# Patient Record
Sex: FEMALE | Race: OTHER | Employment: OTHER | URBAN - METROPOLITAN AREA
[De-identification: names, ages, dates, MRNs, and addresses within clinical notes are randomized per-mention and may not be internally consistent; named-entity substitution may affect disease eponyms.]

---

## 2018-10-21 ENCOUNTER — HOSPITAL ENCOUNTER (EMERGENCY)
Age: 53
Discharge: HOME | End: 2018-10-21
Attending: EMERGENCY MEDICINE
Payer: MEDICAID

## 2018-10-21 VITALS
SYSTOLIC BLOOD PRESSURE: 180 MMHG | TEMPERATURE: 98.3 F | OXYGEN SATURATION: 98 % | RESPIRATION RATE: 18 BRPM | HEART RATE: 86 BPM | DIASTOLIC BLOOD PRESSURE: 109 MMHG

## 2018-10-21 DIAGNOSIS — G40.909 SEIZURE DISORDER (HCC): ICD-10-CM

## 2018-10-21 DIAGNOSIS — R11.2 NAUSEA AND VOMITING, INTRACTABILITY OF VOMITING NOT SPECIFIED, UNSPECIFIED VOMITING TYPE: ICD-10-CM

## 2018-10-21 DIAGNOSIS — G43.011 INTRACTABLE MIGRAINE WITHOUT AURA AND WITH STATUS MIGRAINOSUS: Primary | ICD-10-CM

## 2018-10-21 LAB
ABSOLUTE EOS #: 0.06 K/UL (ref 0–0.44)
ABSOLUTE IMMATURE GRANULOCYTE: 0.03 K/UL (ref 0–0.3)
ABSOLUTE LYMPH #: 3.52 K/UL (ref 1.1–3.7)
ABSOLUTE MONO #: 1.04 K/UL (ref 0.1–1.2)
ALBUMIN SERPL-MCNC: 3.9 G/DL (ref 3.5–5.2)
ALBUMIN/GLOBULIN RATIO: 1.1 (ref 1–2.5)
ALP BLD-CCNC: 144 U/L (ref 35–104)
ALT SERPL-CCNC: 12 U/L (ref 5–33)
ANION GAP SERPL CALCULATED.3IONS-SCNC: 13 MMOL/L (ref 9–17)
AST SERPL-CCNC: 16 U/L
BASOPHILS # BLD: 0 % (ref 0–2)
BASOPHILS ABSOLUTE: 0.04 K/UL (ref 0–0.2)
BILIRUB SERPL-MCNC: 0.35 MG/DL (ref 0.3–1.2)
BILIRUBIN DIRECT: 0.09 MG/DL
BILIRUBIN, INDIRECT: 0.26 MG/DL (ref 0–1)
BUN BLDV-MCNC: 11 MG/DL (ref 6–20)
BUN/CREAT BLD: ABNORMAL (ref 9–20)
CALCIUM SERPL-MCNC: 9.3 MG/DL (ref 8.6–10.4)
CHLORIDE BLD-SCNC: 97 MMOL/L (ref 98–107)
CO2: 27 MMOL/L (ref 20–31)
CREAT SERPL-MCNC: 0.51 MG/DL (ref 0.5–0.9)
DIFFERENTIAL TYPE: ABNORMAL
EKG ATRIAL RATE: 76 BPM
EKG P AXIS: 62 DEGREES
EKG P-R INTERVAL: 170 MS
EKG Q-T INTERVAL: 416 MS
EKG QRS DURATION: 80 MS
EKG QTC CALCULATION (BAZETT): 468 MS
EKG R AXIS: 26 DEGREES
EKG T AXIS: 40 DEGREES
EKG VENTRICULAR RATE: 76 BPM
EOSINOPHILS RELATIVE PERCENT: 1 % (ref 1–4)
GFR AFRICAN AMERICAN: >60 ML/MIN
GFR NON-AFRICAN AMERICAN: >60 ML/MIN
GFR SERPL CREATININE-BSD FRML MDRD: ABNORMAL ML/MIN/{1.73_M2}
GFR SERPL CREATININE-BSD FRML MDRD: ABNORMAL ML/MIN/{1.73_M2}
GLOBULIN: ABNORMAL G/DL (ref 1.5–3.8)
GLUCOSE BLD-MCNC: 88 MG/DL (ref 70–99)
HCT VFR BLD CALC: 43.4 % (ref 36.3–47.1)
HEMOGLOBIN: 14.2 G/DL (ref 11.9–15.1)
IMMATURE GRANULOCYTES: 0 %
LIPASE: 50 U/L (ref 13–60)
LYMPHOCYTES # BLD: 32 % (ref 24–43)
MCH RBC QN AUTO: 27.5 PG (ref 25.2–33.5)
MCHC RBC AUTO-ENTMCNC: 32.7 G/DL (ref 28.4–34.8)
MCV RBC AUTO: 83.9 FL (ref 82.6–102.9)
MONOCYTES # BLD: 10 % (ref 3–12)
NRBC AUTOMATED: 0 PER 100 WBC
PDW BLD-RTO: 13.7 % (ref 11.8–14.4)
PHENYTOIN DATE LAST DOSE: ABNORMAL
PHENYTOIN DOSE AMOUNT: ABNORMAL
PHENYTOIN DOSE TIME: ABNORMAL
PHENYTOIN FREE: 1.4 UG/ML (ref 1–2)
PHENYTOIN LEVEL: 8.9 UG/ML (ref 10–20)
PLATELET # BLD: 361 K/UL (ref 138–453)
PLATELET ESTIMATE: ABNORMAL
PMV BLD AUTO: 8.8 FL (ref 8.1–13.5)
POC TROPONIN I: 0 NG/ML (ref 0–0.1)
POC TROPONIN I: 0.01 NG/ML (ref 0–0.1)
POC TROPONIN INTERP: NORMAL
POC TROPONIN INTERP: NORMAL
POTASSIUM SERPL-SCNC: 4.4 MMOL/L (ref 3.7–5.3)
RBC # BLD: 5.17 M/UL (ref 3.95–5.11)
RBC # BLD: ABNORMAL 10*6/UL
SEG NEUTROPHILS: 57 % (ref 36–65)
SEGMENTED NEUTROPHILS ABSOLUTE COUNT: 6.22 K/UL (ref 1.5–8.1)
SODIUM BLD-SCNC: 137 MMOL/L (ref 135–144)
TOTAL PROTEIN: 7.5 G/DL (ref 6.4–8.3)
WBC # BLD: 10.9 K/UL (ref 3.5–11.3)
WBC # BLD: ABNORMAL 10*3/UL

## 2018-10-21 PROCEDURE — 6360000002 HC RX W HCPCS: Performed by: STUDENT IN AN ORGANIZED HEALTH CARE EDUCATION/TRAINING PROGRAM

## 2018-10-21 PROCEDURE — 80186 ASSAY OF PHENYTOIN FREE: CPT

## 2018-10-21 PROCEDURE — 80048 BASIC METABOLIC PNL TOTAL CA: CPT

## 2018-10-21 PROCEDURE — 80185 ASSAY OF PHENYTOIN TOTAL: CPT

## 2018-10-21 PROCEDURE — 6370000000 HC RX 637 (ALT 250 FOR IP): Performed by: STUDENT IN AN ORGANIZED HEALTH CARE EDUCATION/TRAINING PROGRAM

## 2018-10-21 PROCEDURE — 80076 HEPATIC FUNCTION PANEL: CPT

## 2018-10-21 PROCEDURE — 83690 ASSAY OF LIPASE: CPT

## 2018-10-21 PROCEDURE — 2580000003 HC RX 258: Performed by: STUDENT IN AN ORGANIZED HEALTH CARE EDUCATION/TRAINING PROGRAM

## 2018-10-21 PROCEDURE — 96374 THER/PROPH/DIAG INJ IV PUSH: CPT

## 2018-10-21 PROCEDURE — 85025 COMPLETE CBC W/AUTO DIFF WBC: CPT

## 2018-10-21 PROCEDURE — 84484 ASSAY OF TROPONIN QUANT: CPT

## 2018-10-21 PROCEDURE — 93005 ELECTROCARDIOGRAM TRACING: CPT

## 2018-10-21 PROCEDURE — 99284 EMERGENCY DEPT VISIT MOD MDM: CPT

## 2018-10-21 RX ORDER — 0.9 % SODIUM CHLORIDE 0.9 %
500 INTRAVENOUS SOLUTION INTRAVENOUS ONCE
Status: COMPLETED | OUTPATIENT
Start: 2018-10-21 | End: 2018-10-21

## 2018-10-21 RX ORDER — PROMETHAZINE HYDROCHLORIDE 25 MG/ML
25 INJECTION, SOLUTION INTRAMUSCULAR; INTRAVENOUS ONCE
Status: COMPLETED | OUTPATIENT
Start: 2018-10-21 | End: 2018-10-21

## 2018-10-21 RX ORDER — DIPHENHYDRAMINE HCL 25 MG
25 TABLET ORAL ONCE
Status: COMPLETED | OUTPATIENT
Start: 2018-10-21 | End: 2018-10-21

## 2018-10-21 RX ADMIN — PROMETHAZINE HYDROCHLORIDE 25 MG: 25 INJECTION INTRAMUSCULAR; INTRAVENOUS at 10:21

## 2018-10-21 RX ADMIN — SODIUM CHLORIDE 500 ML: 9 INJECTION, SOLUTION INTRAVENOUS at 10:20

## 2018-10-21 RX ADMIN — DIPHENHYDRAMINE HCL 25 MG: 25 TABLET ORAL at 10:21

## 2018-10-21 ASSESSMENT — PAIN SCALES - GENERAL: PAINLEVEL_OUTOF10: 8

## 2018-10-21 ASSESSMENT — PAIN DESCRIPTION - LOCATION: LOCATION: HEAD

## 2018-10-21 ASSESSMENT — ENCOUNTER SYMPTOMS
RHINORRHEA: 0
DIARRHEA: 1
VOMITING: 1
NAUSEA: 1
SINUS PRESSURE: 0
PHOTOPHOBIA: 1
WHEEZING: 0
CONSTIPATION: 0
BLOOD IN STOOL: 0
COUGH: 0

## 2018-10-21 ASSESSMENT — PAIN DESCRIPTION - DESCRIPTORS: DESCRIPTORS: ACHING

## 2018-10-21 ASSESSMENT — PAIN DESCRIPTION - PAIN TYPE: TYPE: ACUTE PAIN

## 2018-10-21 ASSESSMENT — PAIN DESCRIPTION - FREQUENCY: FREQUENCY: CONTINUOUS

## 2018-10-21 NOTE — ED NOTES
Patient scheduled at [facility] on 10/30/18 at 11:00am to establish PCP.      Jose Linares, MSW, LSW

## 2018-10-21 NOTE — ED PROVIDER NOTES
troponin negative. Will follow up with second troponin. Point-of-care ultrasound of the gallbladder showed a non-thickened gallbladder wall, no Pericholecystic, no signs of gallbladder stones or sludge, and a negative Modi sign. Patient received Benadryl/Compazine and states her headache is starting to resolve. Currently 6/10 in pain. 11:53: Patient wants to go home. States her headache is 4/10. Will obtain second troponin. Phenytoin total 8.9. Phenytoin free 1.4. Likely discharge patient to home. Arranged ride for her to the [facility]. Asked social work to schedule her an appointment at [facility] clinic on [facility] to establish care. External referral to Neurology to establish care with local neurologist as patient just moved here from [place]. 12:14: Second Trop negative. Will discharge the patient home. Scheduled appointment at [facility] clinic on Oct 30th. External referral to Neurology. Patient states the pain has improved. FINAL IMPRESSION      1. Intractable migraine without aura and with status migrainosus    2. Nausea and vomiting, intractability of vomiting not specified, unspecified vomiting type    3. Seizure disorder [facility])          DISPOSITION/PLAN   DISPOSITION      Discharged home with scheduled follow up outpatient appointments. PATIENT REFERRED TO:  [facility]- Internal Medicine  [address]  P: [phone]  Go on 10/30/2018  Appointment at 11:00am to establish primary care doctor      DISCHARGE MEDICATIONS:  New Prescriptions    No medications on file     (Please note that portionsof this note were completed with a voice recognition program.  Efforts were made to edit the dictations but occasionally words are mis-transcribed.)  Ilda Edward MD  Internal Medicine Resident, PGY-1  [address], [place]  10/21/2018, 12:19 PM                Ilda Edward MD  Resident  10/21/18 [id number]

## 2018-10-22 ENCOUNTER — HOSPITAL ENCOUNTER (EMERGENCY)
Age: 53
Discharge: HOME | End: 2018-10-22
Attending: EMERGENCY MEDICINE
Payer: MEDICAID

## 2018-10-22 VITALS
DIASTOLIC BLOOD PRESSURE: 110 MMHG | TEMPERATURE: 98.2 F | HEART RATE: 89 BPM | SYSTOLIC BLOOD PRESSURE: 175 MMHG | OXYGEN SATURATION: 100 % | RESPIRATION RATE: 18 BRPM

## 2018-10-22 DIAGNOSIS — G43.809 OTHER MIGRAINE WITHOUT STATUS MIGRAINOSUS, NOT INTRACTABLE: Primary | ICD-10-CM

## 2018-10-22 PROCEDURE — 96374 THER/PROPH/DIAG INJ IV PUSH: CPT

## 2018-10-22 PROCEDURE — 2580000003 HC RX 258: Performed by: STUDENT IN AN ORGANIZED HEALTH CARE EDUCATION/TRAINING PROGRAM

## 2018-10-22 PROCEDURE — 96375 TX/PRO/DX INJ NEW DRUG ADDON: CPT

## 2018-10-22 PROCEDURE — 99283 EMERGENCY DEPT VISIT LOW MDM: CPT

## 2018-10-22 PROCEDURE — 6360000002 HC RX W HCPCS: Performed by: STUDENT IN AN ORGANIZED HEALTH CARE EDUCATION/TRAINING PROGRAM

## 2018-10-22 PROCEDURE — 6370000000 HC RX 637 (ALT 250 FOR IP): Performed by: STUDENT IN AN ORGANIZED HEALTH CARE EDUCATION/TRAINING PROGRAM

## 2018-10-22 RX ORDER — PHENYTOIN SODIUM 100 MG/1
100 CAPSULE, EXTENDED RELEASE ORAL ONCE
Status: COMPLETED | OUTPATIENT
Start: 2018-10-22 | End: 2018-10-22

## 2018-10-22 RX ORDER — 0.9 % SODIUM CHLORIDE 0.9 %
1000 INTRAVENOUS SOLUTION INTRAVENOUS ONCE
Status: COMPLETED | OUTPATIENT
Start: 2018-10-22 | End: 2018-10-22

## 2018-10-22 RX ORDER — DIVALPROEX SODIUM 500 MG/1
500 TABLET, EXTENDED RELEASE ORAL DAILY
Status: DISCONTINUED | OUTPATIENT
Start: 2018-10-22 | End: 2018-10-22 | Stop reason: HOSPADM

## 2018-10-22 RX ORDER — BUTALBITAL, ACETAMINOPHEN AND CAFFEINE 50; 325; 40 MG/1; MG/1; MG/1
2 TABLET ORAL ONCE
Status: COMPLETED | OUTPATIENT
Start: 2018-10-22 | End: 2018-10-22

## 2018-10-22 RX ORDER — DEXAMETHASONE SODIUM PHOSPHATE 10 MG/ML
10 INJECTION INTRAMUSCULAR; INTRAVENOUS ONCE
Status: COMPLETED | OUTPATIENT
Start: 2018-10-22 | End: 2018-10-22

## 2018-10-22 RX ORDER — DIPHENHYDRAMINE HCL 25 MG
25 TABLET ORAL ONCE
Status: COMPLETED | OUTPATIENT
Start: 2018-10-22 | End: 2018-10-22

## 2018-10-22 RX ADMIN — EXTENDED PHENYTOIN SODIUM 100 MG: 100 CAPSULE ORAL at 17:31

## 2018-10-22 RX ADMIN — BUTALBITAL, ACETAMINOPHEN, AND CAFFEINE 2 TABLET: 50; 325; 40 TABLET ORAL at 17:31

## 2018-10-22 RX ADMIN — PROCHLORPERAZINE EDISYLATE 10 MG: 5 INJECTION INTRAMUSCULAR; INTRAVENOUS at 16:49

## 2018-10-22 RX ADMIN — DEXAMETHASONE SODIUM PHOSPHATE 10 MG: 10 INJECTION INTRAMUSCULAR; INTRAVENOUS at 16:49

## 2018-10-22 RX ADMIN — SODIUM CHLORIDE 1000 ML: 9 INJECTION, SOLUTION INTRAVENOUS at 16:49

## 2018-10-22 RX ADMIN — DIPHENHYDRAMINE HCL 25 MG: 25 TABLET ORAL at 16:49

## 2018-10-22 ASSESSMENT — PAIN DESCRIPTION - FREQUENCY: FREQUENCY: CONTINUOUS

## 2018-10-22 ASSESSMENT — PAIN SCALES - GENERAL
PAINLEVEL_OUTOF10: 8
PAINLEVEL_OUTOF10: 8

## 2018-10-22 ASSESSMENT — PAIN DESCRIPTION - LOCATION: LOCATION: HEAD

## 2018-10-22 ASSESSMENT — PAIN DESCRIPTION - PAIN TYPE: TYPE: ACUTE PAIN

## 2018-10-22 NOTE — ED PROVIDER NOTES
dissection. Social History     Social History    Marital status:      Spouse name: N/A    Number of children: N/A    Years of education: N/A     Occupational History    Not on file. Social History Main Topics    Smoking status: Current Every Day Smoker     Packs/day: 0.50     Years: 12.00    Smokeless tobacco: Never Used    Alcohol use No    Drug use: No    Sexual activity: Not on file     Other Topics Concern    Not on file     Social History Narrative    No narrative on file       History reviewed. No pertinent family history. Allergies:  Bee venom; Bromide ion [bromine]; Flexeril [cyclobenzaprine]; Imitrex [sumatriptan]; Naproxen; Nsaids; Reglan [metoclopramide]; Sulfa antibiotics; Sulfadiazine; Toradol [ketorolac tromethamine]; and Tramadol    Home Medications:  Prior to Admission medications    Not on File       REVIEW OF SYSTEMS    (2-9 systems for level 4, 10 or more for level 5)      Review of Systems   Constitutional: Negative for chills, fatigue and fever. HENT: Negative for congestion and sore throat. Eyes: Negative for visual disturbance. Respiratory: Negative for cough and shortness of breath. Cardiovascular: Negative for chest pain. Gastrointestinal: Negative for abdominal pain, diarrhea, nausea and vomiting. Genitourinary: Negative for dysuria, flank pain and hematuria. Musculoskeletal: Negative for arthralgias, gait problem, neck pain and neck stiffness. Skin: Negative for rash and wound. Neurological: Positive for headaches. Negative for syncope, weakness, light-headedness and numbness. PHYSICAL EXAM   (up to 7 for level 4, 8 or more for level 5)      INITIAL VITALS:   BP (!) 175/110   Pulse 89   Temp 98.2 °F (36.8 °C) (Oral)   Resp 18   SpO2 100%     Physical Exam   Gen. Appearance: patient appears well, nondistressed. HEENT: head atraumatic, normocephalic. Pupils equal and reactive to light.  Oropharynx clear and moist.  Neck: Supple, normal range of motion. No lymphadenopathy. Pulmonary: Lungs clear to auscultation bilaterally. Equal air entry right left. Cardiovascular:  Heart sounds normal, no murmurs. Peripheral pulses strong, regular, equal.   Abdomen: Soft, nontender, nondistended. Bowel sounds normal. No palpable masses. Neurology: GCS 15. Oriented to person place and time.    -Cranial nerve exam:                        CN III, IV, VI: EOM normal                        CN V: facial sensation normal                        CN VII: normal facial expressions - symmetrical and normal eyebrow raise, eye closure, smile                        CN IX, X: uvula midline, symmetrical palate motion                        CN XI: normal symmetrical shoulder raise                        CN XII: normal tongue movement; no deviation  -Peripheral nerve exam: Normal tone and power in all 4 extremities. No sensory deficits. Skin: Warm, dry, well perfused    DIFFERENTIAL  DIAGNOSIS     PLAN (LABS / IMAGING / EKG):  No orders of the defined types were placed in this encounter. MEDICATIONS ORDERED:  Orders Placed This Encounter   Medications    prochlorperazine (COMPAZINE) injection 10 mg    diphenhydrAMINE (BENADRYL) tablet 25 mg    0.9 % sodium chloride bolus    dexamethasone (DECADRON) injection 10 mg    DISCONTD: divalproex (DEPAKOTE ER) extended release tablet 500 mg    phenytoin (DILANTIN) ER capsule 100 mg    butalbital-acetaminophen-caffeine (FIORICET, ESGIC) per tablet 2 tablet       DDX: migraine    DIAGNOSTIC RESULTS / EMERGENCY DEPARTMENT COURSE / MDM     LABS:  No results found for this visit on 10/22/18. IMPRESSION: 52-year-old female presents complaining of her typical migraine headache. Patient is afebrile, hemodynamically stable, and in no acute distress on arrival to the emergency department. Normal respiratory effort, lungs clear bilaterally, heart sounds normal, abdomen benign.   Thorough neurological examination benign. No elements of history or findings on examination to suggest infectious process; absolutely no findings of meningismus; very low clinical suspicion for CNS infection. No focal findings on exam to suggest intracranial pathology. No red flag element of patient's headache. Patient appears to have an ongoing migraine headache that is typical for her. Plan for Compazine, Benadryl, and dexamethasone to help prevent rebound headache. Will also give patient's home dose of Dilantin. Outpatient follow-up with PCP and neurologist.    Viola Osler:  None    EKG  None    All EKG's are interpreted by the Emergency Department Physician who either signs or Co-signs this chart in the absence of a cardiologist.    EMERGENCY DEPARTMENT COURSE:    Patient reports significant improvement after Compazine/Benadryl/dexamethasone, however states her symptoms are still present. Fioricet given with complete resolution of her symptoms. Dilantin given. Patient requesting discharge. I provided the patient with walk-in clinic information to establish a new primary care provider. She does plan to seek a primary care provider this week. I counseled her to return to the emergency department for any worsening symptoms, vision changes, numbness or tingling, focal weakness, slurred speech, vertigo symptoms, or for any other cares or concerns. Patient verbalized understanding and agreement with this plan. Discharged back to [facility] in stable condition and in no distress. PROCEDURES:  None    CONSULTS:  None    CRITICAL CARE:  None    FINAL IMPRESSION      1.  Other migraine without status migrainosus, not intractable          DISPOSITION / PLAN     DISPOSITION Decision To Discharge 10/22/2018 05:31:36 PM      PATIENT REFERRED TO:  Your Primary Care Provider    Schedule an appointment as soon as possible for a visit       [facility]  [address]  [address] [place] [id number]  [phone]    As

## 2018-10-24 ASSESSMENT — ENCOUNTER SYMPTOMS
ABDOMINAL PAIN: 0
COUGH: 0
SHORTNESS OF BREATH: 0
DIARRHEA: 0
NAUSEA: 0
VOMITING: 0
SORE THROAT: 0

## 2018-10-26 ENCOUNTER — HOSPITAL ENCOUNTER (OUTPATIENT)
Age: 53
Setting detail: OBSERVATION
Discharge: HOME | End: 2018-10-27
Attending: EMERGENCY MEDICINE | Admitting: EMERGENCY MEDICINE
Payer: MEDICAID

## 2018-10-26 ENCOUNTER — APPOINTMENT (OUTPATIENT)
Dept: GENERAL RADIOLOGY | Age: 53
End: 2018-10-26
Payer: MEDICAID

## 2018-10-26 ENCOUNTER — APPOINTMENT (OUTPATIENT)
Dept: CT IMAGING | Age: 53
End: 2018-10-26
Payer: MEDICAID

## 2018-10-26 DIAGNOSIS — R07.9 CHEST PAIN, UNSPECIFIED TYPE: Primary | ICD-10-CM

## 2018-10-26 LAB
ABSOLUTE EOS #: 0.05 K/UL (ref 0–0.44)
ABSOLUTE IMMATURE GRANULOCYTE: 0.08 K/UL (ref 0–0.3)
ABSOLUTE LYMPH #: 4.43 K/UL (ref 1.1–3.7)
ABSOLUTE MONO #: 0.97 K/UL (ref 0.1–1.2)
ANION GAP SERPL CALCULATED.3IONS-SCNC: 17 MMOL/L (ref 9–17)
BASOPHILS # BLD: 1 % (ref 0–2)
BASOPHILS ABSOLUTE: 0.06 K/UL (ref 0–0.2)
BNP INTERPRETATION: NORMAL
BUN BLDV-MCNC: 6 MG/DL (ref 6–20)
BUN/CREAT BLD: ABNORMAL (ref 9–20)
CALCIUM SERPL-MCNC: 9 MG/DL (ref 8.6–10.4)
CHLORIDE BLD-SCNC: 93 MMOL/L (ref 98–107)
CO2: 24 MMOL/L (ref 20–31)
CREAT SERPL-MCNC: 0.47 MG/DL (ref 0.5–0.9)
D-DIMER QUANTITATIVE: 0.52 MG/L FEU
DIFFERENTIAL TYPE: ABNORMAL
EOSINOPHILS RELATIVE PERCENT: 0 % (ref 1–4)
GFR AFRICAN AMERICAN: >60 ML/MIN
GFR NON-AFRICAN AMERICAN: >60 ML/MIN
GFR SERPL CREATININE-BSD FRML MDRD: ABNORMAL ML/MIN/{1.73_M2}
GFR SERPL CREATININE-BSD FRML MDRD: ABNORMAL ML/MIN/{1.73_M2}
GLUCOSE BLD-MCNC: 94 MG/DL (ref 70–99)
HCT VFR BLD CALC: 42.7 % (ref 36.3–47.1)
HEMOGLOBIN: 13.5 G/DL (ref 11.9–15.1)
IMMATURE GRANULOCYTES: 1 %
LYMPHOCYTES # BLD: 37 % (ref 24–43)
MCH RBC QN AUTO: 27.5 PG (ref 25.2–33.5)
MCHC RBC AUTO-ENTMCNC: 31.6 G/DL (ref 28.4–34.8)
MCV RBC AUTO: 87 FL (ref 82.6–102.9)
MONOCYTES # BLD: 8 % (ref 3–12)
NRBC AUTOMATED: 0 PER 100 WBC
PDW BLD-RTO: 14.1 % (ref 11.8–14.4)
PLATELET # BLD: 288 K/UL (ref 138–453)
PLATELET ESTIMATE: ABNORMAL
PMV BLD AUTO: 9 FL (ref 8.1–13.5)
POC TROPONIN I: 0 NG/ML (ref 0–0.1)
POC TROPONIN I: 0.01 NG/ML (ref 0–0.1)
POC TROPONIN INTERP: NORMAL
POC TROPONIN INTERP: NORMAL
POTASSIUM SERPL-SCNC: 3.9 MMOL/L (ref 3.7–5.3)
PRO-BNP: 84 PG/ML
RBC # BLD: 4.91 M/UL (ref 3.95–5.11)
RBC # BLD: ABNORMAL 10*6/UL
SEG NEUTROPHILS: 53 % (ref 36–65)
SEGMENTED NEUTROPHILS ABSOLUTE COUNT: 6.38 K/UL (ref 1.5–8.1)
SODIUM BLD-SCNC: 134 MMOL/L (ref 135–144)
WBC # BLD: 12 K/UL (ref 3.5–11.3)
WBC # BLD: ABNORMAL 10*3/UL

## 2018-10-26 PROCEDURE — G0378 HOSPITAL OBSERVATION PER HR: HCPCS

## 2018-10-26 PROCEDURE — 71260 CT THORAX DX C+: CPT

## 2018-10-26 PROCEDURE — 6370000000 HC RX 637 (ALT 250 FOR IP): Performed by: EMERGENCY MEDICINE

## 2018-10-26 PROCEDURE — 6360000002 HC RX W HCPCS: Performed by: EMERGENCY MEDICINE

## 2018-10-26 PROCEDURE — 96376 TX/PRO/DX INJ SAME DRUG ADON: CPT

## 2018-10-26 PROCEDURE — 96374 THER/PROPH/DIAG INJ IV PUSH: CPT

## 2018-10-26 PROCEDURE — 71046 X-RAY EXAM CHEST 2 VIEWS: CPT

## 2018-10-26 PROCEDURE — 2580000003 HC RX 258: Performed by: EMERGENCY MEDICINE

## 2018-10-26 PROCEDURE — 80048 BASIC METABOLIC PNL TOTAL CA: CPT

## 2018-10-26 PROCEDURE — 85379 FIBRIN DEGRADATION QUANT: CPT

## 2018-10-26 PROCEDURE — 93005 ELECTROCARDIOGRAM TRACING: CPT

## 2018-10-26 PROCEDURE — 85025 COMPLETE CBC W/AUTO DIFF WBC: CPT

## 2018-10-26 PROCEDURE — 99285 EMERGENCY DEPT VISIT HI MDM: CPT

## 2018-10-26 PROCEDURE — 96375 TX/PRO/DX INJ NEW DRUG ADDON: CPT

## 2018-10-26 PROCEDURE — 6360000004 HC RX CONTRAST MEDICATION: Performed by: EMERGENCY MEDICINE

## 2018-10-26 PROCEDURE — 84484 ASSAY OF TROPONIN QUANT: CPT

## 2018-10-26 PROCEDURE — 83880 ASSAY OF NATRIURETIC PEPTIDE: CPT

## 2018-10-26 RX ORDER — ACETAMINOPHEN 325 MG/1
650 TABLET ORAL EVERY 4 HOURS PRN
Status: DISCONTINUED | OUTPATIENT
Start: 2018-10-26 | End: 2018-10-27 | Stop reason: HOSPADM

## 2018-10-26 RX ORDER — ASPIRIN 81 MG/1
324 TABLET, CHEWABLE ORAL ONCE
Status: COMPLETED | OUTPATIENT
Start: 2018-10-26 | End: 2018-10-26

## 2018-10-26 RX ORDER — SODIUM CHLORIDE 9 MG/ML
INJECTION, SOLUTION INTRAVENOUS CONTINUOUS
Status: DISCONTINUED | OUTPATIENT
Start: 2018-10-27 | End: 2018-10-27

## 2018-10-26 RX ORDER — DIVALPROEX SODIUM 500 MG/1
500 TABLET, EXTENDED RELEASE ORAL DAILY
Status: DISCONTINUED | OUTPATIENT
Start: 2018-10-26 | End: 2018-10-27 | Stop reason: HOSPADM

## 2018-10-26 RX ORDER — ONDANSETRON 2 MG/ML
4 INJECTION INTRAMUSCULAR; INTRAVENOUS EVERY 8 HOURS PRN
Status: DISCONTINUED | OUTPATIENT
Start: 2018-10-26 | End: 2018-10-27 | Stop reason: HOSPADM

## 2018-10-26 RX ORDER — MORPHINE SULFATE 4 MG/ML
4 INJECTION, SOLUTION INTRAMUSCULAR; INTRAVENOUS ONCE
Status: COMPLETED | OUTPATIENT
Start: 2018-10-26 | End: 2018-10-26

## 2018-10-26 RX ORDER — MORPHINE SULFATE 4 MG/ML
4 INJECTION, SOLUTION INTRAMUSCULAR; INTRAVENOUS
Status: DISCONTINUED | OUTPATIENT
Start: 2018-10-26 | End: 2018-10-27

## 2018-10-26 RX ORDER — SODIUM CHLORIDE 0.9 % (FLUSH) 0.9 %
10 SYRINGE (ML) INJECTION PRN
Status: DISCONTINUED | OUTPATIENT
Start: 2018-10-26 | End: 2018-10-27 | Stop reason: HOSPADM

## 2018-10-26 RX ORDER — DIVALPROEX SODIUM 500 MG/1
500 TABLET, DELAYED RELEASE ORAL 3 TIMES DAILY
Status: ON HOLD | COMMUNITY
End: 2019-01-11 | Stop reason: HOSPADM

## 2018-10-26 RX ORDER — ONDANSETRON 2 MG/ML
4 INJECTION INTRAMUSCULAR; INTRAVENOUS ONCE
Status: COMPLETED | OUTPATIENT
Start: 2018-10-26 | End: 2018-10-26

## 2018-10-26 RX ORDER — PHENYTOIN SODIUM 100 MG/1
300 CAPSULE, EXTENDED RELEASE ORAL 3 TIMES DAILY
Status: ON HOLD | COMMUNITY
End: 2019-01-11 | Stop reason: HOSPADM

## 2018-10-26 RX ORDER — MONTELUKAST SODIUM 10 MG/1
10 TABLET ORAL NIGHTLY
Status: ON HOLD | COMMUNITY
End: 2019-01-12 | Stop reason: ALTCHOICE

## 2018-10-26 RX ORDER — PHENYTOIN SODIUM 100 MG/1
300 CAPSULE, EXTENDED RELEASE ORAL ONCE
Status: COMPLETED | OUTPATIENT
Start: 2018-10-26 | End: 2018-10-26

## 2018-10-26 RX ORDER — MORPHINE SULFATE 2 MG/ML
2 INJECTION, SOLUTION INTRAMUSCULAR; INTRAVENOUS
Status: DISCONTINUED | OUTPATIENT
Start: 2018-10-26 | End: 2018-10-27

## 2018-10-26 RX ORDER — SODIUM CHLORIDE 0.9 % (FLUSH) 0.9 %
10 SYRINGE (ML) INJECTION EVERY 12 HOURS SCHEDULED
Status: DISCONTINUED | OUTPATIENT
Start: 2018-10-26 | End: 2018-10-27 | Stop reason: HOSPADM

## 2018-10-26 RX ORDER — LEVOTHYROXINE SODIUM 0.05 MG/1
50 TABLET ORAL DAILY
COMMUNITY
End: 2019-10-15 | Stop reason: SDUPTHER

## 2018-10-26 RX ORDER — AMLODIPINE BESYLATE 5 MG/1
5 TABLET ORAL DAILY
Status: ON HOLD | COMMUNITY
End: 2019-01-15 | Stop reason: HOSPADM

## 2018-10-26 RX ORDER — PHENOBARBITAL 30 MG/1
100 TABLET ORAL 3 TIMES DAILY
Status: ON HOLD | COMMUNITY
End: 2019-01-11 | Stop reason: HOSPADM

## 2018-10-26 RX ORDER — FENTANYL CITRATE 50 UG/ML
50 INJECTION, SOLUTION INTRAMUSCULAR; INTRAVENOUS ONCE
Status: COMPLETED | OUTPATIENT
Start: 2018-10-26 | End: 2018-10-26

## 2018-10-26 RX ORDER — BUTALBITAL, ACETAMINOPHEN AND CAFFEINE 50; 325; 40 MG/1; MG/1; MG/1
2 TABLET ORAL EVERY 4 HOURS PRN
Status: ON HOLD | COMMUNITY
End: 2019-01-12 | Stop reason: ALTCHOICE

## 2018-10-26 RX ADMIN — MORPHINE SULFATE 4 MG: 4 INJECTION INTRAVENOUS at 17:32

## 2018-10-26 RX ADMIN — SODIUM CHLORIDE: 9 INJECTION, SOLUTION INTRAVENOUS at 23:12

## 2018-10-26 RX ADMIN — IOPAMIDOL 75 ML: 755 INJECTION, SOLUTION INTRAVENOUS at 18:35

## 2018-10-26 RX ADMIN — DIVALPROEX SODIUM 500 MG: 500 TABLET, EXTENDED RELEASE ORAL at 23:11

## 2018-10-26 RX ADMIN — FENTANYL CITRATE 50 MCG: 50 INJECTION INTRAMUSCULAR; INTRAVENOUS at 18:53

## 2018-10-26 RX ADMIN — EXTENDED PHENYTOIN SODIUM 300 MG: 100 CAPSULE ORAL at 23:11

## 2018-10-26 RX ADMIN — ONDANSETRON 4 MG: 2 INJECTION INTRAMUSCULAR; INTRAVENOUS at 17:32

## 2018-10-26 RX ADMIN — MORPHINE SULFATE 4 MG: 4 INJECTION INTRAVENOUS at 23:11

## 2018-10-26 RX ADMIN — Medication 10 ML: at 23:11

## 2018-10-26 RX ADMIN — ONDANSETRON 4 MG: 2 INJECTION INTRAMUSCULAR; INTRAVENOUS at 20:07

## 2018-10-26 RX ADMIN — ASPIRIN 81 MG 324 MG: 81 TABLET ORAL at 17:00

## 2018-10-26 ASSESSMENT — PAIN DESCRIPTION - FREQUENCY: FREQUENCY: CONTINUOUS

## 2018-10-26 ASSESSMENT — ENCOUNTER SYMPTOMS
EYE PAIN: 0
SHORTNESS OF BREATH: 1
NAUSEA: 1
VOMITING: 0
RHINORRHEA: 0
COUGH: 0
ABDOMINAL PAIN: 0
DIARRHEA: 0
CONSTIPATION: 0

## 2018-10-26 ASSESSMENT — HEART SCORE: ECG: 0

## 2018-10-26 ASSESSMENT — PAIN DESCRIPTION - ORIENTATION
ORIENTATION: LEFT;LOWER
ORIENTATION: LEFT;MID

## 2018-10-26 ASSESSMENT — PAIN DESCRIPTION - LOCATION
LOCATION: CHEST
LOCATION: CHEST

## 2018-10-26 ASSESSMENT — PAIN SCALES - GENERAL
PAINLEVEL_OUTOF10: 9
PAINLEVEL_OUTOF10: 5
PAINLEVEL_OUTOF10: 9
PAINLEVEL_OUTOF10: 6
PAINLEVEL_OUTOF10: 8

## 2018-10-26 ASSESSMENT — PAIN DESCRIPTION - DESCRIPTORS: DESCRIPTORS: PRESSURE

## 2018-10-26 ASSESSMENT — PAIN DESCRIPTION - PAIN TYPE
TYPE: ACUTE PAIN
TYPE: ACUTE PAIN

## 2018-10-26 NOTE — H&P
CC: L sided Chest pain     HPI:  Joni Burkett, 51 yo. F presents to the ED w/ progressive intermittent L sided chest pain x 1 day. Pain radiates up R arm and jaw. Pain 8/10. Associated with nausea, headache and SOB. Patient took 3 dose of nitro and 1 baby aspirin at home yesterday with no relief. Patient took 1 dose of Nitro today prior to coming to ED with no relief. Patient has h/o HTN. In February patient had cardiac cath performed was advised to receive several stents but did not follow through with procedure. Patient denies diaphoresis, fever, chills, vomiting.

## 2018-10-26 NOTE — ED TRIAGE NOTES
Pt. Arrives to ED via private auto for c/o left sided chest pain x2  Days. Pt .reports she took aspirin yesterday and SL nitro, took 3 nitros today last dose approximately 30 minutes PTA without relief. Pt. States that she has been evaluated by a cardiologist in [place] for known blockages but has been unable to follow up since relocating here 09/2018. Pt. States she has been nauseous but denies vomiting.

## 2018-10-26 NOTE — ED PROVIDER NOTES
[facility] ED  Emergency Department Encounter  EmergencyMedicine Resident     Pt Name:Aleta Hernandez  MRN: [id number]  [place] 1965  Date of evaluation: 10/26/18  PCP:  No primary care provider on file. CHIEF COMPLAINT       Chief Complaint   Patient presents with    Chest Pain     x2 days, pt. took 3 SL nitro at home without relief. pt. has known blockage no cardiologist        HISTORY OF PRESENT ILLNESS  (Location/Symptom, Timing/Onset, Context/Setting, Quality, Duration, Modifying Factors, Severity.)      Peter Adams is a 52 y.o. female who presents presents to the ED w/ progressive intermittent L sided chest pain x 1 day. Pain radiates up R arm and jaw. Pain 8/10. Associated with nausea, headache and SOB. Patient took 3 dose of nitro and 1 baby aspirin at home yesterday with no relief. Patient took 1 dose of Nitro today prior to coming to ED with no relief. Patient has h/o HTN. In February patient [place] cardiac cath [place] was advised to receive several stents but did not follow through with procedure. Patient denies diaphoresis, fever, chills, vomiting. HPI written by medical student and edited by myself    PAST MEDICAL / SURGICAL / SOCIAL / FAMILY HISTORY      has a past medical history of CHF (congestive heart failure) ([address] COPD (chronic obstructive pulmonary disease) ([address] Headache; Neck fracture ([address] Seizure ([address] and Thyroid disease. has a past surgical history that includes knee surgery; partial hysterectomy (cervix not removed); and lymph node dissection. Social History     Social History    Marital status:      Spouse name: N/A    Number of children: N/A    Years of education: N/A     Occupational History    Not on file.      Social History Main Topics    Smoking status: Current Every Day Smoker     Packs/day: 0.50     Years: 12.00    Smokeless tobacco: Never Used    Alcohol use No    Drug use: No    Sexual activity: Not on %    Monocytes 8 3 - 12 %    Eosinophils % 0 (L) 1 - 4 %    Basophils 1 0 - 2 %    Immature Granulocytes 1 (H) 0 %    Segs Absolute 6.38 1.50 - 8.10 k/uL    Absolute Lymph # 4.43 (H) 1.10 - 3.70 k/uL    Absolute Mono # 0.97 0.10 - 1.20 k/uL    Absolute Eos # 0.05 0.00 - 0.44 k/uL    Basophils # 0.06 0.00 - 0.20 k/uL    Absolute Immature Granulocyte 0.08 0.00 - 0.30 k/uL   Basic Metabolic Panel   Result Value Ref Range    Glucose 94 70 - 99 mg/dL    BUN 6 6 - 20 mg/dL    CREATININE 0.47 (L) 0.50 - 0.90 mg/dL    Bun/Cre Ratio NOT REPORTED 9 - 20    Calcium 9.0 8.6 - 10.4 mg/dL    Sodium 134 (L) 135 - 144 mmol/L    Potassium 3.9 3.7 - 5.3 mmol/L    Chloride 93 (L) 98 - 107 mmol/L    CO2 24 20 - 31 mmol/L    Anion Gap 17 9 - 17 mmol/L    GFR Non-African American >60 >60 mL/min    GFR African American >60 >60 mL/min    GFR Comment          GFR Staging NOT REPORTED    D-Dimer, Quantitative   Result Value Ref Range    D-Dimer, Quant 0.52 mg/L FEU   Brain Natriuretic Peptide   Result Value Ref Range    Pro-BNP 84 <300 pg/mL    BNP Interpretation         POCT troponin   Result Value Ref Range    POC Troponin I 0.01 0.00 - 0.10 ng/mL    POC Troponin Interp       The Troponin-I (POC) results cannot be compared to the Troponin-T results. POCT troponin   Result Value Ref Range    POC Troponin I 0.00 0.00 - 0.10 ng/mL    POC Troponin Interp       The Troponin-I (POC) results cannot be compared to the Troponin-T results. IMPRESSION: Patient evaluated by myself and attending physician. Patient appears uncomfortable but no acute distress. Vital signs normal on arrival other slight hypertension. Afebrile. Heart normal rate and rhythm. Lungs auscultation bilaterally. We'll continue cardiac workup and d-dimer as patient recently traveled from [place] and states the pain is worse with deep breathing. We'll continue with cardiac workup, d-dimer, and symptomatic management. We'll give full dose aspirin.     RADIOLOGY:  Xr Chest Standard (2 Vw)    Result Date: 10/26/2018  EXAMINATION: TWO VIEWS OF THE CHEST 10/26/2018 5:30 pm COMPARISON: None. HISTORY: ORDERING SYSTEM PROVIDED HISTORY: chest pain TECHNOLOGIST PROVIDED HISTORY: chest pain FINDINGS: Frontal and lateral views of the chest are submitted for review. The cardiac silhouette is normal in size. Lung parenchyma is clear without focal airspace consolidation, sizeable pleural effusion, or pneumothorax. Trachea is midline. Osseous structures and soft tissues are grossly intact. No acute cardiopulmonary pathology. Ct Chest Pulmonary Embolism W Contrast    Result Date: 10/26/2018  EXAMINATION: CTA OF THE CHEST 10/26/2018 6:38 pm TECHNIQUE: CTA of the chest was performed after the administration of intravenous contrast.  Multiplanar reformatted images are provided for review. MIP images are provided for review. Dose modulation, iterative reconstruction, and/or weight based adjustment of the mA/kV was utilized to reduce the radiation dose to as low as reasonably achievable. COMPARISON: None. HISTORY: ORDERING SYSTEM PROVIDED HISTORY: elevated ddimer FINDINGS: Pulmonary Arteries: Pulmonary arteries are adequately opacified for evaluation. No evidence of intraluminal filling defect to suggest pulmonary embolism. Main pulmonary artery is normal in caliber. Mediastinum: No evidence of mediastinal lymphadenopathy. The heart and pericardium demonstrate no acute abnormality. There is no acute abnormality of the thoracic aorta. Lungs/pleura: The lungs are without acute process. No focal consolidation or pulmonary edema. No evidence of pleural effusion or pneumothorax. Upper Abdomen: Limited images of the upper abdomen are unremarkable. Soft Tissues/Bones: No acute bone or soft tissue abnormality. No evidence of pulmonary embolism or acute pulmonary abnormality.        EKG  EKG Interpretation    Interpreted by me    Rhythm: normal sinus   Rate: normal  Axis: Resident    (Please note that portions of thisnote were completed with a voice recognition program.  Efforts were made to edit the dictations but occasionally words are mis-transcribed.)      Ramez Emmanuel DO  10/26/18 1333

## 2018-10-27 ENCOUNTER — APPOINTMENT (OUTPATIENT)
Dept: NUCLEAR MEDICINE | Age: 53
End: 2018-10-27
Payer: MEDICAID

## 2018-10-27 VITALS
DIASTOLIC BLOOD PRESSURE: 80 MMHG | SYSTOLIC BLOOD PRESSURE: 126 MMHG | RESPIRATION RATE: 16 BRPM | HEART RATE: 76 BPM | OXYGEN SATURATION: 95 % | HEIGHT: 62 IN | BODY MASS INDEX: 31.45 KG/M2 | TEMPERATURE: 98.2 F | WEIGHT: 170.9 LBS

## 2018-10-27 LAB
EKG ATRIAL RATE: 69 BPM
EKG ATRIAL RATE: 74 BPM
EKG ATRIAL RATE: 87 BPM
EKG P AXIS: 61 DEGREES
EKG P AXIS: 61 DEGREES
EKG P AXIS: 66 DEGREES
EKG P-R INTERVAL: 158 MS
EKG P-R INTERVAL: 174 MS
EKG P-R INTERVAL: 182 MS
EKG Q-T INTERVAL: 380 MS
EKG Q-T INTERVAL: 414 MS
EKG Q-T INTERVAL: 432 MS
EKG QRS DURATION: 80 MS
EKG QRS DURATION: 80 MS
EKG QRS DURATION: 82 MS
EKG QTC CALCULATION (BAZETT): 457 MS
EKG QTC CALCULATION (BAZETT): 459 MS
EKG QTC CALCULATION (BAZETT): 462 MS
EKG R AXIS: 19 DEGREES
EKG R AXIS: 24 DEGREES
EKG R AXIS: 28 DEGREES
EKG T AXIS: 36 DEGREES
EKG T AXIS: 41 DEGREES
EKG T AXIS: 48 DEGREES
EKG VENTRICULAR RATE: 69 BPM
EKG VENTRICULAR RATE: 74 BPM
EKG VENTRICULAR RATE: 87 BPM
LV EF: 77 %
LVEF MODALITY: NORMAL

## 2018-10-27 PROCEDURE — 78452 HT MUSCLE IMAGE SPECT MULT: CPT

## 2018-10-27 PROCEDURE — G0378 HOSPITAL OBSERVATION PER HR: HCPCS

## 2018-10-27 PROCEDURE — A9500 TC99M SESTAMIBI: HCPCS | Performed by: INTERNAL MEDICINE

## 2018-10-27 PROCEDURE — 3430000000 HC RX DIAGNOSTIC RADIOPHARMACEUTICAL: Performed by: INTERNAL MEDICINE

## 2018-10-27 PROCEDURE — 6370000000 HC RX 637 (ALT 250 FOR IP): Performed by: EMERGENCY MEDICINE

## 2018-10-27 PROCEDURE — 96376 TX/PRO/DX INJ SAME DRUG ADON: CPT

## 2018-10-27 PROCEDURE — 6360000002 HC RX W HCPCS: Performed by: INTERNAL MEDICINE

## 2018-10-27 PROCEDURE — 93005 ELECTROCARDIOGRAM TRACING: CPT

## 2018-10-27 PROCEDURE — 2580000003 HC RX 258: Performed by: INTERNAL MEDICINE

## 2018-10-27 PROCEDURE — 2580000003 HC RX 258: Performed by: EMERGENCY MEDICINE

## 2018-10-27 PROCEDURE — 6360000002 HC RX W HCPCS: Performed by: EMERGENCY MEDICINE

## 2018-10-27 PROCEDURE — 93017 CV STRESS TEST TRACING ONLY: CPT | Performed by: NURSE PRACTITIONER

## 2018-10-27 RX ORDER — AMINOPHYLLINE DIHYDRATE 25 MG/ML
100 INJECTION, SOLUTION INTRAVENOUS
Status: DISCONTINUED | OUTPATIENT
Start: 2018-10-27 | End: 2018-10-27

## 2018-10-27 RX ORDER — NITROGLYCERIN 0.4 MG/1
0.4 TABLET SUBLINGUAL EVERY 5 MIN PRN
Status: DISCONTINUED | OUTPATIENT
Start: 2018-10-27 | End: 2018-10-27

## 2018-10-27 RX ORDER — METOPROLOL TARTRATE 5 MG/5ML
2.5 INJECTION INTRAVENOUS PRN
Status: DISCONTINUED | OUTPATIENT
Start: 2018-10-27 | End: 2018-10-27

## 2018-10-27 RX ORDER — SODIUM CHLORIDE 0.9 % (FLUSH) 0.9 %
10 SYRINGE (ML) INJECTION PRN
Status: DISCONTINUED | OUTPATIENT
Start: 2018-10-27 | End: 2018-10-27

## 2018-10-27 RX ORDER — SODIUM CHLORIDE 9 MG/ML
INJECTION, SOLUTION INTRAVENOUS ONCE
Status: COMPLETED | OUTPATIENT
Start: 2018-10-27 | End: 2018-10-27

## 2018-10-27 RX ADMIN — ONDANSETRON 4 MG: 2 INJECTION INTRAMUSCULAR; INTRAVENOUS at 14:01

## 2018-10-27 RX ADMIN — Medication 10 ML: at 12:40

## 2018-10-27 RX ADMIN — REGADENOSON 0.4 MG: 0.08 INJECTION, SOLUTION INTRAVENOUS at 10:33

## 2018-10-27 RX ADMIN — DIVALPROEX SODIUM 500 MG: 500 TABLET, EXTENDED RELEASE ORAL at 08:20

## 2018-10-27 RX ADMIN — MORPHINE SULFATE 2 MG: 2 INJECTION, SOLUTION INTRAMUSCULAR; INTRAVENOUS at 08:27

## 2018-10-27 RX ADMIN — MORPHINE SULFATE 2 MG: 2 INJECTION, SOLUTION INTRAMUSCULAR; INTRAVENOUS at 14:01

## 2018-10-27 RX ADMIN — SODIUM CHLORIDE: 9 INJECTION, SOLUTION INTRAVENOUS at 10:03

## 2018-10-27 RX ADMIN — TETRAKIS(2-METHOXYISOBUTYLISOCYANIDE)COPPER(I) TETRAFLUOROBORATE 11.1 MILLICURIE: 1 INJECTION, POWDER, LYOPHILIZED, FOR SOLUTION INTRAVENOUS at 13:35

## 2018-10-27 RX ADMIN — MORPHINE SULFATE 4 MG: 4 INJECTION INTRAVENOUS at 02:36

## 2018-10-27 RX ADMIN — MORPHINE SULFATE 4 MG: 4 INJECTION INTRAVENOUS at 04:56

## 2018-10-27 RX ADMIN — TETRAKIS(2-METHOXYISOBUTYLISOCYANIDE)COPPER(I) TETRAFLUOROBORATE 39.1 MILLICURIE: 1 INJECTION, POWDER, LYOPHILIZED, FOR SOLUTION INTRAVENOUS at 13:34

## 2018-10-27 RX ADMIN — ONDANSETRON 4 MG: 2 INJECTION INTRAMUSCULAR; INTRAVENOUS at 04:56

## 2018-10-27 RX ADMIN — Medication 10 ML: at 10:03

## 2018-10-27 ASSESSMENT — PAIN SCALES - GENERAL
PAINLEVEL_OUTOF10: 7
PAINLEVEL_OUTOF10: 7
PAINLEVEL_OUTOF10: 5
PAINLEVEL_OUTOF10: 5
PAINLEVEL_OUTOF10: 6
PAINLEVEL_OUTOF10: 6

## 2018-10-27 ASSESSMENT — PAIN DESCRIPTION - LOCATION: LOCATION: CHEST

## 2018-10-27 ASSESSMENT — PAIN DESCRIPTION - DESCRIPTORS: DESCRIPTORS: DISCOMFORT

## 2018-10-27 ASSESSMENT — PAIN DESCRIPTION - FREQUENCY: FREQUENCY: INTERMITTENT

## 2018-10-27 ASSESSMENT — PAIN DESCRIPTION - PAIN TYPE: TYPE: ACUTE PAIN

## 2018-10-27 NOTE — CONSULTS
congestion or hives. PHYSICAL EXAM:    Physical Examination:    BP (!) 168/108   Pulse 81   Temp 98.8 °F (37.1 °C) (Oral)   Resp 16   Ht 5' 2\" (1.575 m)   Wt 170 lb 14.4 oz (77.5 kg)   SpO2 95%   BMI 31.26 kg/m²    Constitutional and General Appearance: alert, cooperative, no distress and appears stated age  HEENT: PERRL, no cervical lymphadenopathy. No masses palpable. Normal oral mucosa  Respiratory:  · Normal excursion and expansion without use of accessory muscles  · Resp Auscultation: Good respiratory effort. No for increased work of breathing. On auscultation: clear to auscultation bilaterally  Cardiovascular:  · The apical impulse is not displaced  · Heart tones are crisp and normal. regular S1 and S2. Murmurs:  · Jugular venous pulsation Normal  · The carotid upstroke is normal in amplitude and contour without delay or bruit  · Peripheral pulses are symmetrical and full   Abdomen:  · No masses or tenderness  · Bowel sounds present  Extremities:  ·  No Cyanosis or Clubbing  ·  Lower extremity edema: Yes  ·  Skin: Warm and dry  Neurological:  · Alert and oriented. · Moves all extremities well  · No abnormalities of mood, affect, memory, mentation, or behavior are noted    DATA:    Diagnostics:      EKG: normal EKG, normal sinus rhythm, nonspecific ST and T waves changes, unchanged from previous tracings. Labs:     CBC:   Recent Labs      10/26/18   1702   WBC  12.0*   HGB  13.5   HCT  42.7   PLT  288     BMP:   Recent Labs      10/26/18   1702   NA  134*   K  3.9   CO2  24   BUN  6   CREATININE  0.47*   LABGLOM  >60   GLUCOSE  94     CARDIAC ENZYMES:  Recent Labs      10/26/18   1656  10/26/18   1851   TROPONINI  0.01  0.00           Patient Active Problem List   Diagnosis    Chest pain     IMPRESSION &  RECOMMENDATIONS:    1. Atypical chest pain. Possibly ACS. Has significant risk factors. Follow troponins. EKG unremarkable. Acute MI can be ruled out by serial troponins.  Check stress test. Further recommendations post testing. Check lipids, CMP, A1c.    2. Aggressive lifestyle and risk factor modifications discussed extensively with patient. 3. Further recs after above testing. Discussed with patient and nursing.     Electronically signed by Gerry Mo DO on 10/27/2018 at 8:07 AM.  [place] cardiology Consultant

## 2018-10-27 NOTE — H&P
None    REVIEW OF SYSTEMS       General ROS - No fevers, No malaise, diaphoresis  Ophthalmic ROS - No discharge, No changes in vision  ENT ROS -  No sore throat, No rhinorrhea,   Respiratory ROS - no shortness of breath, no cough, no  wheezing  Cardiovascular ROS - chest pain, no dyspnea on exertion  Gastrointestinal ROS - No abdominal pain, nausea, no vomiting, no change in bowel habits, no black or bloody stools  Genito-Urinary ROS - No dysuria, trouble voiding, or hematuria  Musculoskeletal ROS - No myalgias, No arthalgias  Neurological ROS - No headache, no dizziness/lightheadedness, No focal weakness, no loss of sensation  Dermatological ROS - No lesions, No rash     (PQRS) Advance directives on face sheet per hospital policy. No change unless specifically mentioned in chart    PAST MEDICAL HISTORY    has a past medical history of CHF (congestive heart failure) ([facility] COPD (chronic obstructive pulmonary disease) ([facility] Headache; Neck fracture ([facility] Seizure ([facility] and Thyroid disease. I have reviewed the past medical history with the patient and it is pertinent to this complaint. SURGICAL HISTORY      has a past surgical history that includes knee surgery; partial hysterectomy (cervix not removed); and lymph node dissection. I have reviewed and agree with Surgical History entered and it is not pertinent to this complaint. CURRENT MEDICATIONS         divalproex (DEPAKOTE ER) extended release tablet 500 mg Daily   sodium chloride flush 0.9 % injection 10 mL 2 times per day   sodium chloride flush 0.9 % injection 10 mL PRN   acetaminophen (TYLENOL) tablet 650 mg Q4H PRN   0.9 % sodium chloride infusion Continuous   ondansetron (ZOFRAN) injection 4 mg Q8H PRN   morphine injection 2 mg Q2H PRN   Or    morphine (PF) injection 4 mg Q2H PRN   influenza quadrivalent split vaccine (FLUZONE;FLUARIX;FLULAVAL;AFLURIA) injection 0.5 mL Once       All medication charted and reviewed.     ALLERGIES     is allergic Observation Physician who either signs or Co-signs this chart in the absence of a cardiologist.    EKG Interpretation    Interpreted by me    Rhythm: normal sinus   Rate: normal  Axis: normal  Ectopy: none  Conduction: normal  ST Segments: no acute change  T Waves: no acute change  Q Waves: none    Clinical Impression: no acute changes and normal EKG    Alinda Block, DO      RADIOLOGY:   I directly visualized the following  images and reviewed the radiologist interpretations:    Xr Chest Standard (2 Vw)    Result Date: 10/26/2018  EXAMINATION: TWO VIEWS OF THE CHEST 10/26/2018 5:30 pm COMPARISON: None. HISTORY: ORDERING SYSTEM PROVIDED HISTORY: chest pain TECHNOLOGIST PROVIDED HISTORY: chest pain FINDINGS: Frontal and lateral views of the chest are submitted for review. The cardiac silhouette is normal in size. Lung parenchyma is clear without focal airspace consolidation, sizeable pleural effusion, or pneumothorax. Trachea is midline. Osseous structures and soft tissues are grossly intact. No acute cardiopulmonary pathology. Ct Chest Pulmonary Embolism W Contrast    Result Date: 10/26/2018  EXAMINATION: CTA OF THE CHEST 10/26/2018 6:38 pm TECHNIQUE: CTA of the chest was performed after the administration of intravenous contrast.  Multiplanar reformatted images are provided for review. MIP images are provided for review. Dose modulation, iterative reconstruction, and/or weight based adjustment of the mA/kV was utilized to reduce the radiation dose to as low as reasonably achievable. COMPARISON: None. HISTORY: ORDERING SYSTEM PROVIDED HISTORY: elevated ddimer FINDINGS: Pulmonary Arteries: Pulmonary arteries are adequately opacified for evaluation. No evidence of intraluminal filling defect to suggest pulmonary embolism. Main pulmonary artery is normal in caliber. Mediastinum: No evidence of mediastinal lymphadenopathy. The heart and pericardium demonstrate no acute abnormality.   There is no acute

## 2018-10-28 NOTE — DISCHARGE SUMMARY
appears stated age    HEAD: normocephalic, atraumatic   EYES: PERRLA, EOMI    ENT: moist mucous membranes, uvula midline   NECK: supple, symmetric   BACK: symmetric   LUNGS: clear to auscultation bilaterally   CARDIOVASCULAR: regular rate and rhythm, no murmurs, rubs or gallops, non-reproducible chest pain   ABDOMEN: soft, non-tender, non-distended with normal active bowel sounds   NEUROLOGIC:  MAEx4, no focal sensory or motor deficits   MUSCULOSKELETAL: no clubbing, cyanosis or edema, no calf pain   SKIN: no rash or wounds           Hospital Course:  Clinical course has improved, labs and imaging reviewed. Peter Adams originally presented to the hospital on 10/26/2018  4:32 PM. with Acute left-sided chest pain due to unknown etiology. At that time it was determined that She required further observation and cardiology consult and stress test. She was admitted and labs and imaging were followed daily. Imaging results as above. She is medically stable to be discharged. Disposition: Home    Patient stated that they will not drive themselves home from the hospital if they have gotten pain killers/ narcotics earlier that day and that they will arrange for transportation on their own or work with the  for a ride. Patient counseled NOT to drive while under the influence of narcotics/ pain killers. Condition: Good    Patient stable and ready for discharge home. I have discussed plan of care with patient and they are in understanding. They were instructed to read discharge paperwork. All of their questions and concerns were addressed. Time Spent: 0 day      --  Anabel Patterson DO  Emergency Medicine Resident Physician    This dictation was generated by voice recognition computer software. Although all attempts are made to edit the dictation for accuracy, there may be errors in the transcription that are not intended.

## 2018-10-30 ENCOUNTER — HOSPITAL ENCOUNTER (OUTPATIENT)
Age: 53
Setting detail: OBSERVATION
Discharge: HOME | End: 2018-10-31
Attending: EMERGENCY MEDICINE | Admitting: PSYCHIATRY & NEUROLOGY
Payer: MEDICAID

## 2018-10-30 DIAGNOSIS — G43.001 MIGRAINE WITHOUT AURA AND WITH STATUS MIGRAINOSUS, NOT INTRACTABLE: Primary | ICD-10-CM

## 2018-10-30 DIAGNOSIS — G43.809 MIGRAINE VARIANT WITH HEADACHE: ICD-10-CM

## 2018-10-30 PROCEDURE — 96365 THER/PROPH/DIAG IV INF INIT: CPT

## 2018-10-30 PROCEDURE — 2580000003 HC RX 258: Performed by: STUDENT IN AN ORGANIZED HEALTH CARE EDUCATION/TRAINING PROGRAM

## 2018-10-30 PROCEDURE — 96372 THER/PROPH/DIAG INJ SC/IM: CPT

## 2018-10-30 PROCEDURE — 96375 TX/PRO/DX INJ NEW DRUG ADDON: CPT

## 2018-10-30 PROCEDURE — 6360000002 HC RX W HCPCS: Performed by: STUDENT IN AN ORGANIZED HEALTH CARE EDUCATION/TRAINING PROGRAM

## 2018-10-30 PROCEDURE — 99284 EMERGENCY DEPT VISIT MOD MDM: CPT

## 2018-10-30 PROCEDURE — 6360000002 HC RX W HCPCS: Performed by: EMERGENCY MEDICINE

## 2018-10-30 PROCEDURE — 2580000003 HC RX 258: Performed by: EMERGENCY MEDICINE

## 2018-10-30 PROCEDURE — G0378 HOSPITAL OBSERVATION PER HR: HCPCS

## 2018-10-30 PROCEDURE — 6370000000 HC RX 637 (ALT 250 FOR IP): Performed by: EMERGENCY MEDICINE

## 2018-10-30 PROCEDURE — 96374 THER/PROPH/DIAG INJ IV PUSH: CPT

## 2018-10-30 RX ORDER — SODIUM CHLORIDE 0.9 % (FLUSH) 0.9 %
10 SYRINGE (ML) INJECTION PRN
Status: DISCONTINUED | OUTPATIENT
Start: 2018-10-30 | End: 2018-10-31 | Stop reason: HOSPADM

## 2018-10-30 RX ORDER — PHENYTOIN SODIUM 300 MG/1
300 CAPSULE, EXTENDED RELEASE ORAL 3 TIMES DAILY
Status: DISCONTINUED | OUTPATIENT
Start: 2018-10-30 | End: 2018-10-31 | Stop reason: HOSPADM

## 2018-10-30 RX ORDER — DIPHENHYDRAMINE HYDROCHLORIDE 50 MG/ML
25 INJECTION INTRAMUSCULAR; INTRAVENOUS ONCE
Status: DISCONTINUED | OUTPATIENT
Start: 2018-10-30 | End: 2018-10-30

## 2018-10-30 RX ORDER — ACETAMINOPHEN 325 MG/1
650 TABLET ORAL EVERY 4 HOURS PRN
Status: DISCONTINUED | OUTPATIENT
Start: 2018-10-30 | End: 2018-10-31

## 2018-10-30 RX ORDER — DIVALPROEX SODIUM 500 MG/1
500 TABLET, DELAYED RELEASE ORAL 3 TIMES DAILY
Status: DISCONTINUED | OUTPATIENT
Start: 2018-10-30 | End: 2018-10-31 | Stop reason: HOSPADM

## 2018-10-30 RX ORDER — MONTELUKAST SODIUM 10 MG/1
10 TABLET ORAL NIGHTLY
Status: DISCONTINUED | OUTPATIENT
Start: 2018-10-30 | End: 2018-10-31 | Stop reason: HOSPADM

## 2018-10-30 RX ORDER — MAGNESIUM SULFATE 1 G/100ML
1 INJECTION INTRAVENOUS ONCE
Status: COMPLETED | OUTPATIENT
Start: 2018-10-30 | End: 2018-10-30

## 2018-10-30 RX ORDER — DIPHENHYDRAMINE HCL 25 MG
25 TABLET ORAL ONCE
Status: DISCONTINUED | OUTPATIENT
Start: 2018-10-30 | End: 2018-10-30

## 2018-10-30 RX ORDER — SODIUM CHLORIDE 0.9 % (FLUSH) 0.9 %
10 SYRINGE (ML) INJECTION EVERY 12 HOURS SCHEDULED
Status: DISCONTINUED | OUTPATIENT
Start: 2018-10-30 | End: 2018-10-31 | Stop reason: HOSPADM

## 2018-10-30 RX ORDER — PROMETHAZINE HYDROCHLORIDE 25 MG/ML
12.5 INJECTION, SOLUTION INTRAMUSCULAR; INTRAVENOUS ONCE
Status: COMPLETED | OUTPATIENT
Start: 2018-10-30 | End: 2018-10-30

## 2018-10-30 RX ORDER — PHENOBARBITAL 97.2 MG/1
100 TABLET ORAL 3 TIMES DAILY
Status: DISCONTINUED | OUTPATIENT
Start: 2018-10-30 | End: 2018-10-31 | Stop reason: HOSPADM

## 2018-10-30 RX ORDER — AMLODIPINE BESYLATE 5 MG/1
5 TABLET ORAL DAILY
Status: DISCONTINUED | OUTPATIENT
Start: 2018-10-30 | End: 2018-10-31 | Stop reason: HOSPADM

## 2018-10-30 RX ORDER — DIPHENHYDRAMINE HYDROCHLORIDE 50 MG/ML
25 INJECTION INTRAMUSCULAR; INTRAVENOUS ONCE
Status: COMPLETED | OUTPATIENT
Start: 2018-10-30 | End: 2018-10-30

## 2018-10-30 RX ORDER — 0.9 % SODIUM CHLORIDE 0.9 %
1000 INTRAVENOUS SOLUTION INTRAVENOUS ONCE
Status: COMPLETED | OUTPATIENT
Start: 2018-10-30 | End: 2018-10-30

## 2018-10-30 RX ORDER — BUTALBITAL, ACETAMINOPHEN AND CAFFEINE 50; 325; 40 MG/1; MG/1; MG/1
2 TABLET ORAL EVERY 4 HOURS PRN
Status: DISCONTINUED | OUTPATIENT
Start: 2018-10-30 | End: 2018-10-31

## 2018-10-30 RX ORDER — ONDANSETRON 2 MG/ML
4 INJECTION INTRAMUSCULAR; INTRAVENOUS ONCE
Status: COMPLETED | OUTPATIENT
Start: 2018-10-30 | End: 2018-10-30

## 2018-10-30 RX ORDER — LEVOTHYROXINE SODIUM 0.05 MG/1
50 TABLET ORAL DAILY
Status: DISCONTINUED | OUTPATIENT
Start: 2018-10-31 | End: 2018-10-31 | Stop reason: HOSPADM

## 2018-10-30 RX ADMIN — Medication 25 MG: at 15:44

## 2018-10-30 RX ADMIN — PHENYTOIN SODIUM 300 MG: 300 CAPSULE, EXTENDED RELEASE ORAL at 22:44

## 2018-10-30 RX ADMIN — PROCHLORPERAZINE EDISYLATE 10 MG: 5 INJECTION INTRAMUSCULAR; INTRAVENOUS at 15:46

## 2018-10-30 RX ADMIN — DIVALPROEX SODIUM 500 MG: 500 TABLET, DELAYED RELEASE ORAL at 22:45

## 2018-10-30 RX ADMIN — AMLODIPINE BESYLATE 5 MG: 5 TABLET ORAL at 22:45

## 2018-10-30 RX ADMIN — MAGNESIUM SULFATE HEPTAHYDRATE 1 G: 1 INJECTION, SOLUTION INTRAVENOUS at 17:47

## 2018-10-30 RX ADMIN — METOPROLOL TARTRATE 25 MG: 25 TABLET ORAL at 22:47

## 2018-10-30 RX ADMIN — MONTELUKAST SODIUM 10 MG: 10 TABLET, FILM COATED ORAL at 22:44

## 2018-10-30 RX ADMIN — ONDANSETRON 4 MG: 2 INJECTION INTRAMUSCULAR; INTRAVENOUS at 18:57

## 2018-10-30 RX ADMIN — PROMETHAZINE HYDROCHLORIDE 12.5 MG: 25 INJECTION INTRAMUSCULAR; INTRAVENOUS at 15:45

## 2018-10-30 RX ADMIN — BUTALBITAL, ACETAMINOPHEN, AND CAFFEINE 2 TABLET: 50; 325; 40 TABLET ORAL at 22:44

## 2018-10-30 RX ADMIN — ENOXAPARIN SODIUM 40 MG: 40 INJECTION SUBCUTANEOUS at 22:45

## 2018-10-30 RX ADMIN — SODIUM CHLORIDE 1000 ML: 9 INJECTION, SOLUTION INTRAVENOUS at 15:45

## 2018-10-30 RX ADMIN — Medication 10 ML: at 22:47

## 2018-10-30 ASSESSMENT — PAIN DESCRIPTION - ONSET: ONSET: ON-GOING

## 2018-10-30 ASSESSMENT — ENCOUNTER SYMPTOMS
VOMITING: 0
PHOTOPHOBIA: 0
STRIDOR: 0
NAUSEA: 1
RHINORRHEA: 0
ABDOMINAL PAIN: 0
WHEEZING: 0
BACK PAIN: 0

## 2018-10-30 ASSESSMENT — PAIN DESCRIPTION - FREQUENCY: FREQUENCY: CONTINUOUS

## 2018-10-30 ASSESSMENT — PAIN DESCRIPTION - PROGRESSION
CLINICAL_PROGRESSION: GRADUALLY IMPROVING

## 2018-10-30 ASSESSMENT — PAIN SCALES - GENERAL
PAINLEVEL_OUTOF10: 6
PAINLEVEL_OUTOF10: 6

## 2018-10-30 ASSESSMENT — PAIN DESCRIPTION - DESCRIPTORS: DESCRIPTORS: HEADACHE

## 2018-10-30 ASSESSMENT — PAIN DESCRIPTION - PAIN TYPE: TYPE: ACUTE PAIN

## 2018-10-30 ASSESSMENT — PAIN DESCRIPTION - LOCATION: LOCATION: HEAD

## 2018-10-30 NOTE — ED PROVIDER NOTES
Emergency Medicine Attending Note    I have seen and examined the patient in conjunction with the Resident/MLP. Please see my key portion documented:      I agree with the assessment and plan as discussed with Dr. Janay Smith. Electronically signed:  KENY Boo MD  10/30/18 1233

## 2018-10-31 VITALS
BODY MASS INDEX: 31.28 KG/M2 | SYSTOLIC BLOOD PRESSURE: 119 MMHG | OXYGEN SATURATION: 98 % | TEMPERATURE: 98.2 F | RESPIRATION RATE: 17 BRPM | DIASTOLIC BLOOD PRESSURE: 90 MMHG | WEIGHT: 170 LBS | HEART RATE: 68 BPM | HEIGHT: 62 IN

## 2018-10-31 PROCEDURE — G0008 ADMIN INFLUENZA VIRUS VAC: HCPCS | Performed by: EMERGENCY MEDICINE

## 2018-10-31 PROCEDURE — G0378 HOSPITAL OBSERVATION PER HR: HCPCS

## 2018-10-31 PROCEDURE — 96376 TX/PRO/DX INJ SAME DRUG ADON: CPT

## 2018-10-31 PROCEDURE — 6360000002 HC RX W HCPCS: Performed by: EMERGENCY MEDICINE

## 2018-10-31 PROCEDURE — 6370000000 HC RX 637 (ALT 250 FOR IP): Performed by: EMERGENCY MEDICINE

## 2018-10-31 PROCEDURE — 90686 IIV4 VACC NO PRSV 0.5 ML IM: CPT | Performed by: EMERGENCY MEDICINE

## 2018-10-31 PROCEDURE — 6360000002 HC RX W HCPCS: Performed by: STUDENT IN AN ORGANIZED HEALTH CARE EDUCATION/TRAINING PROGRAM

## 2018-10-31 PROCEDURE — 96372 THER/PROPH/DIAG INJ SC/IM: CPT

## 2018-10-31 PROCEDURE — 2580000003 HC RX 258: Performed by: EMERGENCY MEDICINE

## 2018-10-31 RX ORDER — BUTALBITAL, ACETAMINOPHEN AND CAFFEINE 50; 325; 40 MG/1; MG/1; MG/1
2 TABLET ORAL EVERY 8 HOURS PRN
Status: DISCONTINUED | OUTPATIENT
Start: 2018-10-31 | End: 2018-10-31 | Stop reason: HOSPADM

## 2018-10-31 RX ORDER — ONDANSETRON 2 MG/ML
4 INJECTION INTRAMUSCULAR; INTRAVENOUS
Status: COMPLETED | OUTPATIENT
Start: 2018-10-31 | End: 2018-10-31

## 2018-10-31 RX ORDER — HYDROCODONE BITARTRATE AND ACETAMINOPHEN 5; 325 MG/1; MG/1
2 TABLET ORAL EVERY 8 HOURS PRN
Status: DISCONTINUED | OUTPATIENT
Start: 2018-10-31 | End: 2018-10-31 | Stop reason: HOSPADM

## 2018-10-31 RX ORDER — ACETAMINOPHEN AND CODEINE PHOSPHATE 300; 30 MG/1; MG/1
2 TABLET ORAL EVERY 8 HOURS PRN
Status: DISCONTINUED | OUTPATIENT
Start: 2018-10-31 | End: 2018-10-31 | Stop reason: CLARIF

## 2018-10-31 RX ORDER — ACETAMINOPHEN AND CODEINE PHOSPHATE 300; 30 MG/1; MG/1
1-2 TABLET ORAL 3 TIMES DAILY PRN
Qty: 10 TABLET | Refills: 0 | Status: SHIPPED | OUTPATIENT
Start: 2018-10-31 | End: 2018-11-30

## 2018-10-31 RX ORDER — ONDANSETRON 4 MG/1
4 TABLET, FILM COATED ORAL EVERY 8 HOURS PRN
Qty: 12 TABLET | Refills: 0 | Status: SHIPPED | OUTPATIENT
Start: 2018-10-31 | End: 2018-11-03

## 2018-10-31 RX ORDER — ONDANSETRON 2 MG/ML
4 INJECTION INTRAMUSCULAR; INTRAVENOUS EVERY 4 HOURS PRN
Status: DISCONTINUED | OUTPATIENT
Start: 2018-10-31 | End: 2018-10-31 | Stop reason: HOSPADM

## 2018-10-31 RX ORDER — DIAZEPAM 5 MG/1
5 TABLET ORAL ONCE
Status: COMPLETED | OUTPATIENT
Start: 2018-10-31 | End: 2018-10-31

## 2018-10-31 RX ADMIN — BUTALBITAL, ACETAMINOPHEN, AND CAFFEINE 2 TABLET: 50; 325; 40 TABLET ORAL at 12:07

## 2018-10-31 RX ADMIN — ONDANSETRON 4 MG: 2 INJECTION INTRAMUSCULAR; INTRAVENOUS at 08:22

## 2018-10-31 RX ADMIN — ONDANSETRON 4 MG: 2 INJECTION INTRAMUSCULAR; INTRAVENOUS at 15:50

## 2018-10-31 RX ADMIN — HYDROCODONE BITARTRATE AND ACETAMINOPHEN 2 TABLET: 5; 325 TABLET ORAL at 12:08

## 2018-10-31 RX ADMIN — AMLODIPINE BESYLATE 5 MG: 5 TABLET ORAL at 12:06

## 2018-10-31 RX ADMIN — INFLUENZA A VIRUS A/MICHIGAN/45/2015 X-275 (H1N1) ANTIGEN (FORMALDEHYDE INACTIVATED), INFLUENZA A VIRUS A/SINGAPORE/INFIMH-16-0019/2016 IVR-186 (H3N2) ANTIGEN (FORMALDEHYDE INACTIVATED), INFLUENZA B VIRUS B/PHUKET/3073/2013 ANTIGEN (FORMALDEHYDE INACTIVATED), AND INFLUENZA B VIRUS B/MARYLAND/15/2016 BX-69A ANTIGEN (FORMALDEHYDE INACTIVATED) 0.5 ML: 15; 15; 15; 15 INJECTION, SUSPENSION INTRAMUSCULAR at 15:02

## 2018-10-31 RX ADMIN — ONDANSETRON 4 MG: 2 INJECTION INTRAMUSCULAR; INTRAVENOUS at 11:23

## 2018-10-31 RX ADMIN — ENOXAPARIN SODIUM 40 MG: 40 INJECTION SUBCUTANEOUS at 11:26

## 2018-10-31 RX ADMIN — METOPROLOL TARTRATE 25 MG: 25 TABLET ORAL at 12:05

## 2018-10-31 RX ADMIN — DIVALPROEX SODIUM 500 MG: 500 TABLET, DELAYED RELEASE ORAL at 16:11

## 2018-10-31 RX ADMIN — DIAZEPAM 5 MG: 5 TABLET ORAL at 12:08

## 2018-10-31 RX ADMIN — PHENYTOIN SODIUM 300 MG: 300 CAPSULE, EXTENDED RELEASE ORAL at 16:11

## 2018-10-31 RX ADMIN — BUTALBITAL, ACETAMINOPHEN, AND CAFFEINE 2 TABLET: 50; 325; 40 TABLET ORAL at 06:38

## 2018-10-31 RX ADMIN — DIVALPROEX SODIUM 500 MG: 500 TABLET, DELAYED RELEASE ORAL at 12:05

## 2018-10-31 RX ADMIN — PHENOBARBITAL 97.2 MG: 97.2 TABLET ORAL at 16:10

## 2018-10-31 RX ADMIN — LEVOTHYROXINE SODIUM 50 MCG: 50 TABLET ORAL at 06:45

## 2018-10-31 RX ADMIN — Medication 10 ML: at 15:58

## 2018-10-31 RX ADMIN — PHENOBARBITAL 97.2 MG: 97.2 TABLET ORAL at 12:06

## 2018-10-31 RX ADMIN — PHENYTOIN SODIUM 300 MG: 300 CAPSULE, EXTENDED RELEASE ORAL at 12:05

## 2018-10-31 RX ADMIN — PHENOBARBITAL 97.2 MG: 97.2 TABLET ORAL at 01:08

## 2018-10-31 ASSESSMENT — PAIN SCALES - GENERAL
PAINLEVEL_OUTOF10: 5
PAINLEVEL_OUTOF10: 8
PAINLEVEL_OUTOF10: 5

## 2018-10-31 ASSESSMENT — PAIN DESCRIPTION - PROGRESSION
CLINICAL_PROGRESSION: GRADUALLY IMPROVING

## 2018-10-31 ASSESSMENT — PAIN DESCRIPTION - PAIN TYPE: TYPE: CHRONIC PAIN

## 2018-10-31 ASSESSMENT — PAIN DESCRIPTION - LOCATION: LOCATION: BACK

## 2018-10-31 NOTE — CARE COORDINATION
Case Management Initial Discharge Plan  Aleta Mcdonough,             Met with:patient to discuss discharge plans. Information verified: address, contacts, phone number, , insurance Yes  PCP: No primary care provider on file. Date of last visit: has rescheduled [facility] appointment for 11-6    Insurance Provider: Managed Medicaid from [place]    Discharge Planning    Living Arrangements:  [place]:  None    Home has 1 stories  6  stairs to climb to get into front door,   Patient able to perform ADL's:Independent    Current Services (outpatient & in home) none   DME equipment: none  DME provider:     Pharmacy: plans on using [facility], since medications are delivered. Potential Assistance Purchasing Medications:  No  Does patient want to participate in local refill/ meds to beds program?  Not Assessed    Potential Assistance Needed:  N/A    Patient agreeable to home care: No  [place] of choice provided:  n/a    Prior SNF/Rehab Placement and Facility:   Agreeable to SNF/Rehab: No  [place] of choice provided: n/a   Evaluation: no    Expected Discharge date:  10/31/18  Patient expects to be discharged to:  home  Follow Up Appointment: Best Day/ Time: Monday AM    Transportation provider:   Transportation arrangements needed for discharge: unsure    Readmission Risk              Risk of Unplanned Readmission:        11             Does patient have a readmission risk score greater than 14?: No  11%  If yes, follow-up appointment must be made within 7 days of discharge. Discharge Plan: return to  [employer] [facility].  She states her Medicare and Medicaid will be transferred to [place] in a couple of weeks. .  On the phone with Disability agents when I was completing the interview.         Electronically signed by Matt Farris RN on 10/31/18 at 12:49 PM

## 2018-10-31 NOTE — H&P
[facility]  CDU / OBSERVATION eNCOUnter  Resident Note     Pt Name: Alen Hastings  MRN: [id number]  [place] 1965  Date of evaluation: 10/31/18  Patient's PCP is : No primary care provider on file. CHIEF COMPLAINT       Chief Complaint   Patient presents with    Migraine     started last [address] Jenna Goff is a 52 y.o. female who presents acute onset of  Typical migraine headache without aura. Happened 2x in past week. Getting worse and lasting longer. This one started Friday and has not been allievited with rest or iburpofen or caffeine. Not worst of life, no fevers neckpain chills nausea vomiting or photo/phonophobia. REVIEW OF SYSTEMS         General: no fever, chills, malaise  HEENT: no eye discharge, no change in vision, no sore throat, no rhinorrhea, no difficulty swallowing  Neck: no lymphadenopathy, no swelling, no tracheal deviation  Pulmonary: no shortness of breath, no cough, no wheezing  Cardiovascular: no chest pain, no palpitation, no exertional dyspnea  GI: no abdominal pain, nausea, vomiting, black or bloody BMs  : no dysuria, hematuria, testicular/vaginal pain  MSK: no myalgia, arthralgia, or decreased range of motion  Neuro: + headache. No dizziness, syncope, focal weakness, or loss of sensation  Skin: No rash or lesions. (PQRS) Advance directives on face sheet per hospital policy. No change unless specifically mentioned in chart    PAST MEDICAL HISTORY    has a past medical history of CHF (congestive heart failure) ([address] COPD (chronic obstructive pulmonary disease) ([address] Headache; Neck fracture ([address] Seizure ([address] and Thyroid disease. I have reviewed the past medical history with the patient and it is pertinent to this complaint. SURGICAL HISTORY      has a past surgical history that includes knee surgery; partial hysterectomy (cervix not removed); and lymph node dissection.   I have reviewed and respiration is 17 and oxygen saturation is 98%. General: alert and oriented x 4, nontoxic, not in acute distress  HEENT: Normocephalic, atraumatic, PERRLA, extraocular movements intact, tympanic membranes pearly and intact bilaterally, no tragal tenderness, no vesicles and auditory canal, nose is atraumatic, no tonsillar injection and erythema edema or exudation. Uvula is midline. NECK: Supple, symmetrical, trachea midline, no JVD  Pulmonary: Lungs clear to auscultation bilaterally in all fields, no wheezes rales or rhonchi. No accessory muscle use. HEART: Normal S1 and S2 heart sounds, no murmurs rubs or gallops. ABDOMEN: Soft, NTND, No peritoneal signs. Active bowel sounds x 4 quadrants  MSK: Normal Range of motion, nontender extremities. Neuro: A/O x3 , Sensation intact in bilateral upper and lower extremities to both deep and light palpation. No focal sensory or motor deficits  Skin: no rash or wounds        DIFFERENTIAL DIAGNOSIS/MDM:   SAH/ICH, meningitis, tension HA, migraine, epidural, subdural Hemorrhage. DIAGNOSTIC RESULTS     EKG: All EKG's are interpreted by the Observation Physician who either signs or Co-signs this chart in the absence of a cardiologist.      Judye Cooks, DO        RADIOLOGY:   I directly visualized the following  images and reviewed the radiologist interpretations:    No results found. LABS:  I have reviewed and interpreted all available lab results.   Labs Reviewed - No data to display    SCREENING TOOLS:    HEART Risk Score for Chest Pain Patients   History and Physical Exam Suspicion Level  (Nausea, Vomiting, Diaphoresis, Radiation, Exertion)   Slightly Suspicious (0 pts)   Moderately Suspicious (1 pt)   Highly Suspicious (2 pts)   EKG Interpretation   Normal (0 pts)   Non-Specific Repolarization Disturbance (1 pt)   Significant ST-Depression (2 pts)   Age of Patient (in years)   = 45 (0 pts)   46-64 (1 pt)   = 65 (2 pts)   Risk Factors   No Risk Factors (0 pts)   1-2 Risk Factors (1 pt)   = 3 Risk Factors (2 pts)   Risk Factors Include:   Hypercholesterolemia   Hypertension   Diabetes Mellitus   Cigarette smoking   Positive family history   Obesity   CAD   (SLE, CKDz, HIV, Cocaine abuse)   Troponin Levels   = Normal Limit (0 pts)   1-3 Times Normal Limit (1 pt)   > 3 Times Normal Limit (2 pts)  TOTAL:    Percent Risk for Major Adverse Cardiac Event (MACE)  0-3 pts indicates low risk for MACE   2.5% (DISCHARGE)   4-7 pts indicates moderate risk for MACE  20.3% (OBS)  8-10 pts indicates high risk for MACE  72.7% (EARLY INVASIVE [place])    CDU HENNA / Jd Quintin is a 52 y.o. female who presents with     1.) Acute on chronic exacerbation of headache, secondary to typical migraine without aura. - ED course unremarkable  - Pain control  - Antiemetic control  - Neurology consult    - IV zofran, PO norco, PO fioricet, and IV Phenergan    · Continue home medications and pain control  · Monitor vitals, labs, and imaging  · DISPO: pending consults and clinical improvement    CONSULTS:    IP CONSULT TO NEUROLOGY    PROCEDURES:  Not indicated       PATIENT REFERRED TO:    No follow-up provider specified. --  Judye Cooks, DO   Emergency Medicine Resident     This dictation was generated by voice recognition computer software. Although all attempts are made to edit the dictation for accuracy, there may be errors in the transcription that are not intended.

## 2018-11-01 ENCOUNTER — APPOINTMENT (OUTPATIENT)
Dept: GENERAL RADIOLOGY | Age: 53
End: 2018-11-01
Payer: MEDICARE

## 2018-11-01 ENCOUNTER — APPOINTMENT (OUTPATIENT)
Dept: CT IMAGING | Age: 53
End: 2018-11-01
Payer: MEDICARE

## 2018-11-01 ENCOUNTER — HOSPITAL ENCOUNTER (EMERGENCY)
Age: 53
Discharge: LEFT BEFORE DISCHARGE | End: 2018-11-01
Attending: EMERGENCY MEDICINE
Payer: MEDICARE

## 2018-11-01 VITALS
OXYGEN SATURATION: 97 % | WEIGHT: 170 LBS | TEMPERATURE: 98.4 F | RESPIRATION RATE: 19 BRPM | SYSTOLIC BLOOD PRESSURE: 126 MMHG | HEART RATE: 62 BPM | BODY MASS INDEX: 31.09 KG/M2 | DIASTOLIC BLOOD PRESSURE: 72 MMHG

## 2018-11-01 DIAGNOSIS — R56.9 SEIZURE (HCC): Primary | ICD-10-CM

## 2018-11-01 LAB
-: ABNORMAL
-: NORMAL
-: NORMAL
ABSOLUTE EOS #: 0.09 K/UL (ref 0–0.44)
ABSOLUTE IMMATURE GRANULOCYTE: 0.04 K/UL (ref 0–0.3)
ABSOLUTE LYMPH #: 3.84 K/UL (ref 1.1–3.7)
ABSOLUTE MONO #: 0.77 K/UL (ref 0.1–1.2)
ALBUMIN SERPL-MCNC: 3.4 G/DL (ref 3.5–5.2)
ALBUMIN/GLOBULIN RATIO: 0.9 (ref 1–2.5)
ALP BLD-CCNC: 120 U/L (ref 35–104)
ALT SERPL-CCNC: 16 U/L (ref 5–33)
AMORPHOUS: ABNORMAL
ANION GAP SERPL CALCULATED.3IONS-SCNC: 10 MMOL/L (ref 9–17)
AST SERPL-CCNC: 29 U/L
BACTERIA: ABNORMAL
BASOPHILS # BLD: 1 % (ref 0–2)
BASOPHILS ABSOLUTE: 0.05 K/UL (ref 0–0.2)
BILIRUB SERPL-MCNC: <0.1 MG/DL (ref 0.3–1.2)
BILIRUBIN URINE: NEGATIVE
BUN BLDV-MCNC: 6 MG/DL (ref 6–20)
BUN/CREAT BLD: ABNORMAL (ref 9–20)
CALCIUM SERPL-MCNC: 8.7 MG/DL (ref 8.6–10.4)
CASTS UA: ABNORMAL /LPF (ref 0–8)
CHLORIDE BLD-SCNC: 94 MMOL/L (ref 98–107)
CO2: 20 MMOL/L (ref 20–31)
COLOR: YELLOW
CREAT SERPL-MCNC: 0.41 MG/DL (ref 0.5–0.9)
CRYSTALS, UA: ABNORMAL /HPF
DIFFERENTIAL TYPE: ABNORMAL
EKG ATRIAL RATE: 76 BPM
EKG P AXIS: 53 DEGREES
EKG P-R INTERVAL: 166 MS
EKG Q-T INTERVAL: 400 MS
EKG QRS DURATION: 78 MS
EKG QTC CALCULATION (BAZETT): 450 MS
EKG R AXIS: 27 DEGREES
EKG T AXIS: 40 DEGREES
EKG VENTRICULAR RATE: 76 BPM
EOSINOPHILS RELATIVE PERCENT: 1 % (ref 1–4)
EPITHELIAL CELLS UA: ABNORMAL /HPF (ref 0–5)
GFR AFRICAN AMERICAN: >60 ML/MIN
GFR NON-AFRICAN AMERICAN: >60 ML/MIN
GFR SERPL CREATININE-BSD FRML MDRD: ABNORMAL ML/MIN/{1.73_M2}
GFR SERPL CREATININE-BSD FRML MDRD: ABNORMAL ML/MIN/{1.73_M2}
GLUCOSE BLD-MCNC: 85 MG/DL (ref 70–99)
GLUCOSE URINE: NEGATIVE
HCG QUALITATIVE: NEGATIVE
HCT VFR BLD CALC: 42.8 % (ref 36.3–47.1)
HEMOGLOBIN: 13.4 G/DL (ref 11.9–15.1)
IMMATURE GRANULOCYTES: 1 %
KETONES, URINE: NEGATIVE
LEUKOCYTE ESTERASE, URINE: ABNORMAL
LIPASE: 35 U/L (ref 13–60)
LYMPHOCYTES # BLD: 47 % (ref 24–43)
MCH RBC QN AUTO: 27.3 PG (ref 25.2–33.5)
MCHC RBC AUTO-ENTMCNC: 31.3 G/DL (ref 28.4–34.8)
MCV RBC AUTO: 87.2 FL (ref 82.6–102.9)
MONOCYTES # BLD: 10 % (ref 3–12)
MUCUS: ABNORMAL
NITRITE, URINE: POSITIVE
NRBC AUTOMATED: 0 PER 100 WBC
OTHER OBSERVATIONS UA: ABNORMAL
PDW BLD-RTO: 14.3 % (ref 11.8–14.4)
PH UA: 8 (ref 5–8)
PHENOBARBITAL DATE LAST DOSE: ABNORMAL
PHENOBARBITAL DOSE AMOUNT: ABNORMAL
PHENOBARBITAL TIME LAST DOSE: ABNORMAL
PHENOBARBITAL: 8.6 UG/ML (ref 15–40)
PHENYTOIN FREE: 1.2 UG/ML (ref 1–2)
PLATELET # BLD: ABNORMAL K/UL (ref 138–453)
PLATELET ESTIMATE: ABNORMAL
PLATELET, FLUORESCENCE: NORMAL K/UL (ref 138–453)
PLATELET, IMMATURE FRACTION: NORMAL % (ref 1.1–10.3)
PMV BLD AUTO: ABNORMAL FL (ref 8.1–13.5)
POC TROPONIN I: 0.11 NG/ML (ref 0–0.1)
POC TROPONIN INTERP: ABNORMAL
POTASSIUM SERPL-SCNC: 5.5 MMOL/L (ref 3.7–5.3)
PROTEIN UA: NEGATIVE
RBC # BLD: 4.91 M/UL (ref 3.95–5.11)
RBC # BLD: ABNORMAL 10*6/UL
RBC UA: ABNORMAL /HPF (ref 0–4)
REASON FOR REJECTION: NORMAL
REASON FOR REJECTION: NORMAL
RENAL EPITHELIAL, UA: ABNORMAL /HPF
SEG NEUTROPHILS: 41 % (ref 36–65)
SEGMENTED NEUTROPHILS ABSOLUTE COUNT: 3.31 K/UL (ref 1.5–8.1)
SODIUM BLD-SCNC: 124 MMOL/L (ref 135–144)
SPECIFIC GRAVITY UA: 1.01 (ref 1–1.03)
TOTAL PROTEIN: 7.1 G/DL (ref 6.4–8.3)
TRICHOMONAS: ABNORMAL
TROPONIN INTERP: NORMAL
TROPONIN T: <0.03 NG/ML
TSH SERPL DL<=0.05 MIU/L-ACNC: 1.39 MIU/L (ref 0.3–5)
TURBIDITY: CLEAR
URINE HGB: NEGATIVE
UROBILINOGEN, URINE: NORMAL
VALPROIC ACID LEVEL: 28 UG/ML (ref 50–125)
VALPROIC DATE LAST DOSE: ABNORMAL
VALPROIC DOSE AMOUNT: ABNORMAL
VALPROIC TIME LAST DOSE: ABNORMAL
WBC # BLD: 8.1 K/UL (ref 3.5–11.3)
WBC # BLD: ABNORMAL 10*3/UL
WBC UA: ABNORMAL /HPF (ref 0–5)
YEAST: ABNORMAL
ZZ NTE CLEAN UP: ORDERED TEST: NORMAL
ZZ NTE CLEAN UP: ORDERED TEST: NORMAL
ZZ NTE WITH NAME CLEAN UP: SPECIMEN SOURCE: NORMAL
ZZ NTE WITH NAME CLEAN UP: SPECIMEN SOURCE: NORMAL

## 2018-11-01 PROCEDURE — 96361 HYDRATE IV INFUSION ADD-ON: CPT

## 2018-11-01 PROCEDURE — 93005 ELECTROCARDIOGRAM TRACING: CPT

## 2018-11-01 PROCEDURE — 96374 THER/PROPH/DIAG INJ IV PUSH: CPT

## 2018-11-01 PROCEDURE — 2580000003 HC RX 258: Performed by: PHYSICIAN ASSISTANT

## 2018-11-01 PROCEDURE — 84443 ASSAY THYROID STIM HORMONE: CPT

## 2018-11-01 PROCEDURE — 85025 COMPLETE CBC W/AUTO DIFF WBC: CPT

## 2018-11-01 PROCEDURE — 80164 ASSAY DIPROPYLACETIC ACD TOT: CPT

## 2018-11-01 PROCEDURE — 71046 X-RAY EXAM CHEST 2 VIEWS: CPT

## 2018-11-01 PROCEDURE — 80053 COMPREHEN METABOLIC PANEL: CPT

## 2018-11-01 PROCEDURE — 80184 ASSAY OF PHENOBARBITAL: CPT

## 2018-11-01 PROCEDURE — 70450 CT HEAD/BRAIN W/O DYE: CPT

## 2018-11-01 PROCEDURE — 81001 URINALYSIS AUTO W/SCOPE: CPT

## 2018-11-01 PROCEDURE — 84703 CHORIONIC GONADOTROPIN ASSAY: CPT

## 2018-11-01 PROCEDURE — 85055 RETICULATED PLATELET ASSAY: CPT

## 2018-11-01 PROCEDURE — 84484 ASSAY OF TROPONIN QUANT: CPT

## 2018-11-01 PROCEDURE — 80186 ASSAY OF PHENYTOIN FREE: CPT

## 2018-11-01 PROCEDURE — 99285 EMERGENCY DEPT VISIT HI MDM: CPT

## 2018-11-01 PROCEDURE — 6360000002 HC RX W HCPCS: Performed by: PHYSICIAN ASSISTANT

## 2018-11-01 PROCEDURE — 83690 ASSAY OF LIPASE: CPT

## 2018-11-01 RX ORDER — ACETAMINOPHEN 325 MG/1
650 TABLET ORAL ONCE
Status: DISCONTINUED | OUTPATIENT
Start: 2018-11-01 | End: 2018-11-01 | Stop reason: HOSPADM

## 2018-11-01 RX ORDER — ONDANSETRON 2 MG/ML
4 INJECTION INTRAMUSCULAR; INTRAVENOUS ONCE
Status: COMPLETED | OUTPATIENT
Start: 2018-11-01 | End: 2018-11-01

## 2018-11-01 RX ORDER — 0.9 % SODIUM CHLORIDE 0.9 %
1000 INTRAVENOUS SOLUTION INTRAVENOUS ONCE
Status: COMPLETED | OUTPATIENT
Start: 2018-11-01 | End: 2018-11-01

## 2018-11-01 RX ADMIN — ONDANSETRON HYDROCHLORIDE 4 MG: 2 INJECTION, SOLUTION INTRAMUSCULAR; INTRAVENOUS at 16:45

## 2018-11-01 RX ADMIN — SODIUM CHLORIDE 1000 ML: 9 INJECTION, SOLUTION INTRAVENOUS at 16:45

## 2018-11-01 ASSESSMENT — ENCOUNTER SYMPTOMS
NAUSEA: 1
BACK PAIN: 0
COUGH: 0
DIARRHEA: 0
COLOR CHANGE: 0
EYE PAIN: 0
SORE THROAT: 0
ABDOMINAL PAIN: 0
SHORTNESS OF BREATH: 0
VOMITING: 1
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
## 2018-11-04 ENCOUNTER — HOSPITAL ENCOUNTER (EMERGENCY)
Age: 53
Discharge: HOME | End: 2018-11-04
Attending: EMERGENCY MEDICINE
Payer: MEDICARE

## 2018-11-04 ENCOUNTER — APPOINTMENT (OUTPATIENT)
Dept: CT IMAGING | Age: 53
End: 2018-11-04
Payer: MEDICARE

## 2018-11-04 VITALS
DIASTOLIC BLOOD PRESSURE: 88 MMHG | RESPIRATION RATE: 20 BRPM | OXYGEN SATURATION: 99 % | SYSTOLIC BLOOD PRESSURE: 136 MMHG | TEMPERATURE: 97.2 F | HEART RATE: 77 BPM

## 2018-11-04 DIAGNOSIS — R10.9 FLANK PAIN: Primary | ICD-10-CM

## 2018-11-04 LAB
-: ABNORMAL
ABSOLUTE EOS #: 0.1 K/UL (ref 0–0.44)
ABSOLUTE IMMATURE GRANULOCYTE: 0.03 K/UL (ref 0–0.3)
ABSOLUTE LYMPH #: 4.53 K/UL (ref 1.1–3.7)
ABSOLUTE MONO #: 0.61 K/UL (ref 0.1–1.2)
ALBUMIN SERPL-MCNC: 3.8 G/DL (ref 3.5–5.2)
ALBUMIN/GLOBULIN RATIO: 1.3 (ref 1–2.5)
ALP BLD-CCNC: 122 U/L (ref 35–104)
ALT SERPL-CCNC: 15 U/L (ref 5–33)
AMORPHOUS: ABNORMAL
ANION GAP SERPL CALCULATED.3IONS-SCNC: 7 MMOL/L (ref 9–17)
AST SERPL-CCNC: 17 U/L
BACTERIA: ABNORMAL
BASOPHILS # BLD: 1 % (ref 0–2)
BASOPHILS ABSOLUTE: 0.07 K/UL (ref 0–0.2)
BILIRUB SERPL-MCNC: <0.1 MG/DL (ref 0.3–1.2)
BILIRUBIN URINE: NEGATIVE
BUN BLDV-MCNC: 7 MG/DL (ref 6–20)
BUN/CREAT BLD: ABNORMAL (ref 9–20)
CALCIUM SERPL-MCNC: 9 MG/DL (ref 8.6–10.4)
CASTS UA: ABNORMAL /LPF (ref 0–2)
CHLORIDE BLD-SCNC: 98 MMOL/L (ref 98–107)
CO2: 26 MMOL/L (ref 20–31)
COLOR: YELLOW
CREAT SERPL-MCNC: 0.43 MG/DL (ref 0.5–0.9)
CRYSTALS, UA: ABNORMAL /HPF
DIFFERENTIAL TYPE: ABNORMAL
EOSINOPHILS RELATIVE PERCENT: 1 % (ref 1–4)
EPITHELIAL CELLS UA: ABNORMAL /HPF (ref 0–5)
GFR AFRICAN AMERICAN: >60 ML/MIN
GFR NON-AFRICAN AMERICAN: >60 ML/MIN
GFR SERPL CREATININE-BSD FRML MDRD: ABNORMAL ML/MIN/{1.73_M2}
GFR SERPL CREATININE-BSD FRML MDRD: ABNORMAL ML/MIN/{1.73_M2}
GLUCOSE BLD-MCNC: 89 MG/DL (ref 70–99)
GLUCOSE URINE: NEGATIVE
HCT VFR BLD CALC: 40.8 % (ref 36.3–47.1)
HEMOGLOBIN: 13.2 G/DL (ref 11.9–15.1)
IMMATURE GRANULOCYTES: 0 %
KETONES, URINE: NEGATIVE
LEUKOCYTE ESTERASE, URINE: ABNORMAL
LYMPHOCYTES # BLD: 49 % (ref 24–43)
MCH RBC QN AUTO: 27 PG (ref 25.2–33.5)
MCHC RBC AUTO-ENTMCNC: 32.4 G/DL (ref 28.4–34.8)
MCV RBC AUTO: 83.4 FL (ref 82.6–102.9)
MONOCYTES # BLD: 7 % (ref 3–12)
MUCUS: ABNORMAL
NITRITE, URINE: NEGATIVE
NRBC AUTOMATED: 0 PER 100 WBC
OTHER OBSERVATIONS UA: ABNORMAL
PDW BLD-RTO: 14.2 % (ref 11.8–14.4)
PH UA: 6.5 (ref 5–8)
PLATELET # BLD: 341 K/UL (ref 138–453)
PLATELET ESTIMATE: ABNORMAL
PMV BLD AUTO: 8.7 FL (ref 8.1–13.5)
POTASSIUM SERPL-SCNC: 4.6 MMOL/L (ref 3.7–5.3)
PROTEIN UA: NEGATIVE
RBC # BLD: 4.89 M/UL (ref 3.95–5.11)
RBC # BLD: ABNORMAL 10*6/UL
RBC UA: ABNORMAL /HPF (ref 0–2)
RENAL EPITHELIAL, UA: ABNORMAL /HPF
SEG NEUTROPHILS: 42 % (ref 36–65)
SEGMENTED NEUTROPHILS ABSOLUTE COUNT: 3.83 K/UL (ref 1.5–8.1)
SODIUM BLD-SCNC: 131 MMOL/L (ref 135–144)
SPECIFIC GRAVITY UA: 1.01 (ref 1–1.03)
TOTAL PROTEIN: 6.8 G/DL (ref 6.4–8.3)
TRICHOMONAS: ABNORMAL
TURBIDITY: CLEAR
URINE HGB: NEGATIVE
UROBILINOGEN, URINE: NORMAL
WBC # BLD: 9.2 K/UL (ref 3.5–11.3)
WBC # BLD: ABNORMAL 10*3/UL
WBC UA: ABNORMAL /HPF (ref 0–5)
YEAST: ABNORMAL

## 2018-11-04 PROCEDURE — 6370000000 HC RX 637 (ALT 250 FOR IP): Performed by: STUDENT IN AN ORGANIZED HEALTH CARE EDUCATION/TRAINING PROGRAM

## 2018-11-04 PROCEDURE — 81001 URINALYSIS AUTO W/SCOPE: CPT

## 2018-11-04 PROCEDURE — 80053 COMPREHEN METABOLIC PANEL: CPT

## 2018-11-04 PROCEDURE — 6360000002 HC RX W HCPCS: Performed by: STUDENT IN AN ORGANIZED HEALTH CARE EDUCATION/TRAINING PROGRAM

## 2018-11-04 PROCEDURE — 85025 COMPLETE CBC W/AUTO DIFF WBC: CPT

## 2018-11-04 PROCEDURE — 99284 EMERGENCY DEPT VISIT MOD MDM: CPT

## 2018-11-04 PROCEDURE — 74176 CT ABD & PELVIS W/O CONTRAST: CPT

## 2018-11-04 RX ORDER — ONDANSETRON 2 MG/ML
4 INJECTION INTRAMUSCULAR; INTRAVENOUS ONCE
Status: DISCONTINUED | OUTPATIENT
Start: 2018-11-04 | End: 2018-11-04

## 2018-11-04 RX ORDER — ONDANSETRON 4 MG/1
4 TABLET, FILM COATED ORAL ONCE
Status: DISCONTINUED | OUTPATIENT
Start: 2018-11-04 | End: 2018-11-04

## 2018-11-04 RX ORDER — HYDROCODONE BITARTRATE AND ACETAMINOPHEN 5; 325 MG/1; MG/1
2 TABLET ORAL ONCE
Status: COMPLETED | OUTPATIENT
Start: 2018-11-04 | End: 2018-11-04

## 2018-11-04 RX ORDER — DIAZEPAM 5 MG/1
5 TABLET ORAL ONCE
Status: COMPLETED | OUTPATIENT
Start: 2018-11-04 | End: 2018-11-04

## 2018-11-04 RX ORDER — ONDANSETRON 4 MG/1
4 TABLET, ORALLY DISINTEGRATING ORAL ONCE
Status: COMPLETED | OUTPATIENT
Start: 2018-11-04 | End: 2018-11-04

## 2018-11-04 RX ORDER — ACETAMINOPHEN 500 MG
1000 TABLET ORAL EVERY 6 HOURS PRN
Qty: 120 TABLET | Refills: 0 | Status: ON HOLD | OUTPATIENT
Start: 2018-11-04 | End: 2019-05-20

## 2018-11-04 RX ADMIN — ONDANSETRON 4 MG: 4 TABLET, ORALLY DISINTEGRATING ORAL at 16:04

## 2018-11-04 RX ADMIN — HYDROCODONE BITARTRATE AND ACETAMINOPHEN 2 TABLET: 5; 325 TABLET ORAL at 16:04

## 2018-11-04 RX ADMIN — DIAZEPAM 5 MG: 5 TABLET ORAL at 17:54

## 2018-11-04 ASSESSMENT — PAIN SCALES - GENERAL
PAINLEVEL_OUTOF10: 7
PAINLEVEL_OUTOF10: 8

## 2018-11-04 ASSESSMENT — PAIN DESCRIPTION - DESCRIPTORS: DESCRIPTORS: ACHING

## 2018-11-04 ASSESSMENT — ENCOUNTER SYMPTOMS
ABDOMINAL PAIN: 0
NAUSEA: 0
VOMITING: 0
SORE THROAT: 0
COUGH: 0
DIARRHEA: 0
SHORTNESS OF BREATH: 0

## 2018-11-04 ASSESSMENT — PAIN DESCRIPTION - LOCATION: LOCATION: FLANK

## 2018-11-04 ASSESSMENT — PAIN DESCRIPTION - ONSET: ONSET: ON-GOING

## 2018-11-04 ASSESSMENT — PAIN DESCRIPTION - PROGRESSION: CLINICAL_PROGRESSION: NOT CHANGED

## 2018-11-04 ASSESSMENT — PAIN DESCRIPTION - ORIENTATION: ORIENTATION: RIGHT;LEFT

## 2018-11-04 ASSESSMENT — PAIN DESCRIPTION - PAIN TYPE: TYPE: ACUTE PAIN

## 2018-11-04 ASSESSMENT — PAIN DESCRIPTION - FREQUENCY: FREQUENCY: CONTINUOUS

## 2018-11-04 NOTE — ED PROVIDER NOTES
Main Topics    Smoking status: Current Every Day Smoker     Packs/day: 0.50     Years: 12.00    Smokeless tobacco: Never Used    Alcohol use No    Drug use: No    Sexual activity: Not on file     Other Topics Concern    Not on file     Social History Narrative    No narrative on file       History reviewed. No pertinent family history. Allergies:  Bee venom; Bromide ion [bromine]; Flexeril [cyclobenzaprine]; Imitrex [sumatriptan]; Naproxen; Nsaids; Reglan [metoclopramide]; Sulfa antibiotics; Sulfadiazine; Toradol [ketorolac tromethamine]; and Tramadol    Home Medications:  Prior to Admission medications    Medication Sig Start Date End Date Taking? Authorizing Provider   acetaminophen (APAP EXTRA STRENGTH) 500 MG tablet Take 2 tablets by mouth every 6 hours as needed for Pain 11/4/18  Yes Roseanne Carver MD   Lidocaine-Menthol 4-5 % PTCH Apply 1 patch topically daily as needed (pain) Leave patch on for 12 hours at a time or follow instructions on box. 11/4/18  Yes Roseanne Carver MD   acetaminophen-codeine (TYLENOL/CODEINE #3) 300-30 MG per tablet Take 1-2 tablets by mouth 3 times daily as needed for Pain for up to 10 doses. . 10/31/18 11/30/18  Bhavin [place],    amLODIPine (NORVASC) 5 MG tablet Take 5 mg by mouth daily    Historical Provider, MD   levothyroxine (SYNTHROID) 50 MCG tablet Take 50 mcg by mouth Daily    Historical Provider, MD   metoprolol tartrate (LOPRESSOR) 25 MG tablet Take 25 mg by mouth 2 times daily    Historical Provider, MD   montelukast (SINGULAIR) 10 MG tablet Take 10 mg by mouth nightly    Historical Provider, MD   phenytoin (DILANTIN) 100 MG ER capsule Take 300 mg by mouth 3 times daily    Historical Provider, MD   divalproex (DEPAKOTE) 500 MG DR tablet Take 500 mg by mouth 3 times daily    Historical Provider, MD   PHENobarbital (LUMINAL) 30 MG tablet Take 100 mg by mouth 3 times daily. [place]     Historical Provider, MD   butalbital-acetaminophen-caffeine MisaeloqqortVenancio ochoa 11) -25  CBC Auto Differential    Comprehensive Metabolic Panel       MEDICATIONS ORDERED:  Orders Placed This Encounter   Medications    DISCONTD: ondansetron (ZOFRAN) injection 4 mg    HYDROcodone-acetaminophen (NORCO) 5-325 MG per tablet 2 tablet    DISCONTD: ondansetron (ZOFRAN) tablet 4 mg    ondansetron (ZOFRAN-ODT) disintegrating tablet 4 mg    diazepam (VALIUM) tablet 5 mg    acetaminophen (APAP EXTRA STRENGTH) 500 MG tablet     Sig: Take 2 tablets by mouth every 6 hours as needed for Pain     Dispense:  120 tablet     Refill:  0    Lidocaine-Menthol 4-5 % PTCH     Sig: Apply 1 patch topically daily as needed (pain) Leave patch on for 12 hours at a time or follow instructions on box.      Dispense:  30 patch     Refill:  0       DDX: Nephrolithiasis, pyelonephritis, musculoskeletal pain, pneumonia, AAA    DIAGNOSTIC RESULTS / EMERGENCY DEPARTMENT COURSE / MDM     LABS:  Results for orders placed or performed during the hospital encounter of 11/04/18   Urinalysis with microscopic   Result Value Ref Range    Color, UA YELLOW YEL    Turbidity UA CLEAR CLEAR    Glucose, Ur NEGATIVE NEG    Bilirubin Urine NEGATIVE NEG    Ketones, Urine NEGATIVE NEG    Specific Gravity, UA 1.010 1.005 - 1.030    Urine Hgb NEGATIVE NEG    pH, UA 6.5 5.0 - 8.0    Protein, UA NEGATIVE NEG    Urobilinogen, Urine Normal NORM    Nitrite, Urine NEGATIVE NEG    Leukocyte Esterase, Urine TRACE (A) NEG    -          WBC, UA 0 TO 2 0 - 5 /HPF    RBC, UA None 0 - 2 /HPF    Casts UA NOT REPORTED 0 - 2 /LPF    Crystals UA NOT REPORTED NONE /HPF    Epithelial Cells UA 0 TO 2 0 - 5 /HPF    Renal Epithelial, Urine NOT REPORTED 0 /HPF    Bacteria, UA FEW (A) NONE    Mucus, UA NOT REPORTED NONE    Trichomonas, UA NOT REPORTED NONE    Amorphous, UA NOT REPORTED NONE    Other Observations UA NOT REPORTED NREQ    Yeast, UA NOT REPORTED NONE   CBC Auto Differential   Result Value Ref Range    WBC 9.2 3.5 - 11.3 k/uL    RBC 4.89 3.95 - 5.11 m/uL nontender, neurologically normal.  There is mild CVA tenderness with percussion bilaterally, with absolutely no other abnormalities on examination. There is no midline back pain and no neurological findings in her lower extremities. Impression is MSK pain, possible bilateral nephrolithiasis, urinary tract infection, pyelonephritis less likely. Doubt AAA given the lack of abdominal symptoms. No respiratory symptoms to suggest pulmonary pathology. Plan for CBC, CMP, urinalysis, CT abdomen/pelvis, symptom relief. RADIOLOGY:      CT ABDOMEN PELVIS WO CONTRAST Additional Contrast? None (Preliminary result)   Result time 11/04/18 17:07:52   Preliminary result by Ladi Callaway MD (11/04/18 17:07:52)                Impression:    No acute abnormality. Narrative:    EXAMINATION:  CT OF THE ABDOMEN AND PELVIS WITHOUT CONTRAST 11/4/2018 4:29 pm    TECHNIQUE:  CT of the abdomen and pelvis was performed without the administration of  intravenous contrast. Multiplanar reformatted images are provided for review. Dose modulation, iterative reconstruction, and/or weight based adjustment of  the mA/kV was utilized to reduce the radiation dose to as low as reasonably  achievable. COMPARISON:  None    HISTORY:  ORDERING SYSTEM PROVIDED HISTORY: NEOPLASM - OTHER ABDOMINAL PRIMARY  TECHNOLOGIST PROVIDED HISTORY:      Acute bilateral renal colic. FINDINGS:  Lower Chest:  Visualized portion of the lower chest demonstrates no acute  abnormality. Organs: There is no acute abnormality of the liver, gallbladder, pancreas,  spleen, adrenals, or kidneys. [place] Bull[place] is a 3 cm minimally complex right renal  cyst with a thin posterior calcification.  This requires no additional  follow-up imaging. [place] Bullocks is a punctate nonobstructing calculus in the right  kidney. GI/Bowel: Bowel caliber is normal.  There is no evidence of active bowel  inflammation. There is no evidence of acute appendicitis.     Pelvis: No acute abnormality of the pelvic viscera. Peritoneum/Retroperitoneum: There is no free air or free fluid.  Lymph nodes  are not enlarged. There are atherosclerotic vascular calcifications. Alexis Slim  is dense calcification in the right common iliac artery which likely causes  stenosis. Bones/Soft Tissues: No acute osseous abnormality.                Preliminary result by Marcie Hancock MD (11/04/18 17:03:41)                Impression:    No acute abnormality. EKG  None    All EKG's are interpreted by the Emergency Department Physician who either signs or Co-signs this chart in the absence of a cardiologist.    EMERGENCY DEPARTMENT COURSE:    CBC, CMP, urinalysis unremarkable. No evidence of hematuria. CT abdomen/pelvis unremarkable. Normal caliber aorta, normal appearing kidneys, no nephrolithiasis, no other abnormalities. I discussed results with patient. She reports no relief of her symptoms after Norco.  I discussed possible MSK pain, perhaps a stone which has already passed. I counseled the patient to use Tylenol and lidocaine patches for her symptoms, as she is allergic to NSAIDs and Flexeril. I discussed heat and ice and lower back stretches. I counseled the patient to follow up with the primary care provider as planned. I counseled her to return to the emergency department for any worsening symptoms, dysuria, continued hematuria, abdominal pain, vomiting, or for any other cares or concerns. Patient verbalized understanding and agreement with this plan. Patient discharged home in stable condition and in no distress. PROCEDURES:  None    CONSULTS:  None    CRITICAL CARE:  None    FINAL IMPRESSION      1.  Flank pain          DISPOSITION / PLAN     DISPOSITION Decision To Discharge 11/04/2018 05:34:31 PM      PATIENT REFERRED TO:  Your Primary Care Provider    Schedule an appointment as soon as possible for a visit       [facility] ED  [address] [address]  [phone]    As needed      DISCHARGE MEDICATIONS:  Discharge Medication List as of 11/4/2018  5:37 PM      START taking these medications    Details   acetaminophen (APAP EXTRA STRENGTH) 500 MG tablet Take 2 tablets by mouth every 6 hours as needed for Pain, Disp-120 tablet, R-0Print      Lidocaine-Menthol 4-5 % PTCH Apply 1 patch topically daily as needed (pain) Leave patch on for 12 hours at a time or follow instructions on box., Disp-30 patch, R-0Print             Grace Cabrera MD  Emergency Medicine Resident    (Please note that portions of thisnote were completed with a voice recognition program.  Efforts were made to edit the dictations but occasionally words are mis-transcribed.)        Grace Cabrera MD  11/04/18 [id number]

## 2018-11-10 ENCOUNTER — HOSPITAL ENCOUNTER (EMERGENCY)
Age: 53
Discharge: HOME | End: 2018-11-10
Attending: EMERGENCY MEDICINE
Payer: MEDICARE

## 2018-11-10 ENCOUNTER — APPOINTMENT (OUTPATIENT)
Dept: GENERAL RADIOLOGY | Age: 53
End: 2018-11-10
Payer: MEDICARE

## 2018-11-10 VITALS
HEIGHT: 66 IN | HEART RATE: 89 BPM | DIASTOLIC BLOOD PRESSURE: 98 MMHG | BODY MASS INDEX: 27.32 KG/M2 | OXYGEN SATURATION: 99 % | RESPIRATION RATE: 19 BRPM | SYSTOLIC BLOOD PRESSURE: 150 MMHG | WEIGHT: 170 LBS | TEMPERATURE: 98.4 F

## 2018-11-10 DIAGNOSIS — J06.9 VIRAL UPPER RESPIRATORY TRACT INFECTION: Primary | ICD-10-CM

## 2018-11-10 PROCEDURE — 71046 X-RAY EXAM CHEST 2 VIEWS: CPT

## 2018-11-10 PROCEDURE — G0382 LEV 3 HOSP TYPE B ED VISIT: HCPCS

## 2018-11-10 RX ORDER — OXYMETAZOLINE HYDROCHLORIDE 0.05 G/100ML
2 SPRAY NASAL 2 TIMES DAILY
Qty: 1 BOTTLE | Refills: 0 | Status: SHIPPED | OUTPATIENT
Start: 2018-11-10 | End: 2018-11-13

## 2018-11-10 RX ORDER — GUAIFENESIN 600 MG/1
1200 TABLET, EXTENDED RELEASE ORAL 2 TIMES DAILY
Qty: 28 TABLET | Refills: 0 | Status: SHIPPED | OUTPATIENT
Start: 2018-11-10 | End: 2018-11-17

## 2018-11-10 RX ORDER — BENZONATATE 100 MG/1
100 CAPSULE ORAL 3 TIMES DAILY PRN
Qty: 30 CAPSULE | Refills: 0 | Status: SHIPPED | OUTPATIENT
Start: 2018-11-10 | End: 2018-11-17

## 2018-11-10 RX ORDER — ALBUTEROL SULFATE 90 UG/1
1-2 AEROSOL, METERED RESPIRATORY (INHALATION) EVERY 4 HOURS PRN
Qty: 1 INHALER | Refills: 0 | Status: ON HOLD | OUTPATIENT
Start: 2018-11-10 | End: 2019-01-12 | Stop reason: SDUPTHER

## 2018-11-10 ASSESSMENT — PAIN DESCRIPTION - FREQUENCY: FREQUENCY: CONTINUOUS

## 2018-11-10 ASSESSMENT — PAIN DESCRIPTION - DESCRIPTORS: DESCRIPTORS: ACHING

## 2018-11-10 ASSESSMENT — PAIN DESCRIPTION - ORIENTATION: ORIENTATION: RIGHT;LEFT

## 2018-11-10 ASSESSMENT — PAIN DESCRIPTION - LOCATION: LOCATION: EAR

## 2018-11-10 ASSESSMENT — PAIN DESCRIPTION - PAIN TYPE: TYPE: ACUTE PAIN

## 2018-11-10 ASSESSMENT — PAIN SCALES - GENERAL: PAINLEVEL_OUTOF10: 4

## 2018-11-10 NOTE — ED PROVIDER NOTES
Neurology: GCS 15. Oriented to person place and time. Normal tone and power in all 4 extremities. No sensory deficits. Skin: Warm, dry, well perfused      DIFFERENTIAL  DIAGNOSIS     PLAN (LABS / Thamas Evangelina / EKG):  Orders Placed This Encounter   Procedures    XR CHEST STANDARD (2 VW)       MEDICATIONS ORDERED:  Orders Placed This Encounter   Medications    oxymetazoline (AFRIN 12 HOUR) 0.05 % nasal spray     Si sprays by Nasal route 2 times daily for 3 days     Dispense:  1 Bottle     Refill:  0    guaiFENesin (MUCINEX) 600 MG extended release tablet     Sig: Take 2 tablets by mouth 2 times daily for 7 days     Dispense:  28 tablet     Refill:  0    benzonatate (TESSALON PERLES) 100 MG capsule     Sig: Take 1 capsule by mouth 3 times daily as needed for Cough     Dispense:  30 capsule     Refill:  0    benzocaine-menthol (CEPACOL SORE THROAT) 15-3.6 MG lozenge     Sig: Take 1 lozenge by mouth every 2 hours as needed for Sore Throat     Dispense:  168 lozenge     Refill:  0       DDX: URI, bronchitis, influenza, pneumonia    DIAGNOSTIC RESULTS / EMERGENCY DEPARTMENT COURSE / MDM     LABS:  No results found for this visit on 11/10/18. IMPRESSION: 47yo female presents with URI symptoms for the past four days. She is afebrile, HD stable, and in no acute distress. Normal respiratory effort, lungs clear bilaterally, heart sounds normal, abdomen benign. Obvious nasal congestion. Doubt influenza; also patient would not benefit from tamiflu given the length of symptoms. Doubt pneumonia but will rule out with CXR. Impression is likely viral URI. Likely discharge with symptomatic support.     RADIOLOGY:  XR CHEST STANDARD (2 VW) (Final result)   Result time 11/10/18 14:55:25   Final result by Charbel Ferrara MD (11/10/18 14:55:25)                Impression:    Normal exam.            Narrative:    EXAMINATION:  TWO VIEWS OF THE CHEST    11/10/2018 2:44 pm    COMPARISON:  Chest radiograph dated

## 2018-11-11 ASSESSMENT — ENCOUNTER SYMPTOMS
SORE THROAT: 1
VOMITING: 0
PHOTOPHOBIA: 0
NAUSEA: 0
SINUS PRESSURE: 1
COUGH: 1
ABDOMINAL PAIN: 0
VOICE CHANGE: 0
DIARRHEA: 0
TROUBLE SWALLOWING: 0
RHINORRHEA: 1
SHORTNESS OF BREATH: 0

## 2018-11-16 ENCOUNTER — APPOINTMENT (OUTPATIENT)
Dept: GENERAL RADIOLOGY | Age: 53
End: 2018-11-16
Payer: MEDICARE

## 2018-11-16 ENCOUNTER — HOSPITAL ENCOUNTER (EMERGENCY)
Age: 53
Discharge: HOME | End: 2018-11-16
Attending: EMERGENCY MEDICINE
Payer: MEDICARE

## 2018-11-16 VITALS
RESPIRATION RATE: 16 BRPM | HEART RATE: 100 BPM | TEMPERATURE: 98.2 F | BODY MASS INDEX: 27.44 KG/M2 | SYSTOLIC BLOOD PRESSURE: 132 MMHG | WEIGHT: 170 LBS | OXYGEN SATURATION: 97 % | DIASTOLIC BLOOD PRESSURE: 87 MMHG

## 2018-11-16 DIAGNOSIS — J44.1 COPD EXACERBATION (HCC): Primary | ICD-10-CM

## 2018-11-16 PROCEDURE — 71046 X-RAY EXAM CHEST 2 VIEWS: CPT

## 2018-11-16 PROCEDURE — 6370000000 HC RX 637 (ALT 250 FOR IP): Performed by: PHYSICIAN ASSISTANT

## 2018-11-16 PROCEDURE — 99283 EMERGENCY DEPT VISIT LOW MDM: CPT

## 2018-11-16 PROCEDURE — 94640 AIRWAY INHALATION TREATMENT: CPT

## 2018-11-16 PROCEDURE — 6360000002 HC RX W HCPCS: Performed by: PHYSICIAN ASSISTANT

## 2018-11-16 RX ORDER — PREDNISONE 20 MG/1
40 TABLET ORAL ONCE
Status: COMPLETED | OUTPATIENT
Start: 2018-11-16 | End: 2018-11-16

## 2018-11-16 RX ORDER — IPRATROPIUM BROMIDE AND ALBUTEROL SULFATE 2.5; .5 MG/3ML; MG/3ML
1 SOLUTION RESPIRATORY (INHALATION)
Status: DISCONTINUED | OUTPATIENT
Start: 2018-11-16 | End: 2018-11-16

## 2018-11-16 RX ORDER — ALBUTEROL SULFATE 90 UG/1
2 AEROSOL, METERED RESPIRATORY (INHALATION)
Status: DISCONTINUED | OUTPATIENT
Start: 2018-11-16 | End: 2018-11-16

## 2018-11-16 RX ORDER — ALBUTEROL SULFATE 90 UG/1
2 AEROSOL, METERED RESPIRATORY (INHALATION)
Status: DISCONTINUED | OUTPATIENT
Start: 2018-11-16 | End: 2018-11-16 | Stop reason: HOSPADM

## 2018-11-16 RX ORDER — PREDNISONE 10 MG/1
TABLET ORAL
Qty: 20 TABLET | Refills: 0 | Status: SHIPPED | OUTPATIENT
Start: 2018-11-16 | End: 2018-11-26

## 2018-11-16 RX ORDER — DOXYCYCLINE HYCLATE 100 MG
100 TABLET ORAL ONCE
Status: COMPLETED | OUTPATIENT
Start: 2018-11-16 | End: 2018-11-16

## 2018-11-16 RX ORDER — DOXYCYCLINE HYCLATE 100 MG
100 TABLET ORAL 2 TIMES DAILY
Qty: 14 TABLET | Refills: 0 | Status: SHIPPED | OUTPATIENT
Start: 2018-11-16 | End: 2018-11-23

## 2018-11-16 RX ORDER — ALBUTEROL SULFATE 2.5 MG/3ML
5 SOLUTION RESPIRATORY (INHALATION)
Status: DISCONTINUED | OUTPATIENT
Start: 2018-11-16 | End: 2018-11-16 | Stop reason: HOSPADM

## 2018-11-16 RX ADMIN — PREDNISONE 40 MG: 20 TABLET ORAL at 12:50

## 2018-11-16 RX ADMIN — DOXYCYCLINE HYCLATE 100 MG: 100 TABLET, COATED ORAL at 12:50

## 2018-11-16 RX ADMIN — IPRATROPIUM BROMIDE 0.5 MG: 0.5 SOLUTION RESPIRATORY (INHALATION) at 11:39

## 2018-11-16 RX ADMIN — ALBUTEROL SULFATE 5 MG: 5 SOLUTION RESPIRATORY (INHALATION) at 11:39

## 2018-11-16 ASSESSMENT — ENCOUNTER SYMPTOMS
NAUSEA: 0
EYE ITCHING: 0
COLOR CHANGE: 0
RHINORRHEA: 0
BACK PAIN: 0
SORE THROAT: 0
EYE PAIN: 0
VOMITING: 0
EYE DISCHARGE: 0
WHEEZING: 0
COUGH: 1

## 2018-11-16 ASSESSMENT — PAIN DESCRIPTION - PAIN TYPE: TYPE: ACUTE PAIN

## 2018-11-16 ASSESSMENT — PAIN DESCRIPTION - LOCATION: LOCATION: GENERALIZED

## 2018-11-16 ASSESSMENT — PAIN SCALES - GENERAL: PAINLEVEL_OUTOF10: 3

## 2018-11-16 ASSESSMENT — PAIN DESCRIPTION - DESCRIPTORS: DESCRIPTORS: ACHING

## 2018-11-16 NOTE — ED PROVIDER NOTES
[facility] ED  Emergency Department Encounter  Mid Level Provider     Pt Name: Lissy Myers  MRN: [id number]  [place] 1965  Date of evaluation: 11/16/18  PCP:  No primary care provider on file. CHIEF COMPLAINT       Chief Complaint   Patient presents with    URI     states she was diagnosed with URI. she states she's still coughing up yellow and having nasal drainage. HISTORY OF PRESENT ILLNESS  (Location/Symptom, Timing/Onset,Context/Setting, Quality, Duration, Modifying Factors, Severity.)      Lissy Myers is a 52 y.o. female who presents with Cough productive of green sputum, nasal congestion. Patient states that symptoms started about 2 weeks ago. Patient is currently staying in a homeless shelter and states that there are many ill with similar symptoms. She did get an influenza vaccine. Patient states that she has a history of COPD or asthma she is unsure which and is on 3 different inhalers which she took an hour and a half ago. She reports for subjective \"low-grade\" fevers which she took 2 Tylenol earlier today. In addition she was seen for this same complaint about one week ago and has been taking Tessalon Perles, TheraFlu. She denies any abdominal pain nausea or vomiting. She does report chest pain when coughing only. PAST MEDICAL /SURGICAL / SOCIAL / FAMILY HISTORY      has a past medical history of CHF (congestive heart failure) ([address] COPD (chronic obstructive pulmonary disease) ([address] Headache; Neck fracture ([address] Seizure ([address] and Thyroid disease. has a past surgical history that includes knee surgery; partial hysterectomy (cervix not removed); and lymph node dissection. Social History     Social History    Marital status:      Spouse name: N/A    Number of children: N/A    Years of education: N/A     Occupational History    Not on file.      Social History Main Topics    Smoking status: Current Every Day Smoker     Packs/day: 0.50     Years: 12.00    Smokeless tobacco: Never Used    Alcohol use No    Drug use: No    Sexual activity: Not on file     Other Topics Concern    Not on file     Social History Narrative    No narrative on file       History reviewed. No pertinent family history. Allergies:  Bee venom; Bromide ion [bromine]; Flexeril [cyclobenzaprine]; Imitrex [sumatriptan]; Naproxen; Nsaids; Reglan [metoclopramide]; Sulfa antibiotics; Sulfadiazine; Toradol [ketorolac tromethamine]; and Tramadol    Home Medications:  Prior to Admission medications    Medication Sig Start Date End Date Taking? Authorizing Provider   predniSONE (DELTASONE) 10 MG tablet Take 4 tablets by mouth once daily for 5 days 11/16/18 11/26/18 Yes Saint Nora, PA-C   doxycycline hyclate (VIBRA-TABS) 100 MG tablet Take 1 tablet by mouth 2 times daily for 7 days 11/16/18 11/23/18 Yes Saint Nora, PA-C   guaiFENesin (MUCINEX) 600 MG extended release tablet Take 2 tablets by mouth 2 times daily for 7 days 11/10/18 11/17/18  Duane Preciado MD   benzonatate (TESSALON PERLES) 100 MG capsule Take 1 capsule by mouth 3 times daily as needed for Cough 11/10/18 11/17/18  Duane Preciado MD   benzocaine-menthol (CEPACOL SORE THROAT) 15-3.6 MG lozenge Take 1 lozenge by mouth every 2 hours as needed for Sore Throat 11/10/18   Duane Preciado MD   albuterol sulfate HFA (PROVENTIL HFA) 108 (90 Base) MCG/ACT inhaler Inhale 1-2 puffs into the lungs every 4 hours as needed for Wheezing or Shortness of Breath (Space out to every 6 hours as symptoms improve) Space out to every 6 hours as symptoms improve. 11/10/18   Kenney Schilling MD   acetaminophen (APAP EXTRA STRENGTH) 500 MG tablet Take 2 tablets by mouth every 6 hours as needed for Pain 11/4/18   Duane Preciado MD   Lidocaine-Menthol 4-5 % [facility] AND [facility] Apply 1 patch topically daily as needed (pain) Leave patch on for 12 hours at a time or follow instructions on box.  11/4/18   Duane Preciado MD °C). Her blood pressure is 132/87 and her pulse is 100. Her respiration is 16 and oxygen saturation is 97%. Physical Exam   Constitutional: She is oriented to person, place, and time. She appears well-developed and well-nourished. HENT:   Head: Normocephalic and atraumatic. Right Ear: Tympanic membrane, external ear and ear canal normal.   Left Ear: Tympanic membrane, external ear and ear canal normal.   Eyes: Right eye exhibits no discharge. Left eye exhibits no discharge. No scleral icterus. Neck: Normal range of motion. No tracheal deviation present. Cardiovascular: Normal rate, regular rhythm and normal heart sounds. Exam reveals no gallop. No murmur heard. Pulmonary/Chest: Effort normal. No stridor. No respiratory distress. She has wheezes. Abdominal: There is no tenderness. There is no rebound and no guarding. Musculoskeletal: Normal range of motion. She exhibits no edema. Neurological: She is alert and oriented to person, place, and time. Coordination normal.   Skin: Skin is warm and dry. No rash (on exposed surfaces) noted. She is not diaphoretic. No pallor. Psychiatric: She has a normal mood and affect.  Her behavior is normal.       DIFFERENTIAL  DIAGNOSIS         , COPD exacerbation, pneumonia    PLAN (LABS / IMAGING / EKG):  Orders Placed This Encounter   Procedures    XR CHEST STANDARD (2 VW)    Initiate ED Aerosol Protocol    MDI Treatment    HHN Treatment       MEDICATIONS ORDERED:  Orders Placed This Encounter   Medications    albuterol (PROVENTIL) nebulizer solution 5 mg    DISCONTD: ipratropium-albuterol (DUONEB) nebulizer solution 1 ampule    albuterol (PROVENTIL) nebulizer solution 5 mg    albuterol sulfate  (90 Base) MCG/ACT inhaler 2 puff    DISCONTD: albuterol sulfate  (90 Base) MCG/ACT inhaler 2 puff    DISCONTD: ipratropium (ATROVENT HFA) 17 MCG/ACT inhaler 2 puff    ipratropium (ATROVENT) 0.02 % nebulizer solution 0.5 mg    predniSONE caliber. Mediastinum: No evidence of mediastinal lymphadenopathy. The heart and pericardium demonstrate no acute abnormality. There is no acute abnormality of the thoracic aorta. Lungs/pleura: The lungs are without acute process. No focal consolidation or pulmonary edema. No evidence of pleural effusion or pneumothorax. Upper Abdomen: Limited images of the upper abdomen are unremarkable. Soft Tissues/Bones: No acute bone or soft tissue abnormality. No evidence of pulmonary embolism or acute pulmonary abnormality. Nm Myocardial Spect Rest Exercise Or Rx    Result Date: 10/27/2018  EXAMINATION: MYOCARDIAL PERFUSION IMAGING 10/27/2018 1:36 pm TECHNIQUE: For the rest study, 11.3 mCi of Tc 99 labeled sestamibi were injected. SPECT images were acquired. Under cardiology supervision, 0.4mg Guadalupe Lass was infused. After pharmacologic stress, 39.1 mCi of Tc 99 labeled sestamibi were injected. SPECT images with ECG gating were acquired. COMPARISON: None Available. HISTORY: ORDERING SYSTEM PROVIDED HISTORY: CHEST PAIN, ACUTE CORONARY SYNDROME SUSPECT TECHNOLOGIST PROVIDED HISTORY: Ordering Physician Provided Reason for Exam: chest pain, acute coronary syndrome suspect Acuity: Acute Type of Exam: Initial Additional signs and symptoms: shortness of breath, palpitations FINDINGS: There is normal accumulation of tracer throughout the myocardium on rest images and stress images. There are no fixed or reversible defects. End diastolic volume is 72 ml. The ejection fraction equals 77% with no focal wall motion abnormalities. There is no left ventricular dilatation. TID of 1.14.     1. No pharmacologically induced reversible perfusion defects to suggest ischemia. 2. Ejection fraction of 77%. 3. No focal wall motion abnormality. XR CHEST STANDARD (2 VW)   Final Result   No acute airspace disease identified. LABS:  No results found for this visit on 11/16/18.       FINAL IMPRESSION      1. COPD

## 2018-11-16 NOTE — ED TRIAGE NOTES
Pt to room 7. She reports that she was diagnosed with a cold. She states that she's still having nasal drainage and coughing up yellow. She states that she is achey when she coughs. Pt is alert, oriented, speaking in full sentences.

## 2018-11-27 ENCOUNTER — APPOINTMENT (OUTPATIENT)
Dept: GENERAL RADIOLOGY | Age: 53
End: 2018-11-27
Payer: MEDICARE

## 2018-11-27 ENCOUNTER — HOSPITAL ENCOUNTER (EMERGENCY)
Age: 53
Discharge: HOME | End: 2018-11-27
Attending: EMERGENCY MEDICINE
Payer: MEDICARE

## 2018-11-27 VITALS
WEIGHT: 200 LBS | TEMPERATURE: 98.8 F | RESPIRATION RATE: 16 BRPM | HEART RATE: 99 BPM | HEIGHT: 65 IN | OXYGEN SATURATION: 96 % | BODY MASS INDEX: 33.32 KG/M2 | SYSTOLIC BLOOD PRESSURE: 129 MMHG | DIASTOLIC BLOOD PRESSURE: 89 MMHG

## 2018-11-27 DIAGNOSIS — J06.9 VIRAL URI WITH COUGH: Primary | ICD-10-CM

## 2018-11-27 LAB
ABSOLUTE EOS #: 0.06 K/UL (ref 0–0.44)
ABSOLUTE IMMATURE GRANULOCYTE: 0.04 K/UL (ref 0–0.3)
ABSOLUTE LYMPH #: 3.12 K/UL (ref 1.1–3.7)
ABSOLUTE MONO #: 1.2 K/UL (ref 0.1–1.2)
ANION GAP SERPL CALCULATED.3IONS-SCNC: 13 MMOL/L (ref 9–17)
BASOPHILS # BLD: 0 % (ref 0–2)
BASOPHILS ABSOLUTE: 0.04 K/UL (ref 0–0.2)
BUN BLDV-MCNC: 5 MG/DL (ref 6–20)
BUN/CREAT BLD: ABNORMAL (ref 9–20)
CALCIUM SERPL-MCNC: 9.1 MG/DL (ref 8.6–10.4)
CHLORIDE BLD-SCNC: 100 MMOL/L (ref 98–107)
CO2: 23 MMOL/L (ref 20–31)
CREAT SERPL-MCNC: 0.44 MG/DL (ref 0.5–0.9)
DIFFERENTIAL TYPE: ABNORMAL
EKG ATRIAL RATE: 100 BPM
EKG P AXIS: 55 DEGREES
EKG P-R INTERVAL: 160 MS
EKG Q-T INTERVAL: 384 MS
EKG QRS DURATION: 76 MS
EKG QTC CALCULATION (BAZETT): 495 MS
EKG R AXIS: 19 DEGREES
EKG T AXIS: 32 DEGREES
EKG VENTRICULAR RATE: 100 BPM
EOSINOPHILS RELATIVE PERCENT: 0 % (ref 1–4)
GFR AFRICAN AMERICAN: >60 ML/MIN
GFR NON-AFRICAN AMERICAN: >60 ML/MIN
GFR SERPL CREATININE-BSD FRML MDRD: ABNORMAL ML/MIN/{1.73_M2}
GFR SERPL CREATININE-BSD FRML MDRD: ABNORMAL ML/MIN/{1.73_M2}
GLUCOSE BLD-MCNC: 120 MG/DL (ref 70–99)
HCT VFR BLD CALC: 37.6 % (ref 36.3–47.1)
HEMOGLOBIN: 12.4 G/DL (ref 11.9–15.1)
IMMATURE GRANULOCYTES: 0 %
LYMPHOCYTES # BLD: 22 % (ref 24–43)
MCH RBC QN AUTO: 28.1 PG (ref 25.2–33.5)
MCHC RBC AUTO-ENTMCNC: 33 G/DL (ref 28.4–34.8)
MCV RBC AUTO: 85.3 FL (ref 82.6–102.9)
MONOCYTES # BLD: 9 % (ref 3–12)
NRBC AUTOMATED: 0 PER 100 WBC
PDW BLD-RTO: 16.4 % (ref 11.8–14.4)
PLATELET # BLD: 262 K/UL (ref 138–453)
PLATELET ESTIMATE: ABNORMAL
PMV BLD AUTO: 9.2 FL (ref 8.1–13.5)
POC TROPONIN I: 0 NG/ML (ref 0–0.1)
POC TROPONIN I: 0.01 NG/ML (ref 0–0.1)
POC TROPONIN INTERP: NORMAL
POC TROPONIN INTERP: NORMAL
POTASSIUM SERPL-SCNC: 3.7 MMOL/L (ref 3.7–5.3)
RBC # BLD: 4.41 M/UL (ref 3.95–5.11)
RBC # BLD: ABNORMAL 10*6/UL
SEG NEUTROPHILS: 69 % (ref 36–65)
SEGMENTED NEUTROPHILS ABSOLUTE COUNT: 9.5 K/UL (ref 1.5–8.1)
SODIUM BLD-SCNC: 136 MMOL/L (ref 135–144)
WBC # BLD: 14 K/UL (ref 3.5–11.3)
WBC # BLD: ABNORMAL 10*3/UL

## 2018-11-27 PROCEDURE — 99284 EMERGENCY DEPT VISIT MOD MDM: CPT

## 2018-11-27 PROCEDURE — 93005 ELECTROCARDIOGRAM TRACING: CPT

## 2018-11-27 PROCEDURE — 84484 ASSAY OF TROPONIN QUANT: CPT

## 2018-11-27 PROCEDURE — 80048 BASIC METABOLIC PNL TOTAL CA: CPT

## 2018-11-27 PROCEDURE — 6360000002 HC RX W HCPCS: Performed by: STUDENT IN AN ORGANIZED HEALTH CARE EDUCATION/TRAINING PROGRAM

## 2018-11-27 PROCEDURE — 94640 AIRWAY INHALATION TREATMENT: CPT

## 2018-11-27 PROCEDURE — 71046 X-RAY EXAM CHEST 2 VIEWS: CPT

## 2018-11-27 PROCEDURE — 85025 COMPLETE CBC W/AUTO DIFF WBC: CPT

## 2018-11-27 RX ORDER — FLUTICASONE PROPIONATE 50 MCG
1 SPRAY, SUSPENSION (ML) NASAL DAILY
Qty: 1 BOTTLE | Refills: 0 | Status: ON HOLD | OUTPATIENT
Start: 2018-11-27 | End: 2019-01-12 | Stop reason: ALTCHOICE

## 2018-11-27 RX ORDER — ALBUTEROL SULFATE 2.5 MG/3ML
5 SOLUTION RESPIRATORY (INHALATION)
Status: DISCONTINUED | OUTPATIENT
Start: 2018-11-27 | End: 2018-11-27 | Stop reason: HOSPADM

## 2018-11-27 RX ORDER — GUAIFENESIN, PSEUDOEPHEDRINE HYDROCHLORIDE 600; 60 MG/1; MG/1
1 TABLET, EXTENDED RELEASE ORAL EVERY 12 HOURS
Qty: 14 TABLET | Refills: 1 | Status: SHIPPED | OUTPATIENT
Start: 2018-11-27 | End: 2018-12-04

## 2018-11-27 RX ORDER — BENZONATATE 100 MG/1
100 CAPSULE ORAL 3 TIMES DAILY PRN
Status: DISCONTINUED | OUTPATIENT
Start: 2018-11-27 | End: 2018-11-27 | Stop reason: HOSPADM

## 2018-11-27 RX ADMIN — ALBUTEROL SULFATE 5 MG: 2.5 SOLUTION RESPIRATORY (INHALATION) at 09:54

## 2018-11-27 RX ADMIN — IPRATROPIUM BROMIDE 0.5 MG: 0.5 SOLUTION RESPIRATORY (INHALATION) at 09:54

## 2018-11-27 RX ADMIN — ALBUTEROL SULFATE 5 MG: 5 SOLUTION RESPIRATORY (INHALATION) at 10:22

## 2018-11-27 RX ADMIN — ALBUTEROL SULFATE 5 MG: 5 SOLUTION RESPIRATORY (INHALATION) at 11:05

## 2018-11-27 NOTE — ED PROVIDER NOTES
Start Date End Date Taking? Authorizing Provider   pseudoephedrine-guaiFENesin (MUCINEX D)  MG per extended release tablet Take 1 tablet by mouth every 12 hours for 7 days 11/27/18 12/4/18 Yes Dorenda Eth, DO   fluticasone (FLONASE) 50 MCG/ACT nasal spray 1 spray by Each Nare route daily 11/27/18  Yes Dorenda Eth, DO   benzocaine-menthol (CEPACOL SORE THROAT) 15-3.6 MG lozenge Take 1 lozenge by mouth every 2 hours as needed for Sore Throat 11/10/18   Brook Lipscomb MD   albuterol sulfate HFA (PROVENTIL HFA) 108 (90 Base) MCG/ACT inhaler Inhale 1-2 puffs into the lungs every 4 hours as needed for Wheezing or Shortness of Breath (Space out to every 6 hours as symptoms improve) Space out to every 6 hours as symptoms improve. 11/10/18   Yayo Garcia MD   acetaminophen (APAP EXTRA STRENGTH) 500 MG tablet Take 2 tablets by mouth every 6 hours as needed for Pain 11/4/18   Brook Lipscomb MD   Lidocaine-Menthol 4-5 % [facility] AND [facility] Apply 1 patch topically daily as needed (pain) Leave patch on for 12 hours at a time or follow instructions on box. 11/4/18   Brook Lipscomb MD   acetaminophen-codeine (TYLENOL/CODEINE #3) 300-30 MG per tablet Take 1-2 tablets by mouth 3 times daily as needed for Pain for up to 10 doses. . 10/31/18 11/30/18  Bhavin Armstrong DO   amLODIPine (NORVASC) 5 MG tablet Take 5 mg by mouth daily    Historical Provider, MD   levothyroxine (SYNTHROID) 50 MCG tablet Take 50 mcg by mouth Daily    Historical Provider, MD   metoprolol tartrate (LOPRESSOR) 25 MG tablet Take 25 mg by mouth 2 times daily    Historical Provider, MD   montelukast (SINGULAIR) 10 MG tablet Take 10 mg by mouth nightly    Historical Provider, MD   phenytoin (DILANTIN) 100 MG ER capsule Take 300 mg by mouth 3 times daily    Historical Provider, MD   divalproex (DEPAKOTE) 500 MG DR tablet Take 500 mg by mouth 3 times daily    Historical Provider, MD   PHENobarbital (LUMINAL) 30 MG tablet Take 100 mg by mouth 3 times daily. Sanjana Hernandez 0.0 per 100 WBC    Differential Type NOT REPORTED     Seg Neutrophils 69 (H) 36 - 65 %    Lymphocytes 22 (L) 24 - 43 %    Monocytes 9 3 - 12 %    Eosinophils % 0 (L) 1 - 4 %    Basophils 0 0 - 2 %    Immature Granulocytes 0 0 %    Segs Absolute 9.50 (H) 1.50 - 8.10 k/uL    Absolute Lymph # 3.12 1.10 - 3.70 k/uL    Absolute Mono # 1.20 0.10 - 1.20 k/uL    Absolute Eos # 0.06 0.00 - 0.44 k/uL    Basophils # 0.04 0.00 - 0.20 k/uL    Absolute Immature Granulocyte 0.04 0.00 - 0.30 k/uL    WBC Morphology NOT REPORTED     RBC Morphology ANISOCYTOSIS PRESENT     Platelet Estimate NOT REPORTED    Basic Metabolic Panel   Result Value Ref Range    Glucose 120 (H) 70 - 99 mg/dL    BUN 5 (L) 6 - 20 mg/dL    CREATININE 0.44 (L) 0.50 - 0.90 mg/dL    Bun/Cre Ratio NOT REPORTED 9 - 20    Calcium 9.1 8.6 - 10.4 mg/dL    Sodium 136 135 - 144 mmol/L    Potassium 3.7 3.7 - 5.3 mmol/L    Chloride 100 98 - 107 mmol/L    CO2 23 20 - 31 mmol/L    Anion Gap 13 9 - 17 mmol/L    GFR Non-African American >60 >60 mL/min    GFR African American >60 >60 mL/min    GFR Comment          GFR Staging NOT REPORTED    POCT troponin   Result Value Ref Range    POC Troponin I 0.00 0.00 - 0.10 ng/mL    POC Troponin Interp       The Troponin-I (POC) results cannot be compared to the Troponin-T results. POCT troponin   Result Value Ref Range    POC Troponin I 0.01 0.00 - 0.10 ng/mL    POC Troponin Interp       The Troponin-I (POC) results cannot be compared to the Troponin-T results. EKG 12 Lead   Result Value Ref Range    Ventricular Rate 100 BPM    Atrial Rate 100 BPM    P-R Interval 160 ms    QRS Duration 76 ms    Q-T Interval 384 ms    QTc Calculation (Bazett) 495 ms    P Axis 55 degrees    R Axis 19 degrees    T Axis 32 degrees       IMPRESSION: Patient is a 47yo female who presents for sob and cough as noted above.  Patient had a recent negative stress test. Will obtain CXR, RT eval, EKG, CBC, BMP, trop give aspirin and re-evaluate    Patient

## 2018-11-27 NOTE — ED PROVIDER NOTES
96%.    Patient awake alert, speaking in full sentences has audible congestion  Tenderness to palpation over the maxillary sinuses, bilateral nasal turbinates are edematous bilaterally with rhinorrhea present next line posterior pharynx is nonerythematous nonedematous, no tonsillar hypertrophy, uvula is midline  Patient is status post 2 albuterol nebulizer treatments. She does have some mild respiratory wheezes noted in upper lobes, and some mild radials noted in the bases bilaterally  Cardiac vascular: Regular rate and rhythm, no murmurs gallops or rubs  No swelling to her legs bilaterally, and no pain with palpation over her calves bilaterally. Impression: bronchitis, URI    Plan: cbc, bmp, ekg, trop, cxr, decongestant, cough supressant, and abx            EKG Interpretation    Interpreted by me    EKG shows normal sinus rhythm, with normal access, QT is prolonged at 495, no ST or T-wave changes noted, ventricular rate of 100, WI interval 160, curative 76.           Martha Schuster D.O, M.P.H  Attending Emergency Medicine Physician         Martha Schuster DO  11/27/18 [id number]

## 2018-11-28 ASSESSMENT — ENCOUNTER SYMPTOMS
VOMITING: 0
DIARRHEA: 0
ABDOMINAL DISTENTION: 0
NAUSEA: 0
WHEEZING: 0
EYE PAIN: 0
TROUBLE SWALLOWING: 0
ABDOMINAL PAIN: 0
VOICE CHANGE: 0
BACK PAIN: 0
RHINORRHEA: 1
SORE THROAT: 0
SHORTNESS OF BREATH: 0
CONSTIPATION: 0
COUGH: 1
STRIDOR: 0

## 2019-01-09 ENCOUNTER — HOSPITAL ENCOUNTER (INPATIENT)
Age: 54
LOS: 2 days | Discharge: PSYCHIATRIC OR SUBSTANCE USE FACILITY | DRG: 812 | End: 2019-01-11
Attending: EMERGENCY MEDICINE | Admitting: INTERNAL MEDICINE
Payer: MEDICARE

## 2019-01-09 ENCOUNTER — APPOINTMENT (OUTPATIENT)
Dept: GENERAL RADIOLOGY | Age: 54
DRG: 812 | End: 2019-01-09
Payer: MEDICARE

## 2019-01-09 DIAGNOSIS — F19.10 POLYSUBSTANCE ABUSE (HCC): ICD-10-CM

## 2019-01-09 DIAGNOSIS — G40.909 SEIZURE DISORDER (HCC): ICD-10-CM

## 2019-01-09 DIAGNOSIS — T50.904A DRUG OVERDOSE, UNDETERMINED INTENT, INITIAL ENCOUNTER: Primary | ICD-10-CM

## 2019-01-09 PROBLEM — I10 ESSENTIAL HYPERTENSION: Status: ACTIVE | Noted: 2019-01-09

## 2019-01-09 PROBLEM — T50.901A DRUG OVERDOSE, ACCIDENTAL OR UNINTENTIONAL, INITIAL ENCOUNTER: Status: ACTIVE | Noted: 2019-01-09

## 2019-01-09 PROBLEM — E03.9 HYPOTHYROIDISM: Status: ACTIVE | Noted: 2019-01-09

## 2019-01-09 LAB
ABSOLUTE EOS #: 0 K/UL (ref 0–0.4)
ABSOLUTE IMMATURE GRANULOCYTE: 0 K/UL (ref 0–0.3)
ABSOLUTE LYMPH #: 5.4 K/UL (ref 1.1–3.7)
ABSOLUTE MONO #: 0.61 K/UL (ref 0.1–0.8)
ACETAMINOPHEN LEVEL: <5 UG/ML (ref 10–30)
ALBUMIN SERPL-MCNC: 3.9 G/DL (ref 3.5–5.2)
ALBUMIN/GLOBULIN RATIO: 1.3 (ref 1–2.5)
ALLEN TEST: ABNORMAL
ALLEN TEST: NORMAL
ALP BLD-CCNC: 137 U/L (ref 35–104)
ALT SERPL-CCNC: 15 U/L (ref 5–33)
AMMONIA: 71 UMOL/L (ref 11–51)
ANION GAP SERPL CALCULATED.3IONS-SCNC: 12 MMOL/L (ref 9–17)
ANION GAP: 1 MMOL/L (ref 7–16)
AST SERPL-CCNC: 20 U/L
ATYPICAL LYMPHOCYTE ABSOLUTE COUNT: 0.17 K/UL
ATYPICAL LYMPHOCYTES: 2 %
BASOPHILS # BLD: 0 % (ref 0–2)
BASOPHILS ABSOLUTE: 0 K/UL (ref 0–0.2)
BILIRUB SERPL-MCNC: 0.21 MG/DL (ref 0.3–1.2)
BUN BLDV-MCNC: 5 MG/DL (ref 6–20)
BUN/CREAT BLD: ABNORMAL (ref 9–20)
CALCIUM SERPL-MCNC: 8.7 MG/DL (ref 8.6–10.4)
CARBOXYHEMOGLOBIN: 6.8 % (ref 0–5)
CHLORIDE BLD-SCNC: 88 MMOL/L (ref 98–107)
CO2: 25 MMOL/L (ref 20–31)
CREAT SERPL-MCNC: 0.4 MG/DL (ref 0.5–0.9)
DIFFERENTIAL TYPE: ABNORMAL
EOSINOPHILS RELATIVE PERCENT: 0 % (ref 1–4)
ETHANOL PERCENT: <0.01 %
ETHANOL: <10 MG/DL
FIO2: ABNORMAL
FIO2: NORMAL
GFR AFRICAN AMERICAN: >60 ML/MIN
GFR NON-AFRICAN AMERICAN: >60 ML/MIN
GFR NON-AFRICAN AMERICAN: >60 ML/MIN
GFR SERPL CREATININE-BSD FRML MDRD: >60 ML/MIN
GFR SERPL CREATININE-BSD FRML MDRD: ABNORMAL ML/MIN/{1.73_M2}
GLUCOSE BLD-MCNC: 79 MG/DL (ref 70–99)
GLUCOSE BLD-MCNC: 81 MG/DL (ref 74–100)
HCO3 VENOUS: 25.9 MMOL/L (ref 22–29)
HCO3 VENOUS: 26 MMOL/L (ref 24–30)
HCT VFR BLD CALC: 39.6 % (ref 36.3–47.1)
HEMOGLOBIN: 13.3 G/DL (ref 11.9–15.1)
IMMATURE GRANULOCYTES: 0 %
LIPASE: 18 U/L (ref 13–60)
LYMPHOCYTES # BLD: 62 % (ref 24–44)
MCH RBC QN AUTO: 29.6 PG (ref 25.2–33.5)
MCHC RBC AUTO-ENTMCNC: 33.6 G/DL (ref 28.4–34.8)
MCV RBC AUTO: 88.2 FL (ref 82.6–102.9)
METHEMOGLOBIN: ABNORMAL % (ref 0–1.5)
MODE: ABNORMAL
MODE: NORMAL
MONOCYTES # BLD: 7 % (ref 1–7)
MORPHOLOGY: ABNORMAL
NEGATIVE BASE EXCESS, VEN: ABNORMAL MMOL/L (ref 0–2)
NEGATIVE BASE EXCESS, VEN: NORMAL (ref 0–2)
NOTIFICATION TIME: ABNORMAL
NOTIFICATION: ABNORMAL
NRBC AUTOMATED: 0 PER 100 WBC
O2 DEVICE/FLOW/%: ABNORMAL
O2 DEVICE/FLOW/%: NORMAL
O2 SAT, VEN: 66 % (ref 60–85)
O2 SAT, VEN: 98.6 % (ref 60–85)
OXYHEMOGLOBIN: ABNORMAL % (ref 95–98)
PATIENT TEMP: 37
PATIENT TEMP: NORMAL
PCO2, VEN, TEMP ADJ: ABNORMAL MMHG (ref 39–55)
PCO2, VEN: 42.1 MM HG (ref 41–51)
PCO2, VEN: 48.3 (ref 39–55)
PDW BLD-RTO: 16.3 % (ref 11.8–14.4)
PEEP/CPAP: ABNORMAL
PH VENOUS: 7.35 (ref 7.32–7.42)
PH VENOUS: 7.4 (ref 7.32–7.43)
PH, VEN, TEMP ADJ: ABNORMAL (ref 7.32–7.42)
PHENOBARBITAL DATE LAST DOSE: ABNORMAL
PHENOBARBITAL DOSE AMOUNT: ABNORMAL
PHENOBARBITAL TIME LAST DOSE: ABNORMAL
PHENOBARBITAL: 44 UG/ML (ref 15–40)
PHENYTOIN DATE LAST DOSE: ABNORMAL
PHENYTOIN DOSE AMOUNT: ABNORMAL
PHENYTOIN DOSE TIME: ABNORMAL
PHENYTOIN FREE: 1.4 UG/ML (ref 1–2)
PHENYTOIN LEVEL: 8.8 UG/ML (ref 10–20)
PLATELET # BLD: 290 K/UL (ref 138–453)
PLATELET ESTIMATE: ABNORMAL
PMV BLD AUTO: 8.7 FL (ref 8.1–13.5)
PO2, VEN, TEMP ADJ: ABNORMAL MMHG (ref 30–50)
PO2, VEN: 116 (ref 30–50)
PO2, VEN: 34.5 MM HG (ref 30–50)
POC CHLORIDE: 96 MMOL/L (ref 98–107)
POC CREATININE: 0.5 MG/DL (ref 0.51–1.19)
POC HEMATOCRIT: 45 % (ref 36–46)
POC HEMOGLOBIN: 15.3 G/DL (ref 12–16)
POC IONIZED CALCIUM: 0.93 MMOL/L (ref 1.15–1.33)
POC LACTIC ACID: 1.37 MMOL/L (ref 0.56–1.39)
POC PCO2 TEMP: NORMAL MM HG
POC PH TEMP: NORMAL
POC PO2 TEMP: NORMAL MM HG
POC POTASSIUM: 4.2 MMOL/L (ref 3.5–4.5)
POC SODIUM: 123 MMOL/L (ref 138–146)
POSITIVE BASE EXCESS, VEN: 0.4 MMOL/L (ref 0–2)
POSITIVE BASE EXCESS, VEN: 1 (ref 0–3)
POTASSIUM SERPL-SCNC: 4.4 MMOL/L (ref 3.7–5.3)
PSV: ABNORMAL
PT. POSITION: ABNORMAL
RBC # BLD: 4.49 M/UL (ref 3.95–5.11)
RBC # BLD: ABNORMAL 10*6/UL
RESPIRATORY RATE: ABNORMAL
SALICYLATE LEVEL: <1 MG/DL (ref 3–10)
SAMPLE SITE: ABNORMAL
SAMPLE SITE: NORMAL
SEG NEUTROPHILS: 29 % (ref 36–66)
SEGMENTED NEUTROPHILS ABSOLUTE COUNT: 2.52 K/UL (ref 1.8–7.7)
SERUM OSMOLALITY: 267 MOSM/KG (ref 275–295)
SET RATE: ABNORMAL
SODIUM BLD-SCNC: 125 MMOL/L (ref 135–144)
TEXT FOR RESPIRATORY: ABNORMAL
TOTAL CO2, VENOUS: 27 MMOL/L (ref 23–30)
TOTAL HB: ABNORMAL G/DL (ref 12–16)
TOTAL PROTEIN: 6.8 G/DL (ref 6.4–8.3)
TOTAL RATE: ABNORMAL
TOXIC TRICYCLIC SC,BLOOD: NEGATIVE
TROPONIN INTERP: NORMAL
TROPONIN T: NORMAL NG/ML
TROPONIN, HIGH SENSITIVITY: <6 NG/L (ref 0–14)
VALPROIC ACID % FREE: 9.3 % (ref 5–18.4)
VALPROIC ACID LEVEL: 95 UG/ML (ref 50–125)
VALPROIC ACID, FREE: 8.8 UG/ML (ref 7–23)
VALPROIC DATE LAST DOSE: NORMAL
VALPROIC DOSE AMOUNT: NORMAL
VALPROIC TIME LAST DOSE: NORMAL
VT: ABNORMAL
WBC # BLD: 8.7 K/UL (ref 3.5–11.3)
WBC # BLD: ABNORMAL 10*3/UL

## 2019-01-09 PROCEDURE — 80165 DIPROPYLACETIC ACID FREE: CPT

## 2019-01-09 PROCEDURE — 80164 ASSAY DIPROPYLACETIC ACD TOT: CPT

## 2019-01-09 PROCEDURE — 85025 COMPLETE CBC W/AUTO DIFF WBC: CPT

## 2019-01-09 PROCEDURE — 80307 DRUG TEST PRSMV CHEM ANLYZR: CPT

## 2019-01-09 PROCEDURE — 80185 ASSAY OF PHENYTOIN TOTAL: CPT

## 2019-01-09 PROCEDURE — 80186 ASSAY OF PHENYTOIN FREE: CPT

## 2019-01-09 PROCEDURE — 83930 ASSAY OF BLOOD OSMOLALITY: CPT

## 2019-01-09 PROCEDURE — 84295 ASSAY OF SERUM SODIUM: CPT

## 2019-01-09 PROCEDURE — 80184 ASSAY OF PHENOBARBITAL: CPT

## 2019-01-09 PROCEDURE — 93005 ELECTROCARDIOGRAM TRACING: CPT

## 2019-01-09 PROCEDURE — 83605 ASSAY OF LACTIC ACID: CPT

## 2019-01-09 PROCEDURE — G0480 DRUG TEST DEF 1-7 CLASSES: HCPCS

## 2019-01-09 PROCEDURE — 2580000003 HC RX 258: Performed by: HOSPITALIST

## 2019-01-09 PROCEDURE — 85014 HEMATOCRIT: CPT

## 2019-01-09 PROCEDURE — 82330 ASSAY OF CALCIUM: CPT

## 2019-01-09 PROCEDURE — 82803 BLOOD GASES ANY COMBINATION: CPT

## 2019-01-09 PROCEDURE — 82140 ASSAY OF AMMONIA: CPT

## 2019-01-09 PROCEDURE — 82435 ASSAY OF BLOOD CHLORIDE: CPT

## 2019-01-09 PROCEDURE — 83690 ASSAY OF LIPASE: CPT

## 2019-01-09 PROCEDURE — 82947 ASSAY GLUCOSE BLOOD QUANT: CPT

## 2019-01-09 PROCEDURE — 84132 ASSAY OF SERUM POTASSIUM: CPT

## 2019-01-09 PROCEDURE — 2060000000 HC ICU INTERMEDIATE R&B

## 2019-01-09 PROCEDURE — 36415 COLL VENOUS BLD VENIPUNCTURE: CPT

## 2019-01-09 PROCEDURE — 82565 ASSAY OF CREATININE: CPT

## 2019-01-09 PROCEDURE — 71045 X-RAY EXAM CHEST 1 VIEW: CPT

## 2019-01-09 PROCEDURE — 84484 ASSAY OF TROPONIN QUANT: CPT

## 2019-01-09 PROCEDURE — 99285 EMERGENCY DEPT VISIT HI MDM: CPT

## 2019-01-09 PROCEDURE — 82805 BLOOD GASES W/O2 SATURATION: CPT

## 2019-01-09 PROCEDURE — 80053 COMPREHEN METABOLIC PANEL: CPT

## 2019-01-09 RX ORDER — SODIUM CHLORIDE 0.9 % (FLUSH) 0.9 %
10 SYRINGE (ML) INJECTION PRN
Status: DISCONTINUED | OUTPATIENT
Start: 2019-01-09 | End: 2019-01-11 | Stop reason: HOSPADM

## 2019-01-09 RX ORDER — ONDANSETRON 2 MG/ML
4 INJECTION INTRAMUSCULAR; INTRAVENOUS EVERY 6 HOURS PRN
Status: DISCONTINUED | OUTPATIENT
Start: 2019-01-09 | End: 2019-01-11 | Stop reason: HOSPADM

## 2019-01-09 RX ORDER — 0.9 % SODIUM CHLORIDE 0.9 %
1000 INTRAVENOUS SOLUTION INTRAVENOUS ONCE
Status: COMPLETED | OUTPATIENT
Start: 2019-01-09 | End: 2019-01-09

## 2019-01-09 RX ORDER — SODIUM CHLORIDE 0.9 % (FLUSH) 0.9 %
10 SYRINGE (ML) INJECTION EVERY 12 HOURS SCHEDULED
Status: DISCONTINUED | OUTPATIENT
Start: 2019-01-10 | End: 2019-01-11 | Stop reason: HOSPADM

## 2019-01-09 RX ADMIN — SODIUM CHLORIDE 1000 ML: 9 INJECTION, SOLUTION INTRAVENOUS at 18:36

## 2019-01-09 ASSESSMENT — ENCOUNTER SYMPTOMS
EYE DISCHARGE: 0
EYE REDNESS: 0
NAUSEA: 0
CONSTIPATION: 0
ABDOMINAL DISTENTION: 0
VOICE CHANGE: 0
CHEST TIGHTNESS: 0
DIARRHEA: 0
VOMITING: 0
SHORTNESS OF BREATH: 0
SORE THROAT: 0
ALLERGIC/IMMUNOLOGIC COMMENTS: BEE VENOM
PHOTOPHOBIA: 0
APNEA: 0
WHEEZING: 0
TROUBLE SWALLOWING: 0
ABDOMINAL PAIN: 0
COLOR CHANGE: 0
BACK PAIN: 1
COUGH: 0

## 2019-01-10 LAB
ABSOLUTE EOS #: 0.06 K/UL (ref 0–0.44)
ABSOLUTE IMMATURE GRANULOCYTE: 0.06 K/UL (ref 0–0.3)
ABSOLUTE LYMPH #: 2.86 K/UL (ref 1.1–3.7)
ABSOLUTE MONO #: 0.64 K/UL (ref 0.1–1.2)
ALLEN TEST: POSITIVE
AMMONIA: 58 UMOL/L (ref 11–51)
AMPHETAMINE SCREEN URINE: NEGATIVE
AMPHETAMINE SCREEN URINE: NEGATIVE
ANION GAP SERPL CALCULATED.3IONS-SCNC: 15 MMOL/L (ref 9–17)
BARBITURATE SCREEN URINE: POSITIVE
BARBITURATE SCREEN URINE: POSITIVE
BASOPHILS # BLD: 1 % (ref 0–2)
BASOPHILS ABSOLUTE: 0.04 K/UL (ref 0–0.2)
BENZODIAZEPINE SCREEN, URINE: NEGATIVE
BENZODIAZEPINE SCREEN, URINE: NEGATIVE
BILIRUBIN URINE: NEGATIVE
BUN BLDV-MCNC: 7 MG/DL (ref 6–20)
BUN/CREAT BLD: ABNORMAL (ref 9–20)
BUPRENORPHINE URINE: ABNORMAL
BUPRENORPHINE URINE: ABNORMAL
CALCIUM SERPL-MCNC: 9.1 MG/DL (ref 8.6–10.4)
CANNABINOID SCREEN URINE: NEGATIVE
CANNABINOID SCREEN URINE: NEGATIVE
CHLORIDE BLD-SCNC: 93 MMOL/L (ref 98–107)
CO2: 23 MMOL/L (ref 20–31)
COCAINE METABOLITE, URINE: NEGATIVE
COCAINE METABOLITE, URINE: NEGATIVE
COLOR: YELLOW
COMMENT UA: NORMAL
CREAT SERPL-MCNC: 0.46 MG/DL (ref 0.5–0.9)
CREATININE URINE: 39.6 MG/DL (ref 28–217)
DIFFERENTIAL TYPE: ABNORMAL
EKG ATRIAL RATE: 57 BPM
EKG ATRIAL RATE: 67 BPM
EKG P AXIS: 50 DEGREES
EKG P AXIS: 64 DEGREES
EKG P-R INTERVAL: 176 MS
EKG P-R INTERVAL: 196 MS
EKG Q-T INTERVAL: 434 MS
EKG Q-T INTERVAL: 488 MS
EKG QRS DURATION: 80 MS
EKG QRS DURATION: 86 MS
EKG QTC CALCULATION (BAZETT): 458 MS
EKG QTC CALCULATION (BAZETT): 474 MS
EKG R AXIS: 20 DEGREES
EKG R AXIS: 29 DEGREES
EKG T AXIS: 29 DEGREES
EKG T AXIS: 47 DEGREES
EKG VENTRICULAR RATE: 57 BPM
EKG VENTRICULAR RATE: 67 BPM
EOSINOPHILS RELATIVE PERCENT: 1 % (ref 1–4)
FIO2: ABNORMAL
GFR AFRICAN AMERICAN: >60 ML/MIN
GFR NON-AFRICAN AMERICAN: >60 ML/MIN
GFR SERPL CREATININE-BSD FRML MDRD: ABNORMAL ML/MIN/{1.73_M2}
GFR SERPL CREATININE-BSD FRML MDRD: ABNORMAL ML/MIN/{1.73_M2}
GLUCOSE BLD-MCNC: 131 MG/DL (ref 65–105)
GLUCOSE BLD-MCNC: 78 MG/DL (ref 70–99)
GLUCOSE BLD-MCNC: 85 MG/DL (ref 74–100)
GLUCOSE URINE: NEGATIVE
HCT VFR BLD CALC: 40.6 % (ref 36.3–47.1)
HEMOGLOBIN: 13.4 G/DL (ref 11.9–15.1)
IMMATURE GRANULOCYTES: 1 %
KETONES, URINE: NEGATIVE
LEUKOCYTE ESTERASE, URINE: NEGATIVE
LYMPHOCYTES # BLD: 40 % (ref 24–43)
MCH RBC QN AUTO: 29.6 PG (ref 25.2–33.5)
MCHC RBC AUTO-ENTMCNC: 33 G/DL (ref 28.4–34.8)
MCV RBC AUTO: 89.8 FL (ref 82.6–102.9)
MDMA URINE: ABNORMAL
MDMA URINE: ABNORMAL
METHADONE SCREEN, URINE: NEGATIVE
METHADONE SCREEN, URINE: NEGATIVE
METHAMPHETAMINE, URINE: ABNORMAL
METHAMPHETAMINE, URINE: ABNORMAL
MODE: ABNORMAL
MONOCYTES # BLD: 9 % (ref 3–12)
NEGATIVE BASE EXCESS, ART: ABNORMAL (ref 0–2)
NITRITE, URINE: NEGATIVE
NRBC AUTOMATED: 0 PER 100 WBC
O2 DEVICE/FLOW/%: ABNORMAL
OPIATES, URINE: NEGATIVE
OPIATES, URINE: NEGATIVE
OSMOLALITY URINE: 173 MOSM/KG (ref 80–1300)
OXYCODONE SCREEN URINE: NEGATIVE
OXYCODONE SCREEN URINE: NEGATIVE
PATIENT TEMP: ABNORMAL
PDW BLD-RTO: 16.7 % (ref 11.8–14.4)
PH UA: 6.5 (ref 5–8)
PHENCYCLIDINE, URINE: NEGATIVE
PHENCYCLIDINE, URINE: NEGATIVE
PHENOBARBITAL DATE LAST DOSE: ABNORMAL
PHENOBARBITAL DOSE AMOUNT: ABNORMAL
PHENOBARBITAL TIME LAST DOSE: ABNORMAL
PHENOBARBITAL: 41.5 UG/ML (ref 15–40)
PHENYTOIN DATE LAST DOSE: NORMAL
PHENYTOIN DOSE AMOUNT: NORMAL
PHENYTOIN DOSE TIME: NORMAL
PHENYTOIN FREE: 1.4 UG/ML (ref 1–2)
PHENYTOIN LEVEL: 10.6 UG/ML (ref 10–20)
PLATELET # BLD: 314 K/UL (ref 138–453)
PLATELET ESTIMATE: ABNORMAL
PMV BLD AUTO: 8.7 FL (ref 8.1–13.5)
POC HCO3: 28.3 MMOL/L (ref 21–28)
POC O2 SATURATION: 95 % (ref 94–98)
POC PCO2 TEMP: ABNORMAL MM HG
POC PCO2: 44.7 MM HG (ref 35–48)
POC PH TEMP: ABNORMAL
POC PH: 7.41 (ref 7.35–7.45)
POC PO2 TEMP: ABNORMAL MM HG
POC PO2: 74.9 MM HG (ref 83–108)
POSITIVE BASE EXCESS, ART: 3 (ref 0–3)
POTASSIUM SERPL-SCNC: 4 MMOL/L (ref 3.7–5.3)
PROPOXYPHENE, URINE: ABNORMAL
PROPOXYPHENE, URINE: ABNORMAL
PROTEIN UA: NEGATIVE
RBC # BLD: 4.52 M/UL (ref 3.95–5.11)
RBC # BLD: ABNORMAL 10*6/UL
SAMPLE SITE: ABNORMAL
SEG NEUTROPHILS: 48 % (ref 36–65)
SEGMENTED NEUTROPHILS ABSOLUTE COUNT: 3.48 K/UL (ref 1.5–8.1)
SODIUM BLD-SCNC: 131 MMOL/L (ref 135–144)
SODIUM,UR: <20 MMOL/L
SPECIFIC GRAVITY UA: 1.01 (ref 1–1.03)
TCO2 (CALC), ART: 30 MMOL/L (ref 22–29)
TEST INFORMATION: ABNORMAL
TEST INFORMATION: ABNORMAL
TRICYCLIC ANTIDEPRESSANTS, UR: ABNORMAL
TRICYCLIC ANTIDEPRESSANTS, UR: ABNORMAL
TURBIDITY: CLEAR
URINE HGB: NEGATIVE
UROBILINOGEN, URINE: NORMAL
VALPROIC ACID % FREE: 8 % (ref 5–18.4)
VALPROIC ACID LEVEL: 30 UG/ML (ref 50–125)
VALPROIC ACID, FREE: 2.4 UG/ML (ref 7–23)
VALPROIC DATE LAST DOSE: ABNORMAL
VALPROIC DOSE AMOUNT: ABNORMAL
VALPROIC TIME LAST DOSE: ABNORMAL
WBC # BLD: 7.1 K/UL (ref 3.5–11.3)
WBC # BLD: ABNORMAL 10*3/UL

## 2019-01-10 PROCEDURE — 76937 US GUIDE VASCULAR ACCESS: CPT

## 2019-01-10 PROCEDURE — 2060000000 HC ICU INTERMEDIATE R&B

## 2019-01-10 PROCEDURE — 84300 ASSAY OF URINE SODIUM: CPT

## 2019-01-10 PROCEDURE — 82803 BLOOD GASES ANY COMBINATION: CPT

## 2019-01-10 PROCEDURE — 6370000000 HC RX 637 (ALT 250 FOR IP): Performed by: STUDENT IN AN ORGANIZED HEALTH CARE EDUCATION/TRAINING PROGRAM

## 2019-01-10 PROCEDURE — 83935 ASSAY OF URINE OSMOLALITY: CPT

## 2019-01-10 PROCEDURE — 93005 ELECTROCARDIOGRAM TRACING: CPT

## 2019-01-10 PROCEDURE — 82140 ASSAY OF AMMONIA: CPT

## 2019-01-10 PROCEDURE — 80048 BASIC METABOLIC PNL TOTAL CA: CPT

## 2019-01-10 PROCEDURE — 2580000003 HC RX 258: Performed by: STUDENT IN AN ORGANIZED HEALTH CARE EDUCATION/TRAINING PROGRAM

## 2019-01-10 PROCEDURE — 99254 IP/OBS CNSLTJ NEW/EST MOD 60: CPT | Performed by: PSYCHIATRY & NEUROLOGY

## 2019-01-10 PROCEDURE — 82947 ASSAY GLUCOSE BLOOD QUANT: CPT

## 2019-01-10 PROCEDURE — 85025 COMPLETE CBC W/AUTO DIFF WBC: CPT

## 2019-01-10 PROCEDURE — 80307 DRUG TEST PRSMV CHEM ANLYZR: CPT

## 2019-01-10 PROCEDURE — 80186 ASSAY OF PHENYTOIN FREE: CPT

## 2019-01-10 PROCEDURE — 6360000002 HC RX W HCPCS: Performed by: STUDENT IN AN ORGANIZED HEALTH CARE EDUCATION/TRAINING PROGRAM

## 2019-01-10 PROCEDURE — 80164 ASSAY DIPROPYLACETIC ACD TOT: CPT

## 2019-01-10 PROCEDURE — 80165 DIPROPYLACETIC ACID FREE: CPT

## 2019-01-10 PROCEDURE — 36415 COLL VENOUS BLD VENIPUNCTURE: CPT

## 2019-01-10 PROCEDURE — 82570 ASSAY OF URINE CREATININE: CPT

## 2019-01-10 PROCEDURE — 99223 1ST HOSP IP/OBS HIGH 75: CPT | Performed by: INTERNAL MEDICINE

## 2019-01-10 PROCEDURE — 90792 PSYCH DIAG EVAL W/MED SRVCS: CPT | Performed by: NURSE PRACTITIONER

## 2019-01-10 PROCEDURE — 81003 URINALYSIS AUTO W/O SCOPE: CPT

## 2019-01-10 PROCEDURE — 36600 WITHDRAWAL OF ARTERIAL BLOOD: CPT

## 2019-01-10 PROCEDURE — 80185 ASSAY OF PHENYTOIN TOTAL: CPT

## 2019-01-10 RX ORDER — PHENYTOIN SODIUM 300 MG/1
300 CAPSULE, EXTENDED RELEASE ORAL 3 TIMES DAILY
Status: DISCONTINUED | OUTPATIENT
Start: 2019-01-10 | End: 2019-01-11 | Stop reason: HOSPADM

## 2019-01-10 RX ORDER — PHENOBARBITAL 97.2 MG/1
100 TABLET ORAL 3 TIMES DAILY
Status: DISCONTINUED | OUTPATIENT
Start: 2019-01-10 | End: 2019-01-11

## 2019-01-10 RX ORDER — SODIUM CHLORIDE 9 MG/ML
INJECTION, SOLUTION INTRAVENOUS CONTINUOUS
Status: DISCONTINUED | OUTPATIENT
Start: 2019-01-10 | End: 2019-01-11 | Stop reason: HOSPADM

## 2019-01-10 RX ORDER — BENZONATATE 100 MG/1
100 CAPSULE ORAL 3 TIMES DAILY PRN
Status: DISCONTINUED | OUTPATIENT
Start: 2019-01-10 | End: 2019-01-11 | Stop reason: HOSPADM

## 2019-01-10 RX ORDER — LORAZEPAM 0.5 MG/1
0.5 TABLET ORAL ONCE
Status: COMPLETED | OUTPATIENT
Start: 2019-01-10 | End: 2019-01-10

## 2019-01-10 RX ORDER — AMLODIPINE BESYLATE 5 MG/1
5 TABLET ORAL DAILY
Status: DISCONTINUED | OUTPATIENT
Start: 2019-01-10 | End: 2019-01-11 | Stop reason: HOSPADM

## 2019-01-10 RX ORDER — DIVALPROEX SODIUM 500 MG/1
500 TABLET, DELAYED RELEASE ORAL 3 TIMES DAILY
Status: DISCONTINUED | OUTPATIENT
Start: 2019-01-10 | End: 2019-01-11

## 2019-01-10 RX ADMIN — DIVALPROEX SODIUM 500 MG: 500 TABLET, DELAYED RELEASE ORAL at 21:24

## 2019-01-10 RX ADMIN — Medication 10 ML: at 21:24

## 2019-01-10 RX ADMIN — Medication 10 ML: at 09:18

## 2019-01-10 RX ADMIN — METOPROLOL TARTRATE 25 MG: 25 TABLET, FILM COATED ORAL at 09:17

## 2019-01-10 RX ADMIN — SODIUM CHLORIDE: 9 INJECTION, SOLUTION INTRAVENOUS at 09:18

## 2019-01-10 RX ADMIN — AMLODIPINE BESYLATE 5 MG: 5 TABLET ORAL at 09:17

## 2019-01-10 RX ADMIN — PHENOBARBITAL 97.2 MG: 97.2 TABLET ORAL at 21:24

## 2019-01-10 RX ADMIN — ENOXAPARIN SODIUM 40 MG: 40 INJECTION SUBCUTANEOUS at 09:17

## 2019-01-10 RX ADMIN — DIVALPROEX SODIUM 500 MG: 500 TABLET, DELAYED RELEASE ORAL at 13:44

## 2019-01-10 RX ADMIN — PHENYTOIN SODIUM 300 MG: 300 CAPSULE, EXTENDED RELEASE ORAL at 13:44

## 2019-01-10 RX ADMIN — PHENOBARBITAL 97.2 MG: 97.2 TABLET ORAL at 13:44

## 2019-01-10 RX ADMIN — PHENYTOIN SODIUM 300 MG: 300 CAPSULE, EXTENDED RELEASE ORAL at 21:24

## 2019-01-10 RX ADMIN — LORAZEPAM 0.5 MG: 0.5 TABLET ORAL at 22:34

## 2019-01-10 ASSESSMENT — PAIN SCALES - GENERAL: PAINLEVEL_OUTOF10: 0

## 2019-01-11 ENCOUNTER — HOSPITAL ENCOUNTER (INPATIENT)
Age: 54
LOS: 4 days | Discharge: HOME | DRG: 751 | End: 2019-01-15
Attending: PSYCHIATRY & NEUROLOGY | Admitting: PSYCHIATRY & NEUROLOGY
Payer: MEDICARE

## 2019-01-11 VITALS
BODY MASS INDEX: 34.32 KG/M2 | HEIGHT: 62 IN | RESPIRATION RATE: 16 BRPM | TEMPERATURE: 97.7 F | HEART RATE: 88 BPM | WEIGHT: 186.51 LBS | OXYGEN SATURATION: 97 % | DIASTOLIC BLOOD PRESSURE: 88 MMHG | SYSTOLIC BLOOD PRESSURE: 134 MMHG

## 2019-01-11 PROBLEM — F32.2 SEVERE MAJOR DEPRESSION (HCC): Status: ACTIVE | Noted: 2019-01-11

## 2019-01-11 LAB
ABSOLUTE EOS #: 0.05 K/UL (ref 0–0.44)
ABSOLUTE IMMATURE GRANULOCYTE: 0.03 K/UL (ref 0–0.3)
ABSOLUTE LYMPH #: 3.7 K/UL (ref 1.1–3.7)
ABSOLUTE MONO #: 0.9 K/UL (ref 0.1–1.2)
AMMONIA: 67 UMOL/L (ref 11–51)
ANION GAP SERPL CALCULATED.3IONS-SCNC: 18 MMOL/L (ref 9–17)
BASOPHILS # BLD: 1 % (ref 0–2)
BASOPHILS ABSOLUTE: 0.07 K/UL (ref 0–0.2)
BUN BLDV-MCNC: 10 MG/DL (ref 6–20)
BUN/CREAT BLD: ABNORMAL (ref 9–20)
CALCIUM SERPL-MCNC: 9.2 MG/DL (ref 8.6–10.4)
CHLORIDE BLD-SCNC: 85 MMOL/L (ref 98–107)
CO2: 26 MMOL/L (ref 20–31)
CREAT SERPL-MCNC: 0.52 MG/DL (ref 0.5–0.9)
DIFFERENTIAL TYPE: ABNORMAL
EOSINOPHILS RELATIVE PERCENT: 1 % (ref 1–4)
GFR AFRICAN AMERICAN: >60 ML/MIN
GFR NON-AFRICAN AMERICAN: >60 ML/MIN
GFR SERPL CREATININE-BSD FRML MDRD: ABNORMAL ML/MIN/{1.73_M2}
GFR SERPL CREATININE-BSD FRML MDRD: ABNORMAL ML/MIN/{1.73_M2}
GLUCOSE BLD-MCNC: 124 MG/DL (ref 70–99)
HCT VFR BLD CALC: 40.9 % (ref 36.3–47.1)
HEMOGLOBIN: 13.1 G/DL (ref 11.9–15.1)
IMMATURE GRANULOCYTES: 0 %
LYMPHOCYTES # BLD: 50 % (ref 24–43)
MCH RBC QN AUTO: 29.4 PG (ref 25.2–33.5)
MCHC RBC AUTO-ENTMCNC: 32 G/DL (ref 28.4–34.8)
MCV RBC AUTO: 91.7 FL (ref 82.6–102.9)
MONOCYTES # BLD: 12 % (ref 3–12)
NRBC AUTOMATED: 0 PER 100 WBC
PDW BLD-RTO: 16.4 % (ref 11.8–14.4)
PHENOBARBITAL DATE LAST DOSE: ABNORMAL
PHENOBARBITAL DOSE AMOUNT: ABNORMAL
PHENOBARBITAL TIME LAST DOSE: ABNORMAL
PHENOBARBITAL: 44.8 UG/ML (ref 15–40)
PHENYTOIN DATE LAST DOSE: ABNORMAL
PHENYTOIN DOSE AMOUNT: ABNORMAL
PHENYTOIN DOSE TIME: ABNORMAL
PHENYTOIN FREE: 2.2 UG/ML (ref 1–2)
PHENYTOIN LEVEL: 14.3 UG/ML (ref 10–20)
PLATELET # BLD: 300 K/UL (ref 138–453)
PLATELET ESTIMATE: ABNORMAL
PMV BLD AUTO: 8.5 FL (ref 8.1–13.5)
POTASSIUM SERPL-SCNC: 4.6 MMOL/L (ref 3.7–5.3)
RBC # BLD: 4.46 M/UL (ref 3.95–5.11)
RBC # BLD: ABNORMAL 10*6/UL
SEG NEUTROPHILS: 36 % (ref 36–65)
SEGMENTED NEUTROPHILS ABSOLUTE COUNT: 2.68 K/UL (ref 1.5–8.1)
SODIUM BLD-SCNC: 129 MMOL/L (ref 135–144)
VALPROIC ACID % FREE: 11 % (ref 5–18.4)
VALPROIC ACID LEVEL: 51 UG/ML (ref 50–125)
VALPROIC ACID, FREE: 5.6 UG/ML (ref 7–23)
VALPROIC DATE LAST DOSE: ABNORMAL
VALPROIC DOSE AMOUNT: ABNORMAL
VALPROIC TIME LAST DOSE: ABNORMAL
WBC # BLD: 7.4 K/UL (ref 3.5–11.3)
WBC # BLD: ABNORMAL 10*3/UL

## 2019-01-11 PROCEDURE — 1240000000 HC EMOTIONAL WELLNESS R&B

## 2019-01-11 PROCEDURE — 6370000000 HC RX 637 (ALT 250 FOR IP): Performed by: STUDENT IN AN ORGANIZED HEALTH CARE EDUCATION/TRAINING PROGRAM

## 2019-01-11 PROCEDURE — 99238 HOSP IP/OBS DSCHRG MGMT 30/<: CPT | Performed by: INTERNAL MEDICINE

## 2019-01-11 PROCEDURE — 80165 DIPROPYLACETIC ACID FREE: CPT

## 2019-01-11 PROCEDURE — 99232 SBSQ HOSP IP/OBS MODERATE 35: CPT | Performed by: PSYCHIATRY & NEUROLOGY

## 2019-01-11 PROCEDURE — 6360000002 HC RX W HCPCS: Performed by: STUDENT IN AN ORGANIZED HEALTH CARE EDUCATION/TRAINING PROGRAM

## 2019-01-11 PROCEDURE — 80164 ASSAY DIPROPYLACETIC ACD TOT: CPT

## 2019-01-11 PROCEDURE — 95819 EEG AWAKE AND ASLEEP: CPT

## 2019-01-11 PROCEDURE — 95819 EEG AWAKE AND ASLEEP: CPT | Performed by: PSYCHIATRY & NEUROLOGY

## 2019-01-11 PROCEDURE — 80185 ASSAY OF PHENYTOIN TOTAL: CPT

## 2019-01-11 PROCEDURE — 80048 BASIC METABOLIC PNL TOTAL CA: CPT

## 2019-01-11 PROCEDURE — 85025 COMPLETE CBC W/AUTO DIFF WBC: CPT

## 2019-01-11 PROCEDURE — 2580000003 HC RX 258: Performed by: STUDENT IN AN ORGANIZED HEALTH CARE EDUCATION/TRAINING PROGRAM

## 2019-01-11 PROCEDURE — 82140 ASSAY OF AMMONIA: CPT

## 2019-01-11 PROCEDURE — 80186 ASSAY OF PHENYTOIN FREE: CPT

## 2019-01-11 PROCEDURE — 80184 ASSAY OF PHENOBARBITAL: CPT

## 2019-01-11 PROCEDURE — 36415 COLL VENOUS BLD VENIPUNCTURE: CPT

## 2019-01-11 PROCEDURE — 6370000000 HC RX 637 (ALT 250 FOR IP): Performed by: PSYCHIATRY & NEUROLOGY

## 2019-01-11 RX ORDER — LORAZEPAM 0.5 MG/1
0.25 TABLET ORAL ONCE
Status: DISCONTINUED | OUTPATIENT
Start: 2019-01-11 | End: 2019-01-11 | Stop reason: HOSPADM

## 2019-01-11 RX ORDER — ALBUTEROL SULFATE 90 UG/1
1 AEROSOL, METERED RESPIRATORY (INHALATION) EVERY 4 HOURS PRN
Status: DISCONTINUED | OUTPATIENT
Start: 2019-01-11 | End: 2019-01-15 | Stop reason: HOSPADM

## 2019-01-11 RX ORDER — LEVOTHYROXINE SODIUM 0.05 MG/1
50 TABLET ORAL DAILY
Status: DISCONTINUED | OUTPATIENT
Start: 2019-01-12 | End: 2019-01-15 | Stop reason: HOSPADM

## 2019-01-11 RX ORDER — PHENOBARBITAL 16.2 MG/1
80 TABLET ORAL 3 TIMES DAILY
Qty: 450 TABLET | Refills: 0 | Status: SHIPPED | OUTPATIENT
Start: 2019-01-11 | End: 2019-02-10

## 2019-01-11 RX ORDER — BENZTROPINE MESYLATE 1 MG/ML
2 INJECTION INTRAMUSCULAR; INTRAVENOUS 2 TIMES DAILY PRN
Status: DISCONTINUED | OUTPATIENT
Start: 2019-01-11 | End: 2019-01-15 | Stop reason: HOSPADM

## 2019-01-11 RX ORDER — NICOTINE 21 MG/24HR
1 PATCH, TRANSDERMAL 24 HOURS TRANSDERMAL DAILY
Status: DISCONTINUED | OUTPATIENT
Start: 2019-01-12 | End: 2019-01-15 | Stop reason: HOSPADM

## 2019-01-11 RX ORDER — DIVALPROEX SODIUM 250 MG/1
750 TABLET, DELAYED RELEASE ORAL EVERY 12 HOURS SCHEDULED
Qty: 90 TABLET | Refills: 3 | Status: ON HOLD | OUTPATIENT
Start: 2019-01-11 | End: 2019-01-15 | Stop reason: HOSPADM

## 2019-01-11 RX ORDER — ALBUTEROL SULFATE 2.5 MG/3ML
2.5 SOLUTION RESPIRATORY (INHALATION) EVERY 4 HOURS PRN
Status: DISCONTINUED | OUTPATIENT
Start: 2019-01-11 | End: 2019-01-11 | Stop reason: HOSPADM

## 2019-01-11 RX ORDER — MAGNESIUM HYDROXIDE/ALUMINUM HYDROXICE/SIMETHICONE 120; 1200; 1200 MG/30ML; MG/30ML; MG/30ML
30 SUSPENSION ORAL EVERY 6 HOURS PRN
Status: DISCONTINUED | OUTPATIENT
Start: 2019-01-11 | End: 2019-01-15 | Stop reason: HOSPADM

## 2019-01-11 RX ORDER — ACETAMINOPHEN 325 MG/1
650 TABLET ORAL EVERY 4 HOURS PRN
Status: DISCONTINUED | OUTPATIENT
Start: 2019-01-11 | End: 2019-01-15 | Stop reason: HOSPADM

## 2019-01-11 RX ORDER — HYDROXYZINE 50 MG/1
50 TABLET, FILM COATED ORAL 3 TIMES DAILY PRN
Status: DISCONTINUED | OUTPATIENT
Start: 2019-01-11 | End: 2019-01-15 | Stop reason: HOSPADM

## 2019-01-11 RX ORDER — AMLODIPINE BESYLATE 5 MG/1
5 TABLET ORAL DAILY
Status: DISCONTINUED | OUTPATIENT
Start: 2019-01-12 | End: 2019-01-15 | Stop reason: HOSPADM

## 2019-01-11 RX ORDER — TRAZODONE HYDROCHLORIDE 50 MG/1
50 TABLET ORAL NIGHTLY PRN
Status: DISCONTINUED | OUTPATIENT
Start: 2019-01-12 | End: 2019-01-11

## 2019-01-11 RX ORDER — TRAZODONE HYDROCHLORIDE 50 MG/1
50 TABLET ORAL NIGHTLY PRN
Status: DISCONTINUED | OUTPATIENT
Start: 2019-01-11 | End: 2019-01-15 | Stop reason: HOSPADM

## 2019-01-11 RX ORDER — PHENYTOIN SODIUM 300 MG/1
300 CAPSULE, EXTENDED RELEASE ORAL 3 TIMES DAILY
Qty: 90 CAPSULE | Refills: 3 | Status: ON HOLD | OUTPATIENT
Start: 2019-01-11 | End: 2019-03-01 | Stop reason: HOSPADM

## 2019-01-11 RX ORDER — LEVOTHYROXINE SODIUM 0.05 MG/1
50 TABLET ORAL DAILY
Status: DISCONTINUED | OUTPATIENT
Start: 2019-01-11 | End: 2019-01-11 | Stop reason: HOSPADM

## 2019-01-11 RX ADMIN — BENZONATATE 100 MG: 100 CAPSULE ORAL at 06:10

## 2019-01-11 RX ADMIN — ENOXAPARIN SODIUM 40 MG: 40 INJECTION SUBCUTANEOUS at 08:13

## 2019-01-11 RX ADMIN — PHENOBARBITAL 81 MG: 16.2 TABLET ORAL at 14:06

## 2019-01-11 RX ADMIN — AMLODIPINE BESYLATE 5 MG: 5 TABLET ORAL at 08:13

## 2019-01-11 RX ADMIN — DIVALPROEX SODIUM 500 MG: 500 TABLET, DELAYED RELEASE ORAL at 08:12

## 2019-01-11 RX ADMIN — TRAZODONE HYDROCHLORIDE 50 MG: 50 TABLET ORAL at 23:49

## 2019-01-11 RX ADMIN — PHENYTOIN SODIUM 300 MG: 300 CAPSULE, EXTENDED RELEASE ORAL at 08:10

## 2019-01-11 RX ADMIN — PHENOBARBITAL 97.2 MG: 97.2 TABLET ORAL at 08:12

## 2019-01-11 RX ADMIN — LEVOTHYROXINE SODIUM 50 MCG: 50 TABLET ORAL at 11:18

## 2019-01-11 RX ADMIN — PHENYTOIN SODIUM 300 MG: 300 CAPSULE, EXTENDED RELEASE ORAL at 14:06

## 2019-01-11 RX ADMIN — Medication 10 ML: at 08:13

## 2019-01-11 RX ADMIN — METOPROLOL TARTRATE 25 MG: 25 TABLET, FILM COATED ORAL at 08:10

## 2019-01-11 ASSESSMENT — PAIN SCALES - GENERAL
PAINLEVEL_OUTOF10: 0
PAINLEVEL_OUTOF10: 0

## 2019-01-12 PROCEDURE — 6370000000 HC RX 637 (ALT 250 FOR IP): Performed by: NURSE PRACTITIONER

## 2019-01-12 PROCEDURE — 1240000000 HC EMOTIONAL WELLNESS R&B

## 2019-01-12 PROCEDURE — 94664 DEMO&/EVAL PT USE INHALER: CPT

## 2019-01-12 PROCEDURE — 6370000000 HC RX 637 (ALT 250 FOR IP): Performed by: PSYCHIATRY & NEUROLOGY

## 2019-01-12 PROCEDURE — 90792 PSYCH DIAG EVAL W/MED SRVCS: CPT | Performed by: NURSE PRACTITIONER

## 2019-01-12 RX ORDER — CLOPIDOGREL BISULFATE 75 MG/1
TABLET ORAL
Status: ON HOLD | COMMUNITY
End: 2019-05-20

## 2019-01-12 RX ORDER — OXYCODONE AND ACETAMINOPHEN 7.5; 325 MG/1; MG/1
TABLET ORAL
Status: ON HOLD | COMMUNITY
End: 2019-01-12 | Stop reason: SDUPTHER

## 2019-01-12 RX ORDER — LEVOFLOXACIN 500 MG/1
TABLET, FILM COATED ORAL
Status: ON HOLD | COMMUNITY
End: 2019-01-12 | Stop reason: ALTCHOICE

## 2019-01-12 RX ORDER — CLOPIDOGREL BISULFATE 75 MG/1
75 TABLET ORAL DAILY
Status: DISCONTINUED | OUTPATIENT
Start: 2019-01-12 | End: 2019-01-15 | Stop reason: HOSPADM

## 2019-01-12 RX ORDER — BACLOFEN 10 MG/1
TABLET ORAL
Status: ON HOLD | COMMUNITY
End: 2019-01-12 | Stop reason: ALTCHOICE

## 2019-01-12 RX ORDER — AZITHROMYCIN 250 MG/1
TABLET, FILM COATED ORAL
Status: ON HOLD | COMMUNITY
End: 2019-01-12 | Stop reason: ALTCHOICE

## 2019-01-12 RX ORDER — LORATADINE 10 MG/1
TABLET ORAL
Status: ON HOLD | COMMUNITY
End: 2019-01-12 | Stop reason: ALTCHOICE

## 2019-01-12 RX ORDER — PROMETHAZINE HYDROCHLORIDE 25 MG/1
TABLET ORAL
Status: ON HOLD | COMMUNITY
End: 2019-01-12 | Stop reason: ALTCHOICE

## 2019-01-12 RX ORDER — ASPIRIN 81 MG/1
81 TABLET ORAL DAILY
Status: DISCONTINUED | OUTPATIENT
Start: 2019-01-12 | End: 2019-01-15 | Stop reason: HOSPADM

## 2019-01-12 RX ORDER — LISINOPRIL 10 MG/1
TABLET ORAL
Status: ON HOLD | COMMUNITY
End: 2019-01-12 | Stop reason: ALTCHOICE

## 2019-01-12 RX ORDER — MIRTAZAPINE 30 MG/1
30 TABLET, FILM COATED ORAL NIGHTLY
Status: DISCONTINUED | OUTPATIENT
Start: 2019-01-12 | End: 2019-01-15 | Stop reason: HOSPADM

## 2019-01-12 RX ORDER — CITALOPRAM 40 MG/1
TABLET ORAL
Status: ON HOLD | COMMUNITY
Start: 2019-01-02 | End: 2019-01-12 | Stop reason: ALTCHOICE

## 2019-01-12 RX ORDER — MIRTAZAPINE 30 MG/1
30 TABLET, FILM COATED ORAL NIGHTLY
Status: ON HOLD | COMMUNITY
Start: 2019-01-02 | End: 2019-01-15 | Stop reason: HOSPADM

## 2019-01-12 RX ORDER — PREDNISONE 50 MG/1
TABLET ORAL
Status: ON HOLD | COMMUNITY
Start: 2018-12-02 | End: 2019-01-12 | Stop reason: ALTCHOICE

## 2019-01-12 RX ORDER — IPRATROPIUM BROMIDE AND ALBUTEROL SULFATE 2.5; .5 MG/3ML; MG/3ML
SOLUTION RESPIRATORY (INHALATION)
Status: ON HOLD | COMMUNITY
Start: 2019-01-03 | End: 2019-01-12 | Stop reason: ALTCHOICE

## 2019-01-12 RX ORDER — DOXYCYCLINE HYCLATE 100 MG/1
CAPSULE ORAL
Status: ON HOLD | COMMUNITY
End: 2019-01-12 | Stop reason: ALTCHOICE

## 2019-01-12 RX ORDER — SIMVASTATIN 10 MG
TABLET ORAL
Status: ON HOLD | COMMUNITY
End: 2019-01-12 | Stop reason: ALTCHOICE

## 2019-01-12 RX ORDER — LACTULOSE 10 G/15ML
SOLUTION ORAL
Status: ON HOLD | COMMUNITY
End: 2019-01-12 | Stop reason: ALTCHOICE

## 2019-01-12 RX ORDER — NITROGLYCERIN 0.4 MG/1
TABLET SUBLINGUAL
Status: ON HOLD | COMMUNITY
End: 2019-05-20

## 2019-01-12 RX ORDER — RISPERIDONE 1 MG/1
1 TABLET, FILM COATED ORAL EVERY EVENING
Status: DISCONTINUED | OUTPATIENT
Start: 2019-01-12 | End: 2019-01-15 | Stop reason: HOSPADM

## 2019-01-12 RX ORDER — OMEPRAZOLE 40 MG/1
CAPSULE, DELAYED RELEASE ORAL
Status: ON HOLD | COMMUNITY
End: 2019-01-12 | Stop reason: ALTCHOICE

## 2019-01-12 RX ORDER — FAMOTIDINE 20 MG/1
TABLET, FILM COATED ORAL
Status: ON HOLD | COMMUNITY
Start: 2019-01-03 | End: 2019-01-12 | Stop reason: ALTCHOICE

## 2019-01-12 RX ORDER — ALBUTEROL SULFATE 2.5 MG/3ML
SOLUTION RESPIRATORY (INHALATION)
Status: ON HOLD | COMMUNITY
Start: 2018-12-07 | End: 2019-05-24 | Stop reason: HOSPADM

## 2019-01-12 RX ORDER — PANTOPRAZOLE SODIUM 40 MG/1
TABLET, DELAYED RELEASE ORAL
Status: ON HOLD | COMMUNITY
Start: 2019-01-03 | End: 2019-01-12 | Stop reason: ALTCHOICE

## 2019-01-12 RX ORDER — METOPROLOL SUCCINATE 25 MG/1
TABLET, EXTENDED RELEASE ORAL
Status: ON HOLD | COMMUNITY
Start: 2019-01-03 | End: 2019-01-12 | Stop reason: ALTCHOICE

## 2019-01-12 RX ORDER — NICOTINE 21 MG/24HR
PATCH, TRANSDERMAL 24 HOURS TRANSDERMAL
Status: ON HOLD | COMMUNITY
Start: 2019-01-02 | End: 2019-03-01 | Stop reason: HOSPADM

## 2019-01-12 RX ORDER — HYDROCODONE BITARTRATE AND ACETAMINOPHEN 5; 325 MG/1; MG/1
TABLET ORAL
Status: ON HOLD | COMMUNITY
End: 2019-01-12 | Stop reason: SDUPTHER

## 2019-01-12 RX ORDER — FLUTICASONE PROPIONATE 50 MCG
BLISTER, WITH INHALATION DEVICE INHALATION
Status: ON HOLD | COMMUNITY
Start: 2018-11-29 | End: 2019-01-12 | Stop reason: ALTCHOICE

## 2019-01-12 RX ORDER — AMOXICILLIN AND CLAVULANATE POTASSIUM 875; 125 MG/1; MG/1
TABLET, FILM COATED ORAL
Status: ON HOLD | COMMUNITY
Start: 2019-01-02 | End: 2019-01-12 | Stop reason: ALTCHOICE

## 2019-01-12 RX ORDER — TRAZODONE HYDROCHLORIDE 100 MG/1
100 TABLET ORAL NIGHTLY
Status: ON HOLD | COMMUNITY
Start: 2019-01-02 | End: 2019-01-15 | Stop reason: HOSPADM

## 2019-01-12 RX ORDER — ALBUTEROL SULFATE 90 UG/1
2 AEROSOL, METERED RESPIRATORY (INHALATION)
Status: ON HOLD | COMMUNITY
End: 2019-03-01 | Stop reason: HOSPADM

## 2019-01-12 RX ORDER — TOPIRAMATE 25 MG/1
TABLET ORAL
Status: ON HOLD | COMMUNITY
End: 2019-01-12 | Stop reason: ALTCHOICE

## 2019-01-12 RX ORDER — ASPIRIN 81 MG
TABLET, DELAYED RELEASE (ENTERIC COATED) ORAL
Status: ON HOLD | COMMUNITY
Start: 2019-01-03 | End: 2019-03-01 | Stop reason: HOSPADM

## 2019-01-12 RX ORDER — RISPERIDONE 1 MG/1
1 TABLET, FILM COATED ORAL EVERY EVENING
Status: ON HOLD | COMMUNITY
Start: 2018-12-06 | End: 2019-01-15 | Stop reason: HOSPADM

## 2019-01-12 RX ORDER — PHENOBARBITAL 64.8 MG/1
TABLET ORAL
Status: ON HOLD | COMMUNITY
Start: 2019-01-02 | End: 2019-01-12 | Stop reason: ALTCHOICE

## 2019-01-12 RX ORDER — PHENYTOIN SODIUM 100 MG/1
100 CAPSULE, EXTENDED RELEASE ORAL 3 TIMES DAILY
Status: DISCONTINUED | OUTPATIENT
Start: 2019-01-12 | End: 2019-01-14

## 2019-01-12 RX ORDER — BUDESONIDE AND FORMOTEROL FUMARATE DIHYDRATE 160; 4.5 UG/1; UG/1
AEROSOL RESPIRATORY (INHALATION)
Status: ON HOLD | COMMUNITY
End: 2019-01-12 | Stop reason: ALTCHOICE

## 2019-01-12 RX ORDER — LISINOPRIL 5 MG/1
TABLET ORAL
Status: ON HOLD | COMMUNITY
End: 2019-01-12 | Stop reason: ALTCHOICE

## 2019-01-12 RX ORDER — FLUTICASONE PROPIONATE 110 UG/1
AEROSOL, METERED RESPIRATORY (INHALATION)
Status: ON HOLD | COMMUNITY
End: 2019-01-12 | Stop reason: ALTCHOICE

## 2019-01-12 RX ORDER — ALBUTEROL SULFATE 90 UG/1
AEROSOL, METERED RESPIRATORY (INHALATION)
Status: ON HOLD | COMMUNITY
End: 2019-03-01 | Stop reason: HOSPADM

## 2019-01-12 RX ORDER — DIAZEPAM 5 MG/1
TABLET ORAL
Status: ON HOLD | COMMUNITY
End: 2019-01-12 | Stop reason: ALTCHOICE

## 2019-01-12 RX ORDER — CIPROFLOXACIN 500 MG/1
TABLET, FILM COATED ORAL
Status: ON HOLD | COMMUNITY
End: 2019-01-12 | Stop reason: ALTCHOICE

## 2019-01-12 RX ADMIN — ASPIRIN 81 MG: 81 TABLET, COATED ORAL at 14:32

## 2019-01-12 RX ADMIN — TRAZODONE HYDROCHLORIDE 50 MG: 50 TABLET ORAL at 21:17

## 2019-01-12 RX ADMIN — ACETAMINOPHEN 650 MG: 325 TABLET, FILM COATED ORAL at 12:16

## 2019-01-12 RX ADMIN — CLOPIDOGREL BISULFATE 75 MG: 75 TABLET ORAL at 14:32

## 2019-01-12 RX ADMIN — AMLODIPINE BESYLATE 5 MG: 5 TABLET ORAL at 08:55

## 2019-01-12 RX ADMIN — MIRTAZAPINE 30 MG: 30 TABLET, FILM COATED ORAL at 21:17

## 2019-01-12 RX ADMIN — PHENYTOIN SODIUM 100 MG: 100 CAPSULE, EXTENDED RELEASE ORAL at 21:17

## 2019-01-12 RX ADMIN — LEVOTHYROXINE SODIUM 50 MCG: 50 TABLET ORAL at 08:54

## 2019-01-12 RX ADMIN — PHENYTOIN SODIUM 100 MG: 100 CAPSULE, EXTENDED RELEASE ORAL at 14:32

## 2019-01-12 RX ADMIN — METOPROLOL TARTRATE 25 MG: 25 TABLET ORAL at 08:54

## 2019-01-12 RX ADMIN — PHENYTOIN SODIUM 300 MG: 200 CAPSULE, EXTENDED RELEASE ORAL at 08:55

## 2019-01-12 RX ADMIN — DIVALPROEX SODIUM 750 MG: 500 TABLET, DELAYED RELEASE ORAL at 21:17

## 2019-01-12 ASSESSMENT — ENCOUNTER SYMPTOMS
NAUSEA: 0
SHORTNESS OF BREATH: 0
EYE DISCHARGE: 0
DIARRHEA: 0
APNEA: 0
CONSTIPATION: 0
CHOKING: 1
STRIDOR: 0
ALLERGIC/IMMUNOLOGIC NEGATIVE: 1
SORE THROAT: 0
EYE PAIN: 0
ABDOMINAL PAIN: 0
SINUS PAIN: 0
EYE ITCHING: 0

## 2019-01-12 ASSESSMENT — SLEEP AND FATIGUE QUESTIONNAIRES
SLEEP PATTERN: NORMAL
DO YOU USE A SLEEP AID: YES
AVERAGE NUMBER OF SLEEP HOURS: 6
DO YOU HAVE DIFFICULTY SLEEPING: NO

## 2019-01-12 ASSESSMENT — LIFESTYLE VARIABLES
HISTORY_ALCOHOL_USE: NO
HISTORY_ALCOHOL_USE: NO

## 2019-01-12 ASSESSMENT — PAIN SCALES - GENERAL
PAINLEVEL_OUTOF10: 2
PAINLEVEL_OUTOF10: 3

## 2019-01-13 LAB
-: NORMAL
REASON FOR REJECTION: NORMAL
ZZ NTE CLEAN UP: ORDERED TEST: NORMAL
ZZ NTE WITH NAME CLEAN UP: SPECIMEN SOURCE: NORMAL

## 2019-01-13 PROCEDURE — 6370000000 HC RX 637 (ALT 250 FOR IP): Performed by: NURSE PRACTITIONER

## 2019-01-13 PROCEDURE — 6370000000 HC RX 637 (ALT 250 FOR IP): Performed by: PSYCHIATRY & NEUROLOGY

## 2019-01-13 PROCEDURE — 99222 1ST HOSP IP/OBS MODERATE 55: CPT | Performed by: INTERNAL MEDICINE

## 2019-01-13 PROCEDURE — 1240000000 HC EMOTIONAL WELLNESS R&B

## 2019-01-13 RX ADMIN — MIRTAZAPINE 30 MG: 30 TABLET, FILM COATED ORAL at 21:11

## 2019-01-13 RX ADMIN — HYDROXYZINE HYDROCHLORIDE 50 MG: 50 TABLET, FILM COATED ORAL at 18:25

## 2019-01-13 RX ADMIN — TRAZODONE HYDROCHLORIDE 50 MG: 50 TABLET ORAL at 21:11

## 2019-01-13 RX ADMIN — DIVALPROEX SODIUM 750 MG: 500 TABLET, DELAYED RELEASE ORAL at 09:25

## 2019-01-13 RX ADMIN — LEVOTHYROXINE SODIUM 50 MCG: 50 TABLET ORAL at 06:00

## 2019-01-13 RX ADMIN — METOPROLOL TARTRATE 25 MG: 25 TABLET ORAL at 09:25

## 2019-01-13 RX ADMIN — AMLODIPINE BESYLATE 5 MG: 5 TABLET ORAL at 09:25

## 2019-01-13 RX ADMIN — CLOPIDOGREL BISULFATE 75 MG: 75 TABLET ORAL at 09:25

## 2019-01-13 RX ADMIN — RISPERIDONE 1 MG: 1 TABLET ORAL at 17:32

## 2019-01-13 RX ADMIN — PHENYTOIN SODIUM 100 MG: 100 CAPSULE, EXTENDED RELEASE ORAL at 21:11

## 2019-01-13 RX ADMIN — PHENYTOIN SODIUM 100 MG: 100 CAPSULE, EXTENDED RELEASE ORAL at 14:36

## 2019-01-13 RX ADMIN — ASPIRIN 81 MG: 81 TABLET, COATED ORAL at 09:25

## 2019-01-13 RX ADMIN — METOPROLOL TARTRATE 25 MG: 25 TABLET ORAL at 21:11

## 2019-01-13 RX ADMIN — PHENYTOIN SODIUM 100 MG: 100 CAPSULE, EXTENDED RELEASE ORAL at 09:25

## 2019-01-13 RX ADMIN — DIVALPROEX SODIUM 750 MG: 500 TABLET, DELAYED RELEASE ORAL at 21:11

## 2019-01-14 PROBLEM — F32.3 SEVERE MAJOR DEPRESSION WITH PSYCHOTIC FEATURES (HCC): Status: ACTIVE | Noted: 2019-01-11

## 2019-01-14 LAB
6-ACETYLMORPHINE, UR: NOT DETECTED
7-AMINOCLONAZEPAM, URINE: NOT DETECTED
ALPHA-OH-ALPRAZ, URINE: NOT DETECTED
ALPRAZOLAM, URINE: NOT DETECTED
AMPHETAMINES, URINE: NOT DETECTED
ANION GAP SERPL CALCULATED.3IONS-SCNC: 12 MMOL/L (ref 9–17)
BARBITURATES, URINE: PRESENT
BENZOYLECGONINE, UR: NOT DETECTED
BUN BLDV-MCNC: 10 MG/DL (ref 6–20)
BUN/CREAT BLD: ABNORMAL (ref 9–20)
BUPRENORPHINE URINE: NOT DETECTED
CALCIUM SERPL-MCNC: 9.4 MG/DL (ref 8.6–10.4)
CARISOPRODOL, UR: NOT DETECTED
CHLORIDE BLD-SCNC: 94 MMOL/L (ref 98–107)
CLONAZEPAM, URINE: NOT DETECTED
CO2: 23 MMOL/L (ref 20–31)
CODEINE, URINE: NOT DETECTED
CREAT SERPL-MCNC: 0.46 MG/DL (ref 0.5–0.9)
CREATININE URINE: 30.9 MG/DL (ref 20–400)
DIAZEPAM, URINE: NOT DETECTED
DRUGS EXPECTED, UR: NORMAL
EER HI RES INTERP UR: NORMAL
ETHYL GLUCURONIDE UR: NOT DETECTED
FENTANYL URINE: NOT DETECTED
GFR AFRICAN AMERICAN: >60 ML/MIN
GFR NON-AFRICAN AMERICAN: >60 ML/MIN
GFR SERPL CREATININE-BSD FRML MDRD: ABNORMAL ML/MIN/{1.73_M2}
GFR SERPL CREATININE-BSD FRML MDRD: ABNORMAL ML/MIN/{1.73_M2}
GLUCOSE BLD-MCNC: 121 MG/DL (ref 70–99)
HYDROCODONE, URINE: NOT DETECTED
HYDROMORPHONE, URINE: NOT DETECTED
LORAZEPAM, URINE: NOT DETECTED
MARIJUANA METAB, UR: NOT DETECTED
MDA, UR: NOT DETECTED
MDEA, EVE, UR: NOT DETECTED
MDMA URINE: NOT DETECTED
MEPERIDINE METAB, UR: NOT DETECTED
METHADONE, URINE: NOT DETECTED
METHAMPHETAMINE, URINE: NOT DETECTED
METHYLPHENIDATE: NOT DETECTED
MIDAZOLAM, URINE: NOT DETECTED
MORPHINE URINE: NOT DETECTED
NORBUPRENORPHINE, URINE: NOT DETECTED
NORDIAZEPAM, URINE: NOT DETECTED
NORFENTANYL, URINE: NOT DETECTED
NORHYDROCODONE, URINE: NOT DETECTED
NOROXYCODONE, URINE: NOT DETECTED
NOROXYMORPHONE, URINE: NOT DETECTED
OXAZEPAM, URINE: NOT DETECTED
OXYCODONE URINE: NOT DETECTED
OXYMORPHONE, URINE: NOT DETECTED
PAIN MANAGEMENT DRUG PANEL INTERP, URINE: NORMAL
PAIN MGT DRUG PANEL, HI RES, UR: NORMAL
PCP,URINE: NOT DETECTED
PHENTERMINE, UR: NOT DETECTED
POTASSIUM SERPL-SCNC: 4.7 MMOL/L (ref 3.7–5.3)
PROPOXYPHENE, URINE: NOT DETECTED
SODIUM BLD-SCNC: 129 MMOL/L (ref 135–144)
TAPENTADOL, URINE: NOT DETECTED
TAPENTADOL-O-SULFATE, URINE: NOT DETECTED
TEMAZEPAM, URINE: NOT DETECTED
TRAMADOL, URINE: NOT DETECTED
ZOLPIDEM, URINE: NOT DETECTED

## 2019-01-14 PROCEDURE — 1240000000 HC EMOTIONAL WELLNESS R&B

## 2019-01-14 PROCEDURE — 36415 COLL VENOUS BLD VENIPUNCTURE: CPT

## 2019-01-14 PROCEDURE — 5130000000 HC BRIDGE APPOINTMENT

## 2019-01-14 PROCEDURE — 99232 SBSQ HOSP IP/OBS MODERATE 35: CPT | Performed by: REGISTERED NURSE

## 2019-01-14 PROCEDURE — 6370000000 HC RX 637 (ALT 250 FOR IP): Performed by: NURSE PRACTITIONER

## 2019-01-14 PROCEDURE — 6370000000 HC RX 637 (ALT 250 FOR IP): Performed by: INTERNAL MEDICINE

## 2019-01-14 PROCEDURE — 6370000000 HC RX 637 (ALT 250 FOR IP): Performed by: PSYCHIATRY & NEUROLOGY

## 2019-01-14 PROCEDURE — 99254 IP/OBS CNSLTJ NEW/EST MOD 60: CPT | Performed by: INTERNAL MEDICINE

## 2019-01-14 PROCEDURE — 80048 BASIC METABOLIC PNL TOTAL CA: CPT

## 2019-01-14 RX ORDER — DIPHENHYDRAMINE HYDROCHLORIDE 50 MG/ML
25 INJECTION INTRAMUSCULAR; INTRAVENOUS EVERY 6 HOURS PRN
Status: DISCONTINUED | OUTPATIENT
Start: 2019-01-14 | End: 2019-01-15 | Stop reason: HOSPADM

## 2019-01-14 RX ORDER — LORAZEPAM 2 MG/ML
1 INJECTION INTRAMUSCULAR EVERY 6 HOURS PRN
Status: DISCONTINUED | OUTPATIENT
Start: 2019-01-14 | End: 2019-01-15 | Stop reason: HOSPADM

## 2019-01-14 RX ORDER — HALOPERIDOL 5 MG/ML
5 INJECTION INTRAMUSCULAR EVERY 6 HOURS PRN
Status: DISCONTINUED | OUTPATIENT
Start: 2019-01-14 | End: 2019-01-15 | Stop reason: HOSPADM

## 2019-01-14 RX ADMIN — METOPROLOL TARTRATE 25 MG: 25 TABLET ORAL at 08:12

## 2019-01-14 RX ADMIN — LEVOTHYROXINE SODIUM 50 MCG: 50 TABLET ORAL at 07:00

## 2019-01-14 RX ADMIN — PHENOBARBITAL 81 MG: 64.8 TABLET ORAL at 21:23

## 2019-01-14 RX ADMIN — RISPERIDONE 1 MG: 1 TABLET ORAL at 18:03

## 2019-01-14 RX ADMIN — ASPIRIN 81 MG: 81 TABLET, COATED ORAL at 08:12

## 2019-01-14 RX ADMIN — TRAZODONE HYDROCHLORIDE 50 MG: 50 TABLET ORAL at 21:17

## 2019-01-14 RX ADMIN — MIRTAZAPINE 30 MG: 30 TABLET, FILM COATED ORAL at 21:15

## 2019-01-14 RX ADMIN — METOPROLOL TARTRATE 25 MG: 25 TABLET ORAL at 21:16

## 2019-01-14 RX ADMIN — PHENOBARBITAL 81 MG: 64.8 TABLET ORAL at 15:23

## 2019-01-14 RX ADMIN — DIVALPROEX SODIUM 750 MG: 500 TABLET, DELAYED RELEASE ORAL at 08:12

## 2019-01-14 RX ADMIN — ACETAMINOPHEN 650 MG: 325 TABLET, FILM COATED ORAL at 06:32

## 2019-01-14 RX ADMIN — DIVALPROEX SODIUM 750 MG: 500 TABLET, DELAYED RELEASE ORAL at 21:15

## 2019-01-14 RX ADMIN — CLOPIDOGREL BISULFATE 75 MG: 75 TABLET ORAL at 08:12

## 2019-01-14 RX ADMIN — PHENOBARBITAL 81 MG: 64.8 TABLET ORAL at 08:11

## 2019-01-14 RX ADMIN — PHENYTOIN SODIUM 300 MG: 200 CAPSULE, EXTENDED RELEASE ORAL at 08:12

## 2019-01-14 RX ADMIN — PHENYTOIN SODIUM 300 MG: 200 CAPSULE, EXTENDED RELEASE ORAL at 21:14

## 2019-01-14 RX ADMIN — AMLODIPINE BESYLATE 5 MG: 5 TABLET ORAL at 08:12

## 2019-01-14 RX ADMIN — PHENYTOIN SODIUM 300 MG: 200 CAPSULE, EXTENDED RELEASE ORAL at 15:23

## 2019-01-14 ASSESSMENT — PAIN SCALES - GENERAL
PAINLEVEL_OUTOF10: 3
PAINLEVEL_OUTOF10: 0

## 2019-01-15 VITALS
DIASTOLIC BLOOD PRESSURE: 81 MMHG | OXYGEN SATURATION: 100 % | WEIGHT: 186 LBS | SYSTOLIC BLOOD PRESSURE: 124 MMHG | RESPIRATION RATE: 14 BRPM | BODY MASS INDEX: 34.23 KG/M2 | TEMPERATURE: 98.4 F | HEART RATE: 77 BPM | HEIGHT: 62 IN

## 2019-01-15 LAB — TSH SERPL DL<=0.05 MIU/L-ACNC: 1 MIU/L (ref 0.3–5)

## 2019-01-15 PROCEDURE — 6370000000 HC RX 637 (ALT 250 FOR IP): Performed by: INTERNAL MEDICINE

## 2019-01-15 PROCEDURE — 5130000000 HC BRIDGE APPOINTMENT

## 2019-01-15 PROCEDURE — 36415 COLL VENOUS BLD VENIPUNCTURE: CPT

## 2019-01-15 PROCEDURE — 99239 HOSP IP/OBS DSCHRG MGMT >30: CPT | Performed by: REGISTERED NURSE

## 2019-01-15 PROCEDURE — 6370000000 HC RX 637 (ALT 250 FOR IP): Performed by: PSYCHIATRY & NEUROLOGY

## 2019-01-15 PROCEDURE — 84443 ASSAY THYROID STIM HORMONE: CPT

## 2019-01-15 PROCEDURE — 6370000000 HC RX 637 (ALT 250 FOR IP): Performed by: NURSE PRACTITIONER

## 2019-01-15 RX ORDER — AMLODIPINE BESYLATE 5 MG/1
5 TABLET ORAL DAILY
Qty: 14 TABLET | Refills: 0 | Status: ON HOLD | OUTPATIENT
Start: 2019-01-16 | End: 2019-02-16

## 2019-01-15 RX ORDER — TRAZODONE HYDROCHLORIDE 50 MG/1
50 TABLET ORAL NIGHTLY PRN
Qty: 14 TABLET | Refills: 0 | Status: ON HOLD | OUTPATIENT
Start: 2019-01-15 | End: 2019-03-01 | Stop reason: HOSPADM

## 2019-01-15 RX ORDER — MIRTAZAPINE 30 MG/1
30 TABLET, FILM COATED ORAL NIGHTLY
Qty: 14 TABLET | Refills: 0 | Status: ON HOLD | OUTPATIENT
Start: 2019-01-15 | End: 2019-03-01 | Stop reason: HOSPADM

## 2019-01-15 RX ORDER — DIVALPROEX SODIUM 250 MG/1
750 TABLET, DELAYED RELEASE ORAL EVERY 12 HOURS SCHEDULED
Qty: 84 TABLET | Refills: 0 | Status: ON HOLD | OUTPATIENT
Start: 2019-01-15 | End: 2019-03-01 | Stop reason: HOSPADM

## 2019-01-15 RX ORDER — RISPERIDONE 1 MG/1
1 TABLET, FILM COATED ORAL EVERY EVENING
Qty: 14 TABLET | Refills: 0 | Status: ON HOLD | OUTPATIENT
Start: 2019-01-15 | End: 2019-03-01 | Stop reason: HOSPADM

## 2019-01-15 RX ADMIN — DIVALPROEX SODIUM 750 MG: 500 TABLET, DELAYED RELEASE ORAL at 08:43

## 2019-01-15 RX ADMIN — PHENOBARBITAL 81 MG: 64.8 TABLET ORAL at 08:42

## 2019-01-15 RX ADMIN — METOPROLOL TARTRATE 25 MG: 25 TABLET ORAL at 08:43

## 2019-01-15 RX ADMIN — CLOPIDOGREL BISULFATE 75 MG: 75 TABLET ORAL at 08:43

## 2019-01-15 RX ADMIN — ASPIRIN 81 MG: 81 TABLET, COATED ORAL at 08:43

## 2019-01-15 RX ADMIN — PHENYTOIN SODIUM 300 MG: 200 CAPSULE, EXTENDED RELEASE ORAL at 08:43

## 2019-01-15 RX ADMIN — LEVOTHYROXINE SODIUM 50 MCG: 50 TABLET ORAL at 08:43

## 2019-01-15 RX ADMIN — AMLODIPINE BESYLATE 5 MG: 5 TABLET ORAL at 08:42

## 2019-02-12 ENCOUNTER — HOSPITAL ENCOUNTER (INPATIENT)
Age: 54
LOS: 4 days | Discharge: PSYCHIATRIC OR SUBSTANCE USE FACILITY | DRG: 817 | End: 2019-02-16
Attending: EMERGENCY MEDICINE | Admitting: INTERNAL MEDICINE
Payer: MEDICARE

## 2019-02-12 ENCOUNTER — APPOINTMENT (OUTPATIENT)
Dept: CT IMAGING | Age: 54
DRG: 817 | End: 2019-02-12
Payer: MEDICARE

## 2019-02-12 ENCOUNTER — APPOINTMENT (OUTPATIENT)
Dept: GENERAL RADIOLOGY | Age: 54
DRG: 817 | End: 2019-02-12
Payer: MEDICARE

## 2019-02-12 ENCOUNTER — HOSPITAL ENCOUNTER (EMERGENCY)
Age: 54
Discharge: HOME | DRG: 817 | End: 2019-02-12
Attending: EMERGENCY MEDICINE
Payer: MEDICARE

## 2019-02-12 VITALS
DIASTOLIC BLOOD PRESSURE: 92 MMHG | HEIGHT: 62 IN | RESPIRATION RATE: 16 BRPM | HEART RATE: 83 BPM | OXYGEN SATURATION: 97 % | TEMPERATURE: 98.4 F | WEIGHT: 192 LBS | SYSTOLIC BLOOD PRESSURE: 145 MMHG | BODY MASS INDEX: 35.33 KG/M2

## 2019-02-12 DIAGNOSIS — T42.3X2A: Primary | ICD-10-CM

## 2019-02-12 DIAGNOSIS — J96.01 ACUTE RESPIRATORY FAILURE WITH HYPOXIA (HCC): ICD-10-CM

## 2019-02-12 DIAGNOSIS — E72.20 HYPERAMMONEMIA (HCC): ICD-10-CM

## 2019-02-12 DIAGNOSIS — I95.2 HYPOTENSION DUE TO DRUGS: ICD-10-CM
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
## 2019-02-12 LAB
-: ABNORMAL
-: NORMAL
ACETAMINOPHEN LEVEL: <5 UG/ML (ref 10–30)
ALBUMIN SERPL-MCNC: 4 G/DL (ref 3.5–5.2)
ALBUMIN/GLOBULIN RATIO: 1.3 (ref 1–2.5)
ALLEN TEST: ABNORMAL
ALP BLD-CCNC: 144 U/L (ref 35–104)
ALT SERPL-CCNC: 14 U/L (ref 5–33)
AMMONIA: 81 UMOL/L (ref 11–51)
AMORPHOUS: ABNORMAL
AMPHETAMINE SCREEN URINE: NEGATIVE
ANION GAP SERPL CALCULATED.3IONS-SCNC: 15 MMOL/L (ref 9–17)
AST SERPL-CCNC: 16 U/L
BACTERIA: ABNORMAL
BARBITURATE SCREEN URINE: POSITIVE
BENZODIAZEPINE SCREEN, URINE: POSITIVE
BILIRUB SERPL-MCNC: 0.33 MG/DL (ref 0.3–1.2)
BILIRUBIN URINE: NEGATIVE
BUN BLDV-MCNC: 9 MG/DL (ref 6–20)
BUN/CREAT BLD: ABNORMAL (ref 9–20)
BUPRENORPHINE URINE: ABNORMAL
CALCIUM SERPL-MCNC: 9 MG/DL (ref 8.6–10.4)
CANNABINOID SCREEN URINE: NEGATIVE
CARBOXYHEMOGLOBIN: 2.9 % (ref 0–5)
CASTS UA: ABNORMAL /LPF (ref 0–2)
CASTS UA: ABNORMAL /LPF (ref 0–2)
CHLORIDE BLD-SCNC: 97 MMOL/L (ref 98–107)
CO2: 22 MMOL/L (ref 20–31)
COCAINE METABOLITE, URINE: NEGATIVE
COLOR: YELLOW
COMMENT UA: ABNORMAL
CREAT SERPL-MCNC: 0.66 MG/DL (ref 0.5–0.9)
CRYSTALS, UA: ABNORMAL /HPF
EPITHELIAL CELLS UA: ABNORMAL /HPF (ref 0–5)
ETHANOL PERCENT: <0.01 %
ETHANOL: <10 MG/DL
FIO2: ABNORMAL
GFR AFRICAN AMERICAN: >60 ML/MIN
GFR NON-AFRICAN AMERICAN: >60 ML/MIN
GFR SERPL CREATININE-BSD FRML MDRD: ABNORMAL ML/MIN/{1.73_M2}
GFR SERPL CREATININE-BSD FRML MDRD: ABNORMAL ML/MIN/{1.73_M2}
GLUCOSE BLD-MCNC: 98 MG/DL (ref 70–99)
GLUCOSE URINE: NEGATIVE
HCG QUANTITATIVE: <1 IU/L
HCO3 VENOUS: 23.8 MMOL/L (ref 24–30)
KETONES, URINE: NEGATIVE
LEUKOCYTE ESTERASE, URINE: NEGATIVE
MDMA URINE: ABNORMAL
METHADONE SCREEN, URINE: NEGATIVE
METHAMPHETAMINE, URINE: ABNORMAL
METHEMOGLOBIN: ABNORMAL % (ref 0–1.5)
MODE: ABNORMAL
MUCUS: ABNORMAL
NEGATIVE BASE EXCESS, VEN: 5 MMOL/L (ref 0–2)
NITRITE, URINE: NEGATIVE
NOTIFICATION TIME: ABNORMAL
NOTIFICATION: ABNORMAL
O2 DEVICE/FLOW/%: ABNORMAL
O2 SAT, VEN: 80.3 % (ref 60–85)
OPIATES, URINE: POSITIVE
OTHER OBSERVATIONS UA: ABNORMAL
OXYCODONE SCREEN URINE: NEGATIVE
OXYHEMOGLOBIN: ABNORMAL % (ref 95–98)
PATIENT TEMP: 37
PCO2, VEN, TEMP ADJ: ABNORMAL MMHG (ref 39–55)
PCO2, VEN: 64.1 (ref 39–55)
PEEP/CPAP: ABNORMAL
PH UA: 6.5 (ref 5–8)
PH VENOUS: 7.2 (ref 7.32–7.42)
PH, VEN, TEMP ADJ: ABNORMAL (ref 7.32–7.42)
PHENCYCLIDINE, URINE: NEGATIVE
PHENOBARBITAL DATE LAST DOSE: ABNORMAL
PHENOBARBITAL DOSE AMOUNT: ABNORMAL
PHENOBARBITAL TIME LAST DOSE: ABNORMAL
PHENOBARBITAL: 51.4 UG/ML (ref 15–40)
PHENYTOIN DATE LAST DOSE: ABNORMAL
PHENYTOIN DOSE AMOUNT: ABNORMAL
PHENYTOIN DOSE TIME: ABNORMAL
PHENYTOIN FREE: 0.6 UG/ML (ref 1–2)
PHENYTOIN LEVEL: 4.6 UG/ML (ref 10–20)
PO2, VEN, TEMP ADJ: ABNORMAL MMHG (ref 30–50)
PO2, VEN: 54.4 (ref 30–50)
POSITIVE BASE EXCESS, VEN: ABNORMAL MMOL/L (ref 0–2)
POTASSIUM SERPL-SCNC: 3.8 MMOL/L (ref 3.7–5.3)
PROPOXYPHENE, URINE: ABNORMAL
PROTEIN UA: NEGATIVE
PSV: ABNORMAL
PT. POSITION: ABNORMAL
RBC UA: ABNORMAL /HPF (ref 0–2)
REASON FOR REJECTION: NORMAL
RENAL EPITHELIAL, UA: ABNORMAL /HPF
RESPIRATORY RATE: ABNORMAL
SALICYLATE LEVEL: <1 MG/DL (ref 3–10)
SAMPLE SITE: ABNORMAL
SET RATE: ABNORMAL
SODIUM BLD-SCNC: 134 MMOL/L (ref 135–144)
SPECIFIC GRAVITY UA: 1.01 (ref 1–1.03)
TEST INFORMATION: ABNORMAL
TEXT FOR RESPIRATORY: ABNORMAL
TOTAL HB: ABNORMAL G/DL (ref 12–16)
TOTAL PROTEIN: 7.1 G/DL (ref 6.4–8.3)
TOTAL RATE: ABNORMAL
TOXIC TRICYCLIC SC,BLOOD: NEGATIVE
TRICHOMONAS: ABNORMAL
TRICYCLIC ANTIDEPRESSANTS, UR: ABNORMAL
TURBIDITY: CLEAR
URINE HGB: ABNORMAL
UROBILINOGEN, URINE: NORMAL
VALPROIC ACID % FREE: 12.2 % (ref 5–18.4)
VALPROIC ACID LEVEL: 18 UG/ML (ref 50–125)
VALPROIC ACID, FREE: 2.2 UG/ML (ref 7–23)
VALPROIC DATE LAST DOSE: ABNORMAL
VALPROIC DOSE AMOUNT: ABNORMAL
VALPROIC TIME LAST DOSE: ABNORMAL
VT: ABNORMAL
WBC UA: ABNORMAL /HPF (ref 0–5)
YEAST: ABNORMAL
ZZ NTE CLEAN UP: ORDERED TEST: NORMAL
ZZ NTE WITH NAME CLEAN UP: SPECIMEN SOURCE: NORMAL

## 2019-02-12 PROCEDURE — G0480 DRUG TEST DEF 1-7 CLASSES: HCPCS

## 2019-02-12 PROCEDURE — 2000000000 HC ICU R&B

## 2019-02-12 PROCEDURE — 2580000003 HC RX 258: Performed by: EMERGENCY MEDICINE

## 2019-02-12 PROCEDURE — 5A09457 ASSISTANCE WITH RESPIRATORY VENTILATION, 24-96 CONSECUTIVE HOURS, CONTINUOUS POSITIVE AIRWAY PRESSURE: ICD-10-PCS | Performed by: STUDENT IN AN ORGANIZED HEALTH CARE EDUCATION/TRAINING PROGRAM

## 2019-02-12 PROCEDURE — 6360000002 HC RX W HCPCS

## 2019-02-12 PROCEDURE — 80185 ASSAY OF PHENYTOIN TOTAL: CPT

## 2019-02-12 PROCEDURE — 87641 MR-STAPH DNA AMP PROBE: CPT

## 2019-02-12 PROCEDURE — 2700000000 HC OXYGEN THERAPY PER DAY

## 2019-02-12 PROCEDURE — 71045 X-RAY EXAM CHEST 1 VIEW: CPT

## 2019-02-12 PROCEDURE — 84702 CHORIONIC GONADOTROPIN TEST: CPT

## 2019-02-12 PROCEDURE — 72125 CT NECK SPINE W/O DYE: CPT

## 2019-02-12 PROCEDURE — 36415 COLL VENOUS BLD VENIPUNCTURE: CPT

## 2019-02-12 PROCEDURE — 80186 ASSAY OF PHENYTOIN FREE: CPT

## 2019-02-12 PROCEDURE — 82805 BLOOD GASES W/O2 SATURATION: CPT

## 2019-02-12 PROCEDURE — 87086 URINE CULTURE/COLONY COUNT: CPT

## 2019-02-12 PROCEDURE — 31500 INSERT EMERGENCY AIRWAY: CPT

## 2019-02-12 PROCEDURE — 82140 ASSAY OF AMMONIA: CPT

## 2019-02-12 PROCEDURE — 94761 N-INVAS EAR/PLS OXIMETRY MLT: CPT

## 2019-02-12 PROCEDURE — 6370000000 HC RX 637 (ALT 250 FOR IP): Performed by: STUDENT IN AN ORGANIZED HEALTH CARE EDUCATION/TRAINING PROGRAM

## 2019-02-12 PROCEDURE — 80184 ASSAY OF PHENOBARBITAL: CPT

## 2019-02-12 PROCEDURE — 2500000003 HC RX 250 WO HCPCS

## 2019-02-12 PROCEDURE — 70450 CT HEAD/BRAIN W/O DYE: CPT

## 2019-02-12 PROCEDURE — 96372 THER/PROPH/DIAG INJ SC/IM: CPT

## 2019-02-12 PROCEDURE — 80053 COMPREHEN METABOLIC PANEL: CPT

## 2019-02-12 PROCEDURE — 81001 URINALYSIS AUTO W/SCOPE: CPT

## 2019-02-12 PROCEDURE — 72131 CT LUMBAR SPINE W/O DYE: CPT

## 2019-02-12 PROCEDURE — 0BH18EZ INSERTION OF ENDOTRACHEAL AIRWAY INTO TRACHEA, VIA NATURAL OR ARTIFICIAL OPENING ENDOSCOPIC: ICD-10-PCS | Performed by: EMERGENCY MEDICINE

## 2019-02-12 PROCEDURE — 93005 ELECTROCARDIOGRAM TRACING: CPT

## 2019-02-12 PROCEDURE — 80165 DIPROPYLACETIC ACID FREE: CPT

## 2019-02-12 PROCEDURE — 99285 EMERGENCY DEPT VISIT HI MDM: CPT

## 2019-02-12 PROCEDURE — 94002 VENT MGMT INPAT INIT DAY: CPT

## 2019-02-12 PROCEDURE — 72128 CT CHEST SPINE W/O DYE: CPT

## 2019-02-12 PROCEDURE — 80164 ASSAY DIPROPYLACETIC ACD TOT: CPT

## 2019-02-12 PROCEDURE — 6360000002 HC RX W HCPCS: Performed by: STUDENT IN AN ORGANIZED HEALTH CARE EDUCATION/TRAINING PROGRAM

## 2019-02-12 PROCEDURE — 85025 COMPLETE CBC W/AUTO DIFF WBC: CPT

## 2019-02-12 PROCEDURE — 80307 DRUG TEST PRSMV CHEM ANLYZR: CPT

## 2019-02-12 PROCEDURE — 2500000003 HC RX 250 WO HCPCS: Performed by: EMERGENCY MEDICINE

## 2019-02-12 PROCEDURE — 99284 EMERGENCY DEPT VISIT MOD MDM: CPT

## 2019-02-12 PROCEDURE — 94770 HC ETCO2 MONITOR DAILY: CPT

## 2019-02-12 RX ORDER — 0.9 % SODIUM CHLORIDE 0.9 %
1000 INTRAVENOUS SOLUTION INTRAVENOUS ONCE
Status: COMPLETED | OUTPATIENT
Start: 2019-02-12 | End: 2019-02-12

## 2019-02-12 RX ORDER — LIDOCAINE HYDROCHLORIDE 40 MG/ML
4 INJECTION, SOLUTION RETROBULBAR; TOPICAL
Status: DISCONTINUED | OUTPATIENT
Start: 2019-02-12 | End: 2019-02-16 | Stop reason: HOSPADM

## 2019-02-12 RX ORDER — IBUPROFEN 600 MG/1
600 TABLET ORAL 4 TIMES DAILY PRN
Qty: 60 TABLET | Refills: 0 | Status: ON HOLD | OUTPATIENT
Start: 2019-02-12 | End: 2019-02-16

## 2019-02-12 RX ORDER — IBUPROFEN 400 MG/1
400 TABLET ORAL EVERY 6 HOURS PRN
Status: DISCONTINUED | OUTPATIENT
Start: 2019-02-12 | End: 2019-02-12

## 2019-02-12 RX ORDER — IPRATROPIUM BROMIDE AND ALBUTEROL SULFATE 2.5; .5 MG/3ML; MG/3ML
1 SOLUTION RESPIRATORY (INHALATION)
Status: DISCONTINUED | OUTPATIENT
Start: 2019-02-12 | End: 2019-02-14

## 2019-02-12 RX ORDER — ALBUTEROL SULFATE 90 UG/1
2 AEROSOL, METERED RESPIRATORY (INHALATION)
Status: DISCONTINUED | OUTPATIENT
Start: 2019-02-12 | End: 2019-02-12

## 2019-02-12 RX ORDER — ONDANSETRON 4 MG/1
4 TABLET, FILM COATED ORAL ONCE
Status: COMPLETED | OUTPATIENT
Start: 2019-02-12 | End: 2019-02-12

## 2019-02-12 RX ORDER — ALBUTEROL SULFATE 2.5 MG/3ML
2.5 SOLUTION RESPIRATORY (INHALATION)
Status: DISCONTINUED | OUTPATIENT
Start: 2019-02-12 | End: 2019-02-14

## 2019-02-12 RX ORDER — CHLORHEXIDINE GLUCONATE 0.12 MG/ML
15 RINSE ORAL 2 TIMES DAILY
Status: DISCONTINUED | OUTPATIENT
Start: 2019-02-12 | End: 2019-02-12

## 2019-02-12 RX ORDER — ALBUTEROL SULFATE 90 UG/1
4 AEROSOL, METERED RESPIRATORY (INHALATION) EVERY 4 HOURS PRN
Status: DISCONTINUED | OUTPATIENT
Start: 2019-02-12 | End: 2019-02-12

## 2019-02-12 RX ORDER — ACETYLCYSTEINE 200 MG/ML
600 SOLUTION ORAL; RESPIRATORY (INHALATION)
Status: DISCONTINUED | OUTPATIENT
Start: 2019-02-12 | End: 2019-02-12

## 2019-02-12 RX ORDER — ALBUTEROL SULFATE 2.5 MG/3ML
5 SOLUTION RESPIRATORY (INHALATION)
Status: DISCONTINUED | OUTPATIENT
Start: 2019-02-12 | End: 2019-02-12

## 2019-02-12 RX ORDER — KETAMINE HYDROCHLORIDE 10 MG/ML
160 INJECTION, SOLUTION INTRAMUSCULAR; INTRAVENOUS ONCE
Status: DISCONTINUED | OUTPATIENT
Start: 2019-02-12 | End: 2019-02-15

## 2019-02-12 RX ORDER — MORPHINE SULFATE 4 MG/ML
4 INJECTION, SOLUTION INTRAMUSCULAR; INTRAVENOUS ONCE
Status: COMPLETED | OUTPATIENT
Start: 2019-02-12 | End: 2019-02-12

## 2019-02-12 RX ORDER — KETAMINE HYDROCHLORIDE 50 MG/ML
INJECTION, SOLUTION, CONCENTRATE INTRAMUSCULAR; INTRAVENOUS
Status: COMPLETED
Start: 2019-02-12 | End: 2019-02-12

## 2019-02-12 RX ADMIN — Medication 2 MG/HR: at 22:15

## 2019-02-12 RX ADMIN — SODIUM CHLORIDE 1000 ML: 9 INJECTION, SOLUTION INTRAVENOUS at 19:50

## 2019-02-12 RX ADMIN — KETAMINE HYDROCHLORIDE 160 MG: 50 INJECTION INTRAMUSCULAR; INTRAVENOUS at 22:07

## 2019-02-12 RX ADMIN — ONDANSETRON HYDROCHLORIDE 4 MG: 4 TABLET, FILM COATED ORAL at 11:38

## 2019-02-12 RX ADMIN — SODIUM CHLORIDE 1000 ML: 9 INJECTION, SOLUTION INTRAVENOUS at 22:00

## 2019-02-12 RX ADMIN — MORPHINE SULFATE 4 MG: 4 INJECTION INTRAVENOUS at 11:38

## 2019-02-12 RX ADMIN — NOREPINEPHRINE BITARTRATE 15 MCG/MIN: 1 INJECTION INTRAVENOUS at 22:10

## 2019-02-12 ASSESSMENT — ENCOUNTER SYMPTOMS
FACIAL SWELLING: 0
WHEEZING: 0
APNEA: 0
SHORTNESS OF BREATH: 0
ABDOMINAL DISTENTION: 0
PHOTOPHOBIA: 0
EYE ITCHING: 0
ABDOMINAL PAIN: 0
COUGH: 0
ABDOMINAL PAIN: 0
ALLERGIC/IMMUNOLOGIC COMMENTS: DRUG ALLERGIES
VOMITING: 0
BACK PAIN: 1
EYE PAIN: 0
VOMITING: 0
SHORTNESS OF BREATH: 0
DIARRHEA: 0
CHEST TIGHTNESS: 0
NAUSEA: 0
BACK PAIN: 1
SINUS PRESSURE: 0
NAUSEA: 1
TROUBLE SWALLOWING: 0
CHOKING: 0
SINUS PAIN: 0
EYE DISCHARGE: 0
EYE REDNESS: 0

## 2019-02-12 ASSESSMENT — PAIN SCALES - GENERAL
PAINLEVEL_OUTOF10: 3
PAINLEVEL_OUTOF10: 8
PAINLEVEL_OUTOF10: 8
PAINLEVEL_OUTOF10: 1
PAINLEVEL_OUTOF10: 9

## 2019-02-12 ASSESSMENT — PAIN DESCRIPTION - LOCATION
LOCATION: HEAD
LOCATION: BACK;SHOULDER;NECK;HEAD
LOCATION: HEAD
LOCATION: HEAD

## 2019-02-12 ASSESSMENT — PAIN DESCRIPTION - PAIN TYPE
TYPE: ACUTE PAIN

## 2019-02-12 ASSESSMENT — PAIN DESCRIPTION - ORIENTATION
ORIENTATION: POSTERIOR
ORIENTATION: RIGHT

## 2019-02-12 ASSESSMENT — PULMONARY FUNCTION TESTS
PIF_VALUE: 19
PIF_VALUE: 24
PIF_VALUE: 19

## 2019-02-12 ASSESSMENT — PAIN DESCRIPTION - DESCRIPTORS
DESCRIPTORS: CONSTANT
DESCRIPTORS: ACHING

## 2019-02-13 LAB
ABSOLUTE EOS #: 0.1 K/UL (ref 0–0.44)
ABSOLUTE EOS #: 0.11 K/UL (ref 0–0.44)
ABSOLUTE IMMATURE GRANULOCYTE: 0.04 K/UL (ref 0–0.3)
ABSOLUTE IMMATURE GRANULOCYTE: <0.03 K/UL (ref 0–0.3)
ABSOLUTE LYMPH #: 2.48 K/UL (ref 1.1–3.7)
ABSOLUTE LYMPH #: 3.02 K/UL (ref 1.1–3.7)
ABSOLUTE MONO #: 0.87 K/UL (ref 0.1–1.2)
ABSOLUTE MONO #: 1.25 K/UL (ref 0.1–1.2)
ALBUMIN SERPL-MCNC: 3.2 G/DL (ref 3.5–5.2)
ALBUMIN/GLOBULIN RATIO: 1.2 (ref 1–2.5)
ALLEN TEST: POSITIVE
ALLEN TEST: POSITIVE
ALP BLD-CCNC: 118 U/L (ref 35–104)
ALT SERPL-CCNC: 10 U/L (ref 5–33)
AMMONIA: 64 UMOL/L (ref 11–51)
ANION GAP SERPL CALCULATED.3IONS-SCNC: 11 MMOL/L (ref 9–17)
AST SERPL-CCNC: 11 U/L
BASOPHILS # BLD: 1 % (ref 0–2)
BASOPHILS # BLD: 1 % (ref 0–2)
BASOPHILS ABSOLUTE: 0.06 K/UL (ref 0–0.2)
BASOPHILS ABSOLUTE: 0.06 K/UL (ref 0–0.2)
BILIRUB SERPL-MCNC: 0.25 MG/DL (ref 0.3–1.2)
BILIRUBIN DIRECT: 0.1 MG/DL
BILIRUBIN, INDIRECT: 0.15 MG/DL (ref 0–1)
BUN BLDV-MCNC: 6 MG/DL (ref 6–20)
BUN/CREAT BLD: ABNORMAL (ref 9–20)
CALCIUM SERPL-MCNC: 7.7 MG/DL (ref 8.6–10.4)
CHLORIDE BLD-SCNC: 105 MMOL/L (ref 98–107)
CO2: 22 MMOL/L (ref 20–31)
CREAT SERPL-MCNC: 0.34 MG/DL (ref 0.5–0.9)
CULTURE: NO GROWTH
DIFFERENTIAL TYPE: ABNORMAL
DIFFERENTIAL TYPE: ABNORMAL
EKG ATRIAL RATE: 91 BPM
EKG P AXIS: 56 DEGREES
EKG P-R INTERVAL: 146 MS
EKG Q-T INTERVAL: 386 MS
EKG QRS DURATION: 84 MS
EKG QTC CALCULATION (BAZETT): 474 MS
EKG R AXIS: 16 DEGREES
EKG T AXIS: 31 DEGREES
EKG VENTRICULAR RATE: 91 BPM
EOSINOPHILS RELATIVE PERCENT: 1 % (ref 1–4)
EOSINOPHILS RELATIVE PERCENT: 1 % (ref 1–4)
FIO2: 30
FIO2: 30
GFR AFRICAN AMERICAN: >60 ML/MIN
GFR NON-AFRICAN AMERICAN: >60 ML/MIN
GFR SERPL CREATININE-BSD FRML MDRD: ABNORMAL ML/MIN/{1.73_M2}
GFR SERPL CREATININE-BSD FRML MDRD: ABNORMAL ML/MIN/{1.73_M2}
GLOBULIN: ABNORMAL G/DL (ref 1.5–3.8)
GLUCOSE BLD-MCNC: 102 MG/DL (ref 70–99)
HCG QUALITATIVE: NEGATIVE
HCT VFR BLD CALC: 35.3 % (ref 36.3–47.1)
HCT VFR BLD CALC: 36.9 % (ref 36.3–47.1)
HEMOGLOBIN: 11.3 G/DL (ref 11.9–15.1)
HEMOGLOBIN: 11.9 G/DL (ref 11.9–15.1)
IMMATURE GRANULOCYTES: 0 %
IMMATURE GRANULOCYTES: 0 %
INR BLD: 0.9
LYMPHOCYTES # BLD: 24 % (ref 24–43)
LYMPHOCYTES # BLD: 33 % (ref 24–43)
Lab: NORMAL
MCH RBC QN AUTO: 30.5 PG (ref 25.2–33.5)
MCH RBC QN AUTO: 31.6 PG (ref 25.2–33.5)
MCHC RBC AUTO-ENTMCNC: 32 G/DL (ref 28.4–34.8)
MCHC RBC AUTO-ENTMCNC: 32.2 G/DL (ref 28.4–34.8)
MCV RBC AUTO: 95.1 FL (ref 82.6–102.9)
MCV RBC AUTO: 97.9 FL (ref 82.6–102.9)
MODE: ABNORMAL
MODE: ABNORMAL
MONOCYTES # BLD: 10 % (ref 3–12)
MONOCYTES # BLD: 12 % (ref 3–12)
MRSA, DNA, NASAL: NORMAL
NEGATIVE BASE EXCESS, ART: 2 (ref 0–2)
NEGATIVE BASE EXCESS, ART: ABNORMAL (ref 0–2)
NRBC AUTOMATED: 0 PER 100 WBC
NRBC AUTOMATED: 0 PER 100 WBC
O2 DEVICE/FLOW/%: ABNORMAL
O2 DEVICE/FLOW/%: ABNORMAL
PATIENT TEMP: ABNORMAL
PATIENT TEMP: ABNORMAL
PDW BLD-RTO: 13.7 % (ref 11.8–14.4)
PDW BLD-RTO: 13.8 % (ref 11.8–14.4)
PHENOBARBITAL DATE LAST DOSE: ABNORMAL
PHENOBARBITAL DOSE AMOUNT: ABNORMAL
PHENOBARBITAL TIME LAST DOSE: ABNORMAL
PHENOBARBITAL: 100 UG/ML (ref 15–40)
PHENOBARBITAL: 69 UG/ML (ref 15–40)
PHENOBARBITAL: 80.6 UG/ML (ref 15–40)
PHENOBARBITAL: 89.1 UG/ML (ref 15–40)
PHENOBARBITAL: 94.2 UG/ML (ref 15–40)
PHENOBARBITAL: 96 UG/ML (ref 15–40)
PHENOBARBITAL: 98.1 UG/ML (ref 15–40)
PLATELET # BLD: 272 K/UL (ref 138–453)
PLATELET # BLD: 284 K/UL (ref 138–453)
PLATELET ESTIMATE: ABNORMAL
PLATELET ESTIMATE: ABNORMAL
PMV BLD AUTO: 8.6 FL (ref 8.1–13.5)
PMV BLD AUTO: 8.7 FL (ref 8.1–13.5)
POC HCO3: 24.7 MMOL/L (ref 21–28)
POC HCO3: 25.4 MMOL/L (ref 21–28)
POC O2 SATURATION: 93 % (ref 94–98)
POC O2 SATURATION: 97 % (ref 94–98)
POC PCO2 TEMP: ABNORMAL MM HG
POC PCO2 TEMP: ABNORMAL MM HG
POC PCO2: 44.2 MM HG (ref 35–48)
POC PCO2: 49.6 MM HG (ref 35–48)
POC PH TEMP: ABNORMAL
POC PH TEMP: ABNORMAL
POC PH: 7.31 (ref 7.35–7.45)
POC PH: 7.37 (ref 7.35–7.45)
POC PO2 TEMP: ABNORMAL MM HG
POC PO2 TEMP: ABNORMAL MM HG
POC PO2: 105.2 MM HG (ref 83–108)
POC PO2: 71.3 MM HG (ref 83–108)
POSITIVE BASE EXCESS, ART: 0 (ref 0–3)
POSITIVE BASE EXCESS, ART: ABNORMAL (ref 0–3)
POTASSIUM SERPL-SCNC: 3.7 MMOL/L (ref 3.7–5.3)
PROTHROMBIN TIME: 9.9 SEC (ref 9–12)
RBC # BLD: 3.71 M/UL (ref 3.95–5.11)
RBC # BLD: 3.77 M/UL (ref 3.95–5.11)
RBC # BLD: ABNORMAL 10*6/UL
RBC # BLD: ABNORMAL 10*6/UL
SAMPLE SITE: ABNORMAL
SAMPLE SITE: ABNORMAL
SEG NEUTROPHILS: 55 % (ref 36–65)
SEG NEUTROPHILS: 62 % (ref 36–65)
SEGMENTED NEUTROPHILS ABSOLUTE COUNT: 5.07 K/UL (ref 1.5–8.1)
SEGMENTED NEUTROPHILS ABSOLUTE COUNT: 6.45 K/UL (ref 1.5–8.1)
SODIUM BLD-SCNC: 138 MMOL/L (ref 135–144)
SPECIMEN DESCRIPTION: NORMAL
SPECIMEN DESCRIPTION: NORMAL
TCO2 (CALC), ART: 26 MMOL/L (ref 22–29)
TCO2 (CALC), ART: 27 MMOL/L (ref 22–29)
TOTAL PROTEIN: 5.9 G/DL (ref 6.4–8.3)
WBC # BLD: 10.4 K/UL (ref 3.5–11.3)
WBC # BLD: 9.1 K/UL (ref 3.5–11.3)
WBC # BLD: ABNORMAL 10*3/UL
WBC # BLD: ABNORMAL 10*3/UL

## 2019-02-13 PROCEDURE — 36600 WITHDRAWAL OF ARTERIAL BLOOD: CPT

## 2019-02-13 PROCEDURE — 2500000003 HC RX 250 WO HCPCS: Performed by: STUDENT IN AN ORGANIZED HEALTH CARE EDUCATION/TRAINING PROGRAM

## 2019-02-13 PROCEDURE — 94762 N-INVAS EAR/PLS OXIMTRY CONT: CPT

## 2019-02-13 PROCEDURE — 6360000002 HC RX W HCPCS: Performed by: INTERNAL MEDICINE

## 2019-02-13 PROCEDURE — 80076 HEPATIC FUNCTION PANEL: CPT

## 2019-02-13 PROCEDURE — 2000000000 HC ICU R&B

## 2019-02-13 PROCEDURE — 82803 BLOOD GASES ANY COMBINATION: CPT

## 2019-02-13 PROCEDURE — 84703 CHORIONIC GONADOTROPIN ASSAY: CPT

## 2019-02-13 PROCEDURE — 2580000003 HC RX 258: Performed by: STUDENT IN AN ORGANIZED HEALTH CARE EDUCATION/TRAINING PROGRAM

## 2019-02-13 PROCEDURE — 6360000002 HC RX W HCPCS: Performed by: STUDENT IN AN ORGANIZED HEALTH CARE EDUCATION/TRAINING PROGRAM

## 2019-02-13 PROCEDURE — 2700000000 HC OXYGEN THERAPY PER DAY

## 2019-02-13 PROCEDURE — 6370000000 HC RX 637 (ALT 250 FOR IP): Performed by: STUDENT IN AN ORGANIZED HEALTH CARE EDUCATION/TRAINING PROGRAM

## 2019-02-13 PROCEDURE — 06HY33Z INSERTION OF INFUSION DEVICE INTO LOWER VEIN, PERCUTANEOUS APPROACH: ICD-10-PCS | Performed by: SURGERY

## 2019-02-13 PROCEDURE — 80184 ASSAY OF PHENOBARBITAL: CPT

## 2019-02-13 PROCEDURE — 5A1D70Z PERFORMANCE OF URINARY FILTRATION, INTERMITTENT, LESS THAN 6 HOURS PER DAY: ICD-10-PCS | Performed by: INTERNAL MEDICINE

## 2019-02-13 PROCEDURE — 85025 COMPLETE CBC W/AUTO DIFF WBC: CPT

## 2019-02-13 PROCEDURE — 80048 BASIC METABOLIC PNL TOTAL CA: CPT

## 2019-02-13 PROCEDURE — 6370000000 HC RX 637 (ALT 250 FOR IP): Performed by: INTERNAL MEDICINE

## 2019-02-13 PROCEDURE — 36556 INSERT NON-TUNNEL CV CATH: CPT

## 2019-02-13 PROCEDURE — 94770 HC ETCO2 MONITOR DAILY: CPT

## 2019-02-13 PROCEDURE — 94003 VENT MGMT INPAT SUBQ DAY: CPT

## 2019-02-13 PROCEDURE — 85610 PROTHROMBIN TIME: CPT

## 2019-02-13 PROCEDURE — 82140 ASSAY OF AMMONIA: CPT

## 2019-02-13 PROCEDURE — 99291 CRITICAL CARE FIRST HOUR: CPT | Performed by: INTERNAL MEDICINE

## 2019-02-13 PROCEDURE — 36415 COLL VENOUS BLD VENIPUNCTURE: CPT

## 2019-02-13 RX ORDER — HEPARIN SODIUM 1000 [USP'U]/ML
1600 INJECTION, SOLUTION INTRAVENOUS; SUBCUTANEOUS PRN
Status: DISCONTINUED | OUTPATIENT
Start: 2019-02-13 | End: 2019-02-16 | Stop reason: HOSPADM

## 2019-02-13 RX ORDER — 0.9 % SODIUM CHLORIDE 0.9 %
500 INTRAVENOUS SOLUTION INTRAVENOUS ONCE
Status: COMPLETED | OUTPATIENT
Start: 2019-02-13 | End: 2019-02-13

## 2019-02-13 RX ORDER — GUAIFENESIN 100 MG/5ML
200 SYRUP ORAL 4 TIMES DAILY PRN
Status: ON HOLD | COMMUNITY
End: 2019-03-01 | Stop reason: HOSPADM

## 2019-02-13 RX ORDER — PANTOPRAZOLE SODIUM 40 MG/1
40 TABLET, DELAYED RELEASE ORAL DAILY
Status: ON HOLD | COMMUNITY
End: 2019-02-16

## 2019-02-13 RX ORDER — PHENOBARBITAL 32.4 MG/1
32.4 TABLET ORAL 3 TIMES DAILY
Status: ON HOLD | COMMUNITY
End: 2019-06-04 | Stop reason: HOSPADM

## 2019-02-13 RX ORDER — GUAIFENESIN 600 MG/1
600 TABLET, EXTENDED RELEASE ORAL 2 TIMES DAILY
Status: DISCONTINUED | OUTPATIENT
Start: 2019-02-13 | End: 2019-02-13

## 2019-02-13 RX ORDER — PANTOPRAZOLE SODIUM 40 MG/1
40 GRANULE, DELAYED RELEASE ORAL
Status: ON HOLD | COMMUNITY
End: 2019-02-16

## 2019-02-13 RX ORDER — SODIUM CHLORIDE 9 MG/ML
INJECTION, SOLUTION INTRAVENOUS CONTINUOUS
Status: DISCONTINUED | OUTPATIENT
Start: 2019-02-13 | End: 2019-02-16 | Stop reason: HOSPADM

## 2019-02-13 RX ORDER — FENTANYL CITRATE 50 UG/ML
50 INJECTION, SOLUTION INTRAMUSCULAR; INTRAVENOUS
Status: DISCONTINUED | OUTPATIENT
Start: 2019-02-13 | End: 2019-02-16 | Stop reason: HOSPADM

## 2019-02-13 RX ORDER — HEPARIN SODIUM 1000 [USP'U]/ML
1900 INJECTION, SOLUTION INTRAVENOUS; SUBCUTANEOUS PRN
Status: DISCONTINUED | OUTPATIENT
Start: 2019-02-13 | End: 2019-02-16 | Stop reason: HOSPADM

## 2019-02-13 RX ORDER — FAMOTIDINE 20 MG/1
20 TABLET, FILM COATED ORAL 2 TIMES DAILY
COMMUNITY
End: 2019-11-26 | Stop reason: SDUPTHER

## 2019-02-13 RX ORDER — GUAIFENESIN 100 MG/5ML
200 SOLUTION ORAL EVERY 4 HOURS PRN
Status: DISCONTINUED | OUTPATIENT
Start: 2019-02-13 | End: 2019-02-13

## 2019-02-13 RX ORDER — FENTANYL CITRATE 50 UG/ML
100 INJECTION, SOLUTION INTRAMUSCULAR; INTRAVENOUS
Status: DISCONTINUED | OUTPATIENT
Start: 2019-02-13 | End: 2019-02-16 | Stop reason: HOSPADM

## 2019-02-13 RX ORDER — TIZANIDINE 2 MG/1
2 TABLET ORAL EVERY 8 HOURS PRN
Status: ON HOLD | COMMUNITY
End: 2019-05-24 | Stop reason: HOSPADM

## 2019-02-13 RX ADMIN — SODIUM BICARBONATE: 84 INJECTION, SOLUTION INTRAVENOUS at 13:32

## 2019-02-13 RX ADMIN — FAMOTIDINE 20 MG: 10 INJECTION, SOLUTION INTRAVENOUS at 20:32

## 2019-02-13 RX ADMIN — FENTANYL CITRATE 100 MCG: 50 INJECTION, SOLUTION INTRAMUSCULAR; INTRAVENOUS at 19:20

## 2019-02-13 RX ADMIN — METOPROLOL TARTRATE 25 MG: 25 TABLET ORAL at 12:42

## 2019-02-13 RX ADMIN — FENTANYL CITRATE 100 MCG: 50 INJECTION, SOLUTION INTRAMUSCULAR; INTRAVENOUS at 22:34

## 2019-02-13 RX ADMIN — FAMOTIDINE 20 MG: 10 INJECTION, SOLUTION INTRAVENOUS at 08:02

## 2019-02-13 RX ADMIN — SODIUM CHLORIDE 500 ML: 9 INJECTION, SOLUTION INTRAVENOUS at 05:08

## 2019-02-13 RX ADMIN — HEPARIN SODIUM 1600 UNITS: 1000 INJECTION, SOLUTION INTRAVENOUS; SUBCUTANEOUS at 01:30

## 2019-02-13 RX ADMIN — FENTANYL CITRATE 100 MCG: 50 INJECTION, SOLUTION INTRAMUSCULAR; INTRAVENOUS at 16:39

## 2019-02-13 RX ADMIN — HEPARIN SODIUM 1900 UNITS: 1000 INJECTION, SOLUTION INTRAVENOUS; SUBCUTANEOUS at 01:30

## 2019-02-13 RX ADMIN — FENTANYL CITRATE 100 MCG: 50 INJECTION, SOLUTION INTRAMUSCULAR; INTRAVENOUS at 13:32

## 2019-02-13 RX ADMIN — FENTANYL CITRATE 100 MCG: 50 INJECTION, SOLUTION INTRAMUSCULAR; INTRAVENOUS at 20:26

## 2019-02-13 RX ADMIN — ACTIVATED CHARCOAL 50 G: 208 SUSPENSION ORAL at 17:38

## 2019-02-13 RX ADMIN — FENTANYL CITRATE 50 MCG: 50 INJECTION, SOLUTION INTRAMUSCULAR; INTRAVENOUS at 10:48

## 2019-02-13 RX ADMIN — ACTIVATED CHARCOAL 50 G: 208 SUSPENSION ORAL at 21:26

## 2019-02-13 RX ADMIN — FENTANYL CITRATE 100 MCG: 50 INJECTION, SOLUTION INTRAMUSCULAR; INTRAVENOUS at 14:49

## 2019-02-13 RX ADMIN — FENTANYL CITRATE 100 MCG: 50 INJECTION, SOLUTION INTRAMUSCULAR; INTRAVENOUS at 18:14

## 2019-02-13 RX ADMIN — ENOXAPARIN SODIUM 40 MG: 40 INJECTION SUBCUTANEOUS at 08:38

## 2019-02-13 RX ADMIN — METOPROLOL TARTRATE 25 MG: 25 TABLET ORAL at 20:32

## 2019-02-13 RX ADMIN — FENTANYL CITRATE 100 MCG: 50 INJECTION, SOLUTION INTRAMUSCULAR; INTRAVENOUS at 23:36

## 2019-02-13 RX ADMIN — SODIUM CHLORIDE: 9 INJECTION, SOLUTION INTRAVENOUS at 01:52

## 2019-02-13 RX ADMIN — FENTANYL CITRATE 100 MCG: 50 INJECTION, SOLUTION INTRAMUSCULAR; INTRAVENOUS at 21:26

## 2019-02-13 ASSESSMENT — PULMONARY FUNCTION TESTS
PIF_VALUE: 21
PIF_VALUE: 20
PIF_VALUE: 11
PIF_VALUE: 13
PIF_VALUE: 22
PIF_VALUE: 20
PIF_VALUE: 23
PIF_VALUE: 20
PIF_VALUE: 20
PIF_VALUE: 19
PIF_VALUE: 22
PIF_VALUE: 18
PIF_VALUE: 23
PIF_VALUE: 20
PIF_VALUE: 22
PIF_VALUE: 21
PIF_VALUE: 23
PIF_VALUE: 23
PIF_VALUE: 17
PIF_VALUE: 16
PIF_VALUE: 19
PIF_VALUE: 15
PIF_VALUE: 18
PIF_VALUE: 21
PIF_VALUE: 20
PIF_VALUE: 22
PIF_VALUE: 21
PIF_VALUE: 19
PIF_VALUE: 22
PIF_VALUE: 23
PIF_VALUE: 16
PIF_VALUE: 20
PIF_VALUE: 22
PIF_VALUE: 20
PIF_VALUE: 14
PIF_VALUE: 22
PIF_VALUE: 22
PIF_VALUE: 21
PIF_VALUE: 20
PIF_VALUE: 16
PIF_VALUE: 20
PIF_VALUE: 22
PIF_VALUE: 20
PIF_VALUE: 21
PIF_VALUE: 19
PIF_VALUE: 23
PIF_VALUE: 21
PIF_VALUE: 21

## 2019-02-13 ASSESSMENT — PAIN SCALES - GENERAL
PAINLEVEL_OUTOF10: 0

## 2019-02-14 ENCOUNTER — APPOINTMENT (OUTPATIENT)
Dept: GENERAL RADIOLOGY | Age: 54
DRG: 817 | End: 2019-02-14
Payer: MEDICARE

## 2019-02-14 LAB
ABSOLUTE EOS #: 0.07 K/UL (ref 0–0.44)
ABSOLUTE EOS #: <0.03 K/UL (ref 0–0.44)
ABSOLUTE IMMATURE GRANULOCYTE: 0.05 K/UL (ref 0–0.3)
ABSOLUTE IMMATURE GRANULOCYTE: 0.05 K/UL (ref 0–0.3)
ABSOLUTE LYMPH #: 2.15 K/UL (ref 1.1–3.7)
ABSOLUTE LYMPH #: 2.47 K/UL (ref 1.1–3.7)
ABSOLUTE MONO #: 1.36 K/UL (ref 0.1–1.2)
ABSOLUTE MONO #: 1.49 K/UL (ref 0.1–1.2)
ALBUMIN SERPL-MCNC: 3.4 G/DL (ref 3.5–5.2)
ALBUMIN/GLOBULIN RATIO: 1.1 (ref 1–2.5)
ALLEN TEST: POSITIVE
ALP BLD-CCNC: 138 U/L (ref 35–104)
ALT SERPL-CCNC: 12 U/L (ref 5–33)
ANION GAP SERPL CALCULATED.3IONS-SCNC: 16 MMOL/L (ref 9–17)
AST SERPL-CCNC: 15 U/L
BASOPHILS # BLD: 0 % (ref 0–2)
BASOPHILS # BLD: 1 % (ref 0–2)
BASOPHILS ABSOLUTE: 0.03 K/UL (ref 0–0.2)
BASOPHILS ABSOLUTE: 0.06 K/UL (ref 0–0.2)
BILIRUB SERPL-MCNC: 0.23 MG/DL (ref 0.3–1.2)
BILIRUBIN DIRECT: 0.09 MG/DL
BILIRUBIN, INDIRECT: 0.14 MG/DL (ref 0–1)
BUN BLDV-MCNC: <2 MG/DL (ref 6–20)
BUN/CREAT BLD: ABNORMAL (ref 9–20)
CALCIUM SERPL-MCNC: 8.5 MG/DL (ref 8.6–10.4)
CHLORIDE BLD-SCNC: 95 MMOL/L (ref 98–107)
CO2: 23 MMOL/L (ref 20–31)
CREAT SERPL-MCNC: 0.45 MG/DL (ref 0.5–0.9)
DIFFERENTIAL TYPE: ABNORMAL
DIFFERENTIAL TYPE: ABNORMAL
EOSINOPHILS RELATIVE PERCENT: 0 % (ref 1–4)
EOSINOPHILS RELATIVE PERCENT: 1 % (ref 1–4)
FIO2: 30
GFR AFRICAN AMERICAN: >60 ML/MIN
GFR NON-AFRICAN AMERICAN: >60 ML/MIN
GFR SERPL CREATININE-BSD FRML MDRD: ABNORMAL ML/MIN/{1.73_M2}
GFR SERPL CREATININE-BSD FRML MDRD: ABNORMAL ML/MIN/{1.73_M2}
GLOBULIN: ABNORMAL G/DL (ref 1.5–3.8)
GLUCOSE BLD-MCNC: 148 MG/DL (ref 70–99)
HBV SURFACE AB TITR SER: 66.2 MIU/ML
HCT VFR BLD CALC: 33.2 % (ref 36.3–47.1)
HCT VFR BLD CALC: 36.7 % (ref 36.3–47.1)
HEMOGLOBIN: 10.9 G/DL (ref 11.9–15.1)
HEMOGLOBIN: 12.1 G/DL (ref 11.9–15.1)
HEPATITIS B CORE TOTAL ANTIBODY: REACTIVE
HEPATITIS B SURFACE ANTIGEN: NONREACTIVE
IMMATURE GRANULOCYTES: 0 %
IMMATURE GRANULOCYTES: 1 %
LYMPHOCYTES # BLD: 22 % (ref 24–43)
LYMPHOCYTES # BLD: 23 % (ref 24–43)
MAGNESIUM: 1.4 MG/DL (ref 1.6–2.6)
MCH RBC QN AUTO: 30.6 PG (ref 25.2–33.5)
MCH RBC QN AUTO: 31.3 PG (ref 25.2–33.5)
MCHC RBC AUTO-ENTMCNC: 32.8 G/DL (ref 28.4–34.8)
MCHC RBC AUTO-ENTMCNC: 33 G/DL (ref 28.4–34.8)
MCV RBC AUTO: 93.3 FL (ref 82.6–102.9)
MCV RBC AUTO: 94.8 FL (ref 82.6–102.9)
MODE: ABNORMAL
MONOCYTES # BLD: 13 % (ref 3–12)
MONOCYTES # BLD: 14 % (ref 3–12)
NEGATIVE BASE EXCESS, ART: ABNORMAL (ref 0–2)
NRBC AUTOMATED: 0 PER 100 WBC
NRBC AUTOMATED: 0 PER 100 WBC
O2 DEVICE/FLOW/%: ABNORMAL
PATIENT TEMP: ABNORMAL
PDW BLD-RTO: 13.1 % (ref 11.8–14.4)
PDW BLD-RTO: 13.2 % (ref 11.8–14.4)
PHENOBARBITAL DATE LAST DOSE: ABNORMAL
PHENOBARBITAL DATE LAST DOSE: NORMAL
PHENOBARBITAL DATE LAST DOSE: NORMAL
PHENOBARBITAL DOSE AMOUNT: ABNORMAL
PHENOBARBITAL DOSE AMOUNT: NORMAL
PHENOBARBITAL DOSE AMOUNT: NORMAL
PHENOBARBITAL TIME LAST DOSE: ABNORMAL
PHENOBARBITAL TIME LAST DOSE: NORMAL
PHENOBARBITAL TIME LAST DOSE: NORMAL
PHENOBARBITAL: 38.2 UG/ML (ref 15–40)
PHENOBARBITAL: 40 UG/ML (ref 15–40)
PHENOBARBITAL: 46.7 UG/ML (ref 15–40)
PHENOBARBITAL: 47.7 UG/ML (ref 15–40)
PHENOBARBITAL: 59.6 UG/ML (ref 15–40)
PLATELET # BLD: 181 K/UL (ref 138–453)
PLATELET # BLD: 217 K/UL (ref 138–453)
PLATELET ESTIMATE: ABNORMAL
PLATELET ESTIMATE: ABNORMAL
PMV BLD AUTO: 8.8 FL (ref 8.1–13.5)
PMV BLD AUTO: 8.9 FL (ref 8.1–13.5)
POC HCO3: 25.4 MMOL/L (ref 21–28)
POC O2 SATURATION: 98 % (ref 94–98)
POC PCO2 TEMP: ABNORMAL MM HG
POC PCO2: 40.6 MM HG (ref 35–48)
POC PH TEMP: ABNORMAL
POC PH: 7.41 (ref 7.35–7.45)
POC PO2 TEMP: ABNORMAL MM HG
POC PO2: 111.6 MM HG (ref 83–108)
POSITIVE BASE EXCESS, ART: 1 (ref 0–3)
POTASSIUM SERPL-SCNC: 3 MMOL/L (ref 3.7–5.3)
RBC # BLD: 3.56 M/UL (ref 3.95–5.11)
RBC # BLD: 3.87 M/UL (ref 3.95–5.11)
RBC # BLD: ABNORMAL 10*6/UL
RBC # BLD: ABNORMAL 10*6/UL
SAMPLE SITE: ABNORMAL
SEG NEUTROPHILS: 62 % (ref 36–65)
SEG NEUTROPHILS: 63 % (ref 36–65)
SEGMENTED NEUTROPHILS ABSOLUTE COUNT: 5.92 K/UL (ref 1.5–8.1)
SEGMENTED NEUTROPHILS ABSOLUTE COUNT: 7.02 K/UL (ref 1.5–8.1)
SODIUM BLD-SCNC: 134 MMOL/L (ref 135–144)
TCO2 (CALC), ART: 27 MMOL/L (ref 22–29)
TOTAL PROTEIN: 6.4 G/DL (ref 6.4–8.3)
WBC # BLD: 11.2 K/UL (ref 3.5–11.3)
WBC # BLD: 9.5 K/UL (ref 3.5–11.3)
WBC # BLD: ABNORMAL 10*3/UL
WBC # BLD: ABNORMAL 10*3/UL

## 2019-02-14 PROCEDURE — 87040 BLOOD CULTURE FOR BACTERIA: CPT

## 2019-02-14 PROCEDURE — 87185 SC STD ENZYME DETCJ PER NZM: CPT

## 2019-02-14 PROCEDURE — 94640 AIRWAY INHALATION TREATMENT: CPT

## 2019-02-14 PROCEDURE — 2580000003 HC RX 258: Performed by: STUDENT IN AN ORGANIZED HEALTH CARE EDUCATION/TRAINING PROGRAM

## 2019-02-14 PROCEDURE — 6360000002 HC RX W HCPCS: Performed by: STUDENT IN AN ORGANIZED HEALTH CARE EDUCATION/TRAINING PROGRAM

## 2019-02-14 PROCEDURE — 6370000000 HC RX 637 (ALT 250 FOR IP): Performed by: STUDENT IN AN ORGANIZED HEALTH CARE EDUCATION/TRAINING PROGRAM

## 2019-02-14 PROCEDURE — 6370000000 HC RX 637 (ALT 250 FOR IP): Performed by: EMERGENCY MEDICINE

## 2019-02-14 PROCEDURE — 85025 COMPLETE CBC W/AUTO DIFF WBC: CPT

## 2019-02-14 PROCEDURE — 36415 COLL VENOUS BLD VENIPUNCTURE: CPT

## 2019-02-14 PROCEDURE — 2500000003 HC RX 250 WO HCPCS: Performed by: STUDENT IN AN ORGANIZED HEALTH CARE EDUCATION/TRAINING PROGRAM

## 2019-02-14 PROCEDURE — 94003 VENT MGMT INPAT SUBQ DAY: CPT

## 2019-02-14 PROCEDURE — 6360000002 HC RX W HCPCS: Performed by: EMERGENCY MEDICINE

## 2019-02-14 PROCEDURE — 82803 BLOOD GASES ANY COMBINATION: CPT

## 2019-02-14 PROCEDURE — 94762 N-INVAS EAR/PLS OXIMTRY CONT: CPT

## 2019-02-14 PROCEDURE — 87077 CULTURE AEROBIC IDENTIFY: CPT

## 2019-02-14 PROCEDURE — 90935 HEMODIALYSIS ONE EVALUATION: CPT

## 2019-02-14 PROCEDURE — 86704 HEP B CORE ANTIBODY TOTAL: CPT

## 2019-02-14 PROCEDURE — 6370000000 HC RX 637 (ALT 250 FOR IP): Performed by: INTERNAL MEDICINE

## 2019-02-14 PROCEDURE — 80076 HEPATIC FUNCTION PANEL: CPT

## 2019-02-14 PROCEDURE — 6360000002 HC RX W HCPCS: Performed by: INTERNAL MEDICINE

## 2019-02-14 PROCEDURE — 99291 CRITICAL CARE FIRST HOUR: CPT | Performed by: INTERNAL MEDICINE

## 2019-02-14 PROCEDURE — 87070 CULTURE OTHR SPECIMN AEROBIC: CPT

## 2019-02-14 PROCEDURE — 84132 ASSAY OF SERUM POTASSIUM: CPT

## 2019-02-14 PROCEDURE — 2700000000 HC OXYGEN THERAPY PER DAY

## 2019-02-14 PROCEDURE — 71045 X-RAY EXAM CHEST 1 VIEW: CPT

## 2019-02-14 PROCEDURE — 86317 IMMUNOASSAY INFECTIOUS AGENT: CPT

## 2019-02-14 PROCEDURE — 87340 HEPATITIS B SURFACE AG IA: CPT

## 2019-02-14 PROCEDURE — 2000000000 HC ICU R&B

## 2019-02-14 PROCEDURE — 80184 ASSAY OF PHENOBARBITAL: CPT

## 2019-02-14 PROCEDURE — 83605 ASSAY OF LACTIC ACID: CPT

## 2019-02-14 PROCEDURE — 83735 ASSAY OF MAGNESIUM: CPT

## 2019-02-14 PROCEDURE — 94770 HC ETCO2 MONITOR DAILY: CPT

## 2019-02-14 PROCEDURE — 87205 SMEAR GRAM STAIN: CPT

## 2019-02-14 PROCEDURE — 6370000000 HC RX 637 (ALT 250 FOR IP)

## 2019-02-14 PROCEDURE — 80048 BASIC METABOLIC PNL TOTAL CA: CPT

## 2019-02-14 RX ORDER — HEPARIN SODIUM 1000 [USP'U]/ML
1600 INJECTION, SOLUTION INTRAVENOUS; SUBCUTANEOUS ONCE
Status: COMPLETED | OUTPATIENT
Start: 2019-02-14 | End: 2019-02-14

## 2019-02-14 RX ORDER — POTASSIUM CHLORIDE 7.45 MG/ML
10 INJECTION INTRAVENOUS ONCE
Status: COMPLETED | OUTPATIENT
Start: 2019-02-14 | End: 2019-02-14

## 2019-02-14 RX ORDER — ALBUTEROL SULFATE 2.5 MG/3ML
2.5 SOLUTION RESPIRATORY (INHALATION) EVERY 4 HOURS PRN
Status: DISCONTINUED | OUTPATIENT
Start: 2019-02-14 | End: 2019-02-14

## 2019-02-14 RX ORDER — 0.9 % SODIUM CHLORIDE 0.9 %
250 INTRAVENOUS SOLUTION INTRAVENOUS PRN
Status: DISCONTINUED | OUTPATIENT
Start: 2019-02-14 | End: 2019-02-16 | Stop reason: HOSPADM

## 2019-02-14 RX ORDER — IPRATROPIUM BROMIDE AND ALBUTEROL SULFATE 2.5; .5 MG/3ML; MG/3ML
1 SOLUTION RESPIRATORY (INHALATION) EVERY 6 HOURS
Status: DISCONTINUED | OUTPATIENT
Start: 2019-02-14 | End: 2019-02-14

## 2019-02-14 RX ORDER — 0.9 % SODIUM CHLORIDE 0.9 %
150 INTRAVENOUS SOLUTION INTRAVENOUS PRN
Status: DISCONTINUED | OUTPATIENT
Start: 2019-02-14 | End: 2019-02-16 | Stop reason: HOSPADM

## 2019-02-14 RX ORDER — ACETAMINOPHEN 325 MG/1
650 TABLET ORAL EVERY 6 HOURS PRN
Status: DISCONTINUED | OUTPATIENT
Start: 2019-02-14 | End: 2019-02-15

## 2019-02-14 RX ORDER — HEPARIN SODIUM 1000 [USP'U]/ML
1900 INJECTION, SOLUTION INTRAVENOUS; SUBCUTANEOUS ONCE
Status: COMPLETED | OUTPATIENT
Start: 2019-02-14 | End: 2019-02-14

## 2019-02-14 RX ORDER — MAGNESIUM SULFATE 1 G/100ML
1 INJECTION INTRAVENOUS PRN
Status: DISCONTINUED | OUTPATIENT
Start: 2019-02-14 | End: 2019-02-16 | Stop reason: HOSPADM

## 2019-02-14 RX ORDER — POTASSIUM CHLORIDE 29.8 MG/ML
20 INJECTION INTRAVENOUS PRN
Status: DISCONTINUED | OUTPATIENT
Start: 2019-02-14 | End: 2019-02-16 | Stop reason: HOSPADM

## 2019-02-14 RX ORDER — IPRATROPIUM BROMIDE AND ALBUTEROL SULFATE 2.5; .5 MG/3ML; MG/3ML
SOLUTION RESPIRATORY (INHALATION)
Status: COMPLETED
Start: 2019-02-14 | End: 2019-02-14

## 2019-02-14 RX ORDER — SODIUM CHLORIDE 9 MG/ML
INJECTION, SOLUTION INTRAVENOUS CONTINUOUS
Status: DISCONTINUED | OUTPATIENT
Start: 2019-02-14 | End: 2019-02-16 | Stop reason: HOSPADM

## 2019-02-14 RX ORDER — POTASSIUM CHLORIDE 7.45 MG/ML
10 INJECTION INTRAVENOUS PRN
Status: DISCONTINUED | OUTPATIENT
Start: 2019-02-14 | End: 2019-02-14

## 2019-02-14 RX ORDER — SENNOSIDES 8.8 MG/5ML
5 LIQUID ORAL DAILY
Status: DISCONTINUED | OUTPATIENT
Start: 2019-02-14 | End: 2019-02-16 | Stop reason: HOSPADM

## 2019-02-14 RX ORDER — LEVALBUTEROL INHALATION SOLUTION 1.25 MG/3ML
1.25 SOLUTION RESPIRATORY (INHALATION) EVERY 6 HOURS
Status: DISCONTINUED | OUTPATIENT
Start: 2019-02-14 | End: 2019-02-15

## 2019-02-14 RX ORDER — LEVALBUTEROL INHALATION SOLUTION 1.25 MG/3ML
1.25 SOLUTION RESPIRATORY (INHALATION) EVERY 6 HOURS PRN
Status: DISCONTINUED | OUTPATIENT
Start: 2019-02-14 | End: 2019-02-15

## 2019-02-14 RX ADMIN — POTASSIUM CHLORIDE 10 MEQ: 7.46 INJECTION, SOLUTION INTRAVENOUS at 07:01

## 2019-02-14 RX ADMIN — FENTANYL CITRATE 100 MCG: 50 INJECTION, SOLUTION INTRAMUSCULAR; INTRAVENOUS at 16:17

## 2019-02-14 RX ADMIN — FENTANYL CITRATE 100 MCG: 50 INJECTION, SOLUTION INTRAMUSCULAR; INTRAVENOUS at 13:58

## 2019-02-14 RX ADMIN — FENTANYL CITRATE 100 MCG: 50 INJECTION, SOLUTION INTRAMUSCULAR; INTRAVENOUS at 02:40

## 2019-02-14 RX ADMIN — FENTANYL CITRATE 100 MCG: 50 INJECTION, SOLUTION INTRAMUSCULAR; INTRAVENOUS at 12:25

## 2019-02-14 RX ADMIN — DOCUSATE SODIUM 100 MG: 50 LIQUID ORAL at 16:23

## 2019-02-14 RX ADMIN — FENTANYL CITRATE 100 MCG: 50 INJECTION, SOLUTION INTRAMUSCULAR; INTRAVENOUS at 00:40

## 2019-02-14 RX ADMIN — ACETAMINOPHEN 650 MG: 325 TABLET ORAL at 22:14

## 2019-02-14 RX ADMIN — SODIUM CHLORIDE: 9 INJECTION, SOLUTION INTRAVENOUS at 16:24

## 2019-02-14 RX ADMIN — SODIUM BICARBONATE: 84 INJECTION, SOLUTION INTRAVENOUS at 13:58

## 2019-02-14 RX ADMIN — HEPARIN SODIUM 1900 UNITS: 1000 INJECTION INTRAVENOUS; SUBCUTANEOUS at 17:02

## 2019-02-14 RX ADMIN — METOPROLOL TARTRATE 25 MG: 25 TABLET ORAL at 20:23

## 2019-02-14 RX ADMIN — Medication 5 MG/HR: at 03:30

## 2019-02-14 RX ADMIN — FAMOTIDINE 20 MG: 10 INJECTION, SOLUTION INTRAVENOUS at 20:23

## 2019-02-14 RX ADMIN — Medication 8 MG/HR: at 17:43

## 2019-02-14 RX ADMIN — MAGNESIUM SULFATE HEPTAHYDRATE 1 G: 1 INJECTION, SOLUTION INTRAVENOUS at 09:39

## 2019-02-14 RX ADMIN — FENTANYL CITRATE 100 MCG: 50 INJECTION, SOLUTION INTRAMUSCULAR; INTRAVENOUS at 06:21

## 2019-02-14 RX ADMIN — FENTANYL CITRATE 100 MCG: 50 INJECTION, SOLUTION INTRAMUSCULAR; INTRAVENOUS at 19:22

## 2019-02-14 RX ADMIN — POTASSIUM CHLORIDE 20 MEQ: 29.8 INJECTION, SOLUTION INTRAVENOUS at 09:29

## 2019-02-14 RX ADMIN — METOPROLOL TARTRATE 25 MG: 25 TABLET ORAL at 10:03

## 2019-02-14 RX ADMIN — ACTIVATED CHARCOAL 50 G: 208 SUSPENSION ORAL at 02:30

## 2019-02-14 RX ADMIN — IPRATROPIUM BROMIDE AND ALBUTEROL SULFATE 3 ML: .5; 3 SOLUTION RESPIRATORY (INHALATION) at 04:10

## 2019-02-14 RX ADMIN — MAGNESIUM SULFATE HEPTAHYDRATE 1 G: 1 INJECTION, SOLUTION INTRAVENOUS at 10:49

## 2019-02-14 RX ADMIN — FENTANYL CITRATE 100 MCG: 50 INJECTION, SOLUTION INTRAMUSCULAR; INTRAVENOUS at 22:14

## 2019-02-14 RX ADMIN — Medication 8.8 MG: at 16:23

## 2019-02-14 RX ADMIN — POTASSIUM CHLORIDE 10 MEQ: 7.46 INJECTION, SOLUTION INTRAVENOUS at 07:59

## 2019-02-14 RX ADMIN — LEVALBUTEROL HYDROCHLORIDE 1.25 MG: 1.25 SOLUTION RESPIRATORY (INHALATION) at 15:30

## 2019-02-14 RX ADMIN — HEPARIN SODIUM 1600 UNITS: 1000 INJECTION INTRAVENOUS; SUBCUTANEOUS at 17:01

## 2019-02-14 RX ADMIN — SODIUM BICARBONATE: 84 INJECTION, SOLUTION INTRAVENOUS at 01:08

## 2019-02-14 RX ADMIN — ENOXAPARIN SODIUM 40 MG: 40 INJECTION SUBCUTANEOUS at 09:53

## 2019-02-14 RX ADMIN — FENTANYL CITRATE 100 MCG: 50 INJECTION, SOLUTION INTRAMUSCULAR; INTRAVENOUS at 15:16

## 2019-02-14 RX ADMIN — FENTANYL CITRATE 100 MCG: 50 INJECTION, SOLUTION INTRAMUSCULAR; INTRAVENOUS at 09:47

## 2019-02-14 RX ADMIN — IPRATROPIUM BROMIDE AND ALBUTEROL SULFATE 3 ML: 2.5; .5 SOLUTION RESPIRATORY (INHALATION) at 04:10

## 2019-02-14 RX ADMIN — FAMOTIDINE 20 MG: 10 INJECTION, SOLUTION INTRAVENOUS at 09:53

## 2019-02-14 RX ADMIN — IPRATROPIUM BROMIDE 0.5 MG: 0.5 SOLUTION RESPIRATORY (INHALATION) at 19:28

## 2019-02-14 RX ADMIN — SODIUM CHLORIDE: 9 INJECTION, SOLUTION INTRAVENOUS at 01:23

## 2019-02-14 RX ADMIN — FENTANYL CITRATE 100 MCG: 50 INJECTION, SOLUTION INTRAMUSCULAR; INTRAVENOUS at 20:57

## 2019-02-14 RX ADMIN — LEVALBUTEROL HYDROCHLORIDE 1.25 MG: 1.25 SOLUTION RESPIRATORY (INHALATION) at 19:29

## 2019-02-14 RX ADMIN — POTASSIUM CHLORIDE 20 MEQ: 29.8 INJECTION, SOLUTION INTRAVENOUS at 10:45

## 2019-02-14 RX ADMIN — IPRATROPIUM BROMIDE 0.5 MG: 0.5 SOLUTION RESPIRATORY (INHALATION) at 15:03

## 2019-02-14 RX ADMIN — LEVALBUTEROL HYDROCHLORIDE 1.25 MG: 1.25 SOLUTION RESPIRATORY (INHALATION) at 09:18

## 2019-02-14 RX ADMIN — FENTANYL CITRATE 100 MCG: 50 INJECTION, SOLUTION INTRAMUSCULAR; INTRAVENOUS at 11:01

## 2019-02-14 RX ADMIN — FENTANYL CITRATE 100 MCG: 50 INJECTION, SOLUTION INTRAMUSCULAR; INTRAVENOUS at 04:34

## 2019-02-14 RX ADMIN — FENTANYL CITRATE 100 MCG: 50 INJECTION, SOLUTION INTRAMUSCULAR; INTRAVENOUS at 17:26

## 2019-02-14 RX ADMIN — FENTANYL CITRATE 100 MCG: 50 INJECTION, SOLUTION INTRAMUSCULAR; INTRAVENOUS at 08:07

## 2019-02-14 ASSESSMENT — PAIN SCALES - GENERAL
PAINLEVEL_OUTOF10: 0
PAINLEVEL_OUTOF10: 0
PAINLEVEL_OUTOF10: 10
PAINLEVEL_OUTOF10: 0

## 2019-02-14 ASSESSMENT — PULMONARY FUNCTION TESTS
PIF_VALUE: 21
PIF_VALUE: 18
PIF_VALUE: 23
PIF_VALUE: 13
PIF_VALUE: 19
PIF_VALUE: 21
PIF_VALUE: 14
PIF_VALUE: 21
PIF_VALUE: 19
PIF_VALUE: 17
PIF_VALUE: 20
PIF_VALUE: 32
PIF_VALUE: 19
PIF_VALUE: 21
PIF_VALUE: 20
PIF_VALUE: 18
PIF_VALUE: 20
PIF_VALUE: 19
PIF_VALUE: 22
PIF_VALUE: 19
PIF_VALUE: 20
PIF_VALUE: 19
PIF_VALUE: 21
PIF_VALUE: 18
PIF_VALUE: 19
PIF_VALUE: 22
PIF_VALUE: 16
PIF_VALUE: 21
PIF_VALUE: 20
PIF_VALUE: 19
PIF_VALUE: 21
PIF_VALUE: 20
PIF_VALUE: 22
PIF_VALUE: 19
PIF_VALUE: 19
PIF_VALUE: 17
PIF_VALUE: 19
PIF_VALUE: 22
PIF_VALUE: 18
PIF_VALUE: 20
PIF_VALUE: 28
PIF_VALUE: 13
PIF_VALUE: 16
PIF_VALUE: 19
PIF_VALUE: 19
PIF_VALUE: 20
PIF_VALUE: 19
PIF_VALUE: 19
PIF_VALUE: 22
PIF_VALUE: 24
PIF_VALUE: 22
PIF_VALUE: 23
PIF_VALUE: 21
PIF_VALUE: 20
PIF_VALUE: 19
PIF_VALUE: 19
PIF_VALUE: 22

## 2019-02-15 LAB
ABSOLUTE EOS #: 0.05 K/UL (ref 0–0.44)
ABSOLUTE IMMATURE GRANULOCYTE: 0.08 K/UL (ref 0–0.3)
ABSOLUTE LYMPH #: 2.77 K/UL (ref 1.1–3.7)
ABSOLUTE MONO #: 1.35 K/UL (ref 0.1–1.2)
ALBUMIN SERPL-MCNC: 3.6 G/DL (ref 3.5–5.2)
ALBUMIN/GLOBULIN RATIO: 1.2 (ref 1–2.5)
ALLEN TEST: POSITIVE
ALP BLD-CCNC: 150 U/L (ref 35–104)
ALT SERPL-CCNC: 12 U/L (ref 5–33)
ANION GAP SERPL CALCULATED.3IONS-SCNC: 13 MMOL/L (ref 9–17)
AST SERPL-CCNC: 17 U/L
BASOPHILS # BLD: 0 % (ref 0–2)
BASOPHILS ABSOLUTE: 0.04 K/UL (ref 0–0.2)
BILIRUB SERPL-MCNC: 0.35 MG/DL (ref 0.3–1.2)
BILIRUBIN DIRECT: 0.13 MG/DL
BILIRUBIN, INDIRECT: 0.22 MG/DL (ref 0–1)
BUN BLDV-MCNC: 4 MG/DL (ref 6–20)
BUN/CREAT BLD: ABNORMAL (ref 9–20)
CALCIUM SERPL-MCNC: 8.5 MG/DL (ref 8.6–10.4)
CHLORIDE BLD-SCNC: 101 MMOL/L (ref 98–107)
CO2: 24 MMOL/L (ref 20–31)
CREAT SERPL-MCNC: 0.31 MG/DL (ref 0.5–0.9)
DIFFERENTIAL TYPE: ABNORMAL
DIRECT EXAM: NORMAL
DIRECT EXAM: NORMAL
EOSINOPHILS RELATIVE PERCENT: 1 % (ref 1–4)
FIO2: 30
GFR AFRICAN AMERICAN: >60 ML/MIN
GFR NON-AFRICAN AMERICAN: >60 ML/MIN
GFR SERPL CREATININE-BSD FRML MDRD: ABNORMAL ML/MIN/{1.73_M2}
GFR SERPL CREATININE-BSD FRML MDRD: ABNORMAL ML/MIN/{1.73_M2}
GLOBULIN: ABNORMAL G/DL (ref 1.5–3.8)
GLUCOSE BLD-MCNC: 121 MG/DL (ref 70–99)
HCT VFR BLD CALC: 37.3 % (ref 36.3–47.1)
HEMOGLOBIN: 12.1 G/DL (ref 11.9–15.1)
IMMATURE GRANULOCYTES: 1 %
LACTIC ACID, WHOLE BLOOD: 1 MMOL/L (ref 0.7–2.1)
LYMPHOCYTES # BLD: 27 % (ref 24–43)
Lab: NORMAL
MAGNESIUM: 1.7 MG/DL (ref 1.6–2.6)
MCH RBC QN AUTO: 30.6 PG (ref 25.2–33.5)
MCHC RBC AUTO-ENTMCNC: 32.4 G/DL (ref 28.4–34.8)
MCV RBC AUTO: 94.4 FL (ref 82.6–102.9)
MODE: ABNORMAL
MONOCYTES # BLD: 13 % (ref 3–12)
NEGATIVE BASE EXCESS, ART: ABNORMAL (ref 0–2)
NRBC AUTOMATED: 0 PER 100 WBC
O2 DEVICE/FLOW/%: ABNORMAL
PATIENT TEMP: ABNORMAL
PDW BLD-RTO: 13.1 % (ref 11.8–14.4)
PHENOBARBITAL DATE LAST DOSE: NORMAL
PHENOBARBITAL DOSE AMOUNT: NORMAL
PHENOBARBITAL TIME LAST DOSE: NORMAL
PHENOBARBITAL: 19.1 UG/ML (ref 15–40)
PHENOBARBITAL: 19.8 UG/ML (ref 15–40)
PHENOBARBITAL: 20.5 UG/ML (ref 15–40)
PHENOBARBITAL: 21 UG/ML (ref 15–40)
PHENOBARBITAL: 22.7 UG/ML (ref 15–40)
PLATELET # BLD: 219 K/UL (ref 138–453)
PLATELET ESTIMATE: ABNORMAL
PMV BLD AUTO: 9.2 FL (ref 8.1–13.5)
POC HCO3: 28.7 MMOL/L (ref 21–28)
POC O2 SATURATION: 94 % (ref 94–98)
POC PCO2 TEMP: ABNORMAL MM HG
POC PCO2: 43.8 MM HG (ref 35–48)
POC PH TEMP: ABNORMAL
POC PH: 7.42 (ref 7.35–7.45)
POC PO2 TEMP: ABNORMAL MM HG
POC PO2: 67.9 MM HG (ref 83–108)
POSITIVE BASE EXCESS, ART: 4 (ref 0–3)
POTASSIUM SERPL-SCNC: 3.8 MMOL/L (ref 3.7–5.3)
POTASSIUM SERPL-SCNC: 3.9 MMOL/L (ref 3.7–5.3)
RBC # BLD: 3.95 M/UL (ref 3.95–5.11)
RBC # BLD: ABNORMAL 10*6/UL
SAMPLE SITE: ABNORMAL
SEG NEUTROPHILS: 58 % (ref 36–65)
SEGMENTED NEUTROPHILS ABSOLUTE COUNT: 6.03 K/UL (ref 1.5–8.1)
SODIUM BLD-SCNC: 138 MMOL/L (ref 135–144)
SPECIMEN DESCRIPTION: NORMAL
TCO2 (CALC), ART: 30 MMOL/L (ref 22–29)
TOTAL PROTEIN: 6.7 G/DL (ref 6.4–8.3)
WBC # BLD: 10.3 K/UL (ref 3.5–11.3)
WBC # BLD: ABNORMAL 10*3/UL

## 2019-02-15 PROCEDURE — 6370000000 HC RX 637 (ALT 250 FOR IP): Performed by: EMERGENCY MEDICINE

## 2019-02-15 PROCEDURE — 97161 PT EVAL LOW COMPLEX 20 MIN: CPT

## 2019-02-15 PROCEDURE — 6360000002 HC RX W HCPCS: Performed by: EMERGENCY MEDICINE

## 2019-02-15 PROCEDURE — 2500000003 HC RX 250 WO HCPCS: Performed by: STUDENT IN AN ORGANIZED HEALTH CARE EDUCATION/TRAINING PROGRAM

## 2019-02-15 PROCEDURE — 94762 N-INVAS EAR/PLS OXIMTRY CONT: CPT

## 2019-02-15 PROCEDURE — 80184 ASSAY OF PHENOBARBITAL: CPT

## 2019-02-15 PROCEDURE — 6360000002 HC RX W HCPCS: Performed by: STUDENT IN AN ORGANIZED HEALTH CARE EDUCATION/TRAINING PROGRAM

## 2019-02-15 PROCEDURE — 97110 THERAPEUTIC EXERCISES: CPT

## 2019-02-15 PROCEDURE — 6370000000 HC RX 637 (ALT 250 FOR IP): Performed by: STUDENT IN AN ORGANIZED HEALTH CARE EDUCATION/TRAINING PROGRAM

## 2019-02-15 PROCEDURE — 97166 OT EVAL MOD COMPLEX 45 MIN: CPT

## 2019-02-15 PROCEDURE — 94003 VENT MGMT INPAT SUBQ DAY: CPT

## 2019-02-15 PROCEDURE — 6370000000 HC RX 637 (ALT 250 FOR IP): Performed by: INTERNAL MEDICINE

## 2019-02-15 PROCEDURE — 94667 MNPJ CHEST WALL 1ST: CPT

## 2019-02-15 PROCEDURE — 2580000003 HC RX 258: Performed by: STUDENT IN AN ORGANIZED HEALTH CARE EDUCATION/TRAINING PROGRAM

## 2019-02-15 PROCEDURE — 94664 DEMO&/EVAL PT USE INHALER: CPT

## 2019-02-15 PROCEDURE — 82803 BLOOD GASES ANY COMBINATION: CPT

## 2019-02-15 PROCEDURE — 6360000002 HC RX W HCPCS: Performed by: INTERNAL MEDICINE

## 2019-02-15 PROCEDURE — 85025 COMPLETE CBC W/AUTO DIFF WBC: CPT

## 2019-02-15 PROCEDURE — 99291 CRITICAL CARE FIRST HOUR: CPT | Performed by: INTERNAL MEDICINE

## 2019-02-15 PROCEDURE — 2000000000 HC ICU R&B

## 2019-02-15 PROCEDURE — 2700000000 HC OXYGEN THERAPY PER DAY

## 2019-02-15 PROCEDURE — 80048 BASIC METABOLIC PNL TOTAL CA: CPT

## 2019-02-15 PROCEDURE — 97535 SELF CARE MNGMENT TRAINING: CPT

## 2019-02-15 PROCEDURE — 94640 AIRWAY INHALATION TREATMENT: CPT

## 2019-02-15 PROCEDURE — 80076 HEPATIC FUNCTION PANEL: CPT

## 2019-02-15 PROCEDURE — 36600 WITHDRAWAL OF ARTERIAL BLOOD: CPT

## 2019-02-15 PROCEDURE — 94770 HC ETCO2 MONITOR DAILY: CPT

## 2019-02-15 PROCEDURE — 36415 COLL VENOUS BLD VENIPUNCTURE: CPT

## 2019-02-15 RX ORDER — IPRATROPIUM BROMIDE AND ALBUTEROL SULFATE 2.5; .5 MG/3ML; MG/3ML
1 SOLUTION RESPIRATORY (INHALATION) 4 TIMES DAILY
Status: DISCONTINUED | OUTPATIENT
Start: 2019-02-15 | End: 2019-02-16 | Stop reason: HOSPADM

## 2019-02-15 RX ORDER — ONDANSETRON 2 MG/ML
4 INJECTION INTRAMUSCULAR; INTRAVENOUS ONCE
Status: COMPLETED | OUTPATIENT
Start: 2019-02-15 | End: 2019-02-15

## 2019-02-15 RX ORDER — PROPOFOL 10 MG/ML
10 INJECTION, EMULSION INTRAVENOUS
Status: DISCONTINUED | OUTPATIENT
Start: 2019-02-15 | End: 2019-02-16 | Stop reason: HOSPADM

## 2019-02-15 RX ORDER — ALBUTEROL SULFATE 2.5 MG/3ML
2.5 SOLUTION RESPIRATORY (INHALATION) 2 TIMES DAILY
Status: DISCONTINUED | OUTPATIENT
Start: 2019-02-16 | End: 2019-02-16 | Stop reason: HOSPADM

## 2019-02-15 RX ORDER — ALBUTEROL SULFATE 2.5 MG/3ML
2.5 SOLUTION RESPIRATORY (INHALATION) EVERY 6 HOURS PRN
Status: DISCONTINUED | OUTPATIENT
Start: 2019-02-15 | End: 2019-02-16 | Stop reason: HOSPADM

## 2019-02-15 RX ORDER — PHENYTOIN SODIUM 100 MG/1
100 CAPSULE, EXTENDED RELEASE ORAL EVERY 8 HOURS
Status: DISCONTINUED | OUTPATIENT
Start: 2019-02-15 | End: 2019-02-16 | Stop reason: HOSPADM

## 2019-02-15 RX ORDER — AMLODIPINE BESYLATE 5 MG/1
5 TABLET ORAL DAILY
Status: DISCONTINUED | OUTPATIENT
Start: 2019-02-15 | End: 2019-02-16

## 2019-02-15 RX ORDER — ASPIRIN 81 MG/1
81 TABLET ORAL DAILY
Status: DISCONTINUED | OUTPATIENT
Start: 2019-02-15 | End: 2019-02-16 | Stop reason: HOSPADM

## 2019-02-15 RX ORDER — PHENYTOIN 125 MG/5ML
100 SUSPENSION ORAL 3 TIMES DAILY
Status: DISCONTINUED | OUTPATIENT
Start: 2019-02-15 | End: 2019-02-15

## 2019-02-15 RX ORDER — ACETAMINOPHEN 325 MG/1
650 TABLET ORAL EVERY 6 HOURS PRN
Status: DISCONTINUED | OUTPATIENT
Start: 2019-02-15 | End: 2019-02-16 | Stop reason: HOSPADM

## 2019-02-15 RX ORDER — RISPERIDONE 1 MG/1
1 TABLET, FILM COATED ORAL EVERY EVENING
Status: DISCONTINUED | OUTPATIENT
Start: 2019-02-15 | End: 2019-02-16 | Stop reason: HOSPADM

## 2019-02-15 RX ORDER — VALPROIC ACID 250 MG/1
500 CAPSULE, LIQUID FILLED ORAL 3 TIMES DAILY
Status: DISCONTINUED | OUTPATIENT
Start: 2019-02-15 | End: 2019-02-16 | Stop reason: HOSPADM

## 2019-02-15 RX ORDER — IPRATROPIUM BROMIDE AND ALBUTEROL SULFATE 2.5; .5 MG/3ML; MG/3ML
1 SOLUTION RESPIRATORY (INHALATION) 3 TIMES DAILY
Status: DISCONTINUED | OUTPATIENT
Start: 2019-02-15 | End: 2019-02-15

## 2019-02-15 RX ADMIN — FENTANYL CITRATE 100 MCG: 50 INJECTION, SOLUTION INTRAMUSCULAR; INTRAVENOUS at 09:41

## 2019-02-15 RX ADMIN — BENZOCAINE, MENTHOL 1 LOZENGE: 15; 3.6 LOZENGE ORAL at 22:02

## 2019-02-15 RX ADMIN — FENTANYL CITRATE 100 MCG: 50 INJECTION, SOLUTION INTRAMUSCULAR; INTRAVENOUS at 00:47

## 2019-02-15 RX ADMIN — SODIUM CHLORIDE: 9 INJECTION, SOLUTION INTRAVENOUS at 17:20

## 2019-02-15 RX ADMIN — VALPROIC ACID 500 MG: 250 CAPSULE, LIQUID FILLED ORAL at 13:42

## 2019-02-15 RX ADMIN — MOMETASONE FUROATE AND FORMOTEROL FUMARATE DIHYDRATE 2 PUFF: 200; 5 AEROSOL RESPIRATORY (INHALATION) at 19:34

## 2019-02-15 RX ADMIN — ACETAMINOPHEN 650 MG: 325 TABLET ORAL at 05:31

## 2019-02-15 RX ADMIN — FENTANYL CITRATE 100 MCG: 50 INJECTION, SOLUTION INTRAMUSCULAR; INTRAVENOUS at 08:21

## 2019-02-15 RX ADMIN — SODIUM CHLORIDE: 9 INJECTION, SOLUTION INTRAVENOUS at 03:48

## 2019-02-15 RX ADMIN — RISPERIDONE 1 MG: 1 TABLET, FILM COATED ORAL at 17:11

## 2019-02-15 RX ADMIN — IPRATROPIUM BROMIDE AND ALBUTEROL SULFATE 1 AMPULE: .5; 3 SOLUTION RESPIRATORY (INHALATION) at 14:10

## 2019-02-15 RX ADMIN — AMLODIPINE BESYLATE 5 MG: 5 TABLET ORAL at 13:14

## 2019-02-15 RX ADMIN — PIPERACILLIN AND TAZOBACTAM 3.38 G: 3; .375 INJECTION, POWDER, LYOPHILIZED, FOR SOLUTION INTRAVENOUS; PARENTERAL at 09:08

## 2019-02-15 RX ADMIN — ACETAMINOPHEN 650 MG: 325 TABLET ORAL at 14:54

## 2019-02-15 RX ADMIN — METOPROLOL TARTRATE 25 MG: 25 TABLET ORAL at 09:08

## 2019-02-15 RX ADMIN — FENTANYL CITRATE 100 MCG: 50 INJECTION, SOLUTION INTRAMUSCULAR; INTRAVENOUS at 02:08

## 2019-02-15 RX ADMIN — ASPIRIN 81 MG: 81 TABLET ORAL at 13:14

## 2019-02-15 RX ADMIN — DOCUSATE SODIUM 100 MG: 50 LIQUID ORAL at 09:08

## 2019-02-15 RX ADMIN — FAMOTIDINE 20 MG: 10 INJECTION, SOLUTION INTRAVENOUS at 19:18

## 2019-02-15 RX ADMIN — IPRATROPIUM BROMIDE 0.5 MG: 0.5 SOLUTION RESPIRATORY (INHALATION) at 03:24

## 2019-02-15 RX ADMIN — IPRATROPIUM BROMIDE AND ALBUTEROL SULFATE 1 AMPULE: .5; 3 SOLUTION RESPIRATORY (INHALATION) at 19:34

## 2019-02-15 RX ADMIN — IPRATROPIUM BROMIDE 0.5 MG: 0.5 SOLUTION RESPIRATORY (INHALATION) at 08:12

## 2019-02-15 RX ADMIN — METOPROLOL TARTRATE 25 MG: 25 TABLET ORAL at 19:17

## 2019-02-15 RX ADMIN — MOMETASONE FUROATE AND FORMOTEROL FUMARATE DIHYDRATE 2 PUFF: 200; 5 AEROSOL RESPIRATORY (INHALATION) at 14:10

## 2019-02-15 RX ADMIN — LEVALBUTEROL HYDROCHLORIDE 1.25 MG: 1.25 SOLUTION RESPIRATORY (INHALATION) at 08:12

## 2019-02-15 RX ADMIN — Medication 8.8 MG: at 09:08

## 2019-02-15 RX ADMIN — BENZOCAINE, MENTHOL 1 LOZENGE: 15; 3.6 LOZENGE ORAL at 17:57

## 2019-02-15 RX ADMIN — PIPERACILLIN AND TAZOBACTAM 3.38 G: 3; .375 INJECTION, POWDER, LYOPHILIZED, FOR SOLUTION INTRAVENOUS; PARENTERAL at 17:11

## 2019-02-15 RX ADMIN — ONDANSETRON 4 MG: 2 INJECTION INTRAMUSCULAR; INTRAVENOUS at 22:02

## 2019-02-15 RX ADMIN — PROPOFOL 10 MCG/KG/MIN: 10 INJECTION, EMULSION INTRAVENOUS at 10:23

## 2019-02-15 RX ADMIN — ONDANSETRON 4 MG: 2 INJECTION INTRAMUSCULAR; INTRAVENOUS at 15:01

## 2019-02-15 RX ADMIN — LEVALBUTEROL HYDROCHLORIDE 1.25 MG: 1.25 SOLUTION RESPIRATORY (INHALATION) at 03:24

## 2019-02-15 RX ADMIN — FAMOTIDINE 20 MG: 10 INJECTION, SOLUTION INTRAVENOUS at 09:08

## 2019-02-15 RX ADMIN — Medication 8 MG/HR: at 04:58

## 2019-02-15 RX ADMIN — EXTENDED PHENYTOIN SODIUM 100 MG: 100 CAPSULE ORAL at 13:42

## 2019-02-15 RX ADMIN — ENOXAPARIN SODIUM 40 MG: 40 INJECTION SUBCUTANEOUS at 09:08

## 2019-02-15 RX ADMIN — VALPROIC ACID 500 MG: 250 CAPSULE, LIQUID FILLED ORAL at 20:59

## 2019-02-15 RX ADMIN — FENTANYL CITRATE 100 MCG: 50 INJECTION, SOLUTION INTRAMUSCULAR; INTRAVENOUS at 03:46

## 2019-02-15 RX ADMIN — FENTANYL CITRATE 100 MCG: 50 INJECTION, SOLUTION INTRAMUSCULAR; INTRAVENOUS at 05:31

## 2019-02-15 RX ADMIN — EXTENDED PHENYTOIN SODIUM 100 MG: 100 CAPSULE ORAL at 20:59

## 2019-02-15 ASSESSMENT — PAIN SCALES - GENERAL
PAINLEVEL_OUTOF10: 7
PAINLEVEL_OUTOF10: 10
PAINLEVEL_OUTOF10: 0

## 2019-02-15 ASSESSMENT — PAIN SCALES - WONG BAKER: WONGBAKER_NUMERICALRESPONSE: 0

## 2019-02-15 ASSESSMENT — PAIN DESCRIPTION - PAIN TYPE: TYPE: ACUTE PAIN

## 2019-02-15 ASSESSMENT — PULMONARY FUNCTION TESTS
PIF_VALUE: 13
PIF_VALUE: 20
PIF_VALUE: 13
PIF_VALUE: 21
PIF_VALUE: 15

## 2019-02-15 ASSESSMENT — PAIN DESCRIPTION - LOCATION: LOCATION: HEAD

## 2019-02-16 ENCOUNTER — HOSPITAL ENCOUNTER (INPATIENT)
Age: 54
LOS: 13 days | Discharge: HOME | DRG: 751 | End: 2019-03-01
Attending: PSYCHIATRY & NEUROLOGY | Admitting: PSYCHIATRY & NEUROLOGY
Payer: MEDICARE

## 2019-02-16 VITALS
TEMPERATURE: 98.6 F | HEIGHT: 61 IN | SYSTOLIC BLOOD PRESSURE: 151 MMHG | WEIGHT: 188.93 LBS | OXYGEN SATURATION: 99 % | BODY MASS INDEX: 35.67 KG/M2 | DIASTOLIC BLOOD PRESSURE: 91 MMHG | HEART RATE: 82 BPM | RESPIRATION RATE: 18 BRPM

## 2019-02-16 PROBLEM — J40 BRONCHITIS: Status: ACTIVE | Noted: 2019-02-16

## 2019-02-16 PROBLEM — F33.9 MAJOR DEPRESSIVE DISORDER, RECURRENT (HCC): Status: ACTIVE | Noted: 2019-02-16

## 2019-02-16 PROBLEM — T14.91XA SUICIDE ATTEMPT (HCC): Status: ACTIVE | Noted: 2019-02-16

## 2019-02-16 LAB
ABSOLUTE EOS #: 0.18 K/UL (ref 0–0.44)
ABSOLUTE IMMATURE GRANULOCYTE: 0.13 K/UL (ref 0–0.3)
ABSOLUTE LYMPH #: 2.89 K/UL (ref 1.1–3.7)
ABSOLUTE MONO #: 0.99 K/UL (ref 0.1–1.2)
ANION GAP SERPL CALCULATED.3IONS-SCNC: 14 MMOL/L (ref 9–17)
BASOPHILS # BLD: 1 % (ref 0–2)
BASOPHILS ABSOLUTE: 0.05 K/UL (ref 0–0.2)
BUN BLDV-MCNC: 3 MG/DL (ref 6–20)
BUN/CREAT BLD: ABNORMAL (ref 9–20)
CALCIUM SERPL-MCNC: 8.5 MG/DL (ref 8.6–10.4)
CHLORIDE BLD-SCNC: 103 MMOL/L (ref 98–107)
CO2: 22 MMOL/L (ref 20–31)
CREAT SERPL-MCNC: 0.29 MG/DL (ref 0.5–0.9)
DIFFERENTIAL TYPE: ABNORMAL
EKG ATRIAL RATE: 78 BPM
EKG P AXIS: 67 DEGREES
EKG P-R INTERVAL: 170 MS
EKG Q-T INTERVAL: 406 MS
EKG QRS DURATION: 78 MS
EKG QTC CALCULATION (BAZETT): 462 MS
EKG R AXIS: 35 DEGREES
EKG T AXIS: 39 DEGREES
EKG VENTRICULAR RATE: 78 BPM
EOSINOPHILS RELATIVE PERCENT: 2 % (ref 1–4)
GFR AFRICAN AMERICAN: >60 ML/MIN
GFR NON-AFRICAN AMERICAN: >60 ML/MIN
GFR SERPL CREATININE-BSD FRML MDRD: ABNORMAL ML/MIN/{1.73_M2}
GFR SERPL CREATININE-BSD FRML MDRD: ABNORMAL ML/MIN/{1.73_M2}
GLUCOSE BLD-MCNC: 94 MG/DL (ref 70–99)
HCT VFR BLD CALC: 35.1 % (ref 36.3–47.1)
HEMOGLOBIN: 11.4 G/DL (ref 11.9–15.1)
IMMATURE GRANULOCYTES: 1 %
LYMPHOCYTES # BLD: 30 % (ref 24–43)
MAGNESIUM: 2.4 MG/DL (ref 1.6–2.6)
MCH RBC QN AUTO: 31.4 PG (ref 25.2–33.5)
MCHC RBC AUTO-ENTMCNC: 32.5 G/DL (ref 28.4–34.8)
MCV RBC AUTO: 96.7 FL (ref 82.6–102.9)
MONOCYTES # BLD: 10 % (ref 3–12)
NRBC AUTOMATED: 0 PER 100 WBC
PDW BLD-RTO: 12.8 % (ref 11.8–14.4)
PHENOBARBITAL DATE LAST DOSE: NORMAL
PHENOBARBITAL DATE LAST DOSE: NORMAL
PHENOBARBITAL DOSE AMOUNT: NORMAL
PHENOBARBITAL DOSE AMOUNT: NORMAL
PHENOBARBITAL TIME LAST DOSE: NORMAL
PHENOBARBITAL TIME LAST DOSE: NORMAL
PHENOBARBITAL: 17.6 UG/ML (ref 15–40)
PHENOBARBITAL: 18.1 UG/ML (ref 15–40)
PLATELET # BLD: 221 K/UL (ref 138–453)
PLATELET ESTIMATE: ABNORMAL
PMV BLD AUTO: 9.1 FL (ref 8.1–13.5)
POTASSIUM SERPL-SCNC: 3.4 MMOL/L (ref 3.7–5.3)
RBC # BLD: 3.63 M/UL (ref 3.95–5.11)
RBC # BLD: ABNORMAL 10*6/UL
SEG NEUTROPHILS: 56 % (ref 36–65)
SEGMENTED NEUTROPHILS ABSOLUTE COUNT: 5.57 K/UL (ref 1.5–8.1)
SODIUM BLD-SCNC: 139 MMOL/L (ref 135–144)
WBC # BLD: 9.8 K/UL (ref 3.5–11.3)
WBC # BLD: ABNORMAL 10*3/UL

## 2019-02-16 PROCEDURE — 83735 ASSAY OF MAGNESIUM: CPT

## 2019-02-16 PROCEDURE — 80184 ASSAY OF PHENOBARBITAL: CPT

## 2019-02-16 PROCEDURE — 85025 COMPLETE CBC W/AUTO DIFF WBC: CPT

## 2019-02-16 PROCEDURE — 6370000000 HC RX 637 (ALT 250 FOR IP): Performed by: NURSE PRACTITIONER

## 2019-02-16 PROCEDURE — 6370000000 HC RX 637 (ALT 250 FOR IP): Performed by: STUDENT IN AN ORGANIZED HEALTH CARE EDUCATION/TRAINING PROGRAM

## 2019-02-16 PROCEDURE — 94762 N-INVAS EAR/PLS OXIMTRY CONT: CPT

## 2019-02-16 PROCEDURE — 36415 COLL VENOUS BLD VENIPUNCTURE: CPT

## 2019-02-16 PROCEDURE — 2580000003 HC RX 258: Performed by: STUDENT IN AN ORGANIZED HEALTH CARE EDUCATION/TRAINING PROGRAM

## 2019-02-16 PROCEDURE — 90792 PSYCH DIAG EVAL W/MED SRVCS: CPT | Performed by: NURSE PRACTITIONER

## 2019-02-16 PROCEDURE — 99238 HOSP IP/OBS DSCHRG MGMT 30/<: CPT | Performed by: INTERNAL MEDICINE

## 2019-02-16 PROCEDURE — 1240000000 HC EMOTIONAL WELLNESS R&B

## 2019-02-16 PROCEDURE — 6370000000 HC RX 637 (ALT 250 FOR IP): Performed by: INTERNAL MEDICINE

## 2019-02-16 PROCEDURE — 94640 AIRWAY INHALATION TREATMENT: CPT

## 2019-02-16 PROCEDURE — 2500000003 HC RX 250 WO HCPCS: Performed by: STUDENT IN AN ORGANIZED HEALTH CARE EDUCATION/TRAINING PROGRAM

## 2019-02-16 PROCEDURE — 93005 ELECTROCARDIOGRAM TRACING: CPT

## 2019-02-16 PROCEDURE — 6370000000 HC RX 637 (ALT 250 FOR IP): Performed by: EMERGENCY MEDICINE

## 2019-02-16 PROCEDURE — 6360000002 HC RX W HCPCS: Performed by: EMERGENCY MEDICINE

## 2019-02-16 PROCEDURE — 2700000000 HC OXYGEN THERAPY PER DAY

## 2019-02-16 PROCEDURE — 80048 BASIC METABOLIC PNL TOTAL CA: CPT

## 2019-02-16 PROCEDURE — 6360000002 HC RX W HCPCS: Performed by: STUDENT IN AN ORGANIZED HEALTH CARE EDUCATION/TRAINING PROGRAM

## 2019-02-16 RX ORDER — VALPROIC ACID 250 MG/1
500 CAPSULE, LIQUID FILLED ORAL 3 TIMES DAILY
Status: CANCELLED | OUTPATIENT
Start: 2019-02-16

## 2019-02-16 RX ORDER — ASPIRIN 81 MG/1
81 TABLET ORAL DAILY
Status: DISCONTINUED | OUTPATIENT
Start: 2019-02-17 | End: 2019-03-01 | Stop reason: HOSPADM

## 2019-02-16 RX ORDER — CLOPIDOGREL BISULFATE 75 MG/1
75 TABLET ORAL DAILY
Status: DISCONTINUED | OUTPATIENT
Start: 2019-02-17 | End: 2019-03-01 | Stop reason: HOSPADM

## 2019-02-16 RX ORDER — HYDROXYZINE 50 MG/1
50 TABLET, FILM COATED ORAL 3 TIMES DAILY PRN
Status: DISCONTINUED | OUTPATIENT
Start: 2019-02-16 | End: 2019-03-01 | Stop reason: HOSPADM

## 2019-02-16 RX ORDER — POTASSIUM CHLORIDE 20 MEQ/1
40 TABLET, EXTENDED RELEASE ORAL ONCE
Status: COMPLETED | OUTPATIENT
Start: 2019-02-16 | End: 2019-02-16

## 2019-02-16 RX ORDER — ONDANSETRON 2 MG/ML
4 INJECTION INTRAMUSCULAR; INTRAVENOUS ONCE
Status: DISCONTINUED | OUTPATIENT
Start: 2019-02-16 | End: 2019-02-16

## 2019-02-16 RX ORDER — LEVOTHYROXINE SODIUM 0.05 MG/1
50 TABLET ORAL DAILY
Status: DISCONTINUED | OUTPATIENT
Start: 2019-02-17 | End: 2019-03-01 | Stop reason: HOSPADM

## 2019-02-16 RX ORDER — ONDANSETRON 4 MG/1
4 TABLET, FILM COATED ORAL ONCE
Status: COMPLETED | OUTPATIENT
Start: 2019-02-16 | End: 2019-02-16

## 2019-02-16 RX ORDER — AMLODIPINE BESYLATE 10 MG/1
10 TABLET ORAL DAILY
Status: CANCELLED | OUTPATIENT
Start: 2019-02-17

## 2019-02-16 RX ORDER — MIRTAZAPINE 30 MG/1
30 TABLET, FILM COATED ORAL NIGHTLY
Status: DISCONTINUED | OUTPATIENT
Start: 2019-02-16 | End: 2019-02-18

## 2019-02-16 RX ORDER — ALBUTEROL SULFATE 2.5 MG/3ML
2.5 SOLUTION RESPIRATORY (INHALATION) 2 TIMES DAILY
Status: CANCELLED | OUTPATIENT
Start: 2019-02-17

## 2019-02-16 RX ORDER — BENZTROPINE MESYLATE 1 MG/ML
2 INJECTION INTRAMUSCULAR; INTRAVENOUS 2 TIMES DAILY PRN
Status: DISCONTINUED | OUTPATIENT
Start: 2019-02-16 | End: 2019-03-01 | Stop reason: HOSPADM

## 2019-02-16 RX ORDER — MAGNESIUM HYDROXIDE/ALUMINUM HYDROXICE/SIMETHICONE 120; 1200; 1200 MG/30ML; MG/30ML; MG/30ML
30 SUSPENSION ORAL EVERY 6 HOURS PRN
Status: DISCONTINUED | OUTPATIENT
Start: 2019-02-16 | End: 2019-03-01 | Stop reason: HOSPADM

## 2019-02-16 RX ORDER — TRAZODONE HYDROCHLORIDE 50 MG/1
50 TABLET ORAL NIGHTLY PRN
Status: DISCONTINUED | OUTPATIENT
Start: 2019-02-16 | End: 2019-02-17

## 2019-02-16 RX ORDER — LEVOFLOXACIN 500 MG/1
500 TABLET, FILM COATED ORAL DAILY
Status: CANCELLED | OUTPATIENT
Start: 2019-02-17 | End: 2019-02-19

## 2019-02-16 RX ORDER — TIZANIDINE 4 MG/1
2 TABLET ORAL EVERY 8 HOURS PRN
Status: DISCONTINUED | OUTPATIENT
Start: 2019-02-16 | End: 2019-03-01 | Stop reason: HOSPADM

## 2019-02-16 RX ORDER — PHENYTOIN SODIUM 100 MG/1
100 CAPSULE, EXTENDED RELEASE ORAL 3 TIMES DAILY
Status: DISCONTINUED | OUTPATIENT
Start: 2019-02-16 | End: 2019-03-01 | Stop reason: HOSPADM

## 2019-02-16 RX ORDER — RISPERIDONE 1 MG/1
1 TABLET, FILM COATED ORAL EVERY EVENING
Status: CANCELLED | OUTPATIENT
Start: 2019-02-16

## 2019-02-16 RX ORDER — NICOTINE 21 MG/24HR
1 PATCH, TRANSDERMAL 24 HOURS TRANSDERMAL DAILY
Status: DISCONTINUED | OUTPATIENT
Start: 2019-02-17 | End: 2019-02-17

## 2019-02-16 RX ORDER — LEVOFLOXACIN 500 MG/1
500 TABLET, FILM COATED ORAL DAILY
Status: DISCONTINUED | OUTPATIENT
Start: 2019-02-16 | End: 2019-02-16 | Stop reason: HOSPADM

## 2019-02-16 RX ORDER — AMLODIPINE BESYLATE 10 MG/1
10 TABLET ORAL DAILY
Status: DISCONTINUED | OUTPATIENT
Start: 2019-02-16 | End: 2019-02-16 | Stop reason: HOSPADM

## 2019-02-16 RX ORDER — PHENYTOIN SODIUM 100 MG/1
100 CAPSULE, EXTENDED RELEASE ORAL EVERY 8 HOURS
Status: CANCELLED | OUTPATIENT
Start: 2019-02-16

## 2019-02-16 RX ORDER — TRAZODONE HYDROCHLORIDE 50 MG/1
50 TABLET ORAL NIGHTLY PRN
Status: DISCONTINUED | OUTPATIENT
Start: 2019-02-17 | End: 2019-02-16

## 2019-02-16 RX ORDER — FAMOTIDINE 20 MG/1
20 TABLET, FILM COATED ORAL 2 TIMES DAILY
Status: DISCONTINUED | OUTPATIENT
Start: 2019-02-16 | End: 2019-03-01 | Stop reason: HOSPADM

## 2019-02-16 RX ORDER — ACETAMINOPHEN 325 MG/1
650 TABLET ORAL EVERY 4 HOURS PRN
Status: DISCONTINUED | OUTPATIENT
Start: 2019-02-16 | End: 2019-03-01 | Stop reason: HOSPADM

## 2019-02-16 RX ORDER — IPRATROPIUM BROMIDE AND ALBUTEROL SULFATE 2.5; .5 MG/3ML; MG/3ML
1 SOLUTION RESPIRATORY (INHALATION) 4 TIMES DAILY
Status: CANCELLED | OUTPATIENT
Start: 2019-02-16

## 2019-02-16 RX ORDER — ONDANSETRON 2 MG/ML
4 INJECTION INTRAMUSCULAR; INTRAVENOUS ONCE
Status: COMPLETED | OUTPATIENT
Start: 2019-02-16 | End: 2019-02-16

## 2019-02-16 RX ORDER — RISPERIDONE 1 MG/1
1 TABLET, FILM COATED ORAL EVERY EVENING
Status: DISCONTINUED | OUTPATIENT
Start: 2019-02-16 | End: 2019-02-18

## 2019-02-16 RX ORDER — ALBUTEROL SULFATE 90 UG/1
2 AEROSOL, METERED RESPIRATORY (INHALATION) EVERY 6 HOURS PRN
Status: DISCONTINUED | OUTPATIENT
Start: 2019-02-16 | End: 2019-03-01 | Stop reason: HOSPADM

## 2019-02-16 RX ORDER — ONDANSETRON 2 MG/ML
INJECTION INTRAMUSCULAR; INTRAVENOUS
Status: DISCONTINUED
Start: 2019-02-16 | End: 2019-02-16 | Stop reason: HOSPADM

## 2019-02-16 RX ORDER — ASPIRIN 81 MG/1
81 TABLET ORAL DAILY
Status: CANCELLED | OUTPATIENT
Start: 2019-02-17

## 2019-02-16 RX ORDER — GUAIFENESIN 100 MG/5ML
200 SOLUTION ORAL 4 TIMES DAILY PRN
Status: DISCONTINUED | OUTPATIENT
Start: 2019-02-16 | End: 2019-03-01 | Stop reason: HOSPADM

## 2019-02-16 RX ADMIN — TRAZODONE HYDROCHLORIDE 50 MG: 50 TABLET ORAL at 22:36

## 2019-02-16 RX ADMIN — ONDANSETRON 4 MG: 2 INJECTION INTRAMUSCULAR; INTRAVENOUS at 09:25

## 2019-02-16 RX ADMIN — BENZOCAINE, MENTHOL 1 LOZENGE: 15; 3.6 LOZENGE ORAL at 05:13

## 2019-02-16 RX ADMIN — IPRATROPIUM BROMIDE AND ALBUTEROL SULFATE 1 AMPULE: .5; 3 SOLUTION RESPIRATORY (INHALATION) at 08:35

## 2019-02-16 RX ADMIN — FAMOTIDINE 20 MG: 10 INJECTION, SOLUTION INTRAVENOUS at 08:12

## 2019-02-16 RX ADMIN — LEVOFLOXACIN 500 MG: 500 TABLET, FILM COATED ORAL at 12:23

## 2019-02-16 RX ADMIN — VALPROIC ACID 500 MG: 250 CAPSULE, LIQUID FILLED ORAL at 14:38

## 2019-02-16 RX ADMIN — AMLODIPINE BESYLATE 10 MG: 5 TABLET ORAL at 08:10

## 2019-02-16 RX ADMIN — Medication 8.8 MG: at 08:12

## 2019-02-16 RX ADMIN — ONDANSETRON 4 MG: 2 INJECTION INTRAMUSCULAR; INTRAVENOUS at 06:33

## 2019-02-16 RX ADMIN — MIRTAZAPINE 30 MG: 30 TABLET, FILM COATED ORAL at 22:35

## 2019-02-16 RX ADMIN — FAMOTIDINE 20 MG: 20 TABLET ORAL at 22:36

## 2019-02-16 RX ADMIN — ACETAMINOPHEN 650 MG: 325 TABLET ORAL at 00:27

## 2019-02-16 RX ADMIN — ONDANSETRON HYDROCHLORIDE 4 MG: 4 TABLET, FILM COATED ORAL at 18:38

## 2019-02-16 RX ADMIN — PIPERACILLIN AND TAZOBACTAM 3.38 G: 3; .375 INJECTION, POWDER, LYOPHILIZED, FOR SOLUTION INTRAVENOUS; PARENTERAL at 01:44

## 2019-02-16 RX ADMIN — IPRATROPIUM BROMIDE AND ALBUTEROL SULFATE 1 AMPULE: .5; 3 SOLUTION RESPIRATORY (INHALATION) at 18:03

## 2019-02-16 RX ADMIN — DOCUSATE SODIUM 100 MG: 50 LIQUID ORAL at 08:11

## 2019-02-16 RX ADMIN — METOPROLOL TARTRATE 25 MG: 25 TABLET ORAL at 08:11

## 2019-02-16 RX ADMIN — EXTENDED PHENYTOIN SODIUM 100 MG: 100 CAPSULE ORAL at 14:40

## 2019-02-16 RX ADMIN — ENOXAPARIN SODIUM 40 MG: 40 INJECTION SUBCUTANEOUS at 08:12

## 2019-02-16 RX ADMIN — RISPERIDONE 1 MG: 1 TABLET, FILM COATED ORAL at 16:58

## 2019-02-16 RX ADMIN — ASPIRIN 81 MG: 81 TABLET ORAL at 08:10

## 2019-02-16 RX ADMIN — TIZANIDINE 2 MG: 4 TABLET ORAL at 22:36

## 2019-02-16 RX ADMIN — HYDROXYZINE HYDROCHLORIDE 50 MG: 50 TABLET, FILM COATED ORAL at 22:35

## 2019-02-16 RX ADMIN — METOPROLOL TARTRATE 25 MG: 25 TABLET ORAL at 22:35

## 2019-02-16 RX ADMIN — IPRATROPIUM BROMIDE AND ALBUTEROL SULFATE 1 AMPULE: .5; 3 SOLUTION RESPIRATORY (INHALATION) at 12:00

## 2019-02-16 RX ADMIN — PHENYTOIN SODIUM 100 MG: 100 CAPSULE ORAL at 22:34

## 2019-02-16 RX ADMIN — EXTENDED PHENYTOIN SODIUM 100 MG: 100 CAPSULE ORAL at 05:13

## 2019-02-16 RX ADMIN — GUAIFENESIN 200 MG: 200 SOLUTION ORAL at 22:33

## 2019-02-16 RX ADMIN — SODIUM CHLORIDE: 9 INJECTION, SOLUTION INTRAVENOUS at 06:01

## 2019-02-16 RX ADMIN — POTASSIUM CHLORIDE 40 MEQ: 1500 TABLET, EXTENDED RELEASE ORAL at 08:15

## 2019-02-16 RX ADMIN — ONDANSETRON HYDROCHLORIDE 4 MG: 4 TABLET, FILM COATED ORAL at 12:23

## 2019-02-16 RX ADMIN — VALPROIC ACID 500 MG: 250 CAPSULE, LIQUID FILLED ORAL at 08:10

## 2019-02-16 RX ADMIN — DIVALPROEX SODIUM 750 MG: 500 TABLET, DELAYED RELEASE ORAL at 22:34

## 2019-02-16 RX ADMIN — BENZOCAINE, MENTHOL 1 LOZENGE: 15; 3.6 LOZENGE ORAL at 08:11

## 2019-02-16 RX ADMIN — MOMETASONE FUROATE AND FORMOTEROL FUMARATE DIHYDRATE 2 PUFF: 200; 5 AEROSOL RESPIRATORY (INHALATION) at 08:36

## 2019-02-16 ASSESSMENT — PAIN SCALES - GENERAL
PAINLEVEL_OUTOF10: 0
PAINLEVEL_OUTOF10: 0
PAINLEVEL_OUTOF10: 5
PAINLEVEL_OUTOF10: 0
PAINLEVEL_OUTOF10: 3

## 2019-02-16 ASSESSMENT — SLEEP AND FATIGUE QUESTIONNAIRES
SLEEP PATTERN: DIFFICULTY FALLING ASLEEP;RESTLESSNESS;INSOMNIA
DIFFICULTY ARISING: NO
DIFFICULTY STAYING ASLEEP: YES
DIFFICULTY FALLING ASLEEP: YES
RESTFUL SLEEP: NO
DO YOU USE A SLEEP AID: YES
DO YOU HAVE DIFFICULTY SLEEPING: YES

## 2019-02-16 ASSESSMENT — PAIN DESCRIPTION - DESCRIPTORS: DESCRIPTORS: ACHING

## 2019-02-16 ASSESSMENT — PAIN DESCRIPTION - LOCATION
LOCATION: HEAD
LOCATION: HEAD;THROAT

## 2019-02-16 ASSESSMENT — PAIN DESCRIPTION - PAIN TYPE
TYPE: ACUTE PAIN
TYPE: ACUTE PAIN

## 2019-02-16 ASSESSMENT — LIFESTYLE VARIABLES: HISTORY_ALCOHOL_USE: NO

## 2019-02-16 ASSESSMENT — PATIENT HEALTH QUESTIONNAIRE - PHQ9: SUM OF ALL RESPONSES TO PHQ QUESTIONS 1-9: 18

## 2019-02-17 LAB
CULTURE: ABNORMAL
CULTURE: ABNORMAL
DIRECT EXAM: ABNORMAL
Lab: ABNORMAL
SPECIMEN DESCRIPTION: ABNORMAL

## 2019-02-17 PROCEDURE — 6370000000 HC RX 637 (ALT 250 FOR IP): Performed by: NURSE PRACTITIONER

## 2019-02-17 PROCEDURE — 90792 PSYCH DIAG EVAL W/MED SRVCS: CPT | Performed by: NURSE PRACTITIONER

## 2019-02-17 PROCEDURE — 1240000000 HC EMOTIONAL WELLNESS R&B

## 2019-02-17 RX ORDER — TRAZODONE HYDROCHLORIDE 100 MG/1
100 TABLET ORAL NIGHTLY PRN
Status: DISCONTINUED | OUTPATIENT
Start: 2019-02-17 | End: 2019-03-01 | Stop reason: HOSPADM

## 2019-02-17 RX ADMIN — PHENYTOIN SODIUM 100 MG: 100 CAPSULE ORAL at 09:33

## 2019-02-17 RX ADMIN — TIZANIDINE 2 MG: 4 TABLET ORAL at 09:37

## 2019-02-17 RX ADMIN — METOPROLOL TARTRATE 25 MG: 25 TABLET ORAL at 21:32

## 2019-02-17 RX ADMIN — FAMOTIDINE 20 MG: 20 TABLET ORAL at 21:32

## 2019-02-17 RX ADMIN — ACETAMINOPHEN 650 MG: 325 TABLET, FILM COATED ORAL at 13:13

## 2019-02-17 RX ADMIN — GUAIFENESIN 200 MG: 200 SOLUTION ORAL at 13:33

## 2019-02-17 RX ADMIN — FAMOTIDINE 20 MG: 20 TABLET ORAL at 09:32

## 2019-02-17 RX ADMIN — TRAZODONE HYDROCHLORIDE 100 MG: 100 TABLET ORAL at 21:32

## 2019-02-17 RX ADMIN — LEVOTHYROXINE SODIUM 50 MCG: 50 TABLET ORAL at 06:43

## 2019-02-17 RX ADMIN — RISPERIDONE 1 MG: 1 TABLET ORAL at 18:29

## 2019-02-17 RX ADMIN — GUAIFENESIN 200 MG: 200 SOLUTION ORAL at 21:33

## 2019-02-17 RX ADMIN — PHENYTOIN SODIUM 100 MG: 100 CAPSULE ORAL at 21:32

## 2019-02-17 RX ADMIN — Medication 1 LOZENGE: at 06:52

## 2019-02-17 RX ADMIN — CLOPIDOGREL BISULFATE 75 MG: 75 TABLET ORAL at 09:33

## 2019-02-17 RX ADMIN — DIVALPROEX SODIUM 750 MG: 500 TABLET, DELAYED RELEASE ORAL at 21:32

## 2019-02-17 RX ADMIN — METOPROLOL TARTRATE 25 MG: 25 TABLET ORAL at 09:32

## 2019-02-17 RX ADMIN — DIVALPROEX SODIUM 750 MG: 500 TABLET, DELAYED RELEASE ORAL at 09:30

## 2019-02-17 RX ADMIN — HYDROXYZINE HYDROCHLORIDE 50 MG: 50 TABLET, FILM COATED ORAL at 21:32

## 2019-02-17 RX ADMIN — GUAIFENESIN 200 MG: 200 SOLUTION ORAL at 06:48

## 2019-02-17 RX ADMIN — TIZANIDINE 2 MG: 4 TABLET ORAL at 21:32

## 2019-02-17 RX ADMIN — MIRTAZAPINE 30 MG: 30 TABLET, FILM COATED ORAL at 21:32

## 2019-02-17 RX ADMIN — Medication 1 LOZENGE: at 13:33

## 2019-02-17 RX ADMIN — ALBUTEROL SULFATE 2 PUFF: 90 AEROSOL, METERED RESPIRATORY (INHALATION) at 21:31

## 2019-02-17 RX ADMIN — PHENYTOIN SODIUM 100 MG: 100 CAPSULE ORAL at 14:42

## 2019-02-17 RX ADMIN — ASPIRIN 81 MG: 81 TABLET, COATED ORAL at 09:32

## 2019-02-17 ASSESSMENT — LIFESTYLE VARIABLES: HISTORY_ALCOHOL_USE: YES

## 2019-02-17 ASSESSMENT — PAIN SCALES - GENERAL
PAINLEVEL_OUTOF10: 3
PAINLEVEL_OUTOF10: 0
PAINLEVEL_OUTOF10: 1

## 2019-02-18 PROCEDURE — 6370000000 HC RX 637 (ALT 250 FOR IP): Performed by: NURSE PRACTITIONER

## 2019-02-18 PROCEDURE — 1240000000 HC EMOTIONAL WELLNESS R&B

## 2019-02-18 PROCEDURE — 99232 SBSQ HOSP IP/OBS MODERATE 35: CPT | Performed by: NURSE PRACTITIONER

## 2019-02-18 PROCEDURE — 99254 IP/OBS CNSLTJ NEW/EST MOD 60: CPT | Performed by: INTERNAL MEDICINE

## 2019-02-18 PROCEDURE — 6370000000 HC RX 637 (ALT 250 FOR IP): Performed by: INTERNAL MEDICINE

## 2019-02-18 RX ORDER — BUSPIRONE HYDROCHLORIDE 5 MG/1
5 TABLET ORAL 3 TIMES DAILY
Status: DISCONTINUED | OUTPATIENT
Start: 2019-02-18 | End: 2019-02-20

## 2019-02-18 RX ORDER — IPRATROPIUM BROMIDE AND ALBUTEROL SULFATE 2.5; .5 MG/3ML; MG/3ML
1 SOLUTION RESPIRATORY (INHALATION) 4 TIMES DAILY
Status: DISCONTINUED | OUTPATIENT
Start: 2019-02-18 | End: 2019-02-19

## 2019-02-18 RX ORDER — LEVOFLOXACIN 750 MG/1
750 TABLET ORAL DAILY
Status: COMPLETED | OUTPATIENT
Start: 2019-02-18 | End: 2019-02-24

## 2019-02-18 RX ORDER — RISPERIDONE 1 MG/1
1 TABLET, FILM COATED ORAL NIGHTLY
Status: DISCONTINUED | OUTPATIENT
Start: 2019-02-18 | End: 2019-02-25

## 2019-02-18 RX ORDER — BUTALBITAL, ACETAMINOPHEN AND CAFFEINE 50; 325; 40 MG/1; MG/1; MG/1
1 TABLET ORAL EVERY 4 HOURS PRN
Status: DISCONTINUED | OUTPATIENT
Start: 2019-02-18 | End: 2019-03-01 | Stop reason: HOSPADM

## 2019-02-18 RX ADMIN — DIVALPROEX SODIUM 750 MG: 500 TABLET, DELAYED RELEASE ORAL at 21:10

## 2019-02-18 RX ADMIN — RISPERIDONE 1 MG: 1 TABLET ORAL at 21:11

## 2019-02-18 RX ADMIN — CLOPIDOGREL BISULFATE 75 MG: 75 TABLET ORAL at 09:15

## 2019-02-18 RX ADMIN — ALBUTEROL SULFATE 2 PUFF: 90 AEROSOL, METERED RESPIRATORY (INHALATION) at 09:18

## 2019-02-18 RX ADMIN — LEVOTHYROXINE SODIUM 50 MCG: 50 TABLET ORAL at 06:16

## 2019-02-18 RX ADMIN — HYDROXYZINE HYDROCHLORIDE 50 MG: 50 TABLET, FILM COATED ORAL at 21:10

## 2019-02-18 RX ADMIN — FAMOTIDINE 20 MG: 20 TABLET ORAL at 09:15

## 2019-02-18 RX ADMIN — ASPIRIN 81 MG: 81 TABLET, COATED ORAL at 09:15

## 2019-02-18 RX ADMIN — METOPROLOL TARTRATE 25 MG: 25 TABLET ORAL at 21:11

## 2019-02-18 RX ADMIN — BUTALBITAL, ACETAMINOPHEN AND CAFFEINE 1 TABLET: 50; 325; 40 TABLET ORAL at 21:11

## 2019-02-18 RX ADMIN — TIZANIDINE 2 MG: 4 TABLET ORAL at 21:10

## 2019-02-18 RX ADMIN — LEVOFLOXACIN 750 MG: 750 TABLET, FILM COATED ORAL at 21:10

## 2019-02-18 RX ADMIN — Medication 1 LOZENGE: at 06:16

## 2019-02-18 RX ADMIN — PHENYTOIN SODIUM 100 MG: 100 CAPSULE ORAL at 09:15

## 2019-02-18 RX ADMIN — PHENYTOIN SODIUM 100 MG: 100 CAPSULE ORAL at 21:10

## 2019-02-18 RX ADMIN — DIVALPROEX SODIUM 750 MG: 500 TABLET, DELAYED RELEASE ORAL at 09:15

## 2019-02-18 RX ADMIN — TIZANIDINE 2 MG: 4 TABLET ORAL at 09:15

## 2019-02-18 RX ADMIN — PHENYTOIN SODIUM 100 MG: 100 CAPSULE ORAL at 14:00

## 2019-02-18 RX ADMIN — FAMOTIDINE 20 MG: 20 TABLET ORAL at 21:10

## 2019-02-18 RX ADMIN — METOPROLOL TARTRATE 25 MG: 25 TABLET ORAL at 09:15

## 2019-02-18 RX ADMIN — TRAZODONE HYDROCHLORIDE 100 MG: 100 TABLET ORAL at 21:10

## 2019-02-18 RX ADMIN — ACETAMINOPHEN 650 MG: 325 TABLET, FILM COATED ORAL at 09:14

## 2019-02-18 RX ADMIN — BUSPIRONE HYDROCHLORIDE 5 MG: 5 TABLET ORAL at 14:00

## 2019-02-18 RX ADMIN — ACETAMINOPHEN 650 MG: 325 TABLET, FILM COATED ORAL at 15:14

## 2019-02-18 RX ADMIN — GUAIFENESIN 200 MG: 200 SOLUTION ORAL at 06:16

## 2019-02-18 RX ADMIN — BUSPIRONE HYDROCHLORIDE 5 MG: 5 TABLET ORAL at 21:10

## 2019-02-18 RX ADMIN — Medication 1 LOZENGE: at 09:18

## 2019-02-18 RX ADMIN — MIRTAZAPINE 45 MG: 30 TABLET, FILM COATED ORAL at 21:10

## 2019-02-18 RX ADMIN — ALUMINUM HYDROXIDE, MAGNESIUM HYDROXIDE, AND SIMETHICONE 30 ML: 200; 200; 20 SUSPENSION ORAL at 19:27

## 2019-02-18 ASSESSMENT — PAIN SCALES - GENERAL
PAINLEVEL_OUTOF10: 3
PAINLEVEL_OUTOF10: 3
PAINLEVEL_OUTOF10: 1
PAINLEVEL_OUTOF10: 7
PAINLEVEL_OUTOF10: 0
PAINLEVEL_OUTOF10: 0

## 2019-02-19 LAB
ALBUMIN SERPL-MCNC: 3.7 G/DL (ref 3.5–5.2)
ALBUMIN/GLOBULIN RATIO: ABNORMAL (ref 1–2.5)
ALP BLD-CCNC: 132 U/L (ref 35–104)
ALT SERPL-CCNC: 12 U/L (ref 5–33)
ANION GAP SERPL CALCULATED.3IONS-SCNC: 12 MMOL/L (ref 9–17)
AST SERPL-CCNC: 13 U/L
BILIRUB SERPL-MCNC: <0.15 MG/DL (ref 0.3–1.2)
BUN BLDV-MCNC: 10 MG/DL (ref 6–20)
BUN/CREAT BLD: ABNORMAL (ref 9–20)
CALCIUM SERPL-MCNC: 9.3 MG/DL (ref 8.6–10.4)
CHLORIDE BLD-SCNC: 96 MMOL/L (ref 98–107)
CHOLESTEROL/HDL RATIO: 3.4
CHOLESTEROL: 191 MG/DL
CO2: 25 MMOL/L (ref 20–31)
CREAT SERPL-MCNC: 0.44 MG/DL (ref 0.5–0.9)
ESTIMATED AVERAGE GLUCOSE: 103 MG/DL
GFR AFRICAN AMERICAN: >60 ML/MIN
GFR NON-AFRICAN AMERICAN: >60 ML/MIN
GFR SERPL CREATININE-BSD FRML MDRD: ABNORMAL ML/MIN/{1.73_M2}
GFR SERPL CREATININE-BSD FRML MDRD: ABNORMAL ML/MIN/{1.73_M2}
GLUCOSE BLD-MCNC: 118 MG/DL (ref 70–99)
HBA1C MFR BLD: 5.2 % (ref 4–6)
HDLC SERPL-MCNC: 56 MG/DL
LDL CHOLESTEROL: 105 MG/DL (ref 0–130)
POTASSIUM SERPL-SCNC: 4.4 MMOL/L (ref 3.7–5.3)
SODIUM BLD-SCNC: 133 MMOL/L (ref 135–144)
THYROXINE, FREE: 1.06 NG/DL (ref 0.93–1.7)
TOTAL PROTEIN: 7.2 G/DL (ref 6.4–8.3)
TRIGL SERPL-MCNC: 150 MG/DL
TSH SERPL DL<=0.05 MIU/L-ACNC: 0.96 MIU/L (ref 0.3–5)
VLDLC SERPL CALC-MCNC: ABNORMAL MG/DL (ref 1–30)

## 2019-02-19 PROCEDURE — 6370000000 HC RX 637 (ALT 250 FOR IP): Performed by: NURSE PRACTITIONER

## 2019-02-19 PROCEDURE — 99232 SBSQ HOSP IP/OBS MODERATE 35: CPT | Performed by: INTERNAL MEDICINE

## 2019-02-19 PROCEDURE — 80053 COMPREHEN METABOLIC PANEL: CPT

## 2019-02-19 PROCEDURE — 6370000000 HC RX 637 (ALT 250 FOR IP): Performed by: INTERNAL MEDICINE

## 2019-02-19 PROCEDURE — 80061 LIPID PANEL: CPT

## 2019-02-19 PROCEDURE — 84439 ASSAY OF FREE THYROXINE: CPT

## 2019-02-19 PROCEDURE — 99232 SBSQ HOSP IP/OBS MODERATE 35: CPT | Performed by: NURSE PRACTITIONER

## 2019-02-19 PROCEDURE — 84443 ASSAY THYROID STIM HORMONE: CPT

## 2019-02-19 PROCEDURE — 1240000000 HC EMOTIONAL WELLNESS R&B

## 2019-02-19 PROCEDURE — 36415 COLL VENOUS BLD VENIPUNCTURE: CPT

## 2019-02-19 PROCEDURE — 83036 HEMOGLOBIN GLYCOSYLATED A1C: CPT

## 2019-02-19 RX ORDER — IPRATROPIUM BROMIDE AND ALBUTEROL SULFATE 2.5; .5 MG/3ML; MG/3ML
1 SOLUTION RESPIRATORY (INHALATION) EVERY 4 HOURS PRN
Status: DISCONTINUED | OUTPATIENT
Start: 2019-02-19 | End: 2019-03-01 | Stop reason: HOSPADM

## 2019-02-19 RX ORDER — AMMONIUM LACTATE 12 G/100G
LOTION TOPICAL PRN
Status: DISCONTINUED | OUTPATIENT
Start: 2019-02-19 | End: 2019-03-01 | Stop reason: HOSPADM

## 2019-02-19 RX ORDER — AMMONIUM LACTATE 12 G/100G
LOTION TOPICAL PRN
Status: DISCONTINUED | OUTPATIENT
Start: 2019-02-19 | End: 2019-02-19 | Stop reason: SDUPTHER

## 2019-02-19 RX ORDER — METOPROLOL TARTRATE 50 MG/1
50 TABLET, FILM COATED ORAL 2 TIMES DAILY
Status: DISCONTINUED | OUTPATIENT
Start: 2019-02-19 | End: 2019-03-01 | Stop reason: HOSPADM

## 2019-02-19 RX ADMIN — BUSPIRONE HYDROCHLORIDE 5 MG: 5 TABLET ORAL at 09:12

## 2019-02-19 RX ADMIN — CLOPIDOGREL BISULFATE 75 MG: 75 TABLET ORAL at 09:13

## 2019-02-19 RX ADMIN — BUTALBITAL, ACETAMINOPHEN AND CAFFEINE 1 TABLET: 50; 325; 40 TABLET ORAL at 06:31

## 2019-02-19 RX ADMIN — ALUMINUM HYDROXIDE, MAGNESIUM HYDROXIDE, AND SIMETHICONE 30 ML: 200; 200; 20 SUSPENSION ORAL at 13:39

## 2019-02-19 RX ADMIN — Medication 1 LOZENGE: at 09:33

## 2019-02-19 RX ADMIN — ACETAMINOPHEN 650 MG: 325 TABLET, FILM COATED ORAL at 13:39

## 2019-02-19 RX ADMIN — DIVALPROEX SODIUM 750 MG: 500 TABLET, DELAYED RELEASE ORAL at 21:12

## 2019-02-19 RX ADMIN — PHENYTOIN SODIUM 100 MG: 100 CAPSULE ORAL at 09:12

## 2019-02-19 RX ADMIN — TRAZODONE HYDROCHLORIDE 100 MG: 100 TABLET ORAL at 21:14

## 2019-02-19 RX ADMIN — IPRATROPIUM BROMIDE AND ALBUTEROL SULFATE 1 AMPULE: .5; 3 SOLUTION RESPIRATORY (INHALATION) at 08:29

## 2019-02-19 RX ADMIN — BUTALBITAL, ACETAMINOPHEN AND CAFFEINE 1 TABLET: 50; 325; 40 TABLET ORAL at 19:43

## 2019-02-19 RX ADMIN — BUSPIRONE HYDROCHLORIDE 5 MG: 5 TABLET ORAL at 21:11

## 2019-02-19 RX ADMIN — HYDROXYZINE HYDROCHLORIDE 50 MG: 50 TABLET, FILM COATED ORAL at 21:12

## 2019-02-19 RX ADMIN — DIVALPROEX SODIUM 750 MG: 500 TABLET, DELAYED RELEASE ORAL at 09:12

## 2019-02-19 RX ADMIN — METOPROLOL TARTRATE 50 MG: 50 TABLET ORAL at 21:12

## 2019-02-19 RX ADMIN — METOPROLOL TARTRATE 25 MG: 25 TABLET ORAL at 09:12

## 2019-02-19 RX ADMIN — MIRTAZAPINE 45 MG: 30 TABLET, FILM COATED ORAL at 21:11

## 2019-02-19 RX ADMIN — ACETAMINOPHEN 650 MG: 325 TABLET, FILM COATED ORAL at 09:15

## 2019-02-19 RX ADMIN — PHENYTOIN SODIUM 100 MG: 100 CAPSULE ORAL at 21:12

## 2019-02-19 RX ADMIN — LEVOTHYROXINE SODIUM 50 MCG: 50 TABLET ORAL at 06:34

## 2019-02-19 RX ADMIN — BUTALBITAL, ACETAMINOPHEN AND CAFFEINE 1 TABLET: 50; 325; 40 TABLET ORAL at 14:28

## 2019-02-19 RX ADMIN — LEVOFLOXACIN 750 MG: 750 TABLET, FILM COATED ORAL at 09:15

## 2019-02-19 RX ADMIN — TIZANIDINE 2 MG: 4 TABLET ORAL at 09:13

## 2019-02-19 RX ADMIN — RISPERIDONE 1 MG: 1 TABLET ORAL at 21:11

## 2019-02-19 RX ADMIN — Medication 1 LOZENGE: at 19:44

## 2019-02-19 RX ADMIN — FAMOTIDINE 20 MG: 20 TABLET ORAL at 07:40

## 2019-02-19 RX ADMIN — GUAIFENESIN 200 MG: 200 SOLUTION ORAL at 09:33

## 2019-02-19 RX ADMIN — GUAIFENESIN 200 MG: 200 SOLUTION ORAL at 19:44

## 2019-02-19 RX ADMIN — BUSPIRONE HYDROCHLORIDE 5 MG: 5 TABLET ORAL at 14:28

## 2019-02-19 RX ADMIN — BUTALBITAL, ACETAMINOPHEN AND CAFFEINE 1 TABLET: 50; 325; 40 TABLET ORAL at 10:22

## 2019-02-19 RX ADMIN — ASPIRIN 81 MG: 81 TABLET, COATED ORAL at 09:12

## 2019-02-19 RX ADMIN — FAMOTIDINE 20 MG: 20 TABLET ORAL at 21:12

## 2019-02-19 RX ADMIN — PHENYTOIN SODIUM 100 MG: 100 CAPSULE ORAL at 14:28

## 2019-02-19 ASSESSMENT — PAIN SCALES - GENERAL
PAINLEVEL_OUTOF10: 3
PAINLEVEL_OUTOF10: 7
PAINLEVEL_OUTOF10: 2
PAINLEVEL_OUTOF10: 4
PAINLEVEL_OUTOF10: 7
PAINLEVEL_OUTOF10: 6
PAINLEVEL_OUTOF10: 9
PAINLEVEL_OUTOF10: 0
PAINLEVEL_OUTOF10: 2
PAINLEVEL_OUTOF10: 3
PAINLEVEL_OUTOF10: 1

## 2019-02-19 ASSESSMENT — PAIN DESCRIPTION - PAIN TYPE: TYPE: CHRONIC PAIN

## 2019-02-19 ASSESSMENT — PAIN DESCRIPTION - ONSET: ONSET: ON-GOING

## 2019-02-19 ASSESSMENT — PAIN DESCRIPTION - LOCATION: LOCATION: HEAD

## 2019-02-19 ASSESSMENT — PAIN DESCRIPTION - DESCRIPTORS: DESCRIPTORS: ACHING;HEADACHE

## 2019-02-19 ASSESSMENT — PAIN DESCRIPTION - FREQUENCY: FREQUENCY: CONTINUOUS

## 2019-02-20 PROCEDURE — 6370000000 HC RX 637 (ALT 250 FOR IP): Performed by: INTERNAL MEDICINE

## 2019-02-20 PROCEDURE — 6370000000 HC RX 637 (ALT 250 FOR IP): Performed by: NURSE PRACTITIONER

## 2019-02-20 PROCEDURE — 99232 SBSQ HOSP IP/OBS MODERATE 35: CPT | Performed by: NURSE PRACTITIONER

## 2019-02-20 PROCEDURE — 1240000000 HC EMOTIONAL WELLNESS R&B

## 2019-02-20 PROCEDURE — 99232 SBSQ HOSP IP/OBS MODERATE 35: CPT | Performed by: INTERNAL MEDICINE

## 2019-02-20 RX ORDER — BUSPIRONE HYDROCHLORIDE 10 MG/1
10 TABLET ORAL 3 TIMES DAILY
Status: DISCONTINUED | OUTPATIENT
Start: 2019-02-20 | End: 2019-02-27

## 2019-02-20 RX ORDER — HYDROCHLOROTHIAZIDE 25 MG/1
25 TABLET ORAL DAILY
Status: DISCONTINUED | OUTPATIENT
Start: 2019-02-21 | End: 2019-03-01 | Stop reason: HOSPADM

## 2019-02-20 RX ADMIN — DIVALPROEX SODIUM 750 MG: 500 TABLET, DELAYED RELEASE ORAL at 22:27

## 2019-02-20 RX ADMIN — FAMOTIDINE 20 MG: 20 TABLET ORAL at 22:28

## 2019-02-20 RX ADMIN — LEVOTHYROXINE SODIUM 50 MCG: 50 TABLET ORAL at 06:50

## 2019-02-20 RX ADMIN — BUSPIRONE HYDROCHLORIDE 5 MG: 5 TABLET ORAL at 08:39

## 2019-02-20 RX ADMIN — LEVOFLOXACIN 750 MG: 750 TABLET, FILM COATED ORAL at 08:40

## 2019-02-20 RX ADMIN — FAMOTIDINE 20 MG: 20 TABLET ORAL at 08:39

## 2019-02-20 RX ADMIN — BUSPIRONE HYDROCHLORIDE 5 MG: 5 TABLET ORAL at 13:40

## 2019-02-20 RX ADMIN — METOPROLOL TARTRATE 50 MG: 50 TABLET ORAL at 08:40

## 2019-02-20 RX ADMIN — ASPIRIN 81 MG: 81 TABLET, COATED ORAL at 08:40

## 2019-02-20 RX ADMIN — METOPROLOL TARTRATE 50 MG: 50 TABLET ORAL at 22:27

## 2019-02-20 RX ADMIN — PHENYTOIN SODIUM 100 MG: 100 CAPSULE ORAL at 22:28

## 2019-02-20 RX ADMIN — GUAIFENESIN 200 MG: 200 SOLUTION ORAL at 15:30

## 2019-02-20 RX ADMIN — MIRTAZAPINE 45 MG: 30 TABLET, FILM COATED ORAL at 22:27

## 2019-02-20 RX ADMIN — GUAIFENESIN 200 MG: 200 SOLUTION ORAL at 10:48

## 2019-02-20 RX ADMIN — PHENYTOIN SODIUM 100 MG: 100 CAPSULE ORAL at 08:40

## 2019-02-20 RX ADMIN — DIVALPROEX SODIUM 750 MG: 500 TABLET, DELAYED RELEASE ORAL at 08:39

## 2019-02-20 RX ADMIN — RISPERIDONE 1 MG: 1 TABLET ORAL at 22:28

## 2019-02-20 RX ADMIN — TRAZODONE HYDROCHLORIDE 100 MG: 100 TABLET ORAL at 22:31

## 2019-02-20 RX ADMIN — PHENYTOIN SODIUM 100 MG: 100 CAPSULE ORAL at 13:40

## 2019-02-20 RX ADMIN — BUTALBITAL, ACETAMINOPHEN AND CAFFEINE 1 TABLET: 50; 325; 40 TABLET ORAL at 15:30

## 2019-02-20 RX ADMIN — ACETAMINOPHEN 650 MG: 325 TABLET, FILM COATED ORAL at 15:30

## 2019-02-20 RX ADMIN — BUTALBITAL, ACETAMINOPHEN AND CAFFEINE 1 TABLET: 50; 325; 40 TABLET ORAL at 19:31

## 2019-02-20 RX ADMIN — CLOPIDOGREL BISULFATE 75 MG: 75 TABLET ORAL at 08:40

## 2019-02-20 RX ADMIN — BUSPIRONE HYDROCHLORIDE 10 MG: 10 TABLET ORAL at 22:27

## 2019-02-20 RX ADMIN — GUAIFENESIN 200 MG: 200 SOLUTION ORAL at 19:31

## 2019-02-20 RX ADMIN — Medication: at 08:55

## 2019-02-20 RX ADMIN — Medication 1 LOZENGE: at 19:31

## 2019-02-20 RX ADMIN — BUTALBITAL, ACETAMINOPHEN AND CAFFEINE 1 TABLET: 50; 325; 40 TABLET ORAL at 06:50

## 2019-02-20 RX ADMIN — Medication 1 LOZENGE: at 08:55

## 2019-02-20 RX ADMIN — BUTALBITAL, ACETAMINOPHEN AND CAFFEINE 1 TABLET: 50; 325; 40 TABLET ORAL at 10:48

## 2019-02-20 ASSESSMENT — PAIN SCALES - GENERAL
PAINLEVEL_OUTOF10: 5
PAINLEVEL_OUTOF10: 0
PAINLEVEL_OUTOF10: 5
PAINLEVEL_OUTOF10: 0
PAINLEVEL_OUTOF10: 6
PAINLEVEL_OUTOF10: 0
PAINLEVEL_OUTOF10: 5
PAINLEVEL_OUTOF10: 5

## 2019-02-20 ASSESSMENT — PAIN DESCRIPTION - DESCRIPTORS: DESCRIPTORS: ACHING;HEADACHE

## 2019-02-20 ASSESSMENT — PAIN DESCRIPTION - PAIN TYPE: TYPE: CHRONIC PAIN

## 2019-02-21 LAB
CULTURE: NORMAL
CULTURE: NORMAL
Lab: NORMAL
Lab: NORMAL
SPECIMEN DESCRIPTION: NORMAL
SPECIMEN DESCRIPTION: NORMAL

## 2019-02-21 PROCEDURE — 6370000000 HC RX 637 (ALT 250 FOR IP): Performed by: NURSE PRACTITIONER

## 2019-02-21 PROCEDURE — 99231 SBSQ HOSP IP/OBS SF/LOW 25: CPT | Performed by: INTERNAL MEDICINE

## 2019-02-21 PROCEDURE — 6370000000 HC RX 637 (ALT 250 FOR IP): Performed by: INTERNAL MEDICINE

## 2019-02-21 PROCEDURE — 1240000000 HC EMOTIONAL WELLNESS R&B

## 2019-02-21 PROCEDURE — 99232 SBSQ HOSP IP/OBS MODERATE 35: CPT | Performed by: NURSE PRACTITIONER

## 2019-02-21 RX ADMIN — GUAIFENESIN 200 MG: 200 SOLUTION ORAL at 21:04

## 2019-02-21 RX ADMIN — HYDROXYZINE HYDROCHLORIDE 50 MG: 50 TABLET, FILM COATED ORAL at 20:57

## 2019-02-21 RX ADMIN — METOPROLOL TARTRATE 50 MG: 50 TABLET ORAL at 09:01

## 2019-02-21 RX ADMIN — PHENYTOIN SODIUM 100 MG: 100 CAPSULE ORAL at 09:02

## 2019-02-21 RX ADMIN — BUTALBITAL, ACETAMINOPHEN AND CAFFEINE 1 TABLET: 50; 325; 40 TABLET ORAL at 13:58

## 2019-02-21 RX ADMIN — DIVALPROEX SODIUM 750 MG: 500 TABLET, DELAYED RELEASE ORAL at 20:56

## 2019-02-21 RX ADMIN — DIVALPROEX SODIUM 750 MG: 500 TABLET, DELAYED RELEASE ORAL at 09:01

## 2019-02-21 RX ADMIN — RISPERIDONE 1 MG: 1 TABLET ORAL at 20:57

## 2019-02-21 RX ADMIN — CLOPIDOGREL BISULFATE 75 MG: 75 TABLET ORAL at 09:02

## 2019-02-21 RX ADMIN — Medication 1 LOZENGE: at 09:34

## 2019-02-21 RX ADMIN — Medication 1 LOZENGE: at 15:06

## 2019-02-21 RX ADMIN — LEVOTHYROXINE SODIUM 50 MCG: 50 TABLET ORAL at 06:20

## 2019-02-21 RX ADMIN — PHENYTOIN SODIUM 100 MG: 100 CAPSULE ORAL at 20:57

## 2019-02-21 RX ADMIN — Medication 1 LOZENGE: at 18:05

## 2019-02-21 RX ADMIN — BUTALBITAL, ACETAMINOPHEN AND CAFFEINE 1 TABLET: 50; 325; 40 TABLET ORAL at 09:02

## 2019-02-21 RX ADMIN — FAMOTIDINE 20 MG: 20 TABLET ORAL at 20:57

## 2019-02-21 RX ADMIN — METOPROLOL TARTRATE 50 MG: 50 TABLET ORAL at 20:57

## 2019-02-21 RX ADMIN — ASPIRIN 81 MG: 81 TABLET, COATED ORAL at 09:02

## 2019-02-21 RX ADMIN — HYDROCHLOROTHIAZIDE 25 MG: 25 TABLET ORAL at 09:02

## 2019-02-21 RX ADMIN — BUSPIRONE HYDROCHLORIDE 10 MG: 10 TABLET ORAL at 13:58

## 2019-02-21 RX ADMIN — GUAIFENESIN 200 MG: 200 SOLUTION ORAL at 15:06

## 2019-02-21 RX ADMIN — MIRTAZAPINE 45 MG: 30 TABLET, FILM COATED ORAL at 20:56

## 2019-02-21 RX ADMIN — TRAZODONE HYDROCHLORIDE 100 MG: 100 TABLET ORAL at 20:57

## 2019-02-21 RX ADMIN — TIZANIDINE 2 MG: 4 TABLET ORAL at 10:52

## 2019-02-21 RX ADMIN — TIZANIDINE 2 MG: 4 TABLET ORAL at 20:57

## 2019-02-21 RX ADMIN — BUTALBITAL, ACETAMINOPHEN AND CAFFEINE 1 TABLET: 50; 325; 40 TABLET ORAL at 19:33

## 2019-02-21 RX ADMIN — PHENYTOIN SODIUM 100 MG: 100 CAPSULE ORAL at 13:58

## 2019-02-21 RX ADMIN — Medication: at 20:57

## 2019-02-21 RX ADMIN — LEVOFLOXACIN 750 MG: 750 TABLET, FILM COATED ORAL at 09:11

## 2019-02-21 RX ADMIN — GUAIFENESIN 200 MG: 200 SOLUTION ORAL at 09:01

## 2019-02-21 RX ADMIN — BUSPIRONE HYDROCHLORIDE 10 MG: 10 TABLET ORAL at 09:02

## 2019-02-21 RX ADMIN — Medication 1 LOZENGE: at 20:56

## 2019-02-21 RX ADMIN — BUSPIRONE HYDROCHLORIDE 10 MG: 10 TABLET ORAL at 20:57

## 2019-02-21 RX ADMIN — ACETAMINOPHEN 650 MG: 325 TABLET, FILM COATED ORAL at 19:33

## 2019-02-21 RX ADMIN — FAMOTIDINE 20 MG: 20 TABLET ORAL at 09:02

## 2019-02-21 ASSESSMENT — PAIN DESCRIPTION - LOCATION
LOCATION: GENERALIZED
LOCATION: GENERALIZED

## 2019-02-21 ASSESSMENT — PAIN SCALES - GENERAL
PAINLEVEL_OUTOF10: 5
PAINLEVEL_OUTOF10: 6
PAINLEVEL_OUTOF10: 3

## 2019-02-21 ASSESSMENT — PAIN DESCRIPTION - PAIN TYPE
TYPE: ACUTE PAIN
TYPE: ACUTE PAIN

## 2019-02-22 PROCEDURE — 99231 SBSQ HOSP IP/OBS SF/LOW 25: CPT | Performed by: INTERNAL MEDICINE

## 2019-02-22 PROCEDURE — 6370000000 HC RX 637 (ALT 250 FOR IP): Performed by: INTERNAL MEDICINE

## 2019-02-22 PROCEDURE — 6370000000 HC RX 637 (ALT 250 FOR IP): Performed by: NURSE PRACTITIONER

## 2019-02-22 PROCEDURE — 1240000000 HC EMOTIONAL WELLNESS R&B

## 2019-02-22 PROCEDURE — 99232 SBSQ HOSP IP/OBS MODERATE 35: CPT | Performed by: PSYCHIATRY & NEUROLOGY

## 2019-02-22 RX ADMIN — ACETAMINOPHEN 650 MG: 325 TABLET, FILM COATED ORAL at 19:02

## 2019-02-22 RX ADMIN — Medication 1 LOZENGE: at 19:02

## 2019-02-22 RX ADMIN — CLOPIDOGREL BISULFATE 75 MG: 75 TABLET ORAL at 09:55

## 2019-02-22 RX ADMIN — BUTALBITAL, ACETAMINOPHEN AND CAFFEINE 1 TABLET: 50; 325; 40 TABLET ORAL at 09:55

## 2019-02-22 RX ADMIN — DIVALPROEX SODIUM 750 MG: 500 TABLET, DELAYED RELEASE ORAL at 09:54

## 2019-02-22 RX ADMIN — FAMOTIDINE 20 MG: 20 TABLET ORAL at 09:55

## 2019-02-22 RX ADMIN — PHENYTOIN SODIUM 100 MG: 100 CAPSULE ORAL at 15:23

## 2019-02-22 RX ADMIN — PHENYTOIN SODIUM 100 MG: 100 CAPSULE ORAL at 09:54

## 2019-02-22 RX ADMIN — BUSPIRONE HYDROCHLORIDE 10 MG: 10 TABLET ORAL at 21:33

## 2019-02-22 RX ADMIN — TIZANIDINE 2 MG: 4 TABLET ORAL at 09:56

## 2019-02-22 RX ADMIN — BUSPIRONE HYDROCHLORIDE 10 MG: 10 TABLET ORAL at 15:33

## 2019-02-22 RX ADMIN — METOPROLOL TARTRATE 50 MG: 50 TABLET ORAL at 21:34

## 2019-02-22 RX ADMIN — Medication 1 LOZENGE: at 15:24

## 2019-02-22 RX ADMIN — HYDROXYZINE HYDROCHLORIDE 50 MG: 50 TABLET, FILM COATED ORAL at 21:33

## 2019-02-22 RX ADMIN — PHENYTOIN SODIUM 100 MG: 100 CAPSULE ORAL at 21:34

## 2019-02-22 RX ADMIN — LEVOFLOXACIN 750 MG: 750 TABLET, FILM COATED ORAL at 12:05

## 2019-02-22 RX ADMIN — HYDROXYZINE HYDROCHLORIDE 50 MG: 50 TABLET, FILM COATED ORAL at 09:55

## 2019-02-22 RX ADMIN — BUTALBITAL, ACETAMINOPHEN AND CAFFEINE 1 TABLET: 50; 325; 40 TABLET ORAL at 15:25

## 2019-02-22 RX ADMIN — ALBUTEROL SULFATE 2 PUFF: 90 AEROSOL, METERED RESPIRATORY (INHALATION) at 21:32

## 2019-02-22 RX ADMIN — DIVALPROEX SODIUM 750 MG: 500 TABLET, DELAYED RELEASE ORAL at 21:33

## 2019-02-22 RX ADMIN — MIRTAZAPINE 45 MG: 30 TABLET, FILM COATED ORAL at 21:33

## 2019-02-22 RX ADMIN — FAMOTIDINE 20 MG: 20 TABLET ORAL at 21:33

## 2019-02-22 RX ADMIN — ACETAMINOPHEN 650 MG: 325 TABLET, FILM COATED ORAL at 09:55

## 2019-02-22 RX ADMIN — TRAZODONE HYDROCHLORIDE 100 MG: 100 TABLET ORAL at 21:34

## 2019-02-22 RX ADMIN — TIZANIDINE 2 MG: 4 TABLET ORAL at 21:34

## 2019-02-22 RX ADMIN — ASPIRIN 81 MG: 81 TABLET, COATED ORAL at 09:57

## 2019-02-22 RX ADMIN — Medication 1 LOZENGE: at 21:36

## 2019-02-22 RX ADMIN — LEVOTHYROXINE SODIUM 50 MCG: 50 TABLET ORAL at 05:53

## 2019-02-22 RX ADMIN — HYDROCHLOROTHIAZIDE 25 MG: 25 TABLET ORAL at 09:55

## 2019-02-22 RX ADMIN — BUSPIRONE HYDROCHLORIDE 10 MG: 10 TABLET ORAL at 09:55

## 2019-02-22 RX ADMIN — GUAIFENESIN 200 MG: 200 SOLUTION ORAL at 09:54

## 2019-02-22 RX ADMIN — BUTALBITAL, ACETAMINOPHEN AND CAFFEINE 1 TABLET: 50; 325; 40 TABLET ORAL at 19:52

## 2019-02-22 RX ADMIN — RISPERIDONE 1 MG: 1 TABLET ORAL at 21:33

## 2019-02-22 RX ADMIN — Medication 1 LOZENGE: at 12:06

## 2019-02-22 RX ADMIN — GUAIFENESIN 200 MG: 200 SOLUTION ORAL at 19:02

## 2019-02-22 ASSESSMENT — PAIN SCALES - GENERAL
PAINLEVEL_OUTOF10: 2
PAINLEVEL_OUTOF10: 0
PAINLEVEL_OUTOF10: 0
PAINLEVEL_OUTOF10: 2
PAINLEVEL_OUTOF10: 1
PAINLEVEL_OUTOF10: 0
PAINLEVEL_OUTOF10: 2
PAINLEVEL_OUTOF10: 6

## 2019-02-23 PROCEDURE — 6370000000 HC RX 637 (ALT 250 FOR IP): Performed by: INTERNAL MEDICINE

## 2019-02-23 PROCEDURE — 6370000000 HC RX 637 (ALT 250 FOR IP): Performed by: NURSE PRACTITIONER

## 2019-02-23 PROCEDURE — 99232 SBSQ HOSP IP/OBS MODERATE 35: CPT | Performed by: PSYCHIATRY & NEUROLOGY

## 2019-02-23 PROCEDURE — 1240000000 HC EMOTIONAL WELLNESS R&B

## 2019-02-23 RX ADMIN — DIVALPROEX SODIUM 750 MG: 500 TABLET, DELAYED RELEASE ORAL at 08:46

## 2019-02-23 RX ADMIN — CLOPIDOGREL BISULFATE 75 MG: 75 TABLET ORAL at 08:46

## 2019-02-23 RX ADMIN — GUAIFENESIN 200 MG: 200 SOLUTION ORAL at 09:38

## 2019-02-23 RX ADMIN — BUTALBITAL, ACETAMINOPHEN AND CAFFEINE 1 TABLET: 50; 325; 40 TABLET ORAL at 10:48

## 2019-02-23 RX ADMIN — PHENYTOIN SODIUM 100 MG: 100 CAPSULE ORAL at 08:46

## 2019-02-23 RX ADMIN — FAMOTIDINE 20 MG: 20 TABLET ORAL at 20:40

## 2019-02-23 RX ADMIN — LEVOTHYROXINE SODIUM 50 MCG: 50 TABLET ORAL at 05:47

## 2019-02-23 RX ADMIN — Medication 1 LOZENGE: at 09:38

## 2019-02-23 RX ADMIN — BUTALBITAL, ACETAMINOPHEN AND CAFFEINE 1 TABLET: 50; 325; 40 TABLET ORAL at 14:55

## 2019-02-23 RX ADMIN — ASPIRIN 81 MG: 81 TABLET, COATED ORAL at 08:46

## 2019-02-23 RX ADMIN — METOPROLOL TARTRATE 50 MG: 50 TABLET ORAL at 08:46

## 2019-02-23 RX ADMIN — ACETAMINOPHEN 650 MG: 325 TABLET, FILM COATED ORAL at 10:48

## 2019-02-23 RX ADMIN — ACETAMINOPHEN 650 MG: 325 TABLET, FILM COATED ORAL at 05:47

## 2019-02-23 RX ADMIN — PHENYTOIN SODIUM 100 MG: 100 CAPSULE ORAL at 14:39

## 2019-02-23 RX ADMIN — RISPERIDONE 1 MG: 1 TABLET ORAL at 20:40

## 2019-02-23 RX ADMIN — TIZANIDINE 2 MG: 4 TABLET ORAL at 11:32

## 2019-02-23 RX ADMIN — ALUMINUM HYDROXIDE, MAGNESIUM HYDROXIDE, AND SIMETHICONE 30 ML: 200; 200; 20 SUSPENSION ORAL at 14:38

## 2019-02-23 RX ADMIN — BUTALBITAL, ACETAMINOPHEN AND CAFFEINE 1 TABLET: 50; 325; 40 TABLET ORAL at 05:46

## 2019-02-23 RX ADMIN — FAMOTIDINE 20 MG: 20 TABLET ORAL at 08:46

## 2019-02-23 RX ADMIN — GUAIFENESIN 200 MG: 200 SOLUTION ORAL at 21:13

## 2019-02-23 RX ADMIN — BUSPIRONE HYDROCHLORIDE 10 MG: 10 TABLET ORAL at 08:46

## 2019-02-23 RX ADMIN — Medication 1 LOZENGE: at 21:13

## 2019-02-23 RX ADMIN — METOPROLOL TARTRATE 50 MG: 50 TABLET ORAL at 20:40

## 2019-02-23 RX ADMIN — MIRTAZAPINE 45 MG: 30 TABLET, FILM COATED ORAL at 20:40

## 2019-02-23 RX ADMIN — HYDROCHLOROTHIAZIDE 25 MG: 25 TABLET ORAL at 08:46

## 2019-02-23 RX ADMIN — BUTALBITAL, ACETAMINOPHEN AND CAFFEINE 1 TABLET: 50; 325; 40 TABLET ORAL at 20:40

## 2019-02-23 RX ADMIN — DIVALPROEX SODIUM 750 MG: 500 TABLET, DELAYED RELEASE ORAL at 20:40

## 2019-02-23 RX ADMIN — PHENYTOIN SODIUM 100 MG: 100 CAPSULE ORAL at 20:40

## 2019-02-23 RX ADMIN — BUSPIRONE HYDROCHLORIDE 10 MG: 10 TABLET ORAL at 20:40

## 2019-02-23 RX ADMIN — BUSPIRONE HYDROCHLORIDE 10 MG: 10 TABLET ORAL at 14:39

## 2019-02-23 RX ADMIN — LEVOFLOXACIN 750 MG: 750 TABLET, FILM COATED ORAL at 08:46

## 2019-02-23 ASSESSMENT — PAIN SCALES - GENERAL
PAINLEVEL_OUTOF10: 5
PAINLEVEL_OUTOF10: 5
PAINLEVEL_OUTOF10: 0
PAINLEVEL_OUTOF10: 2
PAINLEVEL_OUTOF10: 5
PAINLEVEL_OUTOF10: 0
PAINLEVEL_OUTOF10: 3
PAINLEVEL_OUTOF10: 0

## 2019-02-23 ASSESSMENT — PAIN DESCRIPTION - PAIN TYPE: TYPE: CHRONIC PAIN

## 2019-02-23 ASSESSMENT — PAIN DESCRIPTION - LOCATION: LOCATION: GENERALIZED

## 2019-02-24 PROCEDURE — 6370000000 HC RX 637 (ALT 250 FOR IP): Performed by: NURSE PRACTITIONER

## 2019-02-24 PROCEDURE — 6370000000 HC RX 637 (ALT 250 FOR IP): Performed by: INTERNAL MEDICINE

## 2019-02-24 PROCEDURE — 99231 SBSQ HOSP IP/OBS SF/LOW 25: CPT | Performed by: INTERNAL MEDICINE

## 2019-02-24 PROCEDURE — 1240000000 HC EMOTIONAL WELLNESS R&B

## 2019-02-24 PROCEDURE — 99232 SBSQ HOSP IP/OBS MODERATE 35: CPT | Performed by: PSYCHIATRY & NEUROLOGY

## 2019-02-24 RX ORDER — HYDROCORTISONE 0.5 %
CREAM (GRAM) TOPICAL 3 TIMES DAILY PRN
Status: DISCONTINUED | OUTPATIENT
Start: 2019-02-24 | End: 2019-03-01 | Stop reason: HOSPADM

## 2019-02-24 RX ORDER — CETIRIZINE HYDROCHLORIDE 10 MG/1
5 TABLET ORAL DAILY
Status: DISCONTINUED | OUTPATIENT
Start: 2019-02-24 | End: 2019-03-01 | Stop reason: HOSPADM

## 2019-02-24 RX ORDER — FLUTICASONE PROPIONATE 50 MCG
1 SPRAY, SUSPENSION (ML) NASAL DAILY
Status: DISCONTINUED | OUTPATIENT
Start: 2019-02-24 | End: 2019-03-01 | Stop reason: HOSPADM

## 2019-02-24 RX ADMIN — Medication 1 LOZENGE: at 18:21

## 2019-02-24 RX ADMIN — CLOPIDOGREL BISULFATE 75 MG: 75 TABLET ORAL at 09:03

## 2019-02-24 RX ADMIN — METOPROLOL TARTRATE 50 MG: 50 TABLET ORAL at 09:02

## 2019-02-24 RX ADMIN — PHENYTOIN SODIUM 100 MG: 100 CAPSULE ORAL at 14:44

## 2019-02-24 RX ADMIN — PHENYTOIN SODIUM 100 MG: 100 CAPSULE ORAL at 09:02

## 2019-02-24 RX ADMIN — MIRTAZAPINE 45 MG: 30 TABLET, FILM COATED ORAL at 21:48

## 2019-02-24 RX ADMIN — ALUMINUM HYDROXIDE, MAGNESIUM HYDROXIDE, AND SIMETHICONE 30 ML: 200; 200; 20 SUSPENSION ORAL at 14:44

## 2019-02-24 RX ADMIN — LEVOFLOXACIN 750 MG: 750 TABLET, FILM COATED ORAL at 09:05

## 2019-02-24 RX ADMIN — GUAIFENESIN 200 MG: 200 SOLUTION ORAL at 18:20

## 2019-02-24 RX ADMIN — LEVOTHYROXINE SODIUM 50 MCG: 50 TABLET ORAL at 06:11

## 2019-02-24 RX ADMIN — CETIRIZINE HYDROCHLORIDE 5 MG: 10 TABLET, FILM COATED ORAL at 17:47

## 2019-02-24 RX ADMIN — HYDROXYZINE HYDROCHLORIDE 50 MG: 50 TABLET, FILM COATED ORAL at 09:03

## 2019-02-24 RX ADMIN — BUSPIRONE HYDROCHLORIDE 10 MG: 10 TABLET ORAL at 14:44

## 2019-02-24 RX ADMIN — BUSPIRONE HYDROCHLORIDE 10 MG: 10 TABLET ORAL at 21:48

## 2019-02-24 RX ADMIN — BUSPIRONE HYDROCHLORIDE 10 MG: 10 TABLET ORAL at 09:03

## 2019-02-24 RX ADMIN — FAMOTIDINE 20 MG: 20 TABLET ORAL at 09:02

## 2019-02-24 RX ADMIN — Medication 1 LOZENGE: at 14:45

## 2019-02-24 RX ADMIN — Medication 1 LOZENGE: at 09:05

## 2019-02-24 RX ADMIN — FAMOTIDINE 20 MG: 20 TABLET ORAL at 21:49

## 2019-02-24 RX ADMIN — TRAZODONE HYDROCHLORIDE 100 MG: 100 TABLET ORAL at 21:48

## 2019-02-24 RX ADMIN — BUTALBITAL, ACETAMINOPHEN AND CAFFEINE 1 TABLET: 50; 325; 40 TABLET ORAL at 18:20

## 2019-02-24 RX ADMIN — FLUTICASONE PROPIONATE 1 SPRAY: 50 SPRAY, METERED NASAL at 17:48

## 2019-02-24 RX ADMIN — GUAIFENESIN 200 MG: 200 SOLUTION ORAL at 09:05

## 2019-02-24 RX ADMIN — BUTALBITAL, ACETAMINOPHEN AND CAFFEINE 1 TABLET: 50; 325; 40 TABLET ORAL at 13:27

## 2019-02-24 RX ADMIN — RISPERIDONE 1 MG: 1 TABLET ORAL at 21:49

## 2019-02-24 RX ADMIN — METOPROLOL TARTRATE 50 MG: 50 TABLET ORAL at 21:49

## 2019-02-24 RX ADMIN — TIZANIDINE 2 MG: 4 TABLET ORAL at 09:02

## 2019-02-24 RX ADMIN — BUTALBITAL, ACETAMINOPHEN AND CAFFEINE 1 TABLET: 50; 325; 40 TABLET ORAL at 09:03

## 2019-02-24 RX ADMIN — DIVALPROEX SODIUM 750 MG: 500 TABLET, DELAYED RELEASE ORAL at 09:02

## 2019-02-24 RX ADMIN — DIVALPROEX SODIUM 750 MG: 500 TABLET, DELAYED RELEASE ORAL at 21:49

## 2019-02-24 RX ADMIN — PHENYTOIN SODIUM 100 MG: 100 CAPSULE ORAL at 21:49

## 2019-02-24 RX ADMIN — ASPIRIN 81 MG: 81 TABLET, COATED ORAL at 09:03

## 2019-02-24 RX ADMIN — HYDROCHLOROTHIAZIDE 25 MG: 25 TABLET ORAL at 09:03

## 2019-02-24 ASSESSMENT — PAIN SCALES - GENERAL
PAINLEVEL_OUTOF10: 5
PAINLEVEL_OUTOF10: 3
PAINLEVEL_OUTOF10: 4
PAINLEVEL_OUTOF10: 6
PAINLEVEL_OUTOF10: 6
PAINLEVEL_OUTOF10: 3

## 2019-02-24 ASSESSMENT — PAIN DESCRIPTION - LOCATION: LOCATION: BACK

## 2019-02-25 PROCEDURE — 6370000000 HC RX 637 (ALT 250 FOR IP): Performed by: INTERNAL MEDICINE

## 2019-02-25 PROCEDURE — 99232 SBSQ HOSP IP/OBS MODERATE 35: CPT | Performed by: NURSE PRACTITIONER

## 2019-02-25 PROCEDURE — 6370000000 HC RX 637 (ALT 250 FOR IP): Performed by: NURSE PRACTITIONER

## 2019-02-25 PROCEDURE — 6370000000 HC RX 637 (ALT 250 FOR IP): Performed by: PSYCHIATRY & NEUROLOGY

## 2019-02-25 PROCEDURE — 99231 SBSQ HOSP IP/OBS SF/LOW 25: CPT | Performed by: INTERNAL MEDICINE

## 2019-02-25 PROCEDURE — 1240000000 HC EMOTIONAL WELLNESS R&B

## 2019-02-25 RX ORDER — ONDANSETRON 4 MG/1
4 TABLET, ORALLY DISINTEGRATING ORAL EVERY 8 HOURS PRN
Status: DISCONTINUED | OUTPATIENT
Start: 2019-02-25 | End: 2019-03-01 | Stop reason: HOSPADM

## 2019-02-25 RX ORDER — NEOMYCIN SULFATE, POLYMYXIN B SULFATE AND HYDROCORTISONE 10; 3.5; 1 MG/ML; MG/ML; [USP'U]/ML
2 SUSPENSION/ DROPS AURICULAR (OTIC) 2 TIMES DAILY
Status: DISCONTINUED | OUTPATIENT
Start: 2019-02-25 | End: 2019-03-01 | Stop reason: HOSPADM

## 2019-02-25 RX ORDER — RISPERIDONE 2 MG/1
2 TABLET, FILM COATED ORAL NIGHTLY
Status: DISCONTINUED | OUTPATIENT
Start: 2019-02-25 | End: 2019-03-01 | Stop reason: HOSPADM

## 2019-02-25 RX ORDER — NEOMYCIN SULFATE, POLYMYXIN B SULFATE, HYDROCORTISONE 3.5; 10000; 1 MG/ML; [USP'U]/ML; MG/ML
2 SOLUTION/ DROPS AURICULAR (OTIC) 2 TIMES DAILY
Status: DISCONTINUED | OUTPATIENT
Start: 2019-02-25 | End: 2019-02-25 | Stop reason: CLARIF

## 2019-02-25 RX ADMIN — BUTALBITAL, ACETAMINOPHEN AND CAFFEINE 1 TABLET: 50; 325; 40 TABLET ORAL at 03:04

## 2019-02-25 RX ADMIN — BUTALBITAL, ACETAMINOPHEN AND CAFFEINE 1 TABLET: 50; 325; 40 TABLET ORAL at 08:54

## 2019-02-25 RX ADMIN — BUTALBITAL, ACETAMINOPHEN AND CAFFEINE 1 TABLET: 50; 325; 40 TABLET ORAL at 19:35

## 2019-02-25 RX ADMIN — CETIRIZINE HYDROCHLORIDE 5 MG: 10 TABLET, FILM COATED ORAL at 08:54

## 2019-02-25 RX ADMIN — PHENYTOIN SODIUM 100 MG: 100 CAPSULE ORAL at 21:51

## 2019-02-25 RX ADMIN — TRAZODONE HYDROCHLORIDE 100 MG: 100 TABLET ORAL at 21:53

## 2019-02-25 RX ADMIN — DIVALPROEX SODIUM 750 MG: 500 TABLET, DELAYED RELEASE ORAL at 21:52

## 2019-02-25 RX ADMIN — DIVALPROEX SODIUM 750 MG: 500 TABLET, DELAYED RELEASE ORAL at 08:53

## 2019-02-25 RX ADMIN — GUAIFENESIN 200 MG: 200 SOLUTION ORAL at 08:52

## 2019-02-25 RX ADMIN — BUSPIRONE HYDROCHLORIDE 10 MG: 10 TABLET ORAL at 14:52

## 2019-02-25 RX ADMIN — MIRTAZAPINE 45 MG: 30 TABLET, FILM COATED ORAL at 21:52

## 2019-02-25 RX ADMIN — CLOPIDOGREL BISULFATE 75 MG: 75 TABLET ORAL at 08:54

## 2019-02-25 RX ADMIN — FAMOTIDINE 20 MG: 20 TABLET ORAL at 22:02

## 2019-02-25 RX ADMIN — FLUTICASONE PROPIONATE 1 SPRAY: 50 SPRAY, METERED NASAL at 08:51

## 2019-02-25 RX ADMIN — FAMOTIDINE 20 MG: 20 TABLET ORAL at 08:54

## 2019-02-25 RX ADMIN — HYDROCHLOROTHIAZIDE 25 MG: 25 TABLET ORAL at 08:53

## 2019-02-25 RX ADMIN — LEVOTHYROXINE SODIUM 50 MCG: 50 TABLET ORAL at 06:29

## 2019-02-25 RX ADMIN — TIZANIDINE 2 MG: 4 TABLET ORAL at 14:52

## 2019-02-25 RX ADMIN — PHENYTOIN SODIUM 100 MG: 100 CAPSULE ORAL at 08:54

## 2019-02-25 RX ADMIN — Medication 1 LOZENGE: at 08:58

## 2019-02-25 RX ADMIN — NEOMYCIN SULFATE, POLYMYXIN B SULFATE AND HYDROCORTISONE 2 DROP: 10; 3.5; 1 SUSPENSION/ DROPS AURICULAR (OTIC) at 21:50

## 2019-02-25 RX ADMIN — RISPERIDONE 2 MG: 2 TABLET ORAL at 21:53

## 2019-02-25 RX ADMIN — Medication 1 LOZENGE: at 03:03

## 2019-02-25 RX ADMIN — BUSPIRONE HYDROCHLORIDE 10 MG: 10 TABLET ORAL at 21:50

## 2019-02-25 RX ADMIN — ASPIRIN 81 MG: 81 TABLET, COATED ORAL at 08:54

## 2019-02-25 RX ADMIN — PHENYTOIN SODIUM 100 MG: 100 CAPSULE ORAL at 14:53

## 2019-02-25 RX ADMIN — GUAIFENESIN 200 MG: 200 SOLUTION ORAL at 03:04

## 2019-02-25 RX ADMIN — BUSPIRONE HYDROCHLORIDE 10 MG: 10 TABLET ORAL at 08:54

## 2019-02-25 RX ADMIN — HYDROXYZINE HYDROCHLORIDE 50 MG: 50 TABLET, FILM COATED ORAL at 21:53

## 2019-02-25 RX ADMIN — Medication 1 LOZENGE: at 21:57

## 2019-02-25 RX ADMIN — ONDANSETRON 4 MG: 4 TABLET, ORALLY DISINTEGRATING ORAL at 19:35

## 2019-02-25 RX ADMIN — BUTALBITAL, ACETAMINOPHEN AND CAFFEINE 1 TABLET: 50; 325; 40 TABLET ORAL at 14:52

## 2019-02-25 ASSESSMENT — PAIN SCALES - GENERAL
PAINLEVEL_OUTOF10: 0
PAINLEVEL_OUTOF10: 5
PAINLEVEL_OUTOF10: 2
PAINLEVEL_OUTOF10: 5
PAINLEVEL_OUTOF10: 4
PAINLEVEL_OUTOF10: 7

## 2019-02-25 ASSESSMENT — PAIN DESCRIPTION - LOCATION
LOCATION: HEAD

## 2019-02-25 ASSESSMENT — PAIN DESCRIPTION - DESCRIPTORS: DESCRIPTORS: HEADACHE

## 2019-02-25 ASSESSMENT — PAIN DESCRIPTION - PAIN TYPE
TYPE: ACUTE PAIN

## 2019-02-26 PROCEDURE — 1240000000 HC EMOTIONAL WELLNESS R&B

## 2019-02-26 PROCEDURE — 6370000000 HC RX 637 (ALT 250 FOR IP): Performed by: NURSE PRACTITIONER

## 2019-02-26 PROCEDURE — 6370000000 HC RX 637 (ALT 250 FOR IP): Performed by: INTERNAL MEDICINE

## 2019-02-26 PROCEDURE — 99232 SBSQ HOSP IP/OBS MODERATE 35: CPT | Performed by: NURSE PRACTITIONER

## 2019-02-26 RX ADMIN — METOPROLOL TARTRATE 50 MG: 50 TABLET ORAL at 21:03

## 2019-02-26 RX ADMIN — BUTALBITAL, ACETAMINOPHEN AND CAFFEINE 1 TABLET: 50; 325; 40 TABLET ORAL at 18:54

## 2019-02-26 RX ADMIN — Medication 1 LOZENGE: at 14:40

## 2019-02-26 RX ADMIN — HYDROCHLOROTHIAZIDE 25 MG: 25 TABLET ORAL at 09:51

## 2019-02-26 RX ADMIN — RISPERIDONE 2 MG: 2 TABLET ORAL at 21:03

## 2019-02-26 RX ADMIN — TIZANIDINE 2 MG: 4 TABLET ORAL at 21:03

## 2019-02-26 RX ADMIN — ASPIRIN 81 MG: 81 TABLET, COATED ORAL at 09:51

## 2019-02-26 RX ADMIN — CETIRIZINE HYDROCHLORIDE 5 MG: 10 TABLET, FILM COATED ORAL at 09:50

## 2019-02-26 RX ADMIN — PHENYTOIN SODIUM 100 MG: 100 CAPSULE ORAL at 21:03

## 2019-02-26 RX ADMIN — TRAZODONE HYDROCHLORIDE 100 MG: 100 TABLET ORAL at 21:03

## 2019-02-26 RX ADMIN — TIZANIDINE 2 MG: 4 TABLET ORAL at 09:50

## 2019-02-26 RX ADMIN — BUSPIRONE HYDROCHLORIDE 10 MG: 10 TABLET ORAL at 21:03

## 2019-02-26 RX ADMIN — DIVALPROEX SODIUM 750 MG: 500 TABLET, DELAYED RELEASE ORAL at 21:03

## 2019-02-26 RX ADMIN — Medication: at 21:09

## 2019-02-26 RX ADMIN — PHENYTOIN SODIUM 100 MG: 100 CAPSULE ORAL at 14:39

## 2019-02-26 RX ADMIN — HYDROXYZINE HYDROCHLORIDE 50 MG: 50 TABLET, FILM COATED ORAL at 21:03

## 2019-02-26 RX ADMIN — BUTALBITAL, ACETAMINOPHEN AND CAFFEINE 1 TABLET: 50; 325; 40 TABLET ORAL at 14:40

## 2019-02-26 RX ADMIN — PHENYTOIN SODIUM 100 MG: 100 CAPSULE ORAL at 09:51

## 2019-02-26 RX ADMIN — FAMOTIDINE 20 MG: 20 TABLET ORAL at 09:51

## 2019-02-26 RX ADMIN — BUSPIRONE HYDROCHLORIDE 10 MG: 10 TABLET ORAL at 09:50

## 2019-02-26 RX ADMIN — LEVOTHYROXINE SODIUM 50 MCG: 50 TABLET ORAL at 06:46

## 2019-02-26 RX ADMIN — BUSPIRONE HYDROCHLORIDE 10 MG: 10 TABLET ORAL at 14:40

## 2019-02-26 RX ADMIN — FAMOTIDINE 20 MG: 20 TABLET ORAL at 21:03

## 2019-02-26 RX ADMIN — BUTALBITAL, ACETAMINOPHEN AND CAFFEINE 1 TABLET: 50; 325; 40 TABLET ORAL at 09:50

## 2019-02-26 RX ADMIN — ONDANSETRON 4 MG: 4 TABLET, ORALLY DISINTEGRATING ORAL at 09:50

## 2019-02-26 RX ADMIN — NEOMYCIN SULFATE, POLYMYXIN B SULFATE AND HYDROCORTISONE 2 DROP: 10; 3.5; 1 SUSPENSION/ DROPS AURICULAR (OTIC) at 09:54

## 2019-02-26 RX ADMIN — MENTHOL, METHYL SALICYLATE: 10; 15 CREAM TOPICAL at 21:01

## 2019-02-26 RX ADMIN — MIRTAZAPINE 45 MG: 30 TABLET, FILM COATED ORAL at 21:03

## 2019-02-26 RX ADMIN — GUAIFENESIN 200 MG: 200 SOLUTION ORAL at 09:50

## 2019-02-26 RX ADMIN — NEOMYCIN SULFATE, POLYMYXIN B SULFATE AND HYDROCORTISONE 2 DROP: 10; 3.5; 1 SUSPENSION/ DROPS AURICULAR (OTIC) at 21:00

## 2019-02-26 RX ADMIN — DIVALPROEX SODIUM 750 MG: 500 TABLET, DELAYED RELEASE ORAL at 09:51

## 2019-02-26 RX ADMIN — CLOPIDOGREL BISULFATE 75 MG: 75 TABLET ORAL at 09:50

## 2019-02-26 ASSESSMENT — PAIN SCALES - GENERAL
PAINLEVEL_OUTOF10: 0
PAINLEVEL_OUTOF10: 1
PAINLEVEL_OUTOF10: 7
PAINLEVEL_OUTOF10: 3
PAINLEVEL_OUTOF10: 4
PAINLEVEL_OUTOF10: 7

## 2019-02-27 PROCEDURE — 99232 SBSQ HOSP IP/OBS MODERATE 35: CPT | Performed by: NURSE PRACTITIONER

## 2019-02-27 PROCEDURE — 6370000000 HC RX 637 (ALT 250 FOR IP): Performed by: NURSE PRACTITIONER

## 2019-02-27 PROCEDURE — 1240000000 HC EMOTIONAL WELLNESS R&B

## 2019-02-27 PROCEDURE — 6370000000 HC RX 637 (ALT 250 FOR IP): Performed by: INTERNAL MEDICINE

## 2019-02-27 RX ORDER — BUSPIRONE HYDROCHLORIDE 5 MG/1
15 TABLET ORAL 3 TIMES DAILY
Status: DISCONTINUED | OUTPATIENT
Start: 2019-02-27 | End: 2019-03-01 | Stop reason: HOSPADM

## 2019-02-27 RX ORDER — BUSPIRONE HYDROCHLORIDE 15 MG/1
15 TABLET ORAL 3 TIMES DAILY
Status: DISCONTINUED | OUTPATIENT
Start: 2019-02-27 | End: 2019-02-27

## 2019-02-27 RX ADMIN — BUTALBITAL, ACETAMINOPHEN AND CAFFEINE 1 TABLET: 50; 325; 40 TABLET ORAL at 12:13

## 2019-02-27 RX ADMIN — TRAZODONE HYDROCHLORIDE 100 MG: 100 TABLET ORAL at 21:01

## 2019-02-27 RX ADMIN — CLOPIDOGREL BISULFATE 75 MG: 75 TABLET ORAL at 08:43

## 2019-02-27 RX ADMIN — PHENYTOIN SODIUM 100 MG: 100 CAPSULE ORAL at 14:17

## 2019-02-27 RX ADMIN — MENTHOL, METHYL SALICYLATE: 10; 15 CREAM TOPICAL at 19:30

## 2019-02-27 RX ADMIN — DIVALPROEX SODIUM 750 MG: 500 TABLET, DELAYED RELEASE ORAL at 08:44

## 2019-02-27 RX ADMIN — Medication 1 LOZENGE: at 03:08

## 2019-02-27 RX ADMIN — ASPIRIN 81 MG: 81 TABLET, COATED ORAL at 08:44

## 2019-02-27 RX ADMIN — ONDANSETRON 4 MG: 4 TABLET, ORALLY DISINTEGRATING ORAL at 03:08

## 2019-02-27 RX ADMIN — BUTALBITAL, ACETAMINOPHEN AND CAFFEINE 1 TABLET: 50; 325; 40 TABLET ORAL at 03:08

## 2019-02-27 RX ADMIN — CETIRIZINE HYDROCHLORIDE 5 MG: 10 TABLET, FILM COATED ORAL at 08:44

## 2019-02-27 RX ADMIN — PHENYTOIN SODIUM 100 MG: 100 CAPSULE ORAL at 08:44

## 2019-02-27 RX ADMIN — BUTALBITAL, ACETAMINOPHEN AND CAFFEINE 1 TABLET: 50; 325; 40 TABLET ORAL at 16:29

## 2019-02-27 RX ADMIN — MIRTAZAPINE 45 MG: 30 TABLET, FILM COATED ORAL at 21:02

## 2019-02-27 RX ADMIN — ONDANSETRON 4 MG: 4 TABLET, ORALLY DISINTEGRATING ORAL at 08:43

## 2019-02-27 RX ADMIN — HYDROCHLOROTHIAZIDE 25 MG: 25 TABLET ORAL at 08:43

## 2019-02-27 RX ADMIN — TIZANIDINE 2 MG: 4 TABLET ORAL at 21:02

## 2019-02-27 RX ADMIN — BUSPIRONE HYDROCHLORIDE 10 MG: 10 TABLET ORAL at 13:37

## 2019-02-27 RX ADMIN — TIZANIDINE 2 MG: 4 TABLET ORAL at 12:13

## 2019-02-27 RX ADMIN — METOPROLOL TARTRATE 50 MG: 50 TABLET ORAL at 21:02

## 2019-02-27 RX ADMIN — HYDROXYZINE HYDROCHLORIDE 50 MG: 50 TABLET, FILM COATED ORAL at 21:02

## 2019-02-27 RX ADMIN — RISPERIDONE 2 MG: 2 TABLET ORAL at 21:02

## 2019-02-27 RX ADMIN — NEOMYCIN SULFATE, POLYMYXIN B SULFATE AND HYDROCORTISONE 2 DROP: 10; 3.5; 1 SUSPENSION/ DROPS AURICULAR (OTIC) at 12:12

## 2019-02-27 RX ADMIN — FAMOTIDINE 20 MG: 20 TABLET ORAL at 21:02

## 2019-02-27 RX ADMIN — GUAIFENESIN 200 MG: 200 SOLUTION ORAL at 13:37

## 2019-02-27 RX ADMIN — Medication 1 LOZENGE: at 13:37

## 2019-02-27 RX ADMIN — FAMOTIDINE 20 MG: 20 TABLET ORAL at 08:44

## 2019-02-27 RX ADMIN — BUSPIRONE HYDROCHLORIDE 15 MG: 5 TABLET ORAL at 21:02

## 2019-02-27 RX ADMIN — BUSPIRONE HYDROCHLORIDE 10 MG: 10 TABLET ORAL at 09:00

## 2019-02-27 RX ADMIN — LEVOTHYROXINE SODIUM 50 MCG: 50 TABLET ORAL at 06:09

## 2019-02-27 RX ADMIN — NEOMYCIN SULFATE, POLYMYXIN B SULFATE AND HYDROCORTISONE 2 DROP: 10; 3.5; 1 SUSPENSION/ DROPS AURICULAR (OTIC) at 21:02

## 2019-02-27 RX ADMIN — DIVALPROEX SODIUM 750 MG: 500 TABLET, DELAYED RELEASE ORAL at 21:02

## 2019-02-27 RX ADMIN — PHENYTOIN SODIUM 100 MG: 100 CAPSULE ORAL at 21:02

## 2019-02-27 ASSESSMENT — PAIN SCALES - GENERAL
PAINLEVEL_OUTOF10: 0
PAINLEVEL_OUTOF10: 4
PAINLEVEL_OUTOF10: 0
PAINLEVEL_OUTOF10: 0
PAINLEVEL_OUTOF10: 5
PAINLEVEL_OUTOF10: 0
PAINLEVEL_OUTOF10: 5

## 2019-02-28 PROCEDURE — 6370000000 HC RX 637 (ALT 250 FOR IP): Performed by: NURSE PRACTITIONER

## 2019-02-28 PROCEDURE — 6370000000 HC RX 637 (ALT 250 FOR IP): Performed by: INTERNAL MEDICINE

## 2019-02-28 PROCEDURE — 1240000000 HC EMOTIONAL WELLNESS R&B

## 2019-02-28 PROCEDURE — 99232 SBSQ HOSP IP/OBS MODERATE 35: CPT | Performed by: NURSE PRACTITIONER

## 2019-02-28 RX ADMIN — DIVALPROEX SODIUM 750 MG: 500 TABLET, DELAYED RELEASE ORAL at 21:19

## 2019-02-28 RX ADMIN — TRAZODONE HYDROCHLORIDE 100 MG: 100 TABLET ORAL at 21:19

## 2019-02-28 RX ADMIN — RISPERIDONE 2 MG: 2 TABLET ORAL at 21:19

## 2019-02-28 RX ADMIN — METOPROLOL TARTRATE 50 MG: 50 TABLET ORAL at 21:19

## 2019-02-28 RX ADMIN — Medication 1 LOZENGE: at 02:30

## 2019-02-28 RX ADMIN — BUSPIRONE HYDROCHLORIDE 15 MG: 5 TABLET ORAL at 21:19

## 2019-02-28 RX ADMIN — PHENYTOIN SODIUM 100 MG: 100 CAPSULE ORAL at 15:25

## 2019-02-28 RX ADMIN — DIVALPROEX SODIUM 750 MG: 500 TABLET, DELAYED RELEASE ORAL at 08:09

## 2019-02-28 RX ADMIN — TIZANIDINE 2 MG: 4 TABLET ORAL at 21:18

## 2019-02-28 RX ADMIN — BUTALBITAL, ACETAMINOPHEN AND CAFFEINE 1 TABLET: 50; 325; 40 TABLET ORAL at 08:09

## 2019-02-28 RX ADMIN — BUTALBITAL, ACETAMINOPHEN AND CAFFEINE 1 TABLET: 50; 325; 40 TABLET ORAL at 21:18

## 2019-02-28 RX ADMIN — ONDANSETRON 4 MG: 4 TABLET, ORALLY DISINTEGRATING ORAL at 15:25

## 2019-02-28 RX ADMIN — BUTALBITAL, ACETAMINOPHEN AND CAFFEINE 1 TABLET: 50; 325; 40 TABLET ORAL at 15:25

## 2019-02-28 RX ADMIN — NEOMYCIN SULFATE, POLYMYXIN B SULFATE AND HYDROCORTISONE 2 DROP: 10; 3.5; 1 SUSPENSION/ DROPS AURICULAR (OTIC) at 21:17

## 2019-02-28 RX ADMIN — GUAIFENESIN 200 MG: 200 SOLUTION ORAL at 02:30

## 2019-02-28 RX ADMIN — PHENYTOIN SODIUM 100 MG: 100 CAPSULE ORAL at 08:10

## 2019-02-28 RX ADMIN — ASPIRIN 81 MG: 81 TABLET, COATED ORAL at 08:10

## 2019-02-28 RX ADMIN — FAMOTIDINE 20 MG: 20 TABLET ORAL at 21:19

## 2019-02-28 RX ADMIN — BUSPIRONE HYDROCHLORIDE 15 MG: 5 TABLET ORAL at 11:58

## 2019-02-28 RX ADMIN — BUSPIRONE HYDROCHLORIDE 15 MG: 5 TABLET ORAL at 15:24

## 2019-02-28 RX ADMIN — Medication 1 LOZENGE: at 21:19

## 2019-02-28 RX ADMIN — CETIRIZINE HYDROCHLORIDE 5 MG: 10 TABLET, FILM COATED ORAL at 08:10

## 2019-02-28 RX ADMIN — PHENYTOIN SODIUM 100 MG: 100 CAPSULE ORAL at 21:18

## 2019-02-28 RX ADMIN — GUAIFENESIN 200 MG: 200 SOLUTION ORAL at 21:18

## 2019-02-28 RX ADMIN — GUAIFENESIN 200 MG: 200 SOLUTION ORAL at 15:25

## 2019-02-28 RX ADMIN — CLOPIDOGREL BISULFATE 75 MG: 75 TABLET ORAL at 08:10

## 2019-02-28 RX ADMIN — ONDANSETRON 4 MG: 4 TABLET, ORALLY DISINTEGRATING ORAL at 08:11

## 2019-02-28 RX ADMIN — FAMOTIDINE 20 MG: 20 TABLET ORAL at 08:09

## 2019-02-28 RX ADMIN — MIRTAZAPINE 45 MG: 30 TABLET, FILM COATED ORAL at 21:19

## 2019-02-28 RX ADMIN — HYDROXYZINE HYDROCHLORIDE 50 MG: 50 TABLET, FILM COATED ORAL at 21:18

## 2019-02-28 RX ADMIN — LEVOTHYROXINE SODIUM 50 MCG: 50 TABLET ORAL at 06:19

## 2019-02-28 RX ADMIN — BUTALBITAL, ACETAMINOPHEN AND CAFFEINE 1 TABLET: 50; 325; 40 TABLET ORAL at 02:30

## 2019-02-28 RX ADMIN — HYDROCHLOROTHIAZIDE 25 MG: 25 TABLET ORAL at 08:10

## 2019-02-28 RX ADMIN — METOPROLOL TARTRATE 50 MG: 50 TABLET ORAL at 08:10

## 2019-02-28 ASSESSMENT — PAIN SCALES - GENERAL
PAINLEVEL_OUTOF10: 4
PAINLEVEL_OUTOF10: 0
PAINLEVEL_OUTOF10: 5
PAINLEVEL_OUTOF10: 6
PAINLEVEL_OUTOF10: 4
PAINLEVEL_OUTOF10: 3
PAINLEVEL_OUTOF10: 5
PAINLEVEL_OUTOF10: 6

## 2019-02-28 ASSESSMENT — PAIN DESCRIPTION - ORIENTATION: ORIENTATION: RIGHT

## 2019-02-28 ASSESSMENT — PAIN DESCRIPTION - LOCATION: LOCATION: HEAD

## 2019-02-28 ASSESSMENT — PAIN DESCRIPTION - PAIN TYPE: TYPE: ACUTE PAIN

## 2019-03-01 VITALS
TEMPERATURE: 98 F | HEIGHT: 61 IN | DIASTOLIC BLOOD PRESSURE: 46 MMHG | BODY MASS INDEX: 35.5 KG/M2 | RESPIRATION RATE: 14 BRPM | SYSTOLIC BLOOD PRESSURE: 82 MMHG | WEIGHT: 188 LBS | OXYGEN SATURATION: 97 % | HEART RATE: 112 BPM

## 2019-03-01 PROCEDURE — 6370000000 HC RX 637 (ALT 250 FOR IP): Performed by: NURSE PRACTITIONER

## 2019-03-01 PROCEDURE — 99239 HOSP IP/OBS DSCHRG MGMT >30: CPT | Performed by: NURSE PRACTITIONER

## 2019-03-01 PROCEDURE — 6370000000 HC RX 637 (ALT 250 FOR IP): Performed by: INTERNAL MEDICINE

## 2019-03-01 PROCEDURE — 5130000000 HC BRIDGE APPOINTMENT

## 2019-03-01 RX ORDER — DIVALPROEX SODIUM 250 MG/1
750 TABLET, DELAYED RELEASE ORAL EVERY 12 HOURS SCHEDULED
Qty: 90 TABLET | Refills: 0 | Status: ON HOLD | OUTPATIENT
Start: 2019-03-01 | End: 2019-06-04 | Stop reason: HOSPADM

## 2019-03-01 RX ORDER — FLUTICASONE PROPIONATE 50 MCG
1 SPRAY, SUSPENSION (ML) NASAL DAILY
Qty: 1 BOTTLE | Refills: 0 | Status: ON HOLD | OUTPATIENT
Start: 2019-03-02 | End: 2019-05-20

## 2019-03-01 RX ORDER — RISPERIDONE 2 MG/1
2 TABLET, FILM COATED ORAL NIGHTLY
Qty: 30 TABLET | Refills: 0 | Status: SHIPPED | OUTPATIENT
Start: 2019-03-01 | End: 2019-10-07 | Stop reason: SDUPTHER

## 2019-03-01 RX ORDER — BUSPIRONE HYDROCHLORIDE 15 MG/1
15 TABLET ORAL 3 TIMES DAILY
Qty: 90 TABLET | Refills: 0 | Status: SHIPPED | OUTPATIENT
Start: 2019-03-01 | End: 2019-05-17

## 2019-03-01 RX ORDER — TRAZODONE HYDROCHLORIDE 100 MG/1
100 TABLET ORAL NIGHTLY PRN
Qty: 30 TABLET | Refills: 0 | Status: ON HOLD | OUTPATIENT
Start: 2019-03-01 | End: 2019-05-24 | Stop reason: HOSPADM

## 2019-03-01 RX ORDER — ASPIRIN 81 MG/1
81 TABLET ORAL DAILY
Qty: 30 TABLET | Refills: 0 | Status: ON HOLD | OUTPATIENT
Start: 2019-03-02 | End: 2019-05-20

## 2019-03-01 RX ORDER — ALBUTEROL SULFATE 90 UG/1
2 AEROSOL, METERED RESPIRATORY (INHALATION) EVERY 6 HOURS PRN
Qty: 1 INHALER | Refills: 0 | Status: ON HOLD | OUTPATIENT
Start: 2019-03-01 | End: 2019-05-20

## 2019-03-01 RX ORDER — HYDROCHLOROTHIAZIDE 25 MG/1
25 TABLET ORAL DAILY
Qty: 30 TABLET | Refills: 0 | Status: ON HOLD | OUTPATIENT
Start: 2019-03-02 | End: 2019-06-04 | Stop reason: HOSPADM

## 2019-03-01 RX ORDER — NEOMYCIN SULFATE, POLYMYXIN B SULFATE AND HYDROCORTISONE 10; 3.5; 1 MG/ML; MG/ML; [USP'U]/ML
2 SUSPENSION/ DROPS AURICULAR (OTIC) 2 TIMES DAILY
Qty: 1 BOTTLE | Refills: 0 | Status: SHIPPED | OUTPATIENT
Start: 2019-03-01 | End: 2019-03-11

## 2019-03-01 RX ORDER — METOPROLOL TARTRATE 50 MG/1
50 TABLET, FILM COATED ORAL 2 TIMES DAILY
Qty: 60 TABLET | Refills: 0 | Status: ON HOLD | OUTPATIENT
Start: 2019-03-01 | End: 2019-06-04 | Stop reason: HOSPADM

## 2019-03-01 RX ORDER — MIRTAZAPINE 45 MG/1
45 TABLET, FILM COATED ORAL NIGHTLY
Qty: 30 TABLET | Refills: 0 | Status: SHIPPED | OUTPATIENT
Start: 2019-03-01 | End: 2019-10-07 | Stop reason: SDUPTHER

## 2019-03-01 RX ORDER — PHENYTOIN SODIUM 100 MG/1
100 CAPSULE, EXTENDED RELEASE ORAL 3 TIMES DAILY
Qty: 90 CAPSULE | Refills: 0 | Status: ON HOLD | OUTPATIENT
Start: 2019-03-01 | End: 2019-06-04 | Stop reason: HOSPADM

## 2019-03-01 RX ORDER — HYDROXYZINE 50 MG/1
50 TABLET, FILM COATED ORAL 3 TIMES DAILY PRN
Qty: 30 TABLET | Refills: 0 | Status: SHIPPED | OUTPATIENT
Start: 2019-03-01 | End: 2019-03-11

## 2019-03-01 RX ADMIN — BUSPIRONE HYDROCHLORIDE 15 MG: 5 TABLET ORAL at 15:13

## 2019-03-01 RX ADMIN — CETIRIZINE HYDROCHLORIDE 5 MG: 10 TABLET, FILM COATED ORAL at 11:27

## 2019-03-01 RX ADMIN — BUTALBITAL, ACETAMINOPHEN AND CAFFEINE 1 TABLET: 50; 325; 40 TABLET ORAL at 04:56

## 2019-03-01 RX ADMIN — TIZANIDINE 2 MG: 4 TABLET ORAL at 15:13

## 2019-03-01 RX ADMIN — PHENYTOIN SODIUM 100 MG: 100 CAPSULE ORAL at 11:26

## 2019-03-01 RX ADMIN — HYDROXYZINE HYDROCHLORIDE 50 MG: 50 TABLET, FILM COATED ORAL at 16:47

## 2019-03-01 RX ADMIN — PHENYTOIN SODIUM 100 MG: 100 CAPSULE ORAL at 15:13

## 2019-03-01 RX ADMIN — BUTALBITAL, ACETAMINOPHEN AND CAFFEINE 1 TABLET: 50; 325; 40 TABLET ORAL at 16:47

## 2019-03-01 RX ADMIN — ASPIRIN 81 MG: 81 TABLET, COATED ORAL at 11:26

## 2019-03-01 RX ADMIN — Medication 1 LOZENGE: at 04:57

## 2019-03-01 RX ADMIN — ONDANSETRON 4 MG: 4 TABLET, ORALLY DISINTEGRATING ORAL at 04:56

## 2019-03-01 RX ADMIN — GUAIFENESIN 200 MG: 200 SOLUTION ORAL at 04:57

## 2019-03-01 RX ADMIN — DIVALPROEX SODIUM 750 MG: 500 TABLET, DELAYED RELEASE ORAL at 11:27

## 2019-03-01 RX ADMIN — BUTALBITAL, ACETAMINOPHEN AND CAFFEINE 1 TABLET: 50; 325; 40 TABLET ORAL at 11:26

## 2019-03-01 RX ADMIN — ONDANSETRON 4 MG: 4 TABLET, ORALLY DISINTEGRATING ORAL at 16:47

## 2019-03-01 RX ADMIN — LEVOTHYROXINE SODIUM 50 MCG: 50 TABLET ORAL at 06:05

## 2019-03-01 RX ADMIN — BUSPIRONE HYDROCHLORIDE 15 MG: 5 TABLET ORAL at 11:26

## 2019-03-01 RX ADMIN — CLOPIDOGREL BISULFATE 75 MG: 75 TABLET ORAL at 11:31

## 2019-03-01 RX ADMIN — FAMOTIDINE 20 MG: 20 TABLET ORAL at 11:27

## 2019-03-01 RX ADMIN — GUAIFENESIN 200 MG: 200 SOLUTION ORAL at 11:26

## 2019-03-01 RX ADMIN — ONDANSETRON 4 MG: 4 TABLET, ORALLY DISINTEGRATING ORAL at 11:25

## 2019-03-01 ASSESSMENT — PAIN SCALES - GENERAL
PAINLEVEL_OUTOF10: 5
PAINLEVEL_OUTOF10: 4
PAINLEVEL_OUTOF10: 5
PAINLEVEL_OUTOF10: 5
PAINLEVEL_OUTOF10: 4
PAINLEVEL_OUTOF10: 2

## 2019-03-01 ASSESSMENT — PAIN DESCRIPTION - PAIN TYPE: TYPE: ACUTE PAIN

## 2019-03-01 ASSESSMENT — PAIN DESCRIPTION - LOCATION: LOCATION: HEAD

## 2019-05-17 ENCOUNTER — ANESTHESIA (OUTPATIENT)
Dept: OPERATING ROOM | Age: 54
DRG: 720 | End: 2019-05-17
Payer: MEDICARE

## 2019-05-17 ENCOUNTER — APPOINTMENT (OUTPATIENT)
Dept: GENERAL RADIOLOGY | Age: 54
DRG: 720 | End: 2019-05-17
Payer: MEDICARE

## 2019-05-17 ENCOUNTER — HOSPITAL ENCOUNTER (INPATIENT)
Age: 54
LOS: 11 days | Discharge: NURSING, REHAB OR LONG-TERM CARE | DRG: 720 | End: 2019-05-28
Attending: EMERGENCY MEDICINE | Admitting: SURGERY
Payer: MEDICARE

## 2019-05-17 ENCOUNTER — ANESTHESIA EVENT (OUTPATIENT)
Dept: OPERATING ROOM | Age: 54
DRG: 720 | End: 2019-05-17
Payer: MEDICARE

## 2019-05-17 ENCOUNTER — APPOINTMENT (OUTPATIENT)
Dept: CT IMAGING | Age: 54
DRG: 720 | End: 2019-05-17
Payer: MEDICARE

## 2019-05-17 VITALS — DIASTOLIC BLOOD PRESSURE: 62 MMHG | OXYGEN SATURATION: 99 % | SYSTOLIC BLOOD PRESSURE: 119 MMHG

## 2019-05-17 DIAGNOSIS — M72.6 NECROTIZING FASCIITIS (HCC): Primary | ICD-10-CM

## 2019-05-17 DIAGNOSIS — M79.89 NECROTIZING SOFT TISSUE INFECTION: ICD-10-CM

## 2019-05-17 PROBLEM — A41.9 SEPSIS AFFECTING SKIN: Status: ACTIVE | Noted: 2019-05-17

## 2019-05-17 PROBLEM — E87.1 HYPONATREMIA: Status: ACTIVE | Noted: 2019-05-17

## 2019-05-17 PROBLEM — D72.829 LEUKOCYTOSIS: Status: ACTIVE | Noted: 2019-05-17

## 2019-05-17 PROBLEM — R79.89 ELEVATED LACTIC ACID LEVEL: Status: ACTIVE | Noted: 2019-05-17

## 2019-05-17 LAB
-: NORMAL
ABSOLUTE EOS #: 0 K/UL (ref 0–0.4)
ABSOLUTE IMMATURE GRANULOCYTE: 0 K/UL (ref 0–0.3)
ABSOLUTE LYMPH #: 1.94 K/UL (ref 1–4.8)
ABSOLUTE MONO #: 0.6 K/UL (ref 0.1–0.8)
ALBUMIN SERPL-MCNC: 2.7 G/DL (ref 3.5–5.2)
ALBUMIN/GLOBULIN RATIO: 0.6 (ref 1–2.5)
ALP BLD-CCNC: 79 U/L (ref 35–104)
ALT SERPL-CCNC: 13 U/L (ref 5–33)
ANION GAP SERPL CALCULATED.3IONS-SCNC: 17 MMOL/L (ref 9–17)
AST SERPL-CCNC: 23 U/L
BASOPHILS # BLD: 0 % (ref 0–2)
BASOPHILS ABSOLUTE: 0 K/UL (ref 0–0.2)
BILIRUB SERPL-MCNC: 0.93 MG/DL (ref 0.3–1.2)
BUN BLDV-MCNC: 19 MG/DL (ref 6–20)
BUN/CREAT BLD: ABNORMAL (ref 9–20)
C-REACTIVE PROTEIN: 341.4 MG/L (ref 0–5)
CALCIUM SERPL-MCNC: 8.8 MG/DL (ref 8.6–10.4)
CHLORIDE BLD-SCNC: 89 MMOL/L (ref 98–107)
CO2: 24 MMOL/L (ref 20–31)
CREAT SERPL-MCNC: 0.65 MG/DL (ref 0.5–0.9)
DIFFERENTIAL TYPE: ABNORMAL
EOSINOPHILS RELATIVE PERCENT: 0 % (ref 1–4)
GFR AFRICAN AMERICAN: >60 ML/MIN
GFR NON-AFRICAN AMERICAN: >60 ML/MIN
GFR SERPL CREATININE-BSD FRML MDRD: ABNORMAL ML/MIN/{1.73_M2}
GFR SERPL CREATININE-BSD FRML MDRD: ABNORMAL ML/MIN/{1.73_M2}
GLUCOSE BLD-MCNC: 101 MG/DL (ref 70–99)
HCT VFR BLD CALC: 38.2 % (ref 36.3–47.1)
HEMOGLOBIN: 13.3 G/DL (ref 11.9–15.1)
IMMATURE GRANULOCYTES: 0 %
INR BLD: 1
LACTIC ACID, SEPSIS WHOLE BLOOD: 2.5 MMOL/L (ref 0.5–1.9)
LACTIC ACID, SEPSIS WHOLE BLOOD: 4.4 MMOL/L (ref 0.5–1.9)
LACTIC ACID, SEPSIS: ABNORMAL MMOL/L (ref 0.5–1.9)
LACTIC ACID, SEPSIS: ABNORMAL MMOL/L (ref 0.5–1.9)
LYMPHOCYTES # BLD: 13 % (ref 24–44)
MCH RBC QN AUTO: 30.1 PG (ref 25.2–33.5)
MCHC RBC AUTO-ENTMCNC: 34.8 G/DL (ref 28.4–34.8)
MCV RBC AUTO: 86.4 FL (ref 82.6–102.9)
MONOCYTES # BLD: 4 % (ref 1–7)
MORPHOLOGY: ABNORMAL
NRBC AUTOMATED: 0 PER 100 WBC
PARTIAL THROMBOPLASTIN TIME: 24 SEC (ref 20.5–30.5)
PDW BLD-RTO: 14.5 % (ref 11.8–14.4)
PLATELET # BLD: ABNORMAL K/UL (ref 138–453)
PLATELET ESTIMATE: ABNORMAL
PMV BLD AUTO: ABNORMAL FL (ref 8.1–13.5)
POTASSIUM SERPL-SCNC: 3.4 MMOL/L (ref 3.7–5.3)
PROTHROMBIN TIME: 10.8 SEC (ref 9–12)
RBC # BLD: 4.42 M/UL (ref 3.95–5.11)
RBC # BLD: ABNORMAL 10*6/UL
REASON FOR REJECTION: NORMAL
SEG NEUTROPHILS: 83 % (ref 36–66)
SEGMENTED NEUTROPHILS ABSOLUTE COUNT: 12.36 K/UL (ref 1.8–7.7)
SODIUM BLD-SCNC: 130 MMOL/L (ref 135–144)
TOTAL PROTEIN: 7.1 G/DL (ref 6.4–8.3)
TROPONIN INTERP: NORMAL
TROPONIN T: NORMAL NG/ML
TROPONIN, HIGH SENSITIVITY: <6 NG/L (ref 0–14)
WBC # BLD: 14.9 K/UL (ref 3.5–11.3)
WBC # BLD: ABNORMAL 10*3/UL
ZZ NTE CLEAN UP: ORDERED TEST: NORMAL
ZZ NTE WITH NAME CLEAN UP: SPECIMEN SOURCE: NORMAL

## 2019-05-17 PROCEDURE — 83605 ASSAY OF LACTIC ACID: CPT

## 2019-05-17 PROCEDURE — 87176 TISSUE HOMOGENIZATION CULTR: CPT

## 2019-05-17 PROCEDURE — 80053 COMPREHEN METABOLIC PANEL: CPT

## 2019-05-17 PROCEDURE — 73701 CT LOWER EXTREMITY W/DYE: CPT

## 2019-05-17 PROCEDURE — 88304 TISSUE EXAM BY PATHOLOGIST: CPT

## 2019-05-17 PROCEDURE — 85025 COMPLETE CBC W/AUTO DIFF WBC: CPT

## 2019-05-17 PROCEDURE — 96375 TX/PRO/DX INJ NEW DRUG ADDON: CPT

## 2019-05-17 PROCEDURE — 6360000002 HC RX W HCPCS: Performed by: ANESTHESIOLOGY

## 2019-05-17 PROCEDURE — 87077 CULTURE AEROBIC IDENTIFY: CPT

## 2019-05-17 PROCEDURE — 71045 X-RAY EXAM CHEST 1 VIEW: CPT

## 2019-05-17 PROCEDURE — 2580000003 HC RX 258: Performed by: EMERGENCY MEDICINE

## 2019-05-17 PROCEDURE — 0JB90ZZ EXCISION OF BUTTOCK SUBCUTANEOUS TISSUE AND FASCIA, OPEN APPROACH: ICD-10-PCS | Performed by: SURGERY

## 2019-05-17 PROCEDURE — 7100000000 HC PACU RECOVERY - FIRST 15 MIN: Performed by: SURGERY

## 2019-05-17 PROCEDURE — 3600000014 HC SURGERY LEVEL 4 ADDTL 15MIN: Performed by: SURGERY

## 2019-05-17 PROCEDURE — 87070 CULTURE OTHR SPECIMN AEROBIC: CPT

## 2019-05-17 PROCEDURE — 85730 THROMBOPLASTIN TIME PARTIAL: CPT

## 2019-05-17 PROCEDURE — 87075 CULTR BACTERIA EXCEPT BLOOD: CPT

## 2019-05-17 PROCEDURE — 87086 URINE CULTURE/COLONY COUNT: CPT

## 2019-05-17 PROCEDURE — 7100000001 HC PACU RECOVERY - ADDTL 15 MIN: Performed by: SURGERY

## 2019-05-17 PROCEDURE — 87040 BLOOD CULTURE FOR BACTERIA: CPT

## 2019-05-17 PROCEDURE — 3600000004 HC SURGERY LEVEL 4 BASE: Performed by: SURGERY

## 2019-05-17 PROCEDURE — 86403 PARTICLE AGGLUT ANTBDY SCRN: CPT

## 2019-05-17 PROCEDURE — 6360000002 HC RX W HCPCS: Performed by: EMERGENCY MEDICINE

## 2019-05-17 PROCEDURE — 96367 TX/PROPH/DG ADDL SEQ IV INF: CPT

## 2019-05-17 PROCEDURE — 6360000002 HC RX W HCPCS: Performed by: NURSE ANESTHETIST, CERTIFIED REGISTERED

## 2019-05-17 PROCEDURE — 6360000002 HC RX W HCPCS

## 2019-05-17 PROCEDURE — 2500000003 HC RX 250 WO HCPCS: Performed by: NURSE ANESTHETIST, CERTIFIED REGISTERED

## 2019-05-17 PROCEDURE — 2580000003 HC RX 258: Performed by: STUDENT IN AN ORGANIZED HEALTH CARE EDUCATION/TRAINING PROGRAM

## 2019-05-17 PROCEDURE — 6360000004 HC RX CONTRAST MEDICATION: Performed by: EMERGENCY MEDICINE

## 2019-05-17 PROCEDURE — 2580000003 HC RX 258: Performed by: NURSE ANESTHETIST, CERTIFIED REGISTERED

## 2019-05-17 PROCEDURE — 84484 ASSAY OF TROPONIN QUANT: CPT

## 2019-05-17 PROCEDURE — 2709999900 HC NON-CHARGEABLE SUPPLY: Performed by: SURGERY

## 2019-05-17 PROCEDURE — 96365 THER/PROPH/DIAG IV INF INIT: CPT

## 2019-05-17 PROCEDURE — 87205 SMEAR GRAM STAIN: CPT

## 2019-05-17 PROCEDURE — 86140 C-REACTIVE PROTEIN: CPT

## 2019-05-17 PROCEDURE — 3700000000 HC ANESTHESIA ATTENDED CARE: Performed by: SURGERY

## 2019-05-17 PROCEDURE — 87185 SC STD ENZYME DETCJ PER NZM: CPT

## 2019-05-17 PROCEDURE — 3700000001 HC ADD 15 MINUTES (ANESTHESIA): Performed by: SURGERY

## 2019-05-17 PROCEDURE — 2500000003 HC RX 250 WO HCPCS: Performed by: EMERGENCY MEDICINE

## 2019-05-17 PROCEDURE — 85610 PROTHROMBIN TIME: CPT

## 2019-05-17 PROCEDURE — 99285 EMERGENCY DEPT VISIT HI MDM: CPT

## 2019-05-17 PROCEDURE — 2000000000 HC ICU R&B

## 2019-05-17 PROCEDURE — 0JBL0ZZ EXCISION OF RIGHT UPPER LEG SUBCUTANEOUS TISSUE AND FASCIA, OPEN APPROACH: ICD-10-PCS | Performed by: SURGERY

## 2019-05-17 RX ORDER — 0.9 % SODIUM CHLORIDE 0.9 %
80 INTRAVENOUS SOLUTION INTRAVENOUS ONCE
Status: COMPLETED | OUTPATIENT
Start: 2019-05-17 | End: 2019-05-17

## 2019-05-17 RX ORDER — SODIUM CHLORIDE 0.9 % (FLUSH) 0.9 %
10 SYRINGE (ML) INJECTION
Status: COMPLETED | OUTPATIENT
Start: 2019-05-17 | End: 2019-05-17

## 2019-05-17 RX ORDER — CLINDAMYCIN PHOSPHATE 900 MG/50ML
900 INJECTION INTRAVENOUS ONCE
Status: COMPLETED | OUTPATIENT
Start: 2019-05-17 | End: 2019-05-17

## 2019-05-17 RX ORDER — SODIUM CHLORIDE 0.9 % (FLUSH) 0.9 %
10 SYRINGE (ML) INJECTION EVERY 12 HOURS SCHEDULED
Status: DISCONTINUED | OUTPATIENT
Start: 2019-05-17 | End: 2019-05-29 | Stop reason: HOSPADM

## 2019-05-17 RX ORDER — ONDANSETRON 2 MG/ML
4 INJECTION INTRAMUSCULAR; INTRAVENOUS ONCE
Status: COMPLETED | OUTPATIENT
Start: 2019-05-17 | End: 2019-05-17

## 2019-05-17 RX ORDER — SODIUM CHLORIDE 9 MG/ML
INJECTION, SOLUTION INTRAVENOUS CONTINUOUS PRN
Status: DISCONTINUED | OUTPATIENT
Start: 2019-05-17 | End: 2019-05-17 | Stop reason: SDUPTHER

## 2019-05-17 RX ORDER — DEXAMETHASONE SODIUM PHOSPHATE 10 MG/ML
INJECTION INTRAMUSCULAR; INTRAVENOUS PRN
Status: DISCONTINUED | OUTPATIENT
Start: 2019-05-17 | End: 2019-05-17 | Stop reason: SDUPTHER

## 2019-05-17 RX ORDER — OXYCODONE HYDROCHLORIDE 5 MG/1
5 TABLET ORAL EVERY 4 HOURS PRN
Status: DISCONTINUED | OUTPATIENT
Start: 2019-05-17 | End: 2019-05-18

## 2019-05-17 RX ORDER — 0.9 % SODIUM CHLORIDE 0.9 %
30 INTRAVENOUS SOLUTION INTRAVENOUS ONCE
Status: COMPLETED | OUTPATIENT
Start: 2019-05-17 | End: 2019-05-17

## 2019-05-17 RX ORDER — PROPOFOL 10 MG/ML
INJECTION, EMULSION INTRAVENOUS PRN
Status: DISCONTINUED | OUTPATIENT
Start: 2019-05-17 | End: 2019-05-17 | Stop reason: SDUPTHER

## 2019-05-17 RX ORDER — ONDANSETRON 2 MG/ML
INJECTION INTRAMUSCULAR; INTRAVENOUS PRN
Status: DISCONTINUED | OUTPATIENT
Start: 2019-05-17 | End: 2019-05-17 | Stop reason: SDUPTHER

## 2019-05-17 RX ORDER — NEOSTIGMINE METHYLSULFATE 5 MG/5 ML
SYRINGE (ML) INTRAVENOUS PRN
Status: DISCONTINUED | OUTPATIENT
Start: 2019-05-17 | End: 2019-05-17 | Stop reason: SDUPTHER

## 2019-05-17 RX ORDER — MORPHINE SULFATE 4 MG/ML
4 INJECTION, SOLUTION INTRAMUSCULAR; INTRAVENOUS
Status: DISCONTINUED | OUTPATIENT
Start: 2019-05-17 | End: 2019-05-20

## 2019-05-17 RX ORDER — PHENYLEPHRINE HYDROCHLORIDE 10 MG/ML
INJECTION INTRAVENOUS PRN
Status: DISCONTINUED | OUTPATIENT
Start: 2019-05-17 | End: 2019-05-17 | Stop reason: SDUPTHER

## 2019-05-17 RX ORDER — ROCURONIUM BROMIDE 10 MG/ML
INJECTION, SOLUTION INTRAVENOUS PRN
Status: DISCONTINUED | OUTPATIENT
Start: 2019-05-17 | End: 2019-05-17 | Stop reason: SDUPTHER

## 2019-05-17 RX ORDER — FENTANYL CITRATE 50 UG/ML
INJECTION, SOLUTION INTRAMUSCULAR; INTRAVENOUS PRN
Status: DISCONTINUED | OUTPATIENT
Start: 2019-05-17 | End: 2019-05-17 | Stop reason: SDUPTHER

## 2019-05-17 RX ORDER — SODIUM CHLORIDE, SODIUM LACTATE, POTASSIUM CHLORIDE, CALCIUM CHLORIDE 600; 310; 30; 20 MG/100ML; MG/100ML; MG/100ML; MG/100ML
INJECTION, SOLUTION INTRAVENOUS CONTINUOUS
Status: DISCONTINUED | OUTPATIENT
Start: 2019-05-17 | End: 2019-05-20

## 2019-05-17 RX ORDER — VANCOMYCIN HYDROCHLORIDE 1 G/200ML
15 INJECTION, SOLUTION INTRAVENOUS ONCE
Status: COMPLETED | OUTPATIENT
Start: 2019-05-17 | End: 2019-05-17

## 2019-05-17 RX ORDER — SODIUM CHLORIDE 0.9 % (FLUSH) 0.9 %
10 SYRINGE (ML) INJECTION PRN
Status: DISCONTINUED | OUTPATIENT
Start: 2019-05-17 | End: 2019-05-29 | Stop reason: HOSPADM

## 2019-05-17 RX ORDER — ONDANSETRON 2 MG/ML
8 INJECTION INTRAMUSCULAR; INTRAVENOUS EVERY 6 HOURS PRN
Status: DISCONTINUED | OUTPATIENT
Start: 2019-05-17 | End: 2019-05-20

## 2019-05-17 RX ORDER — GLYCOPYRROLATE 1 MG/5 ML
SYRINGE (ML) INTRAVENOUS PRN
Status: DISCONTINUED | OUTPATIENT
Start: 2019-05-17 | End: 2019-05-17 | Stop reason: SDUPTHER

## 2019-05-17 RX ORDER — FENTANYL CITRATE 50 UG/ML
50 INJECTION, SOLUTION INTRAMUSCULAR; INTRAVENOUS ONCE
Status: COMPLETED | OUTPATIENT
Start: 2019-05-17 | End: 2019-05-17

## 2019-05-17 RX ORDER — LIDOCAINE HYDROCHLORIDE 10 MG/ML
INJECTION, SOLUTION EPIDURAL; INFILTRATION; INTRACAUDAL; PERINEURAL PRN
Status: DISCONTINUED | OUTPATIENT
Start: 2019-05-17 | End: 2019-05-17 | Stop reason: SDUPTHER

## 2019-05-17 RX ORDER — OXYCODONE HYDROCHLORIDE 5 MG/1
10 TABLET ORAL EVERY 4 HOURS PRN
Status: DISCONTINUED | OUTPATIENT
Start: 2019-05-17 | End: 2019-05-18

## 2019-05-17 RX ORDER — ACETAMINOPHEN 325 MG/1
650 TABLET ORAL EVERY 4 HOURS PRN
Status: DISCONTINUED | OUTPATIENT
Start: 2019-05-17 | End: 2019-05-20 | Stop reason: SDUPTHER

## 2019-05-17 RX ORDER — VANCOMYCIN HYDROCHLORIDE 1 G/200ML
1000 INJECTION, SOLUTION INTRAVENOUS EVERY 12 HOURS
Status: DISCONTINUED | OUTPATIENT
Start: 2019-05-18 | End: 2019-05-20

## 2019-05-17 RX ORDER — MORPHINE SULFATE 2 MG/ML
2 INJECTION, SOLUTION INTRAMUSCULAR; INTRAVENOUS
Status: DISCONTINUED | OUTPATIENT
Start: 2019-05-17 | End: 2019-05-20

## 2019-05-17 RX ADMIN — FENTANYL CITRATE 25 MCG: 50 INJECTION, SOLUTION INTRAMUSCULAR; INTRAVENOUS at 20:15

## 2019-05-17 RX ADMIN — FENTANYL CITRATE 50 MCG: 50 INJECTION, SOLUTION INTRAMUSCULAR; INTRAVENOUS at 18:45

## 2019-05-17 RX ADMIN — SODIUM CHLORIDE: 9 INJECTION, SOLUTION INTRAVENOUS at 19:37

## 2019-05-17 RX ADMIN — FENTANYL CITRATE 25 MCG: 50 INJECTION, SOLUTION INTRAMUSCULAR; INTRAVENOUS at 20:02

## 2019-05-17 RX ADMIN — SODIUM CHLORIDE 80 ML: 9 INJECTION, SOLUTION INTRAVENOUS at 17:40

## 2019-05-17 RX ADMIN — SODIUM CHLORIDE, PRESERVATIVE FREE 10 ML: 5 INJECTION INTRAVENOUS at 17:41

## 2019-05-17 RX ADMIN — PHENYLEPHRINE HYDROCHLORIDE 300 MCG: 10 INJECTION INTRAVENOUS at 19:27

## 2019-05-17 RX ADMIN — FENTANYL CITRATE 25 MCG: 50 INJECTION, SOLUTION INTRAMUSCULAR; INTRAVENOUS at 19:39

## 2019-05-17 RX ADMIN — PHENYLEPHRINE HYDROCHLORIDE 300 MCG: 10 INJECTION INTRAVENOUS at 19:19

## 2019-05-17 RX ADMIN — HYDROMORPHONE HYDROCHLORIDE 0.5 MG: 1 INJECTION, SOLUTION INTRAMUSCULAR; INTRAVENOUS; SUBCUTANEOUS at 21:12

## 2019-05-17 RX ADMIN — FENTANYL CITRATE 50 MCG: 50 INJECTION INTRAMUSCULAR; INTRAVENOUS at 18:45

## 2019-05-17 RX ADMIN — HYDROMORPHONE HYDROCHLORIDE 0.25 MG: 1 INJECTION, SOLUTION INTRAMUSCULAR; INTRAVENOUS; SUBCUTANEOUS at 21:54

## 2019-05-17 RX ADMIN — PIPERACILLIN AND TAZOBACTAM 3.38 G: 3; .375 INJECTION, POWDER, LYOPHILIZED, FOR SOLUTION INTRAVENOUS; PARENTERAL at 16:02

## 2019-05-17 RX ADMIN — HYDROMORPHONE HYDROCHLORIDE 0.25 MG: 1 INJECTION, SOLUTION INTRAMUSCULAR; INTRAVENOUS; SUBCUTANEOUS at 22:23

## 2019-05-17 RX ADMIN — SODIUM CHLORIDE 2040 ML: 9 INJECTION, SOLUTION INTRAVENOUS at 16:32

## 2019-05-17 RX ADMIN — DEXAMETHASONE SODIUM PHOSPHATE 10 MG: 10 INJECTION INTRAMUSCULAR; INTRAVENOUS at 18:53

## 2019-05-17 RX ADMIN — Medication 0.4 MG: at 19:40

## 2019-05-17 RX ADMIN — FENTANYL CITRATE 25 MCG: 50 INJECTION, SOLUTION INTRAMUSCULAR; INTRAVENOUS at 20:11

## 2019-05-17 RX ADMIN — HYDROMORPHONE HYDROCHLORIDE 0.25 MG: 1 INJECTION, SOLUTION INTRAMUSCULAR; INTRAVENOUS; SUBCUTANEOUS at 21:48

## 2019-05-17 RX ADMIN — PROPOFOL 200 MG: 10 INJECTION, EMULSION INTRAVENOUS at 18:45

## 2019-05-17 RX ADMIN — FENTANYL CITRATE 50 MCG: 50 INJECTION INTRAMUSCULAR; INTRAVENOUS at 18:42

## 2019-05-17 RX ADMIN — FENTANYL CITRATE 50 MCG: 50 INJECTION, SOLUTION INTRAMUSCULAR; INTRAVENOUS at 17:51

## 2019-05-17 RX ADMIN — HYDROMORPHONE HYDROCHLORIDE 0.25 MG: 1 INJECTION, SOLUTION INTRAMUSCULAR; INTRAVENOUS; SUBCUTANEOUS at 22:22

## 2019-05-17 RX ADMIN — Medication 10 ML: at 23:10

## 2019-05-17 RX ADMIN — IOHEXOL 75 ML: 350 INJECTION, SOLUTION INTRAVENOUS at 17:40

## 2019-05-17 RX ADMIN — ROCURONIUM BROMIDE 50 MG: 10 INJECTION INTRAVENOUS at 18:46

## 2019-05-17 RX ADMIN — CLINDAMYCIN PHOSPHATE 900 MG: 900 INJECTION, SOLUTION INTRAVENOUS at 18:22

## 2019-05-17 RX ADMIN — PHENYLEPHRINE HYDROCHLORIDE 300 MCG: 10 INJECTION INTRAVENOUS at 19:23

## 2019-05-17 RX ADMIN — PHENYLEPHRINE HYDROCHLORIDE 200 MCG: 10 INJECTION INTRAVENOUS at 19:15

## 2019-05-17 RX ADMIN — PHENYLEPHRINE HYDROCHLORIDE 300 MCG: 10 INJECTION INTRAVENOUS at 19:37

## 2019-05-17 RX ADMIN — FENTANYL CITRATE 25 MCG: 50 INJECTION, SOLUTION INTRAMUSCULAR; INTRAVENOUS at 19:53

## 2019-05-17 RX ADMIN — HYDROMORPHONE HYDROCHLORIDE 0.5 MG: 1 INJECTION, SOLUTION INTRAMUSCULAR; INTRAVENOUS; SUBCUTANEOUS at 21:04

## 2019-05-17 RX ADMIN — SODIUM CHLORIDE, POTASSIUM CHLORIDE, SODIUM LACTATE AND CALCIUM CHLORIDE: 600; 310; 30; 20 INJECTION, SOLUTION INTRAVENOUS at 23:09

## 2019-05-17 RX ADMIN — LIDOCAINE HYDROCHLORIDE 50 MG: 10 INJECTION, SOLUTION EPIDURAL; INFILTRATION; INTRACAUDAL; PERINEURAL at 18:45

## 2019-05-17 RX ADMIN — FENTANYL CITRATE 25 MCG: 50 INJECTION, SOLUTION INTRAMUSCULAR; INTRAVENOUS at 19:44

## 2019-05-17 RX ADMIN — PHENYLEPHRINE HYDROCHLORIDE 200 MCG: 10 INJECTION INTRAVENOUS at 19:11

## 2019-05-17 RX ADMIN — SODIUM CHLORIDE: 9 INJECTION, SOLUTION INTRAVENOUS at 15:00

## 2019-05-17 RX ADMIN — ONDANSETRON 4 MG: 2 INJECTION, SOLUTION INTRAMUSCULAR; INTRAVENOUS at 19:39

## 2019-05-17 RX ADMIN — ONDANSETRON 4 MG: 2 INJECTION INTRAMUSCULAR; INTRAVENOUS at 17:50

## 2019-05-17 RX ADMIN — PHENYLEPHRINE HYDROCHLORIDE 300 MCG: 10 INJECTION INTRAVENOUS at 19:34

## 2019-05-17 RX ADMIN — Medication 3 MG: at 19:40

## 2019-05-17 RX ADMIN — VANCOMYCIN HYDROCHLORIDE 1000 MG: 1 INJECTION, SOLUTION INTRAVENOUS at 16:48

## 2019-05-17 RX ADMIN — FENTANYL CITRATE 25 MCG: 50 INJECTION, SOLUTION INTRAMUSCULAR; INTRAVENOUS at 19:59

## 2019-05-17 RX ADMIN — FENTANYL CITRATE 25 MCG: 50 INJECTION, SOLUTION INTRAMUSCULAR; INTRAVENOUS at 19:55

## 2019-05-17 RX ADMIN — PHENYLEPHRINE HYDROCHLORIDE 300 MCG: 10 INJECTION INTRAVENOUS at 19:40

## 2019-05-17 RX ADMIN — PHENYLEPHRINE HYDROCHLORIDE 200 MCG: 10 INJECTION INTRAVENOUS at 19:30

## 2019-05-17 RX ADMIN — PHENYLEPHRINE HYDROCHLORIDE 200 MCG: 10 INJECTION INTRAVENOUS at 19:08

## 2019-05-17 ASSESSMENT — PULMONARY FUNCTION TESTS
PIF_VALUE: 1
PIF_VALUE: 19
PIF_VALUE: 19
PIF_VALUE: 3
PIF_VALUE: 6
PIF_VALUE: 18
PIF_VALUE: 18
PIF_VALUE: 19
PIF_VALUE: 1
PIF_VALUE: 19
PIF_VALUE: 3
PIF_VALUE: 0
PIF_VALUE: 21
PIF_VALUE: 16
PIF_VALUE: 0
PIF_VALUE: 4
PIF_VALUE: 4
PIF_VALUE: 19
PIF_VALUE: 19
PIF_VALUE: 1
PIF_VALUE: 19
PIF_VALUE: 0
PIF_VALUE: 18
PIF_VALUE: 4
PIF_VALUE: 19
PIF_VALUE: 16
PIF_VALUE: 16
PIF_VALUE: 19
PIF_VALUE: 3
PIF_VALUE: 19
PIF_VALUE: 21
PIF_VALUE: 2
PIF_VALUE: 19
PIF_VALUE: 5
PIF_VALUE: 18
PIF_VALUE: 5
PIF_VALUE: 21
PIF_VALUE: 7
PIF_VALUE: 20
PIF_VALUE: 20
PIF_VALUE: 1
PIF_VALUE: 18
PIF_VALUE: 17
PIF_VALUE: 3
PIF_VALUE: 3
PIF_VALUE: 19
PIF_VALUE: 3
PIF_VALUE: 18
PIF_VALUE: 18
PIF_VALUE: 2
PIF_VALUE: 3
PIF_VALUE: 20
PIF_VALUE: 20
PIF_VALUE: 19
PIF_VALUE: 4
PIF_VALUE: 14
PIF_VALUE: 17
PIF_VALUE: 20
PIF_VALUE: 19
PIF_VALUE: 3
PIF_VALUE: 5
PIF_VALUE: 18
PIF_VALUE: 20
PIF_VALUE: 0
PIF_VALUE: 17
PIF_VALUE: 20
PIF_VALUE: 19
PIF_VALUE: 18
PIF_VALUE: 19
PIF_VALUE: 2
PIF_VALUE: 5
PIF_VALUE: 19
PIF_VALUE: 1
PIF_VALUE: 21
PIF_VALUE: 20
PIF_VALUE: 5
PIF_VALUE: 19
PIF_VALUE: 20
PIF_VALUE: 19
PIF_VALUE: 0
PIF_VALUE: 19
PIF_VALUE: 18
PIF_VALUE: 20
PIF_VALUE: 20
PIF_VALUE: 28
PIF_VALUE: 19
PIF_VALUE: 20
PIF_VALUE: 16

## 2019-05-17 ASSESSMENT — PAIN SCALES - GENERAL
PAINLEVEL_OUTOF10: 9
PAINLEVEL_OUTOF10: 10
PAINLEVEL_OUTOF10: 9
PAINLEVEL_OUTOF10: 10
PAINLEVEL_OUTOF10: 10
PAINLEVEL_OUTOF10: 9
PAINLEVEL_OUTOF10: 10
PAINLEVEL_OUTOF10: 10
PAINLEVEL_OUTOF10: 8
PAINLEVEL_OUTOF10: 2
PAINLEVEL_OUTOF10: 7
PAINLEVEL_OUTOF10: 7
PAINLEVEL_OUTOF10: 10

## 2019-05-17 ASSESSMENT — PAIN DESCRIPTION - PAIN TYPE
TYPE: ACUTE PAIN
TYPE: ACUTE PAIN
TYPE: SURGICAL PAIN
TYPE: ACUTE PAIN
TYPE: ACUTE PAIN
TYPE: ACUTE PAIN;SURGICAL PAIN

## 2019-05-17 ASSESSMENT — ENCOUNTER SYMPTOMS
BLOOD IN STOOL: 0
ABDOMINAL PAIN: 0
VOMITING: 0
CHOKING: 0
NAUSEA: 0
CHEST TIGHTNESS: 0
FACIAL SWELLING: 0
TROUBLE SWALLOWING: 0
DIARRHEA: 0
PHOTOPHOBIA: 0
WHEEZING: 0
SHORTNESS OF BREATH: 1
COLOR CHANGE: 1
STRIDOR: 0

## 2019-05-17 ASSESSMENT — PAIN DESCRIPTION - LOCATION
LOCATION: BUTTOCKS
LOCATION: HIP
LOCATION: BUTTOCKS
LOCATION: BUTTOCKS
LOCATION: HIP
LOCATION: BUTTOCKS

## 2019-05-17 ASSESSMENT — PAIN DESCRIPTION - ORIENTATION
ORIENTATION: RIGHT

## 2019-05-17 ASSESSMENT — PAIN DESCRIPTION - PROGRESSION: CLINICAL_PROGRESSION: NOT CHANGED

## 2019-05-17 ASSESSMENT — PAIN DESCRIPTION - DESCRIPTORS
DESCRIPTORS: THROBBING
DESCRIPTORS: THROBBING
DESCRIPTORS: DISCOMFORT;SORE
DESCRIPTORS: TENDER;TIGHTNESS
DESCRIPTORS: SHARP;BURNING
DESCRIPTORS: TENDER

## 2019-05-17 ASSESSMENT — PAIN DESCRIPTION - FREQUENCY
FREQUENCY: CONTINUOUS
FREQUENCY: CONTINUOUS

## 2019-05-17 ASSESSMENT — PAIN DESCRIPTION - ONSET
ONSET: ON-GOING
ONSET: ON-GOING

## 2019-05-17 NOTE — ANESTHESIA PRE PROCEDURE
Department of Anesthesiology  Preprocedure Note       Name:  Allie Barone   Age:  62 y.o.  :  1965                                          MRN:  [id number]         Date:  2019      Surgeon: Polo Paulson):  Kannan Haskins MD    Procedure: IRRIGATION, DEBRIDEMENT RIGHT THIGH (Right )    Medications prior to admission:   Prior to Admission medications    Medication Sig Start Date End Date Taking? Authorizing Provider   aspirin (ASPIRIN LOW DOSE) 81 MG EC tablet Take 1 tablet by mouth daily meds to beds 3/2/19  Yes KEANU Miranda CNP   divalproex (DEPAKOTE) 250 MG DR tablet Take 3 tablets by mouth every 12 hours 3/1/19  Yes KEANU Miranda CNP   phenytoin (DILANTIN) 100 MG ER capsule Take 1 capsule by mouth 3 times daily 3/1/19  Yes KEANU Miranda CNP   mirtazapine (REMERON) 45 MG tablet Take 1 tablet by mouth nightly 3/1/19  Yes KEANU Miranda CNP   traZODone (DESYREL) 100 MG tablet Take 1 tablet by mouth nightly as needed for Sleep 3/1/19  Yes KEANU Miranda CNP   risperiDONE (RISPERDAL) 2 MG tablet Take 1 tablet by mouth nightly 3/1/19  Yes KEANU Miranda CNP   metoprolol tartrate (LOPRESSOR) 50 MG tablet Take 1 tablet by mouth 2 times daily 3/1/19  Yes KEANU Miranda CNP   hydrochlorothiazide (HYDRODIURIL) 25 MG tablet Take 1 tablet by mouth daily 3/2/19  Yes KEANU Miranda CNP   PHENobarbital (LUMINAL) 32.4 MG tablet Take 32.4 mg by mouth 3 times daily. .   Yes Historical Provider, MD   tiZANidine (ZANAFLEX) 2 MG tablet Take 2 mg by mouth every 8 hours as needed   Yes Historical Provider, MD   famotidine (PEPCID) 20 MG tablet Take 20 mg by mouth 2 times daily   Yes Historical Provider, MD   clopidogrel (PLAVIX) 75 MG tablet clopidogrel 75 mg tablet   Yes Historical Provider, MD   levothyroxine (SYNTHROID) 50 MCG tablet Take 50 mcg by mouth Daily   Yes Historical Provider, MD   albuterol sulfate HFA (PROAIR HFA) 108 (90 Base) MCG/ACT inhaler Inhale 2 puffs into the lungs tiZANidine (ZANAFLEX) 2 MG tablet Take 2 mg by mouth every 8 hours as needed      famotidine (PEPCID) 20 MG tablet Take 20 mg by mouth 2 times daily      clopidogrel (PLAVIX) 75 MG tablet clopidogrel 75 mg tablet      levothyroxine (SYNTHROID) 50 MCG tablet Take 50 mcg by mouth Daily      albuterol sulfate HFA (PROAIR HFA) 108 (90 Base) MCG/ACT inhaler Inhale 2 puffs into the lungs every 6 hours as needed for Wheezing 1 Inhaler 0    fluticasone (FLONASE) 50 MCG/ACT nasal spray 1 spray by Each Nare route daily 1 Bottle 0    albuterol (PROVENTIL) (2.5 MG/3ML) 0.083% nebulizer solution       nitroGLYCERIN (NITROSTAT) 0.4 MG SL tablet Nitrostat 0.4 mg sublingual tablet      acetaminophen (APAP EXTRA STRENGTH) 500 MG tablet Take 2 tablets by mouth every 6 hours as needed for Pain 120 tablet 0       Allergies:     Allergies   Allergen Reactions    Imitrex [Sumatriptan] Hives    Bee Pollen     Bee Venom     Bromide Ion [Bromine]     Flexeril [Cyclobenzaprine]     Ketorolac     Naproxen Hives    Nsaids     Potassium Bromide     Reglan [Metoclopramide]     Sulfa Antibiotics     Sulfadiazine     Toradol [Ketorolac Tromethamine] Hives    Tramadol Hives       Problem List:    Patient Active Problem List   Diagnosis Code    Chest pain R07.9    Migraine variant with headache G43.809    Drug overdose, accidental or unintentional, initial encounter T50.901A    Hypothyroidism E03.9    Essential hypertension I10    Seizure disorder ([facility] G40.909    Drug overdose T50.901A    History of seizure Z87.898    Severe major depression with psychotic features (HCC) F32.3    Severe major depression (HCC) F32.2    Overdose of barbiturate, intentional self-harm, initial encounter ([facility] T42.3X2A    Intentional phenobarbital overdose ([facility] T42.3X2A    Severe episode of recurrent major depressive disorder, without psychotic features ([address] F33.2    Bronchitis J40    Suicide attempt ([address] T14.91XA    Major depressive disorder, recurrent (HCC) F33.9    Ear infection H66.90       Past Medical History:        Diagnosis Date    Arthritis     Asthma     CHF (congestive heart failure) (HCC)     COPD (chronic obstructive pulmonary disease) (HCC)     Fibromyalgia     Headache     Hypertension     Movement disorder     Neck fracture (HCC)     Seizure ([address]     Thyroid disease        Past Surgical History:        Procedure Laterality Date    KNEE SURGERY      LYMPH NODE DISSECTION      PARTIAL HYSTERECTOMY         Social History:    Social History     Tobacco Use    Smoking status: Current Every Day Smoker     Packs/day: 0.50     Years: 12.00     Pack years: 6.00    Smokeless tobacco: Never Used   Substance Use Topics    Alcohol use: No                                Ready to quit: Not Answered  Counseling given: Not Answered      Vital Signs (Current):   Vitals:    05/17/19 1623 05/17/19 1636 05/17/19 1745 05/17/19 1804   BP: 117/71 117/76 131/79 110/60   Pulse: 110 108 103 101   Resp: (!) 31 (!) 34 25 22   Temp:   100.8 °F (38.2 °C)    TempSrc:   Oral    SpO2: 100% 99% 98% 99%   Weight:       Height:                                                  BP Readings from Last 3 Encounters:   05/17/19 110/60   02/16/19 (!) 151/91   02/12/19 (!) 145/92       NPO Status:                                                                                 BMI:   Wt Readings from Last 3 Encounters:   05/17/19 150 lb (68 kg)   02/16/19 188 lb 15 oz (85.7 kg)   02/12/19 192 lb (87.1 kg)     Body mass index is 27.44 kg/m². CBC:   Lab Results   Component Value Date    WBC 14.9 05/17/2019    RBC 4.42 05/17/2019    HGB 13.3 05/17/2019    HCT 38.2 05/17/2019    MCV 86.4 05/17/2019    RDW 14.5 05/17/2019    PLT Platelet clumps present, count appears adequate.  05/17/2019       CMP:   Lab Results   Component Value Date     05/17/2019    K 3.4 05/17/2019    CL 89 05/17/2019    CO2 24 05/17/2019    BUN 19 05/17/2019 CREATININE 0.65 05/17/2019    GFRAA >60 05/17/2019    LABGLOM >60 05/17/2019    GLUCOSE 101 05/17/2019    PROT 7.1 05/17/2019    CALCIUM 8.8 05/17/2019    BILITOT 0.93 05/17/2019    ALKPHOS 79 05/17/2019    AST 23 05/17/2019    ALT 13 05/17/2019       POC Tests: No results for input(s): POCGLU, POCNA, POCK, POCCL, POCBUN, POCHEMO, POCHCT in the last 72 hours. Coags:   Lab Results   Component Value Date    PROTIME 10.8 05/17/2019    INR 1.0 05/17/2019    APTT 24.0 05/17/2019       HCG (If Applicable):   Lab Results   Component Value Date    HCGQUANT <1 02/12/2019        ABGs: No results found for: PHART, PO2ART, FBD6BEV, QMA1XKZ, BEART, Q8TOXWBS     Type & Screen (If Applicable):  No results found for: LABABO, [address]    Anesthesia Evaluation  Patient summary reviewed and Nursing notes reviewed no history of anesthetic complications:   Airway: Mallampati: II  TM distance: >3 FB   Neck ROM: full  Mouth opening: > = 3 FB Dental: normal exam         Pulmonary:normal exam    (+) COPD:  asthma:                            Cardiovascular:    (+) hypertension:, CHF:, orthopnea,     (-) CABG/stent and dysrhythmias            Stress test reviewed       Beta Blocker:  Dose within 24 Hrs         Neuro/Psych:   (+) seizures:, neuromuscular disease:, headaches:, psychiatric history:depression/anxiety             GI/Hepatic/Renal:        (-) GERD       Endo/Other:    (+) hypothyroidism::., .                 Abdominal:           Vascular:                                      Anesthesia Plan      general     ASA 4 - emergent       Induction: intravenous and rapid sequence. MIPS: Postoperative opioids intended and Prophylactic antiemetics administered. Anesthetic plan and risks discussed with patient.       Plan discussed with CRNA and surgical team.                Daja Miguel MD   5/17/2019

## 2019-05-17 NOTE — CONSULTS
History and Physical    PATIENT NAME: Aleta Mcdonough  AGE: 62 y.o. MEDICAL RECORD NO. [id number]  DATE: 5/17/2019  SURGEON: Dr Bruner Elyse: No primary care provider on file. Patient evaluated at the request of  Dr. Tammi Zimmerman  Reason for evaluation: Necrotizing fasciitis    Patient information was obtained from patient. History/Exam limitations: none. Patient presented to the Emergency Department by ambulance    IMPRESSION:     Patient Active Problem List   Diagnosis    Chest pain    Migraine variant with headache    Drug overdose, accidental or unintentional, initial encounter    Hypothyroidism    Essential hypertension    Seizure disorder ([address]    Drug overdose    History of seizure    Severe major depression with psychotic features ([address]    Severe major depression ([address]    Overdose of barbiturate, intentional self-harm, initial encounter ([address]    Intentional phenobarbital overdose ([address]    Severe episode of recurrent major depressive disorder, without psychotic features ([address]    Bronchitis    Suicide attempt ([address]    Major depressive disorder, recurrent ([address]    Ear infection   1. Necrotizing fasciitis  2. Hyponatremia  3. Sepsis      PLAN:   1. Necrotizing fasciitis, right hip  -High clinical suspicion based on physical exam findings, crepitus, hemorrhagic bullae , CT showing diffuse subcutaneous emphysema, malodorous drainage  -Vancomycin, Zosyn, clindamycin  -BP stable, continue IV fluids  -Plan take patient emergently to the OR  -SIRS/sepsis: Febrile 38.2, WBC 14.9, tachycardia 113, tachypnea 34  -Initial lactic 4.4--> 2.5    2. Recommending MICU admission  - Mild Hyponatremia, hypokalemia  -PMHx: COPD, CHF, CAD on plavix, HTN, hypothyroid        HISTORY:   History of Chief Complaint:    Chidi Mendoza is a 62 y.o. female who presents with right hip pain, redness x2 weeks.   General surgery consulted due to concern high concern for necrotizing fasciitis, extensive Christiano Hernandez CNP   divalproex (DEPAKOTE) 250 MG DR tablet Take 3 tablets by mouth every 12 hours 3/1/19  Yes KEANU Lima CNP   phenytoin (DILANTIN) 100 MG ER capsule Take 1 capsule by mouth 3 times daily 3/1/19  Yes KEANU Lima CNP   mirtazapine (REMERON) 45 MG tablet Take 1 tablet by mouth nightly 3/1/19  Yes KEAUN Lima CNP   traZODone (DESYREL) 100 MG tablet Take 1 tablet by mouth nightly as needed for Sleep 3/1/19  Yes KEANU Lima CNP   risperiDONE (RISPERDAL) 2 MG tablet Take 1 tablet by mouth nightly 3/1/19  Yes KEANU Lima CNP   metoprolol tartrate (LOPRESSOR) 50 MG tablet Take 1 tablet by mouth 2 times daily 3/1/19  Yes KEANU Lima CNP   hydrochlorothiazide (HYDRODIURIL) 25 MG tablet Take 1 tablet by mouth daily 3/2/19  Yes KEANU Lima CNP   PHENobarbital (LUMINAL) 32.4 MG tablet Take 32.4 mg by mouth 3 times daily. .   Yes Historical Provider, MD   tiZANidine (ZANAFLEX) 2 MG tablet Take 2 mg by mouth every 8 hours as needed   Yes Historical Provider, MD   famotidine (PEPCID) 20 MG tablet Take 20 mg by mouth 2 times daily   Yes Historical Provider, MD   clopidogrel (PLAVIX) 75 MG tablet clopidogrel 75 mg tablet   Yes Historical Provider, MD   levothyroxine (SYNTHROID) 50 MCG tablet Take 50 mcg by mouth Daily   Yes Historical Provider, MD   albuterol sulfate HFA (PROAIR HFA) 108 (90 Base) MCG/ACT inhaler Inhale 2 puffs into the lungs every 6 hours as needed for Wheezing 3/1/19   KEANU Lima CNP   fluticasone Yakelin Lacy) 50 MCG/ACT nasal spray 1 spray by Each Nare route daily 3/2/19   KEANU Lima CNP   albuterol (PROVENTIL) (2.5 MG/3ML) 0.083% nebulizer solution  12/7/18   Historical Provider, MD   nitroGLYCERIN (NITROSTAT) 0.4 MG SL tablet Nitrostat 0.4 mg sublingual tablet    Historical Provider, MD   acetaminophen (APAP EXTRA STRENGTH) 500 MG tablet Take 2 tablets by mouth every 6 hours as needed for Pain 11/4/18   Darrell Monique MD    Scheduled Meds:   [START ON 5/18/2019] vancomycin  1,000 mg Intravenous Q12H    vancomycin (VANCOCIN) intermittent dosing (placeholder)   Other RX Placeholder    clindamycin (CLEOCIN) IV  900 mg Intravenous Once     Continuous Infusions:  PRN Meds:. Allergies  is allergic to imitrex [sumatriptan]; bee pollen; bee venom; bromide ion [bromine]; flexeril [cyclobenzaprine]; ketorolac; naproxen; nsaids; potassium bromide; reglan [metoclopramide]; sulfa antibiotics; sulfadiazine; toradol [ketorolac tromethamine]; and tramadol. Family History  family history is not on file. Social History   reports that she has been smoking. She has a 6.00 pack-year smoking history. She has never used smokeless tobacco.   reports that she does not drink alcohol. reports that she does not use drugs. Review of Systems  General positive for fevers, chills  HEENT  Denies any diplopia, tinnitus or vertigo  Resp Denies any shortness of breath, cough or wheezing  Cardiac Denies any chest pain, palpitations, claudication or edema  GI Denies any melena, hematochezia, hematemesis or pyrosis   Denies any frequency, urgency, hesitancy or incontinence  Heme Denies bruising or bleeding easily  Endocrine reports prediabetic, thyroid disease  Neuro Denies any focal motor or sensory deficits  Skin right hip drainage, erythema, blistering    PHYSICAL:   VITALS:  height is 5' 2\" (1.575 m) and weight is 150 lb (68 kg). Her oral temperature is 100.8 °F (38.2 °C). Her blood pressure is 110/60 and her pulse is 101. Her respiration is 22 and oxygen saturation is 99%. CONSTITUTIONAL: Alert and oriented times 3, moderate distress  HEENT: Head is normocephalic, atraumatic. EOMI, PERRLA  NECK: Soft, trachea midline and straight  LUNGS: Chest expands equally bilaterally upon respiration, no accessory muscle used.    CARDIOVASCULAR: Heart sounds are normal.  Tachycardia low 100s   ABDOMEN: soft, nontender, nondistended, no masses or organomegaly, no hernias palpable, no guarding or peritoneal signs. Bowel sounds are present in all four quadrants Scars are consistent with previous surgical history. NEUROLOGIC: CN II-XII are grossly intact. There are no focalizing motor or sensory deficits  EXTREMITIES: See chart for image. Extensive area of erythema with straw-colored and hemorrhagic bullae, crepitus, severe tenderness to palpation. High concern for necrotizing fasciitis on exam    LABS:     Recent Labs     05/17/19  1529 05/17/19  1601 05/17/19  1644   WBC 14.9*  --   --    HGB 13.3  --   --    HCT 38.2  --   --    PLT Platelet clumps present, count appears adequate. --   --    NA  --   --  130*   K  --   --  3.4*   CL  --   --  89*   CO2  --   --  24   BUN  --   --  19   CREATININE  --   --  0.65   CALCIUM  --   --  8.8   INR  --  1.0  --    AST  --   --  23   ALT  --   --  13   BILITOT  --   --  0.93     Recent Labs     05/17/19  1644   ALKPHOS 79   ALT 13   AST 23   BILITOT 0.93   LABALBU 2.7*     CBC:   Lab Results   Component Value Date    WBC 14.9 05/17/2019    RBC 4.42 05/17/2019    HGB 13.3 05/17/2019    HCT 38.2 05/17/2019    MCV 86.4 05/17/2019    MCH 30.1 05/17/2019    MCHC 34.8 05/17/2019    RDW 14.5 05/17/2019    PLT Platelet clumps present, count appears adequate. 05/17/2019    MPV NOT REPORTED 05/17/2019     CBC with Differential:    Lab Results   Component Value Date    WBC 14.9 05/17/2019    RBC 4.42 05/17/2019    HGB 13.3 05/17/2019    HCT 38.2 05/17/2019    PLT Platelet clumps present, count appears adequate.  05/17/2019    MCV 86.4 05/17/2019    MCH 30.1 05/17/2019    MCHC 34.8 05/17/2019    RDW 14.5 05/17/2019    LYMPHOPCT 13 05/17/2019    MONOPCT 4 05/17/2019    BASOPCT 0 05/17/2019    MONOSABS 0.60 05/17/2019    LYMPHSABS 1.94 05/17/2019    EOSABS 0.00 05/17/2019    BASOSABS 0.00 05/17/2019    DIFFTYPE NOT REPORTED 05/17/2019     Hemoglobin/Hematocrit:    Lab Results   Component Value Date    HGB 13.3 05/17/2019    HCT 38.2 05/17/2019     CMP: Lab Results   Component Value Date     05/17/2019    K 3.4 05/17/2019    CL 89 05/17/2019    CO2 24 05/17/2019    BUN 19 05/17/2019    CREATININE 0.65 05/17/2019    GFRAA >60 05/17/2019    LABGLOM >60 05/17/2019    GLUCOSE 101 05/17/2019    PROT 7.1 05/17/2019    LABALBU 2.7 05/17/2019    CALCIUM 8.8 05/17/2019    BILITOT 0.93 05/17/2019    ALKPHOS 79 05/17/2019    AST 23 05/17/2019    ALT 13 05/17/2019     BMP:    Lab Results   Component Value Date     05/17/2019    K 3.4 05/17/2019    CL 89 05/17/2019    CO2 24 05/17/2019    BUN 19 05/17/2019    LABALBU 2.7 05/17/2019    CREATININE 0.65 05/17/2019    CALCIUM 8.8 05/17/2019    GFRAA >60 05/17/2019    LABGLOM >60 05/17/2019    GLUCOSE 101 05/17/2019     BUN/Creatinine:    Lab Results   Component Value Date    BUN 19 05/17/2019    CREATININE 0.65 05/17/2019     Hepatic Function Panel:    Lab Results   Component Value Date    ALKPHOS 79 05/17/2019    ALT 13 05/17/2019    AST 23 05/17/2019    PROT 7.1 05/17/2019    BILITOT 0.93 05/17/2019    BILIDIR 0.13 02/15/2019    IBILI 0.22 02/15/2019    LABALBU 2.7 05/17/2019     PT/INR:    Lab Results   Component Value Date    PROTIME 10.8 05/17/2019    INR 1.0 05/17/2019     PTT:    Lab Results   Component Value Date    APTT 24.0 05/17/2019   [APTT}  Troponin:    Lab Results   Component Value Date    TROPONINI 0.01 11/27/2018       RADIOLOGY:   Xr Chest Portable    Result Date: 5/17/2019  No acute process. Ct Hip Right W Contrast    Result Date: 5/17/2019  1. Severe subcutaneous fat stranding of the right gluteal region extending into the proximal thigh. Significant gas in the gluteal subcutaneous fat raising the possibility of a necrotizing cellulitis. No well-defined drainable fluid collection or involvement of the underlying musculature. 2. No acute osseous abnormality. Findings were discussed with Dr. Alondra Bill of the [facility] emergency department at 6:38 pm on 5/17/2019. Thank you for the interesting evaluation. Further recommendations to follow. Raúl Francoisn  5/17/2019, 6:11 PM         Attending Note    Patient with bullae and cellulitis of right hip which as been present for 2 weeks. CT suggestive of abscess  I have reviewed the above TECSS note(s) and I either performed the key elements of the medical history and physical exam or was present with the resident when the key elements of the medical history and physical exam were performed.  I have discussed the findings, established the care plan and recommendations with Resident Nafisa and Meme Jones MD  5/17/2019  9:50 PM

## 2019-05-17 NOTE — ED PROVIDER NOTES
[address] [facility]     Emergency Department     Faculty Attestation    I performed a history and physical examination of the patient and discussed management with the resident. I reviewed the residents note and agree with the documented findings including all diagnostic interpretations and plan of care. Any areas of disagreement are noted on the chart. I was personally present for the key portions of any procedures. I have documented in the chart those procedures where I was not present during the key portions. I have reviewed the emergency nurses triage note. I agree with the chief complaint, past medical history, past surgical history, allergies, medications, social and family history as documented unless otherwise noted below. Documentation of the HPI, Physical Exam and Medical Decision Making performed by navdeepibxiao is based on my personal performance of the HPI, PE and MDM. For Physician Assistant/ Nurse Practitioner cases/documentation I have personally evaluated this patient and have completed at least one if not all key elements of the E/M (history, physical exam, and MDM). Additional findings are as noted. Primary Care Physician: No primary care provider on file. History: This is a 62 y.o. female who presents to the Emergency Department with complaint of hip pain. Worsening over the past 2 weeks with redness. She believes it started as an insect bite LO she did not see anything bite her. Has had chills. No vomiting. Has started to have blisters form and was concerned bumping her to come to the emergency department    Physical:     height is 5' 2\" (1.575 m) and weight is 150 lb (68 kg). Her oral temperature is 98.2 °F (36.8 °C). Her blood pressure is 116/78 and her pulse is 113. Her respiration is 32 (abnormal) and oxygen saturation is 100%.     62 y.o. female ill-appearing, tachycardic, regular rhythm, pulmonary clear bilaterally, tachypnea, abdomen

## 2019-05-17 NOTE — ED PROVIDER NOTES
[facility]  Emergency Department Encounter  EmergencyMedicine Resident     Pt Name:Aleta Bingham  MRN: [id number]  Armschristinegfdora 1965  Date of evaluation: 5/17/19  PCP:  No primary care provider on file. CHIEF COMPLAINT       Chief Complaint   Patient presents with    Abscess       HISTORY OF PRESENT ILLNESS  (Location/Symptom, Timing/Onset, Context/Setting, Quality, Duration, Modifying Factors, Severity.)      Johanna Posada is a 62 y.o. female who presents with primary complaint that for the last 2 weeks she has had worsening erythema and pain on the right lateral soft tissue and skin of hip. She reports this started out as a small pimple. But has progressed. Patient denies any history of drug abuse however patient has been seen in our department before for drug overdoses. Plan for septic workup as patient is tachycardic and appears ill. PAST MEDICAL / SURGICAL / SOCIAL / FAMILY HISTORY      has a past medical history of Arthritis, Asthma, CHF (congestive heart failure) ([address] COPD (chronic obstructive pulmonary disease) ([address] Fibromyalgia, Headache, Hypertension, Movement disorder, Neck fracture ([address] Seizure ([address] and Thyroid disease. has a past surgical history that includes knee surgery; partial hysterectomy (cervix not removed); and lymph node dissection.     Social History     Socioeconomic History    Marital status:      Spouse name: Not on file    Number of children: Not on file    Years of education: Not on file    Highest education level: Not on file   Occupational History    Not on file   Social Needs    Financial resource strain: Not on file    Food insecurity:     Worry: Not on file     Inability: Not on file    Transportation needs:     Medical: Not on file     Non-medical: Not on file   Tobacco Use    Smoking status: Current Every Day Smoker     Packs/day: 0.50     Years: 12.00     Pack years: 6.00    Smokeless tobacco: Never Used   Substance and Sexual Activity    Alcohol use: No    Drug use: No    Sexual activity: Not on file   Lifestyle    Physical activity:     Days per week: Not on file     Minutes per session: Not on file    Stress: Not on file   Relationships    Social connections:     Talks on phone: Not on file     Gets together: Not on file     Attends [employer] service: Not on file     Active member of club or organization: Not on file     Attends meetings of clubs or organizations: Not on file     Relationship status: Not on file    Intimate partner violence:     Fear of current or ex partner: Not on file     Emotionally abused: Not on file     Physically abused: Not on file     Forced sexual activity: Not on file   Other Topics Concern    Not on file   Social History Narrative    Not on file       History reviewed. No pertinent family history. Allergies:  Imitrex [sumatriptan]; Bee pollen; Bee venom; Bromide ion [bromine]; Flexeril [cyclobenzaprine]; Ketorolac; Naproxen; Nsaids; Potassium bromide; Reglan [metoclopramide]; Sulfa antibiotics; Sulfadiazine; Toradol [ketorolac tromethamine]; and Tramadol    Home Medications:  Prior to Admission medications    Medication Sig Start Date End Date Taking?  Authorizing Provider   aspirin (ASPIRIN LOW DOSE) 81 MG EC tablet Take 1 tablet by mouth daily meds to beds 3/2/19  Yes KEANU Lucia CNP   divalproex (DEPAKOTE) 250 MG DR tablet Take 3 tablets by mouth every 12 hours 3/1/19  Yes KEANU Lucia CNP   phenytoin (DILANTIN) 100 MG ER capsule Take 1 capsule by mouth 3 times daily 3/1/19  Yes KEANU Lucia CNP   mirtazapine (REMERON) 45 MG tablet Take 1 tablet by mouth nightly 3/1/19  Yes KEANU Lucia CNP   traZODone (DESYREL) 100 MG tablet Take 1 tablet by mouth nightly as needed for Sleep 3/1/19  Yes KEANU Lucia CNP   risperiDONE (RISPERDAL) 2 MG tablet Take 1 tablet by mouth nightly 3/1/19  Yes KEANU Lucia CNP   metoprolol tartrate (LOPRESSOR) 50 MG tablet Take 1 tablet by mouth 2 times daily 3/1/19  Yes KEANU Kruger CNP   hydrochlorothiazide (HYDRODIURIL) 25 MG tablet Take 1 tablet by mouth daily 3/2/19  Yes KEANU Kruger CNP   PHENobarbital (LUMINAL) 32.4 MG tablet Take 32.4 mg by mouth 3 times daily. .   Yes Historical Provider, MD   tiZANidine (ZANAFLEX) 2 MG tablet Take 2 mg by mouth every 8 hours as needed   Yes Historical Provider, MD   famotidine (PEPCID) 20 MG tablet Take 20 mg by mouth 2 times daily   Yes Historical Provider, MD   clopidogrel (PLAVIX) 75 MG tablet clopidogrel 75 mg tablet   Yes Historical Provider, MD   levothyroxine (SYNTHROID) 50 MCG tablet Take 50 mcg by mouth Daily   Yes Historical Provider, MD   albuterol sulfate HFA (PROAIR HFA) 108 (90 Base) MCG/ACT inhaler Inhale 2 puffs into the lungs every 6 hours as needed for Wheezing 3/1/19   KEANU Kruger CNP   fluticasone [facility]) 50 MCG/ACT nasal spray 1 spray by Each Nare route daily 3/2/19   KEANU Kruger CNP   albuterol (PROVENTIL) (2.5 MG/3ML) 0.083% nebulizer solution  12/7/18   Historical Provider, MD   nitroGLYCERIN (NITROSTAT) 0.4 MG SL tablet Nitrostat 0.4 mg sublingual tablet    Historical Provider, MD   acetaminophen (APAP EXTRA STRENGTH) 500 MG tablet Take 2 tablets by mouth every 6 hours as needed for Pain 11/4/18   Maryam Bautsita MD       REVIEW OF SYSTEMS    (2-9 systems for level 4, 10 or more for level 5)      Review of Systems   Constitutional: Negative for activity change, appetite change, chills, diaphoresis and fever. HENT: Negative for facial swelling, nosebleeds and trouble swallowing. Eyes: Negative for photophobia and visual disturbance. Respiratory: Positive for shortness of breath. Negative for choking, chest tightness, wheezing and stridor. Cardiovascular: Negative for chest pain and leg swelling. Gastrointestinal: Negative for abdominal pain, blood in stool, diarrhea, nausea and vomiting.    Genitourinary: Negative for decreased urine volume, difficulty urinating, dysuria, enuresis and hematuria. Musculoskeletal: Negative for joint swelling and neck pain. Skin: Positive for color change and wound. Negative for pallor and rash. Neurological: Negative for dizziness, syncope, facial asymmetry, speech difficulty, light-headedness and numbness. Psychiatric/Behavioral: Negative for agitation, behavioral problems, confusion and decreased concentration. PHYSICAL EXAM   (up to 7 for level 4, 8 or more for level 5)      INITIAL VITALS:   /60   Pulse 101   Temp 100.8 °F (38.2 °C) (Oral)   Resp 22   Ht 5' 2\" (1.575 m)   Wt 150 lb (68 kg)   SpO2 99%   BMI 27.44 kg/m²     Physical Exam   Constitutional: She is oriented to person, place, and time. She appears well-developed and well-nourished. HENT:   Head: Normocephalic and atraumatic. Eyes: Pupils are equal, round, and reactive to light. Right eye exhibits no discharge. Left eye exhibits no discharge. Neck: Normal range of motion. Cardiovascular: Normal rate, regular rhythm and normal heart sounds. Exam reveals no gallop and no friction rub. No murmur heard. Pulmonary/Chest: No respiratory distress. She has no wheezes. She has no rales. She exhibits no tenderness. Abdominal: Soft. Bowel sounds are normal. She exhibits no distension and no mass. There is no tenderness. There is no rebound and no guarding. Musculoskeletal: Normal range of motion. She exhibits no edema, tenderness or deformity. Neurological: She is alert and oriented to person, place, and time. Skin: Skin is warm, dry and intact. No rash noted. She is not diaphoretic. No erythema. No pallor. Patient has a large area of cellulitis on right lateral leg and buttocks, area of necrotizing skin noted in the center with blistering. Psychiatric: She has a normal mood and affect.  Her speech is normal and behavior is normal. Judgment and thought content normal. Cognition and memory are normal. DIFFERENTIAL  DIAGNOSIS     PLAN (LABS / IMAGING / EKG):  Orders Placed This Encounter   Procedures    Culture Blood #1    Culture Blood #1    Urine Culture    Anaerobic and Aerobic Culture    Urine Culture    Anaerobic and Aerobic Culture    XR CHEST PORTABLE    CT HIP RIGHT W CONTRAST    CBC Auto Differential    Lactate, Sepsis    Troponin    Urinalysis with Microscopic    BUN & CREATININE    SPECIMEN REJECTION    Protime-INR    APTT    PREVIOUS SPECIMEN    Comprehensive Metabolic Panel    VANCOMYCIN, TROUGH    C-Reactive Protein    Vital signs per unit routine    Notify anesthesia provider    Phase I - bedrest    Phase I - warming device    Phase I - cardiac monitor    Phase I & II - IV infusion rate    Nursing communication - Discharge from PACU    Encourage deep breathing and coughing    Continuous Pulse Oximetry    Pharmacy to Dose: Vancomycin    Inpatient consult to General Surgery    Initiate Oxygen Therapy Protocol    Phase I & II - metered glucose    Insert peripheral IV    Transfer Patient    PATIENT STATUS (FROM ED OR OR/PROCEDURAL) Inpatient       MEDICATIONS ORDERED:  Orders Placed This Encounter   Medications    piperacillin-tazobactam (ZOSYN) 3.375 g in dextrose 5 % 50 mL IVPB (mini-bag)    vancomycin (VANCOCIN) 1000 mg in dextrose 5% 200 mL IVPB    0.9 % sodium chloride bolus    0.9 % sodium chloride bolus    iohexol (OMNIPAQUE 350) solution 75 mL    sodium chloride flush 0.9 % injection 10 mL    vancomycin (VANCOCIN) 1000 mg in dextrose 5% 200 mL IVPB    vancomycin (VANCOCIN) intermittent dosing (placeholder)    ondansetron (ZOFRAN) injection 4 mg    fentaNYL (SUBLIMAZE) injection 50 mcg    clindamycin (CLEOCIN) 900 mg in dextrose 5 % 50 mL IVPB    HYDROmorphone (DILAUDID) injection 0.25 mg    HYDROmorphone (DILAUDID) injection 0.5 mg    phenylephrine (DYLON-SYNEPHRINE) 50 mg in dextrose 5 % 250 mL infusion       DDX: Necrotizing fasciitis versus just overlying cellulitis. DIAGNOSTIC RESULTS / EMERGENCY DEPARTMENT COURSE / MDM     LABS:  Results for orders placed or performed during the hospital encounter of 05/17/19   CBC Auto Differential   Result Value Ref Range    WBC 14.9 (H) 3.5 - 11.3 k/uL    RBC 4.42 3.95 - 5.11 m/uL    Hemoglobin 13.3 11.9 - 15.1 g/dL    Hematocrit 38.2 36.3 - 47.1 %    MCV 86.4 82.6 - 102.9 fL    MCH 30.1 25.2 - 33.5 pg    MCHC 34.8 28.4 - 34.8 g/dL    RDW 14.5 (H) 11.8 - 14.4 %    Platelets Platelet clumps present, count appears adequate. 138 - 453 k/uL    MPV NOT REPORTED 8.1 - 13.5 fL    NRBC Automated 0.0 0.0 per 100 WBC    Differential Type NOT REPORTED     WBC Morphology NOT REPORTED     RBC Morphology NOT REPORTED     Platelet Estimate NOT REPORTED     Immature Granulocytes 0 0 %    Seg Neutrophils 83 (H) 36 - 66 %    Lymphocytes 13 (L) 24 - 44 %    Monocytes 4 1 - 7 %    Eosinophils % 0 (L) 1 - 4 %    Basophils 0 0 - 2 %    Absolute Immature Granulocyte 0.00 0.00 - 0.30 k/uL    Segs Absolute 12.36 (H) 1.8 - 7.7 k/uL    Absolute Lymph # 1.94 1.0 - 4.8 k/uL    Absolute Mono # 0.60 0.1 - 0.8 k/uL    Absolute Eos # 0.00 0.0 - 0.4 k/uL    Basophils # 0.00 0.0 - 0.2 k/uL    Morphology ANISOCYTOSIS PRESENT     Morphology TOXIC GRANULATION PRESENT     Morphology VACUOLATED NEUTROPHILS PRESENT    Lactate, Sepsis   Result Value Ref Range    Lactic Acid, Sepsis NOT REPORTED 0.5 - 1.9 mmol/L    Lactic Acid, Sepsis, Whole Blood 4.4 (H) 0.5 - 1.9 mmol/L   Lactate, Sepsis   Result Value Ref Range    Lactic Acid, Sepsis NOT REPORTED 0.5 - 1.9 mmol/L    Lactic Acid, Sepsis, Whole Blood 2.5 (H) 0.5 - 1.9 mmol/L   Troponin   Result Value Ref Range    Troponin, High Sensitivity <6 0 - 14 ng/L    Troponin T NOT REPORTED <0.03 ng/mL    Troponin Interp NOT REPORTED    SPECIMEN REJECTION   Result Value Ref Range    Specimen Source . BLOOD     Ordered Test PT PTT     Reason for Rejection       Unable to perform testing: Specimen quantity not sufficient.    - NOT REPORTED    Protime-INR   Result Value Ref Range    Protime 10.8 9.0 - 12.0 sec    INR 1.0    APTT   Result Value Ref Range    PTT 24.0 20.5 - 30.5 sec   Comprehensive Metabolic Panel   Result Value Ref Range    Glucose 101 (H) 70 - 99 mg/dL    BUN 19 6 - 20 mg/dL    CREATININE 0.65 0.50 - 0.90 mg/dL    Bun/Cre Ratio NOT REPORTED 9 - 20    Calcium 8.8 8.6 - 10.4 mg/dL    Sodium 130 (L) 135 - 144 mmol/L    Potassium 3.4 (L) 3.7 - 5.3 mmol/L    Chloride 89 (L) 98 - 107 mmol/L    CO2 24 20 - 31 mmol/L    Anion Gap 17 9 - 17 mmol/L    Alkaline Phosphatase 79 35 - 104 U/L    ALT 13 5 - 33 U/L    AST 23 <32 U/L    Total Bilirubin 0.93 0.3 - 1.2 mg/dL    Total Protein 7.1 6.4 - 8.3 g/dL    Alb 2.7 (L) 3.5 - 5.2 g/dL    Albumin/Globulin Ratio 0.6 (L) 1.0 - 2.5    GFR Non-African American >60 >60 mL/min    GFR African American >60 >60 mL/min    GFR Comment          GFR Staging NOT REPORTED    C-Reactive Protein   Result Value Ref Range    .4 (H) 0.0 - 5.0 mg/L       RADIOLOGY:     Xr Chest Portable    Result Date: 5/17/2019  EXAMINATION: ONE XRAY VIEW OF THE CHEST 5/17/2019 3:29 pm COMPARISON: Chest February 14, 2019. HISTORY: ORDERING SYSTEM PROVIDED HISTORY: sepsis workup TECHNOLOGIST PROVIDED HISTORY: sepsis workup Ordering Physician Provided Reason for Exam: sepsis workup Acuity: Unknown Type of Exam: Unknown FINDINGS: The lungs are without acute focal process. There is no effusion or pneumothorax. The cardiomediastinal silhouette is without acute process. The osseous structures are without acute process. No acute process. Xr Chest Portable    Result Date: 5/17/2019  EXAMINATION: ONE XRAY VIEW OF THE CHEST 5/17/2019 3:29 pm COMPARISON: Chest February 14, 2019.  HISTORY: ORDERING SYSTEM PROVIDED HISTORY: sepsis workup TECHNOLOGIST PROVIDED HISTORY: sepsis workup Ordering Physician Provided Reason for Exam: sepsis workup Acuity: Unknown Type of Exam: Unknown FINDINGS: The lungs are in the gluteal subcutaneous fat raising the possibility of a necrotizing cellulitis. No well-defined drainable fluid collection or involvement of the underlying musculature. 2. No acute osseous abnormality. Findings were discussed with Dr. Leilani Humphrey of the [address] [place] [address] emergency department at 6:38 pm on 5/17/2019. MDM/EMERGENCY DEPARTMENT COURSE:       ED Course as of May 17 2059   Fri May 17, 2019   1559 Lactic Acid, Sepsis, Whole Blood(!): 4.4 [KW]   1600 Lactic acid elevated 30 mL/kg initiated. Patient presented with complaint of right-sided cellulitis, reports his been worsening for 2 weeks, no obvious area of crepitus under skin however we will get CT to rule out any gas under skin, antibiotics initiated fluids initiated septic workup initiated. [KW]   1611 WBC(!): 14.9 [KW]   7464 CT imaging reviewed, subcutaneous gas noted on imaging, surgery consulted immediately. [OK]   5451 Surgery team evaluated plan for OR.    [KW]   9799 Patient transferred to the OR from the ER she'll be transferred to the ICU from here, ICU resident evaluating now. [KW]      ED Course User Index  [KW] Pushpa Danielle DO         PROCEDURES:  None    CONSULTS:  PHARMACY TO DOSE VANCOMYCIN  IP CONSULT TO GENERAL SURGERY  PHARMACY TO DOSE VANCOMYCIN    CRITICAL CARE:  None    FINAL IMPRESSION      1. Necrotizing fasciitis ([address]          DISPOSITION / PLAN     DISPOSITION Decision To Admit    PATIENT REFERRED TO:  No follow-up provider specified.     DISCHARGE MEDICATIONS:  Current Discharge Medication List          Pushpa Danielle DO  Emergency Medicine Resident    (Please note that portions of this note were completed with a voicerecognition program.  Efforts were made to edit the dictations but occasionally words are mis-transcribed.)       Pushpa Danielle DO  05/17/19 2059

## 2019-05-17 NOTE — ED TRIAGE NOTES
Pt presents to the ED with c/o abscess to right buttocks for the last 2 weeks. Pt initially reports she had a spider bite, or bug bite. Pt reports there was multiple \"pimples\" on her bottom. Pt does have a Hx of abscesses. Pt afebrile on arrival. Pt right buttocks very red and warm to touch with yellow seeping present. Pts redness wraps around from of right leg to vaginal area. Pts right buttocks very tender to touch. Provider to bedside for pt assessment. Pt alert and oriented x4.

## 2019-05-17 NOTE — PROGRESS NOTES
Pharmacy Note  Vancomycin Consult    Eveline Neves is a 62 y.o. female started on Vancomycin for soft tissue infection/possible sepsis; consult received from Dr. Bridget Gross to manage therapy. Also receiving the following antibiotics: Zosyn. Patient Active Problem List   Diagnosis    Chest pain    Migraine variant with headache    Drug overdose, accidental or unintentional, initial encounter    Hypothyroidism    Essential hypertension    Seizure disorder ([facility]    Drug overdose    History of seizure    Severe major depression with psychotic features ([facility]    Severe major depression ([facility]    Overdose of barbiturate, intentional self-harm, [facility]    Intentional phenobarbital overdose ([facility]    Severe episode of recurrent major depressive disorder, without psychotic features ([facility]    Bronchitis    Suicide attempt ([facility]    Major depressive disorder, recurrent ([facility]    Ear infection       Allergies:  Imitrex [sumatriptan]; Bee pollen; Bee venom; Bromide ion [bromine]; Flexeril [cyclobenzaprine]; Ketorolac; Naproxen; Nsaids; Potassium bromide; Reglan [metoclopramide]; Sulfa antibiotics; Sulfadiazine; Toradol [ketorolac tromethamine]; and Tramadol     Temp max: afebrile    Recent Labs     05/17/19  1644   BUN 19       Recent Labs     05/17/19  1644   CREATININE 0.65       Recent Labs     05/17/19  1529   WBC 14.9*       No intake or output data in the 24 hours ending 05/17/19 1732    Culture Date      Source                       Results  5/17/2019            Blood x 2                   Pending  5/17/2019            Urine                          Pending    Ht Readings from Last 1 Encounters:   05/17/19 5' 2\" (1.575 m)        Wt Readings from Last 1 Encounters:   05/17/19 150 lb (68 kg)         Body mass index is 27.44 kg/m². Estimated Creatinine Clearance: 87 mL/min (based on SCr of 0.65 mg/dL).     Goal Trough Level: 15-20 mcg/mL    Assessment/Plan:  Will initiate vancomycin 1000 mg IV every 12 hours. Timing of trough level will be determined based on culture results, renal function, and clinical response. Vancomycin trough on Sunday 5/19/19 at 1600. Thank you for the consult. Will continue to follow.   Hyacinth Wharton [facility]  5/17/2019  5:34 PM

## 2019-05-18 ENCOUNTER — ANESTHESIA EVENT (OUTPATIENT)
Dept: OPERATING ROOM | Age: 54
DRG: 720 | End: 2019-05-18
Payer: MEDICARE

## 2019-05-18 PROBLEM — M79.89 NECROTIZING SOFT TISSUE INFECTION: Status: RESOLVED | Noted: 2019-05-18 | Resolved: 2019-05-18

## 2019-05-18 PROBLEM — M79.89 NECROTIZING SOFT TISSUE INFECTION: Status: ACTIVE | Noted: 2019-05-18

## 2019-05-18 PROBLEM — M79.89 NECROTIZING SOFT TISSUE INFECTION: Status: ACTIVE | Noted: 2019-05-17

## 2019-05-18 LAB
ABSOLUTE EOS #: 0 K/UL (ref 0–0.4)
ABSOLUTE IMMATURE GRANULOCYTE: 0 K/UL (ref 0–0.3)
ABSOLUTE LYMPH #: 0.84 K/UL (ref 1–4.8)
ABSOLUTE MONO #: 0.24 K/UL (ref 0.1–0.8)
ANION GAP SERPL CALCULATED.3IONS-SCNC: 13 MMOL/L (ref 9–17)
BASOPHILS # BLD: 0 % (ref 0–2)
BASOPHILS ABSOLUTE: 0 K/UL (ref 0–0.2)
BUN BLDV-MCNC: 12 MG/DL (ref 6–20)
BUN/CREAT BLD: ABNORMAL (ref 9–20)
CALCIUM IONIZED: 1.1 MMOL/L (ref 1.13–1.33)
CALCIUM SERPL-MCNC: 7.5 MG/DL (ref 8.6–10.4)
CHLORIDE BLD-SCNC: 99 MMOL/L (ref 98–107)
CO2: 21 MMOL/L (ref 20–31)
CREAT SERPL-MCNC: 0.38 MG/DL (ref 0.5–0.9)
CULTURE: NO GROWTH
DIFFERENTIAL TYPE: ABNORMAL
EOSINOPHILS RELATIVE PERCENT: 0 % (ref 1–4)
GFR AFRICAN AMERICAN: >60 ML/MIN
GFR NON-AFRICAN AMERICAN: >60 ML/MIN
GFR SERPL CREATININE-BSD FRML MDRD: ABNORMAL ML/MIN/{1.73_M2}
GFR SERPL CREATININE-BSD FRML MDRD: ABNORMAL ML/MIN/{1.73_M2}
GLUCOSE BLD-MCNC: 115 MG/DL (ref 65–105)
GLUCOSE BLD-MCNC: 119 MG/DL (ref 70–99)
HCT VFR BLD CALC: 26.1 % (ref 36.3–47.1)
HCT VFR BLD CALC: 26.5 % (ref 36.3–47.1)
HEMOGLOBIN: 8.7 G/DL (ref 11.9–15.1)
HEMOGLOBIN: 8.9 G/DL (ref 11.9–15.1)
IMMATURE GRANULOCYTES: 0 %
LACTIC ACID, WHOLE BLOOD: 1.2 MMOL/L (ref 0.7–2.1)
LYMPHOCYTES # BLD: 7 % (ref 24–44)
Lab: NORMAL
MAGNESIUM: 1.9 MG/DL (ref 1.6–2.6)
MCH RBC QN AUTO: 28.7 PG (ref 25.2–33.5)
MCHC RBC AUTO-ENTMCNC: 34.1 G/DL (ref 28.4–34.8)
MCV RBC AUTO: 84.2 FL (ref 82.6–102.9)
MONOCYTES # BLD: 2 % (ref 1–7)
MORPHOLOGY: ABNORMAL
MORPHOLOGY: ABNORMAL
MRSA, DNA, NASAL: NORMAL
NRBC AUTOMATED: 0 PER 100 WBC
PDW BLD-RTO: 14.3 % (ref 11.8–14.4)
PHOSPHORUS: 2.9 MG/DL (ref 2.6–4.5)
PLATELET # BLD: 312 K/UL (ref 138–453)
PLATELET ESTIMATE: ABNORMAL
PMV BLD AUTO: 11.1 FL (ref 8.1–13.5)
POTASSIUM SERPL-SCNC: 3.3 MMOL/L (ref 3.7–5.3)
RBC # BLD: 3.1 M/UL (ref 3.95–5.11)
RBC # BLD: ABNORMAL 10*6/UL
SEG NEUTROPHILS: 91 % (ref 36–66)
SEGMENTED NEUTROPHILS ABSOLUTE COUNT: 10.92 K/UL (ref 1.8–7.7)
SODIUM BLD-SCNC: 133 MMOL/L (ref 135–144)
SPECIMEN DESCRIPTION: NORMAL
SPECIMEN DESCRIPTION: NORMAL
WBC # BLD: 12 K/UL (ref 3.5–11.3)
WBC # BLD: ABNORMAL 10*3/UL

## 2019-05-18 PROCEDURE — 85018 HEMOGLOBIN: CPT

## 2019-05-18 PROCEDURE — 82330 ASSAY OF CALCIUM: CPT

## 2019-05-18 PROCEDURE — 6360000002 HC RX W HCPCS: Performed by: STUDENT IN AN ORGANIZED HEALTH CARE EDUCATION/TRAINING PROGRAM

## 2019-05-18 PROCEDURE — 82947 ASSAY GLUCOSE BLOOD QUANT: CPT

## 2019-05-18 PROCEDURE — 6370000000 HC RX 637 (ALT 250 FOR IP): Performed by: STUDENT IN AN ORGANIZED HEALTH CARE EDUCATION/TRAINING PROGRAM

## 2019-05-18 PROCEDURE — 2580000003 HC RX 258: Performed by: STUDENT IN AN ORGANIZED HEALTH CARE EDUCATION/TRAINING PROGRAM

## 2019-05-18 PROCEDURE — 80048 BASIC METABOLIC PNL TOTAL CA: CPT

## 2019-05-18 PROCEDURE — 2500000003 HC RX 250 WO HCPCS: Performed by: STUDENT IN AN ORGANIZED HEALTH CARE EDUCATION/TRAINING PROGRAM

## 2019-05-18 PROCEDURE — 85025 COMPLETE CBC W/AUTO DIFF WBC: CPT

## 2019-05-18 PROCEDURE — 83605 ASSAY OF LACTIC ACID: CPT

## 2019-05-18 PROCEDURE — 2000000000 HC ICU R&B

## 2019-05-18 PROCEDURE — 85014 HEMATOCRIT: CPT

## 2019-05-18 PROCEDURE — 36415 COLL VENOUS BLD VENIPUNCTURE: CPT

## 2019-05-18 PROCEDURE — 0JBL0ZZ EXCISION OF RIGHT UPPER LEG SUBCUTANEOUS TISSUE AND FASCIA, OPEN APPROACH: ICD-10-PCS | Performed by: SURGERY

## 2019-05-18 PROCEDURE — 87641 MR-STAPH DNA AMP PROBE: CPT

## 2019-05-18 PROCEDURE — 0JB90ZZ EXCISION OF BUTTOCK SUBCUTANEOUS TISSUE AND FASCIA, OPEN APPROACH: ICD-10-PCS | Performed by: SURGERY

## 2019-05-18 PROCEDURE — 83735 ASSAY OF MAGNESIUM: CPT

## 2019-05-18 PROCEDURE — 84100 ASSAY OF PHOSPHORUS: CPT

## 2019-05-18 RX ORDER — LEVOTHYROXINE SODIUM 0.05 MG/1
50 TABLET ORAL DAILY
Status: DISCONTINUED | OUTPATIENT
Start: 2019-05-18 | End: 2019-05-18 | Stop reason: SDUPTHER

## 2019-05-18 RX ORDER — GABAPENTIN 300 MG/1
300 CAPSULE ORAL EVERY 8 HOURS
Status: DISCONTINUED | OUTPATIENT
Start: 2019-05-18 | End: 2019-05-20

## 2019-05-18 RX ORDER — OXYCODONE HYDROCHLORIDE 5 MG/1
10 TABLET ORAL EVERY 6 HOURS PRN
Status: DISCONTINUED | OUTPATIENT
Start: 2019-05-18 | End: 2019-05-19

## 2019-05-18 RX ORDER — POTASSIUM CHLORIDE 7.45 MG/ML
10 INJECTION INTRAVENOUS
Status: COMPLETED | OUTPATIENT
Start: 2019-05-18 | End: 2019-05-18

## 2019-05-18 RX ORDER — ALBUTEROL SULFATE 90 UG/1
2 AEROSOL, METERED RESPIRATORY (INHALATION) EVERY 6 HOURS PRN
Status: DISCONTINUED | OUTPATIENT
Start: 2019-05-18 | End: 2019-05-29 | Stop reason: HOSPADM

## 2019-05-18 RX ORDER — DOCUSATE SODIUM 100 MG/1
100 CAPSULE, LIQUID FILLED ORAL DAILY
Status: DISCONTINUED | OUTPATIENT
Start: 2019-05-18 | End: 2019-05-20

## 2019-05-18 RX ORDER — LEVOTHYROXINE SODIUM 0.05 MG/1
50 TABLET ORAL DAILY
Status: DISCONTINUED | OUTPATIENT
Start: 2019-05-18 | End: 2019-05-29 | Stop reason: HOSPADM

## 2019-05-18 RX ORDER — FAMOTIDINE 20 MG/1
20 TABLET, FILM COATED ORAL 2 TIMES DAILY
Status: DISCONTINUED | OUTPATIENT
Start: 2019-05-18 | End: 2019-05-18 | Stop reason: SDUPTHER

## 2019-05-18 RX ORDER — ACETAMINOPHEN 500 MG
1000 TABLET ORAL EVERY 8 HOURS
Status: DISCONTINUED | OUTPATIENT
Start: 2019-05-18 | End: 2019-05-29 | Stop reason: HOSPADM

## 2019-05-18 RX ORDER — PHENYTOIN SODIUM 100 MG/1
100 CAPSULE, EXTENDED RELEASE ORAL 3 TIMES DAILY
Status: DISCONTINUED | OUTPATIENT
Start: 2019-05-18 | End: 2019-05-20

## 2019-05-18 RX ORDER — OXYCODONE HYDROCHLORIDE 5 MG/1
5 TABLET ORAL EVERY 6 HOURS PRN
Status: DISCONTINUED | OUTPATIENT
Start: 2019-05-18 | End: 2019-05-19

## 2019-05-18 RX ORDER — FAMOTIDINE 20 MG/1
20 TABLET, FILM COATED ORAL 2 TIMES DAILY
Status: DISCONTINUED | OUTPATIENT
Start: 2019-05-18 | End: 2019-05-20

## 2019-05-18 RX ORDER — CLINDAMYCIN PHOSPHATE 900 MG/50ML
900 INJECTION INTRAVENOUS EVERY 8 HOURS
Status: DISCONTINUED | OUTPATIENT
Start: 2019-05-18 | End: 2019-05-18 | Stop reason: SDUPTHER

## 2019-05-18 RX ORDER — CLINDAMYCIN PHOSPHATE 900 MG/50ML
900 INJECTION INTRAVENOUS EVERY 8 HOURS
Status: DISCONTINUED | OUTPATIENT
Start: 2019-05-18 | End: 2019-05-22

## 2019-05-18 RX ADMIN — CLINDAMYCIN IN 5 PERCENT DEXTROSE 900 MG: 18 INJECTION, SOLUTION INTRAVENOUS at 21:01

## 2019-05-18 RX ADMIN — ONDANSETRON 8 MG: 2 INJECTION INTRAMUSCULAR; INTRAVENOUS at 17:14

## 2019-05-18 RX ADMIN — Medication 10 ML: at 08:23

## 2019-05-18 RX ADMIN — Medication 10 ML: at 20:43

## 2019-05-18 RX ADMIN — EXTENDED PHENYTOIN SODIUM 100 MG: 100 CAPSULE ORAL at 08:35

## 2019-05-18 RX ADMIN — GABAPENTIN 300 MG: 300 CAPSULE ORAL at 17:14

## 2019-05-18 RX ADMIN — PIPERACILLIN AND TAZOBACTAM 3.38 G: 3; .375 INJECTION, POWDER, FOR SOLUTION INTRAVENOUS at 13:32

## 2019-05-18 RX ADMIN — GABAPENTIN 300 MG: 300 CAPSULE ORAL at 23:30

## 2019-05-18 RX ADMIN — Medication 10 MEQ: at 10:00

## 2019-05-18 RX ADMIN — CALCIUM GLUCONATE 2 G: 98 INJECTION, SOLUTION INTRAVENOUS at 08:23

## 2019-05-18 RX ADMIN — MORPHINE SULFATE 2 MG: 2 INJECTION, SOLUTION INTRAMUSCULAR; INTRAVENOUS at 13:25

## 2019-05-18 RX ADMIN — MORPHINE SULFATE 4 MG: 4 INJECTION, SOLUTION INTRAMUSCULAR; INTRAVENOUS at 23:55

## 2019-05-18 RX ADMIN — ONDANSETRON 8 MG: 2 INJECTION INTRAMUSCULAR; INTRAVENOUS at 08:24

## 2019-05-18 RX ADMIN — FAMOTIDINE 20 MG: 20 TABLET, FILM COATED ORAL at 20:42

## 2019-05-18 RX ADMIN — ACETAMINOPHEN 1000 MG: 500 TABLET ORAL at 08:33

## 2019-05-18 RX ADMIN — OXYCODONE HYDROCHLORIDE 10 MG: 5 TABLET ORAL at 17:16

## 2019-05-18 RX ADMIN — DIVALPROEX SODIUM 750 MG: 500 TABLET, DELAYED RELEASE ORAL at 08:36

## 2019-05-18 RX ADMIN — DIVALPROEX SODIUM 750 MG: 500 TABLET, DELAYED RELEASE ORAL at 20:42

## 2019-05-18 RX ADMIN — VANCOMYCIN HYDROCHLORIDE 1000 MG: 1 INJECTION, SOLUTION INTRAVENOUS at 09:13

## 2019-05-18 RX ADMIN — MORPHINE SULFATE 2 MG: 2 INJECTION, SOLUTION INTRAMUSCULAR; INTRAVENOUS at 06:13

## 2019-05-18 RX ADMIN — CLINDAMYCIN IN 5 PERCENT DEXTROSE 900 MG: 18 INJECTION, SOLUTION INTRAVENOUS at 13:28

## 2019-05-18 RX ADMIN — VANCOMYCIN HYDROCHLORIDE 1000 MG: 1 INJECTION, SOLUTION INTRAVENOUS at 20:32

## 2019-05-18 RX ADMIN — PIPERACILLIN AND TAZOBACTAM 3.38 G: 3; .375 INJECTION, POWDER, FOR SOLUTION INTRAVENOUS at 22:00

## 2019-05-18 RX ADMIN — Medication 10 MEQ: at 09:14

## 2019-05-18 RX ADMIN — MORPHINE SULFATE 4 MG: 4 INJECTION, SOLUTION INTRAMUSCULAR; INTRAVENOUS at 20:37

## 2019-05-18 RX ADMIN — Medication 10 MEQ: at 11:30

## 2019-05-18 RX ADMIN — DOCUSATE SODIUM 100 MG: 100 CAPSULE, LIQUID FILLED ORAL at 08:36

## 2019-05-18 RX ADMIN — LEVOTHYROXINE SODIUM 50 MCG: 50 TABLET ORAL at 08:33

## 2019-05-18 RX ADMIN — CLINDAMYCIN IN 5 PERCENT DEXTROSE 900 MG: 18 INJECTION, SOLUTION INTRAVENOUS at 03:45

## 2019-05-18 RX ADMIN — ACETAMINOPHEN 1000 MG: 500 TABLET ORAL at 23:30

## 2019-05-18 RX ADMIN — EXTENDED PHENYTOIN SODIUM 100 MG: 100 CAPSULE ORAL at 20:42

## 2019-05-18 RX ADMIN — Medication 10 MEQ: at 08:23

## 2019-05-18 RX ADMIN — ENOXAPARIN SODIUM 40 MG: 40 INJECTION SUBCUTANEOUS at 08:39

## 2019-05-18 RX ADMIN — PIPERACILLIN AND TAZOBACTAM 3.38 G: 3; .375 INJECTION, POWDER, FOR SOLUTION INTRAVENOUS at 04:04

## 2019-05-18 RX ADMIN — OXYCODONE HYDROCHLORIDE 10 MG: 5 TABLET ORAL at 08:53

## 2019-05-18 RX ADMIN — FAMOTIDINE 20 MG: 20 TABLET, FILM COATED ORAL at 08:35

## 2019-05-18 RX ADMIN — ACETAMINOPHEN 1000 MG: 500 TABLET ORAL at 15:00

## 2019-05-18 RX ADMIN — EXTENDED PHENYTOIN SODIUM 100 MG: 100 CAPSULE ORAL at 13:32

## 2019-05-18 ASSESSMENT — PAIN - FUNCTIONAL ASSESSMENT
PAIN_FUNCTIONAL_ASSESSMENT: PREVENTS OR INTERFERES SOME ACTIVE ACTIVITIES AND ADLS

## 2019-05-18 ASSESSMENT — PAIN DESCRIPTION - ORIENTATION
ORIENTATION: RIGHT

## 2019-05-18 ASSESSMENT — PAIN DESCRIPTION - PAIN TYPE
TYPE: ACUTE PAIN;SURGICAL PAIN

## 2019-05-18 ASSESSMENT — PAIN DESCRIPTION - LOCATION
LOCATION: HIP

## 2019-05-18 ASSESSMENT — PAIN DESCRIPTION - ONSET
ONSET: ON-GOING

## 2019-05-18 ASSESSMENT — PAIN DESCRIPTION - DESCRIPTORS
DESCRIPTORS: SHARP

## 2019-05-18 ASSESSMENT — PAIN DESCRIPTION - FREQUENCY
FREQUENCY: CONTINUOUS

## 2019-05-18 ASSESSMENT — PAIN DESCRIPTION - PROGRESSION
CLINICAL_PROGRESSION: NOT CHANGED

## 2019-05-18 ASSESSMENT — PAIN SCALES - GENERAL
PAINLEVEL_OUTOF10: 0
PAINLEVEL_OUTOF10: 8
PAINLEVEL_OUTOF10: 8
PAINLEVEL_OUTOF10: 7
PAINLEVEL_OUTOF10: 2
PAINLEVEL_OUTOF10: 8
PAINLEVEL_OUTOF10: 5
PAINLEVEL_OUTOF10: 6
PAINLEVEL_OUTOF10: 8
PAINLEVEL_OUTOF10: 2
PAINLEVEL_OUTOF10: 3
PAINLEVEL_OUTOF10: 4
PAINLEVEL_OUTOF10: 10
PAINLEVEL_OUTOF10: 0
PAINLEVEL_OUTOF10: 0
PAINLEVEL_OUTOF10: 4
PAINLEVEL_OUTOF10: 8
PAINLEVEL_OUTOF10: 3
PAINLEVEL_OUTOF10: 8

## 2019-05-18 NOTE — PROGRESS NOTES
resp reg/unlabored. Emilia Reid Pt A&O x4.  Ranks pain at 10/10. Skin fleshtone  W/d. LS with rhonchi in BUL'S. BASES MILDLY  DIMINISHED. RML CTA. SR WITHOUT ECTOPY. RT HIP DRSG D/I.  LARGE ERRYTHEMIC AREA AROUND DRSG AND ON THE LATERAL BACK AND MID THIGH TO GROIN.  FEW SM YELLLOW BLISTERED AREAS NOTED. JUANA X4.

## 2019-05-18 NOTE — PROGRESS NOTES
Pt a/o through out the day. Vitals stable and afebrile. Moderate PO intake today and minimal liquids PO. Lungs clear. Ultra sound IV placed today in rt basilic area by MD. Low urine output but pt was NPO until this afternoon. Severe Rt hip pain. PO 10 mg oxycodone given every 6hr with 2 mg morphine for breakthrough pain. Surgery resident added gabapentin to also help with pain. Dressing changed and reinforced X 2 today. Will continue to monitor.

## 2019-05-18 NOTE — CARE COORDINATION
Case Management Initial Discharge Plan  Aleta Mcdonough,             Met with:patient to discuss discharge plans. Information verified: address, contacts, phone number, , insurance Yes  PCP: Felipa Carrasco [facility]  Date of last visit: not sure,  She states she is looking for a new PCP    Insurance Provider: [employer]    Discharge Planning    Living Arrangements:  Friends   Support Systems:  Friends/Neighbors    Home has  stories   stairs to climb to get into front door, stairs to climb to reach second floor  Location of bedroom/bathroom in home main    Patient able to perform ADL's:Independent    Current Services (outpatient & in home) none  DME equipment: nebulizer  DME provider:     Pharmacy: pal/meds to beds   Potential Assistance Purchasing Medications:  No  Does patient want to participate in local refill/ meds to beds program?  Yes    Potential Assistance Needed:  Home Care, Transportation    Patient agreeable to home care: Yes  Freedom of choice provided:  yes    Prior SNF/Rehab Placement and Facility: no  Agreeable to SNF/Rehab: Yes  [employer] of choice provided: yes   Evaluation: n/a    Expected Discharge date:  19  Patient expects to be discharged to:  home  Follow Up Appointment: Best Day/ Time:      Transportation provider: friend/cab  Transportation arrangements needed for discharge: not sure    Readmission Risk              Risk of Unplanned Readmission:        43             Does patient have a readmission risk score greater than 14?: Yes  If yes, follow-up appointment must be made within 7 days of discharge. Discharge Plan: Pt has necrotizing fasciitis to right hip. Await wound care needs and ID rec's  Pt is agreeable to [facility] care if home. Will need to discuss SNF/LTACH is needs greater than home care can provide. States she has a nebulizer but no medicine for it.           Electronically signed by Maylin Vega RN on 19 at 1:05 PM

## 2019-05-18 NOTE — OP NOTE
[address]                             OPERATIVE REPORT    PATIENT NAME: Jen Phillip                    :         1965  MED REC NO:   [id number]                             ROOM:  ACCOUNT NO:   [de-identified]                           ADMIT DATE:  2019  PROVIDER:     Bashir Aldana    DATE OF PROCEDURE:  2019    PREOPERATIVE DIAGNOSIS:  Necrotizing fasciitis of the right hip and  buttock. POSTOPERATIVE DIAGNOSIS:  Necrotizing fasciitis of the right hip  and buttock. PROCEDURE:  Sharp excisional debridement of necrotizing fasciitis  of the right buttock. ANESTHESIA:  General.    SURGEON:    Elissa Chaidez MD    ASSISTANTS:    Bernice Fine, PGY-3  Hermelindo Tinajero, PGY-1    IV FLUIDS:  1200 mL of crystalloid. ESTIMATED BLOOD LOSS:  50 mL. INDICATIONS:  This is a 49-year-old female who presented to the  emergency department of [facility] with severe increasing pain,  tenderness, and erythema to the right buttock. In the emergency  department, she had an elevated fever at 38.2; however, she was  hemodynamically stable. Labs were significant for a leukocytosis  of 14,900 with a left shift as well as a sodium of 130 and a  lactate of 2.5. CT of the right hip was performed by the emergency   department which demonstrated severe subcutaneous gas and fluid  in the right gluteal region extending into the proximal thigh with   extension into the fascial compartment concerning for necrotizing fasciitis. General surgery was consulted at this time and we described to the patient that she  would need to go to the operating room for an emergent excisional  debridement and drainage procedure. The risks, benefits, and  alternatives were discussed with the patient. All questions were  answered and informed consent at this time with an RN as witness.     DESCRIPTION OF PROCEDURE:      The patient was taken to the operating  room, placed in the supine position. General endotracheal  anesthesia was induced. A time-out was performed, confirming all  personnel present, patient ID, and the procedure to be performed. IV antibiotics were given in accordance with SCIP in the form of  Vancomycin, Zosyn and Clindamycin. After general  endotracheal anesthesia was induced, the patient was safely rolled  onto the left lateral decubitus position. The right hip, buttock,  and right groin was prepped with Hibiclens in a sterile fashion,  and the area was draped accordingly. Using a #10 blade, a circular incision was made around the area  of concern extending along the majority of the patient's right  buttock and hip. In the central portion of the tissue, the skin was   completely necrosed and a significant amount of foul smelling, purulent fluid   rushed incision, at which time cultures were obtained and sent to the lab. The circular specimen was fully excised using electrocautery and passed off   the field to be sent for gross pathology. All surrounding pockets of fluid  were widely opened and drained. All necrotic appearing  tissue was fully and sharply excised until we had healthy appearing  bleeding subcutaneous tissue. When we were convinced that we had  widely debrided and drained the area without any additional  significant necrotic tissue, we proceeded with irrigation of the  entire wound cavity. It should be noted that the wound cavity did  extend down into the proximal thigh and groin as we expected from  the CT scan with a significant amount of purulent fluid that was  drained. Again, all the tissue in that area was debrided  appropriately. Again, copious amount of irrigation was used to  wash out the entire wound and hemostasis was obtained with  electrocautery.     At this point, we then dressed the wound with Betadine moistened  Kerlix, then covered them with multiple dry Kerlix and ABDs which  were fastened to the skin with staples for the postoperative  period. We will plan to return to the operating room tomorrow for  a second look to see if any additional tissue needs to be sharply  debrided. The patient tolerated the procedure well and was safely  extubated at the end of the procedure. All sponge and instrument  counts were correct at the end of the procedure. Dr. Janine Granda was present and participated for the duration of the  case. Jeff Nuno    D: 05/17/2019 19:52:45       T: 05/18/2019 3:00:18     [email or web address]  Job#: [id number]     Doc#: [id number]    CC:  Raymondo Sever. Merrilee Huddle, M.D. [address]        Attending Note      I have reviewed the above TECSS note and I either performed the key elements of the procedure or was present with the resident when the key elements of the procedure were performed. I have discussed the findings, established the care plan and recommendations with resident.     Manuel Joseph MD  5/21/2019  9:04 AM

## 2019-05-18 NOTE — H&P
subcutaneous air on CT scan. Patient reports that symptoms have been ongoing and progressively worsening over last 2 weeks. She believes that she was \"bit \"while she was sleeping 2 weeks ago. Initially she noticed several small bumps on the lateral aspect of the right hip. Patient states that over last 2 weeks she's had significant worsening in pain, redness. Patient has not been previously evaluated for this complaint, is not on antibiotics. Patient reports prior history of skin infections. Last infection was several years ago. Patient denies IV drug use. Patient does state that she's had intermittent fevers, chills in the last 2 weeks. Patient needing Sirs criteria and ER with tachycardia, tachypnea, febrile. Patient was started on vancomycin, Zosyn by ER staff, recommended adding clindamycin to regimen. On my evaluation patient with large area of erythema with both straw-colored and hemorrhagic bullae, mild crepitus on palpation. Significant tenderness. CT scan concerning for large area of subcutaneous emphysema to right hip. Patient awake alert oriented, denies shortness of breath, chest pain, abdominal pain, nausea or vomiting. Patient does report prior history of  section. Patient states that she is not eating in several days due to her pain. Past Medical History   has a past medical history of Arthritis, Asthma, CHF (congestive heart failure) ([address] COPD (chronic obstructive pulmonary disease) ([address] Fibromyalgia, Headache, Hypertension, Movement disorder, Neck fracture ([address] Seizure ([address] and Thyroid disease. Past Surgical History   has a past surgical history that includes knee surgery; partial hysterectomy (cervix not removed); and lymph node dissection. Medications  Prior to Admission medications    Medication Sig Start Date End Date Taking?  Authorizing Provider   aspirin (ASPIRIN LOW DOSE) 81 MG EC tablet Take 1 tablet by mouth daily meds to beds 3/2/19  Yes Liliana Chavez Ally Hernandez CNP   divalproex (DEPAKOTE) 250 MG DR tablet Take 3 tablets by mouth every 12 hours 3/1/19  Yes KEANU Powell CNP   phenytoin (DILANTIN) 100 MG ER capsule Take 1 capsule by mouth 3 times daily 3/1/19  Yes KEANU Powell CNP   mirtazapine (REMERON) 45 MG tablet Take 1 tablet by mouth nightly 3/1/19  Yes KEANU Powell CNP   traZODone (DESYREL) 100 MG tablet Take 1 tablet by mouth nightly as needed for Sleep 3/1/19  Yes KEANU Powell CNP   risperiDONE (RISPERDAL) 2 MG tablet Take 1 tablet by mouth nightly 3/1/19  Yes KEANU Powell CNP   metoprolol tartrate (LOPRESSOR) 50 MG tablet Take 1 tablet by mouth 2 times daily 3/1/19  Yes KEANU Powell CNP   hydrochlorothiazide (HYDRODIURIL) 25 MG tablet Take 1 tablet by mouth daily 3/2/19  Yes KEANU Powell CNP   PHENobarbital (LUMINAL) 32.4 MG tablet Take 32.4 mg by mouth 3 times daily. .   Yes Historical Provider, MD   tiZANidine (ZANAFLEX) 2 MG tablet Take 2 mg by mouth every 8 hours as needed   Yes Historical Provider, MD   famotidine (PEPCID) 20 MG tablet Take 20 mg by mouth 2 times daily   Yes Historical Provider, MD   clopidogrel (PLAVIX) 75 MG tablet clopidogrel 75 mg tablet   Yes Historical Provider, MD   levothyroxine (SYNTHROID) 50 MCG tablet Take 50 mcg by mouth Daily   Yes Historical Provider, MD   albuterol sulfate HFA (PROAIR HFA) 108 (90 Base) MCG/ACT inhaler Inhale 2 puffs into the lungs every 6 hours as needed for Wheezing 3/1/19   KEANU Powell CNP   fluticasone Rice [place]) 50 MCG/ACT nasal spray 1 spray by Each Nare route daily 3/2/19   KEANU Powell CNP   albuterol (PROVENTIL) (2.5 MG/3ML) 0.083% nebulizer solution  12/7/18   Historical Provider, MD   nitroGLYCERIN (NITROSTAT) 0.4 MG SL tablet Nitrostat 0.4 mg sublingual tablet    Historical Provider, MD   acetaminophen (APAP EXTRA STRENGTH) 500 MG tablet Take 2 tablets by mouth every 6 hours as needed for Pain 11/4/18   Urszula Hinojosa MD    Scheduled Meds:   [START ON 5/18/2019] vancomycin  1,000 mg Intravenous Q12H    vancomycin (VANCOCIN) intermittent dosing (placeholder)   Other RX Placeholder    clindamycin (CLEOCIN) IV  900 mg Intravenous Once     Continuous Infusions:  PRN Meds:. Allergies  is allergic to imitrex [sumatriptan]; bee pollen; bee venom; bromide ion [bromine]; flexeril [cyclobenzaprine]; ketorolac; naproxen; nsaids; potassium bromide; reglan [metoclopramide]; sulfa antibiotics; sulfadiazine; toradol [ketorolac tromethamine]; and tramadol. Family History  family history is not on file. Social History   reports that she has been smoking. She has a 6.00 pack-year smoking history. She has never used smokeless tobacco.   reports that she does not drink alcohol. reports that she does not use drugs. Review of Systems  General positive for fevers, chills  HEENT  Denies any diplopia, tinnitus or vertigo  Resp Denies any shortness of breath, cough or wheezing  Cardiac Denies any chest pain, palpitations, claudication or edema  GI Denies any melena, hematochezia, hematemesis or pyrosis   Denies any frequency, urgency, hesitancy or incontinence  Heme Denies bruising or bleeding easily  Endocrine reports prediabetic, thyroid disease  Neuro Denies any focal motor or sensory deficits  Skin right hip drainage, erythema, blistering    PHYSICAL:   VITALS:  height is 5' 2\" (1.575 m) and weight is 150 lb (68 kg). Her oral temperature is 100.8 °F (38.2 °C). Her blood pressure is 110/60 and her pulse is 101. Her respiration is 22 and oxygen saturation is 99%. CONSTITUTIONAL: Alert and oriented times 3, moderate distress  HEENT: Head is normocephalic, atraumatic. EOMI, PERRLA  NECK: Soft, trachea midline and straight  LUNGS: Chest expands equally bilaterally upon respiration, no accessory muscle used.    CARDIOVASCULAR: Heart sounds are normal.  Tachycardia low 100s   ABDOMEN: soft, nontender, nondistended, no masses or organomegaly, no hernias Thank you for the interesting evaluation. Further recommendations to follow. Frededwin Nancy  5/17/2019, 6:11 PM         Attending Note    Patientsee as a general surgery consult on 5/17 with bullae and cellulitis of right hip which as been present for 2 weeks. CT suggestive of abscess  To OR for debridemnt  I have reviewed the above TECSS note(s) and I either performed the key elements of the medical history and physical exam or was present with the resident when the key elements of the medical history and physical exam were performed.  I have discussed the findings, established the care plan and recommendations with Resident Nafisa and Lelia Mcconnell MD  5/17/2019  9:50 PM

## 2019-05-18 NOTE — OP NOTE
[address]               [facility]                             OPERATIVE REPORT    PATIENT NAME: Ashish Wood                    :         1965  MED REC NO:   [id number]                             ROOM:  ACCOUNT NO:   [de-identified]                           ADMIT DATE:  2019  PROVIDER:     Arturo Cordon    DATE OF PROCEDURE:  2019    PREOPERATIVE DIAGNOSIS:  Necrotizing fasciitis of the right hip and  buttock. POSTOPERATIVE DIAGNOSIS:  Necrotizing fasciitis of the right hip  and buttock. PROCEDURE:  Sharp excisional debridement of necrotizing fasciitis  of the right buttock. ANESTHESIA:  General.    SURGEON:  Amy Nicolas MD    ASSISTANTS:  Sydnee Burns, PGY-3; Lakeshia Gandhi, PGY-1    IV FLUIDS:  1200 mL of crystalloid. ESTIMATED BLOOD LOSS:  50 mL. INDICATIONS:  This is a 78-year-old female who presented to the  emergency department of [facility] with severe increasing pain,  tenderness, and erythema to the right buttock. In the emergency  department, she had an elevated fever at 38.2; however, she was  hemodynamically stable. Labs were significant for a leukocytosis  of 14,900 with a left shift as well as a sodium of 130 and a  lactate of 2.5. However, her CRP was not drawn. CT of the right  hip was performed by the emergency department which demonstrated  severe subcutaneous _____ in the right gluteal region extending  into the proximal thigh with extension into the fascial compartment  concerning for necrotizing fasciitis. General surgery was  consulted at this time and we described to the patient that she  would need to go to the operating room for an emergent excisional  debridement and drainage procedure. The risks, benefits, and  alternatives were discussed with the patient. All questions were  answered and informed consent at this time with an RN as witness.     DESCRIPTION OF PROCEDURE:  The patient was taken to the operating  room, placed in the supine position. General endotracheal  anesthesia was induced. A time-out was performed, confirming all  personnel present, patient ID, and the procedure to be performed. IV antibiotics were given in accordance with the vancomycin and  Zosyn as well as a dose of clindamycin as well. After general  endotracheal anesthesia was induced, the patient was safely rolled  onto the left lateral decubitus position. The right hip, buttock,  and right groin was prepped with Hibiclens in a sterile fashion,  and the area was draped accordingly. Using a #10 blade, a circular incision was made around the area  concerning extending along the majority of the patient's right  buttock and hip and extending onto the proximal groin. In the  central portion of the tissue, the skin was completely necrosed and  the underlying tissue was consistent with necrotizing fasciitis  upon incision. A significant amount of _____ purulent matter came  rushing from the incision which was cultured and sent to the  microbiology lab. The circular specimen was fully excised using  electrocautery and passed off the field to be sent for gross  pathology. All the pockets surrounding the large amount of tissue  excised were widely drained and opened. All necrotic appearing  tissue was fully and sharply excised until we had healthy appearing  bleeding subcutaneous tissue. When we were convinced that we had  widely debrided and drained the area without any additional  significant necrotic tissue, we proceeded with irrigation of the  entire wound cavity. It should be noted that the wound cavity did  extend down into the proximal thigh and groin as we expected from  the CT scan with a significant amount of purulent fluid that was  drained. Again, all the tissue in that area was debrided  appropriately.   Again, copious amount of irrigation was used to  wash out the entire wound and hemostasis was obtained with  electrocautery. At this point, we then dressed the wound with Betadine moistened  Kerlix, then covered them with multiple dry Kerlix and ABDs which  were fastened to the skin with staples for the postoperative  period. We will plan to return to the operating room tomorrow for  a second look to see if any additional tissue needs to be sharply  debrided. The patient tolerated the procedure well and was safely  extubated at the end of the procedure. All sponge and instrument  counts were correct at the end of the procedure. Dr. Nano Concepcion was present and participated for the duration of the  case. Roland Teresa    D: 05/17/2019 19:52:45       T: 05/18/2019 3:00:18     [email or web address]  Job#: [id number]     Doc#: [id number]    CC:  Codi Encinas. Alexei Newell M.D. [address]        Attending Note      I have reviewed the above TECSS note and I either performed the key elements of the procedure or was present with the resident when the key elements of the procedure were performed. I have discussed the findings, established the care plan and recommendations with resident.     Nitin Shah MD  5/18/2019  8:32 AM

## 2019-05-18 NOTE — PROGRESS NOTES
PT TRANSFERRED TO ICU IN STABLE CONDITION WITH RN X2, EKG, 02 SAT AND BP MONITORING  ALERT AND ORIENTED X4. RANKS PAIN AT 3/10. DRSG ANTERIOR D/I. POSTERIOR WITH YELLOW FOUL SMELLING DRNG.   ERRYTHEMIA UNCHANGED. IV INFUSING.

## 2019-05-18 NOTE — PROGRESS NOTES
ICU PROGRESS NOTE      PATIENT NAME: Frances Casper  MEDICAL RECORD NO. [id number]  DATE: 5/18/2019    PRIMARY CARE PHYSICIAN: No primary care provider on file. HD: # 1    ASSESSMENT    Patient Active Problem List   Diagnosis    Chest pain    Migraine variant with headache    Drug overdose, accidental or unintentional, initial encounter    Hypothyroidism    Essential hypertension    [facility]    Drug overdose    History of seizure    Severe major depression with psychotic features ([facility]    Severe major depression ([facility]    Overdose of barbiturate, intentional self-harm, initial encounter ([facility]    Intentional phenobarbital overdose ([facility]    Severe episode of recurrent major depressive disorder, without psychotic features ([facility]    Bronchitis    Suicide attempt ([facility]    Major depressive disorder, recurrent ([facility]    Ear infection    Necrotizing fasciitis ([facility]    Sepsis affecting skin (HCC)    Leukocytosis    Elevated lactic acid level    Hyponatremia       MEDICAL DECISION MAKING AND PLAN    1. Neuro  1. Pain control - Tylenol, marquez and morphine PRN  2. Seizure hx - restart home dilantin and depakote  3. Significant psych hx including depression and barbituate overdose - verify meds with pharmacy prior to restarting   2. CV  1. Hemodynamically stable  2. Regular rate and rhythm  3. MAPs 60-65 overnight, SBP running 90s  4. Continue to monitor BP  5. Hold home BP meds at this time  3. Pulm  1. O2 sat >95% on 3 L NC, wean as able  2. Home albuterol restarted PRN  4. GI  1. NPO  2. IVF  3. Pepcid BID - home med  4. Bowel regimen: colace and MOM PRN  5. Renal  1. Continue LR @125cc/hr   2. Strict I&Os  3. UOP adequate, >1cc/kg/hr  6. Heme/ID  1. Necrotizing fasciitis of the right lateral thigh and buttock  2. Follow up AM labs  3. Hgb 13.3 preoperatively  4. WBC 14.9 preoperatively  5. Follow up wound cultures  6.  Continue Vanc, Zosyn and Clinda at this time  7. Endocrine  1. Hypothyroidism - continue home synthroid 50mcg  2. BS stable  8. Ppx  1. SCDs  2. Hold AC at this time for return to OR  9. Dispo  1. Continue ICU at this time  2. Plan for second look in the OR to assess wound for possible additional debridement and dressing change vs wound vac placement       SUBJECTIVE    Aleta Mcdonough was seen and examined postoperatively. POD#1 s/p sharp excisional debridement of right lateral thigh and buttock necrotizing fasciitis. Patient has been resting comfortably since transfer to the unit postoperatively. Denies pain at this time. NPO. UOP >1 cc/kg/hr. OBJECTIVE  VITALS: Temp: Temp: 97.2 °F (36.2 °C)Temp  Av.6 °F (37 °C)  Min: 97.2 °F (36.2 °C)  Max: 100.8 °F (09.4 °C) BP Systolic (77RXQ), LAY:475 , Min:66 , HJE:791   Diastolic (59XAE), HCZ:65, Min:39, Max:121   Pulse Pulse  Av.5  Min: 84  Max: 113 Resp Resp  Av.6  Min: 0  Max: 34 Pulse ox SpO2  Av.2 %  Min: 96 %  Max: 100 %    CONSTITUTIONAL: awake, alert, cooperative, no apparent distress  HEAD: atraumatic, normocephalic  EYES: sclera clear, pupils equal and reactive to light  ENT: ears are symmetric, nares patent   HEENT: moist mucous membranes  NECK: Supple, symmetrical, trachea midline  LUNGS: no respiratory distress, no audible wheezing  CARDIOVASCULAR: +S1/S2  ABDOMEN: soft, nontender, nondistended, no guarding, no rebound tenderness   MUSCULOSKELETAL: full range of motion noted  NEUROLOGIC: Awake, alert, oriented to name, place and time  EXTREMITIES: peripheral pulses normal  SKIN: Right hip dressings in place, C/D/I, erythema to right medial thigh    LAB:  CBC:   Recent Labs     19  1529   WBC 14.9*   HGB 13.3   HCT 38.2   MCV 86.4   PLT Platelet clumps present, count appears adequate.      BMP:   Recent Labs     19  1644   *   K 3.4*   CL 89*   CO2 24   BUN 19   CREATININE 0.65   GLUCOSE 101*         RADIOLOGY:  Xr Chest Portable    Result Date: unremarkable. No free fluid in the pelvis. Mild right inguinal lymphadenopathy. Joint:  Mild degenerative changes of the bilateral hips. Mild bilateral sacroiliac joint and pubic symphysis degenerative changes. 1. Severe subcutaneous fat stranding of the right gluteal region extending into the proximal thigh. Significant gas in the gluteal subcutaneous fat raising the possibility of a necrotizing cellulitis. No well-defined drainable fluid collection or involvement of the underlying musculature. 2. No acute osseous abnormality. Findings were discussed with Dr. Halle Jasso of the [facility] emergency department at 6:38 pm on 5/17/2019. Shreya Garcia MD  General Surgery PGY1  Pager Number: [phone]        PROGRESS NOTE    PATIENT NAME: Otto Rose  MEDICAL RECORD NO. [id number]  DATE: 5/18/2019  SURGEON: heather  PRIMARY CARE PHYSICIAN: No primary care provider on file. HD: # 1    ASSESSMENT  Patient Active Problem List   Diagnosis    Chest pain    Migraine variant with headache    Drug overdose, accidental or unintentional, initial encounter    Hypothyroidism    Essential hypertension    Seizure disorder ([address]    Drug overdose    History of seizure    Severe major depression with psychotic features ([address]    Severe major depression ([address]    Overdose of barbiturate, intentional self-harm, initial encounter ([address]    Intentional phenobarbital overdose ([address]    Severe episode of recurrent major depressive disorder, without psychotic features ([address]    Bronchitis    Suicide attempt ([address]    Major depressive disorder, recurrent ([address]    Ear infection    Necrotizing fasciitis ([address]    Sepsis affecting skin ([address]    Leukocytosis    Elevated lactic acid level    Hyponatremia     New diagnoses: none    PLAN  1.  To OR early am 5/19 [place] second look, additional debridement if needed, possible initiation of wound vac systrm     SUBJECTIVE  Patient is doing well. Pain is controlled. she is tolerating a Diet NPO Effective Now diet. Patient is tolerating bedrest with bathroom privileges. Patient is passing flatus and has had a bowel movement. Patient denies nausea or vomiting.      OBJECTIVE  VITALS   Patient Vitals for the past 24 hrs:   BP Temp Temp src Pulse Resp SpO2 Height Weight   05/18/19 0800 89/64 97.5 °F (36.4 °C) Oral 71 21 96 % -- --   05/18/19 0400 -- 99.7 °F (37.6 °C) Oral -- -- -- -- --   05/18/19 0000 -- 97.9 °F (36.6 °C) Oral -- -- -- -- --   05/17/19 2300 91/65 -- -- 90 21 -- -- --   05/17/19 2240 -- -- -- 87 18 -- -- --   05/17/19 2230 (!) 90/59 -- -- 84 21 -- -- --   05/17/19 2220 -- -- -- 87 16 -- -- --   05/17/19 2210 (!) 97/58 -- -- 84 18 -- -- --   05/17/19 2200 (!) 92/52 -- -- 90 16 -- -- --   05/17/19 2154 -- 97.2 °F (36.2 °C) Temporal -- -- -- -- --   05/17/19 2150 100/61 -- -- 88 27 -- -- --   05/17/19 2140 101/65 -- -- 90 27 -- -- --   05/17/19 2130 101/70 -- -- 88 26 -- -- --   05/17/19 2120 98/61 -- -- 91 20 -- -- --   05/17/19 2110 (!) 93/56 -- -- 91 23 -- -- --   05/17/19 2100 (!) 93/56 -- -- 91 24 97 % -- --   05/17/19 2050 -- -- -- 95 25 -- -- --   05/17/19 2045 (!) 92/53 -- -- 94 26 100 % -- --   05/17/19 2040 (!) 92/54 -- -- 96 26 -- -- --   05/17/19 2030 (!) 96/54 -- -- 98 26 100 % -- --   05/17/19 2020 (!) 102/53 -- -- 106 20 -- -- --   05/17/19 2015 107/61 -- -- 111 27 100 % -- --   05/17/19 2011 (!) 92/53 98.2 °F (36.8 °C) Temporal 92 23 96 % -- --   05/17/19 1821 -- -- -- 100 -- -- -- --   05/17/19 1804 110/60 -- -- 101 22 99 % -- --   05/17/19 1745 131/79 100.8 °F (38.2 °C) Oral 103 25 98 % -- --   05/17/19 1636 117/76 -- -- 108 (!) 34 99 % -- --   05/17/19 1623 117/71 -- -- 110 (!) 31 100 % -- --   05/17/19 1510 116/78 98.2 °F (36.8 °C) Oral 113 (!) 32 100 % 5' 2\" (1.575 m) 150 lb (68 kg)     GENERAL: alert  NEUROLOGIC: alert, oriented, normal speech, no focal findings or movement disorder noted  LUNGS: clear to auscultation bilaterally- no wheezes, rales or rhonchi, normal air movement, no respiratory distress  HEART: normal rate, normal S1 and S2, no gallops, intact distal pulses and no carotid bruits  ABDOMEN: soft, non-tender, non-distended, normal bowel sounds, no masses or organomegaly  WOUNDS: see media:  Outer dressing removed  EXTREMITY: no cyanosis and no clubbing    24 HR INTAKE/OUTPUT:     Intake/Output Summary (Last 24 hours) at 5/18/2019 0949  Last data filed at 5/17/2019 2327  Gross per 24 hour   Intake 1450 ml   Output 1425 ml   Net 25 ml       Chest X-Ray: See radiology report    LABS:  CBC:   Recent Labs     05/17/19  1529 05/18/19  0527   WBC 14.9* 12.0*   HGB 13.3 8.9*   HCT 38.2 26.1*   MCV 86.4 84.2   PLT Platelet clumps present, count appears adequate. 312     BMP: [email or web address]  COAGS:   Recent Labs     05/17/19  1601 05/17/19  1644   APTT 24.0  --    PROT  --  7.1   INR 1.0  --      PANCREAS:  No results for input(s): LIPASE, AMYLASE in the last 72 hours.   LIVER:   Recent Labs     05/17/19  1644   AST 23   ALT 13   BILITOT 0.93   ALKPHOS 79     CBC:   Lab Results   Component Value Date    WBC 12.0 05/18/2019    RBC 3.10 05/18/2019    HGB 8.9 05/18/2019    HCT 26.1 05/18/2019    MCV 84.2 05/18/2019    MCH 28.7 05/18/2019    MCHC 34.1 05/18/2019    RDW 14.3 05/18/2019     05/18/2019    MPV 11.1 05/18/2019       Jerome Churchill MD  5/18/19, 9:49 AM

## 2019-05-19 ENCOUNTER — ANESTHESIA (OUTPATIENT)
Dept: OPERATING ROOM | Age: 54
DRG: 720 | End: 2019-05-19
Payer: MEDICARE

## 2019-05-19 VITALS — SYSTOLIC BLOOD PRESSURE: 98 MMHG | OXYGEN SATURATION: 100 % | TEMPERATURE: 95.8 F | DIASTOLIC BLOOD PRESSURE: 83 MMHG

## 2019-05-19 LAB
ABSOLUTE EOS #: 0 K/UL (ref 0–0.4)
ABSOLUTE IMMATURE GRANULOCYTE: 0.25 K/UL (ref 0–0.3)
ABSOLUTE LYMPH #: 2.83 K/UL (ref 1–4.8)
ABSOLUTE MONO #: 0.12 K/UL (ref 0.1–0.8)
ANION GAP SERPL CALCULATED.3IONS-SCNC: 15 MMOL/L (ref 9–17)
BASOPHILS # BLD: 0 % (ref 0–2)
BASOPHILS ABSOLUTE: 0 K/UL (ref 0–0.2)
BUN BLDV-MCNC: 21 MG/DL (ref 6–20)
BUN/CREAT BLD: ABNORMAL (ref 9–20)
CALCIUM IONIZED: 1.07 MMOL/L (ref 1.13–1.33)
CALCIUM SERPL-MCNC: 7.4 MG/DL (ref 8.6–10.4)
CHLORIDE BLD-SCNC: 97 MMOL/L (ref 98–107)
CO2: 20 MMOL/L (ref 20–31)
CREAT SERPL-MCNC: 0.86 MG/DL (ref 0.5–0.9)
DIFFERENTIAL TYPE: ABNORMAL
EOSINOPHILS RELATIVE PERCENT: 0 % (ref 1–4)
GFR AFRICAN AMERICAN: >60 ML/MIN
GFR NON-AFRICAN AMERICAN: >60 ML/MIN
GFR SERPL CREATININE-BSD FRML MDRD: ABNORMAL ML/MIN/{1.73_M2}
GFR SERPL CREATININE-BSD FRML MDRD: ABNORMAL ML/MIN/{1.73_M2}
GLUCOSE BLD-MCNC: 106 MG/DL (ref 70–99)
HCT VFR BLD CALC: 27.7 % (ref 36.3–47.1)
HEMOGLOBIN: 8.8 G/DL (ref 11.9–15.1)
IMMATURE GRANULOCYTES: 2 %
INR BLD: 0.9
LACTIC ACID, WHOLE BLOOD: 2.5 MMOL/L (ref 0.7–2.1)
LYMPHOCYTES # BLD: 23 % (ref 24–44)
MAGNESIUM: 1.7 MG/DL (ref 1.6–2.6)
MCH RBC QN AUTO: 29.2 PG (ref 25.2–33.5)
MCHC RBC AUTO-ENTMCNC: 31.8 G/DL (ref 28.4–34.8)
MCV RBC AUTO: 92 FL (ref 82.6–102.9)
MONOCYTES # BLD: 1 % (ref 1–7)
MORPHOLOGY: ABNORMAL
MORPHOLOGY: ABNORMAL
NRBC AUTOMATED: 0 PER 100 WBC
PARTIAL THROMBOPLASTIN TIME: 20.5 SEC (ref 20.5–30.5)
PDW BLD-RTO: 14.9 % (ref 11.8–14.4)
PLATELET # BLD: 401 K/UL (ref 138–453)
PLATELET ESTIMATE: ABNORMAL
PMV BLD AUTO: 10.8 FL (ref 8.1–13.5)
POTASSIUM SERPL-SCNC: 3 MMOL/L (ref 3.7–5.3)
PROTHROMBIN TIME: 9.5 SEC (ref 9–12)
RBC # BLD: 3.01 M/UL (ref 3.95–5.11)
RBC # BLD: ABNORMAL 10*6/UL
SEG NEUTROPHILS: 74 % (ref 36–66)
SEGMENTED NEUTROPHILS ABSOLUTE COUNT: 9.1 K/UL (ref 1.8–7.7)
SODIUM BLD-SCNC: 132 MMOL/L (ref 135–144)
VANCOMYCIN TROUGH DATE LAST DOSE: NORMAL
VANCOMYCIN TROUGH DOSE AMOUNT: NORMAL
VANCOMYCIN TROUGH TIME LAST DOSE: NORMAL
VANCOMYCIN TROUGH: 15.1 UG/ML (ref 10–20)
WBC # BLD: 12.3 K/UL (ref 3.5–11.3)
WBC # BLD: ABNORMAL 10*3/UL

## 2019-05-19 PROCEDURE — 2709999900 HC NON-CHARGEABLE SUPPLY: Performed by: SURGERY

## 2019-05-19 PROCEDURE — 36415 COLL VENOUS BLD VENIPUNCTURE: CPT

## 2019-05-19 PROCEDURE — 3600000013 HC SURGERY LEVEL 3 ADDTL 15MIN: Performed by: SURGERY

## 2019-05-19 PROCEDURE — 6360000002 HC RX W HCPCS: Performed by: STUDENT IN AN ORGANIZED HEALTH CARE EDUCATION/TRAINING PROGRAM

## 2019-05-19 PROCEDURE — 2580000003 HC RX 258: Performed by: SPECIALIST

## 2019-05-19 PROCEDURE — 3700000000 HC ANESTHESIA ATTENDED CARE: Performed by: SURGERY

## 2019-05-19 PROCEDURE — 3700000001 HC ADD 15 MINUTES (ANESTHESIA): Performed by: SURGERY

## 2019-05-19 PROCEDURE — 2500000003 HC RX 250 WO HCPCS: Performed by: STUDENT IN AN ORGANIZED HEALTH CARE EDUCATION/TRAINING PROGRAM

## 2019-05-19 PROCEDURE — 80048 BASIC METABOLIC PNL TOTAL CA: CPT

## 2019-05-19 PROCEDURE — 6370000000 HC RX 637 (ALT 250 FOR IP): Performed by: STUDENT IN AN ORGANIZED HEALTH CARE EDUCATION/TRAINING PROGRAM

## 2019-05-19 PROCEDURE — 80202 ASSAY OF VANCOMYCIN: CPT

## 2019-05-19 PROCEDURE — 83605 ASSAY OF LACTIC ACID: CPT

## 2019-05-19 PROCEDURE — 7100000001 HC PACU RECOVERY - ADDTL 15 MIN: Performed by: SURGERY

## 2019-05-19 PROCEDURE — 2000000000 HC ICU R&B

## 2019-05-19 PROCEDURE — 2580000003 HC RX 258: Performed by: STUDENT IN AN ORGANIZED HEALTH CARE EDUCATION/TRAINING PROGRAM

## 2019-05-19 PROCEDURE — 2700000000 HC OXYGEN THERAPY PER DAY

## 2019-05-19 PROCEDURE — 2W1NX6Z COMPRESSION OF RIGHT UPPER LEG USING PRESSURE DRESSING: ICD-10-PCS | Performed by: SURGERY

## 2019-05-19 PROCEDURE — 6360000002 HC RX W HCPCS: Performed by: SPECIALIST

## 2019-05-19 PROCEDURE — 85025 COMPLETE CBC W/AUTO DIFF WBC: CPT

## 2019-05-19 PROCEDURE — 6360000002 HC RX W HCPCS: Performed by: ANESTHESIOLOGY

## 2019-05-19 PROCEDURE — 2580000003 HC RX 258: Performed by: SURGERY

## 2019-05-19 PROCEDURE — 83735 ASSAY OF MAGNESIUM: CPT

## 2019-05-19 PROCEDURE — 85730 THROMBOPLASTIN TIME PARTIAL: CPT

## 2019-05-19 PROCEDURE — 94761 N-INVAS EAR/PLS OXIMETRY MLT: CPT

## 2019-05-19 PROCEDURE — 3600000003 HC SURGERY LEVEL 3 BASE: Performed by: SURGERY

## 2019-05-19 PROCEDURE — 7100000000 HC PACU RECOVERY - FIRST 15 MIN: Performed by: SURGERY

## 2019-05-19 PROCEDURE — 82330 ASSAY OF CALCIUM: CPT

## 2019-05-19 PROCEDURE — 2500000003 HC RX 250 WO HCPCS: Performed by: SPECIALIST

## 2019-05-19 PROCEDURE — 85610 PROTHROMBIN TIME: CPT

## 2019-05-19 RX ORDER — FENTANYL CITRATE 50 UG/ML
25 INJECTION, SOLUTION INTRAMUSCULAR; INTRAVENOUS EVERY 5 MIN PRN
Status: DISCONTINUED | OUTPATIENT
Start: 2019-05-19 | End: 2019-05-19 | Stop reason: HOSPADM

## 2019-05-19 RX ORDER — OXYCODONE HYDROCHLORIDE 5 MG/1
10 TABLET ORAL EVERY 4 HOURS PRN
Status: DISCONTINUED | OUTPATIENT
Start: 2019-05-19 | End: 2019-05-24

## 2019-05-19 RX ORDER — OXYCODONE HYDROCHLORIDE 5 MG/1
5 TABLET ORAL EVERY 4 HOURS PRN
Status: DISCONTINUED | OUTPATIENT
Start: 2019-05-19 | End: 2019-05-24

## 2019-05-19 RX ORDER — MEPERIDINE HYDROCHLORIDE 50 MG/ML
12.5 INJECTION INTRAMUSCULAR; INTRAVENOUS; SUBCUTANEOUS EVERY 5 MIN PRN
Status: DISCONTINUED | OUTPATIENT
Start: 2019-05-19 | End: 2019-05-19 | Stop reason: HOSPADM

## 2019-05-19 RX ORDER — DEXTROSE, SODIUM CHLORIDE, SODIUM LACTATE, POTASSIUM CHLORIDE, AND CALCIUM CHLORIDE 5; .6; .31; .03; .02 G/100ML; G/100ML; G/100ML; G/100ML; G/100ML
INJECTION, SOLUTION INTRAVENOUS CONTINUOUS PRN
Status: DISCONTINUED | OUTPATIENT
Start: 2019-05-19 | End: 2019-05-19 | Stop reason: SDUPTHER

## 2019-05-19 RX ORDER — NEOSTIGMINE METHYLSULFATE 5 MG/5 ML
SYRINGE (ML) INTRAVENOUS PRN
Status: DISCONTINUED | OUTPATIENT
Start: 2019-05-19 | End: 2019-05-19 | Stop reason: SDUPTHER

## 2019-05-19 RX ORDER — POTASSIUM CHLORIDE 7.45 MG/ML
10 INJECTION INTRAVENOUS
Status: DISPENSED | OUTPATIENT
Start: 2019-05-19 | End: 2019-05-19

## 2019-05-19 RX ORDER — ONDANSETRON 2 MG/ML
INJECTION INTRAMUSCULAR; INTRAVENOUS PRN
Status: DISCONTINUED | OUTPATIENT
Start: 2019-05-19 | End: 2019-05-19 | Stop reason: SDUPTHER

## 2019-05-19 RX ORDER — TIZANIDINE 2 MG/1
2 TABLET ORAL EVERY 8 HOURS PRN
Status: DISCONTINUED | OUTPATIENT
Start: 2019-05-19 | End: 2019-05-20

## 2019-05-19 RX ORDER — FENTANYL CITRATE 50 UG/ML
50 INJECTION, SOLUTION INTRAMUSCULAR; INTRAVENOUS EVERY 5 MIN PRN
Status: DISCONTINUED | OUTPATIENT
Start: 2019-05-19 | End: 2019-05-19 | Stop reason: HOSPADM

## 2019-05-19 RX ORDER — BUSPIRONE HYDROCHLORIDE 15 MG/1
15 TABLET ORAL 3 TIMES DAILY
Status: DISCONTINUED | OUTPATIENT
Start: 2019-05-19 | End: 2019-05-20

## 2019-05-19 RX ORDER — GLYCOPYRROLATE 1 MG/5 ML
SYRINGE (ML) INTRAVENOUS PRN
Status: DISCONTINUED | OUTPATIENT
Start: 2019-05-19 | End: 2019-05-19 | Stop reason: SDUPTHER

## 2019-05-19 RX ORDER — VANCOMYCIN HYDROCHLORIDE 1 G/20ML
INJECTION, POWDER, LYOPHILIZED, FOR SOLUTION INTRAVENOUS PRN
Status: DISCONTINUED | OUTPATIENT
Start: 2019-05-19 | End: 2019-05-19 | Stop reason: SDUPTHER

## 2019-05-19 RX ORDER — ALBUTEROL SULFATE 2.5 MG/3ML
2.5 SOLUTION RESPIRATORY (INHALATION) EVERY 4 HOURS PRN
Status: DISCONTINUED | OUTPATIENT
Start: 2019-05-19 | End: 2019-05-29 | Stop reason: HOSPADM

## 2019-05-19 RX ORDER — LIDOCAINE HYDROCHLORIDE 10 MG/ML
INJECTION, SOLUTION EPIDURAL; INFILTRATION; INTRACAUDAL; PERINEURAL PRN
Status: DISCONTINUED | OUTPATIENT
Start: 2019-05-19 | End: 2019-05-19 | Stop reason: SDUPTHER

## 2019-05-19 RX ORDER — RISPERIDONE 2 MG/1
2 TABLET, FILM COATED ORAL NIGHTLY
Status: DISCONTINUED | OUTPATIENT
Start: 2019-05-19 | End: 2019-05-29 | Stop reason: HOSPADM

## 2019-05-19 RX ORDER — ONDANSETRON 2 MG/ML
4 INJECTION INTRAMUSCULAR; INTRAVENOUS
Status: DISCONTINUED | OUTPATIENT
Start: 2019-05-19 | End: 2019-05-19 | Stop reason: HOSPADM

## 2019-05-19 RX ORDER — PROPOFOL 10 MG/ML
INJECTION, EMULSION INTRAVENOUS PRN
Status: DISCONTINUED | OUTPATIENT
Start: 2019-05-19 | End: 2019-05-19 | Stop reason: SDUPTHER

## 2019-05-19 RX ORDER — MAGNESIUM HYDROXIDE 1200 MG/15ML
LIQUID ORAL CONTINUOUS PRN
Status: COMPLETED | OUTPATIENT
Start: 2019-05-19 | End: 2019-05-19

## 2019-05-19 RX ORDER — POTASSIUM CHLORIDE 20 MEQ/1
40 TABLET, EXTENDED RELEASE ORAL ONCE
Status: COMPLETED | OUTPATIENT
Start: 2019-05-19 | End: 2019-05-19

## 2019-05-19 RX ORDER — FENTANYL CITRATE 50 UG/ML
INJECTION, SOLUTION INTRAMUSCULAR; INTRAVENOUS PRN
Status: DISCONTINUED | OUTPATIENT
Start: 2019-05-19 | End: 2019-05-19 | Stop reason: SDUPTHER

## 2019-05-19 RX ORDER — TRAZODONE HYDROCHLORIDE 100 MG/1
100 TABLET ORAL NIGHTLY PRN
Status: DISCONTINUED | OUTPATIENT
Start: 2019-05-19 | End: 2019-05-20

## 2019-05-19 RX ORDER — ROCURONIUM BROMIDE 10 MG/ML
INJECTION, SOLUTION INTRAVENOUS PRN
Status: DISCONTINUED | OUTPATIENT
Start: 2019-05-19 | End: 2019-05-19 | Stop reason: SDUPTHER

## 2019-05-19 RX ADMIN — MIRTAZAPINE 45 MG: 30 TABLET, FILM COATED ORAL at 21:30

## 2019-05-19 RX ADMIN — Medication 10 ML: at 20:30

## 2019-05-19 RX ADMIN — MORPHINE SULFATE 2 MG: 2 INJECTION, SOLUTION INTRAMUSCULAR; INTRAVENOUS at 15:05

## 2019-05-19 RX ADMIN — OXYCODONE HYDROCHLORIDE 5 MG: 5 TABLET ORAL at 12:59

## 2019-05-19 RX ADMIN — Medication 4 MG: at 10:49

## 2019-05-19 RX ADMIN — FENTANYL CITRATE 50 MCG: 50 INJECTION, SOLUTION INTRAMUSCULAR; INTRAVENOUS at 11:23

## 2019-05-19 RX ADMIN — Medication 10 MEQ: at 13:00

## 2019-05-19 RX ADMIN — LIDOCAINE HYDROCHLORIDE 50 MG: 10 INJECTION, SOLUTION EPIDURAL; INFILTRATION; INTRACAUDAL; PERINEURAL at 10:09

## 2019-05-19 RX ADMIN — SODIUM CHLORIDE, POTASSIUM CHLORIDE, SODIUM LACTATE AND CALCIUM CHLORIDE: 600; 310; 30; 20 INJECTION, SOLUTION INTRAVENOUS at 12:42

## 2019-05-19 RX ADMIN — EXTENDED PHENYTOIN SODIUM 100 MG: 100 CAPSULE ORAL at 20:33

## 2019-05-19 RX ADMIN — POTASSIUM CHLORIDE 40 MEQ: 20 TABLET, EXTENDED RELEASE ORAL at 18:09

## 2019-05-19 RX ADMIN — FAMOTIDINE 20 MG: 20 TABLET, FILM COATED ORAL at 12:41

## 2019-05-19 RX ADMIN — MORPHINE SULFATE 4 MG: 4 INJECTION, SOLUTION INTRAMUSCULAR; INTRAVENOUS at 18:08

## 2019-05-19 RX ADMIN — BUSPIRONE HYDROCHLORIDE 15 MG: 15 TABLET ORAL at 21:30

## 2019-05-19 RX ADMIN — FENTANYL CITRATE 50 MCG: 50 INJECTION INTRAMUSCULAR; INTRAVENOUS at 10:09

## 2019-05-19 RX ADMIN — VANCOMYCIN HYDROCHLORIDE 1000 MG: 1 INJECTION, POWDER, LYOPHILIZED, FOR SOLUTION INTRAVENOUS at 10:41

## 2019-05-19 RX ADMIN — RISPERIDONE 2 MG: 2 TABLET, FILM COATED ORAL at 21:34

## 2019-05-19 RX ADMIN — OXYCODONE HYDROCHLORIDE 10 MG: 5 TABLET ORAL at 16:54

## 2019-05-19 RX ADMIN — Medication 0.5 MG: at 10:51

## 2019-05-19 RX ADMIN — PROPOFOL 150 MG: 10 INJECTION, EMULSION INTRAVENOUS at 10:09

## 2019-05-19 RX ADMIN — CLINDAMYCIN IN 5 PERCENT DEXTROSE 900 MG: 18 INJECTION, SOLUTION INTRAVENOUS at 04:09

## 2019-05-19 RX ADMIN — CLINDAMYCIN IN 5 PERCENT DEXTROSE 900 MG: 18 INJECTION, SOLUTION INTRAVENOUS at 20:29

## 2019-05-19 RX ADMIN — PIPERACILLIN AND TAZOBACTAM 3.38 G: 3; .375 INJECTION, POWDER, FOR SOLUTION INTRAVENOUS at 04:57

## 2019-05-19 RX ADMIN — ACETAMINOPHEN 1000 MG: 500 TABLET ORAL at 15:00

## 2019-05-19 RX ADMIN — ROCURONIUM BROMIDE 40 MG: 10 INJECTION INTRAVENOUS at 10:09

## 2019-05-19 RX ADMIN — FAMOTIDINE 20 MG: 20 TABLET, FILM COATED ORAL at 20:33

## 2019-05-19 RX ADMIN — ACETAMINOPHEN 1000 MG: 500 TABLET ORAL at 23:55

## 2019-05-19 RX ADMIN — Medication 10 MEQ: at 09:00

## 2019-05-19 RX ADMIN — LEVOTHYROXINE SODIUM 50 MCG: 50 TABLET ORAL at 12:39

## 2019-05-19 RX ADMIN — MORPHINE SULFATE 2 MG: 2 INJECTION, SOLUTION INTRAMUSCULAR; INTRAVENOUS at 05:47

## 2019-05-19 RX ADMIN — ONDANSETRON 8 MG: 2 INJECTION INTRAMUSCULAR; INTRAVENOUS at 13:16

## 2019-05-19 RX ADMIN — MORPHINE SULFATE 2 MG: 2 INJECTION, SOLUTION INTRAMUSCULAR; INTRAVENOUS at 03:20

## 2019-05-19 RX ADMIN — GABAPENTIN 300 MG: 300 CAPSULE ORAL at 23:55

## 2019-05-19 RX ADMIN — FENTANYL CITRATE 50 MCG: 50 INJECTION INTRAMUSCULAR; INTRAVENOUS at 10:05

## 2019-05-19 RX ADMIN — Medication 10 ML: at 09:00

## 2019-05-19 RX ADMIN — SODIUM CHLORIDE, SODIUM LACTATE, POTASSIUM CHLORIDE, CALCIUM CHLORIDE AND DEXTROSE MONOHYDRATE: 5; 600; 310; 30; 20 INJECTION, SOLUTION INTRAVENOUS at 09:48

## 2019-05-19 RX ADMIN — EXTENDED PHENYTOIN SODIUM 100 MG: 100 CAPSULE ORAL at 13:01

## 2019-05-19 RX ADMIN — PIPERACILLIN AND TAZOBACTAM 3.38 G: 3; .375 INJECTION, POWDER, FOR SOLUTION INTRAVENOUS at 20:30

## 2019-05-19 RX ADMIN — FENTANYL CITRATE 50 MCG: 50 INJECTION, SOLUTION INTRAMUSCULAR; INTRAVENOUS at 11:31

## 2019-05-19 RX ADMIN — DIVALPROEX SODIUM 750 MG: 500 TABLET, DELAYED RELEASE ORAL at 20:33

## 2019-05-19 RX ADMIN — ONDANSETRON 4 MG: 2 INJECTION, SOLUTION INTRAMUSCULAR; INTRAVENOUS at 10:44

## 2019-05-19 RX ADMIN — GABAPENTIN 300 MG: 300 CAPSULE ORAL at 16:56

## 2019-05-19 RX ADMIN — VANCOMYCIN HYDROCHLORIDE 1000 MG: 1 INJECTION, SOLUTION INTRAVENOUS at 21:32

## 2019-05-19 RX ADMIN — GABAPENTIN 300 MG: 300 CAPSULE ORAL at 12:40

## 2019-05-19 RX ADMIN — FENTANYL CITRATE 50 MCG: 50 INJECTION, SOLUTION INTRAMUSCULAR; INTRAVENOUS at 12:17

## 2019-05-19 RX ADMIN — DOCUSATE SODIUM 100 MG: 100 CAPSULE, LIQUID FILLED ORAL at 12:40

## 2019-05-19 RX ADMIN — TIZANIDINE 2 MG: 2 TABLET ORAL at 21:30

## 2019-05-19 RX ADMIN — DIVALPROEX SODIUM 750 MG: 500 TABLET, DELAYED RELEASE ORAL at 12:39

## 2019-05-19 ASSESSMENT — PAIN DESCRIPTION - ORIENTATION
ORIENTATION: RIGHT

## 2019-05-19 ASSESSMENT — PAIN - FUNCTIONAL ASSESSMENT
PAIN_FUNCTIONAL_ASSESSMENT: PREVENTS OR INTERFERES SOME ACTIVE ACTIVITIES AND ADLS
PAIN_FUNCTIONAL_ASSESSMENT: PREVENTS OR INTERFERES SOME ACTIVE ACTIVITIES AND ADLS
PAIN_FUNCTIONAL_ASSESSMENT: PREVENTS OR INTERFERES WITH MANY ACTIVE NOT PASSIVE ACTIVITIES
PAIN_FUNCTIONAL_ASSESSMENT: PREVENTS OR INTERFERES SOME ACTIVE ACTIVITIES AND ADLS
PAIN_FUNCTIONAL_ASSESSMENT: PREVENTS OR INTERFERES WITH MANY ACTIVE NOT PASSIVE ACTIVITIES
PAIN_FUNCTIONAL_ASSESSMENT: PREVENTS OR INTERFERES SOME ACTIVE ACTIVITIES AND ADLS

## 2019-05-19 ASSESSMENT — PAIN SCALES - GENERAL
PAINLEVEL_OUTOF10: 8
PAINLEVEL_OUTOF10: 0
PAINLEVEL_OUTOF10: 7
PAINLEVEL_OUTOF10: 6
PAINLEVEL_OUTOF10: 3
PAINLEVEL_OUTOF10: 3
PAINLEVEL_OUTOF10: 8
PAINLEVEL_OUTOF10: 8
PAINLEVEL_OUTOF10: 9
PAINLEVEL_OUTOF10: 9
PAINLEVEL_OUTOF10: 3
PAINLEVEL_OUTOF10: 10
PAINLEVEL_OUTOF10: 10
PAINLEVEL_OUTOF10: 8
PAINLEVEL_OUTOF10: 0
PAINLEVEL_OUTOF10: 2
PAINLEVEL_OUTOF10: 2
PAINLEVEL_OUTOF10: 7
PAINLEVEL_OUTOF10: 6
PAINLEVEL_OUTOF10: 10
PAINLEVEL_OUTOF10: 3

## 2019-05-19 ASSESSMENT — PULMONARY FUNCTION TESTS
PIF_VALUE: 21
PIF_VALUE: 4
PIF_VALUE: 21
PIF_VALUE: 10
PIF_VALUE: 23
PIF_VALUE: 12
PIF_VALUE: 22
PIF_VALUE: 21
PIF_VALUE: 19
PIF_VALUE: 12
PIF_VALUE: 19
PIF_VALUE: 21
PIF_VALUE: 0
PIF_VALUE: 1
PIF_VALUE: 22
PIF_VALUE: 12
PIF_VALUE: 0
PIF_VALUE: 1
PIF_VALUE: 21
PIF_VALUE: 21
PIF_VALUE: 1
PIF_VALUE: 21
PIF_VALUE: 22
PIF_VALUE: 21
PIF_VALUE: 21
PIF_VALUE: 22
PIF_VALUE: 22
PIF_VALUE: 21
PIF_VALUE: 22
PIF_VALUE: 21
PIF_VALUE: 1
PIF_VALUE: 21
PIF_VALUE: 21
PIF_VALUE: 12
PIF_VALUE: 22
PIF_VALUE: 1
PIF_VALUE: 2
PIF_VALUE: 1
PIF_VALUE: 1
PIF_VALUE: 12
PIF_VALUE: 1
PIF_VALUE: 12
PIF_VALUE: 0
PIF_VALUE: 1
PIF_VALUE: 12
PIF_VALUE: 19
PIF_VALUE: 21
PIF_VALUE: 12
PIF_VALUE: 22
PIF_VALUE: 22
PIF_VALUE: 12
PIF_VALUE: 0
PIF_VALUE: 14
PIF_VALUE: 23
PIF_VALUE: 7
PIF_VALUE: 12
PIF_VALUE: 1
PIF_VALUE: 23
PIF_VALUE: 12
PIF_VALUE: 0
PIF_VALUE: 21
PIF_VALUE: 21
PIF_VALUE: 22
PIF_VALUE: 0
PIF_VALUE: 21
PIF_VALUE: 21
PIF_VALUE: 22
PIF_VALUE: 25
PIF_VALUE: 3
PIF_VALUE: 1
PIF_VALUE: 1
PIF_VALUE: 12
PIF_VALUE: 21
PIF_VALUE: 22
PIF_VALUE: 22
PIF_VALUE: 21
PIF_VALUE: 22

## 2019-05-19 ASSESSMENT — PAIN DESCRIPTION - PROGRESSION
CLINICAL_PROGRESSION: NOT CHANGED
CLINICAL_PROGRESSION: NOT CHANGED
CLINICAL_PROGRESSION: GRADUALLY WORSENING
CLINICAL_PROGRESSION: GRADUALLY IMPROVING
CLINICAL_PROGRESSION: GRADUALLY IMPROVING
CLINICAL_PROGRESSION: NOT CHANGED
CLINICAL_PROGRESSION: NOT CHANGED
CLINICAL_PROGRESSION: GRADUALLY IMPROVING
CLINICAL_PROGRESSION: NOT CHANGED
CLINICAL_PROGRESSION: GRADUALLY IMPROVING
CLINICAL_PROGRESSION: GRADUALLY IMPROVING
CLINICAL_PROGRESSION: NOT CHANGED
CLINICAL_PROGRESSION: GRADUALLY IMPROVING
CLINICAL_PROGRESSION: NOT CHANGED
CLINICAL_PROGRESSION: NOT CHANGED
CLINICAL_PROGRESSION: GRADUALLY IMPROVING
CLINICAL_PROGRESSION: NOT CHANGED
CLINICAL_PROGRESSION: GRADUALLY IMPROVING

## 2019-05-19 ASSESSMENT — PAIN DESCRIPTION - FREQUENCY
FREQUENCY: CONTINUOUS
FREQUENCY: INTERMITTENT
FREQUENCY: CONTINUOUS

## 2019-05-19 ASSESSMENT — PAIN DESCRIPTION - DESCRIPTORS
DESCRIPTORS: ACHING
DESCRIPTORS: ACHING;DISCOMFORT;SORE
DESCRIPTORS: ACHING;DISCOMFORT;TENDER

## 2019-05-19 ASSESSMENT — PAIN DESCRIPTION - ONSET
ONSET: ON-GOING

## 2019-05-19 ASSESSMENT — PAIN DESCRIPTION - PAIN TYPE
TYPE: ACUTE PAIN;SURGICAL PAIN
TYPE: SURGICAL PAIN;ACUTE PAIN
TYPE: ACUTE PAIN;SURGICAL PAIN
TYPE: SURGICAL PAIN;ACUTE PAIN

## 2019-05-19 ASSESSMENT — PAIN DESCRIPTION - LOCATION
LOCATION: HIP

## 2019-05-19 NOTE — PROGRESS NOTES
POST OP NOTE    SUBJECTIVE  Pt s/p Right thigh wound washout with irrigating woundvac placement. .     OBJECTIVE  VITALS:  BP 91/61   Pulse 63   Temp 96.8 °F (36 °C) (Oral)   Resp 11   Ht 5' 2\" (1.575 m)   Wt 150 lb (68 kg)   SpO2 96%   BMI 27.44 kg/m²         GENERAL:  awake and alert. No acute distress  CARDIOVASCULAR:  regular rate and rhythm   LUNGS:  CTA Bilaterally  ABDOMEN:   Abdomen soft, non-tender, non-distended  INCISION: Incision clean/dry/intact    ASSESSMENT  1. POD# 0 s/p Right thigh wound washout with irrigating woundvac placement. [place] PLAN  1. Pain management- tylenol, neurotin, morphine, marquez  2. DVT proph- lovenox  3. GI proph- pepcid  4.  House in place - adequate u/o  5. 600 cc output from irrigating woundvac      [facility]  Trauma/Surgery Service  5/19/2019 at 4:47 PM

## 2019-05-19 NOTE — PROGRESS NOTES
ICU PROGRESS NOTE        PATIENT NAME: Verner Canner  MEDICAL RECORD NO. [id number]  DATE: 5/19/2019    PRIMARY CARE PHYSICIAN: Edita Johnston NP-GONZALO    HD: # 2    ASSESSMENT    Patient Active Problem List   Diagnosis    Chest pain    Migraine variant with headache    Drug overdose, accidental or unintentional, initial encounter    Hypothyroidism    Essential hypertension    Seizure disorder ([address]    Drug overdose    History of seizure    Severe major depression with psychotic features ([address]    Severe major depression ([address]    Overdose of barbiturate, intentional self-harm, initial encounter ([address]    Intentional phenobarbital overdose ([address]    Severe episode of recurrent major depressive disorder, without psychotic features ([address]    Bronchitis    Suicide attempt ([address]    Major depressive disorder, recurrent ([address]    Ear infection    Necrotizing soft tissue infection    Sepsis affecting skin ([address]    Leukocytosis    Elevated lactic acid level    Hyponatremia       MEDICAL DECISION MAKING AND PLAN  57-year-old female admitted on 5/17 for right sided hip, gluteal necrotizing soft tissue infection  1. Neuro  1. Pain control - Tylenol, marquez and morphine PRN  2. Seizure hx - on home Dilantin, Depakote    2. CV  1. Hemodynamically stable  2. Regular rate and rhythm  3. -120s, heart rate 70-80s  4. Continue to monitor BP  5. Hemoglobin stable  3. Pulm  1. History of COPD, current smoker  2. Home albuterol restarted PRN  4. GI  1. NPO for surgery  2. Pepcid BID - home med  3. Bowel regimen: colace and MOM PRN  5. Renal  1. Continue LR @125cc/hr   2. Strict I&Os  3. UOP adequate  6. Heme/ID  1. Necrotizing soft tissue infection of the right lateral thigh and buttock  2. Follow up AM labs  3. Continue Vanc, Zosyn and Clinda at this time  7. Endocrine  1. Hypothyroidism -synthroid 50mcg  2. BS stable  8. Ppx  1. SCDs  2. Lovenox  9. Dispo  1. Continue ICU at this time  2.  Plan for OR today 5/19 SUBJECTIVE    Aleta Mcdonough  plan for OR today for second look and washout wound VAC placement. Hemoglobin stable, afebrile last 24 hours, WBC 12.3. If patient continues to progress likely E transfer out of ICU tomorrow. OBJECTIVE  VITALS: Temp: Temp: 98.3 °F (36.8 °C)Temp  Av.9 °F (36.6 °C)  Min: 97.5 °F (36.4 °C)  Max: 98.3 °F (00.0 °C) BP Systolic (75BGL), TZR:162 , Min:85 , BELLE:146   Diastolic (27MRF), XWO:16, Min:52, Max:89   Pulse Pulse  Av.7  Min: 56  Max: 87 Resp Resp  Av.8  Min: 14  Max: 24 Pulse ox SpO2  Av.3 %  Min: 95 %  Max: 100 %    CONSTITUTIONAL: awake, alert, cooperative, no apparent distress  HEAD: atraumatic, normocephalic  EYES: sclera clear, pupils equal and reactive to light  ENT: ears are symmetric, nares patent   HEENT: moist mucous membranes  NECK: Supple, symmetrical, trachea midline  LUNGS: no respiratory distress, no audible wheezing  CARDIOVASCULAR: +S1/S2  ABDOMEN: soft, nontender, nondistended, no guarding, no rebound tenderness   MUSCULOSKELETAL: full range of motion noted  NEUROLOGIC: Awake, alert, oriented to name, place and time  EXTREMITIES: peripheral pulses normal, no pedal edema, no calf tenderness  SKIN: Erythema and tenderness to right hip, buttock, dressing in place    LAB:  CBC:   Recent Labs     19  1529 19  0527 19  1213 19  0548   WBC 14.9* 12.0*  --  12.3*   HGB 13.3 8.9* 8.7* 8.8*   HCT 38.2 26.1* 26.5* 27.7*   MCV 86.4 84.2  --  92.0   PLT Platelet clumps present, count appears adequate.  312  --  401     BMP:   Recent Labs     19  1644 19  0527 19  0548   * 133* 132*   K 3.4* 3.3* 3.0*   CL 89* 99 97*   CO2 24 21 20   BUN 19 12 21*   CREATININE 0.65 0.38* 0.86   GLUCOSE 101* 119* 106*         Nam Skelton DO  19, 7:45 AM         Attending Note    Plan wound vac change  I have reviewed the above TECSS note(s) and I either performed the key elements of the medical history and physical exam or was present with the resident when the key elements of the medical history and physical exam were performed. I have discussed the findings, established the care plan and recommendations with Resident, YENNY RN, bedside nurse.     Emma Ramirez MD  7/9/2019  12:13 PM

## 2019-05-19 NOTE — BRIEF OP NOTE
Brief Postoperative Note  ______________________________________________________________    Patient: Otto Rose  YOB: 1965  MRN: [id number]  Date of Procedure: 5/19/2019    Pre-Op Diagnosis: Necrotizing soft tissue infection s/p irrigation and debridement    Post-Op Diagnosis: Same       Procedure(s):  RIGHT THIGH WOUND WASHOUT WITH irrigating [address] PLACEMENT    Anesthesia: General    Surgeon(s):  Paula To MD    Assistant: Che Gaitan, PGY3, Jayson Gottron, PGY1    Estimated Blood Loss (mL): 2 mL    Complications: None      Drains:   Negative Pressure Wound Therapy Hip Proximal;Right;Upper; Lateral (Active)       Urethral Catheter 16 fr (Active)   Catheter Indications Perioperative use in selected surgeries including but not limited to urologic, pelvic or need for intraoperative monitoring of urinary output due to prolonged surgery, large volume infusion or need for diuretic therapy in surgery 5/18/2019  4:30 PM   Site Assessment No urethral drainage 5/18/2019  8:00 PM   Urine Color Yellow 5/19/2019  4:00 AM   Urine Appearance Clear 5/19/2019  4:00 AM   Output (mL) 200 mL 5/19/2019 12:00 AM       Findings: Wound class 3. Dirty.      Amish Nichols DO  Date: 5/19/2019  Time: 11:05 AM

## 2019-05-19 NOTE — FLOWSHEET NOTE
Assessment: Patient was awake and alert when  visited. Family was not present at the time. Patient talked about her loses (her son and her father who  last year). Patient was a bit tearful and worried. Patient said she came from [place] to stay with friends since after her loses. Patient is currently estranged from her daughter. Patient was raised [employer]ism but not active in her aminata community. Intervention:  maintained listening presence, offered support and some consoling words. Patient received sacrament of anointing of the sick following prayer said with her. Outcome: Patient was very happy and appreciative of the visit and anointing she received. Follow up visits recommended for more spiritual and emotional support. 19 0668   Encounter Summary   Services provided to: Patient   Support System Family members;Friends/neighbors   Place of [employer]racticing [employer]    Continue Visiting   (2019)   Complexity of Encounter Moderate   Length of Encounter 30 minutes   Spiritual Assessment Completed Yes   Routine   Type Follow up   Spiritual/Samaritan   Type Spiritual support   Assessment Calm; Approachable; Hopeful   Intervention Active listening;Nurtured hope;Prayer; Anointing   Outcome Expressed gratitude   Sacraments   Sacrament of Sick-Anointing Anointed

## 2019-05-19 NOTE — BRIEF OP NOTE
Brief Postoperative Note  ______________________________________________________________    Patient: Annette Mckeon  YOB: 1965  MRN: [id number]  Date of Procedure: 5/19/2019    Pre-Op Diagnosis: NECROTIZING SOFT TISSUE INFECTION    Post-Op Diagnosis: Same       Procedure(s):  RIGHT THIGH WOUND WASHOUT WITH IRRIGATING [facility] [id number]    Anesthesia: General    Surgeon(s):  Fouzia Elaine MD    Assistant: Nixon Brown. DO JERICA PGY3, Carlene Rock DO PGY1    Estimated Blood Loss (mL): less than 50     Complications: None    Specimens:   * No specimens in log *    Implants:  * No implants in log *      Drains:   Negative Pressure Wound Therapy Hip Proximal;Right;Upper; Lateral (Active)       Urethral Catheter 16 fr (Active)   Catheter Indications Perioperative use in selected surgeries including but not limited to urologic, pelvic or need for intraoperative monitoring of urinary output due to prolonged surgery, large volume infusion or need for diuretic therapy in surgery 5/18/2019  4:30 PM   Site Assessment No urethral drainage 5/18/2019  8:00 PM   Urine Color Yellow 5/19/2019  4:00 AM   Urine Appearance Clear 5/19/2019  4:00 AM   Output (mL) 200 mL 5/19/2019 12:00 AM       Findings: healthy tissue s/p incisional drainage and debridement on 5/17/19; placement of irrigating wound vac with DuoDerm on tissue edges. Will plan for wound vac change in OR likely Wednesday    Gabriel Wong DO  Date: 5/19/2019  Time: 11:08 AM     Cynthia Bhatt Mc, MD, PhD  05/19/19  12:18 PM

## 2019-05-19 NOTE — PLAN OF CARE
Problem: Falls - Risk of:  Goal: Will remain free from falls  Description  Will remain free from falls  5/19/2019 0110 by Maxwell Chacon RN  Outcome: Ongoing     Problem: Falls - Risk of:  Goal: Absence of physical injury  Description  Absence of physical injury  5/19/2019 0110 by Maxwell Chacon RN  Outcome: Ongoing     Problem: Pain:  Goal: Pain level will decrease  Description  Pain level will decrease  5/19/2019 0110 by Maxwlel Chacon RN  Outcome: Ongoing     Problem: Pain:  Goal: Control of acute pain  Description  Control of acute pain  5/19/2019 0110 by Maxwell Chacon RN  Outcome: Ongoing     Problem: Pain:  Goal: Control of chronic pain  Description  Control of chronic pain  5/19/2019 0110 by Maxwell Chacon RN  Outcome: Ongoing

## 2019-05-19 NOTE — ANESTHESIA PRE PROCEDURE
the lungs every 6 hours as needed for Wheezing 3/1/19   KEANU Lucia CNP   fluticasone [facility]) 50 MCG/ACT nasal spray 1 spray by Each Nare route daily 3/2/19   KEANU Lucia CNP   albuterol (PROVENTIL) (2.5 MG/3ML) 0.083% nebulizer solution  12/7/18   Historical Provider, MD   nitroGLYCERIN (NITROSTAT) 0.4 MG SL tablet Nitrostat 0.4 mg sublingual tablet    Historical Provider, MD   acetaminophen (APAP EXTRA STRENGTH) 500 MG tablet Take 2 tablets by mouth every 6 hours as needed for Pain 11/4/18   Thea Campo MD       Current medications:    Current Facility-Administered Medications   Medication Dose Route Frequency Provider Last Rate Last Dose    famotidine (PEPCID) tablet 20 mg  20 mg Oral BID Varinder Douse, DO   20 mg at 05/18/19 2042    levothyroxine (SYNTHROID) tablet 50 mcg  50 mcg Oral Daily Varinder Douse, DO   50 mcg at 05/18/19 2191    acetaminophen (TYLENOL) tablet 1,000 mg  1,000 mg Oral Q8H Varinder Douse, DO   1,000 mg at 05/18/19 2330    clindamycin (CLEOCIN) 900 mg in dextrose 5 % 50 mL IVPB  900 mg Intravenous Q8H John Campbell MD   Stopped at 05/19/19 0518    piperacillin-tazobactam (ZOSYN) 3.375 g in dextrose 5 % 50 mL IVPB (mini-bag)  3.375 g Intravenous Q8H John Campbell MD 12.5 mL/hr at 05/19/19 0457 3.375 g at 05/19/19 0457    magnesium hydroxide (MILK OF MAGNESIA) 400 MG/5ML suspension 30 mL  30 mL Oral Daily PRN John Campbell MD        docusate sodium (COLACE) capsule 100 mg  100 mg Oral Daily John Campbell MD   100 mg at 05/18/19 0836    albuterol sulfate  (90 Base) MCG/ACT inhaler 2 puff  2 puff Inhalation Q6H PRN John Campbell MD        divalproex (DEPAKOTE) DR tablet 750 mg  750 mg Oral 2 times per day John Campbell MD   750 mg at 05/18/19 2042    phenytoin (DILANTIN) ER capsule 100 mg  100 mg Oral TID John Campbell MD   100 mg at 05/18/19 2042    oxyCODONE (ROXICODONE) immediate release tablet 5 mg  5 mg Oral Q6H PRN John Campbell MD        Or    oxyCODONE (ROXICODONE) immediate release tablet 10 mg  10 mg Oral Q6H PRN Silva Ruelas MD   10 mg at 05/18/19 1716    gabapentin (NEURONTIN) capsule 300 mg  300 mg Oral Q8H Hina Limb, DO   300 mg at 05/18/19 2330    vancomycin (VANCOCIN) 1000 mg in dextrose 5% 200 mL IVPB  1,000 mg Intravenous Q12H Sherral Fearing, DO   Stopped at 05/18/19 2130    vancomycin (VANCOCIN) intermittent dosing (placeholder)   Other RX Placeholder Sherral Fearing, DO        sodium chloride flush 0.9 % injection 10 mL  10 mL Intravenous 2 times per day Sherral Fearing, DO   10 mL at 05/18/19 2043    sodium chloride flush 0.9 % injection 10 mL  10 mL Intravenous PRN Sherral Fearing, DO        acetaminophen (TYLENOL) tablet 650 mg  650 mg Oral Q4H PRN Sherral Fearing, DO        enoxaparin (LOVENOX) injection 40 mg  40 mg Subcutaneous Daily Silva Ruelas MD   40 mg at 05/18/19 5237    lactated ringers infusion   Intravenous Continuous Sherral Fearing,  mL/hr at 05/18/19 1800      morphine injection 2 mg  2 mg Intravenous Q2H PRN Sherral Fearing, DO   2 mg at 05/19/19 0547    Or    morphine (PF) injection 4 mg  4 mg Intravenous Q2H PRN Sherral Fearing, DO   4 mg at 05/18/19 2355    ondansetron (ZOFRAN) injection 8 mg  8 mg Intravenous Q6H PRN Sherral Fearing, DO   8 mg at 05/18/19 1714       Allergies:     Allergies   Allergen Reactions    Imitrex [Sumatriptan] Hives    Bee Pollen     Bee Venom     Bromide Ion [Bromine]     Flexeril [Cyclobenzaprine]     Ketorolac     Naproxen Hives    Nsaids     Potassium Bromide     Reglan [Metoclopramide]     Sulfa Antibiotics     Sulfadiazine     Toradol [Ketorolac Tromethamine] Hives    Tramadol Hives       Problem List:    Patient Active Problem List   Diagnosis Code    Chest pain R07.9    Migraine variant with headache G43.809    Drug overdose, accidental or unintentional, initial encounter T50.901A    Hypothyroidism E03.9    Essential hypertension I10    Seizure disorder ([facility] G40.909  Drug overdose T50.901A    History of seizure Z87.898    Severe major depression with psychotic features (HCC) F32.3    Severe major depression (HCC) F32.2    Overdose of barbiturate, intentional self-harm, initial encounter [facility] T42.3X2A    Intentional phenobarbital overdose ([facility] T42.3X2A    Severe episode of recurrent major depressive disorder, without psychotic features ([facility] F33.2    Bronchitis J40    Suicide attempt ([facility] T14.91XA    Major depressive disorder, recurrent (HCC) F33.9    Ear infection H66.90    Necrotizing soft tissue infection M79.89    Sepsis affecting skin ([facility]) A41.9    Leukocytosis D72.829    Elevated lactic acid level R79.89    Hyponatremia E87.1       Past Medical History:        Diagnosis Date    Arthritis     Asthma     CHF (congestive heart failure) (HCC)     COPD (chronic obstructive pulmonary disease) (HCC)     Fibromyalgia     Headache     Hypertension     Movement disorder     Neck fracture ([facility]     Seizure ([facility]     Thyroid disease        Past Surgical History:        Procedure Laterality Date    KNEE SURGERY      LYMPH NODE DISSECTION      PARTIAL HYSTERECTOMY         Social History:    Social History     Tobacco Use    Smoking status: Current Every Day Smoker     Packs/day: 0.50     Years: 12.00     Pack years: 6.00    Smokeless tobacco: Never Used   Substance Use Topics    Alcohol use:  No                                Ready to quit: Not Answered  Counseling given: Not Answered      Vital Signs (Current):   Vitals:    05/18/19 2130 05/19/19 0000 05/19/19 0100 05/19/19 0200   BP: 126/78  128/66 (!) 97/58   Pulse:       Resp:       Temp:  98.3 °F (36.8 °C)     TempSrc:       SpO2:       Weight:       Height:                                                  BP Readings from Last 3 Encounters:   05/19/19 (!) 97/58   05/17/19 119/62   02/16/19 (!) 151/91       NPO Status: BMI:   Wt Readings from Last 3 Encounters:   05/17/19 150 lb (68 kg)   02/16/19 188 lb 15 oz (85.7 kg)   02/12/19 192 lb (87.1 kg)     Body mass index is 27.44 kg/m².     CBC:   Lab Results   Component Value Date    WBC 12.3 05/19/2019    RBC 3.01 05/19/2019    HGB 8.8 05/19/2019    HCT 27.7 05/19/2019    MCV 92.0 05/19/2019    RDW 14.9 05/19/2019     05/19/2019       CMP:   Lab Results   Component Value Date     05/19/2019    K 3.0 05/19/2019    CL 97 05/19/2019    CO2 20 05/19/2019    BUN 21 05/19/2019    CREATININE 0.86 05/19/2019    GFRAA >60 05/19/2019    LABGLOM >60 05/19/2019    GLUCOSE 106 05/19/2019    PROT 7.1 05/17/2019    CALCIUM 7.4 05/19/2019    BILITOT 0.93 05/17/2019    ALKPHOS 79 05/17/2019    AST 23 05/17/2019    ALT 13 05/17/2019       POC Tests:   Recent Labs     05/18/19  0205   POCGLU 115*       Coags:   Lab Results   Component Value Date    PROTIME 9.5 05/19/2019    INR 0.9 05/19/2019    APTT 20.5 05/19/2019       HCG (If Applicable):   Lab Results   Component Value Date    HCGQUANT <1 02/12/2019        ABGs: No results found for: PHART, PO2ART, NJS5EYT, NES1PRV, BEART, D4AELPDQ     Type & Screen (If Applicable):  No results found for: LABABO, [address]    Anesthesia Evaluation  Patient summary reviewed and Nursing notes reviewed no history of anesthetic complications:   Airway: Mallampati: II  TM distance: >3 FB   Neck ROM: full  Mouth opening: > = 3 FB Dental: normal exam         Pulmonary:normal exam    (+) COPD:  asthma:                            Cardiovascular:    (+) hypertension:, CHF:, orthopnea,     (-) CABG/stent and dysrhythmias            Stress test reviewed       Beta Blocker:  Dose within 24 Hrs         Neuro/Psych:   (+) seizures:, neuromuscular disease:, headaches:, psychiatric history:depression/anxiety             GI/Hepatic/Renal:        (-) GERD       Endo/Other:    (+) hypothyroidism::., .                 Abdominal:           Vascular:

## 2019-05-20 LAB
ABSOLUTE EOS #: <0.03 K/UL (ref 0–0.44)
ABSOLUTE IMMATURE GRANULOCYTE: 0.5 K/UL (ref 0–0.3)
ABSOLUTE LYMPH #: 3.62 K/UL (ref 1.1–3.7)
ABSOLUTE MONO #: 0.61 K/UL (ref 0.1–1.2)
ANION GAP SERPL CALCULATED.3IONS-SCNC: 14 MMOL/L (ref 9–17)
BASOPHILS # BLD: 0 % (ref 0–2)
BASOPHILS ABSOLUTE: 0.03 K/UL (ref 0–0.2)
BUN BLDV-MCNC: 8 MG/DL (ref 6–20)
BUN/CREAT BLD: ABNORMAL (ref 9–20)
CALCIUM IONIZED: 1.07 MMOL/L (ref 1.13–1.33)
CALCIUM SERPL-MCNC: 7.4 MG/DL (ref 8.6–10.4)
CHLORIDE BLD-SCNC: 98 MMOL/L (ref 98–107)
CO2: 19 MMOL/L (ref 20–31)
CREAT SERPL-MCNC: 0.52 MG/DL (ref 0.5–0.9)
CULTURE: ABNORMAL
DIFFERENTIAL TYPE: ABNORMAL
DIRECT EXAM: ABNORMAL
EOSINOPHILS RELATIVE PERCENT: 0 % (ref 1–4)
GFR AFRICAN AMERICAN: >60 ML/MIN
GFR NON-AFRICAN AMERICAN: >60 ML/MIN
GFR SERPL CREATININE-BSD FRML MDRD: ABNORMAL ML/MIN/{1.73_M2}
GFR SERPL CREATININE-BSD FRML MDRD: ABNORMAL ML/MIN/{1.73_M2}
GLUCOSE BLD-MCNC: 98 MG/DL (ref 70–99)
HCT VFR BLD CALC: 30.9 % (ref 36.3–47.1)
HEMOGLOBIN: 9.2 G/DL (ref 11.9–15.1)
IMMATURE GRANULOCYTES: 4 %
LYMPHOCYTES # BLD: 27 % (ref 24–43)
Lab: ABNORMAL
Lab: ABNORMAL
MAGNESIUM: 1.5 MG/DL (ref 1.6–2.6)
MCH RBC QN AUTO: 29.1 PG (ref 25.2–33.5)
MCHC RBC AUTO-ENTMCNC: 29.8 G/DL (ref 28.4–34.8)
MCV RBC AUTO: 97.8 FL (ref 82.6–102.9)
MONOCYTES # BLD: 5 % (ref 3–12)
NRBC AUTOMATED: 0 PER 100 WBC
PDW BLD-RTO: 15.5 % (ref 11.8–14.4)
PHOSPHORUS: 4 MG/DL (ref 2.6–4.5)
PLATELET # BLD: 434 K/UL (ref 138–453)
PLATELET ESTIMATE: ABNORMAL
PMV BLD AUTO: 10.7 FL (ref 8.1–13.5)
POTASSIUM SERPL-SCNC: 3.6 MMOL/L (ref 3.7–5.3)
RBC # BLD: 3.16 M/UL (ref 3.95–5.11)
RBC # BLD: ABNORMAL 10*6/UL
SEG NEUTROPHILS: 64 % (ref 36–65)
SEGMENTED NEUTROPHILS ABSOLUTE COUNT: 8.43 K/UL (ref 1.5–8.1)
SODIUM BLD-SCNC: 131 MMOL/L (ref 135–144)
SPECIMEN DESCRIPTION: ABNORMAL
SPECIMEN DESCRIPTION: ABNORMAL
VALPROIC ACID % FREE: 31.1 % (ref 5–18.4)
VALPROIC ACID LEVEL: 65 UG/ML (ref 50–125)
VALPROIC ACID, FREE: 20.2 UG/ML (ref 7–23)
VALPROIC DATE LAST DOSE: ABNORMAL
VALPROIC DOSE AMOUNT: ABNORMAL
VALPROIC TIME LAST DOSE: ABNORMAL
WBC # BLD: 13.2 K/UL (ref 3.5–11.3)
WBC # BLD: ABNORMAL 10*3/UL

## 2019-05-20 PROCEDURE — 85025 COMPLETE CBC W/AUTO DIFF WBC: CPT

## 2019-05-20 PROCEDURE — 80164 ASSAY DIPROPYLACETIC ACD TOT: CPT

## 2019-05-20 PROCEDURE — 80185 ASSAY OF PHENYTOIN TOTAL: CPT

## 2019-05-20 PROCEDURE — 2580000003 HC RX 258: Performed by: STUDENT IN AN ORGANIZED HEALTH CARE EDUCATION/TRAINING PROGRAM

## 2019-05-20 PROCEDURE — 94761 N-INVAS EAR/PLS OXIMETRY MLT: CPT

## 2019-05-20 PROCEDURE — 6370000000 HC RX 637 (ALT 250 FOR IP): Performed by: STUDENT IN AN ORGANIZED HEALTH CARE EDUCATION/TRAINING PROGRAM

## 2019-05-20 PROCEDURE — 82330 ASSAY OF CALCIUM: CPT

## 2019-05-20 PROCEDURE — 80165 DIPROPYLACETIC ACID FREE: CPT

## 2019-05-20 PROCEDURE — 84100 ASSAY OF PHOSPHORUS: CPT

## 2019-05-20 PROCEDURE — 6370000000 HC RX 637 (ALT 250 FOR IP): Performed by: NURSE PRACTITIONER

## 2019-05-20 PROCEDURE — 2500000003 HC RX 250 WO HCPCS: Performed by: STUDENT IN AN ORGANIZED HEALTH CARE EDUCATION/TRAINING PROGRAM

## 2019-05-20 PROCEDURE — 6360000002 HC RX W HCPCS: Performed by: STUDENT IN AN ORGANIZED HEALTH CARE EDUCATION/TRAINING PROGRAM

## 2019-05-20 PROCEDURE — 1200000000 HC SEMI PRIVATE

## 2019-05-20 PROCEDURE — 80186 ASSAY OF PHENYTOIN FREE: CPT

## 2019-05-20 PROCEDURE — 83735 ASSAY OF MAGNESIUM: CPT

## 2019-05-20 PROCEDURE — 80048 BASIC METABOLIC PNL TOTAL CA: CPT

## 2019-05-20 PROCEDURE — 2700000000 HC OXYGEN THERAPY PER DAY

## 2019-05-20 PROCEDURE — 36415 COLL VENOUS BLD VENIPUNCTURE: CPT

## 2019-05-20 RX ORDER — POTASSIUM CHLORIDE 20 MEQ/1
20 TABLET, EXTENDED RELEASE ORAL 2 TIMES DAILY
Status: COMPLETED | OUTPATIENT
Start: 2019-05-20 | End: 2019-05-20

## 2019-05-20 RX ORDER — SENNA AND DOCUSATE SODIUM 50; 8.6 MG/1; MG/1
2 TABLET, FILM COATED ORAL 2 TIMES DAILY
Status: DISCONTINUED | OUTPATIENT
Start: 2019-05-20 | End: 2019-05-23

## 2019-05-20 RX ORDER — PHENYTOIN SODIUM 300 MG/1
300 CAPSULE, EXTENDED RELEASE ORAL NIGHTLY
Status: DISCONTINUED | OUTPATIENT
Start: 2019-05-21 | End: 2019-05-29 | Stop reason: HOSPADM

## 2019-05-20 RX ORDER — PHENOBARBITAL 32.4 MG/1
32.4 TABLET ORAL 3 TIMES DAILY
Status: DISCONTINUED | OUTPATIENT
Start: 2019-05-20 | End: 2019-05-29 | Stop reason: HOSPADM

## 2019-05-20 RX ORDER — BUSPIRONE HYDROCHLORIDE 5 MG/1
5 TABLET ORAL 2 TIMES DAILY
Status: DISCONTINUED | OUTPATIENT
Start: 2019-05-20 | End: 2019-05-29 | Stop reason: HOSPADM

## 2019-05-20 RX ORDER — HYDROCHLOROTHIAZIDE 25 MG/1
25 TABLET ORAL DAILY
Status: DISCONTINUED | OUTPATIENT
Start: 2019-05-20 | End: 2019-05-20

## 2019-05-20 RX ORDER — POTASSIUM CHLORIDE 20 MEQ/1
20 TABLET, EXTENDED RELEASE ORAL DAILY
Status: DISPENSED | OUTPATIENT
Start: 2019-05-20 | End: 2019-05-23

## 2019-05-20 RX ORDER — METOPROLOL TARTRATE 50 MG/1
50 TABLET, FILM COATED ORAL 2 TIMES DAILY
Status: DISCONTINUED | OUTPATIENT
Start: 2019-05-20 | End: 2019-05-20

## 2019-05-20 RX ORDER — FAMOTIDINE 20 MG/1
20 TABLET, FILM COATED ORAL DAILY
Status: DISCONTINUED | OUTPATIENT
Start: 2019-05-21 | End: 2019-05-29 | Stop reason: HOSPADM

## 2019-05-20 RX ORDER — ASPIRIN 81 MG/1
81 TABLET, CHEWABLE ORAL DAILY
Status: DISCONTINUED | OUTPATIENT
Start: 2019-05-20 | End: 2019-05-22

## 2019-05-20 RX ORDER — POLYETHYLENE GLYCOL 3350 17 G/17G
17 POWDER, FOR SOLUTION ORAL DAILY
Status: DISCONTINUED | OUTPATIENT
Start: 2019-05-20 | End: 2019-05-23

## 2019-05-20 RX ORDER — VANCOMYCIN HYDROCHLORIDE 1 G/200ML
1000 INJECTION, SOLUTION INTRAVENOUS EVERY 12 HOURS
Status: DISCONTINUED | OUTPATIENT
Start: 2019-05-20 | End: 2019-05-23

## 2019-05-20 RX ORDER — MIRTAZAPINE 30 MG/1
30 TABLET, FILM COATED ORAL NIGHTLY
Status: DISCONTINUED | OUTPATIENT
Start: 2019-05-20 | End: 2019-05-20

## 2019-05-20 RX ADMIN — VANCOMYCIN HYDROCHLORIDE 1000 MG: 1 INJECTION, SOLUTION INTRAVENOUS at 21:28

## 2019-05-20 RX ADMIN — POTASSIUM CHLORIDE 20 MEQ: 1500 TABLET, EXTENDED RELEASE ORAL at 10:24

## 2019-05-20 RX ADMIN — PIPERACILLIN AND TAZOBACTAM 3.38 G: 3; .375 INJECTION, POWDER, FOR SOLUTION INTRAVENOUS at 04:00

## 2019-05-20 RX ADMIN — PHENOBARBITAL 32.4 MG: 32.4 TABLET ORAL at 14:18

## 2019-05-20 RX ADMIN — MAGNESIUM GLUCONATE 500 MG ORAL TABLET 400 MG: 500 TABLET ORAL at 20:49

## 2019-05-20 RX ADMIN — VANCOMYCIN HYDROCHLORIDE 1000 MG: 1 INJECTION, SOLUTION INTRAVENOUS at 09:00

## 2019-05-20 RX ADMIN — EXTENDED PHENYTOIN SODIUM 100 MG: 100 CAPSULE ORAL at 08:47

## 2019-05-20 RX ADMIN — BUSPIRONE HYDROCHLORIDE 5 MG: 15 TABLET ORAL at 20:51

## 2019-05-20 RX ADMIN — MAGNESIUM GLUCONATE 500 MG ORAL TABLET 400 MG: 500 TABLET ORAL at 08:45

## 2019-05-20 RX ADMIN — POTASSIUM CHLORIDE 20 MEQ: 1500 TABLET, EXTENDED RELEASE ORAL at 08:45

## 2019-05-20 RX ADMIN — ACETAMINOPHEN 1000 MG: 500 TABLET ORAL at 23:30

## 2019-05-20 RX ADMIN — CLINDAMYCIN IN 5 PERCENT DEXTROSE 900 MG: 18 INJECTION, SOLUTION INTRAVENOUS at 03:58

## 2019-05-20 RX ADMIN — GABAPENTIN 300 MG: 300 CAPSULE ORAL at 08:30

## 2019-05-20 RX ADMIN — DIVALPROEX SODIUM 750 MG: 500 TABLET, DELAYED RELEASE ORAL at 20:52

## 2019-05-20 RX ADMIN — PHENOBARBITAL 32.4 MG: 32.4 TABLET ORAL at 21:59

## 2019-05-20 RX ADMIN — RISPERIDONE 2 MG: 2 TABLET, FILM COATED ORAL at 20:53

## 2019-05-20 RX ADMIN — ACETAMINOPHEN 1000 MG: 500 TABLET ORAL at 14:18

## 2019-05-20 RX ADMIN — MAGNESIUM GLUCONATE 500 MG ORAL TABLET 400 MG: 500 TABLET ORAL at 14:18

## 2019-05-20 RX ADMIN — DIVALPROEX SODIUM 750 MG: 500 TABLET, DELAYED RELEASE ORAL at 08:46

## 2019-05-20 RX ADMIN — OXYCODONE HYDROCHLORIDE 10 MG: 5 TABLET ORAL at 08:53

## 2019-05-20 RX ADMIN — Medication 10 ML: at 20:54

## 2019-05-20 RX ADMIN — MORPHINE SULFATE 4 MG: 4 INJECTION, SOLUTION INTRAMUSCULAR; INTRAVENOUS at 10:00

## 2019-05-20 RX ADMIN — BUSPIRONE HYDROCHLORIDE 15 MG: 15 TABLET ORAL at 08:45

## 2019-05-20 RX ADMIN — ASPIRIN 81 MG: 81 TABLET, CHEWABLE ORAL at 15:25

## 2019-05-20 RX ADMIN — Medication 10 ML: at 09:00

## 2019-05-20 RX ADMIN — STANDARDIZED SENNA CONCENTRATE AND DOCUSATE SODIUM 2 TABLET: 8.6; 5 TABLET ORAL at 20:53

## 2019-05-20 RX ADMIN — PIPERACILLIN AND TAZOBACTAM 3.38 G: 3; .375 INJECTION, POWDER, FOR SOLUTION INTRAVENOUS at 12:26

## 2019-05-20 RX ADMIN — OXYCODONE HYDROCHLORIDE 10 MG: 5 TABLET ORAL at 00:00

## 2019-05-20 RX ADMIN — OXYCODONE HYDROCHLORIDE 10 MG: 5 TABLET ORAL at 14:18

## 2019-05-20 RX ADMIN — CLINDAMYCIN IN 5 PERCENT DEXTROSE 900 MG: 18 INJECTION, SOLUTION INTRAVENOUS at 12:24

## 2019-05-20 RX ADMIN — CLINDAMYCIN IN 5 PERCENT DEXTROSE 900 MG: 18 INJECTION, SOLUTION INTRAVENOUS at 20:15

## 2019-05-20 RX ADMIN — OXYCODONE HYDROCHLORIDE 10 MG: 5 TABLET ORAL at 18:02

## 2019-05-20 RX ADMIN — MORPHINE SULFATE 2 MG: 2 INJECTION, SOLUTION INTRAMUSCULAR; INTRAVENOUS at 02:03

## 2019-05-20 RX ADMIN — POLYETHYLENE GLYCOL 3350 17 G: 17 POWDER, FOR SOLUTION ORAL at 08:52

## 2019-05-20 RX ADMIN — ENOXAPARIN SODIUM 40 MG: 40 INJECTION SUBCUTANEOUS at 08:47

## 2019-05-20 RX ADMIN — MAGNESIUM GLUCONATE 500 MG ORAL TABLET 400 MG: 500 TABLET ORAL at 10:24

## 2019-05-20 RX ADMIN — STANDARDIZED SENNA CONCENTRATE AND DOCUSATE SODIUM 2 TABLET: 8.6; 5 TABLET ORAL at 08:52

## 2019-05-20 RX ADMIN — ACETAMINOPHEN 1000 MG: 500 TABLET ORAL at 06:53

## 2019-05-20 RX ADMIN — FAMOTIDINE 20 MG: 20 TABLET, FILM COATED ORAL at 08:52

## 2019-05-20 RX ADMIN — OXYCODONE HYDROCHLORIDE 10 MG: 5 TABLET ORAL at 05:33

## 2019-05-20 RX ADMIN — LEVOTHYROXINE SODIUM 50 MCG: 50 TABLET ORAL at 06:53

## 2019-05-20 RX ADMIN — PIPERACILLIN AND TAZOBACTAM 3.38 G: 3; .375 INJECTION, POWDER, FOR SOLUTION INTRAVENOUS at 20:49

## 2019-05-20 ASSESSMENT — PAIN DESCRIPTION - DESCRIPTORS
DESCRIPTORS: ACHING;DISCOMFORT
DESCRIPTORS: ACHING;DISCOMFORT
DESCRIPTORS: DISCOMFORT;ACHING;SHARP
DESCRIPTORS: ACHING
DESCRIPTORS: ACHING;DISCOMFORT
DESCRIPTORS: ACHING
DESCRIPTORS: ACHING;BURNING

## 2019-05-20 ASSESSMENT — PAIN DESCRIPTION - FREQUENCY
FREQUENCY: CONTINUOUS
FREQUENCY: INTERMITTENT
FREQUENCY: INTERMITTENT
FREQUENCY: CONTINUOUS
FREQUENCY: INTERMITTENT

## 2019-05-20 ASSESSMENT — PAIN DESCRIPTION - LOCATION
LOCATION: HIP

## 2019-05-20 ASSESSMENT — PAIN SCALES - GENERAL
PAINLEVEL_OUTOF10: 10
PAINLEVEL_OUTOF10: 5
PAINLEVEL_OUTOF10: 8
PAINLEVEL_OUTOF10: 6
PAINLEVEL_OUTOF10: 8
PAINLEVEL_OUTOF10: 7
PAINLEVEL_OUTOF10: 7
PAINLEVEL_OUTOF10: 0
PAINLEVEL_OUTOF10: 0
PAINLEVEL_OUTOF10: 10
PAINLEVEL_OUTOF10: 3
PAINLEVEL_OUTOF10: 5
PAINLEVEL_OUTOF10: 10
PAINLEVEL_OUTOF10: 10
PAINLEVEL_OUTOF10: 0
PAINLEVEL_OUTOF10: 6
PAINLEVEL_OUTOF10: 0

## 2019-05-20 ASSESSMENT — PAIN DESCRIPTION - PROGRESSION
CLINICAL_PROGRESSION: NOT CHANGED
CLINICAL_PROGRESSION: GRADUALLY IMPROVING
CLINICAL_PROGRESSION: NOT CHANGED

## 2019-05-20 ASSESSMENT — PAIN DESCRIPTION - PAIN TYPE
TYPE: SURGICAL PAIN
TYPE: ACUTE PAIN;SURGICAL PAIN
TYPE: ACUTE PAIN;SURGICAL PAIN
TYPE: SURGICAL PAIN;ACUTE PAIN
TYPE: ACUTE PAIN;SURGICAL PAIN

## 2019-05-20 ASSESSMENT — PAIN DESCRIPTION - ORIENTATION
ORIENTATION: RIGHT;LEFT
ORIENTATION: RIGHT
ORIENTATION: RIGHT;LEFT
ORIENTATION: RIGHT

## 2019-05-20 ASSESSMENT — PAIN DESCRIPTION - ONSET
ONSET: ON-GOING

## 2019-05-20 NOTE — PROGRESS NOTES
Pharmacy Vancomycin Consult     Vancomycin Day: 4  Current Dosin mg q12h    Indication:necrotizing fasciitis  Goal range: 10-20 mcg/mL    Temp max:  afebrile    Recent Labs     19  0548 19  0531   BUN 21* 8       Recent Labs     19  0548 19  0531   CREATININE 0.86 0.52       Recent Labs     19  0548 19  0531   WBC 12.3* 13.2*         Intake/Output Summary (Last 24 hours) at 2019 0738  Last data filed at 2019 0400  Gross per 24 hour   Intake 4360 ml   Output 4400 ml   Net -40 ml       Culture Date      Source                       Results  .                BCx                            NG @ 3 days  .                WCx                           Strep pyogenes, GNR, GPCC  ..                BCx                            NG @ 2 days        Ht Readings from Last 1 Encounters:   19 5' 2\" (1.575 m)        Wt Readings from Last 1 Encounters:   19 150 lb (68 kg)         Body mass index is 27.44 kg/m². Estimated Creatinine Clearance: 109 mL/min (based on SCr of 0.52 mg/dL). Trough: 15.1 mcg/mL (drawn 2 hours early)    Assessment/Plan:  1) Trough within therapeutic range of 10-20 mcg/mL, will continue current regimen at this time. 2) Pharmacy will follow patient renal function, Vancomycin levels and doses with you during the course of therapy. Additional recommendations will appear in follow up notes. Faheem Zavaleta [facility] [address] [place] [address] Resident  [phone]  2019 7:46 AM

## 2019-05-20 NOTE — PLAN OF CARE
Problem: Falls - Risk of:  Goal: Will remain free from falls  Description  Will remain free from falls  5/19/2019 2343 by Nazario Diaz RN  Outcome: Ongoing     Problem: Falls - Risk of:  Goal: Absence of physical injury  Description  Absence of physical injury  5/19/2019 2343 by Nazario Diaz RN  Outcome: Ongoing     Problem: Pain:  Goal: Pain level will decrease  Description  Pain level will decrease  5/19/2019 2343 by Nazario Diaz RN  Outcome: Ongoing     Problem: Pain:  Goal: Control of acute pain  Description  Control of acute pain  5/19/2019 2343 by Nazario Diaz RN  Outcome: Ongoing     Problem: Pain:  Goal: Control of chronic pain  Description  Control of chronic pain  5/19/2019 2343 by Nazario Diaz RN  Outcome: Ongoing     Problem: Infection - Surgical Site:  Goal: Will show no infection signs and symptoms  Description  Will show no infection signs and symptoms  5/19/2019 2343 by Nazario Diaz RN  Outcome: Ongoing

## 2019-05-20 NOTE — CARE COORDINATION
Consult received for psych follow? Meds?   Met with pt who stated she resides in [facility] - came here several months ago from [place]  Stated she is see's Nathan Knife for PCP and has her meds delivered by [employer]  Noted pt in [facility] in Jan, Feb/Mar   Pt stated she was linked with [facility] for her psych care (depression)  Pt stated she see's the psychiatrist and a therapist but admits has not been there in months  Stated she is on an antidepressant that was prescribed by the psychiatrist at [address] [place] [address]  Asked pt if she had an upcoming appt and she was not sure  Pt was not agreeable to writer calling [facility] to see if has an upcoming appt, and if not, to have writer schedule her appt  Will f/u with pt again to discuss further as pt was somewhat guarded at this time in discussing above - pt was agreeable for writer to return at a later time

## 2019-05-20 NOTE — OP NOTE
[facility]                                OPERATIVE REPORT    PATIENT NAME: Devin Miller                    :        1965  MED REC NO:   [id number]                             ROOM:       0125  ACCOUNT NO:   [de-identified]                           ADMIT DATE: 2019  PROVIDER:     Solon Closs, DO    DATE OF PROCEDURE:  2019    PREOPERATIVE DIAGNOSIS:  Necrotizing soft tissue infection of the right  lateral thigh. POSTOPERATIVE DIAGNOSIS:  Necrotizing soft tissue infection of the right  lateral thigh. PROCEDURE:  Right thigh wound washout with irrigating wound VAC  Placement [id number]. ANESTHESIA:  General.    SURGEON:  Teofilo Gaona. Alexander Sutton MD    ASSISTANTS:  Shahab Trejo DO, PGY-3, Solon Closs, DO, PGY-1    ESTIMATED BLOOD LOSS:  2 mL. COMPLICATIONS:  None. SPECIMENS:  None obtained. INDICATIONS:  The patient is a 57-year-old female who is postop day-2  from a necrotizing soft tissue infection incision and drainage with sharp  debridement. The patient is being taken back to the OR on postop day-2 for  wound VAC placement and washout of right lateral thigh. The risks,  benefits, and alternatives were all discussed with the patient. Informed consent was obtained, and all questions were answered to the  fullest extent, and the patient agreed to proceed with the procedure. DESCRIPTION OF PROCEDURE:  The patient was brought into the operating  room and placed in the supine position on the operating table. After  induction of general anesthesia, the patient was placed in the left  lateral decubitus with a bean bag supporting her. The right thigh  dressings were taken down and investigated, in which the wound bed appeared to be clean. A time-out was performed confirming the patient, procedure, allergies,  and antibiotics given.   The patient was then prepped anddraped  in the usual sterile fashion. Again, the wound bed appeared to be  healthy without any additional necrotic tissue; therefore, the wound VAC  was appropriately measured [id number] and placed on the wound bed which included  areas of undermining from the prior procedure. DuoDerm was used to  protect the skin edges from the black foam.  The wound VAC was then  properly placed and hooked to an irrigating wound VAC device. Approximately 300 mL was irrigated onto the wound bed, and after  confirming adequate irrigation and no evidence of leak, the saline water  was suctioned out. This then concluded the procedure. All sponge and needle counts were confirmed at the end of the procedure. The patient was then  transferred to the PACU in stable condition. Dr. Srikanth Gresham was present for the entirety of the case.         Laith Vernon DO    D: 05/19/2019 11:40:05       T: 05/19/2019 22:52:23     [id number]  Job#: [id number]     Doc#: [id number]    CC:  MD Tish Torres DO

## 2019-05-20 NOTE — ANESTHESIA POSTPROCEDURE EVALUATION
Department of Anesthesiology  Postprocedure Note    Patient: Delaney Cote  MRN: [id number]  YOB: 1965  Date of evaluation: 5/20/2019  Time:  7:54 AM     Procedure Summary     Date:  05/17/19 Room / Location:  [facility] OR [address] [place] [address] OR    Anesthesia Start:  Algie Body Anesthesia Stop:  2019    Procedure:  IRRIGATION, DEBRIDEMENT RIGHT THIGH (Right Thigh) Diagnosis:  (NECROTIZING FASCIITIS)    Surgeon:  Marianne Esparza MD Responsible Provider:  Frank Chowdhury MD    Anesthesia Type:  general ASA Status:  4 - Emergent          Anesthesia Type: No value filed. Wojciech Phase I: Wojciech Score: 9    Wojciech Phase II:      Last vitals: Reviewed and per EMR flowsheets.        Anesthesia Post Evaluation    Patient location during evaluation: ICU  Patient participation: complete - patient participated  Level of consciousness: awake and alert  Airway patency: patent  Nausea & Vomiting: no nausea and no vomiting  Complications: no  Cardiovascular status: hemodynamically stable  Respiratory status: room air  Hydration status: euvolemic

## 2019-05-20 NOTE — PROGRESS NOTES
ICU PROGRESS NOTE        PATIENT NAME: Ijeoma Friend  MEDICAL RECORD NO. [id number]  DATE: 5/20/2019    PRIMARY CARE PHYSICIAN: Raúl Hernandez NPHA    HD: # 3    ASSESSMENT    Patient Active Problem List   Diagnosis    Chest pain    Migraine variant with headache    Drug overdose, accidental or unintentional, initial encounter    Hypothyroidism    Essential hypertension    [facility]    Drug overdose    History of seizure    Severe major depression with psychotic features ([facility]    Severe major depression ([facility]    Overdose of barbiturate, intentional self-harm, initial encounter ([facility]    Intentional phenobarbital overdose ([facility]    Severe episode of recurrent major depressive disorder, without psychotic features ([facility]    Bronchitis    Suicide attempt ([facility]    Major depressive disorder, recurrent ([facility]    Ear infection    Necrotizing soft tissue infection    Sepsis affecting skin (HCC)    Leukocytosis    Elevated lactic acid level    Hyponatremia       MEDICAL DECISION MAKING AND PLAN  1. Neuro  1. Pain control - Tylenol, marquez and morphine PRN  2. Seizure hx - on home Dilantin, Depakote    2. CV  1. Hemodynamically stable  2. Regular rate and rhythm  3. SBP 90-100s, heart rate 70-80s  4. Continue to monitor BP  3. Pulm  1. 97% on RA  2. History of COPD, current smoker  3. Home albuterol restarted PRN  4. GI  1. General diet with supplements  2. Pepcid BID - home med  3. Bowel regimen: colace daily, senna BID, miralax daily  5. Renal  1. Continue LR @125cc/hr   2. Strict I&Os  3. House (5/17) - UOP adequate  6. Heme/ID  1. Follow up AM labs  2. Continue Vanc, Zosyn and Clinda  7. Endocrine  1. Hypothyroidism -synthroid 50mcg  2. BS stable  8. Psych  1. Restart home meds - remeron, buspar  9. Ppx  1. SCDs  2. Lovenox  3. Pepcid BID  10. Dispo  1. Txf to stepdown  2. Wound vac change in OR likely Wednesday          SUBJECTIVE    Aleta Aguilar is doing okay. No acute overnight events. Patient complains of a lot of pain in her right thigh. Per nursing, patient sleeps well but will wake up suddenly and shout for pain medications. SBP in 80-90s when asleep and asymptomatic. Encouraged patient to be OOB and into chair today. Voiding without issues. Afebrile. VSS.        OBJECTIVE  VITALS: Temp: Temp: 97 °F (36.1 °C)Temp  Av.2 °F (35.7 °C)  Min: 95.4 °F (35.2 °C)  Max: 97 °F (91.7 °C) BP Systolic (81INL), FWA:875 , Min:78 , DET:812   Diastolic (11NIB), XXE:12, Min:29, Max:196   Pulse Pulse  Av.1  Min: 56  Max: 89 Resp Resp  Avg: 10.4  Min: 0  Max: 38 Pulse ox SpO2  Av.4 %  Min: 95 %  Max: 100 %    CONSTITUTIONAL: awake, alert, cooperative, no apparent distress  HEAD: atraumatic, normocephalic  EYES: sclera clear, pupils equal and reactive to light  ENT: ears are symmetric, nares patent   HEENT: moist mucous membranes  NECK: Supple, symmetrical, trachea midline  LUNGS: no respiratory distress, no audible wheezing  CARDIOVASCULAR: +S1/S2  ABDOMEN: soft, nontender, nondistended, no guarding, no rebound tenderness   MUSCULOSKELETAL: full range of motion noted, RLE limited due to pain  NEUROLOGIC: Awake, alert, oriented to name, place and time  EXTREMITIES: right lateral thigh wound vac in place with surrounding erythema on skin edges and erythema on upper thigh extending medially unchanged from yesterday; peripheral pulses normal, no pedal edema, no calf tenderness    LAB:  CBC:   Recent Labs     19  1213 19  0548 19  0531   WBC 12.0*  --  12.3* 13.2*   HGB 8.9* 8.7* 8.8* 9.2*   HCT 26.1* 26.5* 27.7* 30.9*   MCV 84.2  --  92.0 97.8     --  401 434     BMP:   Recent Labs     19  0548 19  0531   * 132* 131*   K 3.3* 3.0* 3.6*   CL 99 97* 98   CO2 21 20 19*   BUN 12 21* 8   CREATININE 0.38* 0.86 0.52   GLUCOSE 119* 106* 98         Belinda Saenz DO  19, 7:31 AM       Trauma Attending Attestation      I have reviewed the above TECSS note(s) and confirmed the key elements of the medical history and physical exam. I have seen and examined the pt. I have discussed the findings, established the care plan and recommendations with Resident, GCS RN, bedside nurse. I personally reviewed any images in real time to expedite the patient's care.         Eriberto Tolbert MD  7/5/2019  11:57 PM

## 2019-05-21 ENCOUNTER — ANESTHESIA EVENT (OUTPATIENT)
Dept: OPERATING ROOM | Age: 54
DRG: 720 | End: 2019-05-21
Payer: MEDICARE

## 2019-05-21 LAB
ABSOLUTE EOS #: 0 K/UL (ref 0–0.4)
ABSOLUTE IMMATURE GRANULOCYTE: 0.42 K/UL (ref 0–0.3)
ABSOLUTE LYMPH #: 2.78 K/UL (ref 1–4.8)
ABSOLUTE MONO #: 0.97 K/UL (ref 0.1–0.8)
ANION GAP SERPL CALCULATED.3IONS-SCNC: 11 MMOL/L (ref 9–17)
BASOPHILS # BLD: 0 % (ref 0–2)
BASOPHILS ABSOLUTE: 0 K/UL (ref 0–0.2)
BUN BLDV-MCNC: 6 MG/DL (ref 6–20)
BUN/CREAT BLD: ABNORMAL (ref 9–20)
CALCIUM SERPL-MCNC: 7.8 MG/DL (ref 8.6–10.4)
CHLORIDE BLD-SCNC: 97 MMOL/L (ref 98–107)
CO2: 24 MMOL/L (ref 20–31)
CREAT SERPL-MCNC: 0.54 MG/DL (ref 0.5–0.9)
DIFFERENTIAL TYPE: ABNORMAL
EOSINOPHILS RELATIVE PERCENT: 0 % (ref 1–4)
GFR AFRICAN AMERICAN: >60 ML/MIN
GFR NON-AFRICAN AMERICAN: >60 ML/MIN
GFR SERPL CREATININE-BSD FRML MDRD: ABNORMAL ML/MIN/{1.73_M2}
GFR SERPL CREATININE-BSD FRML MDRD: ABNORMAL ML/MIN/{1.73_M2}
GLUCOSE BLD-MCNC: 93 MG/DL (ref 70–99)
HCT VFR BLD CALC: 27.5 % (ref 36.3–47.1)
HEMOGLOBIN: 8.9 G/DL (ref 11.9–15.1)
IMMATURE GRANULOCYTES: 3 %
LYMPHOCYTES # BLD: 20 % (ref 24–44)
MAGNESIUM: 1.7 MG/DL (ref 1.6–2.6)
MCH RBC QN AUTO: 29.8 PG (ref 25.2–33.5)
MCHC RBC AUTO-ENTMCNC: 32.4 G/DL (ref 28.4–34.8)
MCV RBC AUTO: 92 FL (ref 82.6–102.9)
MONOCYTES # BLD: 7 % (ref 1–7)
MORPHOLOGY: ABNORMAL
MORPHOLOGY: ABNORMAL
NRBC AUTOMATED: 0 PER 100 WBC
PDW BLD-RTO: 15.6 % (ref 11.8–14.4)
PHENYTOIN DATE LAST DOSE: ABNORMAL
PHENYTOIN DOSE AMOUNT: ABNORMAL
PHENYTOIN DOSE TIME: ABNORMAL
PHENYTOIN FREE: 0.4 UG/ML (ref 1–2)
PHENYTOIN LEVEL: 1.8 UG/ML (ref 10–20)
PLATELET # BLD: 456 K/UL (ref 138–453)
PLATELET ESTIMATE: ABNORMAL
PMV BLD AUTO: 10.1 FL (ref 8.1–13.5)
POTASSIUM SERPL-SCNC: 3.6 MMOL/L (ref 3.7–5.3)
RBC # BLD: 2.99 M/UL (ref 3.95–5.11)
RBC # BLD: ABNORMAL 10*6/UL
SEG NEUTROPHILS: 70 % (ref 36–66)
SEGMENTED NEUTROPHILS ABSOLUTE COUNT: 9.73 K/UL (ref 1.8–7.7)
SODIUM BLD-SCNC: 132 MMOL/L (ref 135–144)
SURGICAL PATHOLOGY REPORT: NORMAL
VALPROIC ACID % FREE: 36.6 % (ref 5–18.4)
VALPROIC ACID LEVEL: 41 UG/ML (ref 50–125)
VALPROIC ACID, FREE: 15 UG/ML (ref 7–23)
VALPROIC DATE LAST DOSE: ABNORMAL
VALPROIC DOSE AMOUNT: ABNORMAL
VALPROIC TIME LAST DOSE: ABNORMAL
WBC # BLD: 13.9 K/UL (ref 3.5–11.3)
WBC # BLD: ABNORMAL 10*3/UL

## 2019-05-21 PROCEDURE — 6370000000 HC RX 637 (ALT 250 FOR IP): Performed by: STUDENT IN AN ORGANIZED HEALTH CARE EDUCATION/TRAINING PROGRAM

## 2019-05-21 PROCEDURE — 2500000003 HC RX 250 WO HCPCS: Performed by: STUDENT IN AN ORGANIZED HEALTH CARE EDUCATION/TRAINING PROGRAM

## 2019-05-21 PROCEDURE — 1200000000 HC SEMI PRIVATE

## 2019-05-21 PROCEDURE — 6370000000 HC RX 637 (ALT 250 FOR IP): Performed by: NURSE PRACTITIONER

## 2019-05-21 PROCEDURE — 2580000003 HC RX 258: Performed by: STUDENT IN AN ORGANIZED HEALTH CARE EDUCATION/TRAINING PROGRAM

## 2019-05-21 PROCEDURE — 80165 DIPROPYLACETIC ACID FREE: CPT

## 2019-05-21 PROCEDURE — 80048 BASIC METABOLIC PNL TOTAL CA: CPT

## 2019-05-21 PROCEDURE — 80164 ASSAY DIPROPYLACETIC ACD TOT: CPT

## 2019-05-21 PROCEDURE — 6360000002 HC RX W HCPCS: Performed by: STUDENT IN AN ORGANIZED HEALTH CARE EDUCATION/TRAINING PROGRAM

## 2019-05-21 PROCEDURE — 83735 ASSAY OF MAGNESIUM: CPT

## 2019-05-21 PROCEDURE — 85025 COMPLETE CBC W/AUTO DIFF WBC: CPT

## 2019-05-21 PROCEDURE — 97161 PT EVAL LOW COMPLEX 20 MIN: CPT

## 2019-05-21 PROCEDURE — 97166 OT EVAL MOD COMPLEX 45 MIN: CPT

## 2019-05-21 PROCEDURE — 36415 COLL VENOUS BLD VENIPUNCTURE: CPT

## 2019-05-21 PROCEDURE — 97530 THERAPEUTIC ACTIVITIES: CPT

## 2019-05-21 RX ADMIN — MAGNESIUM GLUCONATE 500 MG ORAL TABLET 400 MG: 500 TABLET ORAL at 08:01

## 2019-05-21 RX ADMIN — MAGNESIUM GLUCONATE 500 MG ORAL TABLET 400 MG: 500 TABLET ORAL at 15:20

## 2019-05-21 RX ADMIN — OXYCODONE HYDROCHLORIDE 10 MG: 5 TABLET ORAL at 12:12

## 2019-05-21 RX ADMIN — STANDARDIZED SENNA CONCENTRATE AND DOCUSATE SODIUM 2 TABLET: 8.6; 5 TABLET ORAL at 08:00

## 2019-05-21 RX ADMIN — CLINDAMYCIN IN 5 PERCENT DEXTROSE 900 MG: 18 INJECTION, SOLUTION INTRAVENOUS at 22:21

## 2019-05-21 RX ADMIN — PHENOBARBITAL 32.4 MG: 32.4 TABLET ORAL at 22:26

## 2019-05-21 RX ADMIN — VANCOMYCIN HYDROCHLORIDE 1000 MG: 1 INJECTION, SOLUTION INTRAVENOUS at 23:40

## 2019-05-21 RX ADMIN — CLINDAMYCIN IN 5 PERCENT DEXTROSE 900 MG: 18 INJECTION, SOLUTION INTRAVENOUS at 04:07

## 2019-05-21 RX ADMIN — DIVALPROEX SODIUM 750 MG: 500 TABLET, DELAYED RELEASE ORAL at 22:23

## 2019-05-21 RX ADMIN — FAMOTIDINE 20 MG: 20 TABLET, FILM COATED ORAL at 08:01

## 2019-05-21 RX ADMIN — PHENOBARBITAL 32.4 MG: 32.4 TABLET ORAL at 15:19

## 2019-05-21 RX ADMIN — Medication 10 ML: at 22:27

## 2019-05-21 RX ADMIN — LEVOTHYROXINE SODIUM 50 MCG: 50 TABLET ORAL at 07:49

## 2019-05-21 RX ADMIN — MAGNESIUM GLUCONATE 500 MG ORAL TABLET 400 MG: 500 TABLET ORAL at 22:25

## 2019-05-21 RX ADMIN — ASPIRIN 81 MG: 81 TABLET, CHEWABLE ORAL at 08:01

## 2019-05-21 RX ADMIN — PIPERACILLIN AND TAZOBACTAM 3.38 G: 3; .375 INJECTION, POWDER, FOR SOLUTION INTRAVENOUS at 04:07

## 2019-05-21 RX ADMIN — Medication 10 ML: at 08:03

## 2019-05-21 RX ADMIN — ACETAMINOPHEN 1000 MG: 500 TABLET ORAL at 15:18

## 2019-05-21 RX ADMIN — RISPERIDONE 2 MG: 2 TABLET, FILM COATED ORAL at 22:24

## 2019-05-21 RX ADMIN — MIRTAZAPINE 45 MG: 30 TABLET, FILM COATED ORAL at 08:03

## 2019-05-21 RX ADMIN — VANCOMYCIN HYDROCHLORIDE 1000 MG: 1 INJECTION, SOLUTION INTRAVENOUS at 09:30

## 2019-05-21 RX ADMIN — ACETAMINOPHEN 1000 MG: 500 TABLET ORAL at 07:48

## 2019-05-21 RX ADMIN — ACETAMINOPHEN 1000 MG: 500 TABLET ORAL at 22:22

## 2019-05-21 RX ADMIN — ENOXAPARIN SODIUM 40 MG: 40 INJECTION SUBCUTANEOUS at 08:02

## 2019-05-21 RX ADMIN — PHENOBARBITAL 32.4 MG: 32.4 TABLET ORAL at 12:08

## 2019-05-21 RX ADMIN — PHENYTOIN SODIUM 300 MG: 300 CAPSULE, EXTENDED RELEASE ORAL at 22:26

## 2019-05-21 RX ADMIN — BUSPIRONE HYDROCHLORIDE 5 MG: 15 TABLET ORAL at 08:01

## 2019-05-21 RX ADMIN — POLYETHYLENE GLYCOL 3350 17 G: 17 POWDER, FOR SOLUTION ORAL at 08:02

## 2019-05-21 RX ADMIN — DIVALPROEX SODIUM 750 MG: 500 TABLET, DELAYED RELEASE ORAL at 08:01

## 2019-05-21 RX ADMIN — POTASSIUM CHLORIDE 20 MEQ: 1500 TABLET, EXTENDED RELEASE ORAL at 08:01

## 2019-05-21 RX ADMIN — CLINDAMYCIN IN 5 PERCENT DEXTROSE 900 MG: 18 INJECTION, SOLUTION INTRAVENOUS at 12:08

## 2019-05-21 RX ADMIN — PIPERACILLIN AND TAZOBACTAM 3.38 G: 3; .375 INJECTION, POWDER, FOR SOLUTION INTRAVENOUS at 15:17

## 2019-05-21 RX ADMIN — STANDARDIZED SENNA CONCENTRATE AND DOCUSATE SODIUM 2 TABLET: 8.6; 5 TABLET ORAL at 22:22

## 2019-05-21 RX ADMIN — OXYCODONE HYDROCHLORIDE 10 MG: 5 TABLET ORAL at 08:01

## 2019-05-21 RX ADMIN — BUSPIRONE HYDROCHLORIDE 5 MG: 15 TABLET ORAL at 21:00

## 2019-05-21 ASSESSMENT — PAIN DESCRIPTION - LOCATION
LOCATION: HIP

## 2019-05-21 ASSESSMENT — PAIN SCALES - GENERAL
PAINLEVEL_OUTOF10: 9
PAINLEVEL_OUTOF10: 10
PAINLEVEL_OUTOF10: 8
PAINLEVEL_OUTOF10: 10
PAINLEVEL_OUTOF10: 0
PAINLEVEL_OUTOF10: 3
PAINLEVEL_OUTOF10: 0
PAINLEVEL_OUTOF10: 8
PAINLEVEL_OUTOF10: 9
PAINLEVEL_OUTOF10: 10

## 2019-05-21 ASSESSMENT — PAIN DESCRIPTION - DESCRIPTORS
DESCRIPTORS: ACHING;DISCOMFORT
DESCRIPTORS: ACHING;BURNING

## 2019-05-21 ASSESSMENT — PAIN DESCRIPTION - PROGRESSION
CLINICAL_PROGRESSION: NOT CHANGED
CLINICAL_PROGRESSION: GRADUALLY IMPROVING
CLINICAL_PROGRESSION: GRADUALLY IMPROVING
CLINICAL_PROGRESSION: NOT CHANGED
CLINICAL_PROGRESSION: GRADUALLY IMPROVING
CLINICAL_PROGRESSION: NOT CHANGED
CLINICAL_PROGRESSION: GRADUALLY IMPROVING
CLINICAL_PROGRESSION: NOT CHANGED

## 2019-05-21 ASSESSMENT — PAIN DESCRIPTION - PAIN TYPE
TYPE: ACUTE PAIN;SURGICAL PAIN
TYPE: CHRONIC PAIN
TYPE: CHRONIC PAIN
TYPE: ACUTE PAIN;SURGICAL PAIN
TYPE: SURGICAL PAIN
TYPE: ACUTE PAIN;SURGICAL PAIN

## 2019-05-21 ASSESSMENT — PAIN DESCRIPTION - FREQUENCY
FREQUENCY: INTERMITTENT

## 2019-05-21 ASSESSMENT — PAIN DESCRIPTION - ONSET
ONSET: GRADUAL
ONSET: GRADUAL

## 2019-05-21 ASSESSMENT — PAIN DESCRIPTION - ORIENTATION
ORIENTATION: RIGHT
ORIENTATION: RIGHT;LEFT
ORIENTATION: RIGHT

## 2019-05-21 NOTE — PROGRESS NOTES
Smoking Cessation - topics covered   []  Health Risks  []  Benefits of Quitting   []  Smoking Cessation  []  Patient has no history of tobacco use  []  Patient is former smoker. []  No need for tobacco cessation education. []  Booklet given  []  Patient verbalizes understanding. []  Patient denies need for tobacco cessation education. [x]  Unable to meet with patient today. Will follow up as able.   Vernon Romero  2:55 PM

## 2019-05-21 NOTE — PROGRESS NOTES
Occupational Therapy   Occupational Therapy Initial Assessment  Date: 2019   Patient Name: Desire Jung  MRN: [id number]     : 1965    Date of Service: 2019    Discharge Recommendations: Further therapy recommended at discharge. Pt close to baseline, need to assess further to determine recommendations. Pt reported living w/ friend who can assist PRN. OT Equipment Recommendations  Equipment Needed: No    Assessment   Performance deficits / Impairments: Decreased functional mobility ; Decreased safe awareness;Decreased balance;Decreased ADL status; Decreased endurance;Decreased high-level IADLs  Assessment: MAX encouragement needed for pt to participate in session. Pt reported being frustrated multiple times throughout session. Prognosis: Good  Decision Making: Medium Complexity  Patient Education: Traci Lim, importance of participating in therapy - good return  REQUIRES OT FOLLOW UP: Yes  Activity Tolerance  Activity Tolerance: Patient limited by pain  Safety Devices  Safety Devices in place: Yes  Type of devices: Left in chair;Call light within reach;Nurse notified  Restraints  Initially in place: No        Patient Diagnosis(es): The encounter diagnosis was Necrotizing fasciitis ([address] has a past medical history of Arthritis, Asthma, CHF (congestive heart failure) ([address] COPD (chronic obstructive pulmonary disease) ([address] Fibromyalgia, Headache, Hypertension, Movement disorder, Neck fracture ([address] Seizure ([address] and Thyroid disease. has a past surgical history that includes knee surgery; partial hysterectomy (cervix not removed); lymph node dissection; Irrigation and debridement (Right, 2019); and EXPLORATION OF WOUND OF EXTREMITY (Right, 2019).     Restrictions  Restrictions/Precautions  Restrictions/Precautions: Fall Risk, General Precautions  Required Braces or Orthoses?: No  Position Activity Restriction  Other position/activity restrictions: up with assist, R hip wound vac    Subjective   General  Patient assessed for rehabilitation services?: Yes  Family / Caregiver Present: No  Pain Assessment  Pain Assessment: 0-10  Pain Level: 9  Pain Type: Acute pain;Surgical pain  Pain Location: Hip  Pain Orientation: Right  Pain Descriptors: Aching;Discomfort  Pain Frequency: Intermittent  Non-Pharmaceutical Pain Intervention(s): Emotional support; Ambulation/Increased Activity; Therapeutic presence;Distraction  Response to Pain Intervention: Patient Satisfied  Social/Functional History  Social/Functional History  Lives With: Friend(s)  Type of Home: Apartment(1st floor apt)  Home Layout: One level  Home Access: Level entry  Bathroom Shower/Tub: Tub/Shower unit  Bathroom Toilet: Standard  Bathroom Equipment: Grab bars in shower, Tub transfer bench, Grab bars around toilet  Home Equipment: (no DME)  Receives Help From: Friend(s)  ADL Assistance: Independent  Homemaking Assistance: Independent  Homemaking Responsibilities: Yes  Ambulation Assistance: Independent  Transfer Assistance: Independent  Active : No  Patient's  Info: no car  Mode of Transportation: Bus  Leisure & Hobbies: \"absolutely nothing\"  Additional Comments: Pt reports friend is able to provide prn assist     Objective   Vision: Impaired  Vision Exceptions: Wears glasses at all times  Hearing: Within functional limits    Orientation  Overall Orientation Status: Within Functional Limits     Balance  Sitting Balance: Independent  Standing Balance: Stand by assistance  Standing Balance  Time: 4 minutes  Activity: Pt stood chairside, funct mob  Comment: Unsteady  Functional Mobility  Functional - Mobility Device: No device  Activity: Other(Steps forward and back)  Assist Level: Stand by assistance  Functional Mobility Comments: Unsteady  ADL  Feeding: Independent  Grooming: Stand by assistance  UE Bathing: Minimal assistance  LE Bathing: Minimal assistance  UE Dressing: Minimal assistance  LE Dressing: Minimal assistance  Toileting: Stand by assistance  Additional Comments: Pt refused ADLs  Tone RUE  RUE Tone: Normotonic  Tone LUE  LUE Tone: Normotonic  Coordination  Movements Are Fluid And Coordinated: Yes     Bed mobility  Supine to Sit: Unable to assess  Sit to Supine: Unable to assess  Scooting: Stand by assistance  Comment: Pt found and left seated in recliner  Transfers  Sit to stand: Stand by assistance  Stand to sit: Stand by assistance     Cognition  Overall Cognitive Status: WFL        Sensation  Overall Sensation Status: WFL      LUE AROM (degrees)  LUE AROM : WFL  Left Hand AROM (degrees)  Left Hand AROM: WFL  RUE AROM (degrees)  RUE AROM : WFL  Right Hand AROM (degrees)  Right Hand AROM: WFL  LUE Strength  Gross LUE Strength: WFL  L Hand Grasp: 4+/5  L Hand Release: 4+/5  RUE Strength  R Hand Grasp: 4+/5  R Hand Release: 4+/5     Plan   Plan  Times per week: 3-4x    AM-PAC Score  AM-[facility] Inpatient Daily Activity Raw Score: 21  AM-PAC Inpatient ADL T-Scale Score : 44.27  ADL Inpatient CMS 0-100% Score: 32.79  ADL Inpatient CMS G-Code Modifier : CJ    Goals  Short term goals  Time Frame for Short term goals: Pt will by d/c  Short term goal 1: Demo functional high/low item retrieval and mobility w/ SBA  Short term goal 2: Demo UB/LB ADLs w/ APRIL  Short term goal 3: independently demo sit <> stand transfer  Short term goal 4: demo 15 mins of functional dynamic standing w/ SUP       Therapy Time   Individual Concurrent Group Co-treatment   Time In 1047         Time Out 1103         Minutes 16      Co-eval w/ PT      Re Green S/OT

## 2019-05-21 NOTE — DISCHARGE INSTR - COC
Continuity of Care Form    Patient Name: Otto Rose   :  1965  MRN:  [id number]    Admit date:  2019  Discharge date:  19    Code Status Order: Full Code   Advance Directives:     Admitting Physician:  Martha Mir MD  PCP: Raúl KAUR    Discharging Nurse: [facility] Unit/Room#:   Discharging Unit Phone Number: ***    Emergency Contact:   Extended Emergency Contact Information  Primary Emergency Contact: Riley Mackgiovanny [address] Phone: [phone]  Mobile Phone: [phone]  Relation: Child    Past Surgical History:  Past Surgical History:   Procedure Laterality Date    EXPLORATION OF WOUND OF EXTREMITY Right 2019    RIGHT THIGH WOUND WASHOUT WITH [address] performed by Paula oT MD at [address] [place] [address], [place] [address] Right 2019    IRRIGATION, DEBRIDEMENT RIGHT THIGH performed [facility] Martha [facility] at [facility]      PARTIAL HYSTERECTOMY         Immunization History:   Immunization History   Administered Date(s) Administered    Influenza, Quadv, 6 mo and older, IM, PF (Flulaval, Fluarix) 10/31/2018       Active Problems:  Patient Active Problem List   Diagnosis Code    Chest pain R07.9    Migraine variant with headache G43.809    Drug overdose, accidental or unintentional, initial encounter T50.901A    Hypothyroidism E03.9    Essential hypertension I10    Seizure disorder ([address] G40.909    Drug overdose T50.901A    History of seizure Z87.898    Severe major depression with psychotic features ([address] F32.3    Severe major depression ([address] F32.2    Overdose of barbiturate, intentional self-harm, initial encounter ([address] T42.3X2A    Intentional phenobarbital overdose ([address] T42.3X2A    Severe episode of recurrent major depressive disorder, without psychotic features ([address] F33.2    Bronchitis J40    Suicide attempt ([address] T14.91XA    Major Net -1500 ml     I/O last 3 completed shifts: In: 1110 [P.O.:360; IV Piggyback:750]  Out: 4763 [Urine:2245; Drains:1450]    Safety Concerns: At Risk for Falls    Impairments/Disabilities:      None    Nutrition Therapy:  Current Nutrition Therapy:   - Oral Diet:  General    Routes of Feeding: Oral  Liquids: Thin Liquids  Daily Fluid Restriction: no  Last Modified Barium Swallow with Video (Video Swallowing Test): not done    Treatments at the Time of Hospital Discharge:   Respiratory Treatments: na  Oxygen Therapy:  is not on home oxygen therapy. Ventilator:    - No ventilator support    Rehab Therapies: wound care, wound vac   Weight Bearing Status/Restrictions: No weight bearing restirctions  Other Medical Equipment (for information only, NOT a DME order): Wound vac   Other Treatments: Wound Vac changes    Patient's personal belongings (please select all that are sent with patient):  None    RN SIGNATURE:  Electronically signed by Vladimir Blanco RN on 5/28/19 at 5:21 PM    CASE MANAGEMENT/SOCIAL WORK SECTION    Inpatient Status Date: ***    Readmission Risk Assessment Score:  Readmission Risk              Risk of Unplanned Readmission:        42           Discharging to Facility/ Agency   · Name: [facility]  · Address:  · Phone:[phone]  · Fax:    Dialysis Facility (if applicable)   · Name:  · Address:  · Dialysis Schedule:  · Phone:  · Fax:    / signature: Electronically signed by LEANN Grajeda on 5/28/19 at 5:04 PM    PHYSICIAN SECTION    Prognosis: Good    Condition at Discharge: Stable    Rehab Potential (if transferring to Rehab): Good    Recommended Labs or Other Treatments After Discharge: ***    Physician Certification: I certify the above information and transfer of Alaina Singletary  is necessary for the continuing treatment of the diagnosis listed and that she requires [facility] for less 30 days.      Update Admission H&P: No change in H&P    PHYSICIAN SIGNATURE:  Electronically signed by Iram Donald MD on 5/23/19 at 4:33 PM

## 2019-05-21 NOTE — CARE COORDINATION
Spoke with Jose Luis Castañeda at [facility]. They have referral.    Spoke with pt. She states she is not interested in nursing facility/LTACH at [place]. She wants to go home. She is agreeable to [facility]. Currently has wound VAC. Await further surgical plan and antibiotic plan.     Pt's [place] Adv CM is Katerin Blanco [phone]

## 2019-05-21 NOTE — ANESTHESIA PRE PROCEDURE
Hold] vancomycin (VANCOCIN) 1000 mg in dextrose 5% 200 mL IVPB  1,000 mg Intravenous Q12H Kirit Cowboy, DO   Stopped at 05/22/19 0040    [MAR Hold] sennosides-docusate sodium (SENOKOT-S) 8.6-50 MG tablet 2 tablet  2 tablet Oral BID Kirit Cowboy, DO   2 tablet at 05/21/19 2222    [MAR Hold] polyethylene glycol (GLYCOLAX) packet 17 g  17 g Oral Daily Kirit Cowboy, DO   17 g at 05/21/19 0802    [MAR Hold] magnesium oxide (MAG-OX) tablet 400 mg  400 mg Oral TID KEANU Read - CNP   400 mg at 05/21/19 2225    potassium chloride (KLOR-CON M) extended release tablet 20 mEq  20 mEq Oral Daily KEANU Read - CNP   20 mEq at 05/21/19 0801    [MAR Hold] phenytoin (PHENYTEK) ER capsule 300 mg  300 mg Oral Nightly KEANU Read CNP   300 mg at 05/21/19 2226    [MAR Hold] busPIRone (BUSPAR) tablet 5 mg  5 mg Oral BID KEANU Read - CNP   5 mg at 05/21/19 2100    [MAR Hold] aspirin chewable tablet 81 mg  81 mg Oral Daily KEANU Read - CNP   81 mg at 05/21/19 0801    [MAR Hold] PHENobarbital (LUMINAL) tablet 32.4 mg  32.4 mg Oral TID KEANU Read - CNP   32.4 mg at 05/21/19 2226    [MAR Hold] famotidine (PEPCID) tablet 20 mg  20 mg Oral Daily KEANU Read - CNP   20 mg at 05/21/19 0801    [MAR Hold] mirtazapine (REMERON) tablet 45 mg  45 mg Oral Daily KEANU Read - CNP   45 mg at 05/21/19 0803    [MAR Hold] oxyCODONE (ROXICODONE) immediate release tablet 5 mg  5 mg Oral Q4H PRN Tish Talbot, DO   5 mg at 05/22/19 0639    Or    [MAR Hold] oxyCODONE (ROXICODONE) immediate release tablet 10 mg  10 mg Oral Q4H PRN Tish Talbot, DO   10 mg at 05/22/19 1008    [MAR Hold] albuterol (PROVENTIL) nebulizer solution 2.5 mg  2.5 mg Nebulization Q4H PRN Tish Talbot DO        [MAR Hold] risperiDONE (RISPERDAL) tablet 2 mg  2 mg Oral Nightly Tish Talbot DO   2 mg at 05/21/19 2224    [MAR Hold] levothyroxine (SYNTHROID) tablet 50 mcg  50 mcg Oral Daily Tish CALIX E03.9    Essential hypertension I10    Seizure disorder ([facility] G40.909    Drug overdose T50.901A    History of seizure Z87.898    Severe major depression with psychotic features (HCC) F32.3    Severe major depression (HCC) F32.2    Overdose of barbiturate, intentional self-harm, initial encounter ([facility] T42.3X2A    Intentional phenobarbital overdose ([facility] T42.3X2A    Severe episode of recurrent major depressive disorder, without psychotic features ([facility] F33.2    Bronchitis J40    Suicide attempt ([facility] T14.91XA    Major depressive disorder, recurrent ([facility]) F33.9    Ear infection H66.90    Necrotizing soft tissue infection M79.89    Sepsis affecting skin ([facility]) A41.9    Leukocytosis D72.829    Elevated lactic acid level R79.89    Hyponatremia E87.1       Past Medical History:        Diagnosis Date    Arthritis     Asthma     CHF (congestive heart failure) ([facility])     COPD (chronic obstructive pulmonary disease) ([facility])     Fibromyalgia     Headache     Hypertension     Movement disorder     Neck fracture ([facility]     Seizure ([facility]     Thyroid disease        Past Surgical History:        Procedure Laterality Date    EXPLORATION OF WOUND OF EXTREMITY Right 5/19/2019    RIGHT THIGH WOUND WASHOUT WITH [facility]ACEMENT performed by Kristie Jo MD at [address] [place] [address] Right 5/17/2019    IRRIGATION, DEBRIDEMENT RIGHT THIGH performed by Terri Bee MD at [facility]      PARTIAL HYSTERECTOMY         Social History:    Social History     Tobacco Use    Smoking status: Current Every Day Smoker     Packs/day: 0.50     Years: 12.00     Pack years: 6.00    Smokeless tobacco: Never Used   Substance Use Topics    Alcohol use:  No                                Ready to quit: Not Answered  Counseling given: Not Answered      Vital Signs (Current):   Vitals:    05/21/19 2345 05/22/19 0851 05/22/19 1038 05/22/19 1050   BP: (!) 95/53 107/60 122/88 121/73   Pulse:  88 85 82   Resp:  18 14 14   Temp: 98.9 °F (37.2 °C) 98.7 °F (37.1 °C) 98.2 °F (36.8 °C)    TempSrc: Oral Axillary Temporal    SpO2:  91% 93% 97%   Weight:       Height:                                                  BP Readings from Last 3 Encounters:   05/22/19 121/73   05/19/19 98/83   05/17/19 119/62       NPO Status: Time of last liquid consumption: 1000(sip of water with meds)                        Time of last solid consumption: 1545                        Date of last liquid consumption: 05/22/19                        Date of last solid food consumption: 05/21/19    BMI:   Wt Readings from Last 3 Encounters:   05/17/19 150 lb (68 kg)   02/16/19 188 lb 15 oz (85.7 kg)   02/12/19 192 lb (87.1 kg)     Body mass index is 27.44 kg/m². CBC:   Lab Results   Component Value Date    WBC 9.2 05/22/2019    RBC 2.92 05/22/2019    HGB 8.5 05/22/2019    HCT 26.3 05/22/2019    MCV 90.1 05/22/2019    RDW 15.7 05/22/2019     05/22/2019       CMP:   Lab Results   Component Value Date     05/22/2019    K 4.1 05/22/2019     05/22/2019    CO2 27 05/22/2019    BUN 5 05/22/2019    CREATININE 0.48 05/22/2019    GFRAA >60 05/22/2019    LABGLOM >60 05/22/2019    GLUCOSE 81 05/22/2019    PROT 7.1 05/17/2019    CALCIUM 8.0 05/22/2019    BILITOT 0.93 05/17/2019    ALKPHOS 79 05/17/2019    AST 23 05/17/2019    ALT 13 05/17/2019       POC Tests:   No results for input(s): POCGLU, POCNA, POCK, POCCL, POCBUN, POCHEMO, POCHCT in the last 72 hours.     Coags:   Lab Results   Component Value Date    PROTIME 9.5 05/19/2019    INR 0.9 05/19/2019    APTT 20.5 05/19/2019       HCG (If Applicable):   Lab Results   Component Value Date    HCGQUANT <1 02/12/2019        ABGs: No results found for: PHART, PO2ART, HAL5FCH, HMT7EZQ, BEART, V9KNPPZN     Type & Screen (If Applicable):  No results found for: LABABO, [address]    Anesthesia Evaluation  Patient summary reviewed and Nursing notes reviewed no history of anesthetic complications:   Airway: Mallampati: II  TM distance: >3 FB   Neck ROM: full  Mouth opening: > = 3 FB Dental: normal exam         Pulmonary:normal exam    (+) COPD:  asthma:                            Cardiovascular:  Exercise tolerance: no interval change,   (+) hypertension:, CHF:, orthopnea,     (-) CABG/stent and dysrhythmias    ECG reviewed        Stress test reviewed       Beta Blocker:  Dose within 24 Hrs         Neuro/Psych:   (+) seizures:, neuromuscular disease:, headaches:, psychiatric history:depression/anxiety             GI/Hepatic/Renal: Neg GI/Hepatic/Renal ROS       (-) GERD       Endo/Other:    (+) hypothyroidism::., .                 Abdominal:       Abdomen: soft. Vascular: negative vascular ROS. Anesthesia Plan      general     ASA 3     (ASA 3 for COPD with multiple co-morbidities)  Induction: intravenous and rapid sequence. MIPS: Postoperative opioids intended and Prophylactic antiemetics administered. Anesthetic plan and risks discussed with patient.       Plan discussed with CRNA and surgical team.                  Yann Martinez MD   5/22/2019

## 2019-05-21 NOTE — CARE COORDINATION
Met with pt again  Inquired about where she gets her psych meds filled  Pt stated she gets them filled at [facility] - stated she was on 4 different meds  Pt was agreeable to writer calling to get med list and to see if she has an upcoming appt scheduled    Called [facility] and spoke with the pharmacist  She stated pt has not had any meds filled at their pharmacy  She stated pt has never seen the dr there and has only been there for counseling  Stated she was last seen in early March, cancelled her last 3 appts and there were no upcoming appts scheduled    Will f/u with pt again as she had difficult time staying awake

## 2019-05-21 NOTE — PROGRESS NOTES
PATIENT NAME: Connie Disla  MEDICAL RECORD NO. [id number]  DATE: 5/21/2019    PRIMARY CARE PHYSICIAN: Isaac Mera NP-C    HD: # 4    ASSESSMENT    Patient Active Problem List   Diagnosis    Chest pain    Migraine variant with headache    Drug overdose, accidental or unintentional, initial encounter    Hypothyroidism    Essential hypertension    [facility] disorder ([facility]    Drug overdose    History of seizure    Severe major depression with psychotic features ([address]    Severe major depression ([address]    Overdose of barbiturate, intentional self-harm, initial encounter ([facility]    Intentional phenobarbital overdose ([address]    Severe episode of recurrent major depressive disorder, without psychotic features ([address]    Bronchitis    Suicide attempt ([facility]    Major depressive disorder, recurrent ([address]    Ear infection    Necrotizing soft tissue infection    Sepsis affecting skin (HCC)    Leukocytosis    Elevated lactic acid level    Hyponatremia       MEDICAL DECISION MAKING AND PLAN  1. Neuro  1. Pain control - Tylenol, marquez and morphine PRN  2. Seizure hx - on home Dilantin, Depakote    2. CV  1. Hemodynamically stable    3. Pulm  1. >95%  on RA  2. History of COPD, current smoker  3. Home albuterol restarted PRN    4. GI  1. General diet with supplements, NPO at midnight for OR 5/22  2. Pepcid BID - home med  3. Bowel regimen: colace daily, senna BID, miralax daily    5. Renal  1. KVO  2. Monitor uop    6. Heme/ID  1. Follow up AM labs  2. R hip necrotizing fasciitis   - POD#4 excision debridement R hip  - POD#2 R thigh washout with irrigating wound vac placement ([id number])  - OR 5/22 for wound vac exchange  - Continue Vanc, Zosyn and Clinda, if wound bed clean will d/c clinda    7. Endocrine  1. Hypothyroidism -synthroid 50mcg  2. BS stable    8. Psych  1. Restart home meds - remeron, buspar    9. Ppx  1. SCDs  2. Lovenox  3. Pepcid BID  10. Dispo  1. Txf to stepdown  2.  Wound vac change in OR likely Wednesday          SUBJECTIVE    Aleta Solange Both is improving. Remains afebrile. Tolerating diet w/out n/v. +flatus. Has been up oob, ambulating and voiding on own. OBJECTIVE  VITALS: Temp: Temp: 98.6 °F (37 °C)Temp  Av.6 °F (37 °C)  Min: 98.4 °F (36.9 °C)  Max: 98.9 °F (12.4 °C) BP Systolic (00PFP), OTR:236 , Min:93 , UUL:101   Diastolic (87XBU), XBO:78, Min:55, Max:67   Pulse Pulse  Av.5  Min: 90  Max: 105 Resp Resp  Av  Min: 15  Max: 24 Pulse ox SpO2  Av.2 %  Min: 95 %  Max: 97 %    CONSTITUTIONAL: awake, alert, cooperative, no apparent distress  HEAD: atraumatic, normocephalic  EYES: sclera clear, pupils equal and reactive to light  ENT: ears are symmetric, nares patent   HEENT: moist mucous membranes  NECK: Supple, symmetrical, trachea midline  LUNGS: no respiratory distress, no audible wheezing  CARDIOVASCULAR: +S1/S2  ABDOMEN: soft, nontender, nondistended, no guarding, no rebound tenderness   MUSCULOSKELETAL: Wound vac intact R hip, mild surrounding erythema, decreased compared . Attp. FROM. NEUROLOGIC: Awake, alert, oriented to name, place and time    LAB:  CBC:   Recent Labs     19  0548 19  0531 19  0515   WBC 12.3* 13.2* 13.9*   HGB 8.8* 9.2* 8.9*   HCT 27.7* 30.9* 27.5*   MCV 92.0 97.8 92.0    434 456*     BMP:   Recent Labs     19  0548 19  0531 19  0515   * 131* 132*   K 3.0* 3.6* 3.6*   CL 97* 98 97*   CO2 20 19* 24   BUN 21* 8 6   CREATININE 0.86 0.52 0.54   GLUCOSE 106* 98 93         DO JAISON Palomo personally evaluated the patient and directed the medical decision making with Resident/JUDAH after the physical/radiologic exam and laboratory values were reviewed and confirmed.  ANKIT

## 2019-05-21 NOTE — PROGRESS NOTES
Physical Therapy    [address]  Initial Assessment    NAME: Naina Alfonso  : 1965  MRN: [id number]    Date of Service: 2019    Discharge Recommendations: Further therapy recommended at discharge. PT Equipment Recommendations  Equipment Needed: (CTA)    Assessment   Body structures, Functions, Activity limitations: Decreased balance;Decreased functional mobility ; Decreased endurance  Assessment: The pt ambulated 10ft without AD and SBA, limited by pain and pt agitation. The pt would benefit from continued therapy for gait and balance training. Prognosis: Good  Decision Making: Medium Complexity  Patient Education: POC, importance of mobility, purpose of PT  REQUIRES PT FOLLOW UP: Yes  Activity Tolerance  Activity Tolerance: Patient limited by pain;Treatment limited secondary to agitation       Patient Diagnosis(es): The encounter diagnosis was Necrotizing fasciitis ([address] has a past medical history of Arthritis, Asthma, CHF (congestive heart failure) ([address] COPD (chronic obstructive pulmonary disease) ([address] Fibromyalgia, Headache, Hypertension, Movement disorder, Neck fracture ([address] Seizure ([address] and Thyroid disease. has a past surgical history that includes knee surgery; partial hysterectomy (cervix not removed); lymph node dissection; Irrigation and debridement (Right, 2019); and EXPLORATION OF WOUND OF EXTREMITY (Right, 2019).     Restrictions  Restrictions/Precautions  Restrictions/Precautions: Fall Risk  Required Braces or Orthoses?: No  Position Activity Restriction  Other position/activity restrictions: up with assist, R hip wound vac  Vision/Hearing  Vision: Impaired  Vision Exceptions: Wears glasses at all times  Hearing: Within functional limits     Subjective  General  Patient assessed for rehabilitation services?: Yes  Response To Previous Treatment: Not applicable  Family / Caregiver Present: No  Follows Commands: Within Functional Limits  Subjective  Subjective: RN and pt agreeable to PT. Pt sitting in bedside chair upon arrival. Pt cooperative, however easily agitated.   Pain Screening  Patient Currently in Pain: Yes  Pain Assessment  Pain Assessment: 0-10  Pain Level: 9  Pain Type: Acute pain;Surgical pain  Pain Location: Hip  Pain Orientation: Right  Vital Signs  Patient Currently in Pain: Yes       Orientation  Orientation  Overall Orientation Status: Within Functional Limits  Social/Functional History  Social/Functional History  Lives With: Friend(s)  Type of Home: Apartment(1st floor)  Home Layout: One level  Home Access: Level entry  Bathroom Shower/Tub: Tub/Shower unit  Bathroom Toilet: Standard  Bathroom Equipment: Grab bars in shower, Tub transfer bench, Grab bars around toilet  Home Equipment: (no DME)  Receives Help From: Friend(s)  ADL Assistance: Independent  Homemaking Assistance: Independent  Homemaking Responsibilities: Yes  Ambulation Assistance: Independent  Transfer Assistance: Independent  Active : No  Patient's  Info: no car  Mode of Transportation: Bus  Leisure & Hobbies: \"absolutely nothing\"  Additional Comments: Pt reports friend is able to provide prn assist  Cognition   Cognition  Overall Cognitive Status: WNL    Objective          Joint Mobility  Spine: WFL  ROM RLE: WFL  ROM LLE: WFL  ROM RUE: WFL  ROM LUE: WFL  Strength RLE  Comment: appears WFL, not formally assessed d/t pain  Strength LLE  Strength LLE: WFL  Strength RUE  Strength RUE: WFL  Strength LUE  Strength LUE: WFL  Tone RLE  RLE Tone: Normotonic  Tone LLE  LLE Tone: Normotonic  Motor Control  Gross Motor?: WFL  Sensation  Overall Sensation Status: WFL(pt denies numbness and tingling)  Bed mobility  Comment: Pt sitting in bedside chair upon arrival, returned to sitting following ambulation  Transfers  Sit to Stand: Stand by assistance  Stand to sit: Stand by assistance  Stand Pivot Transfers: Stand by assistance  Comment: Pt would not allow CGA, mildly unsteady.   Ambulation  Ambulation?: Yes  Ambulation 1  Surface: level tile  Device: No Device  Assistance: Stand by assistance  Quality of Gait: antalgic, mildly unsteady, decreased R stance  Distance: 10ft within room  Comments: Pt declined CGA, gait belt and AD  Stairs/Curb  Stairs?: No     Balance  Posture: Good  Sitting - Static: Good  Sitting - Dynamic: Good  Standing - Static: Good;-  Standing - Dynamic: Good;-  Comments: standing balance assessed without AD        Plan   Plan  Times per week: 3-5x/wk  Current Treatment Recommendations: Strengthening, Balance Training, Functional Mobility Training, Endurance Training, Transfer Training, Patient/Caregiver Education & Training, Safety Education & Training, Gait Training, Stair training, ROM  Safety Devices  Type of devices: Gait belt, Call light within reach, Nurse notified, Left in chair  Restraints  Initially in place: No      AM-PAC Score  AM-PAC Inpatient Mobility Raw Score : 18  AM-PAC Inpatient T-Scale Score : 43.63  Mobility Inpatient CMS 0-100% Score: 46.58  Mobility Inpatient CMS G-Code Modifier : CK          Goals  Short term goals  Time Frame for Short term goals: 14 visits  Short term goal 1: Perform bed mobility and functional transfers independently  Short term goal 2: Ambulate 300ft without AD and supervision  Short term goal 3: Demo Good dynamic standing balance        Therapy Time   Individual Concurrent Group Co-treatment   Time In 1047         Time Out 1103         Minutes 16         Timed Code Treatment Minutes: 8 Minutes       Ulises Carrera, PT

## 2019-05-22 ENCOUNTER — ANESTHESIA (OUTPATIENT)
Dept: OPERATING ROOM | Age: 54
DRG: 720 | End: 2019-05-22
Payer: MEDICARE

## 2019-05-22 VITALS — TEMPERATURE: 94.6 F | DIASTOLIC BLOOD PRESSURE: 75 MMHG | OXYGEN SATURATION: 100 % | SYSTOLIC BLOOD PRESSURE: 124 MMHG

## 2019-05-22 LAB
-: NORMAL
ABSOLUTE EOS #: 0 K/UL (ref 0–0.4)
ABSOLUTE IMMATURE GRANULOCYTE: 0.46 K/UL (ref 0–0.3)
ABSOLUTE LYMPH #: 1.56 K/UL (ref 1–4.8)
ABSOLUTE MONO #: 0.37 K/UL (ref 0.1–0.8)
ANION GAP SERPL CALCULATED.3IONS-SCNC: 11 MMOL/L (ref 9–17)
BASOPHILS # BLD: 0 % (ref 0–2)
BASOPHILS ABSOLUTE: 0 K/UL (ref 0–0.2)
BUN BLDV-MCNC: 5 MG/DL (ref 6–20)
BUN/CREAT BLD: ABNORMAL (ref 9–20)
CALCIUM SERPL-MCNC: 8 MG/DL (ref 8.6–10.4)
CHLORIDE BLD-SCNC: 100 MMOL/L (ref 98–107)
CO2: 27 MMOL/L (ref 20–31)
CREAT SERPL-MCNC: 0.48 MG/DL (ref 0.5–0.9)
DIFFERENTIAL TYPE: ABNORMAL
EOSINOPHILS RELATIVE PERCENT: 0 % (ref 1–4)
GFR AFRICAN AMERICAN: >60 ML/MIN
GFR NON-AFRICAN AMERICAN: >60 ML/MIN
GFR SERPL CREATININE-BSD FRML MDRD: ABNORMAL ML/MIN/{1.73_M2}
GFR SERPL CREATININE-BSD FRML MDRD: ABNORMAL ML/MIN/{1.73_M2}
GLUCOSE BLD-MCNC: 81 MG/DL (ref 70–99)
HCT VFR BLD CALC: 26.3 % (ref 36.3–47.1)
HEMOGLOBIN: 8.5 G/DL (ref 11.9–15.1)
IMMATURE GRANULOCYTES: 5 %
LYMPHOCYTES # BLD: 17 % (ref 24–44)
MAGNESIUM: 1.9 MG/DL (ref 1.6–2.6)
MCH RBC QN AUTO: 29.1 PG (ref 25.2–33.5)
MCHC RBC AUTO-ENTMCNC: 32.3 G/DL (ref 28.4–34.8)
MCV RBC AUTO: 90.1 FL (ref 82.6–102.9)
MONOCYTES # BLD: 4 % (ref 1–7)
MORPHOLOGY: ABNORMAL
NRBC AUTOMATED: 0 PER 100 WBC
PDW BLD-RTO: 15.7 % (ref 11.8–14.4)
PLATELET # BLD: 457 K/UL (ref 138–453)
PLATELET ESTIMATE: ABNORMAL
PMV BLD AUTO: 9.4 FL (ref 8.1–13.5)
POTASSIUM SERPL-SCNC: 4.1 MMOL/L (ref 3.7–5.3)
RBC # BLD: 2.92 M/UL (ref 3.95–5.11)
RBC # BLD: ABNORMAL 10*6/UL
REASON FOR REJECTION: NORMAL
SEG NEUTROPHILS: 74 % (ref 36–66)
SEGMENTED NEUTROPHILS ABSOLUTE COUNT: 6.81 K/UL (ref 1.8–7.7)
SODIUM BLD-SCNC: 138 MMOL/L (ref 135–144)
WBC # BLD: 9.2 K/UL (ref 3.5–11.3)
WBC # BLD: ABNORMAL 10*3/UL
ZZ NTE CLEAN UP: ORDERED TEST: NORMAL
ZZ NTE WITH NAME CLEAN UP: SPECIMEN SOURCE: NORMAL

## 2019-05-22 PROCEDURE — 6370000000 HC RX 637 (ALT 250 FOR IP): Performed by: STUDENT IN AN ORGANIZED HEALTH CARE EDUCATION/TRAINING PROGRAM

## 2019-05-22 PROCEDURE — 6360000002 HC RX W HCPCS: Performed by: NURSE ANESTHETIST, CERTIFIED REGISTERED

## 2019-05-22 PROCEDURE — 2W0NX6Z CHANGE PRESSURE DRESSING ON RIGHT UPPER LEG: ICD-10-PCS | Performed by: SURGERY

## 2019-05-22 PROCEDURE — 80048 BASIC METABOLIC PNL TOTAL CA: CPT

## 2019-05-22 PROCEDURE — 3700000001 HC ADD 15 MINUTES (ANESTHESIA): Performed by: SURGERY

## 2019-05-22 PROCEDURE — 2500000003 HC RX 250 WO HCPCS: Performed by: STUDENT IN AN ORGANIZED HEALTH CARE EDUCATION/TRAINING PROGRAM

## 2019-05-22 PROCEDURE — 1200000000 HC SEMI PRIVATE

## 2019-05-22 PROCEDURE — 3600000014 HC SURGERY LEVEL 4 ADDTL 15MIN: Performed by: SURGERY

## 2019-05-22 PROCEDURE — 6360000002 HC RX W HCPCS: Performed by: STUDENT IN AN ORGANIZED HEALTH CARE EDUCATION/TRAINING PROGRAM

## 2019-05-22 PROCEDURE — 7100000001 HC PACU RECOVERY - ADDTL 15 MIN: Performed by: SURGERY

## 2019-05-22 PROCEDURE — 36415 COLL VENOUS BLD VENIPUNCTURE: CPT

## 2019-05-22 PROCEDURE — 85025 COMPLETE CBC W/AUTO DIFF WBC: CPT

## 2019-05-22 PROCEDURE — 7100000000 HC PACU RECOVERY - FIRST 15 MIN: Performed by: SURGERY

## 2019-05-22 PROCEDURE — 83735 ASSAY OF MAGNESIUM: CPT

## 2019-05-22 PROCEDURE — 2580000003 HC RX 258: Performed by: STUDENT IN AN ORGANIZED HEALTH CARE EDUCATION/TRAINING PROGRAM

## 2019-05-22 PROCEDURE — 3700000000 HC ANESTHESIA ATTENDED CARE: Performed by: SURGERY

## 2019-05-22 PROCEDURE — 2500000003 HC RX 250 WO HCPCS: Performed by: NURSE ANESTHETIST, CERTIFIED REGISTERED

## 2019-05-22 PROCEDURE — 2709999900 HC NON-CHARGEABLE SUPPLY: Performed by: SURGERY

## 2019-05-22 PROCEDURE — 3600000004 HC SURGERY LEVEL 4 BASE: Performed by: SURGERY

## 2019-05-22 PROCEDURE — 2580000003 HC RX 258: Performed by: NURSE ANESTHETIST, CERTIFIED REGISTERED

## 2019-05-22 RX ORDER — DEXAMETHASONE SODIUM PHOSPHATE 10 MG/ML
INJECTION INTRAMUSCULAR; INTRAVENOUS PRN
Status: DISCONTINUED | OUTPATIENT
Start: 2019-05-22 | End: 2019-05-22 | Stop reason: SDUPTHER

## 2019-05-22 RX ORDER — ALBUTEROL SULFATE 2.5 MG/3ML
2.5 SOLUTION RESPIRATORY (INHALATION) 2 TIMES DAILY
Status: DISCONTINUED | OUTPATIENT
Start: 2019-05-23 | End: 2019-05-29 | Stop reason: HOSPADM

## 2019-05-22 RX ORDER — IPRATROPIUM BROMIDE AND ALBUTEROL SULFATE 2.5; .5 MG/3ML; MG/3ML
1 SOLUTION RESPIRATORY (INHALATION) 4 TIMES DAILY
Status: DISCONTINUED | OUTPATIENT
Start: 2019-05-22 | End: 2019-05-22

## 2019-05-22 RX ORDER — ONDANSETRON 2 MG/ML
INJECTION INTRAMUSCULAR; INTRAVENOUS PRN
Status: DISCONTINUED | OUTPATIENT
Start: 2019-05-22 | End: 2019-05-22 | Stop reason: SDUPTHER

## 2019-05-22 RX ORDER — VALPROIC ACID 250 MG/1
500 CAPSULE, LIQUID FILLED ORAL 3 TIMES DAILY
Status: DISCONTINUED | OUTPATIENT
Start: 2019-05-22 | End: 2019-05-22

## 2019-05-22 RX ORDER — AMLODIPINE BESYLATE 10 MG/1
10 TABLET ORAL DAILY
Status: DISCONTINUED | OUTPATIENT
Start: 2019-05-22 | End: 2019-05-22

## 2019-05-22 RX ORDER — PHENYTOIN SODIUM 100 MG/1
100 CAPSULE, EXTENDED RELEASE ORAL EVERY 8 HOURS
Status: DISCONTINUED | OUTPATIENT
Start: 2019-05-22 | End: 2019-05-22

## 2019-05-22 RX ORDER — LIDOCAINE HYDROCHLORIDE 10 MG/ML
INJECTION, SOLUTION EPIDURAL; INFILTRATION; INTRACAUDAL; PERINEURAL PRN
Status: DISCONTINUED | OUTPATIENT
Start: 2019-05-22 | End: 2019-05-22 | Stop reason: SDUPTHER

## 2019-05-22 RX ORDER — LEVOFLOXACIN 500 MG/1
500 TABLET, FILM COATED ORAL DAILY
Status: COMPLETED | OUTPATIENT
Start: 2019-05-22 | End: 2019-05-23

## 2019-05-22 RX ORDER — PROPOFOL 10 MG/ML
INJECTION, EMULSION INTRAVENOUS PRN
Status: DISCONTINUED | OUTPATIENT
Start: 2019-05-22 | End: 2019-05-22 | Stop reason: SDUPTHER

## 2019-05-22 RX ORDER — ASPIRIN 81 MG/1
81 TABLET ORAL DAILY
Status: DISCONTINUED | OUTPATIENT
Start: 2019-05-22 | End: 2019-05-29 | Stop reason: HOSPADM

## 2019-05-22 RX ORDER — GLYCOPYRROLATE 1 MG/5 ML
SYRINGE (ML) INTRAVENOUS PRN
Status: DISCONTINUED | OUTPATIENT
Start: 2019-05-22 | End: 2019-05-22 | Stop reason: SDUPTHER

## 2019-05-22 RX ORDER — FENTANYL CITRATE 50 UG/ML
INJECTION, SOLUTION INTRAMUSCULAR; INTRAVENOUS PRN
Status: DISCONTINUED | OUTPATIENT
Start: 2019-05-22 | End: 2019-05-22 | Stop reason: SDUPTHER

## 2019-05-22 RX ORDER — MIDAZOLAM HYDROCHLORIDE 1 MG/ML
INJECTION INTRAMUSCULAR; INTRAVENOUS PRN
Status: DISCONTINUED | OUTPATIENT
Start: 2019-05-22 | End: 2019-05-22 | Stop reason: SDUPTHER

## 2019-05-22 RX ORDER — SODIUM CHLORIDE, SODIUM LACTATE, POTASSIUM CHLORIDE, CALCIUM CHLORIDE 600; 310; 30; 20 MG/100ML; MG/100ML; MG/100ML; MG/100ML
INJECTION, SOLUTION INTRAVENOUS CONTINUOUS PRN
Status: DISCONTINUED | OUTPATIENT
Start: 2019-05-22 | End: 2019-05-22 | Stop reason: SDUPTHER

## 2019-05-22 RX ORDER — RISPERIDONE 1 MG/1
1 TABLET, FILM COATED ORAL EVERY EVENING
Status: DISCONTINUED | OUTPATIENT
Start: 2019-05-22 | End: 2019-05-22

## 2019-05-22 RX ADMIN — CLINDAMYCIN IN 5 PERCENT DEXTROSE 900 MG: 18 INJECTION, SOLUTION INTRAVENOUS at 05:57

## 2019-05-22 RX ADMIN — OXYCODONE HYDROCHLORIDE 10 MG: 5 TABLET ORAL at 10:08

## 2019-05-22 RX ADMIN — ONDANSETRON 4 MG: 2 INJECTION, SOLUTION INTRAMUSCULAR; INTRAVENOUS at 12:33

## 2019-05-22 RX ADMIN — MIRTAZAPINE 45 MG: 30 TABLET, FILM COATED ORAL at 16:39

## 2019-05-22 RX ADMIN — FENTANYL CITRATE 50 MCG: 50 INJECTION INTRAMUSCULAR; INTRAVENOUS at 12:08

## 2019-05-22 RX ADMIN — VANCOMYCIN HYDROCHLORIDE 1000 MG: 1 INJECTION, SOLUTION INTRAVENOUS at 21:00

## 2019-05-22 RX ADMIN — PHENYTOIN SODIUM 300 MG: 300 CAPSULE, EXTENDED RELEASE ORAL at 21:06

## 2019-05-22 RX ADMIN — DEXAMETHASONE SODIUM PHOSPHATE 10 MG: 10 INJECTION INTRAMUSCULAR; INTRAVENOUS at 12:22

## 2019-05-22 RX ADMIN — STANDARDIZED SENNA CONCENTRATE AND DOCUSATE SODIUM 2 TABLET: 8.6; 5 TABLET ORAL at 16:30

## 2019-05-22 RX ADMIN — FENTANYL CITRATE 25 MCG: 50 INJECTION INTRAMUSCULAR; INTRAVENOUS at 12:18

## 2019-05-22 RX ADMIN — FENTANYL CITRATE 25 MCG: 50 INJECTION INTRAMUSCULAR; INTRAVENOUS at 12:31

## 2019-05-22 RX ADMIN — OXYCODONE HYDROCHLORIDE 10 MG: 5 TABLET ORAL at 20:58

## 2019-05-22 RX ADMIN — LEVOFLOXACIN 500 MG: 500 TABLET, FILM COATED ORAL at 16:29

## 2019-05-22 RX ADMIN — PHENYLEPHRINE HYDROCHLORIDE 100 MCG: 10 INJECTION INTRAVENOUS at 12:31

## 2019-05-22 RX ADMIN — PHENYLEPHRINE HYDROCHLORIDE 100 MCG: 10 INJECTION INTRAVENOUS at 12:40

## 2019-05-22 RX ADMIN — PHENYLEPHRINE HYDROCHLORIDE 200 MCG: 10 INJECTION INTRAVENOUS at 12:17

## 2019-05-22 RX ADMIN — ACETAMINOPHEN 1000 MG: 500 TABLET ORAL at 16:28

## 2019-05-22 RX ADMIN — DIVALPROEX SODIUM 750 MG: 500 TABLET, DELAYED RELEASE ORAL at 21:07

## 2019-05-22 RX ADMIN — FENTANYL CITRATE 50 MCG: 50 INJECTION INTRAMUSCULAR; INTRAVENOUS at 12:43

## 2019-05-22 RX ADMIN — RISPERIDONE 2 MG: 2 TABLET, FILM COATED ORAL at 21:07

## 2019-05-22 RX ADMIN — LIDOCAINE HYDROCHLORIDE 50 MG: 10 INJECTION, SOLUTION EPIDURAL; INFILTRATION; INTRACAUDAL; PERINEURAL at 12:08

## 2019-05-22 RX ADMIN — PHENYLEPHRINE HYDROCHLORIDE 100 MCG: 10 INJECTION INTRAVENOUS at 12:45

## 2019-05-22 RX ADMIN — METOPROLOL TARTRATE 25 MG: 25 TABLET ORAL at 21:05

## 2019-05-22 RX ADMIN — PROPOFOL 50 MG: 10 INJECTION, EMULSION INTRAVENOUS at 12:18

## 2019-05-22 RX ADMIN — BUSPIRONE HYDROCHLORIDE 5 MG: 15 TABLET ORAL at 21:08

## 2019-05-22 RX ADMIN — PIPERACILLIN AND TAZOBACTAM 3.38 G: 3; .375 INJECTION, POWDER, FOR SOLUTION INTRAVENOUS at 01:10

## 2019-05-22 RX ADMIN — OXYCODONE HYDROCHLORIDE 10 MG: 5 TABLET ORAL at 16:25

## 2019-05-22 RX ADMIN — PHENYLEPHRINE HYDROCHLORIDE 100 MCG: 10 INJECTION INTRAVENOUS at 12:49

## 2019-05-22 RX ADMIN — Medication 0.2 MG: at 12:16

## 2019-05-22 RX ADMIN — STANDARDIZED SENNA CONCENTRATE AND DOCUSATE SODIUM 2 TABLET: 8.6; 5 TABLET ORAL at 21:09

## 2019-05-22 RX ADMIN — OXYCODONE HYDROCHLORIDE 5 MG: 5 TABLET ORAL at 06:39

## 2019-05-22 RX ADMIN — BUSPIRONE HYDROCHLORIDE 5 MG: 15 TABLET ORAL at 16:37

## 2019-05-22 RX ADMIN — LIDOCAINE HYDROCHLORIDE 50 MG: 10 INJECTION, SOLUTION EPIDURAL; INFILTRATION; INTRACAUDAL; PERINEURAL at 12:51

## 2019-05-22 RX ADMIN — MAGNESIUM GLUCONATE 500 MG ORAL TABLET 400 MG: 500 TABLET ORAL at 21:06

## 2019-05-22 RX ADMIN — SODIUM CHLORIDE, POTASSIUM CHLORIDE, SODIUM LACTATE AND CALCIUM CHLORIDE: 600; 310; 30; 20 INJECTION, SOLUTION INTRAVENOUS at 11:40

## 2019-05-22 RX ADMIN — PHENYLEPHRINE HYDROCHLORIDE 100 MCG: 10 INJECTION INTRAVENOUS at 12:23

## 2019-05-22 RX ADMIN — ACETAMINOPHEN 1000 MG: 500 TABLET ORAL at 06:32

## 2019-05-22 RX ADMIN — MIDAZOLAM HYDROCHLORIDE 2 MG: 1 INJECTION, SOLUTION INTRAMUSCULAR; INTRAVENOUS at 12:02

## 2019-05-22 RX ADMIN — FENTANYL CITRATE 50 MCG: 50 INJECTION INTRAMUSCULAR; INTRAVENOUS at 12:38

## 2019-05-22 RX ADMIN — PIPERACILLIN AND TAZOBACTAM 3.38 G: 3; .375 INJECTION, POWDER, FOR SOLUTION INTRAVENOUS at 16:35

## 2019-05-22 RX ADMIN — PHENYLEPHRINE HYDROCHLORIDE 100 MCG: 10 INJECTION INTRAVENOUS at 12:35

## 2019-05-22 RX ADMIN — PROPOFOL 150 MG: 10 INJECTION, EMULSION INTRAVENOUS at 12:12

## 2019-05-22 RX ADMIN — PHENOBARBITAL 32.4 MG: 32.4 TABLET ORAL at 20:59

## 2019-05-22 ASSESSMENT — PULMONARY FUNCTION TESTS
PIF_VALUE: 21
PIF_VALUE: 1
PIF_VALUE: 15
PIF_VALUE: 1
PIF_VALUE: 9
PIF_VALUE: 1
PIF_VALUE: 20
PIF_VALUE: 6
PIF_VALUE: 21
PIF_VALUE: 20
PIF_VALUE: 1
PIF_VALUE: 23
PIF_VALUE: 20
PIF_VALUE: 20
PIF_VALUE: 9
PIF_VALUE: 1
PIF_VALUE: 23
PIF_VALUE: 20
PIF_VALUE: 1
PIF_VALUE: 24
PIF_VALUE: 1
PIF_VALUE: 21
PIF_VALUE: 1
PIF_VALUE: 2
PIF_VALUE: 20
PIF_VALUE: 22
PIF_VALUE: 24
PIF_VALUE: 20
PIF_VALUE: 1
PIF_VALUE: 20
PIF_VALUE: 20
PIF_VALUE: 1
PIF_VALUE: 20
PIF_VALUE: 1
PIF_VALUE: 2
PIF_VALUE: 28
PIF_VALUE: 1
PIF_VALUE: 3
PIF_VALUE: 11
PIF_VALUE: 2
PIF_VALUE: 1
PIF_VALUE: 24
PIF_VALUE: 25
PIF_VALUE: 2
PIF_VALUE: 5
PIF_VALUE: 2
PIF_VALUE: 1
PIF_VALUE: 35
PIF_VALUE: 20
PIF_VALUE: 21
PIF_VALUE: 22
PIF_VALUE: 14
PIF_VALUE: 23
PIF_VALUE: 23
PIF_VALUE: 20
PIF_VALUE: 24
PIF_VALUE: 1
PIF_VALUE: 20
PIF_VALUE: 33

## 2019-05-22 ASSESSMENT — PAIN SCALES - GENERAL
PAINLEVEL_OUTOF10: 10
PAINLEVEL_OUTOF10: 0
PAINLEVEL_OUTOF10: 0
PAINLEVEL_OUTOF10: 10
PAINLEVEL_OUTOF10: 0
PAINLEVEL_OUTOF10: 0
PAINLEVEL_OUTOF10: 10

## 2019-05-22 ASSESSMENT — PAIN - FUNCTIONAL ASSESSMENT
PAIN_FUNCTIONAL_ASSESSMENT: 0-10
PAIN_FUNCTIONAL_ASSESSMENT: INTOLERABLE, UNABLE TO DO ANY ACTIVE OR PASSIVE ACTIVITIES

## 2019-05-22 ASSESSMENT — PAIN DESCRIPTION - DESCRIPTORS: DESCRIPTORS: THROBBING;SHARP;CONSTANT

## 2019-05-22 NOTE — PROGRESS NOTES
PATIENT NAME: Chidi Mendoza  MEDICAL RECORD NO. [id number]  DATE: 5/22/2019    PRIMARY CARE PHYSICIAN: Gabrielle Pandey NPDavidC    HD: # 5    ASSESSMENT    Patient Active Problem List   Diagnosis    Chest pain    Migraine variant with headache    Drug overdose, accidental or unintentional, initial encounter    Hypothyroidism    Essential hypertension    Seizure disorder ([address]    Drug overdose    History of seizure    Severe major depression with psychotic features ([address]    Severe major depression ([address]    Overdose of barbiturate, intentional self-harm, initial encounter ([address]    Intentional phenobarbital overdose ([address]    Severe episode of recurrent major depressive disorder, without psychotic features ([address]    Bronchitis    Suicide attempt ([address]    Major depressive disorder, recurrent ([address]    Ear infection    Necrotizing soft tissue infection    Sepsis affecting skin (HCC)    Leukocytosis    Elevated lactic acid level    Hyponatremia       MEDICAL DECISION MAKING AND PLAN  1. Neuro  1. Pain control - Tylenol, marquez and morphine PRN  2. Seizure hx - on home Dilantin, Depakote    2. CV  1. Hemodynamically stable    3. Pulm  1. >95%  on RA  2. History of COPD, current smoker  3. Home albuterol restarted PRN    4. GI  1. General diet with supplements, NPO at midnight for OR 5/22  2. Pepcid BID - home med  3. Bowel regimen: colace daily, senna BID, miralax daily    5. Renal  1. KVO  2. Monitor uop    6. Heme/ID  1. Follow up AM labs  2. R hip necrotizing soft tissue infection  - POD#5 excision debridement R hip  - POD#3 R thigh washout with irrigating wound vac placement ([id number])  - OR today for wound vac exchange - consent obtained  - Continue Vanc, Zosyn and Clinda, if wound bed clean will d/c clinda    7. Endocrine  1. Hypothyroidism -synthroid 50mcg  2. BS stable    8. Psych  1. Restart home meds - remeron, buspar    9. Ppx  1. SCDs  2. Lovenox  3. Pepcid BID  10. Dispo  1.  Back to stepdown follow OR        SUBJECTIVE    Aleta Hernandez continues to improve. States she is not happy because she was calling out in pain overnight and never received pain medications. Med history shows she received 10mg roxicodones overnight. Patient has no further questions regarding OR today. Denies nausea, vomiting, fevers, chills. OBJECTIVE  VITALS: Temp: Temp: 98.9 °F (37.2 °C)Temp  Av.5 °F (36.9 °C)  Min: 97.9 °F (36.6 °C)  Max: 98.9 °F (76.9 °C) BP Systolic (34IGO), MFL:66 , Min:82 , IVB:92   Diastolic (80SBZ), JSY:33, Min:49, Max:63   Pulse Pulse  Av.2  Min: 77  Max: 106 Resp Resp  Av.8  Min: 18  Max: 31 Pulse ox SpO2  Av.6 %  Min: 89 %  Max: 99 %    CONSTITUTIONAL: awake, alert, cooperative, no apparent distress  HEAD: atraumatic, normocephalic  EYES: sclera clear, pupils equal and reactive to light  ENT: ears are symmetric, nares patent   HEENT: moist mucous membranes  NECK: Supple, symmetrical, trachea midline  LUNGS: no respiratory distress, no audible wheezing  CARDIOVASCULAR: +S1/S2  ABDOMEN: soft, nontender, nondistended, no guarding, no rebound tenderness   MUSCULOSKELETAL: Wound vac intact R hip, surrounding erythema extending inferiorly although unchanged from postop exam. Attp. FROM.    NEUROLOGIC: Awake, alert, oriented to name, place and time    LAB:  CBC:   Recent Labs     19   WBC 13.2* 13.9*   HGB 9.2* 8.9*   HCT 30.9* 27.5*   MCV 97.8 92.0    456*     BMP:   Recent Labs     1931 19   * 132*   K 3.6* 3.6*   CL 98 97*   CO2 19* 24   BUN 8 6   CREATININE 0.52 0.54   GLUCOSE 98 Carlos Lebron DO

## 2019-05-22 NOTE — PROGRESS NOTES
POST OP NOTE    SUBJECTIVE  Pt s/p right hip wound examination lavage and wound VAC placement. Patient seen and evaluated after transfer back to room following OR. Patient awake alert and oriented. Complaining of mild pain in the right hip. No acute distress. Respirations 18 and unlabored, abdomen soft nontender. Patient denies nausea, vomiting. .     OBJECTIVE  VITALS:  /70   Pulse 88   Temp 96.8 °F (36 °C) (Tympanic)   Resp 22   Ht 5' 2\" (1.575 m)   Wt 150 lb (68 kg)   SpO2 96%   BMI 27.44 kg/m²         GENERAL:  awake and alert. No acute distress  CARDIOVASCULAR:  regular rate and rhythm   LUNGS:  No acute respiratory distress  ABDOMEN:   Abdomen soft, non-tender, non-distended  INCISION: Right hip wound VAC in place    ASSESSMENT  1. POD# 0 s/p wound inspection, irrigation and wound VAC placement for history of necrotizing soft tissue infection. PLAN  1. Pain management-Tylenol, Roxicodone  2. DVT proph-continue Lovenox  3. GI proph- Pepcid  4. Antibiotics: Continue vancomycin, Zosyn. Discontinue clindamycin  5.   Psychiatry consultation: follow recommendation's for medications  6.  Gen. diet      Becky Goyal  Trauma/Surgery Service  5/22/2019 at 2:09 PM

## 2019-05-22 NOTE — BRIEF OP NOTE
Brief Postoperative Note  ______________________________________________________________    Patient: Flores Brown  YOB: 1965  MRN: [id number]  Date of Procedure: 5/22/2019    Pre-Op Diagnosis: NECROTIZING FASCITIS    Post-Op Diagnosis: Same       Procedure(s):  RIGHT WOUND HIP EXAMINATION LAVAGE AND WOUND VAC PLACEMENT    Anesthesia: General    Surgeon(s):  González Souza MD    Assistant: Karyle Glimpse PGY3    Estimated Blood Loss (mL): 01IP    Complications: None    Specimens:   * No specimens in log *    Implants:  * No implants in log *      Drains:   Negative Pressure Wound Therapy Hip Proximal;Right;Upper; Lateral (Active)   Wound Type Surgical 5/22/2019 10:38 AM   Dressing Type Black foam 5/22/2019 10:38 AM   Cycle Continuous 5/22/2019  9:21 AM   Target Pressure (mmHg) 125 5/22/2019  9:21 AM   Canister changed? No 5/21/2019  8:11 AM   Dressing Status Clean;Dry; Intact 5/22/2019 10:38 AM   Dressing Changed Other (Comment) 5/22/2019 10:38 AM   Drainage Amount Moderate 5/22/2019 10:38 AM   Drainage Description Serosanguinous 5/22/2019 10:38 AM   Dressing Change Due 05/22/19 5/22/2019 10:38 AM   Output (ml) 300 ml 5/21/2019  1:53 PM   Wound Assessment Clean;Dry;Painful;Red 5/21/2019  8:11 AM   Kori-wound Assessment Red 5/21/2019  8:11 AM   Odor None 5/21/2019  8:00 AM       [REMOVED] Urethral Catheter 16 fr (Removed)   Catheter Indications Need for fluid management in critically ill patients in a critical care setting not able to be managed by other means such as BSC with hat, bedpan, urinal, condom catheter, or short term intermittent urethral catherization 5/20/2019  4:00 PM   Site Assessment No urethral drainage 5/20/2019  4:00 PM   Urine Color Yellow 5/20/2019  4:00 PM   Urine Appearance Clear 5/20/2019  4:00 PM   Output (mL) 360 mL 5/20/2019  4:00 PM       Findings: 67O50U1.5cm wound diminsions. Wound base clean and healthy granulation tissue, no necrosis noted.  No purulent drainage or additional pockets noted.      Sierra Metz DO  Date: 5/22/2019  Time: 1:06 PM

## 2019-05-22 NOTE — PROGRESS NOTES
Smoking Cessation - topics covered   []  Health Risks  []  Benefits of Quitting   []  Smoking Cessation  []  Patient has no history of tobacco use  []  Patient is former smoker. []  No need for tobacco cessation education. []  Booklet given  []  Patient verbalizes understanding. []  Patient denies need for tobacco cessation education. [x]  Unable to meet with patient today. Will follow up as able.   Dar Harris  2:29 PM

## 2019-05-23 LAB
ABSOLUTE EOS #: 0.08 K/UL (ref 0–0.4)
ABSOLUTE IMMATURE GRANULOCYTE: 0.54 K/UL (ref 0–0.3)
ABSOLUTE LYMPH #: 2.39 K/UL (ref 1–4.8)
ABSOLUTE MONO #: 0.77 K/UL (ref 0.1–0.8)
ANION GAP SERPL CALCULATED.3IONS-SCNC: 9 MMOL/L (ref 9–17)
BASOPHILS # BLD: 0 % (ref 0–2)
BASOPHILS ABSOLUTE: 0 K/UL (ref 0–0.2)
BUN BLDV-MCNC: 6 MG/DL (ref 6–20)
BUN/CREAT BLD: ABNORMAL (ref 9–20)
CALCIUM SERPL-MCNC: 8.2 MG/DL (ref 8.6–10.4)
CHLORIDE BLD-SCNC: 99 MMOL/L (ref 98–107)
CO2: 29 MMOL/L (ref 20–31)
CREAT SERPL-MCNC: 0.53 MG/DL (ref 0.5–0.9)
CULTURE: NORMAL
DIFFERENTIAL TYPE: ABNORMAL
EOSINOPHILS RELATIVE PERCENT: 1 % (ref 1–4)
GFR AFRICAN AMERICAN: >60 ML/MIN
GFR NON-AFRICAN AMERICAN: >60 ML/MIN
GFR SERPL CREATININE-BSD FRML MDRD: ABNORMAL ML/MIN/{1.73_M2}
GFR SERPL CREATININE-BSD FRML MDRD: ABNORMAL ML/MIN/{1.73_M2}
GLUCOSE BLD-MCNC: 103 MG/DL (ref 70–99)
HCT VFR BLD CALC: 27.2 % (ref 36.3–47.1)
HEMOGLOBIN: 8.7 G/DL (ref 11.9–15.1)
IMMATURE GRANULOCYTES: 7 %
LYMPHOCYTES # BLD: 31 % (ref 24–44)
Lab: NORMAL
MAGNESIUM: 1.8 MG/DL (ref 1.6–2.6)
MCH RBC QN AUTO: 30.1 PG (ref 25.2–33.5)
MCHC RBC AUTO-ENTMCNC: 32 G/DL (ref 28.4–34.8)
MCV RBC AUTO: 94.1 FL (ref 82.6–102.9)
MONOCYTES # BLD: 10 % (ref 1–7)
MORPHOLOGY: ABNORMAL
NRBC AUTOMATED: 0 PER 100 WBC
PDW BLD-RTO: 15.9 % (ref 11.8–14.4)
PLATELET # BLD: 471 K/UL (ref 138–453)
PLATELET ESTIMATE: ABNORMAL
PMV BLD AUTO: 9.4 FL (ref 8.1–13.5)
POTASSIUM SERPL-SCNC: 3.2 MMOL/L (ref 3.7–5.3)
RBC # BLD: 2.89 M/UL (ref 3.95–5.11)
RBC # BLD: ABNORMAL 10*6/UL
SEG NEUTROPHILS: 51 % (ref 36–66)
SEGMENTED NEUTROPHILS ABSOLUTE COUNT: 3.92 K/UL (ref 1.8–7.7)
SODIUM BLD-SCNC: 137 MMOL/L (ref 135–144)
SPECIMEN DESCRIPTION: NORMAL
WBC # BLD: 7.7 K/UL (ref 3.5–11.3)
WBC # BLD: ABNORMAL 10*3/UL

## 2019-05-23 PROCEDURE — 90792 PSYCH DIAG EVAL W/MED SRVCS: CPT | Performed by: PSYCHIATRY & NEUROLOGY

## 2019-05-23 PROCEDURE — 6360000002 HC RX W HCPCS: Performed by: STUDENT IN AN ORGANIZED HEALTH CARE EDUCATION/TRAINING PROGRAM

## 2019-05-23 PROCEDURE — 6370000000 HC RX 637 (ALT 250 FOR IP): Performed by: STUDENT IN AN ORGANIZED HEALTH CARE EDUCATION/TRAINING PROGRAM

## 2019-05-23 PROCEDURE — 36415 COLL VENOUS BLD VENIPUNCTURE: CPT

## 2019-05-23 PROCEDURE — 2580000003 HC RX 258: Performed by: STUDENT IN AN ORGANIZED HEALTH CARE EDUCATION/TRAINING PROGRAM

## 2019-05-23 PROCEDURE — 83735 ASSAY OF MAGNESIUM: CPT

## 2019-05-23 PROCEDURE — 85025 COMPLETE CBC W/AUTO DIFF WBC: CPT

## 2019-05-23 PROCEDURE — 1200000000 HC SEMI PRIVATE

## 2019-05-23 PROCEDURE — 80048 BASIC METABOLIC PNL TOTAL CA: CPT

## 2019-05-23 RX ORDER — ONDANSETRON 4 MG/1
4 TABLET, ORALLY DISINTEGRATING ORAL EVERY 8 HOURS PRN
Status: DISCONTINUED | OUTPATIENT
Start: 2019-05-23 | End: 2019-05-24

## 2019-05-23 RX ORDER — IBUPROFEN 600 MG/1
600 TABLET ORAL EVERY 6 HOURS
Status: DISCONTINUED | OUTPATIENT
Start: 2019-05-23 | End: 2019-05-29 | Stop reason: HOSPADM

## 2019-05-23 RX ORDER — POTASSIUM CHLORIDE 20 MEQ/1
40 TABLET, EXTENDED RELEASE ORAL ONCE
Status: COMPLETED | OUTPATIENT
Start: 2019-05-23 | End: 2019-05-23

## 2019-05-23 RX ORDER — BENZONATATE 100 MG/1
100 CAPSULE ORAL 3 TIMES DAILY PRN
Status: DISCONTINUED | OUTPATIENT
Start: 2019-05-23 | End: 2019-05-29 | Stop reason: HOSPADM

## 2019-05-23 RX ORDER — DOCUSATE SODIUM 100 MG/1
100 CAPSULE, LIQUID FILLED ORAL DAILY
Status: DISCONTINUED | OUTPATIENT
Start: 2019-05-23 | End: 2019-05-24

## 2019-05-23 RX ADMIN — OXYCODONE HYDROCHLORIDE 10 MG: 5 TABLET ORAL at 10:45

## 2019-05-23 RX ADMIN — OXYCODONE HYDROCHLORIDE 10 MG: 5 TABLET ORAL at 21:10

## 2019-05-23 RX ADMIN — RISPERIDONE 2 MG: 2 TABLET, FILM COATED ORAL at 21:14

## 2019-05-23 RX ADMIN — DIVALPROEX SODIUM 750 MG: 500 TABLET, DELAYED RELEASE ORAL at 21:14

## 2019-05-23 RX ADMIN — MIRTAZAPINE 45 MG: 30 TABLET, FILM COATED ORAL at 09:08

## 2019-05-23 RX ADMIN — BUSPIRONE HYDROCHLORIDE 5 MG: 15 TABLET ORAL at 21:14

## 2019-05-23 RX ADMIN — PHENOBARBITAL 32.4 MG: 32.4 TABLET ORAL at 22:24

## 2019-05-23 RX ADMIN — PHENOBARBITAL 32.4 MG: 32.4 TABLET ORAL at 15:44

## 2019-05-23 RX ADMIN — PIPERACILLIN AND TAZOBACTAM 3.38 G: 3; .375 INJECTION, POWDER, FOR SOLUTION INTRAVENOUS at 01:29

## 2019-05-23 RX ADMIN — PIPERACILLIN AND TAZOBACTAM 3.38 G: 3; .375 INJECTION, POWDER, FOR SOLUTION INTRAVENOUS at 09:11

## 2019-05-23 RX ADMIN — ASPIRIN 81 MG: 81 TABLET ORAL at 09:04

## 2019-05-23 RX ADMIN — OXYCODONE HYDROCHLORIDE 10 MG: 5 TABLET ORAL at 01:24

## 2019-05-23 RX ADMIN — ACETAMINOPHEN 1000 MG: 500 TABLET ORAL at 15:44

## 2019-05-23 RX ADMIN — OXYCODONE HYDROCHLORIDE 10 MG: 5 TABLET ORAL at 06:51

## 2019-05-23 RX ADMIN — METOPROLOL TARTRATE 25 MG: 25 TABLET ORAL at 21:14

## 2019-05-23 RX ADMIN — PHENYTOIN SODIUM 300 MG: 300 CAPSULE, EXTENDED RELEASE ORAL at 21:15

## 2019-05-23 RX ADMIN — POTASSIUM CHLORIDE 40 MEQ: 1500 TABLET, EXTENDED RELEASE ORAL at 09:04

## 2019-05-23 RX ADMIN — VANCOMYCIN HYDROCHLORIDE 1000 MG: 1 INJECTION, SOLUTION INTRAVENOUS at 06:51

## 2019-05-23 RX ADMIN — LEVOFLOXACIN 500 MG: 500 TABLET, FILM COATED ORAL at 09:03

## 2019-05-23 RX ADMIN — OXYCODONE HYDROCHLORIDE 10 MG: 5 TABLET ORAL at 17:12

## 2019-05-23 RX ADMIN — LEVOTHYROXINE SODIUM 50 MCG: 50 TABLET ORAL at 06:53

## 2019-05-23 RX ADMIN — BENZONATATE 100 MG: 100 CAPSULE ORAL at 20:30

## 2019-05-23 RX ADMIN — Medication 10 ML: at 10:25

## 2019-05-23 RX ADMIN — METOPROLOL TARTRATE 25 MG: 25 TABLET ORAL at 09:08

## 2019-05-23 RX ADMIN — ONDANSETRON 4 MG: 4 TABLET, ORALLY DISINTEGRATING ORAL at 22:24

## 2019-05-23 RX ADMIN — FAMOTIDINE 20 MG: 20 TABLET, FILM COATED ORAL at 09:03

## 2019-05-23 RX ADMIN — ACETAMINOPHEN 1000 MG: 500 TABLET ORAL at 01:28

## 2019-05-23 RX ADMIN — BUSPIRONE HYDROCHLORIDE 5 MG: 15 TABLET ORAL at 09:04

## 2019-05-23 RX ADMIN — Medication 10 ML: at 21:17

## 2019-05-23 RX ADMIN — DIVALPROEX SODIUM 750 MG: 500 TABLET, DELAYED RELEASE ORAL at 09:04

## 2019-05-23 RX ADMIN — ACETAMINOPHEN 1000 MG: 500 TABLET ORAL at 06:53

## 2019-05-23 RX ADMIN — ACETAMINOPHEN 1000 MG: 500 TABLET ORAL at 23:30

## 2019-05-23 RX ADMIN — ENOXAPARIN SODIUM 40 MG: 40 INJECTION SUBCUTANEOUS at 09:04

## 2019-05-23 ASSESSMENT — PAIN SCALES - GENERAL
PAINLEVEL_OUTOF10: 10
PAINLEVEL_OUTOF10: 7
PAINLEVEL_OUTOF10: 10
PAINLEVEL_OUTOF10: 6
PAINLEVEL_OUTOF10: 8
PAINLEVEL_OUTOF10: 2
PAINLEVEL_OUTOF10: 2
PAINLEVEL_OUTOF10: 10
PAINLEVEL_OUTOF10: 3
PAINLEVEL_OUTOF10: 10
PAINLEVEL_OUTOF10: 2

## 2019-05-23 ASSESSMENT — PAIN DESCRIPTION - ORIENTATION
ORIENTATION: RIGHT
ORIENTATION: RIGHT

## 2019-05-23 ASSESSMENT — PAIN DESCRIPTION - PAIN TYPE
TYPE: ACUTE PAIN
TYPE: ACUTE PAIN

## 2019-05-23 ASSESSMENT — PAIN DESCRIPTION - LOCATION
LOCATION: LEG
LOCATION: LEG

## 2019-05-23 NOTE — ANESTHESIA POSTPROCEDURE EVALUATION
Department of Anesthesiology  Postprocedure Note    Patient: Crow Doyle  MRN: [id number]  YOB: 1965  Date of evaluation: 5/23/2019  Time:  9:07 AM     Procedure Summary     Date:  05/22/19 Room / Location:  [facility] OR  / [facility] OR    Anesthesia Start:  1152 Anesthesia Stop:  6351    Procedure:  RIGHT WOUND HIP EXAMINATION LAVAGE AND WOUND VAC PLACEMENT (N/A ) Diagnosis:  (NECROTIZING FASCITIS)    Surgeon:  Harsha Mendenhall MD Responsible Provider:  Cintia Fritz MD    Anesthesia Type:  general ASA Status:  3          Anesthesia Type: general    Wojciech Phase I: Wojciech Score: 9    Wojciech Phase II:      Last vitals: Reviewed and per EMR flowsheets.        Anesthesia Post Evaluation    Patient location during evaluation: PACU  Patient participation: complete - patient participated  Level of consciousness: awake  Pain score: 1  Airway patency: patent  Nausea & Vomiting: no nausea and no vomiting  Complications: no  Cardiovascular status: blood pressure returned to baseline and hemodynamically stable  Respiratory status: acceptable  Hydration status: euvolemic

## 2019-05-23 NOTE — PROGRESS NOTES
BRONCHOSPASM/BRONCHOCONSTRICTION     [x]         IMPROVE AERATION/BREATH SOUNDS  []   ADMINISTER BRONCHODILATOR THERAPY AS APPROPRIATE  [x]   ASSESS BREATH SOUNDS  []   IMPLEMENT AEROSOL/MDI PROTOCOL  [x]   PATIENT EDUCATION AS NEEDED

## 2019-05-23 NOTE — PROGRESS NOTES
Smoking Cessation - topics covered   []  Health Risks  []  Benefits of Quitting   []  Smoking Cessation  []  Patient has no history of tobacco use  []  Patient is former smoker. []  No need for tobacco cessation education. []  Booklet given  []  Patient verbalizes understanding. []  Patient denies need for tobacco cessation education. [x]  Unable to meet with patient today. Will follow up as able.   Renard Pal [phone]  5:24 PM

## 2019-05-23 NOTE — OP NOTE
Patient: Allie Barone  YOB: 1965  MRN: [id number]  Date of Procedure: 5/22/2019     Pre-Op Diagnosis: NECROTIZING FASCITIS     Post-Op Diagnosis: Same       Procedure(s):  RIGHT WOUND HIP EXAMINATION LAVAGE AND WOUND VAC PLACEMENT     Anesthesia: General     Surgeon(s):  Joselyn Taylor MD     Assistant: Ijeoma Wilder PGY3     Estimated Blood Loss (mL): 89WM     Complications: None     Specimens:   * No specimens in log *     Implants:  * No implants in log *      Drains:        Negative Pressure Wound Therapy Hip Proximal;Right;Upper; Lateral (Active)   Wound Type Surgical 5/22/2019 10:38 AM   Dressing Type Black foam 5/22/2019 10:38 AM   Cycle Continuous 5/22/2019  9:21 AM   Target Pressure (mmHg) 125 5/22/2019  9:21 AM   Canister changed? No 5/21/2019  8:11 AM   Dressing Status Clean;Dry; Intact 5/22/2019 10:38 AM   Dressing Changed Other (Comment) 5/22/2019 10:38 AM   Drainage Amount Moderate 5/22/2019 10:38 AM   Drainage Description Serosanguinous 5/22/2019 10:38 AM   Dressing Change Due 05/22/19 5/22/2019 10:38 AM   Output (ml) 300 ml 5/21/2019  1:53 PM   Wound Assessment Clean;Dry;Painful;Red 5/21/2019  8:11 AM   Kori-wound Assessment Red 5/21/2019  8:11 AM   Odor None 5/21/2019  8:00 AM       [REMOVED] Urethral Catheter 16 fr (Removed)   Catheter Indications Need for fluid management in critically ill patients in a critical care setting not able to be managed by other means such as BSC with hat, bedpan, urinal, condom catheter, or short term intermittent urethral catherization 5/20/2019  4:00 PM   Site Assessment No urethral drainage 5/20/2019  4:00 PM   Urine Color Yellow 5/20/2019  4:00 PM   Urine Appearance Clear 5/20/2019  4:00 PM   Output (mL) 360 mL 5/20/2019  4:00 PM         Findings: 51P73Q1.5cm wound diminsions. Wound base clean and healthy granulation tissue, no necrosis noted. No purulent drainage or additional pockets noted.      Indication  Que Gant is a 62year-old female who presented to the hospital on 5/17/2019. She was suspected to have necrotizing fasciitis of the right hip. She was emergently taken to the operating room for sharp excisional debridements and started on antibiotics. She was retaken back to the operating room room for reexploration at which time no additional debridement was performed in a irrigating wound VAC was placed. Today she was taken back to the operating room for her first wound VAC change and possible additional debridement. The risk, benefits and alternatives were discussed with her and all questions answered. Informed consent was signed. Procedure: The patient was taken back to the operating room and placed under general anesthesia per anesthesia guidelines. A timeout was called identifying the correct patient, position and procedure to be performed. A bump was placed under the patient's right flank in order to expose the right hip for maximum exposure of the wound bed. The existing wound VAC with black foam was removed and the area prepped and draped in usual sterile fashion. The wound bed appeared clean with healthy beefy red granulation tissue along with healthy appearing fat without any additional evidence of necrosis or infection. The wound cavity measured 52Y23V6.5 cm. the wound was explored and no additional areas of debridement were needed. No additional evidence of abscess cavities were noted. The wound was irrigated with a liter of normal saline. A large wound VAC was then replaced and connected to the [facility] without any evidence of leak. Instrument and sponge count were performed and found to be correct. The patient tolerated the procedure well and was transferred to the PACU in stable condition. Dr. Trisha Sorenson was present for the entirety of the procedure.

## 2019-05-23 NOTE — CARE COORDINATION
Met with pt along with Mikey/trauma cm to discuss home vs placement. Pt agreeable to placement in a SNF. Provided a list of [employer] approved SNF's. Pt wants to discuss with her friend/roommate prior to making a decision.

## 2019-05-23 NOTE — PROGRESS NOTES
improve. Is much happier this morning - likes her nurse better overnight. Had several loose stool overnight. Nonproductive cough. Denies nausea, vomiting, fevers, chills. OBJECTIVE  VITALS: Temp: Temp: 98.7 °F (37.1 °C)Temp  Av.7 °F (34.3 °C)  Min: 63 °F (17.2 °C)  Max: 98.7 °F (09.8 °C) BP Systolic (97IYR), TAV:062 , Min:33 , IUC:047   Diastolic (97IAH), QSA:82, Min:18, Max:96   Pulse Pulse  Av.8  Min: 82  Max: 92 Resp Resp  Av.1  Min: 0  Max: 53 Pulse ox SpO2  Av.9 %  Min: 90 %  Max: 100 %    CONSTITUTIONAL: awake, alert, cooperative, no apparent distress  HEENT: moist mucous membranes  NECK: Supple, symmetrical, trachea midline  LUNGS: no respiratory distress, no audible wheezing  CARDIOVASCULAR: +S1/S2  ABDOMEN: soft, nontender, nondistended, no guarding, no rebound tenderness   MUSCULOSKELETAL: Wound vac intact R hip, surrounding erythema extending inferiorly although unchanged from postop exam.   NEUROLOGIC: Awake, alert, oriented to name, place and time    LAB:  CBC:   Recent Labs     19  0515 19  0905 19  0625   WBC 13.9* 9.2 7.7   HGB 8.9* 8.5* 8.7*   HCT 27.5* 26.3* 27.2*   MCV 92.0 90.1 94.1   * 457* 471*     BMP:   Recent Labs     19  0515 19  0620 19  0625   * 138 137   K 3.6* 4.1 3.2*   CL 97* 100 99   CO2 24 27 29   BUN 6 5* 6   CREATININE 0.54 0.48* 0.53   GLUCOSE 93 81 103*         Norberto Luu DO     I personally evaluated the patient and directed the medical decision making with Resident/JUDAH after the physical/radiologic exam and laboratory values were reviewed and confirmed.  ANKIT

## 2019-05-24 PROBLEM — M79.89 NECROTIZING SOFT TISSUE INFECTION: Status: RESOLVED | Noted: 2019-05-17 | Resolved: 2019-05-24

## 2019-05-24 PROBLEM — R79.89 ELEVATED LACTIC ACID LEVEL: Status: RESOLVED | Noted: 2019-05-17 | Resolved: 2019-05-24

## 2019-05-24 PROBLEM — E43 SEVERE MALNUTRITION (HCC): Status: ACTIVE | Noted: 2019-05-24

## 2019-05-24 PROBLEM — D72.829 LEUKOCYTOSIS: Status: RESOLVED | Noted: 2019-05-17 | Resolved: 2019-05-24

## 2019-05-24 PROBLEM — E87.1 HYPONATREMIA: Status: RESOLVED | Noted: 2019-05-17 | Resolved: 2019-05-24

## 2019-05-24 PROBLEM — A41.9 SEPSIS AFFECTING SKIN: Status: RESOLVED | Noted: 2019-05-17 | Resolved: 2019-05-24

## 2019-05-24 LAB
ABSOLUTE EOS #: 0 K/UL (ref 0–0.4)
ABSOLUTE IMMATURE GRANULOCYTE: 0.77 K/UL (ref 0–0.3)
ABSOLUTE LYMPH #: 2.38 K/UL (ref 1–4.8)
ABSOLUTE MONO #: 0.6 K/UL (ref 0.1–0.8)
ANION GAP SERPL CALCULATED.3IONS-SCNC: 11 MMOL/L (ref 9–17)
BASOPHILS # BLD: 0 % (ref 0–2)
BASOPHILS ABSOLUTE: 0 K/UL (ref 0–0.2)
BUN BLDV-MCNC: 3 MG/DL (ref 6–20)
BUN/CREAT BLD: ABNORMAL (ref 9–20)
CALCIUM SERPL-MCNC: 8.2 MG/DL (ref 8.6–10.4)
CHLORIDE BLD-SCNC: 99 MMOL/L (ref 98–107)
CO2: 29 MMOL/L (ref 20–31)
CREAT SERPL-MCNC: 0.56 MG/DL (ref 0.5–0.9)
CULTURE: NORMAL
DIFFERENTIAL TYPE: ABNORMAL
EOSINOPHILS RELATIVE PERCENT: 0 % (ref 1–4)
GFR AFRICAN AMERICAN: >60 ML/MIN
GFR NON-AFRICAN AMERICAN: >60 ML/MIN
GFR SERPL CREATININE-BSD FRML MDRD: ABNORMAL ML/MIN/{1.73_M2}
GFR SERPL CREATININE-BSD FRML MDRD: ABNORMAL ML/MIN/{1.73_M2}
GLUCOSE BLD-MCNC: 80 MG/DL (ref 70–99)
HCT VFR BLD CALC: 30.1 % (ref 36.3–47.1)
HEMOGLOBIN: 9.4 G/DL (ref 11.9–15.1)
IMMATURE GRANULOCYTES: 9 %
LYMPHOCYTES # BLD: 28 % (ref 24–44)
Lab: NORMAL
MAGNESIUM: 1.7 MG/DL (ref 1.6–2.6)
MCH RBC QN AUTO: 29.2 PG (ref 25.2–33.5)
MCHC RBC AUTO-ENTMCNC: 31.2 G/DL (ref 28.4–34.8)
MCV RBC AUTO: 93.5 FL (ref 82.6–102.9)
MONOCYTES # BLD: 7 % (ref 1–7)
MORPHOLOGY: ABNORMAL
MORPHOLOGY: ABNORMAL
NRBC AUTOMATED: 0 PER 100 WBC
PDW BLD-RTO: 15.9 % (ref 11.8–14.4)
PLATELET # BLD: 479 K/UL (ref 138–453)
PLATELET ESTIMATE: ABNORMAL
PMV BLD AUTO: 8.9 FL (ref 8.1–13.5)
POTASSIUM SERPL-SCNC: 3.4 MMOL/L (ref 3.7–5.3)
RBC # BLD: 3.22 M/UL (ref 3.95–5.11)
RBC # BLD: ABNORMAL 10*6/UL
SEG NEUTROPHILS: 56 % (ref 36–66)
SEGMENTED NEUTROPHILS ABSOLUTE COUNT: 4.75 K/UL (ref 1.8–7.7)
SODIUM BLD-SCNC: 139 MMOL/L (ref 135–144)
SPECIMEN DESCRIPTION: NORMAL
WBC # BLD: 8.5 K/UL (ref 3.5–11.3)
WBC # BLD: ABNORMAL 10*3/UL

## 2019-05-24 PROCEDURE — 6370000000 HC RX 637 (ALT 250 FOR IP): Performed by: STUDENT IN AN ORGANIZED HEALTH CARE EDUCATION/TRAINING PROGRAM

## 2019-05-24 PROCEDURE — 85025 COMPLETE CBC W/AUTO DIFF WBC: CPT

## 2019-05-24 PROCEDURE — 36415 COLL VENOUS BLD VENIPUNCTURE: CPT

## 2019-05-24 PROCEDURE — 97535 SELF CARE MNGMENT TRAINING: CPT | Performed by: OCCUPATIONAL THERAPIST

## 2019-05-24 PROCEDURE — 1200000000 HC SEMI PRIVATE

## 2019-05-24 PROCEDURE — 6360000002 HC RX W HCPCS: Performed by: STUDENT IN AN ORGANIZED HEALTH CARE EDUCATION/TRAINING PROGRAM

## 2019-05-24 PROCEDURE — 2580000003 HC RX 258: Performed by: STUDENT IN AN ORGANIZED HEALTH CARE EDUCATION/TRAINING PROGRAM

## 2019-05-24 PROCEDURE — 83735 ASSAY OF MAGNESIUM: CPT

## 2019-05-24 PROCEDURE — 80048 BASIC METABOLIC PNL TOTAL CA: CPT

## 2019-05-24 RX ORDER — OXYCODONE HYDROCHLORIDE 5 MG/1
5 TABLET ORAL EVERY 8 HOURS PRN
Qty: 12 TABLET | Refills: 0 | Status: SHIPPED | OUTPATIENT
Start: 2019-05-24 | End: 2019-05-24 | Stop reason: SDUPTHER

## 2019-05-24 RX ORDER — ALBUTEROL SULFATE 90 UG/1
2 AEROSOL, METERED RESPIRATORY (INHALATION) EVERY 6 HOURS PRN
Qty: 1 INHALER | Refills: 3 | Status: SHIPPED | OUTPATIENT
Start: 2019-05-24 | End: 2020-04-14 | Stop reason: SDUPTHER

## 2019-05-24 RX ORDER — BUSPIRONE HYDROCHLORIDE 5 MG/1
5 TABLET ORAL 2 TIMES DAILY
Qty: 14 TABLET | Refills: 0 | Status: SHIPPED | OUTPATIENT
Start: 2019-05-24 | End: 2019-05-31

## 2019-05-24 RX ORDER — OXYCODONE HYDROCHLORIDE 5 MG/1
10 TABLET ORAL ONCE
Status: COMPLETED | OUTPATIENT
Start: 2019-05-24 | End: 2019-05-24

## 2019-05-24 RX ORDER — OXYCODONE HYDROCHLORIDE 5 MG/1
5 TABLET ORAL EVERY 8 HOURS PRN
Qty: 12 TABLET | Refills: 0 | Status: SHIPPED | OUTPATIENT
Start: 2019-05-24 | End: 2019-05-31

## 2019-05-24 RX ORDER — ASPIRIN 81 MG/1
81 TABLET ORAL DAILY
Qty: 7 TABLET | Refills: 0 | Status: ON HOLD | OUTPATIENT
Start: 2019-05-25 | End: 2019-06-04 | Stop reason: HOSPADM

## 2019-05-24 RX ORDER — OXYCODONE HYDROCHLORIDE 5 MG/1
5 TABLET ORAL EVERY 4 HOURS PRN
Status: DISCONTINUED | OUTPATIENT
Start: 2019-05-24 | End: 2019-05-27

## 2019-05-24 RX ORDER — IBUPROFEN 600 MG/1
600 TABLET ORAL EVERY 6 HOURS
Qty: 20 TABLET | Refills: 0 | Status: ON HOLD | COMMUNITY
Start: 2019-05-24 | End: 2019-06-04 | Stop reason: HOSPADM

## 2019-05-24 RX ORDER — POTASSIUM CHLORIDE 20 MEQ/1
40 TABLET, EXTENDED RELEASE ORAL ONCE
Status: COMPLETED | OUTPATIENT
Start: 2019-05-24 | End: 2019-05-24

## 2019-05-24 RX ADMIN — MOMETASONE FUROATE AND FORMOTEROL FUMARATE DIHYDRATE 2 PUFF: 200; 5 AEROSOL RESPIRATORY (INHALATION) at 23:05

## 2019-05-24 RX ADMIN — PHENOBARBITAL 32.4 MG: 32.4 TABLET ORAL at 16:19

## 2019-05-24 RX ADMIN — DIVALPROEX SODIUM 750 MG: 500 TABLET, DELAYED RELEASE ORAL at 11:42

## 2019-05-24 RX ADMIN — PHENYTOIN SODIUM 300 MG: 300 CAPSULE, EXTENDED RELEASE ORAL at 20:53

## 2019-05-24 RX ADMIN — DIVALPROEX SODIUM 750 MG: 500 TABLET, DELAYED RELEASE ORAL at 22:56

## 2019-05-24 RX ADMIN — Medication 10 ML: at 20:54

## 2019-05-24 RX ADMIN — ALBUTEROL SULFATE 2.5 MG: 2.5 SOLUTION RESPIRATORY (INHALATION) at 22:55

## 2019-05-24 RX ADMIN — PHENOBARBITAL 32.4 MG: 32.4 TABLET ORAL at 22:55

## 2019-05-24 RX ADMIN — OXYCODONE HYDROCHLORIDE 5 MG: 5 TABLET ORAL at 20:53

## 2019-05-24 RX ADMIN — OXYCODONE HYDROCHLORIDE 10 MG: 5 TABLET ORAL at 07:41

## 2019-05-24 RX ADMIN — OXYCODONE HYDROCHLORIDE 10 MG: 5 TABLET ORAL at 16:18

## 2019-05-24 RX ADMIN — RISPERIDONE 2 MG: 2 TABLET, FILM COATED ORAL at 20:53

## 2019-05-24 RX ADMIN — METOPROLOL TARTRATE 25 MG: 25 TABLET ORAL at 11:42

## 2019-05-24 RX ADMIN — BUSPIRONE HYDROCHLORIDE 5 MG: 15 TABLET ORAL at 11:41

## 2019-05-24 RX ADMIN — LEVOTHYROXINE SODIUM 50 MCG: 50 TABLET ORAL at 06:40

## 2019-05-24 RX ADMIN — BENZONATATE 100 MG: 100 CAPSULE ORAL at 11:46

## 2019-05-24 RX ADMIN — BENZONATATE 100 MG: 100 CAPSULE ORAL at 03:00

## 2019-05-24 RX ADMIN — ENOXAPARIN SODIUM 40 MG: 40 INJECTION SUBCUTANEOUS at 11:48

## 2019-05-24 RX ADMIN — OXYCODONE HYDROCHLORIDE 10 MG: 5 TABLET ORAL at 01:09

## 2019-05-24 RX ADMIN — OXYCODONE HYDROCHLORIDE 10 MG: 5 TABLET ORAL at 05:17

## 2019-05-24 RX ADMIN — BUSPIRONE HYDROCHLORIDE 5 MG: 15 TABLET ORAL at 22:55

## 2019-05-24 RX ADMIN — ACETAMINOPHEN 1000 MG: 500 TABLET ORAL at 16:20

## 2019-05-24 RX ADMIN — OXYCODONE HYDROCHLORIDE 10 MG: 5 TABLET ORAL at 11:43

## 2019-05-24 RX ADMIN — ONDANSETRON 4 MG: 4 TABLET, ORALLY DISINTEGRATING ORAL at 05:17

## 2019-05-24 RX ADMIN — METOPROLOL TARTRATE 25 MG: 25 TABLET ORAL at 22:56

## 2019-05-24 RX ADMIN — ACETAMINOPHEN 1000 MG: 500 TABLET ORAL at 06:40

## 2019-05-24 RX ADMIN — Medication 10 ML: at 10:00

## 2019-05-24 RX ADMIN — BENZONATATE 100 MG: 100 CAPSULE ORAL at 20:53

## 2019-05-24 RX ADMIN — POTASSIUM CHLORIDE 40 MEQ: 20 TABLET, EXTENDED RELEASE ORAL at 11:43

## 2019-05-24 RX ADMIN — MIRTAZAPINE 45 MG: 30 TABLET, FILM COATED ORAL at 09:54

## 2019-05-24 ASSESSMENT — PAIN DESCRIPTION - ORIENTATION
ORIENTATION: RIGHT

## 2019-05-24 ASSESSMENT — PAIN SCALES - GENERAL
PAINLEVEL_OUTOF10: 9
PAINLEVEL_OUTOF10: 7
PAINLEVEL_OUTOF10: 7
PAINLEVEL_OUTOF10: 9
PAINLEVEL_OUTOF10: 9
PAINLEVEL_OUTOF10: 7
PAINLEVEL_OUTOF10: 9
PAINLEVEL_OUTOF10: 8
PAINLEVEL_OUTOF10: 6
PAINLEVEL_OUTOF10: 9
PAINLEVEL_OUTOF10: 10
PAINLEVEL_OUTOF10: 8

## 2019-05-24 ASSESSMENT — PAIN DESCRIPTION - PAIN TYPE
TYPE: ACUTE PAIN

## 2019-05-24 ASSESSMENT — PAIN DESCRIPTION - LOCATION
LOCATION: HIP
LOCATION: LEG
LOCATION: LEG
LOCATION: HIP

## 2019-05-24 ASSESSMENT — PAIN DESCRIPTION - DESCRIPTORS: DESCRIPTORS: THROBBING

## 2019-05-24 ASSESSMENT — PAIN DESCRIPTION - FREQUENCY: FREQUENCY: CONTINUOUS

## 2019-05-24 NOTE — PROGRESS NOTES
Patient gave permission to send referral for SNF placement to [facility] and [address] [place] [address]. Kelsey /ANASTASIA made aware of patient's choice. Dispo planning continues.

## 2019-05-24 NOTE — PROGRESS NOTES
recommendations with resident, TECSS nurse and bedside nurse. The following confirms and/or amends the IMPRESSION, MEDICAL DECISION MAKING and PLAN from above. ASSESSMENT    Patient Active Problem List   Diagnosis    Chest pain    Migraine variant with headache    Drug overdose, accidental or unintentional, initial encounter    Hypothyroidism    Essential hypertension    Seizure disorder ([address]    Drug overdose    History of seizure    Major depressive disorder with psychotic features ([address]    Severe major depression ([address]    Overdose of barbiturate, intentional self-harm, initial encounter ([address]    Intentional phenobarbital overdose ([address]    Severe episode of recurrent major depressive disorder, without psychotic features ([address]    Bronchitis    Suicide attempt ([address]    Major depressive disorder, recurrent ([address]    Ear infection    Necrotizing soft tissue infection    Sepsis affecting skin ([address]    Leukocytosis    Elevated lactic acid level    Hyponatremia       MEDICAL DECISION MAKING AND PLAN  As plan is wound vac, would strongly urge patient to go to SNF where dressing canges can be done and pre-procedure analgesia would be appropriately administered.     Alise Wolff MD  5/24/2019  10:41 AM

## 2019-05-24 NOTE — PROGRESS NOTES
Occupational Therapy  Facility/Department: [facility] CAR 2  Daily Treatment Note  NAME: Chelsey العراقي  : 1965  MRN: [id number]    Date of Service: 2019    Discharge Recommendations:    Further therapy recommended at discharge. Assessment   Performance deficits / Impairments: Decreased functional mobility ; Decreased safe awareness;Decreased balance;Decreased ADL status; Decreased endurance;Decreased high-level IADLs  Prognosis: Good  Patient Education: OT POC, importance of mobility and therapy- fair return  REQUIRES OT FOLLOW UP: Yes  Activity Tolerance  Activity Tolerance: Patient limited by pain  Safety Devices  Safety Devices in place: Yes  Type of devices: Left in bed;Call light within reach; All fall risk precautions in place  Restraints  Initially in place: No         Patient Diagnosis(es): The primary encounter diagnosis was Necrotizing fasciitis ([facility] [address] A diagnosis of Necrotizing soft tissue infection was also pertinent to this visit. has a past medical history of Arthritis, Asthma, CHF (congestive heart failure) ([address] COPD (chronic obstructive pulmonary disease) ([address] Fibromyalgia, Headache, Hypertension, Movement disorder, Neck fracture ([address] Seizure ([address] and Thyroid disease. has a past surgical history that includes knee surgery; partial hysterectomy (cervix not removed); lymph node dissection; Irrigation and debridement (Right, 2019); EXPLORATION OF WOUND OF EXTREMITY (Right, 2019); and EXPLORATION OF WOUND OF EXTREMITY (N/A, 2019).     Restrictions  Restrictions/Precautions  Restrictions/Precautions: Fall Risk, General Precautions  Required Braces or Orthoses?: No  Position Activity Restriction  Other position/activity restrictions: up with assist, R hip wound vac     Subjective   General  Patient assessed for rehabilitation services?: Yes  Family / Caregiver Present: No  Pain Assessment  Pain Assessment: 0-10  Pain Level: 10  Pain Location: Hip  Pain Orientation: Right  Non-Pharmaceutical Pain Intervention(s): Emotional support  Response to Pain Intervention: Patient Satisfied  Vital Signs  Patient Currently in Pain: Yes   Orientation     Objective    ADL  Grooming: Modified independent ;Setup  Toileting: Stand by assistance;Setup(Pt completed toileting on bedside commode)        Standing Balance  Time: ~1 minute  Activity: functional mobility from bed to commode  Functional Mobility  Functional - Mobility Device: No device  Activity: Other(to/from bedside commode)  Assist Level: Stand by assistance  Bed mobility  Supine to Sit: Stand by assistance  Sit to Supine: Stand by assistance  Transfers  Sit to stand: Stand by assistance  Stand to sit: Stand by assistance          Plan   Plan  Times per week: 3-4x    Goals  Short term goals  Time Frame for Short term goals: Pt will by d/c  Short term goal 1: Demo functional high/low item retrieval and mobility w/ SBA  Short term goal 2: Demo UB/LB ADLs w/ APRIL  Short term goal 3: independently demo sit <> stand transfer  Short term goal 4: demo 15 mins of functional dynamic standing w/ SUP       Therapy Time   Individual Concurrent Group Co-treatment   Time In  10:36         Time Out  10:54         Minutes  18               Chastity Boyd OTR/L

## 2019-05-24 NOTE — PLAN OF CARE
Problem: Falls - Risk of:  Goal: Will remain free from falls  5/23/2019 2348 by Josefina Jarrell RN  Outcome: Ongoing  5/23/2019 1818 by Lucien Kong RN  Outcome: Met This Shift  Goal: Absence of physical injury  Outcome: Ongoing     Problem: Pain:  Goal: Pain level will decrease  5/23/2019 2348 by Josefina Jarrell RN  Outcome: Ongoing  5/23/2019 1818 by Lucien Kong RN  Outcome: Met This Shift  Goal: Control of acute pain  Outcome: Ongoing  Goal: Control of chronic pain  Outcome: Ongoing     Problem: Infection - Surgical Site:  Goal: Will show no infection signs and symptoms  5/23/2019 2348 by Josefina Jarrell RN  Outcome: Ongoing  5/23/2019 1818 by Lucien Kong RN  Outcome: Ongoing     Problem: Risk for Impaired Skin Integrity  Goal: Tissue integrity - skin and mucous membranes  5/23/2019 2348 by Josefina Jarrell RN  Outcome: Ongoing  5/23/2019 1818 by Lucien Kong RN  Outcome: Ongoing

## 2019-05-24 NOTE — PROGRESS NOTES
Nutrition Assessment    Type and Reason for Visit: Initial(LOS Day 7)     Nutrition Recommendations: Continue current general diet and recommend modifying current (wound healing supplements) vanilla/berry nutrition supplement to Ensure Enlive TID.     Nutrition Assessment:  Pt admitted one week ago d/t right hip pain/redness x 2 weeks. Pt was diagnosed with NF per hip eval. Pt w/ wound vac and surgical wounds. Pt w/ 11.7% wt loss x 6 mo per EMR. Pt states UBW of 135 lbs? Pt meets the criteria for severe malnutrition. Pt reported 50-75% meal consumption and would like to try a different supplement such as Ensure Enlive or [employer]. Will add supplements d/t severe malnutrition. Will monitor po intake, weights and wounds. Malnutrition Assessment:  · Malnutrition Status: Meets the criteria for severe malnutrition  · Context: Chronic illness  · Findings of the 6 clinical characteristics of malnutrition (Minimum of 2 out of 6 clinical characteristics is required to make the diagnosis of moderate or severe Protein Calorie Malnutrition based on AND/ASPEN Guidelines):  1. Energy Intake-Less than or equal to 75% of estimated energy requirement, Greater than or equal to 3 months    2. Weight Loss-10% loss or greater, in 6 months  3. Fat Loss-No significant subcutaneous fat loss,    4. Muscle Loss-No significant muscle mass loss,    5. Fluid Accumulation-Mild fluid accumulation, Extremities(right side)  6.   Strength-Not measured    Nutrition Risk Level: High    Nutrient Needs:  · Estimated Daily Total Kcal: 25-30 kcal/kg ~>6989-5090 kcals/d   · Estimated Daily Protein (g): 1.2-1.5 gm/kg ~> gms/d     Nutrition Diagnosis:   · Problem: Severe malnutrition, In context of chronic illness  · Etiology: related to Insufficient energy/nutrient consumption(medical condition)     Signs and symptoms:  as evidenced by Weight loss greater than or equal to 10% in 6 months, Diet history of poor intake, Localized or generalized fluid accumulation    Objective Information:  · Wound Type: Surgical Wound(Right hip incision with wound vac)  · Current Nutrition Therapies:  · Oral Diet Orders: General   · Oral Diet intake: 51-75%, %  · Oral Nutrition Supplement (ONS) Orders: Wound Healing Oral Supplement  · ONS intake: 0%(per pt)  · Anthropometric Measures:  · Ht: 5' 2\" (157.5 cm)   · Current Body Wt: 150 lb (68 kg)  · Admission Body Wt: 150 lb (68 kg)  · Usual Body Wt: 135 lb (61.2 kg)(per pt)  · % Weight Change:  ,  11% wt gain per pt; 11.7% wt loss x 6 mo  · Ideal Body Wt: 110 lb (49.9 kg), % Ideal Body 136% (adm/ideal)  · BMI Classification: BMI 25.0 - 29.9 Overweight    Nutrition Interventions:   Continue current diet, Modify current ONS  Continued Inpatient Monitoring, Education Not Indicated    Nutrition Evaluation:   · Evaluation: Goals set   · Goals: Consume >75% nutrient needs via PO intake/supplements    · Monitoring: Nutrition Progression, Meal Intake, Supplement Intake, Diet Tolerance, I&O, Weight, Wound Healing, Skin Integrity, Pertinent Labs, Monitor Bowel Function      Electronically signed by Arlene Wong RD, LD on 5/24/19 at 3:31 PM    Contact Number: [phone]

## 2019-05-24 NOTE — PROGRESS NOTES
Physical Therapy  DATE: 2019    NAME: Allie Barone  MRN: [id number]   : 1965    Patient not seen this date for Physical Therapy due to:  [] Blood transfusion in progress  [] Hemodialysis  [x]  Patient Declined  [] Spine Precautions   [] Strict Bedrest  [] Surgery/ Procedure  [] Testing      [x] Other I am not getting up after al lthat pain, Pt referring to wound vac change. Pt reports we shall try tomorrow. [] PT being discontinued at this time. Patient independent. No further needs. [] PT being discontinued at this time as the patient has been transferred to palliative care. No further needs.     Robert Ko, PT

## 2019-05-24 NOTE — PROGRESS NOTES
Smoking Cessation - topics covered   []  Health Risks  []  Benefits of Quitting   []  Smoking Cessation  []  Patient has no history of tobacco use  []  Patient is former smoker. []  No need for tobacco cessation education. []  Booklet given  [x]  Patient verbalizes understanding. [x]  Patient denies need for tobacco cessation education stating she quit smoking 1 week ago. []  Unable to meet with patient today. Will follow up as able.   Jenna Caballero  3:05 PM

## 2019-05-25 PROCEDURE — 6370000000 HC RX 637 (ALT 250 FOR IP): Performed by: STUDENT IN AN ORGANIZED HEALTH CARE EDUCATION/TRAINING PROGRAM

## 2019-05-25 PROCEDURE — 6370000000 HC RX 637 (ALT 250 FOR IP)

## 2019-05-25 PROCEDURE — 97116 GAIT TRAINING THERAPY: CPT

## 2019-05-25 PROCEDURE — 1200000000 HC SEMI PRIVATE

## 2019-05-25 PROCEDURE — 6360000002 HC RX W HCPCS: Performed by: STUDENT IN AN ORGANIZED HEALTH CARE EDUCATION/TRAINING PROGRAM

## 2019-05-25 PROCEDURE — 2580000003 HC RX 258: Performed by: STUDENT IN AN ORGANIZED HEALTH CARE EDUCATION/TRAINING PROGRAM

## 2019-05-25 RX ORDER — ACETAMINOPHEN 500 MG
TABLET ORAL
Status: DISCONTINUED
Start: 2019-05-25 | End: 2019-05-25 | Stop reason: WASHOUT

## 2019-05-25 RX ORDER — ACETAMINOPHEN 500 MG
TABLET ORAL
Status: COMPLETED
Start: 2019-05-25 | End: 2019-05-25

## 2019-05-25 RX ORDER — OXYCODONE HYDROCHLORIDE 5 MG/1
5 TABLET ORAL ONCE
Status: DISCONTINUED | OUTPATIENT
Start: 2019-05-25 | End: 2019-05-29 | Stop reason: HOSPADM

## 2019-05-25 RX ADMIN — METOPROLOL TARTRATE 25 MG: 25 TABLET ORAL at 10:15

## 2019-05-25 RX ADMIN — LEVOTHYROXINE SODIUM 50 MCG: 50 TABLET ORAL at 06:40

## 2019-05-25 RX ADMIN — PHENYTOIN SODIUM 300 MG: 300 CAPSULE, EXTENDED RELEASE ORAL at 21:36

## 2019-05-25 RX ADMIN — ASPIRIN 81 MG: 81 TABLET ORAL at 09:49

## 2019-05-25 RX ADMIN — ACETAMINOPHEN 1000 MG: 500 TABLET ORAL at 15:31

## 2019-05-25 RX ADMIN — OXYCODONE HYDROCHLORIDE 5 MG: 5 TABLET ORAL at 15:31

## 2019-05-25 RX ADMIN — ACETAMINOPHEN 1000 MG: 500 TABLET ORAL at 06:39

## 2019-05-25 RX ADMIN — OXYCODONE HYDROCHLORIDE 5 MG: 5 TABLET ORAL at 10:20

## 2019-05-25 RX ADMIN — BUSPIRONE HYDROCHLORIDE 5 MG: 15 TABLET ORAL at 21:38

## 2019-05-25 RX ADMIN — DIVALPROEX SODIUM 750 MG: 500 TABLET, DELAYED RELEASE ORAL at 09:46

## 2019-05-25 RX ADMIN — MIRTAZAPINE 45 MG: 30 TABLET, FILM COATED ORAL at 09:46

## 2019-05-25 RX ADMIN — OXYCODONE HYDROCHLORIDE 5 MG: 5 TABLET ORAL at 21:43

## 2019-05-25 RX ADMIN — OXYCODONE HYDROCHLORIDE 5 MG: 5 TABLET ORAL at 19:50

## 2019-05-25 RX ADMIN — ACETAMINOPHEN: 500 TABLET ORAL at 01:13

## 2019-05-25 RX ADMIN — OXYCODONE HYDROCHLORIDE 5 MG: 5 TABLET ORAL at 01:12

## 2019-05-25 RX ADMIN — Medication 10 ML: at 09:48

## 2019-05-25 RX ADMIN — OXYCODONE HYDROCHLORIDE 5 MG: 5 TABLET ORAL at 05:28

## 2019-05-25 RX ADMIN — DIVALPROEX SODIUM 750 MG: 500 TABLET, DELAYED RELEASE ORAL at 21:35

## 2019-05-25 RX ADMIN — Medication 10 ML: at 21:37

## 2019-05-25 RX ADMIN — RISPERIDONE 2 MG: 2 TABLET, FILM COATED ORAL at 21:36

## 2019-05-25 RX ADMIN — ENOXAPARIN SODIUM 40 MG: 40 INJECTION SUBCUTANEOUS at 09:50

## 2019-05-25 RX ADMIN — PHENOBARBITAL 32.4 MG: 32.4 TABLET ORAL at 11:44

## 2019-05-25 RX ADMIN — BUSPIRONE HYDROCHLORIDE 5 MG: 15 TABLET ORAL at 09:47

## 2019-05-25 RX ADMIN — FAMOTIDINE 20 MG: 20 TABLET, FILM COATED ORAL at 09:47

## 2019-05-25 ASSESSMENT — PAIN SCALES - GENERAL
PAINLEVEL_OUTOF10: 8
PAINLEVEL_OUTOF10: 3
PAINLEVEL_OUTOF10: 0
PAINLEVEL_OUTOF10: 6
PAINLEVEL_OUTOF10: 8
PAINLEVEL_OUTOF10: 4
PAINLEVEL_OUTOF10: 8
PAINLEVEL_OUTOF10: 6
PAINLEVEL_OUTOF10: 8
PAINLEVEL_OUTOF10: 7
PAINLEVEL_OUTOF10: 0
PAINLEVEL_OUTOF10: 8
PAINLEVEL_OUTOF10: 10

## 2019-05-25 ASSESSMENT — PAIN DESCRIPTION - PAIN TYPE
TYPE: ACUTE PAIN

## 2019-05-25 ASSESSMENT — PAIN DESCRIPTION - DESCRIPTORS
DESCRIPTORS: THROBBING
DESCRIPTORS: THROBBING
DESCRIPTORS: ACHING;THROBBING

## 2019-05-25 ASSESSMENT — PAIN DESCRIPTION - ORIENTATION
ORIENTATION: RIGHT

## 2019-05-25 ASSESSMENT — PAIN DESCRIPTION - LOCATION
LOCATION: HIP

## 2019-05-25 ASSESSMENT — PAIN DESCRIPTION - ONSET
ONSET: PROGRESSIVE
ONSET: ON-GOING
ONSET: ON-GOING

## 2019-05-25 ASSESSMENT — PAIN DESCRIPTION - FREQUENCY
FREQUENCY: CONTINUOUS

## 2019-05-25 ASSESSMENT — PAIN DESCRIPTION - PROGRESSION: CLINICAL_PROGRESSION: NOT CHANGED

## 2019-05-25 NOTE — PROGRESS NOTES
Physical Therapy  Facility/Department: [facility] CAR 2  Daily Treatment Note  NAME: Eveline Neves  : 1965  MRN: [id number]    Date of Service: 2019    Discharge Recommendations:  Further therapy recommended at discharge. PT Equipment Recommendations  Equipment Needed: (CTA)    Patient Diagnosis(es): The primary encounter diagnosis was Necrotizing fasciitis ([facility] [address] A diagnosis of Necrotizing soft tissue infection was also pertinent to this visit. has a past medical history of Arthritis, Asthma, CHF (congestive heart failure) ([facility] [address] COPD (chronic obstructive pulmonary disease) ([address] Fibromyalgia, Headache, Hypertension, Movement disorder, Neck fracture ([facility] [address] Seizure ([facility] [address] and Thyroid disease. has a past surgical history that includes knee surgery; partial hysterectomy (cervix not removed); lymph node dissection; Irrigation and debridement (Right, 2019); EXPLORATION OF WOUND OF EXTREMITY (Right, 2019); and EXPLORATION OF WOUND OF EXTREMITY (N/A, 2019). Restrictions  Restrictions/Precautions  Restrictions/Precautions: Fall Risk, General Precautions  Required Braces or Orthoses?: No  Position Activity Restriction  Other position/activity restrictions: up with assist, R hip wound vac  Subjective   General  Response To Previous Treatment: Not applicable  Family / Caregiver Present: No  Subjective  Subjective: Ist attempt in the a:m pt asks to come back later after lunch. Pt agreeable for short treatment P:M- pt can get irritated easily. Pain Screening  Patient Currently in Pain: Yes  Pain Assessment  Pain Assessment: 0-10  Pain Level: 8  Patient's Stated Pain Goal: No pain  Pain Type: Acute pain  Pain Location: Hip  Pain Orientation: Right  Pain Descriptors:  Throbbing  Pain Frequency: Continuous  Pain Onset: On-going  Clinical Progression: Not changed  Non-Pharmaceutical Pain Intervention(s): Ambulation/Increased Activity;Repositioned  Response to Pain Intervention: Asleep with RR greater than 10  POSS Score (Patient Ctrl Analgesia): 1  Vital Signs  BP Location: Right upper arm  Level of Consciousness: Alert  Patient Currently in Pain: Yes  Oxygen Therapy  O2 Device: None (Room air)  Patient Observation  Observations: Out of the room to OR       Orientation  Orientation  Overall Orientation Status: Within Functional Limits  Cognition      Objective   Bed mobility  Supine to Sit: Stand by assistance  Sit to Supine: Stand by assistance  Scooting: Stand by assistance  Transfers  Sit to Stand: Stand by assistance  Stand to sit: Stand by assistance  Comment: RW  Ambulation  Ambulation?: Yes  Ambulation 1  Surface: level tile  Device: Rolling Walker  Other Apparatus: (R Hip Wound vac)  Assistance: Stand by assistance  Quality of Gait: antalgic, mildly unsteady, decreased R stance  Distance: 20 ft  Comments: Pt declined CGA, gait belt , agreeable for RW, reports felt steadier with RW. Stairs/Curb  Stairs?: No     Balance  Posture: Good  Sitting - Static: Good  Sitting - Dynamic: Good  Standing - Static: Good;-  Standing - Dynamic: Good;-  Comments: standing balance assessed without AD         Strength RLE  Comment: appears WFL, not formally assessed d/t pain  Strength LLE  Strength LLE: WFL  Strength RUE  Strength RUE: WFL  Strength LUE  Strength LUE: WFL                 Assessment   Body structures, Functions, Activity limitations: Decreased balance;Decreased functional mobility ; Decreased endurance  Assessment: The pt ambulated 20ft withRW and CGASBA, limited by pain and pt agitation. The pt would benefit from continued therapy for gait and balance training.   Prognosis: Good  Patient Education: POC, importance of mobility, purpose of PT  REQUIRES PT FOLLOW UP: Yes  Activity Tolerance  Activity Tolerance: Patient limited by pain;Treatment limited secondary to agitation     G-Code     OutComes Score                                                  AM-PAC Score             Goals  Short term goals  Time Frame for Short term goals: 14 visits  Short term goal 1: Perform bed mobility and functional transfers independently  Short term goal 2: Ambulate 300ft without AD and supervision  Short term goal 3: Demo Good dynamic standing balance     Plan    Plan  Times per week: 3-5x/wk  Current Treatment Recommendations: Strengthening, Balance Training, Functional Mobility Training, Endurance Training, Transfer Training, Patient/Caregiver Education & Training, Safety Education & Training, Gait Training, Stair training, ROM  Safety Devices  Type of devices: Gait belt, Call light within reach, Nurse notified, Left in chair  Restraints  Initially in place: No     Therapy Time   Individual Concurrent Group Co-treatment   Time In 1410         Time Out 1423         Minutes 13                 Shannen Toledo, PT

## 2019-05-25 NOTE — CARE COORDINATION
Discussed case with LEANN Olmstead, patient has been accepted to [facility], awaiting precert    [id number] discussed with laurie Rodriguez, they are awaiting precert

## 2019-05-25 NOTE — PROGRESS NOTES
PROGRESS NOTE      PATIENT NAME: Flores Brown  MEDICAL RECORD NO. [id number]  DATE: 5/25/2019  PRIMARY CARE PHYSICIAN: Tye Blackwell NPHA    HD: # 8    ASSESSMENT    Patient Active Problem List   Diagnosis    Chest pain    Migraine variant with headache    Drug overdose, accidental or unintentional, initial encounter    Hypothyroidism    Essential hypertension    Seizure disorder ([address]    Drug overdose    History of seizure    Major depressive disorder with psychotic features ([address]    Severe major depression ([address]    Overdose of barbiturate, intentional self-harm, initial encounter ([address]    Intentional phenobarbital overdose ([address]    Severe episode of recurrent major depressive disorder, without psychotic features ([address]    Bronchitis    Suicide attempt ([address]    Major depressive disorder, recurrent ([address]    Ear infection    Severe malnutrition ([address]       MEDICAL DECISION MAKING AND PLAN  24-year-old female admitted on 5/17 with necrotizing soft tissue infection of right hip and buttock S/PE OR for debridement. 1. Neuro  1. Pain control - Tylenol, marquez   2. Seizure hx - on home Dilantin, Depakote     2. CV  1. Hemodynamically stable     3. Pulm  1. >95%  on RA  2. History of COPD, current smoker  3. Home albuterol restarted PRN     4. GI  1. General diet with supplements  2. Pepcid BID - home med  3. Bowel regimen: colace daily, senna BID, miralax daily     5. Renal  1. KVO  2. Monitor uop     6. Heme/ID  1. Afebrile  2. R hip necrotizing soft tissue infection  - POD#8 excision debridement R hip  - POD#6 R thigh washout with irrigating wound vac placement ([id number])  - POD#3 R thigh washout and wound vac placement ([id number]. 5cm)  - Bedside wound vac change yesterday     7. Endocrine  1. Hypothyroidism -synthroid 50mcg  2. BS stable     8. Psych  1.  home meds - remeron, buspar     9. Ppx  1. SCDs  2. Lovenox  3. Pepcid BID  10. PT/OT - continue to treat daily  11.  Dispo planning - awaiting SNF                SUBJECTIVE    Aleta Mcdonough is { unchanged since yesterday. Patient is tolerating oral diet. Pain controlled with current therapies. Patient waiting on SNF placement. Vital signs are stable. Wound VAC was changed at bedside yesterday. OBJECTIVE  VITALS: Temp: Temp: 99.1 °F (37.3 °C)Temp  Av.5 °F (36.9 °C)  Min: 97.8 °F (36.6 °C)  Max: 99.1 °F (69.3 °C) BP Systolic (64VQO), YRQ:477 , Min:106 , CEX:049   Diastolic (10KLW), RDC:79, Min:57, Max:64   Pulse Pulse  Av.8  Min: 85  Max: 90 Resp Resp  Av  Min: 16  Max: 20 Pulse ox SpO2  Av.5 %  Min: 94 %  Max: 95 %    CONSTITUTIONAL: awake, alert, cooperative, no apparent distress  HEAD: atraumatic, normocephalic  EYES: sclera clear, pupils equal and reactive to light  HEENT: moist mucous membranes  LUNGS: no respiratory distress, no audible wheezing  CARDIOVASCULAR: +S1/S2, regular rate  ABDOMEN: soft, nontender, nondistended,   MUSCULOSKELETAL: full range of motion noted  NEUROLOGIC: Awake, alert, oriented to name, place and time  EXTREMITIES: Wound VAC to right hip, skin surrounding wound VAC is not erythematous, no drainage. SKIN: normal coloration and turgor    No intake/output data recorded. Drain/tube output:  No intake/output data recorded. LAB:  CBC:   Recent Labs     19  0905 19  0625 19  0525   WBC 9.2 7.7 8.5   HGB 8.5* 8.7* 9.4*   HCT 26.3* 27.2* 30.1*   MCV 90.1 94.1 93.5   * 471* 479*     BMP:   Recent Labs     19  0620 19  0625 19  0525    137 139   K 4.1 3.2* 3.4*    99 99   CO2 27 29 29   BUN 5* 6 3*   CREATININE 0.48* 0.53 0.56   GLUCOSE 81 103* 80     COAGS: No results for input(s): APTT, PROT, INR in the last 72 hours. Pako Suárez  19, 6:12 AM

## 2019-05-26 PROCEDURE — 6370000000 HC RX 637 (ALT 250 FOR IP): Performed by: STUDENT IN AN ORGANIZED HEALTH CARE EDUCATION/TRAINING PROGRAM

## 2019-05-26 PROCEDURE — 6360000002 HC RX W HCPCS: Performed by: STUDENT IN AN ORGANIZED HEALTH CARE EDUCATION/TRAINING PROGRAM

## 2019-05-26 PROCEDURE — 1200000000 HC SEMI PRIVATE

## 2019-05-26 PROCEDURE — 2580000003 HC RX 258: Performed by: STUDENT IN AN ORGANIZED HEALTH CARE EDUCATION/TRAINING PROGRAM

## 2019-05-26 RX ADMIN — ACETAMINOPHEN 1000 MG: 500 TABLET ORAL at 05:59

## 2019-05-26 RX ADMIN — PHENOBARBITAL 32.4 MG: 32.4 TABLET ORAL at 20:53

## 2019-05-26 RX ADMIN — IBUPROFEN 600 MG: 600 TABLET, FILM COATED ORAL at 20:48

## 2019-05-26 RX ADMIN — ASPIRIN 81 MG: 81 TABLET ORAL at 09:21

## 2019-05-26 RX ADMIN — OXYCODONE HYDROCHLORIDE 5 MG: 5 TABLET ORAL at 01:35

## 2019-05-26 RX ADMIN — METOPROLOL TARTRATE 25 MG: 25 TABLET ORAL at 09:27

## 2019-05-26 RX ADMIN — FAMOTIDINE 20 MG: 20 TABLET, FILM COATED ORAL at 09:30

## 2019-05-26 RX ADMIN — DIVALPROEX SODIUM 750 MG: 500 TABLET, DELAYED RELEASE ORAL at 20:51

## 2019-05-26 RX ADMIN — PHENYTOIN SODIUM 300 MG: 300 CAPSULE, EXTENDED RELEASE ORAL at 20:53

## 2019-05-26 RX ADMIN — OXYCODONE HYDROCHLORIDE 5 MG: 5 TABLET ORAL at 14:45

## 2019-05-26 RX ADMIN — LEVOTHYROXINE SODIUM 50 MCG: 50 TABLET ORAL at 05:59

## 2019-05-26 RX ADMIN — ENOXAPARIN SODIUM 40 MG: 40 INJECTION SUBCUTANEOUS at 09:18

## 2019-05-26 RX ADMIN — DIVALPROEX SODIUM 750 MG: 500 TABLET, DELAYED RELEASE ORAL at 09:20

## 2019-05-26 RX ADMIN — BUSPIRONE HYDROCHLORIDE 5 MG: 15 TABLET ORAL at 20:51

## 2019-05-26 RX ADMIN — METOPROLOL TARTRATE 25 MG: 25 TABLET ORAL at 20:49

## 2019-05-26 RX ADMIN — RISPERIDONE 2 MG: 2 TABLET, FILM COATED ORAL at 20:53

## 2019-05-26 RX ADMIN — PHENOBARBITAL 32.4 MG: 32.4 TABLET ORAL at 14:45

## 2019-05-26 RX ADMIN — Medication 10 ML: at 09:26

## 2019-05-26 RX ADMIN — OXYCODONE HYDROCHLORIDE 5 MG: 5 TABLET ORAL at 05:36

## 2019-05-26 RX ADMIN — ACETAMINOPHEN 1000 MG: 500 TABLET ORAL at 14:46

## 2019-05-26 RX ADMIN — OXYCODONE HYDROCHLORIDE 5 MG: 5 TABLET ORAL at 09:22

## 2019-05-26 RX ADMIN — OXYCODONE HYDROCHLORIDE 5 MG: 5 TABLET ORAL at 20:47

## 2019-05-26 RX ADMIN — PHENOBARBITAL 32.4 MG: 32.4 TABLET ORAL at 09:20

## 2019-05-26 RX ADMIN — MIRTAZAPINE 45 MG: 30 TABLET, FILM COATED ORAL at 09:18

## 2019-05-26 RX ADMIN — BUSPIRONE HYDROCHLORIDE 5 MG: 15 TABLET ORAL at 09:20

## 2019-05-26 RX ADMIN — IBUPROFEN 600 MG: 600 TABLET, FILM COATED ORAL at 01:36

## 2019-05-26 RX ADMIN — Medication 10 ML: at 20:53

## 2019-05-26 ASSESSMENT — PAIN DESCRIPTION - ORIENTATION
ORIENTATION: RIGHT

## 2019-05-26 ASSESSMENT — PAIN DESCRIPTION - FREQUENCY
FREQUENCY: CONTINUOUS

## 2019-05-26 ASSESSMENT — PAIN SCALES - GENERAL
PAINLEVEL_OUTOF10: 7
PAINLEVEL_OUTOF10: 0
PAINLEVEL_OUTOF10: 7
PAINLEVEL_OUTOF10: 8
PAINLEVEL_OUTOF10: 5
PAINLEVEL_OUTOF10: 9
PAINLEVEL_OUTOF10: 0
PAINLEVEL_OUTOF10: 5
PAINLEVEL_OUTOF10: 0
PAINLEVEL_OUTOF10: 8
PAINLEVEL_OUTOF10: 9
PAINLEVEL_OUTOF10: 6
PAINLEVEL_OUTOF10: 8
PAINLEVEL_OUTOF10: 0
PAINLEVEL_OUTOF10: 8

## 2019-05-26 ASSESSMENT — PAIN DESCRIPTION - ONSET
ONSET: ON-GOING

## 2019-05-26 ASSESSMENT — PAIN DESCRIPTION - LOCATION
LOCATION: HIP

## 2019-05-26 ASSESSMENT — PAIN DESCRIPTION - DESCRIPTORS
DESCRIPTORS: THROBBING

## 2019-05-26 ASSESSMENT — PAIN DESCRIPTION - PAIN TYPE
TYPE: ACUTE PAIN

## 2019-05-26 ASSESSMENT — PAIN DESCRIPTION - PROGRESSION: CLINICAL_PROGRESSION: NOT CHANGED

## 2019-05-26 NOTE — PROGRESS NOTES
Received Phenobarbital from pharmacy in to administer medication, and patient asleep, pt. Refused to be awaken.

## 2019-05-26 NOTE — PLAN OF CARE
IPOC reviewed and revised. Patient free of injury/falls. Patient's pain is difficult to manage, pt. Has refused scheduled dose of Ibuprofen, and patient refuses to be awaken for scheduled pain medications.

## 2019-05-26 NOTE — PROGRESS NOTES
Ordered missing Phenobarbital medication from pharmacy, pt. Informed, pt states if medication arrives, and she is asleep she doesn't want to be awaken. Patient educated on the importance of anti-convulsants, despite education pt. Extremely adamant about not being awaken, to include taking scheduled Tylenol.

## 2019-05-26 NOTE — PROGRESS NOTES
PROGRESS NOTE          PATIENT NAME: Lu Mcrae  MEDICAL RECORD NO. [id number]  DATE: 5/26/2019  SURGEON: Nohemi King  PRIMARY CARE PHYSICIAN: Kandi Lugo NPD[facility]idC    HD: # 9    ASSESSMENT    Patient Active Problem List   Diagnosis    Chest pain    Migraine variant with headache    Drug overdose, accidental or unintentional, initial encounter    Hypothyroidism    Essential hypertension    Seizure disorder ([facility] [address]    Drug overdose    History of seizure    Major depressive disorder with psychotic features ([facility] [address]    Severe major depression ([facility] [address]    Overdose of barbiturate, intentional self-harm, initial encounter ([facility] [address]    Intentional phenobarbital overdose ([facility] [address]    Severe episode of recurrent major depressive disorder, without psychotic features ([facility] [address]    Bronchitis    Suicide attempt ([facility] [address]    Major depressive disorder, recurrent ([facility] [address]    Ear infection    Severe malnutrition ([facility] [address]       MEDICAL DECISION MAKING AND PLAN       1. Neuro  1. Pain control - Tylenol, ibuprofen, marquez   2. Seizure hx - on home Dilantin, Depakote     2. CV  1. Hemodynamically stable     3. Pulm  1. >95%  on RA  2. History of COPD, current smoker  3. Home albuterol restarted PRN     4. GI  1. General diet with supplements  2. Pepcid BID - home med  3. Bowel regimen: colace daily, senna BID, miralax daily     5. Renaal  1. Monitor uop     6. Heme/ID  1. Afebrile  2. R hip necrotizing soft tissue infection  - POD#9 excision debridement R hip  - POD#7 R thigh washout with irrigating wound vac placement ([id number])  - POD#4 R thigh washout and wound vac placement ([id number]. 5cm)  - Bedside wound vac change yesterday     7. Endocrine  1. Hypothyroidism -synthroid 50mcg  2. BS stable      8. Psych  1.  home meds - remeron, buspar     9. Ppx  1. SCDs  2. Lovenox  3. Pepcid BID  10. PT/OT - continue to treat daily  11. Dispo planning - awaiting SNF placement Tuesday         SUBJECTIVE    Aleta Claudette Afton is is unchanged since yesterday.  Eating and drinking more than usual. BM x2. No headache, nausea, vomiting, cp, sob, weakness, numbness. Wound vac 0 output since previous shift (200cc) in cannister. Patient refusing any nonnarcotic pain medication. OBJECTIVE  VITALS: Temp: Temp: 98 °F (36.7 °C)Temp  Av.5 °F (36.9 °C)  Min: 98 °F (36.7 °C)  Max: 98.9 °F (23.6 °C) BP Systolic (05HZU), OGD:834 , Min:94 , SHJ:683   Diastolic (50UCG), USD:88, Min:45, Max:72   Pulse Pulse  Av.6  Min: 70  Max: 90 Resp Resp  Av  Min: 16  Max: 20 Pulse ox SpO2  Av %  Min: 94 %  Max: 98 %  GENERAL: alert, no distress  NEURO: GCS 15  HEENT: no conjunctival pallor or scleral icterus  : deferred  LUNGS: clear to ausculation, without wheezes, rales or rhonci  HEART: normal rate and regular rhythm  ABDOMEN: soft, non-tender, non-distended, bowel sounds present in all 4 quadrants and no guarding or peritoneal signs present  EXTREMITY: no cyanosis, clubbing or edema ecchymosis RUE  WOUND: wound vac c/d/i , no cellulitis     I/O last 3 completed shifts: In: 480 [P.O.:480]  Out: 200 [Drains:200]    Drain/tube output: In: 840 [P.O.:840]  Out: 200 [Drains:200]    LAB:  CBC:   Recent Labs     19  0525   WBC 8.5   HGB 9.4*   HCT 30.1*   MCV 93.5   *     BMP:   Recent Labs     19  0525      K 3.4*   CL 99   CO2 29   BUN 3*   CREATININE 0.56   GLUCOSE 80     COAGS: No results for input(s): APTT, PROT, INR in the last 72 hours. RADIOLOGY:  CXR:       Liv Toure, DO  19, 6:56 AM     Trauma, Emergency and Critical Surgical Services  Attending Note      I have reviewed the above TECSS note(s) and confirmed the key elements of the medical history and physical exam. I have discussed the findings, established the care plan and recommendations with Resident, TECSS RN, bedside nurse. Seen and examined by me this am.  Awaiting placement. States needs to be asleep for VAC changes.   Advised this not practical.    Soraya Vieira, MD  5/26/2019  7:56 AM

## 2019-05-26 NOTE — PROGRESS NOTES
Patient slept in long intervals until around 0330 this am. Patient medicated around the clock with PRN Oxycodone. Patient very anxious at times in intervals, pt. Appeared to be upset when she desired her pain medication early, medication was due and administered q 4 hours PRN as scheduled. Patient re-educated on pain management, and medication administration. Will prepare to report off to am nurse. No acute distress noted. Wound vac remains patent and intact to right hip, scant serosanguineous drainage noted in tubing, 0 output noted in canister since previous shift noted 200ml.

## 2019-05-26 NOTE — PROGRESS NOTES
Bedside shift report completed at the beginning of the shift. Pt. A&OX3, pt. C/o pain to right hip, and medicated accordingly. Pt. Refuses Ibuprofen. Pt. Appears to be upset with pain regimen, pt. States her pain isn't adequately managed. Pt. Wanted MD to be notified. On call MD paged, awaiting response. Wound vac patent and intact to right hip at 125 mmHg. Will continue to monitor.

## 2019-05-27 PROCEDURE — 6370000000 HC RX 637 (ALT 250 FOR IP): Performed by: STUDENT IN AN ORGANIZED HEALTH CARE EDUCATION/TRAINING PROGRAM

## 2019-05-27 PROCEDURE — 94761 N-INVAS EAR/PLS OXIMETRY MLT: CPT

## 2019-05-27 PROCEDURE — 2580000003 HC RX 258: Performed by: STUDENT IN AN ORGANIZED HEALTH CARE EDUCATION/TRAINING PROGRAM

## 2019-05-27 PROCEDURE — 6360000002 HC RX W HCPCS: Performed by: STUDENT IN AN ORGANIZED HEALTH CARE EDUCATION/TRAINING PROGRAM

## 2019-05-27 PROCEDURE — 1200000000 HC SEMI PRIVATE

## 2019-05-27 RX ORDER — GABAPENTIN 300 MG/1
600 CAPSULE ORAL 3 TIMES DAILY
Status: DISCONTINUED | OUTPATIENT
Start: 2019-05-27 | End: 2019-05-28

## 2019-05-27 RX ORDER — ONDANSETRON 4 MG/1
4 TABLET, ORALLY DISINTEGRATING ORAL EVERY 8 HOURS PRN
Status: DISCONTINUED | OUTPATIENT
Start: 2019-05-27 | End: 2019-05-29 | Stop reason: HOSPADM

## 2019-05-27 RX ORDER — MAGNESIUM HYDROXIDE/ALUMINUM HYDROXICE/SIMETHICONE 120; 1200; 1200 MG/30ML; MG/30ML; MG/30ML
30 SUSPENSION ORAL ONCE
Status: COMPLETED | OUTPATIENT
Start: 2019-05-27 | End: 2019-05-27

## 2019-05-27 RX ADMIN — DIVALPROEX SODIUM 750 MG: 500 TABLET, DELAYED RELEASE ORAL at 21:26

## 2019-05-27 RX ADMIN — BUSPIRONE HYDROCHLORIDE 5 MG: 15 TABLET ORAL at 08:57

## 2019-05-27 RX ADMIN — BENZONATATE 100 MG: 100 CAPSULE ORAL at 21:26

## 2019-05-27 RX ADMIN — ACETAMINOPHEN 1000 MG: 500 TABLET ORAL at 23:18

## 2019-05-27 RX ADMIN — ACETAMINOPHEN 1000 MG: 500 TABLET ORAL at 06:28

## 2019-05-27 RX ADMIN — RISPERIDONE 2 MG: 2 TABLET, FILM COATED ORAL at 21:26

## 2019-05-27 RX ADMIN — BUSPIRONE HYDROCHLORIDE 5 MG: 15 TABLET ORAL at 21:26

## 2019-05-27 RX ADMIN — IBUPROFEN 600 MG: 600 TABLET, FILM COATED ORAL at 18:29

## 2019-05-27 RX ADMIN — PHENYTOIN SODIUM 300 MG: 300 CAPSULE, EXTENDED RELEASE ORAL at 21:26

## 2019-05-27 RX ADMIN — FAMOTIDINE 20 MG: 20 TABLET, FILM COATED ORAL at 08:57

## 2019-05-27 RX ADMIN — METOPROLOL TARTRATE 25 MG: 25 TABLET ORAL at 21:26

## 2019-05-27 RX ADMIN — DIVALPROEX SODIUM 750 MG: 500 TABLET, DELAYED RELEASE ORAL at 08:57

## 2019-05-27 RX ADMIN — METOPROLOL TARTRATE 25 MG: 25 TABLET ORAL at 08:58

## 2019-05-27 RX ADMIN — GABAPENTIN 600 MG: 300 CAPSULE ORAL at 21:25

## 2019-05-27 RX ADMIN — PHENOBARBITAL 32.4 MG: 32.4 TABLET ORAL at 09:00

## 2019-05-27 RX ADMIN — Medication 10 ML: at 09:00

## 2019-05-27 RX ADMIN — MIRTAZAPINE 45 MG: 30 TABLET, FILM COATED ORAL at 08:57

## 2019-05-27 RX ADMIN — ACETAMINOPHEN 1000 MG: 500 TABLET ORAL at 15:11

## 2019-05-27 RX ADMIN — ALUMINUM HYDROXIDE, MAGNESIUM HYDROXIDE, AND SIMETHICONE 30 ML: 200; 200; 20 SUSPENSION ORAL at 18:29

## 2019-05-27 RX ADMIN — LEVOTHYROXINE SODIUM 50 MCG: 50 TABLET ORAL at 06:28

## 2019-05-27 RX ADMIN — Medication 10 ML: at 21:26

## 2019-05-27 RX ADMIN — OXYCODONE HYDROCHLORIDE 5 MG: 5 TABLET ORAL at 04:15

## 2019-05-27 RX ADMIN — PHENOBARBITAL 32.4 MG: 32.4 TABLET ORAL at 21:26

## 2019-05-27 RX ADMIN — ENOXAPARIN SODIUM 40 MG: 40 INJECTION SUBCUTANEOUS at 08:58

## 2019-05-27 RX ADMIN — OXYCODONE HYDROCHLORIDE 5 MG: 5 TABLET ORAL at 09:06

## 2019-05-27 RX ADMIN — PHENOBARBITAL 32.4 MG: 32.4 TABLET ORAL at 15:11

## 2019-05-27 RX ADMIN — ASPIRIN 81 MG: 81 TABLET ORAL at 08:58

## 2019-05-27 ASSESSMENT — PAIN SCALES - GENERAL
PAINLEVEL_OUTOF10: 6
PAINLEVEL_OUTOF10: 3
PAINLEVEL_OUTOF10: 6
PAINLEVEL_OUTOF10: 8
PAINLEVEL_OUTOF10: 8
PAINLEVEL_OUTOF10: 4
PAINLEVEL_OUTOF10: 4
PAINLEVEL_OUTOF10: 0
PAINLEVEL_OUTOF10: 8
PAINLEVEL_OUTOF10: 8

## 2019-05-27 ASSESSMENT — PAIN DESCRIPTION - FREQUENCY
FREQUENCY: CONTINUOUS

## 2019-05-27 ASSESSMENT — PAIN DESCRIPTION - ONSET
ONSET: ON-GOING

## 2019-05-27 ASSESSMENT — PAIN DESCRIPTION - ORIENTATION
ORIENTATION: RIGHT

## 2019-05-27 ASSESSMENT — PAIN DESCRIPTION - PAIN TYPE
TYPE: ACUTE PAIN

## 2019-05-27 ASSESSMENT — PAIN DESCRIPTION - LOCATION
LOCATION: HIP

## 2019-05-27 ASSESSMENT — PAIN DESCRIPTION - DESCRIPTORS
DESCRIPTORS: ACHING
DESCRIPTORS: ACHING;THROBBING
DESCRIPTORS: ACHING

## 2019-05-28 VITALS
OXYGEN SATURATION: 98 % | WEIGHT: 183.3 LBS | HEIGHT: 62 IN | BODY MASS INDEX: 33.73 KG/M2 | SYSTOLIC BLOOD PRESSURE: 124 MMHG | RESPIRATION RATE: 20 BRPM | HEART RATE: 75 BPM | TEMPERATURE: 98.6 F | DIASTOLIC BLOOD PRESSURE: 80 MMHG

## 2019-05-28 PROCEDURE — APPSS45 APP SPLIT SHARED TIME 31-45 MINUTES: Performed by: NURSE PRACTITIONER

## 2019-05-28 PROCEDURE — 97530 THERAPEUTIC ACTIVITIES: CPT

## 2019-05-28 PROCEDURE — 6370000000 HC RX 637 (ALT 250 FOR IP): Performed by: STUDENT IN AN ORGANIZED HEALTH CARE EDUCATION/TRAINING PROGRAM

## 2019-05-28 PROCEDURE — 2700000000 HC OXYGEN THERAPY PER DAY

## 2019-05-28 PROCEDURE — 6360000002 HC RX W HCPCS: Performed by: STUDENT IN AN ORGANIZED HEALTH CARE EDUCATION/TRAINING PROGRAM

## 2019-05-28 PROCEDURE — 94640 AIRWAY INHALATION TREATMENT: CPT

## 2019-05-28 PROCEDURE — 97116 GAIT TRAINING THERAPY: CPT

## 2019-05-28 PROCEDURE — 2500000003 HC RX 250 WO HCPCS

## 2019-05-28 PROCEDURE — 2580000003 HC RX 258: Performed by: STUDENT IN AN ORGANIZED HEALTH CARE EDUCATION/TRAINING PROGRAM

## 2019-05-28 PROCEDURE — 1200000000 HC SEMI PRIVATE

## 2019-05-28 RX ORDER — OXYCODONE HYDROCHLORIDE 5 MG/1
5 TABLET ORAL ONCE
Status: COMPLETED | OUTPATIENT
Start: 2019-05-28 | End: 2019-05-28

## 2019-05-28 RX ORDER — LIDOCAINE HYDROCHLORIDE 10 MG/ML
10 INJECTION, SOLUTION EPIDURAL; INFILTRATION; INTRACAUDAL; PERINEURAL ONCE
Status: DISCONTINUED | OUTPATIENT
Start: 2019-05-28 | End: 2019-05-28 | Stop reason: SDUPTHER

## 2019-05-28 RX ORDER — LIDOCAINE HYDROCHLORIDE 10 MG/ML
INJECTION, SOLUTION INFILTRATION; PERINEURAL
Status: COMPLETED
Start: 2019-05-28 | End: 2019-05-28

## 2019-05-28 RX ADMIN — LIDOCAINE HYDROCHLORIDE: 10 INJECTION, SOLUTION INFILTRATION; PERINEURAL at 16:28

## 2019-05-28 RX ADMIN — METOPROLOL TARTRATE 25 MG: 25 TABLET ORAL at 09:00

## 2019-05-28 RX ADMIN — PHENOBARBITAL 32.4 MG: 32.4 TABLET ORAL at 08:57

## 2019-05-28 RX ADMIN — OXYCODONE HYDROCHLORIDE 5 MG: 5 TABLET ORAL at 11:48

## 2019-05-28 RX ADMIN — ONDANSETRON 4 MG: 4 TABLET, ORALLY DISINTEGRATING ORAL at 02:06

## 2019-05-28 RX ADMIN — LEVOTHYROXINE SODIUM 50 MCG: 50 TABLET ORAL at 06:28

## 2019-05-28 RX ADMIN — MIRTAZAPINE 45 MG: 30 TABLET, FILM COATED ORAL at 09:00

## 2019-05-28 RX ADMIN — IBUPROFEN 600 MG: 600 TABLET, FILM COATED ORAL at 02:06

## 2019-05-28 RX ADMIN — FAMOTIDINE 20 MG: 20 TABLET, FILM COATED ORAL at 09:00

## 2019-05-28 RX ADMIN — BUSPIRONE HYDROCHLORIDE 5 MG: 15 TABLET ORAL at 08:57

## 2019-05-28 RX ADMIN — ONDANSETRON 4 MG: 4 TABLET, ORALLY DISINTEGRATING ORAL at 11:48

## 2019-05-28 RX ADMIN — MOMETASONE FUROATE AND FORMOTEROL FUMARATE DIHYDRATE 2 PUFF: 200; 5 AEROSOL RESPIRATORY (INHALATION) at 08:09

## 2019-05-28 RX ADMIN — Medication 10 ML: at 09:00

## 2019-05-28 RX ADMIN — IBUPROFEN 600 MG: 600 TABLET, FILM COATED ORAL at 11:46

## 2019-05-28 RX ADMIN — ENOXAPARIN SODIUM 40 MG: 40 INJECTION SUBCUTANEOUS at 08:58

## 2019-05-28 RX ADMIN — ASPIRIN 81 MG: 81 TABLET ORAL at 08:57

## 2019-05-28 RX ADMIN — ACETAMINOPHEN 1000 MG: 500 TABLET ORAL at 11:47

## 2019-05-28 RX ADMIN — GABAPENTIN 600 MG: 300 CAPSULE ORAL at 08:57

## 2019-05-28 RX ADMIN — ACETAMINOPHEN 1000 MG: 500 TABLET ORAL at 06:28

## 2019-05-28 RX ADMIN — ALBUTEROL SULFATE 2.5 MG: 2.5 SOLUTION RESPIRATORY (INHALATION) at 08:08

## 2019-05-28 RX ADMIN — IBUPROFEN 600 MG: 600 TABLET, FILM COATED ORAL at 08:57

## 2019-05-28 RX ADMIN — DIVALPROEX SODIUM 750 MG: 500 TABLET, DELAYED RELEASE ORAL at 08:57

## 2019-05-28 ASSESSMENT — PAIN DESCRIPTION - DESCRIPTORS
DESCRIPTORS: ACHING
DESCRIPTORS: ACHING;DISCOMFORT
DESCRIPTORS: THROBBING;ACHING

## 2019-05-28 ASSESSMENT — PAIN DESCRIPTION - FREQUENCY
FREQUENCY: CONTINUOUS

## 2019-05-28 ASSESSMENT — PAIN SCALES - GENERAL
PAINLEVEL_OUTOF10: 8
PAINLEVEL_OUTOF10: 6
PAINLEVEL_OUTOF10: 8

## 2019-05-28 ASSESSMENT — PAIN DESCRIPTION - LOCATION
LOCATION: HIP

## 2019-05-28 ASSESSMENT — PAIN DESCRIPTION - PAIN TYPE
TYPE: ACUTE PAIN

## 2019-05-28 ASSESSMENT — PAIN - FUNCTIONAL ASSESSMENT: PAIN_FUNCTIONAL_ASSESSMENT: PREVENTS OR INTERFERES SOME ACTIVE ACTIVITIES AND ADLS

## 2019-05-28 ASSESSMENT — PAIN DESCRIPTION - ORIENTATION
ORIENTATION: RIGHT

## 2019-05-28 ASSESSMENT — PAIN DESCRIPTION - ONSET
ONSET: ON-GOING
ONSET: ON-GOING

## 2019-05-28 NOTE — CARE COORDINATION
Spoke to pt's friend at pt's request.  She stated that she wants pt to go to [facility] [address] [place] [address]. Called [facility] and they are not a [employer] provider. Called pt's friend back and went over the list of SNF's. She choose [facility]. Called [facility] and they are still working on precert.

## 2019-05-28 NOTE — PROGRESS NOTES
Physical Therapy  Facility/Department: [facility] CAR 2  Daily Treatment Note  NAME: Lu Mcrae  : 1965  MRN: [id number]    Date of Service: 2019    Discharge Recommendations:  Patient would benefit from continued therapy after discharge   PT Equipment Recommendations  Equipment Needed: Yes  Mobility Devices: Canes  Cane: Straight Cane    Patient Diagnosis(es): The primary encounter diagnosis was Necrotizing fasciitis ([facility] [address] A diagnosis of Necrotizing soft tissue infection was also pertinent to this visit. has a past medical history of Arthritis, Asthma, CHF (congestive heart failure) ([address] COPD (chronic obstructive pulmonary disease) ([address] Fibromyalgia, Headache, Hypertension, Movement disorder, Neck fracture ([facility] [address] Seizure ([facility] [address] and Thyroid disease. has a past surgical history that includes knee surgery; partial hysterectomy (cervix not removed); lymph node dissection; Irrigation and debridement (Right, 2019); EXPLORATION OF WOUND OF EXTREMITY (Right, 2019); and EXPLORATION OF WOUND OF EXTREMITY (N/A, 2019). Restrictions  Restrictions/Precautions  Restrictions/Precautions: Fall Risk, General Precautions  Required Braces or Orthoses?: No  Position Activity Restriction  Other position/activity restrictions: up in chair, R hip wound vac  Subjective   General  Response To Previous Treatment: Patient with no complaints from previous session. Family / Caregiver Present: No  Subjective  Subjective: Pt supine in bed resting upon arrival. Pt agreeable to therapy with encouragement this morning. RN agreeable to therapy.    Pain Screening  Patient Currently in Pain: Yes  Pain Assessment  Pain Assessment: 0-10  Pain Level: 8  Pain Type: Acute pain  Pain Location: Hip  Pain Orientation: Right  Pain Descriptors: Aching;Discomfort  Pain Frequency: Continuous  Functional Pain Assessment: Prevents or interferes some active activities and ADLs  Non-Pharmaceutical Pain Intervention(s): Ambulation/Increased Activity; Distraction;Repositioned  Response to Pain Intervention: Patient Satisfied  Vital Signs  Patient Currently in Pain: Yes       Objective   Bed mobility  Supine to Sit: Supervision  Sit to Supine: Supervision  Scooting: Supervision  Comment: HOB raised  Transfers  Sit to Stand: Supervision  Stand to sit: Supervision  Ambulation  Ambulation?: Yes  More Ambulation?: Yes  Ambulation 1  Surface: level tile  Device: No Device  Other Apparatus: (R hip wound vac)  Assistance: Stand by assistance;Contact guard assistance  Quality of Gait: antalgic with noted mild unsteadiness but without LOB, increased pain  Gait Deviations: Decreased step length; Slow Rosalind  Distance: 20ft  Comments: Pt adamantly declines gait belt  Ambulation 2  Surface - 2: level tile  Device 2: Single point cane  Assistance 2: Stand by assistance;Contact guard assistance  Quality of Gait 2: antalgic with noted mild unsteadiness but without LOB, pt reports more comfortable- noted antalgia as documented but somewhat improved  Gait Deviations: Slow Rosalind;Decreased step length  Distance: 20ft  Comments: Pt adamantly declines gait belt, pt declines attempt of RW stating she will not use it even if it will help with her pain  Stairs/Curb  Stairs?: No     Balance  Posture: Good  Sitting - Static: Good  Sitting - Dynamic: Good  Standing - Static: Good;-  Standing - Dynamic: Good;-  Comments: standing balance assessed without AD  Exercises  Comments: Provided pt with and educated pt on written HEP including: ankle pumps, clam shells, reverse clam shells, supine hip abduction, SLR and LAQ. Pt declines performing exercises at this time but thanks writer and verbalizes understanding of exercises and importance of exercises as reviewed by writer. Assessment   Body structures, Functions, Activity limitations: Decreased balance;Decreased functional mobility ; Decreased endurance  Assessment: Pt able to demonstrate

## 2019-05-28 NOTE — PROGRESS NOTES
PROGRESS NOTE          PATIENT NAME: Zina Jain  MEDICAL RECORD NO. [id number]  DATE: 5/28/2019  SURGEON: Jack Zelaya  PRIMARY CARE PHYSICIAN: Roman Benítez NP-C    HD: # 6    ASSESSMENT    Patient Active Problem List   Diagnosis    Chest pain    Migraine variant with headache    Drug overdose, accidental or unintentional, initial encounter    Hypothyroidism    Essential hypertension    Seizure disorder ([address]    Drug overdose    History of seizure    Major depressive disorder with psychotic features ([address]    Severe major depression ([address]    Overdose of barbiturate, intentional self-harm, initial encounter ([address]    Intentional phenobarbital overdose ([address]    Severe episode of recurrent major depressive disorder, without psychotic features ([address]    Bronchitis    Suicide attempt ([address]    Major depressive disorder, recurrent ([address]    Ear infection    Severe malnutrition ([address]       MEDICAL DECISION MAKING AND PLAN       1. Neuro  1. Pain control - Tylenol, ibuprofen, marquez for wound vac changes only  2. Seizure hx - on home Dilantin, Depakote     2. CV  1. Hemodynamically stable     3. Pulm  1. >95%  on RA  2. History of COPD, current smoker  3. Home albuterol restarted PRN     4. GI  1. General diet with supplements  2. Pepcid BID - home med  3. Bowel regimen: colace daily, senna BID, miralax daily     5. Renaal  1. Monitor uop     6. Heme/ID  1. Afebrile  2. R hip necrotizing soft tissue infection  - POD#11 excision debridement R hip  - POD#9 R thigh washout with irrigating wound vac placement ([id number])  - POD#6 R thigh washout and wound vac placement ([id number]. 5cm)  - Bedside wound vac change today - will need to decide whether she will be able to tolerate BID WTD dressing changes or continued wound vac changeds every 2-3 days     7. Endocrine  1. Hypothyroidism -synthroid 50mcg  2. BS stable      8. Psych  1.  home meds - remeron, buspar     9. Ppx  1. SCDs  2. Lovenox  3. Pepcid BID  10.  PT/OT -

## 2019-05-28 NOTE — CARE COORDINATION
Discharge instructions explained and given to pt. All questions answered, IV d/c'd with catheter tip intact. All belongings packed,   Report called to SNF, ambulance to  [phone]. Wound vac to go with pt. Security called to bedside to return valuables. Pt awaiting ambulance  for discharge. Pt friend Filippo Peña updated.

## 2019-05-28 NOTE — PROGRESS NOTES
Nutrition Assessment    Type and Reason for Visit: Reassess    Nutrition Recommendations: Continue general diet. Continue ensure enlive supplements TID. Nutrition Assessment: Pt improving from a nutritional standpoint aeb pt consuming % of meals/supplements. Pt reports that her appetite has increased since admission and likes the ensure enlive supplements. Pt remains at risk d/t severe malnutrition. Will continue to monitor po intake, weights and wounds. Malnutrition Assessment:  · Malnutrition Status: Meets the criteria for severe malnutrition  · Context: Chronic illness  · Findings of the 6 clinical characteristics of malnutrition (Minimum of 2 out of 6 clinical characteristics is required to make the diagnosis of moderate or severe Protein Calorie Malnutrition based on AND/ASPEN Guidelines):  1. Energy Intake-Less than or equal to 75% of estimated energy requirement, Greater than or equal to 3 months    2. Weight Loss-10% loss or greater, in 6 months  3. Fat Loss-No significant subcutaneous fat loss,    4. Muscle Loss-No significant muscle mass loss,    5. Fluid Accumulation-Mild fluid accumulation, Extremities(right side)  6.   Strength-Not measured    Nutrition Risk Level: High    Nutrient Needs:  · Estimated Daily Total Kcal: 1.2-1.3 ~>3936-1747 kcals/d   · Estimated Daily Protein (g): 1.2-1.4 gm/kg ~>60-70 gms/d     Nutrition Diagnosis:   · Problem: Severe malnutrition, In context of chronic illness  · Etiology: related to Insufficient energy/nutrient consumption(medical condition)     Signs and symptoms:  as evidenced by Weight loss greater than or equal to 10% in 6 months, Diet history of poor intake, Localized or generalized fluid accumulation    Objective Information:  · Wound Type: Multiple, Open Wounds, Surgical Wound  · Current Nutrition Therapies:  · Oral Diet Orders: General   · Oral Diet intake: %  · Oral Nutrition Supplement (ONS) Orders: Standard High Calorie Oral Supplement  · ONS intake: %  · Anthropometric Measures:  · Ht: 5' 2\" (157.5 cm)   · Current Body Wt: 183 lb (83 kg)  · Admission Body Wt: 183 lb (83 kg)  · Usual Body Wt: 135 lb (61.2 kg)(per pt)  · % Weight Change:  ,  Pt w/ wt gain since admission  · Ideal Body Wt: 110 lb (49.9 kg), % Ideal Body 166% (adm/ideal)  · BMI Classification: BMI 25.0 - 29.9 Overweight    Nutrition Interventions:   Continue current diet, Continue current ONS  Continued Inpatient Monitoring, Education Not Indicated    Nutrition Evaluation:   · Evaluation: Goal achieved   · Goals: Consume >75% nutrient needs via PO intake/supplements    · Monitoring: Nutrition Progression, Meal Intake, Supplement Intake, Diet Tolerance, I&O, Weight, Wound Healing, Skin Integrity, Pertinent Labs, Monitor Bowel Function      Electronically signed by Shala Ambrose RD, LD on 5/28/19 at 11:00 AM    Contact Number: [phone]

## 2019-05-28 NOTE — PROGRESS NOTES
Patient seen and examined at bedside for wound vac change. Tylenol, motrin and Eva 5mg administered approximately 30 minutes prior to vac change. 1% lidocaine diluted in saline used for removing black foam.     Photo of wound bed unfortunately did not save in Epic. Wound bed is beefy red with spots of yellow granulation tissue. No further areas of necrosis. Wound is healing nicely. Patient tolerated wound vac change. D/c planning to SNF.      Edmond Garza D.O. PGY-1  Department of Surgery  Pager # [phone]  5/28/2019, 12:59 PM

## 2019-05-28 NOTE — CARE COORDINATION
Discharge Report    [place] [address] Case Management Department  Written by: LEANN Gonzalez    Patient Name: Eveline Neves  Attending Provider: Yaya Robertson MD  Admit Date: 2019  3:01 PM  MRN: [id number]  Account: [de-identified]                     : 1965  Discharge Date:       Disposition: SNF [facility] received precert.   Pt is scheduled to transport with [facility] between 7pm and 7:30pm.    Called and notified her friend Mitzi Pallas, LSW

## 2019-05-30 NOTE — DISCHARGE SUMMARY
necrotizing soft tissue infection  - POD#11 excision debridement R hip  - POD#9 R thigh washout with irrigating wound vac placement ([id number])  - POD#6 R thigh washout and wound vac placement ([id number]. 5cm)  - Bedside wound vac change today - will need to decide whether she will be able to tolerate BID WTD dressing changes or continued wound vac changeds every 2-3 days     7. Endocrine  1. Hypothyroidism -synthroid 50mcg  2. BS stable      8. Psych  1.  home meds - remeron, buspar     9. Ppx  1. SCDs  2. Lovenox  3. Pepcid BID  10. PT/OT - continue to treat daily  11. Dispo planning - awaiting SNF placement           SUBJECTIVE     Aleta Mcdonough is unchanged from yesterday. Patient is very unhappy that her roxicodone was discontinued yesterday. States she doesn't understand why her pain medications were taken away. Reports that she has been taking all of her medications that have been given to her and it's not right that her roxis were taken away. Refusing a wound vac change this morning. Refusing to decide how she wants her wound to be taken care of.  States she's going to find an  because she has been lied to this whole time about her care.      OBJECTIVE  VITALS: Temp: Temp: 98.5 °F (36.9 °C)Temp  Av.3 °F (36.8 °C)  Min: 97.7 °F (36.5 °C)  Max: 98.6 °F (37 °C) BP Systolic (53JHZ), CKY:237 , Min:103 , JM   Diastolic (86WOW), LZO:84, Min:53, Max:75   Pulse Pulse  Av.5  Min: 62  Max: 88 Resp Resp  Av  Min: 16  Max: 18 Pulse ox SpO2  Av.6 %  Min: 96 %  Max: 98 %  GENERAL: alert, no distress  NEURO: GCS 15  HEENT: no conjunctival pallor or scleral icterus  : deferred  LUNGS: clear to ausculation, without wheezes, rales or rhonci  HEART: normal rate and regular rhythm  ABDOMEN: soft, non-tender, non-distended, no guarding or peritoneal signs   EXTREMITY: no cyanosis, clubbing or edema ecchymosis RUE  WOUND: wound vac c/d/i , no cellulitis      I/O last 3 completed shifts:  In: - tablet by mouth daily for 7 days Gap prescription until follow up with primary. Do not refill. Follow up with primary  What changed:  additional instructions     busPIRone 5 MG tablet  Commonly known as:  BUSPAR  Take 1 tablet by mouth 2 times daily for 7 days Gap prescription until follow up with psychiatry. Do not refill.   Follow up with psychiatrist  What changed:    · medication strength  · how much to take  · when to take this  · additional instructions        CONTINUE taking these medications    divalproex 250 MG DR tablet  Commonly known as:  DEPAKOTE  Take 3 tablets by mouth every 12 hours     famotidine 20 MG tablet  Commonly known as:  PEPCID     hydrochlorothiazide 25 MG tablet  Commonly known as:  HYDRODIURIL  Take 1 tablet by mouth daily     levothyroxine 50 MCG tablet  Commonly known as:  SYNTHROID     metoprolol tartrate 50 MG tablet  Commonly known as:  LOPRESSOR  Take 1 tablet by mouth 2 times daily     mirtazapine 45 MG tablet  Commonly known as:  REMERON  Take 1 tablet by mouth nightly     PHENobarbital 32.4 MG tablet  Commonly known as:  LUMINAL     phenytoin 100 MG ER capsule  Commonly known as:  DILANTIN  Take 1 capsule by mouth 3 times daily     risperiDONE 2 MG tablet  Commonly known as:  RISPERDAL  Take 1 tablet by mouth nightly        STOP taking these medications    acetaminophen 500 MG tablet  Commonly known as:  APAP EXTRA STRENGTH     clopidogrel 75 MG tablet  Commonly known as:  PLAVIX     fluticasone 50 MCG/ACT nasal spray  Commonly known as:  FLONASE     NITROSTAT 0.4 MG SL tablet  Generic drug:  nitroGLYCERIN     tiZANidine 2 MG tablet  Commonly known as:  ZANAFLEX     traZODone 100 MG tablet  Commonly known as:  DESYREL           Where to Get Your Medications      These medications were sent to [address]  [address] [facility], [address] [address]    Phone:  [phone]   · aspirin 81 MG EC

## 2019-06-02 ENCOUNTER — APPOINTMENT (OUTPATIENT)
Dept: CT IMAGING | Age: 54
DRG: 812 | End: 2019-06-02
Payer: MEDICARE

## 2019-06-02 ENCOUNTER — APPOINTMENT (OUTPATIENT)
Dept: GENERAL RADIOLOGY | Age: 54
DRG: 812 | End: 2019-06-02
Payer: MEDICARE

## 2019-06-02 ENCOUNTER — HOSPITAL ENCOUNTER (INPATIENT)
Age: 54
LOS: 2 days | Discharge: HOME HEALTH | DRG: 812 | End: 2019-06-04
Attending: EMERGENCY MEDICINE | Admitting: INTERNAL MEDICINE
Payer: MEDICARE

## 2019-06-02 DIAGNOSIS — G40.909 SEIZURE DISORDER (HCC): Primary | ICD-10-CM

## 2019-06-02 DIAGNOSIS — T50.901A DRUG OVERDOSE, ACCIDENTAL OR UNINTENTIONAL, INITIAL ENCOUNTER: ICD-10-CM

## 2019-06-02 DIAGNOSIS — R41.82 ALTERED MENTAL STATUS, UNSPECIFIED ALTERED MENTAL STATUS TYPE: ICD-10-CM

## 2019-06-02 LAB
ABSOLUTE EOS #: 0 K/UL (ref 0–0.4)
ABSOLUTE IMMATURE GRANULOCYTE: 0.12 K/UL (ref 0–0.3)
ABSOLUTE LYMPH #: 4.31 K/UL (ref 1–4.8)
ABSOLUTE MONO #: 0.37 K/UL (ref 0.1–0.8)
ABSOLUTE RETIC #: 0.13 M/UL (ref 0.03–0.08)
ALBUMIN SERPL-MCNC: 3.2 G/DL (ref 3.5–5.2)
ALBUMIN/GLOBULIN RATIO: 0.7 (ref 1–2.5)
ALP BLD-CCNC: 124 U/L (ref 35–104)
ALT SERPL-CCNC: 19 U/L (ref 5–33)
AMMONIA: 30 UMOL/L (ref 11–51)
ANION GAP SERPL CALCULATED.3IONS-SCNC: 10 MMOL/L (ref 9–17)
AST SERPL-CCNC: 56 U/L
BASOPHILS # BLD: 0 % (ref 0–2)
BASOPHILS ABSOLUTE: 0 K/UL (ref 0–0.2)
BILIRUB SERPL-MCNC: 0.18 MG/DL (ref 0.3–1.2)
BUN BLDV-MCNC: 17 MG/DL (ref 6–20)
BUN/CREAT BLD: ABNORMAL (ref 9–20)
CALCIUM SERPL-MCNC: 8.2 MG/DL (ref 8.6–10.4)
CHLORIDE BLD-SCNC: 95 MMOL/L (ref 98–107)
CO2: 27 MMOL/L (ref 20–31)
CREAT SERPL-MCNC: 0.9 MG/DL (ref 0.5–0.9)
CULTURE: NORMAL
DIFFERENTIAL TYPE: ABNORMAL
EOSINOPHILS RELATIVE PERCENT: 0 % (ref 1–4)
FOLATE: 9.4 NG/ML
GFR AFRICAN AMERICAN: >60 ML/MIN
GFR NON-AFRICAN AMERICAN: >60 ML/MIN
GFR SERPL CREATININE-BSD FRML MDRD: ABNORMAL ML/MIN/{1.73_M2}
GFR SERPL CREATININE-BSD FRML MDRD: ABNORMAL ML/MIN/{1.73_M2}
GLUCOSE BLD-MCNC: 96 MG/DL (ref 70–99)
HAV IGM SER IA-ACNC: NONREACTIVE
HCT VFR BLD CALC: 30.8 % (ref 36.3–47.1)
HEMOGLOBIN: 9.9 G/DL (ref 11.9–15.1)
HEPATITIS B CORE IGM ANTIBODY: NONREACTIVE
HEPATITIS B SURFACE ANTIGEN: NONREACTIVE
HEPATITIS C ANTIBODY: REACTIVE
IMMATURE GRANULOCYTES: 1 %
IMMATURE RETIC FRACT: 12.6 % (ref 2.7–18.3)
INR BLD: 0.9
IRON SATURATION: 9 % (ref 20–55)
IRON: 32 UG/DL (ref 37–145)
LACTIC ACID, SEPSIS WHOLE BLOOD: 1.5 MMOL/L (ref 0.5–1.9)
LACTIC ACID, SEPSIS WHOLE BLOOD: 1.6 MMOL/L (ref 0.5–1.9)
LACTIC ACID, SEPSIS: NORMAL MMOL/L (ref 0.5–1.9)
LACTIC ACID, SEPSIS: NORMAL MMOL/L (ref 0.5–1.9)
LYMPHOCYTES # BLD: 35 % (ref 24–44)
Lab: NORMAL
MAGNESIUM: 2.4 MG/DL (ref 1.6–2.6)
MCH RBC QN AUTO: 30.2 PG (ref 25.2–33.5)
MCHC RBC AUTO-ENTMCNC: 32.1 G/DL (ref 28.4–34.8)
MCV RBC AUTO: 93.9 FL (ref 82.6–102.9)
MONOCYTES # BLD: 3 % (ref 1–7)
MORPHOLOGY: ABNORMAL
NRBC AUTOMATED: 0 PER 100 WBC
PARTIAL THROMBOPLASTIN TIME: 21.1 SEC (ref 20.5–30.5)
PDW BLD-RTO: 16.3 % (ref 11.8–14.4)
PHENYTOIN DATE LAST DOSE: ABNORMAL
PHENYTOIN DATE LAST DOSE: ABNORMAL
PHENYTOIN DOSE AMOUNT: ABNORMAL
PHENYTOIN DOSE AMOUNT: ABNORMAL
PHENYTOIN DOSE TIME: ABNORMAL
PHENYTOIN DOSE TIME: ABNORMAL
PHENYTOIN FREE: 0.3 UG/ML (ref 1–2)
PHENYTOIN LEVEL: 2.1 UG/ML (ref 10–20)
PHENYTOIN LEVEL: ABNORMAL UG/ML
PLATELET # BLD: 572 K/UL (ref 138–453)
PLATELET ESTIMATE: ABNORMAL
PMV BLD AUTO: 8.3 FL (ref 8.1–13.5)
POTASSIUM SERPL-SCNC: 3.7 MMOL/L (ref 3.7–5.3)
PROTHROMBIN TIME: 9.9 SEC (ref 9–12)
RBC # BLD: 3.28 M/UL (ref 3.95–5.11)
RBC # BLD: ABNORMAL 10*6/UL
RETIC %: 4.5 % (ref 0.5–1.9)
RETIC HEMOGLOBIN: 34.6 PG (ref 28.2–35.7)
SEG NEUTROPHILS: 61 % (ref 36–66)
SEGMENTED NEUTROPHILS ABSOLUTE COUNT: 7.5 K/UL (ref 1.8–7.7)
SODIUM BLD-SCNC: 132 MMOL/L (ref 135–144)
SPECIMEN DESCRIPTION: NORMAL
TOTAL IRON BINDING CAPACITY: 373 UG/DL (ref 250–450)
TOTAL PROTEIN: 7.7 G/DL (ref 6.4–8.3)
TROPONIN INTERP: ABNORMAL
TROPONIN INTERP: ABNORMAL
TROPONIN INTERP: NORMAL
TROPONIN T: ABNORMAL NG/ML
TROPONIN T: ABNORMAL NG/ML
TROPONIN T: NORMAL NG/ML
TROPONIN, HIGH SENSITIVITY: 13 NG/L (ref 0–14)
TROPONIN, HIGH SENSITIVITY: 18 NG/L (ref 0–14)
TROPONIN, HIGH SENSITIVITY: 19 NG/L (ref 0–14)
TSH SERPL DL<=0.05 MIU/L-ACNC: 3.69 MIU/L (ref 0.3–5)
UNSATURATED IRON BINDING CAPACITY: 341 UG/DL (ref 112–347)
VALPROIC ACID % FREE: 14.6 % (ref 5–18.4)
VALPROIC ACID LEVEL: 37 UG/ML (ref 50–125)
VALPROIC ACID, FREE: 5.4 UG/ML (ref 7–23)
VALPROIC DATE LAST DOSE: ABNORMAL
VALPROIC DOSE AMOUNT: ABNORMAL
VALPROIC TIME LAST DOSE: ABNORMAL
VITAMIN B-12: 658 PG/ML (ref 232–1245)
WBC # BLD: 12.3 K/UL (ref 3.5–11.3)
WBC # BLD: ABNORMAL 10*3/UL

## 2019-06-02 PROCEDURE — 99285 EMERGENCY DEPT VISIT HI MDM: CPT

## 2019-06-02 PROCEDURE — 87205 SMEAR GRAM STAIN: CPT

## 2019-06-02 PROCEDURE — 85025 COMPLETE CBC W/AUTO DIFF WBC: CPT

## 2019-06-02 PROCEDURE — 96365 THER/PROPH/DIAG IV INF INIT: CPT

## 2019-06-02 PROCEDURE — 6370000000 HC RX 637 (ALT 250 FOR IP): Performed by: STUDENT IN AN ORGANIZED HEALTH CARE EDUCATION/TRAINING PROGRAM

## 2019-06-02 PROCEDURE — 6360000002 HC RX W HCPCS

## 2019-06-02 PROCEDURE — 2580000003 HC RX 258: Performed by: STUDENT IN AN ORGANIZED HEALTH CARE EDUCATION/TRAINING PROGRAM

## 2019-06-02 PROCEDURE — 80186 ASSAY OF PHENYTOIN FREE: CPT

## 2019-06-02 PROCEDURE — 87040 BLOOD CULTURE FOR BACTERIA: CPT

## 2019-06-02 PROCEDURE — 80074 ACUTE HEPATITIS PANEL: CPT

## 2019-06-02 PROCEDURE — 6370000000 HC RX 637 (ALT 250 FOR IP): Performed by: INTERNAL MEDICINE

## 2019-06-02 PROCEDURE — 80164 ASSAY DIPROPYLACETIC ACD TOT: CPT

## 2019-06-02 PROCEDURE — 6360000002 HC RX W HCPCS: Performed by: STUDENT IN AN ORGANIZED HEALTH CARE EDUCATION/TRAINING PROGRAM

## 2019-06-02 PROCEDURE — 36556 INSERT NON-TUNNEL CV CATH: CPT

## 2019-06-02 PROCEDURE — 93005 ELECTROCARDIOGRAM TRACING: CPT

## 2019-06-02 PROCEDURE — 85045 AUTOMATED RETICULOCYTE COUNT: CPT

## 2019-06-02 PROCEDURE — 96368 THER/DIAG CONCURRENT INF: CPT

## 2019-06-02 PROCEDURE — 87086 URINE CULTURE/COLONY COUNT: CPT

## 2019-06-02 PROCEDURE — 99223 1ST HOSP IP/OBS HIGH 75: CPT | Performed by: INTERNAL MEDICINE

## 2019-06-02 PROCEDURE — 81001 URINALYSIS AUTO W/SCOPE: CPT

## 2019-06-02 PROCEDURE — 71045 X-RAY EXAM CHEST 1 VIEW: CPT

## 2019-06-02 PROCEDURE — 2060000000 HC ICU INTERMEDIATE R&B

## 2019-06-02 PROCEDURE — 82746 ASSAY OF FOLIC ACID SERUM: CPT

## 2019-06-02 PROCEDURE — 2580000003 HC RX 258: Performed by: INTERNAL MEDICINE

## 2019-06-02 PROCEDURE — 84443 ASSAY THYROID STIM HORMONE: CPT

## 2019-06-02 PROCEDURE — 85610 PROTHROMBIN TIME: CPT

## 2019-06-02 PROCEDURE — 82607 VITAMIN B-12: CPT

## 2019-06-02 PROCEDURE — 82140 ASSAY OF AMMONIA: CPT

## 2019-06-02 PROCEDURE — 94640 AIRWAY INHALATION TREATMENT: CPT

## 2019-06-02 PROCEDURE — 84484 ASSAY OF TROPONIN QUANT: CPT

## 2019-06-02 PROCEDURE — 80307 DRUG TEST PRSMV CHEM ANLYZR: CPT

## 2019-06-02 PROCEDURE — 94761 N-INVAS EAR/PLS OXIMETRY MLT: CPT

## 2019-06-02 PROCEDURE — 83735 ASSAY OF MAGNESIUM: CPT

## 2019-06-02 PROCEDURE — 83550 IRON BINDING TEST: CPT

## 2019-06-02 PROCEDURE — 80185 ASSAY OF PHENYTOIN TOTAL: CPT

## 2019-06-02 PROCEDURE — 6360000002 HC RX W HCPCS: Performed by: EMERGENCY MEDICINE

## 2019-06-02 PROCEDURE — 80165 DIPROPYLACETIC ACID FREE: CPT

## 2019-06-02 PROCEDURE — 2580000003 HC RX 258: Performed by: EMERGENCY MEDICINE

## 2019-06-02 PROCEDURE — 6360000002 HC RX W HCPCS: Performed by: INTERNAL MEDICINE

## 2019-06-02 PROCEDURE — 96375 TX/PRO/DX INJ NEW DRUG ADDON: CPT

## 2019-06-02 PROCEDURE — 85730 THROMBOPLASTIN TIME PARTIAL: CPT

## 2019-06-02 PROCEDURE — 06HY33Z INSERTION OF INFUSION DEVICE INTO LOWER VEIN, PERCUTANEOUS APPROACH: ICD-10-PCS | Performed by: EMERGENCY MEDICINE

## 2019-06-02 PROCEDURE — 83605 ASSAY OF LACTIC ACID: CPT

## 2019-06-02 PROCEDURE — 93005 ELECTROCARDIOGRAM TRACING: CPT | Performed by: EMERGENCY MEDICINE

## 2019-06-02 PROCEDURE — 70450 CT HEAD/BRAIN W/O DYE: CPT

## 2019-06-02 PROCEDURE — 83540 ASSAY OF IRON: CPT

## 2019-06-02 PROCEDURE — 80053 COMPREHEN METABOLIC PANEL: CPT

## 2019-06-02 PROCEDURE — 87070 CULTURE OTHR SPECIMN AEROBIC: CPT

## 2019-06-02 RX ORDER — LORAZEPAM 2 MG/ML
1 INJECTION INTRAMUSCULAR EVERY 6 HOURS PRN
Status: DISCONTINUED | OUTPATIENT
Start: 2019-06-02 | End: 2019-06-04 | Stop reason: HOSPADM

## 2019-06-02 RX ORDER — NALOXONE HYDROCHLORIDE 0.4 MG/ML
INJECTION, SOLUTION INTRAMUSCULAR; INTRAVENOUS; SUBCUTANEOUS
Status: COMPLETED
Start: 2019-06-02 | End: 2019-06-02

## 2019-06-02 RX ORDER — FLUTICASONE PROPIONATE 50 MCG
2 SPRAY, SUSPENSION (ML) NASAL DAILY
Status: DISCONTINUED | OUTPATIENT
Start: 2019-06-02 | End: 2019-06-04 | Stop reason: HOSPADM

## 2019-06-02 RX ORDER — SODIUM CHLORIDE 0.9 % (FLUSH) 0.9 %
10 SYRINGE (ML) INJECTION PRN
Status: DISCONTINUED | OUTPATIENT
Start: 2019-06-02 | End: 2019-06-04 | Stop reason: HOSPADM

## 2019-06-02 RX ORDER — MAGNESIUM SULFATE 1 G/100ML
1 INJECTION INTRAVENOUS PRN
Status: DISCONTINUED | OUTPATIENT
Start: 2019-06-02 | End: 2019-06-04 | Stop reason: HOSPADM

## 2019-06-02 RX ORDER — METHYLPREDNISOLONE SODIUM SUCCINATE 40 MG/ML
40 INJECTION, POWDER, LYOPHILIZED, FOR SOLUTION INTRAMUSCULAR; INTRAVENOUS DAILY
Status: DISCONTINUED | OUTPATIENT
Start: 2019-06-02 | End: 2019-06-03

## 2019-06-02 RX ORDER — IPRATROPIUM BROMIDE AND ALBUTEROL SULFATE 2.5; .5 MG/3ML; MG/3ML
1 SOLUTION RESPIRATORY (INHALATION)
Status: DISCONTINUED | OUTPATIENT
Start: 2019-06-02 | End: 2019-06-04 | Stop reason: HOSPADM

## 2019-06-02 RX ORDER — ALBUTEROL SULFATE 90 UG/1
2 AEROSOL, METERED RESPIRATORY (INHALATION) EVERY 6 HOURS PRN
Status: DISCONTINUED | OUTPATIENT
Start: 2019-06-02 | End: 2019-06-04 | Stop reason: HOSPADM

## 2019-06-02 RX ORDER — PHENYTOIN SODIUM 100 MG/1
100 CAPSULE, EXTENDED RELEASE ORAL 3 TIMES DAILY
Status: DISCONTINUED | OUTPATIENT
Start: 2019-06-02 | End: 2019-06-03

## 2019-06-02 RX ORDER — SODIUM CHLORIDE 0.9 % (FLUSH) 0.9 %
10 SYRINGE (ML) INJECTION EVERY 12 HOURS SCHEDULED
Status: DISCONTINUED | OUTPATIENT
Start: 2019-06-02 | End: 2019-06-04 | Stop reason: SDUPTHER

## 2019-06-02 RX ORDER — BENZONATATE 100 MG/1
100 CAPSULE ORAL 3 TIMES DAILY PRN
Status: DISCONTINUED | OUTPATIENT
Start: 2019-06-02 | End: 2019-06-04 | Stop reason: HOSPADM

## 2019-06-02 RX ORDER — RISPERIDONE 2 MG/1
2 TABLET, FILM COATED ORAL NIGHTLY
Status: DISCONTINUED | OUTPATIENT
Start: 2019-06-02 | End: 2019-06-04 | Stop reason: HOSPADM

## 2019-06-02 RX ORDER — SODIUM CHLORIDE 0.9 % (FLUSH) 0.9 %
10 SYRINGE (ML) INJECTION EVERY 12 HOURS SCHEDULED
Status: DISCONTINUED | OUTPATIENT
Start: 2019-06-02 | End: 2019-06-04 | Stop reason: HOSPADM

## 2019-06-02 RX ORDER — SODIUM CHLORIDE 9 MG/ML
INJECTION, SOLUTION INTRAVENOUS CONTINUOUS
Status: DISCONTINUED | OUTPATIENT
Start: 2019-06-02 | End: 2019-06-04

## 2019-06-02 RX ORDER — LEVOTHYROXINE SODIUM 0.05 MG/1
50 TABLET ORAL DAILY
Status: DISCONTINUED | OUTPATIENT
Start: 2019-06-02 | End: 2019-06-04 | Stop reason: HOSPADM

## 2019-06-02 RX ORDER — PHENOBARBITAL 32.4 MG/1
32.4 TABLET ORAL 3 TIMES DAILY
Status: DISCONTINUED | OUTPATIENT
Start: 2019-06-02 | End: 2019-06-04

## 2019-06-02 RX ORDER — 0.9 % SODIUM CHLORIDE 0.9 %
1000 INTRAVENOUS SOLUTION INTRAVENOUS ONCE
Status: DISCONTINUED | OUTPATIENT
Start: 2019-06-02 | End: 2019-06-04 | Stop reason: HOSPADM

## 2019-06-02 RX ORDER — NALOXONE HYDROCHLORIDE 0.4 MG/ML
0.4 INJECTION, SOLUTION INTRAMUSCULAR; INTRAVENOUS; SUBCUTANEOUS ONCE
Status: COMPLETED | OUTPATIENT
Start: 2019-06-02 | End: 2019-06-02

## 2019-06-02 RX ORDER — 0.9 % SODIUM CHLORIDE 0.9 %
30 INTRAVENOUS SOLUTION INTRAVENOUS ONCE
Status: COMPLETED | OUTPATIENT
Start: 2019-06-02 | End: 2019-06-02

## 2019-06-02 RX ORDER — ONDANSETRON 2 MG/ML
4 INJECTION INTRAMUSCULAR; INTRAVENOUS EVERY 8 HOURS PRN
Status: DISCONTINUED | OUTPATIENT
Start: 2019-06-02 | End: 2019-06-02

## 2019-06-02 RX ORDER — ONDANSETRON 2 MG/ML
4 INJECTION INTRAMUSCULAR; INTRAVENOUS EVERY 6 HOURS PRN
Status: DISCONTINUED | OUTPATIENT
Start: 2019-06-02 | End: 2019-06-04 | Stop reason: HOSPADM

## 2019-06-02 RX ORDER — ACETAMINOPHEN 325 MG/1
650 TABLET ORAL EVERY 4 HOURS PRN
Status: DISCONTINUED | OUTPATIENT
Start: 2019-06-02 | End: 2019-06-04 | Stop reason: HOSPADM

## 2019-06-02 RX ADMIN — Medication 10 ML: at 22:12

## 2019-06-02 RX ADMIN — SODIUM CHLORIDE 2448 ML: 9 INJECTION, SOLUTION INTRAVENOUS at 08:53

## 2019-06-02 RX ADMIN — LEVOTHYROXINE SODIUM 50 MCG: 50 TABLET ORAL at 13:42

## 2019-06-02 RX ADMIN — Medication 10 ML: at 12:20

## 2019-06-02 RX ADMIN — PHENOBARBITAL 32.4 MG: 32.4 TABLET ORAL at 21:00

## 2019-06-02 RX ADMIN — EXTENDED PHENYTOIN SODIUM 100 MG: 100 CAPSULE ORAL at 13:42

## 2019-06-02 RX ADMIN — FLUTICASONE PROPIONATE 2 SPRAY: 50 SPRAY, METERED NASAL at 13:48

## 2019-06-02 RX ADMIN — Medication 10 ML: at 22:13

## 2019-06-02 RX ADMIN — NALOXONE HYDROCHLORIDE 0.4 MG: 0.4 INJECTION, SOLUTION INTRAMUSCULAR; INTRAVENOUS; SUBCUTANEOUS at 09:42

## 2019-06-02 RX ADMIN — Medication 2 PUFF: at 22:35

## 2019-06-02 RX ADMIN — RISPERIDONE 2 MG: 2 TABLET, FILM COATED ORAL at 20:58

## 2019-06-02 RX ADMIN — ACETAMINOPHEN 650 MG: 325 TABLET ORAL at 15:47

## 2019-06-02 RX ADMIN — PIPERACILLIN AND TAZOBACTAM 3.38 G: 3; .375 INJECTION, POWDER, FOR SOLUTION INTRAVENOUS at 16:39

## 2019-06-02 RX ADMIN — PIPERACILLIN AND TAZOBACTAM 3.38 G: 3; .375 INJECTION, POWDER, FOR SOLUTION INTRAVENOUS at 09:03

## 2019-06-02 RX ADMIN — PHENOBARBITAL 32.4 MG: 32.4 TABLET ORAL at 13:41

## 2019-06-02 RX ADMIN — Medication 2 PUFF: at 16:23

## 2019-06-02 RX ADMIN — METHYLPREDNISOLONE SODIUM SUCCINATE 40 MG: 40 INJECTION, POWDER, FOR SOLUTION INTRAMUSCULAR; INTRAVENOUS at 13:43

## 2019-06-02 RX ADMIN — SODIUM CHLORIDE: 9 INJECTION, SOLUTION INTRAVENOUS at 12:46

## 2019-06-02 RX ADMIN — ENOXAPARIN SODIUM 40 MG: 40 INJECTION SUBCUTANEOUS at 13:43

## 2019-06-02 RX ADMIN — MIRTAZAPINE 45 MG: 30 TABLET, FILM COATED ORAL at 20:58

## 2019-06-02 RX ADMIN — IPRATROPIUM BROMIDE AND ALBUTEROL SULFATE 1 AMPULE: .5; 3 SOLUTION RESPIRATORY (INHALATION) at 22:35

## 2019-06-02 RX ADMIN — DIVALPROEX SODIUM 750 MG: 500 TABLET, DELAYED RELEASE ORAL at 20:56

## 2019-06-02 RX ADMIN — EXTENDED PHENYTOIN SODIUM 100 MG: 100 CAPSULE ORAL at 20:58

## 2019-06-02 RX ADMIN — Medication 1250 MG: at 22:11

## 2019-06-02 RX ADMIN — IPRATROPIUM BROMIDE AND ALBUTEROL SULFATE 1 AMPULE: .5; 3 SOLUTION RESPIRATORY (INHALATION) at 16:22

## 2019-06-02 RX ADMIN — Medication 1250 MG: at 09:01

## 2019-06-02 RX ADMIN — PIPERACILLIN AND TAZOBACTAM 3.38 G: 3; .375 INJECTION, POWDER, FOR SOLUTION INTRAVENOUS at 23:59

## 2019-06-02 ASSESSMENT — PAIN SCALES - GENERAL
PAINLEVEL_OUTOF10: 7
PAINLEVEL_OUTOF10: 0
PAINLEVEL_OUTOF10: 8
PAINLEVEL_OUTOF10: 0
PAINLEVEL_OUTOF10: 0

## 2019-06-02 ASSESSMENT — PAIN DESCRIPTION - LOCATION: LOCATION: HIP

## 2019-06-02 ASSESSMENT — PAIN DESCRIPTION - ORIENTATION: ORIENTATION: RIGHT

## 2019-06-02 ASSESSMENT — PAIN DESCRIPTION - PAIN TYPE: TYPE: ACUTE PAIN

## 2019-06-02 NOTE — PLAN OF CARE
Pt A&Ox4, able to make needs known. Pt currently off the unit in w/c with tech to gift shop to get personal care items she states that she needs, took her money, cards, and cell phone with her after signing leaving unit responsibility form. Wound vac intake but does not have  cord for it. Pt emotionally labile, goes from conversing about discharge plans to expressing disdain regarding her POC especially the safety measures in place. Able to ambulate independently from bathroom back to bedside chair or back to bed. Expresses displeasure with room size stating that she is unable to rest in so small of a room, but then was noted to be in fact sleeping soundly and snoring multiple times since her arrival to the room. IVF continue into L femoral triple lumen PICC in distal lumen per MD order (see eMAR for details). This nurse will continue to monitor patient during her 7a-7p shift today, Sunday 06/02/2019. Problem: Risk for Impaired Skin Integrity  Goal: Tissue integrity - skin and mucous membranes  Description  Structural intactness and normal physiological function of skin and  mucous membranes.   Outcome: Ongoing     Problem: Falls - Risk of:  Goal: Will remain free from falls  Description  Will remain free from falls  Outcome: Ongoing  Goal: Absence of physical injury  Description  Absence of physical injury  Outcome: Ongoing

## 2019-06-02 NOTE — PROGRESS NOTES
Pharmacy Note  Vancomycin Consult    Flores Brown is a 62 y.o. female started on Vancomycin for empiric coverage; consult received from Dr. Shaunna Cheung to manage therapy. Also receiving the following antibiotics: Zosyn IV in ED. Patient Active Problem List   Diagnosis    Chest pain    Migraine variant with headache    Drug overdose, accidental or unintentional, initial encounter    Hypothyroidism    Essential hypertension    Seizure disorder ([facility] [address]    Drug overdose    History of seizure    Major depressive disorder with psychotic features ([facility] [address]    Severe major depression ([address]    Overdose of barbiturate, intentional self-harm, initial encounter ([facility] [address]    Intentional phenobarbital overdose ([address]    Severe episode of recurrent major depressive disorder, without psychotic features ([facility] [address]    Bronchitis    Suicide attempt ([facility] [address]    Major depressive disorder, recurrent ([facility] [address]    Ear infection    Severe malnutrition ([address]    Necrotizing fasciitis ([address]       Allergies:  Imitrex [sumatriptan]; Bee pollen; Bee venom; Bromide ion [bromine]; Flexeril [cyclobenzaprine]; Neurontin [gabapentin]; Nsaids; Potassium bromide; Reglan [metoclopramide]; Sulfa antibiotics; Sulfadiazine; Toradol [ketorolac tromethamine]; and Tramadol     Temp max: 99.8    No results for input(s): BUN in the last 72 hours. No results for input(s): CREATININE in the last 72 hours. No results for input(s): WBC in the last 72 hours. No intake or output data in the 24 hours ending 06/02/19 0726    Culture Date      Source                       Results      Ht Readings from Last 1 Encounters:   06/02/19 5' 2\" (1.575 m)        Wt Readings from Last 1 Encounters:   06/02/19 180 lb (81.6 kg)         Body mass index is 32.92 kg/m². Estimated Creatinine Clearance: 111 mL/min (based on SCr of 0.56 mg/dL). Goal Trough Level: 10-20 mcg/mL    Assessment/Plan:  Will initiate vancomycin 1250 mg IV every 12 hours.   Timing of trough level will be

## 2019-06-02 NOTE — PROGRESS NOTES
shift of pt's behaviors as well as this nurse's concerns regarding pt's threats of AMA and going outside to smoke with PICC line intact. Per hospital policy and procedure, pt able to sign waiver form to leave unit PRN and pt has signed form in chart. Night shift nurse, Collette Potts RN updated and made aware of all concerns. Pt sitting in bed eating dinner while watching TV and playing on phone. Bed in lowest locking position, call light within reach. Pt left the unit again approx 1916 to go smoke, refuses to stay in room or accept nicotine replacement therapy. Refuses to go back on monitor or take IVF and abx tx. Notified MD, no new orders at this time. Charge nurses made aware.

## 2019-06-02 NOTE — ED PROVIDER NOTES
[facility] 4B STEPDOWN  Emergency Department Encounter  EmergencyMedicine Resident     Pt Name:Aleta Aguirre  MRN: [id number]  [place] 1965  Date of evaluation: 6/2/19  PCP:  Ajay Choi NP-C    [address] [place] [address]       Chief Complaint   Patient presents with    Altered Mental Status       HISTORY OF PRESENT ILLNESS  (Location/Symptom, Timing/Onset, Context/Setting, Quality, Duration, Modifying Factors, Severity.)      Naina Alfonso is a 62 y.o. female who presents with EMS for altered mental status. Patient appeared sleepy/lethargic, with respiratory depression, pinpoint pupils and hypoxemia not able to answer questions or respond with just voice initially and needed a gentle knowledge, and after administration of 0.4 mg of Narcan intranasally patient woke up and was alert and oriented but very angry. Difficult to obtain review of systems due to patient's mentation and being a poor historian. Patient came from a rehab facility after having a history for necrotizing fasciitis does have a wound VAC in place to the right hip. On chart review significant past medical history for asthma, congestive heart failure, COPD, hypertension, and thyroid disease, and seizures. PAST MEDICAL / SURGICAL / SOCIAL / FAMILY HISTORY      has a past medical history of Arthritis, Asthma, CHF (congestive heart failure) ([address] COPD (chronic obstructive pulmonary disease) ([address] Fibromyalgia, Headache, Hypertension, Movement disorder, Neck fracture ([address] Seizure ([address] and Thyroid disease. has a past surgical history that includes knee surgery; partial hysterectomy (cervix not removed); lymph node dissection; Irrigation and debridement (Right, 5/17/2019); EXPLORATION OF WOUND OF EXTREMITY (Right, 5/19/2019); and EXPLORATION OF WOUND OF EXTREMITY (N/A, 5/22/2019).     Social History     Socioeconomic History    Marital status:      Spouse name: Not on file    Number of children: Not on file    Years of education: Not on file    Highest education level: Not on file   Occupational History    Not on file   Social Needs    Financial resource strain: Not on file    Food insecurity:     Worry: Not on file     Inability: Not on file    Transportation needs:     Medical: Not on file     Non-medical: Not on file   Tobacco Use    Smoking status: Current Every Day Smoker     Packs/day: 0.50     Years: 12.00     Pack years: 6.00    Smokeless tobacco: Never Used   Substance and Sexual Activity    Alcohol use: No    Drug use: No    Sexual activity: Not on file   Lifestyle    Physical activity:     Days per week: Not on file     Minutes per session: Not on file    Stress: Not on file   Relationships    Social connections:     Talks on phone: Not on file     Gets together: Not on file     Attends Anglican service: Not on file     Active member of club or organization: Not on file     Attends meetings of clubs or organizations: Not on file     Relationship status: Not on file    Intimate partner violence:     Fear of current or ex partner: Not on file     Emotionally abused: Not on file     Physically abused: Not on file     Forced sexual activity: Not on file   Other Topics Concern    Not on file   Social History Narrative    Not on file       History reviewed. No pertinent family history. Allergies:  Imitrex [sumatriptan]; Bee pollen; Bee venom; Bromide ion [bromine]; Flexeril [cyclobenzaprine]; Neurontin [gabapentin]; Nsaids; Potassium bromide; Reglan [metoclopramide]; Sulfa antibiotics; Sulfadiazine; Toradol [ketorolac tromethamine]; and Tramadol    Home Medications:  Prior to Admission medications    Medication Sig Start Date End Date Taking? Authorizing Provider   ibuprofen (ADVIL;MOTRIN) 600 MG tablet Take 1 tablet by mouth every 6 hours 5/24/19  Yes KEANU Chahal - CNP   enoxaparin (LOVENOX) 40 MG/0.4ML injection Inject 0.4 mLs into the skin daily for 14 days While in facility.   Do not take at home 5/25/19 6/8/19 Yes KEANU Pederson CNP   divalproex (DEPAKOTE) 250 MG DR tablet Take 3 tablets by mouth every 12 hours 3/1/19  Yes KEANU Henao CNP   phenytoin (DILANTIN) 100 MG ER capsule Take 1 capsule by mouth 3 times daily 3/1/19  Yes KEANU Henao CNP   mirtazapine (REMERON) 45 MG tablet Take 1 tablet by mouth nightly 3/1/19  Yes KEANU Henao CNP   risperiDONE (RISPERDAL) 2 MG tablet Take 1 tablet by mouth nightly 3/1/19  Yes KEANU Henao CNP   metoprolol tartrate (LOPRESSOR) 50 MG tablet Take 1 tablet by mouth 2 times daily 3/1/19  Yes KEANU Henao CNP   hydrochlorothiazide (HYDRODIURIL) 25 MG tablet Take 1 tablet by mouth daily 3/2/19  Yes KEANU Henao CNP   PHENobarbital (LUMINAL) 32.4 MG tablet Take 32.4 mg by mouth 3 times daily. .   Yes Historical Provider, MD   famotidine (PEPCID) 20 MG tablet Take 20 mg by mouth 2 times daily   Yes Historical Provider, MD   levothyroxine (SYNTHROID) 50 MCG tablet Take 50 mcg by mouth Daily   Yes Historical Provider, MD   aspirin (ASPIRIN LOW DOSE) 81 MG EC tablet Take 1 tablet by mouth daily for 7 days Gap prescription until follow up with primary. Do not refill. Follow up with primary 5/25/19 6/1/19  KEANU Pederson CNP   albuterol sulfate  (90 Base) MCG/ACT inhaler Inhale 2 puffs into the lungs every 6 hours as needed for Wheezing Gap prescription until follow up with primary. Do not refill. Follow up with primary 5/24/19   KEANU Pederson CNP       REVIEW OF SYSTEMS    (2-9 systems for level 4, 10 or more for level 5)      Review of Systems   Unable to perform ROS: Mental status change       PHYSICAL EXAM   (up to 7 for level 4, 8 or more for level 5)      INITIAL VITALS:   BP (!) 97/56   Pulse 69   Temp 97.9 °F (36.6 °C) (Oral)   Resp 14   Ht 5' 2.01\" (1.575 m)   Wt 202 lb 6.1 oz (91.8 kg)   SpO2 100%   BMI 37.01 kg/m²     Physical Exam   Constitutional: She is oriented to person, place, and time.  She Culture Blood #1    Culture Blood #1    Urine Culture    XR CHEST PORTABLE    CT HEAD WO CONTRAST    Ammonia    CBC Auto Differential    Comprehensive Metabolic Panel    Lactate, Sepsis    Magnesium    Protime-INR    APTT    Troponin    Urinalysis with Microscopic    Basic Metabolic Panel w/ Reflex to MG    Lactic acid, plasma    Magnesium    CBC auto differential    Urinalysis Reflex to Culture    Urine Drug Screen    Troponin    DIET GENERAL;    Vital signs per unit routine    Strict Bedrest    Place intermittent pneumatic compression device    Telemetry Monitoring    Elevate heels off of bed at all times if patient is not able to move lower extremities    Turn or assist with turn every 2 hours if patient is unable to turn self. Remind patient to turn if necessary.     Inspect skin per unit guidelines    Maintain HOB at the lowest elevation consistent with medical plan of care    Vital signs per unit routine    Tobacco cessation education    Notify physician    Up as tolerated    Intake and output    Neurovascular checks    Full Code    Pharmacy to Dose: Vancomycin    Inpatient consult to Internal Medicine    Inpatient consult to Cardiology    Inpatient consult to Spiritual Services    Inpatient consult to Case Management    OT eval and treat    PT evaluation and treat    Initiate Oxygen Therapy Protocol    EKG 12 Lead    PATIENT STATUS (FROM ED OR OR/PROCEDURAL) Inpatient       MEDICATIONS ORDERED:  Orders Placed This Encounter   Medications    naloxone (NARCAN) 0.4 MG/ML injection     Donny Malave: cabinet override    0.9 % sodium chloride bolus    piperacillin-tazobactam (ZOSYN) 3.375 g in dextrose 5 % 50 mL IVPB (mini-bag)    vancomycin (VANCOCIN) 1250 mg in dextrose 5 % 250 mL IVPB    vancomycin (VANCOCIN) intermittent dosing (placeholder)    0.9 % sodium chloride bolus    naloxone (NARCAN) injection 0.4 mg    sodium chloride flush 0.9 % injection 10 mL  sodium chloride flush 0.9 % injection 10 mL    acetaminophen (TYLENOL) tablet 650 mg    DISCONTD: enoxaparin (LOVENOX) injection 40 mg    0.9 % sodium chloride infusion    DISCONTD: ondansetron (ZOFRAN) injection 4 mg    sodium chloride flush 0.9 % injection 10 mL    sodium chloride flush 0.9 % injection 10 mL    ondansetron (ZOFRAN) injection 4 mg    enoxaparin (LOVENOX) injection 40 mg    magnesium sulfate 1 g in dextrose 5% 100 mL IVPB       DDX: AMS, sepsis, opioid overdose    DIAGNOSTIC RESULTS / EMERGENCY DEPARTMENT COURSE / MDM     LABS:  Results for orders placed or performed during the hospital encounter of 06/02/19   Culture Blood #1   Result Value Ref Range    Specimen Description . BLOOD     Special Requests NOT REPORTED     Culture       SPECIMEN SUBMITTED WAS INCORRECTLY IDENTIFIED NOTIFIED RN [facility] AT [id number] 06/02/2019   Culture Blood #1   Result Value Ref Range    Specimen Description . BLOOD     Special Requests L FEM CENTRAL LINE 10ML     Culture NO GROWTH 2 HOURS    Ammonia   Result Value Ref Range    Ammonia 30 11 - 51 umol/L   CBC Auto Differential   Result Value Ref Range    WBC 12.3 (H) 3.5 - 11.3 k/uL    RBC 3.28 (L) 3.95 - 5.11 m/uL    Hemoglobin 9.9 (L) 11.9 - 15.1 g/dL    Hematocrit 30.8 (L) 36.3 - 47.1 %    MCV 93.9 82.6 - 102.9 fL    MCH 30.2 25.2 - 33.5 pg    MCHC 32.1 28.4 - 34.8 g/dL    RDW 16.3 (H) 11.8 - 14.4 %    Platelets 541 (H) 763 - 453 k/uL    MPV 8.3 8.1 - 13.5 fL    NRBC Automated 0.0 0.0 per 100 WBC    Differential Type NOT REPORTED     WBC Morphology NOT REPORTED     RBC Morphology NOT REPORTED     Platelet Estimate NOT REPORTED     Immature Granulocytes 1 (H) 0 %    Seg Neutrophils 61 36 - 66 %    Lymphocytes 35 24 - 44 %    Monocytes 3 1 - 7 %    Eosinophils % 0 (L) 1 - 4 %    Basophils 0 0 - 2 %    Absolute Immature Granulocyte 0.12 0.00 - 0.30 k/uL    Segs Absolute 7.50 1.8 - 7.7 k/uL    Absolute Lymph # 4.31 1.0 - 4.8 k/uL    Absolute Mono # 0.37 0.1 - 0.8 k/uL    Absolute Eos # 0.00 0.0 - 0.4 k/uL    Basophils # 0.00 0.0 - 0.2 k/uL    Morphology ANISOCYTOSIS PRESENT    Comprehensive Metabolic Panel   Result Value Ref Range    Glucose 96 70 - 99 mg/dL    BUN 17 6 - 20 mg/dL    CREATININE 0.90 0.50 - 0.90 mg/dL    Bun/Cre Ratio NOT REPORTED 9 - 20    Calcium 8.2 (L) 8.6 - 10.4 mg/dL    Sodium 132 (L) 135 - 144 mmol/L    Potassium 3.7 3.7 - 5.3 mmol/L    Chloride 95 (L) 98 - 107 mmol/L    CO2 27 20 - 31 mmol/L    Anion Gap 10 9 - 17 mmol/L    Alkaline Phosphatase 124 (H) 35 - 104 U/L    ALT 19 5 - 33 U/L    AST 56 (H) <32 U/L    Total Bilirubin 0.18 (L) 0.3 - 1.2 mg/dL    Total Protein 7.7 6.4 - 8.3 g/dL    Alb 3.2 (L) 3.5 - 5.2 g/dL    Albumin/Globulin Ratio 0.7 (L) 1.0 - 2.5    GFR Non-African American >60 >60 mL/min    GFR African American >60 >60 mL/min    GFR Comment          GFR Staging NOT REPORTED    Lactate, Sepsis   Result Value Ref Range    Lactic Acid, Sepsis NOT REPORTED 0.5 - 1.9 mmol/L    Lactic Acid, Sepsis, Whole Blood 1.5 0.5 - 1.9 mmol/L   Magnesium   Result Value Ref Range    Magnesium 2.4 1.6 - 2.6 mg/dL   Protime-INR   Result Value Ref Range    Protime 9.9 9.0 - 12.0 sec    INR 0.9    APTT   Result Value Ref Range    PTT 21.1 20.5 - 30.5 sec   Troponin   Result Value Ref Range    Troponin, High Sensitivity 18 (H) 0 - 14 ng/L    Troponin T NOT REPORTED <0.03 ng/mL    Troponin Interp NOT REPORTED    Troponin   Result Value Ref Range    Troponin, High Sensitivity 19 (H) 0 - 14 ng/L    Troponin T NOT REPORTED <0.03 ng/mL    Troponin Interp NOT REPORTED        IMPRESSION/EMERGENCY DEPARTMENT COURSE:        ED Course as of Jun 02 1205   Sun Jun 02, 2019   Dorothy Mendez 68 is to give Narcan for decreased respirations, pinpoint pupils, and other mental status as the likely etiology. We'll also obtain a septic workup, obtain blood cultures, administer IV fluids, antibiotics and consult internal medicine for admission.   Patient does have a history of no chest fasciitis and does have a wound VAC in place on the right hip area. Patient's poor historian and unable to obtain accurate review of systems or significant amount of health information. [JM]   9936 Central line placed, see procedure note for details. No complications. [JM]   3164 Discussed with internal medicine, they will take patient for further evaluation and treatment service. Patient given additional IV fluids and is been responsive, patient also received a second dose of Narcan IV this time as it intranasally. [JM]      ED Course User Index  [JM] Kristie Mcknight DO     Awaiting transfer to the floor. BP responding to IVF's. RADIOLOGY:  Ct Head Wo Contrast    Result Date: 6/2/2019  EXAMINATION: CT OF THE HEAD WITHOUT CONTRAST  6/2/2019 9:55 am TECHNIQUE: CT of the head was performed without the administration of intravenous contrast. Dose modulation, iterative reconstruction, and/or weight based adjustment of the mA/kV was utilized to reduce the radiation dose to as low as reasonably achievable. COMPARISON: 12 February 2019 HISTORY: ORDERING SYSTEM PROVIDED HISTORY: AMS TECHNOLOGIST PROVIDED HISTORY: FINDINGS: BRAIN/VENTRICLES: There is no acute intracranial hemorrhage, mass effect or midline shift. No abnormal extra-axial fluid collection. The gray-white differentiation is maintained without evidence of an acute infarct. There is no evidence of hydrocephalus. ORBITS: The visualized portion of the orbits demonstrate no acute abnormality. SINUSES: Moderate to moderately severe mucoperiosteal thickening is present in the maxillary ethmoid and sphenoid sinuses, similar to that noted on prior exam.  Frontal sinuses and mastoid air cells are adequately appropriated. SOFT TISSUES/SKULL:  No acute abnormality of the visualized skull or soft tissues. Estimated biologic radiation dose for this procedure:865.54 mGy/cm2. No acute intracranial abnormality.   Redemonstration of moderate sinus inflammation similar to that noted on prior exam.     Xr Chest Portable    Result Date: 6/2/2019  EXAMINATION: ONE XRAY VIEW OF THE CHEST 6/2/2019 7:13 am COMPARISON: 05/17/2019 HISTORY: ORDERING SYSTEM PROVIDED HISTORY: respiratory depression TECHNOLOGIST PROVIDED HISTORY: respiratory depression Acuity: Unknown Type of Exam: Unknown Initial encounter FINDINGS: Mild bibasilar atelectasis. No focal consolidation, pleural effusion or pneumothorax. The cardiomediastinal silhouette is stable. No overt pulmonary edema. The osseous structures are stable. Mild bibasilar atelectasis. Xr Chest Portable    Result Date: 5/17/2019  EXAMINATION: ONE XRAY VIEW OF THE CHEST 5/17/2019 3:29 pm COMPARISON: Chest February 14, 2019. HISTORY: ORDERING SYSTEM PROVIDED HISTORY: sepsis workup TECHNOLOGIST PROVIDED HISTORY: sepsis workup Ordering Physician Provided Reason for Exam: sepsis workup Acuity: Unknown Type of Exam: Unknown FINDINGS: The lungs are without acute focal process. There is no effusion or pneumothorax. The cardiomediastinal silhouette is without acute process. The osseous structures are without acute process. No acute process. Ct Hip Right W Contrast    Result Date: 5/17/2019  EXAMINATION: CT OF THE RIGHT HIP WITH CONTRAST 5/17/2019 5:42 pm TECHNIQUE: CT of the right hip was performed with the administration of intravenous contrast.  Multiplanar reformatted images are provided for review. Dose modulation, iterative reconstruction, and/or weight based adjustment of the mA/kV was utilized to reduce the radiation dose to as low as reasonably achievable. COMPARISON: CT abdomen and pelvis 11/04/2018. HISTORY ORDERING SYSTEM PROVIDED HISTORY: Cellulitis overlying right lateral hip and right buttock tox please evaluate for any subcutaneous gas. TECHNOLOGIST PROVIDED HISTORY: Cellulitis overlying right lateral hip and right buttock tox please include soft tissue. FINDINGS: Bones: No fracture or dislocation. No osseous erosion. No suspicious lytic or blastic osseous lesion. Soft Tissue:  Severe subcutaneous fat stranding in the right gluteal area extending into the proximal right thigh sparing the medial aspect. Significant amount of gas in the right gluteal subcutaneous fat. No well-defined drainable fluid collection. Overlying skin thickening is noted. The visualized musculature is within normal limits. No deep fascial involvement identified. No radiopaque foreign body. The neurovascular structures are unremarkable. The urinary bladder is normal in appearance. Status post hysterectomy. The visualized bowel is unremarkable. No free fluid in the pelvis. Mild right inguinal lymphadenopathy. Joint:  Mild degenerative changes of the bilateral hips. Mild bilateral sacroiliac joint and pubic symphysis degenerative changes. 1. Severe subcutaneous fat stranding of the right gluteal region extending into the proximal thigh. Significant gas in the gluteal subcutaneous fat raising the possibility of a necrotizing cellulitis. No well-defined drainable fluid collection or involvement of the underlying musculature. 2. No acute osseous abnormality. Findings were discussed with Dr. Ishan Casanova of the [facility] emergency department at 6:38 pm on 5/17/2019.        EKG  EKG Interpretation    Interpreted by me    Rhythm: normal sinus   Rate: normal  Axis: normal  Ectopy: none  Conduction: possible left atrial enlargement  ST Segments: non-specific changes  T Waves: non-specific changes  Q Waves: none    Clinical Impression: non-specific EKG, ST/T wave changes when compared to previous done in February 2019    All EKG's are interpreted by the Emergency Department Physician who either signs or Co-signs thischart in the absence of a cardiologist.      PROCEDURES:  Central Line Placement Procedure Note    Indication: vascular access    Consent: The patient provided verbal consent for this

## 2019-06-02 NOTE — CARE COORDINATION
Pt is unable to be an active participant in assessment, per nurse pt is not able to provide clear responses.

## 2019-06-02 NOTE — ED NOTES
Bed: 25  Expected date: 6/2/19  Expected time:   Means of arrival:   Comments:  Michael Mayorga X [phone] Dewey Muniz RN  06/02/19 [id number]

## 2019-06-02 NOTE — ED TRIAGE NOTES
Pt presents via [facility]. Pt has been acting different at her ECF. Pt presents alert and oriented x2. Pt knows who she is but not where she is. Pt desating on RA. Pt palced on cardiac monitor. Dr. Navid Darnell at bedside.

## 2019-06-02 NOTE — CARE COORDINATION
Case Management Initial Discharge Plan  Aleta Mcdonough,             Met with:patient to discuss discharge plans. Information verified: address, contacts, phone number, , insurance Yes  PCP: Aliya KAUR  Date of last visit: 1 month go    Insurance Provider: [employer]    Discharge Planning    Living Arrangements:  Friends   Support Systems:  Friends/Neighbors, ECF/Assisted Living    Home has 2 stories  1 stairs to climb to get into front door, 1 flight stairs to climb to reach second floor  Location of bedroom/bathroom in home upstairs    Patient able to perform ADL's:Independent    Current Services (outpatient & in home) dme  DME equipment: nebulizer and shower chair  DME provider:     Pharmacy: [facility] Medications:  Yes  Does patient want to participate in local refill/ meds to beds program?  Yes    Potential Assistance Needed:  [facility]    Patient agreeable to home care: No  [place] of choice provided:  n/a    Prior SNF/Rehab Placement and Facility: [facility]  Agreeable to SNF/Rehab: No  [place] of choice provided: n/a   Evaluation: n/a    Expected Discharge date:  19  Patient expects to be discharged to:  return to [facility]  Follow Up Appointment: Best Day/ Time: Thursday PM    Transportation provider: suleiman  Transportation arrangements needed for discharge: Yes    Readmission Risk              Risk of Unplanned Readmission:        30             Does patient have a readmission risk score greater than 14?: Yes  If yes, follow-up appointment must be made within 7 days of discharge. Discharge Plan: SNF has list need choice.           Electronically signed by Mitzy Pulido RN on 19 at 1:00 PM

## 2019-06-02 NOTE — ED NOTES
Dr Virl Goltz ok starting iv antibiotics with only one blood culture.      Maliha Began, RN  06/02/19 [phone]

## 2019-06-02 NOTE — ED PROVIDER NOTES
[facility] [address] [facility]     Emergency Department     Faculty Attestation    I performed a history and physical examination of the patient and discussed management with the resident. I reviewed the residents note and agree with the documented findings and plan of care. Any areas of disagreement are noted on the chart. I was personally present for the key portions of any procedures. I have documented in the chart those procedures where I was not present during the key portions. I have reviewed the emergency nurses triage note. I agree with the chief complaint, past medical history, past surgical history, allergies, medications, social and family history as documented unless otherwise noted below. For Physician Assistant/ Nurse Practitioner cases/documentation I have personally evaluated this patient and have completed at least one if not all key elements of the E/M (history, physical exam, and MDM). Additional findings are as noted. Patient here with altered mental status from care facility, on arrival file toxidrome, improved with Narcan. No opioids on medication list however does states she's been taking oxycodone prescribed by her provider given recent wound infection. However patient is still somewhat confused, will not give additional Narcan was needed. We'll proceed with mental status workup given recent infection    ECG interpretation: sinus rhythm 81, normal intervals, normal axis, no acute ST or T changes. Normal ECG.     Critical Care     none    Charmaine Chan MD, Roz Tracy  Attending Emergency  Physician             Charmaine Chan MD  06/02/19 [id number]

## 2019-06-02 NOTE — H&P
[address]     Department of Internal Medicine - Staff Internal Medicine Service          ADMISSION NOTE/HISTORY AND PHYSICAL EXAMINATION   ______________________________________________________________________    HISTORY OBTAIN FROM: Patient and chart review    CHIEF COMPLAINT: Altered mental status since yesterday      HISTORY OF PRESENT ILLNESS:      The patient is a pleasant 62 y.o. female with PMH of depression with suicide attempt in past, seizure disorder, hypertension, migraine, hypothyroidism, asthma, COPD presented with confusion/altered mentation since yesterday night. She states that yesterday night she started feeling very hot and later found herself on the floor. She had no memory of events after that and eventually found herself in hospital. She denied any Seizure-like activity, hitting of head, bowel bladder incontinence. She was recently discharged from hospital few days ago after being treated for necrotizing fasciitis. She underwent surgical debridement with wound washout and was discharged to skilled nursing facility with wound VAC in place. (Patient has no follow-up with general surgery as per patient and chart review). She mentioned having subjective fevers without any chills since discharge. Has productive cough since few weeks however denied any shortness of breath, chest pain, change in bowel or bladder habits or any other symptoms. She denied any increased drainage or signs or symptoms of infection or wound VAC. She has underlying depression and has been admitted for suicidal attempt in the past. She takes Remeron and Risperdal for that. She has underlying seizure disorder for which she takes Dilantin, phenobarbital and Depakote. She reported using albuterol, Flonase for her underlying COPD as per patient (not sure about other inhalers. Was following up with pulmonologist in [place]). Takes Synthroid 50 for her hypothyroidism.     In ER she was sleepy  IRRIGATION AND DEBRIDEMENT Right 5/17/2019    IRRIGATION, DEBRIDEMENT RIGHT THIGH performed by Portia Billingsley MD at [facility]      PARTIAL HYSTERECTOMY         MEDICATION PRIOR TO ADMISSION:  Medications Prior to Admission: ibuprofen (ADVIL;MOTRIN) 600 MG tablet, Take 1 tablet by mouth every 6 hours  enoxaparin (LOVENOX) 40 MG/0.4ML injection, Inject 0.4 mLs into the skin daily for 14 days While in facility. Do not take at home  divalproex (DEPAKOTE) 250 MG DR tablet, Take 3 tablets by mouth every 12 hours  phenytoin (DILANTIN) 100 MG ER capsule, Take 1 capsule by mouth 3 times daily  mirtazapine (REMERON) 45 MG tablet, Take 1 tablet by mouth nightly  risperiDONE (RISPERDAL) 2 MG tablet, Take 1 tablet by mouth nightly  metoprolol tartrate (LOPRESSOR) 50 MG tablet, Take 1 tablet by mouth 2 times daily  hydrochlorothiazide (HYDRODIURIL) 25 MG tablet, Take 1 tablet by mouth daily  PHENobarbital (LUMINAL) 32.4 MG tablet, Take 32.4 mg by mouth 3 times daily. .  famotidine (PEPCID) 20 MG tablet, Take 20 mg by mouth 2 times daily  levothyroxine (SYNTHROID) 50 MCG tablet, Take 50 mcg by mouth Daily  aspirin (ASPIRIN LOW DOSE) 81 MG EC tablet, Take 1 tablet by mouth daily for 7 days [place] prescription until follow up with primary. Do not refill. Follow up with primary  albuterol sulfate  (90 Base) MCG/ACT inhaler, Inhale 2 puffs into the lungs every 6 hours as needed for Wheezing [place] prescription until follow up with primary. Do not refill. Follow up with primary    Allergies:  Imitrex [sumatriptan]; Bee pollen; Bee venom; Bromide ion [bromine]; Flexeril [cyclobenzaprine]; Neurontin [gabapentin]; Nsaids; Potassium bromide; Reglan [metoclopramide]; Sulfa antibiotics; Sulfadiazine; Toradol [ketorolac tromethamine]; and Tramadol    SOCIAL HISTORY: 20-pack-year history of smoking present. Reported occasional alcohol intake.   Patient denied history of drug abuse.    FAMILY HISTORY:   History reviewed. No pertinent family history. REVIEW OF SYSTEMS:  CONSTITUTIONAL:  positive for  fatigue and malaise  HEENT:  positive for  nasal congestion  RESPIRATORY:  positive for  cough with sputum and wheezing  CARDIOVASCULAR:  negative for  chest pain, palpitations, orthopnea, PND  GASTROINTESTINAL:  negative for nausea, vomiting and change in bowel habits  MUSCULOSKELETAL:  negative for  myalgias, arthralgias and pain  NEUROLOGICAL:  negative for headaches, dizziness and seizures  BEHAVIOR/PSYCH:  positive for depressed mood    PHYSICAL EXAM:  BP (!) 97/56   Pulse 69   Temp 97.9 °F (36.6 °C) (Oral)   Resp 14   Ht 5' 2.01\" (1.575 m)   Wt 202 lb 6.1 oz (91.8 kg)   SpO2 100%   BMI 37.01 kg/m²   CONSTITUTIONAL:  awake, alert, cooperative, lethargic and drowsy  ENT:  Normocephalic, without obvious abnormality, atraumatic, sinuses nontender on palpation, external ears without lesions, oral pharynx with moist mucus membranes, tonsils without erythema or exudates, gums normal and good dentition. BACK:  Symmetric, no curvature, spinous processes are non-tender on palpation, paraspinous muscles are non-tender on palpation, no costal vertebral tenderness  LUNGS:  No increased work of breathing, good air exchange, wheezing present bilaterally  CARDIOVASCULAR:  Normal apical impulse, regular rate and rhythm, normal S1 and S2, no S3 or S4, and no murmur noted  ABDOMEN:  No scars, normal bowel sounds, soft, non-distended, non-tender, no masses palpated, no hepatosplenomegally  MUSCULOSKELETAL:  There is no redness, warmth, or swelling of the joints. Full range of motion noted. Motor strength is 5 out of 5 all extremities bilaterally. Tone is normal.  NEUROLOGIC:  Awake, alert, oriented to name, place and time. Lethargic and drowsy  Cranial nerves II-XII are grossly intact. Motor is 5 out of 5 bilaterally. Sensory is intact. gait is normal.  SKIN:  Rt hip wound VAC in place, draining well. Skin her wound VAC slightly erythematous(at baseline as per patient)      DATA:    CT head without contrast  Impression   No acute intracranial abnormality.  Redemonstration of moderate sinus   inflammation similar to that noted on prior exam.               Chest x-ray  FINDINGS:   Mild bibasilar atelectasis.  No focal consolidation, pleural effusion or   pneumothorax.  The cardiomediastinal silhouette is stable.  No overt   pulmonary edema.  The osseous structures are stable.           Impression   Mild bibasilar atelectasis. IMPRESSION  This is a 62 y.o. female with PMH of depression with suicide attempt in past, seizure disorder, hypertension, migraine, hypothyroidism, asthma/COPD and recent admission for necrotizing fasciitis s/p debridement with wound VAC in place presented from SNF with altered mentation and found to have leukocytosis and likely sepsis. Patient requires inpatient admission to [facility]. ASSESSMENT/PLAN:  1. Toxic metabolic encephalopathy likely secondary to underlying sepsis and opioid use- lethargic and drowsy but alert and oriented ×3. CT head unremarkable, ammonia levels normal.  We will continue to monitor. 2.  Sepsis etiology yet to be determined-leukocytosis present. History of subjective fevers since few days. Currently afebrile. Recent surgical debridement for necrotizing fasciitis of right hip with movement in place. Received a bolus of an nacl at 30 ml/kg as per sepsis protocol in ER. Will continue on IV fluids at 125 mg per hour for now. Received 1 dose of vancomycin and Zosyn in ER. Will continue on IV vancomycin dosed by pharmacy. Follow-up blood, urine and sputum cultures. Will monitor WBCs and fever. 3.  Leukocytosis-WBC 12.3 on presentation. Afebrile otherwise. Continued on vancomycin dosed by pharmacy. Daily CBC. 3.  Necrotizing fasciitis of right hip in recent admission s/p surgical wound debridement and wound VAC in place. Wound VAC draining well. No s/s/o infection noticed around [facility]. Will consult general surgery for further recommendations. 4.  COPD/asthma-CXR showed chronic sinusitis changes with no acute pathology. On evaluation bilateral wheezing present. We'll continue on albuterol, DuoNeb nebulizer and IV Solu Medrol 40 mg daily. RT aerosol treatment. RT eval and treat. Pulse ox. Continue telemetry. Oxygen as per protocol    5. Depression with suicidal ideation past- currently denies any acute signs and symptoms. No suicidal ideation as per patient. Resumed on home doses of Remeron, Resperdal.     6.  Seizure disorder-resumed on home doses of Dilantin, Depakote inhibitor. Will follow-up drug levels. 7.  Cxvuzpfuirtndz-tdujbx-yx TSH/T4. Resumed on levo thyroxine 50 mcs as per home dose. 8.  Hypertension-currently the low-normal range. We'll monitor and resume medications as appropriate. 9.  Elevated troponins-troponins trended up from 7<18<19. Initial EKG done in ER showed nonspecific ST-T wave changes. Patient asymptomatic. Cardiology was consulted from ER. Diet- General diet  DVT prophylaxis-On lovenox  PT/OT Evaluation and treatment.  consulted for discharge planning. Praveena Venegas, PGY 1, Internal Medicine Resident  [address] [place] [address]    Attending Physician Statement  I have discussed the care of Michael Amaya, including pertinent history and exam findings with the resident. I have reviewed the key elements of all parts of the encounter with the resident. I have seen and examined the patient with the resident and the key elements of all parts of the encounter have been performed by me.      62year old lady admitted with altered mental status   PMH, vitals, labs, medications reviewed  AMS was most likely sec to opiates  Also has h/o seizures, drug levels were subtherapeutic- meds have been continued, neuro consult has been placed  Questionable sepsis, sec to wound infection, currently has a wound Vac  DC Antibiotics once cleared by surgery  Recheck lactic acid  Has been placed on steroids for COPD exacerbation-change to PO  Patient is currently awake, alert, answers all questions appropriately , appears comfortable, able to ambulate  No narcotics  Elevated troponin-ECHO

## 2019-06-03 LAB
-: NORMAL
ABSOLUTE EOS #: 0.03 K/UL (ref 0–0.44)
ABSOLUTE IMMATURE GRANULOCYTE: 0.06 K/UL (ref 0–0.3)
ABSOLUTE LYMPH #: 3.48 K/UL (ref 1.1–3.7)
ABSOLUTE MONO #: 1.04 K/UL (ref 0.1–1.2)
AMORPHOUS: NORMAL
AMPHETAMINE SCREEN URINE: NEGATIVE
ANION GAP SERPL CALCULATED.3IONS-SCNC: 12 MMOL/L (ref 9–17)
BACTERIA: NORMAL
BARBITURATE SCREEN URINE: POSITIVE
BASOPHILS # BLD: 1 % (ref 0–2)
BASOPHILS ABSOLUTE: 0.06 K/UL (ref 0–0.2)
BENZODIAZEPINE SCREEN, URINE: NEGATIVE
BILIRUBIN URINE: NEGATIVE
BUN BLDV-MCNC: 9 MG/DL (ref 6–20)
BUN/CREAT BLD: ABNORMAL (ref 9–20)
BUPRENORPHINE URINE: ABNORMAL
CALCIUM SERPL-MCNC: 8.1 MG/DL (ref 8.6–10.4)
CANNABINOID SCREEN URINE: NEGATIVE
CASTS UA: NORMAL /LPF (ref 0–8)
CHLORIDE BLD-SCNC: 99 MMOL/L (ref 98–107)
CO2: 25 MMOL/L (ref 20–31)
COCAINE METABOLITE, URINE: NEGATIVE
COLOR: YELLOW
CREAT SERPL-MCNC: 0.6 MG/DL (ref 0.5–0.9)
CRYSTALS, UA: NORMAL /HPF
DIFFERENTIAL TYPE: ABNORMAL
EOSINOPHILS RELATIVE PERCENT: 0 % (ref 1–4)
EPITHELIAL CELLS UA: NORMAL /HPF (ref 0–5)
GFR AFRICAN AMERICAN: >60 ML/MIN
GFR NON-AFRICAN AMERICAN: >60 ML/MIN
GFR SERPL CREATININE-BSD FRML MDRD: ABNORMAL ML/MIN/{1.73_M2}
GFR SERPL CREATININE-BSD FRML MDRD: ABNORMAL ML/MIN/{1.73_M2}
GLUCOSE BLD-MCNC: 89 MG/DL (ref 70–99)
GLUCOSE URINE: NEGATIVE
HCT VFR BLD CALC: 29 % (ref 36.3–47.1)
HEMOGLOBIN: 8.8 G/DL (ref 11.9–15.1)
IMMATURE GRANULOCYTES: 1 %
KETONES, URINE: NEGATIVE
LACTIC ACID, WHOLE BLOOD: 2.4 MMOL/L (ref 0.7–2.1)
LACTIC ACID, WHOLE BLOOD: 2.9 MMOL/L (ref 0.7–2.1)
LACTIC ACID: ABNORMAL MMOL/L
LEUKOCYTE ESTERASE, URINE: NEGATIVE
LV EF: 74 %
LVEF MODALITY: NORMAL
LYMPHOCYTES # BLD: 44 % (ref 24–43)
MAGNESIUM: 2 MG/DL (ref 1.6–2.6)
MCH RBC QN AUTO: 29.6 PG (ref 25.2–33.5)
MCHC RBC AUTO-ENTMCNC: 30.3 G/DL (ref 28.4–34.8)
MCV RBC AUTO: 97.6 FL (ref 82.6–102.9)
MDMA URINE: ABNORMAL
METHADONE SCREEN, URINE: NEGATIVE
METHAMPHETAMINE, URINE: ABNORMAL
MONOCYTES # BLD: 13 % (ref 3–12)
MUCUS: NORMAL
NITRITE, URINE: NEGATIVE
NRBC AUTOMATED: 0 PER 100 WBC
OPIATES, URINE: POSITIVE
OTHER OBSERVATIONS UA: NORMAL
OXYCODONE SCREEN URINE: NEGATIVE
PDW BLD-RTO: 16.7 % (ref 11.8–14.4)
PH UA: 6 (ref 5–8)
PHENCYCLIDINE, URINE: NEGATIVE
PHENOBARBITAL DATE LAST DOSE: ABNORMAL
PHENOBARBITAL DOSE AMOUNT: ABNORMAL
PHENOBARBITAL TIME LAST DOSE: ABNORMAL
PHENOBARBITAL: 10.7 UG/ML (ref 15–40)
PLATELET # BLD: 486 K/UL (ref 138–453)
PLATELET ESTIMATE: ABNORMAL
PMV BLD AUTO: 8.4 FL (ref 8.1–13.5)
POTASSIUM SERPL-SCNC: 3.6 MMOL/L (ref 3.7–5.3)
PROPOXYPHENE, URINE: ABNORMAL
PROTEIN UA: NEGATIVE
RBC # BLD: 2.97 M/UL (ref 3.95–5.11)
RBC # BLD: ABNORMAL 10*6/UL
RBC UA: NORMAL /HPF (ref 0–4)
RENAL EPITHELIAL, UA: NORMAL /HPF
SEG NEUTROPHILS: 41 % (ref 36–65)
SEGMENTED NEUTROPHILS ABSOLUTE COUNT: 3.23 K/UL (ref 1.5–8.1)
SODIUM BLD-SCNC: 136 MMOL/L (ref 135–144)
SPECIFIC GRAVITY UA: 1.01 (ref 1–1.03)
TEST INFORMATION: ABNORMAL
TRICHOMONAS: NORMAL
TRICYCLIC ANTIDEPRESSANTS, UR: ABNORMAL
TROPONIN INTERP: NORMAL
TROPONIN INTERP: NORMAL
TROPONIN T: NORMAL NG/ML
TROPONIN T: NORMAL NG/ML
TROPONIN, HIGH SENSITIVITY: 10 NG/L (ref 0–14)
TROPONIN, HIGH SENSITIVITY: 6 NG/L (ref 0–14)
TURBIDITY: CLEAR
URINE HGB: NEGATIVE
UROBILINOGEN, URINE: NORMAL
WBC # BLD: 7.9 K/UL (ref 3.5–11.3)
WBC # BLD: ABNORMAL 10*3/UL
WBC UA: NORMAL /HPF (ref 0–5)
YEAST: NORMAL

## 2019-06-03 PROCEDURE — 81001 URINALYSIS AUTO W/SCOPE: CPT

## 2019-06-03 PROCEDURE — 83735 ASSAY OF MAGNESIUM: CPT

## 2019-06-03 PROCEDURE — 6370000000 HC RX 637 (ALT 250 FOR IP): Performed by: STUDENT IN AN ORGANIZED HEALTH CARE EDUCATION/TRAINING PROGRAM

## 2019-06-03 PROCEDURE — 36415 COLL VENOUS BLD VENIPUNCTURE: CPT

## 2019-06-03 PROCEDURE — 6370000000 HC RX 637 (ALT 250 FOR IP): Performed by: INTERNAL MEDICINE

## 2019-06-03 PROCEDURE — 2580000003 HC RX 258: Performed by: STUDENT IN AN ORGANIZED HEALTH CARE EDUCATION/TRAINING PROGRAM

## 2019-06-03 PROCEDURE — 2580000003 HC RX 258: Performed by: INTERNAL MEDICINE

## 2019-06-03 PROCEDURE — 80048 BASIC METABOLIC PNL TOTAL CA: CPT

## 2019-06-03 PROCEDURE — 93306 TTE W/DOPPLER COMPLETE: CPT

## 2019-06-03 PROCEDURE — 87086 URINE CULTURE/COLONY COUNT: CPT

## 2019-06-03 PROCEDURE — 94760 N-INVAS EAR/PLS OXIMETRY 1: CPT

## 2019-06-03 PROCEDURE — 1200000000 HC SEMI PRIVATE

## 2019-06-03 PROCEDURE — 99223 1ST HOSP IP/OBS HIGH 75: CPT | Performed by: PSYCHIATRY & NEUROLOGY

## 2019-06-03 PROCEDURE — 6360000002 HC RX W HCPCS: Performed by: STUDENT IN AN ORGANIZED HEALTH CARE EDUCATION/TRAINING PROGRAM

## 2019-06-03 PROCEDURE — 83605 ASSAY OF LACTIC ACID: CPT

## 2019-06-03 PROCEDURE — 80184 ASSAY OF PHENOBARBITAL: CPT

## 2019-06-03 PROCEDURE — 80307 DRUG TEST PRSMV CHEM ANLYZR: CPT

## 2019-06-03 PROCEDURE — 94640 AIRWAY INHALATION TREATMENT: CPT

## 2019-06-03 PROCEDURE — 85025 COMPLETE CBC W/AUTO DIFF WBC: CPT

## 2019-06-03 PROCEDURE — 6360000002 HC RX W HCPCS: Performed by: INTERNAL MEDICINE

## 2019-06-03 PROCEDURE — 84484 ASSAY OF TROPONIN QUANT: CPT

## 2019-06-03 RX ORDER — AMOXICILLIN AND CLAVULANATE POTASSIUM 875; 125 MG/1; MG/1
1 TABLET, FILM COATED ORAL EVERY 12 HOURS SCHEDULED
Status: DISCONTINUED | OUTPATIENT
Start: 2019-06-04 | End: 2019-06-03

## 2019-06-03 RX ORDER — LANOLIN ALCOHOL/MO/W.PET/CERES
325 CREAM (GRAM) TOPICAL
Status: DISCONTINUED | OUTPATIENT
Start: 2019-06-04 | End: 2019-06-04 | Stop reason: HOSPADM

## 2019-06-03 RX ORDER — FENTANYL CITRATE 50 UG/ML
50 INJECTION, SOLUTION INTRAMUSCULAR; INTRAVENOUS ONCE
Status: COMPLETED | OUTPATIENT
Start: 2019-06-03 | End: 2019-06-03

## 2019-06-03 RX ORDER — PREDNISONE 20 MG/1
40 TABLET ORAL DAILY
Status: DISCONTINUED | OUTPATIENT
Start: 2019-06-04 | End: 2019-06-04 | Stop reason: HOSPADM

## 2019-06-03 RX ORDER — BUSPIRONE HYDROCHLORIDE 5 MG/1
5 TABLET ORAL 2 TIMES DAILY
Status: DISCONTINUED | OUTPATIENT
Start: 2019-06-03 | End: 2019-06-04 | Stop reason: HOSPADM

## 2019-06-03 RX ORDER — LANOLIN ALCOHOL/MO/W.PET/CERES
325 CREAM (GRAM) TOPICAL 2 TIMES DAILY WITH MEALS
Status: DISCONTINUED | OUTPATIENT
Start: 2019-06-03 | End: 2019-06-03

## 2019-06-03 RX ORDER — DIVALPROEX SODIUM 500 MG/1
1000 TABLET, DELAYED RELEASE ORAL EVERY 12 HOURS SCHEDULED
Status: DISCONTINUED | OUTPATIENT
Start: 2019-06-03 | End: 2019-06-04 | Stop reason: HOSPADM

## 2019-06-03 RX ADMIN — DIVALPROEX SODIUM 750 MG: 500 TABLET, DELAYED RELEASE ORAL at 08:49

## 2019-06-03 RX ADMIN — ACETAMINOPHEN 650 MG: 325 TABLET ORAL at 06:03

## 2019-06-03 RX ADMIN — PHENOBARBITAL 32.4 MG: 32.4 TABLET ORAL at 21:00

## 2019-06-03 RX ADMIN — FERROUS SULFATE TAB EC 325 MG (65 MG FE EQUIVALENT) 325 MG: 325 (65 FE) TABLET DELAYED RESPONSE at 09:03

## 2019-06-03 RX ADMIN — METHYLPREDNISOLONE SODIUM SUCCINATE 40 MG: 40 INJECTION, POWDER, FOR SOLUTION INTRAMUSCULAR; INTRAVENOUS at 08:50

## 2019-06-03 RX ADMIN — ACETAMINOPHEN 650 MG: 325 TABLET ORAL at 15:45

## 2019-06-03 RX ADMIN — ENOXAPARIN SODIUM 40 MG: 40 INJECTION SUBCUTANEOUS at 08:49

## 2019-06-03 RX ADMIN — PIPERACILLIN AND TAZOBACTAM 3.38 G: 3; .375 INJECTION, POWDER, FOR SOLUTION INTRAVENOUS at 15:54

## 2019-06-03 RX ADMIN — SODIUM CHLORIDE: 9 INJECTION, SOLUTION INTRAVENOUS at 15:55

## 2019-06-03 RX ADMIN — IPRATROPIUM BROMIDE AND ALBUTEROL SULFATE 1 AMPULE: .5; 3 SOLUTION RESPIRATORY (INHALATION) at 15:32

## 2019-06-03 RX ADMIN — ACETAMINOPHEN 650 MG: 325 TABLET ORAL at 10:35

## 2019-06-03 RX ADMIN — FLUTICASONE PROPIONATE 2 SPRAY: 50 SPRAY, METERED NASAL at 08:50

## 2019-06-03 RX ADMIN — EXTENDED PHENYTOIN SODIUM 150 MG: 30 CAPSULE ORAL at 21:00

## 2019-06-03 RX ADMIN — RISPERIDONE 2 MG: 2 TABLET, FILM COATED ORAL at 21:00

## 2019-06-03 RX ADMIN — BUSPIRONE HYDROCHLORIDE 5 MG: 5 TABLET ORAL at 21:01

## 2019-06-03 RX ADMIN — PHENOBARBITAL 32.4 MG: 32.4 TABLET ORAL at 13:32

## 2019-06-03 RX ADMIN — EXTENDED PHENYTOIN SODIUM 100 MG: 100 CAPSULE ORAL at 13:32

## 2019-06-03 RX ADMIN — Medication 10 ML: at 09:37

## 2019-06-03 RX ADMIN — Medication 2 PUFF: at 08:01

## 2019-06-03 RX ADMIN — DIVALPROEX SODIUM 1000 MG: 500 TABLET, DELAYED RELEASE ORAL at 21:01

## 2019-06-03 RX ADMIN — Medication 1250 MG: at 06:00

## 2019-06-03 RX ADMIN — EXTENDED PHENYTOIN SODIUM 100 MG: 100 CAPSULE ORAL at 08:49

## 2019-06-03 RX ADMIN — ACETAMINOPHEN 650 MG: 325 TABLET ORAL at 21:02

## 2019-06-03 RX ADMIN — PHENOBARBITAL 32.4 MG: 32.4 TABLET ORAL at 09:35

## 2019-06-03 RX ADMIN — MIRTAZAPINE 45 MG: 30 TABLET, FILM COATED ORAL at 21:01

## 2019-06-03 RX ADMIN — ACETAMINOPHEN 650 MG: 325 TABLET ORAL at 00:33

## 2019-06-03 RX ADMIN — Medication 10 ML: at 09:36

## 2019-06-03 RX ADMIN — IPRATROPIUM BROMIDE AND ALBUTEROL SULFATE 1 AMPULE: .5; 3 SOLUTION RESPIRATORY (INHALATION) at 08:01

## 2019-06-03 RX ADMIN — FENTANYL CITRATE 50 MCG: 50 INJECTION, SOLUTION INTRAMUSCULAR; INTRAVENOUS at 16:52

## 2019-06-03 RX ADMIN — LEVOTHYROXINE SODIUM 50 MCG: 50 TABLET ORAL at 06:02

## 2019-06-03 RX ADMIN — PIPERACILLIN AND TAZOBACTAM 3.38 G: 3; .375 INJECTION, POWDER, FOR SOLUTION INTRAVENOUS at 09:35

## 2019-06-03 ASSESSMENT — PAIN SCALES - GENERAL
PAINLEVEL_OUTOF10: 8
PAINLEVEL_OUTOF10: 6
PAINLEVEL_OUTOF10: 0
PAINLEVEL_OUTOF10: 4
PAINLEVEL_OUTOF10: 10
PAINLEVEL_OUTOF10: 5
PAINLEVEL_OUTOF10: 7
PAINLEVEL_OUTOF10: 0
PAINLEVEL_OUTOF10: 0
PAINLEVEL_OUTOF10: 4
PAINLEVEL_OUTOF10: 0

## 2019-06-03 ASSESSMENT — ENCOUNTER SYMPTOMS
EYES NEGATIVE: 1
RESPIRATORY NEGATIVE: 1

## 2019-06-03 NOTE — PROGRESS NOTES
Smoking Cessation - topics covered   []  Health Risks  []  Benefits of Quitting   []  Smoking Cessation  []  Patient has no history of tobacco use  []  Patient is former smoker. []  No need for tobacco cessation education. []  Booklet given  [x]  Patient verbalizes understanding. [x]  Patient denies need for tobacco cessation education. []  Unable to meet with patient today. Will follow up as able.   Pankaj Dominguez  2:36 PM

## 2019-06-03 NOTE — PROGRESS NOTES
Nutrition Assessment    Type and Reason for Visit: Initial, Positive Nutrition Screen(poor appetite)    Nutrition Recommendations: Continue general diet. Recommend obtaining daily wts to accurately assess wt status. Nutrition Assessment:  Pt admitted d/t altered mental status. Pt nutritionally at risk aeb presence of wounds. Pt reports \"okay\" appetite; however, pt consumes % meals and ate 3 breakfasts this morning. UBW unknown by pt - wt increase x 4 mo noted per EMR. Pt states her CBW is the most she has ever weighed - she was dx w/ anorexia/bulimia at 13 yo. Will monitor po intake, weight, labs, and wound progression. Malnutrition Assessment:  · Malnutrition Status: At risk for malnutrition  · Context: Acute illness or injury  · Findings of the 6 clinical characteristics of malnutrition (Minimum of 2 out of 6 clinical characteristics is required to make the diagnosis of moderate or severe Protein Calorie Malnutrition based on AND/ASPEN Guidelines):  1. Energy Intake-Unable to assess, Unable to assess    2. Weight Loss-No significant weight loss,    3. Fat Loss-No significant subcutaneous fat loss,    4. Muscle Loss-No significant muscle mass loss,    5. Fluid Accumulation-Mild fluid accumulation(+1), Extremities  6.  Strength-Not measured    Nutrition Risk Level:  Moderate    Nutrient Needs:  · Estimated Daily Total Kcal: 1131-2567 kcals/d (1.1-1.3)  · Estimated Daily Protein (g): 55-65 g/d (1.1-1.3 g/kg)    Nutrition Diagnosis:   · Problem: Increased nutrient needs  · Etiology: related to Increased demand for energy/nutrients(wound healing)     Signs and symptoms:  as evidenced by Presence of wounds    Objective Information:  · Nutrition-Focused Physical Findings:    · Wound Type: Surgical Wound, Multiple(right hip and thigh)  · Current Nutrition Therapies:  · Oral Diet Orders: General   · Oral Diet intake: %  · Oral Nutrition Supplement (ONS) Orders: None  · Anthropometric Measures:  · Ht: 5' 2.01\" (157.5 cm)   · Current Body Wt: 202 lb (91.6 kg)  · Admission Body Wt: 202 lb (91.6 kg)  · Usual Body Wt: (FRED, unknown by pt)  · % Weight Change:  ,  FRED, 6.9% wt increase x 4 mo per EMR  · Ideal Body Wt: 110 lb (49.9 kg), % Ideal Body 184% (adm/ideal)  · BMI Classification: BMI 35.0 - 39.9 Obese Class II    Nutrition Interventions:   Continue current diet  Continued Inpatient Monitoring, Education Not Indicated    Nutrition Evaluation:   · Evaluation: Goals set   · Goals: Meet at least 85% or more nutrient needs via PO intake    · Monitoring: Nutrition Progression, Meal Intake, I&O, Skin Integrity, Wound Healing, Mental Status/Confusion, Weight, Pertinent Labs, Monitor Bowel Function    Charisma Randall, Dietetic Intern  Electronically signed by Irineo Taylor RD, LD on 6/3/19 at 12:04 PM    Contact Number: [phone]

## 2019-06-03 NOTE — PLAN OF CARE
Pt A&Ox4, able to make needs known, took all meds per order (see eMAR for details). Wound vac intake and secured on pt's IV pole. IVF running per order into R FA PIV. Pt continues to display manipulative behaviors with staff-splitting commentary as well as frequently changing details when assessing pt during various provider rounds. Blood work continues, ECHO completed today. New order received to transfer pt to lower level of acuity to general Med/Surg when a bed is available d/t pt's return to baseline mental status, independence in ADLs, as well as frequent trips off unit (signed waiver form in pt's chart) for extended periods of time. Pt currently off the unit again following neuro team bedside rounds. This nurse will continue to monitor patient during her 7a-7p shift today, Monday 06/03/2019. Problem: Risk for Impaired Skin Integrity  Goal: Tissue integrity - skin and mucous membranes  Description  Structural intactness and normal physiological function of skin and  mucous membranes.   6/3/2019 1441 by Betina Aragon RN  Outcome: Ongoing  6/3/2019 1441 by Betina Aragon RN  Outcome: Ongoing     Problem: Falls - Risk of:  Goal: Will remain free from falls  Description  Will remain free from falls  6/3/2019 1441 by Betina Aragon RN  Outcome: Ongoing  6/3/2019 1441 by Betina Aragon RN  Outcome: Ongoing  Goal: Absence of physical injury  Description  Absence of physical injury  6/3/2019 1441 by Betina Aragon RN  Outcome: Ongoing  6/3/2019 1441 by Betina Aragon RN  Outcome: Ongoing     Problem: Pain:  Goal: Pain level will decrease  Description  Pain level will decrease  6/3/2019 1441 by Betina Aragon RN  Outcome: Ongoing  6/3/2019 1441 by Betina Aragon RN  Outcome: Ongoing  Goal: Control of acute pain  Description  Control of acute pain  6/3/2019 1441 by Betina Aragon RN  Outcome: Ongoing  6/3/2019 1441 by Betina Aragon RN  Outcome: Ongoing  Goal: Control of chronic pain  Description  Control of chronic pain  6/3/2019 1441 by Cinda Hendrix RN  Outcome: Ongoing  6/3/2019 1441 by Cinda Hendrix RN  Outcome: Ongoing     Problem: Nutrition  Goal: Optimal nutrition therapy  Description  Nutrition Problem: Increased nutrient needs  Intervention: Food and/or Nutrient Delivery: Continue current diet  Nutritional Goals: Meet at least 85% or more nutrient needs via PO intake   6/3/2019 1441 by Cinda Hendrix RN  Outcome: Ongoing  6/3/2019 1441 by Cinda Hendrix RN  Outcome: Ongoing

## 2019-06-03 NOTE — CONSULTS
CONSULT NOTE    PATIENT NAME: Aleta Mcdonough  AGE: 62 y.o. MEDICAL RECORD NO. [id number]  DATE: 6/3/2019  SURGEON:  LEAH  PRIMARY CARE PHYSICIAN: Meseret Gunter NP-C    Patient evaluated at the request of  Dr. Paulette Wilkerson  Reason for evaluation: right hip wound    Patient information was obtained from patient and past medical records. History/Exam limitations: none. IMPRESSION:     Patient Active Problem List   Diagnosis    Chest pain    Migraine variant with headache    Drug overdose, accidental or unintentional, initial encounter    Hypothyroidism    Essential hypertension    Seizure disorder ([address]    Drug overdose    History of seizure    Major depressive disorder with psychotic features ([address]    Severe major depression ([address]    Overdose of barbiturate, intentional self-harm, initial encounter ([address]    Intentional phenobarbital overdose ([address]    Severe episode of recurrent major depressive disorder, without psychotic features ([address]    Bronchitis    Suicide attempt ([address]    Major depressive disorder, recurrent ([address]    Ear infection    Severe malnutrition ([address]    Necrotizing fasciitis ([address]    Altered mental status     MEDICAL DECISION MAKING AND PLAN:     1. Wound vac: Site appears clean, dry, and intact. Low suspicion for infection, doubt cause of altered mental status. Wound vac changed today. Recommend consulting wound care for further wound management  2. PE/DVT prophylaxis: okay from surgery standpoint. 3. Altered mental status: Doubt related to wound. Management and work up per primary team.  4. Patient will need home health care for wound vac changes MW.  5. Trauma surgery to sign off at this time. We certainly appreciate being involved in the care of this patient. Thank you for the consultation. Any questions or concerns please do not hesitate to contact trauma surgery.     HISTORY:   History of Chief Complaint:    Bhavani Martinez is a 62 y.o. female who presents with altered mental status. She presented to the ED last night with AMS with hypoxemia that responded to narcan. She was started on vancomycin and zosyn for presumed sepsis. She recently underwent surgical debridement of her right hip due to concerns for necrotizing fascitis on 5/17 with placement of a wound vac. She was discharged and staying in a nursing care facility. She reports increasing subjective fevers and pain at the wound vac site since then. Severity is described as severe. Course of her symptoms over time is acute. Past Medical History   has a past medical history of Arthritis, Asthma, CHF (congestive heart failure) ([address] COPD (chronic obstructive pulmonary disease) ([address] Fibromyalgia, Headache, Hypertension, Movement disorder, Neck fracture ([address] Seizure ([address] and Thyroid disease. Past Surgical History   has a past surgical history that includes knee surgery; partial hysterectomy (cervix not removed); lymph node dissection; Irrigation and debridement (Right, 5/17/2019); EXPLORATION OF WOUND OF EXTREMITY (Right, 5/19/2019); and EXPLORATION OF WOUND OF EXTREMITY (N/A, 5/22/2019). Medications  Prior to Admission medications    Medication Sig Start Date End Date Taking? Authorizing Provider   ibuprofen (ADVIL;MOTRIN) 600 MG tablet Take 1 tablet by mouth every 6 hours 5/24/19  Yes KEANU Hartmann CNP   enoxaparin (LOVENOX) 40 MG/0.4ML injection Inject 0.4 mLs into the skin daily for 14 days While in facility.   Do not take at home 5/25/19 6/8/19 Yes KEANU Hartmann CNP   divalproex (DEPAKOTE) 250 MG DR tablet Take 3 tablets by mouth every 12 hours 3/1/19  Yes KEANU Pickard CNP   phenytoin (DILANTIN) 100 MG ER capsule Take 1 capsule by mouth 3 times daily 3/1/19  Yes KEANU Pickard CNP   mirtazapine (REMERON) 45 MG tablet Take 1 tablet by mouth nightly 3/1/19  Yes KEANU Pickard CNP   risperiDONE (RISPERDAL) 2 MG tablet Take 1 tablet by mouth nightly 3/1/19  Yes KEANU Pickard Meds:.sodium chloride flush, acetaminophen, sodium chloride flush, ondansetron, magnesium sulfate, albuterol sulfate HFA, benzonatate, LORazepam    Allergies  is allergic to imitrex [sumatriptan]; bee pollen; bee venom; bromide ion [bromine]; flexeril [cyclobenzaprine]; neurontin [gabapentin]; nsaids; potassium bromide; reglan [metoclopramide]; sulfa antibiotics; sulfadiazine; toradol [ketorolac tromethamine]; and tramadol. Family History  family history is not on file. Social History   reports that she has been smoking. She has a 6.00 pack-year smoking history. She has never used smokeless tobacco.   reports that she does not drink alcohol. reports that she does not use drugs. REVIEW OF SYSTEMS:     General Positive for subjective fevers  HEENT Denies any diplopia, tinnitus or vertigo  Resp Denies any shortness of breath, cough or wheezing  Cardiac Denies any chest pain, palpitations, claudication or edema  GI Normal   Denies any frequency, urgency, hesitancy or incontinence  Heme Denies bruising or bleeding easily  Endocrine Denies any history of diabetes or thyroid disease  Neuro Denies any focal motor or sensory deficits    PHYSICAL:     VITALS:  height is 5' 2.01\" (1.575 m) and weight is 202 lb 6.1 oz (91.8 kg). Her oral temperature is 98 °F (36.7 °C). Her blood pressure is 122/73 and her pulse is 81. Her respiration is 18 and oxygen saturation is 99%. CONSTITUTIONAL: Alert and oriented times 3, no acute distress and cooperative to examination. HEENT: Normal  NECK: Soft, trachea midline and straight  LUNGS: Chest expands equally bilaterally upon respiration, no accessory muscle used. Ausculation reveals no wheezes, rales or rhonchi. CARDIOVASCULAR: Heart sounds are normal.  Regular rate and rhythm without murmur, gallop or rub.   ABDOMEN: soft, nontender, nondistended, no masses or organomegaly  soft, nontender, nondistended, no masses or organomegaly, no hernias palpable, no guarding or peritoneal signs. Bowel sounds are present in all four quadrants Scars are consistent with previous surgical history. NEUROLOGIC: CN II-XII are grossly intact. There are no focalizing motor or sensory deficits  EXTREMITIES: Wound vac in place over the lateral aspect of the proximal right thigh. No surrounding erythema or warmth. Small amount of drainage in wound vac. LABS:     Recent Labs     06/02/19  0853 06/03/19  0408 06/03/19  0625   WBC 12.3*  --  7.9   HGB 9.9*  --  8.8*   HCT 30.8*  --  29.0*   *  --  486*   *  --  136   K 3.7  --  3.6*   CL 95*  --  99   CO2 27  --  25   BUN 17  --  9   CREATININE 0.90  --  0.60   MG 2.4  --  2.0   CALCIUM 8.2*  --  8.1*   INR 0.9  --   --    AST 56*  --   --    ALT 19  --   --    BILITOT 0.18*  --   --    NITRU  --  NEGATIVE  --    COLORU  --  YELLOW  --    BACTERIA  --  NOT REPORTED  --      Recent Labs     06/02/19  0853   ALKPHOS 124*   ALT 19   AST 56*   BILITOT 0.18*   LABALBU 3.2*     CBC:   Lab Results   Component Value Date    WBC 7.9 06/03/2019    RBC 2.97 06/03/2019    HGB 8.8 06/03/2019    HCT 29.0 06/03/2019    MCV 97.6 06/03/2019    MCH 29.6 06/03/2019    MCHC 30.3 06/03/2019    RDW 16.7 06/03/2019     06/03/2019    MPV 8.4 06/03/2019     Blood Culture:  No components found for: CBLOOD, CFUNGUSBL    RADIOLOGY:     Ct Head Wo Contrast    Result Date: 6/2/2019  EXAMINATION: CT OF THE HEAD WITHOUT CONTRAST  6/2/2019 9:55 am TECHNIQUE: CT of the head was performed without the administration of intravenous contrast. Dose modulation, iterative reconstruction, and/or weight based adjustment of the mA/kV was utilized to reduce the radiation dose to as low as reasonably achievable. COMPARISON: 12 February 2019 HISTORY: ORDERING SYSTEM PROVIDED HISTORY: AMS TECHNOLOGIST PROVIDED HISTORY: FINDINGS: BRAIN/VENTRICLES: There is no acute intracranial hemorrhage, mass effect or midline shift. No abnormal extra-axial fluid collection.   The gray-white was performed with the administration of intravenous contrast.  Multiplanar reformatted images are provided for review. Dose modulation, iterative reconstruction, and/or weight based adjustment of the mA/kV was utilized to reduce the radiation dose to as low as reasonably achievable. COMPARISON: CT abdomen and pelvis 11/04/2018. HISTORY ORDERING SYSTEM PROVIDED HISTORY: Cellulitis overlying right lateral hip and right buttock tox please evaluate for any subcutaneous gas. TECHNOLOGIST PROVIDED HISTORY: Cellulitis overlying right lateral hip and right buttock tox please include soft tissue. FINDINGS: Bones: No fracture or dislocation. No osseous erosion. No suspicious lytic or blastic osseous lesion. Soft Tissue:  Severe subcutaneous fat stranding in the right gluteal area extending into the proximal right thigh sparing the medial aspect. Significant amount of gas in the right gluteal subcutaneous fat. No well-defined drainable fluid collection. Overlying skin thickening is noted. The visualized musculature is within normal limits. No deep fascial involvement identified. No radiopaque foreign body. The neurovascular structures are unremarkable. The urinary bladder is normal in appearance. Status post hysterectomy. The visualized bowel is unremarkable. No free fluid in the pelvis. Mild right inguinal lymphadenopathy. Joint:  Mild degenerative changes of the bilateral hips. Mild bilateral sacroiliac joint and pubic symphysis degenerative changes. 1. Severe subcutaneous fat stranding of the right gluteal region extending into the proximal thigh. Significant gas in the gluteal subcutaneous fat raising the possibility of a necrotizing cellulitis. No well-defined drainable fluid collection or involvement of the underlying musculature. 2. No acute osseous abnormality. Findings were discussed with Dr. Inessa Porter of the [facility] emergency department at 6:38 pm on 5/17/2019. resident performed them. I have discussed the findings, established the care plan and recommendations with resident, TECSS nurse and bedside nurse. The following confirms and/or amends the IMPRESSION, MEDICAL DECISION MAKING and PLAN from above.     ASSESSMENT    Patient Active Problem List   Diagnosis    Chest pain    Migraine variant with headache    Drug overdose, accidental or unintentional, initial encounter    Hypothyroidism    Essential hypertension    Seizure disorder ([address]    Drug overdose    History of seizure    Major depressive disorder with psychotic features ([address]    Severe major depression ([address]    Overdose of barbiturate, intentional self-harm, initial encounter ([address]    Intentional phenobarbital overdose ([address]    Severe episode of recurrent major depressive disorder, without psychotic features ([address]    Bronchitis    Suicide attempt ([address]    Major depressive disorder, recurrent ([address]    Ear infection    Severe malnutrition ([address]    Necrotizing fasciitis ([address]    Altered mental status    Depression    Elevated troponin       MEDICAL DECISION MAKING AND PLAN      Vicki Walsh MD  6/3/2019  2:00 PM

## 2019-06-03 NOTE — CONSULTS
[facility]    Consult / H&P               Today's Date: 6/3/2019  Patient Name: Chelsey العراقي  Date of admission: 6/2/2019  6:46 AM  Patient's age: 62 y.o., 1965  Admission Dx: Altered mental status [R41.82]    Reason for Consult:  Cardiac evaluation    Requesting Physician: Oma Pulido MD    CHIEF COMPLAINT: Altered Mental Status    History Obtained From:  patient, electronic medical record    HISTORY OF PRESENT ILLNESS:    68-year-old female with past medical history of hypertension, epilepsy, hypothyroidism, movement disorder. As per the patient she lives in a nursing home and remembers suddenly feeling hot yesterday after which the next thing she knew she was surrounded by EMS personnel. Patient denied any drug abuse before that. As per EMR she had pinpoint pupils in the ED and responded to Narcan with rapid improvement of mental status. Troponin on admission have been stable 18 and 19. EKG showing no acute changes. She reportede some chest pain, none now    Patient denies any chest pain, shortness of breath. Stress test was normal October 27, 2018. Lipid panel was also normal except high triglycerides previously. Urine drug screen positive for barbiturates and opiates however patient states that she has been taking oxycodone for hourly. Currently patient has a wound VAC on right side for previous necrotizing fascitis as per her it was caused by a unknown bite. Past Medical History:   has a past medical history of Arthritis, Asthma, CHF (congestive heart failure) ([address] COPD (chronic obstructive pulmonary disease) ([address] Fibromyalgia, Headache, Hypertension, Movement disorder, Neck fracture ([address] Seizure ([address] and Thyroid disease.     Past Surgical History:   has a past surgical history that includes knee surgery; partial hysterectomy (cervix not removed); lymph node dissection; Irrigation and debridement (Right, 5/17/2019); EXPLORATION OF WOUND OF EXTREMITY (Right, 5/19/2019); and EXPLORATION OF WOUND OF EXTREMITY (N/A, 5/22/2019). Home Medications:    Prior to Admission medications    Medication Sig Start Date End Date Taking? Authorizing Provider   ibuprofen (ADVIL;MOTRIN) 600 MG tablet Take 1 tablet by mouth every 6 hours 5/24/19  Yes KEANU Leonardo CNP   enoxaparin (LOVENOX) 40 MG/0.4ML injection Inject 0.4 mLs into the skin daily for 14 days While in facility. Do not take at home 5/25/19 6/8/19 Yes KEANU Leonardo CNP   divalproex (DEPAKOTE) 250 MG DR tablet Take 3 tablets by mouth every 12 hours 3/1/19  Yes KEANU Sales CNP   phenytoin (DILANTIN) 100 MG ER capsule Take 1 capsule by mouth 3 times daily 3/1/19  Yes KEANU Sales CNP   mirtazapine (REMERON) 45 MG tablet Take 1 tablet by mouth nightly 3/1/19  Yes KAENU Sales CNP   risperiDONE (RISPERDAL) 2 MG tablet Take 1 tablet by mouth nightly 3/1/19  Yes KEANU Sales CNP   metoprolol tartrate (LOPRESSOR) 50 MG tablet Take 1 tablet by mouth 2 times daily 3/1/19  Yes KEANU Sales CNP   hydrochlorothiazide (HYDRODIURIL) 25 MG tablet Take 1 tablet by mouth daily 3/2/19  Yes KEANU Sales CNP   PHENobarbital (LUMINAL) 32.4 MG tablet Take 32.4 mg by mouth 3 times daily. .   Yes Historical Provider, MD   famotidine (PEPCID) 20 MG tablet Take 20 mg by mouth 2 times daily   Yes Historical Provider, MD   levothyroxine (SYNTHROID) 50 MCG tablet Take 50 mcg by mouth Daily   Yes Historical Provider, MD   aspirin (ASPIRIN LOW DOSE) 81 MG EC tablet Take 1 tablet by mouth daily for 7 days Gap prescription until follow up with primary. Do not refill. Follow up with primary 5/25/19 6/1/19  KEANU Leonardo CNP   albuterol sulfate  (90 Base) MCG/ACT inhaler Inhale 2 puffs into the lungs every 6 hours as needed for Wheezing Gap prescription until follow up with primary. Do not refill.   Follow up with primary 5/24/19   KEANU Leonardo - CNP      Current Facility-Administered Medications: ferrous sulfate EC tablet 325 mg, 325 mg, Oral, BID WC  vancomycin (VANCOCIN) 1250 mg in dextrose 5 % 250 mL IVPB, 1,250 mg, Intravenous, Q12H  vancomycin (VANCOCIN) intermittent dosing (placeholder), , Other, RX Placeholder  0.9 % sodium chloride bolus, 1,000 mL, Intravenous, Once  sodium chloride flush 0.9 % injection 10 mL, 10 mL, Intravenous, 2 times per day  sodium chloride flush 0.9 % injection 10 mL, 10 mL, Intravenous, PRN  acetaminophen (TYLENOL) tablet 650 mg, 650 mg, Oral, Q4H PRN  0.9 % sodium chloride infusion, , Intravenous, Continuous  sodium chloride flush 0.9 % injection 10 mL, 10 mL, Intravenous, 2 times per day  sodium chloride flush 0.9 % injection 10 mL, 10 mL, Intravenous, PRN  ondansetron (ZOFRAN) injection 4 mg, 4 mg, Intravenous, Q6H PRN  enoxaparin (LOVENOX) injection 40 mg, 40 mg, Subcutaneous, Daily  magnesium sulfate 1 g in dextrose 5% 100 mL IVPB, 1 g, Intravenous, PRN  albuterol sulfate  (90 Base) MCG/ACT inhaler 2 puff, 2 puff, Inhalation, Q6H PRN  ipratropium-albuterol (DUONEB) nebulizer solution 1 ampule, 1 ampule, Inhalation, Q4H WA  mometasone-formoterol (DULERA) 200-5 MCG/ACT inhaler 2 puff, 2 puff, Inhalation, BID **AND** MDI Treatment, , , BID  methylPREDNISolone sodium (SOLU-MEDROL) injection 40 mg, 40 mg, Intravenous, Daily  benzonatate (TESSALON) capsule 100 mg, 100 mg, Oral, TID PRN  fluticasone (FLONASE) 50 MCG/ACT nasal spray 2 spray, 2 spray, Each Nostril, Daily  divalproex (DEPAKOTE) DR tablet 750 mg, 750 mg, Oral, 2 times per day  mirtazapine (REMERON) tablet 45 mg, 45 mg, Oral, Nightly  PHENobarbital (LUMINAL) tablet 32.4 mg, 32.4 mg, Oral, TID  phenytoin (DILANTIN) ER capsule 100 mg, 100 mg, Oral, TID  risperiDONE (RISPERDAL) tablet 2 mg, 2 mg, Oral, Nightly  levothyroxine (SYNTHROID) tablet 50 mcg, 50 mcg, Oral, Daily  LORazepam (ATIVAN) injection 1 mg, 1 mg, Intravenous, Q6H PRN  piperacillin-tazobactam (ZOSYN) 3.375 g in dextrose 5 % 50 mL IVPB extended infusion (mini-bag), 3.375 g, Intravenous, Q8H    Allergies:  Imitrex [sumatriptan]; Bee pollen; Bee venom; Bromide ion [bromine]; Flexeril [cyclobenzaprine]; Neurontin [gabapentin]; Nsaids; Potassium bromide; Reglan [metoclopramide]; Sulfa antibiotics; Sulfadiazine; Toradol [ketorolac tromethamine]; and Tramadol    Social History:   reports that she has been smoking. She has a 6.00 pack-year smoking history. She has never used smokeless tobacco. She reports that she does not drink alcohol or use drugs. Family History: family history is not on file. No h/o sudden cardiac death for premature CAD    REVIEW OF SYSTEMS:    · Constitutional: Loss of consciousness. · Eyes: No visual changes or diplopia. No scleral icterus. · ENT: No Headaches  · Cardiovascular: No cardiac history  · Respiratory: No previous pulmonary problems, No cough  · Gastrointestinal: No abdominal pain. No change in bowel or bladder habits. · Genitourinary: No dysuria, trouble voiding, or hematuria. · Musculoskeletal:  No gait disturbance, No weakness or joint complaints. · Integumentary: No rash or pruritis. · Neurological: No headache, diplopia, change in muscle strength, numbness or tingling. No change in gait, balance, coordination, mood, affect, memory, mentation, behavior. · Psychiatric: No anxiety, or depression. · Endocrine: No temperature intolerance. No excessive thirst, fluid intake, or urination. No tremor. · Hematologic/Lymphatic: No abnormal bruising or bleeding, blood clots or swollen lymph nodes. · Allergic/Immunologic: No nasal congestion or hives. PHYSICAL EXAM:      /73   Pulse 81   Temp 98 °F (36.7 °C) (Oral)   Resp 18   Ht 5' 2.01\" (1.575 m)   Wt 202 lb 6.1 oz (91.8 kg)   SpO2 97%   BMI 37.01 kg/m²    Constitutional and General Appearance: alert, cooperative, no distress and appears stated age  HEENT: PERRL, no cervical lymphadenopathy. No masses palpable. Normal oral mucosa  Respiratory:  · Normal excursion and expansion without use of accessory muscles  · Resp Auscultation: Good respiratory effort. No for increased work of breathing. On auscultation: clear to auscultation bilaterally  Cardiovascular:  · The apical impulse is not displaced  · Heart tones are crisp and normal. regular S1 and S2.  · Jugular venous pulsation Normal  · The carotid upstroke is normal in amplitude and contour without delay or bruit  · Peripheral pulses are symmetrical and full   Abdomen:   · No masses or tenderness  · Bowel sounds present  Extremities:  ·  No Cyanosis or Clubbing  ·  Lower extremity edema: No  ·  Skin: Warm and dry  Neurological:  · Alert and oriented. · Moves all extremities well  · No abnormalities of mood, affect, memory, mentation, or behavior are noted    DATA:    Diagnostics:    EKG: Normal EKG, nonspecific changes. Unchanged from previous tracings. Stress Test: .  Negative Lexiscan stress test 10/27/18. Labs:     CBC:   Recent Labs     06/02/19  0853   WBC 12.3*   HGB 9.9*   HCT 30.8*   *     BMP:   Recent Labs     06/02/19  0853   *   K 3.7   CO2 27   BUN 17   CREATININE 0.90   LABGLOM >60   GLUCOSE 96     BNP: No results for input(s): BNP in the last 72 hours. PT/INR:   Recent Labs     06/02/19  0853   PROTIME 9.9   INR 0.9     APTT:  Recent Labs     06/02/19  0853   APTT 21.1     CARDIAC ENZYMES:No results for input(s): CKTOTAL, CKMB, CKMBINDEX, TROPONINI in the last 72 hours. FASTING LIPID PANEL:  Lab Results   Component Value Date    HDL 56 02/19/2019    TRIG 150 02/19/2019     LIVER PROFILE:  Recent Labs     06/02/19  0853   AST 56*   ALT 19   LABALBU 3.2*       IMPRESSION:    1. Chest pain, non specific, atypical  2, HTN  3.  Overdose history  Hypothyroid  Patient Active Problem List   Diagnosis    Chest pain    Migraine variant with headache    Drug overdose, accidental or unintentional, initial encounter    Hypothyroidism    Essential hypertension    Seizure disorder ([facility] [address]    Drug overdose    History of seizure    Major depressive disorder with psychotic features ([facility] [address]    Severe major depression ([facility] [address]    Overdose of barbiturate, intentional self-harm, initial encounter ([facility] [address]    Intentional phenobarbital overdose ([facility] [address]    Severe episode of recurrent major depressive disorder, without psychotic features ([address]    Bronchitis    Suicide attempt ([facility] [address]    Major depressive disorder, recurrent ([address]    Ear infection    Severe malnutrition ([address]    Necrotizing fasciitis ([address]    Altered mental status       RECOMMENDATIONS:  1. Currently patient is asymptomatic with EKG showing no changes and troponins are pending stable. Will trend 2 more troponin 6 hourly. 2.  Patient has a history of smoking and advised her to abstain from smoking. Can continue to take aspirin. 3. Cause of syncope unknown at this time. Ordered TTE to rule out structural heart disease. Continue to follow. Discussed with patient and Nurse. Nathalie Beltran MD  [facility]         6/3/2019, 9:13 AM  [phone]    [facility]      [phone]      I have reviewed the case / procedure with resident / fellow  I have examined the patient personally  Patient agree with treatment plan, correction innotes was made as appropriate, and discussed final arrangement based on  my evaluation and exam.    Risk and benefit of procedure if planned were explained in details. Procedure was performed by me, with all aspect of the procedure being done using standard protocol. Note was modified based on my own assessment and treatment.     Gavin Mary MD  [facility]

## 2019-06-03 NOTE — CARE COORDINATION
Met with pt who was alert and oriented to complete SBIRT. Pt denies any drug or alcohol use. 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 She has also been diagnosed with PTSD and has night terrors related to her mother's murder. Pt states that she takes meds for these things and does okay handling them. Alcohol Screening and Brief Intervention        No results for input(s): ALC in the last 72 hours. Alcohol Pre-screening     (WOMEN ONLY) How many times in the past year have you had 4 or more drinks in a day?: None    Alcohol Screening Audit       Drug Pre-Screening   How many times in the past year have you used a recreational drug or used a prescription medication for nonmedical reasons?: None    Drug Screening DAST       Mood Pre-Screening (PHQ-2)  During the past two weeks, have you been bothered by little interest or pleasure in doing things?: No  During the past two weeks, have you been bothered by feeling down, depressed, or hopeless?: No    Mood Pre-Screening (PHQ-9)         I have interviewed Lia Simpsonann, [id number] regarding  Her alcohol consumption/drug use and risk for excessive use. Screenings were negative. Patient  N/A intervention at this time. Please see social work note regarding intervention.     Deferred []    Completed on: 6/3/2019   LEANN Sandhu

## 2019-06-03 NOTE — PROGRESS NOTES
Occupational Therapy Not Seen Note    DATE: 6/3/2019  Name: Shantell Hoffmann  : 1965  MRN: [id number]    Patient not available for Occupational Therapy due to:    Pt independent with functional mobility and functional tasks.  Pt with no OT acute care needs at this time, will defer OT eval.    Next Scheduled Treatment: NA     Electronically signed by Wava Apley, OTR/L on 6/3/2019 at 10:29 AM

## 2019-06-03 NOTE — CONSULTS
Neurology Consult Note          CHIEF COMPLAINT:  Altered mental status for 1 day    History Obtained From:  patient, electronic medical record       HISTORY OF PRESENT ILLNESS:              The patient is a 62 y.o. female with significant past medical history of hypertension, epilepsy, hypothyroidism, and movement disorder  who presents with altered mental status for 1 day. Patient mentions that she was having subjective fever and then she remembers that she woke up finding on the floor and does not recall what happened in between. In the ED, she was lethargic, sleepy, and hypotensive. Blood pressure 95/58. Pulse 86. Saturation 88%. Temp 98.8. She received 0.4 gram of Narcan with improvement of her mentation as per notes. Troponins went up from 7-18-19. EKG showed nonspecific ST wave changes. White blood cells 12.3. Hemoglobin 9.9. Chest x-ray unremarkable. CT head showed no acute abnormality with chronic sinusitis. He was started on broad-spectrum antibiotics in the ED and given fluid bolus. AEDs including Depakote, Dilantin, Luminal, were resumed. EEG January 2019: Mild slowing consisting of 5-7 Hz theta activity. Intermittent slowing and bifrontal areas with delta frequency lasting 1-2 seconds. Findings consistent with diffuse encephalopathy. No epileptiform discharges. No clinical or EEG seizures. Patient has been having epilepsy since the age of 3. She has generalized tonic-clonic seizures. Last seizure episode was a month ago and before that a few months ago. She has been on many regimens and reports that the current regimen of Depakote, Dilantin, and luminal has been the best for seizure control. She moved 5 months ago from [place] and that was the last time seen in neurology clinic.     Luminal 32.4 mg by mouth 3 times a day  Depakote 750 milligrams by mouth twice a day  Dilantin 100 mg by mouth 3 times a day    Past Medical History:        Diagnosis Date    Arthritis  Asthma     [facility]     COPD [facility]ch[facility] obstructive pulmonary disease) [facility]     Fibromyalgia     Headache     Hypertension     Movement disorder     [facility]     Seizure [facility]     Thyroid disease      Past Surgical History:        Procedure Laterality Date    EXPLORATION OF WOUND OF EXTREMITY Right 5/19/2019    RIGHT THIGH WOUND WASHOUT WITH [address] PLACEMENT performed by Mukul Gillis MD at [address] N/A 5/22/2019    RIGHT WOUND HIP EXAMINATION LAVAGE AND WOUND VAC PLACEMENT performed by Niyah Hobson MD at [address] [place] [address] Right 5/17/2019    IRRIGATION, DEBRIDEMENT RIGHT THIGH performed by Cruz Sol MD at [facility]      PARTIAL HYSTERECTOMY       Current Medications:   Current Facility-Administered Medications: ferrous sulfate EC tablet 325 mg, 325 mg, Oral, BID WC  vancomycin (VANCOCIN) 1250 mg in dextrose 5 % 250 mL IVPB, 1,250 mg, Intravenous, Q12H  vancomycin (VANCOCIN) intermittent dosing (placeholder), , Other, RX Placeholder  0.9 % sodium chloride bolus, 1,000 mL, Intravenous, Once  sodium chloride flush 0.9 % injection 10 mL, 10 mL, Intravenous, 2 times per day  sodium chloride flush 0.9 % injection 10 mL, 10 mL, Intravenous, PRN  acetaminophen (TYLENOL) tablet 650 mg, 650 mg, Oral, Q4H PRN  0.9 % sodium chloride infusion, , Intravenous, Continuous  sodium chloride flush 0.9 % injection 10 mL, 10 mL, Intravenous, 2 times per day  sodium chloride flush 0.9 % injection 10 mL, 10 mL, Intravenous, PRN  ondansetron (ZOFRAN) injection 4 mg, 4 mg, Intravenous, Q6H PRN  enoxaparin (LOVENOX) injection 40 mg, 40 mg, Subcutaneous, Daily  magnesium sulfate 1 g in dextrose 5% 100 mL IVPB, 1 g, Intravenous, PRN  albuterol sulfate  (90 Base) MCG/ACT inhaler 2 puff, 2 puff, Inhalation, Q6H PRN  ipratropium-albuterol (DUONEB) nebulizer solution 1 ampule, 1 ampule, Inhalation, Q4H WA  mometasone-formoterol (DULERA) 200-5 MCG/ACT inhaler 2 puff, 2 puff, Inhalation, BID **AND** MDI Treatment, , , BID  methylPREDNISolone sodium (SOLU-MEDROL) injection 40 mg, 40 mg, Intravenous, Daily  benzonatate (TESSALON) capsule 100 mg, 100 mg, Oral, TID PRN  fluticasone (FLONASE) 50 MCG/ACT nasal spray 2 spray, 2 spray, Each Nostril, Daily  divalproex (DEPAKOTE) DR tablet 750 mg, 750 mg, Oral, 2 times per day  mirtazapine (REMERON) tablet 45 mg, 45 mg, Oral, Nightly  PHENobarbital (LUMINAL) tablet 32.4 mg, 32.4 mg, Oral, TID  phenytoin (DILANTIN) ER capsule 100 mg, 100 mg, Oral, TID  risperiDONE (RISPERDAL) tablet 2 mg, 2 mg, Oral, Nightly  levothyroxine (SYNTHROID) tablet 50 mcg, 50 mcg, Oral, Daily  LORazepam (ATIVAN) injection 1 mg, 1 mg, Intravenous, Q6H PRN  piperacillin-tazobactam (ZOSYN) 3.375 g in dextrose 5 % 50 mL IVPB extended infusion (mini-bag), 3.375 g, Intravenous, Q8H  Allergies:  Imitrex [sumatriptan]; Bee pollen; Bee venom; Bromide ion [bromine]; Flexeril [cyclobenzaprine]; Neurontin [gabapentin]; Nsaids; Potassium bromide; Reglan [metoclopramide]; Sulfa antibiotics; Sulfadiazine; Toradol [ketorolac tromethamine]; and Tramadol    Social History:  TOBACCO:   reports that she has been smoking. She has a 6.00 pack-year smoking history. She has never used smokeless tobacco.  ETOH:   reports that she does not drink alcohol. DRUGS:   reports that she does not use drugs. ACTIVITIES OF DAILY LIVING:    INSTRUMENTAL ACTIVITIES OF DAILY LIVING:  Patient is able to perform all instrumental activities of daily living. Family History:   History reviewed. No pertinent family history. REVIEW OF SYSTEMS:  Review of Systems   Constitutional: Positive for fever. HENT: Negative. Eyes: Negative. Respiratory: Negative. Cardiovascular: Negative. Genitourinary: Negative. Neurological: Positive for seizures.  Negative for dizziness, tremors, syncope, Description . BLOOD     Special Requests L FEM CENTRAL LINE 10ML     Culture NO GROWTH 21 HOURS    Ammonia    Collection Time: 06/02/19  8:53 AM   Result Value Ref Range    Ammonia 30 11 - 51 umol/L   CBC Auto Differential    Collection Time: 06/02/19  8:53 AM   Result Value Ref Range    WBC 12.3 (H) 3.5 - 11.3 k/uL    RBC 3.28 (L) 3.95 - 5.11 m/uL    Hemoglobin 9.9 (L) 11.9 - 15.1 g/dL    Hematocrit 30.8 (L) 36.3 - 47.1 %    MCV 93.9 82.6 - 102.9 fL    MCH 30.2 25.2 - 33.5 pg    MCHC 32.1 28.4 - 34.8 g/dL    RDW 16.3 (H) 11.8 - 14.4 %    Platelets 229 (H) 643 - 453 k/uL    MPV 8.3 8.1 - 13.5 fL    NRBC Automated 0.0 0.0 per 100 WBC    Differential Type NOT REPORTED     WBC Morphology NOT REPORTED     RBC Morphology NOT REPORTED     Platelet Estimate NOT REPORTED     Immature Granulocytes 1 (H) 0 %    Seg Neutrophils 61 36 - 66 %    Lymphocytes 35 24 - 44 %    Monocytes 3 1 - 7 %    Eosinophils % 0 (L) 1 - 4 %    Basophils 0 0 - 2 %    Absolute Immature Granulocyte 0.12 0.00 - 0.30 k/uL    Segs Absolute 7.50 1.8 - 7.7 k/uL    Absolute Lymph # 4.31 1.0 - 4.8 k/uL    Absolute Mono # 0.37 0.1 - 0.8 k/uL    Absolute Eos # 0.00 0.0 - 0.4 k/uL    Basophils # 0.00 0.0 - 0.2 k/uL    Morphology ANISOCYTOSIS PRESENT    Comprehensive Metabolic Panel    Collection Time: 06/02/19  8:53 AM   Result Value Ref Range    Glucose 96 70 - 99 mg/dL    BUN 17 6 - 20 mg/dL    CREATININE 0.90 0.50 - 0.90 mg/dL    Bun/Cre Ratio NOT REPORTED 9 - 20    Calcium 8.2 (L) 8.6 - 10.4 mg/dL    Sodium 132 (L) 135 - 144 mmol/L    Potassium 3.7 3.7 - 5.3 mmol/L    Chloride 95 (L) 98 - 107 mmol/L    CO2 27 20 - 31 mmol/L    Anion Gap 10 9 - 17 mmol/L    Alkaline Phosphatase 124 (H) 35 - 104 U/L    ALT 19 5 - 33 U/L    AST 56 (H) <32 U/L    Total Bilirubin 0.18 (L) 0.3 - 1.2 mg/dL    Total Protein 7.7 6.4 - 8.3 g/dL    Alb 3.2 (L) 3.5 - 5.2 g/dL    Albumin/Globulin Ratio 0.7 (L) 1.0 - 2.5    GFR Non-African American >60 >60 mL/min    GFR African American >60 >60 mL/min    GFR Comment          GFR Staging NOT REPORTED    Magnesium    Collection Time: 06/02/19  8:53 AM   Result Value Ref Range    Magnesium 2.4 1.6 - 2.6 mg/dL   Protime-INR    Collection Time: 06/02/19  8:53 AM   Result Value Ref Range    Protime 9.9 9.0 - 12.0 sec    INR 0.9    APTT    Collection Time: 06/02/19  8:53 AM   Result Value Ref Range    PTT 21.1 20.5 - 30.5 sec   Troponin    Collection Time: 06/02/19  8:53 AM   Result Value Ref Range    Troponin, High Sensitivity 18 (H) 0 - 14 ng/L    Troponin T NOT REPORTED <0.03 ng/mL    Troponin Interp NOT REPORTED    Phenytoin Level, Total    Collection Time: 06/02/19  8:53 AM   Result Value Ref Range    Phenytoin Lvl 2.1 (L) 10.0 - 20.0 ug/mL    Phenytoin Dose Amount NOT REPORTED     Phenytoin Date Last Dose NOT REPORTED     Phenytoin Dose Time NOT REPORTED    Iron and TIBC    Collection Time: 06/02/19  8:53 AM   Result Value Ref Range    Iron 32 (L) 37 - 145 ug/dL    TIBC 373 250 - 450 ug/dL    Iron Saturation 9 (L) 20 - 55 %    UIBC 341 112 - 347 ug/dL   TSH with Reflex    Collection Time: 06/02/19  8:53 AM   Result Value Ref Range    TSH 3.69 0.30 - 5.00 mIU/L   Lactate, Sepsis    Collection Time: 06/02/19  8:54 AM   Result Value Ref Range    Lactic Acid, Sepsis NOT REPORTED 0.5 - 1.9 mmol/L    Lactic Acid, Sepsis, Whole Blood 1.5 0.5 - 1.9 mmol/L   Culture Blood #1    Collection Time: 06/02/19  9:30 AM   Result Value Ref Range    Specimen Description . BLOOD     Special Requests NOT REPORTED     Culture       SPECIMEN SUBMITTED WAS INCORRECTLY IDENTIFIED NOTIFIED RN [facility] AT 7801 06/02/2019   Troponin    Collection Time: 06/02/19 10:23 AM   Result Value Ref Range    Troponin, High Sensitivity 19 (H) 0 - 14 ng/L    Troponin T NOT REPORTED <0.03 ng/mL    Troponin Interp NOT REPORTED    Phenytoin Level, Total and Free    Collection Time: 06/02/19  1:12 PM   Result Value Ref Range    Phenytoin Lvl MOVED TO [id number] ug/mL    Phenytoin Dose Amount NOT Comment          GFR Staging NOT REPORTED    Lactic acid, plasma    Collection Time: 06/03/19  6:25 AM   Result Value Ref Range    Lactic Acid NOT REPORTED mmol/L    Lactic Acid, Whole Blood 2.9 (H) 0.7 - 2.1 mmol/L   Magnesium    Collection Time: 06/03/19  6:25 AM   Result Value Ref Range    Magnesium 2.0 1.6 - 2.6 mg/dL   CBC auto differential    Collection Time: 06/03/19  6:25 AM   Result Value Ref Range    WBC 7.9 3.5 - 11.3 k/uL    RBC 2.97 (L) 3.95 - 5.11 m/uL    Hemoglobin 8.8 (L) 11.9 - 15.1 g/dL    Hematocrit 29.0 (L) 36.3 - 47.1 %    MCV 97.6 82.6 - 102.9 fL    MCH 29.6 25.2 - 33.5 pg    MCHC 30.3 28.4 - 34.8 g/dL    RDW 16.7 (H) 11.8 - 14.4 %    Platelets 042 (H) 662 - 453 k/uL    MPV 8.4 8.1 - 13.5 fL    NRBC Automated 0.0 0.0 per 100 WBC    Differential Type NOT REPORTED     WBC Morphology NOT REPORTED     RBC Morphology ANISOCYTOSIS PRESENT     Platelet Estimate NOT REPORTED     Seg Neutrophils 41 36 - 65 %    Lymphocytes 44 (H) 24 - 43 %    Monocytes 13 (H) 3 - 12 %    Eosinophils % 0 (L) 1 - 4 %    Basophils 1 0 - 2 %    Immature Granulocytes 1 (H) 0 %    Segs Absolute 3.23 1.50 - 8.10 k/uL    Absolute Lymph # 3.48 1.10 - 3.70 k/uL    Absolute Mono # 1.04 0.10 - 1.20 k/uL    Absolute Eos # 0.03 0.00 - 0.44 k/uL    Basophils # 0.06 0.00 - 0.20 k/uL    Absolute Immature Granulocyte 0.06 0.00 - 0.30 k/uL       IMAGING   CT head without contrast 6/2/2019  No acute intracranial abnormality.  Redemonstration of moderate sinus   inflammation similar to that noted on prior exam.       IMPRESSION  Breakthrough seizure  Subtherapeutic Depakote level  Subtherapeutic Dilantin level    RECOMMENDATIONS:   Will discuss regarding increasing Depakote, and Dilantin dose  Will obtain a phenobarbital level  Seizure precautions  Ativan 2 mg IV for generalized motor seizures  Will follow up in clinic in 2 weeks after discharge    Thank you for the consultation. Will follow.  Case consulted with Dr. Kelly Daugherty, MD  Neurology Resident PGY-2  6/3/2019 at 8:35 AM

## 2019-06-03 NOTE — PROGRESS NOTES
Physical Therapy  DATE: 6/3/2019    NAME: Deloris Neri  MRN: [id number]   : 1965    Patient not seen this date for Physical Therapy due to:  [] Blood transfusion in progress  [] Hemodialysis  []  Patient Declined  [] Spine Precautions   [] Strict Bedrest  [] Surgery/ Procedure  [] Testing      [] Other        [x] PT being discontinued at this time. Patient independent. No further needs. [] PT being discontinued at this time as the patient has been transferred to palliative care. No further needs.     Kaveh Mcgovern, PT

## 2019-06-04 VITALS
DIASTOLIC BLOOD PRESSURE: 65 MMHG | WEIGHT: 202.38 LBS | BODY MASS INDEX: 37.24 KG/M2 | HEART RATE: 87 BPM | HEIGHT: 62 IN | RESPIRATION RATE: 16 BRPM | SYSTOLIC BLOOD PRESSURE: 118 MMHG | TEMPERATURE: 97.8 F | OXYGEN SATURATION: 93 %

## 2019-06-04 LAB
ABSOLUTE EOS #: 0.03 K/UL (ref 0–0.44)
ABSOLUTE IMMATURE GRANULOCYTE: 0.04 K/UL (ref 0–0.3)
ABSOLUTE LYMPH #: 2.7 K/UL (ref 1.1–3.7)
ABSOLUTE MONO #: 0.53 K/UL (ref 0.1–1.2)
ANION GAP SERPL CALCULATED.3IONS-SCNC: 11 MMOL/L (ref 9–17)
BASOPHILS # BLD: 0 % (ref 0–2)
BASOPHILS ABSOLUTE: <0.03 K/UL (ref 0–0.2)
BUN BLDV-MCNC: 7 MG/DL (ref 6–20)
BUN/CREAT BLD: ABNORMAL (ref 9–20)
CALCIUM SERPL-MCNC: 8.3 MG/DL (ref 8.6–10.4)
CHLORIDE BLD-SCNC: 100 MMOL/L (ref 98–107)
CO2: 24 MMOL/L (ref 20–31)
CREAT SERPL-MCNC: 0.51 MG/DL (ref 0.5–0.9)
CULTURE: NO GROWTH
DIFFERENTIAL TYPE: ABNORMAL
EOSINOPHILS RELATIVE PERCENT: 1 % (ref 1–4)
GFR AFRICAN AMERICAN: >60 ML/MIN
GFR NON-AFRICAN AMERICAN: >60 ML/MIN
GFR SERPL CREATININE-BSD FRML MDRD: ABNORMAL ML/MIN/{1.73_M2}
GFR SERPL CREATININE-BSD FRML MDRD: ABNORMAL ML/MIN/{1.73_M2}
GLUCOSE BLD-MCNC: 81 MG/DL (ref 70–99)
HCT VFR BLD CALC: 28.2 % (ref 36.3–47.1)
HEMOGLOBIN: 8.8 G/DL (ref 11.9–15.1)
IMMATURE GRANULOCYTES: 1 %
LYMPHOCYTES # BLD: 44 % (ref 24–43)
Lab: NORMAL
MAGNESIUM: 1.7 MG/DL (ref 1.6–2.6)
MCH RBC QN AUTO: 30 PG (ref 25.2–33.5)
MCHC RBC AUTO-ENTMCNC: 31.2 G/DL (ref 28.4–34.8)
MCV RBC AUTO: 96.2 FL (ref 82.6–102.9)
MONOCYTES # BLD: 9 % (ref 3–12)
NRBC AUTOMATED: 0 PER 100 WBC
PDW BLD-RTO: 16.9 % (ref 11.8–14.4)
PLATELET # BLD: 344 K/UL (ref 138–453)
PLATELET ESTIMATE: ABNORMAL
PMV BLD AUTO: 8.6 FL (ref 8.1–13.5)
POTASSIUM SERPL-SCNC: 3.5 MMOL/L (ref 3.7–5.3)
RBC # BLD: 2.93 M/UL (ref 3.95–5.11)
RBC # BLD: ABNORMAL 10*6/UL
SEG NEUTROPHILS: 46 % (ref 36–65)
SEGMENTED NEUTROPHILS ABSOLUTE COUNT: 2.83 K/UL (ref 1.5–8.1)
SODIUM BLD-SCNC: 135 MMOL/L (ref 135–144)
SPECIMEN DESCRIPTION: NORMAL
WBC # BLD: 6.2 K/UL (ref 3.5–11.3)
WBC # BLD: ABNORMAL 10*3/UL

## 2019-06-04 PROCEDURE — 6360000002 HC RX W HCPCS: Performed by: STUDENT IN AN ORGANIZED HEALTH CARE EDUCATION/TRAINING PROGRAM

## 2019-06-04 PROCEDURE — 80048 BASIC METABOLIC PNL TOTAL CA: CPT

## 2019-06-04 PROCEDURE — 83735 ASSAY OF MAGNESIUM: CPT

## 2019-06-04 PROCEDURE — 6370000000 HC RX 637 (ALT 250 FOR IP): Performed by: STUDENT IN AN ORGANIZED HEALTH CARE EDUCATION/TRAINING PROGRAM

## 2019-06-04 PROCEDURE — 95816 EEG AWAKE AND DROWSY: CPT | Performed by: PSYCHIATRY & NEUROLOGY

## 2019-06-04 PROCEDURE — 95819 EEG AWAKE AND ASLEEP: CPT

## 2019-06-04 PROCEDURE — 2580000003 HC RX 258: Performed by: STUDENT IN AN ORGANIZED HEALTH CARE EDUCATION/TRAINING PROGRAM

## 2019-06-04 PROCEDURE — 36415 COLL VENOUS BLD VENIPUNCTURE: CPT

## 2019-06-04 PROCEDURE — 6370000000 HC RX 637 (ALT 250 FOR IP): Performed by: INTERNAL MEDICINE

## 2019-06-04 PROCEDURE — 99233 SBSQ HOSP IP/OBS HIGH 50: CPT | Performed by: PSYCHIATRY & NEUROLOGY

## 2019-06-04 PROCEDURE — 85025 COMPLETE CBC W/AUTO DIFF WBC: CPT

## 2019-06-04 PROCEDURE — 99239 HOSP IP/OBS DSCHRG MGMT >30: CPT | Performed by: INTERNAL MEDICINE

## 2019-06-04 RX ORDER — DIVALPROEX SODIUM 500 MG/1
1000 TABLET, DELAYED RELEASE ORAL EVERY 12 HOURS SCHEDULED
Qty: 90 TABLET | Refills: 3 | Status: ON HOLD | OUTPATIENT
Start: 2019-06-04 | End: 2019-06-12 | Stop reason: HOSPADM

## 2019-06-04 RX ORDER — PHENOBARBITAL 32.4 MG/1
32.4 TABLET ORAL 2 TIMES DAILY
Status: DISCONTINUED | OUTPATIENT
Start: 2019-06-04 | End: 2019-06-04 | Stop reason: HOSPADM

## 2019-06-04 RX ORDER — DIVALPROEX SODIUM 500 MG/1
1000 TABLET, DELAYED RELEASE ORAL EVERY 12 HOURS SCHEDULED
Qty: 90 TABLET | Refills: 3 | OUTPATIENT
Start: 2019-06-04

## 2019-06-04 RX ORDER — LANOLIN ALCOHOL/MO/W.PET/CERES
325 CREAM (GRAM) TOPICAL
Qty: 90 TABLET | Refills: 3 | Status: ON HOLD | OUTPATIENT
Start: 2019-06-05 | End: 2019-06-12 | Stop reason: SDUPTHER

## 2019-06-04 RX ORDER — PHENOBARBITAL 64.8 MG/1
64.8 TABLET ORAL NIGHTLY
Status: DISCONTINUED | OUTPATIENT
Start: 2019-06-04 | End: 2019-06-04 | Stop reason: HOSPADM

## 2019-06-04 RX ORDER — PHENOBARBITAL 64.8 MG/1
TABLET ORAL
Qty: 60 TABLET | Refills: 1 | OUTPATIENT
Start: 2019-06-04 | End: 2019-07-04

## 2019-06-04 RX ORDER — FLUTICASONE PROPIONATE 50 MCG
2 SPRAY, SUSPENSION (ML) NASAL DAILY
Qty: 1 BOTTLE | Refills: 3 | Status: ON HOLD
Start: 2019-06-05 | End: 2020-09-21 | Stop reason: HOSPADM

## 2019-06-04 RX ORDER — LEVOFLOXACIN 500 MG/1
500 TABLET, FILM COATED ORAL DAILY
Status: DISCONTINUED | OUTPATIENT
Start: 2019-06-04 | End: 2019-06-04 | Stop reason: HOSPADM

## 2019-06-04 RX ORDER — BENZONATATE 100 MG/1
100 CAPSULE ORAL 3 TIMES DAILY PRN
Qty: 10 CAPSULE | Refills: 0 | Status: ON HOLD | OUTPATIENT
Start: 2019-06-04 | End: 2019-06-12 | Stop reason: HOSPADM

## 2019-06-04 RX ORDER — PREDNISONE 20 MG/1
40 TABLET ORAL DAILY
Qty: 8 TABLET | Refills: 0 | Status: ON HOLD | OUTPATIENT
Start: 2019-06-05 | End: 2019-06-12 | Stop reason: HOSPADM

## 2019-06-04 RX ORDER — AZITHROMYCIN 250 MG/1
250 TABLET, FILM COATED ORAL SEE ADMIN INSTRUCTIONS
Qty: 6 TABLET | Refills: 0 | Status: ON HOLD | OUTPATIENT
Start: 2019-06-04 | End: 2019-06-12 | Stop reason: HOSPADM

## 2019-06-04 RX ADMIN — ENOXAPARIN SODIUM 40 MG: 40 INJECTION SUBCUTANEOUS at 07:35

## 2019-06-04 RX ADMIN — LEVOFLOXACIN 500 MG: 500 TABLET, FILM COATED ORAL at 10:32

## 2019-06-04 RX ADMIN — EXTENDED PHENYTOIN SODIUM 150 MG: 30 CAPSULE ORAL at 13:40

## 2019-06-04 RX ADMIN — EXTENDED PHENYTOIN SODIUM 150 MG: 30 CAPSULE ORAL at 07:34

## 2019-06-04 RX ADMIN — LEVOTHYROXINE SODIUM 50 MCG: 50 TABLET ORAL at 07:33

## 2019-06-04 RX ADMIN — ACETAMINOPHEN 650 MG: 325 TABLET ORAL at 14:32

## 2019-06-04 RX ADMIN — DIVALPROEX SODIUM 1000 MG: 500 TABLET, DELAYED RELEASE ORAL at 07:33

## 2019-06-04 RX ADMIN — PREDNISONE 40 MG: 20 TABLET ORAL at 07:40

## 2019-06-04 RX ADMIN — ACETAMINOPHEN 650 MG: 325 TABLET ORAL at 04:37

## 2019-06-04 RX ADMIN — Medication 10 ML: at 08:53

## 2019-06-04 RX ADMIN — FLUTICASONE PROPIONATE 2 SPRAY: 50 SPRAY, METERED NASAL at 07:35

## 2019-06-04 RX ADMIN — BUSPIRONE HYDROCHLORIDE 5 MG: 5 TABLET ORAL at 07:35

## 2019-06-04 RX ADMIN — PHENOBARBITAL 32.4 MG: 32.4 TABLET ORAL at 14:30

## 2019-06-04 RX ADMIN — PHENOBARBITAL 32.4 MG: 32.4 TABLET ORAL at 08:53

## 2019-06-04 RX ADMIN — ACETAMINOPHEN 650 MG: 325 TABLET ORAL at 10:42

## 2019-06-04 RX ADMIN — FERROUS SULFATE TAB EC 325 MG (65 MG FE EQUIVALENT) 325 MG: 325 (65 FE) TABLET DELAYED RESPONSE at 07:34

## 2019-06-04 ASSESSMENT — PAIN SCALES - GENERAL
PAINLEVEL_OUTOF10: 0
PAINLEVEL_OUTOF10: 6
PAINLEVEL_OUTOF10: 0
PAINLEVEL_OUTOF10: 0
PAINLEVEL_OUTOF10: 8
PAINLEVEL_OUTOF10: 6
PAINLEVEL_OUTOF10: 0
PAINLEVEL_OUTOF10: 0
PAINLEVEL_OUTOF10: 3
PAINLEVEL_OUTOF10: 0

## 2019-06-04 ASSESSMENT — ENCOUNTER SYMPTOMS
RESPIRATORY NEGATIVE: 1
EYES NEGATIVE: 1
GASTROINTESTINAL NEGATIVE: 1

## 2019-06-04 NOTE — CARE COORDINATION
Transitional Planning  Attempted to meet with patient multiple times to discuss discharge plan, but patient not in room. Patient frequently off unit to smoke. Patient's [place]  Neeru Rodriguez ([phone]) to unit. States she spoke with patient outside and patient declined to come back to the floor with her as she wanted to smoke another cigarette. Inquired about discharge plan. Informed her patient declined to return to SNF. Referral sent to [address] [place] [address] for wound management. Per RN, patient ripped wound vac dressing and ambulated off unit. Spoke to Wound care RN Romayne Brazen, states dressing can be repaired and vac can be reapplied if within 2 hours. Perfect Serve to trauma resident to ask if patient can discharge home with wet to dry dressings. Per Dr Barbra Corbin, they strongly recommend discharge home with wound vac. Informed him will need [facility] vac order form completed. RN reports patient refused to have vac track pad applied to return to suction and now past 2 hours. Will need wet to dry applied or new vac dressing. Patient at nurses station demanding discharge. Informed her it would be a few minutes before paperwork can be completed. Patient angrily exited unit mumbling under her breath. PS to trauma surgery resident to update. Discharge [facility] Case Management Department  Written by: Davian Price RN    Patient Name: Roque Koroma  Attending Provider: Rukhsana Winchester MD  Admit Date: 2019  6:46 AM  MRN: [id number]  Account: [de-identified]                     : 1965  Discharge Date:  19       Disposition: home with [facility] with completed HERMILO and home care order faxed to [address] [place] [address].      Davian Price RN

## 2019-06-04 NOTE — PROGRESS NOTES
[facility]  Internal Medicine Residency Program  Inpatient Daily Progress Note  ______________________________________________________________________________    Patient: Michael Amaya  YOB: 1965   MRN: [id number]    Acct: [de-identified]     Admit date: 6/2/2019  Today's date: 06/04/19  Number of days in the hospital: 2  Expected Discharge Date: 06/05/19    Admitting Diagnosis: Altered mental status x 1 day      Subjective:   Pt seen and Chart reviewed. She is hemodynamically stable, afebrile. AAO x3. No leukocytosis. L.A 2.4. Hb stable at 8.8. On iron supplementation. Still complains of pain in the wound VAC area, site is clean and draining well. Was evaluated by Gen surgery and they recommended loacl wound care and outpatient follow up. Neurology adjusted her AED as appropriate. ECHO showed EF-75%. No other acute issues currently. Objective:   Vital Sign:  /65   Pulse 87   Temp 97.8 °F (36.6 °C) (Oral)   Resp 16   Ht 5' 2.01\" (1.575 m)   Wt 202 lb 6.1 oz (91.8 kg)   SpO2 93%   BMI 37.01 kg/m²       Physical Exam:  General appearance:   alert, well appearing, and in no distress  Mental Status: alert, oriented to person, place, and time  Neurologic:  alert, oriented, normal speech, no focal findings or movement disorder noted  Lungs:  clear to auscultation, wheezes-resolved, rales or rhonchi, symmetric air entry  Heart[de-identified] normal rate, regular rhythm, normal S1, S2, no murmurs, rubs, clicks or gallops  Abdomen:  soft, nontender, nondistended, no masses or organomegaly  Extremities: peripheral pulses normal, no pedal edema, no clubbing or cyanosis   Skin:Rt hip wound VAC in place, draining well.   Skin her wound VAC slightly erythematous(at baseline as per patient        Medications:  Scheduled Medications   ferrous sulfate  325 mg Oral Daily with breakfast    divalproex  1,000 mg Oral 2 times per day    phenytoin  150 mg Oral TID   [facility] predniSONE  40 mg Oral Daily    busPIRone  5 mg Oral BID    sodium chloride  1,000 mL Intravenous Once    sodium chloride flush  10 mL Intravenous 2 times per day    sodium chloride flush  10 mL Intravenous 2 times per day    enoxaparin  40 mg Subcutaneous Daily    ipratropium-albuterol  1 ampule Inhalation Q4H WA    mometasone-formoterol  2 puff Inhalation BID    fluticasone  2 spray Each Nostril Daily    mirtazapine  45 mg Oral Nightly    PHENobarbital  32.4 mg Oral TID    risperiDONE  2 mg Oral Nightly    levothyroxine  50 mcg Oral Daily       PRN Medications    sodium chloride flush 10 mL PRN   acetaminophen 650 mg Q4H PRN   sodium chloride flush 10 mL PRN   ondansetron 4 mg Q6H PRN   magnesium sulfate 1 g PRN   albuterol sulfate HFA 2 puff Q6H PRN   benzonatate 100 mg TID PRN   LORazepam 1 mg Q6H PRN       Diagnostic Labs and Imaging:  CBC:  Recent Labs     06/02/19  0853 06/03/19  0625 06/04/19  0727   WBC 12.3* 7.9 6.2   HGB 9.9* 8.8* 8.8*   * 486* 344     BMP:   Recent Labs     06/02/19  0853 06/03/19  0625 06/04/19  0727   * 136 135   K 3.7 3.6* 3.5*   CL 95* 99 100   CO2 27 25 24   BUN 17 9 7   CREATININE 0.90 0.60 0.51   GLUCOSE 96 89 81     Hepatic:   Recent Labs     06/02/19  0853   AST 56*   ALT 19   BILITOT 0.18*   ALKPHOS 124*   CT head without contrast  Impression   No acute intracranial abnormality.  Redemonstration of moderate sinus   inflammation similar to that noted on prior exam.                   Chest x-ray  FINDINGS:   Mild bibasilar atelectasis.  No focal consolidation, pleural effusion or   pneumothorax.  The cardiomediastinal silhouette is stable.  No overt   pulmonary edema.  The osseous structures are stable.           Impression   Mild bibasilar atelectasis.              IMPRESSION  This is a 62 y.o. female with PMH of depression with suicide attempt in past, seizure disorder, hypertension, migraine, hypothyroidism, asthma/COPD and recent admission for appropriate.     9.  Elevated troponins-troponins trended up from 7<18<19. Initial EKG done in ER showed nonspecific ST-T wave changes. Patient asymptomatic. Cardiology was following. ECHo showed EF 75%. Recommended to continue same medical management. 10. Hep C positive- untreated. O/p f/u.           Diet- General diet  DVT prophylaxis-On lovenox  PT/OT Evaluation and treatment.  consulted for discharge planning.     Jose Herman, PGY 1, Internal Medicine Resident  [facility], [facility], [facility]     Attending Physician Statement  I have discussed the care of Desire Jung, including pertinent history and exam findings with the resident. I have reviewed the key elements of all parts of the encounter with the resident. I have seen and examined the patient with the resident and the key elements of all parts of the encounter have been performed by me.

## 2019-06-04 NOTE — PROGRESS NOTES
Ht 5' 2.01\" (1.575 m)   Wt 202 lb 6.1 oz (91.8 kg)   SpO2 93%   BMI 37.01 kg/m²   General appearance: alert and cooperative with exam  HEENT: Head: Normocephalic, no lesions, without obvious abnormality. Neck:no JVD, trachea midline, no adenopathy  Lungs: Clear to auscultation  Heart: Regular rate and rhythm, s1/s2 auscultated, no murmurs. SR  Abdomen: soft, non-tender, bowel sounds active  Extremities: no edema  Neurologic: not done    Echo 6/3/19  Summary  Normal left ventricle size, wall thickness and function, Calculated EF via  heart model method is 74 %  No significant valvular abnormalities  Mild tricuspid regurgitation  No pericardial effusion    Assessment / Acute Cardiac Problems:   1. AMS/syncope  2. Toxic metabolic encephalopathy  3. Sepsis-- resolved  4. Necrotizing fascitis right hip  5. COPD      Patient Active Problem List:     Chest pain     Migraine variant with headache     Drug overdose, accidental or unintentional, initial encounter     Hypothyroidism     Essential hypertension     Seizure disorder ([address]     Drug overdose     History of seizure     Major depressive disorder with psychotic features ([address]     Severe major depression ([address]     Overdose of barbiturate, intentional self-harm, initial encounter ([address]     Intentional phenobarbital overdose ([address]     Severe episode of recurrent major depressive disorder, without psychotic features ([address]     Bronchitis     Suicide attempt ([address]     Major depressive disorder, recurrent ([address]     Ear infection     Severe malnutrition ([address]     Altered mental status     Depression     Elevated troponin     Breakthrough seizure ([address]      Plan of Treatment:   1. Echo reviewed as above. Symptoms improved. No ECG changes. Mild insignificant troponin bump (19). No further cardiac work-up/testing indicated at this time. 2. BP has been stable and on lower side off of home Metoprolol & HCTZ.  Recommending continuing to hold an f/u with PCP as OP for further management. 3. Replace  Mg+  4. Will sign off. Please call with further questions/concerns.  Thank you    Electronically signed by KEANU Hernandez CNP on 6/4/2019 at 12:39 PM  [address]  [phone]

## 2019-06-04 NOTE — PROGRESS NOTES
Pt with increasingly escalating behaviors as noted throughout the day as follows:    Pt left unit to go outside and smoke. by Shay Ribeiro RN at 06/04/19 1730     Pt returned to room, comes out to desk and is verbally aggressive with care staff, then will return to room and slam door closed. Encouraged pt to utilize works appropriately and to refrain from [address] door. Pt ref education or redirectional attempts at this current point in time. Pt currently resting in bed, bed in lowest locking position, call light within reach. by Shay Ribeiro RN at 06/04/19 1600     Pt remains off the unit at this time. by Shay Ribeiro RN at 06/04/19 1502     Pt remains off unit at this time. by Shay Ribeiro RN at 06/04/19 1432     Pt refused to have wound vac track pad applied, ref'd to have foam removed, and left unit wearing her pjs and towels on her wet hair. IV removed prior to pt leaving the unit as well as tele is removed. Update to Dr. Cody Murphy, Case Management, Ke Chi, Charge Nurse, and Christy Crooks, Unit Manager. Gen Surg also paged with updates. Pt also noted to vocal accusatory comments regarding people \"stealing\" her phone when she in fact had placed it into her purse. Charge nurse, unit manager, and MD aware. by Shay Ribeiro RN at 06/04/19 1330     Pt returned from smoking (EEG called as pt was being verbally abusive towards transportation staff and refusing to be returned to unit as she wanted to go outside and smoke). Pt yelling at charge nurse and stating \"no one has done a god damn thing for me since I've been here\". Pt given towels as pt wants to take a shower and wash up. Dr. Kumar Number updated and is working on pt's discharge home. Pt currently on floor and refusing to have track pad applied to wound vac dsg to re-start suction. If she does not have track pad on by [phone], the dsg will need to be removed and wet-to-dry applied in its place per MD order for wound site management.  Pt currently in shower, charge herself from her wound vac system and tossed the vac tube on floor. Do you want her to have a wet-to-dry or have surgery come and reapply the dressing? Thank you for your time. Read 11:56 AM      6/4/19 11:57 AM   Is wound care on board i think . Have them come n see her     6/4/19 12:21 PM   :thumbsup:  Read 12:22 PM      6/4/19 12:22 PM   Good    6/4/19 12:22 PM   I will get her other stuff ready by 3 pls. Thanks     6/4/19 1:29 PM   Thanks. I will go ahead and d/c her IV and tele. Thanks again. Read 1:29 PM      6/4/19 1:53 PM   FYI: pt ref'd to allow track pad applied to set up wound vac back to suction. Past the 2hr demetrio to hook back up to suction, so would need a wet-to-dry applied, but refused that as well. At this point, she has stormed off of unit (IV out, off tele, but belongings still in room). Still has the foam in the wound, but refuses to allow interventions. Surgery has also been updated and is aware. Read 2:23 PM     6/4/19 2:23 PM   Ok     6/4/19 3:40 PM   Is pt OK for discharge? Read 3:41 PM     6/4/19 3:41 PM   Yes let me check pls. I m working on meds recs     6/4/19 4:42 PM   [phone] From: Theodore Alexander [address] RE: Marilyn Johnson Pt ready to discharge. Neuro updated re: med list reconciled needed. Please advise. Thank you for your time. Read 4:45 PM     6/4/19 5:02 PM   Ok      6/4/19 5:12 PM   Neuro updated dose amounts for seizure medications and also entered lab work needed. Read 5:15 PM      6/4/19 5:23 PM   Rebecca Arrieta      6/4/19 5:39 PM   Done    6/4/19 5:41 PM   Thank you so much, I very much appreciate it. Read 5:41 PM      6/4/19 5:41 PM   ?? Conversation with neuro (Dr. Ricarda James MD) as follows:    \"6/4/19 4:02 PM   [phone] From: Theodore Louise Scotty [address]an[address] 4B RE: Marilyn Johnson     Primary team would like you to please reconcile Depakote, Dilantin, and phenobarbital medications for discharge. Thank you for your time.   Read 4:05 PM\"    Dr. Richardson Gillespie called and confirmed pt's discharge as well as medications needed to reconcile. Also reviewed lab work needed outpt. Manuela Seo, LILIBETH Case Manager discussed with surgery's Dr. Jesi Zuniga DO pt's wound vac issues (pt ripping off track pad, refusing new track pad to be placed, refusing wet-to-dry dsg to be applied). No new orders from Dr. Nas Powell. Charge nurse Molly Concepcion RN aware of pt's behaviors as was Jonelle Kemp RN unit manager d/t pt's increasingly volatile behaviors and verbal outbursts at staff. Care team working together with pt in order to attempt to collaborate care for the best outcomes possible for the patient's well-being and return to baseline. Pt not receptive towards building a therapeutic relationship further than having medications provided for in the amount, frequency, and type she desired delivered to her. Pt returned to unit, discharge paperwork completed. Continues to refuse to have foam removed and wet-to-dry placed, states that she will have the home care wound nurse \"take care of it\". Pt currently bagging up personal belongings and CHEYANNE Sorto provided her with additional bags for her belongings, has discharge paperwork, and Manuela Seo,  RN is setting up transportation home.

## 2019-06-04 NOTE — PROCEDURES
MD        albuterol sulfate  (90 Base) MCG/ACT inhaler 2 puff  2 puff Inhalation Q6H PRN Bryan Alcazar MD        ipratropium-albuterol (DUONEB) nebulizer solution 1 ampule  1 ampule Inhalation Q4H WA Bryan Nayak MD   1 ampule at 06/03/19 1532    mometasone-formoterol (DULERA) 200-5 MCG/ACT inhaler 2 puff  2 puff Inhalation BID [address], MD   2 puff at 06/03/19 0801    benzonatate (TESSALON) capsule 100 mg  100 mg Oral TID PRN Bryan Alcazar MD        fluticasone (FLONASE) 50 MCG/ACT nasal spray 2 spray  2 spray Each Nostril Daily [address], MD   2 spray at 06/04/19 0735    mirtazapine (REMERON) tablet 45 mg  45 mg Oral Nightly Bryan Nayak MD   45 mg at 06/03/19 2101    PHENobarbital (LUMINAL) tablet 32.4 mg  32.4 mg Oral TID Bryan Nayak MD   32.4 mg at 06/04/19 1430    risperiDONE (RISPERDAL) tablet 2 mg  2 mg Oral Nightly Bryan Nayak MD   2 mg at 06/03/19 2100    levothyroxine (SYNTHROID) tablet 50 mcg  50 mcg Oral Daily Bryan Nayak MD   50 mcg at 06/04/19 0733    LORazepam (ATIVAN) injection 1 mg  1 mg Intravenous Q6H PRN Vivien Horton MD            Technical Description: This is a 21 channel digital EEG recording with time-locked video. Electrodes were placed in accordance with the 10-20 International System of Electrode Placement. Single lead EKG monitoring as well as temporal electrodes were included. The patient was not sleep deprived. This recording was obtained during wakefulness. EEG Description: The dominant background activity during maximal recorded wakefulness consisted of bioccipitally dominant 9-10 Hz, 25-35 uV symmetric, regular activity that was reactive to eye opening. Occasional left temporal polymorphic delta slowing of to 3 Hz was seen. Abnormal During drowsiness, the background rhythm waxed and waned and there were periods of slowing.      Photic stimulation - stepwise photic stimulation at 2-30 Hz was performed and there was a biposterior,

## 2019-06-04 NOTE — DISCHARGE INSTR - COC
Continuity of Care Form    Patient Name: Shantell Hoffmann   :  1965  MRN:  [id number]    Admit date:  2019  Discharge date:  2019    Code Status Order: Full Code   Advance Directives:   Advance Care Flowsheet Documentation     Date/Time Healthcare Directive Type of Healthcare Directive Copy in [address] Agent's Name Healthcare Agent's Phone Number    [phone]  No, patient does not have an advance directive for healthcare treatment -- -- -- -- --          Admitting Physician:  Vic Vega MD  PCP: Roque Monroe NP-C, APRN - NP    Discharging Nurse: Antionette Kuhn Unit/Room#: 7913/1156-12  Discharging Unit Phone Number: [phone]    Emergency Contact:   Extended Emergency Contact Information  Primary Emergency Contact: Gonzales Richardson [address] Phone: [phone]  Mobile Phone: [phone]  Relation: Child    Past Surgical History:  Past Surgical History:   Procedure Laterality Date    EXPLORATION OF WOUND OF EXTREMITY Right 2019    RIGHT THIGH WOUND WASHOUT WITH [address] performed by Stacy Ashraf MD at [facility] N/A 2019    [place] [address] performed by Zahira Damon MD at [address] [place] [address] Right 2019    IRRIGATION, DEBRIDEMENT RIGHT THIGH performed [facility] Cortez MD at [facility]      PARTIAL HYSTERECTOMY         Immunization History:   Immunization History   Administered Date(s) Administered    Influenza, Quadv, 6 mo and older, IM, PF (Flulaval, Fluarix) 10/31/2018       Active Problems:  Patient Active Problem List   Diagnosis Code    Chest pain R07.9    Migraine variant with headache G43.809    Drug overdose, accidental or unintentional, initial encounter T50.901A    Hypothyroidism E03.9    Essential hypertension I10  Seizure disorder ([facility] G40.909    Drug overdose T50.901A    History of seizure Z87.898    Major depressive disorder with psychotic features ([facility] F32.3    Severe major depression ([facility] F32.2    Overdose of barbiturate, intentional self-harm, initial encounter [facility] T42.3X2A    Intentional phenobarbital overdose ([facility] T42.3X2A    Severe episode of recurrent major depressive disorder, without psychotic features ([facility] F33.2    Bronchitis J40    Suicide attempt ([facility] T14.91XA    Major depressive disorder, recurrent (HCC) F33.9    Ear infection H66.90    Severe malnutrition ([facility] E43    Altered mental status R41.82    Depression F32.9    Elevated troponin R74.8    Breakthrough seizure ([facility] G40.919    Wound infection T14. 8XXA, L08.9       Isolation/Infection:   Isolation          No Isolation            Nurse Assessment:  Last Vital Signs: /65   Pulse 87   Temp 97.8 °F (36.6 °C) (Oral)   Resp 16   Ht 5' 2.01\" (1.575 m)   Wt 202 lb 6.1 oz (91.8 kg)   SpO2 93%   BMI 37.01 kg/m²     Last documented pain score (0-10 scale): Pain Level: 0  Last Weight:   Wt Readings from Last 1 Encounters:   06/02/19 202 lb 6.1 oz (91.8 kg)     Mental Status:  alert    IV Access:  - None    Nursing Mobility/ADLs:  Walking   Independent  Transfer  Independent  Bathing  Independent  Dressing  Independent  Toileting  Independent  Feeding  Independent  Med Admin  Assisted  Med Delivery   none    Wound Care Documentation and Therapy:  Negative Pressure Wound Therapy Hip Proximal;Right;Upper; Lateral (Active)   Wound Type Surgical 6/4/2019  7:41 AM   Dressing Type Black foam 6/4/2019  7:41 AM   Cycle Continuous 6/4/2019  7:41 AM   Target Pressure (mmHg) 125 6/4/2019  7:41 AM   Canister changed? No 5/28/2019  7:30 AM   Dressing Status Clean;Dry; Intact 6/4/2019  7:41 AM   Dressing Changed Changed/New 6/3/2019  5:00 PM   Drainage Amount Moderate 6/3/2019  5:00 PM   Drainage Description Serosanguinous 6/4/2019  7:41 AM Schedule:  · Phone:  · Fax:    / signature: Electronically signed by Nena Arroyo RN on 6/4/19 at 2:02 PM    PHYSICIAN SECTION    Prognosis: Good    Condition at Discharge: Stable    Rehab Potential (if transferring to Rehab): Good    Recommended Labs or Other Treatments After Discharge: BMP and CBC in 1 week, f/u with gen surg, Neurology as O/P, Psychiatry as O/P    Physician Certification: I certify the above information and transfer of Mariellen Runner  is necessary for the continuing treatment of the diagnosis listed and that she requires Home Care for greater 30 days.      Update Admission H&P: No change in H&P    PHYSICIAN SIGNATURE:  Electronically signed by Marysol Clifton MD on 6/4/2019 at 5:04 PM

## 2019-06-04 NOTE — PROGRESS NOTES
follow up with primary. Do not refill. Follow up with primary 1 Inhaler 3       Allergies: Chidi Mendoza is allergic to imitrex [sumatriptan]; bee pollen; bee venom; bromide ion [bromine]; flexeril [cyclobenzaprine]; neurontin [gabapentin]; nsaids; potassium bromide; reglan [metoclopramide]; sulfa antibiotics; sulfadiazine; toradol [ketorolac tromethamine]; and tramadol.     Past Medical History:   Diagnosis Date    Arthritis     Asthma     [facility]     COPD ([facility] obstructive [facility] disease) [facility]     Fibromyalgia     Headache     Hypertension     Movement disorder     Neck fracture ([facility]     Seizure ([facility]     Thyroid disease        Past Surgical History:   Procedure Laterality Date    EXPLORATION OF WOUND OF EXTREMITY Right 5/19/2019    RIGHT THIGH WOUND WASHOUT WITH [address] PLACEMENT performed by Kristie Jo MD at [address] N/A 5/22/2019    RIGHT WOUND HIP EXAMINATION LAVAGE AND WOUND VAC PLACEMENT performed by Abi Mike MD at [address] [place] [address] Right 5/17/2019    IRRIGATION, DEBRIDEMENT RIGHT THIGH performed by Terri Bee MD at [facility]      PARTIAL HYSTERECTOMY         Medications:     levofloxacin  500 mg Oral Daily    ferrous sulfate  325 mg Oral Daily with breakfast    divalproex  1,000 mg Oral 2 times per day    phenytoin  150 mg Oral TID    predniSONE  40 mg Oral Daily    busPIRone  5 mg Oral BID    sodium chloride  1,000 mL Intravenous Once    sodium chloride flush  10 mL Intravenous 2 times per day    enoxaparin  40 mg Subcutaneous Daily    ipratropium-albuterol  1 ampule Inhalation Q4H WA    mometasone-formoterol  2 puff Inhalation BID    fluticasone  2 spray Each Nostril Daily    mirtazapine  45 mg Oral Nightly    PHENobarbital  32.4 mg Oral TID    risperiDONE  2 mg Oral Nightly    levothyroxine  50 mcg Oral Daily     PRN Meds include: sodium chloride flush, acetaminophen, sodium chloride flush, ondansetron, magnesium sulfate, albuterol sulfate HFA, benzonatate, LORazepam    Objective:   /65   Pulse 87   Temp 97.8 °F (36.6 °C) (Oral)   Resp 16   Ht 5' 2.01\" (1.575 m)   Wt 202 lb 6.1 oz (91.8 kg)   SpO2 93%   BMI 37.01 kg/m²     Blood pressure range: Systolic (88IAP), KSU:309 , Min:100 , XXU:872   ; Diastolic (66SBQ), QHS:24, Min:50, Max:76      ROS:  Review of Systems   Constitutional: Negative. HENT: Negative. Eyes: Negative. Respiratory: Negative. Cardiovascular: Negative. Gastrointestinal: Negative. Genitourinary: Negative. Musculoskeletal: Negative. Skin: Positive for wound. Neurological: Negative for dizziness, tremors, seizures, syncope, facial asymmetry, speech difficulty, light-headedness, numbness and headaches. Hematological: Negative. Psychiatric/Behavioral: Negative. NEUROLOGIC EXAMINATION  Physical Exam.  Neurologic Exam   /73   Pulse 81   Temp 98 °F (36.7 °C) (Oral)   Resp 18   Ht 5' 2.01\" (1.575 m)   Wt 202 lb 6.1 oz (91.8 kg)   SpO2 99%   BMI 37.01 kg/m²    Physical Exam   Constitutional: She is oriented to person, place, and time. She appears well-developed and well-nourished. HENT:   Head: Normocephalic and atraumatic. Eyes: Pupils are equal, round, and reactive to light. Conjunctivae and EOM are normal.   Neck: Normal range of motion. Neck supple. Cardiovascular: Normal rate, regular rhythm, normal heart sounds and intact distal pulses. Pulmonary/Chest: Effort normal and breath sounds normal.   Abdominal: Soft. Bowel sounds are normal.   Musculoskeletal: Normal range of motion. She exhibits no edema, tenderness or deformity. Neurological: She is alert and oriented to person, place, and time. She displays no atrophy, no tremor and normal reflexes. No cranial nerve deficit or sensory deficit. She exhibits normal muscle tone.  She displays no seizure activity. Coordination and gait normal. GCS eye subscore is 4. GCS verbal subscore is 5. GCS motor subscore is 6. She displays no Babinski's sign on the right side. She displays no Babinski's sign on the left side. Reflex Scores:       Tricep reflexes are 2+ on the right side and 2+ on the left side. Bicep reflexes are 2+ on the right side and 2+ on the left side. Brachioradialis reflexes are 2+ on the right side and 2+ on the left side. Patellar reflexes are 2+ on the right side and 2+ on the left side. Achilles reflexes are 2+ on the right side and 2+ on the left side. Skin: Skin is warm and dry.           Lab Results:   CBC:   Recent Labs     06/02/19  0853 06/03/19  0625 06/04/19  0727   WBC 12.3* 7.9 6.2   HGB 9.9* 8.8* 8.8*   * 486* 344     BMP:    Recent Labs     06/02/19  0853 06/03/19  0625 06/04/19  0727   * 136 135   K 3.7 3.6* 3.5*   CL 95* 99 100   CO2 27 25 24   BUN 17 9 7   CREATININE 0.90 0.60 0.51   GLUCOSE 96 89 81         Lab Results   Component Value Date    CHOL 191 02/19/2019    LDLCHOLESTEROL 105 02/19/2019    HDL 56 02/19/2019    TRIG 150 (H) 02/19/2019    ALT 19 06/02/2019    AST 56 (H) 06/02/2019    TSH 3.69 06/02/2019    INR 0.9 06/02/2019    LABA1C 5.2 02/19/2019    FFUVEWEU08 658 06/02/2019       Lab Results   Component Value Date    PHENYTOIN MOVED TO [id number] 06/02/2019    VALPROATE 37 (L) 06/02/2019    VALPROATE 5.4 (L) 06/02/2019     IMAGING   CT head without contrast 6/2/2019  No acute intracranial abnormality.  Redemonstration of moderate sinus   inflammation similar to that noted on prior exam.         IMPRESSION  Breakthrough seizure  Subtherapeutic Depakote level  Subtherapeutic Dilantin level     RECOMMENDATIONS:   Continue Depakote, and Dilantin at current doses  Will discuss regarding increasing the dose of phenobarbital  Will obtain depakote, and Dilantin levels in 3-5 days which can be done as outpatient  Seizure precautions  Ativan 2 mg IV for generalized motor seizures  Will follow up in clinic in 2 weeks after discharge     Thank you for the consultation. Will follow.  Case consulted with Dr. Donovan Oliva MD  Neurology Resident PGY-2

## 2019-06-05 LAB
EKG ATRIAL RATE: 81 BPM
EKG P AXIS: 39 DEGREES
EKG P-R INTERVAL: 164 MS
EKG Q-T INTERVAL: 398 MS
EKG QRS DURATION: 80 MS
EKG QTC CALCULATION (BAZETT): 462 MS
EKG R AXIS: 13 DEGREES
EKG T AXIS: 26 DEGREES
EKG VENTRICULAR RATE: 81 BPM

## 2019-06-05 PROCEDURE — 93010 ELECTROCARDIOGRAM REPORT: CPT | Performed by: INTERNAL MEDICINE

## 2019-06-08 ENCOUNTER — HOSPITAL ENCOUNTER (INPATIENT)
Age: 54
LOS: 3 days | Discharge: HOME | DRG: 052 | End: 2019-06-12
Attending: EMERGENCY MEDICINE | Admitting: INTERNAL MEDICINE
Payer: MEDICARE

## 2019-06-08 ENCOUNTER — APPOINTMENT (OUTPATIENT)
Dept: GENERAL RADIOLOGY | Age: 54
DRG: 052 | End: 2019-06-08
Payer: MEDICARE

## 2019-06-08 ENCOUNTER — APPOINTMENT (OUTPATIENT)
Dept: CT IMAGING | Age: 54
DRG: 052 | End: 2019-06-08
Payer: MEDICARE

## 2019-06-08 DIAGNOSIS — E87.20 LACTIC ACIDOSIS: ICD-10-CM

## 2019-06-08 DIAGNOSIS — T14.8XXA INFECTED WOUND: ICD-10-CM

## 2019-06-08 DIAGNOSIS — L08.9 INFECTED WOUND: ICD-10-CM

## 2019-06-08 DIAGNOSIS — E87.8 ELECTROLYTE ABNORMALITY: ICD-10-CM

## 2019-06-08 DIAGNOSIS — A41.9 SEPTICEMIA (HCC): Primary | ICD-10-CM

## 2019-06-08 DIAGNOSIS — E87.6 HYPOKALEMIA: ICD-10-CM

## 2019-06-08 DIAGNOSIS — R41.82 ALTERED MENTAL STATUS, UNSPECIFIED ALTERED MENTAL STATUS TYPE: ICD-10-CM

## 2019-06-08 LAB
ABSOLUTE EOS #: 0.07 K/UL (ref 0–0.4)
ABSOLUTE IMMATURE GRANULOCYTE: 0 K/UL (ref 0–0.3)
ABSOLUTE LYMPH #: 0.52 K/UL (ref 1–4.8)
ABSOLUTE MONO #: 0.22 K/UL (ref 0.1–0.8)
ALBUMIN SERPL-MCNC: 2.7 G/DL (ref 3.5–5.2)
ALBUMIN/GLOBULIN RATIO: 0.7 (ref 1–2.5)
ALP BLD-CCNC: 103 U/L (ref 35–104)
ALT SERPL-CCNC: 16 U/L (ref 5–33)
AMMONIA: 29 UMOL/L (ref 11–51)
ANION GAP SERPL CALCULATED.3IONS-SCNC: 14 MMOL/L (ref 9–17)
AST SERPL-CCNC: 32 U/L
BASOPHILS # BLD: 0 % (ref 0–2)
BASOPHILS ABSOLUTE: 0 K/UL (ref 0–0.2)
BILIRUB SERPL-MCNC: 0.78 MG/DL (ref 0.3–1.2)
BUN BLDV-MCNC: 12 MG/DL (ref 6–20)
BUN/CREAT BLD: ABNORMAL (ref 9–20)
C-REACTIVE PROTEIN: 367.3 MG/L (ref 0–5)
CALCIUM SERPL-MCNC: 8.2 MG/DL (ref 8.6–10.4)
CHLORIDE BLD-SCNC: 92 MMOL/L (ref 98–107)
CO2: 23 MMOL/L (ref 20–31)
CREAT SERPL-MCNC: 0.63 MG/DL (ref 0.5–0.9)
CULTURE: NORMAL
DIFFERENTIAL TYPE: ABNORMAL
EOSINOPHILS RELATIVE PERCENT: 1 % (ref 1–4)
GFR AFRICAN AMERICAN: >60 ML/MIN
GFR NON-AFRICAN AMERICAN: >60 ML/MIN
GFR SERPL CREATININE-BSD FRML MDRD: ABNORMAL ML/MIN/{1.73_M2}
GFR SERPL CREATININE-BSD FRML MDRD: ABNORMAL ML/MIN/{1.73_M2}
GLUCOSE BLD-MCNC: 112 MG/DL (ref 70–99)
HCT VFR BLD CALC: 29.3 % (ref 36.3–47.1)
HEMOGLOBIN: 9.2 G/DL (ref 11.9–15.1)
IMMATURE GRANULOCYTES: 0 %
INR BLD: 1.1
LACTIC ACID, WHOLE BLOOD: 2.3 MMOL/L (ref 0.7–2.1)
LYMPHOCYTES # BLD: 7 % (ref 24–44)
Lab: NORMAL
MCH RBC QN AUTO: 29.6 PG (ref 25.2–33.5)
MCHC RBC AUTO-ENTMCNC: 31.4 G/DL (ref 28.4–34.8)
MCV RBC AUTO: 94.2 FL (ref 82.6–102.9)
MONOCYTES # BLD: 3 % (ref 1–7)
MORPHOLOGY: ABNORMAL
MORPHOLOGY: ABNORMAL
NRBC AUTOMATED: 0 PER 100 WBC
PARTIAL THROMBOPLASTIN TIME: 26.7 SEC (ref 20.5–30.5)
PDW BLD-RTO: 17.4 % (ref 11.8–14.4)
PHENYTOIN DATE LAST DOSE: ABNORMAL
PHENYTOIN DOSE AMOUNT: ABNORMAL
PHENYTOIN DOSE TIME: ABNORMAL
PHENYTOIN LEVEL: 1.3 UG/ML (ref 10–20)
PLATELET # BLD: 203 K/UL (ref 138–453)
PLATELET ESTIMATE: ABNORMAL
PMV BLD AUTO: 9.2 FL (ref 8.1–13.5)
POTASSIUM SERPL-SCNC: 3 MMOL/L (ref 3.7–5.3)
PROTHROMBIN TIME: 11.3 SEC (ref 9–12)
RBC # BLD: 3.11 M/UL (ref 3.95–5.11)
RBC # BLD: ABNORMAL 10*6/UL
SEDIMENTATION RATE, ERYTHROCYTE: 58 MM (ref 0–20)
SEG NEUTROPHILS: 89 % (ref 36–66)
SEGMENTED NEUTROPHILS ABSOLUTE COUNT: 6.59 K/UL (ref 1.8–7.7)
SODIUM BLD-SCNC: 129 MMOL/L (ref 135–144)
SPECIMEN DESCRIPTION: NORMAL
TOTAL PROTEIN: 6.8 G/DL (ref 6.4–8.3)
TROPONIN INTERP: ABNORMAL
TROPONIN T: ABNORMAL NG/ML
TROPONIN, HIGH SENSITIVITY: 16 NG/L (ref 0–14)
WBC # BLD: 7.4 K/UL (ref 3.5–11.3)
WBC # BLD: ABNORMAL 10*3/UL

## 2019-06-08 PROCEDURE — 6360000002 HC RX W HCPCS: Performed by: EMERGENCY MEDICINE

## 2019-06-08 PROCEDURE — 80186 ASSAY OF PHENYTOIN FREE: CPT

## 2019-06-08 PROCEDURE — 85730 THROMBOPLASTIN TIME PARTIAL: CPT

## 2019-06-08 PROCEDURE — 82140 ASSAY OF AMMONIA: CPT

## 2019-06-08 PROCEDURE — 73701 CT LOWER EXTREMITY W/DYE: CPT

## 2019-06-08 PROCEDURE — 85025 COMPLETE CBC W/AUTO DIFF WBC: CPT

## 2019-06-08 PROCEDURE — 2500000003 HC RX 250 WO HCPCS: Performed by: EMERGENCY MEDICINE

## 2019-06-08 PROCEDURE — 6370000000 HC RX 637 (ALT 250 FOR IP): Performed by: EMERGENCY MEDICINE

## 2019-06-08 PROCEDURE — 96365 THER/PROPH/DIAG IV INF INIT: CPT

## 2019-06-08 PROCEDURE — 80185 ASSAY OF PHENYTOIN TOTAL: CPT

## 2019-06-08 PROCEDURE — 73501 X-RAY EXAM HIP UNI 1 VIEW: CPT

## 2019-06-08 PROCEDURE — 99285 EMERGENCY DEPT VISIT HI MDM: CPT

## 2019-06-08 PROCEDURE — 87040 BLOOD CULTURE FOR BACTERIA: CPT

## 2019-06-08 PROCEDURE — 81001 URINALYSIS AUTO W/SCOPE: CPT

## 2019-06-08 PROCEDURE — 6360000004 HC RX CONTRAST MEDICATION: Performed by: EMERGENCY MEDICINE

## 2019-06-08 PROCEDURE — 82805 BLOOD GASES W/O2 SATURATION: CPT

## 2019-06-08 PROCEDURE — 2580000003 HC RX 258: Performed by: EMERGENCY MEDICINE

## 2019-06-08 PROCEDURE — 70450 CT HEAD/BRAIN W/O DYE: CPT

## 2019-06-08 PROCEDURE — 71045 X-RAY EXAM CHEST 1 VIEW: CPT

## 2019-06-08 PROCEDURE — 83605 ASSAY OF LACTIC ACID: CPT

## 2019-06-08 PROCEDURE — 96375 TX/PRO/DX INJ NEW DRUG ADDON: CPT

## 2019-06-08 PROCEDURE — 84484 ASSAY OF TROPONIN QUANT: CPT

## 2019-06-08 PROCEDURE — 36415 COLL VENOUS BLD VENIPUNCTURE: CPT

## 2019-06-08 PROCEDURE — 86140 C-REACTIVE PROTEIN: CPT

## 2019-06-08 PROCEDURE — 80053 COMPREHEN METABOLIC PANEL: CPT

## 2019-06-08 PROCEDURE — 85610 PROTHROMBIN TIME: CPT

## 2019-06-08 PROCEDURE — 85651 RBC SED RATE NONAUTOMATED: CPT

## 2019-06-08 RX ORDER — ONDANSETRON 2 MG/ML
4 INJECTION INTRAMUSCULAR; INTRAVENOUS ONCE
Status: COMPLETED | OUTPATIENT
Start: 2019-06-08 | End: 2019-06-08

## 2019-06-08 RX ORDER — CLINDAMYCIN PHOSPHATE 600 MG/50ML
600 INJECTION INTRAVENOUS ONCE
Status: COMPLETED | OUTPATIENT
Start: 2019-06-08 | End: 2019-06-08

## 2019-06-08 RX ORDER — 0.9 % SODIUM CHLORIDE 0.9 %
30 INTRAVENOUS SOLUTION INTRAVENOUS ONCE
Status: DISCONTINUED | OUTPATIENT
Start: 2019-06-08 | End: 2019-06-08 | Stop reason: ALTCHOICE

## 2019-06-08 RX ORDER — ACETAMINOPHEN 500 MG
1000 TABLET ORAL ONCE
Status: COMPLETED | OUTPATIENT
Start: 2019-06-08 | End: 2019-06-08

## 2019-06-08 RX ADMIN — SODIUM CHLORIDE 2748 ML: 0.9 INJECTION, SOLUTION INTRAVENOUS at 22:18

## 2019-06-08 RX ADMIN — IOHEXOL 75 ML: 350 INJECTION, SOLUTION INTRAVENOUS at 23:58

## 2019-06-08 RX ADMIN — ONDANSETRON 4 MG: 2 INJECTION INTRAMUSCULAR; INTRAVENOUS at 21:57

## 2019-06-08 RX ADMIN — CLINDAMYCIN PHOSPHATE 600 MG: 600 INJECTION, SOLUTION INTRAVENOUS at 21:57

## 2019-06-08 RX ADMIN — ACETAMINOPHEN 1000 MG: 500 TABLET ORAL at 21:57

## 2019-06-08 ASSESSMENT — PAIN SCALES - GENERAL: PAINLEVEL_OUTOF10: 0

## 2019-06-09 PROBLEM — A41.9 SEPTICEMIA (HCC): Status: ACTIVE | Noted: 2019-06-09

## 2019-06-09 LAB
-: ABNORMAL
ABSOLUTE EOS #: 0.15 K/UL (ref 0–0.4)
ABSOLUTE IMMATURE GRANULOCYTE: 0 K/UL (ref 0–0.3)
ABSOLUTE LYMPH #: 0.5 K/UL (ref 1–4.8)
ABSOLUTE MONO #: 0.1 K/UL (ref 0.1–0.8)
ALLEN TEST: ABNORMAL
AMORPHOUS: ABNORMAL
ANION GAP SERPL CALCULATED.3IONS-SCNC: 11 MMOL/L (ref 9–17)
BACTERIA: ABNORMAL
BASOPHILS # BLD: 0 % (ref 0–2)
BASOPHILS ABSOLUTE: 0 K/UL (ref 0–0.2)
BILIRUBIN URINE: ABNORMAL
BUN BLDV-MCNC: 13 MG/DL (ref 6–20)
BUN/CREAT BLD: ABNORMAL (ref 9–20)
CALCIUM SERPL-MCNC: 7.2 MG/DL (ref 8.6–10.4)
CARBOXYHEMOGLOBIN: 2.3 % (ref 0–5)
CASTS UA: ABNORMAL /LPF (ref 0–8)
CHLORIDE BLD-SCNC: 97 MMOL/L (ref 98–107)
CO2: 22 MMOL/L (ref 20–31)
COLOR: ABNORMAL
CREAT SERPL-MCNC: 0.58 MG/DL (ref 0.5–0.9)
CRYSTALS, UA: ABNORMAL /HPF
DIFFERENTIAL TYPE: ABNORMAL
DIRECT EXAM: NORMAL
EOSINOPHILS RELATIVE PERCENT: 3 % (ref 1–4)
EPITHELIAL CELLS UA: ABNORMAL /HPF (ref 0–5)
FIO2: ABNORMAL
GFR AFRICAN AMERICAN: >60 ML/MIN
GFR NON-AFRICAN AMERICAN: >60 ML/MIN
GFR SERPL CREATININE-BSD FRML MDRD: ABNORMAL ML/MIN/{1.73_M2}
GFR SERPL CREATININE-BSD FRML MDRD: ABNORMAL ML/MIN/{1.73_M2}
GLUCOSE BLD-MCNC: 118 MG/DL (ref 70–99)
GLUCOSE URINE: NEGATIVE
HCO3 VENOUS: 24.2 MMOL/L (ref 24–30)
HCT VFR BLD CALC: 26.5 % (ref 36.3–47.1)
HEMOGLOBIN: 7.9 G/DL (ref 11.9–15.1)
IMMATURE GRANULOCYTES: 0 %
KETONES, URINE: NEGATIVE
LACTIC ACID, SEPSIS WHOLE BLOOD: 1.1 MMOL/L (ref 0.5–1.9)
LACTIC ACID, SEPSIS: NORMAL MMOL/L (ref 0.5–1.9)
LACTOFERRIN, QUAL: NORMAL
LEUKOCYTE ESTERASE, URINE: ABNORMAL
LYMPHOCYTES # BLD: 10 % (ref 24–44)
Lab: NORMAL
Lab: NORMAL
MAGNESIUM: 1.8 MG/DL (ref 1.6–2.6)
MAGNESIUM: 1.9 MG/DL (ref 1.6–2.6)
MCH RBC QN AUTO: 29.7 PG (ref 25.2–33.5)
MCHC RBC AUTO-ENTMCNC: 29.8 G/DL (ref 28.4–34.8)
MCV RBC AUTO: 99.6 FL (ref 82.6–102.9)
METHEMOGLOBIN: ABNORMAL % (ref 0–1.5)
MICRO OVA & PARASITES: NORMAL
MODE: ABNORMAL
MONOCYTES # BLD: 2 % (ref 1–7)
MORPHOLOGY: ABNORMAL
MUCUS: ABNORMAL
NEGATIVE BASE EXCESS, VEN: ABNORMAL MMOL/L (ref 0–2)
NITRITE, URINE: NEGATIVE
NOTIFICATION TIME: ABNORMAL
NOTIFICATION: ABNORMAL
NRBC AUTOMATED: 0 PER 100 WBC
O2 DEVICE/FLOW/%: ABNORMAL
O2 SAT, VEN: 90.5 % (ref 60–85)
OTHER OBSERVATIONS UA: ABNORMAL
OXYHEMOGLOBIN: ABNORMAL % (ref 95–98)
PATIENT TEMP: 37
PCO2, VEN, TEMP ADJ: ABNORMAL MMHG (ref 39–55)
PCO2, VEN: 36 (ref 39–55)
PDW BLD-RTO: 17.6 % (ref 11.8–14.4)
PEEP/CPAP: ABNORMAL
PH UA: 5.5 (ref 5–8)
PH VENOUS: 7.44 (ref 7.32–7.42)
PH, VEN, TEMP ADJ: ABNORMAL (ref 7.32–7.42)
PHENYTOIN DATE LAST DOSE: ABNORMAL
PHENYTOIN DOSE AMOUNT: ABNORMAL
PHENYTOIN DOSE TIME: ABNORMAL
PHENYTOIN FREE: 0.2 UG/ML (ref 1–2)
PHENYTOIN FREE: 0.2 UG/ML (ref 1–2)
PHENYTOIN LEVEL: 1.1 UG/ML (ref 10–20)
PLATELET # BLD: 170 K/UL (ref 138–453)
PLATELET ESTIMATE: ABNORMAL
PMV BLD AUTO: 9.3 FL (ref 8.1–13.5)
PO2, VEN, TEMP ADJ: ABNORMAL MMHG (ref 30–50)
PO2, VEN: 54.8 (ref 30–50)
POSITIVE BASE EXCESS, VEN: 0.6 MMOL/L (ref 0–2)
POTASSIUM SERPL-SCNC: 2.7 MMOL/L (ref 3.7–5.3)
PROTEIN UA: ABNORMAL
PSV: ABNORMAL
PT. POSITION: ABNORMAL
RBC # BLD: 2.66 M/UL (ref 3.95–5.11)
RBC # BLD: ABNORMAL 10*6/UL
RBC UA: ABNORMAL /HPF (ref 0–4)
RENAL EPITHELIAL, UA: ABNORMAL /HPF
RESPIRATORY RATE: ABNORMAL
SAMPLE SITE: ABNORMAL
SEG NEUTROPHILS: 85 % (ref 36–66)
SEGMENTED NEUTROPHILS ABSOLUTE COUNT: 4.25 K/UL (ref 1.8–7.7)
SET RATE: ABNORMAL
SODIUM BLD-SCNC: 130 MMOL/L (ref 135–144)
SPECIFIC GRAVITY UA: 1.02 (ref 1–1.03)
SPECIMEN DESCRIPTION: NORMAL
SPECIMEN DESCRIPTION: NORMAL
TEXT FOR RESPIRATORY: ABNORMAL
TOTAL HB: ABNORMAL G/DL (ref 12–16)
TOTAL RATE: ABNORMAL
TRICHOMONAS: ABNORMAL
TURBIDITY: CLEAR
URINE HGB: NEGATIVE
UROBILINOGEN, URINE: NORMAL
VALPROIC ACID LEVEL: 8 UG/ML (ref 50–125)
VALPROIC ACID, FREE: <0.4 UG/ML (ref 7–23)
VALPROIC DATE LAST DOSE: ABNORMAL
VALPROIC DOSE AMOUNT: ABNORMAL
VALPROIC TIME LAST DOSE: ABNORMAL
VT: ABNORMAL
WBC # BLD: 5 K/UL (ref 3.5–11.3)
WBC # BLD: ABNORMAL 10*3/UL
WBC UA: ABNORMAL /HPF (ref 0–5)
YEAST: ABNORMAL

## 2019-06-09 PROCEDURE — 87015 SPECIMEN INFECT AGNT CONCNTJ: CPT

## 2019-06-09 PROCEDURE — 87147 CULTURE TYPE IMMUNOLOGIC: CPT

## 2019-06-09 PROCEDURE — 85025 COMPLETE CBC W/AUTO DIFF WBC: CPT

## 2019-06-09 PROCEDURE — 2580000003 HC RX 258: Performed by: STUDENT IN AN ORGANIZED HEALTH CARE EDUCATION/TRAINING PROGRAM

## 2019-06-09 PROCEDURE — 6370000000 HC RX 637 (ALT 250 FOR IP): Performed by: STUDENT IN AN ORGANIZED HEALTH CARE EDUCATION/TRAINING PROGRAM

## 2019-06-09 PROCEDURE — 6370000000 HC RX 637 (ALT 250 FOR IP): Performed by: INTERNAL MEDICINE

## 2019-06-09 PROCEDURE — 87186 SC STD MICRODIL/AGAR DIL: CPT

## 2019-06-09 PROCEDURE — 87506 IADNA-DNA/RNA PROBE TQ 6-11: CPT

## 2019-06-09 PROCEDURE — 99223 1ST HOSP IP/OBS HIGH 75: CPT | Performed by: PSYCHIATRY & NEUROLOGY

## 2019-06-09 PROCEDURE — 83630 LACTOFERRIN FECAL (QUAL): CPT

## 2019-06-09 PROCEDURE — 87449 NOS EACH ORGANISM AG IA: CPT

## 2019-06-09 PROCEDURE — 6360000002 HC RX W HCPCS: Performed by: EMERGENCY MEDICINE

## 2019-06-09 PROCEDURE — 80185 ASSAY OF PHENYTOIN TOTAL: CPT

## 2019-06-09 PROCEDURE — 99223 1ST HOSP IP/OBS HIGH 75: CPT | Performed by: INTERNAL MEDICINE

## 2019-06-09 PROCEDURE — 87209 SMEAR COMPLEX STAIN: CPT

## 2019-06-09 PROCEDURE — 87493 C DIFF AMPLIFIED PROBE: CPT

## 2019-06-09 PROCEDURE — 36415 COLL VENOUS BLD VENIPUNCTURE: CPT

## 2019-06-09 PROCEDURE — 87070 CULTURE OTHR SPECIMN AEROBIC: CPT

## 2019-06-09 PROCEDURE — 80165 DIPROPYLACETIC ACID FREE: CPT

## 2019-06-09 PROCEDURE — 86403 PARTICLE AGGLUT ANTBDY SCRN: CPT

## 2019-06-09 PROCEDURE — 2060000000 HC ICU INTERMEDIATE R&B

## 2019-06-09 PROCEDURE — 99254 IP/OBS CNSLTJ NEW/EST MOD 60: CPT | Performed by: INTERNAL MEDICINE

## 2019-06-09 PROCEDURE — 96375 TX/PRO/DX INJ NEW DRUG ADDON: CPT

## 2019-06-09 PROCEDURE — 94664 DEMO&/EVAL PT USE INHALER: CPT

## 2019-06-09 PROCEDURE — 87210 SMEAR WET MOUNT SALINE/INK: CPT

## 2019-06-09 PROCEDURE — 80164 ASSAY DIPROPYLACETIC ACD TOT: CPT

## 2019-06-09 PROCEDURE — 83735 ASSAY OF MAGNESIUM: CPT

## 2019-06-09 PROCEDURE — 80186 ASSAY OF PHENYTOIN FREE: CPT

## 2019-06-09 PROCEDURE — 87324 CLOSTRIDIUM AG IA: CPT

## 2019-06-09 PROCEDURE — 2580000003 HC RX 258: Performed by: INTERNAL MEDICINE

## 2019-06-09 PROCEDURE — 83605 ASSAY OF LACTIC ACID: CPT

## 2019-06-09 PROCEDURE — 2580000003 HC RX 258: Performed by: EMERGENCY MEDICINE

## 2019-06-09 PROCEDURE — 2580000003 HC RX 258

## 2019-06-09 PROCEDURE — 87205 SMEAR GRAM STAIN: CPT

## 2019-06-09 PROCEDURE — 6360000002 HC RX W HCPCS: Performed by: STUDENT IN AN ORGANIZED HEALTH CARE EDUCATION/TRAINING PROGRAM

## 2019-06-09 PROCEDURE — 80048 BASIC METABOLIC PNL TOTAL CA: CPT

## 2019-06-09 PROCEDURE — 94640 AIRWAY INHALATION TREATMENT: CPT

## 2019-06-09 PROCEDURE — 87328 CRYPTOSPORIDIUM AG IA: CPT

## 2019-06-09 PROCEDURE — 87329 GIARDIA AG IA: CPT

## 2019-06-09 RX ORDER — PHENOBARBITAL 64.8 MG/1
64.8 TABLET ORAL NIGHTLY
Status: DISCONTINUED | OUTPATIENT
Start: 2019-06-09 | End: 2019-06-10

## 2019-06-09 RX ORDER — 0.9 % SODIUM CHLORIDE 0.9 %
1000 INTRAVENOUS SOLUTION INTRAVENOUS ONCE
Status: COMPLETED | OUTPATIENT
Start: 2019-06-09 | End: 2019-06-09

## 2019-06-09 RX ORDER — SODIUM CHLORIDE 0.9 % (FLUSH) 0.9 %
10 SYRINGE (ML) INJECTION EVERY 12 HOURS SCHEDULED
Status: DISCONTINUED | OUTPATIENT
Start: 2019-06-09 | End: 2019-06-12 | Stop reason: HOSPADM

## 2019-06-09 RX ORDER — LANOLIN ALCOHOL/MO/W.PET/CERES
325 CREAM (GRAM) TOPICAL
Status: DISCONTINUED | OUTPATIENT
Start: 2019-06-09 | End: 2019-06-11

## 2019-06-09 RX ORDER — MAGNESIUM HYDROXIDE 1200 MG/15ML
LIQUID ORAL
Status: COMPLETED
Start: 2019-06-09 | End: 2019-06-09

## 2019-06-09 RX ORDER — ALBUTEROL SULFATE 90 UG/1
2 AEROSOL, METERED RESPIRATORY (INHALATION) EVERY 6 HOURS PRN
Status: DISCONTINUED | OUTPATIENT
Start: 2019-06-09 | End: 2019-06-12 | Stop reason: HOSPADM

## 2019-06-09 RX ORDER — VALPROIC ACID 250 MG/1
1000 CAPSULE, LIQUID FILLED ORAL 2 TIMES DAILY
Status: DISCONTINUED | OUTPATIENT
Start: 2019-06-09 | End: 2019-06-12 | Stop reason: HOSPADM

## 2019-06-09 RX ORDER — SODIUM CHLORIDE 0.9 % (FLUSH) 0.9 %
10 SYRINGE (ML) INJECTION PRN
Status: DISCONTINUED | OUTPATIENT
Start: 2019-06-09 | End: 2019-06-12 | Stop reason: HOSPADM

## 2019-06-09 RX ORDER — ACETAMINOPHEN 325 MG/1
650 TABLET ORAL EVERY 4 HOURS PRN
Status: DISCONTINUED | OUTPATIENT
Start: 2019-06-09 | End: 2019-06-09

## 2019-06-09 RX ORDER — IPRATROPIUM BROMIDE AND ALBUTEROL SULFATE 2.5; .5 MG/3ML; MG/3ML
1 SOLUTION RESPIRATORY (INHALATION)
Status: DISCONTINUED | OUTPATIENT
Start: 2019-06-09 | End: 2019-06-12 | Stop reason: HOSPADM

## 2019-06-09 RX ORDER — MAGNESIUM SULFATE 1 G/100ML
1 INJECTION INTRAVENOUS PRN
Status: DISCONTINUED | OUTPATIENT
Start: 2019-06-09 | End: 2019-06-12 | Stop reason: HOSPADM

## 2019-06-09 RX ORDER — POTASSIUM CHLORIDE 7.45 MG/ML
40 INJECTION INTRAVENOUS ONCE
Status: COMPLETED | OUTPATIENT
Start: 2019-06-09 | End: 2019-06-09

## 2019-06-09 RX ORDER — FLUTICASONE PROPIONATE 50 MCG
2 SPRAY, SUSPENSION (ML) NASAL DAILY
Status: DISCONTINUED | OUTPATIENT
Start: 2019-06-09 | End: 2019-06-12 | Stop reason: HOSPADM

## 2019-06-09 RX ORDER — NICOTINE 21 MG/24HR
1 PATCH, TRANSDERMAL 24 HOURS TRANSDERMAL DAILY
Status: DISCONTINUED | OUTPATIENT
Start: 2019-06-09 | End: 2019-06-12 | Stop reason: HOSPADM

## 2019-06-09 RX ORDER — PHENOBARBITAL 32.4 MG/1
32.4 TABLET ORAL 3 TIMES DAILY
Status: DISCONTINUED | OUTPATIENT
Start: 2019-06-09 | End: 2019-06-09

## 2019-06-09 RX ORDER — VALPROIC ACID 250 MG/1
250 CAPSULE, LIQUID FILLED ORAL 3 TIMES DAILY
Status: DISCONTINUED | OUTPATIENT
Start: 2019-06-09 | End: 2019-06-09

## 2019-06-09 RX ORDER — RISPERIDONE 2 MG/1
2 TABLET, FILM COATED ORAL NIGHTLY
Status: DISCONTINUED | OUTPATIENT
Start: 2019-06-09 | End: 2019-06-12 | Stop reason: HOSPADM

## 2019-06-09 RX ORDER — LEVOTHYROXINE SODIUM 0.05 MG/1
50 TABLET ORAL DAILY
Status: DISCONTINUED | OUTPATIENT
Start: 2019-06-09 | End: 2019-06-12 | Stop reason: HOSPADM

## 2019-06-09 RX ORDER — POTASSIUM CHLORIDE 20 MEQ/1
40 TABLET, EXTENDED RELEASE ORAL ONCE
Status: DISCONTINUED | OUTPATIENT
Start: 2019-06-09 | End: 2019-06-09

## 2019-06-09 RX ORDER — OXYCODONE HYDROCHLORIDE AND ACETAMINOPHEN 5; 325 MG/1; MG/1
1 TABLET ORAL EVERY 6 HOURS PRN
Status: COMPLETED | OUTPATIENT
Start: 2019-06-09 | End: 2019-06-11

## 2019-06-09 RX ORDER — PROCHLORPERAZINE EDISYLATE 5 MG/ML
5 INJECTION INTRAMUSCULAR; INTRAVENOUS ONCE
Status: COMPLETED | OUTPATIENT
Start: 2019-06-09 | End: 2019-06-09

## 2019-06-09 RX ORDER — ONDANSETRON 2 MG/ML
4 INJECTION INTRAMUSCULAR; INTRAVENOUS EVERY 6 HOURS PRN
Status: DISCONTINUED | OUTPATIENT
Start: 2019-06-09 | End: 2019-06-12 | Stop reason: HOSPADM

## 2019-06-09 RX ORDER — 0.9 % SODIUM CHLORIDE 0.9 %
500 INTRAVENOUS SOLUTION INTRAVENOUS ONCE
Status: COMPLETED | OUTPATIENT
Start: 2019-06-09 | End: 2019-06-09

## 2019-06-09 RX ORDER — ACETAMINOPHEN 325 MG/1
650 TABLET ORAL EVERY 4 HOURS PRN
Status: DISCONTINUED | OUTPATIENT
Start: 2019-06-09 | End: 2019-06-12 | Stop reason: HOSPADM

## 2019-06-09 RX ORDER — PHENOBARBITAL 32.4 MG/1
32.4 TABLET ORAL 2 TIMES DAILY
Status: DISCONTINUED | OUTPATIENT
Start: 2019-06-09 | End: 2019-06-10

## 2019-06-09 RX ORDER — SODIUM CHLORIDE 9 MG/ML
INJECTION, SOLUTION INTRAVENOUS CONTINUOUS
Status: DISCONTINUED | OUTPATIENT
Start: 2019-06-09 | End: 2019-06-12

## 2019-06-09 RX ORDER — POTASSIUM CHLORIDE 7.45 MG/ML
10 INJECTION INTRAVENOUS
Status: ACTIVE | OUTPATIENT
Start: 2019-06-09 | End: 2019-06-09

## 2019-06-09 RX ADMIN — SODIUM CHLORIDE 500 ML: 9 INJECTION, SOLUTION INTRAVENOUS at 05:35

## 2019-06-09 RX ADMIN — SODIUM CHLORIDE: 9 INJECTION, SOLUTION INTRAVENOUS at 06:19

## 2019-06-09 RX ADMIN — VALPROIC ACID 1000 MG: 250 CAPSULE, LIQUID FILLED ORAL at 13:00

## 2019-06-09 RX ADMIN — Medication 1250 MG: at 02:22

## 2019-06-09 RX ADMIN — SODIUM CHLORIDE: 9 INJECTION, SOLUTION INTRAVENOUS at 16:36

## 2019-06-09 RX ADMIN — PIPERACILLIN AND TAZOBACTAM 3.38 G: 3; .375 INJECTION, POWDER, FOR SOLUTION INTRAVENOUS at 23:33

## 2019-06-09 RX ADMIN — PROCHLORPERAZINE EDISYLATE 5 MG: 5 INJECTION INTRAMUSCULAR; INTRAVENOUS at 20:56

## 2019-06-09 RX ADMIN — SODIUM CHLORIDE: 9 INJECTION, SOLUTION INTRAVENOUS at 02:21

## 2019-06-09 RX ADMIN — PIPERACILLIN AND TAZOBACTAM 3.38 G: 3; .375 INJECTION, POWDER, FOR SOLUTION INTRAVENOUS at 09:33

## 2019-06-09 RX ADMIN — ACETAMINOPHEN 650 MG: 325 TABLET ORAL at 09:41

## 2019-06-09 RX ADMIN — OXYCODONE HYDROCHLORIDE AND ACETAMINOPHEN 1 TABLET: 5; 325 TABLET ORAL at 23:33

## 2019-06-09 RX ADMIN — VALPROIC ACID 1000 MG: 250 CAPSULE, LIQUID FILLED ORAL at 20:56

## 2019-06-09 RX ADMIN — PIPERACILLIN AND TAZOBACTAM 3.38 G: 3; .375 INJECTION, POWDER, LYOPHILIZED, FOR SOLUTION INTRAVENOUS; PARENTERAL at 00:23

## 2019-06-09 RX ADMIN — PHENOBARBITAL 64.8 MG: 32.4 TABLET ORAL at 20:57

## 2019-06-09 RX ADMIN — FLUTICASONE PROPIONATE 2 SPRAY: 50 SPRAY, METERED NASAL at 09:40

## 2019-06-09 RX ADMIN — PIPERACILLIN AND TAZOBACTAM 3.38 G: 3; .375 INJECTION, POWDER, FOR SOLUTION INTRAVENOUS at 16:34

## 2019-06-09 RX ADMIN — WATER 1000 ML: 1 IRRIGANT IRRIGATION at 02:39

## 2019-06-09 RX ADMIN — PHENOBARBITAL 32.4 MG: 64.8 TABLET ORAL at 14:26

## 2019-06-09 RX ADMIN — Medication 10 ML: at 09:48

## 2019-06-09 RX ADMIN — MIRTAZAPINE 45 MG: 30 TABLET, FILM COATED ORAL at 20:57

## 2019-06-09 RX ADMIN — ENOXAPARIN SODIUM 40 MG: 40 INJECTION SUBCUTANEOUS at 09:41

## 2019-06-09 RX ADMIN — SODIUM CHLORIDE 1000 ML: 900 IRRIGANT IRRIGATION at 21:30

## 2019-06-09 RX ADMIN — ONDANSETRON 4 MG: 2 INJECTION INTRAMUSCULAR; INTRAVENOUS at 16:35

## 2019-06-09 RX ADMIN — EXTENDED PHENYTOIN SODIUM 150 MG: 30 CAPSULE ORAL at 14:26

## 2019-06-09 RX ADMIN — FERROUS SULFATE TAB EC 325 MG (65 MG FE EQUIVALENT) 325 MG: 325 (65 FE) TABLET DELAYED RESPONSE at 09:33

## 2019-06-09 RX ADMIN — ACETAMINOPHEN 650 MG: 325 TABLET ORAL at 20:56

## 2019-06-09 RX ADMIN — RISPERIDONE 2 MG: 2 TABLET, FILM COATED ORAL at 20:56

## 2019-06-09 RX ADMIN — Medication 10 ML: at 20:57

## 2019-06-09 RX ADMIN — Medication 10 ML: at 21:00

## 2019-06-09 RX ADMIN — SODIUM CHLORIDE 1000 ML: 9 INJECTION, SOLUTION INTRAVENOUS at 09:30

## 2019-06-09 RX ADMIN — VANCOMYCIN HYDROCHLORIDE 1250 MG: 10 INJECTION, POWDER, LYOPHILIZED, FOR SOLUTION INTRAVENOUS at 14:26

## 2019-06-09 RX ADMIN — Medication 10 ML: at 09:56

## 2019-06-09 RX ADMIN — POTASSIUM CHLORIDE 40 MEQ: 7.46 INJECTION, SOLUTION INTRAVENOUS at 06:09

## 2019-06-09 RX ADMIN — ONDANSETRON 4 MG: 2 INJECTION INTRAMUSCULAR; INTRAVENOUS at 23:33

## 2019-06-09 RX ADMIN — EXTENDED PHENYTOIN SODIUM 150 MG: 30 CAPSULE ORAL at 20:56

## 2019-06-09 RX ADMIN — IPRATROPIUM BROMIDE AND ALBUTEROL SULFATE 1 AMPULE: .5; 3 SOLUTION RESPIRATORY (INHALATION) at 19:42

## 2019-06-09 ASSESSMENT — PAIN SCALES - GENERAL
PAINLEVEL_OUTOF10: 8
PAINLEVEL_OUTOF10: 0
PAINLEVEL_OUTOF10: 6
PAINLEVEL_OUTOF10: 0
PAINLEVEL_OUTOF10: 8
PAINLEVEL_OUTOF10: 6
PAINLEVEL_OUTOF10: 8

## 2019-06-09 ASSESSMENT — PAIN DESCRIPTION - FREQUENCY
FREQUENCY: CONTINUOUS
FREQUENCY: INTERMITTENT

## 2019-06-09 ASSESSMENT — PAIN DESCRIPTION - LOCATION
LOCATION: LEG
LOCATION: LEG

## 2019-06-09 ASSESSMENT — PAIN DESCRIPTION - PAIN TYPE
TYPE: ACUTE PAIN
TYPE: ACUTE PAIN

## 2019-06-09 ASSESSMENT — PAIN DESCRIPTION - DESCRIPTORS
DESCRIPTORS: ACHING;THROBBING
DESCRIPTORS: ACHING;THROBBING

## 2019-06-09 ASSESSMENT — PAIN DESCRIPTION - ONSET
ONSET: ON-GOING
ONSET: ON-GOING

## 2019-06-09 ASSESSMENT — PAIN DESCRIPTION - ORIENTATION
ORIENTATION: RIGHT
ORIENTATION: RIGHT

## 2019-06-09 ASSESSMENT — PAIN - FUNCTIONAL ASSESSMENT
PAIN_FUNCTIONAL_ASSESSMENT: ACTIVITIES ARE NOT PREVENTED
PAIN_FUNCTIONAL_ASSESSMENT: ACTIVITIES ARE NOT PREVENTED

## 2019-06-09 ASSESSMENT — PAIN DESCRIPTION - PROGRESSION
CLINICAL_PROGRESSION: GRADUALLY WORSENING
CLINICAL_PROGRESSION: GRADUALLY WORSENING

## 2019-06-09 NOTE — ED NOTES
Bed: 20  Expected date: 6/8/19  Expected time:   Means of arrival:   Comments:  Mortimer Rice, RN  06/08/19 0277

## 2019-06-09 NOTE — ED PROVIDER NOTES
Abnormal; Notable for the following components:    Glucose 112 (*)     Calcium 8.2 (*)     Sodium 129 (*)     Potassium 3.0 (*)     Chloride 92 (*)     AST 32 (*)     Alb 2.7 (*)     Albumin/Globulin Ratio 0.7 (*)     All other components within normal limits   URINALYSIS WITH MICROSCOPIC - Abnormal; Notable for the following components:    Color, UA DARK YELLOW (*)     Bilirubin Urine NEGATIVE  Verified by ictotest. (*)     Protein, UA 1+ (*)     Leukocyte Esterase, Urine SMALL (*)     All other components within normal limits   TROPONIN - Abnormal; Notable for the following components:    Troponin, High Sensitivity 16 (*)     All other components within normal limits   C-REACTIVE PROTEIN - Abnormal; Notable for the following components:    .3 (*)     All other components within normal limits   SEDIMENTATION RATE - Abnormal; Notable for the following components:    Sed Rate 58 (*)     All other components within normal limits   PHENYTOIN LEVEL, TOTAL - Abnormal; Notable for the following components:    Phenytoin Lvl 1.3 (*)     All other components within normal limits   LACTIC ACID, WHOLE BLOOD - Abnormal; Notable for the following components:    Lactic Acid, Whole Blood 2.3 (*)     All other components within normal limits   PHENYTOIN LEVEL, FREE - Abnormal; Notable for the following components:    Phenytoin, Free 0.2 (*)     All other components within normal limits   CULTURE BLOOD #1   CULTURE BLOOD #1   AMMONIA   PROTIME-INR   APTT   LACTATE, SEPSIS   BLOOD GAS, VENOUS   POC BLOOD GAS AND CHEMISTRY       Xr Hip Right (1 View)    Result Date: 6/8/2019  EXAMINATION: ONE XRAY VIEW OF THE RIGHT HIP 6/8/2019 10:48 pm COMPARISON: None.  HISTORY: ORDERING SYSTEM PROVIDED HISTORY: necrotizing infection TECHNOLOGIST PROVIDED HISTORY: necrotizing infection Ordering Physician Provided Reason for Exam: rt hip wound,pt has wound vac./check for air,ap only Acuity: Unknown Type of Exam: Unknown FINDINGS: Single view of the hip was provided. Detail of the bones is limited. There is no acute fracture. There is no dislocation. There is no definite destructive change. There is a large soft tissue defect in the lateral aspect of the hip soft tissues. Large soft tissue defect No focal bony erosion. Bone detail is slightly limited     Ct Head Wo Contrast    Result Date: 6/8/2019  EXAMINATION: CT OF THE HEAD WITHOUT CONTRAST  6/8/2019 9:41 pm TECHNIQUE: CT of the head was performed without the administration of intravenous contrast. Dose modulation, iterative reconstruction, and/or weight based adjustment of the mA/kV was utilized to reduce the radiation dose to as low as reasonably achievable. COMPARISON: CT head performed 06/02/2019. HISTORY: ORDERING SYSTEM PROVIDED HISTORY: ams TECHNOLOGIST PROVIDED HISTORY: Ordering Physician Provided Reason for Exam: AMS Acuity: Acute Type of Exam: Initial FINDINGS: BRAIN/VENTRICLES: There is no acute intracranial hemorrhage, mass effect, or midline shift. There is satisfactory overall gray-white matter differentiation. The ventricular structures are symmetric and unremarkable. The infratentorial structures are unremarkable. ORBITS: The visualized portion of the orbits demonstrate no acute abnormality. SINUSES: Mastoid air cells are normally aerated. There is chronic sinusitis most pronounced in the maxillary sinuses and left sphenoid sinus. SOFT TISSUES/SKULL:  No acute abnormality of the visualized skull or soft tissues. No acute intracranial abnormality. Chronic sinusitis most pronounced in the maxillary sinuses and left sphenoid sinus. Xr Chest Portable    Result Date: 6/8/2019  EXAMINATION: ONE XRAY VIEW OF THE CHEST 6/8/2019 10:48 pm COMPARISON: Chest radiograph performed 06/02/2019.  HISTORY: ORDERING SYSTEM PROVIDED HISTORY: fever, sepsis TECHNOLOGIST PROVIDED HISTORY: fever, sepsis Ordering Physician Provided Reason for Exam: fever,sepsis Acuity: Unknown Type of Exam: Unknown FINDINGS: There is mild pulmonary congestion bilaterally. There is no definite consolidation or effusion. There is no pneumothorax. The mediastinal structures are unremarkable. The upper abdomen is unremarkable. The extrathoracic soft tissues are unremarkable. Mild bilateral pulmonary congestion. RECENT VITALS:     Temp: 101 °F (38.3 °C),  Pulse: 100, Resp: 20, BP: 97/64, SpO2: 95 %    This patient is a 62 y.o. Female with septic. H/o of nec fas to right thigh, wound vac removed 3 days ago. Surgery evaluated, don't thiink it is wound infection. But meets SIRS criteria. Elevated CRP. Given Vanc, Zosyn, Clinda. Admitted to IM. OUTSTANDING TASKS / RECOMMENDATIONS:    1. Transport to Floor     FINAL IMPRESSION:     1. Septicemia ([address]    2. Altered mental status, unspecified altered mental status type    3. Hypokalemia    4.  Lactic acidosis        DISPOSITION:         DISPOSITION:  []  Discharge   []  Transfer -    [x]  Admission -  Internal Medicine   []  Against Medical Advice   []  Eloped   FOLLOW-UP: KEANU Hansen - NP  [address]  [phone]           DISCHARGE MEDICATIONS: New Prescriptions    No medications on file          Alisha Casanova MD  Emergency Medicine Resident  [facility]        Alisha Casanova MD  Resident  06/09/19 4120

## 2019-06-09 NOTE — CONSULTS
NEUROLOGY INPATIENT CONSULT NOTE    6/9/2019         Eveline Neves is a  62 y.o. female admitted on 6/8/2019 with  Septicemia ([facility] [address] [A41.9]      History is obtained mostly from the patient and the medical record and from the caregivers. Chart is reviewed and patient is examined. Briefly, this is a  62 y.o. female admitted on 6/8/2019 with past medical history of seizure disorder, hypertension, headache, fibromyalgia, COPD, CHF and movement disorder came to the ED due to altered mental status. Patient was recently discharged from the hospital after being treated for necrotizing fasciitis and underwent surgical debridement with wound washout and was discharged to SNF with found to [facility] in place. Shunt took her wound VAC of 3 days ago and also has been having diarrhea since discharge without any mucus or blood in it. She was seen by neurology earlier this week due to breakthrough seizure in the setting of low Dilantin, Depakote, and unknown luminal  level and infection. Patient has a history of seizures that started at the age of 3. Her seizure episode starts with an aura of feeling overwhelmed, difficulty breathing and involve tongue biting  with periods of apnea. Her last seizure on 6/2 and the one prior to that was 4 month ago. Medications included     Luminal 32.4 mg tid  Depakote 750 mg bid  Dilantin 100 mg tid    On her last admission plan was to increase vimpat to 1000 mg twice a day, Dilantin 150 mg 3 times a day and continue luminal was increased to 64.8 as level was low. EEG done at that time showed occasional left component slowing and patient was advised to get trough free and total levels in 3-5 day outpatient  Today phenytoin levels are 1.3, free 0.2  Other lab abnormalities include sodium-130 potassium 2.7, lactic acid 2. 3.UA showed 1 positive protein and small leukocyte esterase with 5-10 WBCs  Patient had fever of 103.1 and bp 97/64 pulse 110 on arrival in ED  Ct head was unremarkable for [ketorolac tromethamine]; and tramadol. Past Medical History:   Diagnosis Date    Arthritis     Asthma     [facility]est[facility]     COPD [facility]chronic obstructive pulmonary disease) [facility]     Fibromyalgia     Headache     Hypertension     Movement disorder     Neck fracture ([facility] [address]     Seizure ([facility] [address]     Thyroid disease        Past Surgical History:   Procedure Laterality Date    EXPLORATION OF WOUND OF EXTREMITY Right 5/19/2019    RIGHT THIGH WOUND WASHOUT WITH [facility] WEST-ER PLACEMENT performed by Tony Winter MD at [address] N/A 5/22/2019    RIGHT WOUND HIP EXAMINATION LAVAGE AND WOUND VAC PLACEMENT performed by Sabina Norman MD at [address] [place] [address], [place] [address] Right 5/17/2019    IRRIGATION, DEBRIDEMENT RIGHT THIGH performed by Nitin Shah MD at [facility]      PARTIAL HYSTERECTOMY       Social History: Deloris Neri  reports that she has been smoking. She has a 6.00 pack-year smoking history. She has never used smokeless tobacco. She reports that she does not drink alcohol or use drugs. History reviewed. No pertinent family history.     Current Medications:     sodium chloride flush  10 mL Intravenous 2 times per day    sodium chloride flush  10 mL Intravenous 2 times per day    enoxaparin  40 mg Subcutaneous Daily    nicotine  1 patch Transdermal Daily    vancomycin  1,250 mg Intravenous Q12H    vancomycin (VANCOCIN) intermittent dosing (placeholder)   Other RX Placeholder    piperacillin-tazobactam  3.375 g Intravenous Q8H    ferrous sulfate  325 mg Oral Daily with breakfast    fluticasone  2 spray Each Nostril Daily    levothyroxine  50 mcg Oral Daily    mirtazapine  45 mg Oral Nightly    risperiDONE  2 mg Oral Nightly    ipratropium-albuterol  1 ampule Inhalation Q4H WA    potassium chloride  10 mEq Intravenous Q1H    sodium chloride  1,000 mL Intravenous Once     PRN Meds include: sodium chloride flush, acetaminophen, sodium chloride flush, magnesium hydroxide, ondansetron, magnesium sulfate, albuterol sulfate HFA    ROS:   Constitutional Negative for fever and chills   HEENT Negative for ear discharge, ear pain, nosebleed   Eyes Negative for photophobia, pain and discharge   Respiratory Negative for hemoptysis and sputum   Cardiovascular Negative for orthopnea, claudication and PND   Gastrointestinal Negative for abdominal pain, diarrhea, blood in stool   Musculoskeletal Negative for joint pain, negative for myalgia   Skin Negative for rash or itching   Endo/heme/allergies Negative for polydipsia, environmental allergy   Psychiatric Negative for suicidal ideation. Patient is not anxious           Objective:   BP (!) 87/49 Comment: provider notified. Bolus ordered  Pulse 74   Temp 97.9 °F (36.6 °C) (Axillary)   Resp 30   Ht 5' 2.21\" (1.58 m)   Wt 186 lb 11.7 oz (84.7 kg)   SpO2 99%   BMI 33.93 kg/m²     Blood pressure range: Systolic (93NXH), AXZ:486 , Min:87 , FGR:413   ; Diastolic (96UEP), PDN:02, Min:49, Max:76      Continuous infusions:    sodium chloride 125 mL/hr at 06/09/19 0619                    EXAMINATION:  GENERAL    SKIN  Appears comfortable and in no distress    Right hip wound site warm and erythematous, no discharge noted   HEENT NC/ AT  Eyes:no icterus, redness, pupils equal and reactive, extraocular eye movements intact, conjunctiva clear  Ear: normal external ear, no discharge, hearing intact  Nose:  no drainage noted  Mouth: mucous membranes moist   NECK  LUNGS  Supple and no bruits heard  Clear to auscultation,b/l   Cardiovascular  Abdomen  S1, S2 heard; radial pulse intact,RRR  Soft,non-tender,no organomegaly,BS+   MENTAL STATUS:  Alert, oriented, intact memory, no confusion, normal speech, normal language, no hallucination or delusion   CRANIAL NERVES: II     -       Pupils reactive b/l visual acuity: 20/30 OU;  Fundus exam: intact venous pulsations; 92*  --  97*   CO2 23  --  22   GLUCOSE 112*  --  118*   BUN 12  --  13   CREATININE 0.63  --  0.58   MG  --  1.8 1.9   CALCIUM 8.2*  --  7.2*     Recent Labs     06/08/19  2140   PROT 6.8   LABALBU 2.7*   AST 32*   ALT 16   AMMONIA 29       Lab Results   Component Value Date    PHENYTOIN 1.3 (L) 06/08/2019    VALPROATE 37 (L) 06/02/2019    VALPROATE 5.4 (L) 06/02/2019                 Impression and Plan:     Ms. Johanna Posada is a 62 y.o. female with  past medical history of seizure disorder, hypertension, headache, fibromyalgia, COPD, CHF and movement disorder came to the ED due to altered mental status. Patient was discharged on  VPA  1000 mg twice a day, Dilantin 150 mg 3 times a day and luminal was increased to 64.8 evening dose due to low level  Her phenytoin levels are 1.3, free 0.2 today  Will resume medications and check trough levels and make adjustment if neccessary  Seiziure precautions  Ativan prn for breakthrough seizures      Staffed with   Will follow with you. Thank you for consultation.          Katy Soni MD Pager: [phone]  Electronically signed 6/9/2019 at 9:03 AM

## 2019-06-09 NOTE — ED PROVIDER NOTES
file   [place] needs:     Medical: Not on file     Non-medical: Not on file   Tobacco Use    Smoking status: Current Every Day Smoker     Packs/day: 0.50     Years: 12.00     Pack years: 6.00    Smokeless tobacco: Never Used   Substance and Sexual Activity    Alcohol use: No    Drug use: No    Sexual activity: Not on file   Lifestyle    Physical activity:     Days per week: Not on file     Minutes per session: Not on file    Stress: Not on file   Relationships    Social connections:     Talks on phone: Not on file     Gets together: Not on file     Attends Sikhism service: Not on file     Active member of club or organization: Not on file     Attends meetings of clubs or organizations: Not on file     Relationship status: Not on file    Intimate partner violence:     Fear of current or ex partner: Not on file     Emotionally abused: Not on file     Physically abused: Not on file     Forced sexual activity: Not on file   Other Topics Concern    Not on file   Social History Narrative    Not on file       History reviewed. No pertinent family history. Allergies:  Imitrex [sumatriptan]; Bee pollen; Bee venom; Bromide ion [bromine]; Flexeril [cyclobenzaprine]; Neurontin [gabapentin]; Nsaids; Potassium bromide; Reglan [metoclopramide]; Sulfa antibiotics; Sulfadiazine; Toradol [ketorolac tromethamine]; and Tramadol    Home Medications:  Prior to Admission medications    Medication Sig Start Date End Date Taking?  Authorizing Provider   divalproex (DEPAKOTE) 500 MG DR tablet Take 2 tablets by mouth every 12 hours 6/4/19   Susan Avalos MD   phenytoin (DILANTIN) 30 MG ER capsule Take 5 capsules by mouth 3 times daily 6/4/19   Susan Avalos MD   predniSONE (DELTASONE) 20 MG tablet Take 2 tablets by mouth daily for 4 doses 6/5/19 6/9/19  Susan Avalos MD   benzonatate (TESSALON) 100 MG capsule Take 1 capsule by mouth 3 times daily as needed for Cough 6/4/19 6/11/19  Susan Avalos MD   fluticasone (FLONASE) 50 MCG/ACT nasal spray 2 sprays by Each Nostril route daily 6/5/19   Jolanta Phillips MD   azithromycin (ZITHROMAX) 250 MG tablet Take 1 tablet by mouth See Admin Instructions for 5 days 500mg on day 1 followed by 250mg on days 2 - 5 6/4/19 6/9/19  Jolanta Phillips MD   ferrous sulfate 325 (65 Fe) MG EC tablet Take 1 tablet by mouth daily (with breakfast) 6/5/19   Bryan Nayak MD   albuterol sulfate  (90 Base) MCG/ACT inhaler Inhale 2 puffs into the lungs every 6 hours as needed for Wheezing Gap prescription until follow up with primary. Do not refill. Follow up with primary 5/24/19   KEANU Hanks CNP   mirtazapine (REMERON) 45 MG tablet Take 1 tablet by mouth nightly 3/1/19   KEANU Hutson CNP   risperiDONE (RISPERDAL) 2 MG tablet Take 1 tablet by mouth nightly 3/1/19   KEANU Hutson CNP   famotidine (PEPCID) 20 MG tablet Take 20 mg by mouth 2 times daily    Historical Provider, MD   levothyroxine (SYNTHROID) 50 MCG tablet Take 50 mcg by mouth Daily    Historical Provider, MD       REVIEW OF SYSTEMS    (2-9 systems for level 4, 10 or more for level 5)      Review of Systems   Unable to perform ROS: Mental status change       PHYSICAL EXAM   (up to 7 for level 4, 8 or more for level 5)      INITIAL VITALS:   BP 97/64   Pulse 100   Temp 101 °F (38.3 °C) (Axillary)   Resp 20   Wt 202 lb (91.6 kg)   SpO2 95%   BMI 36.94 kg/m²     Physical Exam   Constitutional: She appears well-developed and well-nourished. She appears lethargic. She appears distressed. HENT:   Head: Normocephalic and atraumatic. Eyes: Pupils are equal, round, and reactive to light. Neck: Normal range of motion. No tracheal deviation present. Cardiovascular: Regular rhythm. Tachycardia present. Pulmonary/Chest: Effort normal. No respiratory distress. She has no wheezes. Abdominal: Soft. She exhibits no distension. There is no tenderness. Musculoskeletal:        Right hip: She exhibits tenderness. Value Ref Range    Protime 11.3 9.0 - 12.0 sec    INR 1.1    APTT   Result Value Ref Range    PTT 26.7 20.5 - 30.5 sec   Urinalysis with Microscopic   Result Value Ref Range    Color, UA DARK YELLOW (A) YELLOW    Turbidity UA CLEAR CLEAR    Glucose, Ur NEGATIVE NEGATIVE    Bilirubin Urine NEGATIVE  Verified by ictotest. (A) NEGATIVE    Ketones, Urine NEGATIVE NEGATIVE    Specific Gravity, UA 1.017 1.005 - 1.030    Urine Hgb NEGATIVE NEGATIVE    pH, UA 5.5 5.0 - 8.0    Protein, UA 1+ (A) NEGATIVE    Urobilinogen, Urine Normal Normal    Nitrite, Urine NEGATIVE NEGATIVE    Leukocyte Esterase, Urine SMALL (A) NEGATIVE    -          WBC, UA 5 TO 10 0 - 5 /HPF    RBC, UA 0 TO 2 0 - 4 /HPF    Casts UA  0 - 8 /LPF     10 TO 20 HYALINE Reference range defined for non-centrifuged specimen. Crystals UA NOT REPORTED None /HPF    Epithelial Cells UA 2 TO 5 0 - 5 /HPF    Renal Epithelial, Urine NOT REPORTED 0 /HPF    Bacteria, UA NOT REPORTED None    Mucus, UA NOT REPORTED None    Trichomonas, UA NOT REPORTED None    Amorphous, UA NOT REPORTED None    Other Observations UA NOT REPORTED NOT REQ. Yeast, UA NOT REPORTED None   Troponin   Result Value Ref Range    Troponin, High Sensitivity 16 (H) 0 - 14 ng/L    Troponin T NOT REPORTED <0.03 ng/mL    Troponin Interp NOT REPORTED    C-REACTIVE PROTEIN   Result Value Ref Range    .3 (H) 0.0 - 5.0 mg/L   Sedimentation Rate   Result Value Ref Range    Sed Rate 58 (H) 0 - 20 mm   Phenytoin Level, Total   Result Value Ref Range    Phenytoin Lvl 1.3 (L) 10.0 - 20.0 ug/mL    Phenytoin Dose Amount NOT REPORTED     Phenytoin Date Last Dose NOT REPORTED     Phenytoin Dose Time NOT REPORTED    Lactic Acid, Whole Blood   Result Value Ref Range    Lactic Acid, Whole Blood 2.3 (H) 0.7 - 2.1 mmol/L   Phenytoin level, free   Result Value Ref Range    Phenytoin, Free 0.2 (L) 1.0 - 2.0 ug/mL       IMPRESSION: Patient presents with possible necrotizing infection, sepsis of unknown source. Plan is for workup, full septic workup, antibiotics. Gen. surgery consults to evaluate wound VAC however on initial appearance do not think this is the source of her infection. There is some mild serous drainage but there is no crepitance and no purulent drainage. The surrounding skin is not very erythematous or warm to touch. RADIOLOGY:  Xr Hip Right (1 View)    Result Date: 6/8/2019  EXAMINATION: ONE XRAY VIEW OF THE RIGHT HIP 6/8/2019 10:48 pm COMPARISON: None. HISTORY: ORDERING SYSTEM PROVIDED HISTORY: necrotizing infection TECHNOLOGIST PROVIDED HISTORY: necrotizing infection Ordering Physician Provided Reason for Exam: rt hip wound,pt has wound vac./check for air,ap only Acuity: Unknown Type of Exam: Unknown FINDINGS: Single view of the hip was provided. Detail of the bones is limited. There is no acute fracture. There is no dislocation. There is no definite destructive change. There is a large soft tissue defect in the lateral aspect of the hip soft tissues. Large soft tissue defect No focal bony erosion. Bone detail is slightly limited     Ct Head Wo Contrast    Result Date: 6/8/2019  EXAMINATION: CT OF THE HEAD WITHOUT CONTRAST  6/8/2019 9:41 pm TECHNIQUE: CT of the head was performed without the administration of intravenous contrast. Dose modulation, iterative reconstruction, and/or weight based adjustment of the mA/kV was utilized to reduce the radiation dose to as low as reasonably achievable. COMPARISON: CT head performed 06/02/2019. HISTORY: ORDERING SYSTEM PROVIDED HISTORY: ams TECHNOLOGIST PROVIDED HISTORY: Ordering Physician Provided Reason for Exam: AMS Acuity: Acute Type of Exam: Initial FINDINGS: BRAIN/VENTRICLES: There is no acute intracranial hemorrhage, mass effect, or midline shift. There is satisfactory overall gray-white matter differentiation. The ventricular structures are symmetric and unremarkable. The infratentorial structures are unremarkable.  ORBITS: The visualized portion of the orbits demonstrate no acute abnormality. SINUSES: Mastoid air cells are normally aerated. There is chronic sinusitis most pronounced in the maxillary sinuses and left sphenoid sinus. SOFT TISSUES/SKULL:  No acute abnormality of the visualized skull or soft tissues. No acute intracranial abnormality. Chronic sinusitis most pronounced in the maxillary sinuses and left sphenoid sinus. Ct Head Wo Contrast    Result Date: 6/2/2019  EXAMINATION: CT OF THE HEAD WITHOUT CONTRAST  6/2/2019 9:55 am TECHNIQUE: CT of the head was performed without the administration of intravenous contrast. Dose modulation, iterative reconstruction, and/or weight based adjustment of the mA/kV was utilized to reduce the radiation dose to as low as reasonably achievable. COMPARISON: 12 February 2019 HISTORY: ORDERING SYSTEM PROVIDED HISTORY: [facility] TECHNOLOGIST PROVIDED HISTORY: FINDINGS: BRAIN/VENTRICLES: There is no acute intracranial hemorrhage, mass effect or midline shift. No abnormal extra-axial fluid collection. The gray-white differentiation is maintained without evidence of an acute infarct. There is no evidence of hydrocephalus. ORBITS: The visualized portion of the orbits demonstrate no acute abnormality. SINUSES: Moderate to moderately severe mucoperiosteal thickening is present in the maxillary ethmoid and sphenoid sinuses, similar to that noted on prior exam.  Frontal sinuses and mastoid air cells are adequately appropriated. SOFT TISSUES/SKULL:  No acute abnormality of the visualized skull or soft tissues. Estimated biologic radiation dose for this procedure:865.54 mGy/cm2. No acute intracranial abnormality. Redemonstration of moderate sinus inflammation similar to that noted on prior exam.     Xr Chest Portable    Result Date: 6/8/2019  EXAMINATION: ONE XRAY VIEW OF THE CHEST 6/8/2019 10:48 pm COMPARISON: Chest radiograph performed 06/02/2019.  HISTORY: ORDERING SYSTEM PROVIDED HISTORY: fever, sepsis dose to as low as reasonably achievable. COMPARISON: CT abdomen and pelvis 11/04/2018. HISTORY ORDERING SYSTEM PROVIDED HISTORY: Cellulitis overlying right lateral hip and right buttock tox please evaluate for any subcutaneous gas. TECHNOLOGIST PROVIDED HISTORY: Cellulitis overlying right lateral hip and right buttock tox please include soft tissue. FINDINGS: Bones: No fracture or dislocation. No osseous erosion. No suspicious lytic or blastic osseous lesion. Soft Tissue:  Severe subcutaneous fat stranding in the right gluteal area extending into the proximal right thigh sparing the medial aspect. Significant amount of gas in the right gluteal subcutaneous fat. No well-defined drainable fluid collection. Overlying skin thickening is noted. The visualized musculature is within normal limits. No deep fascial involvement identified. No radiopaque foreign body. The neurovascular structures are unremarkable. The urinary bladder is normal in appearance. Status post hysterectomy. The visualized bowel is unremarkable. No free fluid in the pelvis. Mild right inguinal lymphadenopathy. Joint:  Mild degenerative changes of the bilateral hips. Mild bilateral sacroiliac joint and pubic symphysis degenerative changes. 1. Severe subcutaneous fat stranding of the right gluteal region extending into the proximal thigh. Significant gas in the gluteal subcutaneous fat raising the possibility of a necrotizing cellulitis. No well-defined drainable fluid collection or involvement of the underlying musculature. 2. No acute osseous abnormality. Findings were discussed with Dr. Chloe Blank of the [facility] emergency department at 6:38 pm on 5/17/2019. EKG  None    All EKG's are interpreted by the Emergency Department Physician who either signs or Co-signs this chart in the absence of a cardiologist.    EMERGENCY DEPARTMENT COURSE:  General surgery has evaluated patient.  Do not believe source of infection is from wound. No concerns for nec fasc. Urinalysis pending, but no source of infection yet. Patient sent up to Dr. Erendira Jansen. Admitted to internal medicine. PROCEDURES:  None    CONSULTS:  IP CONSULT TO GENERAL SURGERY  IP CONSULT TO INTERNAL MEDICINE    CRITICAL CARE:  None    FINAL IMPRESSION      1. Septicemia ([facility] [address]    2. Altered mental status, unspecified altered mental status type    3. Hypokalemia    4. Lactic acidosis          DISPOSITION / PLAN     DISPOSITION  Admit      PATIENT REFERRED TO:  KEANU Gordon - [place]  [address]  [phone]            DISCHARGE MEDICATIONS:  New Prescriptions    No medications on file       Ericka Quesada DO  Emergency Medicine Resident    (Please note that portions of thisnote were completed with a voice recognition program.  Efforts were made to edit the dictations but occasionally words are mis-transcribed. )        Ericka Quesada DO  Resident  06/09/19 [id number]

## 2019-06-09 NOTE — H&P
[facility]     Department of Internal Medicine - Staff Internal Medicine Service          ADMISSION NOTE/HISTORY AND PHYSICAL EXAMINATION   ______________________________________________________________________    HISTORY OBTAIN FROM: Patient and chart review    CHIEF COMPLAINT: altered mentation since yest      HISTORY OF PRESENT ILLNESS:      The patient is a pleasant 62 y.o. female with PMH of depression with suicidal ideation in past, seizure disorder, hypertension, migraine, hypothyroidism, asthma/COPD was brought in by EMS for altered mental status since yesterday. She was recently discharged from hospital and was admitted with similar symptoms at that time as well. Pt told EMS that she took her wound VAC off 3 days ago. Patient also reported having diarrhea since discharge but without any mucus or blood in it. She denied any fever, cough, shortness of breath, headache, seizure-like activity or any other current symptoms. She has underlying depression and has been admitted for suicidal attempt in the past. She takes Remeron and Risperdal for that. She has underlying seizure disorder for which she takes Dilantin, phenobarbital and Depakote. She reported using albuterol, Flonase for her underlying COPD as per patient (not sure about other inhalers. Was following up with pulmonologist in [place]). Takes Synthroid 50 for her hypothyroidism. On presentation she was lethargic, drowsy and somnolent. Not very responsive. Blood pressure was in normal range but she had fever with T max-103. Her wound VAC site was erythematous and warm with some yellowish drainage but without any purulent or bloody drainage. General surgery was also consulted from ER and they ruled out necrotizing fasciitis. Other significant labs showed   Na-129, potassium 3 , lactic acid 2.3 and later trended down to 1.1, CRP elevated to 367 and ESR in 50s. Albumin 2.7 , hemoglobin 9.2 , troponin 16 .   EKG was unremarkable . Urinalysis was sent which showed dark colored urine and 1 positive protein and small leukocyte esterase with 5-10 WBCs. Chest x-ray showed mild bilateral pulmonary congestion. CT head without contrast was unremarkable except chronic sinusitis changes in Maxillary sinuses and left sphenoid sinus. X-ray right hip showed no bony erosion but a large soft tissue defect in the lateral aspect of the hip soft tissues was reported. Follow-up CT of the right side with contrast.  She received 1 dose of vancomycin and Zosyn and clindamycin and is continued on IV hydration.         PAST MEDICAL HISTORY:        Diagnosis Date    Arthritis     Asthma     [facility]congestive [facility] failure) [facility] [address]     COPD (chronic obstructive pulmonary disease) ([facility] [address]     Fibromyalgia     Headache     Hypertension     Movement disorder     Neck fracture ([facility] [address]     Seizure ([facility] [address]     Thyroid disease        PAST SURGICAL HISTORY:        Procedure Laterality Date    EXPLORATION OF WOUND OF EXTREMITY Right 5/19/2019    RIGHT THIGH WOUND WASHOUT WITH [address] PLACEMENT performed by Maria Esther Menjivar MD at [address] N/A 5/22/2019    RIGHT WOUND HIP EXAMINATION LAVAGE AND WOUND VAC PLACEMENT performed by Julianna Navarro MD at [address] Right 5/17/2019    IRRIGATION, DEBRIDEMENT RIGHT THIGH performed by Adore Maguire MD at [place] [address] TO ADMISSION:  Medications Prior to Admission: divalproex (DEPAKOTE) 500 MG DR tablet, Take 2 tablets by mouth every 12 hours  phenytoin (DILANTIN) 30 MG ER capsule, Take 5 capsules by mouth 3 times daily  predniSONE (DELTASONE) 20 MG tablet, Take 2 tablets by mouth daily for 4 doses  benzonatate (TESSALON) 100 MG capsule, Take 1 capsule by mouth 3 times daily as needed for Cough  fluticasone (FLONASE) 50 MCG/ACT nasal spray, 2 sprays by Each Nostril route daily  azithromycin (ZITHROMAX) 250 MG tablet, Take 1 tablet by mouth See Admin Instructions for 5 days 500mg on day 1 followed by 250mg on days 2 - 5  ferrous sulfate 325 (65 Fe) MG EC tablet, Take 1 tablet by mouth daily (with breakfast)  albuterol sulfate  (90 Base) MCG/ACT inhaler, Inhale 2 puffs into the lungs every 6 hours as needed for Wheezing [place] prescription until follow up with primary. Do not refill. Follow up with primary  mirtazapine (REMERON) 45 MG tablet, Take 1 tablet by mouth nightly  risperiDONE (RISPERDAL) 2 MG tablet, Take 1 tablet by mouth nightly  famotidine (PEPCID) 20 MG tablet, Take 20 mg by mouth 2 times daily  levothyroxine (SYNTHROID) 50 MCG tablet, Take 50 mcg by mouth Daily    Allergies:  Imitrex [sumatriptan]; Bee pollen; Bee venom; Bromide ion [bromine]; Flexeril [cyclobenzaprine]; Neurontin [gabapentin]; Nsaids; Potassium bromide; Reglan [metoclopramide]; Sulfa antibiotics; Sulfadiazine; Toradol [ketorolac tromethamine]; and Tramadol    SOCIAL HISTORY:   20-pack-year history of smoking present . occasional alcohol intake. Denies drug abuse. FAMILY HISTORY:   History reviewed. No pertinent family history. REVIEW OF SYSTEMS:  CONSTITUTIONAL:  positive for  fatigue and malaise  HEENT:   negative for congestion.   RESPIRATORY:  positive for  wheezing  CARDIOVASCULAR:  negative for  chest pain, palpitations, orthopnea, PND  GASTROINTESTINAL:   positive for diarrhea negative for nausea, vomiting and change in bowel habits  MUSCULOSKELETAL:  negative for  myalgias, arthralgias and pain  NEUROLOGICAL:  negative for headaches, dizziness and seizures  BEHAVIOR/PSYCH:  positive for depressed mood      PHYSICAL EXAM:  BP 97/64   Pulse 85   Temp 101 °F (38.3 °C) (Axillary)   Resp 20   Wt 202 lb (91.6 kg)   SpO2 94%   BMI 36.94 kg/m²   CONSTITUTIONAL:  awake, alert, cooperative,  lethargic and drowsy  NECK:  Supple, symmetrical, trachea midline, no adenopathy, thyroid symmetric, not enlarged and no tenderness, skin normal  BACK:  Symmetric, no curvature, spinous processes are non-tender on palpation, paraspinous muscles are non-tender on palpation, no costal vertebral tenderness  LUNGS:  No increased work of breathing, good air exchange, wheezing present bilaterally  CARDIOVASCULAR:  Normal apical impulse, regular rate and rhythm, normal S1 and S2, no S3 or S4, and no murmur noted  ABDOMEN:  No scars, normal bowel sounds, soft, non-distended, non-tender, no masses palpated, no hepatosplenomegally  MUSCULOSKELETAL:  There is no redness, warmth, or swelling of the joints. Full range of motion noted. Motor strength is 5 out of 5 all extremities bilaterally. Tone is normal.  NEUROLOGIC:Lethargic and drowsy    SKIN: Rt hip wound site warm and erythematous. No purulent discharge noticed. DATA:    Chest x-ray showed mild bilateral pulmonary congestion. CT head without contrast was unremarkable except chronic sinusitis changes in Maxillary sinuses and left sphenoid sinus. X-ray right hip showed no bony erosion but a large soft tissue defect in the lateral aspect of the hip soft tissues was reported. IMPRESSION  This is a 62 y.o. female with PMH of depression with suicide attempt in past, seizure disorder, hypertension, migraine, hypothyroidism, asthma/COPD and necrotizing fasciitis of right hip status post wound VAC in place presented with altered mental status , reported that her wound VAC came off 3 days ago and found to have sepsis. Patient requires admission to Med surg for further management and care. ASSESSMENT/PLAN:  Toxic metabolic encephalopathy likely secondary to underlying sepsis  lethargic and drowsy but alert and oriented ×3. CT head without contrast was unremarkable except chronic sinusitis changes in Maxillary sinuses and left sphenoid sinus. We will continue to monitor.      Sepsis etiology yet to be determined.   Had fever of T-max 103.1F. CRP elevated to 367 and ESR in 50s. Received 1 dose of clindamycin, vancomycin and Zosyn in ER . Will continue on IV fluids at 125 mg per hour for now and vancomycin and Zosyn . Pharmacy to dose. Follow-up blood, urine cultures. Will monitor WBCs and fever. Necrotizing fasciitis of right hip s/p surgical wound debridement and wound VAC that came off 3 days ago. Wound site warm and erythematous, no purulent secretions noticed. Gen. surgery was consulted and they ruled out any active necrotizing fasciitis. X-ray right hip showed no bony erosion but a large soft tissue defect in the lateral aspect of the hip soft tissues was reported. Follow-up CT of the right side with contrast.  She received 1 dose of vancomycin and Zosyn and clindamycin and is continued on IV hydration. Consulted wound care. We'll continue on vancomycin and Zosyn for now. Electrolyte derangement   Na-129, potassium 3 . Electrolyte replacement as per needed. Daily BMP. Lactic acidosis   lactic acid 2.3 and later trended down to 1.1. Will continue to monitor. UTI  Urinalysis was sent which showed dark colored urine and 1 positive protein and small leukocyte esterase with 5-10 WBCs. Will f/u urine cultures. Vancomycin and Zosyn for now.       Diarrhea   Follow up stool studies. COPD/Asthma  Chest x-ray showed mild bilateral pulmonary congestion. On evaluation bilateral wheezing present. We'll continue on albuterol, DuoNeb nebulizer. RT aerosol treatment. RT eval and treat. Pulse ox. Continue telemetry. Oxygen as per protocol     Depression with suicidal ideation past- currently denies any acute signs and symptoms. No suicidal ideation as per patient. Resumed on home doses of Remeron, Resperdal.      Seizure disorder-phenytoin levels were low. Will consult neurology for medication adjustment.       Hypothyroidism-Resumed on levo thyroxine 50 mcs as per home dose.     Hypertension-currently the low-normal range.

## 2019-06-09 NOTE — PLAN OF CARE
Pt A&Ox3 with periods of lethargy, slow to respond but when awake and alert is able to make needs known. Took all meds per order (see eMAR for details). IVF running per order into L FA PIV. Pt continues to display manipulative behaviors with staff-splitting commentary and has requested to speak with a patient rep d/t safety measures in place r/t her continuing to be symptomatic with her variable Bps. Stool sample out for testing, contact iso precautions continue. Continuing to offload R arm, VQ scan ordered. Id, Neuro, PT c/s'd for goals of care. This nurse will continue to monitor patient during her 7a-7p shift today, Monday 06/03/2019. Problem: Falls - Risk of:  Goal: Will remain free from falls  Description  Will remain free from falls  Outcome: Ongoing  Goal: Absence of physical injury  Description  Absence of physical injury  Outcome: Ongoing     Problem: Risk for Impaired Skin Integrity  Goal: Tissue integrity - skin and mucous membranes  Description  Structural intactness and normal physiological function of skin and  mucous membranes.   Outcome: Ongoing

## 2019-06-09 NOTE — ED NOTES
Wound dressed with a wet to dry dressing. Packed with 2 krillex and covered with ABDs.       Norbert Gracia RN  06/09/19 2538

## 2019-06-09 NOTE — CONSULTS
[facility] - Initial Consult Note  Today's Date and Time: 6/9/2019, 3:21 PM    Impression :   · Right hip wound infection   · History of right hip fasciitis s/p I&D on 5/22/2019. Wound vac recently removed - wound drainage   · Right hip Soft tissue not well defined collectin - possible abscess -CT hip. · Sepsis/ SIRS from infection,  · Hypotension may also be as a result of having diarrhea for 4 days. · CRP elevation 367    Recommendations:   · Continue Vancomycin and Zosyn. Will adjust on finalization of cultures. · dakins wet to dry for now  · Follow up stool studies, blood cultures  · Wound care evaluation  · Contact precautions pending C.diff studies. Medical Decision Making/Summary/Discussion:6/9/2019     ·   Infection Control Recommendations   · [place] Precautions  · Contact Isolation     Antimicrobial Stewardship Recommendations     · Simplification of therapy  · Restricted antimicrobial use  · Discontinuation of therapy    Coordination of Outpatient Care:   · Estimated Length of IV antimicrobials:  · Patient will need Midline Catheter Insertion:   · Patient will need PICC line Insertion:  · Patient will need: Home IV , [place],  SNF,  LTAC:  · Patient will need outpatient wound care:    Chief complaint/reason for consultation:   · Sepsis     History of Present Illness:   Yovana Kline is a 62y.o.-year-old  female who was initially admitted on 6/8/2019. Patient seen at the request of Tunde Berg    The patient presented from home via EMS with altered mental status. History was obtained mostly from chart review as she does not recall the events leading to her present admission. She was admitted in May for necrotizing fasciitis of the right hip and underwent debridement. She was treated with Vancomycin and Zosyn at the time, but discharged off antibiotics. Wound vac was placed.  She was then re-admitted on 6/2 with AMS secondary to sepsis. Again treated with antibiotics. She was then discharged after blood cultures were negative. Wound care was following at home. However, her wound vac was apparently dislodged for 3 days. Her roommate mentioned that she developed over that time period lethargy which led up to AMS. She also reported diarrhea for the last 4 days. Non-bloody watery stools with mucous. More than 4 episodes daily. Denied abdominal pain, recent travel,change in diet or sick contacts. On admission, Tmax was 103. 1. Hypotensive, tachypneic, tachycardic. She received a total of 4 L fluids thus far and maintained on IVF @ 125 cc/hr. Labs, X rays reviewed: 6/9/2019    BUN:12-->13  Cr:0.63-->0.58    WBC:7.4-->5.0 (Segmented neutrophils 85)  Hb: 9.2-->7.9  Plat: 203-->170    Cultures:    Blood: negative (pending)    Stool:   · Lactoferrin (pending)  · Culture (pending)  · Leukocytes (pending)  · C. Diff (pending)  · Giardia/cryptosporidium (pending)    CT right hip: 6/9/19  . Large wound extending to the level of the underlying iliotibial   band/fascia venessa.  Subcutaneous edema involving the soft tissues about the   wound suggesting cellulitis.  Somewhat more confluent fluid noted adjacent to   the lateral aspect of the right gluteus julia muscle, however, no   well-defined drainable fluid collection identified.  No subcutaneous gas   evident. 2. No CT evidence for osteomyelitis.          Past Medical History:     Past Medical History:   Diagnosis Date    Arthritis     Asthma     CHF [facility]congestive heart failure) ([facility]     COPD (chronic obstructive pulmonary disease) (HCC)     Fibromyalgia     Headache     Hypertension     Movement disorder     Neck fracture (HCC)     Se[facility] ([facility] [address]     Thyroid disease        Past Surgical  History:     Past Surgical History:   Procedure Laterality Date    EXPLORATION OF WOUND OF EXTREMITY Right 5/19/2019    RIGHT THIGH WOUND WASHOUT WITH [address] PLACEMENT performed by Giancarlo Graves Alden Edward MD at [facility] N/A 5/22/2019    RIGHT WOUND HIP EXAMINATION LAVAGE AND WOUND VAC PLACEMENT performed by González Souza MD at [address] [place] [address], [address] Right 5/17/2019    IRRIGATION, DEBRIDEMENT RIGHT THIGH performed by Kelsea Weller MD at [facility]      PARTIAL HYSTERECTOMY         Medications:      sodium chloride flush  10 mL Intravenous 2 times per day    sodium chloride flush  10 mL Intravenous 2 times per day    enoxaparin  40 mg Subcutaneous Daily    nicotine  1 patch Transdermal Daily    vancomycin (VANCOCIN) intermittent dosing (placeholder)   Other RX Placeholder    piperacillin-tazobactam  3.375 g Intravenous Q8H    ferrous sulfate  325 mg Oral Daily with breakfast    fluticasone  2 spray Each Nostril Daily    levothyroxine  50 mcg Oral Daily    mirtazapine  45 mg Oral Nightly    risperiDONE  2 mg Oral Nightly    ipratropium-albuterol  1 ampule Inhalation Q4H WA    vancomycin  1,250 mg Intravenous Q12H    phenytoin  150 mg Oral TID    valproic acid  1,000 mg Oral BID    PHENobarbital  64.8 mg Oral Nightly    PHENobarbital  32.4 mg Oral BID       Social History:     Social History     Socioeconomic History    Marital status:      Spouse name: Not on file    Number of children: Not on file    Years of education: Not on file    Highest education level: Not on file   Occupational History    Not on file   Social Needs    Financial resource strain: Not on file    Food insecurity:     Worry: Not on file     Inability: Not on file    Transportation needs:     Medical: Not on file     Non-medical: Not on file   Tobacco Use    Smoking status: Current Every Day Smoker     Packs/day: 0.50     Years: 12.00     Pack years: 6.00    Smokeless tobacco: Never Used   Substance and Sexual Activity    Alcohol use: No    Drug use: No    Sexual activity: Not on file   Lifestyle    Physical activity:     Days per week: Not on file     Minutes per session: Not on file    Stress: Not on file   Relationships    Social connections:     Talks on phone: Not on file     Gets together: Not on file     Attends [employer] service: Not on file     Active member of club or organization: Not on file     Attends meetings of clubs or organizations: Not on file     Relationship status: Not on file    Intimate partner violence:     Fear of current or ex partner: Not on file     Emotionally abused: Not on file     Physically abused: Not on file     Forced sexual activity: Not on file   Other Topics Concern    Not on file   Social History Narrative    Not on file       Family History:   History reviewed. No pertinent family history. Allergies:   Imitrex [sumatriptan]; Bee pollen; Bee venom; Bromide ion [bromine]; Flexeril [cyclobenzaprine]; Neurontin [gabapentin]; Nsaids; Potassium bromide; Reglan [metoclopramide]; Sulfa antibiotics; Sulfadiazine; Toradol [ketorolac tromethamine]; and Tramadol     Review of Systems:   Constitutional: No fevers or chills. No systemic complaints. Feeling afraid  Head: No headaches  Eyes: No double vision or blurry vision. No conjunctival inflammation. ENT: No sore throat or runny nose. . No hearing loss, tinnitus or vertigo. Cardiovascular: No chest pain or palpitations. No shortness of breath. No DESAI  Lung: No shortness of breath or cough. No sputum production  Abdomen: pos diarrhea, no abdominal pain. [place] No cramps. Genitourinary: No increased urinary frequency, or dysuria. No hematuria. No suprapubic or CVA pain  Musculoskeletal: right hip pain. Hematologic: No bleeding or bruising. Neurologic: No headache, weakness, numbness, or tingling. Integument: No rash, no ulcers. Psychiatric: No depression. Endocrine: No polyuria, no polydipsia, no polyphagia.     Physical Examination :     Patient Vitals for the past 8 hrs:   BP Temp Temp src Pulse Resp SpO2   06/09/19 1400 and examined the patient and the key elements of all parts of the encounter have been performed by me. I agree with the assessment, plan and orders as documented by the resident.     Tamica Serrano, Infectious Diseases

## 2019-06-09 NOTE — PROGRESS NOTES
Physical Therapy  DATE: 2019    NAME: Myla Mckeon  MRN: [id number]   : 1965    Patient not seen this date for Physical Therapy due to:  [] Blood transfusion in progress  [] Hemodialysis  []  Patient Declined  [] Spine Precautions   [] Strict Bedrest  [] Surgery/ Procedure  [] Testing      [x] Other      Dopplers R UE        [] PT being discontinued at this time. Patient independent. No further needs. [] PT being discontinued at this time as the patient has been transferred to palliative care. No further needs.     Chandrakant Coffey, PT

## 2019-06-09 NOTE — PROGRESS NOTES
Pt continues with c/o nausea. Msg to Intern as follows:    \"6/9/19 7:38 PM   [phone] From: [facility] RE: Bogdan Mcdaniel Pt is c/o nausea. Zofran given at 1635. Recommend Compazine or Reglan d/t pt's h/o AMS r/t potential ODs and pmh s/f IVDA. Thank you for your time. Read 7:38 PM\"    Response remains pending at this time. Night shift nurse, Maico Shelby RN and charge nurse, Jose Calero RN notified and made aware of pt's c/o nausea as well as pt complaining about using bed pan instead of getting OOB for frequent loose stools d/t her on-going lethargy as well as being symptomatic with her soft Bps (sys ). Pt demanded to talk with pt rep; none available on Sunday, but request noted and parlayed to appropriate resources to see her tomorrow. Handoff at bedside conducted, explained POC to manage pt's c/o nausea as well as manage pt's safety with frequent evaluations of pt's status. Pt currently resting in bed, seizure precaution pads in place, bed in lowest locked position, bed alarm and engaged, call light within reach.

## 2019-06-09 NOTE — ED NOTES
Pt refusing to allow writer to start antibiotics or redraw blood until she gets a soda.       Hoang Flores RN  06/09/19 0076

## 2019-06-09 NOTE — FLOWSHEET NOTE
Assessment: Patient was awake and alert when  visited. Patient said she was experiencing excruciating pain. However, patient remained optimistic and seemed to have confidence in the medical staff. Patient was raised Mandaen. Intervention:  maintained listening presence, offered support and prayed with patient. Patient received sacrament of anointing of the sick. Outcome: Patient was very grateful and thankful for the visit and prayer said with her. Follow up visits recommended for more spiritual and emotional support. 06/09/19 1103   Encounter Summary   Services provided to: Patient   Place of [facility]   Continue Visiting   (06/09/2019)   Complexity of Encounter Moderate   Length of Encounter 15 minutes   Spiritual Assessment Completed Yes   Routine   Type Initial   Spiritual/Zoroastrian   Type Spiritual support   Assessment Calm; Approachable; Hopeful   Intervention Active listening;Nurtured hope;Prayer; Anointing   Outcome Expressed gratitude   Sacraments   Sacrament of Sick-Anointing Anointed

## 2019-06-09 NOTE — ED NOTES
Pt to the ED with complaints of a wound to her right hip. EMS was called because she was altered. Pt is answering questions but is slow to respond. Pt had surgery last month for necrotizing fascitis but states that she took her wound vac off a couple of days ago but will not say why. Wound appears slightly red around the border.      Tristan Stuart RN  06/08/19 1800

## 2019-06-09 NOTE — CARE COORDINATION
Case Management Initial Discharge Plan  Aleta Mcdonough,             Met with:patient to discuss discharge plans. Information verified: address, contacts, phone number, , insurance Yes  PCP: Edita YEPEZC, KEANU Hernandez NP  Date of last visit: \"less than a month ago\"    Insurance Provider: [employer]    Discharge Planning    Living Arrangements:  Friends   Support Systems:  Friends/Neighbors, Home Care Staff    Home has 1 stories  1 stairs to climb to get into front door, n/a stairs to climb to reach second floor  Location of bedroom/bathroom in home main level    Patient able to perform ADL's:Independent    Current Services (outpatient & in home) DME, [facility]  DME equipment: nebulizer, wound vac  DME provider:      Pharmacy: [address] Medications:  No  Does patient want to participate in local refill/ meds to beds program?  Yes    Potential Assistance Needed:  1 Kavitha Vitale, Outpatient IV, [place]    Patient agreeable to home care: Yes  Freedom of choice provided:  yes    Prior SNF/Rehab Placement and Facility: Yes  Agreeable to SNF/Rehab: No  [place] of choice provided: n/a   Evaluation: n/a    Expected Discharge date:  19  Patient expects to be discharged to:  Home w/ [facility] vs SNF  Follow Up Appointment: Best Day/ Time:      Transportation provider: nelly  Transportation arrangements needed for discharge: No     Readmission Risk              Risk of Unplanned Readmission:        44             Does patient have a readmission risk score greater than 14?: Yes  If yes, follow-up appointment must be made within 7 days of discharge. Discharge Plan: Home with [facility] vs SNF - monitor antibiotic plan  May need SNF placement if continued IV antibiotics d/t history of drug abuse. ** 30 day readmission ** Patient discharged 19 to home with home care after refusing wound vac dressing to be fixed. Readmitted yesterday with sepsis.  States wound vac was just delivered to her home and put on by visiting nurse.          Electronically signed by Nia Vernon RN on 6/9/19 at 10:08 AM

## 2019-06-10 LAB
ABSOLUTE EOS #: 0.12 K/UL (ref 0–0.4)
ABSOLUTE IMMATURE GRANULOCYTE: 0 K/UL (ref 0–0.3)
ABSOLUTE LYMPH #: 1.2 K/UL (ref 1–4.8)
ABSOLUTE MONO #: 0.12 K/UL (ref 0.1–0.8)
ABSOLUTE RETIC #: 0.09 M/UL (ref 0.03–0.08)
ALBUMIN SERPL-MCNC: 2.2 G/DL (ref 3.5–5.2)
ANION GAP SERPL CALCULATED.3IONS-SCNC: 14 MMOL/L (ref 9–17)
BASOPHILS # BLD: 0 % (ref 0–2)
BASOPHILS ABSOLUTE: 0 K/UL (ref 0–0.2)
BUN BLDV-MCNC: 6 MG/DL (ref 6–20)
BUN/CREAT BLD: ABNORMAL (ref 9–20)
C DIFF AG + TOXIN: ABNORMAL
C DIFFICILE TOXINS, PCR: NORMAL
C-REACTIVE PROTEIN: 100.9 MG/L (ref 0–5)
CALCIUM SERPL-MCNC: 7.2 MG/DL (ref 8.6–10.4)
CAMPYLOBACTER PCR: NORMAL
CHLORIDE BLD-SCNC: 102 MMOL/L (ref 98–107)
CO2: 21 MMOL/L (ref 20–31)
CREAT SERPL-MCNC: 0.51 MG/DL (ref 0.5–0.9)
DATE, STOOL #1: NORMAL
DATE, STOOL #1: NORMAL
DATE, STOOL #2: NORMAL
DATE, STOOL #2: NORMAL
DATE, STOOL #3: NORMAL
DATE, STOOL #3: NORMAL
DIFFERENTIAL TYPE: ABNORMAL
DIRECT EXAM: NORMAL
DIRECT EXAM: NORMAL
E COLI ENTEROTOXIGENIC PCR: NORMAL
EOSINOPHILS RELATIVE PERCENT: 4 % (ref 1–4)
FERRITIN: 200 UG/L (ref 13–150)
FOLATE: 5.7 NG/ML
GFR AFRICAN AMERICAN: >60 ML/MIN
GFR NON-AFRICAN AMERICAN: >60 ML/MIN
GFR SERPL CREATININE-BSD FRML MDRD: ABNORMAL ML/MIN/{1.73_M2}
GFR SERPL CREATININE-BSD FRML MDRD: ABNORMAL ML/MIN/{1.73_M2}
GLUCOSE BLD-MCNC: 102 MG/DL (ref 70–99)
HCT VFR BLD CALC: 21.3 % (ref 36.3–47.1)
HCT VFR BLD CALC: 26.2 % (ref 36.3–47.1)
HEMOCCULT SP1 STL QL: NEGATIVE
HEMOCCULT SP1 STL QL: NEGATIVE
HEMOCCULT SP2 STL QL: NORMAL
HEMOCCULT SP2 STL QL: NORMAL
HEMOCCULT SP3 STL QL: NORMAL
HEMOCCULT SP3 STL QL: NORMAL
HEMOGLOBIN: 7 G/DL (ref 11.9–15.1)
HEMOGLOBIN: 7.8 G/DL (ref 11.9–15.1)
IMMATURE GRANULOCYTES: 0 %
IMMATURE RETIC FRACT: 5.7 % (ref 2.7–18.3)
IRON SATURATION: 7 % (ref 20–55)
IRON: 14 UG/DL (ref 37–145)
LACTIC ACID, WHOLE BLOOD: 3.8 MMOL/L (ref 0.7–2.1)
LACTIC ACID, WHOLE BLOOD: 3.9 MMOL/L (ref 0.7–2.1)
LACTIC ACID: ABNORMAL MMOL/L
LYMPHOCYTES # BLD: 40 % (ref 24–44)
Lab: NORMAL
MAGNESIUM: 1.7 MG/DL (ref 1.6–2.6)
MCH RBC QN AUTO: 30 PG (ref 25.2–33.5)
MCHC RBC AUTO-ENTMCNC: 32.9 G/DL (ref 28.4–34.8)
MCV RBC AUTO: 91.4 FL (ref 82.6–102.9)
MONOCYTES # BLD: 4 % (ref 1–7)
MORPHOLOGY: ABNORMAL
NRBC AUTOMATED: 0 PER 100 WBC
PDW BLD-RTO: 17.2 % (ref 11.8–14.4)
PHENOBARBITAL DATE LAST DOSE: ABNORMAL
PHENOBARBITAL DOSE AMOUNT: ABNORMAL
PHENOBARBITAL TIME LAST DOSE: ABNORMAL
PHENOBARBITAL: 7.8 UG/ML (ref 15–40)
PHENYTOIN DATE LAST DOSE: ABNORMAL
PHENYTOIN DOSE AMOUNT: ABNORMAL
PHENYTOIN DOSE TIME: ABNORMAL
PHENYTOIN FREE: 0.5 UG/ML (ref 1–2)
PHENYTOIN FREE: ABNORMAL UG/ML (ref 1–2)
PHENYTOIN LEVEL: 1.6 UG/ML (ref 10–20)
PLATELET # BLD: ABNORMAL K/UL (ref 138–453)
PLATELET ESTIMATE: ABNORMAL
PLATELET, FLUORESCENCE: NORMAL K/UL (ref 138–453)
PLATELET, IMMATURE FRACTION: NORMAL % (ref 1.1–10.3)
PLESIOMONAS SHIGELLOIDES PCR: NORMAL
PMV BLD AUTO: ABNORMAL FL (ref 8.1–13.5)
POTASSIUM SERPL-SCNC: 2.9 MMOL/L (ref 3.7–5.3)
RBC # BLD: 2.33 M/UL (ref 3.95–5.11)
RBC # BLD: ABNORMAL 10*6/UL
RETIC %: 3.6 % (ref 0.5–1.9)
RETIC HEMOGLOBIN: 24.3 PG (ref 28.2–35.7)
SALMONELLA PCR: NORMAL
SEG NEUTROPHILS: 52 % (ref 36–66)
SEGMENTED NEUTROPHILS ABSOLUTE COUNT: 1.56 K/UL (ref 1.8–7.7)
SHIGATOXIN GENE PCR: NORMAL
SHIGELLA SP PCR: NORMAL
SODIUM BLD-SCNC: 137 MMOL/L (ref 135–144)
SPECIMEN DESCRIPTION: ABNORMAL
SPECIMEN DESCRIPTION: NORMAL
TIME, STOOL #1: NORMAL
TIME, STOOL #1: NORMAL
TIME, STOOL #2: NORMAL
TIME, STOOL #2: NORMAL
TIME, STOOL #3: NORMAL
TIME, STOOL #3: NORMAL
TOTAL IRON BINDING CAPACITY: 194 UG/DL (ref 250–450)
UNSATURATED IRON BINDING CAPACITY: 180 UG/DL (ref 112–347)
VALPROIC ACID % FREE: 28.7 % (ref 5–18.4)
VALPROIC ACID LEVEL: 52 UG/ML (ref 50–125)
VALPROIC ACID, FREE: 14.9 UG/ML (ref 7–23)
VALPROIC DATE LAST DOSE: ABNORMAL
VALPROIC DOSE AMOUNT: ABNORMAL
VALPROIC TIME LAST DOSE: ABNORMAL
VANCOMYCIN TROUGH DATE LAST DOSE: NORMAL
VANCOMYCIN TROUGH DOSE AMOUNT: NORMAL
VANCOMYCIN TROUGH TIME LAST DOSE: NORMAL
VANCOMYCIN TROUGH: 10.4 UG/ML (ref 10–20)
VIBRIO PCR: NORMAL
VITAMIN B-12: 588 PG/ML (ref 232–1245)
WBC # BLD: 3 K/UL (ref 3.5–11.3)
WBC # BLD: ABNORMAL 10*3/UL
YERSINIA ENTEROCOLITICA PCR: NORMAL

## 2019-06-10 PROCEDURE — 80185 ASSAY OF PHENYTOIN TOTAL: CPT

## 2019-06-10 PROCEDURE — 80184 ASSAY OF PHENOBARBITAL: CPT

## 2019-06-10 PROCEDURE — 99233 SBSQ HOSP IP/OBS HIGH 50: CPT | Performed by: INTERNAL MEDICINE

## 2019-06-10 PROCEDURE — 83550 IRON BINDING TEST: CPT

## 2019-06-10 PROCEDURE — 2580000003 HC RX 258: Performed by: STUDENT IN AN ORGANIZED HEALTH CARE EDUCATION/TRAINING PROGRAM

## 2019-06-10 PROCEDURE — 85018 HEMOGLOBIN: CPT

## 2019-06-10 PROCEDURE — 83540 ASSAY OF IRON: CPT

## 2019-06-10 PROCEDURE — 85055 RETICULATED PLATELET ASSAY: CPT

## 2019-06-10 PROCEDURE — 99232 SBSQ HOSP IP/OBS MODERATE 35: CPT | Performed by: INTERNAL MEDICINE

## 2019-06-10 PROCEDURE — 86140 C-REACTIVE PROTEIN: CPT

## 2019-06-10 PROCEDURE — 85014 HEMATOCRIT: CPT

## 2019-06-10 PROCEDURE — 80165 DIPROPYLACETIC ACID FREE: CPT

## 2019-06-10 PROCEDURE — 6360000002 HC RX W HCPCS: Performed by: STUDENT IN AN ORGANIZED HEALTH CARE EDUCATION/TRAINING PROGRAM

## 2019-06-10 PROCEDURE — 2060000000 HC ICU INTERMEDIATE R&B

## 2019-06-10 PROCEDURE — 6370000000 HC RX 637 (ALT 250 FOR IP): Performed by: STUDENT IN AN ORGANIZED HEALTH CARE EDUCATION/TRAINING PROGRAM

## 2019-06-10 PROCEDURE — 85025 COMPLETE CBC W/AUTO DIFF WBC: CPT

## 2019-06-10 PROCEDURE — 94640 AIRWAY INHALATION TREATMENT: CPT

## 2019-06-10 PROCEDURE — 80202 ASSAY OF VANCOMYCIN: CPT

## 2019-06-10 PROCEDURE — 83735 ASSAY OF MAGNESIUM: CPT

## 2019-06-10 PROCEDURE — 36415 COLL VENOUS BLD VENIPUNCTURE: CPT

## 2019-06-10 PROCEDURE — 94762 N-INVAS EAR/PLS OXIMTRY CONT: CPT

## 2019-06-10 PROCEDURE — 82728 ASSAY OF FERRITIN: CPT

## 2019-06-10 PROCEDURE — 80164 ASSAY DIPROPYLACETIC ACD TOT: CPT

## 2019-06-10 PROCEDURE — 99212 OFFICE O/P EST SF 10 MIN: CPT

## 2019-06-10 PROCEDURE — 6360000002 HC RX W HCPCS: Performed by: INTERNAL MEDICINE

## 2019-06-10 PROCEDURE — 85045 AUTOMATED RETICULOCYTE COUNT: CPT

## 2019-06-10 PROCEDURE — 6370000000 HC RX 637 (ALT 250 FOR IP): Performed by: INTERNAL MEDICINE

## 2019-06-10 PROCEDURE — 83605 ASSAY OF LACTIC ACID: CPT

## 2019-06-10 PROCEDURE — 82607 VITAMIN B-12: CPT

## 2019-06-10 PROCEDURE — 80048 BASIC METABOLIC PNL TOTAL CA: CPT

## 2019-06-10 PROCEDURE — G0328 FECAL BLOOD SCRN IMMUNOASSAY: HCPCS

## 2019-06-10 PROCEDURE — 82746 ASSAY OF FOLIC ACID SERUM: CPT

## 2019-06-10 PROCEDURE — 99233 SBSQ HOSP IP/OBS HIGH 50: CPT | Performed by: NURSE PRACTITIONER

## 2019-06-10 PROCEDURE — 80186 ASSAY OF PHENYTOIN FREE: CPT

## 2019-06-10 PROCEDURE — 93971 EXTREMITY STUDY: CPT

## 2019-06-10 PROCEDURE — 82040 ASSAY OF SERUM ALBUMIN: CPT

## 2019-06-10 RX ORDER — POTASSIUM CHLORIDE 20 MEQ/1
20 TABLET, EXTENDED RELEASE ORAL ONCE
Status: DISCONTINUED | OUTPATIENT
Start: 2019-06-10 | End: 2019-06-10

## 2019-06-10 RX ORDER — 0.9 % SODIUM CHLORIDE 0.9 %
1000 INTRAVENOUS SOLUTION INTRAVENOUS ONCE
Status: COMPLETED | OUTPATIENT
Start: 2019-06-10 | End: 2019-06-10

## 2019-06-10 RX ORDER — POTASSIUM CHLORIDE 20 MEQ/1
40 TABLET, EXTENDED RELEASE ORAL ONCE
Status: COMPLETED | OUTPATIENT
Start: 2019-06-10 | End: 2019-06-10

## 2019-06-10 RX ORDER — POTASSIUM CHLORIDE 20 MEQ/1
40 TABLET, EXTENDED RELEASE ORAL 2 TIMES DAILY WITH MEALS
Status: COMPLETED | OUTPATIENT
Start: 2019-06-10 | End: 2019-06-10

## 2019-06-10 RX ORDER — MAGNESIUM SULFATE 1 G/100ML
1 INJECTION INTRAVENOUS
Status: COMPLETED | OUTPATIENT
Start: 2019-06-10 | End: 2019-06-10

## 2019-06-10 RX ORDER — LORAZEPAM 2 MG/ML
1 INJECTION INTRAMUSCULAR EVERY 30 MIN PRN
Status: DISCONTINUED | OUTPATIENT
Start: 2019-06-10 | End: 2019-06-12 | Stop reason: HOSPADM

## 2019-06-10 RX ADMIN — POTASSIUM CHLORIDE 40 MEQ: 1500 TABLET, EXTENDED RELEASE ORAL at 18:11

## 2019-06-10 RX ADMIN — MAGNESIUM SULFATE HEPTAHYDRATE 1 G: 1 INJECTION, SOLUTION INTRAVENOUS at 08:05

## 2019-06-10 RX ADMIN — VANCOMYCIN HYDROCHLORIDE 1250 MG: 10 INJECTION, POWDER, LYOPHILIZED, FOR SOLUTION INTRAVENOUS at 02:00

## 2019-06-10 RX ADMIN — EXTENDED PHENYTOIN SODIUM 150 MG: 30 CAPSULE ORAL at 15:21

## 2019-06-10 RX ADMIN — DAKIN'S SOLUTION 0.125% (QUARTER STRENGTH) 473 ML: 0.12 SOLUTION at 21:35

## 2019-06-10 RX ADMIN — POTASSIUM CHLORIDE 40 MEQ: 1500 TABLET, EXTENDED RELEASE ORAL at 10:43

## 2019-06-10 RX ADMIN — SODIUM CHLORIDE: 9 INJECTION, SOLUTION INTRAVENOUS at 21:28

## 2019-06-10 RX ADMIN — PIPERACILLIN AND TAZOBACTAM 3.38 G: 3; .375 INJECTION, POWDER, FOR SOLUTION INTRAVENOUS at 10:42

## 2019-06-10 RX ADMIN — PIPERACILLIN AND TAZOBACTAM 3.38 G: 3; .375 INJECTION, POWDER, FOR SOLUTION INTRAVENOUS at 23:44

## 2019-06-10 RX ADMIN — SODIUM CHLORIDE 1000 ML: 9 INJECTION, SOLUTION INTRAVENOUS at 07:56

## 2019-06-10 RX ADMIN — ACETAMINOPHEN 650 MG: 325 TABLET ORAL at 08:18

## 2019-06-10 RX ADMIN — OXYCODONE HYDROCHLORIDE AND ACETAMINOPHEN 1 TABLET: 5; 325 TABLET ORAL at 18:10

## 2019-06-10 RX ADMIN — MAGNESIUM SULFATE HEPTAHYDRATE 1 G: 1 INJECTION, SOLUTION INTRAVENOUS at 10:28

## 2019-06-10 RX ADMIN — EXTENDED PHENYTOIN SODIUM 150 MG: 30 CAPSULE ORAL at 21:30

## 2019-06-10 RX ADMIN — ONDANSETRON 4 MG: 2 INJECTION INTRAMUSCULAR; INTRAVENOUS at 11:34

## 2019-06-10 RX ADMIN — FLUTICASONE PROPIONATE 2 SPRAY: 50 SPRAY, METERED NASAL at 08:19

## 2019-06-10 RX ADMIN — IPRATROPIUM BROMIDE AND ALBUTEROL SULFATE 1 AMPULE: .5; 3 SOLUTION RESPIRATORY (INHALATION) at 16:52

## 2019-06-10 RX ADMIN — PIPERACILLIN AND TAZOBACTAM 3.38 G: 3; .375 INJECTION, POWDER, FOR SOLUTION INTRAVENOUS at 18:12

## 2019-06-10 RX ADMIN — ACETAMINOPHEN 650 MG: 325 TABLET ORAL at 03:59

## 2019-06-10 RX ADMIN — OXYCODONE HYDROCHLORIDE AND ACETAMINOPHEN 1 TABLET: 5; 325 TABLET ORAL at 10:42

## 2019-06-10 RX ADMIN — IPRATROPIUM BROMIDE AND ALBUTEROL SULFATE 1 AMPULE: .5; 3 SOLUTION RESPIRATORY (INHALATION) at 19:47

## 2019-06-10 RX ADMIN — VALPROIC ACID 1000 MG: 250 CAPSULE, LIQUID FILLED ORAL at 08:21

## 2019-06-10 RX ADMIN — POTASSIUM CHLORIDE 40 MEQ: 20 TABLET, EXTENDED RELEASE ORAL at 08:18

## 2019-06-10 RX ADMIN — ONDANSETRON 4 MG: 2 INJECTION INTRAMUSCULAR; INTRAVENOUS at 06:54

## 2019-06-10 RX ADMIN — ONDANSETRON 4 MG: 2 INJECTION INTRAMUSCULAR; INTRAVENOUS at 18:10

## 2019-06-10 RX ADMIN — RISPERIDONE 2 MG: 2 TABLET, FILM COATED ORAL at 21:32

## 2019-06-10 RX ADMIN — EXTENDED PHENYTOIN SODIUM 150 MG: 30 CAPSULE ORAL at 08:19

## 2019-06-10 RX ADMIN — PHENOBARBITAL 32.4 MG: 64.8 TABLET ORAL at 08:19

## 2019-06-10 RX ADMIN — DAKIN'S SOLUTION 0.125% (QUARTER STRENGTH): 0.12 SOLUTION at 08:24

## 2019-06-10 RX ADMIN — ACETAMINOPHEN 650 MG: 325 TABLET ORAL at 15:22

## 2019-06-10 RX ADMIN — FERROUS SULFATE TAB EC 325 MG (65 MG FE EQUIVALENT) 325 MG: 325 (65 FE) TABLET DELAYED RESPONSE at 08:19

## 2019-06-10 RX ADMIN — MIRTAZAPINE 45 MG: 30 TABLET, FILM COATED ORAL at 21:32

## 2019-06-10 RX ADMIN — IPRATROPIUM BROMIDE AND ALBUTEROL SULFATE 1 AMPULE: .5; 3 SOLUTION RESPIRATORY (INHALATION) at 11:48

## 2019-06-10 RX ADMIN — LEVOTHYROXINE SODIUM 50 MCG: 50 TABLET ORAL at 06:54

## 2019-06-10 RX ADMIN — VALPROIC ACID 1000 MG: 250 CAPSULE, LIQUID FILLED ORAL at 21:30

## 2019-06-10 RX ADMIN — VANCOMYCIN HYDROCHLORIDE 1250 MG: 10 INJECTION, POWDER, LYOPHILIZED, FOR SOLUTION INTRAVENOUS at 15:22

## 2019-06-10 RX ADMIN — Medication 10 ML: at 08:21

## 2019-06-10 ASSESSMENT — PAIN SCALES - GENERAL
PAINLEVEL_OUTOF10: 7
PAINLEVEL_OUTOF10: 6
PAINLEVEL_OUTOF10: 10
PAINLEVEL_OUTOF10: 0
PAINLEVEL_OUTOF10: 6
PAINLEVEL_OUTOF10: 8
PAINLEVEL_OUTOF10: 6
PAINLEVEL_OUTOF10: 8

## 2019-06-10 ASSESSMENT — PAIN DESCRIPTION - LOCATION: LOCATION: LEG

## 2019-06-10 ASSESSMENT — PAIN DESCRIPTION - ORIENTATION: ORIENTATION: RIGHT

## 2019-06-10 ASSESSMENT — PAIN DESCRIPTION - PAIN TYPE: TYPE: ACUTE PAIN

## 2019-06-10 ASSESSMENT — PAIN - FUNCTIONAL ASSESSMENT: PAIN_FUNCTIONAL_ASSESSMENT: ACTIVITIES ARE NOT PREVENTED

## 2019-06-10 ASSESSMENT — PAIN DESCRIPTION - PROGRESSION: CLINICAL_PROGRESSION: GRADUALLY IMPROVING

## 2019-06-10 ASSESSMENT — PAIN DESCRIPTION - FREQUENCY: FREQUENCY: INTERMITTENT

## 2019-06-10 ASSESSMENT — PAIN DESCRIPTION - ONSET: ONSET: GRADUAL

## 2019-06-10 ASSESSMENT — PAIN DESCRIPTION - DESCRIPTORS: DESCRIPTORS: ACHING;THROBBING

## 2019-06-10 NOTE — PLAN OF CARE
Problem: Falls - Risk of:  Goal: Will remain free from falls  Description  Will remain free from falls  6/10/2019 0609 by Livia Kellogg RN  Outcome: Ongoing  6/9/2019 1754 by Michael Blake RN  Outcome: Ongoing  Goal: Absence of physical injury  Description  Absence of physical injury  6/10/2019 0609 by Livia Kellogg RN  Outcome: Ongoing  6/9/2019 1754 by Michael Blake RN  Outcome: Ongoing     Problem: Risk for Impaired Skin Integrity  Goal: Tissue integrity - skin and mucous membranes  Description  Structural intactness and normal physiological function of skin and  mucous membranes. 6/10/2019 1724 by Livia Kellogg RN  Outcome: Ongoing  6/9/2019 1754 by Michael Blake RN  Outcome: Ongoing     Problem: Pain:  Goal: Pain level will decrease  Description  Pain level will decrease  Outcome: Ongoing  Goal: Control of acute pain  Description  Control of acute pain  Outcome: Ongoing  Goal: Control of chronic pain  Description  Control of chronic pain  Outcome: Ongoing   D: Patient is A/Ox3, AVSS on room air, c/o RLE pain, and continent/incontinent of stool. NSR on telemetry. A: Completed assessments. Administered scheduled and prn medications (see EMAR for details). R: Will continue to monitor Patient for acute changes; will intervene as needed.

## 2019-06-10 NOTE — PROGRESS NOTES
Pharmacy Vancomycin Consult     Vancomycin Day: 2  Current Dosinmg q12    Temp max:  98.8    Recent Labs     19  0422 06/10/19  0541   BUN 13 6       Recent Labs     19  0422 06/10/19  0541   CREATININE 0.58 0.51       Recent Labs     19  0422 06/10/19  0541   WBC 5.0 3.0*         Intake/Output Summary (Last 24 hours) at 6/10/2019 1335  Last data filed at 6/10/2019 0400  Gross per 24 hour   Intake 1730 ml   Output 1500 ml   Net 230 ml       Culture Date      Source                       Results                         blood                          ngtd                         wound                        ngtd    Ht Readings from Last 1 Encounters:   19 5' 2.21\" (1.58 m)        Wt Readings from Last 1 Encounters:   19 186 lb 11.7 oz (84.7 kg)         Body mass index is 33.93 kg/m². Estimated Creatinine Clearance: 125 mL/min (based on SCr of 0.51 mg/dL).     Trough: 10.4    Assessment/Plan:  Continue current regimen.    Fadi Kirkland PharmD, BCPS 6/10/2019 1:37 PM

## 2019-06-10 NOTE — PROGRESS NOTES
Occupational Therapy Not Seen Note    DATE: 6/10/2019  Name: Vivi Giles  : 1965  MRN: [id number]    Patient not available for Occupational Therapy due to:     Other: dopplers ordered, will eval once results available     Next Scheduled Treatment: check back later as able or 19    Electronically signed by IRIS Lynn on 6/10/2019 at 9:18 AM

## 2019-06-10 NOTE — PROGRESS NOTES
[facility]  Nurse  Consult Note       NAME:  Kareem Gallardo  MEDICAL RECORD NUMBER:  [id number]  AGE: 62 y.o. GENDER: female  : 1965  TODAY'S DATE:  6/10/2019    Subjective   Reason for [address] Nurse Evaluation and Assessment:   Wound assessment and dressing change right hip wound s/p I&D for necrotizing wound per Dr. Murphy Randolph.   Had been discharged to home with NPWT apparently applied by Home care. Returned to hospital after having removed the [facility] dressing; states she is unclear why or how it was removed. [place] (home NPWT device) with depleted battery at bedside with trackpad and canister attached;  Canister removed and discarded (canister with serous drainage)      Kareem Gallardo is a 62 y.o. female referred by:   [x] Physician  [] Nursing  [] Other:         PAST MEDICAL HISTORY        Diagnosis Date    Arthritis     Asthma     [facility]     COPD [facility]chronic obstructive pulmonary disease[facility]     Fibromyalgia     Headache     Hypertension     Movement disorder     Neck fracture [facility]     Seizure ([facility]     Thyroid disease        PAST SURGICAL HISTORY    Past Surgical History:   Procedure Laterality Date    EXPLORATION OF WOUND OF EXTREMITY Right 2019    RIGHT THIGH WOUND WASHOUT WITH [address] performed by Gama Sinclair MD at [facility] N/A 2019    [facility] by Richard Segundo MD at [address] [place] [address] Right 2019    IRRIGATION, DEBRIDEMENT RIGHT THIGH performed by Jerome Churchill MD at [address] [place] [address]    History reviewed. No pertinent family history.     SOCIAL HISTORY    Social History     Tobacco Use    Smoking status: Current Every Day Smoker     Packs/day: 0.50     Years: 12.00     Pack years: 6.00    Smokeless tobacco: Never Used   Substance Use Topics    Alcohol use: No    Drug use: No       ALLERGIES    Allergies   Allergen Reactions    Imitrex [Sumatriptan] Hives    Bee Pollen     Bee Venom     Bromide Ion [Bromine]     Flexeril [Cyclobenzaprine]     Neurontin [Gabapentin]      Please consider all AEDS and meds if this med is required    Nsaids      States nausea and vomiting with PO NSAIDS, specifically ibuprofen and naproxen     Potassium Bromide     Reglan [Metoclopramide]     Sulfa Antibiotics     Sulfadiazine     Toradol [Ketorolac Tromethamine] Hives    Tramadol Hives       MEDICATIONS    No current facility-administered medications on file prior to encounter. Current Outpatient Medications on File Prior to Encounter   Medication Sig Dispense Refill    divalproex (DEPAKOTE) 500 MG DR tablet Take 2 tablets by mouth every 12 hours 90 tablet 3    phenytoin (DILANTIN) 30 MG ER capsule Take 5 capsules by mouth 3 times daily 60 capsule 3    benzonatate (TESSALON) 100 MG capsule Take 1 capsule by mouth 3 times daily as needed for Cough 10 capsule 0    fluticasone (FLONASE) 50 MCG/ACT nasal spray 2 sprays by Each Nostril route daily 1 Bottle 3    ferrous sulfate 325 (65 Fe) MG EC tablet Take 1 tablet by mouth daily (with breakfast) 90 tablet 3    albuterol sulfate  (90 Base) MCG/ACT inhaler Inhale 2 puffs into the lungs every 6 hours as needed for Wheezing Gap prescription until follow up with primary. Do not refill.   Follow up with primary 1 Inhaler 3    mirtazapine (REMERON) 45 MG tablet Take 1 tablet by mouth nightly 30 tablet 0    risperiDONE (RISPERDAL) 2 MG tablet Take 1 tablet by mouth nightly 30 tablet 0    famotidine (PEPCID) 20 MG tablet Take 20 mg by mouth 2 times daily      levothyroxine (SYNTHROID) 50 MCG tablet Take 50 mcg by mouth Daily         Objective    /68   Pulse 81   Temp 97 °F (36.1 °C) (Temporal)   Resp 22   Ht 5' 2.21\" (1.58 m)   Wt 186 lb 11.7 oz Assessment Pink;Granulation tissue;Clean   Kori-wound Assessment Intact  (SurePREP applied)   Wound Length (cm) 17 cm   Wound Width (cm) 27 cm   Wound Depth (cm) 2 cm   Wound Volume (cm^3) 918 cm^3   Drainage Amount Moderate   Drainage Description Yellow   Odor None   Dressing/Treatment Pharmaceutical agent (see MAR); Moist to moist;ABD  (Maternity panties)   Dressing Changed Changed/New   Dressing Status Changed             Response to treatment:  With complaints of pain. Plan   Plan of Care: Incision 05/19/19 Hip Right-Dressing/Treatment: (P) Pharmaceutical agent (see MAR), Moist to moist, ABD(Maternity panties)  0.125% Steph Damon re: plan of care. To continue BID moist to moist dressing changes with 1/4 st. Dakin's solution through tomorrow. Will resume NPWT for home; will need to have  brought for home to charge battery.         Specialty Bed Required : No   [] Low Air Loss   [] Pressure Redistribution  [] Fluid Immersion  [] Bariatric  [] Total Pressure Relief  [] Other:     Current Diet: DIET GENERAL;      Discharge Plan:  Placement for patient upon discharge: home with support    Patient appropriate for Outpatient [address]: Yes      Patient/Caregiver Teaching:  Level of patient/caregiver understanding able to:   [] Indicates understanding       [x] Needs reinforcement  [] Unsuccessful      [] Verbal Understanding  [] Demonstrated understanding       [] No evidence of learning  [] Refused teaching         [] N/A

## 2019-06-10 NOTE — PROGRESS NOTES
[facility]  Internal Medicine Residency Program  Inpatient Daily Progress Note  ______________________________________________________________________________    Patient: Verner Canner  YOB: 1965   MRN: [id number]    Acct: [de-identified]     Admit date: 6/8/2019  Today's date: 06/10/19  Number of days in the hospital: 1  Expected Discharge Date: 06/12/19    Admitting Diagnosis: Sepsis of unknown origin     Subjective:   Pt seen and examined bedside. Her B.p is in lower normal range this am, afebrile. Potassium 2.9 this morning, hb-7 likely dilutional.  On electrolyte replacement as appropriate. CT hip showed s/c edema suggesting cellulitis absecess r/o . ID followingand continued on vancomycin and Zosyn for now. Neurology adjusted AED medications yest.  No other new acute issues. Objective:   Vital Sign:  /68   Pulse 81   Temp 97 °F (36.1 °C) (Temporal)   Resp 22   Ht 5' 2.21\" (1.58 m)   Wt 186 lb 11.7 oz (84.7 kg)   SpO2 97%   BMI 33.93 kg/m²       Physical Exam:  General appearance:   alert, well appearing, and in no distress, valeriy rgic. Mental Status: alert, oriented to person, place, and time  Neurologic:  alert, oriented, normal speech, no focal findings or movement disorder noted  Lungs:  clear to auscultation, no wheezes, rales or rhonchi, symmetric air entry  Heart[de-identified] normal rate, regular rhythm, normal S1, S2, no murmurs, rubs, clicks or gallops  Abdomen:  soft, nontender, nondistended, no masses or organomegaly  Extremities: peripheral pulses normal, no pedal edema, no clubbing or cyanosis   Skin: Rt hip wound bandaged.  , no rashes, no suspicious skin lesions noted    Medications:  Scheduled Medications   sodium chloride  1,000 mL Intravenous Once    potassium chloride  40 mEq Oral Once    potassium chloride  20 mEq Oral Once    magnesium sulfate  2 g Intravenous Once    sodium chloride flush  10 mL Intravenous 2 times per day    sodium chloride flush  10 mL Intravenous 2 times per day    enoxaparin  40 mg Subcutaneous Daily    nicotine  1 patch Transdermal Daily    vancomycin (VANCOCIN) intermittent dosing (placeholder)   Other RX Placeholder    piperacillin-tazobactam  3.375 g Intravenous Q8H    ferrous sulfate  325 mg Oral Daily with breakfast    fluticasone  2 spray Each Nostril Daily    levothyroxine  50 mcg Oral Daily    mirtazapine  45 mg Oral Nightly    risperiDONE  2 mg Oral Nightly    ipratropium-albuterol  1 ampule Inhalation Q4H WA    vancomycin  1,250 mg Intravenous Q12H    phenytoin  150 mg Oral TID    valproic acid  1,000 mg Oral BID    PHENobarbital  64.8 mg Oral Nightly    PHENobarbital  32.4 mg Oral BID    sodium hypochlorite   Irrigation BID       PRN Medications  sodium chloride flush 10 mL PRN   acetaminophen 650 mg Q4H PRN   sodium chloride flush 10 mL PRN   magnesium hydroxide 30 mL Daily PRN   ondansetron 4 mg Q6H PRN   magnesium sulfate 1 g PRN   albuterol sulfate HFA 2 puff Q6H PRN   oxyCODONE-acetaminophen 1 tablet Q6H PRN       Diagnostic Labs and Imaging:  CBC:  Recent Labs     06/08/19 2140 06/09/19 0422 06/10/19  0541   WBC 7.4 5.0 PENDING   HGB 9.2* 7.9* 7.0*    170 See Reflexed IPF Result     BMP: Recent Labs     06/08/19 2140 06/09/19  0422 06/10/19  0541   * 130* 137   K 3.0* 2.7* 2.9*   CL 92* 97* 102   CO2 23 22 21   BUN 12 13 6   CREATININE 0.63 0.58 0.51   GLUCOSE 112* 118* 102*     Hepatic: Recent Labs     06/08/19 2140   AST 32*   ALT 16   BILITOT 0.78   ALKPHOS 103     CT hip rt w contrast  1.  Large wound extending to the level of the underlying iliotibial   band/fascia venessa.  Subcutaneous edema involving the soft tissues about the   wound suggesting cellulitis.  Somewhat more confluent fluid noted adjacent to   the lateral aspect of the right gluteus julia muscle, however, no   well-defined drainable fluid collection identified.  No subcutaneous gas evident. 2. No CT evidence for osteomyelitis. 3. Small amount of gas within the bladder lumen.  Correlate for recent   instrumentation. Assessment and Plan:   Metabolic encephalopathy likely secondary to underlying sepsis  Lethargic and drowsy but alert and oriented ×3. CT head without contrast was unremarkable except chronic sinusitis changes in Maxillary sinuses and left sphenoid sinus. We will continue to monitor.     Sepsis of unknown etiology  Afebrile. ID following.  Will continue on IV fluids at 125 mg per hour for now and vancomycin and Zosyn . Pharmacy to dose. Blood, urine cultures- no growth till date. Will monitor WBCs and fever.     Necrotizing fasciitis of right hip s/p surgical wound debridement and wound VAC that came off 3 days ago- clean   Gen. surgery  ruled out any active necrotizing fasciitis. X-ray right hip showed no bony erosion but a large soft tissue defect in the lateral aspect of the hip soft tissues was reported. CT of the right side with contrast showed cellulitis r/o abscess and osteomyelitis. ID following. On  vancomycin and Zosyn, IV hydration. wound care following.      Acute on chronic anemia likely dilutional.  Hb-7 this am from 9.2 on admission. F/u iron studies suggesting fe def anemia. Will give I.v venofer for few doses. Electrolyte derangement from underlying diarrhea and poor oral intake  Potassium 2.9 . On KCL 40 BID for now. Daily BMP.     Lactic acidosis   lactic acid 3.9 today. F/u repeat L. A. Will continue to monitor.      Diarrhea-improving   stool studies unmramarkable. f/u c.diff toxin PCR.     COPD/Asthma-stable  Chest x-ray showed mild bilateral pulmonary congestion. On evaluation bilateral wheezing present.  We'll continue on albuterol, DuoNeb nebulizer.  RT aerosol treatment. RT eval and treat. Pulse ox. Continue telemetry. Oxygen as per protocol     Depression with suicidal ideation past- currently denies any acute signs and symptoms.  No suicidal ideation as per patient. on home doses of Remeron, Resperdal.      Seizure disorder-phenytoin, luminal levels were low on admission and repeat levels this am as well. Neurology following.        Hypothyroidism-On levo thyroxine 50 mcs as per home dose.     Hypertension-currently the low-normal range. On iv hydration and intermittent fluid boluses if reqd We'll monitor B.p.     Elevated troponins-troponins 16.  follow-up trend. EKG unremarkable       Diet- General diet  DVT prophylaxis-On lovenox  PT/OT following   consulted for discharge maranda Nayak, PGY 1, Internal Medicine Resident  [address] [place] [address]       Attending Physician Statement  I have discussed the care of Neisha Ambrocio, including pertinent history and exam findings with the resident. I have reviewed the key elements of all parts of the encounter with the resident. I have seen and examined the patient with the resident and the key elements of all parts of the encounter have been performed by me.         Pelon Lerma MD  Attending and Faculty Internal Medicine  [address] [place] [address]  Internal Medicine [facility]   6/10/19
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
## 2019-06-10 NOTE — PROGRESS NOTES
Smoking Cessation - topics covered   []  Health Risks  []  Benefits of Quitting   []  Smoking Cessation  []  Patient has no history of tobacco use  []  Patient is former smoker. []  No need for tobacco cessation education. []  Booklet given  [x]  Patient verbalizes understanding. [x]  Patient denies need for tobacco cessation education. []  Unable to meet with patient today. Will follow up as able.   Keyshawn Olivares  2:58 PM

## 2019-06-10 NOTE — CONSULTS
[facility] - Initial Consult Note  Today's Date and Time: 6/10/2019, 6:09 PM    Impression :   · Right hip wound infection   · History of right hip fasciitis s/p I&D on 5/22/2019. Wound vac recently removed - wound drainage   · Right hip Soft tissue not well defined collectin - possible abscess -CT hip. · Sepsis/ SIRS from infection,  · Hypotension may also be as a result of having diarrhea for 4 days. · CRP elevation 367 -100    Recommendations:   · Continue Vancomycin and Zosyn. · Right hip seems to be better CRP down 100  · To still not clear if there is a fluid collection in the soft tissues of the right wound. · dakins wet to dry for now, if it continues to be clean tomorrow, would place the [facility], the case was discussed the stoma nurse and patient. · Follow up stool studies, blood cultures  · Wound care evaluation  ·     Medical Decision Making/Summary/Discussion:6/10/2019     ·   Infection Control Recommendations   · [place] Precautions  · Contact Isolation     Antimicrobial Stewardship Recommendations     · Simplification of therapy  · Restricted antimicrobial use  · Discontinuation of therapy    Coordination of Outpatient Care:   · Estimated Length of IV antimicrobials:  · Patient will need Midline Catheter Insertion:   · Patient will need PICC line Insertion:  · Patient will need: Home IV , [place],  SNF,  LTAC:  · Patient will need outpatient wound care:    Chief complaint/reason for consultation:   · Sepsis     History of Present Illness:   Chelsey العراقي is a 62y.o.-year-old  female who was initially admitted on 6/8/2019. Patient seen at the request of Anahy Dumont    The patient presented from home via EMS with altered mental status. History was obtained mostly from chart review as she does not recall the events leading to her present admission.      She was admitted in May for necrotizing fasciitis of the right hip and underwent debridement. She was treated with Vancomycin and Zosyn at the time, but discharged off antibiotics. Wound vac was placed. She was then re-admitted on 6/2 with AMS secondary to sepsis. Again treated with antibiotics. She was then discharged after blood cultures were negative. Wound care was following at home. However, her wound vac was apparently dislodged for 3 days. Her roommate mentioned that she developed over that time period lethargy which led up to AMS. She also reported diarrhea for the last 4 days. Non-bloody watery stools with mucous. More than 4 episodes daily. Denied abdominal pain, recent travel,change in diet or sick contacts. On admission, Tmax was 103. 1. Hypotensive, tachypneic, tachycardic. She received a total of 4 L fluids thus far and maintained on IVF @ 125 cc/hr. Interval changes 06/11/19   No fever, chills, feels much better, the achiness has resolved, the right upper/. She is able to eat. Although she has some nausea, but she is better on Zofran  , No abdominal pain, the stools have tested negative for C. diff   Repeat blood cultures are negative      Labs, X rays reviewed: 6/10/2019    BUN:12-->13  Cr:0.63-->0.58    · WBC:7.4-->5.0   · (Segmented neutrophils 85)-3  · 4% eosinophils, neutrophils are 52  Hb: 9.2-->7.9  Plat: 203-->170    Cultures:    Blood: negative (pending)    Stool:   · Lactoferrin (pending)  · Culture (pending)  · Leukocytes (pending)  · C. Diff (pending)  · Giardia/cryptosporidium (pending)    CT right hip: 6/9/19  . Large wound extending to the level of the underlying iliotibial   band/fascia venessa.  Subcutaneous edema involving the soft tissues about the   wound suggesting cellulitis.  Somewhat more confluent fluid noted adjacent to   the lateral aspect of the right gluteus julia muscle, however, no   well-defined drainable fluid collection identified.  No subcutaneous gas   evident. 2. No CT evidence for osteomyelitis.          Past Medical History: Past Medical History:   Diagnosis Date    Arthritis     Asthma     CHF (congestive heart failure) (HCC)     [facility] disease) [facility]     Fibromyalgia     Headache     Hypertension     Movement disorder     [facility]     Seizure ([facility]     Thyroid disease        Past Surgical  History:     Past Surgical History:   Procedure Laterality Date    EXPLORATION OF WOUND OF EXTREMITY Right 5/19/2019    RIGHT THIGH WOUND WASHOUT WITH [address] PLACEMENT performed by Nickey Duverney, MD at [address] N/A 5/22/2019    RIGHT WOUND HIP EXAMINATION LAVAGE AND WOUND VAC PLACEMENT performed by Joanne Rice MD at [address] [place] [address] Right 5/17/2019    IRRIGATION, DEBRIDEMENT RIGHT THIGH performed by Steven Salter MD at [facility]      PARTIAL HYSTERECTOMY         Medications:      potassium chloride  40 mEq Oral BID WC    sodium chloride flush  10 mL Intravenous 2 times per day    sodium chloride flush  10 mL Intravenous 2 times per day    enoxaparin  40 mg Subcutaneous Daily    nicotine  1 patch Transdermal Daily    vancomycin (VANCOCIN) intermittent dosing (placeholder)   Other RX Placeholder    piperacillin-tazobactam  3.375 g Intravenous Q8H    ferrous sulfate  325 mg Oral Daily with breakfast    fluticasone  2 spray Each Nostril Daily    levothyroxine  50 mcg Oral Daily    mirtazapine  45 mg Oral Nightly    risperiDONE  2 mg Oral Nightly    ipratropium-albuterol  1 ampule Inhalation Q4H WA    vancomycin  1,250 mg Intravenous Q12H    phenytoin  150 mg Oral TID    valproic acid  1,000 mg Oral BID    sodium hypochlorite   Irrigation BID       Social History:     Social History     Socioeconomic History    Marital status:      Spouse name: Not on file    Number of children: Not on file    Years of education: Not on file    Highest education level: Not on Musculoskeletal: Negative for arthralgias. Skin: Positive for wound. Allergic/Immunologic: Negative for environmental allergies. Neurological: Negative for dizziness. Hematological: Negative for adenopathy. Psychiatric/Behavioral: Negative for agitation. Physical Examination :     Patient Vitals for the past 8 hrs:   Resp SpO2   06/10/19 1652 26 100 %   06/10/19 1148 26 98 %   ,    Physical Exam   Constitutional: She is oriented to person, place, and time. She appears well-developed and well-nourished. HENT:   Head: Normocephalic and atraumatic. Eyes: Conjunctivae are normal.   Neck: Neck supple. No tracheal deviation present. Cardiovascular: Normal rate and regular rhythm. Exam reveals no friction rub. No murmur heard. Pulmonary/Chest: No stridor. No respiratory distress. Abdominal: Soft. She exhibits no distension. There is no tenderness. Genitourinary:   Genitourinary Comments: urine clear   Musculoskeletal: She exhibits no edema or deformity. Neurological: She is alert and oriented to person, place, and time. No cranial nerve deficit. Coordination normal.   Skin: Skin is warm and dry. No erythema. Right hip wound clean   Psychiatric: She has a normal mood and affect.  Her behavior is normal.         Medical Decision Making -Laboratory:   I have independently reviewed/ordered the following labs:    CBC with Differential:   Recent Labs     06/09/19  0422 06/10/19  0541 06/10/19  1135   WBC 5.0 3.0*  --    HGB 7.9* 7.0* 7.8*   HCT 26.5* 21.3* 26.2*    See Reflexed IPF Result  --    LYMPHOPCT 10* 40  --    MONOPCT 2 4  --      BMP:   Recent Labs     06/09/19  0422 06/10/19  0541   * 137   K 2.7* 2.9*   CL 97* 102   CO2 22 21   BUN 13 6   CREATININE 0.58 0.51   MG 1.9 1.7     Hepatic Function Panel:   Recent Labs     06/08/19  2140 06/10/19  0541   PROT 6.8  --    LABALBU 2.7* 2.2*   BILITOT 0.78  --    ALKPHOS 103  --    ALT 16  --    AST 32*  --      No results for input(s): RPR in the last 72 hours. No results for input(s): HIV in the last 72 hours. No results for input(s): BC in the last 72 hours. Lab Results   Component Value Date    MUCUS NOT REPORTED 06/08/2019    RBC 2.33 06/10/2019    TRICHOMONAS NOT REPORTED 06/08/2019    WBC 3.0 06/10/2019    YEAST NOT REPORTED 06/08/2019    TURBIDITY CLEAR 06/08/2019     Lab Results   Component Value Date    CREATININE 0.51 06/10/2019    GLUCOSE 102 06/10/2019       Medical Decision Making-Imaging:     CT right hip 6/8/2019  Impression   1. Large wound extending to the level of the underlying iliotibial   band/fascia venessa.  Subcutaneous edema involving the soft tissues about the   wound suggesting cellulitis.  Somewhat more confluent fluid noted adjacent to   the lateral aspect of the right gluteus julia muscle, however, no   well-defined drainable fluid collection identified.  No subcutaneous gas   evident. 2. No CT evidence for osteomyelitis. 3. Small amount of gas within the bladder lumen.  Correlate for recent   instrumentation. XR Right hip 6/8/2019  Impression   Large soft tissue defect       No focal bony erosion.  Bone detail is slightly limited     CXR 6/8/2019  Impression   Mild bilateral pulmonary congestion. Medical Decision [id number]:       Medical Decision Making-Other:     Note:  · Labs, medications, radiologic studies were reviewed with personal review of films  · Moderate Large amounts of data were reviewed  · Discussed with nursing Staff, Discharge planner  · Infection Control and Prevention measures reviewed  · All prior entries were reviewed  · Administer medications as ordered  · Prognosis: Good  · Discharge planning reviewed  · Follow up as outpatient. Thank you for allowing us to participate in the care of this patient. Please call with questions.        Nichelle Gregg MD  medicine resident  [facility], [place], [place]  6/10/2019         I have discussed the care of the patient, including pertinent history and exam findings,  with the resident. I have seen and examined the patient and the key elements of all parts of the encounter have been performed by me. I agree with the assessment, plan and orders as documented by the resident.     Tamica Serrano, Infectious Diseases

## 2019-06-11 PROBLEM — A41.9 SEPTICEMIA (HCC): Status: RESOLVED | Noted: 2019-06-09 | Resolved: 2019-06-11

## 2019-06-11 LAB
ABSOLUTE EOS #: 0.35 K/UL (ref 0–0.44)
ABSOLUTE IMMATURE GRANULOCYTE: 0.03 K/UL (ref 0–0.3)
ABSOLUTE LYMPH #: 1.13 K/UL (ref 1.1–3.7)
ABSOLUTE MONO #: 0.35 K/UL (ref 0.1–1.2)
ANION GAP SERPL CALCULATED.3IONS-SCNC: 10 MMOL/L (ref 9–17)
BASOPHILS # BLD: 0 % (ref 0–2)
BASOPHILS ABSOLUTE: 0 K/UL (ref 0–0.2)
BUN BLDV-MCNC: <2 MG/DL (ref 6–20)
BUN/CREAT BLD: ABNORMAL (ref 9–20)
CALCIUM SERPL-MCNC: 7.5 MG/DL (ref 8.6–10.4)
CHLORIDE BLD-SCNC: 105 MMOL/L (ref 98–107)
CO2: 24 MMOL/L (ref 20–31)
CREAT SERPL-MCNC: 0.37 MG/DL (ref 0.5–0.9)
DATE, STOOL #1: NORMAL
DATE, STOOL #2: NORMAL
DATE, STOOL #3: NORMAL
DIFFERENTIAL TYPE: ABNORMAL
EOSINOPHILS RELATIVE PERCENT: 12 % (ref 1–4)
GFR AFRICAN AMERICAN: >60 ML/MIN
GFR NON-AFRICAN AMERICAN: >60 ML/MIN
GFR SERPL CREATININE-BSD FRML MDRD: ABNORMAL ML/MIN/{1.73_M2}
GFR SERPL CREATININE-BSD FRML MDRD: ABNORMAL ML/MIN/{1.73_M2}
GLUCOSE BLD-MCNC: 79 MG/DL (ref 70–99)
HCT VFR BLD CALC: 24.8 % (ref 36.3–47.1)
HEMOCCULT SP1 STL QL: NEGATIVE
HEMOCCULT SP2 STL QL: NORMAL
HEMOCCULT SP3 STL QL: NORMAL
HEMOGLOBIN: 7.7 G/DL (ref 11.9–15.1)
IMMATURE GRANULOCYTES: 1 %
LACTIC ACID, WHOLE BLOOD: 2.4 MMOL/L (ref 0.7–2.1)
LYMPHOCYTES # BLD: 39 % (ref 24–43)
MAGNESIUM: 1.6 MG/DL (ref 1.6–2.6)
MCH RBC QN AUTO: 29.3 PG (ref 25.2–33.5)
MCHC RBC AUTO-ENTMCNC: 31 G/DL (ref 28.4–34.8)
MCV RBC AUTO: 94.3 FL (ref 82.6–102.9)
MONOCYTES # BLD: 12 % (ref 3–12)
MORPHOLOGY: ABNORMAL
NRBC AUTOMATED: 0 PER 100 WBC
PDW BLD-RTO: 17.3 % (ref 11.8–14.4)
PHENYTOIN DATE LAST DOSE: ABNORMAL
PHENYTOIN DOSE AMOUNT: ABNORMAL
PHENYTOIN DOSE TIME: ABNORMAL
PHENYTOIN FREE: 0.7 UG/ML (ref 1–2)
PHENYTOIN LEVEL: 3 UG/ML (ref 10–20)
PLATELET # BLD: 176 K/UL (ref 138–453)
PLATELET ESTIMATE: ABNORMAL
PMV BLD AUTO: 9.2 FL (ref 8.1–13.5)
POTASSIUM SERPL-SCNC: 3 MMOL/L (ref 3.7–5.3)
RBC # BLD: 2.63 M/UL (ref 3.95–5.11)
RBC # BLD: ABNORMAL 10*6/UL
SEG NEUTROPHILS: 36 % (ref 36–65)
SEGMENTED NEUTROPHILS ABSOLUTE COUNT: 1.04 K/UL (ref 1.5–8.1)
SODIUM BLD-SCNC: 139 MMOL/L (ref 135–144)
SURGICAL PATHOLOGY REPORT: NORMAL
TIME, STOOL #1: 700
TIME, STOOL #2: NORMAL
TIME, STOOL #3: NORMAL
WBC # BLD: 2.9 K/UL (ref 3.5–11.3)
WBC # BLD: ABNORMAL 10*3/UL

## 2019-06-11 PROCEDURE — 83605 ASSAY OF LACTIC ACID: CPT

## 2019-06-11 PROCEDURE — 80185 ASSAY OF PHENYTOIN TOTAL: CPT

## 2019-06-11 PROCEDURE — 99233 SBSQ HOSP IP/OBS HIGH 50: CPT | Performed by: INTERNAL MEDICINE

## 2019-06-11 PROCEDURE — 6370000000 HC RX 637 (ALT 250 FOR IP): Performed by: STUDENT IN AN ORGANIZED HEALTH CARE EDUCATION/TRAINING PROGRAM

## 2019-06-11 PROCEDURE — 80048 BASIC METABOLIC PNL TOTAL CA: CPT

## 2019-06-11 PROCEDURE — 99232 SBSQ HOSP IP/OBS MODERATE 35: CPT | Performed by: INTERNAL MEDICINE

## 2019-06-11 PROCEDURE — 2580000003 HC RX 258: Performed by: STUDENT IN AN ORGANIZED HEALTH CARE EDUCATION/TRAINING PROGRAM

## 2019-06-11 PROCEDURE — 6370000000 HC RX 637 (ALT 250 FOR IP): Performed by: INTERNAL MEDICINE

## 2019-06-11 PROCEDURE — 85025 COMPLETE CBC W/AUTO DIFF WBC: CPT

## 2019-06-11 PROCEDURE — 83735 ASSAY OF MAGNESIUM: CPT

## 2019-06-11 PROCEDURE — 6360000002 HC RX W HCPCS: Performed by: STUDENT IN AN ORGANIZED HEALTH CARE EDUCATION/TRAINING PROGRAM

## 2019-06-11 PROCEDURE — 99232 SBSQ HOSP IP/OBS MODERATE 35: CPT | Performed by: NURSE PRACTITIONER

## 2019-06-11 PROCEDURE — 36415 COLL VENOUS BLD VENIPUNCTURE: CPT

## 2019-06-11 PROCEDURE — 80186 ASSAY OF PHENYTOIN FREE: CPT

## 2019-06-11 PROCEDURE — 1200000000 HC SEMI PRIVATE

## 2019-06-11 PROCEDURE — G0328 FECAL BLOOD SCRN IMMUNOASSAY: HCPCS

## 2019-06-11 RX ORDER — DOXYCYCLINE HYCLATE 100 MG
100 TABLET ORAL EVERY 12 HOURS SCHEDULED
Status: DISCONTINUED | OUTPATIENT
Start: 2019-06-11 | End: 2019-06-12 | Stop reason: HOSPADM

## 2019-06-11 RX ORDER — OXYCODONE HYDROCHLORIDE AND ACETAMINOPHEN 5; 325 MG/1; MG/1
1 TABLET ORAL EVERY 6 HOURS PRN
Status: DISCONTINUED | OUTPATIENT
Start: 2019-06-11 | End: 2019-06-12 | Stop reason: HOSPADM

## 2019-06-11 RX ORDER — POTASSIUM CHLORIDE 20 MEQ/1
40 TABLET, EXTENDED RELEASE ORAL 2 TIMES DAILY WITH MEALS
Status: COMPLETED | OUTPATIENT
Start: 2019-06-11 | End: 2019-06-11

## 2019-06-11 RX ADMIN — MIRTAZAPINE 45 MG: 30 TABLET, FILM COATED ORAL at 23:08

## 2019-06-11 RX ADMIN — ONDANSETRON 4 MG: 2 INJECTION INTRAMUSCULAR; INTRAVENOUS at 15:14

## 2019-06-11 RX ADMIN — IRON SUCROSE 200 MG: 20 INJECTION, SOLUTION INTRAVENOUS at 11:00

## 2019-06-11 RX ADMIN — DOXYCYCLINE HYCLATE 100 MG: 100 TABLET, COATED ORAL at 23:09

## 2019-06-11 RX ADMIN — VANCOMYCIN HYDROCHLORIDE 1250 MG: 10 INJECTION, POWDER, LYOPHILIZED, FOR SOLUTION INTRAVENOUS at 01:57

## 2019-06-11 RX ADMIN — ALBUTEROL SULFATE 2 PUFF: 90 AEROSOL, METERED RESPIRATORY (INHALATION) at 06:08

## 2019-06-11 RX ADMIN — OXYCODONE HYDROCHLORIDE AND ACETAMINOPHEN 1 TABLET: 5; 325 TABLET ORAL at 19:13

## 2019-06-11 RX ADMIN — DAKIN'S SOLUTION 0.125% (QUARTER STRENGTH) 473 ML: 0.12 SOLUTION at 08:55

## 2019-06-11 RX ADMIN — EXTENDED PHENYTOIN SODIUM 150 MG: 30 CAPSULE ORAL at 23:07

## 2019-06-11 RX ADMIN — LEVOTHYROXINE SODIUM 50 MCG: 50 TABLET ORAL at 06:08

## 2019-06-11 RX ADMIN — VALPROIC ACID 1000 MG: 250 CAPSULE, LIQUID FILLED ORAL at 08:52

## 2019-06-11 RX ADMIN — PIPERACILLIN AND TAZOBACTAM 3.38 G: 3; .375 INJECTION, POWDER, FOR SOLUTION INTRAVENOUS at 15:53

## 2019-06-11 RX ADMIN — OXYCODONE HYDROCHLORIDE AND ACETAMINOPHEN 1 TABLET: 5; 325 TABLET ORAL at 03:03

## 2019-06-11 RX ADMIN — ONDANSETRON 4 MG: 2 INJECTION INTRAMUSCULAR; INTRAVENOUS at 09:10

## 2019-06-11 RX ADMIN — PIPERACILLIN AND TAZOBACTAM 3.38 G: 3; .375 INJECTION, POWDER, FOR SOLUTION INTRAVENOUS at 08:52

## 2019-06-11 RX ADMIN — VANCOMYCIN HYDROCHLORIDE 1250 MG: 10 INJECTION, POWDER, LYOPHILIZED, FOR SOLUTION INTRAVENOUS at 14:16

## 2019-06-11 RX ADMIN — POTASSIUM CHLORIDE 40 MEQ: 1500 TABLET, EXTENDED RELEASE ORAL at 19:12

## 2019-06-11 RX ADMIN — EXTENDED PHENYTOIN SODIUM 150 MG: 30 CAPSULE ORAL at 08:54

## 2019-06-11 RX ADMIN — EXTENDED PHENYTOIN SODIUM 150 MG: 30 CAPSULE ORAL at 14:18

## 2019-06-11 RX ADMIN — ONDANSETRON 4 MG: 2 INJECTION INTRAMUSCULAR; INTRAVENOUS at 03:04

## 2019-06-11 RX ADMIN — OXYCODONE HYDROCHLORIDE AND ACETAMINOPHEN 1 TABLET: 5; 325 TABLET ORAL at 13:15

## 2019-06-11 RX ADMIN — POTASSIUM CHLORIDE 40 MEQ: 1500 TABLET, EXTENDED RELEASE ORAL at 09:09

## 2019-06-11 RX ADMIN — DAKIN'S SOLUTION 0.125% (QUARTER STRENGTH): 0.12 SOLUTION at 23:10

## 2019-06-11 RX ADMIN — VALPROIC ACID 1000 MG: 250 CAPSULE, LIQUID FILLED ORAL at 23:07

## 2019-06-11 RX ADMIN — RISPERIDONE 2 MG: 2 TABLET, FILM COATED ORAL at 23:08

## 2019-06-11 RX ADMIN — ACETAMINOPHEN 650 MG: 325 TABLET ORAL at 09:10

## 2019-06-11 ASSESSMENT — PAIN SCALES - GENERAL
PAINLEVEL_OUTOF10: 8
PAINLEVEL_OUTOF10: 6
PAINLEVEL_OUTOF10: 8

## 2019-06-11 ASSESSMENT — PAIN DESCRIPTION - LOCATION
LOCATION: HIP
LOCATION: LEG

## 2019-06-11 ASSESSMENT — PAIN DESCRIPTION - FREQUENCY: FREQUENCY: INTERMITTENT

## 2019-06-11 ASSESSMENT — PAIN - FUNCTIONAL ASSESSMENT: PAIN_FUNCTIONAL_ASSESSMENT: ACTIVITIES ARE NOT PREVENTED

## 2019-06-11 ASSESSMENT — PAIN DESCRIPTION - DESCRIPTORS: DESCRIPTORS: THROBBING

## 2019-06-11 ASSESSMENT — PAIN DESCRIPTION - PROGRESSION: CLINICAL_PROGRESSION: NOT CHANGED

## 2019-06-11 ASSESSMENT — PAIN DESCRIPTION - PAIN TYPE: TYPE: ACUTE PAIN

## 2019-06-11 ASSESSMENT — PAIN DESCRIPTION - ORIENTATION
ORIENTATION: RIGHT
ORIENTATION: RIGHT

## 2019-06-11 ASSESSMENT — PAIN DESCRIPTION - ONSET: ONSET: GRADUAL

## 2019-06-11 NOTE — DISCHARGE SUMMARY
[address] [place] [address]     Department of Internal Medicine - Staff Internal Medicine Service    INPATIENT DISCHARGE SUMMARY        Patient Identification:  Michael Amaya is a 62 y.o. female. :  1965  MRN: [id number]     Acct: [de-identified]   Admit Date:  2019  Discharge date and time: 2019  6:30 PM   Attending Provider: No att. providers found                                     Admission Diagnoses: Altered mental status [R41.82]    Discharge Diagnoses: Active Problems:    Altered mental status    Depression    Elevated troponin    Breakthrough seizure ([address]    Infected wound  Resolved Problems:    * No resolved hospital problems. *       Consults:   Gen surg, Neurology,cardiology. Brief Inpatient course:  Pleasant 62 y.o. female with PMH of depression with suicide attempt in past, seizure disorder, hypertension, migraine, hypothyroidism, asthma, COPD presented with confusion/altered mentation of 1 day duration. No seizure like activity noticed. She was recently discharged from hospital few days ago after being treated for necrotizing fasciitis. She underwent surgical debridement with wound washout and was discharged to skilled nursing facility with wound VAC in place. She was discharged on oxycodone while wound dressings but patient was taking oxycodone every 8. She was noticed to have pinpoint pupils  And 1 dose of Narcan 0.4 mg was given after which her mentation improved as per chart review. She was given fluid bolus as per sepsis protocol in ER at 30 ml/kg/hr and then continued on IV normal saline at 125 ml/hr and started on vanco and zosyn dosed by pharmacy. Gen surg was also consulted and necrotizing fasciitis was ruled out. She was also started on breathing treatments albuterol, DuoNeb Dulera and Flonase and started on IV solumedrol 40 mg daily as well. She is resumed on Remeron, Resperdal and Synthyroid (her home medications).  AED Medication levels were checked and they were found to be subtherapeutic neurology was consulted and they've adjusted her AED medications. Wound care was also following. Cardiology was also consulted and initially her troponins were elevated likely type II MI. Echo done showed normal EF 70%. Later she recovered over time and was d/c home with [facility] in stable condition.         Procedures:  None    Any Hospital Acquired Infections: none    Discharge Functional Status:  stable    Disposition: home    Patient Instructions:   Discharge Medication List as of 6/4/2019  5:46 PM      START taking these medications    Details   predniSONE (DELTASONE) 20 MG tablet Take 2 tablets by mouth daily for 4 doses, Disp-8 tablet, R-0Normal      benzonatate (TESSALON) 100 MG capsule Take 1 capsule by mouth 3 times daily as needed for Cough, Disp-10 capsule, R-0Normal      fluticasone (FLONASE) 50 MCG/ACT nasal spray 2 sprays by Each Nostril route daily, Disp-1 Bottle, R-3Normal      azithromycin (ZITHROMAX) 250 MG tablet Take 1 tablet by mouth See Admin Instructions for 5 days 500mg on day 1 followed by 250mg on days 2 - 5, Disp-6 tablet, R-0Normal      ferrous sulfate 325 (65 Fe) MG EC tablet Take 1 tablet by mouth daily (with breakfast), Disp-90 tablet, R-3Normal         CONTINUE these medications which have CHANGED    Details   divalproex (DEPAKOTE) 500 MG DR tablet Take 2 tablets by mouth every 12 hours, Disp-90 tablet, R-3Normal      phenytoin (DILANTIN) 30 MG ER capsule Take 5 capsules by mouth 3 times daily, Disp-60 capsule, R-3Normal         CONTINUE these medications which have NOT CHANGED    Details   mirtazapine (REMERON) 45 MG tablet Take 1 tablet by mouth nightly, Disp-30 tablet, R-0Normal      risperiDONE (RISPERDAL) 2 MG tablet Take 1 tablet by mouth nightly, Disp-30 tablet, R-0Normal      famotidine (PEPCID) 20 MG tablet Take 20 mg by mouth 2 times dailyHistorical Med      levothyroxine (SYNTHROID) 50 MCG tablet Take 50 mcg by mouth DailyHistorical Med

## 2019-06-11 NOTE — DISCHARGE INSTR - COC
overdose T50.901A    History of seizure Z87.898    Major depressive disorder with psychotic features ([facility] F32.3    Severe major depression (HCC) F32.2    Overdose of barbiturate, intentional self-harm, initial encounter ([facility] T42.3X2A    Intentional phenobarbital overdose ([facility] T42.3X2A    Severe episode of recurrent major depressive disorder, without psychotic features ([facility] F33.2    Bronchitis J40    Suicide attempt ([address] T14.91XA    Major depressive disorder, recurrent ([address] F33.9    Ear infection H66.90    Sepsis ([address] A41.9    Severe malnutrition ([facility] E43    Altered mental status R41.82    Depression F32.9    Elevated troponin R74.8    Breakthrough seizure ([facility] G40.919    Hypokalemia E87.6       Isolation/Infection:   Isolation          No Isolation        Patient Infection Status     Infection Encounter Level?  Onset Date Added Added By Resolved Resolved By Review Date    MRSA No 06/09/19 06/11/19 WOUND CULTURE       C-diff (Clostridium difficile) Yes 06/09/19 06/10/19 C DIFF TOXIN/ANTIGEN   06/17/19          Nurse Assessment:  Last Vital Signs: /82   Pulse 89   Temp 98.4 °F (36.9 °C) (Oral)   Resp 16   Ht 5' 2.21\" (1.58 m)   Wt 186 lb 11.7 oz (84.7 kg)   SpO2 97%   BMI 33.93 kg/m²     Last documented pain score (0-10 scale): Pain Level: 9  Last Weight:   Wt Readings from Last 1 Encounters:   06/09/19 186 lb 11.7 oz (84.7 kg)     Mental Status:  oriented, alert and able to concentrate and follow conversation    IV Access:  - None    Nursing Mobility/ADLs:  Walking   Independent  Transfer  Independent  Bathing  [address] [facility]  Assisted  Med Delivery   none    Wound Care Documentation and Therapy:   dakins dressing change twice daily until wound vac reapplied     Elimination:  Continence:   · Bowel: YES  · Bladder: YES  Urinary Catheter: None   Colostomy/Ileostomy/Ileal Conduit: NO  Date of Last BM: ***    Intake/Output Summary (Last 24 hours) at 6/12/2019 1104  Last data filed at 6/12/2019 0934  Gross per 24 hour   Intake 2180 ml   Output 1200 ml   Net 980 ml     I/O last 3 completed shifts: In: 1700 [P.O.:1700]  Out: 1200 [Urine:1200]    Safety Concerns:     None    Impairments/Disabilities:      None    Nutrition Therapy:  Current Nutrition Therapy:   - Oral Diet:  General    Routes of Feeding: Oral  Liquids: {Slp liquid thickness:36821}  Daily Fluid Restriction: no  Last Modified Barium Swallow with Video (Video Swallowing Test): not done    Treatments at the Time of Hospital Discharge:   Respiratory Treatments: ***  Oxygen Therapy:  is not on home oxygen therapy. Ventilator:    - No ventilator support    Rehab Therapies: {THERAPEUTIC INTERVENTION:5704934162}  Weight Bearing Status/Restrictions: No weight bearing restirctions  Other Medical Equipment (for information only, NOT a DME order):  {EQUIPMENT:030829221}  Other Treatments: ***    Patient's personal belongings (please select all that are sent with patient):  None    RN SIGNATURE:  Electronically signed by Annalisa Hernández RN on 6/12/19 at 12:21 PM    CASE MANAGEMENT/SOCIAL WORK SECTION    Inpatient Status Date: 6/12/19    Readmission Risk Assessment Score:  Readmission Risk              Risk of Unplanned Readmission:        43           Discharging to Facility/ Agency   · Name: [facility]  Address:  [address] [place] [address]        Phone: [phone]       Fax: [phone]        ·   · Phone:  · Fax:    Dialysis Facility (if applicable)   · Name:  · Address:  · Dialysis Schedule:  · Phone:  · Fax:    / signature: Electronically signed by Elian Olmos RN on 6/12/19 at 11:04 AM    PHYSICIAN SECTION    Prognosis: Fair    Condition at Discharge: Stable    Rehab Potential (if transferring to Rehab): Fair    Recommended Labs or Other Treatments After Discharge: wound care, BMP and CBC in 1 week.     Physician Certification: I certify the above information and transfer of Desire Jung  is necessary for the continuing treatment of the diagnosis listed and that she requires Home Care for greater 30 days.      Update Admission H&P: No change in H&P    PHYSICIAN SIGNATURE:  Electronically signed by Fabiola Wilhelm MD on 6/12/19 at 9:25 AM

## 2019-06-11 NOTE — PROGRESS NOTES
Pt signed paperwork for her to leave the floor. Pt left the floor twice tonight. Pt drinks a large amount of fluids throughout night. Pt up to bathroom frequently. Pt unplugs herself from EKG monitor and uses bathroom without asking for assistance. Advised pt to use call light. Pt continues to get up without assistance from staff.

## 2019-06-11 NOTE — PLAN OF CARE
Problem: Falls - Risk of:  Goal: Will remain free from falls  Description  Will remain free from falls  Outcome: Met This Shift     Problem: Risk for Impaired Skin Integrity  Goal: Tissue integrity - skin and mucous membranes  Description  Structural intactness and normal physiological function of skin and  mucous membranes.   Outcome: Ongoing     Problem: Pain:  Goal: Pain level will decrease  Description  Pain level will decrease  Outcome: Ongoing

## 2019-06-11 NOTE — PROGRESS NOTES
Occupational Therapy Not Seen Note    DATE: 2019  Name: Bhavani Martinez  : 1965  MRN: [id number]    Patient not available for Occupational Therapy due to:    Per RN, pt independent with functional mobility and functional tasks. Pt has been independent getting up to bathroom and going off unit.  Pt with no OT acute care needs at this time, will defer OT eval.    Next Scheduled Treatment: NA     Electronically signed by IRIS Jessica on 2019 at 10:27 AM

## 2019-06-11 NOTE — PLAN OF CARE
Patient transferred to this unit in stable condition. VSS. No signs of distress and no needs noted at this time.

## 2019-06-11 NOTE — PLAN OF CARE
Problem: Falls - Risk of:  Goal: Will remain free from falls  Description  Will remain free from falls  6/11/2019 1726 by Galdino Chambers RN  Outcome: Met This Shift    Problem: Risk for Impaired Skin Integrity  Goal: Tissue integrity - skin and mucous membranes  Description  Structural intactness and normal physiological function of skin and  mucous membranes.   6/11/2019 1726 by Galdino Chambers RN  Outcome: Ongoing    Problem: Pain:  Goal: Pain level will decrease  Description  Pain level will decrease  6/11/2019 1726 by Galdino Chambers RN  Outcome: Ongoing

## 2019-06-11 NOTE — CONSULTS
[facility] - Initial Consult Note  Today's Date and Time: 6/11/2019, 7:07 PM    Impression :   · Right hip wound infection   · History of right hip fasciitis s/p I&D on 5/22/2019. Wound vac recently removed - wound drainage   · Right hip Soft tissue not well defined collectin - possible abscess -CT hip. · Sepsis/ SIRS from infection,  · Hypotension may also be as a result of having diarrhea for 4 days. · CRP elevation 367 -100 -  · Drug reaction -Eosinophilia and leukopenia- 6/11/19 -    Recommendations:   · Stop  Vancomycin and Zosyn. Due to drop in the WBC and the eosinophilia -  · Right hip better  · Need to look at the wound in am  · Switch to po doxy -14 more days - cx is MRSA per lab  · To still not clear if there is a fluid collection in the soft tissues of the right wound. · disch planning in am  · FU office in 2 weeks  w me      Medical Decision Making/Summary/Discussion:6/11/2019     ·   Infection Control Recommendations   · [place] Precautions  · Contact Isolation     Antimicrobial Stewardship Recommendations     · Simplification of therapy  · Restricted antimicrobial use  · Discontinuation of therapy    Coordination of Outpatient Care:   · Estimated Length of IV antimicrobials:  · Patient will need Midline Catheter Insertion:   · Patient will need PICC line Insertion:  · Patient will need: Home IV , [place],  SNF,  LTAC:  · Patient will need outpatient wound care:    Chief complaint/reason for consultation:   · Sepsis     History of Present Illness:   Zina Jain is a 62y.o.-year-old  female who was initially admitted on 6/8/2019. Patient seen at the request of Sumanth Smith    The patient presented from home via EMS with altered mental status. History was obtained mostly from chart review as she does not recall the events leading to her present admission.      She was admitted in May for necrotizing fasciitis of the right hip and underwent debridement. She was treated with Vancomycin and Zosyn at the time, but discharged off antibiotics. Wound vac was placed. She was then re-admitted on 6/2 with AMS secondary to sepsis. Again treated with antibiotics. She was then discharged after blood cultures were negative. Wound care was following at home. However, her wound vac was apparently dislodged for 3 days. Her roommate mentioned that she developed over that time period lethargy which led up to AMS. She also reported diarrhea for the last 4 days. Non-bloody watery stools with mucous. More than 4 episodes daily. Denied abdominal pain, recent travel,change in diet or sick contacts. On admission, Tmax was 103. 1. Hypotensive, tachypneic, tachycardic. She received a total of 4 L fluids thus far and maintained on IVF @ 125 cc/hr. Interval changes 06/11/19   Not happy to show me the wound tired- but better in general - abvd soft non tender  eso elevated and WBC low as below- possibly the vanco  Agrees to switch antibiotics to just doxy - called the lab abd the cx is pos MRSA only  Wound less tender to touch - will eval in am w the stoma RN      , No abdominal pain, the stools have tested negative for C. diff   Repeat blood cultures are negative      Labs, X rays reviewed: 6/11/2019    BUN:12-->13  Cr:0.63-->0.58    · WBC:7.4-->5.0   · (Segmented neutrophils 85)-3  · 4% eosinophils, neutrophils are 52  Hb: 9.2-->7.9  Plat: 203-->170    Cultures:    Blood: negative (pending)    Stool:   · Lactoferrin (pending)  · Culture (pending)  · Leukocytes (pending)  · C. Diff (pending)  · Giardia/cryptosporidium (pending)    CT right hip: 6/9/19  .  Large wound extending to the level of the underlying iliotibial   band/fascia venessa.  Subcutaneous edema involving the soft tissues about the   wound suggesting cellulitis.  Somewhat more confluent fluid noted adjacent to   the lateral aspect of the right gluteus julia muscle, however, no   well-defined drainable fluid collection identified.  No subcutaneous gas   evident. 2. No CT evidence for osteomyelitis.          Past Medical History:     Past Medical History:   Diagnosis Date    Arthritis     Asthma     CHF (congestive heart failure) (HCC)     COPD (chronic obstructive pulmonary disease) (HCC)     Fibromyalgia     Headache     Hypertension     Movement disorder     Neck fracture (HCC)     Seizure ([facility] [address]     Thyroid disease        Past Surgical  History:     Past Surgical History:   Procedure Laterality Date    EXPLORATION OF WOUND OF EXTREMITY Right 5/19/2019    RIGHT THIGH WOUND WASHOUT WITH [address] PLACEMENT performed by Randy Katz MD at [address] N[address]A 5/22/2019    RIGHT WOUND HIP EXAMINATION LAVAGE AND WOUND VAC PLACEMENT performed by Hyacinth Carrington MD at [address] [place] [address] Right 5/17/2019    IRRIGATION, DEBRIDEMENT RIGHT THIGH performed by Shannan Lloyd MD at [facility]      PARTIAL HYSTERECTOMY         Medications:      potassium chloride  40 mEq Oral BID WC    iron sucrose  200 mg Intravenous Q24H    doxycycline hyclate  100 mg Oral 2 times per day    sodium chloride flush  10 mL Intravenous 2 times per day    sodium chloride flush  10 mL Intravenous 2 times per day    enoxaparin  40 mg Subcutaneous Daily    nicotine  1 patch Transdermal Daily    fluticasone  2 spray Each Nostril Daily    levothyroxine  50 mcg Oral Daily    mirtazapine  45 mg Oral Nightly    risperiDONE  2 mg Oral Nightly    ipratropium-albuterol  1 ampule Inhalation Q4H WA    phenytoin  150 mg Oral TID    valproic acid  1,000 mg Oral BID    sodium hypochlorite   Irrigation BID       Social History:     Social History     Socioeconomic History    Marital status:      Spouse name: Not on file    Number of children: Not on file    Years of education: Not on file    Highest education level: Not on file   Occupational History    Not on file   Social Needs    Financial resource strain: Not on file    Food insecurity:     Worry: Not on file     Inability: Not on file    Transportation needs:     Medical: Not on file     Non-medical: Not on file   Tobacco Use    Smoking status: Current Every Day Smoker     Packs/day: 0.50     Years: 12.00     Pack years: 6.00    Smokeless tobacco: Never Used   Substance and Sexual Activity    Alcohol use: No    Drug use: No    Sexual activity: Not on file   Lifestyle    Physical activity:     Days per week: Not on file     Minutes per session: Not on file    Stress: Not on file   Relationships    Social connections:     Talks on phone: Not on file     Gets together: Not on file     Attends [employer] service: Not on file     Active member of club or organization: Not on file     Attends meetings of clubs or organizations: Not on file     Relationship status: Not on file    Intimate partner violence:     Fear of current or ex partner: Not on file     Emotionally abused: Not on file     Physically abused: Not on file     Forced sexual activity: Not on file   Other Topics Concern    Not on file   Social History Narrative    Not on file       Family History:   History reviewed. No pertinent family history. Allergies:   Imitrex [sumatriptan]; Bee pollen; Bee venom; Bromide ion [bromine]; Flexeril [cyclobenzaprine]; Neurontin [gabapentin]; Nsaids; Potassium bromide; Reglan [metoclopramide]; Sulfa antibiotics; Sulfadiazine; Toradol [ketorolac tromethamine]; and Tramadol     Review of Systems:     Review of Systems   Constitutional: Negative for activity change. HENT: Negative for congestion. Eyes: Negative for discharge. Respiratory: Negative for chest tightness. Cardiovascular: Negative for leg swelling. Gastrointestinal: Negative for abdominal distention. Endocrine: Negative for heat intolerance. Genitourinary: Negative for dysuria. Musculoskeletal: Negative for arthralgias. Skin: Negative for color change. Allergic/Immunologic: Negative for food allergies. Neurological: Negative for syncope. Psychiatric/Behavioral: Negative for agitation. Physical Examination :     Patient Vitals for the past 8 hrs:   BP Temp Temp src Pulse Resp SpO2   06/11/19 1742 134/85 -- -- -- -- --   06/11/19 1738 (!) 150/83 98.8 °F (37.1 °C) Oral 79 20 99 %   06/11/19 1515 115/85 97.3 °F (36.3 °C) Temporal 82 23 98 %   06/11/19 1238 117/86 98.2 °F (36.8 °C) Oral 89 28 100 %     Physical Exam   Constitutional: She is oriented to person, place, and time. She appears well-developed and well-nourished. No distress. HENT:   Head: Normocephalic and atraumatic. Eyes: Pupils are equal, round, and reactive to light. Conjunctivae are normal. No scleral icterus. Neck: Neck supple. No tracheal deviation present. Cardiovascular: Normal rate and regular rhythm. Exam reveals no friction rub. No murmur heard. Pulmonary/Chest: No stridor. No respiratory distress. Abdominal: Soft. She exhibits no distension. There is no tenderness. Genitourinary:   Genitourinary Comments: Urine clear   Musculoskeletal: She exhibits no edema or deformity. Neurological: She is alert and oriented to person, place, and time. No cranial nerve deficit. Skin: Skin is warm. She is not diaphoretic. No erythema. Hip wound is covered -   Psychiatric: She has a normal mood and affect.  Her behavior is normal.               Medical Decision Making -Laboratory:   I have independently reviewed/ordered the following labs:    CBC with Differential:   Recent Labs     06/10/19  0541 06/10/19  1135 06/11/19  0710   WBC 3.0*  --  2.9*   HGB 7.0* 7.8* 7.7*   HCT 21.3* 26.2* 24.8*   PLT See Reflexed IPF Result  --  176   LYMPHOPCT 40  --  39   MONOPCT 4  --  12     BMP:   Recent Labs     06/10/19  0541 06/11/19  0710    139   K 2.9* 3.0*    105   CO2 21 24   BUN 6 <2* CREATININE 0.51 0.37*   MG 1.7 1.6     Hepatic Function Panel:   Recent Labs     06/08/19  2140 06/10/19  0541   PROT 6.8  --    LABALBU 2.7* 2.2*   BILITOT 0.78  --    ALKPHOS 103  --    ALT 16  --    AST 32*  --      No results for input(s): RPR in the last 72 hours. No results for input(s): HIV in the last 72 hours. No results for input(s): BC in the last 72 hours. Lab Results   Component Value Date    MUCUS NOT REPORTED 06/08/2019    RBC 2.63 06/11/2019    TRICHOMONAS NOT REPORTED 06/08/2019    WBC 2.9 06/11/2019    YEAST NOT REPORTED 06/08/2019    TURBIDITY CLEAR 06/08/2019     Lab Results   Component Value Date    CREATININE 0.37 06/11/2019    GLUCOSE 79 06/11/2019       Medical Decision Making-Imaging:     CT right hip 6/8/2019  Impression   1. Large wound extending to the level of the underlying iliotibial   band/fascia venessa.  Subcutaneous edema involving the soft tissues about the   wound suggesting cellulitis.  Somewhat more confluent fluid noted adjacent to   the lateral aspect of the right gluteus julia muscle, however, no   well-defined drainable fluid collection identified.  No subcutaneous gas   evident. 2. No CT evidence for osteomyelitis. 3. Small amount of gas within the bladder lumen.  Correlate for recent   instrumentation. XR Right hip 6/8/2019  Impression   Large soft tissue defect       No focal bony erosion.  Bone detail is slightly limited     CXR 6/8/2019  Impression   Mild bilateral pulmonary congestion.      Medical Decision [id number]:       Medical Decision Making-Other:     Note:  · Labs, medications, radiologic studies were reviewed with personal review of films  · Moderate Large amounts of data were reviewed  · Discussed with nursing Staff, Discharge planner  · Infection Control and Prevention measures reviewed  · All prior entries were reviewed  · Administer medications as ordered  · Prognosis: Good  · Discharge planning reviewed  · Follow up as outpatient. Thank you for allowing us to participate in the care of this patient. Please call with questions. Scooby Luke MD  medicine resident  [facility], [place], [place]  6/11/2019         I have discussed the care of the patient, including pertinent history and exam findings,  with the resident. I have seen and examined the patient and the key elements of all parts of the encounter have been performed by me. I agree with the assessment, plan and orders as documented by the resident.     Tamica Serrano, Infectious Diseases
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
## 2019-06-11 NOTE — PROGRESS NOTES
Subcutaneous Daily    nicotine  1 patch Transdermal Daily    vancomycin (VANCOCIN) intermittent dosing (placeholder)   Other RX Placeholder    piperacillin-tazobactam  3.375 g Intravenous Q8H    ferrous sulfate  325 mg Oral Daily with breakfast    fluticasone  2 spray Each Nostril Daily    levothyroxine  50 mcg Oral Daily    mirtazapine  45 mg Oral Nightly    risperiDONE  2 mg Oral Nightly    ipratropium-albuterol  1 ampule Inhalation Q4H WA    vancomycin  1,250 mg Intravenous Q12H    phenytoin  150 mg Oral TID    valproic acid  1,000 mg Oral BID    sodium hypochlorite   Irrigation BID       PRN Medications    LORazepam 1 mg Q30 Min PRN   sodium chloride flush 10 mL PRN   acetaminophen 650 mg Q4H PRN   sodium chloride flush 10 mL PRN   magnesium hydroxide 30 mL Daily PRN   ondansetron 4 mg Q6H PRN   magnesium sulfate 1 g PRN   albuterol sulfate HFA 2 puff Q6H PRN       Diagnostic Labs and Imaging:  CBC:  Recent Labs     06/08/19 2140 06/09/19 0422 06/10/19  0541 06/10/19  1135   WBC 7.4 5.0 3.0*  --    HGB 9.2* 7.9* 7.0* 7.8*    170 See Reflexed IPF Result  --      BMP:   Recent Labs     06/08/19 2140 06/09/19 0422 06/10/19  0541   * 130* 137   K 3.0* 2.7* 2.9*   CL 92* 97* 102   CO2 23 22 21   BUN 12 13 6   CREATININE 0.63 0.58 0.51   GLUCOSE 112* 118* 102*     Hepatic:   Recent Labs     06/08/19 2140   AST 32*   ALT 16   BILITOT 0.78   ALKPHOS 103     CT hip rt w contrast  1. Large wound extending to the level of the underlying iliotibial   band/fascia venessa.  Subcutaneous edema involving the soft tissues about the   wound suggesting cellulitis.  Somewhat more confluent fluid noted adjacent to   the lateral aspect of the right gluteus julia muscle, however, no   well-defined drainable fluid collection identified.  No subcutaneous gas   evident. 2. No CT evidence for osteomyelitis. 3. Small amount of gas within the bladder lumen.  Correlate for recent   instrumentation. Assessment and Plan:   Metabolic encephalopathy likely secondary to underlying sepsis- resolved  Alert and oriented ×3. CT head without contrast was unremarkable except chronic sinusitis changes in Maxillary sinuses and left sphenoid sinus. We will continue to monitor. Neurology following.     Sepsis of unknown etiology-resolving  Afebrile, blood pressure in normal range . ID following. Lactic acid trended down to 2.4 today and CRP trended to 100(397 on admission). Continued on IV fluids and vancomycin and Zosyn Pharmacy to dose. Blood, urine cultures- no growth till date. Will monitor WBCs and fever.     Necrotizing fasciitis of right hip s/p surgical wound debridement and wound VAC that came off 3 days ago- clean   Gen. surgery  ruled out any active necrotizing fasciitis. X-ray right hip showed no bony erosion but a large soft tissue defect in the lateral aspect of the hip soft tissues was reported. CT of the right side with contrast showed cellulitis r/o abscess and osteomyelitis. ID following. On  vancomycin and Zosyn, IV hydration. Wound care following.      Acute on chronic anemia likely dilutional-improving  Hb-7.8 this am.  FOBT negative. On I.v venofer for 3 doses. Electrolyte derangement from underlying diarrhea and poor oral intake  Potassium 3 this am. On KCL 40 BID for now. Daily BMP.     Lactic acidosis-resolving  lactic acid trended down to 2.4 today. Will continue to monitor.      Diarrhea-improving   Stool studies unmramarkable. C.diff test indeterminate. ID following.     COPD/Asthma-stable  Chest x-ray showed mild bilateral pulmonary congestion. On evaluation bilateral wheezing present.  We'll continue on albuterol, DuoNeb nebulizer. RT aerosol treatment. RT eval and treat. Pulse ox. Continue telemetry. Oxygen as per protocol     Depression with suicidal ideation past-  currently denies any acute signs and symptoms.  No suicidal ideation as per patient.  On home doses of Remeron, Resperdal.      Seizure disorder  phenytoin, luminal levels were low on admission and repeat levels this am as well. Neurology following.        Hypothyroidism  On levo thyroxine 50 mcs as per home dose.     Hypertension  currently in normal range. On iv hydration. We'll monitor B.p.     Elevated troponins  Troponins 16, showed a flat trend. EKG unremarkable. ACS ruled out.      Diet- General diet  DVT prophylaxis-On lovenox  PT/OT following   following for discharge planning        Nasra Vargas, PGY 1, Internal Medicine Resident  [facility], [facility]     Attending Physician Statement  I have discussed the care of Pinkie Breath, including pertinent history and exam findings with the resident. I have reviewed the key elements of all parts of the encounter with the resident. I have seen and examined the patient with the resident and the key elements of all parts of the encounter have been performed by me.         Joselin Gallardo MD  Attending and Faculty Internal Medicine  [facility]  Internal Medicine [address] [place] [address], [facility]   6/11/19

## 2019-06-11 NOTE — PROGRESS NOTES
Resident notified via [employer] of pt's potassium level. Awaiting further orders, will continue to monitor.

## 2019-06-12 VITALS
HEART RATE: 83 BPM | RESPIRATION RATE: 16 BRPM | BODY MASS INDEX: 34.36 KG/M2 | DIASTOLIC BLOOD PRESSURE: 81 MMHG | SYSTOLIC BLOOD PRESSURE: 125 MMHG | HEIGHT: 62 IN | TEMPERATURE: 97.7 F | OXYGEN SATURATION: 99 % | WEIGHT: 186.73 LBS

## 2019-06-12 LAB
ABSOLUTE EOS #: 0.28 K/UL (ref 0–0.44)
ABSOLUTE IMMATURE GRANULOCYTE: 0.04 K/UL (ref 0–0.3)
ABSOLUTE LYMPH #: 1.99 K/UL (ref 1.1–3.7)
ABSOLUTE MONO #: 0.28 K/UL (ref 0.1–1.2)
ANION GAP SERPL CALCULATED.3IONS-SCNC: 11 MMOL/L (ref 9–17)
BASOPHILS # BLD: 0 % (ref 0–2)
BASOPHILS ABSOLUTE: 0 K/UL (ref 0–0.2)
BUN BLDV-MCNC: 2 MG/DL (ref 6–20)
BUN/CREAT BLD: ABNORMAL (ref 9–20)
C-REACTIVE PROTEIN: 30.4 MG/L (ref 0–5)
CALCIUM SERPL-MCNC: 8.2 MG/DL (ref 8.6–10.4)
CHLORIDE BLD-SCNC: 104 MMOL/L (ref 98–107)
CO2: 26 MMOL/L (ref 20–31)
CREAT SERPL-MCNC: 0.44 MG/DL (ref 0.5–0.9)
DIFFERENTIAL TYPE: ABNORMAL
EOSINOPHILS RELATIVE PERCENT: 8 % (ref 1–4)
GFR AFRICAN AMERICAN: >60 ML/MIN
GFR NON-AFRICAN AMERICAN: >60 ML/MIN
GFR SERPL CREATININE-BSD FRML MDRD: ABNORMAL ML/MIN/{1.73_M2}
GFR SERPL CREATININE-BSD FRML MDRD: ABNORMAL ML/MIN/{1.73_M2}
GLUCOSE BLD-MCNC: 87 MG/DL (ref 70–99)
HCT VFR BLD CALC: 28.3 % (ref 36.3–47.1)
HEMOGLOBIN: 8.4 G/DL (ref 11.9–15.1)
IMMATURE GRANULOCYTES: 1 %
LYMPHOCYTES # BLD: 57 % (ref 24–43)
MAGNESIUM: 1.5 MG/DL (ref 1.6–2.6)
MCH RBC QN AUTO: 29.2 PG (ref 25.2–33.5)
MCHC RBC AUTO-ENTMCNC: 29.7 G/DL (ref 28.4–34.8)
MCV RBC AUTO: 98.3 FL (ref 82.6–102.9)
MONOCYTES # BLD: 8 % (ref 3–12)
MORPHOLOGY: ABNORMAL
NRBC AUTOMATED: 0 PER 100 WBC
PATHOLOGIST REVIEW: NORMAL
PDW BLD-RTO: 17.5 % (ref 11.8–14.4)
PHENYTOIN DATE LAST DOSE: ABNORMAL
PHENYTOIN DOSE AMOUNT: ABNORMAL
PHENYTOIN DOSE TIME: ABNORMAL
PHENYTOIN FREE: 0.6 UG/ML (ref 1–2)
PHENYTOIN LEVEL: 3.9 UG/ML (ref 10–20)
PLATELET # BLD: 192 K/UL (ref 138–453)
PLATELET ESTIMATE: ABNORMAL
PMV BLD AUTO: 9.2 FL (ref 8.1–13.5)
POTASSIUM SERPL-SCNC: 3.5 MMOL/L (ref 3.7–5.3)
RBC # BLD: 2.88 M/UL (ref 3.95–5.11)
RBC # BLD: ABNORMAL 10*6/UL
SEG NEUTROPHILS: 26 % (ref 36–65)
SEGMENTED NEUTROPHILS ABSOLUTE COUNT: 0.91 K/UL (ref 1.5–8.1)
SODIUM BLD-SCNC: 141 MMOL/L (ref 135–144)
WBC # BLD: 3.5 K/UL (ref 3.5–11.3)
WBC # BLD: ABNORMAL 10*3/UL

## 2019-06-12 PROCEDURE — 6370000000 HC RX 637 (ALT 250 FOR IP): Performed by: STUDENT IN AN ORGANIZED HEALTH CARE EDUCATION/TRAINING PROGRAM

## 2019-06-12 PROCEDURE — 2580000003 HC RX 258: Performed by: STUDENT IN AN ORGANIZED HEALTH CARE EDUCATION/TRAINING PROGRAM

## 2019-06-12 PROCEDURE — 94760 N-INVAS EAR/PLS OXIMETRY 1: CPT

## 2019-06-12 PROCEDURE — 85025 COMPLETE CBC W/AUTO DIFF WBC: CPT

## 2019-06-12 PROCEDURE — 6370000000 HC RX 637 (ALT 250 FOR IP): Performed by: INTERNAL MEDICINE

## 2019-06-12 PROCEDURE — 80048 BASIC METABOLIC PNL TOTAL CA: CPT

## 2019-06-12 PROCEDURE — 80186 ASSAY OF PHENYTOIN FREE: CPT

## 2019-06-12 PROCEDURE — 2580000003 HC RX 258: Performed by: NURSE PRACTITIONER

## 2019-06-12 PROCEDURE — 99233 SBSQ HOSP IP/OBS HIGH 50: CPT | Performed by: INTERNAL MEDICINE

## 2019-06-12 PROCEDURE — 36415 COLL VENOUS BLD VENIPUNCTURE: CPT

## 2019-06-12 PROCEDURE — 99232 SBSQ HOSP IP/OBS MODERATE 35: CPT | Performed by: NURSE PRACTITIONER

## 2019-06-12 PROCEDURE — 80185 ASSAY OF PHENYTOIN TOTAL: CPT

## 2019-06-12 PROCEDURE — 2580000003 HC RX 258: Performed by: INTERNAL MEDICINE

## 2019-06-12 PROCEDURE — 99212 OFFICE O/P EST SF 10 MIN: CPT

## 2019-06-12 PROCEDURE — 6360000002 HC RX W HCPCS: Performed by: NURSE PRACTITIONER

## 2019-06-12 PROCEDURE — 6360000002 HC RX W HCPCS: Performed by: STUDENT IN AN ORGANIZED HEALTH CARE EDUCATION/TRAINING PROGRAM

## 2019-06-12 PROCEDURE — 83735 ASSAY OF MAGNESIUM: CPT

## 2019-06-12 PROCEDURE — 86140 C-REACTIVE PROTEIN: CPT

## 2019-06-12 RX ORDER — VALPROIC ACID 250 MG/1
1000 CAPSULE, LIQUID FILLED ORAL 2 TIMES DAILY
Qty: 120 CAPSULE | Refills: 3 | Status: SHIPPED | OUTPATIENT
Start: 2019-06-12 | End: 2019-06-12

## 2019-06-12 RX ORDER — VALPROIC ACID 250 MG/1
1000 CAPSULE, LIQUID FILLED ORAL 2 TIMES DAILY
Qty: 120 CAPSULE | Refills: 0 | Status: SHIPPED | OUTPATIENT
Start: 2019-06-12 | End: 2020-01-17

## 2019-06-12 RX ORDER — DOXYCYCLINE HYCLATE 100 MG
100 TABLET ORAL EVERY 12 HOURS SCHEDULED
Qty: 20 TABLET | Refills: 0 | Status: SHIPPED | OUTPATIENT
Start: 2019-06-12 | End: 2019-06-12

## 2019-06-12 RX ORDER — PHENYTOIN SODIUM 200 MG/1
200 CAPSULE, EXTENDED RELEASE ORAL 2 TIMES DAILY
Status: DISCONTINUED | OUTPATIENT
Start: 2019-06-12 | End: 2019-06-12 | Stop reason: HOSPADM

## 2019-06-12 RX ORDER — LANOLIN ALCOHOL/MO/W.PET/CERES
325 CREAM (GRAM) TOPICAL
Qty: 30 TABLET | Refills: 0 | Status: SHIPPED | OUTPATIENT
Start: 2019-06-12 | End: 2019-11-26 | Stop reason: SDUPTHER

## 2019-06-12 RX ORDER — MAGNESIUM SULFATE 1 G/100ML
1 INJECTION INTRAVENOUS
Status: COMPLETED | OUTPATIENT
Start: 2019-06-12 | End: 2019-06-12

## 2019-06-12 RX ORDER — LOPERAMIDE HYDROCHLORIDE 2 MG/1
2 CAPSULE ORAL ONCE
Status: COMPLETED | OUTPATIENT
Start: 2019-06-12 | End: 2019-06-12

## 2019-06-12 RX ORDER — VALPROIC ACID 250 MG/1
1000 CAPSULE, LIQUID FILLED ORAL 2 TIMES DAILY
Qty: 30 CAPSULE | Refills: 0 | Status: SHIPPED | OUTPATIENT
Start: 2019-06-12 | End: 2019-06-12 | Stop reason: HOSPADM

## 2019-06-12 RX ORDER — PHENYTOIN SODIUM 200 MG/1
200 CAPSULE, EXTENDED RELEASE ORAL 2 TIMES DAILY
Qty: 60 CAPSULE | Refills: 4 | Status: SHIPPED | OUTPATIENT
Start: 2019-06-12 | End: 2019-11-21 | Stop reason: SDUPTHER

## 2019-06-12 RX ORDER — ONDANSETRON 4 MG/1
4 TABLET, FILM COATED ORAL DAILY PRN
Qty: 10 TABLET | Refills: 0 | Status: SHIPPED | OUTPATIENT
Start: 2019-06-12 | End: 2019-10-07 | Stop reason: SDUPTHER

## 2019-06-12 RX ORDER — TRAMADOL HYDROCHLORIDE 50 MG/1
50 TABLET ORAL EVERY 8 HOURS PRN
Qty: 10 TABLET | Refills: 0 | Status: SHIPPED | OUTPATIENT
Start: 2019-06-12 | End: 2019-06-14

## 2019-06-12 RX ORDER — DOXYCYCLINE HYCLATE 100 MG
100 TABLET ORAL EVERY 12 HOURS SCHEDULED
Qty: 28 TABLET | Refills: 0 | Status: SHIPPED | OUTPATIENT
Start: 2019-06-12 | End: 2019-06-26

## 2019-06-12 RX ORDER — VALPROIC ACID 250 MG/1
1000 CAPSULE, LIQUID FILLED ORAL 2 TIMES DAILY
Qty: 120 CAPSULE | Refills: 4 | Status: SHIPPED | OUTPATIENT
Start: 2019-06-12 | End: 2019-06-12 | Stop reason: HOSPADM

## 2019-06-12 RX ORDER — ADHESIVE TAPE 3"X 2.3 YD
200 TAPE, NON-MEDICATED TOPICAL DAILY
Qty: 5 TABLET | Refills: 0 | Status: SHIPPED | OUTPATIENT
Start: 2019-06-12 | End: 2019-09-11

## 2019-06-12 RX ORDER — POTASSIUM CHLORIDE 20 MEQ/1
10 TABLET, EXTENDED RELEASE ORAL DAILY
Qty: 1 TABLET | Refills: 0 | Status: ON HOLD | OUTPATIENT
Start: 2019-06-12 | End: 2020-09-10 | Stop reason: ALTCHOICE

## 2019-06-12 RX ADMIN — FLUTICASONE PROPIONATE 2 SPRAY: 50 SPRAY, METERED NASAL at 10:43

## 2019-06-12 RX ADMIN — EXTENDED PHENYTOIN SODIUM 150 MG: 30 CAPSULE ORAL at 10:43

## 2019-06-12 RX ADMIN — OXYCODONE HYDROCHLORIDE AND ACETAMINOPHEN 1 TABLET: 5; 325 TABLET ORAL at 02:22

## 2019-06-12 RX ADMIN — OXYCODONE HYDROCHLORIDE AND ACETAMINOPHEN 1 TABLET: 5; 325 TABLET ORAL at 15:51

## 2019-06-12 RX ADMIN — Medication 10 ML: at 12:15

## 2019-06-12 RX ADMIN — SODIUM CHLORIDE: 9 INJECTION, SOLUTION INTRAVENOUS at 01:39

## 2019-06-12 RX ADMIN — IRON SUCROSE 200 MG: 20 INJECTION, SOLUTION INTRAVENOUS at 10:44

## 2019-06-12 RX ADMIN — DOXYCYCLINE HYCLATE 100 MG: 100 TABLET, COATED ORAL at 10:42

## 2019-06-12 RX ADMIN — OXYCODONE HYDROCHLORIDE AND ACETAMINOPHEN 1 TABLET: 5; 325 TABLET ORAL at 08:49

## 2019-06-12 RX ADMIN — DAKIN'S SOLUTION 0.125% (QUARTER STRENGTH): 0.12 SOLUTION at 12:14

## 2019-06-12 RX ADMIN — LEVOTHYROXINE SODIUM 50 MCG: 50 TABLET ORAL at 06:04

## 2019-06-12 RX ADMIN — VALPROIC ACID 1000 MG: 250 CAPSULE, LIQUID FILLED ORAL at 10:43

## 2019-06-12 RX ADMIN — ONDANSETRON 4 MG: 2 INJECTION INTRAMUSCULAR; INTRAVENOUS at 08:49

## 2019-06-12 RX ADMIN — ENOXAPARIN SODIUM 40 MG: 40 INJECTION SUBCUTANEOUS at 10:44

## 2019-06-12 RX ADMIN — MAGNESIUM SULFATE HEPTAHYDRATE 1 G: 1 INJECTION, SOLUTION INTRAVENOUS at 10:44

## 2019-06-12 RX ADMIN — MAGNESIUM SULFATE HEPTAHYDRATE 1 G: 1 INJECTION, SOLUTION INTRAVENOUS at 12:13

## 2019-06-12 RX ADMIN — PHENYTOIN SODIUM 600 MG: 50 INJECTION INTRAMUSCULAR; INTRAVENOUS at 16:00

## 2019-06-12 RX ADMIN — LOPERAMIDE HYDROCHLORIDE 2 MG: 2 CAPSULE ORAL at 10:42

## 2019-06-12 ASSESSMENT — ENCOUNTER SYMPTOMS
APNEA: 0
ABDOMINAL DISTENTION: 0
CHEST TIGHTNESS: 0
EYE DISCHARGE: 0
COLOR CHANGE: 0

## 2019-06-12 ASSESSMENT — PAIN SCALES - GENERAL
PAINLEVEL_OUTOF10: 6
PAINLEVEL_OUTOF10: 7
PAINLEVEL_OUTOF10: 0
PAINLEVEL_OUTOF10: 2
PAINLEVEL_OUTOF10: 3
PAINLEVEL_OUTOF10: 9

## 2019-06-12 NOTE — PROGRESS NOTES
CLINICAL PHARMACY NOTE: MEDS TO [address] Patient?: Yes  Total # of Prescriptions Filled: 7   The following medications were delivered to the patient:  · H-CHLOR 12 (0.125%) SOLN. · MAGNESIUM OXIDE 400 MG  · DOXYCYCLINE HYCLATE 100 MG  · POTASSIUM CHLORIDE KYMBERLY. ER 10  · ONDANSETRON 4 MG  Total # of Interventions Completed: 2  Time Spent (min): 120    Additional Documentation:DELIVERED MEDICATIONS TO THE  ROOM AND THEN THEY CALLED FOR ME TO GO BACK AND GET THE MEDICATIONS BECAUSE THEY ARE CHANGING A COUPLE OF MEDICATIONS AND ADDING A MEDICATION.
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
## 2019-06-12 NOTE — PROGRESS NOTES
[facility]  Internal Medicine Residency Program  Inpatient Daily Progress Note  ______________________________________________________________________________    Patient: Delaney Cote  YOB: 1965   MRN: [id number]    Acct: [de-identified]     Admit date: 6/8/2019  Today's date: 06/12/19  Number of days in the hospital: 3  Expected Discharge Date: 06/12/19    Admitting Diagnosis: Sepsis of unknown origin     Subjective:   Pt seen and examined bedside. Her B.p was in normal range this am, afebrile. Potassium 3.5/mg-1.5 this morning, hb stable at 8.5 currently. CRP trended down to 30(367 on admission). No current s/s/o infection noticed. On electrolyte replacement as appropriate. ID following and started her on p/o doxycycline 100 BID. Pt c/o diarrhea( no loose stools reported as per RN). All stool studies and C.diff testing neg. Blood and urine cultures showed no growth. Neurology following. Adjusted her AEDs. Dosages. Her appetite improved and she is ambulating well. No other new acute issues. Objective:   Vital Sign:  /82   Pulse 89   Temp 98.4 °F (36.9 °C) (Oral)   Resp 16   Ht 5' 2.21\" (1.58 m)   Wt 186 lb 11.7 oz (84.7 kg)   SpO2 97%   BMI 33.93 kg/m²       Physical Exam:  General appearance:   alert, well appearing, and in no distress, lethargic-improving. Mental Status: alert, oriented to person, place, and time  Neurologic:  alert, oriented, normal speech, no focal findings or movement disorder noted  Lungs:  clear to auscultation, no wheezes, rales or rhonchi, symmetric air entry  Heart[de-identified] normal rate, regular rhythm, normal S1, S2, no murmurs, rubs, clicks or gallops  Abdomen:  soft, nontender, nondistended, no masses or organomegaly  Extremities: peripheral pulses normal, no pedal edema, no clubbing or cyanosis   Skin: Rt hip wound bandaged, Site is clean with no signs and symptoms of infection.   no rashes, no suspicious skin lesions noted    Medications:  Scheduled Medications   magnesium sulfate  2 g Intravenous Once    iron sucrose  200 mg Intravenous Q24H    doxycycline hyclate  100 mg Oral 2 times per day    sodium chloride flush  10 mL Intravenous 2 times per day    sodium chloride flush  10 mL Intravenous 2 times per day    enoxaparin  40 mg Subcutaneous Daily    nicotine  1 patch Transdermal Daily    fluticasone  2 spray Each Nostril Daily    levothyroxine  50 mcg Oral Daily    mirtazapine  45 mg Oral Nightly    risperiDONE  2 mg Oral Nightly    ipratropium-albuterol  1 ampule Inhalation Q4H WA    phenytoin  150 mg Oral TID    valproic acid  1,000 mg Oral BID    sodium hypochlorite   Irrigation BID       PRN Medications    oxyCODONE-acetaminophen 1 tablet Q6H PRN   LORazepam 1 mg Q30 Min PRN   sodium chloride flush 10 mL PRN   acetaminophen 650 mg Q4H PRN   sodium chloride flush 10 mL PRN   magnesium hydroxide 30 mL Daily PRN   ondansetron 4 mg Q6H PRN   magnesium sulfate 1 g PRN   albuterol sulfate HFA 2 puff Q6H PRN       Diagnostic Labs and Imaging:  CBC:  Recent Labs     06/10/19  0541 06/10/19  1135 06/11/19  0710 06/12/19  0556   WBC 3.0*  --  2.9* 3.5   HGB 7.0* 7.8* 7.7* 8.4*   PLT See Reflexed IPF Result  --  176 192     BMP:   Recent Labs     06/10/19  0541 06/11/19  0710 06/12/19  0556    139 141   K 2.9* 3.0* 3.5*    105 104   CO2 21 24 26   BUN 6 <2* 2*   CREATININE 0.51 0.37* 0.44*   GLUCOSE 102* 79 87     Hepatic:   No results for input(s): AST, ALT, ALB, BILITOT, ALKPHOS in the last 72 hours. CT hip rt w contrast  1. Large wound extending to the level of the underlying iliotibial   band/fascia venessa.  Subcutaneous edema involving the soft tissues about the   wound suggesting cellulitis.  Somewhat more confluent fluid noted adjacent to   the lateral aspect of the right gluteus julia muscle, however, no   well-defined drainable fluid collection identified.  No subcutaneous gas   evident. 2. No CT evidence for osteomyelitis. 3. Small amount of gas within the bladder lumen.  Correlate for recent   instrumentation. Assessment and Plan:   Metabolic encephalopathy likely secondary to underlying sepsis- resolved  Alert and oriented ×3. CT head without contrast was unremarkable except chronic sinusitis changes in Maxillary sinuses and left sphenoid sinus. We will continue to monitor. Neurology following.     Sepsis of unknown etiology-resolved  Afebrile, blood pressure in normal range . ID following. CRP trended to 30(397 on admission). ID following and started her on p/o doxycycline 100 BID. Blood, urine cultures- no growth till date. Will monitor WBCs and fever.     Necrotizing fasciitis of right hip s/p surgical wound debridement and wound VAC that came off 3 days ago- clean   Gen. surgery  ruled out any active necrotizing fasciitis. X-ray right hip showed no bony erosion but a large soft tissue defect in the lateral aspect of the hip soft tissues was reported. CT of the right side with contrast showed cellulitis r/o abscess and osteomyelitis. ID following. Wound care following. Acute on chronic anemia likely dilutional-improving  Hb stable at 8.5. FOBT negative. On I.v venofer for 3 doses. Electrolyte derangement from underlying diarrhea and poor oral intake  Potassium 3.5/mg-1.5 this morning. On electrolyte replacement as appropriate. Daily BMP.      Lactic acidosis-resolved  Will continue to monitor.      Diarrhea-improving   Stool studies unmramarkable. C.diff test indeterminate. ID following.     COPD/Asthma-stable  Chest x-ray showed mild bilateral pulmonary congestion. On evaluation bilateral wheezing present.  We'll continue on albuterol, DuoNeb nebulizer.  RT aerosol treatment. RT eval and treat. Pulse ox. Continue telemetry. Oxygen as per protocol     Depression with suicidal ideation past-  currently denies any acute signs and symptoms.  No suicidal ideation as per patient. On home doses of Remeron, Resperdal. On buspar as well as per pt . Will check with pharmacy and restart as appropriate.     Seizure disorder  phenytoin, luminal levels were low on admission and repeat levels this am as well. Neurology following.        Hypothyroidism  On levo thyroxine 50 mcs as per home dose.     Hypertension  currently in normal range. On iv hydration. We'll monitor B.p.     Elevated troponins  Troponins 16, showed a flat trend. EKG unremarkable. ACS ruled out.      Diet- General diet  DVT prophylaxis-On lovenox  PT/OT following   following for discharge planning. Likely d/c home with [facility]. today.        Feliciano Bence, PGY 1, Internal Medicine Resident  [facility], [facility]     Attending Physician Statement  I have discussed the care of Juarez Flaherty, including pertinent history and exam findings with the resident. I have reviewed the key elements of all parts of the encounter with the resident. I have seen and examined the patient with the resident and the key elements of all parts of the encounter have been performed by me.         Melani Rodriguez MD  Attending and Faculty Internal Medicine  [facility]  Internal Medicine [address] [place] [address], S..   6/12/19

## 2019-06-12 NOTE — PROGRESS NOTES
[facility] Continence Nurse  Follow up      NAME:  Jordan Blankenship RECORD NUMBER:  [id number]  AGE: 62 y.o. GENDER: female  : 1965  TODAY'S DATE:  2019    Subjective:     Reason for WOCN Evaluation and Assessment: right thigh wound. Crow Doyle is a 62 y.o. female referred by:   [] Physician  [] Nursing  [] Other:     Wound Identification:  Wound Type: surgical s/o I&D necrotizing soft tissue infection. Contributing Factors: non-adherence        PAST MEDICAL HISTORY        Diagnosis Date    Arthritis     Asthma     CHF (congestive heart failure) (HCC)     [facility]) [facility]     Fibromyalgia     Headache     Hypertension     Movement disorder     Neck fracture ([facility]     Seizure ([facility]     Thyroid disease        PAST SURGICAL HISTORY    Past Surgical History:   Procedure Laterality Date    EXPLORATION OF WOUND OF EXTREMITY Right 2019    RIGHT THIGH WOUND WASHOUT WITH [facility] WEST-ER PLACEMENT performed by Kee Ga MD at [facility] N/A 2019    RIGHT WOUND HIP EXAMINATION LAVAGE AND WOUND VAC PLACEMENT performed by Harsha Mendenhall MD at [address] [place] [address] Right 2019    IRRIGATION, DEBRIDEMENT RIGHT THIGH performed by Manuel Joseph MD at [facility]      PARTIAL HYSTERECTOMY         FAMILY HISTORY    History reviewed. No pertinent family history.     SOCIAL HISTORY    Social History     Tobacco Use    Smoking status: Current Every Day Smoker     Packs/day: 0.50     Years: 12.00     Pack years: 6.00    Smokeless tobacco: Never Used   Substance Use Topics    Alcohol use: No    Drug use: No       ALLERGIES    Allergies   Allergen Reactions    Imitrex [Sumatriptan] Hives    Bee Pollen     Bee Venom     Bromide Ion [Bromine]     Flexeril [Cyclobenzaprine]     Neurontin [Gabapentin]      Please consider all AEDS and meds Right  Wound Description (Comments): Wound VAC   Wound Image    Wound Assessment Pink;Slough; Yellow   Kori-wound Assessment Intact;Painful   Wound Length (cm) 17 cm   Wound Width (cm) 27 cm   Wound Depth (cm) 2 cm   Wound Volume (cm^3) 918 cm^3   Wound Healing % 0   Closure None   Drainage Amount Moderate   Drainage Description Serosanguinous; Yellow   Odor None   Dressing/Treatment Pharmaceutical agent (see MAR); Moist to moist;ABD   Dressing Changed Changed/New   Dressing Change Due 06/12/19     Yellow drainage noted on fluff removed from the most medial and distal wound bed edge. No odor. Home unit in room. No power cord and battery is depleted. Response to treatment:  Well tolerated by patient. Plan:     Plan of Care:   Continue Dakin's 0.125% moistened fluffs under ABD pads secured with paper tape BID until NPWT can be resumed by home care RN. Specialty Bed Required : No   [] Low Air Loss   [] Pressure Redistribution  [] Fluid Immersion  [] Bariatric  [] Total Pressure Relief  [] Other:     Discharge Plan:  Placement for patient upon discharge: home with support   Hospice Care: No   Patient appropriate for Outpatient [address] [facility] [address]: No    Patient/Caregiver Teaching:  Supplies (fluffs, ABD, paper tape) provided to patient for home use until home health RN can reapply NPWT. Instructed patient to plug unit in on arrival to home. Complete the Dakin's 0.125% moistened gauze packings until home care applies NPWT.  D/w .   [] Indicates understanding       [x] Needs reinforcement  [] Unsuccessful      [] Verbal Understanding  [] Demonstrated understanding       [] No evidence of learning  [] Refused teaching         [] N/A       Electronically signed by Wilmar Caldwell RN, CWON on 6/12/2019 at 2:20 PM

## 2019-06-12 NOTE — PROGRESS NOTES
Pt discharged with personal belongings, meds and escorted out by [facility] to cab ride arranged by herself.

## 2019-06-13 LAB
CULTURE: ABNORMAL
CULTURE: ABNORMAL
DIRECT EXAM: ABNORMAL
DIRECT EXAM: ABNORMAL
INTERVENTION: NORMAL
Lab: ABNORMAL
SPECIMEN DESCRIPTION: ABNORMAL

## 2019-06-13 RX ORDER — ACETAMINOPHEN 500 MG
500 TABLET ORAL EVERY 8 HOURS PRN
Qty: 90 TABLET | Refills: 0 | Status: SHIPPED | OUTPATIENT
Start: 2019-06-13 | End: 2019-11-12

## 2019-06-14 NOTE — DISCHARGE SUMMARY
[address] [place] [address]     Department of Internal Medicine - Staff Internal Medicine Service    INPATIENT DISCHARGE SUMMARY        Patient Identification:  Romona Bloch is a 62 y.o. female. :  1965  MRN: [id number]     Acct: [de-identified]   Admit Date:  2019  Discharge date and time: 2019  6:34 PM   Attending Provider: No att. providers found                                     Admission Diagnoses:   Septicemia [facility]) [A41.9]    Discharge Diagnoses: Active Problems:    Sepsis ([address]    Hypokalemia  Resolved Problems:    Infected wound    Septicemia (HCC)    Electrolyte abnormality    Lactic acidosis    CRP elevated    Arterial hypotension    Subtherapeutic phenytoin level    Subtherapeutic serum dilantin level       Consults:   ID, Gen surg, Neurology. Brief Inpatient course: The patient is a pleasant 62 y.o. female with PMH of depression with suicidal ideation in past, seizure disorder, hypertension, migraine, hypothyroidism, asthma/COPD was brought in by EMS for altered mental status of 1 day duration. She was recently discharged from hospital and was admitted with similar symptoms at that time as well. Pt told EMS that she took her wound VAC off 3 days ago. Patient also reported having diarrhea since discharge but without any mucus or blood in it. She has underlying depression and has been admitted for suicidal attempt in the past. She takes Remeron and Risperdal for that. She has underlying seizure disorder for which she takes Dilantin, phenobarbital and Depakote. She reported using albuterol, Flonase for her underlying COPD as per patient (not sure about other inhalers. Was following up with pulmonologist in [place]). Takes Synthroid 50 for her hypothyroidism. In this admission she was febrile initially with hypotension, had lactic acidosis and also elevated CRP. He was  started on IV hydration as per sepsis protocol.   General surgery was also consulted for concerns of wound VAC site infection. x-ray right hip and CT right hip they were unremarkable for any abscess. Necrotizing fasciitis was ruled out. Wound care was following. Chest x-ray showed bilateral pulmonary congestion. Patient was started on vancomycin and Zosyn. ID was also consulted. She was continued on vancomycin, Zosyn and IV hydration and later switched to doxycycline on d/c. Neurology was also consulted as patient's AEd levels were subtherapeutic and were adjusted as appropriate. Later on her mentation improved. She developed diarrhea, C. difficile ruled out. Electrolytes were replaced as appropriate. She also received IV Venofer for 3 doses due to concerns of acute on chronic anemia. Later she was discharged home with home care in stable condition. Procedures:  None    Any Hospital Acquired Infections: none    Discharge Functional Status:  stable    Disposition: [facility] with [facility]    Patient Instructions:   Discharge Medication List as of 6/12/2019  4:36 PM      START taking these medications    Details   potassium chloride (KLOR-CON M) 20 MEQ extended release tablet Take 0.5 tablets by mouth daily for 2 days, Disp-1 tablet, R-0Normal      Magnesium Oxide 200 MG TABS Take 200 mg by mouth daily, Disp-5 tablet, R-0Normal      ondansetron (ZOFRAN) 4 MG tablet Take 1 tablet by mouth daily as needed for Nausea or Vomiting, Disp-10 tablet, R-0Normal      sodium hypochlorite (DAKINS) 0.125 % SOLN Irrigate with 473 mLs as directed 2 times daily, Disp-2 Bottle, R-0Normal      traMADol (ULTRAM) 50 MG tablet Take 1 tablet by mouth every 8 hours as needed for Pain for up to 10 doses. Intended supply: 3 days.  Take lowest dose possible to manage pain, Disp-10 tablet, R-0Print         CONTINUE these medications which have CHANGED    Details   ferrous sulfate 325 (65 Fe) MG EC tablet Take 1 tablet by mouth daily (with breakfast), Disp-30 tablet, R-0Normal      doxycycline hyclate (VIBRA-TABS) 100 MG tablet Take 1 tablet by mouth every 12 hours for 14 days, Disp-28 tablet, R-0Normal      phenytoin (PHENYTEK) 200 MG ER capsule Take 1 capsule by mouth 2 times daily, Disp-60 capsule, R-4Normal      valproic acid (DEPAKENE) 250 MG capsule Take 4 capsules by mouth 2 times daily, Disp-120 capsule, R-0Normal         CONTINUE these medications which have NOT CHANGED    Details   fluticasone (FLONASE) 50 MCG/ACT nasal spray 2 sprays by Each Nostril route daily, Disp-1 Bottle, R-3Normal      albuterol sulfate  (90 Base) MCG/ACT inhaler Inhale 2 puffs into the lungs every 6 hours as needed for [place]ezing [place] prescription until follow up with primary. Do not refill.   Follow up with primary, Disp-1 Inhaler, R-3Print      mirtazapine (REMERON) 45 MG tablet Take 1 tablet by mouth nightly, Disp-30 tablet, R-0Normal      risperiDONE (RISPERDAL) 2 MG tablet Take 1 tablet by mouth nightly, Disp-30 tablet, R-0Normal      famotidine (PEPCID) 20 MG tablet Take 20 mg by mouth 2 times dailyHistorical Med      levothyroxine (SYNTHROID) 50 MCG tablet Take 50 mcg by mouth DailyHistorical Med         STOP taking these medications       divalproex (DEPAKOTE) 500 MG DR tablet Comments:   Reason for Stopping:         predniSONE (DELTASONE) 20 MG tablet Comments:   Reason for Stopping:         benzonatate (TESSALON) 100 MG capsule Comments:   Reason for Stopping:         azithromycin (ZITHROMAX) 250 MG tablet Comments:   Reason for Stopping:               Activity: activity as tolerated    Diet: regular diet    Follow-up:    KEANU Sands - NP  [address]  [phone]    In 1 week      Elayne Salmeron  [phone]  In 1 week      *[facility]    In 2 weeks      [address] [address]  [phone]        [place] [address] [phone]    In 2 weeks  pls call to make apointment in 2 weeks for the wound infection      Follow up labs: BMP, CBC in 1 week    Follow up imaging: per gen surg    Note that over 30 minutes was spent in preparing discharge papers, discussing discharge with patient, medication review, etc.      Eli Islas MD         Department of [place] [address], [facility]         6/14/2019, 1:45 PM

## 2019-06-15 ENCOUNTER — APPOINTMENT (OUTPATIENT)
Dept: GENERAL RADIOLOGY | Age: 54
End: 2019-06-15
Payer: MEDICARE

## 2019-06-15 ENCOUNTER — HOSPITAL ENCOUNTER (OUTPATIENT)
Age: 54
Setting detail: OBSERVATION
Discharge: HOME | End: 2019-06-16
Attending: EMERGENCY MEDICINE | Admitting: EMERGENCY MEDICINE
Payer: MEDICARE

## 2019-06-15 DIAGNOSIS — I50.9 CONGESTIVE HEART FAILURE, UNSPECIFIED HF CHRONICITY, UNSPECIFIED HEART FAILURE TYPE (HCC): Primary | ICD-10-CM

## 2019-06-15 DIAGNOSIS — G43.809 MIGRAINE VARIANT WITH HEADACHE: ICD-10-CM

## 2019-06-15 LAB
ABSOLUTE EOS #: <0.03 K/UL (ref 0–0.44)
ABSOLUTE IMMATURE GRANULOCYTE: 0.16 K/UL (ref 0–0.3)
ABSOLUTE LYMPH #: 4 K/UL (ref 1.1–3.7)
ABSOLUTE MONO #: 0.45 K/UL (ref 0.1–1.2)
ANION GAP SERPL CALCULATED.3IONS-SCNC: 12 MMOL/L (ref 9–17)
BASOPHILS # BLD: 0 % (ref 0–2)
BASOPHILS ABSOLUTE: 0.03 K/UL (ref 0–0.2)
BNP INTERPRETATION: ABNORMAL
BUN BLDV-MCNC: 6 MG/DL (ref 6–20)
BUN/CREAT BLD: ABNORMAL (ref 9–20)
CALCIUM SERPL-MCNC: 8.5 MG/DL (ref 8.6–10.4)
CHLORIDE BLD-SCNC: 99 MMOL/L (ref 98–107)
CO2: 22 MMOL/L (ref 20–31)
CREAT SERPL-MCNC: 0.44 MG/DL (ref 0.5–0.9)
DIFFERENTIAL TYPE: ABNORMAL
EOSINOPHILS RELATIVE PERCENT: 0 % (ref 1–4)
GFR AFRICAN AMERICAN: >60 ML/MIN
GFR NON-AFRICAN AMERICAN: >60 ML/MIN
GFR SERPL CREATININE-BSD FRML MDRD: ABNORMAL ML/MIN/{1.73_M2}
GFR SERPL CREATININE-BSD FRML MDRD: ABNORMAL ML/MIN/{1.73_M2}
GLUCOSE BLD-MCNC: 97 MG/DL (ref 70–99)
HCT VFR BLD CALC: 31.5 % (ref 36.3–47.1)
HEMOGLOBIN: 9.4 G/DL (ref 11.9–15.1)
IMMATURE GRANULOCYTES: 2 %
LYMPHOCYTES # BLD: 39 % (ref 24–43)
MCH RBC QN AUTO: 29.6 PG (ref 25.2–33.5)
MCHC RBC AUTO-ENTMCNC: 29.8 G/DL (ref 28.4–34.8)
MCV RBC AUTO: 99.1 FL (ref 82.6–102.9)
MONOCYTES # BLD: 4 % (ref 3–12)
NRBC AUTOMATED: 0 PER 100 WBC
PDW BLD-RTO: 18.8 % (ref 11.8–14.4)
PLATELET # BLD: 281 K/UL (ref 138–453)
PLATELET ESTIMATE: ABNORMAL
PMV BLD AUTO: 9.2 FL (ref 8.1–13.5)
POTASSIUM SERPL-SCNC: 3.8 MMOL/L (ref 3.7–5.3)
PRO-BNP: 406 PG/ML
RBC # BLD: 3.18 M/UL (ref 3.95–5.11)
RBC # BLD: ABNORMAL 10*6/UL
SEG NEUTROPHILS: 55 % (ref 36–65)
SEGMENTED NEUTROPHILS ABSOLUTE COUNT: 5.59 K/UL (ref 1.5–8.1)
SODIUM BLD-SCNC: 133 MMOL/L (ref 135–144)
TROPONIN INTERP: NORMAL
TROPONIN T: NORMAL NG/ML
TROPONIN, HIGH SENSITIVITY: 10 NG/L (ref 0–14)
WBC # BLD: 10.2 K/UL (ref 3.5–11.3)
WBC # BLD: ABNORMAL 10*3/UL

## 2019-06-15 PROCEDURE — 85025 COMPLETE CBC W/AUTO DIFF WBC: CPT

## 2019-06-15 PROCEDURE — 83880 ASSAY OF NATRIURETIC PEPTIDE: CPT

## 2019-06-15 PROCEDURE — 99285 EMERGENCY DEPT VISIT HI MDM: CPT

## 2019-06-15 PROCEDURE — 85379 FIBRIN DEGRADATION QUANT: CPT

## 2019-06-15 PROCEDURE — 84484 ASSAY OF TROPONIN QUANT: CPT

## 2019-06-15 PROCEDURE — 93005 ELECTROCARDIOGRAM TRACING: CPT | Performed by: EMERGENCY MEDICINE

## 2019-06-15 PROCEDURE — 93005 ELECTROCARDIOGRAM TRACING: CPT

## 2019-06-15 PROCEDURE — 80048 BASIC METABOLIC PNL TOTAL CA: CPT

## 2019-06-15 PROCEDURE — 71046 X-RAY EXAM CHEST 2 VIEWS: CPT

## 2019-06-15 RX ORDER — ACETAMINOPHEN 500 MG
1000 TABLET ORAL ONCE
Status: DISCONTINUED | OUTPATIENT
Start: 2019-06-15 | End: 2019-06-16 | Stop reason: HOSPADM

## 2019-06-16 VITALS
OXYGEN SATURATION: 100 % | HEART RATE: 73 BPM | TEMPERATURE: 98.5 F | SYSTOLIC BLOOD PRESSURE: 125 MMHG | WEIGHT: 180 LBS | BODY MASS INDEX: 32.71 KG/M2 | DIASTOLIC BLOOD PRESSURE: 82 MMHG | RESPIRATION RATE: 16 BRPM

## 2019-06-16 PROBLEM — I50.9 CONGESTIVE HEART FAILURE OF UNKNOWN ETIOLOGY (HCC): Status: ACTIVE | Noted: 2019-06-16

## 2019-06-16 LAB
-: NORMAL
D-DIMER QUANTITATIVE: 0.5 MG/L FEU
HCT VFR BLD CALC: 29.9 % (ref 36.3–47.1)
HEMOGLOBIN: 9.4 G/DL (ref 11.9–15.1)
MCH RBC QN AUTO: 30 PG (ref 25.2–33.5)
MCHC RBC AUTO-ENTMCNC: 31.4 G/DL (ref 28.4–34.8)
MCV RBC AUTO: 95.5 FL (ref 82.6–102.9)
NRBC AUTOMATED: 0 PER 100 WBC
PDW BLD-RTO: 19.1 % (ref 11.8–14.4)
PLATELET # BLD: 292 K/UL (ref 138–453)
PMV BLD AUTO: 9.3 FL (ref 8.1–13.5)
RBC # BLD: 3.13 M/UL (ref 3.95–5.11)
REASON FOR REJECTION: NORMAL
TROPONIN INTERP: NORMAL
TROPONIN T: NORMAL NG/ML
TROPONIN, HIGH SENSITIVITY: 11 NG/L (ref 0–14)
WBC # BLD: 6.5 K/UL (ref 3.5–11.3)
ZZ NTE CLEAN UP: ORDERED TEST: NORMAL
ZZ NTE WITH NAME CLEAN UP: SPECIMEN SOURCE: NORMAL

## 2019-06-16 PROCEDURE — 93970 EXTREMITY STUDY: CPT

## 2019-06-16 PROCEDURE — G0378 HOSPITAL OBSERVATION PER HR: HCPCS

## 2019-06-16 PROCEDURE — 85379 FIBRIN DEGRADATION QUANT: CPT

## 2019-06-16 PROCEDURE — 36415 COLL VENOUS BLD VENIPUNCTURE: CPT

## 2019-06-16 PROCEDURE — 96372 THER/PROPH/DIAG INJ SC/IM: CPT

## 2019-06-16 PROCEDURE — 85027 COMPLETE CBC AUTOMATED: CPT

## 2019-06-16 PROCEDURE — 84484 ASSAY OF TROPONIN QUANT: CPT

## 2019-06-16 PROCEDURE — 6370000000 HC RX 637 (ALT 250 FOR IP): Performed by: EMERGENCY MEDICINE

## 2019-06-16 PROCEDURE — 6370000000 HC RX 637 (ALT 250 FOR IP): Performed by: STUDENT IN AN ORGANIZED HEALTH CARE EDUCATION/TRAINING PROGRAM

## 2019-06-16 PROCEDURE — 6360000002 HC RX W HCPCS: Performed by: EMERGENCY MEDICINE

## 2019-06-16 RX ORDER — OXYCODONE HYDROCHLORIDE AND ACETAMINOPHEN 5; 325 MG/1; MG/1
2 TABLET ORAL ONCE
Status: COMPLETED | OUTPATIENT
Start: 2019-06-16 | End: 2019-06-16

## 2019-06-16 RX ORDER — FUROSEMIDE 10 MG/ML
40 INJECTION INTRAMUSCULAR; INTRAVENOUS ONCE
Status: DISCONTINUED | OUTPATIENT
Start: 2019-06-16 | End: 2019-06-16 | Stop reason: HOSPADM

## 2019-06-16 RX ORDER — OXYCODONE HYDROCHLORIDE 5 MG/1
5 TABLET ORAL EVERY 4 HOURS PRN
Status: DISCONTINUED | OUTPATIENT
Start: 2019-06-16 | End: 2019-06-16 | Stop reason: HOSPADM

## 2019-06-16 RX ORDER — FUROSEMIDE 20 MG/1
20 TABLET ORAL DAILY
Status: DISCONTINUED | OUTPATIENT
Start: 2019-06-16 | End: 2019-06-16 | Stop reason: HOSPADM

## 2019-06-16 RX ORDER — PHENYTOIN SODIUM 200 MG/1
200 CAPSULE, EXTENDED RELEASE ORAL 2 TIMES DAILY
Status: DISCONTINUED | OUTPATIENT
Start: 2019-06-16 | End: 2019-06-16 | Stop reason: HOSPADM

## 2019-06-16 RX ORDER — SODIUM CHLORIDE 0.9 % (FLUSH) 0.9 %
10 SYRINGE (ML) INJECTION PRN
Status: DISCONTINUED | OUTPATIENT
Start: 2019-06-16 | End: 2019-06-16 | Stop reason: HOSPADM

## 2019-06-16 RX ORDER — IPRATROPIUM BROMIDE AND ALBUTEROL SULFATE 2.5; .5 MG/3ML; MG/3ML
1 SOLUTION RESPIRATORY (INHALATION) EVERY 4 HOURS
Qty: 360 ML | Refills: 1 | Status: ON HOLD | OUTPATIENT
Start: 2019-06-16 | End: 2021-06-03 | Stop reason: SDUPTHER

## 2019-06-16 RX ORDER — SODIUM CHLORIDE 0.9 % (FLUSH) 0.9 %
10 SYRINGE (ML) INJECTION EVERY 12 HOURS SCHEDULED
Status: DISCONTINUED | OUTPATIENT
Start: 2019-06-16 | End: 2019-06-16 | Stop reason: HOSPADM

## 2019-06-16 RX ORDER — LANOLIN ALCOHOL/MO/W.PET/CERES
325 CREAM (GRAM) TOPICAL
Status: DISCONTINUED | OUTPATIENT
Start: 2019-06-16 | End: 2019-06-16 | Stop reason: HOSPADM

## 2019-06-16 RX ORDER — FAMOTIDINE 20 MG/1
20 TABLET, FILM COATED ORAL 2 TIMES DAILY
Status: DISCONTINUED | OUTPATIENT
Start: 2019-06-16 | End: 2019-06-16 | Stop reason: HOSPADM

## 2019-06-16 RX ORDER — ACETAMINOPHEN 325 MG/1
650 TABLET ORAL EVERY 4 HOURS PRN
Status: DISCONTINUED | OUTPATIENT
Start: 2019-06-16 | End: 2019-06-16 | Stop reason: HOSPADM

## 2019-06-16 RX ORDER — FUROSEMIDE 20 MG/1
20 TABLET ORAL DAILY
Qty: 12 TABLET | Refills: 1 | Status: SHIPPED | OUTPATIENT
Start: 2019-06-16 | End: 2019-12-16 | Stop reason: SDUPTHER

## 2019-06-16 RX ORDER — VALPROIC ACID 250 MG/1
1000 CAPSULE, LIQUID FILLED ORAL 2 TIMES DAILY
Status: DISCONTINUED | OUTPATIENT
Start: 2019-06-16 | End: 2019-06-16 | Stop reason: HOSPADM

## 2019-06-16 RX ORDER — ONDANSETRON 2 MG/ML
4 INJECTION INTRAMUSCULAR; INTRAVENOUS EVERY 8 HOURS PRN
Status: DISCONTINUED | OUTPATIENT
Start: 2019-06-16 | End: 2019-06-16 | Stop reason: HOSPADM

## 2019-06-16 RX ORDER — LEVOTHYROXINE SODIUM 0.05 MG/1
50 TABLET ORAL DAILY
Status: DISCONTINUED | OUTPATIENT
Start: 2019-06-16 | End: 2019-06-16 | Stop reason: HOSPADM

## 2019-06-16 RX ORDER — RISPERIDONE 2 MG/1
2 TABLET, FILM COATED ORAL NIGHTLY
Status: DISCONTINUED | OUTPATIENT
Start: 2019-06-16 | End: 2019-06-16 | Stop reason: HOSPADM

## 2019-06-16 RX ORDER — OXYCODONE HYDROCHLORIDE AND ACETAMINOPHEN 5; 325 MG/1; MG/1
1-2 TABLET ORAL EVERY 12 HOURS PRN
Qty: 20 TABLET | Refills: 0 | Status: SHIPPED | OUTPATIENT
Start: 2019-06-16 | End: 2019-06-30

## 2019-06-16 RX ADMIN — OXYCODONE HYDROCHLORIDE 5 MG: 5 TABLET ORAL at 13:31

## 2019-06-16 RX ADMIN — OXYCODONE HYDROCHLORIDE 5 MG: 5 TABLET ORAL at 09:19

## 2019-06-16 RX ADMIN — LEVOTHYROXINE SODIUM 50 MCG: 50 TABLET ORAL at 05:17

## 2019-06-16 RX ADMIN — MAGNESIUM GLUCONATE 500 MG ORAL TABLET 200 MG: 500 TABLET ORAL at 09:20

## 2019-06-16 RX ADMIN — PHENYTOIN SODIUM 200 MG: 200 CAPSULE, EXTENDED RELEASE ORAL at 09:19

## 2019-06-16 RX ADMIN — FUROSEMIDE 20 MG: 20 TABLET ORAL at 18:46

## 2019-06-16 RX ADMIN — FERROUS SULFATE TAB EC 325 MG (65 MG FE EQUIVALENT) 325 MG: 325 (65 FE) TABLET DELAYED RESPONSE at 09:20

## 2019-06-16 RX ADMIN — FAMOTIDINE 20 MG: 20 TABLET, FILM COATED ORAL at 09:22

## 2019-06-16 RX ADMIN — OXYCODONE HYDROCHLORIDE AND ACETAMINOPHEN 2 TABLET: 5; 325 TABLET ORAL at 15:40

## 2019-06-16 RX ADMIN — ACETAMINOPHEN 650 MG: 325 TABLET ORAL at 05:15

## 2019-06-16 RX ADMIN — ENOXAPARIN SODIUM 40 MG: 40 INJECTION SUBCUTANEOUS at 09:20

## 2019-06-16 RX ADMIN — OXYCODONE HYDROCHLORIDE 5 MG: 5 TABLET ORAL at 18:11

## 2019-06-16 RX ADMIN — VALPROIC ACID 1000 MG: 250 CAPSULE, LIQUID FILLED ORAL at 09:19

## 2019-06-16 ASSESSMENT — PAIN SCALES - GENERAL
PAINLEVEL_OUTOF10: 6
PAINLEVEL_OUTOF10: 5
PAINLEVEL_OUTOF10: 7
PAINLEVEL_OUTOF10: 6
PAINLEVEL_OUTOF10: 7

## 2019-06-16 NOTE — ED NOTES
resident at bedside attempting 1 [facility]  06/16/19 245 [facility]  06/16/19 0998

## 2019-06-16 NOTE — ED NOTES
Pt came into ER in NAD. Pt states bilateral leg swelling and pain x4-5days. Pt states she just got off blood thinners as well. Pt reports pain is 6/10. Pt also reports hip pain due to wound on right side of hip. Pt has wound vac on that right side of hip wound. Pt denies CP or SOB. Pt resting in bed in NAD with even and unlabored rr.  Pt on cardiac monitor, will continue to assess       rFoy Houser RN  06/15/19 3208

## 2019-06-16 NOTE — ED NOTES
Pt returning from [facility] [place] [facility], RN  06/16/19 0001

## 2019-06-16 NOTE — ED NOTES
Report given to [facility].      Arcelia Navarrete RN  06/16/19 4421

## 2019-06-16 NOTE — ED NOTES
Straight stick done for lab work, labeled. Pt in NAD.  Dr. Armando Dietrich @ bedside for eval.     Krystian Ruiz, LILIBETH  06/15/19 3661

## 2019-06-16 NOTE — PROGRESS NOTES
Per wound RN, wound vac sponge should not be left without a vacuum for more than 2 hours. Pt refuses wet to dry dressing and does not want it changed.

## 2019-06-16 NOTE — ED PROVIDER NOTES
[facility]  Emergency Department  Faculty Attestation     I performed a history and physical examination of the patient and discussed management with the resident. I reviewed the residents note and agree with the documented findings and plan of care. Any areas of disagreement are noted on the chart. I was personally present for the key portions of any procedures. I have documented in the chart those procedures where I was not present during the key portions. I have reviewed the emergency nurses triage note. I agree with the chief complaint, past medical history, past surgical history, allergies, medications, social and family history as documented unless otherwise noted below. For Physician Assistant/ Nurse Practitioner cases/documentation I have personally evaluated this patient and have completed at least one if not all key elements of the E/M (history, physical exam, and MDM). Additional findings are as noted. Primary Care Physician:  Felipa Carrasco NP-C, APRN - NP    Screenings:  [unfilled]    CHIEF COMPLAINT       Chief Complaint   Patient presents with    Leg Swelling     pt. was recently admitted for sepsis, pt. was on blood thinners but taken off    Leg Pain     bilteral calf pain       RECENT VITALS:   Temp: 98.1 °F (36.7 °C),  Pulse: 86, Resp: 18, BP: 127/85    LABS:  Labs Reviewed - No data to display    Radiology  No orders to display         EKG:  EKG Interpretation    Interpreted by emergency department physician    Rhythm: normal sinus   Rate: normal  Axis: normal  Ectopy: none  Conduction: long QTc  ST Segments: no acute change  T Waves: no acute change  Q Waves: none    Clinical Impression: non-specific EKG    Yimi Magana      Attending Physician Additional  Notes    Right lateral leg swelling with right calf pain. No chest pain. There is been no shortness of breath. No cough sputum hemoptysis.   She has low-grade temperatures and has recent surgery for necrotizing fasciitis and has a wound VAC which has increasing fluid drainage. She is on doxycycline. She has visiting nurse daily. Pictures of her wounds look good. On exam she is nontoxic afebrile vital signs are normal.  Lungs are clear. Heart exam is without murmur gallop rub. No JVD. Abdomen is benign. No erythema warmth tenderness surrounding her right greater trochanteric wound. There is trace pitting edema both lower extremities symmetrical nature without calf tenderness or cords. Normal pulses. Patient is peripheral edema palpitations calf pain, rule out DVT. [facility]  Arron Baires MD, [address] [facility]  Attending Emergency  Physician                Maryann Peterson MD  06/15/19 5893

## 2019-06-16 NOTE — PROGRESS NOTES
Discharge order received pending WBC below 12, it is 6.5. Writer went over discharge instructions with pt including follow up appointments and medications. Pt verbalized understanding. Pt received medications from pharmacy and called for ride. Pt ambulated off unit with all belongings.

## 2019-06-16 NOTE — DISCHARGE INSTR - COC
Continuity of Care Form    Patient Name: Alaina Singletary   :  1965  MRN:  [id number]    Admit date:  6/15/2019  Discharge date:  2019    Code Status Order: Full Code   Advance Directives:   [facility] Documentation     Date/Time Healthcare Directive Type of Healthcare Directive Copy in [facility] [address] Agent's Name Healthcare Agent's Phone Number    [phone]  Unknown, patient unable to respond due to medical condition -- -- -- -- --          Admitting Physician:  Vilma Wills MD  PCP: Janet Oleary NP-C, APRN - NP    Discharging Nurse: [facility] Unit/Room#: 6550/7361-05  Discharging Unit Phone Number:     Emergency Contact:   Extended Emergency Contact Information  Primary Emergency Contact: Gonzales Richardson [address] Phone: [phone]  Mobile Phone: [phone]  Relation: Child  Secondary Emergency Contact: luh griffiths  [place] Phone: [phone]  Relation: Other   needed?  No    Past Surgical History:  Past Surgical History:   Procedure Laterality Date    EXPLORATION OF WOUND OF EXTREMITY Right 2019    RIGHT THIGH WOUND WASHOUT WITH [facility] WEST-ER PLACEMENT performed by Nickey Duverney, MD at [facility] N/A 2019    RIGHT WOUND HIP EXAMINATION LAVAGE AND WOUND VAC PLACEMENT performed by Joanne Rice MD at [address] [place] [address], [place] [address] Right 2019    IRRIGATION, DEBRIDEMENT RIGHT THIGH performed by Steven Salter MD at [facility]      PARTIAL HYSTERECTOMY         Immunization History:   Immunization History   Administered Date(s) Administered    Influenza, Quadv, 6 mo and older, IM, PF (Flulaval, Fluarix) 10/31/2018       Active Problems:  Patient Active Problem List   Diagnosis Code    Chest pain R07.9    Migraine variant with headache G43.809    Drug overdose, accidental or unintentional, initial encounter T50.901A    Hypothyroidism E03.9    Essential hypertension I10    Seizure disorder ([facility] G40.909    Drug overdose T50.901A    History of seizure Z87.898    Major depressive disorder with psychotic features ([facility] F32.3    Severe major depression ([facility] F32.2    Overdose of barbiturate, intentional self-harm, initial encounter ([facility] T42.3X2A    Intentional phenobarbital overdose ([facility] T42.3X2A    Severe episode of recurrent major depressive disorder, without psychotic features ([facility] F33.2    Bronchitis J40    Suicide attempt ([facility] T14.91XA    Major depressive disorder, recurrent (HCC) F33.9    Ear infection H66.90    Sepsis ([facility] A41.9    Severe malnutrition ([facility] E43    Altered mental status R41.82    Depression F32.9    Elevated troponin R74.8    Breakthrough seizure ([facility] G40.919    Hypokalemia E87.6    Congestive heart failure of unknown etiology ([facility] I50.9       Isolation/Infection:   Isolation          No Isolation        Patient Infection Status     Infection Encounter Level? Onset Date Added Added By Resolved Resolved By Review Date    MRSA No 06/09/19 06/11/19 WOUND CULTURE             Nurse Assessment:  Last Vital Signs: /82   Pulse 73   Temp 98.5 °F (36.9 °C) (Oral)   Resp 16   Wt 180 lb (81.6 kg)   SpO2 100%   BMI 32.71 kg/m²     Last documented pain score (0-10 scale): Pain Level: 7  Last Weight:   Wt Readings from Last 1 Encounters:   06/15/19 180 lb (81.6 kg)     Mental Status:  oriented    IV Access:  - None    Nursing Mobility/ADLs:  Walking   Independent  Transfer  Independent  Bathing  Assisted  Dressing  Independent  [address]  Independent  Med Delivery   whole    Wound Care Documentation and Therapy:        Elimination:  Continence:   · Bowel:  Yes  · Bladder: Yes  Urinary Catheter: None   Colostomy/Ileostomy/Ileal Conduit: No       Date of Last BM: ***    Intake/Output Summary (Last 24 hours) at 6/16/2019 1256  Last data filed at 6/16/2019 0634  Gross per 24 hour   Intake 755 ml   Output --   Net 755 ml     I/O last 3 completed shifts: In: 56 [P.O.:755]  Out: -     Safety Concerns:     History of Seizures    Impairments/Disabilities:      None    Nutrition Therapy:  Current Nutrition Therapy:   - Oral Diet:  General    Routes of Feeding: Oral  Liquids: Thin Liquids  Daily Fluid Restriction: no  Last Modified Barium Swallow with Video (Video Swallowing Test): not done    Treatments at the Time of Hospital Discharge:   Respiratory Treatments: none***  Oxygen Therapy:  is not on home oxygen therapy. Ventilator:    - No ventilator support    Rehab Therapies: ***  Weight Bearing Status/Restrictions: No weight bearing restirctions  Other Medical Equipment (for information only, NOT a DME order):  ***  Other Treatments: ***    Patient's personal belongings (please select all that are sent with patient):  Phone, purse, clothing    RN SIGNATURE:  Electronically signed by Baltazar Olsen RN on 6/16/19 at 5:40 PM    CASE MANAGEMENT/SOCIAL WORK SECTION    Inpatient Status Date: ***    Readmission Risk Assessment Score:  Readmission Risk              Risk of Unplanned Readmission:        55           Discharging to Facility/ Agency   · Name: [address] [place] [address] 17- home care   · Address:  · Phone:[phone]  · Fax: [phone]    Dialysis Facility (if applicable)   · Name:  · Address:  · Dialysis Schedule:  · Phone:  · Fax:    / signature: Electronically signed by Denise Lees RN on 6/16/19 at 1:24 PM    PHYSICIAN SECTION    Prognosis: Good    Condition at Discharge: Stable    Rehab Potential (if transferring to Rehab): Good    Recommended Labs or Other Treatments After Discharge: Wound care    Physician Certification: I certify the above information and transfer of Kareem Gallardo  is necessary for the continuing treatment of the diagnosis listed and that she requires Home Care for greater 30 days. Update Admission H&P: No change in H&P    PHYSICIAN SIGNATURE:  Electronically signed by Delonte Hernandez MD on 6/16/19 at 12:56 PM

## 2019-06-16 NOTE — FLOWSHEET NOTE
 Spiritual Assessment: Devi Mcrae is a 62 yr old female. Has been in  multiple times through out the month with various issues. She recognized  form the elevator on her last discharge. She was open to talking. She is from [place] but is looking to move here. Her expressed her support system was Orthodox friends. She identified as a Mu-ism, but has been attending [address]. She was open for prayer.  Intervention: I knocked and entered the room. I spoke with her first of the 2 beds. I engaged in conversation with patient. I offered a prayer and she accepted prayer. Then moved to bed 1.      Outcome: Pt seemed hopeful about her condition. She expressed gratitude for the prayer and visit.        06/16/19 1400   Encounter Summary   Services provided to: Patient   Referral/Consult From: Luis Manuel Galeana [facility]   (6/16/19)   Complexity of Encounter Low   Length of Encounter 15 minutes   Spiritual Assessment Completed Yes   Routine   Type Initial   Assessment Calm; Approachable;Passive; Anxious; Coping   Intervention Active listening;Explored feelings, thoughts, concerns;Prayer;Sustaining presence/ Ministry of presence   Outcome Comfort;Expressed gratitude;Engaged in conversation

## 2019-06-16 NOTE — ED PROVIDER NOTES
[facility]  Emergency Department Encounter  EmergencyMedicine Resident     Pt Name:Madge Burnard Simmonds  MRN: [id number]  [place] 1965  Date of evaluation: 6/15/19  PCP:  Kate KAUR, KEANU Hernandez NP    [address] [facility]       Chief Complaint   Patient presents with    Leg Swelling     pt. was recently admitted for sepsis, pt. was on blood thinners but taken off    Leg Pain     bilteral calf pain       HISTORY OF PRESENT ILLNESS  (Location/Symptom, Timing/Onset, Context/Setting, Quality, Duration, Modifying Factors, Severity.)      Mariellen Runner is a 62 y.o. female who presents with complaints of bilateral lower extremity swelling. Patient has been admitted several times in the past few weeks for altered mental status, and chest pain/shortness of breath and states that her legs have been swelling more and she has become short of breath. Patient does have a wound VAC to her right hip and has had previous history of necrotizing fasciitis. The wound VAC does appear to be working well, is full, and will be changed during this visit. Patient is resting comfortably in no acute distress, nontoxic-appearing, normal vitals. No unilateral lower extremity swelling or erythema, mild bilateral lower extremity +1 pitting edema, no calf pain or significant tenderness to palpation. Patient states she has bilateral lower extremity swelling and discomfort, denies any headache or change in vision, no nausea or vomiting, no fevers or chills, no chest pain/shortness of breath currently, no abdominal pain no  symptoms    PAST MEDICAL / SURGICAL / SOCIAL / FAMILY HISTORY      has a past medical history of Arthritis, Asthma, CHF (congestive heart failure) ([address] COPD (chronic obstructive pulmonary disease) ([address] Fibromyalgia, Headache, Hypertension, Movement disorder, Neck fracture ([address] Seizure ([address] and Thyroid disease.      has a past surgical history that includes knee surgery; partial hysterectomy (cervix not Medications:  Prior to Admission medications    Medication Sig Start Date End Date Taking? Authorizing Provider   oxyCODONE-acetaminophen (PERCOCET) 5-325 MG per tablet Take 1-2 tablets by mouth every 12 hours as needed for Pain for up to 14 days. WARNING:  May cause drowsiness. May impair ability to operate vehicles or machinery. Do not use in combination with alcohol.  6/16/19 6/30/19 Yes Yuri Mattson MD   ipratropium-albuterol (DUONEB) 0.5-2.5 (3) MG/3ML SOLN nebulizer solution Inhale 3 mLs into the lungs every 4 hours 6/16/19  Yes Yuri Mattson MD   furosemide (LASIX) 20 MG tablet Take 1 tablet by mouth daily 6/16/19  Yes Yuri Mattson MD   acetaminophen (APAP EXTRA STRENGTH) 500 MG tablet Take 1 tablet by mouth every 8 hours as needed for Pain 6/13/19   Suresh Escobar MD   potassium chloride (KLOR-CON M) 20 MEQ extended release tablet Take 0.5 tablets by mouth daily for 2 days 6/12/19 6/14/19  Lisseth Benson MD   Magnesium Oxide 200 MG TABS Take 200 mg by mouth daily 6/12/19   Lisseth Benson MD   ferrous sulfate 325 (65 Fe) MG EC tablet Take 1 tablet by mouth daily (with breakfast) 6/12/19   Lisseth Benson MD   ondansetron (ZOFRAN) 4 MG tablet Take 1 tablet by mouth daily as needed for Nausea or Vomiting 6/12/19   Lisseth Benson MD   doxycycline hyclate (VIBRA-TABS) 100 MG tablet Take 1 tablet by mouth every 12 hours for 14 days 6/12/19 6/26/19  Maile Edmondson MD   sodium hypochlorite (DAKINS) 0.125 % SOLN Irrigate with 473 mLs as directed 2 times daily 6/12/19   Lisseth Benson MD   phenytoin (PHENYTEK) 200 MG ER capsule Take 1 capsule by mouth 2 times daily 6/12/19   KEANU Galicia - CNP   valproic acid (DEPAKENE) 250 MG capsule Take 4 capsules by mouth 2 times daily 6/12/19   Lisseth Benson MD   fluticasone (FLONASE) 50 MCG/ACT nasal spray 2 sprays by Each Nostril route daily 6/5/19   Lisseth Benson MD   albuterol sulfate  (90 Base) MCG/ACT inhaler Inhale 2 puffs into the lungs every 6 hours as needed for Wheezing Gap prescription until follow up with primary. Do not refill. Follow up with primary 5/24/19   Cheryl Leavitt, APRN - CNP   mirtazapine (REMERON) 45 MG tablet Take 1 tablet by mouth nightly 3/1/19   [place], APRN - CNP   risperiDONE (RISPERDAL) 2 MG tablet Take 1 tablet by mouth nightly 3/1/19   [place], APRN - CNP   famotidine (PEPCID) 20 MG tablet Take 20 mg by mouth 2 times daily    Historical Provider, MD   levothyroxine (SYNTHROID) 50 MCG tablet Take 50 mcg by mouth Daily    Historical Provider, MD       REVIEW OF SYSTEMS    (2-9 systems for level 4, 10 or more for level 5)      Review of Systems   Constitutional: Negative for chills and fever. HENT: Negative for congestion, rhinorrhea and sore throat. Eyes: Negative for discharge and redness. Respiratory: Negative for cough, chest tightness and shortness of breath. Cardiovascular: Positive for leg swelling. Negative for chest pain. Gastrointestinal: Negative for abdominal pain, blood in stool, diarrhea, nausea and vomiting. Endocrine: Negative for polydipsia and polyuria. Genitourinary: Negative for dysuria and hematuria. Musculoskeletal: Negative for neck pain and neck stiffness. Bilateral lower extremely leg pain and swelling   Skin: Negative for rash and wound. Allergic/Immunologic: Negative for immunocompromised state. Neurological: Negative for weakness, numbness and headaches. Hematological: Negative for adenopathy. Psychiatric/Behavioral: Negative for agitation and behavioral problems. PHYSICAL EXAM   (up to 7 for level 4, 8 or more for level 5)      INITIAL VITALS:   /82   Pulse 73   Temp 98.5 °F (36.9 °C) (Oral)   Resp 16   Wt 180 lb (81.6 kg)   SpO2 100%   BMI 32.71 kg/m²     Physical Exam   Constitutional: She is oriented to person, place, and time. She appears well-developed and well-nourished. No distress.    Patient is well-appearing, nontoxic, no acute distress, resting comfortably in bed, normal vitals     HENT:   Head: Normocephalic and atraumatic. Eyes: Pupils are equal, round, and reactive to light. Conjunctivae and EOM are normal.   Neck: Normal range of motion. Neck supple. No JVD present. No tracheal deviation present. Cardiovascular: Normal rate, regular rhythm, normal heart sounds and intact distal pulses. Pulmonary/Chest: Effort normal and breath sounds normal. No stridor. No respiratory distress. She has no wheezes. She has no rales. She exhibits no tenderness. Abdominal: Soft. Bowel sounds are normal. She exhibits no distension. There is no tenderness. There is no rebound and no guarding. Base,Abdomen soft, nondistended, nontender, non-peritoneal, no rebound tenderness or guarding, normal bowel sounds, no midline pulsatile abdominal mass     Musculoskeletal: Normal range of motion. She exhibits edema and tenderness. She exhibits no deformity. Bilateral 1+ pitting edema to bilateral lower extremities, no calf tenderness to palpation, no unilateral leg swelling compared to the other, no erythema   Neurological: She is alert and oriented to person, place, and time. No cranial nerve deficit. No focal neurological deficits noted, moving all extremities, answering all questions appropriately, no change in sensation to bilateral upper and lower extremities, normal 5/5 motor strength bilateral upper and lower extremities, no loss of bowel or bladder, no nystagmus or disconjugate gaze     Skin: Skin is warm and dry. Capillary refill takes less than 2 seconds. No rash noted. Psychiatric: She has a normal mood and affect.        DIFFERENTIAL  DIAGNOSIS     PLAN (LABS / IMAGING / EKG):  Orders Placed This Encounter   Procedures    XR CHEST STANDARD (2 VW)    VL DUP LOWER EXTREMITY VENOUS BILATERAL    Basic Metabolic Panel    Brain Natriuretic Peptide    CBC Auto Differential    Troponin    SPECIMEN REJECTION    D-Dimer, Quantitative    orders placed or performed during the hospital encounter of 09/98/76   Basic Metabolic Panel   Result Value Ref Range    Glucose 97 70 - 99 mg/dL    BUN 6 6 - 20 mg/dL    CREATININE 0.44 (L) 0.50 - 0.90 mg/dL    Bun/Cre Ratio NOT REPORTED 9 - 20    Calcium 8.5 (L) 8.6 - 10.4 mg/dL    Sodium 133 (L) 135 - 144 mmol/L    Potassium 3.8 3.7 - 5.3 mmol/L    Chloride 99 98 - 107 mmol/L    CO2 22 20 - 31 mmol/L    Anion Gap 12 9 - 17 mmol/L    GFR Non-African American >60 >60 mL/min    GFR African American >60 >60 mL/min    GFR Comment          GFR Staging NOT REPORTED    Brain Natriuretic Peptide   Result Value Ref Range    Pro- (H) <300 pg/mL    BNP Interpretation Pro-BNP Reference Range:    CBC Auto Differential   Result Value Ref Range    WBC 10.2 3.5 - 11.3 k/uL    RBC 3.18 (L) 3.95 - 5.11 m/uL    Hemoglobin 9.4 (L) 11.9 - 15.1 g/dL    Hematocrit 31.5 (L) 36.3 - 47.1 %    MCV 99.1 82.6 - 102.9 fL    MCH 29.6 25.2 - 33.5 pg    MCHC 29.8 28.4 - 34.8 g/dL    RDW 18.8 (H) 11.8 - 14.4 %    Platelets 112 000 - 557 k/uL    MPV 9.2 8.1 - 13.5 fL    NRBC Automated 0.0 0.0 per 100 WBC    Differential Type NOT REPORTED     WBC Morphology NOT REPORTED     RBC Morphology ANISOCYTOSIS PRESENT     Platelet Estimate NOT REPORTED     Seg Neutrophils 55 36 - 65 %    Lymphocytes 39 24 - 43 %    Monocytes 4 3 - 12 %    Eosinophils % 0 (L) 1 - 4 %    Basophils 0 0 - 2 %    Immature Granulocytes 2 (H) 0 %    Segs Absolute 5.59 1.50 - 8.10 k/uL    Absolute Lymph # 4.00 (H) 1.10 - 3.70 k/uL    Absolute Mono # 0.45 0.10 - 1.20 k/uL    Absolute Eos # <0.03 0.00 - 0.44 k/uL    Basophils # 0.03 0.00 - 0.20 k/uL    Absolute Immature Granulocyte 0.16 0.00 - 0.30 k/uL   Troponin   Result Value Ref Range    Troponin, High Sensitivity 10 0 - 14 ng/L    Troponin T NOT REPORTED <0.03 ng/mL    Troponin Interp NOT REPORTED    Troponin   Result Value Ref Range    Troponin, High Sensitivity 11 0 - 14 ng/L    Troponin T NOT REPORTED <0.03 ng/mL Troponin Interp NOT REPORTED    SPECIMEN REJECTION   Result Value Ref Range    Specimen Source . BLOOD     Ordered Test DIME     Reason for Rejection Unable to perform testing: Specimen clotted. - NOT REPORTED    D-Dimer, Quantitative   Result Value Ref Range    D-Dimer, Quant 0.50 mg/L FEU   CBC   Result Value Ref Range    WBC 6.5 3.5 - 11.3 k/uL    RBC 3.13 (L) 3.95 - 5.11 m/uL    Hemoglobin 9.4 (L) 11.9 - 15.1 g/dL    Hematocrit 29.9 (L) 36.3 - 47.1 %    MCV 95.5 82.6 - 102.9 fL    MCH 30.0 25.2 - 33.5 pg    MCHC 31.4 28.4 - 34.8 g/dL    RDW 19.1 (H) 11.8 - 14.4 %    Platelets 817 043 - 940 k/uL    MPV 9.3 8.1 - 13.5 fL    NRBC Automated 0.0 0.0 per 100 WBC       IMPRESSION/EMERGENCY DEPARTMENT COURSE:        ED Course as of Jun 17 0429   Sat Julian 15, 2019   9776 Plan is to obtain basic laboratory work-up including cardiac, will also obtain d-dimer for possible DVT however due to bilateral lower extremity swelling likely congestive heart failure. Patient no acute distress, normal vitals. [JM]   Sun Jun 16, 2019   0018 Laboratory work-up demonstrated elevated BNP, pending troponin and d-dimer. Will admit to observation for further evaluation for new onset CHF. Will give a dose of Lasix and reevaluate. Patient has no respiratory distress, no hypoxemia. [JM]   0031 Perform straight stick for blood as patient is a very hard stick. [JM]   0107 Negative troponin, pending d-dimer. [JM]   0213 Femoral stick performed for blood with success. See procedure note for details. [JM]      ED Course User Index  [JM] Samantha Knee, DO     D-dimer 0.5, low suspicion for DVT/PE, likely CHF exacerbation due to fluid overload. Given IV Lasix, patient admitted to the observation unit for CHF work-up and diuresis and possible lower extreme Doppler. Vision agreeable to plan, resting comfortably no acute distress, awaiting transfer to the floor.     RADIOLOGY:  Xr Chest Standard (2 Vw)    Result Date: 6/16/2019  EXAMINATION: TWO XRAY VIEWS OF THE CHEST 6/15/2019 11:56 pm COMPARISON: 06/08/2019. HISTORY: ORDERING SYSTEM PROVIDED HISTORY: SOB, BLE swelling TECHNOLOGIST PROVIDED HISTORY: SOB, BLE swelling Ordering Physician Provided Reason for Exam: Shortness of breath Acuity: Unknown Type of Exam: Unknown FINDINGS: Lungs are clear. Cardiac and mediastinal silhouettes are within normal limits. No pneumothoraces. Bony structures appear intact. No evidence for acute cardiopulmonary process. Xr Hip Right (1 View)    Result Date: 6/8/2019  EXAMINATION: ONE XRAY VIEW OF THE RIGHT HIP 6/8/2019 10:48 pm COMPARISON: None. HISTORY: ORDERING SYSTEM PROVIDED HISTORY: necrotizing infection TECHNOLOGIST PROVIDED HISTORY: necrotizing infection Ordering Physician Provided Reason for Exam: rt hip wound,pt has wound vac./check for air,ap only Acuity: Unknown Type of Exam: Unknown FINDINGS: Single view of the hip was provided. Detail of the bones is limited. There is no acute fracture. There is no dislocation. There is no definite destructive change. There is a large soft tissue defect in the lateral aspect of the hip soft tissues. Large soft tissue defect No focal bony erosion. Bone detail is slightly limited     Ct Head Wo Contrast    Result Date: 6/8/2019  EXAMINATION: CT OF THE HEAD WITHOUT CONTRAST  6/8/2019 9:41 pm TECHNIQUE: CT of the head was performed without the administration of intravenous contrast. Dose modulation, iterative reconstruction, and/or weight based adjustment of the mA/kV was utilized to reduce the radiation dose to as low as reasonably achievable. COMPARISON: CT head performed 06/02/2019. HISTORY: ORDERING SYSTEM PROVIDED HISTORY: ams TECHNOLOGIST PROVIDED HISTORY: Ordering Physician Provided Reason for Exam: AMS Acuity: Acute Type of Exam: Initial FINDINGS: BRAIN/VENTRICLES: There is no acute intracranial hemorrhage, mass effect, or midline shift.   There is satisfactory overall gray-white matter differentiation. The ventricular structures are symmetric and unremarkable. The infratentorial structures are unremarkable. ORBITS: The visualized portion of the orbits demonstrate no acute abnormality. SINUSES: Mastoid air cells are normally aerated. There is chronic sinusitis most pronounced in the maxillary sinuses and left sphenoid sinus. SOFT TISSUES/SKULL:  No acute abnormality of the visualized skull or soft tissues. No acute intracranial abnormality. Chronic sinusitis most pronounced in the maxillary sinuses and left sphenoid sinus. Ct Head Wo Contrast    Result Date: 6/2/2019  EXAMINATION: CT OF THE HEAD WITHOUT CONTRAST  6/2/2019 9:55 am TECHNIQUE: CT of the head was performed without the administration of intravenous contrast. Dose modulation, iterative reconstruction, and/or weight based adjustment of the mA/kV was utilized to reduce the radiation dose to as low as reasonably achievable. COMPARISON: 12 February 2019 HISTORY: ORDERING SYSTEM PROVIDED HISTORY: [facility] TECHNOLOGIST PROVIDED HISTORY: FINDINGS: BRAIN/VENTRICLES: There is no acute intracranial hemorrhage, mass effect or midline shift. No abnormal extra-axial fluid collection. The gray-white differentiation is maintained without evidence of an acute infarct. There is no evidence of hydrocephalus. ORBITS: The visualized portion of the orbits demonstrate no acute abnormality. SINUSES: Moderate to moderately severe mucoperiosteal thickening is present in the maxillary ethmoid and sphenoid sinuses, similar to that noted on prior exam.  Frontal sinuses and mastoid air cells are adequately appropriated. SOFT TISSUES/SKULL:  No acute abnormality of the visualized skull or soft tissues. Estimated biologic radiation dose for this procedure:865.54 mGy/cm2. No acute intracranial abnormality.   Redemonstration of moderate sinus inflammation similar to that noted on prior exam.     Xr Chest Portable    Result Date: 6/8/2019  EXAMINATION: ONE XRAY VIEW OF THE CHEST 6/8/2019 10:48 pm COMPARISON: Chest radiograph performed 06/02/2019. HISTORY: ORDERING SYSTEM PROVIDED HISTORY: fever, sepsis TECHNOLOGIST PROVIDED HISTORY: fever, sepsis Ordering Physician Provided Reason for Exam: fever,sepsis Acuity: Unknown Type of Exam: Unknown FINDINGS: There is mild pulmonary congestion bilaterally. There is no definite consolidation or effusion. There is no pneumothorax. The mediastinal structures are unremarkable. The upper abdomen is unremarkable. The extrathoracic soft tissues are unremarkable. Mild bilateral pulmonary congestion. Xr Chest Portable    Result Date: 6/2/2019  EXAMINATION: ONE XRAY VIEW OF THE CHEST 6/2/2019 7:13 am COMPARISON: 05/17/2019 HISTORY: ORDERING SYSTEM PROVIDED HISTORY: respiratory depression TECHNOLOGIST PROVIDED HISTORY: respiratory depression Acuity: Unknown Type of Exam: Unknown Initial encounter FINDINGS: Mild bibasilar atelectasis. No focal consolidation, pleural effusion or pneumothorax. The cardiomediastinal silhouette is stable. No overt pulmonary edema. The osseous structures are stable. Mild bibasilar atelectasis. Ct Hip Right W Contrast    Result Date: 6/9/2019  EXAMINATION: CT OF THE RIGHT HIP WITH CONTRAST 6/8/2019 11:12 pm TECHNIQUE: CT of the right hip was performed with the administration of intravenous contrast.  Multiplanar reformatted images are provided for review. Dose modulation, iterative reconstruction, and/or weight based adjustment of the mA/kV was utilized to reduce the radiation dose to as low as reasonably achievable. COMPARISON: Right hip radiograph June 8, 2019; CT right hip May 17, 2019 HISTORY ORDERING SYSTEM PROVIDED HISTORY: open wound TECHNOLOGIST PROVIDED HISTORY: Acuity: Unknown Type of Exam: Unknown FINDINGS: Bones: No acute fracture or dislocation is identified. Osseous mineralization appears preserved.   No osteolysis or suspicious periosteal # [id number]   Patient Acct # [de-identified]   MR #           [id number]     Sonographer             Diann Johnston RVT   Accession #    [id number]   Interpreting Physician  Maye Howell   Referring                  Referring Physician     Chelsie Lopez MD  Nurse  Practitioner  Procedure Type of Study:   Veins: Upper Extremities Veins, Venous Scan Upper Right. Indications for Study:Pain and swelling. Patient Status: In Patient. Conclusions   Summary   No evidence of superficial or deep venous thrombosis in the right upper  extremity. Signature   ----------------------------------------------------------------  Electronically signed by Diann Johnston RVT(Sonographer) on  06/10/2019 01:42 PM  ----------------------------------------------------------------   ----------------------------------------------------------------  Electronically signed by Lionel Torres(Interpreting physician)  on 06/10/2019 10:20 PM  ----------------------------------------------------------------  Findings:   Right Impression:  Right internal jugular, subclavian, axillary, brachial, ulnar, radial,  cephalic and basilic veins are compressible with normal doppler  responses. Risk Factors History +------------------------------------------------------------+----+--------+ ! Diagnosis                                                   ! Date! Comments! +------------------------------------------------------------+----+--------+ ! CHF                                                         !    !        ! +------------------------------------------------------------+----+--------+ ! Chronic lung disease->COPD                                  !    !        ! +------------------------------------------------------------+----+--------+   - The patient's risk factor(s) include: arterial hypertension.  Velocities are measured in cm/s ; Diameters are measured in cm Right UE Vein Measurements 2D Measurements +------------------------------------+----------+---------------+----------+ ! Location                            ! Visualized! Compressibility! Thrombosis! +------------------------------------+----------+---------------+----------+ ! Prox IJV                            ! Yes       ! Yes            ! None      ! +------------------------------------+----------+---------------+----------+ ! Dist IJV                            ! Yes       ! Yes            ! None      ! +------------------------------------+----------+---------------+----------+ ! Prox SCV                            ! Yes       ! Yes            ! None      ! +------------------------------------+----------+---------------+----------+ ! Dist SCV                            ! Yes       ! Yes            ! None      ! +------------------------------------+----------+---------------+----------+ ! Prox Axillary                       ! Yes       ! Yes            ! None      ! +------------------------------------+----------+---------------+----------+ ! Dist Axillary                       ! Yes       ! Yes            ! None      ! +------------------------------------+----------+---------------+----------+ ! Prox Brachial                       !Yes       ! Yes            ! None      ! +------------------------------------+----------+---------------+----------+ ! Dist Brachial                       !Yes       ! Yes            ! None      ! +------------------------------------+----------+---------------+----------+ ! Prox Radial                         !Yes       ! Yes            ! None      ! +------------------------------------+----------+---------------+----------+ ! Dist Radial                         !Yes       ! Yes            ! None      ! +------------------------------------+----------+---------------+----------+ ! Prox Ulnar                          ! Yes       ! Yes            ! None      ! +------------------------------------+----------+---------------+----------+ ! Dist Ulnar !Yes       !Yes            ! None      ! +------------------------------------+----------+---------------+----------+ ! Basilic at UA                       ! Yes       ! Yes            ! None      ! +------------------------------------+----------+---------------+----------+ ! Basilic at AF                       ! Yes       ! Yes            ! None      ! +------------------------------------+----------+---------------+----------+ ! Basilic at [address]                       ! Yes       ! Yes            ! None      ! +------------------------------------+----------+---------------+----------+ ! Cephalic at UA                      ! Yes       ! Yes            ! None      ! +------------------------------------+----------+---------------+----------+ ! Cephalic at AF                      ! Yes       ! Yes            ! None      ! +------------------------------------+----------+---------------+----------+ ! Cephalic at [address]                      ! Yes       ! Yes            ! None      ! +------------------------------------+----------+---------------+----------+ Doppler Measurements +------------------------+-------------------------+-----------------------+ ! Location                ! Signal                   !Reflux                 ! +------------------------+-------------------------+-----------------------+ ! IJV                     ! Pulsatile                !                       ! +------------------------+-------------------------+-----------------------+ ! SCV                     ! Pulsatile                !                       ! +------------------------+-------------------------+-----------------------+ ! Axillary                ! Pulsatile                !                       ! +------------------------+-------------------------+-----------------------+ ! Brachial                !Pulsatile                !                       ! +------------------------+-------------------------+-----------------------+ Left UE Vein Measurements Waqar Sapp DO  Emergency Medicine Resident    (Please note that portions of this note were completed with a voice recognition program.  Jacob Board made to edit the dictations but occasionally words are mis-transcribed.)     Graham Muir DO  06/17/19 [id number]

## 2019-06-16 NOTE — PROGRESS NOTES
[facility]  CDU / OBSERVATION eNCOUnter  Attending NOte       I performed a history and physical examination of the patient and discussed management with the resident. I reviewed the residents note and agree with the documented findings and plan of care. Any areas of disagreement are noted on the chart. I was personally present for the key portions of any procedures. I have documented in the chart those procedures where I was not present during the key portions. I have reviewed the nurses notes. I agree with the chief complaint, past medical history, past surgical history, allergies, medications, social and family history as documented unless otherwise noted below. The Family history, social history, and ROS are effectively unchanged since admission unless noted elsewhere in the chart. Patient admitted for Dopplers of lower extremities. Patient swelling the legs. Chief complaints is congestive heart failure though the patient does not have actual shortness of breath but rather leg swelling. Patient had wound VAC on which should come loose. Discussed with wound care and wet-to-dry dressings were suggested in view of reattaching the wound VAC. Patient had requested a shower chair, cane, and raised toilet seat. Face-to-face discussion was had with patient regarding DME equipment. Patient understands need for equipment and how to use. Patient awaiting Doppler results. Patient for some instruction on wound care.     Jenelle Phillip MD  Attending Emergency  Physician

## 2019-06-17 LAB
EKG ATRIAL RATE: 87 BPM
EKG P AXIS: 54 DEGREES
EKG P-R INTERVAL: 150 MS
EKG Q-T INTERVAL: 400 MS
EKG QRS DURATION: 86 MS
EKG QTC CALCULATION (BAZETT): 481 MS
EKG R AXIS: 22 DEGREES
EKG T AXIS: 32 DEGREES
EKG VENTRICULAR RATE: 87 BPM

## 2019-06-17 PROCEDURE — 93010 ELECTROCARDIOGRAM REPORT: CPT | Performed by: INTERNAL MEDICINE

## 2019-06-17 ASSESSMENT — ENCOUNTER SYMPTOMS
ABDOMINAL PAIN: 0
NAUSEA: 0
RHINORRHEA: 0
COUGH: 0
CHEST TIGHTNESS: 0
BLOOD IN STOOL: 0
SORE THROAT: 0
VOMITING: 0
SHORTNESS OF BREATH: 0
EYE DISCHARGE: 0
EYE REDNESS: 0
DIARRHEA: 0

## 2019-06-17 NOTE — CARE COORDINATION
Discharge [facility] Case Management Department  Written by: Landy Santiago RN    Patient Name: Roque Koroma  Attending Provider: No att. providers found  Admit Date: 6/15/2019 10:22 PM  MRN: [id number]  Account: [de-identified]                     : 1965  Discharge Date: 2019      Disposition: home with home care - [facility], all DC papers faxed to office.      Landy Santiago RN

## 2019-06-17 NOTE — H&P
[address] [facility] [employer]  CDU / OBSERVATION ENCOUNTER  ATTENDING NOTE     Pt Name: Mariellen Runner  MRN: [id number]  [place] 1965  Date of evaluation: 6/16/19  Patient's PCP is :  Kate KAUR, KEANU - [place]    [address] [facility]       Chief Complaint   Patient presents with    Leg Swelling     pt. was recently admitted for sepsis, pt. was on blood thinners but taken off    Leg Pain     bilteral calf pain         HISTORY OF PRESENT ILLNESS    Madge Burnard Simmonds is a 62 y.o. female who presents with complaints of leg swelling. Patient had bilateral lower extremity swelling which is symmetric. She had slightly swelling around the wound on her hip where debridement had occurred after fasciitis on the right. Patient did not have any fevers. Patient did not have any signs of sepsis. No chills. Patient has had home care. Patient is mobile and ambulatory. Patient also had her wound VAC disconnected. Patient will require dressing changes. Location/Symptom: Lateral lower extremity edema. Somewhat resolved after elevation of lower extremities. Timing/Onset: Last 2 to 3 days. Provocation: Patient has had lower extremity hip wound. Patient has had swelling previously and this is an acute exacerbation of subacute problem. Quality: Bilateral feelings of fullness in the legs  Radiation: None  Severity: Mild now after elevation. Timing/Duration: Several days  Modifying Factors: None    REVIEW OF SYSTEMS        General ROS - No fevers, No malaise   Ophthalmic ROS - No discharge, No changes in vision  ENT ROS -  No sore throat, No rhinorrhea,   Respiratory ROS - no shortness of breath, no cough, no  wheezing  Cardiovascular ROS - No chest pain, no dyspnea on exertion  Gastrointestinal ROS - No abdominal pain, no nausea or vomiting, no change in bowel habits, no black or bloody stools  Genito-Urinary ROS - No dysuria, trouble voiding, or hematuria  Musculoskeletal ROS -healing wound to right hip. Neurological ROS - No headache, no dizziness/lightheadedness, No focal weakness, no loss of sensation  Dermatological ROS - No lesions, No rash     (PQRS) Advance directives on face sheet per hospital policy. No change unless specifically mentioned in chart    PAST MEDICAL HISTORY    has a past medical history of Arthritis, Asthma, CHF (congestive heart failure) ([facility] [address] COPD (chronic obstructive pulmonary disease) ([facility] [address] Fibromyalgia, Headache, Hypertension, Movement disorder, Neck fracture ([facility] [address] Seizure ([facility] [address] and Thyroid disease. I have reviewed the past medical history with the patient and it is  pertinent to this complaint. SURGICAL HISTORY      has a past surgical history that includes knee surgery; partial hysterectomy (cervix not removed); lymph node dissection; Irrigation and debridement (Right, 5/17/2019); EXPLORATION OF WOUND OF EXTREMITY (Right, 5/19/2019); and EXPLORATION OF WOUND OF EXTREMITY (N/A, 5/22/2019). I have reviewed and agree with Surgical History entered and it is   pertinent to this complaint. CURRENT MEDICATIONS       No current facility-administered medications for this encounter. All medication charted and reviewed. ALLERGIES     is allergic to imitrex [sumatriptan]; bee pollen; bee venom; bromide ion [bromine]; flexeril [cyclobenzaprine]; neurontin [gabapentin]; nsaids; potassium bromide; reglan [metoclopramide]; sulfa antibiotics; sulfadiazine; toradol [ketorolac tromethamine]; and tramadol. FAMILY HISTORY     has no family status information on file. family history is not on file. The patient denies any pertinent family history. I have reviewed and agree with the family history entered. I have reviewed the Family History and it is not significant to the case    SOCIAL HISTORY      reports that she has been smoking. She has a 6.00 pack-year smoking history.  She has never used smokeless tobacco. She reports that she does not drink alcohol or use !None      ! +------------------------------------+----------+---------------+----------+ ! Deep Femoral                        !No        !               !          ! +------------------------------------+----------+---------------+----------+ ! Popliteal                           !Yes       ! Yes            ! None      ! +------------------------------------+----------+---------------+----------+ ! Sapheno Femoral Junction            ! Yes       ! Yes            ! None      ! +------------------------------------+----------+---------------+----------+ ! PTV                                 ! Yes       ! Yes            ! None      ! +------------------------------------+----------+---------------+----------+ ! Peroneal                            !No        !               !          ! +------------------------------------+----------+---------------+----------+ ! Gastroc                             ! Yes       ! Yes            ! None      ! +------------------------------------+----------+---------------+----------+ ! GSV Thigh                           ! Yes       ! Yes            ! None      ! +------------------------------------+----------+---------------+----------+ ! GSV Knee                            ! Yes       ! Yes            ! None      ! +------------------------------------+----------+---------------+----------+ ! GSV Ankle                           ! Yes       ! Yes            ! None      ! +------------------------------------+----------+---------------+----------+ ! SSV                                 ! Yes       ! Yes            ! None      ! +------------------------------------+----------+---------------+----------+ Right Doppler Measurements +---------------------------+------+------+--------------------------------+ ! Location                   ! Signal!Reflux! Reflux (msec)                   ! +---------------------------+------+------+--------------------------------+ ! Common Femoral             !Phasic!      ! ! +---------------------------+------+------+--------------------------------+ ! Prox Femoral               !Phasic!      !                                ! +---------------------------+------+------+--------------------------------+ ! Popliteal                  !Phasic!      !                                ! +---------------------------+------+------+--------------------------------+ Left Lower Extremities DVT Study Measurements Left 2D Measurements +------------------------------------+----------+---------------+----------+ ! Location                            ! Visualized! Compressibility! Thrombosis! +------------------------------------+----------+---------------+----------+ ! Common Femoral                      !Yes       ! Yes            ! None      ! +------------------------------------+----------+---------------+----------+ ! Prox Femoral                        !Yes       ! Yes            ! None      ! +------------------------------------+----------+---------------+----------+ ! Mid Femoral                         !Yes       ! Yes            ! None      ! +------------------------------------+----------+---------------+----------+ ! Dist Femoral                        !Yes       ! Yes            ! None      ! +------------------------------------+----------+---------------+----------+ ! Deep Femoral                        !No        !               !          ! +------------------------------------+----------+---------------+----------+ ! Popliteal                           !Yes       ! Yes            ! None      ! +------------------------------------+----------+---------------+----------+ ! Sapheno Femoral Junction            ! Yes       ! Yes            ! None      ! +------------------------------------+----------+---------------+----------+ ! PTV                                 ! Yes       ! Yes            ! None      ! +------------------------------------+----------+---------------+----------+ ! Brad Abnormal; Notable for the following components:    Pro- (*)     All other components within normal limits   CBC WITH AUTO DIFFERENTIAL - Abnormal; Notable for the following components:    RBC 3.18 (*)     Hemoglobin 9.4 (*)     Hematocrit 31.5 (*)     RDW 18.8 (*)     Eosinophils % 0 (*)     Immature Granulocytes 2 (*)     Absolute Lymph # 4.00 (*)     All other components within normal limits   CBC - Abnormal; Notable for the following components:    RBC 3.13 (*)     Hemoglobin 9.4 (*)     Hematocrit 29.9 (*)     RDW 19.1 (*)     All other components within normal limits   TROPONIN   TROPONIN   SPECIMEN REJECTION   D-DIMER, QUANTITATIVE   PREVIOUS SPECIMEN         CDU HENNA / Madie Mcginnis is a 62 y.o. female who presents with swelling of her lower extremities admitted for Dopplers of the lower extremities to rule out DVT. Patient also requiring wound care and analgesia. · #1. Acute on chronic lower extremity edema secondary to wound to right hip. Patient improved with diuretics and elevation. Admitted for possible DVT and treated with p.o. Lasix and investigated further with Doppler ultrasound. · #2. Open wound to right hip. Subacute. Secondary to debridement after fasciitis. Treated with wet-to-dry dressings as instructed by wound care. Patient also will require analgesia. Patient has home nurse to help further treatment at home. · Continue home medications and pain control  · Monitor vitals, labs, and imaging  · DISPO: pending consults and clinical improvement    CONSULTS:    IP CONSULT TO HOME CARE NEEDS    PROCEDURES:  Not indicated        PATIENT REFERRED TO:    KEANU Porter - NP  [address]  [phone]            --  Jacquie Maldonado MD   Emergency Medicine Attending    This dictation was generated by voice recognition computer software.   Although all attempts are made to edit the dictation for accuracy, there may be errors in the transcription that are not intended.

## 2019-06-17 NOTE — PROGRESS NOTES
CLINICAL PHARMACY NOTE: MEDS TO [address] Select Patient?: Yes  Total # of Prescriptions Filled: 3   The following medications were delivered to the patient:  · Lasix  · Percocet  · ipratropium  Total # of Interventions Completed: 0  Time Spent (min): 0    Additional Documentation: delivered meds to the nurse. Patient was not in her room . The nurse signed for the meds.  meds delivered on 6.16.19 at 6:20pm

## 2019-06-17 NOTE — DISCHARGE SUMMARY
CDU Discharge Summary        Patient:  Urszula Short  YOB: 1965    MRN: [id number]   Acct: [de-identified]    Primary Care Physician: Lamberto KAUR, APRN - NP    Admit date:  6/15/2019 10:22 PM  Discharge date: 6/16/2019  7:03 PM      Discharge Diagnoses:     1.)  Acute on chronic exacerbation of bilateral lower extremity swelling. Secondary to open wound and inflammatory process. Admitted for rule out of DVT. Dopplers of lower extremities    2. Chronic healing of open wound to right hip where patient had debridement after fasciitis. Treated with wet-to-dry dressing changes per wound care. No evidence of infection. Further work-up for sepsis negative. Follow-up:  Call today/tomorrow for a follow up appointment with Lamberto KAUR, APRN - NP , or return to the Emergency Room with worsening symptoms    Stressed to patient the importance of following up with primary care doctor for further workup/management of symptoms. Pt verbalizes understanding and agrees with plan. Discharge Medication Changes:       Medication List      START taking these medications    furosemide 20 MG tablet  Commonly known as:  LASIX  Take 1 tablet by mouth daily     ipratropium-albuterol 0.5-2.5 (3) MG/3ML Soln nebulizer solution  Commonly known as:  DUONEB  Inhale 3 mLs into the lungs every 4 hours     oxyCODONE-acetaminophen 5-325 MG per tablet  Commonly known as:  PERCOCET  Take 1-2 tablets by mouth every 12 hours as needed for Pain for up to 14 days. WARNING:  May cause drowsiness. May impair ability to operate vehicles or machinery. Do not use in combination with alcohol.         ASK your doctor about these medications    acetaminophen 500 MG tablet  Commonly known as:  APAP EXTRA STRENGTH  Take 1 tablet by mouth every 8 hours as needed for Pain     albuterol sulfate  (90 Base) MCG/ACT inhaler  Inhale 2 puffs into the lungs every 6 hours as needed for Wheezing Gap prescription until follow up with primary. Do not refill.   Follow up with primary     doxycycline hyclate 100 MG tablet  Commonly known as:  VIBRA-TABS  Take 1 tablet by mouth every 12 hours for 14 days     famotidine 20 MG tablet  Commonly known as:  PEPCID     ferrous sulfate 325 (65 Fe) MG EC tablet  Take 1 tablet by mouth daily (with breakfast)     fluticasone 50 MCG/ACT nasal spray  Commonly known as:  FLONASE  2 sprays by Each Nostril route daily     levothyroxine 50 MCG tablet  Commonly known as:  SYNTHROID     Magnesium Oxide 200 MG Tabs  Take 200 mg by mouth daily     mirtazapine 45 MG tablet  Commonly known as:  REMERON  Take 1 tablet by mouth nightly     ondansetron 4 MG tablet  Commonly known as:  ZOFRAN  Take 1 tablet by mouth daily as needed for Nausea or Vomiting     phenytoin 200 MG ER capsule  Commonly known as:  PHENYTEK  Take 1 capsule by mouth 2 times daily     potassium chloride 20 MEQ extended release tablet  Commonly known as:  KLOR-CON M  Take 0.5 tablets by mouth daily for 2 days     risperiDONE 2 MG tablet  Commonly known as:  RISPERDAL  Take 1 tablet by mouth nightly     sodium hypochlorite 0.125 % Soln  Commonly known as:  DAKINS  Irrigate with 473 mLs as directed 2 times daily     valproic acid 250 MG capsule  Commonly known as:  DEPAKENE  Take 4 capsules by mouth 2 times daily           Where to Get Your Medications      You can get these medications from any pharmacy    Bring a paper prescription for each of these medications  · furosemide 20 MG tablet  · ipratropium-albuterol 0.5-2.5 (3) MG/3ML Soln nebulizer solution  · oxyCODONE-acetaminophen 5-325 MG per tablet         Diet:  No diet orders on file, advance as tolerated     Activity:  As tolerated    Consultants: IP CONSULT TO HOME CARE NEEDS    Procedures:  Not indicated      Diagnostic Test:   Results for orders placed or performed during the hospital encounter of 14/82/88   Basic Metabolic Panel   Result Value Ref Range    Glucose 97 70 - 99 mg/dL    BUN 6 6 - 20 mg/dL    CREATININE 0.44 (L) 0.50 - 0.90 mg/dL    Bun/Cre Ratio NOT REPORTED 9 - 20    Calcium 8.5 (L) 8.6 - 10.4 mg/dL    Sodium 133 (L) 135 - 144 mmol/L    Potassium 3.8 3.7 - 5.3 mmol/L    Chloride 99 98 - 107 mmol/L    CO2 22 20 - 31 mmol/L    Anion Gap 12 9 - 17 mmol/L    GFR Non-African American >60 >60 mL/min    GFR African American >60 >60 mL/min    GFR Comment          GFR Staging NOT REPORTED    Brain Natriuretic Peptide   Result Value Ref Range    Pro- (H) <300 pg/mL    BNP Interpretation Pro-BNP Reference Range:    CBC Auto Differential   Result Value Ref Range    WBC 10.2 3.5 - 11.3 k/uL    RBC 3.18 (L) 3.95 - 5.11 m/uL    Hemoglobin 9.4 (L) 11.9 - 15.1 g/dL    Hematocrit 31.5 (L) 36.3 - 47.1 %    MCV 99.1 82.6 - 102.9 fL    MCH 29.6 25.2 - 33.5 pg    MCHC 29.8 28.4 - 34.8 g/dL    RDW 18.8 (H) 11.8 - 14.4 %    Platelets 266 053 - 350 k/uL    MPV 9.2 8.1 - 13.5 fL    NRBC Automated 0.0 0.0 per 100 WBC    Differential Type NOT REPORTED     WBC Morphology NOT REPORTED     RBC Morphology ANISOCYTOSIS PRESENT     Platelet Estimate NOT REPORTED     Seg Neutrophils 55 36 - 65 %    Lymphocytes 39 24 - 43 %    Monocytes 4 3 - 12 %    Eosinophils % 0 (L) 1 - 4 %    Basophils 0 0 - 2 %    Immature Granulocytes 2 (H) 0 %    Segs Absolute 5.59 1.50 - 8.10 k/uL    Absolute Lymph # 4.00 (H) 1.10 - 3.70 k/uL    Absolute Mono # 0.45 0.10 - 1.20 k/uL    Absolute Eos # <0.03 0.00 - 0.44 k/uL    Basophils # 0.03 0.00 - 0.20 k/uL    Absolute Immature Granulocyte 0.16 0.00 - 0.30 k/uL   Troponin   Result Value Ref Range    Troponin, High Sensitivity 10 0 - 14 ng/L    Troponin T NOT REPORTED <0.03 ng/mL    Troponin Interp NOT REPORTED    Troponin   Result Value Ref Range    Troponin, High Sensitivity 11 0 - 14 ng/L    Troponin T NOT REPORTED <0.03 ng/mL    Troponin Interp NOT REPORTED    SPECIMEN REJECTION   Result Value Ref Range    Specimen Source . BLOOD     Ordered Test DIME     Reason for Rejection Unable to perform testing: Specimen clotted. - NOT REPORTED    D-Dimer, Quantitative   Result Value Ref Range    D-Dimer, Quant 0.50 mg/L FEU   CBC   Result Value Ref Range    WBC 6.5 3.5 - 11.3 k/uL    RBC 3.13 (L) 3.95 - 5.11 m/uL    Hemoglobin 9.4 (L) 11.9 - 15.1 g/dL    Hematocrit 29.9 (L) 36.3 - 47.1 %    MCV 95.5 82.6 - 102.9 fL    MCH 30.0 25.2 - 33.5 pg    MCHC 31.4 28.4 - 34.8 g/dL    RDW 19.1 (H) 11.8 - 14.4 %    Platelets 116 169 - 929 k/uL    MPV 9.3 8.1 - 13.5 fL    NRBC Automated 0.0 0.0 per 100 WBC   EKG 12 Lead   Result Value Ref Range    Ventricular Rate 87 BPM    Atrial Rate 87 BPM    P-R Interval 150 ms    QRS Duration 86 ms    Q-T Interval 400 ms    QTc Calculation (Bazett) 481 ms    P Axis 54 degrees    R Axis 22 degrees    T Axis 32 degrees     Xr Chest Standard (2 Vw)    Result Date: 6/16/2019  EXAMINATION: TWO XRAY VIEWS OF THE CHEST 6/15/2019 11:56 pm COMPARISON: 06/08/2019. HISTORY: ORDERING SYSTEM PROVIDED HISTORY: SOB, BLE swelling TECHNOLOGIST PROVIDED HISTORY: SOB, BLE swelling Ordering Physician Provided Reason for Exam: Shortness of breath Acuity: Unknown Type of Exam: Unknown FINDINGS: Lungs are clear. Cardiac and mediastinal silhouettes are within normal limits. No pneumothoraces. Bony structures appear intact. No evidence for acute cardiopulmonary process.      Vl Dup Lower Extremity Venous Bilateral    Result Date: 6/17/2019    [facility]  Vascular Lower Extremities DVT Study Procedure   Patient Name   EDUARDO URENA     Date of Study           06/16/2019                 PANCHO HOBSON   Date of Birth  1965  Gender                  Female   Age            62 year(s)  Race                    Other   Room Number    5197   Corporate ID # [id number]   Patient Acct # [de-identified]   MR #           [id number]     Sonographer             Liv Parham   Accession #    [id number]   Interpreting Physician  Lionel Torres   Referring                  Referring Physician     Angie Ruffin, DO  Nurse  Practitioner  Procedure Type of Study:   Veins: Lower Extremities DVT Study, Venous Scan Lower Bilateral.  Indications for Study:Pain, leg, Pain and swelling and R/O DVT. Patient Status: In Patient. Technical Quality:Adequate visualization. Conclusions   Summary   No evidence of superficial or deep venous thrombosis in both lower  extremities. Signature   ----------------------------------------------------------------  Electronically signed by Joselo Greene(Sonographer) on  06/16/2019 05:18 PM  ----------------------------------------------------------------   ----------------------------------------------------------------  Electronically signed by Lionel Torres(Interpreting physician)  on 06/17/2019 11:52 AM  ----------------------------------------------------------------  Findings:   Right Impression:                    Left Impression:  The common femoral, femoral,         The common femoral, femoral,  popliteal and tibial veins           popliteal and tibial veins  demonstrate normal compressibility   demonstrate normal compressibility  and augmentation. and augmentation. Normal compressibility of the great  Normal compressibility of the great  saphenous vein. saphenous vein. Normal compressibility of the small  Normal compressibility of the small  saphenous vein. saphenous vein. Risk Factors History +------------------------------------------------------------+----+--------+ ! Diagnosis                                                   ! Date! Comments! +------------------------------------------------------------+----+--------+ ! CHF                                                         !    !        ! +------------------------------------------------------------+----+--------+ ! Chronic lung disease->COPD                                  !    !        ! +------------------------------------------------------------+----+--------+ !Yes            !None      ! +------------------------------------+----------+---------------+----------+ ! GSV Knee                            ! Yes       ! Yes            ! None      ! +------------------------------------+----------+---------------+----------+ ! GSV Ankle                           ! Yes       ! Yes            ! None      ! +------------------------------------+----------+---------------+----------+ ! SSV                                 ! Yes       ! Yes            ! None      ! +------------------------------------+----------+---------------+----------+ Right Doppler Measurements +---------------------------+------+------+--------------------------------+ ! Location                   ! Signal!Reflux! Reflux (msec)                   ! +---------------------------+------+------+--------------------------------+ ! Common Femoral             !Phasic!      !                                ! +---------------------------+------+------+--------------------------------+ ! Prox Femoral               !Phasic!      !                                ! +---------------------------+------+------+--------------------------------+ ! Popliteal                  !Phasic!      !                                ! +---------------------------+------+------+--------------------------------+ Left Lower Extremities DVT Study Measurements Left 2D Measurements +------------------------------------+----------+---------------+----------+ ! Location                            ! Visualized! Compressibility! Thrombosis! +------------------------------------+----------+---------------+----------+ ! Common Femoral                      !Yes       ! Yes            ! None      ! +------------------------------------+----------+---------------+----------+ ! Prox Femoral                        !Yes       ! Yes            ! None      ! +------------------------------------+----------+---------------+----------+ ! Mid Femoral                         !Yes       ! Yes !None      ! +------------------------------------+----------+---------------+----------+ ! Dist Femoral                        !Yes       ! Yes            ! None      ! +------------------------------------+----------+---------------+----------+ ! Deep Femoral                        !No        !               !          ! +------------------------------------+----------+---------------+----------+ ! Popliteal                           !Yes       ! Yes            ! None      ! +------------------------------------+----------+---------------+----------+ ! Sapheno Femoral Junction            ! Yes       ! Yes            ! None      ! +------------------------------------+----------+---------------+----------+ ! PTV                                 ! Yes       ! Yes            ! None      ! +------------------------------------+----------+---------------+----------+ ! Peroneal                            !No        !               !          ! +------------------------------------+----------+---------------+----------+ ! Gastroc                             ! Yes       ! Yes            ! None      ! +------------------------------------+----------+---------------+----------+ ! GSV Thigh                           ! Yes       ! Yes            ! None      ! +------------------------------------+----------+---------------+----------+ ! GSV Knee                            ! Yes       ! Yes            ! None      ! +------------------------------------+----------+---------------+----------+ ! GSV Ankle                           ! Yes       ! Yes            ! None      ! +------------------------------------+----------+---------------+----------+ ! SSV                                 ! Yes       ! Yes            ! None      ! +------------------------------------+----------+---------------+----------+ Left Doppler Measurements +---------------------------+------+------+--------------------------------+ ! Location                   ! Signal!Reflux! Reflux (msec)                   ! +---------------------------+------+------+--------------------------------+ ! Common Femoral             !Phasic!      !                                ! +---------------------------+------+------+--------------------------------+ ! Prox Femoral               !Phasic!      !                                ! +---------------------------+------+------+--------------------------------+ ! Popliteal                  !Phasic!      !                                ! +---------------------------+------+------+--------------------------------+          Physical Exam:    General appearance - NAD, AOx 3    Lungs -CTAB, no R/R/R  Heart - RRR, no M/R/G  Abdomen - Soft, NT/ND  Neurological:  MAEx4, No focal motor deficit, sensory loss  Extremities -wound VAC removed and large but well appearing and healing open wound to right hip. No purulent discharge. Clean wound edges. Skin -warm, dry      Hospital Course:  Clinical course has improved, labs and imaging reviewed. Johanna Posada originally presented to the hospital on 6/15/2019 10:22 PM with lower extremity swelling and here for Dopplers of the lower extremities to rule out DVT. At that time it was determined that She required further observation and dressing changes and rule out for sepsis. Patient had dressings changed which showed well-healing wound. Patient's Dopplers were negative. Patient had negative white count and no fever. Patient had appropriate care at home. Patient was subsequent discharged after negative studies. . Labs and imaging were followed daily. Imaging results as above. She is medically stable to be discharged. Disposition: Home    Patient stated that they will not drive themselves home from the hospital if they have gotten pain killers/ narcotics earlier that day and that they will arrange for transportation on their own or work with the  for a ride.  Patient counseled NOT to drive while under the influence of narcotics/ pain killers. Condition: Good    Patient stable and ready for discharge home. I have discussed plan of care with patient and they are in understanding. They were instructed to read discharge paperwork. All of their questions and concerns were addressed. Time Spent: 0 day      --  Minoo Parr MD  Emergency Medicine Attending Physician    This dictation was generated by voice recognition computer software. Although all attempts are made to edit the dictation for accuracy, there may be errors in the transcription that are not intended.

## 2019-06-25 ENCOUNTER — TELEPHONE (OUTPATIENT)
Dept: SURGERY | Age: 54
End: 2019-06-25

## 2019-06-25 NOTE — TELEPHONE ENCOUNTER
[facility] called wanting to know if we could follow patient until seen in our clinic on 7/2/19. Patient was released from hospital with wound vac and orders to change 3 times a week. How ever family care doc will not sign for the orders for dressing changes. Dr. Princeton Mcburney did agree to follow until she is seen. And keep orignal orders for wound vac change 3 times per week until seen .

## 2019-06-27 LAB
CULTURE: NORMAL
DIRECT EXAM: NORMAL
Lab: NORMAL
SPECIMEN DESCRIPTION: NORMAL

## 2019-07-02 ENCOUNTER — OFFICE VISIT (OUTPATIENT)
Dept: SURGERY | Age: 54
End: 2019-07-02
Payer: MEDICARE

## 2019-07-02 VITALS
HEIGHT: 62 IN | HEART RATE: 91 BPM | DIASTOLIC BLOOD PRESSURE: 79 MMHG | WEIGHT: 179 LBS | BODY MASS INDEX: 32.94 KG/M2 | SYSTOLIC BLOOD PRESSURE: 120 MMHG

## 2019-07-02 DIAGNOSIS — Z51.89 VISIT FOR WOUND CHECK: Primary | ICD-10-CM

## 2019-07-02 PROCEDURE — 99211 OFF/OP EST MAY X REQ PHY/QHP: CPT | Performed by: SPECIALIST

## 2019-07-02 RX ORDER — IBUPROFEN 600 MG/1
600 TABLET ORAL EVERY 6 HOURS PRN
Qty: 120 TABLET | Refills: 1 | Status: SHIPPED | OUTPATIENT
Start: 2019-07-02 | End: 2020-01-11

## 2019-07-02 RX ORDER — ESOMEPRAZOLE MAGNESIUM 40 MG/1
40 CAPSULE, DELAYED RELEASE ORAL
Qty: 30 CAPSULE | Refills: 0 | Status: SHIPPED | OUTPATIENT
Start: 2019-07-02 | End: 2019-11-15 | Stop reason: ALTCHOICE

## 2019-08-01 ENCOUNTER — TELEPHONE (OUTPATIENT)
Dept: SURGERY | Age: 54
End: 2019-08-01

## 2019-08-01 NOTE — TELEPHONE ENCOUNTER
Called patient and talk to her and let her know that she would have to wait until  is here to sign any orders she has that get faxed over to the clinic.

## 2019-08-01 NOTE — TELEPHONE ENCOUNTER
Patient called back regarding her Boost RX, she stated that the RX will need to be faxed at least 2 maybe 3 times, they always say they don't get it.

## 2019-08-06 ENCOUNTER — OFFICE VISIT (OUTPATIENT)
Dept: SURGERY | Age: 54
End: 2019-08-06
Payer: MEDICARE

## 2019-08-06 VITALS
HEIGHT: 62 IN | BODY MASS INDEX: 32.39 KG/M2 | DIASTOLIC BLOOD PRESSURE: 88 MMHG | HEART RATE: 87 BPM | WEIGHT: 176 LBS | SYSTOLIC BLOOD PRESSURE: 132 MMHG

## 2019-08-06 DIAGNOSIS — T14.8XXA OPEN WOUND: ICD-10-CM

## 2019-08-06 DIAGNOSIS — A41.9 SEPSIS, DUE TO UNSPECIFIED ORGANISM: ICD-10-CM

## 2019-08-06 DIAGNOSIS — Z51.89 VISIT FOR WOUND CHECK: Primary | ICD-10-CM

## 2019-08-06 PROCEDURE — 3017F COLORECTAL CA SCREEN DOC REV: CPT | Performed by: SPECIALIST

## 2019-08-06 PROCEDURE — G8417 CALC BMI ABV UP PARAM F/U: HCPCS | Performed by: SPECIALIST

## 2019-08-06 PROCEDURE — 99212 OFFICE O/P EST SF 10 MIN: CPT | Performed by: SPECIALIST

## 2019-08-06 PROCEDURE — G8428 CUR MEDS NOT DOCUMENT: HCPCS | Performed by: SPECIALIST

## 2019-08-06 PROCEDURE — 4004F PT TOBACCO SCREEN RCVD TLK: CPT | Performed by: SPECIALIST

## 2019-08-06 RX ORDER — LACTOSE-REDUCED FOOD 0.06G-1/ML
LIQUID (ML) ORAL
Qty: 60 CAN | Refills: 3 | Status: SHIPPED | OUTPATIENT
Start: 2019-08-06 | End: 2019-08-27 | Stop reason: SDUPTHER

## 2019-08-06 NOTE — PROGRESS NOTES
MD Jasmine at [facility]      PARTIAL HYSTERECTOMY         Current Outpatient Medications   Medication Sig Dispense Refill    esomeprazole ([address]) 40 MG delayed release capsule Take 1 capsule by mouth every morning (before breakfast) 30 capsule 0    ipratropium-albuterol (DUONEB) 0.5-2.5 (3) MG/3ML SOLN nebulizer solution Inhale 3 mLs into the lungs every 4 hours 360 mL 1    furosemide (LASIX) 20 MG tablet Take 1 tablet by mouth daily 12 tablet 1    acetaminophen (APAP EXTRA STRENGTH) 500 MG tablet Take 1 tablet by mouth every 8 hours as needed for Pain 90 tablet 0    Magnesium Oxide 200 MG TABS Take 200 mg by mouth daily 5 tablet 0    ferrous sulfate 325 (65 Fe) MG EC tablet Take 1 tablet by mouth daily (with breakfast) 30 tablet 0    ondansetron (ZOFRAN) 4 MG tablet Take 1 tablet by mouth daily as needed for Nausea or Vomiting 10 tablet 0    sodium hypochlorite (DAKINS) 0.125 % SOLN Irrigate with 473 mLs as directed 2 times daily 2 Bottle 0    phenytoin (PHENYTEK) 200 MG ER capsule Take 1 capsule by mouth 2 times daily 60 capsule 4    valproic acid (DEPAKENE) 250 MG capsule Take 4 capsules by mouth 2 times daily 120 capsule 0    fluticasone (FLONASE) 50 MCG/ACT nasal spray 2 sprays by Each Nostril route daily 1 Bottle 3    albuterol sulfate  (90 Base) MCG/ACT inhaler Inhale 2 puffs into the lungs every 6 hours as needed for Wheezing Gap prescription until follow up with primary. Do not refill.   Follow up with primary 1 Inhaler 3    mirtazapine (REMERON) 45 MG tablet Take 1 tablet by mouth nightly 30 tablet 0    risperiDONE (RISPERDAL) 2 MG tablet Take 1 tablet by mouth nightly 30 tablet 0    famotidine (PEPCID) 20 MG tablet Take 20 mg by mouth 2 times daily      levothyroxine (SYNTHROID) 50 MCG tablet Take 50 mcg by mouth Daily      ibuprofen (ADVIL;MOTRIN) 600 MG tablet Take 1 tablet by mouth every 6 hours as needed for Pain 120 tablet 1   

## 2019-08-14 ENCOUNTER — OFFICE VISIT (OUTPATIENT)
Dept: PODIATRY | Age: 54
End: 2019-08-14
Payer: MEDICARE

## 2019-08-14 VITALS
WEIGHT: 180 LBS | HEIGHT: 63 IN | HEART RATE: 77 BPM | DIASTOLIC BLOOD PRESSURE: 70 MMHG | BODY MASS INDEX: 31.89 KG/M2 | SYSTOLIC BLOOD PRESSURE: 132 MMHG

## 2019-08-14 DIAGNOSIS — M79.674 TOE PAIN, BILATERAL: Primary | ICD-10-CM

## 2019-08-14 DIAGNOSIS — M79.675 TOE PAIN, BILATERAL: Primary | ICD-10-CM

## 2019-08-14 DIAGNOSIS — R73.03 PRE-DIABETES: ICD-10-CM

## 2019-08-14 DIAGNOSIS — B35.1 ONYCHOMYCOSIS: ICD-10-CM

## 2019-08-14 PROCEDURE — 3017F COLORECTAL CA SCREEN DOC REV: CPT | Performed by: STUDENT IN AN ORGANIZED HEALTH CARE EDUCATION/TRAINING PROGRAM

## 2019-08-14 PROCEDURE — 99203 OFFICE O/P NEW LOW 30 MIN: CPT | Performed by: STUDENT IN AN ORGANIZED HEALTH CARE EDUCATION/TRAINING PROGRAM

## 2019-08-14 PROCEDURE — 4004F PT TOBACCO SCREEN RCVD TLK: CPT | Performed by: STUDENT IN AN ORGANIZED HEALTH CARE EDUCATION/TRAINING PROGRAM

## 2019-08-14 PROCEDURE — 11721 DEBRIDE NAIL 6 OR MORE: CPT | Performed by: STUDENT IN AN ORGANIZED HEALTH CARE EDUCATION/TRAINING PROGRAM

## 2019-08-14 PROCEDURE — 99202 OFFICE O/P NEW SF 15 MIN: CPT | Performed by: STUDENT IN AN ORGANIZED HEALTH CARE EDUCATION/TRAINING PROGRAM

## 2019-08-14 PROCEDURE — G8417 CALC BMI ABV UP PARAM F/U: HCPCS | Performed by: STUDENT IN AN ORGANIZED HEALTH CARE EDUCATION/TRAINING PROGRAM

## 2019-08-14 PROCEDURE — G8427 DOCREV CUR MEDS BY ELIG CLIN: HCPCS | Performed by: STUDENT IN AN ORGANIZED HEALTH CARE EDUCATION/TRAINING PROGRAM

## 2019-08-27 ENCOUNTER — OFFICE VISIT (OUTPATIENT)
Dept: SURGERY | Age: 54
End: 2019-08-27
Payer: MEDICARE

## 2019-08-27 VITALS
DIASTOLIC BLOOD PRESSURE: 75 MMHG | BODY MASS INDEX: 32.25 KG/M2 | WEIGHT: 182 LBS | HEIGHT: 63 IN | SYSTOLIC BLOOD PRESSURE: 113 MMHG | HEART RATE: 85 BPM

## 2019-08-27 DIAGNOSIS — A41.9 SEPSIS, DUE TO UNSPECIFIED ORGANISM: ICD-10-CM

## 2019-08-27 DIAGNOSIS — T14.8XXA OPEN WOUND: ICD-10-CM

## 2019-08-27 DIAGNOSIS — Z51.89 VISIT FOR WOUND CHECK: ICD-10-CM

## 2019-08-27 PROCEDURE — 3017F COLORECTAL CA SCREEN DOC REV: CPT | Performed by: SPECIALIST

## 2019-08-27 PROCEDURE — G8427 DOCREV CUR MEDS BY ELIG CLIN: HCPCS | Performed by: SPECIALIST

## 2019-08-27 PROCEDURE — 99212 OFFICE O/P EST SF 10 MIN: CPT | Performed by: SPECIALIST

## 2019-08-27 PROCEDURE — G8417 CALC BMI ABV UP PARAM F/U: HCPCS | Performed by: SPECIALIST

## 2019-08-27 PROCEDURE — 4004F PT TOBACCO SCREEN RCVD TLK: CPT | Performed by: SPECIALIST

## 2019-08-27 RX ORDER — LACTOSE-REDUCED FOOD 0.06G-1/ML
LIQUID (ML) ORAL
Qty: 60 CAN | Refills: 3 | Status: SHIPPED | OUTPATIENT
Start: 2019-08-27 | End: 2019-10-11 | Stop reason: SDUPTHER

## 2019-08-27 NOTE — PROGRESS NOTES
nebulizer solution Inhale 3 mLs into the lungs every 4 hours 360 mL 1    furosemide (LASIX) 20 MG tablet Take 1 tablet by mouth daily 12 tablet 1    acetaminophen (APAP EXTRA STRENGTH) 500 MG tablet Take 1 tablet by mouth every 8 hours as needed for Pain 90 tablet 0    potassium chloride (KLOR-CON M) 20 MEQ extended release tablet Take 0.5 tablets by mouth daily for 2 days 1 tablet 0    Magnesium Oxide 200 MG TABS Take 200 mg by mouth daily 5 tablet 0    ferrous sulfate 325 (65 Fe) MG EC tablet Take 1 tablet by mouth daily (with breakfast) 30 tablet 0    ondansetron (ZOFRAN) 4 MG tablet Take 1 tablet by mouth daily as needed for Nausea or Vomiting 10 tablet 0    sodium hypochlorite (DAKINS) 0.125 % SOLN Irrigate with 473 mLs as directed 2 times daily 2 Bottle 0    phenytoin (PHENYTEK) 200 MG ER capsule Take 1 capsule by mouth 2 times daily 60 capsule 4    valproic acid (DEPAKENE) 250 MG capsule Take 4 capsules by mouth 2 times daily 120 capsule 0    fluticasone (FLONASE) 50 MCG/ACT nasal spray 2 sprays by Each Nostril route daily 1 Bottle 3    albuterol sulfate  (90 Base) MCG/ACT inhaler Inhale 2 puffs into the lungs every 6 hours as needed for Wheezing Gap prescription until follow up with primary. Do not refill. Follow up with primary 1 Inhaler 3    mirtazapine (REMERON) 45 MG tablet Take 1 tablet by mouth nightly 30 tablet 0    risperiDONE (RISPERDAL) 2 MG tablet Take 1 tablet by mouth nightly 30 tablet 0    famotidine (PEPCID) 20 MG tablet Take 20 mg by mouth 2 times daily      levothyroxine (SYNTHROID) 50 MCG tablet Take 50 mcg by mouth Daily       No current facility-administered medications for this visit.         Allergies   Allergen Reactions    Imitrex [Sumatriptan] Hives    Bee Pollen     Bee Venom     Bromide Ion [Bromine]     Flexeril [Cyclobenzaprine]     Neurontin [Gabapentin]      Please consider all AEDS and meds if this med is required    Nsaids      States nausea and

## 2019-09-11 ENCOUNTER — OFFICE VISIT (OUTPATIENT)
Dept: PRIMARY CARE CLINIC | Age: 54
End: 2019-09-11
Payer: MEDICARE

## 2019-09-11 VITALS
BODY MASS INDEX: 32.58 KG/M2 | OXYGEN SATURATION: 94 % | SYSTOLIC BLOOD PRESSURE: 116 MMHG | WEIGHT: 181 LBS | HEART RATE: 77 BPM | DIASTOLIC BLOOD PRESSURE: 81 MMHG

## 2019-09-11 DIAGNOSIS — Z91.030 BEE ALLERGY STATUS: ICD-10-CM

## 2019-09-11 DIAGNOSIS — E43 SEVERE MALNUTRITION (HCC): ICD-10-CM

## 2019-09-11 DIAGNOSIS — G40.919 BREAKTHROUGH SEIZURE (HCC): ICD-10-CM

## 2019-09-11 DIAGNOSIS — F32.2 SEVERE MAJOR DEPRESSION (HCC): ICD-10-CM

## 2019-09-11 DIAGNOSIS — Z92.29 H/O POSTMENOPAUSAL HORMONE REPLACEMENT THERAPY: ICD-10-CM

## 2019-09-11 DIAGNOSIS — Z12.31 ENCOUNTER FOR SCREENING MAMMOGRAM FOR BREAST CANCER: ICD-10-CM

## 2019-09-11 DIAGNOSIS — I50.9 CONGESTIVE HEART FAILURE OF UNKNOWN ETIOLOGY (HCC): ICD-10-CM

## 2019-09-11 DIAGNOSIS — Z23 ENCOUNTER FOR IMMUNIZATION: ICD-10-CM

## 2019-09-11 DIAGNOSIS — G40.909 SEIZURE DISORDER (HCC): Primary | ICD-10-CM

## 2019-09-11 DIAGNOSIS — Z23 NEED FOR PROPHYLACTIC VACCINATION AGAINST STREPTOCOCCUS PNEUMONIAE (PNEUMOCOCCUS): ICD-10-CM

## 2019-09-11 DIAGNOSIS — J44.1 COPD EXACERBATION (HCC): ICD-10-CM

## 2019-09-11 DIAGNOSIS — F33.2 SEVERE EPISODE OF RECURRENT MAJOR DEPRESSIVE DISORDER, WITHOUT PSYCHOTIC FEATURES (HCC): ICD-10-CM

## 2019-09-11 PROCEDURE — G8427 DOCREV CUR MEDS BY ELIG CLIN: HCPCS | Performed by: NURSE PRACTITIONER

## 2019-09-11 PROCEDURE — 3017F COLORECTAL CA SCREEN DOC REV: CPT | Performed by: NURSE PRACTITIONER

## 2019-09-11 PROCEDURE — G8417 CALC BMI ABV UP PARAM F/U: HCPCS | Performed by: NURSE PRACTITIONER

## 2019-09-11 PROCEDURE — 4004F PT TOBACCO SCREEN RCVD TLK: CPT | Performed by: NURSE PRACTITIONER

## 2019-09-11 PROCEDURE — 90686 IIV4 VACC NO PRSV 0.5 ML IM: CPT | Performed by: NURSE PRACTITIONER

## 2019-09-11 PROCEDURE — 90471 IMMUNIZATION ADMIN: CPT | Performed by: NURSE PRACTITIONER

## 2019-09-11 PROCEDURE — 99204 OFFICE O/P NEW MOD 45 MIN: CPT | Performed by: NURSE PRACTITIONER

## 2019-09-11 PROCEDURE — 3023F SPIROM DOC REV: CPT | Performed by: NURSE PRACTITIONER

## 2019-09-11 PROCEDURE — G8926 SPIRO NO PERF OR DOC: HCPCS | Performed by: NURSE PRACTITIONER

## 2019-09-11 PROCEDURE — 90732 PPSV23 VACC 2 YRS+ SUBQ/IM: CPT | Performed by: NURSE PRACTITIONER

## 2019-09-11 PROCEDURE — 90472 IMMUNIZATION ADMIN EACH ADD: CPT | Performed by: NURSE PRACTITIONER

## 2019-09-11 RX ORDER — EPINEPHRINE 0.3 MG/.3ML
0.3 INJECTION SUBCUTANEOUS ONCE
Qty: 0.3 ML | Refills: 2 | Status: ON HOLD | OUTPATIENT
Start: 2019-09-11 | End: 2020-09-10

## 2019-09-11 RX ORDER — DIVALPROEX SODIUM 500 MG/1
500 TABLET, DELAYED RELEASE ORAL 2 TIMES DAILY
COMMUNITY
Start: 2019-09-04 | End: 2019-11-21 | Stop reason: SDUPTHER

## 2019-09-11 RX ORDER — BUDESONIDE AND FORMOTEROL FUMARATE DIHYDRATE 160; 4.5 UG/1; UG/1
2 AEROSOL RESPIRATORY (INHALATION) 2 TIMES DAILY
COMMUNITY
End: 2020-02-13 | Stop reason: SDUPTHER

## 2019-09-11 RX ORDER — DOXYCYCLINE HYCLATE 100 MG/1
100 CAPSULE ORAL 2 TIMES DAILY
Qty: 20 CAPSULE | Refills: 0 | Status: SHIPPED | OUTPATIENT
Start: 2019-09-11 | End: 2019-09-21

## 2019-09-11 RX ORDER — TRAZODONE HYDROCHLORIDE 100 MG/1
100 TABLET ORAL NIGHTLY
Qty: 90 TABLET | Refills: 1 | Status: SHIPPED | OUTPATIENT
Start: 2019-09-11 | End: 2019-10-07 | Stop reason: SDUPTHER

## 2019-09-11 ASSESSMENT — ENCOUNTER SYMPTOMS
DIARRHEA: 0
RHINORRHEA: 0
CHEST TIGHTNESS: 0
ABDOMINAL PAIN: 0
BLOOD IN STOOL: 0
SHORTNESS OF BREATH: 0
CONSTIPATION: 0
COUGH: 0
EYE DISCHARGE: 0
SINUS PRESSURE: 0

## 2019-09-11 NOTE — PROGRESS NOTES
dysphoric mood and self-injury. Objective:     /81 (Site: Right Upper Arm, Position: Sitting, Cuff Size: Large Adult)   Pulse 77   Wt 181 lb (82.1 kg)   SpO2 94%   Breastfeeding? No   BMI 32.58 kg/m²    Physical Exam   Constitutional: She is oriented to person, place, and time. She appears well-developed and well-nourished. No distress. HENT:   Head: Normocephalic and atraumatic. Right Ear: External ear normal.   Left Ear: External ear normal.   Nose: Nose normal.   Mouth/Throat: Oropharynx is clear and moist.   Eyes: Pupils are equal, round, and reactive to light. Conjunctivae and EOM are normal.   Neck: Trachea normal, normal range of motion and full passive range of motion without pain. No thyroid mass present. Cardiovascular: Normal rate, regular rhythm, S1 normal, S2 normal and normal heart sounds. Exam reveals no distant heart sounds and no friction rub. Pulmonary/Chest: Effort normal and breath sounds normal. No accessory muscle usage. No respiratory distress. Abdominal: Soft. Bowel sounds are normal. She exhibits no distension, no ascites and no mass. Musculoskeletal: Normal range of motion. Pain free ROM     Lymphadenopathy:     She has no cervical adenopathy. Neurological: She is oriented to person, place, and time. Gait is normal.   Skin: Skin is warm and dry. No rash noted. She is not diaphoretic. Psychiatric: She has a normal mood and affect. Her behavior is normal. Judgment and thought content normal.        Assessment/Plan         1. Seizure disorder ([address]  F/u neuro tomorrow     2. Encounter for screening mammogram for breast cancer    - [facility] Digital Screen Bilateral [[id number]]; Future    3. Need for prophylactic vaccination against Streptococcus pneumoniae (pneumococcus)    - Pneumococcal polysaccharide vaccine 23-valent PPSV23    4. Breakthrough seizure ([address]  Stable f/u with neuro     5. Congestive heart failure of unknown etiology ([address]  Stable.      6. Severe malnutrition ([facility] [address] [address]  Resolved. 7. Severe episode of recurrent major depressive disorder, without psychotic features ([facility] [address]  Stable seeing psych     8. Severe major depression ([facility] [address] [facility]  Stable seeing psych     9. Encounter for immunization    - INFLUENZA, QUADV, 3 YRS AND OLDER, IM PF, PREFILL SYR OR SDV, 0.5ML (AFLURIA QUADV, PF)    10. H/O postmenopausal hormone replacement therapy  Would like to get referral for HRT  - [facility], [address], [address] [facility], Gynecology, [place]    11. COPD exacerbation (HCC)    - budesonide-formoterol (SYMBICORT) 160-4.5 MCG/ACT AERO; Inhale 2 puffs into the lungs 2 times daily  - doxycycline hyclate (VIBRAMYCIN) 100 MG capsule; Take 1 capsule by mouth 2 times daily for 10 days Take with food, but avoid dairy, calcium and MTV's 2 hours before and after the dose  Dispense: 20 capsule; Refill: 0    12. Bee allergy status    - EPINEPHrine (EPIPEN 2-MARTINA) 0.3 MG/0.3ML SOAJ injection; Inject 0.3 mLs into the muscle once for 1 dose Use as directed for allergic reaction  Dispense: 0.3 mL; Refill: 2    RTO if symptoms worsen or fail to improve  Pt agreeable with plan      Patient given educationalmaterials - see patient instructions. Discussed use, benefit, and side effectsof prescribed medications. All patient questions answered. Pt voiced understanding. Reviewed health maintenance. Instructed to continue current medications, diet andexercise. 1.  Aleta received counseling on the following healthy behaviors: nutrition, exercise and medication adherence  2. Patient given educational materials when available - see patient instructionswhen applicable  3. Discussed use, benefit, and side effects of prescribed medications. Barriersto medication compliance addressed. All patient questions answered. Pt voicedunderstanding. 4.  Reviewed prior labs and health maintenance  5. Continuecurrent medications, diet and exercise.     Completed Refills   Requested Prescriptions     Signed Prescriptions

## 2019-09-12 ENCOUNTER — OFFICE VISIT (OUTPATIENT)
Dept: NEUROLOGY | Age: 54
End: 2019-09-12
Payer: MEDICARE

## 2019-09-12 VITALS
HEART RATE: 60 BPM | SYSTOLIC BLOOD PRESSURE: 120 MMHG | BODY MASS INDEX: 31.86 KG/M2 | WEIGHT: 177 LBS | DIASTOLIC BLOOD PRESSURE: 80 MMHG

## 2019-09-12 DIAGNOSIS — G43.809 MIGRAINE VARIANT WITH HEADACHE: ICD-10-CM

## 2019-09-12 DIAGNOSIS — R56.9 SEIZURE (HCC): Primary | ICD-10-CM

## 2019-09-12 DIAGNOSIS — F32.A DEPRESSION, UNSPECIFIED DEPRESSION TYPE: ICD-10-CM

## 2019-09-12 DIAGNOSIS — T50.902S INTENTIONAL DRUG OVERDOSE, SEQUELA (HCC): ICD-10-CM

## 2019-09-12 DIAGNOSIS — T14.91XA SUICIDE ATTEMPT (HCC): ICD-10-CM

## 2019-09-12 DIAGNOSIS — G40.919 BREAKTHROUGH SEIZURE (HCC): ICD-10-CM

## 2019-09-12 DIAGNOSIS — I50.9 CONGESTIVE HEART FAILURE OF UNKNOWN ETIOLOGY (HCC): ICD-10-CM

## 2019-09-12 PROCEDURE — 99215 OFFICE O/P EST HI 40 MIN: CPT | Performed by: STUDENT IN AN ORGANIZED HEALTH CARE EDUCATION/TRAINING PROGRAM

## 2019-09-12 PROCEDURE — 3017F COLORECTAL CA SCREEN DOC REV: CPT | Performed by: STUDENT IN AN ORGANIZED HEALTH CARE EDUCATION/TRAINING PROGRAM

## 2019-09-12 PROCEDURE — 4004F PT TOBACCO SCREEN RCVD TLK: CPT | Performed by: STUDENT IN AN ORGANIZED HEALTH CARE EDUCATION/TRAINING PROGRAM

## 2019-09-12 PROCEDURE — G8427 DOCREV CUR MEDS BY ELIG CLIN: HCPCS | Performed by: STUDENT IN AN ORGANIZED HEALTH CARE EDUCATION/TRAINING PROGRAM

## 2019-09-12 PROCEDURE — G8417 CALC BMI ABV UP PARAM F/U: HCPCS | Performed by: STUDENT IN AN ORGANIZED HEALTH CARE EDUCATION/TRAINING PROGRAM

## 2019-09-12 RX ORDER — BUTALBITAL, ACETAMINOPHEN AND CAFFEINE 50; 325; 40 MG/1; MG/1; MG/1
1 TABLET ORAL EVERY 4 HOURS PRN
Qty: 10 TABLET | Refills: 0 | Status: SHIPPED | OUTPATIENT
Start: 2019-09-12 | End: 2019-10-15 | Stop reason: SDUPTHER

## 2019-09-12 NOTE — PROGRESS NOTES
Attending Physician Statement:    I have discussed the case of Nitin Whitt, including pertinent history and exam findings with the resident. I have seen and examined the patient and the key elements of the encounter have been performed by me. I have reviewed medications, clinical laboratory, imaging and other diagnostic tests with the residents. I agree with the assessment, plan and orders as documented by the resident with changes made to the note as needed. History:  Ms. Nitin Whitt is a 62 y.o. female with seizure disorder, hypertension, fibromyalgia, COPD, CHF, depression with suicidal attempt was seen in the clinic for hospital follow-up. Patient was admitted on 5/17/2019 for right hip pain and was diagnosed to have necrotizing fasciitis of the right hip, underwent I&D on 5/22/2019, she suffered with severe sepsis at that time. She was finally discharged to SNF but then got readmitted again on 6/2/2019 with altered mental status, hypoxia, she was treated with Narcan, her condition improved significantly with that. Neurology was consulted for altered mental status and subtherapeutic AED levels. EEG showed occasional left temporal slowing. CT head did not show any acute findings. Patient was lethargic, disoriented also septic was treated with antibiotics. She was diagnosed to have encephalopathy secondary to sepsis and toxic encephalopathy. During the admission her phenytoin level was 1.3, free level was 0.2. Her AEDs were adjusted by neurology, she was discharged on valproic acid 1 g twice daily, Dilantin 150 mg 3 times a day and her dose of phenobarbital was discontinued considering her history of suicidal attempt. Of note patient has a history of major depression, she had a suicidal attempt on 2/12/2019 where she intentionally took the whole bottle of phenobarbital.  In January 2019 she also had a similar episode where she took extra doses of AEDs.   But as per patient's these were all situational because she was going through a lot of stress as her son was murdered and his father  recently. She denies any depression at present. As per patient she endorses multiple seizures since discharge from the hospital.  She had 6 episodes in last 2 weeks and 2 seizures this morning. She was requesting to restart her phenobarbital as her seizures were very well controlled on that. She said she was on phenobarbital since her childhood and denies any suicidal attempts or depression with that. She denies any depression or suicidal thoughts or thoughts of hurting herself or others at present. Impression and Plan:     · History of seizure disorder  · History of breakthrough seizures  · History of depression and suicidal attempt. · Fibromyalgia  · COPD  · CHF      Plan  -Continue Dilantin 125 mg 3 times a day and Depakote 1 g twice daily.  -Will check phenytoin and valproic acid level today    -As per patient her seizures were very well controlled when she was taking phenobarbital 32.4 mg/32.4 mg / 64.8 mg, her suicidal attempt last year was situational due to murder of her son and death of her father. Patient denies any depression or suicidal thoughts at present. As her seizures were very well controlled on phenobarbital for so many years without any worsening depression or personality changes will consider restarting her phenobarbital, discussed with patient about all possible side effects and instructed her to call the clinic if she feels depressed or noticed any personality changes or starts developing suicidal thoughts, patient voiced understanding.  -We will check phenobarbital level in 2 weeks.    -Seizure Precautions:   counseled the patient with regard to seizure precautions for injury prevention and reinforced their rationale. No driving for at least 6 months, no activity at dangerous heights, no operation of dangerous machinery, no baths, no swimming.  Caution (preferably accompanied) with cooking or near fires. The patient expressed understanding.     - Fioricet as needed for moderate to severe headache.  - Follow up in the clinic with the resident in 4 weeks  - Instructed patient to call the clinic if symptoms worsen or develop any new symptoms.            She Luo MD 9/12/2019 4:29 PM    Neurology

## 2019-09-12 NOTE — PROGRESS NOTES
[address] [place] [address]  [facility] [address] [facility]  Dept: [phone]  Dept Fax: [phone]    NEUROLOGY FOLLOW UP NOTE                                              PATIENT NAME: James Catherine   PATIENT MRN: [id number]  FOLLOW UP TODAY: 9/12/2019        INITIAL & INTERVAL HISTORY:     James Catherine is a 62 y.o. female here for follow up. Patient was admitted to [facility] on 6/8/19 for altered mental status. She has past medical history of seizure disorder, hypertension, headache, fibromyalgia, COPD, CHF and movement disorder. She was  recently discharged from the hospital previously after being treated for necrotizing fasciitis and underwent surgical debridement with wound washout and was discharged to SNF with VAC in place. Sh took her wound VAC of 3 days ago and also has been having diarrhea since discharge without any mucus or blood in it. She was seen by neurology earlier that  week due to breakthrough seizure in the setting of low Dilantin, Depakote, and unknown luminal  level and infection. Review of charts showed that history of seizures that started at the age of 3. Her seizure episode starts with an aura of feeling overwhelmed, difficulty breathing and involve tongue biting  with periods of apnea. Her last seizure on 6/2 reported in EMR and the one prior to that was 4 month ago. Medications included   Phenobarbital 32.4 mg tid  Depakote 750 mg bid  Dilantin 100 mg tid  During the admission her phenytoin level was 1.3, free-0.2, CT head was unremarkable and she plan was to  discharged  on Valproic acid  1000 mg twice a day, Dilantin 150 mg 3 times a day and  phenobarbital 32.4/32.4/64.8 but it phenobawas d/c due to recent suicidal attempt documented in charts. Patient is seen today in clinic on valporic acid 1000 mg bid, dilantin 150 mg tid. She states she was on phenobarbital 100 mg all her AEDS [facility]

## 2019-09-17 ENCOUNTER — OFFICE VISIT (OUTPATIENT)
Dept: SURGERY | Age: 54
End: 2019-09-17
Payer: MEDICARE

## 2019-09-17 VITALS
HEART RATE: 90 BPM | DIASTOLIC BLOOD PRESSURE: 53 MMHG | HEIGHT: 63 IN | BODY MASS INDEX: 32.43 KG/M2 | WEIGHT: 183 LBS | SYSTOLIC BLOOD PRESSURE: 138 MMHG

## 2019-09-17 DIAGNOSIS — M72.6 NECROTIZING FASCIITIS OF LOWER LEG (HCC): Primary | ICD-10-CM

## 2019-09-17 DIAGNOSIS — Z51.89 VISIT FOR WOUND CHECK: ICD-10-CM

## 2019-09-17 PROCEDURE — 99212 OFFICE O/P EST SF 10 MIN: CPT | Performed by: SURGERY

## 2019-09-17 NOTE — PROGRESS NOTES
Inhale 2 puffs into the lungs 2 times daily      traZODone (DESYREL) 100 MG tablet Take 1 tablet by mouth nightly 90 tablet 1    doxycycline hyclate (VIBRAMYCIN) 100 MG capsule Take 1 capsule by mouth 2 times daily for 10 days Take with food, but avoid dairy, calcium and MTV's 2 hours before and after the dose 20 capsule 0    Nutritional Supplements (BOOST HIGH PROTEIN) LIQD Patient is to drink 1 bottle four times per day 60 Can 3    esomeprazole (NEXIUM) 40 MG delayed release capsule Take 1 capsule by mouth every morning (before breakfast) 30 capsule 0    ipratropium-albuterol (DUONEB) 0.5-2.5 (3) MG/3ML SOLN nebulizer solution Inhale 3 mLs into the lungs every 4 hours 360 mL 1    furosemide (LASIX) 20 MG tablet Take 1 tablet by mouth daily 12 tablet 1    acetaminophen (APAP EXTRA STRENGTH) 500 MG tablet Take 1 tablet by mouth every 8 hours as needed for Pain 90 tablet 0    ferrous sulfate 325 (65 Fe) MG EC tablet Take 1 tablet by mouth daily (with breakfast) 30 tablet 0    ondansetron (ZOFRAN) 4 MG tablet Take 1 tablet by mouth daily as needed for Nausea or Vomiting 10 tablet 0    phenytoin (PHENYTEK) 200 MG ER capsule Take 1 capsule by mouth 2 times daily 60 capsule 4    valproic acid (DEPAKENE) 250 MG capsule Take 4 capsules by mouth 2 times daily 120 capsule 0    fluticasone (FLONASE) 50 MCG/ACT nasal spray 2 sprays by Each Nostril route daily 1 Bottle 3    albuterol sulfate  (90 Base) MCG/ACT inhaler Inhale 2 puffs into the lungs every 6 hours as needed for Wheezing Gap prescription until follow up with primary. Do not refill.   Follow up with primary 1 Inhaler 3    mirtazapine (REMERON) 45 MG tablet Take 1 tablet by mouth nightly 30 tablet 0    risperiDONE (RISPERDAL) 2 MG tablet Take 1 tablet by mouth nightly 30 tablet 0    famotidine (PEPCID) 20 MG tablet Take 20 mg by mouth 2 times daily      levothyroxine (SYNTHROID) 50 MCG tablet Take 50 mcg by mouth Daily      EPINEPHrine (EPIPEN 2-MARTINA) 0.3 MG/0.3ML SOAJ injection Inject 0.3 mLs into the muscle once for 1 dose Use as directed for allergic reaction 0.3 mL 2    ibuprofen (ADVIL;MOTRIN) 600 MG tablet Take 1 tablet by mouth every 6 hours as needed for Pain 120 tablet 1    potassium chloride (KLOR-CON M) 20 MEQ extended release tablet Take 0.5 tablets by mouth daily for 2 days 1 tablet 0     No current facility-administered medications for this visit. Allergies   Allergen Reactions    Imitrex [Sumatriptan] Hives    Bee Pollen     Bee Venom     Bromide Ion [Bromine]     Flexeril [Cyclobenzaprine]     Neurontin [Gabapentin]      Please consider all AEDS and meds if this med is required    Nsaids      States nausea and vomiting with PO NSAIDS, specifically ibuprofen and naproxen     Potassium Bromide     Reglan [Metoclopramide]     Sulfa Antibiotics     Sulfadiazine     Toradol [Ketorolac Tromethamine] Hives    Tramadol Hives       No family history on file.     Social History     Socioeconomic History    Marital status:      Spouse name: Not on file    Number of children: Not on file    Years of education: Not on file    Highest education level: Not on file   Occupational History    Not on file   Social Needs    Financial resource strain: Not on file    Food insecurity:     Worry: Not on file     Inability: Not on file    Transportation needs:     Medical: Not on file     Non-medical: Not on file   Tobacco Use    Smoking status: Current Every Day Smoker     Packs/day: 0.50     Years: 12.00     Pack years: 6.00    Smokeless tobacco: Never Used   Substance and Sexual Activity    Alcohol use: No    Drug use: No    Sexual activity: Not on file   Lifestyle    Physical activity:     Days per week: Not on file     Minutes per session: Not on file    Stress: Not on file   Relationships    Social connections:     Talks on phone: Not on file     Gets together: Not on file     Attends [employer] service: Not

## 2019-10-07 RX ORDER — RISPERIDONE 2 MG/1
2 TABLET, FILM COATED ORAL NIGHTLY
Qty: 30 TABLET | Refills: 0 | Status: SHIPPED | OUTPATIENT
Start: 2019-10-07 | End: 2019-11-15 | Stop reason: SDUPTHER

## 2019-10-07 RX ORDER — ONDANSETRON 4 MG/1
4 TABLET, FILM COATED ORAL DAILY PRN
Qty: 10 TABLET | Refills: 0 | Status: SHIPPED | OUTPATIENT
Start: 2019-10-07 | End: 2019-10-15 | Stop reason: SDUPTHER

## 2019-10-07 RX ORDER — MIRTAZAPINE 45 MG/1
45 TABLET, FILM COATED ORAL NIGHTLY
Qty: 30 TABLET | Refills: 0 | Status: SHIPPED | OUTPATIENT
Start: 2019-10-07 | End: 2019-11-15 | Stop reason: SDUPTHER

## 2019-10-07 RX ORDER — TRAZODONE HYDROCHLORIDE 100 MG/1
100 TABLET ORAL NIGHTLY
Qty: 90 TABLET | Refills: 1 | Status: SHIPPED | OUTPATIENT
Start: 2019-10-07 | End: 2019-11-21 | Stop reason: SDUPTHER

## 2019-10-08 ENCOUNTER — OFFICE VISIT (OUTPATIENT)
Dept: SURGERY | Age: 54
End: 2019-10-08
Payer: MEDICARE

## 2019-10-08 VITALS
HEIGHT: 63 IN | WEIGHT: 187 LBS | HEART RATE: 69 BPM | BODY MASS INDEX: 33.13 KG/M2 | DIASTOLIC BLOOD PRESSURE: 80 MMHG | SYSTOLIC BLOOD PRESSURE: 121 MMHG

## 2019-10-08 DIAGNOSIS — F50.9 EATING DISORDER, UNSPECIFIED TYPE: ICD-10-CM

## 2019-10-08 DIAGNOSIS — Z51.89 VISIT FOR WOUND CHECK: ICD-10-CM

## 2019-10-08 DIAGNOSIS — T14.8XXA OPEN WOUND: ICD-10-CM

## 2019-10-08 DIAGNOSIS — Z51.89 VISIT FOR WOUND CHECK: Primary | ICD-10-CM

## 2019-10-08 PROCEDURE — G8427 DOCREV CUR MEDS BY ELIG CLIN: HCPCS | Performed by: SPECIALIST

## 2019-10-08 PROCEDURE — G8482 FLU IMMUNIZE ORDER/ADMIN: HCPCS | Performed by: SPECIALIST

## 2019-10-08 PROCEDURE — 4004F PT TOBACCO SCREEN RCVD TLK: CPT | Performed by: SPECIALIST

## 2019-10-08 PROCEDURE — 3017F COLORECTAL CA SCREEN DOC REV: CPT | Performed by: SPECIALIST

## 2019-10-08 PROCEDURE — G8417 CALC BMI ABV UP PARAM F/U: HCPCS | Performed by: SPECIALIST

## 2019-10-08 PROCEDURE — 99212 OFFICE O/P EST SF 10 MIN: CPT | Performed by: SPECIALIST

## 2019-10-08 RX ORDER — BENZOYL PEROXIDE 50 MG/ML
LIQUID TOPICAL
COMMUNITY
Start: 2019-10-02 | End: 2020-02-13 | Stop reason: SDUPTHER

## 2019-10-08 RX ORDER — ONDANSETRON 4 MG/1
TABLET, ORALLY DISINTEGRATING ORAL
COMMUNITY
Start: 2019-10-02 | End: 2019-11-15 | Stop reason: SDUPTHER

## 2019-10-08 RX ORDER — BUSPIRONE HYDROCHLORIDE 15 MG/1
TABLET ORAL
COMMUNITY
Start: 2019-10-02 | End: 2019-11-21 | Stop reason: SDUPTHER

## 2019-10-08 RX ORDER — PHENYTOIN 50 MG/1
TABLET, CHEWABLE ORAL
COMMUNITY
Start: 2019-10-02 | End: 2020-02-13

## 2019-10-08 RX ORDER — NICOTINE 21 MG/24HR
PATCH, TRANSDERMAL 24 HOURS TRANSDERMAL
COMMUNITY
Start: 2019-10-02 | End: 2019-11-15 | Stop reason: ALTCHOICE

## 2019-10-08 RX ORDER — WATER / MINERAL OIL / WHITE PETROLATUM 16 OZ
CREAM TOPICAL
Status: ON HOLD | COMMUNITY
Start: 2019-10-02 | End: 2020-08-26 | Stop reason: HOSPADM

## 2019-10-08 RX ORDER — CETIRIZINE HYDROCHLORIDE 10 MG/1
TABLET ORAL
COMMUNITY
Start: 2019-10-02 | End: 2019-12-16 | Stop reason: SDUPTHER

## 2019-10-08 RX ORDER — LIDOCAINE HYDROCHLORIDE 20 MG/ML
JELLY TOPICAL
COMMUNITY
Start: 2019-10-04 | End: 2020-02-13 | Stop reason: SDUPTHER

## 2019-10-08 RX ORDER — TIZANIDINE 2 MG/1
TABLET ORAL
COMMUNITY
Start: 2019-10-02 | End: 2019-11-21 | Stop reason: SDUPTHER

## 2019-10-11 RX ORDER — LACTOSE-REDUCED FOOD 0.06G-1/ML
LIQUID (ML) ORAL
Qty: 120 CAN | Refills: 3 | Status: SHIPPED | OUTPATIENT
Start: 2019-10-11 | End: 2019-10-14 | Stop reason: SDUPTHER

## 2019-10-14 RX ORDER — LACTOSE-REDUCED FOOD 0.06G-1/ML
LIQUID (ML) ORAL
Qty: 120 CAN | Refills: 3 | Status: SHIPPED | OUTPATIENT
Start: 2019-10-14 | End: 2019-10-15 | Stop reason: SDUPTHER

## 2019-10-15 ENCOUNTER — OFFICE VISIT (OUTPATIENT)
Dept: PRIMARY CARE CLINIC | Age: 54
End: 2019-10-15
Payer: MEDICARE

## 2019-10-15 VITALS
WEIGHT: 183 LBS | HEART RATE: 80 BPM | DIASTOLIC BLOOD PRESSURE: 75 MMHG | OXYGEN SATURATION: 97 % | BODY MASS INDEX: 32.94 KG/M2 | SYSTOLIC BLOOD PRESSURE: 109 MMHG | TEMPERATURE: 98.1 F

## 2019-10-15 DIAGNOSIS — G40.919 BREAKTHROUGH SEIZURE (HCC): Primary | ICD-10-CM

## 2019-10-15 DIAGNOSIS — E03.9 HYPOTHYROIDISM, UNSPECIFIED TYPE: ICD-10-CM

## 2019-10-15 DIAGNOSIS — G56.03 BILATERAL CARPAL TUNNEL SYNDROME: ICD-10-CM

## 2019-10-15 DIAGNOSIS — F50.9 EATING DISORDER, UNSPECIFIED TYPE: ICD-10-CM

## 2019-10-15 DIAGNOSIS — G43.109 MIGRAINE WITH AURA AND WITHOUT STATUS MIGRAINOSUS, NOT INTRACTABLE: ICD-10-CM

## 2019-10-15 DIAGNOSIS — K31.84 GASTROPARESIS: ICD-10-CM

## 2019-10-15 DIAGNOSIS — Z51.89 VISIT FOR WOUND CHECK: ICD-10-CM

## 2019-10-15 DIAGNOSIS — T14.8XXA OPEN WOUND: ICD-10-CM

## 2019-10-15 PROCEDURE — 4004F PT TOBACCO SCREEN RCVD TLK: CPT | Performed by: NURSE PRACTITIONER

## 2019-10-15 PROCEDURE — 3017F COLORECTAL CA SCREEN DOC REV: CPT | Performed by: NURSE PRACTITIONER

## 2019-10-15 PROCEDURE — 99214 OFFICE O/P EST MOD 30 MIN: CPT | Performed by: NURSE PRACTITIONER

## 2019-10-15 PROCEDURE — G8417 CALC BMI ABV UP PARAM F/U: HCPCS | Performed by: NURSE PRACTITIONER

## 2019-10-15 PROCEDURE — G8427 DOCREV CUR MEDS BY ELIG CLIN: HCPCS | Performed by: NURSE PRACTITIONER

## 2019-10-15 PROCEDURE — G8482 FLU IMMUNIZE ORDER/ADMIN: HCPCS | Performed by: NURSE PRACTITIONER

## 2019-10-15 RX ORDER — LACTOSE-REDUCED FOOD 0.06G-1/ML
LIQUID (ML) ORAL
Qty: 120 CAN | Refills: 3 | Status: SHIPPED | OUTPATIENT
Start: 2019-10-15 | End: 2019-11-21 | Stop reason: SDUPTHER

## 2019-10-15 RX ORDER — DOCUSATE SODIUM 100 MG/1
100 CAPSULE, LIQUID FILLED ORAL DAILY PRN
Qty: 30 CAPSULE | Refills: 0 | Status: SHIPPED | OUTPATIENT
Start: 2019-10-15 | End: 2019-11-26 | Stop reason: SDUPTHER

## 2019-10-15 RX ORDER — ONDANSETRON 4 MG/1
4 TABLET, FILM COATED ORAL DAILY PRN
Qty: 30 TABLET | Refills: 0 | Status: SHIPPED | OUTPATIENT
Start: 2019-10-15 | End: 2020-01-23 | Stop reason: SDUPTHER

## 2019-10-15 RX ORDER — PHENYLEPHRINE HYDROCHLORIDE 10 MG/1
1 TABLET, COATED ORAL NIGHTLY
Qty: 2 EACH | Refills: 0 | Status: SHIPPED | OUTPATIENT
Start: 2019-10-15 | End: 2019-12-16

## 2019-10-15 RX ORDER — LEVOTHYROXINE SODIUM 0.1 MG/1
100 TABLET ORAL DAILY
Qty: 30 TABLET | Refills: 2 | Status: SHIPPED | OUTPATIENT
Start: 2019-10-15 | End: 2019-11-21 | Stop reason: SDUPTHER

## 2019-10-15 RX ORDER — BUTALBITAL, ACETAMINOPHEN AND CAFFEINE 50; 325; 40 MG/1; MG/1; MG/1
1 TABLET ORAL EVERY 6 HOURS PRN
Qty: 20 TABLET | Refills: 0 | Status: SHIPPED | OUTPATIENT
Start: 2019-10-15 | End: 2019-12-16 | Stop reason: SDUPTHER

## 2019-10-15 ASSESSMENT — ENCOUNTER SYMPTOMS
RHINORRHEA: 0
CHEST TIGHTNESS: 0
COUGH: 0
BLOOD IN STOOL: 0
SHORTNESS OF BREATH: 0
ABDOMINAL PAIN: 0
SINUS PRESSURE: 0
CONSTIPATION: 0
EYE DISCHARGE: 0
DIARRHEA: 0

## 2019-10-17 ENCOUNTER — TELEPHONE (OUTPATIENT)
Dept: GASTROENTEROLOGY | Age: 54
End: 2019-10-17

## 2019-11-05 ENCOUNTER — TELEPHONE (OUTPATIENT)
Dept: SURGERY | Age: 54
End: 2019-11-05

## 2019-11-06 ENCOUNTER — TELEPHONE (OUTPATIENT)
Dept: SURGERY | Age: 54
End: 2019-11-06

## 2019-11-12 ENCOUNTER — OFFICE VISIT (OUTPATIENT)
Dept: SURGERY | Age: 54
End: 2019-11-12
Payer: MEDICARE

## 2019-11-12 VITALS
SYSTOLIC BLOOD PRESSURE: 164 MMHG | HEIGHT: 62 IN | DIASTOLIC BLOOD PRESSURE: 93 MMHG | BODY MASS INDEX: 34.74 KG/M2 | HEART RATE: 70 BPM | WEIGHT: 188.8 LBS

## 2019-11-12 DIAGNOSIS — Z51.89 VISIT FOR WOUND CHECK: Primary | ICD-10-CM

## 2019-11-12 PROCEDURE — G8427 DOCREV CUR MEDS BY ELIG CLIN: HCPCS | Performed by: SPECIALIST

## 2019-11-12 PROCEDURE — G8482 FLU IMMUNIZE ORDER/ADMIN: HCPCS | Performed by: SPECIALIST

## 2019-11-12 PROCEDURE — 99212 OFFICE O/P EST SF 10 MIN: CPT | Performed by: SPECIALIST

## 2019-11-12 PROCEDURE — 3017F COLORECTAL CA SCREEN DOC REV: CPT | Performed by: SPECIALIST

## 2019-11-12 PROCEDURE — G8417 CALC BMI ABV UP PARAM F/U: HCPCS | Performed by: SPECIALIST

## 2019-11-12 PROCEDURE — 4004F PT TOBACCO SCREEN RCVD TLK: CPT | Performed by: SPECIALIST

## 2019-11-12 RX ORDER — ACETAMINOPHEN 500 MG
500 TABLET ORAL EVERY 6 HOURS PRN
Qty: 360 TABLET | Refills: 0 | Status: SHIPPED | OUTPATIENT
Start: 2019-11-12 | End: 2020-01-11 | Stop reason: SDUPTHER

## 2019-11-12 RX ORDER — GAUZE BANDAGE 4" X 4"
1 SPONGE TOPICAL 2 TIMES DAILY
Qty: 30 EACH | Refills: 2 | Status: ON HOLD | OUTPATIENT
Start: 2019-11-12 | End: 2020-09-10

## 2019-11-12 RX ORDER — GAUZE BANDAGE 4" X 4"
1 BANDAGE TOPICAL DAILY PRN
Qty: 30 EACH | Refills: 2 | Status: ON HOLD | OUTPATIENT
Start: 2019-11-12 | End: 2020-08-26 | Stop reason: HOSPADM

## 2019-11-15 ENCOUNTER — HOSPITAL ENCOUNTER (EMERGENCY)
Age: 54
Discharge: HOME | End: 2019-11-15
Attending: EMERGENCY MEDICINE
Payer: MEDICARE

## 2019-11-15 VITALS
TEMPERATURE: 98.1 F | SYSTOLIC BLOOD PRESSURE: 111 MMHG | DIASTOLIC BLOOD PRESSURE: 78 MMHG | HEART RATE: 69 BPM | OXYGEN SATURATION: 97 % | RESPIRATION RATE: 16 BRPM

## 2019-11-15 DIAGNOSIS — Z51.89 VISIT FOR WOUND CHECK: Primary | ICD-10-CM

## 2019-11-15 PROCEDURE — 99282 EMERGENCY DEPT VISIT SF MDM: CPT

## 2019-11-15 RX ORDER — MIRTAZAPINE 45 MG/1
45 TABLET, FILM COATED ORAL NIGHTLY
Qty: 30 TABLET | Refills: 0 | Status: SHIPPED | OUTPATIENT
Start: 2019-11-15 | End: 2019-11-21 | Stop reason: SDUPTHER

## 2019-11-15 RX ORDER — RISPERIDONE 2 MG/1
2 TABLET, FILM COATED ORAL NIGHTLY
Qty: 30 TABLET | Refills: 0 | Status: SHIPPED | OUTPATIENT
Start: 2019-11-15 | End: 2019-11-21 | Stop reason: SDUPTHER

## 2019-11-15 RX ORDER — ONDANSETRON 4 MG/1
4 TABLET, ORALLY DISINTEGRATING ORAL EVERY 8 HOURS PRN
Qty: 90 TABLET | Refills: 0 | Status: SHIPPED | OUTPATIENT
Start: 2019-11-15 | End: 2020-01-24 | Stop reason: SDUPTHER

## 2019-11-15 ASSESSMENT — PAIN SCALES - GENERAL: PAINLEVEL_OUTOF10: 8

## 2019-11-15 ASSESSMENT — ENCOUNTER SYMPTOMS
ABDOMINAL PAIN: 0
VOMITING: 0
SORE THROAT: 0
BACK PAIN: 0
SHORTNESS OF BREATH: 0

## 2019-11-18 ENCOUNTER — CARE COORDINATION (OUTPATIENT)
Dept: CARE COORDINATION | Age: 54
End: 2019-11-18

## 2019-11-19 RX ORDER — GAUZE BANDAGE 4" X 4"
SPONGE TOPICAL
Qty: 30 EACH | Refills: 2 | Status: ON HOLD | OUTPATIENT
Start: 2019-11-19 | End: 2020-08-26 | Stop reason: HOSPADM

## 2019-11-20 ENCOUNTER — OFFICE VISIT (OUTPATIENT)
Dept: PODIATRY | Age: 54
End: 2019-11-20
Payer: MEDICARE

## 2019-11-20 VITALS
BODY MASS INDEX: 35.15 KG/M2 | WEIGHT: 191 LBS | HEIGHT: 62 IN | HEART RATE: 77 BPM | SYSTOLIC BLOOD PRESSURE: 119 MMHG | DIASTOLIC BLOOD PRESSURE: 78 MMHG

## 2019-11-20 DIAGNOSIS — R73.03 PRE-DIABETES: ICD-10-CM

## 2019-11-20 DIAGNOSIS — B35.1 ONYCHOMYCOSIS: Primary | ICD-10-CM

## 2019-11-20 DIAGNOSIS — M79.675 TOE PAIN, BILATERAL: ICD-10-CM

## 2019-11-20 DIAGNOSIS — M79.674 TOE PAIN, BILATERAL: ICD-10-CM

## 2019-11-20 DIAGNOSIS — L84 CORN OR CALLUS: ICD-10-CM

## 2019-11-20 PROCEDURE — 11055 PARING/CUTG B9 HYPRKER LES 1: CPT | Performed by: STUDENT IN AN ORGANIZED HEALTH CARE EDUCATION/TRAINING PROGRAM

## 2019-11-20 PROCEDURE — G8482 FLU IMMUNIZE ORDER/ADMIN: HCPCS | Performed by: STUDENT IN AN ORGANIZED HEALTH CARE EDUCATION/TRAINING PROGRAM

## 2019-11-20 PROCEDURE — G8417 CALC BMI ABV UP PARAM F/U: HCPCS | Performed by: STUDENT IN AN ORGANIZED HEALTH CARE EDUCATION/TRAINING PROGRAM

## 2019-11-20 PROCEDURE — G8428 CUR MEDS NOT DOCUMENT: HCPCS | Performed by: STUDENT IN AN ORGANIZED HEALTH CARE EDUCATION/TRAINING PROGRAM

## 2019-11-20 PROCEDURE — 3017F COLORECTAL CA SCREEN DOC REV: CPT | Performed by: STUDENT IN AN ORGANIZED HEALTH CARE EDUCATION/TRAINING PROGRAM

## 2019-11-20 PROCEDURE — 4004F PT TOBACCO SCREEN RCVD TLK: CPT | Performed by: STUDENT IN AN ORGANIZED HEALTH CARE EDUCATION/TRAINING PROGRAM

## 2019-11-20 PROCEDURE — 99212 OFFICE O/P EST SF 10 MIN: CPT | Performed by: STUDENT IN AN ORGANIZED HEALTH CARE EDUCATION/TRAINING PROGRAM

## 2019-11-20 PROCEDURE — 11721 DEBRIDE NAIL 6 OR MORE: CPT | Performed by: STUDENT IN AN ORGANIZED HEALTH CARE EDUCATION/TRAINING PROGRAM

## 2019-11-20 RX ORDER — AMOXICILLIN 500 MG/1
CAPSULE ORAL
COMMUNITY
Start: 2019-11-08 | End: 2019-11-26

## 2019-11-20 RX ORDER — BUPRENORPHINE AND NALOXONE 8; 2 MG/1; MG/1
FILM, SOLUBLE BUCCAL; SUBLINGUAL
Status: ON HOLD | COMMUNITY
Start: 2019-11-14 | End: 2020-09-09

## 2019-11-20 RX ORDER — AMOXICILLIN 250 MG
CAPSULE ORAL
COMMUNITY
Start: 2019-11-15 | End: 2019-11-21 | Stop reason: SDUPTHER

## 2019-11-21 ENCOUNTER — OFFICE VISIT (OUTPATIENT)
Dept: PRIMARY CARE CLINIC | Age: 54
End: 2019-11-21
Payer: MEDICARE

## 2019-11-21 VITALS
OXYGEN SATURATION: 95 % | BODY MASS INDEX: 35.12 KG/M2 | TEMPERATURE: 98.4 F | WEIGHT: 192 LBS | SYSTOLIC BLOOD PRESSURE: 114 MMHG | HEART RATE: 77 BPM | DIASTOLIC BLOOD PRESSURE: 79 MMHG

## 2019-11-21 DIAGNOSIS — F32.2 SEVERE MAJOR DEPRESSION (HCC): ICD-10-CM

## 2019-11-21 DIAGNOSIS — F50.9 EATING DISORDER, UNSPECIFIED TYPE: ICD-10-CM

## 2019-11-21 DIAGNOSIS — G40.919 BREAKTHROUGH SEIZURE (HCC): Primary | ICD-10-CM

## 2019-11-21 DIAGNOSIS — E03.9 HYPOTHYROIDISM, UNSPECIFIED TYPE: ICD-10-CM

## 2019-11-21 DIAGNOSIS — T14.8XXA OPEN WOUND: ICD-10-CM

## 2019-11-21 PROCEDURE — 4004F PT TOBACCO SCREEN RCVD TLK: CPT | Performed by: NURSE PRACTITIONER

## 2019-11-21 PROCEDURE — G8417 CALC BMI ABV UP PARAM F/U: HCPCS | Performed by: NURSE PRACTITIONER

## 2019-11-21 PROCEDURE — G8427 DOCREV CUR MEDS BY ELIG CLIN: HCPCS | Performed by: NURSE PRACTITIONER

## 2019-11-21 PROCEDURE — 3017F COLORECTAL CA SCREEN DOC REV: CPT | Performed by: NURSE PRACTITIONER

## 2019-11-21 PROCEDURE — 99214 OFFICE O/P EST MOD 30 MIN: CPT | Performed by: NURSE PRACTITIONER

## 2019-11-21 PROCEDURE — G8482 FLU IMMUNIZE ORDER/ADMIN: HCPCS | Performed by: NURSE PRACTITIONER

## 2019-11-21 RX ORDER — LACTOSE-REDUCED FOOD 0.06G-1/ML
LIQUID (ML) ORAL
Qty: 120 CAN | Refills: 3 | Status: SHIPPED | OUTPATIENT
Start: 2019-11-21 | End: 2020-01-21 | Stop reason: SDUPTHER

## 2019-11-21 RX ORDER — AMOXICILLIN 250 MG
1 CAPSULE ORAL 2 TIMES DAILY PRN
Qty: 30 TABLET | Refills: 2 | Status: SHIPPED | OUTPATIENT
Start: 2019-11-21 | End: 2019-12-21

## 2019-11-21 RX ORDER — DIVALPROEX SODIUM 500 MG/1
500 TABLET, DELAYED RELEASE ORAL 2 TIMES DAILY
Qty: 60 TABLET | Refills: 1 | Status: SHIPPED | OUTPATIENT
Start: 2019-11-21 | End: 2019-12-16 | Stop reason: SDUPTHER

## 2019-11-21 RX ORDER — MIRTAZAPINE 45 MG/1
45 TABLET, FILM COATED ORAL NIGHTLY
Qty: 30 TABLET | Refills: 0 | Status: ON HOLD | OUTPATIENT
Start: 2019-11-21 | End: 2020-09-21 | Stop reason: HOSPADM

## 2019-11-21 RX ORDER — TRAZODONE HYDROCHLORIDE 100 MG/1
100 TABLET ORAL NIGHTLY
Qty: 90 TABLET | Refills: 1 | Status: ON HOLD | OUTPATIENT
Start: 2019-11-21 | End: 2020-09-21 | Stop reason: HOSPADM

## 2019-11-21 RX ORDER — PHENYTOIN SODIUM 200 MG/1
200 CAPSULE, EXTENDED RELEASE ORAL 2 TIMES DAILY
Qty: 60 CAPSULE | Refills: 4 | Status: SHIPPED | OUTPATIENT
Start: 2019-11-21 | End: 2020-03-20 | Stop reason: SDUPTHER

## 2019-11-21 RX ORDER — TIZANIDINE 4 MG/1
4 TABLET ORAL EVERY 8 HOURS PRN
Qty: 90 TABLET | Refills: 1 | Status: SHIPPED | OUTPATIENT
Start: 2019-11-21 | End: 2020-02-13 | Stop reason: SDUPTHER

## 2019-11-21 RX ORDER — BUSPIRONE HYDROCHLORIDE 10 MG/1
20 TABLET ORAL 3 TIMES DAILY
Qty: 180 TABLET | Refills: 0 | Status: SHIPPED | OUTPATIENT
Start: 2019-11-21 | End: 2019-12-21

## 2019-11-21 RX ORDER — RISPERIDONE 2 MG/1
2 TABLET, FILM COATED ORAL NIGHTLY
Qty: 30 TABLET | Refills: 0 | Status: ON HOLD | OUTPATIENT
Start: 2019-11-21 | End: 2020-09-21 | Stop reason: HOSPADM

## 2019-11-21 RX ORDER — LEVOTHYROXINE SODIUM 0.1 MG/1
100 TABLET ORAL DAILY
Qty: 30 TABLET | Refills: 2 | Status: SHIPPED | OUTPATIENT
Start: 2019-11-21 | End: 2020-03-17 | Stop reason: SDUPTHER

## 2019-11-21 ASSESSMENT — ENCOUNTER SYMPTOMS
CHEST TIGHTNESS: 0
RHINORRHEA: 0
EYE DISCHARGE: 0
DIARRHEA: 0
SINUS PRESSURE: 0
COUGH: 0
SHORTNESS OF BREATH: 0
CONSTIPATION: 0
ABDOMINAL PAIN: 0
BLOOD IN STOOL: 0

## 2019-11-26 ENCOUNTER — OFFICE VISIT (OUTPATIENT)
Dept: PRIMARY CARE CLINIC | Age: 54
End: 2019-11-26
Payer: MEDICARE

## 2019-11-26 ENCOUNTER — OFFICE VISIT (OUTPATIENT)
Dept: SURGERY | Age: 54
End: 2019-11-26
Payer: MEDICARE

## 2019-11-26 VITALS
TEMPERATURE: 97.5 F | BODY MASS INDEX: 35.48 KG/M2 | HEART RATE: 77 BPM | OXYGEN SATURATION: 96 % | SYSTOLIC BLOOD PRESSURE: 134 MMHG | WEIGHT: 194 LBS | DIASTOLIC BLOOD PRESSURE: 89 MMHG

## 2019-11-26 VITALS
BODY MASS INDEX: 35.48 KG/M2 | SYSTOLIC BLOOD PRESSURE: 140 MMHG | TEMPERATURE: 97.3 F | HEART RATE: 79 BPM | DIASTOLIC BLOOD PRESSURE: 90 MMHG | WEIGHT: 194 LBS

## 2019-11-26 DIAGNOSIS — I10 ESSENTIAL HYPERTENSION: ICD-10-CM

## 2019-11-26 DIAGNOSIS — M72.6 NECROTIZING FASCIITIS OF LOWER LEG (HCC): ICD-10-CM

## 2019-11-26 DIAGNOSIS — J44.9 CHRONIC OBSTRUCTIVE PULMONARY DISEASE, UNSPECIFIED COPD TYPE (HCC): ICD-10-CM

## 2019-11-26 DIAGNOSIS — G47.33 OSA (OBSTRUCTIVE SLEEP APNEA): ICD-10-CM

## 2019-11-26 DIAGNOSIS — E03.9 HYPOTHYROIDISM, UNSPECIFIED TYPE: ICD-10-CM

## 2019-11-26 DIAGNOSIS — D51.0 PERNICIOUS ANEMIA: ICD-10-CM

## 2019-11-26 DIAGNOSIS — Z51.89 VISIT FOR WOUND CHECK: Primary | ICD-10-CM

## 2019-11-26 DIAGNOSIS — Z11.59 ENCOUNTER FOR HEPATITIS C SCREENING TEST FOR LOW RISK PATIENT: Primary | ICD-10-CM

## 2019-11-26 DIAGNOSIS — G43.809 MIGRAINE VARIANT WITH HEADACHE: ICD-10-CM

## 2019-11-26 PROCEDURE — G8926 SPIRO NO PERF OR DOC: HCPCS | Performed by: NURSE PRACTITIONER

## 2019-11-26 PROCEDURE — G8482 FLU IMMUNIZE ORDER/ADMIN: HCPCS | Performed by: NURSE PRACTITIONER

## 2019-11-26 PROCEDURE — 4004F PT TOBACCO SCREEN RCVD TLK: CPT | Performed by: SPECIALIST

## 2019-11-26 PROCEDURE — 99212 OFFICE O/P EST SF 10 MIN: CPT | Performed by: SPECIALIST

## 2019-11-26 PROCEDURE — 4004F PT TOBACCO SCREEN RCVD TLK: CPT | Performed by: NURSE PRACTITIONER

## 2019-11-26 PROCEDURE — 99214 OFFICE O/P EST MOD 30 MIN: CPT | Performed by: NURSE PRACTITIONER

## 2019-11-26 PROCEDURE — 3023F SPIROM DOC REV: CPT | Performed by: NURSE PRACTITIONER

## 2019-11-26 PROCEDURE — G8482 FLU IMMUNIZE ORDER/ADMIN: HCPCS | Performed by: SPECIALIST

## 2019-11-26 PROCEDURE — G8427 DOCREV CUR MEDS BY ELIG CLIN: HCPCS | Performed by: SPECIALIST

## 2019-11-26 PROCEDURE — G8417 CALC BMI ABV UP PARAM F/U: HCPCS | Performed by: NURSE PRACTITIONER

## 2019-11-26 PROCEDURE — G8427 DOCREV CUR MEDS BY ELIG CLIN: HCPCS | Performed by: NURSE PRACTITIONER

## 2019-11-26 PROCEDURE — 3017F COLORECTAL CA SCREEN DOC REV: CPT | Performed by: SPECIALIST

## 2019-11-26 PROCEDURE — 99213 OFFICE O/P EST LOW 20 MIN: CPT | Performed by: SPECIALIST

## 2019-11-26 PROCEDURE — 3017F COLORECTAL CA SCREEN DOC REV: CPT | Performed by: NURSE PRACTITIONER

## 2019-11-26 PROCEDURE — G8417 CALC BMI ABV UP PARAM F/U: HCPCS | Performed by: SPECIALIST

## 2019-11-26 RX ORDER — LANOLIN ALCOHOL/MO/W.PET/CERES
325 CREAM (GRAM) TOPICAL
Qty: 30 TABLET | Refills: 3 | Status: ON HOLD | OUTPATIENT
Start: 2019-11-26 | End: 2020-09-21 | Stop reason: HOSPADM

## 2019-11-26 RX ORDER — DOCUSATE SODIUM 100 MG/1
100 CAPSULE, LIQUID FILLED ORAL DAILY PRN
Qty: 30 CAPSULE | Refills: 2 | Status: ON HOLD | OUTPATIENT
Start: 2019-11-26 | End: 2020-09-21 | Stop reason: HOSPADM

## 2019-11-26 RX ORDER — BUTALBITAL, ACETAMINOPHEN AND CAFFEINE 50; 325; 40 MG/1; MG/1; MG/1
1-2 TABLET ORAL EVERY 4 HOURS PRN
Qty: 20 TABLET | Refills: 0 | Status: SHIPPED | OUTPATIENT
Start: 2019-11-26 | End: 2020-01-21 | Stop reason: SDUPTHER

## 2019-11-26 RX ORDER — BENZONATATE 100 MG/1
100 CAPSULE ORAL 3 TIMES DAILY PRN
Qty: 90 CAPSULE | Refills: 1 | Status: SHIPPED | OUTPATIENT
Start: 2019-11-26 | End: 2020-10-26

## 2019-11-26 RX ORDER — FAMOTIDINE 20 MG/1
20 TABLET, FILM COATED ORAL 2 TIMES DAILY
Qty: 60 TABLET | Refills: 2 | Status: SHIPPED | OUTPATIENT
Start: 2019-11-26 | End: 2020-03-10 | Stop reason: SDUPTHER

## 2019-11-26 RX ORDER — PROPRANOLOL HCL 60 MG
60 CAPSULE, EXTENDED RELEASE 24HR ORAL DAILY
Qty: 30 CAPSULE | Refills: 2 | Status: SHIPPED | OUTPATIENT
Start: 2019-11-26 | End: 2020-02-13 | Stop reason: SDUPTHER

## 2019-11-26 ASSESSMENT — ENCOUNTER SYMPTOMS
CONSTIPATION: 1
EYE DISCHARGE: 0
SHORTNESS OF BREATH: 0
RHINORRHEA: 0
ABDOMINAL PAIN: 0
DIARRHEA: 0
SINUS PRESSURE: 0
CHEST TIGHTNESS: 0
COUGH: 0
BLOOD IN STOOL: 0

## 2019-11-27 ENCOUNTER — HOSPITAL ENCOUNTER (EMERGENCY)
Age: 54
Discharge: HOME | End: 2019-11-27
Attending: EMERGENCY MEDICINE
Payer: MEDICARE

## 2019-11-27 ENCOUNTER — APPOINTMENT (OUTPATIENT)
Dept: CT IMAGING | Age: 54
End: 2019-11-27
Payer: MEDICARE

## 2019-11-27 VITALS
BODY MASS INDEX: 35.7 KG/M2 | RESPIRATION RATE: 17 BRPM | SYSTOLIC BLOOD PRESSURE: 117 MMHG | OXYGEN SATURATION: 98 % | DIASTOLIC BLOOD PRESSURE: 70 MMHG | HEART RATE: 69 BPM | TEMPERATURE: 97.5 F | HEIGHT: 62 IN | WEIGHT: 194 LBS

## 2019-11-27 DIAGNOSIS — R51.9 ACUTE NONINTRACTABLE HEADACHE, UNSPECIFIED HEADACHE TYPE: Primary | ICD-10-CM

## 2019-11-27 PROCEDURE — 6360000002 HC RX W HCPCS: Performed by: STUDENT IN AN ORGANIZED HEALTH CARE EDUCATION/TRAINING PROGRAM

## 2019-11-27 PROCEDURE — 96372 THER/PROPH/DIAG INJ SC/IM: CPT

## 2019-11-27 PROCEDURE — 96375 TX/PRO/DX INJ NEW DRUG ADDON: CPT

## 2019-11-27 PROCEDURE — 2580000003 HC RX 258: Performed by: STUDENT IN AN ORGANIZED HEALTH CARE EDUCATION/TRAINING PROGRAM

## 2019-11-27 PROCEDURE — 70450 CT HEAD/BRAIN W/O DYE: CPT

## 2019-11-27 PROCEDURE — 96374 THER/PROPH/DIAG INJ IV PUSH: CPT

## 2019-11-27 PROCEDURE — 93005 ELECTROCARDIOGRAM TRACING: CPT | Performed by: STUDENT IN AN ORGANIZED HEALTH CARE EDUCATION/TRAINING PROGRAM

## 2019-11-27 PROCEDURE — 99284 EMERGENCY DEPT VISIT MOD MDM: CPT

## 2019-11-27 RX ORDER — DEXAMETHASONE SODIUM PHOSPHATE 10 MG/ML
10 INJECTION INTRAMUSCULAR; INTRAVENOUS ONCE
Status: COMPLETED | OUTPATIENT
Start: 2019-11-27 | End: 2019-11-27

## 2019-11-27 RX ORDER — DIPHENHYDRAMINE HYDROCHLORIDE 50 MG/ML
25 INJECTION INTRAMUSCULAR; INTRAVENOUS ONCE
Status: COMPLETED | OUTPATIENT
Start: 2019-11-27 | End: 2019-11-27

## 2019-11-27 RX ORDER — 0.9 % SODIUM CHLORIDE 0.9 %
500 INTRAVENOUS SOLUTION INTRAVENOUS ONCE
Status: COMPLETED | OUTPATIENT
Start: 2019-11-27 | End: 2019-11-27

## 2019-11-27 RX ORDER — PROMETHAZINE HYDROCHLORIDE 25 MG/ML
25 INJECTION, SOLUTION INTRAMUSCULAR; INTRAVENOUS ONCE
Status: COMPLETED | OUTPATIENT
Start: 2019-11-27 | End: 2019-11-27

## 2019-11-27 RX ADMIN — PROMETHAZINE HYDROCHLORIDE 25 MG: 25 INJECTION INTRAMUSCULAR; INTRAVENOUS at 18:15

## 2019-11-27 RX ADMIN — DEXAMETHASONE SODIUM PHOSPHATE 10 MG: 10 INJECTION INTRAMUSCULAR; INTRAVENOUS at 18:15

## 2019-11-27 RX ADMIN — SODIUM CHLORIDE 500 ML: 9 INJECTION, SOLUTION INTRAVENOUS at 18:15

## 2019-11-27 RX ADMIN — DIPHENHYDRAMINE HYDROCHLORIDE 25 MG: 50 INJECTION, SOLUTION INTRAMUSCULAR; INTRAVENOUS at 18:15

## 2019-11-27 ASSESSMENT — PAIN DESCRIPTION - PAIN TYPE: TYPE: ACUTE PAIN

## 2019-11-27 ASSESSMENT — PAIN SCALES - GENERAL: PAINLEVEL_OUTOF10: 8

## 2019-11-27 ASSESSMENT — PAIN DESCRIPTION - LOCATION: LOCATION: HEAD

## 2019-11-27 ASSESSMENT — PAIN DESCRIPTION - ONSET: ONSET: SUDDEN

## 2019-11-27 ASSESSMENT — PAIN DESCRIPTION - DESCRIPTORS: DESCRIPTORS: SHARP

## 2019-11-27 ASSESSMENT — PAIN DESCRIPTION - FREQUENCY: FREQUENCY: CONTINUOUS

## 2019-11-29 ENCOUNTER — CARE COORDINATION (OUTPATIENT)
Dept: CARE COORDINATION | Age: 54
End: 2019-11-29

## 2019-11-29 LAB
EKG ATRIAL RATE: 63 BPM
EKG P AXIS: 61 DEGREES
EKG P-R INTERVAL: 184 MS
EKG Q-T INTERVAL: 454 MS
EKG QRS DURATION: 88 MS
EKG QTC CALCULATION (BAZETT): 464 MS
EKG R AXIS: 34 DEGREES
EKG T AXIS: 42 DEGREES
EKG VENTRICULAR RATE: 63 BPM

## 2019-12-02 ENCOUNTER — OFFICE VISIT (OUTPATIENT)
Dept: PODIATRY | Age: 54
End: 2019-12-02
Payer: MEDICARE

## 2019-12-02 VITALS
HEIGHT: 62 IN | SYSTOLIC BLOOD PRESSURE: 90 MMHG | WEIGHT: 196 LBS | HEART RATE: 69 BPM | BODY MASS INDEX: 36.07 KG/M2 | DIASTOLIC BLOOD PRESSURE: 61 MMHG

## 2019-12-02 DIAGNOSIS — M77.41 METATARSALGIA OF BOTH FEET: ICD-10-CM

## 2019-12-02 DIAGNOSIS — R73.03 PRE-DIABETES: Primary | ICD-10-CM

## 2019-12-02 DIAGNOSIS — M77.42 METATARSALGIA OF BOTH FEET: ICD-10-CM

## 2019-12-02 DIAGNOSIS — B35.1 ONYCHOMYCOSIS: ICD-10-CM

## 2019-12-02 DIAGNOSIS — L84 CORN OR CALLUS: ICD-10-CM

## 2019-12-02 DIAGNOSIS — E11.42 DIABETIC PERIPHERAL NEUROPATHY ASSOCIATED WITH TYPE 2 DIABETES MELLITUS (HCC): ICD-10-CM

## 2019-12-02 PROCEDURE — G8427 DOCREV CUR MEDS BY ELIG CLIN: HCPCS | Performed by: STUDENT IN AN ORGANIZED HEALTH CARE EDUCATION/TRAINING PROGRAM

## 2019-12-02 PROCEDURE — 2022F DILAT RTA XM EVC RTNOPTHY: CPT | Performed by: STUDENT IN AN ORGANIZED HEALTH CARE EDUCATION/TRAINING PROGRAM

## 2019-12-02 PROCEDURE — G8482 FLU IMMUNIZE ORDER/ADMIN: HCPCS | Performed by: STUDENT IN AN ORGANIZED HEALTH CARE EDUCATION/TRAINING PROGRAM

## 2019-12-02 PROCEDURE — G8417 CALC BMI ABV UP PARAM F/U: HCPCS | Performed by: STUDENT IN AN ORGANIZED HEALTH CARE EDUCATION/TRAINING PROGRAM

## 2019-12-02 PROCEDURE — 4004F PT TOBACCO SCREEN RCVD TLK: CPT | Performed by: STUDENT IN AN ORGANIZED HEALTH CARE EDUCATION/TRAINING PROGRAM

## 2019-12-02 PROCEDURE — L3020 FOOT LONGITUD/METATARSAL SUP: HCPCS | Performed by: STUDENT IN AN ORGANIZED HEALTH CARE EDUCATION/TRAINING PROGRAM

## 2019-12-02 PROCEDURE — 99213 OFFICE O/P EST LOW 20 MIN: CPT | Performed by: STUDENT IN AN ORGANIZED HEALTH CARE EDUCATION/TRAINING PROGRAM

## 2019-12-02 PROCEDURE — 3017F COLORECTAL CA SCREEN DOC REV: CPT | Performed by: STUDENT IN AN ORGANIZED HEALTH CARE EDUCATION/TRAINING PROGRAM

## 2019-12-02 PROCEDURE — 3044F HG A1C LEVEL LT 7.0%: CPT | Performed by: STUDENT IN AN ORGANIZED HEALTH CARE EDUCATION/TRAINING PROGRAM

## 2019-12-02 PROCEDURE — 99212 OFFICE O/P EST SF 10 MIN: CPT

## 2019-12-10 ENCOUNTER — OFFICE VISIT (OUTPATIENT)
Dept: SURGERY | Age: 54
End: 2019-12-10
Payer: MEDICARE

## 2019-12-10 VITALS
HEIGHT: 63 IN | WEIGHT: 198.8 LBS | DIASTOLIC BLOOD PRESSURE: 70 MMHG | BODY MASS INDEX: 35.22 KG/M2 | SYSTOLIC BLOOD PRESSURE: 117 MMHG | HEART RATE: 60 BPM

## 2019-12-10 DIAGNOSIS — G47.33 OSA (OBSTRUCTIVE SLEEP APNEA): Primary | ICD-10-CM

## 2019-12-10 DIAGNOSIS — M72.6 NECROTIZING FASCIITIS (HCC): Primary | ICD-10-CM

## 2019-12-10 PROCEDURE — G8482 FLU IMMUNIZE ORDER/ADMIN: HCPCS | Performed by: SPECIALIST

## 2019-12-10 PROCEDURE — 99213 OFFICE O/P EST LOW 20 MIN: CPT | Performed by: SPECIALIST

## 2019-12-10 PROCEDURE — 3017F COLORECTAL CA SCREEN DOC REV: CPT | Performed by: SPECIALIST

## 2019-12-10 PROCEDURE — G8427 DOCREV CUR MEDS BY ELIG CLIN: HCPCS | Performed by: SPECIALIST

## 2019-12-10 PROCEDURE — G8417 CALC BMI ABV UP PARAM F/U: HCPCS | Performed by: SPECIALIST

## 2019-12-10 PROCEDURE — 99212 OFFICE O/P EST SF 10 MIN: CPT

## 2019-12-10 PROCEDURE — 4004F PT TOBACCO SCREEN RCVD TLK: CPT | Performed by: SPECIALIST

## 2019-12-16 ENCOUNTER — HOSPITAL ENCOUNTER (OUTPATIENT)
Age: 54
Discharge: HOME | End: 2019-12-16
Payer: MEDICARE

## 2019-12-16 ENCOUNTER — HOSPITAL ENCOUNTER (OUTPATIENT)
Dept: SLEEP CENTER | Age: 54
Discharge: HOME | End: 2019-12-18
Payer: MEDICARE

## 2019-12-16 ENCOUNTER — TELEPHONE (OUTPATIENT)
Dept: PRIMARY CARE CLINIC | Age: 54
End: 2019-12-16

## 2019-12-16 ENCOUNTER — OFFICE VISIT (OUTPATIENT)
Dept: PRIMARY CARE CLINIC | Age: 54
End: 2019-12-16
Payer: MEDICARE

## 2019-12-16 VITALS
HEIGHT: 62 IN | HEART RATE: 76 BPM | BODY MASS INDEX: 36.07 KG/M2 | RESPIRATION RATE: 18 BRPM | WEIGHT: 196 LBS | OXYGEN SATURATION: 92 %

## 2019-12-16 VITALS
SYSTOLIC BLOOD PRESSURE: 130 MMHG | DIASTOLIC BLOOD PRESSURE: 83 MMHG | WEIGHT: 196 LBS | OXYGEN SATURATION: 97 % | TEMPERATURE: 98.4 F | HEART RATE: 63 BPM | BODY MASS INDEX: 35.28 KG/M2

## 2019-12-16 DIAGNOSIS — I10 ESSENTIAL HYPERTENSION: ICD-10-CM

## 2019-12-16 DIAGNOSIS — R76.8 HEPATITIS C ANTIBODY POSITIVE IN BLOOD: Primary | ICD-10-CM

## 2019-12-16 DIAGNOSIS — E03.9 HYPOTHYROIDISM, UNSPECIFIED TYPE: ICD-10-CM

## 2019-12-16 DIAGNOSIS — R56.9 SEIZURE (HCC): ICD-10-CM

## 2019-12-16 DIAGNOSIS — Z11.59 ENCOUNTER FOR HEPATITIS C SCREENING TEST FOR LOW RISK PATIENT: ICD-10-CM

## 2019-12-16 DIAGNOSIS — M25.561 ACUTE PAIN OF RIGHT KNEE: ICD-10-CM

## 2019-12-16 DIAGNOSIS — G47.33 OSA (OBSTRUCTIVE SLEEP APNEA): ICD-10-CM

## 2019-12-16 DIAGNOSIS — J06.9 VIRAL UPPER RESPIRATORY TRACT INFECTION: Primary | ICD-10-CM

## 2019-12-16 DIAGNOSIS — Z76.0 MEDICATION REFILL: ICD-10-CM

## 2019-12-16 DIAGNOSIS — D51.0 PERNICIOUS ANEMIA: ICD-10-CM

## 2019-12-16 DIAGNOSIS — J44.1 COPD EXACERBATION (HCC): ICD-10-CM

## 2019-12-16 DIAGNOSIS — G56.03 BILATERAL CARPAL TUNNEL SYNDROME: ICD-10-CM

## 2019-12-16 PROBLEM — M54.50 CHRONIC LOW BACK PAIN: Status: ACTIVE | Noted: 2017-02-10

## 2019-12-16 PROBLEM — M54.16 LUMBAR RADICULOPATHY: Status: ACTIVE | Noted: 2017-02-10

## 2019-12-16 PROBLEM — G57.00 PIRIFORMIS SYNDROME: Status: ACTIVE | Noted: 2019-12-16

## 2019-12-16 PROBLEM — G89.29 CHRONIC LOW BACK PAIN: Status: ACTIVE | Noted: 2017-02-10

## 2019-12-16 LAB
ALBUMIN SERPL-MCNC: 3.9 G/DL (ref 3.5–5.2)
ALBUMIN/GLOBULIN RATIO: 1.3 (ref 1–2.5)
ALP BLD-CCNC: 105 U/L (ref 35–104)
ALT SERPL-CCNC: 11 U/L (ref 5–33)
ANION GAP SERPL CALCULATED.3IONS-SCNC: 11 MMOL/L (ref 9–17)
AST SERPL-CCNC: 19 U/L
BILIRUB SERPL-MCNC: 0.19 MG/DL (ref 0.3–1.2)
BUN BLDV-MCNC: 13 MG/DL (ref 6–20)
BUN/CREAT BLD: ABNORMAL (ref 9–20)
CALCIUM SERPL-MCNC: 8.9 MG/DL (ref 8.6–10.4)
CHLORIDE BLD-SCNC: 96 MMOL/L (ref 98–107)
CO2: 26 MMOL/L (ref 20–31)
CREAT SERPL-MCNC: 0.37 MG/DL (ref 0.5–0.9)
FOLATE: 13.1 NG/ML
GFR AFRICAN AMERICAN: >60 ML/MIN
GFR NON-AFRICAN AMERICAN: >60 ML/MIN
GFR SERPL CREATININE-BSD FRML MDRD: ABNORMAL ML/MIN/{1.73_M2}
GFR SERPL CREATININE-BSD FRML MDRD: ABNORMAL ML/MIN/{1.73_M2}
GLUCOSE FASTING: 107 MG/DL (ref 70–99)
HEPATITIS C ANTIBODY: REACTIVE
PHENYTOIN DATE LAST DOSE: ABNORMAL
PHENYTOIN DOSE AMOUNT: ABNORMAL
PHENYTOIN DOSE TIME: ABNORMAL
PHENYTOIN FREE: 1 UG/ML (ref 1–2)
PHENYTOIN LEVEL: 7 UG/ML (ref 10–20)
POTASSIUM SERPL-SCNC: 4.6 MMOL/L (ref 3.7–5.3)
SODIUM BLD-SCNC: 133 MMOL/L (ref 135–144)
TOTAL PROTEIN: 7 G/DL (ref 6.4–8.3)
TSH SERPL DL<=0.05 MIU/L-ACNC: 0.3 MIU/L (ref 0.3–5)
VALPROIC ACID % FREE: 9.4 % (ref 5–18.4)
VALPROIC ACID LEVEL: 48 UG/ML (ref 50–125)
VALPROIC ACID, FREE: 4.5 UG/ML (ref 7–23)
VALPROIC DATE LAST DOSE: ABNORMAL
VALPROIC DOSE AMOUNT: ABNORMAL
VALPROIC TIME LAST DOSE: ABNORMAL
VITAMIN B-12: 424 PG/ML (ref 232–1245)

## 2019-12-16 PROCEDURE — G8926 SPIRO NO PERF OR DOC: HCPCS | Performed by: NURSE PRACTITIONER

## 2019-12-16 PROCEDURE — 80164 ASSAY DIPROPYLACETIC ACD TOT: CPT

## 2019-12-16 PROCEDURE — 3017F COLORECTAL CA SCREEN DOC REV: CPT | Performed by: NURSE PRACTITIONER

## 2019-12-16 PROCEDURE — 86803 HEPATITIS C AB TEST: CPT

## 2019-12-16 PROCEDURE — G8417 CALC BMI ABV UP PARAM F/U: HCPCS | Performed by: NURSE PRACTITIONER

## 2019-12-16 PROCEDURE — 3023F SPIROM DOC REV: CPT | Performed by: NURSE PRACTITIONER

## 2019-12-16 PROCEDURE — G8427 DOCREV CUR MEDS BY ELIG CLIN: HCPCS | Performed by: NURSE PRACTITIONER

## 2019-12-16 PROCEDURE — 82607 VITAMIN B-12: CPT

## 2019-12-16 PROCEDURE — 95811 POLYSOM 6/>YRS CPAP 4/> PARM: CPT

## 2019-12-16 PROCEDURE — 36415 COLL VENOUS BLD VENIPUNCTURE: CPT

## 2019-12-16 PROCEDURE — 80053 COMPREHEN METABOLIC PANEL: CPT

## 2019-12-16 PROCEDURE — 84443 ASSAY THYROID STIM HORMONE: CPT

## 2019-12-16 PROCEDURE — 80185 ASSAY OF PHENYTOIN TOTAL: CPT

## 2019-12-16 PROCEDURE — 80165 DIPROPYLACETIC ACID FREE: CPT

## 2019-12-16 PROCEDURE — 99214 OFFICE O/P EST MOD 30 MIN: CPT | Performed by: NURSE PRACTITIONER

## 2019-12-16 PROCEDURE — 82746 ASSAY OF FOLIC ACID SERUM: CPT

## 2019-12-16 PROCEDURE — G8482 FLU IMMUNIZE ORDER/ADMIN: HCPCS | Performed by: NURSE PRACTITIONER

## 2019-12-16 PROCEDURE — 80186 ASSAY OF PHENYTOIN FREE: CPT

## 2019-12-16 PROCEDURE — 4004F PT TOBACCO SCREEN RCVD TLK: CPT | Performed by: NURSE PRACTITIONER

## 2019-12-16 RX ORDER — CITALOPRAM 20 MG/1
20 TABLET ORAL DAILY
Status: ON HOLD | COMMUNITY
End: 2020-09-10

## 2019-12-16 RX ORDER — DIVALPROEX SODIUM 500 MG/1
500 TABLET, DELAYED RELEASE ORAL 2 TIMES DAILY
Qty: 60 TABLET | Refills: 1 | Status: SHIPPED | OUTPATIENT
Start: 2019-12-16 | End: 2020-01-17

## 2019-12-16 RX ORDER — FUROSEMIDE 20 MG/1
20 TABLET ORAL DAILY PRN
Qty: 30 TABLET | Refills: 1 | Status: ON HOLD | OUTPATIENT
Start: 2019-12-16 | End: 2020-08-26 | Stop reason: HOSPADM

## 2019-12-16 RX ORDER — LIDOCAINE 50 MG/G
OINTMENT TOPICAL
Qty: 1 TUBE | Refills: 2 | Status: ON HOLD | OUTPATIENT
Start: 2019-12-16 | End: 2020-08-26 | Stop reason: HOSPADM

## 2019-12-16 RX ORDER — BUTALBITAL, ACETAMINOPHEN AND CAFFEINE 50; 325; 40 MG/1; MG/1; MG/1
1 TABLET ORAL EVERY 6 HOURS PRN
Qty: 20 TABLET | Refills: 0 | Status: SHIPPED | OUTPATIENT
Start: 2019-12-16 | End: 2020-01-17 | Stop reason: SDUPTHER

## 2019-12-16 RX ORDER — WATER / MINERAL OIL / WHITE PETROLATUM 16 OZ
CREAM TOPICAL
Qty: 1 PACKAGE | Refills: 2 | Status: SHIPPED | OUTPATIENT
Start: 2019-12-16 | End: 2020-02-13 | Stop reason: SDUPTHER

## 2019-12-16 RX ORDER — IBUPROFEN 600 MG/1
600 TABLET ORAL 3 TIMES DAILY PRN
Qty: 30 TABLET | Refills: 0 | Status: SHIPPED | OUTPATIENT
Start: 2019-12-16 | End: 2020-01-10 | Stop reason: SDUPTHER

## 2019-12-16 RX ORDER — DOXYCYCLINE HYCLATE 100 MG
100 TABLET ORAL 2 TIMES DAILY
Qty: 14 TABLET | Refills: 0 | Status: SHIPPED | OUTPATIENT
Start: 2019-12-16 | End: 2019-12-23

## 2019-12-16 RX ORDER — CETIRIZINE HYDROCHLORIDE 10 MG/1
10 TABLET ORAL DAILY
Qty: 30 TABLET | Refills: 2 | Status: SHIPPED | OUTPATIENT
Start: 2019-12-16 | End: 2020-01-15

## 2019-12-16 RX ORDER — PHENYLEPHRINE HYDROCHLORIDE 10 MG/1
TABLET, COATED ORAL
Qty: 2 EACH | Refills: 0 | Status: SHIPPED | OUTPATIENT
Start: 2019-12-16 | End: 2020-03-10 | Stop reason: SDUPTHER

## 2019-12-16 ASSESSMENT — ENCOUNTER SYMPTOMS
ABDOMINAL PAIN: 0
SINUS PRESSURE: 0
DIARRHEA: 0
CHEST TIGHTNESS: 0
COUGH: 1
RHINORRHEA: 1
WHEEZING: 1
SINUS PAIN: 1
BLOOD IN STOOL: 0
VOMITING: 0
CONSTIPATION: 1
EYE DISCHARGE: 0
SHORTNESS OF BREATH: 0

## 2019-12-17 ENCOUNTER — TELEPHONE (OUTPATIENT)
Dept: PRIMARY CARE CLINIC | Age: 54
End: 2019-12-17

## 2019-12-22 LAB — STATUS: NORMAL

## 2019-12-23 ENCOUNTER — HOSPITAL ENCOUNTER (EMERGENCY)
Age: 54
Discharge: HOME | End: 2019-12-23
Attending: EMERGENCY MEDICINE
Payer: MEDICARE

## 2019-12-23 VITALS
DIASTOLIC BLOOD PRESSURE: 106 MMHG | TEMPERATURE: 98.1 F | RESPIRATION RATE: 17 BRPM | OXYGEN SATURATION: 97 % | HEIGHT: 67 IN | HEART RATE: 66 BPM | WEIGHT: 196.6 LBS | SYSTOLIC BLOOD PRESSURE: 141 MMHG | BODY MASS INDEX: 30.86 KG/M2

## 2019-12-23 DIAGNOSIS — G43.909 MIGRAINE WITHOUT STATUS MIGRAINOSUS, NOT INTRACTABLE, UNSPECIFIED MIGRAINE TYPE: Primary | ICD-10-CM

## 2019-12-23 PROCEDURE — 96372 THER/PROPH/DIAG INJ SC/IM: CPT

## 2019-12-23 PROCEDURE — 6360000002 HC RX W HCPCS: Performed by: STUDENT IN AN ORGANIZED HEALTH CARE EDUCATION/TRAINING PROGRAM

## 2019-12-23 PROCEDURE — 6370000000 HC RX 637 (ALT 250 FOR IP): Performed by: STUDENT IN AN ORGANIZED HEALTH CARE EDUCATION/TRAINING PROGRAM

## 2019-12-23 PROCEDURE — 99283 EMERGENCY DEPT VISIT LOW MDM: CPT

## 2019-12-23 RX ORDER — KETOROLAC TROMETHAMINE 10 MG/1
10 TABLET, FILM COATED ORAL EVERY 6 HOURS PRN
Qty: 20 TABLET | Refills: 0 | Status: SHIPPED | OUTPATIENT
Start: 2019-12-23 | End: 2020-03-10 | Stop reason: SDUPTHER

## 2019-12-23 RX ORDER — ACETAMINOPHEN 500 MG
1000 TABLET ORAL ONCE
Status: COMPLETED | OUTPATIENT
Start: 2019-12-23 | End: 2019-12-23

## 2019-12-23 RX ORDER — PROCHLORPERAZINE MALEATE 10 MG
10 TABLET ORAL ONCE
Status: COMPLETED | OUTPATIENT
Start: 2019-12-23 | End: 2019-12-23

## 2019-12-23 RX ORDER — PROCHLORPERAZINE MALEATE 5 MG/1
5 TABLET ORAL EVERY 6 HOURS PRN
Qty: 10 TABLET | Refills: 0 | Status: SHIPPED | OUTPATIENT
Start: 2019-12-23 | End: 2020-03-10 | Stop reason: SDUPTHER

## 2019-12-23 RX ORDER — KETOROLAC TROMETHAMINE 30 MG/ML
30 INJECTION, SOLUTION INTRAMUSCULAR; INTRAVENOUS ONCE
Status: COMPLETED | OUTPATIENT
Start: 2019-12-23 | End: 2019-12-23

## 2019-12-23 RX ORDER — DIPHENHYDRAMINE HCL 25 MG
25 TABLET ORAL ONCE
Status: COMPLETED | OUTPATIENT
Start: 2019-12-23 | End: 2019-12-23

## 2019-12-23 RX ADMIN — DIPHENHYDRAMINE HCL 25 MG: 25 TABLET ORAL at 13:04

## 2019-12-23 RX ADMIN — ACETAMINOPHEN 1000 MG: 500 TABLET ORAL at 13:04

## 2019-12-23 RX ADMIN — KETOROLAC TROMETHAMINE 30 MG: 30 INJECTION, SOLUTION INTRAMUSCULAR at 13:03

## 2019-12-23 RX ADMIN — PROCHLORPERAZINE MALEATE 10 MG: 10 TABLET ORAL at 13:04

## 2019-12-23 ASSESSMENT — ENCOUNTER SYMPTOMS
SINUS PAIN: 0
APNEA: 0
ABDOMINAL PAIN: 0
RHINORRHEA: 0
SHORTNESS OF BREATH: 0
BLOOD IN STOOL: 0
WHEEZING: 0
DIARRHEA: 0
COUGH: 0
NAUSEA: 0
EYE PAIN: 0
VOMITING: 0
EYE REDNESS: 0

## 2019-12-23 ASSESSMENT — PAIN DESCRIPTION - LOCATION: LOCATION: HEAD

## 2019-12-23 ASSESSMENT — PAIN DESCRIPTION - FREQUENCY: FREQUENCY: CONTINUOUS

## 2019-12-23 ASSESSMENT — PAIN SCALES - GENERAL
PAINLEVEL_OUTOF10: 6

## 2019-12-23 ASSESSMENT — PAIN DESCRIPTION - PAIN TYPE: TYPE: ACUTE PAIN

## 2019-12-23 ASSESSMENT — PAIN DESCRIPTION - DESCRIPTORS: DESCRIPTORS: ACHING;CONSTANT

## 2019-12-24 ENCOUNTER — CARE COORDINATION (OUTPATIENT)
Dept: CARE COORDINATION | Age: 54
End: 2019-12-24

## 2020-01-02 ENCOUNTER — HOSPITAL ENCOUNTER (EMERGENCY)
Age: 55
Discharge: HOME | End: 2020-01-02
Attending: EMERGENCY MEDICINE
Payer: MEDICARE

## 2020-01-02 ENCOUNTER — APPOINTMENT (OUTPATIENT)
Dept: GENERAL RADIOLOGY | Age: 55
End: 2020-01-02
Payer: MEDICARE

## 2020-01-02 VITALS
SYSTOLIC BLOOD PRESSURE: 140 MMHG | OXYGEN SATURATION: 98 % | RESPIRATION RATE: 16 BRPM | HEART RATE: 68 BPM | TEMPERATURE: 98 F | DIASTOLIC BLOOD PRESSURE: 82 MMHG

## 2020-01-02 PROCEDURE — 71046 X-RAY EXAM CHEST 2 VIEWS: CPT

## 2020-01-02 PROCEDURE — 99283 EMERGENCY DEPT VISIT LOW MDM: CPT

## 2020-01-02 PROCEDURE — 6360000002 HC RX W HCPCS: Performed by: GENERAL PRACTICE

## 2020-01-02 PROCEDURE — 96374 THER/PROPH/DIAG INJ IV PUSH: CPT

## 2020-01-02 RX ORDER — DEXAMETHASONE SODIUM PHOSPHATE 10 MG/ML
10 INJECTION INTRAMUSCULAR; INTRAVENOUS ONCE
Status: COMPLETED | OUTPATIENT
Start: 2020-01-02 | End: 2020-01-02

## 2020-01-02 RX ADMIN — DEXAMETHASONE SODIUM PHOSPHATE 10 MG: 10 INJECTION INTRAMUSCULAR; INTRAVENOUS at 07:43

## 2020-01-02 ASSESSMENT — ENCOUNTER SYMPTOMS
SINUS PRESSURE: 0
DIARRHEA: 0
SINUS PAIN: 0
EYE PAIN: 0
SHORTNESS OF BREATH: 0
BACK PAIN: 0
VOMITING: 0
NAUSEA: 0
ABDOMINAL PAIN: 0
SORE THROAT: 0
COUGH: 1

## 2020-01-02 NOTE — ED NOTES
Writer provided patient information on [facility]. Patient stated Marsha Stroud is her PCP at the clinic. Writer explained clinic is open at 8:00am, patient acknowledged but stated she \"woke up like this\". Patient requested [facility] transport home then stated she's going to smoke a cigarette. Writer arranged transport.       GLENIS Perez, [facility]  01/02/20 [id number]

## 2020-01-02 NOTE — ED PROVIDER NOTES
insecurity:     Worry: Not on file     Inability: Not on file    Transportation needs:     Medical: Not on file     Non-medical: Not on file   Tobacco Use    Smoking status: Current Every Day Smoker     Packs/day: 0.50     Years: 12.00     Pack years: 6.00    Smokeless tobacco: Never Used   Substance and Sexual Activity    Alcohol use: No    Drug use: No    Sexual activity: Not on file   Lifestyle    Physical activity:     Days per week: Not on file     Minutes per session: Not on file    Stress: Not on file   Relationships    Social connections:     Talks on phone: Not on file     Gets together: Not on file     Attends Yarsani service: Not on file     Active member of club or organization: Not on file     Attends meetings of clubs or organizations: Not on file     Relationship status: Not on file    Intimate partner violence:     Fear of current or ex partner: Not on file     Emotionally abused: Not on file     Physically abused: Not on file     Forced sexual activity: Not on file   Other Topics Concern    Not on file   Social History Narrative    Not on file       No family history on file. Allergies:  Imitrex [sumatriptan]; Bee pollen; Bee venom; Bromide ion [bromine]; Flexeril [cyclobenzaprine]; Neurontin [gabapentin]; Nsaids; Potassium bromide; Reglan [metoclopramide]; Sulfa antibiotics; Sulfadiazine; Toradol [ketorolac tromethamine]; and Tramadol    Home Medications:  Prior to Admission medications    Medication Sig Start Date End Date Taking?  Authorizing Provider   ketorolac (TORADOL) 10 MG tablet Take 1 tablet by mouth every 6 hours as needed for Pain 12/23/19   Antoinette Friedman MD   prochlorperazine (COMPAZINE) 5 MG tablet Take 1 tablet by mouth every 6 hours as needed for Nausea 12/23/19   Antoinette Friedman MD   Lidocaine 2 % GEL Apply topically 3 times daily as needed (pain) 12/18/19 1/17/20  KEANU Cronin - CNP   Elastic Bandages & Supports (CARPAL TUNNEL WRIST STABILIZER) [address] [facility] Apply nightly to each wrist 12/16/19   KEANU Mckinney CNP   citalopram (CELEXA) 20 MG tablet Take 20 mg by mouth daily    Historical Provider, MD   divalproex (DEPAKOTE) 500 MG DR tablet Take 1 tablet by mouth 2 times daily 12/16/19 1/15/20  KEANU Mckinney CNP   furosemide (LASIX) 20 MG tablet Take 1 tablet by mouth daily as needed (for swelling) 12/16/19   KEANU Mckinney CNP   cetirizine (ZYRTEC) 10 MG tablet Take 1 tablet by mouth daily 12/16/19 1/15/20  KEANU Mckinney CNP   Skin Protectants, Misc. (EUCERIN) cream Apply topically as needed.  12/16/19   KEANU Mckinney CNP   lidocaine (XYLOCAINE) 5 % ointment Apply topically as needed to knee up to three times a day 12/16/19   KEANU Mckinney CNP   butalbital-acetaminophen-caffeine (FIORICET, [facility]) -04 MG per tablet Take 1 tablet by mouth every 6 hours as needed for Migraine 12/16/19 1/15/20  KEANU Mckinney CNP   ibuprofen (ADVIL;MOTRIN) 600 MG tablet Take 1 tablet by mouth 3 times daily as needed for Pain 12/16/19   KEANU Mckinney CNP   famotidine (PEPCID) 20 MG tablet Take 1 tablet by mouth 2 times daily 11/26/19   KEANU Mckinney CNP   docusate sodium (COLACE) 100 MG capsule Take 1 capsule by mouth daily as needed for Constipation 11/26/19   KEANU Mckinney CNP   ferrous sulfate 325 (65 Fe) MG EC tablet Take 1 tablet by mouth daily (with breakfast) 11/26/19   KEANU Mckinney CNP   butalbital-acetaminophen-caffeine (FIORICET, ESGIC) -61 MG per tablet Take 1-2 tablets by mouth every 4 hours as needed for Headaches or Migraine Take 2 pills at onset, then take 1 pill 4 hours later if needed  Patient not taking: Reported on 12/16/2019 11/26/19   Venora Husky, APRN - CNP   propranolol (INDERAL LA) 60 MG extended release capsule Take 1 capsule by mouth daily 11/26/19   KEANU Mckinney - CNP   Nutritional Supplements (BOOST HIGH PROTEIN) LIQD Patient is to drink 1 bottle four times per day  Patient not taking: Reported on 12/16/2019 11/21/19   KEANU Dueñas - CNP   risperiDONE (RISPERDAL) 2 MG tablet Take 1 tablet by mouth nightly 11/21/19   KEANU Dueñas - CNP   mirtazapine (REMERON) 45 MG tablet Take 1 tablet by mouth nightly 11/21/19   KEANU Dueñas - CNP   tiZANidine (ZANAFLEX) 4 MG tablet Take 1 tablet by mouth every 8 hours as needed (pain) 11/21/19   KEANU Dueñas - CNP   traZODone (DESYREL) 100 MG tablet Take 1 tablet by mouth nightly 11/21/19   KEANU Dueñas - CNP   levothyroxine (SYNTHROID) 100 MCG tablet Take 1 tablet by mouth Daily 11/21/19 12/21/19  KEANU Dueñas - CNP   phenytoin (PHENYTEK) 200 MG ER capsule Take 1 capsule by mouth 2 times daily 11/21/19   KEANU Dueñas CNP   buprenorphine-naloxone (SUBOXONE) 8-2 MG FILM SL film  11/14/19   Historical Provider, MD   ciclopirox (PENLAC) 8 % solution Apply topically nightly.  11/20/19   Early LENIN Batista   Wound Dressings (ADAPTIC NON-ADHERING DRESSING) PADS Use daily with wound dressing changes 11/19/19   Iona Lara MD   ondansetron (ZOFRAN-ODT) 4 MG disintegrating tablet Place 1 tablet under the tongue every 8 hours as needed for Nausea or Vomiting 11/15/19   KEANU Dueñas CNP   Wound Dressings (ADAPTIC NON-ADHERING DRESSING) PADS Apply 1 each topically 2 times daily Apply to R lateral thigh wound after shower daily 11/12/19   Patrick Severance, DO   Gauze Pads & Dressings (GAUZE DRESSING) 4\"X4\" PADS 1 each by Does not apply route daily as needed (wound)  Patient not taking: Reported on 12/16/2019 11/12/19   Patrick Severance, DO   acetaminophen (TYLENOL) 500 MG tablet Take 1 tablet by mouth every 6 hours as needed for Pain or Fever 11/12/19   Patrick Severance, DO   ondansetron (ZOFRAN) 4 MG tablet Take 1 tablet by mouth daily as needed for Nausea or Vomiting  Patient not taking: Reported on 12/16/2019 10/15/19   Darien Vergara, tenderness. Abdominal:      General: There is no distension. Palpations: Abdomen is soft. Tenderness: There is no tenderness. Lymphadenopathy:      Cervical: No cervical adenopathy. Skin:     General: Skin is warm. Neurological:      General: No focal deficit present. Mental Status: She is alert and oriented to person, place, and time. Psychiatric:         Mood and Affect: Mood normal.         Thought Content: Thought content normal.         DIFFERENTIAL  DIAGNOSIS     PLAN (LABS / IMAGING / EKG):  Orders Placed This Encounter   Procedures    XR CHEST STANDARD (2 VW)       MEDICATIONS ORDERED:  No orders of the defined types were placed in this encounter. DIAGNOSTIC RESULTS / EMERGENCY DEPARTMENT COURSE / MDM   :  No results found for this visit on 01/02/20. IMPRESSION    RADIOLOGY:  No acute cardiopulmonary abnormality    EKG  None    All EKG's are interpreted by the Emergency Department Physician who either signs or Co-signs this chart in the absence of a cardiologist.    EMERGENCY DEPARTMENT COURSE/IMPRESSION:  47-year-old female with history of COPD and CHF presenting with productive cough for 2 days which is foul-smelling and foul tasting per patient, order chest x-ray rule out pneumonia, remainder of exam is unremarkable, patient is afebrile. Treated patient with 10 mg of oral Decadron, will discharge with Cepacol lozenges, return precautions given. PROCEDURES:  None    CONSULTS:  None    CRITICAL CARE:  None    FINAL IMPRESSION      1. Cough          DISPOSITION / PLAN     DISPOSITION        PATIENT REFERRED TO:  No follow-up provider specified.     DISCHARGE MEDICATIONS:  New Prescriptions    No medications on file       Renate Asher DO  Emergency Medicine Resident    (Please note that portions of thisnote were completed with a voice recognition program.  Efforts were made to edit the dictations but occasionally words are mis-transcribed.)     Renate Asher DO  Resident  01/02/20 [id number]

## 2020-01-02 NOTE — ED PROVIDER NOTES
[facility]  ED  eMERGENCY dEPARTMENT eNCOUnter   Attending Attestation     Pt Name: Irma Jimenes  MRN: [id number]  [place] 1965  Date of evaluation: 1/2/20       Irma Jimenes is a 62 y.o. female who presents with Cough (productive x2 days)    Patient presents with productive cough that is green and tastes badly. Patient says that she does have COPD and has been using her inhaler. Plan for chest x-ray, probable antibiotics and she is COPD and discharge follow-up with PCP. I performed a history and physical examination of the patient and discussed management with the resident. I reviewed the residents note and agree with the documented findings and plan of care. Any areas of disagreement are noted on the chart. I was personally present for the key portions of any procedures. I have documented in the chart those procedures where I was not present during the key portions. I have personally reviewed all images and agree with the resident's interpretation. I have reviewed the emergency nurses triage note. I agree with the chief complaint, past medical history, past surgical history, allergies, medications, social and family history as documented unless otherwise noted below. Documentation of the HPI, Physical Exam and Medical Decision Making performed by medical students or scribes is based on my personal performance of the HPI, PE and MDM. For Phys Assistant/ Nurse Practitioner cases/documentation I have had a face to face evaluation of this patient and have completed at least one if not all key elements of the E/M (history, physical exam, and MDM). Additional findings are as noted. For APC cases I have personally evaluated and examined the patient in conjunction with the APC and agree with the treatment plan and disposition of the patient as recorded by the APC.     Phuc Jung MD  Attending Emergency  Physician        Molina Vega MD  01/02/20 0793

## 2020-01-03 ENCOUNTER — CARE COORDINATION (OUTPATIENT)
Dept: CARE COORDINATION | Age: 55
End: 2020-01-03

## 2020-01-03 NOTE — CARE COORDINATION
Patient was called to follow up with most recent ER visit. A lady states pt is not available. A message was left to have patient call back. Office number given [phone].

## 2020-01-04 ENCOUNTER — HOSPITAL ENCOUNTER (EMERGENCY)
Age: 55
Discharge: HOME | End: 2020-01-04
Attending: EMERGENCY MEDICINE
Payer: MEDICARE

## 2020-01-04 VITALS
HEIGHT: 62 IN | BODY MASS INDEX: 36.07 KG/M2 | HEART RATE: 94 BPM | OXYGEN SATURATION: 95 % | DIASTOLIC BLOOD PRESSURE: 77 MMHG | SYSTOLIC BLOOD PRESSURE: 121 MMHG | RESPIRATION RATE: 16 BRPM | TEMPERATURE: 97.2 F | WEIGHT: 196 LBS

## 2020-01-04 PROCEDURE — 6370000000 HC RX 637 (ALT 250 FOR IP): Performed by: STUDENT IN AN ORGANIZED HEALTH CARE EDUCATION/TRAINING PROGRAM

## 2020-01-04 PROCEDURE — 99282 EMERGENCY DEPT VISIT SF MDM: CPT

## 2020-01-04 RX ORDER — OXYMETAZOLINE HYDROCHLORIDE 0.05 G/100ML
2 SPRAY NASAL 2 TIMES DAILY
Qty: 1 BOTTLE | Refills: 0 | Status: SHIPPED | OUTPATIENT
Start: 2020-01-04 | End: 2020-01-06

## 2020-01-04 RX ORDER — GUAIFENESIN 600 MG/1
600 TABLET, EXTENDED RELEASE ORAL ONCE
Status: COMPLETED | OUTPATIENT
Start: 2020-01-04 | End: 2020-01-04

## 2020-01-04 RX ORDER — GUAIFENESIN AND DEXTROMETHORPHAN HYDROBROMIDE 1200; 60 MG/1; MG/1
1 TABLET, EXTENDED RELEASE ORAL 2 TIMES DAILY PRN
Qty: 28 TABLET | Refills: 0 | Status: SHIPPED | OUTPATIENT
Start: 2020-01-04 | End: 2020-01-07 | Stop reason: SDUPTHER

## 2020-01-04 RX ADMIN — BENZOCAINE AND MENTHOL 1 LOZENGE: 15; 3.6 LOZENGE ORAL at 21:49

## 2020-01-04 RX ADMIN — GUAIFENESIN 600 MG: 600 TABLET, EXTENDED RELEASE ORAL at 21:49

## 2020-01-04 ASSESSMENT — PAIN DESCRIPTION - PAIN TYPE: TYPE: ACUTE PAIN

## 2020-01-04 ASSESSMENT — PAIN DESCRIPTION - LOCATION: LOCATION: CHEST

## 2020-01-04 ASSESSMENT — ENCOUNTER SYMPTOMS
COUGH: 1
NAUSEA: 0
ABDOMINAL PAIN: 0
SORE THROAT: 0
TROUBLE SWALLOWING: 0
RHINORRHEA: 1
VOMITING: 0
SHORTNESS OF BREATH: 0
EYE PAIN: 0
BACK PAIN: 0

## 2020-01-04 ASSESSMENT — PAIN SCALES - GENERAL: PAINLEVEL_OUTOF10: 7

## 2020-01-04 ASSESSMENT — PAIN DESCRIPTION - PROGRESSION: CLINICAL_PROGRESSION: NOT CHANGED

## 2020-01-04 ASSESSMENT — PAIN DESCRIPTION - DESCRIPTORS: DESCRIPTORS: ACHING

## 2020-01-04 ASSESSMENT — PAIN DESCRIPTION - FREQUENCY: FREQUENCY: CONTINUOUS

## 2020-01-04 ASSESSMENT — PAIN DESCRIPTION - ONSET: ONSET: ON-GOING

## 2020-01-05 NOTE — ED PROVIDER NOTES
[facility]  ED  Emergency Department Encounter  Emergency Medicine Resident     Pt Name: Gil Taylor  UTN:[id number]  [place] 1965  Date of evaluation: 1/4/20  PCP:  KEANU Enriquez [id number]       Chief Complaint   Patient presents with    Cough    Nasal Congestion       HISTORY OF PRESENT ILLNESS  (Location/Symptom, Timing/Onset, Context/Setting, Quality, Duration, ModifyingFactors, Severity.)      Gil Taylor is a 62 y.o. female with PMH of COPD, CHF presents with 4 days of persistent dry cough, sore throat, congestion. Presented to the ED 2 days ago with productive cough, given Decadron, discharged with Cepacol lozenges. Patient stated the pharmacy did not have her other 2 prescriptions although upon chart review was only discharged with Cepacol lozenges prescription. 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 Denies any worsening symptoms, fevers, productive cough, headache, lightheadedness, chest pain or shortness of breath, nausea or vomiting. Patient smokes half pack cigarettes per day, denies other alcohol or drug use. PAST MEDICAL / SURGICAL / SOCIAL /FAMILY HISTORY      has a past medical history of Arthritis, Asthma, Borderline diabetes, Borderline personality disorder ([address] CHF (congestive heart failure) ([address] COPD (chronic obstructive pulmonary disease) ([address] Fibromyalgia, Headache, Hypertension, Manic depression ([address] Movement disorder, Neck fracture ([address] Pernicious anemia, Seizure ([address] and Thyroid disease. has a past surgical history that includes knee surgery (Bilateral); partial hysterectomy (cervix not removed); lymph node dissection; Irrigation and debridement (Right, 5/17/2019); EXPLORATION OF WOUND OF EXTREMITY (Right, 5/19/2019); and EXPLORATION OF WOUND OF EXTREMITY (N/A, 5/22/2019).     Social History     Socioeconomic History    Marital status:      Spouse name: Not on file    Number of children: Not on file    Years of education: Not on file    Highest education level: Not on file   Occupational History    Not on file   Social Needs    Financial resource strain: Not on file    Food insecurity:     Worry: Not on file     Inability: Not on file    Transportation needs:     Medical: Not on file     Non-medical: Not on file   Tobacco Use    Smoking status: Current Every Day Smoker     Packs/day: 0.50     Years: 12.00     Pack years: 6.00    Smokeless tobacco: Never Used   Substance and Sexual Activity    Alcohol use: No    Drug use: No    Sexual activity: Not on file   Lifestyle    Physical activity:     Days per week: Not on file     Minutes per session: Not on file    Stress: Not on file   Relationships    Social connections:     Talks on phone: Not on file     Gets together: Not on file     Attends [employer] service: Not on file     Active member of club or organization: Not on file     Attends meetings of clubs or organizations: Not on file     Relationship status: Not on file    Intimate partner violence:     Fear of current or ex partner: Not on file     Emotionally abused: Not on file     Physically abused: Not on file     Forced sexual activity: Not on file   Other Topics Concern    Not on file   Social History Narrative    Not on file       I counseled the patient against using tobacco products. History reviewed. No pertinent family history. Allergies:  Imitrex [sumatriptan]; Bee pollen; Bee venom; Bromide ion [bromine]; Flexeril [cyclobenzaprine]; Neurontin [gabapentin]; Nsaids; Potassium bromide; Reglan [metoclopramide]; Sulfa antibiotics; Sulfadiazine; Toradol [ketorolac tromethamine]; and Tramadol    Home Medications:  Prior to Admission medications    Medication Sig Start Date End Date Taking?  Authorizing Provider   Benzocaine-Menthol (CEPACOL SORE THROAT) 10-2.1 MG LOZG Take 1 lozenge by mouth every 2 hours as 11/26/19   Hernandez Hoang APRN - CNP   docusate sodium (COLACE) 100 MG capsule Take 1 capsule by mouth daily as needed for Constipation 11/26/19   Hernandez Hoang APRN - CNP   ferrous sulfate 325 (65 Fe) MG EC tablet Take 1 tablet by mouth daily (with breakfast) 11/26/19   Hernandez Hoang APRN - CNP   butalbital-acetaminophen-caffeine (FIORICET, ESGIC) -11 MG per tablet Take 1-2 tablets by mouth every 4 hours as needed for Headaches or Migraine Take 2 pills at onset, then take 1 pill 4 hours later if needed  Patient not taking: Reported on 12/16/2019 11/26/19   Hernandez Hoang APRN - CNP   propranolol (INDERAL LA) 60 MG extended release capsule Take 1 capsule by mouth daily 11/26/19   Hernandez Hoang APRN - CNP   Nutritional Supplements (BOOST HIGH PROTEIN) LIQD Patient is to drink 1 bottle four times per day  Patient not taking: Reported on 12/16/2019 11/21/19   Hernandez Hoang APRN - CNP   risperiDONE (RISPERDAL) 2 MG tablet Take 1 tablet by mouth nightly 11/21/19   Hernandez Hoang APRN - CNP   mirtazapine (REMERON) 45 MG tablet Take 1 tablet by mouth nightly 11/21/19   Hernandez Hoang APRN - CNP   tiZANidine (ZANAFLEX) 4 MG tablet Take 1 tablet by mouth every 8 hours as needed (pain) 11/21/19   Hernandez Hoang APRN - CNP   traZODone (DESYREL) 100 MG tablet Take 1 tablet by mouth nightly 11/21/19   Thebetha Natalya, APRN - CNP   levothyroxine (SYNTHROID) 100 MCG tablet Take 1 tablet by mouth Daily 11/21/19 12/21/19  Theagusto Hoang APRN - CNP   phenytoin (PHENYTEK) 200 MG ER capsule Take 1 capsule by mouth 2 times daily 11/21/19   Thebetha Natalya APRN - CNP   buprenorphine-naloxone (SUBOXONE) 8-2 MG FILM SL film  11/14/19   Historical Provider, MD   ciclopirox (PENLAC) 8 % solution Apply topically nightly.  11/20/19   Gulshan Chrissy, DPM   Wound Dressings (ADAPTIC NON-ADHERING DRESSING) PADS Use daily with wound dressing changes 11/19/19   Johana Morris MD   ondansetron (ZOFRAN-ODT) 4 MG disintegrating tablet Place 1 tablet under the tongue every 8 hours as needed for Nausea or Vomiting 11/15/19   KEANU Contreras CNP   Wound Dressings (ADAPTIC NON-ADHERING DRESSING) PADS Apply 1 each topically 2 times daily Apply to R lateral thigh wound after shower daily 11/12/19   Unknown Hurter, DO   Gauze Pads & Dressings (GAUZE DRESSING) 4\"X4\" PADS 1 each by Does not apply route daily as needed (wound)  Patient not taking: Reported on 12/16/2019 11/12/19   Unknown Hurter, DO   acetaminophen (TYLENOL) 500 MG tablet Take 1 tablet by mouth every 6 hours as needed for Pain or Fever 11/12/19   Unknown Hurter, DO   ondansetron (ZOFRAN) 4 MG tablet Take 1 tablet by mouth daily as needed for Nausea or Vomiting  Patient not taking: Reported on 12/16/2019 10/15/19   KEANU Contreras CNP   nicotine (NICOTROL) 10 MG inhaler Inhale 1 puff into the lungs as needed for Smoking cessation 6-16 cartridges per day 10/15/19   KEANU Contreras CNP   BENZOYL PEROXIDE 5 % external wash  10/2/19   Historical Provider, MD   lidocaine (XYLOCAINE) 2 % jelly  10/4/19   Historical Provider, MD   phenytoin (DILANTIN) 50 MG tablet  10/2/19   Historical Provider, MD   Skin Protectants, Misc.  (EUCERIN) cream  10/2/19   Historical Provider, MD   budesonide-formoterol (SYMBICORT) 160-4.5 MCG/ACT AERO Inhale 2 puffs into the lungs 2 times daily    Historical Provider, MD   EPINEPHrine (EPIPEN 2-MARTINA) 0.3 MG/0.3ML SOAJ injection Inject 0.3 mLs into the muscle once for 1 dose Use as directed for allergic reaction 9/11/19 9/11/19  KEANU Flores CNP   ibuprofen (ADVIL;MOTRIN) 600 MG tablet Take 1 tablet by mouth every 6 hours as needed for Pain 7/2/19 8/1/19  Syble Aus, DO   ipratropium-albuterol (DUONEB) 0.5-2.5 (3) MG/3ML SOLN nebulizer solution Inhale 3 mLs into the lungs every 4 hours  Patient not taking: Reported on 12/16/2019 6/16/19   Baldomero Byrne MD   potassium chloride (KLOR-CON M) 20 MEQ R-0Print      oxymetazoline (12 HOUR NASAL SPRAY) 0.05 % nasal spray 2 sprays by Nasal route 2 times daily for 2 days, Disp-1 Bottle, R-0Print             Justyna Wharton DO  PGY 2  Resident Physician Emergency Medicine  01/04/20 10:47 PM        (Please note that portions of this note were completed with a voice recognition program.Efforts were made to edit the dictations but occasionally words are mis-transcribed.)        Justyna Wharton DO  Resident  01/04/20 [id number]

## 2020-01-05 NOTE — ED NOTES
Pt. To ED via self ambulatory to room 33  Patient here with c/o cough and congestion  Patient states she was recently discharged with medication but was unable to get them filled by her pharmacy and needs the prescriptions again  Patient states no new onset of symptoms since last discharge, just not getting any better  Patient states she has intermittent chest pain because of her coughing  Vitals obtained, call light provided, respirations even and non-labored  No other needs at this time  Will continue to follow plan of care     Jennifer Ramirez RN  01/04/20 [id number]

## 2020-01-06 ENCOUNTER — CARE COORDINATION (OUTPATIENT)
Dept: CARE COORDINATION | Age: 55
End: 2020-01-06

## 2020-01-07 ENCOUNTER — OFFICE VISIT (OUTPATIENT)
Dept: PRIMARY CARE CLINIC | Age: 55
End: 2020-01-07
Payer: MEDICARE

## 2020-01-07 ENCOUNTER — HOSPITAL ENCOUNTER (OUTPATIENT)
Age: 55
Discharge: HOME | End: 2020-01-07
Payer: MEDICARE

## 2020-01-07 VITALS
SYSTOLIC BLOOD PRESSURE: 116 MMHG | BODY MASS INDEX: 36.58 KG/M2 | WEIGHT: 200 LBS | DIASTOLIC BLOOD PRESSURE: 74 MMHG | HEART RATE: 71 BPM | OXYGEN SATURATION: 96 % | TEMPERATURE: 98.8 F

## 2020-01-07 PROCEDURE — 87902 NFCT AGT GNTYP ALYS HEP C: CPT

## 2020-01-07 PROCEDURE — 87522 HEPATITIS C REVRS TRNSCRPJ: CPT

## 2020-01-07 PROCEDURE — G8417 CALC BMI ABV UP PARAM F/U: HCPCS | Performed by: NURSE PRACTITIONER

## 2020-01-07 PROCEDURE — G8482 FLU IMMUNIZE ORDER/ADMIN: HCPCS | Performed by: NURSE PRACTITIONER

## 2020-01-07 PROCEDURE — G8427 DOCREV CUR MEDS BY ELIG CLIN: HCPCS | Performed by: NURSE PRACTITIONER

## 2020-01-07 PROCEDURE — 36415 COLL VENOUS BLD VENIPUNCTURE: CPT

## 2020-01-07 PROCEDURE — 4004F PT TOBACCO SCREEN RCVD TLK: CPT | Performed by: NURSE PRACTITIONER

## 2020-01-07 PROCEDURE — 99213 OFFICE O/P EST LOW 20 MIN: CPT | Performed by: NURSE PRACTITIONER

## 2020-01-07 PROCEDURE — 3017F COLORECTAL CA SCREEN DOC REV: CPT | Performed by: NURSE PRACTITIONER

## 2020-01-07 RX ORDER — GUAIFENESIN AND DEXTROMETHORPHAN HYDROBROMIDE 1200; 60 MG/1; MG/1
1 TABLET, EXTENDED RELEASE ORAL 2 TIMES DAILY PRN
Qty: 28 TABLET | Refills: 0 | Status: SHIPPED | OUTPATIENT
Start: 2020-01-07 | End: 2020-04-01 | Stop reason: SDUPTHER

## 2020-01-07 RX ORDER — GABAPENTIN 600 MG/1
TABLET ORAL
COMMUNITY
Start: 2020-01-03 | End: 2020-02-13 | Stop reason: ALTCHOICE

## 2020-01-07 NOTE — PROGRESS NOTES
[address] [facility]  [facility] [place] [address]  Dept: [phone]  Dept Fax: [phone]    Patient Care Team:  KEANU Orellana CNP as PCP - General (Family Medicine)  KEANU Orellana CNP as PCP - REHABILITATION [facility] Empaneled Provider    2020     Jesus Quintero (:  3/05/1918)OF a 62 y.o. female, here for evaluation of the following medical concerns:   Chief Complaint   Patient presents with    Cough    Congestion       In ER on  nd  for URI symptoms  + cough, congestion with yellow phlegm  + HA triggering migraines, frontal pain.  + Sore throat    . Review of Systems    Prior to Visit Medications    Medication Sig Taking? Authorizing Provider   gabapentin (NEURONTIN) 600 MG tablet  Yes Historical Provider, MD   Dextromethorphan-guaiFENesin ([address] DM MAXIMUM STRENGTH)  MG TB12 Take 1 tablet by mouth 2 times daily as needed (cough, congestion) Yes Yennifer Cline, KEANU Hernandez CNP   Benzocaine-Menthol (CEPACOL SORE THROAT) 10-2.1 MG LOZG Take 1 lozenge by mouth every 2 hours as needed (cough) Yes Nishant Meyer DO   ketorolac (TORADOL) 10 MG tablet Take 1 tablet by mouth every 6 hours as needed for Pain Yes Sonal Mann MD   prochlorperazine (COMPAZINE) 5 MG tablet Take 1 tablet by mouth every 6 hours as needed for Nausea Yes Sonal Mann MD   Elastic Bandages & Supports (CARPAL TUNNEL WRIST STABILIZER) MISC Apply nightly to each wrist Yes Yennifer Cline, KEANU Hernandez CNP   citalopram (CELEXA) 20 MG tablet Take 20 mg by mouth daily Yes Historical Provider, MD   furosemide (LASIX) 20 MG tablet Take 1 tablet by mouth daily as needed (for swelling) Yes KEANU Orellana CNP   cetirizine (ZYRTEC) 10 MG tablet Take 1 tablet by mouth daily Yes KEANU Orellana CNP   Skin Protectants, Misc. (EUCERIN) cream Apply topically as needed.  Yes KEANU Orellana CNP   butalbital-acetaminophen-caffeine (FIORICET, [address]) -40 MG per tablet Take 1 tablet by mouth every 6 hours as needed for Migraine Yes Lee Sindi, APRN - CNP   ibuprofen (ADVIL;MOTRIN) 600 MG tablet Take 1 tablet by mouth 3 times daily as needed for Pain Yes Lee Sindi, APRN - CNP   famotidine (PEPCID) 20 MG tablet Take 1 tablet by mouth 2 times daily Yes Lee Sindi, APRN - CNP   docusate sodium (COLACE) 100 MG capsule Take 1 capsule by mouth daily as needed for Constipation Yes Lee Sindi, APRN - CNP   propranolol (INDERAL LA) 60 MG extended release capsule Take 1 capsule by mouth daily Yes Lee Sindi, APRN - CNP   risperiDONE (RISPERDAL) 2 MG tablet Take 1 tablet by mouth nightly Yes Lee Sindi, APRN - CNP   mirtazapine (REMERON) 45 MG tablet Take 1 tablet by mouth nightly Yes Lee Sindi, APRN - CNP   tiZANidine (ZANAFLEX) 4 MG tablet Take 1 tablet by mouth every 8 hours as needed (pain) Yes Lee Sindi, APRN - CNP   traZODone (DESYREL) 100 MG tablet Take 1 tablet by mouth nightly Yes Lee Sindi, APRN - CNP   phenytoin (PHENYTEK) 200 MG ER capsule Take 1 capsule by mouth 2 times daily Yes Lee Sindi, APRN - CNP   buprenorphine-naloxone (SUBOXONE) 8-2 MG FILM SL film  Yes Emeka Edwards MD   ciclopirox (PENLAC) 8 % solution Apply topically nightly.  Yes Emma Stovall DPM   Wound Dressings (ADAPTIC NON-ADHERING DRESSING) PADS Use daily with wound dressing changes Yes Anrdey Rojas MD   ondansetron (ZOFRAN-ODT) 4 MG disintegrating tablet Place 1 tablet under the tongue every 8 hours as needed for Nausea or Vomiting Yes Lee Sindi, APRN - CNP   Wound Dressings (ADAPTIC NON-ADHERING DRESSING) PADS Apply 1 each topically 2 times daily Apply to R lateral thigh wound after shower daily Yes Obi Delatorre DO   acetaminophen (TYLENOL) 500 MG tablet Take 1 tablet by mouth every 6 hours as needed for Pain or Fever Yes Obi Delatorre DO   nicotine (NICOTROL) 10 MG inhaler Inhale 1 puff into the

## 2020-01-10 LAB
DIRECT EXAM: ABNORMAL
DIRECT EXAM: ABNORMAL
Lab: ABNORMAL
SPECIMEN DESCRIPTION: ABNORMAL

## 2020-01-10 RX ORDER — IBUPROFEN 600 MG/1
600 TABLET ORAL 3 TIMES DAILY PRN
Qty: 30 TABLET | Refills: 0 | Status: SHIPPED | OUTPATIENT
Start: 2020-01-10 | End: 2020-01-11

## 2020-01-11 ENCOUNTER — HOSPITAL ENCOUNTER (EMERGENCY)
Age: 55
Discharge: HOME | End: 2020-01-11
Attending: EMERGENCY MEDICINE
Payer: MEDICARE

## 2020-01-11 ENCOUNTER — APPOINTMENT (OUTPATIENT)
Dept: CT IMAGING | Age: 55
End: 2020-01-11
Payer: MEDICARE

## 2020-01-11 VITALS
OXYGEN SATURATION: 98 % | RESPIRATION RATE: 18 BRPM | SYSTOLIC BLOOD PRESSURE: 129 MMHG | TEMPERATURE: 98.6 F | DIASTOLIC BLOOD PRESSURE: 80 MMHG | HEART RATE: 88 BPM

## 2020-01-11 LAB
ABSOLUTE EOS #: 0.19 K/UL (ref 0–0.44)
ABSOLUTE IMMATURE GRANULOCYTE: 0.03 K/UL (ref 0–0.3)
ABSOLUTE LYMPH #: 3.62 K/UL (ref 1.1–3.7)
ABSOLUTE MONO #: 0.73 K/UL (ref 0.1–1.2)
ANION GAP SERPL CALCULATED.3IONS-SCNC: 12 MMOL/L (ref 9–17)
BASOPHILS # BLD: 1 % (ref 0–2)
BASOPHILS ABSOLUTE: 0.06 K/UL (ref 0–0.2)
BUN BLDV-MCNC: 17 MG/DL (ref 6–20)
BUN/CREAT BLD: ABNORMAL (ref 9–20)
C-REACTIVE PROTEIN: 11.6 MG/L (ref 0–5)
CALCIUM SERPL-MCNC: 9 MG/DL (ref 8.6–10.4)
CHLORIDE BLD-SCNC: 97 MMOL/L (ref 98–107)
CO2: 24 MMOL/L (ref 20–31)
CREAT SERPL-MCNC: 0.47 MG/DL (ref 0.5–0.9)
DIFFERENTIAL TYPE: ABNORMAL
EOSINOPHILS RELATIVE PERCENT: 2 % (ref 1–4)
GFR AFRICAN AMERICAN: >60 ML/MIN
GFR NON-AFRICAN AMERICAN: >60 ML/MIN
GFR SERPL CREATININE-BSD FRML MDRD: ABNORMAL ML/MIN/{1.73_M2}
GFR SERPL CREATININE-BSD FRML MDRD: ABNORMAL ML/MIN/{1.73_M2}
GLUCOSE BLD-MCNC: 86 MG/DL (ref 70–99)
HCT VFR BLD CALC: 36.6 % (ref 36.3–47.1)
HCV QUANTITATIVE: NORMAL
HEMOGLOBIN: 12.2 G/DL (ref 11.9–15.1)
HEPATITIS C GENOTYPE: NORMAL
IMMATURE GRANULOCYTES: 0 %
LYMPHOCYTES # BLD: 39 % (ref 24–43)
MCH RBC QN AUTO: 31.5 PG (ref 25.2–33.5)
MCHC RBC AUTO-ENTMCNC: 33.3 G/DL (ref 28.4–34.8)
MCV RBC AUTO: 94.6 FL (ref 82.6–102.9)
MONOCYTES # BLD: 8 % (ref 3–12)
NRBC AUTOMATED: 0 PER 100 WBC
PDW BLD-RTO: 12.9 % (ref 11.8–14.4)
PLATELET # BLD: 266 K/UL (ref 138–453)
PLATELET ESTIMATE: ABNORMAL
PMV BLD AUTO: 8.8 FL (ref 8.1–13.5)
POTASSIUM SERPL-SCNC: 4.4 MMOL/L (ref 3.7–5.3)
RBC # BLD: 3.87 M/UL (ref 3.95–5.11)
RBC # BLD: ABNORMAL 10*6/UL
SEG NEUTROPHILS: 50 % (ref 36–65)
SEGMENTED NEUTROPHILS ABSOLUTE COUNT: 4.61 K/UL (ref 1.5–8.1)
SODIUM BLD-SCNC: 133 MMOL/L (ref 135–144)
WBC # BLD: 9.2 K/UL (ref 3.5–11.3)
WBC # BLD: ABNORMAL 10*3/UL

## 2020-01-11 PROCEDURE — 6360000002 HC RX W HCPCS: Performed by: STUDENT IN AN ORGANIZED HEALTH CARE EDUCATION/TRAINING PROGRAM

## 2020-01-11 PROCEDURE — 72193 CT PELVIS W/DYE: CPT

## 2020-01-11 PROCEDURE — 80048 BASIC METABOLIC PNL TOTAL CA: CPT

## 2020-01-11 PROCEDURE — 85025 COMPLETE CBC W/AUTO DIFF WBC: CPT

## 2020-01-11 PROCEDURE — 99283 EMERGENCY DEPT VISIT LOW MDM: CPT

## 2020-01-11 PROCEDURE — 86140 C-REACTIVE PROTEIN: CPT

## 2020-01-11 PROCEDURE — 6370000000 HC RX 637 (ALT 250 FOR IP): Performed by: STUDENT IN AN ORGANIZED HEALTH CARE EDUCATION/TRAINING PROGRAM

## 2020-01-11 PROCEDURE — 96372 THER/PROPH/DIAG INJ SC/IM: CPT

## 2020-01-11 PROCEDURE — 6360000004 HC RX CONTRAST MEDICATION: Performed by: STUDENT IN AN ORGANIZED HEALTH CARE EDUCATION/TRAINING PROGRAM

## 2020-01-11 RX ORDER — DOXYCYCLINE 100 MG/1
100 TABLET ORAL 2 TIMES DAILY
Qty: 20 TABLET | Refills: 0 | Status: SHIPPED | OUTPATIENT
Start: 2020-01-11 | End: 2020-01-21

## 2020-01-11 RX ORDER — KETOROLAC TROMETHAMINE 30 MG/ML
30 INJECTION, SOLUTION INTRAMUSCULAR; INTRAVENOUS ONCE
Status: COMPLETED | OUTPATIENT
Start: 2020-01-11 | End: 2020-01-11

## 2020-01-11 RX ORDER — ACETAMINOPHEN 500 MG
1000 TABLET ORAL EVERY 6 HOURS PRN
Qty: 60 TABLET | Refills: 0 | Status: SHIPPED | OUTPATIENT
Start: 2020-01-11 | End: 2020-01-17

## 2020-01-11 RX ORDER — IBUPROFEN 800 MG/1
800 TABLET ORAL EVERY 8 HOURS PRN
Qty: 30 TABLET | Refills: 0 | Status: SHIPPED | OUTPATIENT
Start: 2020-01-11 | End: 2020-03-05 | Stop reason: SDUPTHER

## 2020-01-11 RX ORDER — DOXYCYCLINE HYCLATE 100 MG
100 TABLET ORAL ONCE
Status: COMPLETED | OUTPATIENT
Start: 2020-01-11 | End: 2020-01-11

## 2020-01-11 RX ADMIN — KETOROLAC TROMETHAMINE 30 MG: 30 INJECTION, SOLUTION INTRAMUSCULAR at 21:11

## 2020-01-11 RX ADMIN — IOHEXOL 75 ML: 350 INJECTION, SOLUTION INTRAVENOUS at 19:23

## 2020-01-11 RX ADMIN — DOXYCYCLINE HYCLATE 100 MG: 100 TABLET, COATED ORAL at 21:11

## 2020-01-11 ASSESSMENT — ENCOUNTER SYMPTOMS
EYE REDNESS: 0
DIARRHEA: 0
RHINORRHEA: 0
SHORTNESS OF BREATH: 0
BACK PAIN: 0
ABDOMINAL PAIN: 0
EYE ITCHING: 0
VOMITING: 0
COUGH: 0
NAUSEA: 0

## 2020-01-11 ASSESSMENT — PAIN SCALES - GENERAL
PAINLEVEL_OUTOF10: 9
PAINLEVEL_OUTOF10: 10

## 2020-01-11 NOTE — ED NOTES
Patient had hip surgery in 2016 and continues to have issues with the wound healing. The ulceration is silver dollar size no drainage noted. Patient complains of pain, no nausea or fever.       Genaro Chandra RN  01/11/20 [id number]

## 2020-01-12 NOTE — ED PROVIDER NOTES
[facility]  ED  Emergency Department Encounter  EmergencyMedicine Resident     Pt Name:Aleta Chris  MRN: [id number]  [place] 1965  Date of evaluation: 1/11/20  PCP:  KEANU Orellana CNP    CHIEF COMPLAINT       Chief Complaint   Patient presents with    Wound Check       HISTORY OF PRESENT ILLNESS  (Location/Symptom, Timing/Onset, Context/Setting, Quality, Duration, Modifying Factors, Severity.)      Jesus Quintero is a 62 y.o. female who presents with concern for wound infection. Patient has a history of necrotizing fasciitis and had surgery for debridement back in May of this year. Patient states that she has had increased pain over the site, denies fevers chills nausea vomiting abdominal pain. Patient states that it hurts when she touches it, also states that it is having some drainage associated with it. PAST MEDICAL / SURGICAL / SOCIAL / FAMILY HISTORY      has a past medical history of Arthritis, Asthma, Borderline diabetes, Borderline personality disorder ([address] CHF (congestive heart failure) ([address] COPD (chronic obstructive pulmonary disease) ([address] Fibromyalgia, Headache, Hypertension, Manic depression ([address] Movement disorder, Neck fracture ([address] Pernicious anemia, Seizure ([address] and Thyroid disease. has a past surgical history that includes knee surgery (Bilateral); partial hysterectomy (cervix not removed); lymph node dissection; Irrigation and debridement (Right, 5/17/2019); EXPLORATION OF WOUND OF EXTREMITY (Right, 5/19/2019); and EXPLORATION OF WOUND OF EXTREMITY (N/A, 5/22/2019).     Social History     Socioeconomic History    Marital status:      Spouse name: Not on file    Number of children: Not on file    Years of education: Not on file    Highest education level: Not on file   Occupational History    Not on file   Social Needs    Financial resource strain: Not on file    Food insecurity:     Worry: Not on file     Inability: Not on sodium (COLACE) 100 MG capsule Take 1 capsule by mouth daily as needed for Constipation 11/26/19   KEANU Dodson CNP   ferrous sulfate 325 (65 Fe) MG EC tablet Take 1 tablet by mouth daily (with breakfast)  Patient not taking: Reported on 1/7/2020 11/26/19   KEANU Dodson CNP   butalbital-acetaminophen-caffeine (FIORICET, ESGIC) -15 MG per tablet Take 1-2 tablets by mouth every 4 hours as needed for Headaches or Migraine Take 2 pills at onset, then take 1 pill 4 hours later if needed  Patient not taking: Reported on 12/16/2019 11/26/19   KEANU Dodson CNP   propranolol (INDERAL LA) 60 MG extended release capsule Take 1 capsule by mouth daily 11/26/19   KEANU Dodson CNP   Nutritional Supplements (BOOST HIGH PROTEIN) LIQD Patient is to drink 1 bottle four times per day  Patient not taking: Reported on 12/16/2019 11/21/19   KEANU Dodson CNP   risperiDONE (RISPERDAL) 2 MG tablet Take 1 tablet by mouth nightly 11/21/19   KEANU Dodson CNP   mirtazapine (REMERON) 45 MG tablet Take 1 tablet by mouth nightly 11/21/19   KEANU Dodson CNP   tiZANidine (ZANAFLEX) 4 MG tablet Take 1 tablet by mouth every 8 hours as needed (pain) 11/21/19   KEANU Dodson CNP   traZODone (DESYREL) 100 MG tablet Take 1 tablet by mouth nightly 11/21/19   KEANU Dodson CNP   levothyroxine (SYNTHROID) 100 MCG tablet Take 1 tablet by mouth Daily 11/21/19 12/21/19  KEANU Dodson CNP   phenytoin (PHENYTEK) 200 MG ER capsule Take 1 capsule by mouth 2 times daily 11/21/19   KEANU Dodson CNP   buprenorphine-naloxone (SUBOXONE) 8-2 MG FILM SL film  11/14/19   Historical Provider, MD   ciclopirox (PENLAC) 8 % solution Apply topically nightly.  11/20/19   Ezequiel Olea DPM   Wound Dressings (ADAPTIC NON-ADHERING DRESSING) PADS Use daily with wound dressing changes 11/19/19   Cintia Salvador MD   ondansetron (ZOFRAN-ODT) 4 MG disintegrating tablet Place 1 tablet under the tongue every 8 hours as needed for Nausea or Vomiting 11/15/19   KEANU Enriquez CNP   Wound Dressings (ADAPTIC NON-ADHERING DRESSING) PADS Apply 1 each topically 2 times daily Apply to R lateral thigh wound after shower daily 11/12/19   Gunner Shirley DO   Gauze Pads & Dressings (GAUZE DRESSING) 4\"X4\" PADS 1 each by Does not apply route daily as needed (wound)  Patient not taking: Reported on 12/16/2019 11/12/19   Gunner Shirley DO   ondansetron (ZOFRAN) 4 MG tablet Take 1 tablet by mouth daily as needed for Nausea or Vomiting  Patient not taking: Reported on 12/16/2019 10/15/19   KEANU Enriquez CNP   nicotine (NICOTROL) 10 MG inhaler Inhale 1 puff into the lungs as needed for Smoking cessation 6-16 cartridges per day 10/15/19   KEANU Enriquez CNP   BENZOYL PEROXIDE 5 % external wash  10/2/19   Historical Provider, MD   lidocaine (XYLOCAINE) 2 % jelly  10/4/19   Historical Provider, MD   phenytoin (DILANTIN) 50 MG tablet  10/2/19   Historical Provider, MD   Skin Protectants, Misc.  (EUCERIN) cream  10/2/19   Historical Provider, MD   budesonide-formoterol (SYMBICORT) 160-4.5 MCG/ACT AERO Inhale 2 puffs into the lungs 2 times daily    Historical Provider, MD   EPINEPHrine (EPIPEN 2-MARTINA) 0.3 MG/0.3ML SOAJ injection Inject 0.3 mLs into the muscle once for 1 dose Use as directed for allergic reaction 9/11/19 9/11/19  KEANU Grant CNP   ipratropium-albuterol (DUONEB) 0.5-2.5 (3) MG/3ML SOLN nebulizer solution Inhale 3 mLs into the lungs every 4 hours  Patient not taking: Reported on 12/16/2019 6/16/19   Lillie Freeman MD   potassium chloride (KLOR-CON M) 20 MEQ extended release tablet Take 0.5 tablets by mouth daily for 2 days 6/12/19 6/14/19  Kristen Casey MD   valproic acid (DEPAKENE) 250 MG capsule Take 4 capsules by mouth 2 times daily  Patient not taking: Reported on 12/16/2019 6/12/19   Kristen Casey MD   fluticasone (FLONASE) 50 MCG/ACT nasal spray 2 sprays by Each Nostril route daily 6/5/19   Steven Claude, MD   albuterol sulfate  (90 Base) MCG/ACT inhaler Inhale 2 puffs into the lungs every 6 hours as needed for Wheezing Gap prescription until follow up with primary. Do not refill. Follow up with primary 5/24/19   Chucky Meter, APRN - CNP       REVIEW OF SYSTEMS    (2-9 systems for level 4, 10 or more for level 5)      Review of Systems   Constitutional: Negative for chills and fever. HENT: Negative for congestion and rhinorrhea. Eyes: Negative for redness and itching. Respiratory: Negative for cough and shortness of breath. Cardiovascular: Negative for chest pain, palpitations and leg swelling. Gastrointestinal: Negative for abdominal pain, diarrhea, nausea and vomiting. Genitourinary: Negative for dysuria and frequency. Musculoskeletal: Negative for back pain, joint swelling and myalgias. Skin: Positive for rash and wound. Negative for pallor. Neurological: Negative for dizziness, weakness, light-headedness, numbness and headaches. PHYSICAL EXAM   (up to 7 for level 4, 8 or more for level 5)      INITIAL VITALS:   /80   Pulse 88   Temp 98.6 °F (37 °C)   Resp 18   SpO2 98%     Physical Exam  Constitutional:       General: She is not in acute distress. Appearance: She is well-developed. HENT:      Head: Normocephalic and atraumatic. Eyes:      Pupils: Pupils are equal, round, and reactive to light. Cardiovascular:      Rate and Rhythm: Normal rate and regular rhythm. Heart sounds: Normal heart sounds. Pulmonary:      Effort: Pulmonary effort is normal. No respiratory distress. Breath sounds: Normal breath sounds. Abdominal:      General: Bowel sounds are normal. There is no distension. Palpations: Abdomen is soft. Tenderness: There is no tenderness. Musculoskeletal: Normal range of motion. Skin:     Comments:  There is a small open wound in the right lateral thigh, approximately two quarters in size. Patient has some mild surrounding induration and tenderness. No palpable crepitus, no fluid collections or signs of abscess   Neurological:      Mental Status: She is alert and oriented to person, place, and time.          DIFFERENTIAL  DIAGNOSIS     PLAN (LABS / IMAGING / EKG):  Orders Placed This Encounter   Procedures    CT PELVIS W CONTRAST Additional Contrast? None    Basic Metabolic Panel    CBC WITH AUTO DIFFERENTIAL    C-REACTIVE PROTEIN    Contact isolation       MEDICATIONS ORDERED:  Orders Placed This Encounter   Medications    iohexol (OMNIPAQUE 350) solution 75 mL    ketorolac (TORADOL) injection 30 mg    doxycycline hyclate (VIBRA-TABS) tablet 100 mg    acetaminophen (TYLENOL) 500 MG tablet     Sig: Take 2 tablets by mouth every 6 hours as needed for Pain     Dispense:  60 tablet     Refill:  0    ibuprofen (ADVIL;MOTRIN) 800 MG tablet     Sig: Take 1 tablet by mouth every 8 hours as needed for Pain     Dispense:  30 tablet     Refill:  0    doxycycline monohydrate (ADOXA) 100 MG tablet     Sig: Take 1 tablet by mouth 2 times daily for 10 days     Dispense:  20 tablet     Refill:  0       DIAGNOSTIC RESULTS / EMERGENCY DEPARTMENT COURSE / MDM     LABS:  Results for orders placed or performed during the hospital encounter of 28/35/76   Basic Metabolic Panel   Result Value Ref Range    Glucose 86 70 - 99 mg/dL    BUN 17 6 - 20 mg/dL    CREATININE 0.47 (L) 0.50 - 0.90 mg/dL    Bun/Cre Ratio NOT REPORTED 9 - 20    Calcium 9.0 8.6 - 10.4 mg/dL    Sodium 133 (L) 135 - 144 mmol/L    Potassium 4.4 3.7 - 5.3 mmol/L    Chloride 97 (L) 98 - 107 mmol/L    CO2 24 20 - 31 mmol/L    Anion Gap 12 9 - 17 mmol/L    GFR Non-African American >60 >60 mL/min    GFR African American >60 >60 mL/min    GFR Comment          GFR Staging NOT REPORTED    CBC WITH AUTO DIFFERENTIAL   Result Value Ref Range    WBC 9.2 3.5 - 11.3 k/uL    RBC 3.87 (L) 3.95 - 5.11 m/uL    Hemoglobin 12.2 11.9 - 15.1 g/dL    Hematocrit 36.6 36.3 - 47.1 %    MCV 94.6 82.6 - 102.9 fL    MCH 31.5 25.2 - 33.5 pg    MCHC 33.3 28.4 - 34.8 g/dL    RDW 12.9 11.8 - 14.4 %    Platelets 638 835 - 349 k/uL    MPV 8.8 8.1 - 13.5 fL    NRBC Automated 0.0 0.0 per 100 WBC    Differential Type NOT REPORTED     Seg Neutrophils 50 36 - 65 %    Lymphocytes 39 24 - 43 %    Monocytes 8 3 - 12 %    Eosinophils % 2 1 - 4 %    Basophils 1 0 - 2 %    Immature Granulocytes 0 0 %    Segs Absolute 4.61 1.50 - 8.10 k/uL    Absolute Lymph # 3.62 1.10 - 3.70 k/uL    Absolute Mono # 0.73 0.10 - 1.20 k/uL    Absolute Eos # 0.19 0.00 - 0.44 k/uL    Basophils Absolute 0.06 0.00 - 0.20 k/uL    Absolute Immature Granulocyte 0.03 0.00 - 0.30 k/uL    WBC Morphology NOT REPORTED     RBC Morphology NOT REPORTED     Platelet Estimate NOT REPORTED    C-REACTIVE PROTEIN   Result Value Ref Range    CRP 11.6 (H) 0.0 - 5.0 mg/L       IMPRESSION: Andree Larkin is a 62 y.o. presenting with possible wound infection to the right lateral leg. Patient has a history of debridement of the area following necrotizing fasciitis. Vitals normal, afebrile. Patient does not have any systemic signs of infection. There is a small amount of induration surrounding the wound, mild nonpurulent drainage. No active bleeding. Patient will receive CT scan to evaluate for deep space infection in the setting of prior surgery at the site, prior necrotizing fasciitis. Patient otherwise appears well. Patient tolerates Toradol despite hives listed as an allergy, patient also is in recovery for narcotic use and requests non-opioid analgesia. RADIOLOGY:  CT PELVIS W CONTRAST Additional Contrast? None   Final Result   1. Skin thickening along the right lateral thigh with linear densities in   the subcutaneous fat, overall improved in appearance compared to 06/08/2019. This could be scarring/post surgical change.   No abscess collection or other   acute

## 2020-01-13 ENCOUNTER — CARE COORDINATION (OUTPATIENT)
Dept: CARE COORDINATION | Age: 55
End: 2020-01-13

## 2020-01-13 ENCOUNTER — TELEPHONE (OUTPATIENT)
Dept: PRIMARY CARE CLINIC | Age: 55
End: 2020-01-13

## 2020-01-13 NOTE — TELEPHONE ENCOUNTER
Please let Aleta know her lab values do show a current Hepatitis C infection. I referred her to GI in October, she please needs to call and schedule with them. They are the ones to treat this infection.     [facility] - [facility], [address] [place] [address], [address] [phone]

## 2020-01-14 ENCOUNTER — OFFICE VISIT (OUTPATIENT)
Dept: SURGERY | Age: 55
End: 2020-01-14
Payer: MEDICARE

## 2020-01-14 VITALS
DIASTOLIC BLOOD PRESSURE: 77 MMHG | WEIGHT: 205 LBS | HEART RATE: 72 BPM | BODY MASS INDEX: 37.73 KG/M2 | HEIGHT: 62 IN | SYSTOLIC BLOOD PRESSURE: 125 MMHG

## 2020-01-14 PROCEDURE — G8482 FLU IMMUNIZE ORDER/ADMIN: HCPCS | Performed by: SPECIALIST

## 2020-01-14 PROCEDURE — 4004F PT TOBACCO SCREEN RCVD TLK: CPT | Performed by: SPECIALIST

## 2020-01-14 PROCEDURE — 3017F COLORECTAL CA SCREEN DOC REV: CPT | Performed by: SPECIALIST

## 2020-01-14 PROCEDURE — G8417 CALC BMI ABV UP PARAM F/U: HCPCS | Performed by: SPECIALIST

## 2020-01-14 PROCEDURE — 99212 OFFICE O/P EST SF 10 MIN: CPT | Performed by: SPECIALIST

## 2020-01-14 PROCEDURE — G8427 DOCREV CUR MEDS BY ELIG CLIN: HCPCS | Performed by: SPECIALIST

## 2020-01-14 RX ORDER — BENZOCAINE 7.5; 5 MG/1; MG/1
LOZENGE ORAL
Status: ON HOLD | COMMUNITY
Start: 2020-01-07 | End: 2020-08-26 | Stop reason: HOSPADM

## 2020-01-14 RX ORDER — GABAPENTIN 800 MG/1
TABLET ORAL
COMMUNITY
Start: 2020-01-10 | End: 2020-02-13 | Stop reason: DRUGHIGH

## 2020-01-14 NOTE — LETTER
[facility] [address]  [address]  Phone: [phone]  Fax: [phone]    Nora Reyes MD        January 14, 2020     Patient: Karin Rowley   YOB: 1965   Date of Visit: 1/14/2020       To Whom It May Concern: It is my medical opinion that Josefina Cleveland should refrain from participation for yoga classes. Patient has f/u in 1 week. If you have any questions or concerns, please don't hesitate to call.     Sincerely,        Nora Reyes MD

## 2020-01-14 NOTE — PROGRESS NOTES
SURGERY Bilateral     LYMPH NODE DISSECTION      cervical    PARTIAL HYSTERECTOMY         Social History:  reports that she has been smoking. She has a 6.00 pack-year smoking history. She has never used smokeless tobacco. She reports that she does not drink alcohol or use drugs. Family History: family history is not on file. Review of Systems:   Constitutional: positive for fevers, negative for chills  Ears, nose, mouth, throat, and face: negative for nasal congestion and sore throat  Respiratory: negative for shortness of breath and wheezing  Cardiovascular: negative for chest pain and palpitations  Gastrointestinal: negative for abdominal pain, diarrhea, nausea and vomiting  Genitourinary:negative for dysuria and hematuria  Integument/breast: positive for wound drainage, negative for rash  Hematologic/lymphatic: negative for bleeding and easy bruising  Musculoskeletal:positive for muscle weakness and myalgias  Neurological: negative for gait problems, paresthesia and weakness    Allergies: Imitrex [sumatriptan]; Bee pollen; Bee venom; Bromide ion [bromine]; Flexeril [cyclobenzaprine]; Neurontin [gabapentin]; Nsaids; Potassium bromide; Reglan [metoclopramide]; Sulfa antibiotics; Sulfadiazine;  Toradol [ketorolac tromethamine]; and Tramadol    Current Meds:  Current Outpatient Medications:     gabapentin (NEURONTIN) 800 MG tablet, , Disp: , Rfl:     LONG ACTING NASAL SPRAY 0.05 % nasal spray, , Disp: , Rfl:     acetaminophen (TYLENOL) 500 MG tablet, Take 2 tablets by mouth every 6 hours as needed for Pain, Disp: 60 tablet, Rfl: 0    ibuprofen (ADVIL;MOTRIN) 800 MG tablet, Take 1 tablet by mouth every 8 hours as needed for Pain, Disp: 30 tablet, Rfl: 0    doxycycline monohydrate (ADOXA) 100 MG tablet, Take 1 tablet by mouth 2 times daily for 10 days, Disp: 20 tablet, Rfl: 0    gabapentin (NEURONTIN) 600 MG tablet, , Disp: , Rfl:     Dextromethorphan-guaiFENesin (MUCINEX DM MAXIMUM STRENGTH)  MG Misc. (EUCERIN) cream, , Disp: , Rfl:     budesonide-formoterol (SYMBICORT) 160-4.5 MCG/ACT AERO, Inhale 2 puffs into the lungs 2 times daily, Disp: , Rfl:     ipratropium-albuterol (DUONEB) 0.5-2.5 (3) MG/3ML SOLN nebulizer solution, Inhale 3 mLs into the lungs every 4 hours, Disp: 360 mL, Rfl: 1    valproic acid (DEPAKENE) 250 MG capsule, Take 4 capsules by mouth 2 times daily, Disp: 120 capsule, Rfl: 0    fluticasone (FLONASE) 50 MCG/ACT nasal spray, 2 sprays by Each Nostril route daily, Disp: 1 Bottle, Rfl: 3    albuterol sulfate  (90 Base) MCG/ACT inhaler, Inhale 2 puffs into the lungs every 6 hours as needed for Wheezing Gap prescription until follow up with primary. Do not refill. Follow up with primary, Disp: 1 Inhaler, Rfl: 3    levothyroxine (SYNTHROID) 100 MCG tablet, Take 1 tablet by mouth Daily, Disp: 30 tablet, Rfl: 2    EPINEPHrine (EPIPEN 2-MARTINA) 0.3 MG/0.3ML SOAJ injection, Inject 0.3 mLs into the muscle once for 1 dose Use as directed for allergic reaction, Disp: 0.3 mL, Rfl: 2    potassium chloride (KLOR-CON M) 20 MEQ extended release tablet, Take 0.5 tablets by mouth daily for 2 days, Disp: 1 tablet, Rfl: 0    Vital Signs:  Vitals:    01/14/20 1321   BP: 125/77   Pulse: 72       Physical Exam:  Vital signs and Nurse's note reviewed  Gen:  A&Ox3, NAD  HEENT: NCAT, PERRLA, EOMI, no scleral icterus, oral mucosa moist  Neck: Supple, no thyroid enlargement, no cervical or supraclavicular lymphaedenopathy  Chest: Symmetric rise with inhalation, no evidence of trauma  CVS: Regular rate and rhythm, no murmurs, no rubs or gallops  Resp: Good bilateral air entry, clear to auscultation b/l, no wheeze or rhonchi  Abd: soft, non-tender, non-distended, no hepatosplenomegaly or palpable masses, bowel sounds present.   Ext:  No clubbing, cyanosis, edema, peripheral pulses 2+ Rad/Fem/DP/PT  CNS: Moves all extremities, no gross focal motor deficits  Skin: On the right lateral thigh, there is a 2 cm circular healing ulcer with granulation tissue in the center without any discharge,warmth or erythema outside of the new scar tissue. There is no surrounding fluctuance    Labs:   CBC:   Recent Labs     01/11/20 1917   WBC 9.2   HGB 12.2        BMP:    Recent Labs     01/11/20 1917   *   K 4.4   CL 97*   CO2 24   BUN 17   CREATININE 0.47*   GLUCOSE 86     Hepatic: No results for input(s): AST, ALT, ALB, ALKPHOS, BILITOT, BILIDIR, LIPASE, AMYLASE in the last 72 hours. Coagulation: No results for input(s): APTT, PROT, INR in the last 72 hours. Problem List:  Patient Active Problem List    Diagnosis Date Noted    Piriformis syndrome 12/16/2019    Congestive heart failure of unknown etiology ([address] 06/16/2019    Hypokalemia     Depression     Breakthrough seizure ([address]     Altered mental status 06/02/2019    Severe malnutrition ([address] 05/24/2019    Sepsis ([address] 05/17/2019    Ear infection     Bronchitis 02/16/2019    Suicide attempt ([address] 02/16/2019    Major depressive disorder, recurrent ([address] 02/16/2019    Severe episode of recurrent major depressive disorder, without psychotic features ([address]     Intentional phenobarbital overdose ([address]     Overdose of barbiturate, intentional self-harm, initial encounter ([address] 02/12/2019    Severe major depression ([address]     Major depressive disorder with psychotic features ([address] 01/11/2019    Drug overdose     History of seizure     Seizure disorder ([address]     Drug overdose, accidental or unintentional, initial encounter 01/09/2019    Hypothyroidism 01/09/2019    Essential hypertension 01/09/2019    Migraine variant with headache 10/30/2018    Chest pain 10/26/2018    Lumbar radiculopathy 02/10/2017    Chronic low back pain 02/10/2017       Impression:    Karyn Rojas is a 62 y.o. female with poorly healing ulcer on the lateral aspect of her right thigh    Recommendation:    1. Continue to wash the wound with soap and water.   2. Apply

## 2020-01-17 ENCOUNTER — OFFICE VISIT (OUTPATIENT)
Dept: NEUROLOGY | Age: 55
End: 2020-01-17
Payer: MEDICARE

## 2020-01-17 ENCOUNTER — HOSPITAL ENCOUNTER (EMERGENCY)
Age: 55
Discharge: HOME | End: 2020-01-17
Attending: EMERGENCY MEDICINE
Payer: MEDICARE

## 2020-01-17 VITALS — SYSTOLIC BLOOD PRESSURE: 123 MMHG | DIASTOLIC BLOOD PRESSURE: 87 MMHG | HEART RATE: 71 BPM

## 2020-01-17 VITALS
BODY MASS INDEX: 37.36 KG/M2 | DIASTOLIC BLOOD PRESSURE: 78 MMHG | WEIGHT: 203 LBS | HEIGHT: 62 IN | TEMPERATURE: 98.4 F | HEART RATE: 75 BPM | OXYGEN SATURATION: 98 % | SYSTOLIC BLOOD PRESSURE: 113 MMHG | RESPIRATION RATE: 20 BRPM

## 2020-01-17 PROCEDURE — G8427 DOCREV CUR MEDS BY ELIG CLIN: HCPCS | Performed by: STUDENT IN AN ORGANIZED HEALTH CARE EDUCATION/TRAINING PROGRAM

## 2020-01-17 PROCEDURE — 3017F COLORECTAL CA SCREEN DOC REV: CPT | Performed by: STUDENT IN AN ORGANIZED HEALTH CARE EDUCATION/TRAINING PROGRAM

## 2020-01-17 PROCEDURE — 99283 EMERGENCY DEPT VISIT LOW MDM: CPT

## 2020-01-17 PROCEDURE — 2580000003 HC RX 258: Performed by: STUDENT IN AN ORGANIZED HEALTH CARE EDUCATION/TRAINING PROGRAM

## 2020-01-17 PROCEDURE — 6370000000 HC RX 637 (ALT 250 FOR IP): Performed by: STUDENT IN AN ORGANIZED HEALTH CARE EDUCATION/TRAINING PROGRAM

## 2020-01-17 PROCEDURE — 99215 OFFICE O/P EST HI 40 MIN: CPT | Performed by: STUDENT IN AN ORGANIZED HEALTH CARE EDUCATION/TRAINING PROGRAM

## 2020-01-17 PROCEDURE — 96375 TX/PRO/DX INJ NEW DRUG ADDON: CPT

## 2020-01-17 PROCEDURE — 96374 THER/PROPH/DIAG INJ IV PUSH: CPT

## 2020-01-17 PROCEDURE — G8417 CALC BMI ABV UP PARAM F/U: HCPCS | Performed by: STUDENT IN AN ORGANIZED HEALTH CARE EDUCATION/TRAINING PROGRAM

## 2020-01-17 PROCEDURE — 6360000002 HC RX W HCPCS: Performed by: STUDENT IN AN ORGANIZED HEALTH CARE EDUCATION/TRAINING PROGRAM

## 2020-01-17 PROCEDURE — G8482 FLU IMMUNIZE ORDER/ADMIN: HCPCS | Performed by: STUDENT IN AN ORGANIZED HEALTH CARE EDUCATION/TRAINING PROGRAM

## 2020-01-17 PROCEDURE — 4004F PT TOBACCO SCREEN RCVD TLK: CPT | Performed by: STUDENT IN AN ORGANIZED HEALTH CARE EDUCATION/TRAINING PROGRAM

## 2020-01-17 RX ORDER — ACETAMINOPHEN 325 MG/1
325 TABLET ORAL EVERY 6 HOURS PRN
Qty: 120 TABLET | Refills: 3 | Status: SHIPPED | OUTPATIENT
Start: 2020-01-17 | End: 2020-11-18 | Stop reason: SDUPTHER

## 2020-01-17 RX ORDER — PHENOBARBITAL 32.4 MG/1
32.4 TABLET ORAL 3 TIMES DAILY
Qty: 90 TABLET | Refills: 0 | Status: SHIPPED | OUTPATIENT
Start: 2020-01-17 | End: 2020-02-16

## 2020-01-17 RX ORDER — 0.9 % SODIUM CHLORIDE 0.9 %
1000 INTRAVENOUS SOLUTION INTRAVENOUS ONCE
Status: COMPLETED | OUTPATIENT
Start: 2020-01-17 | End: 2020-01-17

## 2020-01-17 RX ORDER — DIPHENHYDRAMINE HYDROCHLORIDE 50 MG/ML
25 INJECTION INTRAMUSCULAR; INTRAVENOUS ONCE
Status: COMPLETED | OUTPATIENT
Start: 2020-01-17 | End: 2020-01-17

## 2020-01-17 RX ORDER — PROCHLORPERAZINE EDISYLATE 5 MG/ML
10 INJECTION INTRAMUSCULAR; INTRAVENOUS ONCE
Status: COMPLETED | OUTPATIENT
Start: 2020-01-17 | End: 2020-01-17

## 2020-01-17 RX ORDER — KETOROLAC TROMETHAMINE 15 MG/ML
15 INJECTION, SOLUTION INTRAMUSCULAR; INTRAVENOUS ONCE
Status: COMPLETED | OUTPATIENT
Start: 2020-01-17 | End: 2020-01-17

## 2020-01-17 RX ORDER — ACETAMINOPHEN 500 MG
1000 TABLET ORAL ONCE
Status: COMPLETED | OUTPATIENT
Start: 2020-01-17 | End: 2020-01-17

## 2020-01-17 RX ADMIN — ACETAMINOPHEN 1000 MG: 500 TABLET ORAL at 19:31

## 2020-01-17 RX ADMIN — KETOROLAC TROMETHAMINE 15 MG: 15 INJECTION, SOLUTION INTRAMUSCULAR; INTRAVENOUS at 19:31

## 2020-01-17 RX ADMIN — DIPHENHYDRAMINE HYDROCHLORIDE 25 MG: 50 INJECTION, SOLUTION INTRAMUSCULAR; INTRAVENOUS at 19:53

## 2020-01-17 RX ADMIN — PROCHLORPERAZINE EDISYLATE 10 MG: 5 INJECTION INTRAMUSCULAR; INTRAVENOUS at 19:53

## 2020-01-17 RX ADMIN — SODIUM CHLORIDE 1000 ML: 9 INJECTION, SOLUTION INTRAVENOUS at 19:53

## 2020-01-17 ASSESSMENT — ENCOUNTER SYMPTOMS
SORE THROAT: 0
BACK PAIN: 0
NAUSEA: 0
SHORTNESS OF BREATH: 0
ABDOMINAL PAIN: 0
CONSTIPATION: 0
DIARRHEA: 0
EYE PAIN: 0
COUGH: 0
VOMITING: 0
NAUSEA: 0
EYE DISCHARGE: 0
EYE REDNESS: 0
COUGH: 0
SINUS PAIN: 0
VOMITING: 0
PHOTOPHOBIA: 1
BACK PAIN: 0
SHORTNESS OF BREATH: 0
PHOTOPHOBIA: 0
ABDOMINAL PAIN: 0

## 2020-01-17 ASSESSMENT — PAIN SCALES - GENERAL
PAINLEVEL_OUTOF10: 7
PAINLEVEL_OUTOF10: 9

## 2020-01-17 NOTE — PROGRESS NOTES
[address] [place] [address],  [address], [facility], [address] [facility], [address] [place] [address]  P: [phone]  F: [address] NOTE     PATIENT NAME: Fam Laura  PATIENT MRN: [id number]  PRIMARY CARE PHYSICIAN: KEANU Gallardo - CNP      Interval History: 1/17/2020     She was last seen in September 2019 with the resident Dr Glynn Marie, during the last visit she was supposed to be started on her phenobarbital, but because of unclear reason it was never restarted. As per patient she was taking phenytoin  mg twice a  day. She was also taking Depakote initially, but stopped taking Depakote for last couple of weeks. As per patient she had 2 seizures since last visit as she was not taking her Depakote. She does not want to continue Depakote and was requesting to prescribe phenobarbital as per patient her seizures were very well controlled when she was on phenobarbital.  She describes her seizures as an aura associated with weird feeling, feeling tense and funny followed by full body shaking lasting for around 5 to 10 minutes, had tongue bite couple of times. Denies any bowel bladder incontinence. Endorses post spell confusion and fatigue with no memories of the event. Denies any other new concerns during this visit. Notes from 9/12/2019-she was seen in the resident clinic at that time. HPI:      Fam Laura is a 62 y.o. right handed  female with PMH significant for seizure disorder, thyroid disease, fibromyalgia, hypertension, COPD, CHF, borderline personality disorder, asthma, arthritis, current smoker  was seen in the clinic for seizures    History:  Ms. Fam Laura is a 62 y.o. female with seizure disorder, hypertension, fibromyalgia, COPD, CHF, depression with suicidal attempt was seen in the clinic for hospital follow-up.   Patient was admitted on 5/17/2019 for right hip pain and was diagnosed to have necrotizing fasciitis of the right hip, or organizations: Not on file     Relationship status: Not on file    Intimate partner violence:     Fear of current or ex partner: Not on file     Emotionally abused: Not on file     Physically abused: Not on file     Forced sexual activity: Not on file   Other Topics Concern    Not on file   Social History Narrative    Not on file        Current Outpatient Medications   Medication Sig Dispense Refill    PHENobarbital (LUMINAL) 32.4 MG tablet Take 1 tablet by mouth 3 times daily for 30 days. 90 tablet 0    gabapentin (NEURONTIN) 800 MG tablet       LONG ACTING NASAL SPRAY 0.05 % nasal spray       acetaminophen (TYLENOL) 500 MG tablet Take 2 tablets by mouth every 6 hours as needed for Pain 60 tablet 0    ibuprofen (ADVIL;MOTRIN) 800 MG tablet Take 1 tablet by mouth every 8 hours as needed for Pain 30 tablet 0    doxycycline monohydrate (ADOXA) 100 MG tablet Take 1 tablet by mouth 2 times daily for 10 days 20 tablet 0    gabapentin (NEURONTIN) 600 MG tablet       Dextromethorphan-guaiFENesin (MUCINEX DM MAXIMUM STRENGTH)  MG TB12 Take 1 tablet by mouth 2 times daily as needed (cough, congestion) 28 tablet 0    ketorolac (TORADOL) 10 MG tablet Take 1 tablet by mouth every 6 hours as needed for Pain 20 tablet 0    prochlorperazine (COMPAZINE) 5 MG tablet Take 1 tablet by mouth every 6 hours as needed for Nausea 10 tablet 0    Lidocaine 2 % GEL Apply topically 3 times daily as needed (pain) 1 Tube 1    Elastic Bandages & Supports (CARPAL TUNNEL WRIST STABILIZER) MISC Apply nightly to each wrist 2 each 0    citalopram (CELEXA) 20 MG tablet Take 20 mg by mouth daily      furosemide (LASIX) 20 MG tablet Take 1 tablet by mouth daily as needed (for swelling) 30 tablet 1    Skin Protectants, Misc. (EUCERIN) cream Apply topically as needed.  1 Package 2    lidocaine (XYLOCAINE) 5 % ointment Apply topically as needed to knee up to three times a day 1 Tube 2    famotidine (PEPCID) 20 MG tablet Take 1 MG inhaler Inhale 1 puff into the lungs as needed for Smoking cessation 6-16 cartridges per day 1 Inhaler 3    BENZOYL PEROXIDE 5 % external wash       lidocaine (XYLOCAINE) 2 % jelly       phenytoin (DILANTIN) 50 MG tablet       Skin Protectants, Misc. (EUCERIN) cream       budesonide-formoterol (SYMBICORT) 160-4.5 MCG/ACT AERO Inhale 2 puffs into the lungs 2 times daily      ipratropium-albuterol (DUONEB) 0.5-2.5 (3) MG/3ML SOLN nebulizer solution Inhale 3 mLs into the lungs every 4 hours 360 mL 1    fluticasone (FLONASE) 50 MCG/ACT nasal spray 2 sprays by Each Nostril route daily 1 Bottle 3    albuterol sulfate  (90 Base) MCG/ACT inhaler Inhale 2 puffs into the lungs every 6 hours as needed for Wheezing Gap prescription until follow up with primary. Do not refill. Follow up with primary 1 Inhaler 3    levothyroxine (SYNTHROID) 100 MCG tablet Take 1 tablet by mouth Daily 30 tablet 2    EPINEPHrine (EPIPEN 2-MARTINA) 0.3 MG/0.3ML SOAJ injection Inject 0.3 mLs into the muscle once for 1 dose Use as directed for allergic reaction 0.3 mL 2    potassium chloride (KLOR-CON M) 20 MEQ extended release tablet Take 0.5 tablets by mouth daily for 2 days 1 tablet 0     No current facility-administered medications for this visit. Allergies  Allergies   Allergen Reactions    Imitrex [Sumatriptan] Hives    Bee Pollen     Bee Venom     Bromide Ion [Bromine]     Flexeril [Cyclobenzaprine]     Neurontin [Gabapentin]      Please consider all AEDS and meds if this med is required    Nsaids      States nausea and vomiting with PO NSAIDS, specifically ibuprofen and naproxen     Potassium Bromide     Reglan [Metoclopramide]     Sulfa Antibiotics     Sulfadiazine     Toradol [Ketorolac Tromethamine] Hives    Tramadol Hives        REVIEW OF SYSTEMS:     Review of Systems   Constitutional: Negative for chills, diaphoresis, fever and unexpected weight change.    HENT: Negative for congestion, ear discharge, ear pain, hearing loss, sinus pain and sore throat. Eyes: Negative for photophobia, pain, discharge, redness and visual disturbance. Respiratory: Negative for cough and shortness of breath. Cardiovascular: Negative for chest pain, palpitations and leg swelling. Gastrointestinal: Negative for abdominal pain, constipation, diarrhea, nausea and vomiting. Endocrine: Negative for polydipsia and polyuria. Genitourinary: Negative for difficulty urinating and hematuria. Musculoskeletal: Negative for back pain and neck pain. Skin: Negative for pallor and rash. Neurological: Positive for dizziness, seizures and headaches. Negative for tremors, syncope, facial asymmetry, speech difficulty, weakness, light-headedness and numbness. Hematological: Does not bruise/bleed easily. Psychiatric/Behavioral: Positive for agitation, confusion, decreased concentration and dysphoric mood. Negative for behavioral problems and hallucinations. The patient is nervous/anxious. VITALS  /87   Pulse 71      PHYSICAL EXAMINATION:     Constitutional: Well developed, well nourished and in no acute distress. Head:  normocephalic, atraumatic. Neck: supple, no carotid bruits, thyroid not palpable  Respiratory: Clear to auscultation bilaterally with no use of accessory muscles during respiration. Cardiovascular: normal rate, regular rhythm, no murmur, gallop, rub.   Abdomen: Soft, nontender, nondistended, normal bowel sounds, no hepatomegaly or splenomegaly  Extremities:  peripheral pulses palpable, no pedal edema or calf pain with palpation  Psych: normal affect      NEUROLOGICAL EXAMINATION:     Mental status   Alert and oriented; intact memory with no confusion, speech or language problems; no hallucinations or delusions     Cranial nerves   II - visual fields intact to confrontation                                                III, IV, VI - extra-ocular muscles full: no pupillary defect; no KAIDEN,

## 2020-01-17 NOTE — ED PROVIDER NOTES
5/17/2019); EXPLORATION OF WOUND OF EXTREMITY (Right, 5/19/2019); and EXPLORATION OF WOUND OF EXTREMITY (N/A, 5/22/2019). Social History     Socioeconomic History    Marital status:      Spouse name: Not on file    Number of children: Not on file    Years of education: Not on file    Highest education level: Not on file   Occupational History    Not on file   Social Needs    Financial resource strain: Not on file    Food insecurity:     Worry: Not on file     Inability: Not on file    Transportation needs:     Medical: Not on file     Non-medical: Not on file   Tobacco Use    Smoking status: Current Every Day Smoker     Packs/day: 0.50     Years: 12.00     Pack years: 6.00    Smokeless tobacco: Never Used   Substance and Sexual Activity    Alcohol use: No    Drug use: No    Sexual activity: Not on file   Lifestyle    Physical activity:     Days per week: Not on file     Minutes per session: Not on file    Stress: Not on file   Relationships    Social connections:     Talks on phone: Not on file     Gets together: Not on file     Attends [employer] service: Not on file     Active member of club or organization: Not on file     Attends meetings of clubs or organizations: Not on file     Relationship status: Not on file    Intimate partner violence:     Fear of current or ex partner: Not on file     Emotionally abused: Not on file     Physically abused: Not on file     Forced sexual activity: Not on file   Other Topics Concern    Not on file   Social History Narrative    Not on file       History reviewed. No pertinent family history. Allergies:  Imitrex [sumatriptan]; Bee pollen; Bee venom; Bromide ion [bromine]; Flexeril [cyclobenzaprine]; Nsaids; Potassium bromide; Reglan [metoclopramide]; Sulfa antibiotics; Sulfadiazine; and Tramadol    Home Medications:  Prior to Admission medications    Medication Sig Start Date End Date Taking?  Authorizing Provider   acetaminophen (TYLENOL) 325 MG CNP   docusate sodium (COLACE) 100 MG capsule Take 1 capsule by mouth daily as needed for Constipation 11/26/19   Duyen López APRN - CNP   ferrous sulfate 325 (65 Fe) MG EC tablet Take 1 tablet by mouth daily (with breakfast) 11/26/19   Duyen López APRN - FELIX   butalbital-acetaminophen-caffeine (FIORICET, ESGIC) -77 MG per tablet Take 1-2 tablets by mouth every 4 hours as needed for Headaches or Migraine Take 2 pills at onset, then take 1 pill 4 hours later if needed 11/26/19   KEANU Wright - CNP   propranolol (INDERAL LA) 60 MG extended release capsule Take 1 capsule by mouth daily 11/26/19   KEANU Wright - CNP   Nutritional Supplements (BOOST HIGH PROTEIN) LIQD Patient is to drink 1 bottle four times per day 11/21/19   KEANU Wright - CNP   risperiDONE (RISPERDAL) 2 MG tablet Take 1 tablet by mouth nightly 11/21/19   Duyen López APRN - CNP   mirtazapine (REMERON) 45 MG tablet Take 1 tablet by mouth nightly 11/21/19   Duyen López APRN - CNP   tiZANidine (ZANAFLEX) 4 MG tablet Take 1 tablet by mouth every 8 hours as needed (pain) 11/21/19   KEANU Wright - CNP   traZODone (DESYREL) 100 MG tablet Take 1 tablet by mouth nightly 11/21/19   Duyen López APRN - CNP   levothyroxine (SYNTHROID) 100 MCG tablet Take 1 tablet by mouth Daily 11/21/19 12/21/19  Duyen López APRN - CNP   phenytoin (PHENYTEK) 200 MG ER capsule Take 1 capsule by mouth 2 times daily 11/21/19   Duyen López APRN - FELIX   buprenorphine-naloxone (SUBOXONE) 8-2 MG FILM SL film  11/14/19   Historical Provider, MD   ciclopirox (PENLAC) 8 % solution Apply topically nightly.  11/20/19   Ninfa Edwards DPM   Wound Dressings (ADAPTIC NON-ADHERING DRESSING) PADS Use daily with wound dressing changes 11/19/19   Stella Rodriguez MD   ondansetron (ZOFRAN-ODT) 4 MG disintegrating tablet Place 1 tablet under the tongue every 8 hours as needed for Nausea or Vomiting 11/15/19   Мария Guevara KEANU Espinoza CNP   Wound Dressings (ADAPTIC NON-ADHERING DRESSING) PADS Apply 1 each topically 2 times daily Apply to R lateral thigh wound after shower daily 11/12/19   Jeri Womack DO   Gauze Pads & Dressings (GAUZE DRESSING) 4\"X4\" PADS 1 each by Does not apply route daily as needed (wound) 11/12/19   Jeri Womack DO   ondansetron (ZOFRAN) 4 MG tablet Take 1 tablet by mouth daily as needed for Nausea or Vomiting 10/15/19   KEANU Mccallum CNP   nicotine (NICOTROL) 10 MG inhaler Inhale 1 puff into the lungs as needed for Smoking cessation 6-16 cartridges per day 10/15/19   KEANU Mccallum CNP   BENZOYL PEROXIDE 5 % external wash  10/2/19   Historical Provider, MD   lidocaine (XYLOCAINE) 2 % jelly  10/4/19   Historical Provider, MD   phenytoin (DILANTIN) 50 MG tablet  10/2/19   Historical Provider, MD   Skin Protectants, Misc. (EUCERIN) cream  10/2/19   Historical Provider, MD   budesonide-formoterol (SYMBICORT) 160-4.5 MCG/ACT AERO Inhale 2 puffs into the lungs 2 times daily    Historical Provider, MD   EPINEPHrine (EPIPEN 2-MARTINA) 0.3 MG/0.3ML SOAJ injection Inject 0.3 mLs into the muscle once for 1 dose Use as directed for allergic reaction 9/11/19 9/11/19  KEANU Segura CNP   ipratropium-albuterol (DUONEB) 0.5-2.5 (3) MG/3ML SOLN nebulizer solution Inhale 3 mLs into the lungs every 4 hours 6/16/19   Vamshi Perez MD   potassium chloride (KLOR-CON M) 20 MEQ extended release tablet Take 0.5 tablets by mouth daily for 2 days 6/12/19 6/14/19  Inna Crocker MD   fluticasone (FLONASE) 50 MCG/ACT nasal spray 2 sprays by Each Nostril route daily 6/5/19   Inna Crocker MD   albuterol sulfate  (90 Base) MCG/ACT inhaler Inhale 2 puffs into the lungs every 6 hours as needed for Wheezing Gap prescription until follow up with primary. Do not refill.   Follow up with primary 5/24/19   Mahesh Eis, APRN - CNP       REVIEW OF SYSTEMS    (2-9 systems for level 4, 10 or more for level 5)      Review of Systems   Constitutional: Negative for chills and fever. HENT: Negative. Negative for congestion. Eyes: Positive for photophobia. Negative for visual disturbance. Respiratory: Negative for cough and shortness of breath. Cardiovascular: Negative for chest pain. Gastrointestinal: Negative for abdominal pain, nausea and vomiting. Genitourinary: Negative for decreased urine volume. Musculoskeletal: Negative for back pain, neck pain and neck stiffness. Skin: Negative for rash. Allergic/Immunologic: Positive for environmental allergies. Neurological: Positive for headaches. Hematological: Negative for adenopathy. Psychiatric/Behavioral: Negative for confusion. PHYSICAL EXAM   (up to 7 for level 4, 8 or more for level 5)      INITIAL VITALS:   /78   Pulse 75   Temp 98.4 °F (36.9 °C) (Oral)   Resp 20   Ht 5' 2\" (1.575 m)   Wt 203 lb (92.1 kg)   SpO2 98%   BMI 37.13 kg/m²     Physical Exam  Constitutional:       General: She is not in acute distress. Appearance: She is not diaphoretic. HENT:      Head: Normocephalic and atraumatic. Eyes:      Extraocular Movements: Extraocular movements intact. Pupils: Pupils are equal, round, and reactive to light. Neck:      Musculoskeletal: Normal range of motion and neck supple. No neck rigidity. Trachea: No tracheal deviation. Cardiovascular:      Rate and Rhythm: Normal rate and regular rhythm. Pulmonary:      Effort: Pulmonary effort is normal. No respiratory distress. Abdominal:      General: There is no distension. Palpations: Abdomen is soft. Musculoskeletal: Normal range of motion. Skin:     General: Skin is warm. Neurological:      General: No focal deficit present. Mental Status: She is oriented to person, place, and time. Comments: Normal strength sensation upper lower extremities.          DIFFERENTIAL  DIAGNOSIS     PLAN (LABS / IMAGING / EKG):  No orders of the defined types were placed in this encounter. MEDICATIONS ORDERED:  Orders Placed This Encounter   Medications    0.9 % sodium chloride bolus    diphenhydrAMINE (BENADRYL) injection 25 mg    prochlorperazine (COMPAZINE) injection 10 mg    acetaminophen (TYLENOL) tablet 1,000 mg    ketorolac (TORADOL) injection 15 mg    acetaminophen (TYLENOL) 325 MG tablet     Sig: Take 1 tablet by mouth every 6 hours as needed for Pain     Dispense:  120 tablet     Refill:  3         DIAGNOSTIC RESULTS / EMERGENCY DEPARTMENT COURSE / [facility]     LABS:  No results found for this visit on 01/17/20. RADIOLOGY:  None    EKG  None    All EKG's are interpreted by the Emergency Department Physician who either signs or Co-signs this chart in the absence of a cardiologist.    EMERGENCY DEPARTMENT COURSE:  22-year-old female presenting with 4-day history of right-sided throbbing severe headache. Not worse headache of life, gradual onset no focal neurologic deficit. Ran out of Fioricet at home. Vital signs are stable. Patient was seen and evaluated by her neurologist today, no comment or management of her headaches currently. Patient apparently is receiving referral onto a seizure or headache specialist.  Photophobia, PERRLA EOMI no neck pain or stiffness nonfocal neurologic exam.  Will treat symptomatically. No indication at this time for brain imaging, not worsening of life not thunderclap presentation, no focal deficit.    8:23 PM Nursing staff unable to establish IV access at this time for medications. Will give patient oral Tylenol, IM Toradol. Discussed with patient she has tolerated Toradol in the past.    Headache proved patient feels well enough to be discharged home at this time. Will have patient follow-up with a neurologist as previous scheduled for headache complaints. PROCEDURES:  None    CONSULTS:  None    CRITICAL CARE:  None    FINAL IMPRESSION      1.  Nonintractable episodic headache, unspecified headache type          DISPOSITION / PLAN     DISPOSITION  dc      PATIENT REFERRED TO:  [facility] ED  [address] [address]  [phone]    If symptoms worsen    Reba Benítez, KEANU - CNP  [address]  [address]  [phone]    In 2 days        DISCHARGE MEDICATIONS:  New Prescriptions    ACETAMINOPHEN (TYLENOL) 325 MG TABLET    Take 1 tablet by mouth every 6 hours as needed for Pain       Andre Diaz DO  Emergency Medicine Resident    (Please note that portions of thisnote were completed with a voice recognition program.  Efforts were made to edit the dictations but occasionally words are mis-transcribed.)        Andre Diaz DO  01/17/20 2023

## 2020-01-18 NOTE — ED NOTES
Pt resting quietly with lights off. NAD noted. RR even and non labored.       Anupam Palacios RN  01/17/20 2005

## 2020-01-21 ENCOUNTER — TELEPHONE (OUTPATIENT)
Dept: PRIMARY CARE CLINIC | Age: 55
End: 2020-01-21

## 2020-01-21 ENCOUNTER — OFFICE VISIT (OUTPATIENT)
Dept: SURGERY | Age: 55
End: 2020-01-21
Payer: MEDICARE

## 2020-01-21 VITALS
TEMPERATURE: 97.9 F | HEIGHT: 63 IN | SYSTOLIC BLOOD PRESSURE: 117 MMHG | WEIGHT: 201.2 LBS | DIASTOLIC BLOOD PRESSURE: 75 MMHG | BODY MASS INDEX: 35.65 KG/M2 | HEART RATE: 71 BPM

## 2020-01-21 PROCEDURE — 3017F COLORECTAL CA SCREEN DOC REV: CPT | Performed by: SPECIALIST

## 2020-01-21 PROCEDURE — G8427 DOCREV CUR MEDS BY ELIG CLIN: HCPCS | Performed by: SPECIALIST

## 2020-01-21 PROCEDURE — 99212 OFFICE O/P EST SF 10 MIN: CPT

## 2020-01-21 PROCEDURE — G8482 FLU IMMUNIZE ORDER/ADMIN: HCPCS | Performed by: SPECIALIST

## 2020-01-21 PROCEDURE — G8417 CALC BMI ABV UP PARAM F/U: HCPCS | Performed by: SPECIALIST

## 2020-01-21 PROCEDURE — 99212 OFFICE O/P EST SF 10 MIN: CPT | Performed by: SPECIALIST

## 2020-01-21 PROCEDURE — 4004F PT TOBACCO SCREEN RCVD TLK: CPT | Performed by: SPECIALIST

## 2020-01-21 RX ORDER — LACTOSE-REDUCED FOOD 0.06G-1/ML
LIQUID (ML) ORAL
Qty: 120 CAN | Refills: 3 | Status: ON HOLD | OUTPATIENT
Start: 2020-01-21 | End: 2020-09-10

## 2020-01-21 RX ORDER — MAGNESIUM HYDROXIDE 1200 MG/15ML
LIQUID ORAL
Qty: 1000 ML | Refills: 1 | Status: SHIPPED | OUTPATIENT
Start: 2020-01-21 | End: 2020-01-21 | Stop reason: SDUPTHER

## 2020-01-21 RX ORDER — MAGNESIUM HYDROXIDE 1200 MG/15ML
LIQUID ORAL
Qty: 1000 ML | Refills: 1 | Status: ON HOLD | OUTPATIENT
Start: 2020-01-21 | End: 2020-08-26 | Stop reason: HOSPADM

## 2020-01-21 RX ORDER — BUTALBITAL, ACETAMINOPHEN AND CAFFEINE 50; 325; 40 MG/1; MG/1; MG/1
TABLET ORAL
Qty: 20 TABLET | Refills: 0 | Status: SHIPPED | OUTPATIENT
Start: 2020-01-21 | End: 2020-03-17 | Stop reason: SDUPTHER

## 2020-01-21 NOTE — PROGRESS NOTES
PLACEMENT performed by Kian Trammell MD at [address] N/A 5/22/2019    RIGHT WOUND HIP EXAMINATION LAVAGE AND WOUND VAC PLACEMENT performed by Rajan Johnson MD at [address] [place] [address], [address] Right 5/17/2019    IRRIGATION, DEBRIDEMENT RIGHT THIGH performed by Leigh Ann Crawford MD at [facility] 1937 Bilateral     LYMPH NODE DISSECTION      cervical    PARTIAL HYSTERECTOMY         Social History:  reports that she has been smoking. She has a 6.00 pack-year smoking history. She has never used smokeless tobacco. She reports that she does not drink alcohol or use drugs. Family History: family history is not on file. Review of Systems:   Constitutional: negative for chills, fatigue and fevers  Eyes: negative except for redness and Positive for floaters of the right eye s/p cataracts. Ears, nose, mouth, throat, and face: negative except for nasal congestion and sore mouth  Respiratory: negative for cough and shortness of breath  Cardiovascular: negative for chest pain and palpitations  Gastrointestinal: negative for abdominal pain, nausea and vomiting  Genitourinary:negative for dysuria and hematuria  Integument/breast: negative for rash and Positive for nonhealing ulcer right lateral thigh. Musculoskeletal:negative for arthralgias, muscle weakness and myalgias  Neurological: negative for paresthesia and weakness    Allergies: Imitrex [sumatriptan]; Bee pollen; Bee venom; Bromide ion [bromine]; Flexeril [cyclobenzaprine]; Nsaids;  Potassium bromide; Reglan [metoclopramide]; Sulfa antibiotics; Sulfadiazine; and Tramadol    Current Meds:  Current Outpatient Medications:     butalbital-acetaminophen-caffeine (FIORICET, ESGIC) -40 MG per tablet, Take 2 pills at onset, then take 1 pill 6 hours later if needed, Disp: 20 tablet, Rfl: 0    Nutritional Supplements (BOOST HIGH PROTEIN) LIQD, Patient is to drink 1 bottle four times per day, Disp: 120 Can, Rfl: MCG/ACT AERO, Inhale 2 puffs into the lungs 2 times daily, Disp: , Rfl:     ipratropium-albuterol (DUONEB) 0.5-2.5 (3) MG/3ML SOLN nebulizer solution, Inhale 3 mLs into the lungs every 4 hours, Disp: 360 mL, Rfl: 1    fluticasone (FLONASE) 50 MCG/ACT nasal spray, 2 sprays by Each Nostril route daily, Disp: 1 Bottle, Rfl: 3    albuterol sulfate  (90 Base) MCG/ACT inhaler, Inhale 2 puffs into the lungs every 6 hours as needed for Wheezing Gap prescription until follow up with primary. Do not refill. Follow up with primary, Disp: 1 Inhaler, Rfl: 3    levothyroxine (SYNTHROID) 100 MCG tablet, Take 1 tablet by mouth Daily, Disp: 30 tablet, Rfl: 2    EPINEPHrine (EPIPEN 2-MARTINA) 0.3 MG/0.3ML SOAJ injection, Inject 0.3 mLs into the muscle once for 1 dose Use as directed for allergic reaction, Disp: 0.3 mL, Rfl: 2    potassium chloride (KLOR-CON M) 20 MEQ extended release tablet, Take 0.5 tablets by mouth daily for 2 days, Disp: 1 tablet, Rfl: 0    Vital Signs:  Vitals:    01/21/20 1328   BP: 117/75   Pulse: 71   Temp: 97.9 °F (36.6 °C)       Physical Exam:  Vital signs and Nurse's note reviewed  Gen:  A&Ox3, NAD  HEENT: NCAT, PERRLA, EOMI, no scleral icterus, oral mucosa moist  Neck: Supple, no JVD, no cervical lymphaedenopathy  Chest: Symmetric rise with inhalation, no evidence of trauma  CVS: Regular rate and rhythm, no murmurs, no rubs or gallops  Resp: Good bilateral air entry, clear to auscultation b/l, no wheeze or rhonchi  Abd: soft, non-tender, non-distended, bowel sounds present. Ext: No clubbing, cyanosis, edema, peripheral pulses 2+ Rad/DP/PT  CNS: Moves all extremities, no gross focal motor deficits  Skin: normal skin turgor, see picture of the wound on the later right thigh, the wound has minimal exudate, with no surrounding erythema, fluctuance, warmth or tenderness. Labs:   CBC: No results for input(s): WBC, HGB, PLT in the last 72 hours.   BMP:  No results for input(s): NA, K, CL, CO2, BUN, CREATININE, GLUCOSE in the last 72 hours. Hepatic: No results for input(s): AST, ALT, ALB, ALKPHOS, BILITOT, BILIDIR, LIPASE, AMYLASE in the last 72 hours. Coagulation: No results for input(s): APTT, PROT, INR in the last 72 hours. Problem List:  Patient Active Problem List    Diagnosis Date Noted    Piriformis syndrome 12/16/2019    Congestive heart failure of unknown etiology ([address] 06/16/2019    Hypokalemia     Depression     Breakthrough seizure ([address]     Altered mental status 06/02/2019    Severe malnutrition ([address] 05/24/2019    Sepsis ([address] 05/17/2019    Ear infection     Bronchitis 02/16/2019    Suicide attempt ([address] 02/16/2019    Major depressive disorder, recurrent ([address] 02/16/2019    Severe episode of recurrent major depressive disorder, without psychotic features ([address]     Intentional phenobarbital overdose ([address]     Overdose of barbiturate, intentional self-harm, initial encounter ([address] 02/12/2019    Severe major depression ([address]     Major depressive disorder with psychotic features ([address] 01/11/2019    Drug overdose     History of seizure     Seizure disorder ([address]     Drug overdose, accidental or unintentional, initial encounter 01/09/2019    Hypothyroidism 01/09/2019    Essential hypertension 01/09/2019    Migraine variant with headache 10/30/2018    Chest pain 10/26/2018    Lumbar radiculopathy 02/10/2017    Chronic low back pain 02/10/2017       Impression:    Mannie Juarez is a 62 y.o. female with female with poorly healing ulcer on the lateral aspect of her right thigh. Recommendation:    1. Continue to wash the wound with soap and water. 2. Apply a wet to dry dressing every day to the wound, but do not use Adaptic. 3. Follow up in 2 weeks for a wound recheck.       Electronically signed by Simon Perry DO  on 1/21/2020 at 1:36 PM

## 2020-01-21 NOTE — TELEPHONE ENCOUNTER
Patient called again for refill on Fioricet, also I will fax order to [facility],[place] [address] for the Boost. Patient is also requesting a referral for her carpal tunnel, she stated you talked about it at her visit in Dec. Please advise    Health Maintenance   Topic Date Due    DTaP/Tdap/Td vaccine (1 - Tdap) 02/16/1974    HIV screen  02/16/1978    Cervical cancer screen  02/16/1984    Breast cancer screen  02/16/2013    Shingles Vaccine (1 of 2) 02/16/2013    Colon Cancer Screen FIT/FOBT  06/11/2020    TSH testing  12/16/2020    Potassium monitoring  01/11/2021    Creatinine monitoring  01/11/2021    Lipid screen  02/19/2024    Flu vaccine  Completed    Pneumococcal 0-64 years Vaccine  Completed    Hepatitis C screen  Completed             (applicable per patient's age: Cancer Screenings, Depression Screening, Fall Risk Screening, Immunizations)    Hemoglobin A1C (%)   Date Value   02/19/2019 5.2     LDL Cholesterol (mg/dL)   Date Value   02/19/2019 105     AST (U/L)   Date Value   12/16/2019 19     ALT (U/L)   Date Value   12/16/2019 11     BUN (mg/dL)   Date Value   01/11/2020 17      (goal A1C is < 7)   (goal LDL is <100) need 30-50% reduction from baseline     BP Readings from Last 3 Encounters:   01/17/20 113/78   01/17/20 123/87   01/14/20 125/77    (goal /80)      All Future Testing planned in [facility]:  Lab Frequency Next Occurrence   Phenytoin Level, Free Once 06/03/2019   Phenytoin Level, Total Once 06/06/2019   Valproic Acid Level, Free Once 06/06/2019   Valproic Acid Level, Total Once 06/06/2019   Phenobarbital Level Once 38/77/8860   Basic Metabolic Panel Once 91/58/2818   Basic Metabolic Panel Once 98/18/7659   Basic Metabolic Panel Once 09/75/9462   KRYSTIAN Digital Screen Bilateral [[id number]] Once 01/27/2020   Phenobarbital Level Once 03/09/2020   PHENOBARBITAL, FREE Once 03/09/2020       Next Visit Date:  Future Appointments   Date Time Provider Rashida Torres   1/21/2020  2:00 PM SCHEDULE,

## 2020-01-22 NOTE — TELEPHONE ENCOUNTER
I will print and fax the prescriptions to a DME pharmacy.  I see she used pharmacy counter in the past for her wound care supplies, we can fax it there

## 2020-01-22 NOTE — TELEPHONE ENCOUNTER
Spoke with patient, she states she is confused she has no idea what pharmacy the wrists braces were sent to. In office note in December states was given a list of DME pharmacies but she states she was never given any scripts for braces.

## 2020-01-22 NOTE — TELEPHONE ENCOUNTER
At her visit we started with braces for her wrists as initial treatment. Did she  the braces and wear them at bedtime?

## 2020-01-23 RX ORDER — ONDANSETRON 4 MG/1
4 TABLET, FILM COATED ORAL DAILY PRN
Qty: 30 TABLET | Refills: 0 | Status: SHIPPED | OUTPATIENT
Start: 2020-01-23 | End: 2020-01-24

## 2020-01-23 NOTE — TELEPHONE ENCOUNTER
2:30 PM KEANU Gardner - CNP [facility]            Patient Active Problem List:     Chest pain     Migraine variant with headache     Drug overdose, accidental or unintentional, initial encounter     Hypothyroidism     Essential hypertension     Seizure disorder ([address]     Drug overdose     History of seizure     Major depressive disorder with psychotic features ([address]     Severe major depression ([address]     Overdose of barbiturate, intentional self-harm, initial encounter ([address]     Intentional phenobarbital overdose ([address]     Severe episode of recurrent major depressive disorder, without psychotic features ([address]     Bronchitis     Suicide attempt ([address]     Major depressive disorder, recurrent ([address]     Ear infection     Sepsis ([address]     Severe malnutrition ([address]     Altered mental status     Depression     Breakthrough seizure ([address]     Hypokalemia     Congestive heart failure of unknown etiology (HCC)     Lumbar radiculopathy     Chronic low back pain     Piriformis syndrome

## 2020-01-24 ENCOUNTER — OFFICE VISIT (OUTPATIENT)
Dept: PRIMARY CARE CLINIC | Age: 55
End: 2020-01-24
Payer: MEDICARE

## 2020-01-24 VITALS
BODY MASS INDEX: 37.26 KG/M2 | WEIGHT: 207 LBS | DIASTOLIC BLOOD PRESSURE: 86 MMHG | HEART RATE: 69 BPM | SYSTOLIC BLOOD PRESSURE: 127 MMHG | OXYGEN SATURATION: 97 %

## 2020-01-24 PROCEDURE — G8427 DOCREV CUR MEDS BY ELIG CLIN: HCPCS | Performed by: NURSE PRACTITIONER

## 2020-01-24 PROCEDURE — G8417 CALC BMI ABV UP PARAM F/U: HCPCS | Performed by: NURSE PRACTITIONER

## 2020-01-24 PROCEDURE — 3017F COLORECTAL CA SCREEN DOC REV: CPT | Performed by: NURSE PRACTITIONER

## 2020-01-24 PROCEDURE — 4004F PT TOBACCO SCREEN RCVD TLK: CPT | Performed by: NURSE PRACTITIONER

## 2020-01-24 PROCEDURE — G8482 FLU IMMUNIZE ORDER/ADMIN: HCPCS | Performed by: NURSE PRACTITIONER

## 2020-01-24 PROCEDURE — 99213 OFFICE O/P EST LOW 20 MIN: CPT | Performed by: NURSE PRACTITIONER

## 2020-01-24 RX ORDER — ONDANSETRON 4 MG/1
4 TABLET, ORALLY DISINTEGRATING ORAL EVERY 8 HOURS PRN
Qty: 30 TABLET | Refills: 0 | Status: SHIPPED | OUTPATIENT
Start: 2020-01-24 | End: 2020-02-13 | Stop reason: SDUPTHER

## 2020-01-24 RX ORDER — KETOROLAC TROMETHAMINE 30 MG/ML
30 INJECTION, SOLUTION INTRAMUSCULAR; INTRAVENOUS ONCE
Status: DISCONTINUED | OUTPATIENT
Start: 2020-01-24 | End: 2020-01-24

## 2020-01-24 RX ORDER — KETOROLAC TROMETHAMINE 30 MG/ML
30 INJECTION, SOLUTION INTRAMUSCULAR; INTRAVENOUS ONCE
Status: COMPLETED | OUTPATIENT
Start: 2020-01-24 | End: 2020-01-24

## 2020-01-24 RX ORDER — DOXYCYCLINE HYCLATE 100 MG
100 TABLET ORAL 2 TIMES DAILY
Qty: 14 TABLET | Refills: 0 | Status: SHIPPED | OUTPATIENT
Start: 2020-01-24 | End: 2020-01-31

## 2020-01-24 RX ADMIN — KETOROLAC TROMETHAMINE 30 MG: 30 INJECTION, SOLUTION INTRAMUSCULAR; INTRAVENOUS at 17:21

## 2020-01-24 ASSESSMENT — ENCOUNTER SYMPTOMS
NAUSEA: 0
SINUS PAIN: 0
COUGH: 0
VOMITING: 0
ABDOMINAL PAIN: 0
SHORTNESS OF BREATH: 0
DIARRHEA: 0
SORE THROAT: 0

## 2020-01-24 NOTE — PROGRESS NOTES
fracture (HCC)     Pernicious anemia     Seizure (HCC)     Thyroid disease         Current Outpatient Medications   Medication Sig Dispense Refill    doxycycline hyclate (VIBRA-TABS) 100 MG tablet Take 1 tablet by mouth 2 times daily for 7 days 14 tablet 0    ondansetron (ZOFRAN-ODT) 4 MG disintegrating tablet Place 1 tablet under the tongue every 8 hours as needed for Nausea or Vomiting 30 tablet 0    butalbital-acetaminophen-caffeine (FIORICET, ESGIC) -40 MG per tablet Take 2 pills at onset, then take 1 pill 6 hours later if needed 20 tablet 0    Nutritional Supplements (BOOST HIGH PROTEIN) LIQD Patient is to drink 1 bottle four times per day 120 Can 3    Gauze Pads & Dressings 2\"X2\" PADS Pt to change dressing once daily 50 each 2    Water For Irrigation, Sterile (STERILE WATER FOR IRRIGATION) Irrigate with as directed for 1 dose once daily 1000 mL 1    PHENobarbital (LUMINAL) 32.4 MG tablet Take 1 tablet by mouth 3 times daily for 30 days.  90 tablet 0    acetaminophen (TYLENOL) 325 MG tablet Take 1 tablet by mouth every 6 hours as needed for Pain 120 tablet 3    gabapentin (NEURONTIN) 800 MG tablet       LONG ACTING NASAL SPRAY 0.05 % nasal spray       ibuprofen (ADVIL;MOTRIN) 800 MG tablet Take 1 tablet by mouth every 8 hours as needed for Pain 30 tablet 0    gabapentin (NEURONTIN) 600 MG tablet       Dextromethorphan-guaiFENesin (MUCINEX DM MAXIMUM STRENGTH)  MG TB12 Take 1 tablet by mouth 2 times daily as needed (cough, congestion) 28 tablet 0    ketorolac (TORADOL) 10 MG tablet Take 1 tablet by mouth every 6 hours as needed for Pain 20 tablet 0    prochlorperazine (COMPAZINE) 5 MG tablet Take 1 tablet by mouth every 6 hours as needed for Nausea 10 tablet 0    Elastic Bandages & Supports (CARPAL TUNNEL WRIST STABILIZER) MISC Apply nightly to each wrist 2 each 0    citalopram (CELEXA) 20 MG tablet Take 20 mg by mouth daily      furosemide (LASIX) 20 MG tablet Take 1 tablet by mouth daily as needed (for swelling) 30 tablet 1    Skin Protectants, Misc. (EUCERIN) cream Apply topically as needed. 1 Package 2    lidocaine (XYLOCAINE) 5 % ointment Apply topically as needed to knee up to three times a day 1 Tube 2    famotidine (PEPCID) 20 MG tablet Take 1 tablet by mouth 2 times daily 60 tablet 2    docusate sodium (COLACE) 100 MG capsule Take 1 capsule by mouth daily as needed for Constipation 30 capsule 2    ferrous sulfate 325 (65 Fe) MG EC tablet Take 1 tablet by mouth daily (with breakfast) 30 tablet 3    propranolol (INDERAL LA) 60 MG extended release capsule Take 1 capsule by mouth daily 30 capsule 2    risperiDONE (RISPERDAL) 2 MG tablet Take 1 tablet by mouth nightly 30 tablet 0    mirtazapine (REMERON) 45 MG tablet Take 1 tablet by mouth nightly 30 tablet 0    tiZANidine (ZANAFLEX) 4 MG tablet Take 1 tablet by mouth every 8 hours as needed (pain) 90 tablet 1    traZODone (DESYREL) 100 MG tablet Take 1 tablet by mouth nightly 90 tablet 1    phenytoin (PHENYTEK) 200 MG ER capsule Take 1 capsule by mouth 2 times daily 60 capsule 4    buprenorphine-naloxone (SUBOXONE) 8-2 MG FILM SL film       ciclopirox (PENLAC) 8 % solution Apply topically nightly. 1 Bottle 3    Wound Dressings (ADAPTIC NON-ADHERING DRESSING) PADS Use daily with wound dressing changes 30 each 2    Wound Dressings (ADAPTIC NON-ADHERING DRESSING) PADS Apply 1 each topically 2 times daily Apply to R lateral thigh wound after shower daily 30 each 2    Gauze Pads & Dressings (GAUZE DRESSING) 4\"X4\" PADS 1 each by Does not apply route daily as needed (wound) 30 each 2    nicotine (NICOTROL) 10 MG inhaler Inhale 1 puff into the lungs as needed for Smoking cessation 6-16 cartridges per day 1 Inhaler 3    BENZOYL PEROXIDE 5 % external wash       lidocaine (XYLOCAINE) 2 % jelly       phenytoin (DILANTIN) 50 MG tablet       Skin Protectants, Misc.  (EUCERIN) cream       budesonide-formoterol (SYMBICORT) 160-4.5 MCG/ACT AERO Inhale 2 puffs into the lungs 2 times daily      ipratropium-albuterol (DUONEB) 0.5-2.5 (3) MG/3ML SOLN nebulizer solution Inhale 3 mLs into the lungs every 4 hours 360 mL 1    fluticasone (FLONASE) 50 MCG/ACT nasal spray 2 sprays by Each Nostril route daily 1 Bottle 3    albuterol sulfate  (90 Base) MCG/ACT inhaler Inhale 2 puffs into the lungs every 6 hours as needed for Wheezing Gap prescription until follow up with primary. Do not refill. Follow up with primary 1 Inhaler 3    levothyroxine (SYNTHROID) 100 MCG tablet Take 1 tablet by mouth Daily 30 tablet 2    EPINEPHrine (EPIPEN 2-MARTINA) 0.3 MG/0.3ML SOAJ injection Inject 0.3 mLs into the muscle once for 1 dose Use as directed for allergic reaction 0.3 mL 2    potassium chloride (KLOR-CON M) 20 MEQ extended release tablet Take 0.5 tablets by mouth daily for 2 days 1 tablet 0     No current facility-administered medications for this visit. Allergies   Allergen Reactions    Imitrex [Sumatriptan] Hives    Bee Pollen     Bee Venom     Bromide Ion [Bromine]     Flexeril [Cyclobenzaprine]     Nsaids      States nausea and vomiting with PO NSAIDS, specifically ibuprofen and naproxen     Potassium Bromide     Reglan [Metoclopramide]     Sulfa Antibiotics     Sulfadiazine     Tramadol Hives       Subjective:      Review of Systems   Constitutional: Negative for chills and fever. HENT: Negative for ear pain, sinus pain and sore throat. Respiratory: Negative for cough and shortness of breath. Cardiovascular: Negative for chest pain and palpitations. Gastrointestinal: Negative for abdominal pain, diarrhea, nausea and vomiting. Skin: Positive for wound. Neurological: Positive for headaches. Negative for dizziness. All other systems reviewed and are negative. Objective:     Physical Exam  Vitals signs and nursing note reviewed. Constitutional:       Appearance: Normal appearance.    HENT:  Absolute Immature Granul* 01/11/2020 0.03     WBC Morphology 01/11/2020 NOT REPORTED     RBC Morphology 01/11/2020 NOT REPORTED     Platelet Estimate 13/12/4223 NOT REPORTED     CRP 01/11/2020 [facility]   Component Date Value    Hepatitis C Genotype 01/07/2020 Type 4     HCV Quantitative 01/07/2020 NOT REPORTED     Specimen Description 01/07/2020 . PLASMA     Special Requests 01/07/2020 NOT REPORTED     Direct Exam 01/07/2020 HCV RNA DETECTED [id number] IU/ML (6.49 LOG IU/ML) This test is a sensitive method for quantitating HCV RNA viral loads in plasma. It utilizes RT-PCR in the FDA approved Roche Ampliprep/Taqman 48 System. This test is intended for detecting and quantifying HCV RNA viral loads in the range of 15 IU/mL to 100,000,000 IU/mL (1.18 log IU/mL to 8.00 log IU/mL). Patients should have confirmed HCV infection prior to RNA quantification. This test has been developed to monitor disease progression and efficacy of anti-HCV drug therapy. This test has been optimized for HCV genotypes 1-6. *    Direct Exam 01/07/2020 Results reported to the appropriate [facility] Outpatient Visit on 12/16/2019   Component Date Value    Status 12/16/2019 [facility] Outpatient Visit on 12/16/2019   Component Date Value    Valproic Acid Lvl 12/16/2019 48*    Valproic Dose amount 12/16/2019 NOT REPORTED     Valproic Date last dose 12/16/2019 NOT REPORTED     Valproic Time last dose 12/16/2019 NOT REPORTED     Valproic Acid, Free 12/16/2019 4.5*    Valproic Acid % Free 12/16/2019 9.4     Phenytoin Lvl 12/16/2019 7.0*    Phenytoin Dose Amount 12/16/2019 NOT REPORTED     Phenytoin Date Last Dose 12/16/2019 NOT REPORTED     Phenytoin Dose Time 12/16/2019 NOT REPORTED     Phenytoin, Free 12/16/2019 1.0    Hospital Outpatient Visit on 12/16/2019   Component Date Value    Hepatitis C Ab 12/16/2019 REACTIVE*    Vitamin B-12 12/16/2019 424  Folate 12/16/2019 13.1     TSH 12/16/2019 0.30     Glucose, Fasting 12/16/2019 107*    BUN 12/16/2019 13     CREATININE 12/16/2019 0.37*    Bun/Cre Ratio 12/16/2019 NOT REPORTED     Calcium 12/16/2019 8.9     Sodium 12/16/2019 133*    Potassium 12/16/2019 4.6     Chloride 12/16/2019 96*    CO2 12/16/2019 26     Anion Gap 12/16/2019 11     Alkaline Phosphatase 12/16/2019 105*    ALT 12/16/2019 11     AST 12/16/2019 19     Total Bilirubin 12/16/2019 0.19*    Total Protein 12/16/2019 7.0     Alb 12/16/2019 3.9     Albumin/Globulin Ratio 12/16/2019 1.3     GFR Non- 12/16/2019 >60     GFR  12/16/2019 >60     GFR Comment 12/16/2019          GFR Staging 12/16/2019 NOT REPORTED    Admission on 11/27/2019, Discharged on 11/27/2019   Component Date Value    Ventricular Rate 11/27/2019 63     Atrial Rate 11/27/2019 63     P-R Interval 11/27/2019 184     QRS Duration 11/27/2019 88     Q-T Interval 11/27/2019 454     QTc Calculation ([place]azett) 11/27/2019 464     P Axis 11/27/2019 61     R Axis 11/27/2019 34     T Axis 11/27/2019 42        Assessment:       Diagnosis Orders   1. Acute intractable headache, unspecified headache type  ketorolac (TORADOL) injection 30 mg    DISCONTINUED: ketorolac (TORADOL) injection 30 mg   2. Wound infection  doxycycline hyclate (VIBRA-TABS) 100 MG tablet   3. Nausea  ondansetron (ZOFRAN-ODT) 4 MG disintegrating tablet       Plan:      Return if symptoms worsen or fail to improve.     Orders Placed This Encounter   Medications    doxycycline hyclate (VIBRA-TABS) 100 MG tablet     Sig: Take 1 tablet by mouth 2 times daily for 7 days     Dispense:  14 tablet     Refill:  0    ondansetron (ZOFRAN-ODT) 4 MG disintegrating tablet     Sig: Place 1 tablet under the tongue every 8 hours as needed for Nausea or Vomiting     Dispense:  30 tablet     Refill:  0    DISCONTD: ketorolac (TORADOL) injection 30 mg    ketorolac (TORADOL) injection

## 2020-01-24 NOTE — PATIENT INSTRUCTIONS
Patient Education        Headache: Care Instructions  Your Care Instructions    Headaches have many possible causes. Most headaches aren't a sign of a more serious problem, and they will get better on their own. Home treatment may help you feel better faster. The doctor has checked you carefully, but problems can develop later. If you notice any problems or new symptoms, get medical treatment right away. Follow-up care is a key part of your treatment and safety. Be sure to make and go to all appointments, and call your doctor if you are having problems. It's also a good idea to know your test results and keep a list of the medicines you take. How can you care for yourself at home? · Do not drive if you have taken a prescription pain medicine. · Rest in a quiet, dark room until your headache is gone. Close your eyes and try to relax or go to sleep. Don't watch TV or read. · Put a cold, moist cloth or cold pack on the painful area for 10 to 20 minutes at a time. Put a thin cloth between the cold pack and your skin. · Use a warm, moist towel or a heating pad set on low to relax tight shoulder and neck muscles. · Have someone gently massage your neck and shoulders. · Take pain medicines exactly as directed. ? If the doctor gave you a prescription medicine for pain, take it as prescribed. ? If you are not taking a prescription pain medicine, ask your doctor if you can take an over-the-counter medicine. · Be careful not to take pain medicine more often than the instructions allow, because you may get worse or more frequent headaches when the medicine wears off. · Do not ignore new symptoms that occur with a headache, such as a fever, weakness or numbness, vision changes, or confusion. These may be signs of a more serious problem. To prevent headaches  · Keep a headache diary so you can figure out what triggers your headaches. Avoiding triggers may help you prevent headaches.  Record when each headache

## 2020-02-12 ENCOUNTER — NURSE TRIAGE (OUTPATIENT)
Dept: OTHER | Facility: CLINIC | Age: 55
End: 2020-02-12

## 2020-02-13 ENCOUNTER — OFFICE VISIT (OUTPATIENT)
Dept: PRIMARY CARE CLINIC | Age: 55
End: 2020-02-13
Payer: MEDICARE

## 2020-02-13 VITALS
TEMPERATURE: 97.2 F | OXYGEN SATURATION: 95 % | WEIGHT: 191 LBS | SYSTOLIC BLOOD PRESSURE: 103 MMHG | HEART RATE: 64 BPM | DIASTOLIC BLOOD PRESSURE: 75 MMHG | BODY MASS INDEX: 34.38 KG/M2

## 2020-02-13 PROBLEM — J40 BRONCHITIS: Status: RESOLVED | Noted: 2019-02-16 | Resolved: 2020-02-13

## 2020-02-13 PROBLEM — J44.9 COPD (CHRONIC OBSTRUCTIVE PULMONARY DISEASE) (HCC): Status: ACTIVE | Noted: 2020-02-13

## 2020-02-13 PROCEDURE — G8417 CALC BMI ABV UP PARAM F/U: HCPCS | Performed by: NURSE PRACTITIONER

## 2020-02-13 PROCEDURE — 4004F PT TOBACCO SCREEN RCVD TLK: CPT | Performed by: NURSE PRACTITIONER

## 2020-02-13 PROCEDURE — G8482 FLU IMMUNIZE ORDER/ADMIN: HCPCS | Performed by: NURSE PRACTITIONER

## 2020-02-13 PROCEDURE — 3017F COLORECTAL CA SCREEN DOC REV: CPT | Performed by: NURSE PRACTITIONER

## 2020-02-13 PROCEDURE — 99213 OFFICE O/P EST LOW 20 MIN: CPT | Performed by: NURSE PRACTITIONER

## 2020-02-13 PROCEDURE — G8427 DOCREV CUR MEDS BY ELIG CLIN: HCPCS | Performed by: NURSE PRACTITIONER

## 2020-02-13 PROCEDURE — 3023F SPIROM DOC REV: CPT | Performed by: NURSE PRACTITIONER

## 2020-02-13 PROCEDURE — G8926 SPIRO NO PERF OR DOC: HCPCS | Performed by: NURSE PRACTITIONER

## 2020-02-13 RX ORDER — BUDESONIDE AND FORMOTEROL FUMARATE DIHYDRATE 160; 4.5 UG/1; UG/1
2 AEROSOL RESPIRATORY (INHALATION) 2 TIMES DAILY
Qty: 1 INHALER | Refills: 5 | Status: SHIPPED | OUTPATIENT
Start: 2020-02-13 | End: 2020-04-14 | Stop reason: SDUPTHER

## 2020-02-13 RX ORDER — ONDANSETRON 4 MG/1
4 TABLET, ORALLY DISINTEGRATING ORAL EVERY 8 HOURS PRN
Qty: 30 TABLET | Refills: 0 | Status: SHIPPED | OUTPATIENT
Start: 2020-02-13 | End: 2020-03-10 | Stop reason: SDUPTHER

## 2020-02-13 RX ORDER — PROPRANOLOL HCL 60 MG
60 CAPSULE, EXTENDED RELEASE 24HR ORAL DAILY
Qty: 30 CAPSULE | Refills: 2 | Status: SHIPPED | OUTPATIENT
Start: 2020-02-13 | End: 2020-03-17 | Stop reason: SDUPTHER

## 2020-02-13 RX ORDER — TIZANIDINE 4 MG/1
4 TABLET ORAL EVERY 8 HOURS PRN
Qty: 90 TABLET | Refills: 1 | Status: SHIPPED | OUTPATIENT
Start: 2020-02-13 | End: 2020-03-17 | Stop reason: SDUPTHER

## 2020-02-13 RX ORDER — WATER / MINERAL OIL / WHITE PETROLATUM 16 OZ
CREAM TOPICAL
Qty: 1 PACKAGE | Refills: 2 | Status: ON HOLD | OUTPATIENT
Start: 2020-02-13 | End: 2020-09-10

## 2020-02-13 RX ORDER — LORAZEPAM 1 MG/1
1 TABLET ORAL 2 TIMES DAILY
Status: ON HOLD | COMMUNITY
End: 2020-09-09

## 2020-02-13 RX ORDER — DEXTROAMPHETAMINE SACCHARATE, AMPHETAMINE ASPARTATE MONOHYDRATE, DEXTROAMPHETAMINE SULFATE AND AMPHETAMINE SULFATE 5; 5; 5; 5 MG/1; MG/1; MG/1; MG/1
20 CAPSULE, EXTENDED RELEASE ORAL EVERY MORNING
Status: ON HOLD | COMMUNITY
End: 2020-09-09

## 2020-02-13 RX ORDER — LIDOCAINE HYDROCHLORIDE 20 MG/ML
JELLY TOPICAL
Qty: 1 TUBE | Refills: 2 | Status: ON HOLD | OUTPATIENT
Start: 2020-02-13 | End: 2020-09-21 | Stop reason: HOSPADM

## 2020-02-13 RX ORDER — GABAPENTIN 600 MG/1
600 TABLET ORAL 2 TIMES DAILY
Qty: 60 TABLET | Refills: 0 | Status: SHIPPED
Start: 2020-02-13 | End: 2020-03-17 | Stop reason: SDUPTHER

## 2020-02-13 RX ORDER — BENZOYL PEROXIDE 50 MG/ML
LIQUID TOPICAL
Qty: 1 BOTTLE | Refills: 0 | Status: ON HOLD | OUTPATIENT
Start: 2020-02-13 | End: 2020-09-10 | Stop reason: ALTCHOICE

## 2020-02-13 ASSESSMENT — ENCOUNTER SYMPTOMS
BLOOD IN STOOL: 0
ABDOMINAL PAIN: 0
TROUBLE SWALLOWING: 0
SHORTNESS OF BREATH: 1
VOMITING: 1
COUGH: 0
ANAL BLEEDING: 0
NAUSEA: 1
DIARRHEA: 1

## 2020-02-13 ASSESSMENT — COPD QUESTIONNAIRES: COPD: 1

## 2020-02-13 NOTE — PROGRESS NOTES
[facility] [address] [facility]  [facility] [address]  Dept: [phone]  Dept Fax: [phone]    Patient Care Team:  KEANU Mccallum CNP as PCP - General (Family Medicine)  KEANU Mccallum CNP as PCP - [facility] Empaneled Provider    2020     Nicola Lawrence (:  [id number])OE a 62 y.o. female, here for evaluation of the following medical concerns:   Chief Complaint   Patient presents with    Diarrhea    Nausea & Vomiting     pt states that on set was monday        Diagnosed with gastroparesis 4-5 years by a GI specialist in [place] [address]. Managed with phenergan and Zofran  Has not been a problem recently until this week  Denies fevers, chills or body aches  Last emesis was 4 am today, is now keeping pop down  Stools are improving, last BM was 7 AM. Not as liquid, still loose    Nausea & Vomiting   This is a new problem. The current episode started in the past 7 days (Monday). The problem has been gradually improving. Associated symptoms include fatigue, nausea, vomiting and weakness. Pertinent negatives include no abdominal pain, chest pain, chills, coughing, fever or myalgias. The symptoms are aggravated by drinking and eating. Diarrhea    This is a new problem. The current episode started in the past 7 days. The problem has been gradually improving. The stool consistency is described as watery. Associated symptoms include vomiting. Pertinent negatives include no abdominal pain, chills, coughing, fever or myalgias. Risk factors: gastroparesis. She has tried increased fluids and change of diet for the symptoms. There is no history of inflammatory bowel disease, irritable bowel syndrome or a recent abdominal surgery. COPD   She complains of shortness of breath. There is no cough. This is a chronic problem. The current episode started more than 1 year ago. The problem has been unchanged.  Pertinent negatives include no chest pain, dyspnea on exertion, fever, myalgias or trouble swallowing. Her symptoms are alleviated by beta-agonist. She reports significant improvement on treatment. Risk factors for lung disease include smoking/tobacco exposure. Her past medical history is significant for COPD. Clarene Search Review of Systems   Constitutional: Positive for fatigue. Negative for chills and fever. HENT: Negative for trouble swallowing. Respiratory: Positive for shortness of breath. Negative for cough. Cardiovascular: Negative for chest pain and dyspnea on exertion. Gastrointestinal: Positive for diarrhea, nausea and vomiting. Negative for abdominal pain, anal bleeding and blood in stool. Genitourinary: Negative for difficulty urinating and dysuria. Musculoskeletal: Negative for myalgias. Neurological: Positive for weakness. Negative for dizziness. Prior to Visit Medications    Medication Sig Taking? Authorizing Provider   gabapentin (NEURONTIN) 600 MG tablet 1 tablet 2 times daily for 30 days. Yes KEANU Kaminski CNP   LORazepam (ATIVAN) 1 MG tablet Take 1 mg by mouth 2 times daily. Yes Historical Provider, MD   amphetamine-dextroamphetamine (ADDERALL XR) 20 MG extended release capsule Take 20 mg by mouth every morning. Yes Historical Provider, MD   ondansetron (ZOFRAN-ODT) 4 MG disintegrating tablet Place 1 tablet under the tongue every 8 hours as needed for Nausea or Vomiting Yes KEANU Kaminski CNP   propranolol (INDERAL LA) 60 MG extended release capsule Take 1 capsule by mouth daily Yes KEANU Kaminski CNP   tiZANidine (ZANAFLEX) 4 MG tablet Take 1 tablet by mouth every 8 hours as needed (pain) Yes KEANU Kaminski CNP   BENZOYL PEROXIDE 5 % external wash Apply daily Yes KEANU Kaminski CNP   budesonide-formoterol (SYMBICORT) 160-4.5 MCG/ACT AERO Inhale 2 puffs into the lungs 2 times daily Yes KEANU Kaminski CNP   Skin Protectants, Misc. (EUCERIN) cream Apply topically as needed.  Yes Ora Ingrid APRN - CNP   lidocaine (XYLOCAINE) 2 % jelly Apply pea sized amount two times a day to shoulder Yes Ora Ingrid, APRN - CNP   butalbital-acetaminophen-caffeine (FIORICET, ESGIC) -40 MG per tablet Take 2 pills at onset, then take 1 pill 6 hours later if needed Yes Ora Ingrid APRN - CNP   Gauze Pads & Dressings 2\"X2\" PADS Pt to change dressing once daily Yes Alysa Mendoza MD   PHENobarbital (LUMINAL) 32.4 MG tablet Take 1 tablet by mouth 3 times daily for 30 days.  Yes Maureen Quintanilla MD   acetaminophen (TYLENOL) 325 MG tablet Take 1 tablet by mouth every 6 hours as needed for Pain Yes Arvin Partida DO   ibuprofen (ADVIL;MOTRIN) 800 MG tablet Take 1 tablet by mouth every 8 hours as needed for Pain Yes Salbador Green MD   Dextromethorphan-guaiFENesin (MUCINEX DM MAXIMUM STRENGTH)  MG TB12 Take 1 tablet by mouth 2 times daily as needed (cough, congestion) Yes Ormerna Quintero APRN - CNP   ketorolac (TORADOL) 10 MG tablet Take 1 tablet by mouth every 6 hours as needed for Pain Yes Edinson Dennis MD   Elastic Bandages & Supports (CARPAL TUNNEL WRIST STABILIZER) MISC Apply nightly to each wrist Yes Ora Ingrid APRN - CNP   citalopram (CELEXA) 20 MG tablet Take 20 mg by mouth daily Yes Historical Provider, MD   furosemide (LASIX) 20 MG tablet Take 1 tablet by mouth daily as needed (for swelling) Yes Ora Ingrid, APRN - CNP   docusate sodium (COLACE) 100 MG capsule Take 1 capsule by mouth daily as needed for Constipation Yes Ora Ingrid, APRN - CNP   ferrous sulfate 325 (65 Fe) MG EC tablet Take 1 tablet by mouth daily (with breakfast) Yes Ora Ingrid APRN - CNP   risperiDONE (RISPERDAL) 2 MG tablet Take 1 tablet by mouth nightly Yes Ora Ingrid, APRN - CNP   mirtazapine (REMERON) 45 MG tablet Take 1 tablet by mouth nightly Yes Ora Ingrid, APRN - CNP   traZODone (DESYREL) 100 MG tablet Take 1 tablet by mouth nightly Yes Ora Ingrid, APRN - Claudetta Rong, APRN - CNP   famotidine (PEPCID) 20 MG tablet Take 1 tablet by mouth 2 times daily  Patient not taking: Reported on 2/13/2020  Claudetta Rong, APRN - CNP   levothyroxine (SYNTHROID) 100 MCG tablet Take 1 tablet by mouth Daily  Claudetta Rong, APRN - CNP   ciclopirox (PENLAC) 8 % solution Apply topically nightly. Patient not taking: Reported on 2/13/2020  Yaniv Santana DPM   Skin Protectants, Misc. (EUCERIN) cream   Historical Provider, MD   EPINEPHrine (EPIPEN 2-MARTINA) 0.3 MG/0.3ML SOAJ injection Inject 0.3 mLs into the muscle once for 1 dose Use as directed for allergic reaction  Darlyn KEANU Smith CNP   potassium chloride (KLOR-CON M) 20 MEQ extended release tablet Take 0.5 tablets by mouth daily for 2 days  Dorothy Dykes MD        Social History     Tobacco Use    Smoking status: Current Every Day Smoker     Packs/day: 0.50     Years: 12.00     Pack years: 6.00    Smokeless tobacco: Never Used   Substance Use Topics    Alcohol use: No        Vitals:    02/13/20 1023   BP: 103/75   Site: Left Upper Arm   Position: Sitting   Cuff Size: Large Adult   Pulse: 64   Temp: 97.2 °F (36.2 °C)   TempSrc: Oral   SpO2: 95%   Weight: 191 lb (86.6 kg)     Estimated body mass index is 34.38 kg/m² as calculated from the following:    Height as of 1/21/20: 5' 2.5\" (1.588 m). Weight as of this encounter: 191 lb (86.6 kg).     DIAGNOSTIC FINDINGS:  CBC:  Lab Results   Component Value Date    WBC 9.2 01/11/2020    HGB 12.2 01/11/2020     01/11/2020       BMP:    Lab Results   Component Value Date     01/11/2020    K 4.4 01/11/2020    CL 97 01/11/2020    CO2 24 01/11/2020    BUN 17 01/11/2020    CREATININE 0.47 01/11/2020    GLUCOSE 86 01/11/2020       HEMOGLOBIN A1C:   Lab Results   Component Value Date    LABA1C 5.2 02/19/2019       FASTING LIPID PANEL:  Lab Results   Component Value Date    CHOL 191 02/19/2019    HDL 56 02/19/2019    TRIG 150 (H) 02/19/2019       Physical Exam  Vitals signs and nursing note reviewed. Constitutional:       General: She is not in acute distress. Appearance: She is well-developed. HENT:      Head: Normocephalic. Eyes:      General: No scleral icterus. Pupils: Pupils are equal, round, and reactive to light. Neck:      Musculoskeletal: Normal range of motion and neck supple. Cardiovascular:      Rate and Rhythm: Normal rate and regular rhythm. Pulmonary:      Effort: Pulmonary effort is normal.      Breath sounds: Normal breath sounds. Abdominal:      Palpations: Abdomen is soft. Skin:     General: Skin is warm and dry. Neurological:      Mental Status: She is alert and oriented to person, place, and time. Coordination: Coordination normal.   Psychiatric:         Behavior: Behavior normal. Behavior is cooperative. Thought Content: Thought content normal.         Judgment: Judgment normal.         ASSESSMENT     Diagnosis Orders   1. Gastroparesis  ondansetron (ZOFRAN-ODT) 4 MG disintegrating tablet   2. Nausea  ondansetron (ZOFRAN-ODT) 4 MG disintegrating tablet   3. Migraine variant with headache  propranolol (INDERAL LA) 60 MG extended release capsule   4. Chronic obstructive pulmonary disease, unspecified COPD type ([facility]            PLAN:  Orders Placed This Encounter   Medications    gabapentin (NEURONTIN) 600 MG tablet     Si tablet 2 times daily for 30 days.      Dispense:  60 tablet     Refill:  0    ondansetron (ZOFRAN-ODT) 4 MG disintegrating tablet     Sig: Place 1 tablet under the tongue every 8 hours as needed for Nausea or Vomiting     Dispense:  30 tablet     Refill:  0    propranolol (INDERAL LA) 60 MG extended release capsule     Sig: Take 1 capsule by mouth daily     Dispense:  30 capsule     Refill:  2    tiZANidine (ZANAFLEX) 4 MG tablet     Sig: Take 1 tablet by mouth every 8 hours as needed (pain)     Dispense:  90 tablet     Refill:  1    BENZOYL PEROXIDE 5 % external wash     Sig: Apply daily     Dispense:  1 Bottle     Refill:  0    budesonide-formoterol (SYMBICORT) 160-4.5 MCG/ACT AERO     Sig: Inhale 2 puffs into the lungs 2 times daily     Dispense:  1 Inhaler     Refill:  5    Skin Protectants, Misc. (EUCERIN) cream     Sig: Apply topically as needed. Dispense:  1 Package     Refill:  2    lidocaine (XYLOCAINE) 2 % jelly     Sig: Apply pea sized amount two times a day to shoulder     Dispense:  1 Tube     Refill:  2         1. FOLLOW UP AND INSTRUCTIONS:  Return in about 3 months (around 5/13/2020) for HTN, copd. Cancel 2/25/19. · Aleta received counseling on the following healthy behaviors:medication adherence and tobacco cessation    · Discussed use, benefit, and side effects of prescribed medications. Barriers to  medication compliance addressed. All patient questions answered. Pt  verbalized understanding of all instructions given. · Patient given educational materials - see patient instructions      · Patient advised to contact scheduling offices for any referrals or imaging orders  placed today if they have not been contacted in 48 hours. Return in about 3 months (around 5/13/2020) for HTN, copd. Cancel 2/25/19. An electronic signature was used to authenticate this note. --KEANU Enriquez - CNP on 2/13/2020 at 2:38 PM  Visit Information    Have you changed or started any medications since your last visit including any over-the-counter medicines, vitamins, or herbal medicines? no   Are you having any side effects from any of your medications? -  no  Have you stopped taking any of your medications? Is so, why? -  no    Have you seen any other physician or provider since your last visit? Yes - Records Requested  Have you had any other diagnostic tests since your last visit? Yes - Records Requested  Have you been seen in the emergency room and/or had an admission to a hospital since we last saw you?  Yes - Records Requested  Have you had your routine dental cleaning in the past 6 months? no    Have you activated your [employer] account? If not, what are your barriers?  Yes     Patient Care Team:  Claudetta Rong, APRN - CNP as PCP - General (Family Medicine)  Claudetta Rong, APRN - CNP as PCP - [facility] Empaneled Provider    Medical History Review  Past Medical, Family, and Social History reviewed and does not contribute to the patient presenting condition    Health Maintenance   Topic Date Due    DTaP/Tdap/Td vaccine (1 - Tdap) 02/16/1974    HIV screen  02/16/1978    Cervical cancer screen  02/16/1984    Breast cancer screen  02/16/2013    Shingles Vaccine (1 of 2) 02/16/2013    Colon Cancer Screen FIT/FOBT  06/11/2020    TSH testing  12/16/2020    Potassium monitoring  01/11/2021    Creatinine monitoring  01/11/2021    Lipid screen  02/19/2024    Flu vaccine  Completed    Pneumococcal 0-64 years Vaccine  Completed    Hepatitis C screen  Completed    Hepatitis A vaccine  Aged Out    Hepatitis B vaccine  Aged Out    Hib vaccine  Aged Out    Meningococcal (ACWY) vaccine  Aged Out

## 2020-02-13 NOTE — LETTER
[address] [facility]  [facility] [address]  Phone: [phone]  Fax: [phone], APRN - CNP        February 13, 2020     Patient: Nicola Lawrence   YOB: 1965   Date of Visit: 2/13/2020       To Whom it May Concern:    Chastity Russell was seen in my clinic on 2/13/2020. She may return to class on 2/17/20. If you have any questions or concerns, please don't hesitate to call.     Sincerely,         Reba Benítez, KEANU - CNP

## 2020-02-17 ENCOUNTER — TELEPHONE (OUTPATIENT)
Dept: NEUROSURGERY | Age: 55
End: 2020-02-17

## 2020-02-20 ENCOUNTER — OFFICE VISIT (OUTPATIENT)
Dept: PRIMARY CARE CLINIC | Age: 55
End: 2020-02-20
Payer: MEDICARE

## 2020-02-20 VITALS
WEIGHT: 190 LBS | HEART RATE: 69 BPM | DIASTOLIC BLOOD PRESSURE: 88 MMHG | HEIGHT: 63 IN | SYSTOLIC BLOOD PRESSURE: 130 MMHG | BODY MASS INDEX: 33.66 KG/M2

## 2020-02-20 PROCEDURE — 3017F COLORECTAL CA SCREEN DOC REV: CPT | Performed by: INTERNAL MEDICINE

## 2020-02-20 PROCEDURE — G8417 CALC BMI ABV UP PARAM F/U: HCPCS | Performed by: INTERNAL MEDICINE

## 2020-02-20 PROCEDURE — 4004F PT TOBACCO SCREEN RCVD TLK: CPT | Performed by: INTERNAL MEDICINE

## 2020-02-20 PROCEDURE — G8482 FLU IMMUNIZE ORDER/ADMIN: HCPCS | Performed by: INTERNAL MEDICINE

## 2020-02-20 PROCEDURE — 99213 OFFICE O/P EST LOW 20 MIN: CPT | Performed by: INTERNAL MEDICINE

## 2020-02-20 PROCEDURE — G8427 DOCREV CUR MEDS BY ELIG CLIN: HCPCS | Performed by: INTERNAL MEDICINE

## 2020-02-20 RX ORDER — DICYCLOMINE HYDROCHLORIDE 10 MG/1
10 CAPSULE ORAL 4 TIMES DAILY
Qty: 60 CAPSULE | Refills: 0 | Status: SHIPPED | OUTPATIENT
Start: 2020-02-20 | End: 2020-03-17 | Stop reason: SDUPTHER

## 2020-02-20 ASSESSMENT — ENCOUNTER SYMPTOMS
ABDOMINAL PAIN: 1
NAUSEA: 1
VOMITING: 1
DIARRHEA: 1

## 2020-02-20 NOTE — PROGRESS NOTES
[address] [facility]  [address] [place] [id number]  Dept: [phone]  Dept Fax: [phone]    Harley Wilson is a 60 y.o. female who presents to the urgent care today for her medicalconditions/complaints as noted below. Harley Wilson is c/o of Abdominal Pain and Letter for School/Work (for class states she missed Tuesday, Wednesday, and today)      HPI:     Abdominal Pain   This is a recurrent (has history of gastroparesis) problem. The current episode started 1 to 4 weeks ago. The pain is located in the generalized abdominal region. The abdominal pain does not radiate. Associated symptoms include anorexia (no appetitie), diarrhea, nausea and vomiting. Treatments tried: may have a gastric stimuator of some kind she says was placed while she was in [place]  also used bentyl in the past.     She was seen by Julia Booker last week and states she was treated with zofran and phenergan for this. She has missed two weeks of \"intensive drug classes' because of her symptoms. Of note patient is on suboxone. Past Medical History:   Diagnosis Date    Arthritis     Asthma     Borderline diabetes     Borderline personality disorder ([facility]     CHF (congestive heart failure) (HCC)     COPD (chronic obstructive pulmonary disease) (HCC)     Fibromyalgia     Headache     Hypertension     Manic depression (HCC)     Movement disorder     Neck fracture (HCC)     Pernicious anemia     Seizure (HCC)     Thyroid disease         Current Outpatient Medications   Medication Sig Dispense Refill    dicyclomine (BENTYL) 10 MG capsule Take 1 capsule by mouth 4 times daily 60 capsule 0    gabapentin (NEURONTIN) 600 MG tablet 1 tablet 2 times daily for 30 days. 60 tablet 0    LORazepam (ATIVAN) 1 MG tablet Take 1 mg by mouth 2 times daily.       amphetamine-dextroamphetamine (ADDERALL XR) 20 MG extended release capsule Take 20 mg by mouth every morning.  ondansetron (ZOFRAN-ODT) 4 MG disintegrating tablet Place 1 tablet under the tongue every 8 hours as needed for Nausea or Vomiting 30 tablet 0    propranolol (INDERAL LA) 60 MG extended release capsule Take 1 capsule by mouth daily 30 capsule 2    tiZANidine (ZANAFLEX) 4 MG tablet Take 1 tablet by mouth every 8 hours as needed (pain) 90 tablet 1    BENZOYL PEROXIDE 5 % external wash Apply daily 1 Bottle 0    budesonide-formoterol (SYMBICORT) 160-4.5 MCG/ACT AERO Inhale 2 puffs into the lungs 2 times daily 1 Inhaler 5    Skin Protectants, Misc. (EUCERIN) cream Apply topically as needed.  1 Package 2    lidocaine (XYLOCAINE) 2 % jelly Apply pea sized amount two times a day to shoulder 1 Tube 2    butalbital-acetaminophen-caffeine (FIORICET, ESGIC) -40 MG per tablet Take 2 pills at onset, then take 1 pill 6 hours later if needed 20 tablet 0    Nutritional Supplements (BOOST HIGH PROTEIN) LIQD Patient is to drink 1 bottle four times per day 120 Can 3    Gauze Pads & Dressings 2\"X2\" PADS Pt to change dressing once daily 50 each 2    acetaminophen (TYLENOL) 325 MG tablet Take 1 tablet by mouth every 6 hours as needed for Pain 120 tablet 3    LONG ACTING NASAL SPRAY 0.05 % nasal spray       ibuprofen (ADVIL;MOTRIN) 800 MG tablet Take 1 tablet by mouth every 8 hours as needed for Pain 30 tablet 0    Dextromethorphan-guaiFENesin (MUCINEX DM MAXIMUM STRENGTH)  MG TB12 Take 1 tablet by mouth 2 times daily as needed (cough, congestion) 28 tablet 0    ketorolac (TORADOL) 10 MG tablet Take 1 tablet by mouth every 6 hours as needed for Pain 20 tablet 0    prochlorperazine (COMPAZINE) 5 MG tablet Take 1 tablet by mouth every 6 hours as needed for Nausea 10 tablet 0    Elastic Bandages & Supports (CARPAL TUNNEL WRIST STABILIZER) MISC Apply nightly to each wrist 2 each 0    citalopram (CELEXA) 20 MG tablet Take 20 mg by mouth daily      furosemide (LASIX) 20 MG tablet Take 1 tablet by mouth daily as needed (for swelling) 30 tablet 1    lidocaine (XYLOCAINE) 5 % ointment Apply topically as needed to knee up to three times a day 1 Tube 2    famotidine (PEPCID) 20 MG tablet Take 1 tablet by mouth 2 times daily 60 tablet 2    docusate sodium (COLACE) 100 MG capsule Take 1 capsule by mouth daily as needed for Constipation 30 capsule 2    ferrous sulfate 325 (65 Fe) MG EC tablet Take 1 tablet by mouth daily (with breakfast) 30 tablet 3    risperiDONE (RISPERDAL) 2 MG tablet Take 1 tablet by mouth nightly 30 tablet 0    mirtazapine (REMERON) 45 MG tablet Take 1 tablet by mouth nightly 30 tablet 0    traZODone (DESYREL) 100 MG tablet Take 1 tablet by mouth nightly 90 tablet 1    phenytoin (PHENYTEK) 200 MG ER capsule Take 1 capsule by mouth 2 times daily 60 capsule 4    buprenorphine-naloxone (SUBOXONE) 8-2 MG FILM SL film       ciclopirox (PENLAC) 8 % solution Apply topically nightly. 1 Bottle 3    Wound Dressings (ADAPTIC NON-ADHERING DRESSING) PADS Use daily with wound dressing changes 30 each 2    Wound Dressings (ADAPTIC NON-ADHERING DRESSING) PADS Apply 1 each topically 2 times daily Apply to R lateral thigh wound after shower daily 30 each 2    Gauze Pads & Dressings (GAUZE DRESSING) 4\"X4\" PADS 1 each by Does not apply route daily as needed (wound) 30 each 2    nicotine (NICOTROL) 10 MG inhaler Inhale 1 puff into the lungs as needed for Smoking cessation 6-16 cartridges per day 1 Inhaler 3    Skin Protectants, Misc. (EUCERIN) cream       ipratropium-albuterol (DUONEB) 0.5-2.5 (3) MG/3ML SOLN nebulizer solution Inhale 3 mLs into the lungs every 4 hours 360 mL 1    fluticasone (FLONASE) 50 MCG/ACT nasal spray 2 sprays by Each Nostril route daily 1 Bottle 3    albuterol sulfate  (90 Base) MCG/ACT inhaler Inhale 2 puffs into the lungs every 6 hours as needed for Wheezing Gap prescription until follow up with primary. Do not refill.   Follow up with primary 1 Inhaler 3   

## 2020-03-04 ENCOUNTER — APPOINTMENT (OUTPATIENT)
Dept: GENERAL RADIOLOGY | Age: 55
End: 2020-03-04
Payer: MEDICARE

## 2020-03-04 ENCOUNTER — HOSPITAL ENCOUNTER (EMERGENCY)
Age: 55
Discharge: HOME | End: 2020-03-05
Attending: EMERGENCY MEDICINE
Payer: MEDICARE

## 2020-03-04 VITALS
BODY MASS INDEX: 36.32 KG/M2 | RESPIRATION RATE: 16 BRPM | WEIGHT: 185 LBS | HEIGHT: 60 IN | DIASTOLIC BLOOD PRESSURE: 69 MMHG | HEART RATE: 97 BPM | OXYGEN SATURATION: 97 % | TEMPERATURE: 99.1 F | SYSTOLIC BLOOD PRESSURE: 139 MMHG

## 2020-03-04 PROCEDURE — 6360000002 HC RX W HCPCS: Performed by: EMERGENCY MEDICINE

## 2020-03-04 PROCEDURE — 6370000000 HC RX 637 (ALT 250 FOR IP): Performed by: EMERGENCY MEDICINE

## 2020-03-04 PROCEDURE — 99283 EMERGENCY DEPT VISIT LOW MDM: CPT

## 2020-03-04 PROCEDURE — 96372 THER/PROPH/DIAG INJ SC/IM: CPT

## 2020-03-04 RX ORDER — KETOROLAC TROMETHAMINE 30 MG/ML
30 INJECTION, SOLUTION INTRAMUSCULAR; INTRAVENOUS ONCE
Status: COMPLETED | OUTPATIENT
Start: 2020-03-04 | End: 2020-03-04

## 2020-03-04 RX ORDER — HYDROCODONE BITARTRATE AND ACETAMINOPHEN 5; 325 MG/1; MG/1
1 TABLET ORAL ONCE
Status: COMPLETED | OUTPATIENT
Start: 2020-03-04 | End: 2020-03-04

## 2020-03-04 RX ADMIN — KETOROLAC TROMETHAMINE 30 MG: 30 INJECTION, SOLUTION INTRAMUSCULAR at 23:31

## 2020-03-04 RX ADMIN — HYDROCODONE BITARTRATE AND ACETAMINOPHEN 1 TABLET: 5; 325 TABLET ORAL at 23:31

## 2020-03-04 ASSESSMENT — PAIN SCALES - GENERAL: PAINLEVEL_OUTOF10: 8

## 2020-03-05 ENCOUNTER — APPOINTMENT (OUTPATIENT)
Dept: GENERAL RADIOLOGY | Age: 55
End: 2020-03-05
Payer: MEDICARE

## 2020-03-05 PROCEDURE — 73080 X-RAY EXAM OF ELBOW: CPT

## 2020-03-05 PROCEDURE — 73060 X-RAY EXAM OF HUMERUS: CPT

## 2020-03-05 RX ORDER — IBUPROFEN 800 MG/1
800 TABLET ORAL EVERY 8 HOURS PRN
Qty: 30 TABLET | Refills: 0 | Status: SHIPPED | OUTPATIENT
Start: 2020-03-05 | End: 2020-11-26

## 2020-03-05 ASSESSMENT — ENCOUNTER SYMPTOMS
COUGH: 0
SORE THROAT: 0
ABDOMINAL PAIN: 0
EYE PAIN: 0
SHORTNESS OF BREATH: 0
DIARRHEA: 0
VOMITING: 0
NAUSEA: 0

## 2020-03-05 NOTE — ED PROVIDER NOTES
[facility] ED  Emergency Department Encounter  EmergencyMedicine Resident     Pt Name:Aleta Ruelas  MRN: [id number]  [place] 1965  Date of evaluation: 3/4/20  PCP:  KEANU Mendez [id number]       Chief Complaint   Patient presents with    Elbow Pain     fell 5 days ago worsen since fall       HISTORY OF PRESENT ILLNESS  (Location/Symptom, Timing/Onset, Context/Setting, Quality, Duration, Modifying Factors, Severity.)      Leah Bailey is a 60 y.o. female who presents with complaints of right elbow pain. Patient reports she was [place] for inpatient psychiatric treatment when she tripped and fell onto her right elbow arm area striking the side of the bed. She did not hit her head, no loss of consciousness. She is not on any blood thinners. She reports she attempted to be taken to the emergency department at that time for evaluation and treatment, however the facility declined to allow her transfer. Patient was subsequently discharged a couple days ago and has had persistent pain and swelling and has limited range of motion, so is presenting at this time for evaluation and treatment of her injury. She reports that she is ambidextrous, so is not specifically right-handed. She reports no fevers or chills. No nausea or vomiting. No chest pain or shortness of breath. She feels that the elbow is swollen and is complaining of pain in the distal upper arm area as well as the proximal forearm area. She reports no decrease sensation, but does say that she has tingling in her right hand at this time.      PAST MEDICAL / SURGICAL / SOCIAL / FAMILY HISTORY      has a past medical history of Arthritis, Asthma, Borderline diabetes, Borderline personality disorder ([address] CHF (congestive heart failure) ([address] COPD (chronic obstructive pulmonary disease) ([address] Fibromyalgia, Headache, Hypertension, Manic depression ([address] Movement disorder, Neck fracture ([address] Molly Sapp MD   albuterol sulfate  (90 Base) MCG/ACT inhaler Inhale 2 puffs into the lungs every 6 hours as needed for [place] prescription until follow up with primary. Do not refill. Follow up with primary 5/24/19   Jose Luis Leisure, APRN - CNP       REVIEW OF SYSTEMS    (2-9 systems for level 4, 10 or more for level 5)      Review of Systems   Constitutional: Negative for activity change, appetite change and fever. HENT: Negative for congestion and sore throat. Eyes: Negative for pain and visual disturbance. Respiratory: Negative for cough and shortness of breath. Cardiovascular: Negative for chest pain and leg swelling. Gastrointestinal: Negative for abdominal pain, diarrhea, nausea and vomiting. Endocrine: Negative for polyphagia and polyuria. Genitourinary: Negative for dysuria and frequency. Musculoskeletal: Positive for arthralgias, joint swelling and myalgias. Skin: Negative for rash and wound. Allergic/Immunologic: Negative for environmental allergies and food allergies. Neurological: Negative for syncope and weakness. Hematological: Negative for adenopathy. Does not bruise/bleed easily. Psychiatric/Behavioral: Negative for confusion. The patient is not nervous/anxious. PHYSICAL EXAM   (up to 7 for level 4, 8 or more for level 5)      INITIAL VITALS:   /69   Pulse 97   Temp 99.1 °F (37.3 °C)   Resp 16   Ht 5' (1.524 m)   Wt 185 lb (83.9 kg)   SpO2 97%   BMI 36.13 kg/m²     Physical Exam  Constitutional:       General: She is not in acute distress. Appearance: She is well-developed. HENT:      Head: Normocephalic and atraumatic. Eyes:      Conjunctiva/sclera: Conjunctivae normal.      Pupils: Pupils are equal, round, and reactive to light. Neck:      Musculoskeletal: Normal range of motion and neck supple. Cardiovascular:      Rate and Rhythm: Normal rate and regular rhythm. Heart sounds: Normal heart sounds. No murmur. No friction rub. No gallop. Pulmonary:      Effort: Pulmonary effort is normal. No respiratory distress. Breath sounds: Normal breath sounds. No wheezing, rhonchi or rales. Abdominal:      General: Bowel sounds are normal. There is no distension. Palpations: Abdomen is soft. Tenderness: There is no abdominal tenderness. There is no right CVA tenderness, left CVA tenderness, guarding or rebound. Musculoskeletal:         General: No tenderness. Right elbow: She exhibits decreased range of motion. Skin:     General: Skin is warm and dry. Findings: No rash. Neurological:      Mental Status: She is alert and oriented to person, place, and time. GCS: GCS eye subscore is 4. GCS verbal subscore is 5. GCS motor subscore is 6. Cranial Nerves: Cranial nerves are intact. Sensory: Sensation is intact. Motor: Motor function is intact. Comments: Neuro exam intact, although patient does report some tingling in the right hand   Psychiatric:         Behavior: Behavior normal.         DIFFERENTIAL  DIAGNOSIS     PLAN (LABS / IMAGING / EKG):  Orders Placed This Encounter   Procedures    XR ELBOW RIGHT (MIN 3 VIEWS)    XR HUMERUS RIGHT (MIN 2 VIEWS)    Apply ace wrap    [facility] [employer] Arm Sling, Right; M       MEDICATIONS ORDERED:  Orders Placed This Encounter   Medications    ketorolac (TORADOL) injection 30 mg    HYDROcodone-acetaminophen (NORCO) 5-325 MG per tablet 1 tablet    ibuprofen (ADVIL;MOTRIN) 800 MG tablet     Sig: Take 1 tablet by mouth every 8 hours as needed for Pain     Dispense:  30 tablet     Refill:  0    diclofenac sodium 1 % GEL     Sig: Apply 4 g topically 4 times daily     Dispense:  4 Tube     Refill:  0       DIAGNOSTIC RESULTS / EMERGENCY DEPARTMENT COURSE / MDM     LABS:  No results found for this visit on 03/04/20.     RADIOLOGY:  Xr Humerus Right (min 2 Views)    Result Date: 3/5/2020  EXAMINATION: THREE XRAY VIEWS OF THE RIGHT ELBOW; TWO XRAY VIEWS OF THE RIGHT HUMERUS 3/4/2020 8:35 pm COMPARISON: None. HISTORY: ORDERING SYSTEM PROVIDED HISTORY: fall, swelling, pain, dec ROM TECHNOLOGIST PROVIDED HISTORY: fall, swelling, pain, dec ROM Reason for Exam: fall FINDINGS: Right humerus: No acute fracture is identified within the right humerus. No osteolysis or periosteal reaction is seen. No soft tissue abnormalities are detected. Right elbow: There are moderate degenerative changes of both the radiocapitellar and ulnotrochlear joint, characterized by joint space loss, osteophytosis, and articular bodies. No acute fracture or dislocation is identified. No joint effusion is seen. There is a needle shaped retained metallic foreign body seen along the radial and volar aspect of the proximal forearm, found approximately 10 cm distal to the radiocapitellar joint. Right humerus: No acute abnormality detected. Right elbow: No acute abnormality detected. Moderate degenerative disease. Retained needle shaped metallic foreign body within the radial and volar soft tissues of the proximal forearm. Xr Elbow Right (min 3 Views)    Result Date: 3/5/2020  EXAMINATION: THREE XRAY VIEWS OF THE RIGHT ELBOW; TWO XRAY VIEWS OF THE RIGHT HUMERUS 3/4/2020 8:35 pm COMPARISON: None. HISTORY: ORDERING SYSTEM PROVIDED HISTORY: fall, swelling, pain, dec ROM TECHNOLOGIST PROVIDED HISTORY: fall, swelling, pain, dec ROM Reason for Exam: fall FINDINGS: Right humerus: No acute fracture is identified within the right humerus. No osteolysis or periosteal reaction is seen. No soft tissue abnormalities are detected. Right elbow: There are moderate degenerative changes of both the radiocapitellar and ulnotrochlear joint, characterized by joint space loss, osteophytosis, and articular bodies. No acute fracture or dislocation is identified. No joint effusion is seen.   There is a needle shaped retained metallic foreign body seen along the radial and volar aspect of the proximal forearm, found approximately 10 cm distal to the radiocapitellar joint. Right humerus: No acute abnormality detected. Right elbow: No acute abnormality detected. Moderate degenerative disease. Retained needle shaped metallic foreign body within the radial and volar soft tissues of the proximal forearm. EKG  None    All EKG's are interpreted by the Emergency Department Physician who either signs or Co-signs this chart in the absence of a cardiologist.    EMERGENCY DEPARTMENT COURSE:  Patient seen and evaluated. She is sitting in the chair comfortably, no acute distress. Nontoxic-appearing. On examination, patient is in regular rate and rhythm, lungs are clear to auscultation bilaterally. She has no reproducible chest pain, no abdominal pain. She is neurovascularly intact except for some reports of ongoing tingling in the right hand. Patient has minimal swelling noted of the right elbow. She has tenderness along the distal humerus olecranon process. No forearm tenderness. She will not flex at the elbow secondary to pain. Patient is also limiting her range of motion at the shoulder secondary to pain. Patient was seen 2 be able to remove her shirt without difficulty even though she is unwilling to move now. No signs of erythema or effusion, no concern for septic joint at this time. No ecchymosis or deformity seen. There are findings on examination. Patient given Toradol and Norco.  X-rays ordered of the right humerus and right elbow. Review of the imaging, there are no acute bony injuries. No effusion noted. Patient found to have signs of arthritis in the joint. She also has an incidental finding of a metallic foreign body present in the proximal forearm area. Again patient was reassessed and found to be nontender in the area. Discussed all of the image findings with the patient and the treatment plan. Ace wrap was applied and sling was also applied.   Patient voiced understanding and is in agreement with the plan. Patient stable and ready for discharge home. PROCEDURES:  None    CONSULTS:  None    CRITICAL CARE:  None    FINAL IMPRESSION      1. Elbow sprain, right, initial encounter    2. Foreign body in right forearm, initial encounter    3.  Contusion of right elbow, initial encounter          DISPOSITION / PLAN     DISPOSITION    Decision to discharge    PATIENT REFERRED TO:  KEANU Ahn - CNP  [address]  [address]  [phone]    Call in 2 days  As needed, If symptoms worsen      DISCHARGE MEDICATIONS:  New Prescriptions    DICLOFENAC SODIUM 1 % GEL    Apply 4 g topically 4 times daily       Grant Chester MD  Emergency Medicine Resident    (Please note that portions of thisnote were completed with a voice recognition program.  Efforts were made to edit the dictations but occasionally words are mis-transcribed.)       Grant Chester MD  Resident  03/05/20 5700

## 2020-03-05 NOTE — ED PROVIDER NOTES
[address] [facility]     Emergency Department     Faculty Attestation    I performed a history and physical examination of the patient and discussed management with the resident. I have reviewed and agree with the residents findings including all diagnostic interpretations, and treatment plans as written at the time of my review. Any areas of disagreement are noted on the chart. I was personally present for the key portions of any procedures. I have documented in the chart those procedures where I was not present during the key portions. For Physician Assistant/ Nurse Practitioner cases/documentation I have personally evaluated this patient and have completed at least one if not all key elements of the E/M (history, physical exam, and MDM). Additional findings are as noted. Primary Care Physician: KEANU Webber - CNP    History: This is a 60 y.o. female who presents to the Emergency Department with complaint of right elbow pain. This been ongoing for last 5 days after fall. There is no head trauma no loss consciousness. Patient is ambidextrous. Physical:   height is 5' (1.524 m) and weight is 185 lb (83.9 kg). Her temperature is 99.1 °F (37.3 °C). Her blood pressure is 139/69 and her pulse is 97. Her respiration is 16 and oxygen saturation is 97%. There is tenderness to palpation along the medial lateral aspect of the right elbow. X-ray nerve is intact. Patient does have range of motion intact here for the wrist and elbow. Impression: Right elbow pain    Plan: X-ray, analgesia    (Please note that portions of this note were completed with a voice recognition program.  Efforts were made to edit the dictations but occasionally words are mis-transcribed.)    Daljit Brown.  Kevin Britt MD, [facility]  Attending Emergency Medicine Physician        Varun Wallace MD  03/04/20 [id number]

## 2020-03-10 ENCOUNTER — OFFICE VISIT (OUTPATIENT)
Dept: PRIMARY CARE CLINIC | Age: 55
End: 2020-03-10
Payer: MEDICARE

## 2020-03-10 VITALS
DIASTOLIC BLOOD PRESSURE: 90 MMHG | WEIGHT: 187 LBS | SYSTOLIC BLOOD PRESSURE: 141 MMHG | HEART RATE: 82 BPM | OXYGEN SATURATION: 97 % | BODY MASS INDEX: 36.52 KG/M2

## 2020-03-10 PROCEDURE — G8482 FLU IMMUNIZE ORDER/ADMIN: HCPCS | Performed by: INTERNAL MEDICINE

## 2020-03-10 PROCEDURE — 99213 OFFICE O/P EST LOW 20 MIN: CPT | Performed by: INTERNAL MEDICINE

## 2020-03-10 PROCEDURE — G8417 CALC BMI ABV UP PARAM F/U: HCPCS | Performed by: INTERNAL MEDICINE

## 2020-03-10 PROCEDURE — 3017F COLORECTAL CA SCREEN DOC REV: CPT | Performed by: INTERNAL MEDICINE

## 2020-03-10 PROCEDURE — G8427 DOCREV CUR MEDS BY ELIG CLIN: HCPCS | Performed by: INTERNAL MEDICINE

## 2020-03-10 PROCEDURE — 4004F PT TOBACCO SCREEN RCVD TLK: CPT | Performed by: INTERNAL MEDICINE

## 2020-03-10 RX ORDER — AZITHROMYCIN 250 MG/1
TABLET, FILM COATED ORAL
Qty: 1 PACKET | Refills: 0 | Status: ON HOLD | OUTPATIENT
Start: 2020-03-10 | End: 2020-09-09

## 2020-03-10 RX ORDER — PROCHLORPERAZINE MALEATE 5 MG/1
5 TABLET ORAL EVERY 6 HOURS PRN
Qty: 10 TABLET | Refills: 0 | Status: ON HOLD | OUTPATIENT
Start: 2020-03-10 | End: 2020-08-26 | Stop reason: SDUPTHER

## 2020-03-10 RX ORDER — ARM BRACE
EACH MISCELLANEOUS
Qty: 1 EACH | Refills: 0 | Status: ON HOLD | OUTPATIENT
Start: 2020-03-10 | End: 2020-09-10

## 2020-03-10 RX ORDER — ONDANSETRON 4 MG/1
4 TABLET, ORALLY DISINTEGRATING ORAL EVERY 8 HOURS PRN
Qty: 30 TABLET | Refills: 0 | Status: SHIPPED | OUTPATIENT
Start: 2020-03-10 | End: 2020-03-17 | Stop reason: SDUPTHER

## 2020-03-10 RX ORDER — PHENYLEPHRINE HYDROCHLORIDE 10 MG/1
TABLET, COATED ORAL
Qty: 2 EACH | Refills: 0 | Status: ON HOLD | OUTPATIENT
Start: 2020-03-10 | End: 2020-09-10

## 2020-03-10 RX ORDER — FAMOTIDINE 20 MG/1
20 TABLET, FILM COATED ORAL 2 TIMES DAILY
Qty: 60 TABLET | Refills: 0 | Status: SHIPPED | OUTPATIENT
Start: 2020-03-10 | End: 2020-03-17 | Stop reason: SDUPTHER

## 2020-03-10 RX ORDER — KETOROLAC TROMETHAMINE 10 MG/1
10 TABLET, FILM COATED ORAL EVERY 6 HOURS PRN
Qty: 20 TABLET | Refills: 0 | Status: ON HOLD | OUTPATIENT
Start: 2020-03-10 | End: 2020-09-09

## 2020-03-10 ASSESSMENT — ENCOUNTER SYMPTOMS: COUGH: 1

## 2020-03-10 NOTE — PROGRESS NOTES
[address] [facility]  [address] [facility] [address]  Dept: [phone]  Dept Fax: [phone]    Andree Larkin is a 60 y.o. female who presents to the urgent care today for her medicalconditions/complaints as noted below. Andree Larkin is c/o of Nausea; Cough; and Dizziness      HPI:     Cough   This is a new problem. The current episode started yesterday. The cough is non-productive. Arm Pain    The incident occurred more than 1 week ago (2 weeks). The incident occurred at home. The injury mechanism was a fall. The pain is present in the right elbow. The quality of the pain is described as aching. The pain does not radiate. The symptoms are aggravated by movement. She has tried ice (wrapped) for the symptoms. Past Medical History:   Diagnosis Date    Arthritis     Asthma     Borderline diabetes     Borderline personality disorder ([facility]     CH[facility]congest[facility] failure) [facility])     COPD (chronic obstructive pulmonary disease) ([facility])     Fibromyalgia     Headache     Hypertension     Manic depression (HCC)     Movement disorder     Neck fracture (HCC)     Pernicious anemia     Seizure (HCC)     Thyroid disease         Current Outpatient Medications   Medication Sig Dispense Refill    nicotine (NICOTROL) 10 MG inhaler Inhale 1 puff into the lungs as needed for Smoking cessation 6-16 cartridges per day 1 Inhaler 0    famotidine (PEPCID) 20 MG tablet Take 1 tablet by mouth 2 times daily 60 tablet 0    ondansetron (ZOFRAN-ODT) 4 MG disintegrating tablet Place 1 tablet under the tongue every 8 hours as needed for Nausea or Vomiting 30 tablet 0    prochlorperazine (COMPAZINE) 5 MG tablet Take 1 tablet by mouth every 6 hours as needed for Nausea 10 tablet 0    ketorolac (TORADOL) 10 MG tablet Take 1 tablet by mouth every 6 hours as needed for Pain 20 tablet 0    Elastic Bandages & Supports (FUTURO ELBOW BRACE) MISC Use daily. (SYNTHROID) 100 MCG tablet Take 1 tablet by mouth Daily 30 tablet 2    Wound Dressings (ADAPTIC NON-ADHERING DRESSING) PADS Use daily with wound dressing changes 30 each 2    Wound Dressings (ADAPTIC NON-ADHERING DRESSING) PADS Apply 1 each topically 2 times daily Apply to R lateral thigh wound after shower daily 30 each 2    EPINEPHrine (EPIPEN 2-MARTINA) 0.3 MG/0.3ML SOAJ injection Inject 0.3 mLs into the muscle once for 1 dose Use as directed for allergic reaction 0.3 mL 2    potassium chloride (KLOR-CON M) 20 MEQ extended release tablet Take 0.5 tablets by mouth daily for 2 days 1 tablet 0     No current facility-administered medications for this visit. Allergies   Allergen Reactions    Imitrex [Sumatriptan] Hives    Bee Pollen     Bee Venom     Bromide Ion [Bromine]     Flexeril [Cyclobenzaprine]     Nsaids      States nausea and vomiting with PO NSAIDS, specifically ibuprofen and naproxen     Potassium Bromide     Reglan [Metoclopramide]     Sulfa Antibiotics     Sulfadiazine     Tramadol Hives       Health Maintenance   Topic Date Due    HIV screen  02/16/1978    DTaP/Tdap/Td vaccine (1 - Tdap) 02/16/1982    Cervical cancer screen  02/16/1984    Breast cancer screen  02/16/2013    Shingles Vaccine (1 of 2) 02/16/2013    Colon Cancer Screen FIT/FOBT  06/11/2020    TSH testing  12/16/2020    Potassium monitoring  01/11/2021    Creatinine monitoring  01/11/2021    Lipid screen  02/19/2024    Flu vaccine  Completed    Pneumococcal 0-64 years Vaccine  Completed    Hepatitis C screen  Completed    Hepatitis A vaccine  Aged Out    Hepatitis B vaccine  Aged Out    Hib vaccine  Aged Out    Meningococcal (ACWY) vaccine  Aged Out       Subjective:      Review of Systems   Respiratory: Positive for cough. All other systems reviewed and are negative. Objective:     Physical Exam  Vitals signs reviewed. Constitutional:       Appearance: Normal appearance.    HENT:      Head:

## 2020-03-10 NOTE — PROGRESS NOTES
Visit Information    Have you changed or started any medications since your last visit including any over-the-counter medicines, vitamins, or herbal medicines? no   Are you having any side effects from any of your medications? -  no  Have you stopped taking any of your medications? Is so, why? -  no    Have you seen any other physician or provider since your last visit? No  Have you had any other diagnostic tests since your last visit? Yes  Have you been seen in the emergency room and/or had an admission to a hospital since we last saw you? Yes  Have you had your routine dental cleaning in the past 6 months? no    Have you activated your [employer] account? If not, what are your barriers?  Yes     Patient Care Team:  KEANU Contreras CNP as PCP - General (Family Medicine)  KEANU Contreras CNP as PCP - [facility] Provider    Medical History Review  Past Medical, Family, and Social History reviewed and does contribute to the patient presenting condition    Health Maintenance   Topic Date Due    HIV screen  02/16/1978    DTaP/Tdap/Td vaccine (1 - Tdap) 02/16/1982    Cervical cancer screen  02/16/1984    Breast cancer screen  02/16/2013    Shingles Vaccine (1 of 2) 02/16/2013    Colon Cancer Screen FIT/FOBT  06/11/2020    TSH testing  12/16/2020    Potassium monitoring  01/11/2021    Creatinine monitoring  01/11/2021    Lipid screen  02/19/2024    Flu vaccine  Completed    Pneumococcal 0-64 years Vaccine  Completed    Hepatitis C screen  Completed    Hepatitis A vaccine  Aged Out    Hepatitis B vaccine  Aged Out    Hib vaccine  Aged Out    Meningococcal (ACWY) vaccine  Aged Out

## 2020-03-16 ENCOUNTER — TELEPHONE (OUTPATIENT)
Dept: PRIMARY CARE CLINIC | Age: 55
End: 2020-03-16

## 2020-03-17 ENCOUNTER — HOSPITAL ENCOUNTER (EMERGENCY)
Age: 55
Discharge: HOME | End: 2020-03-18
Attending: EMERGENCY MEDICINE
Payer: MEDICARE

## 2020-03-17 ENCOUNTER — APPOINTMENT (OUTPATIENT)
Dept: GENERAL RADIOLOGY | Age: 55
End: 2020-03-17
Payer: MEDICARE

## 2020-03-17 VITALS
HEIGHT: 62 IN | BODY MASS INDEX: 31.28 KG/M2 | RESPIRATION RATE: 18 BRPM | HEART RATE: 61 BPM | TEMPERATURE: 98.3 F | SYSTOLIC BLOOD PRESSURE: 144 MMHG | OXYGEN SATURATION: 96 % | DIASTOLIC BLOOD PRESSURE: 88 MMHG | WEIGHT: 170 LBS

## 2020-03-17 LAB
-: NORMAL
-: NORMAL
ABSOLUTE EOS #: 0.11 K/UL (ref 0–0.44)
ABSOLUTE IMMATURE GRANULOCYTE: <0.03 K/UL (ref 0–0.3)
ABSOLUTE LYMPH #: 2.5 K/UL (ref 1.1–3.7)
ABSOLUTE MONO #: 0.54 K/UL (ref 0.1–1.2)
ADENOVIRUS PCR: NOT DETECTED
ALBUMIN SERPL-MCNC: 3.6 G/DL (ref 3.5–5.2)
ALBUMIN/GLOBULIN RATIO: 1.2 (ref 1–2.5)
ALP BLD-CCNC: 117 U/L (ref 35–104)
ALT SERPL-CCNC: 14 U/L (ref 5–33)
AMORPHOUS: NORMAL
ANION GAP SERPL CALCULATED.3IONS-SCNC: 10 MMOL/L (ref 9–17)
AST SERPL-CCNC: 18 U/L
BACTERIA: NORMAL
BASOPHILS # BLD: 1 % (ref 0–2)
BASOPHILS ABSOLUTE: 0.04 K/UL (ref 0–0.2)
BILIRUB SERPL-MCNC: 0.32 MG/DL (ref 0.3–1.2)
BILIRUBIN URINE: NEGATIVE
BORDETELLA PARAPERTUSSIS: NOT DETECTED
BORDETELLA PERTUSSIS PCR: NOT DETECTED
BUN BLDV-MCNC: 3 MG/DL (ref 6–20)
BUN/CREAT BLD: ABNORMAL (ref 9–20)
C-REACTIVE PROTEIN: 1.1 MG/L (ref 0–5)
CALCIUM SERPL-MCNC: 9.3 MG/DL (ref 8.6–10.4)
CASTS UA: NORMAL /LPF (ref 0–8)
CHLAMYDIA PNEUMONIAE BY PCR: NOT DETECTED
CHLORIDE BLD-SCNC: 100 MMOL/L (ref 98–107)
CO2: 27 MMOL/L (ref 20–31)
COLOR: YELLOW
COMMENT UA: ABNORMAL
CORONAVIRUS 229E PCR: NOT DETECTED
CORONAVIRUS HKU1 PCR: NOT DETECTED
CORONAVIRUS NL63 PCR: NOT DETECTED
CORONAVIRUS OC43 PCR: NOT DETECTED
CREAT SERPL-MCNC: 0.39 MG/DL (ref 0.5–0.9)
CRYSTALS, UA: NORMAL /HPF
DIFFERENTIAL TYPE: NORMAL
EOSINOPHILS RELATIVE PERCENT: 2 % (ref 1–4)
EPITHELIAL CELLS UA: NORMAL /HPF (ref 0–5)
GFR AFRICAN AMERICAN: >60 ML/MIN
GFR NON-AFRICAN AMERICAN: >60 ML/MIN
GFR SERPL CREATININE-BSD FRML MDRD: ABNORMAL ML/MIN/{1.73_M2}
GFR SERPL CREATININE-BSD FRML MDRD: ABNORMAL ML/MIN/{1.73_M2}
GLUCOSE BLD-MCNC: 99 MG/DL (ref 70–99)
GLUCOSE URINE: NEGATIVE
HCT VFR BLD CALC: 44.1 % (ref 36.3–47.1)
HEMOGLOBIN: 14.7 G/DL (ref 11.9–15.1)
HUMAN METAPNEUMOVIRUS PCR: NOT DETECTED
IMMATURE GRANULOCYTES: 0 %
INFLUENZA A BY PCR: NOT DETECTED
INFLUENZA A H1 (2009) PCR: NORMAL
INFLUENZA A H1 PCR: NORMAL
INFLUENZA A H3 PCR: NORMAL
INFLUENZA B BY PCR: NOT DETECTED
KETONES, URINE: NEGATIVE
LEGIONELLA PNEUMOPHILIA AG, URINE: NEGATIVE
LEUKOCYTE ESTERASE, URINE: ABNORMAL
LYMPHOCYTES # BLD: 38 % (ref 24–43)
MCH RBC QN AUTO: 30.4 PG (ref 25.2–33.5)
MCHC RBC AUTO-ENTMCNC: 33.3 G/DL (ref 28.4–34.8)
MCV RBC AUTO: 91.3 FL (ref 82.6–102.9)
MONOCYTES # BLD: 8 % (ref 3–12)
MUCUS: NORMAL
MYCOPLASMA PNEUMONIAE PCR: NOT DETECTED
NITRITE, URINE: NEGATIVE
NRBC AUTOMATED: 0 PER 100 WBC
OTHER OBSERVATIONS UA: NORMAL
PARAINFLUENZA 1 PCR: NOT DETECTED
PARAINFLUENZA 2 PCR: NOT DETECTED
PARAINFLUENZA 3 PCR: NOT DETECTED
PARAINFLUENZA 4 PCR: NOT DETECTED
PDW BLD-RTO: 12.2 % (ref 11.8–14.4)
PH UA: 6.5 (ref 5–8)
PLATELET # BLD: 265 K/UL (ref 138–453)
PLATELET ESTIMATE: NORMAL
PMV BLD AUTO: 9.2 FL (ref 8.1–13.5)
POTASSIUM SERPL-SCNC: 3.6 MMOL/L (ref 3.7–5.3)
PROTEIN UA: NEGATIVE
RBC # BLD: 4.83 M/UL (ref 3.95–5.11)
RBC # BLD: NORMAL 10*6/UL
RBC UA: NORMAL /HPF (ref 0–4)
REASON FOR REJECTION: NORMAL
RENAL EPITHELIAL, UA: NORMAL /HPF
RESP SYNCYTIAL VIRUS PCR: NOT DETECTED
RHINO/ENTEROVIRUS PCR: NOT DETECTED
SEG NEUTROPHILS: 51 % (ref 36–65)
SEGMENTED NEUTROPHILS ABSOLUTE COUNT: 3.38 K/UL (ref 1.5–8.1)
SODIUM BLD-SCNC: 137 MMOL/L (ref 135–144)
SPECIFIC GRAVITY UA: 1.01 (ref 1–1.03)
SPECIMEN DESCRIPTION: NORMAL
TOTAL PROTEIN: 6.6 G/DL (ref 6.4–8.3)
TRICHOMONAS: NORMAL
TURBIDITY: CLEAR
URINE HGB: NEGATIVE
UROBILINOGEN, URINE: NORMAL
WBC # BLD: 6.6 K/UL (ref 3.5–11.3)
WBC # BLD: NORMAL 10*3/UL
WBC UA: NORMAL /HPF (ref 0–5)
YEAST: NORMAL
ZZ NTE CLEAN UP: ORDERED TEST: NORMAL
ZZ NTE WITH NAME CLEAN UP: SPECIMEN SOURCE: NORMAL

## 2020-03-17 PROCEDURE — 81001 URINALYSIS AUTO W/SCOPE: CPT

## 2020-03-17 PROCEDURE — 93005 ELECTROCARDIOGRAM TRACING: CPT | Performed by: EMERGENCY MEDICINE

## 2020-03-17 PROCEDURE — 85025 COMPLETE CBC W/AUTO DIFF WBC: CPT

## 2020-03-17 PROCEDURE — 71045 X-RAY EXAM CHEST 1 VIEW: CPT

## 2020-03-17 PROCEDURE — 80053 COMPREHEN METABOLIC PANEL: CPT

## 2020-03-17 PROCEDURE — 6370000000 HC RX 637 (ALT 250 FOR IP): Performed by: EMERGENCY MEDICINE

## 2020-03-17 PROCEDURE — 0100U HC RESPIRPTHGN MULT REV TRANS & AMP PRB TECH 21 TRGT: CPT

## 2020-03-17 PROCEDURE — 87449 NOS EACH ORGANISM AG IA: CPT

## 2020-03-17 PROCEDURE — 87631 RESP VIRUS 3-5 TARGETS: CPT

## 2020-03-17 PROCEDURE — 86140 C-REACTIVE PROTEIN: CPT

## 2020-03-17 PROCEDURE — 99284 EMERGENCY DEPT VISIT MOD MDM: CPT

## 2020-03-17 RX ORDER — LORAZEPAM 1 MG/1
1 TABLET ORAL 2 TIMES DAILY
OUTPATIENT
Start: 2020-03-17

## 2020-03-17 RX ORDER — TIZANIDINE 4 MG/1
4 TABLET ORAL EVERY 8 HOURS PRN
Qty: 90 TABLET | Refills: 1 | Status: SHIPPED | OUTPATIENT
Start: 2020-03-17 | End: 2020-09-03

## 2020-03-17 RX ORDER — BUPRENORPHINE AND NALOXONE 8; 2 MG/1; MG/1
FILM, SOLUBLE BUCCAL; SUBLINGUAL
OUTPATIENT
Start: 2020-03-17

## 2020-03-17 RX ORDER — RISPERIDONE 2 MG/1
2 TABLET, FILM COATED ORAL NIGHTLY
Qty: 30 TABLET | Refills: 0 | OUTPATIENT
Start: 2020-03-17

## 2020-03-17 RX ORDER — DICYCLOMINE HYDROCHLORIDE 10 MG/1
10 CAPSULE ORAL 4 TIMES DAILY
Qty: 60 CAPSULE | Refills: 0 | Status: ON HOLD | OUTPATIENT
Start: 2020-03-17 | End: 2020-09-21 | Stop reason: HOSPADM

## 2020-03-17 RX ORDER — MIRTAZAPINE 45 MG/1
45 TABLET, FILM COATED ORAL NIGHTLY
Qty: 30 TABLET | Refills: 0 | OUTPATIENT
Start: 2020-03-17

## 2020-03-17 RX ORDER — ONDANSETRON 4 MG/1
4 TABLET, ORALLY DISINTEGRATING ORAL EVERY 8 HOURS PRN
Qty: 30 TABLET | Refills: 0 | Status: SHIPPED | OUTPATIENT
Start: 2020-03-17 | End: 2020-05-11 | Stop reason: SDUPTHER

## 2020-03-17 RX ORDER — GABAPENTIN 600 MG/1
600 TABLET ORAL 2 TIMES DAILY
Qty: 60 TABLET | Refills: 2 | Status: SHIPPED | OUTPATIENT
Start: 2020-03-17 | End: 2020-06-18 | Stop reason: SDUPTHER

## 2020-03-17 RX ORDER — DEXTROAMPHETAMINE SACCHARATE, AMPHETAMINE ASPARTATE MONOHYDRATE, DEXTROAMPHETAMINE SULFATE AND AMPHETAMINE SULFATE 5; 5; 5; 5 MG/1; MG/1; MG/1; MG/1
20 CAPSULE, EXTENDED RELEASE ORAL EVERY MORNING
OUTPATIENT
Start: 2020-03-17

## 2020-03-17 RX ORDER — FAMOTIDINE 20 MG/1
20 TABLET, FILM COATED ORAL 2 TIMES DAILY
Qty: 60 TABLET | Refills: 0 | Status: ON HOLD | OUTPATIENT
Start: 2020-03-17 | End: 2020-09-21 | Stop reason: HOSPADM

## 2020-03-17 RX ORDER — TRAZODONE HYDROCHLORIDE 100 MG/1
100 TABLET ORAL NIGHTLY
Qty: 90 TABLET | Refills: 1 | OUTPATIENT
Start: 2020-03-17

## 2020-03-17 RX ORDER — PROMETHAZINE HYDROCHLORIDE 6.25 MG/5ML
6.25 SYRUP ORAL ONCE
Status: COMPLETED | OUTPATIENT
Start: 2020-03-17 | End: 2020-03-17

## 2020-03-17 RX ORDER — PROPRANOLOL HCL 60 MG
60 CAPSULE, EXTENDED RELEASE 24HR ORAL DAILY
Qty: 30 CAPSULE | Refills: 2 | Status: ON HOLD | OUTPATIENT
Start: 2020-03-17 | End: 2020-08-26 | Stop reason: HOSPADM

## 2020-03-17 RX ORDER — LEVOTHYROXINE SODIUM 0.1 MG/1
100 TABLET ORAL DAILY
Qty: 30 TABLET | Refills: 2 | Status: ON HOLD | OUTPATIENT
Start: 2020-03-17 | End: 2020-08-26 | Stop reason: HOSPADM

## 2020-03-17 RX ORDER — BUTALBITAL, ACETAMINOPHEN AND CAFFEINE 50; 325; 40 MG/1; MG/1; MG/1
TABLET ORAL
Qty: 20 TABLET | Refills: 0 | Status: SHIPPED | OUTPATIENT
Start: 2020-03-17 | End: 2020-06-05

## 2020-03-17 RX ADMIN — PROMETHAZINE HYDROCHLORIDE 6.25 MG: 6.25 SOLUTION ORAL at 17:15

## 2020-03-17 ASSESSMENT — ENCOUNTER SYMPTOMS
VOMITING: 1
SHORTNESS OF BREATH: 0
SORE THROAT: 1
DIARRHEA: 1
WHEEZING: 0
CHEST TIGHTNESS: 0
RHINORRHEA: 1
ABDOMINAL PAIN: 0
NAUSEA: 1

## 2020-03-17 ASSESSMENT — PAIN DESCRIPTION - FREQUENCY: FREQUENCY: CONTINUOUS

## 2020-03-17 ASSESSMENT — PAIN DESCRIPTION - ONSET: ONSET: ON-GOING

## 2020-03-17 ASSESSMENT — PAIN SCALES - GENERAL: PAINLEVEL_OUTOF10: 6

## 2020-03-17 ASSESSMENT — PAIN DESCRIPTION - LOCATION: LOCATION: GENERALIZED

## 2020-03-17 ASSESSMENT — PAIN DESCRIPTION - DESCRIPTORS: DESCRIPTORS: HEADACHE;ACHING

## 2020-03-17 ASSESSMENT — PAIN DESCRIPTION - PAIN TYPE: TYPE: ACUTE PAIN

## 2020-03-17 NOTE — ED NOTES
Pt resting on stretcher, no respiratory distress noted, pt updated on plan of care, will continue to monitor, call light in reach.        Irena Bradley RN  03/17/20 [id number]

## 2020-03-17 NOTE — TELEPHONE ENCOUNTER
Patient states she can't get her suboxone right now because she is sick and they won't see her. She also wanted to know if you can change her inhaler to spiriva?  Please advise      Health Maintenance   Topic Date Due    HIV screen  02/16/1978    DTaP/Tdap/Td vaccine (1 - Tdap) 02/16/1982    Cervical cancer screen  02/16/1984    Breast cancer screen  02/16/2013    Shingles Vaccine (1 of 2) 02/16/2013    Colon Cancer Screen FIT/FOBT  06/11/2020    TSH testing  12/16/2020    Potassium monitoring  01/11/2021    Creatinine monitoring  01/11/2021    Lipid screen  02/19/2024    Flu vaccine  Completed    Pneumococcal 0-64 years Vaccine  Completed    Hepatitis C screen  Completed    Hepatitis A vaccine  Aged Out    Hepatitis B vaccine  Aged Out    Hib vaccine  Aged Out    Meningococcal (ACWY) vaccine  Aged Out             (applicable per patient's age: Cancer Screenings, Depression Screening, Fall Risk Screening, Immunizations)    Hemoglobin A1C (%)   Date Value   02/19/2019 5.2     LDL Cholesterol (mg/dL)   Date Value   02/19/2019 105     AST (U/L)   Date Value   12/16/2019 19     ALT (U/L)   Date Value   12/16/2019 11     BUN (mg/dL)   Date Value   01/11/2020 17      (goal A1C is < 7)   (goal LDL is <100) need 30-50% reduction from baseline     BP Readings from Last 3 Encounters:   03/10/20 (!) 141/90   03/04/20 139/69   02/20/20 130/88    (goal /80)      All Future Testing planned in [facility]:  Lab Frequency Next Occurrence   Phenytoin Level, Free Once 06/03/2019   Phenytoin Level, Total Once 06/06/2019   Valproic Acid Level, Free Once 06/06/2019   Valproic Acid Level, Total Once 06/06/2019   Phenobarbital Level Once 30/19/9490   Basic Metabolic Panel Once 17/65/2057   Basic Metabolic Panel Once 92/92/4657   Basic Metabolic Panel Once 12/32/1442   KRYSTIAN Digital Screen Bilateral [RPN0157] Once 05/13/2020   Phenobarbital Level Once 01/16/2021   PHENOBARBITAL, FREE Once 01/16/2021       Next Visit

## 2020-03-17 NOTE — ED NOTES
Pt reports ongoing cough, nausea, and diarrhea x 2 weeks. Pt reports being in class with classmates being sick. Pt reports going to an urgent care and being diagnosed with bronchitis. Pt reports she has not been getting better since. Pt reports hx of asthma, seizures. Pt reports taking zofran, robitussin, tessalon, immodium. Pt reports tolerating fluids. Pt alert and oriented x4, talking in complete sentences, respirations even and unlabored. Pt acting age appropriate. White board updated, will continue to monitor.        Talia Cabezas RN  03/17/20 [id number]

## 2020-03-17 NOTE — ED PROVIDER NOTES
file    Number of children: Not on file    Years of education: Not on file    Highest education level: Not on file   Occupational History    Not on file   Social Needs    Financial resource strain: Not on file    Food insecurity     Worry: Not on file     Inability: Not on file    Transportation needs     Medical: Not on file     Non-medical: Not on file   Tobacco Use    Smoking status: Current Every Day Smoker     Packs/day: 0.50     Years: 12.00     Pack years: 6.00    Smokeless tobacco: Never Used   Substance and Sexual Activity    Alcohol use: No    Drug use: No    Sexual activity: Not Currently   Lifestyle    Physical activity     Days per week: Not on file     Minutes per session: Not on file    Stress: Not on file   Relationships    Social connections     Talks on phone: Not on file     Gets together: Not on file     Attends Anabaptist service: Not on file     Active member of club or organization: Not on file     Attends meetings of clubs or organizations: Not on file     Relationship status: Not on file    Intimate partner violence     Fear of current or ex partner: Not on file     Emotionally abused: Not on file     Physically abused: Not on file     Forced sexual activity: Not on file   Other Topics Concern    Not on file   Social History Narrative    Not on file       No family history on file. Allergies:  Imitrex [sumatriptan]; Bee pollen; Bee venom; Bromide ion [bromine]; Flexeril [cyclobenzaprine]; Nsaids; Potassium bromide; Reglan [metoclopramide]; Sulfa antibiotics; Sulfadiazine; and Tramadol    Home Medications:  Prior to Admission medications    Medication Sig Start Date End Date Taking?  Authorizing Provider   azithromycin (ZITHROMAX) 250 MG tablet Take 1 tablet by mouth daily for 4 days 3/18/20 3/22/20 Yes Annette Scott MD   predniSONE (DELTASONE) 10 MG tablet Take 4 tablets by mouth once daily for 5 days 3/18/20 3/28/20 Yes Annette Scott MD up to three times a day  Patient not taking: Reported on 3/10/2020 12/16/19   KEANU Oliveros CNP   docusate sodium (COLACE) 100 MG capsule Take 1 capsule by mouth daily as needed for Constipation 11/26/19   KEANU Oliveros CNP   ferrous sulfate 325 (65 Fe) MG EC tablet Take 1 tablet by mouth daily (with breakfast) 11/26/19   Harini White, KEANU Hernandez CNP   risperiDONE (RISPERDAL) 2 MG tablet Take 1 tablet by mouth nightly 11/21/19   Harini White, KEANU Hernandez CNP   mirtazapine (REMERON) 45 MG tablet Take 1 tablet by mouth nightly 11/21/19   Harini White, KEANU Hernandez CNP   traZODone (DESYREL) 100 MG tablet Take 1 tablet by mouth nightly 11/21/19   Harini White, KEANU Hernandez CNP   phenytoin (PHENYTEK) 200 MG ER capsule Take 1 capsule by mouth 2 times daily 11/21/19   KEANU Oliveros CNP   buprenorphine-naloxone (SUBOXONE) 8-2 MG FILM SL film  11/14/19   Historical Provider, MD   ciclopirox (PENLAC) 8 % solution Apply topically nightly. 11/20/19   Trav Gilliam DPM   Wound Dressings (ADAPTIC NON-ADHERING DRESSING) PADS Use daily with wound dressing changes 11/19/19   Jan Jay MD   Wound Dressings (ADAPTIC NON-ADHERING DRESSING) PADS Apply 1 each topically 2 times daily Apply to R lateral thigh wound after shower daily 11/12/19   Michelle Galvan DO   Gauze Pads & Dressings (GAUZE DRESSING) 4\"X4\" PADS 1 each by Does not apply route daily as needed (wound) 11/12/19   Michelle Galvan DO   Skin Protectants, Misc.  (EUCERIN) cream  10/2/19   Historical Provider, MD   EPINEPHrine (EPIPEN 2-MARTINA) 0.3 MG/0.3ML SOAJ injection Inject 0.3 mLs into the muscle once for 1 dose Use as directed for allergic reaction 9/11/19 9/11/19  KEANU Weeks CNP   ipratropium-albuterol (DUONEB) 0.5-2.5 (3) MG/3ML SOLN nebulizer solution Inhale 3 mLs into the lungs every 4 hours 6/16/19   Nikhil Hooker MD   potassium chloride (KLOR-CON M) 20 MEQ extended release tablet Take 0.5 tablets by mouth Conjunctivae normal.      Pupils: Pupils are equal, round, and reactive to light. Neck:      Musculoskeletal: Normal range of motion and neck supple. Cardiovascular:      Rate and Rhythm: Normal rate and regular rhythm. Pulses: Normal pulses. Heart sounds: Normal heart sounds. No murmur. No friction rub. No gallop. Pulmonary:      Effort: Pulmonary effort is normal. No respiratory distress. Breath sounds: Normal breath sounds. No wheezing, rhonchi or rales. Abdominal:      General: Abdomen is flat. Bowel sounds are normal. There is no distension. Palpations: Abdomen is soft. Tenderness: There is no abdominal tenderness. There is no guarding or rebound. Musculoskeletal: Normal range of motion. General: No swelling, deformity or signs of injury. Skin:     General: Skin is warm and dry. Capillary Refill: Capillary refill takes less than 2 seconds. Neurological:      General: No focal deficit present. Mental Status: She is alert and oriented to person, place, and time. Mental status is at baseline. Motor: No weakness.          DIFFERENTIAL  DIAGNOSIS     PLAN (LABS / IMAGING / EKG):  Orders Placed This Encounter   Procedures    Respiratory Virus PCR Panel    LEGIONELLA ANTIGEN, URINE    XR CHEST PORTABLE    C-REACTIVE PROTEIN    CBC Auto Differential    Comprehensive Metabolic Panel    SPECIMEN REJECTION    URINALYSIS    Microscopic Urinalysis    EKG 12 Lead    EKG REPORT       MEDICATIONS ORDERED:  Orders Placed This Encounter   Medications    promethazine (PHENERGAN) 6.25 MG/5ML syrup 6.25 mg    azithromycin (ZITHROMAX) 250 MG tablet     Sig: Take 1 tablet by mouth daily for 4 days     Dispense:  4 tablet     Refill:  0    predniSONE (DELTASONE) 10 MG tablet     Sig: Take 4 tablets by mouth once daily for 5 days     Dispense:  20 tablet     Refill:  0    promethazine (PHENERGAN) tablet 25 mg    azithromycin (ZITHROMAX) tablet 500 mg DDX: Мария Campo is a 60 y.o. female who presents to the emergency department with diffuse myalgias, cough, nausea, vomiting. Differential diagnosis includes influenza, COVID, rhinovirus, other unspecified respiratory virus, COPD exacerbation, atypical angina, acute viral syndrome, pneumonia    DIAGNOSTIC RESULTS / EMERGENCY DEPARTMENT COURSE / MDM   LAB RESULTS:  Results for orders placed or performed during the hospital encounter of 03/17/20   Respiratory Virus PCR Panel   Result Value Ref Range    Specimen Description . NASOPHARYNGEAL SWAB     Adenovirus PCR Not Detected Not Detected    Coronavirus 229E PCR Not Detected Not Detected    Coronavirus HKU1 PCR Not Detected Not Detected    Coronavirus NL63 PCR Not Detected Not Detected    Coronavirus OC43 PCR Not Detected Not Detected    Human Metapneumovirus PCR Not Detected Not Detected    Rhino/Enterovirus PCR Not Detected Not Detected    Influenza A by PCR Not Detected Not Detected    Influenza A H1 PCR NOT REPORTED Not Detected    Influenza A H1 (2009) PCR NOT REPORTED Not Detected    Influenza A H3 PCR NOT REPORTED Not Detected    Influenza B by PCR Not Detected Not Detected    Parainfluenza 1 PCR Not Detected Not Detected    Parainfluenza 2 PCR Not Detected Not Detected    Parainfluenza 3 PCR Not Detected Not Detected    Parainfluenza 4 PCR Not Detected Not Detected    Resp Syncytial Virus PCR Not Detected Not Detected    Bordetella Parapertussis Not Detected Not Detected    B Pertussis by PCR Not Detected Not Detected    Chlamydia pneumoniae By PCR Not Detected Not Detected    Mycoplasma pneumo by PCR Not Detected Not Detected   LEGIONELLA ANTIGEN, URINE   Result Value Ref Range    Legionella Pneumophilia Ag, Urine NEGATIVE    C-REACTIVE PROTEIN   Result Value Ref Range    CRP 1.1 0.0 - 5.0 mg/L   CBC Auto Differential   Result Value Ref Range    WBC 6.6 3.5 - 11.3 k/uL    RBC 4.83 3.95 - 5.11 m/uL    Hemoglobin 14.7 11.9 - 15.1 g/dL    Hematocrit UA 1.006 1.005 - 1.030    Urine Hgb NEGATIVE NEGATIVE    pH, UA 6.5 5.0 - 8.0    Protein, UA NEGATIVE NEGATIVE    Urobilinogen, Urine Normal Normal    Nitrite, Urine NEGATIVE NEGATIVE    Leukocyte Esterase, Urine TRACE (A) NEGATIVE    Urinalysis Comments NOT REPORTED    Microscopic Urinalysis   Result Value Ref Range    -          WBC, UA 0 TO 2 0 - 5 /HPF    RBC, UA 2 TO 5 0 - 4 /HPF    Casts UA  0 - 8 /LPF    Crystals, UA NOT REPORTED None /HPF    Epithelial Cells UA 0 TO 2 0 - 5 /HPF    Renal Epithelial, UA NOT REPORTED 0 /HPF    Bacteria, UA NOT REPORTED None    Mucus, UA NOT REPORTED None    Trichomonas, UA NOT REPORTED None    Amorphous, UA NOT REPORTED None    Other Observations UA NOT REPORTED NOT REQ. Yeast, UA NOT REPORTED None   EKG 12 Lead   Result Value Ref Range    Ventricular Rate 59 BPM    Atrial Rate 59 BPM    P-R Interval 180 ms    QRS Duration 82 ms    Q-T Interval 454 ms    QTc Calculation (Bazett) 449 ms    P Axis 64 degrees    R Axis 26 degrees    T Axis 27 degrees       IMPRESSION: Liset Washington is a 60 y.o. female who presents to the emergency department with a 2-week history of diffuse myalgias, cough, nausea, vomiting. She is marginally afebrile at 99.9 °F and she has an examination consistent with a flulike syndrome. However, in the history of COPD and heart disease, will obtain basic cardiac work-up and also obtain chest x-ray. Will medicate for nausea. RADIOLOGY:  Xr Humerus Right (min 2 Views)    Result Date: 3/5/2020  EXAMINATION: THREE XRAY VIEWS OF THE RIGHT ELBOW; TWO XRAY VIEWS OF THE RIGHT HUMERUS 3/4/2020 8:35 pm COMPARISON: None. HISTORY: ORDERING SYSTEM PROVIDED HISTORY: fall, swelling, pain, dec ROM TECHNOLOGIST PROVIDED HISTORY: fall, swelling, pain, dec ROM Reason for Exam: fall FINDINGS: Right humerus: No acute fracture is identified within the right humerus. No osteolysis or periosteal reaction is seen. No soft tissue abnormalities are detected.  Right elbow: There are moderate degenerative changes of both the radiocapitellar and ulnotrochlear joint, characterized by joint space loss, osteophytosis, and articular bodies. No acute fracture or dislocation is identified. No joint effusion is seen. There is a needle shaped retained metallic foreign body seen along the radial and volar aspect of the proximal forearm, found approximately 10 cm distal to the radiocapitellar joint. Right humerus: No acute abnormality detected. Right elbow: No acute abnormality detected. Moderate degenerative disease. Retained needle shaped metallic foreign body within the radial and volar soft tissues of the proximal forearm. Xr Elbow Right (min 3 Views)    Result Date: 3/5/2020  EXAMINATION: THREE XRAY VIEWS OF THE RIGHT ELBOW; TWO XRAY VIEWS OF THE RIGHT HUMERUS 3/4/2020 8:35 pm COMPARISON: None. HISTORY: ORDERING SYSTEM PROVIDED HISTORY: fall, swelling, pain, dec ROM TECHNOLOGIST PROVIDED HISTORY: fall, swelling, pain, dec ROM Reason for Exam: fall FINDINGS: Right humerus: No acute fracture is identified within the right humerus. No osteolysis or periosteal reaction is seen. No soft tissue abnormalities are detected. Right elbow: There are moderate degenerative changes of both the radiocapitellar and ulnotrochlear joint, characterized by joint space loss, osteophytosis, and articular bodies. No acute fracture or dislocation is identified. No joint effusion is seen. There is a needle shaped retained metallic foreign body seen along the radial and volar aspect of the proximal forearm, found approximately 10 cm distal to the radiocapitellar joint. Right humerus: No acute abnormality detected. Right elbow: No acute abnormality detected. Moderate degenerative disease. Retained needle shaped metallic foreign body within the radial and volar soft tissues of the proximal forearm.        EKG  EKG Interpretation    Interpreted by emergency department physician    Rhythm: normal Take 1 tablet by mouth Daily, Disp-30 tablet, R-2Normal      ondansetron (ZOFRAN-ODT) 4 MG disintegrating tablet Place 1 tablet under the tongue every 8 hours as needed for Nausea or Vomiting, Disp-30 tablet, R-0Normal      propranolol (INDERAL LA) 60 MG extended release capsule Take 1 capsule by mouth daily, Disp-30 capsule, R-2Normal      tiZANidine (ZANAFLEX) 4 MG tablet Take 1 tablet by mouth every 8 hours as needed (pain), Disp-90 tablet, R-1Normal       !! - Potential duplicate medications found. Please discuss with provider.           Franky Hubbard MD  Emergency Medicine Resident    (Please note that portions of thisnote were completed with a voice recognition program.  Efforts were made to edit the dictations but occasionally words are mis-transcribed.)       Franky Hubbard MD  Resident  03/18/20 [id number]

## 2020-03-17 NOTE — ED PROVIDER NOTES
[facility]     Emergency Department     Faculty Attestation        I performed a history and physical examination of the patient and discussed management with the resident. I reviewed the residents note and agree with the documented findings and plan of care. Any areas of disagreement are noted on the chart. I was personally present for the key portions of any procedures. I have documented in the chart those procedures where I was not present during the key portions. I have reviewed the emergency nurses triage note. I agree with the chief complaint, past medical history, past surgical history, allergies, medications, social and family history as documented unless otherwise noted below. For Physician Assistant/ Nurse Practitioner cases/documentation I have personally evaluated this patient and have completed at least one if not all key elements of the E/M (history, physical exam, and MDM). Additional findings are as noted. Vital Signs: BP: (!) 144/88  Pulse: 61  Resp: 18  Temp: 99.9 °F (37.7 °C) SpO2: 96 %  PCP:  Anna Odonnell, APRN - CNP    Pertinent Comments:     Patient is a 49-year-old female with history of COPD as well as hypertension and diabetes still smoking with also history of diastolic congestive heart failure. Patient over the last 1 if not almost 2 weeks began with viral syndrome of runny nose as well as cough subsequently having nausea vomiting and diarrhea which still has progressed with minimal improvement over the last week. Patient is having subjective fevers and here with temperature 37.7 °C.    Occasional myalgias as well. Physical Examination:  Clear to auscultation bilaterally, regular rate and rhythm, and   abdomen soft/nontender/nondistended/normal bowel sounds/no pulsatile masses palpated. There is no calf swelling noted and no TTP, with strong DP pulses palpated bilaterally.     HEENT examination with mild nasal congestion observed and mild posterior pharyngeal erythema but no asymmetry and does have a normal midline uvula. Assessment/Plan:   Patient with clearly extended viral syndrome and given risk factors will test with viral PCR panel as well as brief cardiac work-up and obviously chest x-ray to assure no evolution to pneumonia. Symptomatic control attempted and reevaluate after      Critical Care  None      (Please note that portions of this note were completed with a voice recognition program. Efforts were made to edit the dictations but occasionally words are mis-transcribed.  Whenever words are used in this note in any gender, they shall be construed as though they were used in the gender appropriate to the circumstances; and whenever words are used in this note in the singular or plural form, they shall be construed as though they were used in the form appropriate to the circumstances.)    MD Maria Antonia Hernandez  Attending Emergency Medicine Physician            Asif Larson MD  03/17/20 2003

## 2020-03-17 NOTE — ED NOTES
Pt resting on stretcher, no respiratory distress noted, pt updated on plan of care, will continue to monitor, call light in reach.        Faby Cash RN  03/17/20 [id number]

## 2020-03-18 LAB
EKG ATRIAL RATE: 59 BPM
EKG P AXIS: 64 DEGREES
EKG P-R INTERVAL: 180 MS
EKG Q-T INTERVAL: 454 MS
EKG QRS DURATION: 82 MS
EKG QTC CALCULATION (BAZETT): 449 MS
EKG R AXIS: 26 DEGREES
EKG T AXIS: 27 DEGREES
EKG VENTRICULAR RATE: 59 BPM

## 2020-03-18 PROCEDURE — 6370000000 HC RX 637 (ALT 250 FOR IP): Performed by: STUDENT IN AN ORGANIZED HEALTH CARE EDUCATION/TRAINING PROGRAM

## 2020-03-18 PROCEDURE — 93010 ELECTROCARDIOGRAM REPORT: CPT | Performed by: INTERNAL MEDICINE

## 2020-03-18 RX ORDER — PROMETHAZINE HYDROCHLORIDE 25 MG/1
25 TABLET ORAL ONCE
Status: COMPLETED | OUTPATIENT
Start: 2020-03-18 | End: 2020-03-18

## 2020-03-18 RX ORDER — PREDNISONE 10 MG/1
TABLET ORAL
Qty: 20 TABLET | Refills: 0 | Status: SHIPPED | OUTPATIENT
Start: 2020-03-18 | End: 2020-03-28

## 2020-03-18 RX ORDER — AZITHROMYCIN 250 MG/1
500 TABLET, FILM COATED ORAL ONCE
Status: COMPLETED | OUTPATIENT
Start: 2020-03-18 | End: 2020-03-18

## 2020-03-18 RX ORDER — AZITHROMYCIN 250 MG/1
250 TABLET, FILM COATED ORAL DAILY
Qty: 4 TABLET | Refills: 0 | Status: SHIPPED | OUTPATIENT
Start: 2020-03-18 | End: 2020-03-22

## 2020-03-18 RX ADMIN — AZITHROMYCIN 500 MG: 250 TABLET, FILM COATED ORAL at 00:37

## 2020-03-18 RX ADMIN — PROMETHAZINE HYDROCHLORIDE 25 MG: 25 TABLET ORAL at 00:37

## 2020-03-18 NOTE — ED NOTES
Pt resting on stretcher, no respiratory distress noted, pt updated on plan of care, will continue to monitor, call light in reach.        Chaka Ho RN  03/18/20 [id number]

## 2020-03-18 NOTE — ED NOTES
Pt resting on stretcher, no respiratory distress noted, pt updated on plan of care, will continue to monitor, call light in reach.        Susan Meza RN  03/18/20 [id number]

## 2020-03-18 NOTE — ED NOTES
Pt resting on stretcher, no respiratory distress noted, pt updated on plan of care, will continue to monitor, call light in reach.        Clarence Monroe RN  03/18/20 [id number]

## 2020-03-18 NOTE — ED PROVIDER NOTES
[facility] ED  Emergency Department  Emergency Medicine Resident Sign-out     Care of Zina Barba was assumed from Dr. Charli Clinton and is being seen for Cough (started 2 weeks ago; went to walk in clinic on 3/10, has gotten worse since then - pt said \"clinic said I had bronchitis\" ) and Sweats  . The patient's initial evaluation and plan have been discussed with the prior provider who initially evaluated the patient. EMERGENCY DEPARTMENT COURSE / MEDICAL DECISION MAKING:       MEDICATIONS GIVEN:  Orders Placed This Encounter   Medications    promethazine (PHENERGAN) 6.25 MG/5ML syrup 6.25 mg    azithromycin (ZITHROMAX) 250 MG tablet     Sig: Take 1 tablet by mouth daily for 4 days     Dispense:  4 tablet     Refill:  0    predniSONE (DELTASONE) 10 MG tablet     Sig: Take 4 tablets by mouth once daily for 5 days     Dispense:  20 tablet     Refill:  0    promethazine (PHENERGAN) tablet 25 mg    azithromycin (ZITHROMAX) tablet 500 mg       LABS / RADIOLOGY:     Labs Reviewed   COMPREHENSIVE METABOLIC PANEL - Abnormal; Notable for the following components:       Result Value    BUN 3 (*)     CREATININE 0.39 (*)     Potassium 3.6 (*)     Alkaline Phosphatase 117 (*)     All other components within normal limits   URINALYSIS - Abnormal; Notable for the following components:    Leukocyte Esterase, Urine TRACE (*)     All other components within normal limits   RESPIRATORY VIRUS PCR PANEL   LEGIONELLA ANTIGEN, URINE   C-REACTIVE PROTEIN   CBC WITH AUTO DIFFERENTIAL   SPECIMEN REJECTION   MICROSCOPIC URINALYSIS       Xr Humerus Right (min 2 Views)    Result Date: 3/5/2020  EXAMINATION: THREE XRAY VIEWS OF THE RIGHT ELBOW; TWO XRAY VIEWS OF THE RIGHT HUMERUS 3/4/2020 8:35 pm COMPARISON: None.  HISTORY: ORDERING SYSTEM PROVIDED HISTORY: fall, swelling, pain, dec ROM TECHNOLOGIST PROVIDED HISTORY: fall, swelling, pain, dec ROM Reason for Exam: fall FINDINGS: Right humerus: No acute fracture is

## 2020-03-19 ENCOUNTER — CARE COORDINATION (OUTPATIENT)
Dept: CARE COORDINATION | Age: 55
End: 2020-03-19

## 2020-03-20 RX ORDER — PHENOBARBITAL 30 MG/1
30 TABLET ORAL 3 TIMES DAILY
Qty: 10 TABLET | Refills: 0 | Status: SHIPPED | OUTPATIENT
Start: 2020-03-20 | End: 2020-03-23

## 2020-03-20 RX ORDER — PHENYTOIN SODIUM 200 MG/1
200 CAPSULE, EXTENDED RELEASE ORAL 2 TIMES DAILY
Qty: 14 CAPSULE | Refills: 0 | Status: SHIPPED | OUTPATIENT
Start: 2020-03-20 | End: 2020-03-24 | Stop reason: SDUPTHER

## 2020-03-24 ENCOUNTER — TELEPHONE (OUTPATIENT)
Dept: NEUROLOGY | Age: 55
End: 2020-03-24

## 2020-03-24 RX ORDER — PHENYTOIN SODIUM 200 MG/1
200 CAPSULE, EXTENDED RELEASE ORAL 2 TIMES DAILY
Qty: 180 CAPSULE | Refills: 1 | Status: ON HOLD | OUTPATIENT
Start: 2020-03-24 | End: 2020-09-10 | Stop reason: ALTCHOICE

## 2020-03-24 RX ORDER — PHENOBARBITAL 32.4 MG/1
32.4 TABLET ORAL 3 TIMES DAILY
Qty: 270 TABLET | Refills: 1 | Status: SHIPPED | OUTPATIENT
Start: 2020-03-24 | End: 2020-06-22

## 2020-03-24 NOTE — TELEPHONE ENCOUNTER
Please inform the patient that I prescribed phenytoin and phenobarbital for 90 days with 1 refill  Thank you

## 2020-03-26 NOTE — CONSULTS
History and Physical    PATIENT NAME: Aleta Mcdonough  AGE: 62 y.o. MEDICAL RECORD NO. [id number]  DATE: 6/9/2019  SURGEON: Dr. Rodrick Calderón: Gabrielle Pandey NP-C, APRN - NP    Patient evaluated at the request of  Dr. Krystle Flores  Reason for evaluation: Eval of prior R hip nec fasc infection with wound vac    Patient information was obtained from past medical records and ED. History/Exam limitations: mental status. Patient presented to the Emergency Department by ambulance. IMPRESSION:     Patient Active Problem List   Diagnosis    Chest pain    Migraine variant with headache    Drug overdose, accidental or unintentional, initial encounter    Hypothyroidism    Essential hypertension    Seizure disorder ([address]    Drug overdose    History of seizure    Major depressive disorder with psychotic features ([address]    Severe major depression ([address]    Overdose of barbiturate, intentional self-harm, initial encounter ([address]    Intentional phenobarbital overdose ([address]    Severe episode of recurrent major depressive disorder, without psychotic features ([address]    Bronchitis    Suicide attempt ([address]    Major depressive disorder, recurrent ([address]    Ear infection    Severe malnutrition ([address]    Altered mental status    Depression    Elevated troponin    Breakthrough seizure ([address]    Wound infection    Septicemia ([address]         PLAN:   1. Wound vac - site does have some yellow drainage but no purulent or foul smelling drainage, minimal redness surrounding wound and appears to be healing well. Low suspicion for necrotizing fasciitis, normal wbc, mildly elevated lactate 2.3, doubt cause of altered mental status. Recommend consulting wound care for further wound management, general surgery will to sign off.   2.  Altered mental status - recommend medicine admission for further workup, patient has already been given broad spectrum abx and fluids per sepsis protocol   3.     DVT ppx - ok from surgery standpoint as long as other medical conditions are carefully assessed pertaining to the patient      HISTORY:   History of Chief Complaint:    Zina Jain is a 62 y.o. female who presents with altered mental status. Patient arrives brought in by EMS and told them she took off her wound vac 3 days ago to her right hip. Patient with history of necrotizing fasciitis to right hip. ED concerned given prior history and concern for sepsis therefore patient given vancomycin, zosyn and clindamycin and fluids. Unable to obtain any history from patient as she is altered and will only awake briefly to verbal and painful stimuli. Past Medical History   has a past medical history of Arthritis, Asthma, CHF (congestive heart failure) ([address] COPD (chronic obstructive pulmonary disease) ([address] Fibromyalgia, Headache, Hypertension, Movement disorder, Neck fracture ([address] Seizure ([address] and Thyroid disease. Past Surgical History   has a past surgical history that includes knee surgery; partial hysterectomy (cervix not removed); lymph node dissection; Irrigation and debridement (Right, 5/17/2019); EXPLORATION OF WOUND OF EXTREMITY (Right, 5/19/2019); and EXPLORATION OF WOUND OF EXTREMITY (N/A, 5/22/2019). Medications  Prior to Admission medications    Medication Sig Start Date End Date Taking?  Authorizing Provider   divalproex (DEPAKOTE) 500 MG DR tablet Take 2 tablets by mouth every 12 hours 6/4/19   Katya Jones MD   phenytoin (DILANTIN) 30 MG ER capsule Take 5 capsules by mouth 3 times daily 6/4/19   Katya Jones MD   predniSONE (DELTASONE) 20 MG tablet Take 2 tablets by mouth daily for 4 doses 6/5/19 6/9/19  Katya Jones MD   benzonatate (TESSALON) 100 MG capsule Take 1 capsule by mouth 3 times daily as needed for Cough 6/4/19 6/11/19  Katya Jones MD   fluticasone (FLONASE) 50 MCG/ACT nasal spray 2 sprays by Each Nostril route daily 6/5/19   Katya Jones MD   azithromycin (ZITHROMAX) 250 MG tablet Take 1 tablet by mouth See Admin Instructions for 5 days 500mg on day 1 followed by 250mg on days 2 - 5 6/4/19 6/9/19  Bryan Nayak MD   ferrous sulfate 325 (65 Fe) MG EC tablet Take 1 tablet by mouth daily (with breakfast) 6/5/19   Bryan Nayak MD   albuterol sulfate  (90 Base) MCG/ACT inhaler Inhale 2 puffs into the lungs every 6 hours as needed for Wheezing Gap prescription until follow up with primary. Do not refill. Follow up with primary 5/24/19   Teller KEANU Lange - CNP   mirtazapine (REMERON) 45 MG tablet Take 1 tablet by mouth nightly 3/1/19   [facility]   risperiDONE (RISPERDAL) 2 MG tablet Take 1 tablet by mouth nightly 3/1/19   [facility]   famotidine (PEPCID) 20 MG tablet Take 20 mg by mouth 2 times daily    Historical Provider, MD   levothyroxine (SYNTHROID) 50 MCG tablet Take 50 mcg by mouth Daily    Historical Provider, MD    Scheduled Meds:   vancomycin  15 mg/kg Intravenous Once    piperacillin-tazobactam  3.375 g Intravenous Once     Continuous Infusions:  PRN Meds:. Allergies  is allergic to imitrex [sumatriptan]; bee pollen; bee venom; bromide ion [bromine]; flexeril [cyclobenzaprine]; neurontin [gabapentin]; nsaids; potassium bromide; reglan [metoclopramide]; sulfa antibiotics; sulfadiazine; toradol [ketorolac tromethamine]; and tramadol. Family History  family history is not on file. Social History   reports that she has been smoking. She has a 6.00 pack-year smoking history. She has never used smokeless tobacco.   reports that she does not drink alcohol. reports that she does not use drugs. Review of Systems  Unable to obtain due to AMS    PHYSICAL:   VITALS:  weight is 202 lb (91.6 kg). Her axillary temperature is 101 °F (38.3 °C). Her blood pressure is 97/64 and her pulse is 89. Her respiration is 20 and oxygen saturation is 94%. CONSTITUTIONAL: Alert and oriented times 3, no acute distress and cooperative to examination.   HEENT: Head is normocephalic, Self/Transport

## 2020-03-31 ENCOUNTER — TELEPHONE (OUTPATIENT)
Dept: ADMINISTRATIVE | Age: 55
End: 2020-03-31

## 2020-04-01 RX ORDER — GUAIFENESIN AND DEXTROMETHORPHAN HYDROBROMIDE 1200; 60 MG/1; MG/1
1 TABLET, EXTENDED RELEASE ORAL 2 TIMES DAILY PRN
Qty: 28 TABLET | Refills: 0 | Status: ON HOLD | OUTPATIENT
Start: 2020-04-01 | End: 2020-09-21 | Stop reason: HOSPADM

## 2020-04-01 NOTE — TELEPHONE ENCOUNTER
Health Maintenance   Topic Date Due    HIV screen  02/16/1978    DTaP/Tdap/Td vaccine (1 - Tdap) 02/16/1982    Cervical cancer screen  02/16/1984    Breast cancer screen  02/16/2013    Shingles Vaccine (1 of 2) 02/16/2013    Colon Cancer Screen FIT/FOBT  06/11/2020    TSH testing  12/16/2020    Potassium monitoring  03/17/2021    Creatinine monitoring  03/17/2021    Lipid screen  02/19/2024    Flu vaccine  Completed    Pneumococcal 0-64 years Vaccine  Completed    Hepatitis C screen  Completed    Hepatitis A vaccine  Aged Out    Hepatitis B vaccine  Aged Out    Hib vaccine  Aged Out    Meningococcal (ACWY) vaccine  Aged Out             (applicable per patient's age: Cancer Screenings, Depression Screening, Fall Risk Screening, Immunizations)    Hemoglobin A1C (%)   Date Value   02/19/2019 5.2     LDL Cholesterol (mg/dL)   Date Value   02/19/2019 105     AST (U/L)   Date Value   03/17/2020 18     ALT (U/L)   Date Value   03/17/2020 14     BUN (mg/dL)   Date Value   03/17/2020 3 (L)      (goal A1C is < 7)   (goal LDL is <100) need 30-50% reduction from baseline     BP Readings from Last 3 Encounters:   03/17/20 (!) 144/88   03/10/20 (!) 141/90   03/04/20 139/69    (goal /80)      All Future Testing planned in [facility]:  Lab Frequency Next Occurrence   Phenytoin Level, Free Once 06/03/2019   Phenytoin Level, Total Once 06/06/2019   Valproic Acid Level, Free Once 06/06/2019   Valproic Acid Level, Total Once 06/06/2019   Phenobarbital Level Once 34/81/7925   Basic Metabolic Panel Once 61/79/1128   Basic Metabolic Panel Once 57/03/2850   Basic Metabolic Panel Once 23/25/4754   KRYSTIAN Digital Screen Bilateral [[id number]] Once 05/13/2020   Phenobarbital Level Once 01/16/2021   PHENOBARBITAL, FREE Once 01/16/2021       Next Visit Date:  Future Appointments   Date Time Provider Rashida Torres   5/14/2020 10:45 AM Faustine Divers, APRN - CNP [facility]            Patient Active Problem List:

## 2020-04-02 ENCOUNTER — CARE COORDINATION (OUTPATIENT)
Dept: CARE COORDINATION | Age: 55
End: 2020-04-02

## 2020-04-03 ENCOUNTER — TELEPHONE (OUTPATIENT)
Dept: PRIMARY CARE CLINIC | Age: 55
End: 2020-04-03

## 2020-04-07 ENCOUNTER — CARE COORDINATION (OUTPATIENT)
Dept: CARE COORDINATION | Age: 55
End: 2020-04-07

## 2020-04-14 ENCOUNTER — HOSPITAL ENCOUNTER (EMERGENCY)
Age: 55
Discharge: HOME | End: 2020-04-14
Attending: EMERGENCY MEDICINE
Payer: MEDICARE

## 2020-04-14 VITALS
OXYGEN SATURATION: 96 % | RESPIRATION RATE: 16 BRPM | HEART RATE: 88 BPM | WEIGHT: 179 LBS | TEMPERATURE: 98.6 F | DIASTOLIC BLOOD PRESSURE: 101 MMHG | SYSTOLIC BLOOD PRESSURE: 153 MMHG | BODY MASS INDEX: 32.74 KG/M2

## 2020-04-14 PROCEDURE — 99283 EMERGENCY DEPT VISIT LOW MDM: CPT

## 2020-04-14 RX ORDER — BUDESONIDE AND FORMOTEROL FUMARATE DIHYDRATE 160; 4.5 UG/1; UG/1
2 AEROSOL RESPIRATORY (INHALATION) 2 TIMES DAILY
Qty: 1 INHALER | Refills: 5 | Status: SHIPPED | OUTPATIENT
Start: 2020-04-14 | End: 2020-10-09 | Stop reason: SDUPTHER

## 2020-04-14 RX ORDER — ALBUTEROL SULFATE 90 UG/1
1 AEROSOL, METERED RESPIRATORY (INHALATION) EVERY 6 HOURS PRN
Qty: 1 INHALER | Refills: 3 | Status: SHIPPED | OUTPATIENT
Start: 2020-04-14 | End: 2020-12-18 | Stop reason: SDUPTHER

## 2020-04-14 ASSESSMENT — ENCOUNTER SYMPTOMS
EYE REDNESS: 0
ABDOMINAL PAIN: 0
CHEST TIGHTNESS: 0
SHORTNESS OF BREATH: 0
EYE DISCHARGE: 0

## 2020-04-14 NOTE — ED NOTES
Received return to work slip  Discharge instructions given. Verbalized understanding. All questions answered.        Gerardo Merino RN  04/14/20 8035

## 2020-04-14 NOTE — ED PROVIDER NOTES
3/10/20   Lexis Castillo MD   azithromycin (ZITHROMAX) 250 MG tablet Take 2 tabs (500 mg) on Day 1, and take 1 tab (250 mg) on days 2 through 5. 3/10/20   Lexis Castillo MD   Elastic Bandages & Supports (CARPAL TUNNEL WRIST STABILIZER) [facility] Apply nightly to each wrist 3/10/20   Lexis Castillo MD   ibuprofen (ADVIL;MOTRIN) 800 MG tablet Take 1 tablet by mouth every 8 hours as needed for Pain 3/5/20   Savanah Durham MD   diclofenac sodium 1 % GEL Apply 4 g topically 4 times daily 3/5/20   Savanah Durham MD   LORazepam (ATIVAN) 1 MG tablet Take 1 mg by mouth 2 times daily. Historical Provider, MD   amphetamine-dextroamphetamine (ADDERALL XR) 20 MG extended release capsule Take 20 mg by mouth every morning. Historical Provider, MD   BENZOYL PEROXIDE 5 % external wash Apply daily 2/13/20   KEANU Hendrix - CNP   Skin Protectants, Misc. (EUCERIN) cream Apply topically as needed.  2/13/20   KEANU Hendrix - CNP   lidocaine (XYLOCAINE) 2 % jelly Apply pea sized amount two times a day to shoulder 2/13/20   KEANU Hendrix - CNP   Nutritional Supplements (BOOST HIGH PROTEIN) LIQD Patient is to drink 1 bottle four times per day 1/21/20   KEANU Hendrix - CNP   Gauze Pads & Dressings 2\"X2\" PADS Pt to change dressing once daily 1/21/20   Alissa Victor MD   Water For Irrigation, Sterile (STERILE WATER FOR IRRIGATION) Irrigate with as directed for 1 dose once daily 1/21/20 2/4/20  Alissa Victor MD   acetaminophen (TYLENOL) 325 MG tablet Take 1 tablet by mouth every 6 hours as needed for Pain 1/17/20   Tolu Butler,    LONG ACTING NASAL SPRAY 0.05 % nasal spray  1/7/20   Historical Provider, MD   citalopram (CELEXA) 20 MG tablet Take 20 mg by mouth daily    Historical Provider, MD   furosemide (LASIX) 20 MG tablet Take 1 tablet by mouth daily as needed (for swelling) 12/16/19   KEANU Hendrix CNP   lidocaine (XYLOCAINE) 5 % ointment Apply topically as needed to knee up to three times a day  Patient not taking: Reported on 3/10/2020 12/16/19   KEANU You CNP   docusate sodium (COLACE) 100 MG capsule Take 1 capsule by mouth daily as needed for Constipation 11/26/19   KEANU You CNP   ferrous sulfate 325 (65 Fe) MG EC tablet Take 1 tablet by mouth daily (with breakfast) 11/26/19   KEANU You CNP   risperiDONE (RISPERDAL) 2 MG tablet Take 1 tablet by mouth nightly 11/21/19   KEANU You CNP   mirtazapine (REMERON) 45 MG tablet Take 1 tablet by mouth nightly 11/21/19   KEANU You CNP   traZODone (DESYREL) 100 MG tablet Take 1 tablet by mouth nightly 11/21/19   KEANU You CNP   buprenorphine-naloxone (SUBOXONE) 8-2 MG FILM SL film  11/14/19   Historical Provider, MD   ciclopirox (PENLAC) 8 % solution Apply topically nightly. 11/20/19   Frank Fermin DPM   Wound Dressings (ADAPTIC NON-ADHERING DRESSING) PADS Use daily with wound dressing changes 11/19/19   Sabas Tate MD   Wound Dressings (ADAPTIC NON-ADHERING DRESSING) PADS Apply 1 each topically 2 times daily Apply to R lateral thigh wound after shower daily 11/12/19   Illa Proper, DO   Gauze Pads & Dressings (GAUZE DRESSING) 4\"X4\" PADS 1 each by Does not apply route daily as needed (wound) 11/12/19   Illa Proper, DO   Skin Protectants, Misc.  (EUCERIN) cream  10/2/19   Historical Provider, MD   EPINEPHrine (EPIPEN 2-MARTINA) 0.3 MG/0.3ML SOAJ injection Inject 0.3 mLs into the muscle once for 1 dose Use as directed for allergic reaction 9/11/19 9/11/19  KEANU Dee CNP   ipratropium-albuterol (DUONEB) 0.5-2.5 (3) MG/3ML SOLN nebulizer solution Inhale 3 mLs into the lungs every 4 hours 6/16/19   Michaelfabián Brewer MD   potassium chloride (KLOR-CON M) 20 MEQ extended release tablet Take 0.5 tablets by mouth daily for 2 days 6/12/19 6/14/19  Sophie Uriarte MD   fluticasone (FLONASE) 50 MCG/ACT

## 2020-04-14 NOTE — ED PROVIDER NOTES
Raul Saini  ED  Emergency Department  Faculty Attestation       I performed a history and physical examination of the patient and discussed management with the resident. I reviewed the residents note and agree with the documented findings including all diagnostic interpretations and plan of care. Any areas of disagreement are noted on the chart. I was personally present for the key portions of any procedures. I have documented in the chart those procedures where I was not present during the key portions. I have reviewed the emergency nurses triage note. I agree with the chief complaint, past medical history, past surgical history, allergies, medications, social and family history as documented unless otherwise noted below. Documentation of the HPI, Physical Exam and Medical Decision Making performed by scribes is based on my personal performance of the HPI, PE and MDM. For Physician Assistant/ Nurse Practitioner cases/documentation I have personally evaluated this patient and have completed at least one if not all key elements of the E/M (history, physical exam, and MDM). Additional findings are as noted. Pertinent Comments     Primary Care Physician: KEANU Carter - CNP    ED Triage Vitals   BP Temp Temp Source Pulse Resp SpO2 Height Weight   04/14/20 1733 04/14/20 1720 04/14/20 1720 04/14/20 1812 04/14/20 1812 04/14/20 1733 -- 04/14/20 1737   (!) 153/102 98.6 °F (37 °C) Oral 88 16 98 %  179 lb (81.2 kg)        History/Physical: This is a 60 y.o. female who presents to the Emergency Department with complaint of cough, nasal congestion, and fever since Feb.  Symptoms now improved. On exam well appearing in NAD. Talking in full sentences. MDM/Plan: viral syndrome.   D/C    PPE: gown, gloves, mask, and goggles    CRITICAL CARE: None     Roger Mcconnell MD  Attending Emergency Physician         Roger Mcconnell MD  04/18/20 8244

## 2020-04-15 ENCOUNTER — CARE COORDINATION (OUTPATIENT)
Dept: CARE COORDINATION | Age: 55
End: 2020-04-15

## 2020-04-15 NOTE — CARE COORDINATION
call to patient. Identified myself to woman that answered the phone. She states that the patient is not home. I provided the reason for call and my contact information.  Will attempt call tomorrow 4/16/20

## 2020-04-16 ENCOUNTER — CARE COORDINATION (OUTPATIENT)
Dept: CARE COORDINATION | Age: 55
End: 2020-04-16

## 2020-04-24 NOTE — TELEPHONE ENCOUNTER
Last visit:3/10/20   Last Med refill:4/14/20 Does patient have enough medication for 72 hours: No: Pt never got script, Pt is asking for Asprin 81 mg also. Next Visit Date:  No future appointments.     Health Maintenance   Topic Date Due    HIV screen  02/16/1978    DTaP/Tdap/Td vaccine (1 - Tdap) 02/16/1982    Cervical cancer screen  02/16/1984    Breast cancer screen  02/16/2013    Shingles Vaccine (1 of 2) 02/16/2013    Colon Cancer Screen FIT/FOBT  06/11/2020    TSH testing  12/16/2020    Potassium monitoring  03/17/2021    Creatinine monitoring  03/17/2021    Lipid screen  02/19/2024    Flu vaccine  Completed    Pneumococcal 0-64 years Vaccine  Completed    Hepatitis C screen  Completed    Hepatitis A vaccine  Aged Out    Hepatitis B vaccine  Aged Out    Hib vaccine  Aged Out    Meningococcal (ACWY) vaccine  Aged Out       Hemoglobin A1C (%)   Date Value   02/19/2019 5.2             ( goal A1C is < 7)   No results found for: LABMICR  LDL Cholesterol (mg/dL)   Date Value   02/19/2019 105       (goal LDL is <100)   AST (U/L)   Date Value   03/17/2020 18     ALT (U/L)   Date Value   03/17/2020 14     BUN (mg/dL)   Date Value   03/17/2020 3 (L)     BP Readings from Last 3 Encounters:   04/14/20 (!) 153/101   03/17/20 (!) 144/88   03/10/20 (!) 141/90          (goal 120/80)    All Future Testing planned in [facility]  Lab Frequency Next Occurrence   Phenytoin Level, Free Once 06/03/2019   Phenytoin Level, Total Once 06/06/2019   Valproic Acid Level, Free Once 06/06/2019   Valproic Acid Level, Total Once 06/06/2019   Phenobarbital Level Once 87/90/2559   Basic Metabolic Panel Once 63/80/7720   Basic Metabolic Panel Once 41/08/2116   Basic Metabolic Panel Once 63/74/6865   KRYSTIAN Digital Screen Bilateral [[id number]] Once 05/13/2020   Phenobarbital Level Once 01/16/2021   PHENOBARBITAL, FREE Once 01/16/2021               Patient Active Problem List:     Chest pain     Migraine variant with headache     Drug

## 2020-04-27 RX ORDER — ALBUTEROL SULFATE 90 UG/1
1 AEROSOL, METERED RESPIRATORY (INHALATION) EVERY 6 HOURS PRN
Qty: 1 INHALER | Refills: 3 | OUTPATIENT
Start: 2020-04-27

## 2020-05-11 RX ORDER — ONDANSETRON 4 MG/1
4 TABLET, ORALLY DISINTEGRATING ORAL EVERY 8 HOURS PRN
Qty: 30 TABLET | Refills: 0 | Status: SHIPPED | OUTPATIENT
Start: 2020-05-11 | End: 2020-06-05

## 2020-06-05 RX ORDER — BUTALBITAL, ACETAMINOPHEN AND CAFFEINE 50; 325; 40 MG/1; MG/1; MG/1
TABLET ORAL
Qty: 20 TABLET | Refills: 0 | Status: SHIPPED | OUTPATIENT
Start: 2020-06-05 | End: 2020-06-24 | Stop reason: SDUPTHER

## 2020-06-05 RX ORDER — ONDANSETRON 4 MG/1
4 TABLET, ORALLY DISINTEGRATING ORAL EVERY 8 HOURS PRN
Qty: 30 TABLET | Refills: 0 | Status: SHIPPED | OUTPATIENT
Start: 2020-06-05 | End: 2020-06-18 | Stop reason: SDUPTHER

## 2020-06-05 NOTE — TELEPHONE ENCOUNTER
intentional self-harm, initial encounter [facility]     Intentional phenobarbital overdose [facility]     Severe episode of recurrent major depressive disorder, without psychotic features ([facility]     Suicide attempt ([facility]     Major depressive disorder, recurrent ([facility]     Sepsis ([facility]     Severe malnutrition ([facility]     Altered mental status     Depression     Hypokalemia     Congestive heart failure of unknown etiology ([facility]     Lumbar radiculopathy     Chronic low back pain     Piriformis syndrome     COPD (chronic obstructive pulmonary disease) ([facility]

## 2020-06-18 RX ORDER — ALBUTEROL SULFATE 2.5 MG/3ML
2.5 SOLUTION RESPIRATORY (INHALATION) EVERY 6 HOURS PRN
Qty: 120 ML | Refills: 3 | Status: ON HOLD | OUTPATIENT
Start: 2020-06-18 | End: 2021-08-28

## 2020-06-18 RX ORDER — ONDANSETRON 4 MG/1
4 TABLET, ORALLY DISINTEGRATING ORAL EVERY 8 HOURS PRN
Qty: 30 TABLET | Refills: 0 | Status: SHIPPED | OUTPATIENT
Start: 2020-06-18 | End: 2020-07-14

## 2020-06-18 RX ORDER — GABAPENTIN 600 MG/1
600 TABLET ORAL 2 TIMES DAILY
Qty: 60 TABLET | Refills: 2 | Status: ON HOLD | OUTPATIENT
Start: 2020-06-18 | End: 2020-09-09

## 2020-06-24 RX ORDER — BUTALBITAL, ACETAMINOPHEN AND CAFFEINE 50; 325; 40 MG/1; MG/1; MG/1
TABLET ORAL
Qty: 20 TABLET | Refills: 0 | Status: SHIPPED | OUTPATIENT
Start: 2020-06-24 | End: 2020-07-30 | Stop reason: SDUPTHER

## 2020-07-13 ENCOUNTER — HOSPITAL ENCOUNTER (EMERGENCY)
Age: 55
Discharge: HOME | End: 2020-07-14
Attending: EMERGENCY MEDICINE
Payer: MEDICARE

## 2020-07-13 ENCOUNTER — APPOINTMENT (OUTPATIENT)
Dept: GENERAL RADIOLOGY | Age: 55
End: 2020-07-13
Payer: MEDICARE

## 2020-07-13 LAB
-: NORMAL
ABSOLUTE EOS #: <0.03 K/UL (ref 0–0.44)
ABSOLUTE IMMATURE GRANULOCYTE: 0.08 K/UL (ref 0–0.3)
ABSOLUTE LYMPH #: 2.29 K/UL (ref 1.1–3.7)
ABSOLUTE MONO #: 0.84 K/UL (ref 0.1–1.2)
ALBUMIN SERPL-MCNC: 4.2 G/DL (ref 3.5–5.2)
ALBUMIN/GLOBULIN RATIO: 1.3 (ref 1–2.5)
ALP BLD-CCNC: 141 U/L (ref 35–104)
ALT SERPL-CCNC: 25 U/L (ref 5–33)
AMORPHOUS: NORMAL
ANION GAP SERPL CALCULATED.3IONS-SCNC: 14 MMOL/L (ref 9–17)
AST SERPL-CCNC: 35 U/L
BACTERIA: NORMAL
BASOPHILS # BLD: 0 % (ref 0–2)
BASOPHILS ABSOLUTE: 0.05 K/UL (ref 0–0.2)
BILIRUB SERPL-MCNC: 0.61 MG/DL (ref 0.3–1.2)
BILIRUBIN URINE: NEGATIVE
BUN BLDV-MCNC: 13 MG/DL (ref 6–20)
BUN/CREAT BLD: ABNORMAL (ref 9–20)
CALCIUM SERPL-MCNC: 9.2 MG/DL (ref 8.6–10.4)
CASTS UA: NORMAL /LPF (ref 0–8)
CHLORIDE BLD-SCNC: 95 MMOL/L (ref 98–107)
CO2: 25 MMOL/L (ref 20–31)
COLOR: YELLOW
COMMENT UA: ABNORMAL
CREAT SERPL-MCNC: 0.47 MG/DL (ref 0.5–0.9)
CRYSTALS, UA: NORMAL /HPF
D-DIMER QUANTITATIVE: 0.41 MG/L FEU
DIFFERENTIAL TYPE: ABNORMAL
EOSINOPHILS RELATIVE PERCENT: 0 % (ref 1–4)
EPITHELIAL CELLS UA: NORMAL /HPF (ref 0–5)
GFR AFRICAN AMERICAN: >60 ML/MIN
GFR NON-AFRICAN AMERICAN: >60 ML/MIN
GFR SERPL CREATININE-BSD FRML MDRD: ABNORMAL ML/MIN/{1.73_M2}
GFR SERPL CREATININE-BSD FRML MDRD: ABNORMAL ML/MIN/{1.73_M2}
GLUCOSE BLD-MCNC: 126 MG/DL (ref 70–99)
GLUCOSE URINE: NEGATIVE
HCT VFR BLD CALC: 48.7 % (ref 36.3–47.1)
HEMOGLOBIN: 16.6 G/DL (ref 11.9–15.1)
IMMATURE GRANULOCYTES: 1 %
INR BLD: 1
KETONES, URINE: NEGATIVE
LACTIC ACID, SEPSIS WHOLE BLOOD: 1.6 MMOL/L (ref 0.5–1.9)
LACTIC ACID, SEPSIS: NORMAL MMOL/L (ref 0.5–1.9)
LEUKOCYTE ESTERASE, URINE: NEGATIVE
LIPASE: 40 U/L (ref 13–60)
LYMPHOCYTES # BLD: 18 % (ref 24–43)
MCH RBC QN AUTO: 30.3 PG (ref 25.2–33.5)
MCHC RBC AUTO-ENTMCNC: 34.1 G/DL (ref 28.4–34.8)
MCV RBC AUTO: 88.9 FL (ref 82.6–102.9)
MONOCYTES # BLD: 7 % (ref 3–12)
MUCUS: NORMAL
NITRITE, URINE: NEGATIVE
NRBC AUTOMATED: 0 PER 100 WBC
OTHER OBSERVATIONS UA: NORMAL
PDW BLD-RTO: 12.1 % (ref 11.8–14.4)
PH UA: 6.5 (ref 5–8)
PLATELET # BLD: 320 K/UL (ref 138–453)
PLATELET ESTIMATE: ABNORMAL
PMV BLD AUTO: 9 FL (ref 8.1–13.5)
POTASSIUM SERPL-SCNC: 3.8 MMOL/L (ref 3.7–5.3)
PROTEIN UA: NEGATIVE
PROTHROMBIN TIME: 10.2 SEC (ref 9–12)
RBC # BLD: 5.48 M/UL (ref 3.95–5.11)
RBC # BLD: ABNORMAL 10*6/UL
RBC UA: NORMAL /HPF (ref 0–4)
RENAL EPITHELIAL, UA: NORMAL /HPF
SEG NEUTROPHILS: 74 % (ref 36–65)
SEGMENTED NEUTROPHILS ABSOLUTE COUNT: 9.22 K/UL (ref 1.5–8.1)
SODIUM BLD-SCNC: 134 MMOL/L (ref 135–144)
SPECIFIC GRAVITY UA: 1.01 (ref 1–1.03)
TOTAL PROTEIN: 7.4 G/DL (ref 6.4–8.3)
TRICHOMONAS: NORMAL
TROPONIN INTERP: NORMAL
TROPONIN T: NORMAL NG/ML
TROPONIN, HIGH SENSITIVITY: 6 NG/L (ref 0–14)
TURBIDITY: CLEAR
URINE HGB: ABNORMAL
UROBILINOGEN, URINE: NORMAL
WBC # BLD: 12.5 K/UL (ref 3.5–11.3)
WBC # BLD: ABNORMAL 10*3/UL
WBC UA: NORMAL /HPF (ref 0–5)
YEAST: NORMAL

## 2020-07-13 PROCEDURE — 85379 FIBRIN DEGRADATION QUANT: CPT

## 2020-07-13 PROCEDURE — 85025 COMPLETE CBC W/AUTO DIFF WBC: CPT

## 2020-07-13 PROCEDURE — 87086 URINE CULTURE/COLONY COUNT: CPT

## 2020-07-13 PROCEDURE — 2500000003 HC RX 250 WO HCPCS: Performed by: GENERAL PRACTICE

## 2020-07-13 PROCEDURE — 83605 ASSAY OF LACTIC ACID: CPT

## 2020-07-13 PROCEDURE — 6370000000 HC RX 637 (ALT 250 FOR IP): Performed by: GENERAL PRACTICE

## 2020-07-13 PROCEDURE — 71045 X-RAY EXAM CHEST 1 VIEW: CPT

## 2020-07-13 PROCEDURE — 96375 TX/PRO/DX INJ NEW DRUG ADDON: CPT

## 2020-07-13 PROCEDURE — 85610 PROTHROMBIN TIME: CPT

## 2020-07-13 PROCEDURE — 93005 ELECTROCARDIOGRAM TRACING: CPT | Performed by: EMERGENCY MEDICINE

## 2020-07-13 PROCEDURE — 84484 ASSAY OF TROPONIN QUANT: CPT

## 2020-07-13 PROCEDURE — 81001 URINALYSIS AUTO W/SCOPE: CPT

## 2020-07-13 PROCEDURE — 96374 THER/PROPH/DIAG INJ IV PUSH: CPT

## 2020-07-13 PROCEDURE — 80053 COMPREHEN METABOLIC PANEL: CPT

## 2020-07-13 PROCEDURE — 6360000002 HC RX W HCPCS: Performed by: GENERAL PRACTICE

## 2020-07-13 PROCEDURE — 83690 ASSAY OF LIPASE: CPT

## 2020-07-13 PROCEDURE — 99285 EMERGENCY DEPT VISIT HI MDM: CPT

## 2020-07-13 PROCEDURE — 2580000003 HC RX 258: Performed by: GENERAL PRACTICE

## 2020-07-13 RX ORDER — MAGNESIUM SULFATE 1 G/100ML
1 INJECTION INTRAVENOUS ONCE
Status: COMPLETED | OUTPATIENT
Start: 2020-07-13 | End: 2020-07-13

## 2020-07-13 RX ORDER — ONDANSETRON 2 MG/ML
4 INJECTION INTRAMUSCULAR; INTRAVENOUS ONCE
Status: COMPLETED | OUTPATIENT
Start: 2020-07-13 | End: 2020-07-13

## 2020-07-13 RX ORDER — DEXAMETHASONE SODIUM PHOSPHATE 10 MG/ML
10 INJECTION INTRAMUSCULAR; INTRAVENOUS ONCE
Status: COMPLETED | OUTPATIENT
Start: 2020-07-13 | End: 2020-07-13

## 2020-07-13 RX ORDER — NITROGLYCERIN 0.4 MG/1
0.4 TABLET SUBLINGUAL EVERY 5 MIN PRN
Status: DISCONTINUED | OUTPATIENT
Start: 2020-07-13 | End: 2020-07-14 | Stop reason: HOSPADM

## 2020-07-13 RX ORDER — DIPHENHYDRAMINE HYDROCHLORIDE 50 MG/ML
25 INJECTION INTRAMUSCULAR; INTRAVENOUS ONCE
Status: COMPLETED | OUTPATIENT
Start: 2020-07-13 | End: 2020-07-13

## 2020-07-13 RX ORDER — PROMETHAZINE HYDROCHLORIDE 25 MG/ML
12.5 INJECTION, SOLUTION INTRAMUSCULAR; INTRAVENOUS ONCE
Status: COMPLETED | OUTPATIENT
Start: 2020-07-13 | End: 2020-07-13

## 2020-07-13 RX ORDER — HYDRALAZINE HYDROCHLORIDE 20 MG/ML
10 INJECTION INTRAMUSCULAR; INTRAVENOUS ONCE
Status: DISCONTINUED | OUTPATIENT
Start: 2020-07-13 | End: 2020-07-13

## 2020-07-13 RX ORDER — 0.9 % SODIUM CHLORIDE 0.9 %
1000 INTRAVENOUS SOLUTION INTRAVENOUS ONCE
Status: COMPLETED | OUTPATIENT
Start: 2020-07-13 | End: 2020-07-13

## 2020-07-13 RX ORDER — LABETALOL HYDROCHLORIDE 5 MG/ML
10 INJECTION, SOLUTION INTRAVENOUS ONCE
Status: COMPLETED | OUTPATIENT
Start: 2020-07-13 | End: 2020-07-13

## 2020-07-13 RX ORDER — PROCHLORPERAZINE EDISYLATE 5 MG/ML
10 INJECTION INTRAMUSCULAR; INTRAVENOUS ONCE
Status: COMPLETED | OUTPATIENT
Start: 2020-07-13 | End: 2020-07-13

## 2020-07-13 RX ADMIN — DEXAMETHASONE SODIUM PHOSPHATE 10 MG: 10 INJECTION INTRAMUSCULAR; INTRAVENOUS at 23:12

## 2020-07-13 RX ADMIN — NITROGLYCERIN 0.4 MG: 0.4 TABLET SUBLINGUAL at 22:39

## 2020-07-13 RX ADMIN — PROMETHAZINE HYDROCHLORIDE 12.5 MG: 25 INJECTION INTRAMUSCULAR; INTRAVENOUS at 21:34

## 2020-07-13 RX ADMIN — ONDANSETRON 4 MG: 2 INJECTION INTRAMUSCULAR; INTRAVENOUS at 20:34

## 2020-07-13 RX ADMIN — SODIUM CHLORIDE 1000 ML: 9 INJECTION, SOLUTION INTRAVENOUS at 20:34

## 2020-07-13 RX ADMIN — Medication 10 MG: at 21:34

## 2020-07-13 RX ADMIN — PROCHLORPERAZINE EDISYLATE 10 MG: 5 INJECTION INTRAMUSCULAR; INTRAVENOUS at 23:12

## 2020-07-13 RX ADMIN — DIPHENHYDRAMINE HYDROCHLORIDE 25 MG: 50 INJECTION, SOLUTION INTRAMUSCULAR; INTRAVENOUS at 22:10

## 2020-07-13 RX ADMIN — MAGNESIUM SULFATE HEPTAHYDRATE 1 G: 1 INJECTION, SOLUTION INTRAVENOUS at 21:35

## 2020-07-13 ASSESSMENT — PAIN SCALES - GENERAL: PAINLEVEL_OUTOF10: 8

## 2020-07-13 ASSESSMENT — ENCOUNTER SYMPTOMS
COLOR CHANGE: 0
DIARRHEA: 1
VOICE CHANGE: 0
SHORTNESS OF BREATH: 1
TROUBLE SWALLOWING: 0
COUGH: 0
NAUSEA: 1
ABDOMINAL PAIN: 1
VOMITING: 1

## 2020-07-13 ASSESSMENT — PAIN DESCRIPTION - LOCATION: LOCATION: ABDOMEN

## 2020-07-13 ASSESSMENT — PAIN DESCRIPTION - PAIN TYPE: TYPE: ACUTE PAIN

## 2020-07-14 ENCOUNTER — APPOINTMENT (OUTPATIENT)
Dept: CT IMAGING | Age: 55
End: 2020-07-14
Payer: MEDICARE

## 2020-07-14 VITALS
WEIGHT: 160 LBS | HEART RATE: 91 BPM | BODY MASS INDEX: 29.44 KG/M2 | SYSTOLIC BLOOD PRESSURE: 157 MMHG | DIASTOLIC BLOOD PRESSURE: 112 MMHG | RESPIRATION RATE: 20 BRPM | HEIGHT: 62 IN | OXYGEN SATURATION: 95 % | TEMPERATURE: 98.8 F

## 2020-07-14 LAB
CULTURE: NORMAL
EKG ATRIAL RATE: 69 BPM
EKG ATRIAL RATE: 77 BPM
EKG P AXIS: 62 DEGREES
EKG P AXIS: 66 DEGREES
EKG P-R INTERVAL: 180 MS
EKG P-R INTERVAL: 186 MS
EKG Q-T INTERVAL: 436 MS
EKG Q-T INTERVAL: 470 MS
EKG QRS DURATION: 78 MS
EKG QRS DURATION: 80 MS
EKG QTC CALCULATION (BAZETT): 493 MS
EKG QTC CALCULATION (BAZETT): 503 MS
EKG R AXIS: 23 DEGREES
EKG R AXIS: 25 DEGREES
EKG T AXIS: 29 DEGREES
EKG T AXIS: 35 DEGREES
EKG VENTRICULAR RATE: 69 BPM
EKG VENTRICULAR RATE: 77 BPM
Lab: NORMAL
SPECIMEN DESCRIPTION: NORMAL

## 2020-07-14 PROCEDURE — 70450 CT HEAD/BRAIN W/O DYE: CPT

## 2020-07-14 PROCEDURE — 93010 ELECTROCARDIOGRAM REPORT: CPT | Performed by: INTERNAL MEDICINE

## 2020-07-14 RX ORDER — ONDANSETRON 4 MG/1
4 TABLET, ORALLY DISINTEGRATING ORAL EVERY 8 HOURS PRN
Qty: 20 TABLET | Refills: 0 | Status: ON HOLD | OUTPATIENT
Start: 2020-07-14 | End: 2020-08-26 | Stop reason: SDUPTHER

## 2020-07-14 RX ORDER — PROMETHAZINE HYDROCHLORIDE 25 MG/1
25 SUPPOSITORY RECTAL EVERY 6 HOURS PRN
Qty: 20 SUPPOSITORY | Refills: 0 | Status: SHIPPED | OUTPATIENT
Start: 2020-07-14 | End: 2020-07-21

## 2020-07-14 NOTE — ED PROVIDER NOTES
Faculty Sign-Out Attestation  Handoff taken on the following patient from prior Attending Physician: Mariana Davies    I was available and discussed any additional care issues that arose and coordinated the management plans with the resident(s) caring for the patient during my duty period. Any areas of disagreement with residents documentation of care or procedures are noted on the chart. I was personally present for the key portions of any/all procedures during my duty period. I have documented in the chart those procedures where I was not present during the key portions. CT HEAD WO CONTRAST   Final Result   No acute intracranial abnormality. Mild cerebral white matter chronic microvascular ischemic disease. XR CHEST PORTABLE   Final Result   No acute cardiopulmonary disease.                Daria Dandy, MD  Attending Physician       Daria Dandy, MD  07/14/20 9204

## 2020-07-14 NOTE — ED NOTES
Social work: spoke to patient who requested a cab ride home through her [employer]. Will call cab for pt.  Caryle Olszewski lsw Caryle Olszewski, MSW, Salvatore Felix  07/14/20 1001

## 2020-07-14 NOTE — ED PROVIDER NOTES
normal  Ectopy: none  Conduction: Long QTc  ST Segments: no acute change  T Waves: no acute change  Q Waves: none  U waves    Clinical Impression: no acute changes    Attending Physician Additional  Notes    Patient is been sick for the past 5 to 6 days. She is gone through a whole bottle of 30 Zofran tablets. She has persistent nausea vomiting and diarrhea. She has some shortness of breath and occasional cough. She has diffuse myalgias. No sore throat or rhinorrhea. She does have mild headache but no stiff neck. She has no loss of sense of smell or taste. She is not around anyone with COVID that she is aware of. On exam she is uncomfortable nauseated and vomiting, vital signs are hypertension other vital signs are normal.  Lungs are clear. Abdomen is soft and nontender. No distention tympany rebound or guarding. Skin is warm and dry. Impression is gastroenteritis, volume depletion, dyspnea rule out pneumonia, consider COVID or other cause. Plan is IV fluids antiemetics simple analgesics laboratory studies chest x-ray reassess. Erby Dry. Saurabh Meza MD, [facility]  Attending Emergency  Physician                Jorden Torrez MD  07/13/20 2059    EKG Interpretation    Interpreted by me    Rhythm: normal sinus   Rate: normal  Axis: normal  Ectopy: none  Conduction: normal  ST Segments: no acute change  T Waves: Inversion V3  Q Waves: none    Clinical Impression: Nonspecific T wave change, long QTC    Patient has improvement in her nausea and no vomiting here. Her blood pressure now is higher than when she arrived and she is complaining of headaches and chest heaviness. Repeat EKG shows T inversion in V3 with prolongation of her QT interval.  My suspicion for aortic dissection is very low. Chest x-ray mediastinal with an aortic knob is unchanged and within normal limits. Will add on labetalol or hydralazine, migraine cocktail, Decadron, CT brain, troponin/repeat troponin, anticipate admission. May Garcia MD  07/13/20 0111

## 2020-07-14 NOTE — ED NOTES
Pt to the ED with generalized complaints. Pt states that she has been having nonspecific abdominal pain, SOB, nausea, vomiting, diarrhea x 5 days. Pt states that she has not been able to keep anything down PO x 5 days. Pt reports that she has been trying to take her daily medications along with additional OTC medications such as tylenol and motrin, but states that she has been vomiting up all medications for the past couple of days. Upon arrival to ED, pt c/o mild headache and actively wretching.    On exam, pt hypertensive, pt awake and oriented x 4, pt moving all extremities and no neurological deficits noted, abdomen soft and nontender on exam.      Batool Salomon, LILIBETH  07/13/20 6937

## 2020-07-14 NOTE — ED PROVIDER NOTES
[facility] ED  Emergency Department Encounter  EmergencyMedicine Resident     Pt Name:Aleta Gillespie  MRN: [id number]  [place] 1965  Date of evaluation: 7/13/20  PCP:  Viviana Closs, APRN - [place] [id number]       Chief Complaint   Patient presents with    Abdominal Pain     abdominal pain with associated nausea and diarrhea x 5 days     Shortness of Breath    Generalized Body Aches       HISTORY OF PRESENT ILLNESS  (Location/Symptom, Timing/Onset, Context/Setting, Quality, Duration, Modifying Factors, Severity.)      Eleazar Alvarez is a 60 y.o. female who presents with generalized weakness, nausea, vomiting abdominal pain x5 days. Patient has any hematemesis, hematochezia. Patient states she has not been able to eat or drink anything, states anytime she tries to anything she has increased crampy abdominal pain, states she feels she is dehydrated, has had body aches, subjective fevers. Patient denies any dysuria, hematuria, patient has history of hysterectomy. Patient also has history of seizure disorder states her last seizure was approximately 2 to 3 weeks ago. Patient is a heavy smoker, states she has been able to smoke secondary to nausea vomiting abdominal pain. Patient states she is been able to take her hypertension medications due to the nausea vomiting    PAST MEDICAL / SURGICAL / SOCIAL / FAMILY HISTORY      has a past medical history of Arthritis, Asthma, Borderline diabetes, Borderline personality disorder ([address] CHF (congestive heart failure) ([address] COPD (chronic obstructive pulmonary disease) ([address] Fibromyalgia, Headache, Hypertension, Manic depression ([address] Movement disorder, Neck fracture ([address] Pernicious anemia, Seizure ([address] and Thyroid disease.      has a past surgical history that includes knee surgery (Bilateral); partial hysterectomy (cervix not removed); lymph node dissection; Irrigation and debridement (Right, 5/17/2019); EXPLORATION OF WOUND OF EXTREMITY (Right, 5/19/2019); and EXPLORATION OF WOUND OF EXTREMITY (N/A, 5/22/2019). Social History     Socioeconomic History    Marital status:      Spouse name: Not on file    Number of children: Not on file    Years of education: Not on file    Highest education level: Not on file   Occupational History    Not on file   Social Needs    Financial resource strain: Not on file    Food insecurity     Worry: Not on file     Inability: Not on file    Transportation needs     Medical: Not on file     Non-medical: Not on file   Tobacco Use    Smoking status: Current Every Day Smoker     Packs/day: 0.50     Years: 12.00     Pack years: 6.00    Smokeless tobacco: Never Used   Substance and Sexual Activity    Alcohol use: No    Drug use: No    Sexual activity: Not Currently   Lifestyle    Physical activity     Days per week: Not on file     Minutes per session: Not on file    Stress: Not on file   Relationships    Social connections     Talks on phone: Not on file     Gets together: Not on file     Attends Scientologist service: Not on file     Active member of club or organization: Not on file     Attends meetings of clubs or organizations: Not on file     Relationship status: Not on file    Intimate partner violence     Fear of current or ex partner: Not on file     Emotionally abused: Not on file     Physically abused: Not on file     Forced sexual activity: Not on file   Other Topics Concern    Not on file   Social History Narrative    Not on file       History reviewed. No pertinent family history. Allergies:  Imitrex [sumatriptan]; Bee pollen; Bee venom; Bromide ion [bromine]; Flexeril [cyclobenzaprine]; Nsaids; Potassium bromide; Reglan [metoclopramide]; Sulfa antibiotics; Sulfadiazine; and Tramadol    Home Medications:  Prior to Admission medications    Medication Sig Start Date End Date Taking?  Authorizing Provider   ondansetron (ZOFRAN ODT) 4 MG disintegrating tablet Take 1 tablet by mouth every 8 hours as needed for Nausea 7/14/20  Yes Alexander Do DO   promethazine (PHENERGAN) 25 MG suppository Place 1 suppository rectally every 6 hours as needed for Nausea WARNING:  May cause drowsiness. May impair ability to operate vehicles or machinery. Do not use in combination with alcohol. 7/14/20 7/21/20 Yes Alexander Do DO   butalbital-acetaminophen-caffeine (FIORICET, ESGIC) -77 MG per tablet Can take 2 pills at onset, the 1 tablet 6 hours later as needed 6/24/20 7/24/20  Bennett Kruger, KEANU - CNP   gabapentin (NEURONTIN) 600 MG tablet Take 1 tablet by mouth 2 times daily for 30 days. 6/18/20 7/18/20  KEANU Brooks CNP   ciclopirox (PENLAC) 8 % solution Apply topically nightly. 6/18/20   KEANU Brooks CNP   albuterol (PROVENTIL) (2.5 MG/3ML) 0.083% nebulizer solution Take 3 mLs by nebulization every 6 hours as needed for Wheezing 6/18/20   Bennett Kruger, APRN - CNP   albuterol sulfate  (90 Base) MCG/ACT inhaler Inhale 1 puff into the lungs every 6 hours as needed for Wheezing [place] prescription until follow up with primary. Do not refill.   Follow up with primary 4/14/20   Shayy Mcconnell DO   budesonide-formoterol [facility]) 160-4.5 MCG/ACT AERO Inhale 2 puffs into the lungs 2 times daily 4/14/20   Shayy Mcconnell DO   Dextromethorphan-guaiFENesin [facility] DM MAXIMUM STRENGTH)  MG TB12 Take 1 tablet by mouth 2 times daily as needed (cough, congestion) 4/1/20   Bennett Kruger APRN - CNP   phenytoin (PHENYTEK) 200 MG ER capsule Take 1 capsule by mouth 2 times daily 3/24/20 6/22/20  José Faria MD   dicyclomine (BENTYL) 10 MG capsule Take 1 capsule by mouth 4 times daily 3/17/20   Bennett Kruger APRN - CNP   famotidine (PEPCID) 20 MG tablet Take 1 tablet by mouth 2 times daily 3/17/20   KEANU Brooks CNP   levothyroxine (SYNTHROID) 100 MCG tablet Take 1 tablet by mouth Daily 3/17/20 4/16/20  KEANU Brooks CNP propranolol (INDERAL LA) 60 MG extended release capsule Take 1 capsule by mouth daily 3/17/20   KEANU Fortune CNP   tiZANidine (ZANAFLEX) 4 MG tablet Take 1 tablet by mouth every 8 hours as needed (pain) 3/17/20   KEANU Fortune CNP   nicotine (NICOTROL) 10 MG inhaler Inhale 1 puff into the lungs as needed for Smoking cessation 6-16 cartridges per day 3/10/20   Ramsey Erwin MD   prochlorperazine (COMPAZINE) 5 MG tablet Take 1 tablet by mouth every 6 hours as needed for Nausea 3/10/20   Ramsey Ewrin MD   ketorolac (TORADOL) 10 MG tablet Take 1 tablet by mouth every 6 hours as needed for Pain 3/10/20   Ramsey Erwin MD   Elastic Bandages & Supports ([facility]) MISC Use daily. Right elbow. 3/10/20   Ramsey Erwin MD   azithromycin (ZITHROMAX) 250 MG tablet Take 2 tabs (500 mg) on Day 1, and take 1 tab (250 mg) on days 2 through 5. 3/10/20   Ramsey Erwin MD   Elastic Bandages & Supports (CARPAL TUNNEL WRIST STABILIZER) [address] [facility] Apply nightly to each wrist 3/10/20   Ramsey Erwin MD   ibuprofen (ADVIL;MOTRIN) 800 MG tablet Take 1 tablet by mouth every 8 hours as needed for Pain 3/5/20   Christiano Blood MD   diclofenac sodium 1 % GEL Apply 4 g topically 4 times daily 3/5/20   Christiano Blood MD   LORazepam (ATIVAN) 1 MG tablet Take 1 mg by mouth 2 times daily. Historical Provider, MD   amphetamine-dextroamphetamine (ADDERALL XR) 20 MG extended release capsule Take 20 mg by mouth every morning. Historical Provider, MD   BENZOYL PEROXIDE 5 % external wash Apply daily 2/13/20   KEANU Fortune CNP   Skin Protectants, Misc. (EUCERIN) cream Apply topically as needed.  2/13/20   KEANU Fortune CNP   lidocaine (XYLOCAINE) 2 % jelly Apply pea sized amount two times a day to shoulder 2/13/20   KEANU Fortune CNP   Nutritional Supplements (BOOST HIGH PROTEIN) LIQD Patient is to drink 1 bottle four times per day 1/21/20   KEANU Fortune - CNP Gauze Pads & Dressings 2\"X2\" PADS Pt to change dressing once daily 1/21/20   Adrian Carrero MD   Water For Irrigation, Sterile (STERILE WATER FOR IRRIGATION) Irrigate with as directed for 1 dose once daily 1/21/20 2/4/20  Adrian Carrero MD   acetaminophen (TYLENOL) 325 MG tablet Take 1 tablet by mouth every 6 hours as needed for Pain 1/17/20   Ulises Gallardo, DO   LONG ACTING NASAL SPRAY 0.05 % nasal spray  1/7/20   Historical Provider, MD   citalopram (CELEXA) 20 MG tablet Take 20 mg by mouth daily    Historical Provider, MD   furosemide (LASIX) 20 MG tablet Take 1 tablet by mouth daily as needed (for swelling) 12/16/19   KEANU Monique CNP   lidocaine (XYLOCAINE) 5 % ointment Apply topically as needed to knee up to three times a day  Patient not taking: Reported on 3/10/2020 12/16/19   KEANU Monique CNP   docusate sodium (COLACE) 100 MG capsule Take 1 capsule by mouth daily as needed for Constipation 11/26/19   KEANU Monique CNP   ferrous sulfate 325 (65 Fe) MG EC tablet Take 1 tablet by mouth daily (with breakfast) 11/26/19   [place] Donny, APRN - CNP   risperiDONE (RISPERDAL) 2 MG tablet Take 1 tablet by mouth nightly 11/21/19   [place] Donny, APRN - CNP   mirtazapine (REMERON) 45 MG tablet Take 1 tablet by mouth nightly 11/21/19   [place] KEANU Hines CNP   traZODone (DESYREL) 100 MG tablet Take 1 tablet by mouth nightly 11/21/19   [place] Donny, APRN - CNP   buprenorphine-naloxone (SUBOXONE) 8-2 MG FILM SL film  11/14/19   Historical Provider, MD   Wound Dressings (ADAPTIC NON-ADHERING DRESSING) PADS Use daily with wound dressing changes 11/19/19   Adrian Carrero MD   Wound Dressings (ADAPTIC NON-ADHERING DRESSING) PADS Apply 1 each topically 2 times daily Apply to R lateral thigh wound after shower daily 11/12/19   Cheo Rubalcava, DO   Gauze Pads & Dressings (GAUZE DRESSING) 4\"X4\" PADS 1 each by Does not apply route daily as needed (wound) 11/12/19   Arnaldo Kellogg, DO   Skin Protectants, Misc. (EUCERIN) cream  10/2/19   Historical Provider, MD   EPINEPHrine (EPIPEN 2-MARTINA) 0.3 MG/0.3ML SOAJ injection Inject 0.3 mLs into the muscle once for 1 dose Use as directed for allergic reaction 9/11/19 9/11/19  Leif Rileysin, KEANU - CNP   ipratropium-albuterol (DUONEB) 0.5-2.5 (3) MG/3ML SOLN nebulizer solution Inhale 3 mLs into the lungs every 4 hours 6/16/19   Kenny Zuniga MD   potassium chloride (KLOR-CON M) 20 MEQ extended release tablet Take 0.5 tablets by mouth daily for 2 days 6/12/19 6/14/19  Radha Hines MD   fluticasone (FLONASE) 50 MCG/ACT nasal spray 2 sprays by Each Nostril route daily 6/5/19   Radha Hines MD       REVIEW OF SYSTEMS    (2-9 systems for level 4, 10 or more for level 5)      Review of Systems   Constitutional: Positive for activity change, appetite change, chills, fatigue and fever. HENT: Negative for congestion, dental problem, trouble swallowing and voice change. Respiratory: Positive for shortness of breath. Negative for cough. Cardiovascular: Negative for chest pain. Gastrointestinal: Positive for abdominal pain, diarrhea, nausea and vomiting. Genitourinary: Negative for dysuria. Musculoskeletal: Negative for myalgias, neck pain and neck stiffness. Skin: Negative for color change, pallor, rash and wound. Neurological: Positive for seizures. Negative for dizziness, tremors, syncope, facial asymmetry, speech difficulty, weakness, light-headedness, numbness and headaches. Psychiatric/Behavioral: Negative for agitation, confusion and suicidal ideas. The patient is not nervous/anxious. PHYSICAL EXAM   (up to 7 for level 4, 8 or more for level 5)      INITIAL VITALS:   BP (!) 157/112   Pulse 91   Temp 98.8 °F (37.1 °C)   Resp 20   Ht 5' 2\" (1.575 m)   Wt 160 lb (72.6 kg)   SpO2 95%   BMI 29.26 kg/m²     Physical Exam  Constitutional:       General: She is not in acute distress. Appearance: She is normal weight. She is ill-appearing. She is not toxic-appearing or diaphoretic. HENT:      Head: Normocephalic and atraumatic. Nose: Nose normal.   Eyes:      Extraocular Movements: Extraocular movements intact. Conjunctiva/sclera: Conjunctivae normal.      Pupils: Pupils are equal, round, and reactive to light. Cardiovascular:      Pulses: Normal pulses. Heart sounds: Normal heart sounds. Pulmonary:      Effort: Pulmonary effort is normal. No respiratory distress. Breath sounds: Normal breath sounds. No wheezing. Abdominal:      General: Abdomen is flat. Tenderness: There is abdominal tenderness. There is guarding. Neurological:      Mental Status: She is alert. DIFFERENTIAL  DIAGNOSIS     PLAN (LABS / IMAGING / EKG):  Orders Placed This Encounter   Procedures    Culture, Urine    XR CHEST PORTABLE    CT HEAD WO CONTRAST    CBC Auto Differential    Lactate, Sepsis    Comprehensive Metabolic Panel    LIPASE    Protime-INR    Urinalysis    Microscopic Urinalysis    D-Dimer, Quantitative    Troponin       MEDICATIONS ORDERED:  Orders Placed This Encounter   Medications    0.9 % sodium chloride bolus    ondansetron (ZOFRAN) injection 4 mg    labetalol (NORMODYNE;TRANDATE) injection 10 mg    promethazine (PHENERGAN) injection 12.5 mg    magnesium sulfate 1 g in dextrose 5% 100 mL IVPB    diphenhydrAMINE (BENADRYL) injection 25 mg    DISCONTD: hydrALAZINE (APRESOLINE) injection 10 mg    nitroGLYCERIN (NITROSTAT) SL tablet 0.4 mg    prochlorperazine (COMPAZINE) injection 10 mg    dexamethasone (DECADRON) injection 10 mg    ondansetron (ZOFRAN ODT) 4 MG disintegrating tablet     Sig: Take 1 tablet by mouth every 8 hours as needed for Nausea     Dispense:  20 tablet     Refill:  0    promethazine (PHENERGAN) 25 MG suppository     Sig: Place 1 suppository rectally every 6 hours as needed for Nausea WARNING:  May cause drowsiness.   May U/L    ALT 25 5 - 33 U/L    AST 35 (H) <32 U/L    Total Bilirubin 0.61 0.3 - 1.2 mg/dL    Total Protein 7.4 6.4 - 8.3 g/dL    Alb 4.2 3.5 - 5.2 g/dL    Albumin/Globulin Ratio 1.3 1.0 - 2.5    GFR Non-African American >60 >60 mL/min    GFR African American >60 >60 mL/min    GFR Comment          GFR Staging NOT REPORTED    LIPASE   Result Value Ref Range    Lipase 40 13 - 60 U/L   Protime-INR   Result Value Ref Range    Protime 10.2 9.0 - 12.0 sec    INR 1.0    Urinalysis   Result Value Ref Range    Color, UA YELLOW YELLOW    Turbidity UA CLEAR CLEAR    Glucose, Ur NEGATIVE NEGATIVE    Bilirubin Urine NEGATIVE NEGATIVE    Ketones, Urine NEGATIVE NEGATIVE    Specific Gravity, UA 1.010 1.005 - 1.030    Urine Hgb TRACE (A) NEGATIVE    pH, UA 6.5 5.0 - 8.0    Protein, UA NEGATIVE NEGATIVE    Urobilinogen, Urine Normal Normal    Nitrite, Urine NEGATIVE NEGATIVE    Leukocyte Esterase, Urine NEGATIVE NEGATIVE    Urinalysis Comments NOT REPORTED    Microscopic Urinalysis   Result Value Ref Range    -          WBC, UA None 0 - 5 /HPF    RBC, UA 2 TO 5 0 - 4 /HPF    Casts UA  0 - 8 /LPF     0 TO 2 HYALINE Reference range defined for non-centrifuged specimen. Crystals, UA NOT REPORTED None /HPF    Epithelial Cells UA 0 TO 2 0 - 5 /HPF    Renal Epithelial, UA NOT REPORTED 0 /HPF    Bacteria, UA NOT REPORTED None    Mucus, UA NOT REPORTED None    Trichomonas, UA NOT REPORTED None    Amorphous, UA NOT REPORTED None    Other Observations UA NOT REPORTED NOT REQ.     Yeast, UA NOT REPORTED None   D-Dimer, Quantitative   Result Value Ref Range    D-Dimer, Quant 0.41 mg/L FEU   Troponin   Result Value Ref Range    Troponin, High Sensitivity 6 0 - 14 ng/L    Troponin T NOT REPORTED <0.03 ng/mL    Troponin Interp NOT REPORTED          RADIOLOGY:  Narrative & Impression      EXAMINATION:  ONE XRAY VIEW OF THE CHEST     7/13/2020 8:23 pm     COMPARISON:  03/17/2020     HISTORY:  ORDERING SYSTEM PROVIDED HISTORY: fatigue  TECHNOLOGIST PROVIDED HISTORY:  fatigue  Reason for Exam: port Upright     FINDINGS:  Mild elevation of the left hemidiaphragm. Heart size and configuration are  normal.  Thoracic aorta is slightly elongated and tortuous, unchanged. The  lungs are clear. No pneumothorax or pleural fluid. No bone finding. Prior  resection of the distal right clavicle.     IMPRESSION:  No acute cardiopulmonary disease. EKG  Rhythm: normal sinus   Rate: normal  Axis: normal  Ectopy: none  Conduction: Long QTc  ST Segments: no acute change  T Waves: no acute change  Q Waves: none  U waves     Clinical Impression: no acute changes    All EKG's are interpreted by the Emergency Department Physician who either signs or Co-signs this chart in the absence of a cardiologist.    EMERGENCY DEPARTMENT COURSE/IMPRESSION: 57-year-old female presenting with generalized fatigue, abdominal pain, body aches, nausea, vomiting, patient appears ill, patient had elevated blood pressure, was given fluid bolus, 1g magnesium, labetalol, Zofran, nitro, Phenergan. Labs, chest x-ray, blood pressure trending down, patient ambulated out of the emergency department without telling anyone to get air, nausea and vomiting and headache improved. CT scan was ordered, was slightly delayed due to large amount of traumas in the emergency department at the time with backup in the CT scanner. Patient was reevaluated, blood pressure trending down, nausea vomiting improved, headache controlled, patient states she has follow-up with primary care provider will go later today, requesting work note, tolerated oral intake in the emergency department additional return precautions were discussed. PROCEDURES:  None    CONSULTS:  None    CRITICAL CARE:  None    FINAL IMPRESSION      1. Body aches    2. Other headache syndrome    3. Nausea    4.  Nausea and vomiting, intractability of vomiting not specified, unspecified vomiting type          DISPOSITION / PLAN     DISPOSITION

## 2020-07-14 NOTE — ED NOTES
Writer went to bedside with resident to inform patient she cannot leave ED with IVs in. Pt stated she went outside to \"get some air because she has a bad back d/t a previous fx. \"      Maia Dailey RN  07/14/20 0157

## 2020-07-14 NOTE — ED NOTES
Pt assisted back to ED stretcher, placed on telemetry monitoring with alarms set.       Jason Grier, LILIBETH  07/13/20 [id number]

## 2020-07-15 ENCOUNTER — CARE COORDINATION (OUTPATIENT)
Dept: FAMILY MEDICINE CLINIC | Age: 55
End: 2020-07-15

## 2020-07-16 ENCOUNTER — CARE COORDINATION (OUTPATIENT)
Dept: FAMILY MEDICINE CLINIC | Age: 55
End: 2020-07-16

## 2020-07-16 NOTE — CARE COORDINATION
[facility] attempted 2nd outreach for ED follow up, abdominal pain, SOB, nausea, COVID 19 Protocol    No answer and LVM to return call to [facility] at [phone] for updates and precautions.

## 2020-07-30 RX ORDER — BUTALBITAL, ACETAMINOPHEN AND CAFFEINE 50; 325; 40 MG/1; MG/1; MG/1
TABLET ORAL
Qty: 20 TABLET | Refills: 0 | Status: ON HOLD | OUTPATIENT
Start: 2020-07-30 | End: 2020-08-26 | Stop reason: HOSPADM

## 2020-08-02 ENCOUNTER — HOSPITAL ENCOUNTER (EMERGENCY)
Age: 55
Discharge: HOME | End: 2020-08-02
Attending: EMERGENCY MEDICINE
Payer: MEDICARE

## 2020-08-02 VITALS
OXYGEN SATURATION: 98 % | HEART RATE: 84 BPM | BODY MASS INDEX: 29.26 KG/M2 | TEMPERATURE: 98.2 F | WEIGHT: 160 LBS | DIASTOLIC BLOOD PRESSURE: 84 MMHG | SYSTOLIC BLOOD PRESSURE: 117 MMHG | RESPIRATION RATE: 18 BRPM

## 2020-08-02 LAB
ABSOLUTE EOS #: 0.05 K/UL (ref 0–0.44)
ABSOLUTE IMMATURE GRANULOCYTE: 0.04 K/UL (ref 0–0.3)
ABSOLUTE LYMPH #: 1.94 K/UL (ref 1.1–3.7)
ABSOLUTE MONO #: 0.82 K/UL (ref 0.1–1.2)
ANION GAP SERPL CALCULATED.3IONS-SCNC: 15 MMOL/L (ref 9–17)
BASOPHILS # BLD: 0 % (ref 0–2)
BASOPHILS ABSOLUTE: 0.03 K/UL (ref 0–0.2)
BUN BLDV-MCNC: 10 MG/DL (ref 6–20)
BUN/CREAT BLD: ABNORMAL (ref 9–20)
CALCIUM SERPL-MCNC: 9.1 MG/DL (ref 8.6–10.4)
CHLORIDE BLD-SCNC: 98 MMOL/L (ref 98–107)
CO2: 21 MMOL/L (ref 20–31)
CREAT SERPL-MCNC: 0.67 MG/DL (ref 0.5–0.9)
DIFFERENTIAL TYPE: ABNORMAL
EOSINOPHILS RELATIVE PERCENT: 1 % (ref 1–4)
GFR AFRICAN AMERICAN: >60 ML/MIN
GFR NON-AFRICAN AMERICAN: >60 ML/MIN
GFR SERPL CREATININE-BSD FRML MDRD: ABNORMAL ML/MIN/{1.73_M2}
GFR SERPL CREATININE-BSD FRML MDRD: ABNORMAL ML/MIN/{1.73_M2}
GLUCOSE BLD-MCNC: 112 MG/DL (ref 70–99)
HCT VFR BLD CALC: 44.6 % (ref 36.3–47.1)
HEMOGLOBIN: 15.5 G/DL (ref 11.9–15.1)
IMMATURE GRANULOCYTES: 1 %
LYMPHOCYTES # BLD: 24 % (ref 24–43)
MCH RBC QN AUTO: 31.1 PG (ref 25.2–33.5)
MCHC RBC AUTO-ENTMCNC: 34.8 G/DL (ref 28.4–34.8)
MCV RBC AUTO: 89.4 FL (ref 82.6–102.9)
MONOCYTES # BLD: 10 % (ref 3–12)
NRBC AUTOMATED: 0 PER 100 WBC
PDW BLD-RTO: 12.8 % (ref 11.8–14.4)
PLATELET # BLD: 283 K/UL (ref 138–453)
PLATELET ESTIMATE: ABNORMAL
PMV BLD AUTO: 9.1 FL (ref 8.1–13.5)
POTASSIUM SERPL-SCNC: 3.8 MMOL/L (ref 3.7–5.3)
RBC # BLD: 4.99 M/UL (ref 3.95–5.11)
RBC # BLD: ABNORMAL 10*6/UL
SEG NEUTROPHILS: 64 % (ref 36–65)
SEGMENTED NEUTROPHILS ABSOLUTE COUNT: 5.21 K/UL (ref 1.5–8.1)
SODIUM BLD-SCNC: 134 MMOL/L (ref 135–144)
WBC # BLD: 8.1 K/UL (ref 3.5–11.3)
WBC # BLD: ABNORMAL 10*3/UL

## 2020-08-02 PROCEDURE — 93005 ELECTROCARDIOGRAM TRACING: CPT | Performed by: STUDENT IN AN ORGANIZED HEALTH CARE EDUCATION/TRAINING PROGRAM

## 2020-08-02 PROCEDURE — 6360000002 HC RX W HCPCS: Performed by: STUDENT IN AN ORGANIZED HEALTH CARE EDUCATION/TRAINING PROGRAM

## 2020-08-02 PROCEDURE — 96375 TX/PRO/DX INJ NEW DRUG ADDON: CPT

## 2020-08-02 PROCEDURE — 99283 EMERGENCY DEPT VISIT LOW MDM: CPT

## 2020-08-02 PROCEDURE — 80048 BASIC METABOLIC PNL TOTAL CA: CPT

## 2020-08-02 PROCEDURE — 2580000003 HC RX 258: Performed by: STUDENT IN AN ORGANIZED HEALTH CARE EDUCATION/TRAINING PROGRAM

## 2020-08-02 PROCEDURE — 96376 TX/PRO/DX INJ SAME DRUG ADON: CPT

## 2020-08-02 PROCEDURE — 6360000002 HC RX W HCPCS

## 2020-08-02 PROCEDURE — 85025 COMPLETE CBC W/AUTO DIFF WBC: CPT

## 2020-08-02 PROCEDURE — 96365 THER/PROPH/DIAG IV INF INIT: CPT

## 2020-08-02 RX ORDER — ONDANSETRON 2 MG/ML
INJECTION INTRAMUSCULAR; INTRAVENOUS
Status: COMPLETED
Start: 2020-08-02 | End: 2020-08-02

## 2020-08-02 RX ORDER — ONDANSETRON 2 MG/ML
4 INJECTION INTRAMUSCULAR; INTRAVENOUS ONCE
Status: COMPLETED | OUTPATIENT
Start: 2020-08-02 | End: 2020-08-02

## 2020-08-02 RX ORDER — 0.9 % SODIUM CHLORIDE 0.9 %
1000 INTRAVENOUS SOLUTION INTRAVENOUS ONCE
Status: COMPLETED | OUTPATIENT
Start: 2020-08-02 | End: 2020-08-02

## 2020-08-02 RX ORDER — KETOROLAC TROMETHAMINE 30 MG/ML
30 INJECTION, SOLUTION INTRAMUSCULAR; INTRAVENOUS ONCE
Status: COMPLETED | OUTPATIENT
Start: 2020-08-02 | End: 2020-08-02

## 2020-08-02 RX ORDER — DIPHENHYDRAMINE HYDROCHLORIDE 50 MG/ML
25 INJECTION INTRAMUSCULAR; INTRAVENOUS ONCE
Status: COMPLETED | OUTPATIENT
Start: 2020-08-02 | End: 2020-08-02

## 2020-08-02 RX ORDER — PROCHLORPERAZINE EDISYLATE 5 MG/ML
10 INJECTION INTRAMUSCULAR; INTRAVENOUS ONCE
Status: COMPLETED | OUTPATIENT
Start: 2020-08-02 | End: 2020-08-02

## 2020-08-02 RX ORDER — KETOROLAC TROMETHAMINE 15 MG/ML
15 INJECTION, SOLUTION INTRAMUSCULAR; INTRAVENOUS ONCE
Status: COMPLETED | OUTPATIENT
Start: 2020-08-02 | End: 2020-08-02

## 2020-08-02 RX ORDER — MAGNESIUM SULFATE 1 G/100ML
1 INJECTION INTRAVENOUS ONCE
Status: DISCONTINUED | OUTPATIENT
Start: 2020-08-02 | End: 2020-08-02

## 2020-08-02 RX ADMIN — DIPHENHYDRAMINE HYDROCHLORIDE 25 MG: 50 INJECTION INTRAMUSCULAR; INTRAVENOUS at 17:13

## 2020-08-02 RX ADMIN — PROCHLORPERAZINE EDISYLATE 10 MG: 5 INJECTION INTRAMUSCULAR; INTRAVENOUS at 19:05

## 2020-08-02 RX ADMIN — MAGNESIUM SULFATE HEPTAHYDRATE 1 G: 1 INJECTION, SOLUTION INTRAVENOUS at 18:52

## 2020-08-02 RX ADMIN — ONDANSETRON 4 MG: 2 INJECTION INTRAMUSCULAR; INTRAVENOUS at 18:03

## 2020-08-02 RX ADMIN — KETOROLAC TROMETHAMINE 15 MG: 15 INJECTION, SOLUTION INTRAMUSCULAR; INTRAVENOUS at 17:13

## 2020-08-02 RX ADMIN — ONDANSETRON 4 MG: 2 INJECTION, SOLUTION INTRAMUSCULAR; INTRAVENOUS at 18:03

## 2020-08-02 RX ADMIN — KETOROLAC TROMETHAMINE 30 MG: 30 INJECTION, SOLUTION INTRAMUSCULAR at 18:38

## 2020-08-02 RX ADMIN — PROCHLORPERAZINE EDISYLATE 10 MG: 5 INJECTION INTRAMUSCULAR; INTRAVENOUS at 17:14

## 2020-08-02 RX ADMIN — SODIUM CHLORIDE 1000 ML: 9 INJECTION, SOLUTION INTRAVENOUS at 17:13

## 2020-08-02 RX ADMIN — DIPHENHYDRAMINE HYDROCHLORIDE 25 MG: 50 INJECTION INTRAMUSCULAR; INTRAVENOUS at 19:05

## 2020-08-02 ASSESSMENT — ENCOUNTER SYMPTOMS
BACK PAIN: 0
PHOTOPHOBIA: 1
SHORTNESS OF BREATH: 0
ABDOMINAL PAIN: 0
NAUSEA: 1
VOMITING: 1

## 2020-08-02 ASSESSMENT — PAIN SCALES - GENERAL: PAINLEVEL_OUTOF10: 8

## 2020-08-02 ASSESSMENT — PAIN DESCRIPTION - LOCATION: LOCATION: HEAD

## 2020-08-02 ASSESSMENT — PAIN DESCRIPTION - PAIN TYPE: TYPE: ACUTE PAIN

## 2020-08-02 NOTE — ED NOTES
Writer called to beside. Pt IV is leaking, IV fluids noted on bed and floor. Pt c/o no relief of pain at this time.       Carina Matamoros RN  08/02/20 [id number]

## 2020-08-02 NOTE — ED NOTES
Pt to ED with complaints of migraine. Pt states she has a hx of migraines. This started 5 days ago. Pt states she took home meds of fiorect with no relief. Pt states she is sound and light sensitive. Pt has associated nausea and vomitting. Pt states she has vomited 4 times today. Vitals obtained. IV established. Dr. Dickinson Handler at bedside.      Brennon Sullivan RN  08/02/20 [id number]

## 2020-08-02 NOTE — ED PROVIDER NOTES
[address]  ED  eMERGENCY dEPARTMENT eNCOUnter   Attending Attestation     Pt Name: Chris Lee  MRN: [id number]  [place] 1965  Date of evaluation: 8/2/20       Chris Lee is a 60 y.o. female who presents with Migraine      History: Patient presents with migraine. Patient has no other complaints except for some nausea associated with this. Patient says this is somewhat typical for her but is lasting no longer than usual.  Patient has Fioricet at home is not working. Exam: Heart rate and rhythm are regular. Lungs are clear to auscultation bilaterally. Abdomen soft, nontender. Patient is well-appearing. Pupils perrla. Plan for migraine cocktail, reevaluation, most likely discharge. I performed a history and physical examination of the patient and discussed management with the resident. I reviewed the residents note and agree with the documented findings and plan of care. Any areas of disagreement are noted on the chart. I was personally present for the key portions of any procedures. I have documented in the chart those procedures where I was not present during the key portions. I have personally reviewed all images and agree with the resident's interpretation. I have reviewed the emergency nurses triage note. I agree with the chief complaint, past medical history, past surgical history, allergies, medications, social and family history as documented unless otherwise noted below. Documentation of the HPI, Physical Exam and Medical Decision Making performed by medical students or scribes is based on my personal performance of the HPI, PE and MDM. For Phys Assistant/ Nurse Practitioner cases/documentation I have had a face to face evaluation of this patient and have completed at least one if not all key elements of the E/M (history, physical exam, and MDM). Additional findings are as noted.     For APC cases I have personally evaluated and examined the patient in conjunction with the APC and agree with the treatment plan and disposition of the patient as recorded by the APC.     Vasquez Hartman MD  Attending Emergency  Physician       Gary Yeh MD  08/02/20 0094

## 2020-08-02 NOTE — ED PROVIDER NOTES
[facility]  ED  Emergency Department Encounter  Emergency Medicine Resident     Pt Name: Keturah Ruano  MRN: [id number]  Chip 1965  Date of evaluation: 8/2/20  PCP:  KEANU Martinez [id number]       Chief Complaint   Patient presents with    Migraine       HISTORY Tyrese  (Location/Symptom, Timing/Onset, Context/Setting, Quality, Duration, Modifying Factors,Severity.)      Keturah Ruano is a 60 y. o.yo female who presents with headache. Patient complaining of migraine that started 5 days ago progressively worsening, states she is on Versed at home which has not helped. States that she is been seen in the emergency department multiple times for migraines and the migraine cocktail usually does help. States that she is been having nausea has thrown up 4 times today. Denies any fevers or chills, vision changes, focal neuro deficits. States that is been no traumatic injuries to the head, no cramping, no abdominal pain. Denies any shortness of breath or chest pain. PAST MEDICAL / SURGICAL / SOCIAL / FAMILY HISTORY      has a past medical history of Arthritis, Asthma, Borderline diabetes, Borderline personality disorder ([address] CHF (congestive heart failure) ([address] COPD (chronic obstructive pulmonary disease) ([address] Fibromyalgia, Headache, Hypertension, Manic depression ([address] Movement disorder, Neck fracture ([address] Pernicious anemia, Seizure ([address] and Thyroid disease. has a past surgical history that includes knee surgery (Bilateral); partial hysterectomy (cervix not removed); lymph node dissection; Irrigation and debridement (Right, 5/17/2019); EXPLORATION OF WOUND OF EXTREMITY (Right, 5/19/2019); and EXPLORATION OF WOUND OF EXTREMITY (N/A, 5/22/2019).      Social History     Socioeconomic History    Marital status:      Spouse name: Not on file    Number of children: Not on file    Years of education: Not on file    Highest education level: Not on file   Occupational History    Not on file   Social Needs    Financial resource strain: Not on file    Food insecurity     Worry: Not on file     Inability: Not on file    Transportation needs     Medical: Not on file     Non-medical: Not on file   Tobacco Use    Smoking status: Current Every Day Smoker     Packs/day: 0.50     Years: 12.00     Pack years: 6.00    Smokeless tobacco: Never Used   Substance and Sexual Activity    Alcohol use: No    Drug use: No    Sexual activity: Not Currently   Lifestyle    Physical activity     Days per week: Not on file     Minutes per session: Not on file    Stress: Not on file   Relationships    Social connections     Talks on phone: Not on file     Gets together: Not on file     Attends Church service: Not on file     Active member of club or organization: Not on file     Attends meetings of clubs or organizations: Not on file     Relationship status: Not on file    Intimate partner violence     Fear of current or ex partner: Not on file     Emotionally abused: Not on file     Physically abused: Not on file     Forced sexual activity: Not on file   Other Topics Concern    Not on file   Social History Narrative    Not on file       History reviewed. No pertinent family history. Allergies:  Imitrex [sumatriptan]; Bee pollen; Bee venom; Bromide ion [bromine]; Flexeril [cyclobenzaprine]; Nsaids; Potassium bromide; Reglan [metoclopramide]; Sulfa antibiotics; Sulfadiazine; and Tramadol    Home Medications:  Prior to Admission medications    Medication Sig Start Date End Date Taking?  Authorizing Provider   butalbital-acetaminophen-caffeine (FIORICET, ESGIC) 21542-93 MG per tablet Can take 2 pills at onset, the 1 tablet 6 hours later as needed 7/30/20 8/28/20  KEANU Espinal - CNP   ondansetron (ZOFRAN ODT) 4 MG disintegrating tablet Take 1 tablet by mouth every 8 hours as needed for Nausea 7/14/20   Cristina Mince, DO   gabapentin (NEURONTIN) 600 MG tablet Take 1 tablet by mouth 2 times daily for 30 days. 6/18/20 7/18/20  KEANU Zapata CNP   ciclopirox (PENLAC) 8 % solution Apply topically nightly. 6/18/20   KEANU Zapata CNP   albuterol (PROVENTIL) (2.5 MG/3ML) 0.083% nebulizer solution Take 3 mLs by nebulization every 6 hours as needed for Wheezing 6/18/20   KEANU Zapata CNP   albuterol sulfate  (90 Base) MCG/ACT inhaler Inhale 1 puff into the lungs every 6 hours as needed for Wheezing Gap prescription until follow up with primary. Do not refill.   Follow up with primary 4/14/20   Mini Kenneth, DO   budesonide-formoterol [facility]) 160-4.5 MCG/ACT AERO Inhale 2 puffs into the lungs 2 times daily 4/14/20   Mini Cooper, DO   Dextromethorphan-guaiFENesin [facility] DM MAXIMUM STRENGTH)  MG TB12 Take 1 tablet by mouth 2 times daily as needed (cough, congestion) 4/1/20   KEANU Zapata CNP   phenytoin (PHENYTEK) 200 MG ER capsule Take 1 capsule by mouth 2 times daily 3/24/20 6/22/20  Iván Godinez MD   dicyclomine (BENTYL) 10 MG capsule Take 1 capsule by mouth 4 times daily 3/17/20   KEANU Zapata CNP   famotidine (PEPCID) 20 MG tablet Take 1 tablet by mouth 2 times daily 3/17/20   KEANU Zapata CNP   levothyroxine (SYNTHROID) 100 MCG tablet Take 1 tablet by mouth Daily 3/17/20 4/16/20  KEANU Zapata CNP   propranolol (INDERAL LA) 60 MG extended release capsule Take 1 capsule by mouth daily 3/17/20   KEANU Zapata CNP   tiZANidine (ZANAFLEX) 4 MG tablet Take 1 tablet by mouth every 8 hours as needed (pain) 3/17/20   KEANU Zapata CNP   nicotine (NICOTROL) 10 MG inhaler Inhale 1 puff into the lungs as needed for Smoking cessation 6-16 cartridges per day 3/10/20   Viet Ardon MD   prochlorperazine (COMPAZINE) 5 MG tablet Take 1 tablet by mouth every 6 hours as needed for Nausea 3/10/20   Viet Ardon MD   ketorolac (TORADOL) 10 MG tablet Take 1 tablet by mouth every 6 hours as needed for Pain 3/10/20   Melissa Nicole MD   Elastic Bandages & Supports ([address] [place]) MISC Use daily. Right elbow. 3/10/20   Melissa Nicole MD   azithromycin (ZITHROMAX) 250 MG tablet Take 2 tabs (500 mg) on Day 1, and take 1 tab (250 mg) on days 2 through 5. 3/10/20   Melissa Nicole MD   Elastic Bandages & Supports (CARPAL TUNNEL WRIST STABILIZER) [address] Apply nightly to each wrist 3/10/20   Melissa Nicole MD   ibuprofen (ADVIL;MOTRIN) 800 MG tablet Take 1 tablet by mouth every 8 hours as needed for Pain 3/5/20   Afsaneh Yu MD   diclofenac sodium 1 % GEL Apply 4 g topically 4 times daily 3/5/20   Afsaneh Yu MD   LORazepam (ATIVAN) 1 MG tablet Take 1 mg by mouth 2 times daily. Historical Provider, MD   amphetamine-dextroamphetamine (ADDERALL XR) 20 MG extended release capsule Take 20 mg by mouth every morning. Historical Provider, MD   BENZOYL PEROXIDE 5 % external wash Apply daily 2/13/20   Viviana Closs, APRN - CNP   Skin Protectants, Misc. (EUCERIN) cream Apply topically as needed.  2/13/20   Viviana Closs, APRN - CNP   lidocaine (XYLOCAINE) 2 % jelly Apply pea sized amount two times a day to shoulder 2/13/20   Viviana Closs, APRN - CNP   Nutritional Supplements (BOOST HIGH PROTEIN) LIQD Patient is to drink 1 bottle four times per day 1/21/20   Viviana Closs, APRN - CNP   Gauze Pads & Dressings 2\"X2\" PADS Pt to change dressing once daily 1/21/20   Telly Monique MD   Water For Irrigation, Sterile (STERILE WATER FOR IRRIGATION) Irrigate with as directed for 1 dose once daily 1/21/20 2/4/20  Telly Monique MD   acetaminophen (TYLENOL) 325 MG tablet Take 1 tablet by mouth every 6 hours as needed for Pain 1/17/20   [place] Feil, DO   LONG ACTING NASAL SPRAY 0.05 % nasal spray  1/7/20   Historical Provider, MD   citalopram (CELEXA) 20 MG tablet Take 20 mg by mouth daily    Historical Provider, MD furosemide (LASIX) 20 MG tablet Take 1 tablet by mouth daily as needed (for swelling) 12/16/19   KEANU Uribe CNP   lidocaine (XYLOCAINE) 5 % ointment Apply topically as needed to knee up to three times a day  Patient not taking: Reported on 3/10/2020 12/16/19   KEANU Uribe CNP   docusate sodium (COLACE) 100 MG capsule Take 1 capsule by mouth daily as needed for Constipation 11/26/19   KEANU Uribe CNP   ferrous sulfate 325 (65 Fe) MG EC tablet Take 1 tablet by mouth daily (with breakfast) 11/26/19   KEANU Uribe CNP   risperiDONE (RISPERDAL) 2 MG tablet Take 1 tablet by mouth nightly 11/21/19   KEANU Uribe CNP   mirtazapine (REMERON) 45 MG tablet Take 1 tablet by mouth nightly 11/21/19   KEANU Uribe CNP   traZODone (DESYREL) 100 MG tablet Take 1 tablet by mouth nightly 11/21/19   KEANU Uribe CNP   buprenorphine-naloxone (SUBOXONE) 8-2 MG FILM SL film  11/14/19   Historical Provider, MD   Wound Dressings (ADAPTIC NON-ADHERING DRESSING) PADS Use daily with wound dressing changes 11/19/19   Cayla Mccauley MD   Wound Dressings (ADAPTIC NON-ADHERING DRESSING) PADS Apply 1 each topically 2 times daily Apply to R lateral thigh wound after shower daily 11/12/19   Yi Patton, DO   Gauze Pads & Dressings (GAUZE DRESSING) 4\"X4\" PADS 1 each by Does not apply route daily as needed (wound) 11/12/19   Yi Patton, DO   Skin Protectants, Misc.  (EUCERIN) cream  10/2/19   Historical Provider, MD   EPINEPHrine (EPIPEN 2-MARTINA) 0.3 MG/0.3ML SOAJ injection Inject 0.3 mLs into the muscle once for 1 dose Use as directed for allergic reaction 9/11/19 9/11/19  KEANU Mack CNP   ipratropium-albuterol (DUONEB) 0.5-2.5 (3) MG/3ML SOLN nebulizer solution Inhale 3 mLs into the lungs every 4 hours 6/16/19   Devon Quiñones MD   potassium chloride (KLOR-CON M) 20 MEQ extended release tablet Take 0.5 tablets by mouth daily for 2 days 6/12/19 6/14/19  Bryan Nayak MD   fluticasone (FLONASE) 50 MCG/ACT nasal spray 2 sprays by Each Nostril route daily 6/5/19   Acosta Blanc MD       REVIEW OFSYSTEMS    (2-9 systems for level 4, 10 or more for level 5)      Review of Systems   Constitutional: Negative for diaphoresis and fever. HENT: Negative for congestion. Eyes: Positive for photophobia. Negative for visual disturbance. Respiratory: Negative for shortness of breath. Cardiovascular: Negative for chest pain. Gastrointestinal: Positive for nausea and vomiting. Negative for abdominal pain. Endocrine: Negative for polyuria. Genitourinary: Negative for dysuria. Musculoskeletal: Negative for back pain. Skin: Negative for wound. Neurological: Positive for weakness (General) and headaches. Psychiatric/Behavioral: Negative for confusion. PHYSICAL EXAM   (up to 7 for level 4, 8 or more forlevel 5)      ED TRIAGE VITALS BP: 117/84, Temp: 98.2 °F (36.8 °C), Pulse: 84, Resp: 18, SpO2: 98 %    Vitals:    08/02/20 1632 08/02/20 1655   BP:  117/84   Pulse:  84   Resp:  18   Temp: 98.2 °F (36.8 °C)    TempSrc: Oral    SpO2:  98%   Weight:  160 lb (72.6 kg)       Physical Exam  Constitutional:       General: She is not in acute distress. Appearance: She is well-developed. HENT:      Head: Normocephalic and atraumatic. Nose: Nose normal.   Eyes:      Pupils: Pupils are equal, round, and reactive to light. Neck:      Musculoskeletal: Normal range of motion and neck supple. Cardiovascular:      Rate and Rhythm: Normal rate and regular rhythm. Heart sounds: No murmur. Pulmonary:      Effort: Pulmonary effort is normal. No respiratory distress. Breath sounds: No stridor. No wheezing. Abdominal:      General: There is no distension. Palpations: Abdomen is soft. Tenderness: There is no abdominal tenderness. Musculoskeletal: Normal range of motion. General: No tenderness.    Skin: Ratio NOT REPORTED 9 - 20    Calcium 9.1 8.6 - 10.4 mg/dL    Sodium 134 (L) 135 - 144 mmol/L    Potassium 3.8 3.7 - 5.3 mmol/L    Chloride 98 98 - 107 mmol/L    CO2 21 20 - 31 mmol/L    Anion Gap 15 9 - 17 mmol/L    GFR Non-African American >60 >60 mL/min    GFR African American >60 >60 mL/min    GFR Comment          GFR Staging NOT REPORTED    CBC Auto Differential   Result Value Ref Range    WBC 8.1 3.5 - 11.3 k/uL    RBC 4.99 3.95 - 5.11 m/uL    Hemoglobin 15.5 (H) 11.9 - 15.1 g/dL    Hematocrit 44.6 36.3 - 47.1 %    MCV 89.4 82.6 - 102.9 fL    MCH 31.1 25.2 - 33.5 pg    MCHC 34.8 28.4 - 34.8 g/dL    RDW 12.8 11.8 - 14.4 %    Platelets 643 859 - 612 k/uL    MPV 9.1 8.1 - 13.5 fL    NRBC Automated 0.0 0.0 per 100 WBC    Differential Type NOT REPORTED     Seg Neutrophils 64 36 - 65 %    Lymphocytes 24 24 - 43 %    Monocytes 10 3 - 12 %    Eosinophils % 1 1 - 4 %    Basophils 0 0 - 2 %    Immature Granulocytes 1 (H) 0 %    Segs Absolute 5.21 1.50 - 8.10 k/uL    Absolute Lymph # 1.94 1.10 - 3.70 k/uL    Absolute Mono # 0.82 0.10 - 1.20 k/uL    Absolute Eos # 0.05 0.00 - 0.44 k/uL    Basophils Absolute 0.03 0.00 - 0.20 k/uL    Absolute Immature Granulocyte 0.04 0.00 - 0.30 k/uL    WBC Morphology NOT REPORTED     RBC Morphology NOT REPORTED     Platelet Estimate NOT REPORTED        RADIOLOGY:  No orders to display         EMERGENCY DEPARTMENT COURSE:  ED Course as of Aug 02 1930   Sun Aug 02, 2020   1710 Patient seen and assessed in the emergency department no acute respiratory cardiovascular distress. Patient complaining of migraine that started 5 days ago progressively worsening, states she is on Versed at home which has not helped. States that she is been seen in the emergency department multiple times for migraines and the migraine cocktail usually does help. States that she is been having nausea has thrown up 4 times today. Denies any fevers or chills, vision changes, focal neuro deficits.   States that is been no traumatic injuries to the head, no cramping, no abdominal pain. Denies any shortness of breath or chest pain. [PS]   9865 Seems to be something wrong with the IV, unsure if meds were given, fluids not going, will speak with nurse when she is out of trauma. Will redose Toradol IM. [PS]   1845 Repeat EKG as patient's last QTC was over 500 before re-dose of medications. [PS]   2810 QTc recheck, 466      [PS]   1919 Patient did get mag 1g bag before medication was DC. Feeling well, headache better after redosing. Will discharge with follow up. Eating food. [PS]      ED Course User Index  [PS] Gabino Fonseca MD          PROCEDURES:  None    CONSULTS:  None    CRITICAL CARE:  Please see attending note    FINAL IMPRESSION      1.  Intractable migraine without aura and with status migrainosus          DISPOSITION / PLAN     DISPOSITION     discharge    PATIENT REFERRED TO:  [facility], APRN - CNP  [address]    In 3 days      [facility] ED  [address]    If symptoms worsen, As needed      DISCHARGE MEDICATIONS:  New Prescriptions    No medications on file       Gabino Fonseca MD  Emergency Medicine Resident    (Please note that portions of this note were completed with a voice recognition program.Efforts were made to edit the dictations but occasionally words are mis-transcribed.)     Gabino Fonseca MD  Resident  08/02/20 Carisa Howard MD  Resident  08/02/20 1063

## 2020-08-02 NOTE — ED NOTES
Pt states she is feeling better. Pt provided saltine crackers. Dr Roseline Peterson at bedside to reassess patient.      Osmel Thrasher RN  08/02/20 0208

## 2020-08-03 LAB
EKG ATRIAL RATE: 77 BPM
EKG P AXIS: 66 DEGREES
EKG P-R INTERVAL: 176 MS
EKG Q-T INTERVAL: 412 MS
EKG QRS DURATION: 82 MS
EKG QTC CALCULATION (BAZETT): 466 MS
EKG R AXIS: 50 DEGREES
EKG T AXIS: 57 DEGREES
EKG VENTRICULAR RATE: 77 BPM

## 2020-08-03 PROCEDURE — 93010 ELECTROCARDIOGRAM REPORT: CPT | Performed by: INTERNAL MEDICINE

## 2020-08-25 ENCOUNTER — HOSPITAL ENCOUNTER (OUTPATIENT)
Age: 55
Setting detail: OBSERVATION
Discharge: HOME | End: 2020-08-26
Attending: EMERGENCY MEDICINE | Admitting: EMERGENCY MEDICINE
Payer: MEDICARE

## 2020-08-25 ENCOUNTER — APPOINTMENT (OUTPATIENT)
Dept: GENERAL RADIOLOGY | Age: 55
End: 2020-08-25
Payer: MEDICARE

## 2020-08-25 LAB
ABSOLUTE EOS #: 0.07 K/UL (ref 0–0.44)
ABSOLUTE IMMATURE GRANULOCYTE: <0.03 K/UL (ref 0–0.3)
ABSOLUTE LYMPH #: 2.72 K/UL (ref 1.1–3.7)
ABSOLUTE MONO #: 0.67 K/UL (ref 0.1–1.2)
ANION GAP SERPL CALCULATED.3IONS-SCNC: 12 MMOL/L (ref 9–17)
BASOPHILS # BLD: 1 % (ref 0–2)
BASOPHILS ABSOLUTE: 0.05 K/UL (ref 0–0.2)
BNP INTERPRETATION: NORMAL
BUN BLDV-MCNC: 7 MG/DL (ref 6–20)
BUN/CREAT BLD: NORMAL (ref 9–20)
CALCIUM SERPL-MCNC: 9.1 MG/DL (ref 8.6–10.4)
CHLORIDE BLD-SCNC: 102 MMOL/L (ref 98–107)
CO2: 23 MMOL/L (ref 20–31)
CREAT SERPL-MCNC: 0.54 MG/DL (ref 0.5–0.9)
D-DIMER QUANTITATIVE: 0.64 MG/L FEU
DIFFERENTIAL TYPE: ABNORMAL
EOSINOPHILS RELATIVE PERCENT: 1 % (ref 1–4)
GFR AFRICAN AMERICAN: >60 ML/MIN
GFR NON-AFRICAN AMERICAN: >60 ML/MIN
GFR SERPL CREATININE-BSD FRML MDRD: NORMAL ML/MIN/{1.73_M2}
GFR SERPL CREATININE-BSD FRML MDRD: NORMAL ML/MIN/{1.73_M2}
GLUCOSE BLD-MCNC: 86 MG/DL (ref 70–99)
HCT VFR BLD CALC: 46.9 % (ref 36.3–47.1)
HEMOGLOBIN: 15.9 G/DL (ref 11.9–15.1)
IMMATURE GRANULOCYTES: 0 %
LYMPHOCYTES # BLD: 36 % (ref 24–43)
MCH RBC QN AUTO: 31.1 PG (ref 25.2–33.5)
MCHC RBC AUTO-ENTMCNC: 33.9 G/DL (ref 28.4–34.8)
MCV RBC AUTO: 91.8 FL (ref 82.6–102.9)
MONOCYTES # BLD: 9 % (ref 3–12)
NRBC AUTOMATED: 0 PER 100 WBC
PDW BLD-RTO: 12.7 % (ref 11.8–14.4)
PLATELET # BLD: 273 K/UL (ref 138–453)
PLATELET ESTIMATE: ABNORMAL
PMV BLD AUTO: 9.6 FL (ref 8.1–13.5)
POTASSIUM SERPL-SCNC: 4.7 MMOL/L (ref 3.7–5.3)
PRO-BNP: 32 PG/ML
RBC # BLD: 5.11 M/UL (ref 3.95–5.11)
RBC # BLD: ABNORMAL 10*6/UL
SEG NEUTROPHILS: 53 % (ref 36–65)
SEGMENTED NEUTROPHILS ABSOLUTE COUNT: 3.96 K/UL (ref 1.5–8.1)
SODIUM BLD-SCNC: 137 MMOL/L (ref 135–144)
TROPONIN INTERP: NORMAL
TROPONIN T: NORMAL NG/ML
TROPONIN, HIGH SENSITIVITY: <6 NG/L (ref 0–14)
WBC # BLD: 7.5 K/UL (ref 3.5–11.3)
WBC # BLD: ABNORMAL 10*3/UL

## 2020-08-25 PROCEDURE — 99285 EMERGENCY DEPT VISIT HI MDM: CPT

## 2020-08-25 PROCEDURE — 83880 ASSAY OF NATRIURETIC PEPTIDE: CPT

## 2020-08-25 PROCEDURE — 96375 TX/PRO/DX INJ NEW DRUG ADDON: CPT

## 2020-08-25 PROCEDURE — 85379 FIBRIN DEGRADATION QUANT: CPT

## 2020-08-25 PROCEDURE — 85025 COMPLETE CBC W/AUTO DIFF WBC: CPT

## 2020-08-25 PROCEDURE — 6370000000 HC RX 637 (ALT 250 FOR IP): Performed by: STUDENT IN AN ORGANIZED HEALTH CARE EDUCATION/TRAINING PROGRAM

## 2020-08-25 PROCEDURE — G0378 HOSPITAL OBSERVATION PER HR: HCPCS

## 2020-08-25 PROCEDURE — 96374 THER/PROPH/DIAG INJ IV PUSH: CPT

## 2020-08-25 PROCEDURE — 6360000002 HC RX W HCPCS: Performed by: EMERGENCY MEDICINE

## 2020-08-25 PROCEDURE — 84484 ASSAY OF TROPONIN QUANT: CPT

## 2020-08-25 PROCEDURE — 93005 ELECTROCARDIOGRAM TRACING: CPT | Performed by: EMERGENCY MEDICINE

## 2020-08-25 PROCEDURE — 36415 COLL VENOUS BLD VENIPUNCTURE: CPT

## 2020-08-25 PROCEDURE — 2580000003 HC RX 258: Performed by: STUDENT IN AN ORGANIZED HEALTH CARE EDUCATION/TRAINING PROGRAM

## 2020-08-25 PROCEDURE — 71045 X-RAY EXAM CHEST 1 VIEW: CPT

## 2020-08-25 PROCEDURE — 80048 BASIC METABOLIC PNL TOTAL CA: CPT

## 2020-08-25 RX ORDER — LEVOTHYROXINE SODIUM 0.1 MG/1
100 TABLET ORAL DAILY
Status: DISCONTINUED | OUTPATIENT
Start: 2020-08-26 | End: 2020-08-26 | Stop reason: HOSPADM

## 2020-08-25 RX ORDER — SODIUM CHLORIDE 0.9 % (FLUSH) 0.9 %
10 SYRINGE (ML) INJECTION PRN
Status: DISCONTINUED | OUTPATIENT
Start: 2020-08-25 | End: 2020-08-26 | Stop reason: HOSPADM

## 2020-08-25 RX ORDER — SODIUM CHLORIDE 0.9 % (FLUSH) 0.9 %
10 SYRINGE (ML) INJECTION EVERY 12 HOURS SCHEDULED
Status: DISCONTINUED | OUTPATIENT
Start: 2020-08-25 | End: 2020-08-26 | Stop reason: HOSPADM

## 2020-08-25 RX ORDER — ONDANSETRON 2 MG/ML
4 INJECTION INTRAMUSCULAR; INTRAVENOUS EVERY 8 HOURS PRN
Status: DISCONTINUED | OUTPATIENT
Start: 2020-08-25 | End: 2020-08-26 | Stop reason: HOSPADM

## 2020-08-25 RX ORDER — CITALOPRAM 20 MG/1
20 TABLET ORAL DAILY
Status: DISCONTINUED | OUTPATIENT
Start: 2020-08-25 | End: 2020-08-26 | Stop reason: HOSPADM

## 2020-08-25 RX ORDER — RISPERIDONE 2 MG/1
2 TABLET, FILM COATED ORAL NIGHTLY
Status: DISCONTINUED | OUTPATIENT
Start: 2020-08-25 | End: 2020-08-26 | Stop reason: HOSPADM

## 2020-08-25 RX ORDER — TRAZODONE HYDROCHLORIDE 100 MG/1
100 TABLET ORAL NIGHTLY
Status: DISCONTINUED | OUTPATIENT
Start: 2020-08-25 | End: 2020-08-26 | Stop reason: HOSPADM

## 2020-08-25 RX ORDER — FUROSEMIDE 20 MG/1
20 TABLET ORAL DAILY PRN
Status: DISCONTINUED | OUTPATIENT
Start: 2020-08-25 | End: 2020-08-26 | Stop reason: HOSPADM

## 2020-08-25 RX ORDER — PROCHLORPERAZINE EDISYLATE 5 MG/ML
10 INJECTION INTRAMUSCULAR; INTRAVENOUS ONCE
Status: COMPLETED | OUTPATIENT
Start: 2020-08-25 | End: 2020-08-25

## 2020-08-25 RX ORDER — DIPHENHYDRAMINE HYDROCHLORIDE 50 MG/ML
25 INJECTION INTRAMUSCULAR; INTRAVENOUS ONCE
Status: COMPLETED | OUTPATIENT
Start: 2020-08-25 | End: 2020-08-25

## 2020-08-25 RX ORDER — DEXAMETHASONE SODIUM PHOSPHATE 10 MG/ML
10 INJECTION INTRAMUSCULAR; INTRAVENOUS ONCE
Status: DISCONTINUED | OUTPATIENT
Start: 2020-08-25 | End: 2020-08-25

## 2020-08-25 RX ORDER — DEXAMETHASONE SODIUM PHOSPHATE 4 MG/ML
10 INJECTION, SOLUTION INTRA-ARTICULAR; INTRALESIONAL; INTRAMUSCULAR; INTRAVENOUS; SOFT TISSUE ONCE
Status: COMPLETED | OUTPATIENT
Start: 2020-08-25 | End: 2020-08-25

## 2020-08-25 RX ORDER — FAMOTIDINE 20 MG/1
20 TABLET, FILM COATED ORAL 2 TIMES DAILY
Status: DISCONTINUED | OUTPATIENT
Start: 2020-08-25 | End: 2020-08-26 | Stop reason: HOSPADM

## 2020-08-25 RX ORDER — ACETAMINOPHEN 325 MG/1
650 TABLET ORAL EVERY 4 HOURS PRN
Status: DISCONTINUED | OUTPATIENT
Start: 2020-08-25 | End: 2020-08-26 | Stop reason: HOSPADM

## 2020-08-25 RX ORDER — GABAPENTIN 600 MG/1
600 TABLET ORAL 2 TIMES DAILY
Status: DISCONTINUED | OUTPATIENT
Start: 2020-08-25 | End: 2020-08-26 | Stop reason: HOSPADM

## 2020-08-25 RX ORDER — LANOLIN ALCOHOL/MO/W.PET/CERES
325 CREAM (GRAM) TOPICAL
Status: DISCONTINUED | OUTPATIENT
Start: 2020-08-26 | End: 2020-08-26 | Stop reason: HOSPADM

## 2020-08-25 RX ADMIN — DIPHENHYDRAMINE HYDROCHLORIDE 25 MG: 50 INJECTION, SOLUTION INTRAMUSCULAR; INTRAVENOUS at 13:15

## 2020-08-25 RX ADMIN — Medication 10 ML: at 20:17

## 2020-08-25 RX ADMIN — DEXAMETHASONE SODIUM PHOSPHATE 10 MG: 4 INJECTION, SOLUTION INTRAMUSCULAR; INTRAVENOUS at 13:28

## 2020-08-25 RX ADMIN — GABAPENTIN 600 MG: 600 TABLET ORAL at 20:16

## 2020-08-25 RX ADMIN — TRAZODONE HYDROCHLORIDE 100 MG: 100 TABLET ORAL at 20:56

## 2020-08-25 RX ADMIN — RISPERIDONE 2 MG: 2 TABLET, FILM COATED ORAL at 20:56

## 2020-08-25 RX ADMIN — MIRTAZAPINE 45 MG: 15 TABLET, FILM COATED ORAL at 20:16

## 2020-08-25 RX ADMIN — FAMOTIDINE 20 MG: 20 TABLET, FILM COATED ORAL at 20:16

## 2020-08-25 RX ADMIN — PROCHLORPERAZINE EDISYLATE 10 MG: 5 INJECTION INTRAMUSCULAR; INTRAVENOUS at 13:15

## 2020-08-25 ASSESSMENT — ENCOUNTER SYMPTOMS
NAUSEA: 0
ABDOMINAL PAIN: 0
COUGH: 1
SHORTNESS OF BREATH: 0
BACK PAIN: 0
VOMITING: 0

## 2020-08-25 ASSESSMENT — PAIN DESCRIPTION - DESCRIPTORS: DESCRIPTORS: ACHING

## 2020-08-25 ASSESSMENT — PAIN DESCRIPTION - PROGRESSION: CLINICAL_PROGRESSION: NOT CHANGED

## 2020-08-25 ASSESSMENT — PAIN - FUNCTIONAL ASSESSMENT: PAIN_FUNCTIONAL_ASSESSMENT: ACTIVITIES ARE NOT PREVENTED

## 2020-08-25 ASSESSMENT — PAIN DESCRIPTION - LOCATION
LOCATION: CHEST

## 2020-08-25 ASSESSMENT — PAIN DESCRIPTION - ONSET: ONSET: ON-GOING

## 2020-08-25 ASSESSMENT — PAIN DESCRIPTION - ORIENTATION
ORIENTATION: MID
ORIENTATION: MID

## 2020-08-25 ASSESSMENT — PAIN DESCRIPTION - FREQUENCY: FREQUENCY: CONTINUOUS

## 2020-08-25 ASSESSMENT — PAIN DESCRIPTION - PAIN TYPE
TYPE: ACUTE PAIN
TYPE: CHRONIC PAIN
TYPE: ACUTE PAIN

## 2020-08-25 ASSESSMENT — PAIN SCALES - GENERAL
PAINLEVEL_OUTOF10: 5

## 2020-08-25 NOTE — ED NOTES
Bed: 43  Expected date:   Expected time:   Means of arrival:   Comments:  YAHAIRA 1067 Salvador Crow RN  08/25/20 1879

## 2020-08-25 NOTE — ED NOTES
Pt given boxed lunch and ice water. Educated pt on NPO status at midnight, pt verbalized understanding. Telemetry monitor maintained, RR even and unlabored. Pt resting in bed with HOB at 90*, pt watching TV. Writer will continue to monitor.       Carlene Perez RN  08/25/20 [id number]

## 2020-08-25 NOTE — CARE COORDINATION
Case Management Initial Discharge Plan  Aleta Mcdonough,             Met with:patient to discuss discharge plans. Information verified: address, contacts, phone number, , insurance Yes    Emergency Contact/Next of Kin name & number: Shanna Perez @ [phone]    PCP: KEANU Martinez - CNP  Date of last visit: pre covid can call for follow up     Insurance Provider: [employer]     Discharge Planning    Living Arrangements:  Friends   Support Systems:  Friends/Neighbors    Home has 2 stories  3 stairs to climb to get into front door, flight stairs to climb to reach second floor  Location of bedroom/bathroom in home main     Patient able to perform ADL's:Independent    Current Services (outpatient & in home) none  DME equipment: none  DME provider: na    Receiving oral anticoagulation therapy? No- states was on plavix but was aken off     If indicated:   Physician managing anticoagulation treatment: na  Where does patient obtain lab work for ATC treatment? na      Potential Assistance Needed:  N/A    Patient agreeable to home care: No  [place] of choice provided:  n/a    Prior SNF/Rehab Placement and Facility: none  Agreeable to SNF/Rehab: No  [place] of choice provided: n/a     Evaluation: n/a    Expected Discharge date:  20    Patient expects to be discharged to:  home  Follow Up Appointment: Best Day/ Time: Monday PM    Transportation provider: bus pr friend   Transportation arrangements needed for discharge: No    Readmission Risk              Risk of Unplanned Readmission:        0             Does patient have a readmission risk score greater than 14?: No  If yes, follow-up appointment must be made within 7 days of discharge. Goals of Care: find out about chest pain       Discharge Plan: DC to home independently, discussed alternate housing options as patient is unsure if apartment is able to be used. [address] # given for [place] [employer]. established pcp, cab or bus Electronically signed by Joe Bartlett RN on 8/25/20 at 5:54 PM EDT

## 2020-08-25 NOTE — ED PROVIDER NOTES
sinus   Rate: normal  Axis: normal  Ectopy: none  Conduction: normal  ST Segments: no acute change  T Waves: no acute change  Q Waves: none    Clinical Impression: no acute changes and normal EKG    Attending Physician Additional  Notes    Patient has chief complaint of chest pain, brought in by EMS. She has been ill for the past week with sore throat, mild congestion, dry cough. Chest pain is pleuritic as well as tender to palpation. She has been coughing but no true sputum or hemoptysis. No leg pain calf swelling immobility or prior DVT/PE. She has been having severe headache since she got nitroglycerin this morning. She has numbness in her right arm. She has dizziness and feels clumsy with standing. No true ataxia. No weakness. No dysarthria or other speech changes. No diplopia. No history of stroke. She has a history of COPD/asthma, hypertension. She is been out of many of her medications for some time. She is been noncompliant with her medications. No fevers. On exam she is anxious, uncomfortable, afebrile, borderline hypertension. GCS is 15. Normal pupils and extraocular meds. No nystagmus. She is slow to do finger-nose but no asymmetry or true ataxia. Motor strength 5/5. Says subjective decrease sensation in the right arm. Chest is tender to palpation. Breath sounds are diminished but symmetrical, no wheezing. No accessory muscle use or retractions. Abdomen is benign. Impression is atypical chest pain, respiratory illness, consider COVID, headache likely from nitroglycerin, unusual neurological planes. Plan is cardiac work-up EKG troponin chest x-ray d-dimer fluids migraine cocktail CT brain reassess. Tej Menjivar.  Mirela Poon MD, [facility]  Attending Emergency  Physician               Milo Albarran MD  08/25/20 6100

## 2020-08-25 NOTE — ED NOTES
Pt ambulated to bathroom, states dizzy, no acute distress noted     Emilia Membreno, LILIBETH  08/25/20 0807

## 2020-08-26 ENCOUNTER — APPOINTMENT (OUTPATIENT)
Dept: NUCLEAR MEDICINE | Age: 55
End: 2020-08-26
Payer: MEDICARE

## 2020-08-26 VITALS
TEMPERATURE: 98.4 F | OXYGEN SATURATION: 98 % | BODY MASS INDEX: 30.36 KG/M2 | RESPIRATION RATE: 18 BRPM | DIASTOLIC BLOOD PRESSURE: 118 MMHG | WEIGHT: 165 LBS | SYSTOLIC BLOOD PRESSURE: 174 MMHG | HEART RATE: 89 BPM | HEIGHT: 62 IN

## 2020-08-26 LAB
EKG ATRIAL RATE: 72 BPM
EKG ATRIAL RATE: 74 BPM
EKG P AXIS: 54 DEGREES
EKG P AXIS: 65 DEGREES
EKG P-R INTERVAL: 178 MS
EKG P-R INTERVAL: 184 MS
EKG Q-T INTERVAL: 398 MS
EKG Q-T INTERVAL: 418 MS
EKG QRS DURATION: 78 MS
EKG QRS DURATION: 80 MS
EKG QTC CALCULATION (BAZETT): 441 MS
EKG QTC CALCULATION (BAZETT): 457 MS
EKG R AXIS: 14 DEGREES
EKG R AXIS: 39 DEGREES
EKG T AXIS: 38 DEGREES
EKG T AXIS: 55 DEGREES
EKG VENTRICULAR RATE: 72 BPM
EKG VENTRICULAR RATE: 74 BPM
LV EF: 70 %
LVEF MODALITY: NORMAL

## 2020-08-26 PROCEDURE — 6370000000 HC RX 637 (ALT 250 FOR IP): Performed by: EMERGENCY MEDICINE

## 2020-08-26 PROCEDURE — 94760 N-INVAS EAR/PLS OXIMETRY 1: CPT

## 2020-08-26 PROCEDURE — 93010 ELECTROCARDIOGRAM REPORT: CPT | Performed by: INTERNAL MEDICINE

## 2020-08-26 PROCEDURE — 93005 ELECTROCARDIOGRAM TRACING: CPT | Performed by: STUDENT IN AN ORGANIZED HEALTH CARE EDUCATION/TRAINING PROGRAM

## 2020-08-26 PROCEDURE — 6360000002 HC RX W HCPCS: Performed by: EMERGENCY MEDICINE

## 2020-08-26 PROCEDURE — 2580000003 HC RX 258: Performed by: STUDENT IN AN ORGANIZED HEALTH CARE EDUCATION/TRAINING PROGRAM

## 2020-08-26 PROCEDURE — 2580000003 HC RX 258: Performed by: EMERGENCY MEDICINE

## 2020-08-26 PROCEDURE — 93017 CV STRESS TEST TRACING ONLY: CPT

## 2020-08-26 PROCEDURE — A9500 TC99M SESTAMIBI: HCPCS | Performed by: EMERGENCY MEDICINE

## 2020-08-26 PROCEDURE — 78452 HT MUSCLE IMAGE SPECT MULT: CPT

## 2020-08-26 PROCEDURE — 3430000000 HC RX DIAGNOSTIC RADIOPHARMACEUTICAL: Performed by: EMERGENCY MEDICINE

## 2020-08-26 PROCEDURE — G0378 HOSPITAL OBSERVATION PER HR: HCPCS

## 2020-08-26 PROCEDURE — 96375 TX/PRO/DX INJ NEW DRUG ADDON: CPT

## 2020-08-26 PROCEDURE — 6370000000 HC RX 637 (ALT 250 FOR IP): Performed by: STUDENT IN AN ORGANIZED HEALTH CARE EDUCATION/TRAINING PROGRAM

## 2020-08-26 RX ORDER — LEVOTHYROXINE SODIUM 0.1 MG/1
100 TABLET ORAL DAILY
Qty: 30 TABLET | Refills: 3 | Status: SHIPPED | OUTPATIENT
Start: 2020-08-27 | End: 2020-10-09 | Stop reason: SDUPTHER

## 2020-08-26 RX ORDER — HYDROCHLOROTHIAZIDE 25 MG/1
25 TABLET ORAL DAILY
Qty: 30 TABLET | Refills: 0 | Status: ON HOLD | OUTPATIENT
Start: 2020-08-26 | End: 2020-09-21 | Stop reason: HOSPADM

## 2020-08-26 RX ORDER — BUTALBITAL, ACETAMINOPHEN AND CAFFEINE 50; 325; 40 MG/1; MG/1; MG/1
2 TABLET ORAL EVERY 4 HOURS PRN
Status: DISCONTINUED | OUTPATIENT
Start: 2020-08-26 | End: 2020-08-26 | Stop reason: HOSPADM

## 2020-08-26 RX ORDER — ALBUTEROL SULFATE 2.5 MG/3ML
2.5 SOLUTION RESPIRATORY (INHALATION) EVERY 6 HOURS PRN
Status: DISCONTINUED | OUTPATIENT
Start: 2020-08-26 | End: 2020-08-26 | Stop reason: HOSPADM

## 2020-08-26 RX ORDER — ASPIRIN 81 MG/1
81 TABLET ORAL DAILY
Qty: 90 TABLET | Refills: 1 | Status: SHIPPED | OUTPATIENT
Start: 2020-08-26 | End: 2020-10-22 | Stop reason: SDUPTHER

## 2020-08-26 RX ORDER — SODIUM CHLORIDE 9 MG/ML
500 INJECTION, SOLUTION INTRAVENOUS CONTINUOUS PRN
Status: DISCONTINUED | OUTPATIENT
Start: 2020-08-26 | End: 2020-08-26

## 2020-08-26 RX ORDER — SODIUM CHLORIDE 0.9 % (FLUSH) 0.9 %
10 SYRINGE (ML) INJECTION PRN
Status: DISCONTINUED | OUTPATIENT
Start: 2020-08-26 | End: 2020-08-26 | Stop reason: HOSPADM

## 2020-08-26 RX ORDER — PROCHLORPERAZINE MALEATE 5 MG/1
5 TABLET ORAL EVERY 6 HOURS PRN
Qty: 10 TABLET | Refills: 0 | Status: ON HOLD | OUTPATIENT
Start: 2020-08-26 | End: 2020-09-10

## 2020-08-26 RX ORDER — SODIUM CHLORIDE 0.9 % (FLUSH) 0.9 %
10 SYRINGE (ML) INJECTION PRN
Status: DISCONTINUED | OUTPATIENT
Start: 2020-08-26 | End: 2020-08-26

## 2020-08-26 RX ORDER — ONDANSETRON 4 MG/1
4 TABLET, ORALLY DISINTEGRATING ORAL EVERY 8 HOURS PRN
Qty: 20 TABLET | Refills: 0 | Status: SHIPPED | OUTPATIENT
Start: 2020-08-26 | End: 2020-10-26 | Stop reason: SDUPTHER

## 2020-08-26 RX ORDER — KETOROLAC TROMETHAMINE 30 MG/ML
15 INJECTION, SOLUTION INTRAMUSCULAR; INTRAVENOUS ONCE
Status: COMPLETED | OUTPATIENT
Start: 2020-08-26 | End: 2020-08-26

## 2020-08-26 RX ORDER — NITROGLYCERIN 0.4 MG/1
0.4 TABLET SUBLINGUAL EVERY 5 MIN PRN
Status: DISCONTINUED | OUTPATIENT
Start: 2020-08-26 | End: 2020-08-26

## 2020-08-26 RX ORDER — METOPROLOL TARTRATE 5 MG/5ML
5 INJECTION INTRAVENOUS EVERY 5 MIN PRN
Status: DISCONTINUED | OUTPATIENT
Start: 2020-08-26 | End: 2020-08-26

## 2020-08-26 RX ORDER — AMINOPHYLLINE DIHYDRATE 25 MG/ML
50 INJECTION, SOLUTION INTRAVENOUS PRN
Status: DISCONTINUED | OUTPATIENT
Start: 2020-08-26 | End: 2020-08-26

## 2020-08-26 RX ORDER — ATROPINE SULFATE 0.1 MG/ML
0.5 INJECTION INTRAVENOUS EVERY 5 MIN PRN
Status: DISCONTINUED | OUTPATIENT
Start: 2020-08-26 | End: 2020-08-26

## 2020-08-26 RX ORDER — BUTALBITAL, ACETAMINOPHEN AND CAFFEINE 50; 325; 40 MG/1; MG/1; MG/1
1 TABLET ORAL EVERY 4 HOURS PRN
Qty: 12 TABLET | Refills: 0 | Status: SHIPPED | OUTPATIENT
Start: 2020-08-26 | End: 2020-09-03

## 2020-08-26 RX ADMIN — SODIUM CHLORIDE, PRESERVATIVE FREE 10 ML: 5 INJECTION INTRAVENOUS at 09:45

## 2020-08-26 RX ADMIN — ACETAMINOPHEN 650 MG: 325 TABLET ORAL at 08:02

## 2020-08-26 RX ADMIN — CITALOPRAM 20 MG: 20 TABLET, FILM COATED ORAL at 13:09

## 2020-08-26 RX ADMIN — BUTALBITAL, ACETAMINOPHEN AND CAFFEINE 2 TABLET: 50; 325; 40 TABLET ORAL at 13:42

## 2020-08-26 RX ADMIN — KETOROLAC TROMETHAMINE 15 MG: 30 INJECTION, SOLUTION INTRAMUSCULAR; INTRAVENOUS at 09:26

## 2020-08-26 RX ADMIN — GABAPENTIN 600 MG: 600 TABLET ORAL at 13:09

## 2020-08-26 RX ADMIN — BUTALBITAL, ACETAMINOPHEN AND CAFFEINE 2 TABLET: 50; 325; 40 TABLET ORAL at 09:52

## 2020-08-26 RX ADMIN — LEVOTHYROXINE SODIUM 100 MCG: 100 TABLET ORAL at 06:14

## 2020-08-26 RX ADMIN — TETRAKIS(2-METHOXYISOBUTYLISOCYANIDE)COPPER(I) TETRAFLUOROBORATE 38 MILLICURIE: 1 INJECTION, POWDER, LYOPHILIZED, FOR SOLUTION INTRAVENOUS at 11:30

## 2020-08-26 RX ADMIN — REGADENOSON 0.4 MG: 0.08 INJECTION, SOLUTION INTRAVENOUS at 11:23

## 2020-08-26 RX ADMIN — SODIUM CHLORIDE, PRESERVATIVE FREE 10 ML: 5 INJECTION INTRAVENOUS at 09:00

## 2020-08-26 RX ADMIN — FAMOTIDINE 20 MG: 20 TABLET, FILM COATED ORAL at 13:09

## 2020-08-26 RX ADMIN — TETRAKIS(2-METHOXYISOBUTYLISOCYANIDE)COPPER(I) TETRAFLUOROBORATE 15.7 MILLICURIE: 1 INJECTION, POWDER, LYOPHILIZED, FOR SOLUTION INTRAVENOUS at 09:45

## 2020-08-26 RX ADMIN — SODIUM CHLORIDE, PRESERVATIVE FREE 10 ML: 5 INJECTION INTRAVENOUS at 11:30

## 2020-08-26 RX ADMIN — FERROUS SULFATE TAB EC 325 MG (65 MG FE EQUIVALENT) 325 MG: 325 (65 FE) TABLET DELAYED RESPONSE at 13:10

## 2020-08-26 RX ADMIN — Medication 10 ML: at 11:19

## 2020-08-26 ASSESSMENT — ENCOUNTER SYMPTOMS
SHORTNESS OF BREATH: 1
CHOKING: 0
VOMITING: 0
COUGH: 0
NAUSEA: 0
DIARRHEA: 0
TROUBLE SWALLOWING: 0
BACK PAIN: 0
ABDOMINAL DISTENTION: 0
CHEST TIGHTNESS: 0
SORE THROAT: 0
EYE REDNESS: 0
PHOTOPHOBIA: 0
STRIDOR: 0
ABDOMINAL PAIN: 0
SINUS PRESSURE: 0
CONSTIPATION: 0

## 2020-08-26 ASSESSMENT — PAIN SCALES - GENERAL
PAINLEVEL_OUTOF10: 0
PAINLEVEL_OUTOF10: 7
PAINLEVEL_OUTOF10: 0

## 2020-08-26 NOTE — ED PROVIDER NOTES
[facility] ED  Emergency Department Encounter  Emergency Medicine Resident     Pt Name: Reagan Mackenzie  MRN: [id number]  [place] 1965  Date of evaluation: 8/25/20  PCP:  KEANU Dempsey [id number]       Chief Complaint   Patient presents with    Chest Pain     Pt c/o dizziness/CP for last 4 days states has a cardiac history uncertain what it is, moved here from [place] two years ago, non compliant with meds, pain increases with deep breathing       HISTORY OFPRESENT ILLNESS  (Location/Symptom, Timing/Onset, Context/Setting, Quality, Duration, Modifying Júnior Dun.)      Reagan Mackenzie is a 60year old female , PMH CHF, COPD, HTN, who presents with chest pain and headache that is been going on for the past 4 days. The patient describes the chest pain as midsternal in location. No associated nausea or vomiting. The chest pain started while the patient was sitting down several days ago. The pain is worse with inspiration. She describes the pain as sharp in nature. The patient reports a headache and also lightheadedness when changing positions. She denies any sensation that the room is spinning. No history of DVT/PE. PAST MEDICAL / SURGICAL / SOCIAL / FAMILY HISTORY      has a past medical history of Arthritis, Asthma, Borderline diabetes, Borderline personality disorder ([address] CHF (congestive heart failure) ([address] COPD (chronic obstructive pulmonary disease) ([address] Fibromyalgia, Headache, Hypertension, Manic depression ([address] Movement disorder, Neck fracture ([address] Pernicious anemia, Seizure ([address] and Thyroid disease. has a past surgical history that includes knee surgery (Bilateral); partial hysterectomy (cervix not removed); lymph node dissection; Irrigation and debridement (Right, 5/17/2019); EXPLORATION OF WOUND OF EXTREMITY (Right, 5/19/2019); and EXPLORATION OF WOUND OF EXTREMITY (N/A, 5/22/2019).      Social History     Socioeconomic History  Marital status:      Spouse name: Not on file    Number of children: Not on file    Years of education: Not on file    Highest education level: Not on file   Occupational History    Not on file   Social Needs    Financial resource strain: Not on file    Food insecurity     Worry: Not on file     Inability: Not on file    Transportation needs     Medical: Not on file     Non-medical: Not on file   Tobacco Use    Smoking status: Current Every Day Smoker     Packs/day: 0.50     Years: 12.00     Pack years: 6.00    Smokeless tobacco: Never Used   Substance and Sexual Activity    Alcohol use: No    Drug use: No    Sexual activity: Not Currently   Lifestyle    Physical activity     Days per week: Not on file     Minutes per session: Not on file    Stress: Not on file   Relationships    Social connections     Talks on phone: Not on file     Gets together: Not on file     Attends Advent service: Not on file     Active member of club or organization: Not on file     Attends meetings of clubs or organizations: Not on file     Relationship status: Not on file    Intimate partner violence     Fear of current or ex partner: Not on file     Emotionally abused: Not on file     Physically abused: Not on file     Forced sexual activity: Not on file   Other Topics Concern    Not on file   Social History Narrative    Not on file       History reviewed. No pertinent family history. Allergies:  Imitrex [sumatriptan]; Bee pollen; Bee venom; Bromide ion [bromine]; Flexeril [cyclobenzaprine]; Nsaids; Potassium bromide; Reglan [metoclopramide]; Sulfa antibiotics; Sulfadiazine; and Tramadol    Home Medications:  Prior to Admission medications    Medication Sig Start Date End Date Taking?  Authorizing Provider   risperiDONE (RISPERDAL) 2 MG tablet Take 1 tablet by mouth nightly 11/21/19  Yes Viviana Closs, APRN - CNP   traZODone (DESYREL) 100 MG tablet Take 1 tablet by mouth nightly 11/21/19  Yes Zack Rome hours as needed (pain) 3/17/20   KEANU Martinez CNP   nicotine (NICOTROL) 10 MG inhaler Inhale 1 puff into the lungs as needed for Smoking cessation 6-16 cartridges per day 3/10/20   Christian Greenberg MD   prochlorperazine (COMPAZINE) 5 MG tablet Take 1 tablet by mouth every 6 hours as needed for Nausea 3/10/20   Christian Greenberg MD   ketorolac (TORADOL) 10 MG tablet Take 1 tablet by mouth every 6 hours as needed for Pain 3/10/20   Christian Greenberg MD   Elastic Bandages & Supports ([address] [place] [address]) MISC Use daily. Right elbow. 3/10/20   Christian Greenberg MD   azithromycin (ZITHROMAX) 250 MG tablet Take 2 tabs (500 mg) on Day 1, and take 1 tab (250 mg) on days 2 through 5. 3/10/20   Christian Greenberg MD   Elastic Bandages & Supports (CARPAL TUNNEL WRIST STABILIZER) [facility] Apply nightly to each wrist 3/10/20   Christian Greenberg MD   ibuprofen (ADVIL;MOTRIN) 800 MG tablet Take 1 tablet by mouth every 8 hours as needed for Pain 3/5/20   Boubacar Acevedo MD   diclofenac sodium 1 % GEL Apply 4 g topically 4 times daily 3/5/20   Boubacar Acevedo MD   LORazepam (ATIVAN) 1 MG tablet Take 1 mg by mouth 2 times daily. Historical Provider, MD   amphetamine-dextroamphetamine (ADDERALL XR) 20 MG extended release capsule Take 20 mg by mouth every morning. Historical Provider, MD   BENZOYL PEROXIDE 5 % external wash Apply daily 2/13/20   KEANU Martinez CNP   Skin Protectants, Misc. (EUCERIN) cream Apply topically as needed.  2/13/20   KEANU Martinez CNP   lidocaine (XYLOCAINE) 2 % jelly Apply pea sized amount two times a day to shoulder 2/13/20   KEANU Martinez CNP   Nutritional Supplements (BOOST HIGH PROTEIN) LIQD Patient is to drink 1 bottle four times per day 1/21/20   KEANU Martinez CNP   Gauze Pads & Dressings 2\"X2\" PADS Pt to change dressing once daily 1/21/20   Lukas Hussein MD   Water For Irrigation, Sterile (STERILE WATER FOR IRRIGATION) Irrigate with as directed for 1 dose once daily 1/21/20 2/4/20  Candelaria Howell MD   acetaminophen (TYLENOL) 325 MG tablet Take 1 tablet by mouth every 6 hours as needed for Pain 1/17/20   Salbador Billingsley DO   LONG ACTING NASAL SPRAY 0.05 % nasal spray  1/7/20   Historical Provider, MD   citalopram (CELEXA) 20 MG tablet Take 20 mg by mouth daily    Historical Provider, MD   furosemide (LASIX) 20 MG tablet Take 1 tablet by mouth daily as needed (for swelling) 12/16/19   KEANU Prado CNP   lidocaine (XYLOCAINE) 5 % ointment Apply topically as needed to knee up to three times a day  Patient not taking: Reported on 3/10/2020 12/16/19   KEANU Prado CNP   docusate sodium (COLACE) 100 MG capsule Take 1 capsule by mouth daily as needed for Constipation 11/26/19   KEANU Prado CNP   ferrous sulfate 325 (65 Fe) MG EC tablet Take 1 tablet by mouth daily (with breakfast) 11/26/19   KEANU Prado CNP   mirtazapine (REMERON) 45 MG tablet Take 1 tablet by mouth nightly 11/21/19   KEANU Prado CNP   buprenorphine-naloxone (SUBOXONE) 8-2 MG FILM SL film  11/14/19   Historical Provider, MD   Wound Dressings (ADAPTIC NON-ADHERING DRESSING) PADS Use daily with wound dressing changes 11/19/19   Candelaria Howell MD   Wound Dressings (ADAPTIC NON-ADHERING DRESSING) PADS Apply 1 each topically 2 times daily Apply to R lateral thigh wound after shower daily 11/12/19   Carlito Samano,    Gauze Pads & Dressings (GAUZE DRESSING) 4\"X4\" PADS 1 each by Does not apply route daily as needed (wound) 11/12/19   Carlito Samano DO   Skin Protectants, Misc.  (EUCERIN) cream  10/2/19   Historical Provider, MD   EPINEPHrine (EPIPEN 2-MARTINA) 0.3 MG/0.3ML SOAJ injection Inject 0.3 mLs into the muscle once for 1 dose Use as directed for allergic reaction 9/11/19 9/11/19  Laverna Snow, APRN - CNP   ipratropium-albuterol (DUONEB) 0.5-2.5 (3) MG/3ML SOLN nebulizer solution Inhale 3 mLs into the lungs every 4 hours 6/16/19   Dominique Watson MD   potassium chloride (KLOR-CON M) 20 MEQ extended release tablet Take 0.5 tablets by mouth daily for 2 days 6/12/19 6/14/19  Magdalena Mak MD   fluticasone (FLONASE) 50 MCG/ACT nasal spray 2 sprays by Each Nostril route daily 6/5/19   Magdalena Mak MD       REVIEW OFSYSTEMS    (2-9 systems for level 4, 10 or more for level 5)      Review of Systems   Constitutional: Negative for chills and fever. Respiratory: Positive for cough. Negative for shortness of breath. Cardiovascular: Positive for chest pain. Gastrointestinal: Negative for abdominal pain, nausea and vomiting. Musculoskeletal: Negative for back pain and neck pain. Neurological: Positive for headaches. PHYSICAL EXAM   (up to 7 for level 4, 8 or more forlevel 5)      INITIAL VITALS:   ED Triage Vitals [08/25/20 1146]   BP Temp Temp Source Pulse Resp SpO2 Height Weight   (!) 136/104 99.1 °F (37.3 °C) Oral 74 16 97 % 5' 2\" (1.575 m) 165 lb (74.8 kg)       Physical Exam  Constitutional:       General: She is not in acute distress. Appearance: She is well-developed. She is not diaphoretic. HENT:      Head: Normocephalic and atraumatic. Eyes:      Conjunctiva/sclera: Conjunctivae normal.      Pupils: Pupils are equal, round, and reactive to light. Neck:      Musculoskeletal: Neck supple. Cardiovascular:      Rate and Rhythm: Normal rate and regular rhythm. Heart sounds: No murmur. No friction rub. No gallop. Pulmonary:      Effort: Pulmonary effort is normal. No respiratory distress. Breath sounds: Normal breath sounds. No wheezing or rales. Abdominal:      General: There is no distension. Palpations: Abdomen is soft. Tenderness: There is no abdominal tenderness. There is no guarding. Musculoskeletal:      Comments: No lower extremity edema    Skin:     General: Skin is warm. Neurological:      General: No focal deficit present.       Mental Status: She is alert and oriented to person, place, and time. Comments: CN III-VII intact, no gait abnormalities, no nystagmus         DIFFERENTIAL  DIAGNOSIS     PLAN (LABS / IMAGING / EKG):  Orders Placed This Encounter   Procedures    XR CHEST PORTABLE    CBC Auto Differential    D-Dimer, Quantitative    Brain Natriuretic Peptide    Basic Metabolic Panel    Troponin    Troponin    DIET CARDIAC;    Diet NPO, After Midnight    Continuous Pulse Oximetry    Telemetry monitoring    Vital signs per unit routine    Notify physician    Notify physician    Up as tolerated    Telemetry Monitoring    Full Code    Inpatient consult to Cardiology    EKG 12 Lead    EKG 12 Lead    Insert peripheral IV    PATIENT STATUS (FROM ED OR OR/PROCEDURAL) Observation       MEDICATIONS ORDERED:  Orders Placed This Encounter   Medications    diphenhydrAMINE (BENADRYL) injection 25 mg    DISCONTD: dexamethasone (DECADRON) injection 10 mg    prochlorperazine (COMPAZINE) injection 10 mg    dexamethasone (DECADRON) injection 10 mg    citalopram (CELEXA) tablet 20 mg    famotidine (PEPCID) tablet 20 mg    ferrous sulfate (FE TABS 325) EC tablet 325 mg    furosemide (LASIX) tablet 20 mg    gabapentin (NEURONTIN) tablet 600 mg    levothyroxine (SYNTHROID) tablet 100 mcg    mirtazapine (REMERON) tablet 45 mg    sodium chloride flush 0.9 % injection 10 mL    sodium chloride flush 0.9 % injection 10 mL    acetaminophen (TYLENOL) tablet 650 mg    enoxaparin (LOVENOX) injection 40 mg    ondansetron (ZOFRAN) injection 4 mg    risperiDONE (RISPERDAL) tablet 2 mg    traZODone (DESYREL) tablet 100 mg       Initial MDM/Plan: 60 y.o. female who presents with chest pain and headache. Patient stable vital signs on arrival.  On physical exam, she is in no respiratory distress, able to speak in unlabored sentences. Abdomen soft, nondistended nontender. No lower extremity edema.   The patient is tearful and complaining of a -----------------------------------------------------------------------------------------  TOTAL RISK SCORE      =  4    Chest Pain in the Emergency Room: A Multicenter Validation of the [address] Juan José BE, Justo AJ, Sulaiman GOKUL, Chris TP, [employer], Chris EG, Olvin SH, [employer] [place] , [place] [address] Gwendolyn Pal. Crit Pathw Cardiol. 2010 Sep; 9(3): 164-169. \"A prospective validation of the HEART score for chest pain patients at the emergency department. \" Int J Cardiol. 2013 Oct 3;168(3):2153-8. doi: 10.1016/j.ijcard. 2013.01.255. Epub 2013 Mar 7. EMERGENCY DEPARTMENT COURSE:  The patient's d-dimer was elevated to 0.64. However given the YEARS criteria, PE is excluded as her d dimer is <1. Will admit the patient to the observation unit for cardiology evaluation. HEART score of 4. PROCEDURES:  None    CONSULTS:  IP CONSULT TO CARDIOLOGY    CRITICAL CARE:  Please see attending note    FINAL IMPRESSION      1.  Chest pain, unspecified type          DISPOSITION / PLAN     DISPOSITION Admitted 08/25/2020 03:25:42 PM      PATIENT REFERRED TO:  KEANU Ceja - [facility]  [address] [facility]  [phone]            DISCHARGE MEDICATIONS:  Current Discharge Medication List          Phyllis Mary MD  Emergency Medicine Resident    (Please note that portions of this note were completed with a voice recognition program.Efforts were made to edit the dictations but occasionally words are mis-transcribed.)       Phyllis Mary MD  Resident  08/25/20 2057

## 2020-08-26 NOTE — DISCHARGE SUMMARY
CDU Discharge Summary        Patient:  Lyna Carrel  YOB: 1965    MRN: [id number]   Acct: [de-identified]    Primary Care Physician: KEANU Ceja CNP    Admit date:  8/25/2020 11:42 AM  Discharge date: 8/26/2020    Discharge Diagnoses:     Acute chest pain and shortness of breath due to chest wall pain  Improved with Toradol and aspirin    Follow-up:  Call today/tomorrow for a follow up appointment with KEANU Ceja CNP , or return to the Emergency Room with worsening symptoms    Stressed to patient the importance of following up with primary care doctor for further workup/management of symptoms. Pt verbalizes understanding and agrees with plan. Discharge Medications:  Changes to medications-has been taking 25 mg of hydrochlorothiazide once daily        Art Castellon \"Tahira\"   Home Medication Instructions [facility]:[id number]    Printed on:08/26/20 9616   Medication Information                      acetaminophen (TYLENOL) 325 MG tablet  Take 1 tablet by mouth every 6 hours as needed for Pain             albuterol (PROVENTIL) (2.5 MG/3ML) 0.083% nebulizer solution  Take 3 mLs by nebulization every 6 hours as needed for Wheezing             albuterol sulfate  (90 Base) MCG/ACT inhaler  Inhale 1 puff into the lungs every 6 hours as needed for Wheezing Gap prescription until follow up with primary. Do not refill. Follow up with primary             amphetamine-dextroamphetamine (ADDERALL XR) 20 MG extended release capsule  Take 20 mg by mouth every morning. aspirin EC 81 MG EC tablet  Take 1 tablet by mouth daily             azithromycin (ZITHROMAX) 250 MG tablet  Take 2 tabs (500 mg) on Day 1, and take 1 tab (250 mg) on days 2 through 5.              BENZOYL PEROXIDE 5 % external wash  Apply daily             budesonide-formoterol (SYMBICORT) 160-4.5 MCG/ACT AERO  Inhale 2 puffs into the lungs 2 times daily             buprenorphine-naloxone (SUBOXONE) 8-2 MG FILM SL film               butalbital-acetaminophen-caffeine (FIORICET, ESGIC) -40 MG per tablet  Take 1 tablet by mouth every 4 hours as needed for Headaches             ciclopirox (PENLAC) 8 % solution  Apply topically nightly. citalopram (CELEXA) 20 MG tablet  Take 20 mg by mouth daily             Dextromethorphan-guaiFENesin (MUCINEX DM MAXIMUM STRENGTH)  MG TB12  Take 1 tablet by mouth 2 times daily as needed (cough, congestion)             diclofenac sodium 1 % GEL  Apply 4 g topically 4 times daily             dicyclomine (BENTYL) 10 MG capsule  Take 1 capsule by mouth 4 times daily             docusate sodium (COLACE) 100 MG capsule  Take 1 capsule by mouth daily as needed for Constipation             Elastic Bandages & Supports (CARPAL TUNNEL WRIST STABILIZER) MISC  Apply nightly to each wrist             Elastic Bandages & Supports (FUTURO ELBOW BRACE) MISC  Use daily. Right elbow. EPINEPHrine (EPIPEN 2-MARTINA) 0.3 MG/0.3ML SOAJ injection  Inject 0.3 mLs into the muscle once for 1 dose Use as directed for allergic reaction             famotidine (PEPCID) 20 MG tablet  Take 1 tablet by mouth 2 times daily             ferrous sulfate 325 (65 Fe) MG EC tablet  Take 1 tablet by mouth daily (with breakfast)             fluticasone (FLONASE) 50 MCG/ACT nasal spray  2 sprays by Each Nostril route daily             gabapentin (NEURONTIN) 600 MG tablet  Take 1 tablet by mouth 2 times daily for 30 days.              hydroCHLOROthiazide (HYDRODIURIL) 25 MG tablet  Take 1 tablet by mouth daily             ibuprofen (ADVIL;MOTRIN) 800 MG tablet  Take 1 tablet by mouth every 8 hours as needed for Pain             ipratropium-albuterol (DUONEB) 0.5-2.5 (3) MG/3ML SOLN nebulizer solution  Inhale 3 mLs into the lungs every 4 hours             ketorolac (TORADOL) 10 MG tablet  Take 1 tablet by mouth every 6 hours as needed for Pain             levothyroxine (SYNTHROID) 100 MCG tablet  Take 1 tablet by mouth Daily             lidocaine (XYLOCAINE) 2 % jelly  Apply pea sized amount two times a day to shoulder             LORazepam (ATIVAN) 1 MG tablet  Take 1 mg by mouth 2 times daily. mirtazapine (REMERON) 45 MG tablet  Take 1 tablet by mouth nightly             nicotine (NICOTROL) 10 MG inhaler  Inhale 1 puff into the lungs as needed for Smoking cessation 6-16 cartridges per day             Nutritional Supplements (BOOST HIGH PROTEIN) LIQD  Patient is to drink 1 bottle four times per day             ondansetron (ZOFRAN ODT) 4 MG disintegrating tablet  Take 1 tablet by mouth every 8 hours as needed for Nausea             phenytoin (PHENYTEK) 200 MG ER capsule  Take 1 capsule by mouth 2 times daily             potassium chloride (KLOR-CON M) 20 MEQ extended release tablet  Take 0.5 tablets by mouth daily for 2 days             prochlorperazine (COMPAZINE) 5 MG tablet  Take 1 tablet by mouth every 6 hours as needed for Nausea             risperiDONE (RISPERDAL) 2 MG tablet  Take 1 tablet by mouth nightly             Skin Protectants, Misc. (EUCERIN) cream  Apply topically as needed.              tiZANidine (ZANAFLEX) 4 MG tablet  Take 1 tablet by mouth every 8 hours as needed (pain)             traZODone (DESYREL) 100 MG tablet  Take 1 tablet by mouth nightly             Wound Dressings (ADAPTIC NON-ADHERING DRESSING) PADS  Apply 1 each topically 2 times daily Apply to R lateral thigh wound after shower daily                 Diet:  DIET GENERAL; , Advance as tolerated     Activity:  As tolerated    Consultants: IP CONSULT TO SOCIAL WORK    Procedures: Cardiac stress test    Diagnostic Test:   Results for orders placed or performed during the hospital encounter of 08/25/20   CBC Auto Differential   Result Value Ref Range    WBC 7.5 3.5 - 11.3 k/uL    RBC 5.11 3.95 - 5.11 m/uL    Hemoglobin 15.9 (H) 11.9 - 15.1 g/dL    Hematocrit 46.9 36.3 - 47.1 %    MCV 91.8 82.6 - 102.9 fL    MCH 31.1 25.2 - 33.5 pg    MCHC 33.9 28.4 - 34.8 g/dL    RDW 12.7 11.8 - 14.4 %    Platelets 704 162 - 318 k/uL    MPV 9.6 8.1 - 13.5 fL    NRBC Automated 0.0 0.0 per 100 WBC    Differential Type NOT REPORTED     Seg Neutrophils 53 36 - 65 %    Lymphocytes 36 24 - 43 %    Monocytes 9 3 - 12 %    Eosinophils % 1 1 - 4 %    Basophils 1 0 - 2 %    Immature Granulocytes 0 0 %    Segs Absolute 3.96 1.50 - 8.10 k/uL    Absolute Lymph # 2.72 1.10 - 3.70 k/uL    Absolute Mono # 0.67 0.10 - 1.20 k/uL    Absolute Eos # 0.07 0.00 - 0.44 k/uL    Basophils Absolute 0.05 0.00 - 0.20 k/uL    Absolute Immature Granulocyte <0.03 0.00 - 0.30 k/uL    WBC Morphology NOT REPORTED     RBC Morphology NOT REPORTED     Platelet Estimate NOT REPORTED    D-Dimer, Quantitative   Result Value Ref Range    D-Dimer, Quant 0.64 mg/L FEU   Brain Natriuretic Peptide   Result Value Ref Range    Pro-BNP 32 <300 pg/mL    BNP Interpretation Pro-BNP Reference Range:    Basic Metabolic Panel   Result Value Ref Range    Glucose 86 70 - 99 mg/dL    BUN 7 6 - 20 mg/dL    CREATININE 0.54 0.50 - 0.90 mg/dL    Bun/Cre Ratio NOT REPORTED 9 - 20    Calcium 9.1 8.6 - 10.4 mg/dL    Sodium 137 135 - 144 mmol/L    Potassium 4.7 3.7 - 5.3 mmol/L    Chloride 102 98 - 107 mmol/L    CO2 23 20 - 31 mmol/L    Anion Gap 12 9 - 17 mmol/L    GFR Non-African American >60 >60 mL/min    GFR African American >60 >60 mL/min    GFR Comment          GFR Staging NOT REPORTED    Troponin   Result Value Ref Range    Troponin, High Sensitivity <6 0 - 14 ng/L    Troponin T NOT REPORTED <0.03 ng/mL    Troponin Interp NOT REPORTED    Troponin   Result Value Ref Range    Troponin, High Sensitivity <6 0 - 14 ng/L    Troponin T NOT REPORTED <0.03 ng/mL    Troponin Interp NOT REPORTED    Troponin   Result Value Ref Range    Troponin, High Sensitivity <6 0 - 14 ng/L    Troponin T NOT REPORTED <0.03 ng/mL    Troponin Interp NOT REPORTED    EKG 12 Lead   Result Value Ref Range    Ventricular Rate 74 BPM Atrial Rate 74 BPM    P-R Interval 178 ms    QRS Duration 78 ms    Q-T Interval 398 ms    QTc Calculation (Bazett) 441 ms    P Axis 54 degrees    R Axis 14 degrees    T Axis 38 degrees   EKG 12 Lead   Result Value Ref Range    Ventricular Rate 72 BPM    Atrial Rate 72 BPM    P-R Interval 184 ms    QRS Duration 80 ms    Q-T Interval 418 ms    QTc Calculation (Bazett) 457 ms    P Axis 65 degrees    R Axis 39 degrees    T Axis 55 degrees     Xr Chest Portable    Result Date: 8/25/2020  EXAMINATION: ONE XRAY VIEW OF THE CHEST 8/25/2020 12:45 pm COMPARISON: July 13, 2020, chest exam HISTORY: ORDERING SYSTEM PROVIDED HISTORY: chest pain TECHNOLOGIST PROVIDED HISTORY: chest pain FINDINGS: The cardiopericardial silhouette is normal in size and configuration. There is no significant pleural, parenchymal or mediastinal finding     No acute cardiopulmonary findings           Physical Exam:    General appearance - NAD, AOx 3   Lungs -CTAB, no R/R/R  Heart - RRR, no M/R/G  Abdomen - Soft, NT/ND  Neurological:  MAEx4, No focal motor deficit, sensory loss  Extremities - Cap refil <2 sec in all ext., no edema  Skin -warm, dry      Hospital Course:  Clinical course has improved, labs and imaging reviewed. Kenroy King originally presented to the hospital on 8/25/2020 11:42 AM. with acute onset substernal chest pain with associated shortness of breath. Patient had a heart score of 4. At that time it was determined that She required further observation and treatment. Patient went for cardiac stress test this morning with normal results. She is feeling better and had improvement of her symptoms with Toradol. . She was admitted and labs and imaging were followed daily. Imaging results as above. She is medically stable to be discharged.        Disposition: Home    Patient stated that they will not drive themselves home from the hospital if they have gotten pain killers/ narcotics earlier that day and that they will arrange for transportation on their own or work with the  for a ride. Patient counseled NOT to drive while under the influence of narcotics/ pain killers. Condition: Good    Patient stable and ready for discharge home. I have discussed plan of care with patient and they are in understanding. They were instructed to read discharge paperwork. All of their questions and concerns were addressed. Time Spent: 0 day      --  Marya Dowd,   Emergency Medicine Resident Physician    This dictation was generated by voice recognition computer software. Although all attempts are made to edit the dictation for accuracy, there may be errors in the transcription that are not intended.

## 2020-08-26 NOTE — PROGRESS NOTES
[facility]  CDU / OBSERVATION eNCOUnter  Attending NOte       I performed a history and physical examination of the patient and discussed management with the resident. I reviewed the residents note and agree with the documented findings and plan of care. Any areas of disagreement are noted on the chart. I was personally present for the key portions of any procedures. I have documented in the chart those procedures where I was not present during the key portions. I have reviewed the nurses notes. I agree with the chief complaint, past medical history, past surgical history, allergies, medications, social and family history as documented unless otherwise noted below. The Family history, social history, and ROS are effectively unchanged since admission unless noted elsewhere in the chart. Pain somewhat atypical but risk factors and age bring patient to heart score of 4. Patient stress test due to heart score. Patient described pain is much more pleuritic than anginal.  Patient in fact improved with ketorolac. Stress testing done. Will refill patient's medications. Stress testing negative. Patient with pain relief with nonsteroidals. Patient for ongoing treatment with nonsteroidals but will refill regular medications.     Bo Ramirez MD  Attending Emergency  Physician

## 2020-08-26 NOTE — PROCEDURES
[address] [facility]                              CARDIAC STRESS TEST    PATIENT NAME: Talha Perez                    :        1965  MED REC NO:   [id number]                             ROOM:       3288  ACCOUNT NO:   [de-identified]                           ADMIT DATE: 2020  PROVIDER:     Citlalli Bonilla      LEXISCAN STRESS STUDY    DATE OF STUDY:  2020  ORDERING PROVIDER:  Jaqueline Jernigan  PRIMARY CARE PROVIDER:  DAGMAR Cruz  INDICATION: Chest pain  CONSENT:  The test was explained and consent was signed. PROTOCOL: Lexiscan, 0.4 mg infused. PREINFUSION EKG: Normal.  PREINFUSION HR: 75 bpm, infusion HR, 100 bpm.  HR response to Lexiscan  was Normal.  PREINFUSION BP: 174/102 mmHg, infusion BP, 185/136 mmHg. CHEST PAIN:  No chest discomfort with Lexiscan nor in recovery. LEXISCAN EKG:  No changes were noted. ISCHEMIC EKG CHANGES: None. IMPRESSION:  Electrocardiographically negative Lexiscan stress study.   **Cardiolite report issued from the department of Nuclear Medicine**    Demetrice Ahumada    D: 2020 15:19:46       T: 2020 15:21:13     MT/IKE  Job#: [id number]     Doc#: Unknown

## 2020-08-26 NOTE — H&P
[address] [employer]  CDU / OBSERVATION ENCOUNTER  RESIDENT NOTE     Pt Name: Lisa Hines  MRN: [id number]  [place] 1965  Date of evaluation: 8/26/20  Patient's PCP is :  Janneth Uribe       Chief Complaint   Patient presents with    Chest Pain     Pt c/o dizziness/CP for last 4 days states has a cardiac history uncertain what it is, moved here from [place] two years ago, non compliant with meds, pain increases with deep breathing         HISTORY OF PRESENT ILLNESS    Aleta Parsons is a 60 y.o. female who presents with 4 days of midsternal sharp chest pain. Patient reports that chest pain started while she was sitting down 4 days previously and has been increasing in intensity. Patient states that the pain is worse when she takes a deep breath in. She denies any associated nausea, vomiting, diaphoresis or any radiation. Patient also is complaining of a headache for the same duration of time. Patient reports she does have a cardiac history but is unsure what it includes. She reports she moved from [place] where she used to see a cardiologist and has not been compliant with her medications. It appears the patient has history of CHF, COPD and hypertension. Her work-up in the emergency department was negative except for mildly elevated d-dimer. Given her years criteria PE was excluded as her d-dimer was less than 1. REVIEW OF SYSTEMS       Review of Systems   Constitutional: Negative for activity change, appetite change, chills, diaphoresis, fatigue and fever. HENT: Negative for sinus pressure, sneezing, sore throat, tinnitus and trouble swallowing. Eyes: Negative for photophobia, redness and visual disturbance. Respiratory: Positive for shortness of breath. Negative for cough, choking, chest tightness and stridor. Cardiovascular: Positive for chest pain. Negative for palpitations and leg swelling.    Gastrointestinal: Negative for chloride flush 0.9 % injection 10 mL, PRN  acetaminophen (TYLENOL) tablet 650 mg, Q4H PRN  enoxaparin (LOVENOX) injection 40 mg, Daily  ondansetron (ZOFRAN) injection 4 mg, Q8H PRN  risperiDONE (RISPERDAL) tablet 2 mg, Nightly  traZODone (DESYREL) tablet 100 mg, Nightly        All medication charted and reviewed. ALLERGIES     is allergic to imitrex [sumatriptan]; bee pollen; bee venom; bromide ion [bromine]; flexeril [cyclobenzaprine]; nsaids; potassium bromide; reglan [metoclopramide]; sulfa antibiotics; sulfadiazine; and tramadol. FAMILY HISTORY     She indicated that her mother is . She indicated that her father is . family history is not on file. The patient denies any pertinent family history. I have reviewed and agree with the family history entered. I have reviewed the Family History and it is not significant to the case    SOCIAL HISTORY      reports that she has been smoking. She has a 6.00 pack-year smoking history. She has never used smokeless tobacco. She reports that she does not drink alcohol or use drugs. I have reviewed and agree with all Social.  There are no concerns for substance abuse/use. PHYSICAL EXAM     INITIAL VITALS:  height is 5' 2\" (1.575 m) and weight is 165 lb (74.8 kg). Her oral temperature is 97.5 °F (36.4 °C). Her blood pressure is 128/84 and her pulse is 86. Her respiration is 18 and oxygen saturation is 97%. Physical Exam  Constitutional:       General: She is not in acute distress. Appearance: Normal appearance. She is normal weight. She is not toxic-appearing. HENT:      Head: Normocephalic and atraumatic. Nose: Nose normal.      Mouth/Throat:      Mouth: Mucous membranes are moist.      Pharynx: Oropharynx is clear. No posterior oropharyngeal erythema. Eyes:      General: No scleral icterus. Extraocular Movements: Extraocular movements intact.       Conjunctiva/sclera: Conjunctivae normal.      Pupils: Pupils are equal, round, and reactive to light. Neck:      Musculoskeletal: Normal range of motion. No muscular tenderness. Cardiovascular:      Rate and Rhythm: Normal rate and regular rhythm. Pulses: Normal pulses. Heart sounds: Normal heart sounds. No murmur. Pulmonary:      Effort: Pulmonary effort is normal. No respiratory distress. Breath sounds: Normal breath sounds. Abdominal:      General: Abdomen is flat. Bowel sounds are normal. There is no distension. Palpations: Abdomen is soft. Musculoskeletal: Normal range of motion. Skin:     General: Skin is warm and dry. Capillary Refill: Capillary refill takes less than 2 seconds. Findings: No rash. Neurological:      General: No focal deficit present. Mental Status: She is alert and oriented to person, place, and time. DIFFERENTIAL DIAGNOSIS/MDM:     ACS versus unstable angina versus stable angina versus PE versus costochondritis    DIAGNOSTIC RESULTS     EKG: All EKG's are interpreted by the Observation Physician who either signs or Co-signs this chart in the absence of a cardiologist.    EKG Interpretation      Interpreted by observation physician    Rhythm: normal sinus   Rate: normal  Axis: normal  Ectopy: none  Conduction: normal  ST Segments: no acute change  T Waves: no acute change  Q Waves: none    Clinical Impression: no acute changes    Jeniffer Prather DO        RADIOLOGY:   I directly visualized the following  images and reviewed the radiologist interpretations:    Xr Chest Portable    Result Date: 8/25/2020  EXAMINATION: ONE XRAY VIEW OF THE CHEST 8/25/2020 12:45 pm COMPARISON: July 13, 2020, chest exam HISTORY: ORDERING SYSTEM PROVIDED HISTORY: chest pain TECHNOLOGIST PROVIDED HISTORY: chest pain FINDINGS: The cardiopericardial silhouette is normal in size and configuration.  There is no significant pleural, parenchymal or mediastinal finding     No acute cardiopulmonary findings       LABS:  I have reviewed and interpreted all available lab results. Labs Reviewed   CBC WITH AUTO DIFFERENTIAL - Abnormal; Notable for the following components:       Result Value    Hemoglobin 15.9 (*)     All other components within normal limits   D-DIMER, QUANTITATIVE   BRAIN NATRIURETIC PEPTIDE   BASIC METABOLIC PANEL   TROPONIN   TROPONIN   TROPONIN       SCREENING TOOLS:    HEART Risk Score for Chest Pain Patients   History and Physical Exam Suspicion Level  (Nausea, Vomiting, Diaphoresis, Radiation, Exertion)   Slightly Suspicious (0 pts)   Moderately Suspicious (1 pt)   Highly Suspicious (2 pts)   EKG Interpretation   Normal (0 pts)   Non-Specific Repolarization Disturbance (1 pt)   Significant ST-Depression (2 pts)   Age of Patient (in years)   = 39 (0 pts)   46-64 (1 pt)   = 65 (2 pts)   Risk Factors   No Risk Factors (0 pts)   1-2 Risk Factors (1 pt)   = 3 Risk Factors (2 pts)   Risk Factors Include:   Hypercholesterolemia   Hypertension   Diabetes Mellitus   Cigarette smoking   Positive family history   Obesity   CAD   (SLE, CKDz, HIV, Cocaine abuse)   Troponin Levels   = Normal Limit (0 pts)   1-3 Times Normal Limit (1 pt)   > 3 Times Normal Limit (2 pts)  TOTAL: 4    Percent Risk for Major Adverse Cardiac Event (MACE)  0-3 pts indicates low risk for MACE   2.5% (DISCHARGE)   4-7 pts indicates moderate risk for MACE  20.3% (OBS)  8-10 pts indicates high risk for MACE  72.7% (EARLY INVASIVE TX)    CDU HENNA / Troy Tucker is a 60 y.o. female who presents with acute onset of 4 days substernal chest pain with associated headache. Patient has no associated nausea, vomiting, diaphoresis or radiating symptoms. Patient had a heart score of 4 with a negative work-up in the emergency department.     · Appreciate cardiology recommendations  · Continue home medications and pain control  · Monitor vitals, labs, and imaging  · DISPO: pending consults and clinical improvement    CONSULTS:    IP CONSULT TO CARDIOLOGY    PROCEDURES:  Not indicated      PATIENT REFERRED TO:    Kacey Castillo, APRN - CNP  [facility]             --  Dutch Alonso, DO   Emergency Medicine Resident     This dictation was generated by voice recognition computer software. Although all attempts are made to edit the dictation for accuracy, there may be errors in the transcription that are not intended.

## 2020-08-26 NOTE — CARE COORDINATION
Consult received for housing options    Met with pt who stated she lives in a duplex with a roommate but they have to be out by 8/31 as it is getting renovated  Pt stated her roommate has a place to go but she is still looking  Pt has a couple options she is working on but nothing in place yet  Provided pt with list of subsidized apts and pt stated she will call them today  Discussed shelter as option if needed and shelter list provided  Pt stated she has stayed at [facility] when she first came to [place] from [place] 2yrs ago  Stated she got into an argument with one of the workers and is not sure she is allowed back - advise pt will contact them to see if she can return  SW called [employer] and pt is not allowed to return  Pt was tearful on and off during discussion and shared having grief issues over the loss of her dad and son  No counseling and pt was agreeable - advise pt will make her an appt  Pt has not been linked with any [facility] - called [employer] - appt scheduled for Sept 9 at 8:00 (phone assessment), appt added to AVS  Discussed getting linked with [facility] and pt was agreeable - -application completed and faxed to [facility]  Pt also stated she is need of furniture - provided pt with list of places that donate some household items

## 2020-09-01 RX ORDER — ONDANSETRON 4 MG/1
TABLET, ORALLY DISINTEGRATING ORAL
Qty: 20 TABLET | Refills: 0 | OUTPATIENT
Start: 2020-09-01

## 2020-09-02 ENCOUNTER — HOSPITAL ENCOUNTER (EMERGENCY)
Age: 55
Discharge: HOME | End: 2020-09-02
Attending: EMERGENCY MEDICINE
Payer: MEDICARE

## 2020-09-02 VITALS
RESPIRATION RATE: 20 BRPM | HEIGHT: 62 IN | TEMPERATURE: 99 F | OXYGEN SATURATION: 93 % | BODY MASS INDEX: 29.44 KG/M2 | HEART RATE: 62 BPM | WEIGHT: 160 LBS | DIASTOLIC BLOOD PRESSURE: 69 MMHG | SYSTOLIC BLOOD PRESSURE: 121 MMHG

## 2020-09-02 PROCEDURE — 6360000002 HC RX W HCPCS: Performed by: STUDENT IN AN ORGANIZED HEALTH CARE EDUCATION/TRAINING PROGRAM

## 2020-09-02 PROCEDURE — 96372 THER/PROPH/DIAG INJ SC/IM: CPT

## 2020-09-02 PROCEDURE — 96365 THER/PROPH/DIAG IV INF INIT: CPT

## 2020-09-02 PROCEDURE — 2580000003 HC RX 258: Performed by: STUDENT IN AN ORGANIZED HEALTH CARE EDUCATION/TRAINING PROGRAM

## 2020-09-02 PROCEDURE — 99282 EMERGENCY DEPT VISIT SF MDM: CPT

## 2020-09-02 PROCEDURE — 96375 TX/PRO/DX INJ NEW DRUG ADDON: CPT

## 2020-09-02 RX ORDER — METHYLPREDNISOLONE SODIUM SUCCINATE 125 MG/2ML
40 INJECTION, POWDER, LYOPHILIZED, FOR SOLUTION INTRAMUSCULAR; INTRAVENOUS ONCE
Status: COMPLETED | OUTPATIENT
Start: 2020-09-02 | End: 2020-09-02

## 2020-09-02 RX ORDER — 0.9 % SODIUM CHLORIDE 0.9 %
1000 INTRAVENOUS SOLUTION INTRAVENOUS ONCE
Status: COMPLETED | OUTPATIENT
Start: 2020-09-02 | End: 2020-09-02

## 2020-09-02 RX ORDER — KETOROLAC TROMETHAMINE 30 MG/ML
30 INJECTION, SOLUTION INTRAMUSCULAR; INTRAVENOUS ONCE
Status: COMPLETED | OUTPATIENT
Start: 2020-09-02 | End: 2020-09-02

## 2020-09-02 RX ORDER — PROCHLORPERAZINE EDISYLATE 5 MG/ML
10 INJECTION INTRAMUSCULAR; INTRAVENOUS ONCE
Status: COMPLETED | OUTPATIENT
Start: 2020-09-02 | End: 2020-09-02

## 2020-09-02 RX ORDER — DIPHENHYDRAMINE HYDROCHLORIDE 50 MG/ML
25 INJECTION INTRAMUSCULAR; INTRAVENOUS ONCE
Status: COMPLETED | OUTPATIENT
Start: 2020-09-02 | End: 2020-09-02

## 2020-09-02 RX ORDER — MAGNESIUM SULFATE 1 G/100ML
1 INJECTION INTRAVENOUS
Status: DISPENSED | OUTPATIENT
Start: 2020-09-02 | End: 2020-09-02

## 2020-09-02 RX ORDER — PROMETHAZINE HYDROCHLORIDE 25 MG/ML
12.5 INJECTION, SOLUTION INTRAMUSCULAR; INTRAVENOUS ONCE
Status: COMPLETED | OUTPATIENT
Start: 2020-09-02 | End: 2020-09-02

## 2020-09-02 RX ADMIN — METHYLPREDNISOLONE SODIUM SUCCINATE 40 MG: 125 INJECTION, POWDER, FOR SOLUTION INTRAMUSCULAR; INTRAVENOUS at 15:11

## 2020-09-02 RX ADMIN — MAGNESIUM SULFATE HEPTAHYDRATE 1 G: 1 INJECTION, SOLUTION INTRAVENOUS at 15:09

## 2020-09-02 RX ADMIN — SODIUM CHLORIDE 1000 ML: 9 INJECTION, SOLUTION INTRAVENOUS at 15:14

## 2020-09-02 RX ADMIN — DIPHENHYDRAMINE HYDROCHLORIDE 25 MG: 50 INJECTION, SOLUTION INTRAMUSCULAR; INTRAVENOUS at 15:10

## 2020-09-02 RX ADMIN — PROCHLORPERAZINE EDISYLATE 10 MG: 5 INJECTION INTRAMUSCULAR; INTRAVENOUS at 15:13

## 2020-09-02 RX ADMIN — PROMETHAZINE HYDROCHLORIDE 12.5 MG: 25 INJECTION INTRAMUSCULAR; INTRAVENOUS at 15:12

## 2020-09-02 RX ADMIN — KETOROLAC TROMETHAMINE 30 MG: 30 INJECTION, SOLUTION INTRAMUSCULAR at 16:02

## 2020-09-02 ASSESSMENT — ENCOUNTER SYMPTOMS
PHOTOPHOBIA: 1
EYE PAIN: 0
ABDOMINAL PAIN: 0
EYE ITCHING: 0
FACIAL SWELLING: 0
SHORTNESS OF BREATH: 0
EYE REDNESS: 0
VOICE CHANGE: 0

## 2020-09-02 ASSESSMENT — PAIN DESCRIPTION - PAIN TYPE: TYPE: ACUTE PAIN

## 2020-09-02 ASSESSMENT — PAIN DESCRIPTION - LOCATION: LOCATION: HEAD

## 2020-09-02 ASSESSMENT — PAIN SCALES - GENERAL
PAINLEVEL_OUTOF10: 5
PAINLEVEL_OUTOF10: 8

## 2020-09-02 NOTE — ED NOTES
Pt reports feeling improved s/p interventions in ED. Pt ambulatory to restroom with steady gait.    Pt educated on need to notify RN when back from restroom      Darreld [facility]  09/02/20 0101

## 2020-09-02 NOTE — ED NOTES
Pt medicated per order. Pt provided warm blanket per request.   Pt remains on telemetry monitoring with alarms set, denies any additional needs at this time.    Will continue to monitor      Christiano Lee RN  09/02/20 0129

## 2020-09-02 NOTE — ED PROVIDER NOTES
FACULTY SIGN-OUT  ADDENDUM     Care of this patient was assumed from previous attending physician. The patient's initial evaluation and plan have been discussed with the prior provider who initially evaluated the patient. Attestation  I was available and discussed any additional care issues that arose and coordinated the management plans with the resident(s) caring for the patient during my duty period. Any areas of disagreement with resident's documentation of care or procedures are noted on the chart. I was personally present for the key portions of any/all procedures, during my duty period. I have documented in the chart those procedures where I was not present during the key portions. ED COURSE      The patient was given the following medications:  Orders Placed This Encounter   Medications    prochlorperazine (COMPAZINE) injection 10 mg    diphenhydrAMINE (BENADRYL) injection 25 mg    magnesium sulfate 1 g in dextrose 5% 100 mL IVPB    0.9 % sodium chloride bolus    promethazine (PHENERGAN) injection 12.5 mg    methylPREDNISolone sodium (SOLU-MEDROL) injection 40 mg       RECENT VITALS:   Temp: 99 °F (37.2 °C), Pulse: 57, Resp: 17, BP: 127/85    MEDICAL DECISION MAKING        Aleta Drew is a 60 y.o. female who presents to the Emergency Department with complaints of migraine headache. The patient received a migraine cocktail. Will reassess, anticipate disposition of discharge to home. Calvin Quiroz MD, F.A.C.E.P.   Attending Emergency Physician    (Please note that portions of this note were completed with a voice recognition program.  Efforts were made to edit the dictations but occasionally words are mis-transcribed.)         Calvin Quiroz MD  09/02/20 1256

## 2020-09-02 NOTE — ED PROVIDER NOTES
[facility] ED  Emergency Department Encounter  EmergencyMedicine Resident     Pt Name:Aleta Wallace  MRN: [id number]  [place] 1965  Date of evaluation: 9/2/20  [id number]       Chief Complaint   Patient presents with    Migraine     x 5 days, gradually worsening. HISTORY OF PRESENT ILLNESS  (Location/Symptom, Timing/Onset, Context/Setting, Quality, Duration, Modifying Factors, Severity.)      Yong Martin is a 60 y.o. female who presents with green typical in nature to prior episodes of migraine headache associated photophobia and nausea and loose stools patient is afebrile headache is not thunderclap in nature or sudden onset no trauma no loss of consciousness no head injury    PAST MEDICAL / SURGICAL / SOCIAL / FAMILY HISTORY      has a past medical history of Arthritis, Asthma, Borderline diabetes, Borderline personality disorder ([address] CHF (congestive heart failure) ([address] COPD (chronic obstructive pulmonary disease) ([address] Fibromyalgia, Headache, Hypertension, Manic depression ([address] Movement disorder, Neck fracture ([address] Pernicious anemia, Seizure ([address] and Thyroid disease. has a past surgical history that includes knee surgery (Bilateral); partial hysterectomy (cervix not removed); lymph node dissection; Irrigation and debridement (Right, 5/17/2019); EXPLORATION OF WOUND OF EXTREMITY (Right, 5/19/2019); and EXPLORATION OF WOUND OF EXTREMITY (N/A, 5/22/2019).       Social History     Socioeconomic History    Marital status:      Spouse name: Not on file    Number of children: Not on file    Years of education: Not on file    Highest education level: Not on file   Occupational History    Not on file   Social Needs    Financial resource strain: Not on file    Food insecurity     Worry: Not on file     Inability: Not on file    Transportation needs     Medical: Not on file     Non-medical: Not on file   Tobacco Use    Smoking status: Current Every Day Smoker     Packs/day: 0.50     Years: 12.00     Pack years: 6.00    Smokeless tobacco: Never Used   Substance and Sexual Activity    Alcohol use: No    Drug use: No    Sexual activity: Not Currently   Lifestyle    Physical activity     Days per week: Not on file     Minutes per session: Not on file    Stress: Not on file   Relationships    Social connections     Talks on phone: Not on file     Gets together: Not on file     Attends Jewish service: Not on file     Active member of club or organization: Not on file     Attends meetings of clubs or organizations: Not on file     Relationship status: Not on file    Intimate partner violence     Fear of current or ex partner: Not on file     Emotionally abused: Not on file     Physically abused: Not on file     Forced sexual activity: Not on file   Other Topics Concern    Not on file   Social History Narrative    Not on file       No family history on file. Allergies:  Imitrex [sumatriptan]; Bee pollen; Bee venom; Bromide ion [bromine]; Potassium bromide; Reglan [metoclopramide]; Sulfa antibiotics; Sulfadiazine; and Tramadol    Home Medications:  Prior to Admission medications    Medication Sig Start Date End Date Taking?  Authorizing Provider   butalbital-acetaminophen-caffeine (FIORICET, ESGIC) -50 MG per tablet Take 1 tablet by mouth every 4 hours as needed for Headaches 8/26/20   Florestine Bumps, DO   ondansetron (ZOFRAN ODT) 4 MG disintegrating tablet Take 1 tablet by mouth every 8 hours as needed for Nausea 8/26/20   Florestine Bumps, DO   prochlorperazine (COMPAZINE) 5 MG tablet Take 1 tablet by mouth every 6 hours as needed for Nausea 8/26/20   Florestine Bumps, DO   levothyroxine (SYNTHROID) 100 MCG tablet Take 1 tablet by mouth Daily 8/27/20   Florestine Bumps, DO   aspirin EC 81 MG EC tablet Take 1 tablet by mouth daily 8/26/20   Florestine Bumps, DO   hydroCHLOROthiazide (HYDRODIURIL) 25 MG tablet Take 1 tablet by mouth daily 8/26/20   Carlos Alberto Holliday DO   gabapentin (NEURONTIN) 600 MG tablet Take 1 tablet by mouth 2 times daily for 30 days. 6/18/20 7/18/20  Suly Slim, APRN - CNP   ciclopirox (PENLAC) 8 % solution Apply topically nightly. 6/18/20   Suly Slim, APRN - CNP   albuterol (PROVENTIL) (2.5 MG/3ML) 0.083% nebulizer solution Take 3 mLs by nebulization every 6 hours as needed for Wheezing 6/18/20   Suly Slim, APRN - CNP   albuterol sulfate  (90 Base) MCG/ACT inhaler Inhale 1 puff into the lungs every 6 hours as needed for Wheezing Gap prescription until follow up with primary. Do not refill. Follow up with primary 4/14/20   Neyda Cunha DO   budesonide-formoterol [facility]) 160-4.5 MCG/ACT AERO Inhale 2 puffs into the lungs 2 times daily 4/14/20   Neyda Cunha DO   Dextromethorphan-guaiFENesin [facility] DM MAXIMUM STRENGTH)  MG TB12 Take 1 tablet by mouth 2 times daily as needed (cough, congestion) 4/1/20   Suly Slim, APRN - CNP   phenytoin (PHENYTEK) 200 MG ER capsule Take 1 capsule by mouth 2 times daily 3/24/20 6/22/20  Aubree Esparza MD   dicyclomine (BENTYL) 10 MG capsule Take 1 capsule by mouth 4 times daily 3/17/20   Suly Slim, APRN - CNP   famotidine (PEPCID) 20 MG tablet Take 1 tablet by mouth 2 times daily 3/17/20   Suly Slim, APRN - CNP   tiZANidine (ZANAFLEX) 4 MG tablet Take 1 tablet by mouth every 8 hours as needed (pain) 3/17/20   Suly Slim, APRN - CNP   nicotine (NICOTROL) 10 MG inhaler Inhale 1 puff into the lungs as needed for Smoking cessation 6-16 cartridges per day 3/10/20   Delfin Barbour MD   ketorolac (TORADOL) 10 MG tablet Take 1 tablet by mouth every 6 hours as needed for Pain 3/10/20   Delfin Barbour MD   Elastic Bandages & Supports ([facility]) MISC Use daily. Right elbow.  3/10/20   Delfin Barbour MD   azithromycin (ZITHROMAX) 250 MG tablet Take 2 tabs (500 mg) on Day 1, and take 1 tab (250 mg) on days 2 through 5. 3/10/20   Jorge Pittman MD   Elastic Bandages & Supports (CARPAL TUNNEL WRIST STABILIZER) [address] [facility] Apply nightly to each wrist 3/10/20   Jorge Pittman MD   ibuprofen (ADVIL;MOTRIN) 800 MG tablet Take 1 tablet by mouth every 8 hours as needed for Pain 3/5/20   Jonnie Powell MD   diclofenac sodium 1 % GEL Apply 4 g topically 4 times daily 3/5/20   Jonnie Powell MD   LORazepam (ATIVAN) 1 MG tablet Take 1 mg by mouth 2 times daily. Historical Provider, MD   amphetamine-dextroamphetamine (ADDERALL XR) 20 MG extended release capsule Take 20 mg by mouth every morning. Historical Provider, MD   BENZOYL PEROXIDE 5 % external wash Apply daily 2/13/20   KEANU Fiore CNP   Skin Protectants, Misc. (EUCERIN) cream Apply topically as needed.  2/13/20   KEANU Fiore CNP   lidocaine (XYLOCAINE) 2 % jelly Apply pea sized amount two times a day to shoulder 2/13/20   KEANU Fiore CNP   Nutritional Supplements (BOOST HIGH PROTEIN) LIQD Patient is to drink 1 bottle four times per day 1/21/20   KEANU Fiore CNP   acetaminophen (TYLENOL) 325 MG tablet Take 1 tablet by mouth every 6 hours as needed for Pain 1/17/20   Valentina Vega DO   citalopram (CELEXA) 20 MG tablet Take 20 mg by mouth daily    Historical Provider, MD   docusate sodium (COLACE) 100 MG capsule Take 1 capsule by mouth daily as needed for Constipation 11/26/19   KEANU Fiore CNP   ferrous sulfate 325 (65 Fe) MG EC tablet Take 1 tablet by mouth daily (with breakfast) 11/26/19   KEANU Fiore CNP   risperiDONE (RISPERDAL) 2 MG tablet Take 1 tablet by mouth nightly 11/21/19   KEANU Fiore CNP   mirtazapine (REMERON) 45 MG tablet Take 1 tablet by mouth nightly 11/21/19   KEANU Fiore CNP   traZODone (DESYREL) 100 MG tablet Take 1 tablet by mouth nightly 11/21/19   Pablo Oj, APRN - CNP   buprenorphine-naloxone (SUBOXONE) 8-2 MG FILM SL film  11/14/19 Historical Provider, MD   Wound Dressings (ADAPTIC NON-ADHERING DRESSING) PADS Apply 1 each topically 2 times daily Apply to R lateral thigh wound after shower daily 11/12/19   Yi Patton DO   EPINEPHrine (EPIPEN 2-MARTINA) 0.3 MG/0.3ML SOAJ injection Inject 0.3 mLs into the muscle once for 1 dose Use as directed for allergic reaction 9/11/19 9/11/19  KEANU Mack - CNP   ipratropium-albuterol (DUONEB) 0.5-2.5 (3) MG/3ML SOLN nebulizer solution Inhale 3 mLs into the lungs every 4 hours 6/16/19   Devon Quiñones MD   potassium chloride (KLOR-CON M) 20 MEQ extended release tablet Take 0.5 tablets by mouth daily for 2 days 6/12/19 6/14/19  Everardo Lozano MD   fluticasone (FLONASE) 50 MCG/ACT nasal spray 2 sprays by Each Nostril route daily 6/5/19   Everardo Lozano MD       REVIEW OF SYSTEMS    (2-9 systems for level 4, 10 or more for level 5)      Review of Systems   Constitutional: Positive for fatigue. HENT: Negative for facial swelling and voice change. Eyes: Positive for photophobia. Negative for pain, redness, itching and visual disturbance. Respiratory: Negative for shortness of breath. Cardiovascular: Negative for chest pain. Gastrointestinal: Negative for abdominal pain. Genitourinary: Negative for difficulty urinating. Musculoskeletal: Negative for gait problem. Skin: Negative for rash and wound. Neurological: Positive for headaches. Negative for dizziness, tremors, syncope, speech difficulty and weakness. Psychiatric/Behavioral: Negative for agitation. PHYSICAL EXAM   (up to 7 for level 4, 8 or more for level 5)      INITIAL VITALS:   /69   Pulse 62   Temp 99 °F (37.2 °C) (Oral)   Resp 20   Ht 5' 2\" (1.575 m)   Wt 160 lb (72.6 kg)   SpO2 93%   BMI 29.26 kg/m²     Physical Exam  Vitals signs and nursing note reviewed. Constitutional:       General: She is not in acute distress. Appearance: Normal appearance. She is normal weight.  She is not toxic-appearing or diaphoretic. HENT:      Head: Normocephalic and atraumatic. Right Ear: External ear normal.      Left Ear: External ear normal.      Nose: Nose normal.      Mouth/Throat:      Mouth: Mucous membranes are moist.   Eyes:      General: No scleral icterus. Right eye: No discharge. Left eye: No discharge. Extraocular Movements: Extraocular movements intact. Conjunctiva/sclera: Conjunctivae normal.      Pupils: Pupils are equal, round, and reactive to light. Neck:      Musculoskeletal: Normal range of motion. No neck rigidity or muscular tenderness. Cardiovascular:      Rate and Rhythm: Normal rate and regular rhythm. Pulses: Normal pulses. Pulmonary:      Effort: Pulmonary effort is normal.   Abdominal:      General: There is no distension. Palpations: Abdomen is soft. Musculoskeletal: Normal range of motion. Right lower leg: No edema. Left lower leg: No edema. Skin:     General: Skin is warm. Capillary Refill: Capillary refill takes less than 2 seconds. Neurological:      General: No focal deficit present. Mental Status: She is alert and oriented to person, place, and time. Mental status is at baseline. Cranial Nerves: No cranial nerve deficit. Motor: No weakness. Gait: Gait normal.   Psychiatric:         Mood and Affect: Mood normal.         DIFFERENTIAL  DIAGNOSIS     PLAN (LABS / IMAGING / EKG):  No orders of the defined types were placed in this encounter.       MEDICATIONS ORDERED:  Orders Placed This Encounter   Medications    prochlorperazine (COMPAZINE) injection 10 mg    diphenhydrAMINE (BENADRYL) injection 25 mg    magnesium sulfate 1 g in dextrose 5% 100 mL IVPB    0.9 % sodium chloride bolus    promethazine (PHENERGAN) injection 12.5 mg    methylPREDNISolone sodium (SOLU-MEDROL) injection 40 mg    ketorolac (TORADOL) injection 30 mg       DDX: migraine, tension headache    DIAGNOSTIC RESULTS / EMERGENCY DEPARTMENT COURSE / MDM   LAB RESULTS:  No results found for this visit on 09/02/20. IMPRESSION: 76-year-old female photophobia migraine typical nature to prior migraines no trauma no loss of consciousness no head injury not sudden in nature no thunderclap neurologically intact without difficulty or assistance full range of motion of her neck afebrile vitals stable plan will be to provide migraine cocktail and reevaluate. RADIOLOGY:  None    EKG  none    All EKG's are interpreted by the Emergency Department Physician who either signs or Co-signs this chart in the absence of a cardiologist.    EMERGENCY DEPARTMENT COURSE:  ED Course as of Sep 02 1938   Wed Sep 02, 2020   1353 Seen and evluaed workup initiated    [BG]   1539 Feeling better on reevaluation meds and fluids still running    [BG]      ED Course User Index  [BG] Eriberto Metz DO         PROCEDURES:  none    CONSULTS:  None    CRITICAL CARE:  Please see attending note    FINAL IMPRESSION      1.  Migraine without aura and without status migrainosus, not intractable          DISPOSITION / PLAN     DISPOSITION  dc home with pcp followup      PATIENT REFERRED TO:  Umair Mccormack, APRN - [facility]  [address] [facility] [id number] [place] [address] [address] ED  [address] [id number]  [phone]  Go to   As needed, If symptoms worsen    [place] [id number] [phone]  Call today        DISCHARGE MEDICATIONS:  Discharge Medication List as of 9/2/2020  4:07 PM          Eriberto Metz DO  Emergency Medicine Resident    (Please note that portions of thisnote were completed with a voice recognition program.  Efforts were made to edit the dictations but occasionally words are mis-transcribed.)        Eriberto Metz DO  Resident  09/02/20 7699

## 2020-09-02 NOTE — ED PROVIDER NOTES
[facility] ED     Emergency Department     Faculty Attestation        I performed a history and physical examination of the patient and discussed management with the resident. I reviewed the residents note and agree with the documented findings and plan of care. Any areas of disagreement are noted on the chart. I was personally present for the key portions of any procedures. I have documented in the chart those procedures where I was not present during the key portions. I have reviewed the emergency nurses triage note. I agree with the chief complaint, past medical history, past surgical history, allergies, medications, social and family history as documented unless otherwise noted below. For mid-level providers such as nurse practitioners as well as physicians assistants:    I have personally seen and evaluated the patient. I find the patient's history and physical exam are consistent with NP/PA documentation. I agree with the care provided, treatment rendered, disposition, & follow-up plan. Additional findings are as noted. Vital Signs: Temp 99 °F (37.2 °C) (Oral)   PCP:  Vazquez Arambula, APRN - CNP    Pertinent Comments:     Patient presents with her typical migraine headaches gradual in onset. Is been present for the past 5 days. No falls or trauma. No anticoagulation use. She is afebrile nontoxic neck is soft and supple with no meningeal signs. No focal neurological deficits or stroke.       Critical Care  None          Ijeoma Sow MD  Attending Emergency Medicine Physician              Guadlupe Hodgkin, MD  09/02/20 1304

## 2020-09-02 NOTE — ED NOTES
Pt back to ED stretcher, placed back on telemetry monitoring with alarms set.   Call light within reach, will continue to monitor      Arnav Mayberry RN  09/02/20 7599

## 2020-09-02 NOTE — ED NOTES
Pt tolerating PO. Pt reports feeling improved s/p interventions in ED.    Pt discharged, instructed on follow up recommendations and verbalized understanding      Brianda Adler RN  09/02/20 7122

## 2020-09-03 ENCOUNTER — CARE COORDINATION (OUTPATIENT)
Dept: CARE COORDINATION | Age: 55
End: 2020-09-03

## 2020-09-03 NOTE — CARE COORDINATION
Maria R Jj declines care coordination at this time. She did request CC to look into her medication fills from her ED visit on 9.2.20 at [facility]. She reports she does not have the medications at this time. Maria R Jj will call CC if she has any further difficulties getting her medications from her ED visit. S/S of COVID decussed with Tahira. She declines any S/S of COVID.

## 2020-09-09 ENCOUNTER — HOSPITAL ENCOUNTER (INPATIENT)
Age: 55
LOS: 12 days | Discharge: HOME | DRG: 753 | End: 2020-09-21
Attending: EMERGENCY MEDICINE | Admitting: PSYCHIATRY & NEUROLOGY
Payer: MEDICARE

## 2020-09-09 PROBLEM — F32.9 MAJOR DEPRESSIVE DISORDER, SINGLE EPISODE, UNSPECIFIED: Status: ACTIVE | Noted: 2020-09-09

## 2020-09-09 LAB
AMPHETAMINE SCREEN URINE: NEGATIVE
BARBITURATE SCREEN URINE: POSITIVE
BENZODIAZEPINE SCREEN, URINE: NEGATIVE
BUPRENORPHINE URINE: ABNORMAL
CANNABINOID SCREEN URINE: NEGATIVE
COCAINE METABOLITE, URINE: NEGATIVE
MDMA URINE: ABNORMAL
METHADONE SCREEN, URINE: NEGATIVE
METHAMPHETAMINE, URINE: ABNORMAL
OPIATES, URINE: NEGATIVE
OXYCODONE SCREEN URINE: NEGATIVE
PHENCYCLIDINE, URINE: NEGATIVE
PROPOXYPHENE, URINE: ABNORMAL
SARS-COV-2, RAPID: NOT DETECTED
SARS-COV-2: NORMAL
SARS-COV-2: NORMAL
SOURCE: NORMAL
TEST INFORMATION: ABNORMAL
TRICYCLIC ANTIDEPRESSANTS, UR: ABNORMAL

## 2020-09-09 PROCEDURE — 99285 EMERGENCY DEPT VISIT HI MDM: CPT

## 2020-09-09 PROCEDURE — 80307 DRUG TEST PRSMV CHEM ANLYZR: CPT

## 2020-09-09 PROCEDURE — U0002 COVID-19 LAB TEST NON-CDC: HCPCS

## 2020-09-09 PROCEDURE — 1240000000 HC EMOTIONAL WELLNESS R&B

## 2020-09-09 RX ORDER — TRAZODONE HYDROCHLORIDE 50 MG/1
50 TABLET ORAL NIGHTLY PRN
Status: DISCONTINUED | OUTPATIENT
Start: 2020-09-09 | End: 2020-09-09

## 2020-09-09 RX ORDER — ACETAMINOPHEN 325 MG/1
650 TABLET ORAL EVERY 4 HOURS PRN
Status: DISCONTINUED | OUTPATIENT
Start: 2020-09-09 | End: 2020-09-09

## 2020-09-09 RX ORDER — HYDROXYZINE 50 MG/1
50 TABLET, FILM COATED ORAL 3 TIMES DAILY PRN
Status: DISCONTINUED | OUTPATIENT
Start: 2020-09-09 | End: 2020-09-21 | Stop reason: HOSPADM

## 2020-09-09 RX ORDER — TRAZODONE HYDROCHLORIDE 50 MG/1
50 TABLET ORAL NIGHTLY PRN
Status: DISCONTINUED | OUTPATIENT
Start: 2020-09-09 | End: 2020-09-13

## 2020-09-09 RX ORDER — POLYETHYLENE GLYCOL 3350 17 G/17G
17 POWDER, FOR SOLUTION ORAL DAILY PRN
Status: DISCONTINUED | OUTPATIENT
Start: 2020-09-09 | End: 2020-09-21 | Stop reason: HOSPADM

## 2020-09-09 RX ORDER — IBUPROFEN 400 MG/1
400 TABLET ORAL EVERY 6 HOURS PRN
Status: DISCONTINUED | OUTPATIENT
Start: 2020-09-09 | End: 2020-09-21 | Stop reason: HOSPADM

## 2020-09-09 RX ORDER — ACETAMINOPHEN 325 MG/1
650 TABLET ORAL EVERY 4 HOURS PRN
Status: DISCONTINUED | OUTPATIENT
Start: 2020-09-09 | End: 2020-09-21 | Stop reason: HOSPADM

## 2020-09-09 RX ORDER — MAGNESIUM HYDROXIDE/ALUMINUM HYDROXICE/SIMETHICONE 120; 1200; 1200 MG/30ML; MG/30ML; MG/30ML
20 SUSPENSION ORAL EVERY 6 HOURS PRN
Status: DISCONTINUED | OUTPATIENT
Start: 2020-09-09 | End: 2020-09-21 | Stop reason: HOSPADM

## 2020-09-09 RX ORDER — MAGNESIUM HYDROXIDE/ALUMINUM HYDROXICE/SIMETHICONE 120; 1200; 1200 MG/30ML; MG/30ML; MG/30ML
20 SUSPENSION ORAL EVERY 6 HOURS PRN
Status: DISCONTINUED | OUTPATIENT
Start: 2020-09-09 | End: 2020-09-09

## 2020-09-09 RX ORDER — HYDROXYZINE 50 MG/1
50 TABLET, FILM COATED ORAL 3 TIMES DAILY PRN
Status: DISCONTINUED | OUTPATIENT
Start: 2020-09-09 | End: 2020-09-09

## 2020-09-09 RX ORDER — POLYETHYLENE GLYCOL 3350 17 G/17G
17 POWDER, FOR SOLUTION ORAL DAILY PRN
Status: DISCONTINUED | OUTPATIENT
Start: 2020-09-09 | End: 2020-09-09

## 2020-09-09 ASSESSMENT — SLEEP AND FATIGUE QUESTIONNAIRES
DO YOU HAVE DIFFICULTY SLEEPING: YES
SLEEP PATTERN: DIFFICULTY FALLING ASLEEP
DIFFICULTY STAYING ASLEEP: YES
RESTFUL SLEEP: NO
DIFFICULTY ARISING: NO
DO YOU USE A SLEEP AID: YES
AVERAGE NUMBER OF SLEEP HOURS: 5
DIFFICULTY FALLING ASLEEP: YES

## 2020-09-09 ASSESSMENT — ENCOUNTER SYMPTOMS
ABDOMINAL PAIN: 0
COUGH: 0
BACK PAIN: 0

## 2020-09-09 ASSESSMENT — LIFESTYLE VARIABLES: HISTORY_ALCOHOL_USE: NO

## 2020-09-09 NOTE — ED PROVIDER NOTES
[address] ED  EMERGENCY DEPARTMENT ENCOUNTER      Pt Name: Amanda Ng  MRN: [id number]  [place] 1965  Date of evaluation: 9/9/20      CHIEF COMPLAINT       Chief Complaint   Patient presents with    Suicidal    Medication Refill     HISTORY OF PRESENT ILLNESS   HPI 60 y.o. female presents with c/o suicical thoughts. The patient came  to the emergency department on her own accord. The patient reports feeling depressed and having thought of suicide for the last week. The patient has been thinking of cutting her wrist or overdosing on pills. The patient does have a h/o of previous suicide attempt. The patient reports that their symptoms were provoked by relationship difficulties, being off their medications, poor support system. The patient does have a h/o of previous psychiatric admission. The patient denies drug use, denies attempts at self harm to this point. The patient denies medical complaints at this time. REVIEW OF SYSTEMS     Review of Systems   Constitutional: Negative for fever. HENT: Negative for congestion. Eyes: Negative for visual disturbance. Respiratory: Negative for cough. Cardiovascular: Negative for chest pain. Gastrointestinal: Negative for abdominal pain. Genitourinary: Negative for dysuria. Musculoskeletal: Negative for back pain. Skin: Negative for rash. Neurological: Negative for headaches. Psychiatric/Behavioral: Positive for decreased concentration, dysphoric mood and suicidal ideas. Negative for hallucinations and self-injury.          PAST MEDICAL HISTORY     Past Medical History:   Diagnosis Date    Arthritis     Asthma     Borderline diabetes     Borderline [facility] disorder ([facility]     CHF (congestive heart failure) ([facility]     COPD (chronic obstructive pulmonary disease) (HCC)     Fibromyalgia     Headache     Hypertension     Manic depression (HCC)     Movement disorder     Neck fracture (HCC)     Pernicious anemia     [facility]     Thyroid disease        SURGICAL HISTORY       Past Surgical History:   Procedure Laterality Date    EXPLORATION OF WOUND OF EXTREMITY Right 5/19/2019    RIGHT THIGH WOUND WASHOUT WITH [address] PLACEMENT performed by Juan Howard MD at [address] N/A 5/22/2019    RIGHT WOUND HIP EXAMINATION LAVAGE AND WOUND VAC PLACEMENT performed by Marvin Solomon MD at [address] [place] [address], [place] [address] Right 5/17/2019    IRRIGATION, DEBRIDEMENT RIGHT THIGH performed by Harriett Garcia MD at [place] [id number] Bilateral     LYMPH NODE DISSECTION      cervical    PARTIAL HYSTERECTOMY         CURRENT MEDICATIONS       Previous Medications    ACETAMINOPHEN (TYLENOL) 325 MG TABLET    Take 1 tablet by mouth every 6 hours as needed for Pain    ALBUTEROL (PROVENTIL) (2.5 MG/3ML) 0.083% NEBULIZER SOLUTION    Take 3 mLs by nebulization every 6 hours as needed for Wheezing    ALBUTEROL SULFATE  (90 BASE) MCG/ACT INHALER    Inhale 1 puff into the lungs every 6 hours as needed for Wheezing Gap prescription until follow up with primary. Do not refill. Follow up with primary    AMPHETAMINE-DEXTROAMPHETAMINE (ADDERALL XR) 20 MG EXTENDED RELEASE CAPSULE    Take 20 mg by mouth every morning. ASPIRIN EC 81 MG EC TABLET    Take 1 tablet by mouth daily    AZITHROMYCIN (ZITHROMAX) 250 MG TABLET    Take 2 tabs (500 mg) on Day 1, and take 1 tab (250 mg) on days 2 through 5. BENZOYL PEROXIDE 5 % EXTERNAL WASH    Apply daily    BUDESONIDE-FORMOTEROL (SYMBICORT) 160-4.5 MCG/ACT AERO    Inhale 2 puffs into the lungs 2 times daily    BUPRENORPHINE-NALOXONE (SUBOXONE) 8-2 MG FILM SL FILM        BUTALBITAL-ACETAMINOPHEN-CAFFEINE (FIORICET, ESGIC) -40 MG PER TABLET    TAKE 1 TABLET BY MOUTH EVERY 4 HOURS AS NEEDED FOR HEADACHE     CICLOPIROX (PENLAC) 8 % SOLUTION    Apply topically nightly.     CITALOPRAM (CELEXA) 20 MG TABLET    Take 20 mg by mouth daily PROTEIN) LIQD    Patient is to drink 1 bottle four times per day    ONDANSETRON (ZOFRAN ODT) 4 MG DISINTEGRATING TABLET    Take 1 tablet by mouth every 8 hours as needed for Nausea    PHENYTOIN (PHENYTEK) 200 MG ER CAPSULE    Take 1 capsule by mouth 2 times daily    POTASSIUM CHLORIDE (KLOR-CON M) 20 MEQ EXTENDED RELEASE TABLET    Take 0.5 tablets by mouth daily for 2 days    PROCHLORPERAZINE (COMPAZINE) 5 MG TABLET    Take 1 tablet by mouth every 6 hours as needed for Nausea    RISPERIDONE (RISPERDAL) 2 MG TABLET    Take 1 tablet by mouth nightly    SKIN PROTECTANTS, MISC. (EUCERIN) CREAM    Apply topically as needed. TIZANIDINE (ZANAFLEX) 4 MG TABLET    TAKE 1 TABLET BY MOUTH EVERY 8 HOURS AS NEEDED (PAIN)     TRAZODONE (DESYREL) 100 MG TABLET    Take 1 tablet by mouth nightly    WOUND DRESSINGS (ADAPTIC NON-ADHERING DRESSING) PADS    Apply 1 each topically 2 times daily Apply to R lateral thigh wound after shower daily       ALLERGIES     is allergic to imitrex [sumatriptan]; bee pollen; bee venom; bromide ion [bromine]; potassium bromide; reglan [metoclopramide]; sulfa antibiotics; sulfadiazine; and tramadol. FAMILY HISTORY     She indicated that her mother is . She indicated that her father is . SOCIAL HISTORY      reports that she has been smoking. She has a 6.00 pack-year smoking history. She has never used smokeless tobacco. She reports that she does not drink alcohol or use drugs.     PHYSICAL EXAM     INITIAL VITALS: /72   Pulse 69   Temp 98.5 °F (36.9 °C) (Oral)   Resp 16   Ht 5' 2\" (1.575 m)   Wt 165 lb (74.8 kg)   SpO2 98%   BMI 30.18 kg/m²   Gen: NAD  Head: Normocephalic, atraumatic  Eye: Pupils equal round reactive to light, no conjunctivitis  Heart: Regular rate and rhythm no murmurs  Lungs: Clear to auscultation bilaterally, no respiratory distress  Chest wall: No crepitus, no tenderness palpation  Abdomen: Soft, nontender, nondistended, with no peritoneal Non-African American  >60  >60 mL/min  Final  08/25/2020 12:26 PM  [address]    GFR African American  >60  >60 mL/min  Final  08/25/2020 12:26 PM  [address]      WBC  7.5  3.5 - 11.3 k/uL  Final  08/25/2020 12:26 PM  [address]    RBC  5.11  3.95 - 5.11 m/uL  Final  08/25/2020 12:26 PM  [address]    Hemoglobin  15.9High    11.9 - 15.1 g/dL  Final  08/25/2020 12:26 PM  [address]    Hematocrit  46.9  36.3 - 47.1 %  Final  08/25/2020 12:26 PM  [address]    MCV  91.8  82.6 - 102.9 fL  Final  08/25/2020 12:26 PM  [address]    MCH  31.1  25.2 - 33.5 pg  Final  08/25/2020 12:26 PM  [address]    MCHC  33.9  28.4 - 34.8 g/dL  Final  08/25/2020 12:26 PM  [address]    RDW  12.7  11.8 - 14.4 %  Final  08/25/2020 12:26 PM  [address]    Platelets  138  156 - 453 k/uL  Final  08/25/2020 12:26 PM  [address]    MPV  9.6  8.1 - 13.5 fL  Final  08/25/2020 12:26 PM  [address]         Vitals:    Vitals:    09/09/20 1609   BP: 109/72   Pulse: 69   Resp: 16   Temp: 98.5 °F (36.9 °C)   TempSrc: Oral   SpO2: 98%   Weight: 165 lb (74.8 kg)   Height: 5' 2\" (1.575 m)       The patient was given the following medications while in the emergency department:  No orders of the defined types were placed in this encounter. -------------------------  CRITICAL CARE:   CONSULTS: None  PROCEDURES: Procedures     FINAL IMPRESSION      1. Depression with suicidal ideation          DISPOSITION/PLAN   DISPOSITION Decision To Admit 09/09/2020 04:54:35 PM      PATIENT REFERRED TO:  No follow-up provider specified.     DISCHARGE MEDICATIONS:  New Prescriptions    No medications on file         Ana Michelle MD  Attending Emergency Physician                     Ana Michelle MD  09/09/20 0972

## 2020-09-09 NOTE — ED TRIAGE NOTES
Mode of arrival (squad #, walk in, police, etc) : walk in        Chief complaint(s): suicidal        Arrival Note (brief scenario, treatment PTA, etc). : Pt states she has been feeling depressed and has been off her meds. Pt states she was just at [facility] and she isn;t sure if they were giving her the meds she needed there. Pt reports feeling anxious and suicidal. Pt reports she has a plan to cut her wrists. C= \"Have you ever felt that you should Cut down on your drinking? \"  No  A= \"Have people Annoyed you by criticizing your drinking? \"  No  G= \"Have you ever felt bad or Guilty about your drinking? \"  No  E= \"Have you ever had a drink as an Eye-opener first thing in the morning to steady your nerves or to help a hangover? \"  No      Deferred []      Reason for deferring: N/A    *If yes to two or more: probable alcohol abuse. *

## 2020-09-09 NOTE — ED NOTES
Report given to Beckie Fuentes RN from ED. Report method in person   The following was reviewed with receiving RN:   Current vital signs:  /72   Pulse 69   Temp 98.5 °F (36.9 °C) (Oral)   Resp 16   Ht 5' 2\" (1.575 m)   Wt 165 lb (74.8 kg)   SpO2 98%   BMI 30.18 kg/m²                MEWS Score: 1     Any medication or safety alerts were reviewed. Any pending diagnostics and notifications were also reviewed, as well as any safety concerns or issues, abnormal labs, abnormal imaging, and abnormal assessment findings. Questions were answered.             Adilson Masters RN  09/09/20 [id number]
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
## 2020-09-10 PROBLEM — F31.4 SEVERE DEPRESSED BIPOLAR I DISORDER WITHOUT PSYCHOTIC FEATURES (HCC): Status: ACTIVE | Noted: 2020-09-10

## 2020-09-10 PROCEDURE — 6370000000 HC RX 637 (ALT 250 FOR IP): Performed by: PSYCHIATRY & NEUROLOGY

## 2020-09-10 PROCEDURE — 1240000000 HC EMOTIONAL WELLNESS R&B

## 2020-09-10 PROCEDURE — 99223 1ST HOSP IP/OBS HIGH 75: CPT | Performed by: PSYCHIATRY & NEUROLOGY

## 2020-09-10 RX ORDER — ALBUTEROL SULFATE 2.5 MG/3ML
2.5 SOLUTION RESPIRATORY (INHALATION) EVERY 6 HOURS PRN
Status: DISCONTINUED | OUTPATIENT
Start: 2020-09-10 | End: 2020-09-10

## 2020-09-10 RX ORDER — BUTALBITAL, ACETAMINOPHEN AND CAFFEINE 300; 40; 50 MG/1; MG/1; MG/1
1 CAPSULE ORAL EVERY 6 HOURS PRN
Status: DISCONTINUED | OUTPATIENT
Start: 2020-09-10 | End: 2020-09-21 | Stop reason: HOSPADM

## 2020-09-10 RX ORDER — BUDESONIDE AND FORMOTEROL FUMARATE DIHYDRATE 160; 4.5 UG/1; UG/1
2 AEROSOL RESPIRATORY (INHALATION) 2 TIMES DAILY
Status: DISCONTINUED | OUTPATIENT
Start: 2020-09-10 | End: 2020-09-21 | Stop reason: HOSPADM

## 2020-09-10 RX ORDER — ASPIRIN 81 MG/1
81 TABLET ORAL DAILY
Status: DISCONTINUED | OUTPATIENT
Start: 2020-09-10 | End: 2020-09-21 | Stop reason: HOSPADM

## 2020-09-10 RX ORDER — DIVALPROEX SODIUM 500 MG/1
500 TABLET, DELAYED RELEASE ORAL EVERY 12 HOURS SCHEDULED
Status: DISCONTINUED | OUTPATIENT
Start: 2020-09-10 | End: 2020-09-12

## 2020-09-10 RX ORDER — RISPERIDONE 1 MG/1
1 TABLET, FILM COATED ORAL 2 TIMES DAILY
Status: DISCONTINUED | OUTPATIENT
Start: 2020-09-10 | End: 2020-09-15

## 2020-09-10 RX ORDER — ALBUTEROL SULFATE 2.5 MG/3ML
2.5 SOLUTION RESPIRATORY (INHALATION) EVERY 6 HOURS PRN
Status: DISCONTINUED | OUTPATIENT
Start: 2020-09-10 | End: 2020-09-21 | Stop reason: HOSPADM

## 2020-09-10 RX ORDER — CLONIDINE HYDROCHLORIDE 0.1 MG/1
0.1 TABLET ORAL NIGHTLY
Status: DISCONTINUED | OUTPATIENT
Start: 2020-09-10 | End: 2020-09-21 | Stop reason: HOSPADM

## 2020-09-10 RX ADMIN — CLONIDINE HYDROCHLORIDE 0.1 MG: 0.1 TABLET ORAL at 20:50

## 2020-09-10 RX ADMIN — DIVALPROEX SODIUM 500 MG: 500 TABLET, DELAYED RELEASE ORAL at 20:50

## 2020-09-10 RX ADMIN — BUTALBITAL, ACETAMINOPHEN, AND CAFFEINE 1 CAPSULE: 50; 300; 40 CAPSULE ORAL at 15:45

## 2020-09-10 RX ADMIN — RISPERIDONE 1 MG: 1 TABLET ORAL at 20:50

## 2020-09-10 RX ADMIN — ASPIRIN 81 MG: 81 TABLET, COATED ORAL at 15:45

## 2020-09-10 RX ADMIN — BUTALBITAL, ACETAMINOPHEN, AND CAFFEINE 1 CAPSULE: 50; 300; 40 CAPSULE ORAL at 21:32

## 2020-09-10 RX ADMIN — BUDESONIDE AND FORMOTEROL FUMARATE DIHYDRATE 2 PUFF: 160; 4.5 AEROSOL RESPIRATORY (INHALATION) at 15:45

## 2020-09-10 RX ADMIN — TRAZODONE HYDROCHLORIDE 50 MG: 50 TABLET ORAL at 20:54

## 2020-09-10 RX ADMIN — ACETAMINOPHEN 650 MG: 325 TABLET, FILM COATED ORAL at 09:45

## 2020-09-10 RX ADMIN — HYDROXYZINE HYDROCHLORIDE 50 MG: 50 TABLET, FILM COATED ORAL at 20:54

## 2020-09-10 RX ADMIN — NICOTINE POLACRILEX 2 MG: 2 GUM, CHEWING BUCCAL at 19:32

## 2020-09-10 RX ADMIN — NICOTINE POLACRILEX 2 MG: 2 GUM, CHEWING BUCCAL at 15:45

## 2020-09-10 RX ADMIN — RISPERIDONE 1 MG: 1 TABLET ORAL at 15:45

## 2020-09-10 ASSESSMENT — PAIN - FUNCTIONAL ASSESSMENT
PAIN_FUNCTIONAL_ASSESSMENT: 0-10
PAIN_FUNCTIONAL_ASSESSMENT: 0-10

## 2020-09-10 ASSESSMENT — PAIN SCALES - GENERAL
PAINLEVEL_OUTOF10: 4
PAINLEVEL_OUTOF10: 7
PAINLEVEL_OUTOF10: 4

## 2020-09-10 ASSESSMENT — LIFESTYLE VARIABLES: HISTORY_ALCOHOL_USE: NO
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
## 2020-09-10 NOTE — PLAN OF CARE
[facility]  Initial Interdisciplinary Treatment Plan NO      Original treatment plan Date & Time: 9/10/2020                     736AM    Admission Type:  Admission Type: Involuntary    Reason for admission:   Reason for Admission: SI no plan    Estimated Length of Stay:  5-7days  Estimated Discharge Date: to be determined by physician    PATIENT STRENGTHS:  Patient Strengths:Strengths: Positive Support, No significant Physical Illness  Patient Strengths and Limitations:Limitations: Tendency to isolate self, Lacks leisure interests, Difficulty problem solving/relies on others to help solve problems, Hopeless about future, Multiple barriers to leisure interests, Inappropriate/potentially harmful leisure interests (depression substance abuse anxiety poor coping skills)  Addictive Behavior: Addictive Behavior  In the past 3 months, have you felt or has someone told you that you have a problem with:  : None  Do you have a history of Chemical Use?: No  Do you have a history of Alcohol Use?: No  Do you have a history of Street Drug Abuse?: Yes  Histroy of Prescripton Drug Abuse?: No  Medical Problems:No past medical history on file.   Status EXAM:Status and Exam  Normal: No  Facial Expression: Elevated  Affect: Inappropriate  Level of Consciousness: Alert  Mood:Normal: No  Mood: Depressed, Anxious  Motor Activity:Normal: No  Motor Activity: Decreased  Interview Behavior: Cooperative  Preception: Sanders to Person, Terrye Christians to Time, Sanders to Place, Sanders to Situation  Attention:Normal: Yes  Attention: Distractible  Thought Processes: Circumstantial  Thought Content:Normal: Yes  Thought Content: Preoccupations  Hallucinations: None  Delusions: No  Memory:Normal: Yes  Memory: Poor Recent, Confabulation  Insight and Judgment: No  Insight and Judgment: Unmotivated  Present Suicidal Ideation: No  Present Homicidal Ideation: No    EDUCATION:   Learner Progress Toward Treatment Goals: reviewed group plans and strategies for care    Method:group therapy, medication compliance, individualized assessments and care planning    Outcome: needs reinforcement    PATIENT GOALS: to be discussed with patient within 72 hours    PLAN/TREATMENT RECOMMENDATIONS:     continue group therapy , medications compliance, goal setting, individualized assessments and care, continue to monitor pt on unit      SHORT-TERM GOALS:   Time frame for Short-Term Goals: 5-7 days    LONG-TERM GOALS:  Time frame for Long-Term Goals: 6 months  Members Present in Team Meeting: See Signature Sheet    [place] Form

## 2020-09-10 NOTE — GROUP NOTE
Group Therapy Note    Date: 9/10/2020    Group Start Time: 0900  Group End Time: 0915  Group Topic: Community Meeting    [facility]    Daron Hoven        Group Therapy Note    Pt did not attend community meeting group d/t resting in room despite staff invitation to attend. 1:1 talk time offered as alternative to group session, pt declined.

## 2020-09-10 NOTE — GROUP NOTE
Group Therapy Note    Date: 9/10/2020    Group Start Time: 1600  Group End Time: 36  Group Topic: Healthy Living/Wellness    KENNY SÁNCHEZ    Santosh Chaves        Group Therapy Note    Attendees: 6/20         Patient's Goal:  Exploration of values     Notes:      Status After Intervention:  Improved    Participation Level:  Active Listener and Interactive    Participation Quality: Appropriate, Attentive and Supportive      Speech:  normal      Thought Process/Content: Logical      Affective Functioning: Congruent      Mood: normal      Level of consciousness:  Alert and Oriented x4      Response to Learning: Able to verbalize current knowledge/experience, Able to verbalize/acknowledge new learning and Able to retain information      Endings: None Reported    Modes of Intervention: Education, Support, Socialization and Exploration      Discipline Responsible: [address] [facility] [place]      Signature:  Sonya Steven

## 2020-09-10 NOTE — BH NOTE
Patient given tobacco quitline number 84155027797 at this time, refusing to call at this time, states \" I just dont want to quit now\"- patient given information as to the dangers of long term tobacco use. Continue to reinforce the importance of tobacco cessation.

## 2020-09-10 NOTE — GROUP NOTE
Group Therapy Note    Date: 9/10/2020    Group Start Time: 1100  Group End Time: 36  Group Topic: Psychotherapy    [facility] LUIGI Richardson        Group Therapy Note     Patient refused to attend psychotherapy group after encouragement from staff. 1:1 talk time offered but refused. Signature:   Alvaro Richardson
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
## 2020-09-10 NOTE — PLAN OF CARE
Problem: Suicide risk  Goal: Provide patient with safe environment  Description: Provide patient with safe environment  Outcome: Ongoing  Note: Pt isolates to room, resting in bed with eyes closed, breathing unlabored. denies suicidal ideation.  15 minute safety rounds maintained per protocol

## 2020-09-10 NOTE — GROUP NOTE
Group Therapy Note    Date: 9/10/2020    Group Start Time: 1330  Group End Time: 7909  Group Topic: Music Therapy    KENNY Lauren        Group Therapy Note    Pt did not attend music therapy song sharing group d/t resting in room despite staff invitation to attend. 1:1 talk time offered as alternative to group session, pt declined.
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
## 2020-09-10 NOTE — BH NOTE
`[facility]  Admission Note     Admission Type:   Admission Type: Voluntary    Reason for admission:  Reason for Admission: Suicidal ideation    PATIENT STRENGTHS:  Strengths: Communication, Social Skills    Patient Strengths and Limitations:  Limitations: Tendency to isolate self    Addictive Behavior:   Addictive Behavior  In the past 3 months, have you felt or has someone told you that you have a problem with:  : None  Do you have a history of Chemical Use?: No  Do you have a history of Alcohol Use?: No  Do you have a history of Street Drug Abuse?: No  Histroy of Prescripton Drug Abuse?: No    Medical Problems:   Past Medical History:   Diagnosis Date    Arthritis     Asthma     Borderline diabetes     [facility]line [facility] [address] [facility]     [facility]     COPD [facility]     Fibromyalgia     Headache     Hypertension     [facility]     Movement disorder     [facility]     Pernicious anemia     Seizure ([facility]     Thyroid disease        Status EXAM:  Status and Exam  Normal: No  Facial Expression: Flat  Affect: Appropriate  Level of Consciousness: Alert  Mood:Normal: No  Mood: Depressed, Anxious  Motor Activity:Normal: No  Motor Activity: Decreased  Interview Behavior: Cooperative  Preception: [place] to Person, [place] to Time, [place] to Place, [place] to Situation  Attention:Normal: No  Attention: Distractible  Thought Content:Normal: Yes  Hallucinations: None  Delusions: No  Memory:Normal: No  Memory: Poor Recent  Insight and Judgment: No  Insight and Judgment: Poor Insight, Poor Judgment  Present Suicidal Ideation: No  Present Homicidal Ideation: No    Tobacco Screening:  Practical Counseling, on admission, demetrio X, if applicable and completed (first 3 are required if patient doesn't refuse):            ( )  Recognizing danger situations (included triggers and roadblocks)                    ( ) Coping skills (new ways to manage stress, exercise, relaxation techniques, changing routine, distraction)                                                           ( )  Basic information about quitting (benefits of quitting, techniques in how to quit, available resources  ( ) Referral for counseling faxed to [facility]                                           ( x) Patient refused counseling  ( ) Patient has not smoked in the last 30 days    Metabolic Screening:    Lab Results   Component Value Date    LABA1C 5.2 02/19/2019       Lab Results   Component Value Date    CHOL 191 02/19/2019     Lab Results   Component Value Date    TRIG 150 (H) 02/19/2019     Lab Results   Component Value Date    HDL 56 02/19/2019     No components found for: LDLCAL  No results found for: LABVLDL      Body mass index is 30.18 kg/m². BP Readings from Last 2 Encounters:   09/09/20 109/69   09/02/20 121/69           Pt admitted with followings belongings:  Dentures: None  Vision - Corrective Lenses: Glasses  Hearing Aid: None  Jewelry: Watch  Body Piercings Removed: Yes  Clothing: Footwear, Pants, Shirt, Socks, Undergarments (Comment)  Were All Patient Medications Collected?: Yes(8 nicotene patches, zofran, tylenol, nitroglycerin, epi pens, inhaler, tizantidine, chloroperazine, aspirin,)  Other Valuables: Cell phone, Purse, Money (Comment), Wallet, Keys(lighters, cigarettes, keychain, toiletries,)      Valuables placed in safe in security envelope, number:  [id number] and [id number]. Patient's home medications were placed in safe. Patient oriented to surroundings and program expectations and copy of patient rights given. Received admission packet. Consents reviewed, signed . Patient verbalize understanding. Patient education on precautions     Pt admitted voluntary from [facility] for suicidal ideation to cut wrists due to housing and financial issues.  Pt denies suicidal ideation upon admission and contracts for safety at this time.                     Ernst Kahn RN

## 2020-09-10 NOTE — PLAN OF CARE
Problem: Suicide risk  Goal: Provide patient with safe environment  Description: Provide patient with safe environment  9/10/2020 1141 by Brenda Joyce  Outcome: Ongoing  Patient remains safe on the unit. Problem: Altered Mood, Depressive Behavior:  Goal: Able to verbalize acceptance of life and situations over which he or she has no control  Description: Able to verbalize acceptance of life and situations over which he or she has no control  9/10/2020 1141 by Brenda Joyce  Outcome: Ongoing  Patient is cooperative and pleasant. Relates to some feelings of depression and anxiety. Denies any suicidal or homicidal ideation. Denies any auditory of visual hallucinations. Medication compliant. Q15 minute safety checks maintained.       Problem: Altered Mood, Depressive Behavior:  Goal: Able to verbalize support systems  Description: Able to verbalize support systems  9/10/2020 1141 by Brenda Joyce  Outcome: Ongoing

## 2020-09-10 NOTE — BH NOTE
Patient denies thoughts of self harm and is agreeable to seeking out staff should thoughts of self harm arise. Patient is also able to verbalize support systems, Safe environment maintained. 15 minute checks for safety continued per unit policy. Will continue to monitor for safety and provide support and reassurance as needed.

## 2020-09-10 NOTE — GROUP NOTE
Group Therapy Note    Date: 9/10/2020    Group Start Time: 1000  Group End Time: 1494  Group Topic: Cognitive Skills    KENNY BHJAISON Saenz, CTRS        Group Therapy Note    Attendees: 5/10         Pt did not participate in Cognitive Skills Group at 1000AM when encouraged by RT due to resting in room. Pt was offered talk time as an alternative to group but declined.          Discipline Responsible: Psychoeducational Specialist        Signature:  Modesta Epley

## 2020-09-10 NOTE — BH NOTE
RT ASSESSMENT TREATMENT GOALS    [x]Pt Goal: Pt will identify 1-2 positive coping skills by time of discharge. [x]Pt Goal: Pt will express 1-2 feelings in a healthy manner by time of discharge. []Pt Goal: Pt will remain on task/topic for 15-30 minutes during group by time of discharge. []Pt Goal: Pt will identify 1-2 aspects of relapse prevention plan by time of discharge. []Pt Goal: Pt will join in conversation with peers 1-2 times per group by time of discharge. []Pt Goal: Pt will identify 1-2 new leisure interests by time of discharge. []Pt Goal: Pt will not voice any delusional content by time of discharge.

## 2020-09-11 PROCEDURE — 6370000000 HC RX 637 (ALT 250 FOR IP): Performed by: NURSE PRACTITIONER

## 2020-09-11 PROCEDURE — 99232 SBSQ HOSP IP/OBS MODERATE 35: CPT | Performed by: PSYCHIATRY & NEUROLOGY

## 2020-09-11 PROCEDURE — 6370000000 HC RX 637 (ALT 250 FOR IP): Performed by: PSYCHIATRY & NEUROLOGY

## 2020-09-11 PROCEDURE — 1240000000 HC EMOTIONAL WELLNESS R&B

## 2020-09-11 RX ORDER — ONDANSETRON 4 MG/1
4 TABLET, FILM COATED ORAL EVERY 8 HOURS PRN
Status: DISCONTINUED | OUTPATIENT
Start: 2020-09-11 | End: 2020-09-12

## 2020-09-11 RX ADMIN — BUDESONIDE AND FORMOTEROL FUMARATE DIHYDRATE 2 PUFF: 160; 4.5 AEROSOL RESPIRATORY (INHALATION) at 09:06

## 2020-09-11 RX ADMIN — BUTALBITAL, ACETAMINOPHEN, AND CAFFEINE 1 CAPSULE: 50; 300; 40 CAPSULE ORAL at 20:18

## 2020-09-11 RX ADMIN — ASPIRIN 81 MG: 81 TABLET, COATED ORAL at 09:05

## 2020-09-11 RX ADMIN — ACETAMINOPHEN 650 MG: 325 TABLET, FILM COATED ORAL at 19:29

## 2020-09-11 RX ADMIN — CLONIDINE HYDROCHLORIDE 0.1 MG: 0.1 TABLET ORAL at 20:49

## 2020-09-11 RX ADMIN — RISPERIDONE 1 MG: 1 TABLET ORAL at 09:05

## 2020-09-11 RX ADMIN — NICOTINE POLACRILEX 2 MG: 2 GUM, CHEWING BUCCAL at 19:30

## 2020-09-11 RX ADMIN — BUTALBITAL, ACETAMINOPHEN, AND CAFFEINE 1 CAPSULE: 50; 300; 40 CAPSULE ORAL at 07:04

## 2020-09-11 RX ADMIN — NICOTINE POLACRILEX 2 MG: 2 GUM, CHEWING BUCCAL at 10:56

## 2020-09-11 RX ADMIN — TRAZODONE HYDROCHLORIDE 50 MG: 50 TABLET ORAL at 20:49

## 2020-09-11 RX ADMIN — DIVALPROEX SODIUM 500 MG: 500 TABLET, DELAYED RELEASE ORAL at 09:05

## 2020-09-11 RX ADMIN — NICOTINE POLACRILEX 2 MG: 2 GUM, CHEWING BUCCAL at 14:40

## 2020-09-11 RX ADMIN — HYDROXYZINE HYDROCHLORIDE 50 MG: 50 TABLET, FILM COATED ORAL at 20:49

## 2020-09-11 RX ADMIN — ONDANSETRON HYDROCHLORIDE 4 MG: 4 TABLET, FILM COATED ORAL at 19:28

## 2020-09-11 RX ADMIN — DIVALPROEX SODIUM 500 MG: 500 TABLET, DELAYED RELEASE ORAL at 20:49

## 2020-09-11 RX ADMIN — BUTALBITAL, ACETAMINOPHEN, AND CAFFEINE 1 CAPSULE: 50; 300; 40 CAPSULE ORAL at 13:32

## 2020-09-11 RX ADMIN — HYDROXYZINE HYDROCHLORIDE 50 MG: 50 TABLET, FILM COATED ORAL at 12:16

## 2020-09-11 RX ADMIN — RISPERIDONE 1 MG: 1 TABLET ORAL at 20:49

## 2020-09-11 ASSESSMENT — PAIN SCALES - GENERAL
PAINLEVEL_OUTOF10: 3
PAINLEVEL_OUTOF10: 1
PAINLEVEL_OUTOF10: 1
PAINLEVEL_OUTOF10: 3
PAINLEVEL_OUTOF10: 0

## 2020-09-11 ASSESSMENT — PAIN DESCRIPTION - LOCATION: LOCATION: HEAD

## 2020-09-11 ASSESSMENT — PAIN DESCRIPTION - PAIN TYPE: TYPE: ACUTE PAIN

## 2020-09-11 NOTE — GROUP NOTE
Group Therapy Note    Date: 9/11/2020    Group Start Time: 1000  Group End Time: 1050  Group Topic: Cognitive Skills    [facility] LUIGI Ramirez, CTRS        Group Therapy Note    Attendees: 8/11         Patient's Goal:  To increase social interaction and to practice decision making and concentration . Notes: Pt came to group for last 10 mins only and asked if she could watch task, pt was social with peers in group . Pt did not practice decision making and concentration . Status After Intervention: Unchanged     Participation Level:  Active Listener and Interactive-socially     Participation Quality:  Attentive, Sharing         Speech:  Normal     Thought Process/Content: Logical but unmotivated r/t task, focused on socializing with peers         Affective Functioning: Blunted        Mood: euthymic        Level of consciousness:  Alert, and Attentive        Response to Learning: Able to verbalize current knowledge/experience, Progressing to goal        Endings: None Reported     Modes of Intervention: Education, Support, Socialization, Exploration, Clarifying and Problem-solving        Discipline Responsible: Psychoeducational Specialist        Signature:  Yesenia Hernandez

## 2020-09-11 NOTE — GROUP NOTE
Group Therapy Note    Date: 9/11/2020    Group Start Time: 0900  Group End Time: 0940  Group Topic: Community Meeting    [place]        Group Therapy Note    Attendees: 8/21         Pt did not participate in Community Meeting/Goals Group at 900am when encouraged by RT due to resting in room. Pt was offered talk time as an alternative to group but declined.         Discipline Responsible: Psychoeducational Specialist        Signature:  Ashok Dunaway

## 2020-09-11 NOTE — GROUP NOTE
Group Therapy Note    Date: 9/10/2020    Group Start Time: 2030  Group End Time: 2145  Group Topic: Relaxation    [facility] LUIGI Nielsen        Group Therapy Note    Attendees: 10/18         Patient's Goal:  Relaxation Group    Notes:  Socialization/Support    Status After Intervention:  Improved    Participation Level: Interactive    Participation Quality: Appropriate, Sharing and Supportive      Speech:  normal      Thought Process/Content: Logical      Affective Functioning: Congruent      Mood: within normal limits      Level of consciousness:  Alert and Attentive      Response to Learning: Able to retain information and Progressing to goal      Endings: None Reported    Modes of Intervention: Support and Socialization      Discipline Responsible: [address], [facility] [id number]      Signature:  Melissa Nielsen

## 2020-09-11 NOTE — GROUP NOTE
Group Therapy Note    Date: 9/11/2020    Group Start Time: 1330  Group End Time: 6907  Group Topic: Cognitive Skills    KENNY Pond, CTRS    Pt did not attend 1330 cognitive skills group d/t resting in room despite staff invitation to attend. 1:1 talk time offered as alternative to group session, pt declined.               Signature:  Evette Jonas

## 2020-09-11 NOTE — PLAN OF CARE
Problem: Suicide risk  Goal: Provide patient with safe environment  Description: Provide patient with safe environment  9/11/2020 0020 by Dona Herrera RN  Outcome: Ongoing  Note: Pt isolates to room most of evening. Social with select peers. Patient is cooperative and pleasant. Relates to some feelings of depression and anxiety. Denies any suicidal or homicidal ideation. Denies any auditory of visual hallucinations. Medication compliant. Q15 minute safety checks maintained. Problem: Altered Mood, Depressive Behavior:  Goal: Able to verbalize acceptance of life and situations over which he or she has no control  Description: Able to verbalize acceptance of life and situations over which he or she has no control  9/11/2020 0020 by Dona Herrera RN  Outcome: Ongoing     Problem: Altered Mood, Depressive Behavior:  Goal: Able to verbalize support systems  Description: Able to verbalize support systems  9/11/2020 0020 by Dona Herrera RN  Outcome: Ongoing     Problem: Tobacco Use:  Goal: Inpatient tobacco use cessation counseling participation  Description: Inpatient tobacco use cessation counseling participation  Outcome: Ongoing  Note: Patient given tobacco quitline number 43201207666 at this time, refusing to call at this time, states \" I just dont want to quit now\"- patient given information as to the dangers of long term tobacco use. Continue to reinforce the importance of tobacco cessation.

## 2020-09-11 NOTE — GROUP NOTE
Group Therapy Note    Date: 9/11/2020    Group Start Time: 1600  Group End Time: 9062  Group Topic: Group Documentation    [facility] D    Sara Nicole LPN        Group Therapy Note    Attendees: 10/19         Patient's Goal:  inspire    Notes:  educate    Status After Intervention:  Improved    Participation Level:  Active Listener    Participation Quality: Attentive      Speech:  pressured      Thought Process/Content: Flight of ideas      Affective Functioning: Flat      Mood: anxious      Level of consciousness:  Preoccupied      Response to Learning: Progressing to goal      Endings: None Reported    Modes of Intervention: Education      Discipline Responsible: Licensed Practical Nurse      Signature:  Sara Nicole LPN

## 2020-09-11 NOTE — GROUP NOTE
Group Therapy Note    Date: 9/11/2020    Group Start Time: 1100  Group End Time: 4279  Group Topic: Psychotherapy    [facility] C    GLENIS Jimenez, [facility]        Group Therapy Note    Patient declined to attend psychotherapy group at 11 am despite encouragement by staff. 1:1 was offered as an alternative.             Signature:  GLENIS Jimenez, [place]

## 2020-09-11 NOTE — PROGRESS NOTES
Final            Medications  Current Facility-Administered Medications: ondansetron (ZOFRAN) tablet 4 mg, 4 mg, Oral, Q8H PRN  nicotine polacrilex (NICORETTE) gum 2 mg, 2 mg, Oral, PRN  aspirin EC tablet 81 mg, 81 mg, Oral, Daily  budesonide-formoterol (SYMBICORT) 160-4.5 MCG/ACT inhaler 2 puff, 2 puff, Inhalation, BID  butalbital-APAP-caffeine -40 MG per capsule 1 capsule, 1 capsule, Oral, Q6H PRN  divalproex (DEPAKOTE) DR tablet 500 mg, 500 mg, Oral, 2 times per day  risperiDONE (RISPERDAL) tablet 1 mg, 1 mg, Oral, BID  cloNIDine (CATAPRES) tablet 0.1 mg, 0.1 mg, Oral, Nightly  albuterol (PROVENTIL) nebulizer solution 2.5 mg, 2.5 mg, Nebulization, Q6H PRN  ibuprofen (ADVIL;MOTRIN) tablet 400 mg, 400 mg, Oral, Q6H PRN  acetaminophen (TYLENOL) tablet 650 mg, 650 mg, Oral, Q4H PRN  aluminum & magnesium hydroxide-simethicone (MAALOX) 200-200-20 MG/5ML suspension 20 mL, 20 mL, Oral, Q6H PRN  hydrOXYzine (ATARAX) tablet 50 mg, 50 mg, Oral, TID PRN  polyethylene glycol (GLYCOLAX) packet 17 g, 17 g, Oral, Daily PRN  traZODone (DESYREL) tablet 50 mg, 50 mg, Oral, Nightly PRN    ASSESSMENT  Bipolar 1 disorder, depressed without psychotic features    PLAN  Discussed with Dr. Drea Manriquez. Electronically signed by KEANU Callahan CNP on 9/11/20 at 5:22 PM EDT    **This report has been created using voice recognition software. It may contain minor errors which are inherent in voice recognition technology. **  I independently saw and evaluated the patient. I reviewed the nurse practitioners documentation above. Any additional comments or changes to the nurse practitioners documentation are stated below otherwise agree with assessment. Plan will be as follows:    PLAN  Patient s symptoms   are improving  We will observe the patient on current medications for now, consider further adjustments tomorrow  Attempt to develop insight  Psycho-education conducted. Supportive Therapy conducted.   Probable discharge is next week  Follow-up daily while on inpatient unit

## 2020-09-11 NOTE — PLAN OF CARE
Problem: Suicide risk  Goal: Provide patient with safe environment  Description: Provide patient with safe environment  Outcome: Ongoing  Note: Safety checks continue every 15 minutes. Problem: Altered Mood, Depressive Behavior:  Goal: Able to verbalize acceptance of life and situations over which he or she has no control  Description: Able to verbalize acceptance of life and situations over which he or she has no control  Outcome: Ongoing  Note: Patient denies any suicidal or homicidal ideations or tendencies to inflict self harm. Patient expresses feeling some depression and anxiety but feels symptoms are improving with medication. Patient can be hyper verbal at times but can be redirected. Patient is moderately isolative to room this shift but med compliant, programming encouraged. Problem: Altered Mood, Depressive Behavior:  Goal: Able to verbalize support systems  Description: Able to verbalize support systems  Outcome: Ongoing     Problem: Tobacco Use:  Goal: Inpatient tobacco use cessation counseling participation  Description: Inpatient tobacco use cessation counseling participation  Outcome: Ongoing  Note: Patient declines any tobacco cessation literature at this time.

## 2020-09-11 NOTE — GROUP NOTE
Group Therapy Note    Date: 9/11/2020    Group Start Time: 1430  Group End Time: 0174  Group Topic: Cognitive Skills    KENNY BHI LUIGI Braga, CTRS        Group Therapy Note    Attendees: 5/16         Pt did not participate in Cognitive Skills Group at 1430 when encouraged by RT due to up in day room but declined to attend group. Pt was offered talk time as an alternative to group but declined.          Discipline Responsible: Psychoeducational Specialist        Signature:  Burna Fabry

## 2020-09-12 LAB
ALBUMIN SERPL-MCNC: 4.1 G/DL (ref 3.5–5.2)
ALBUMIN/GLOBULIN RATIO: ABNORMAL (ref 1–2.5)
ALP BLD-CCNC: 130 U/L (ref 35–104)
ALT SERPL-CCNC: 16 U/L (ref 5–33)
ANION GAP SERPL CALCULATED.3IONS-SCNC: 9 MMOL/L (ref 9–17)
AST SERPL-CCNC: 19 U/L
BILIRUB SERPL-MCNC: 0.18 MG/DL (ref 0.3–1.2)
BUN BLDV-MCNC: 14 MG/DL (ref 6–20)
BUN/CREAT BLD: ABNORMAL (ref 9–20)
CALCIUM SERPL-MCNC: 10.1 MG/DL (ref 8.6–10.4)
CHLORIDE BLD-SCNC: 98 MMOL/L (ref 98–107)
CO2: 25 MMOL/L (ref 20–31)
CREAT SERPL-MCNC: 0.81 MG/DL (ref 0.5–0.9)
GFR AFRICAN AMERICAN: >60 ML/MIN
GFR NON-AFRICAN AMERICAN: >60 ML/MIN
GFR SERPL CREATININE-BSD FRML MDRD: ABNORMAL ML/MIN/{1.73_M2}
GFR SERPL CREATININE-BSD FRML MDRD: ABNORMAL ML/MIN/{1.73_M2}
GLUCOSE BLD-MCNC: 81 MG/DL (ref 70–99)
POTASSIUM SERPL-SCNC: 5.1 MMOL/L (ref 3.7–5.3)
SODIUM BLD-SCNC: 132 MMOL/L (ref 135–144)
TOTAL PROTEIN: 6.9 G/DL (ref 6.4–8.3)
VALPROIC ACID LEVEL: 54 UG/ML (ref 50–125)
VALPROIC DATE LAST DOSE: NORMAL
VALPROIC DOSE AMOUNT: 500
VALPROIC TIME LAST DOSE: 839

## 2020-09-12 PROCEDURE — 1240000000 HC EMOTIONAL WELLNESS R&B

## 2020-09-12 PROCEDURE — 6370000000 HC RX 637 (ALT 250 FOR IP): Performed by: NURSE PRACTITIONER

## 2020-09-12 PROCEDURE — 80164 ASSAY DIPROPYLACETIC ACD TOT: CPT

## 2020-09-12 PROCEDURE — 80053 COMPREHEN METABOLIC PANEL: CPT

## 2020-09-12 PROCEDURE — 99232 SBSQ HOSP IP/OBS MODERATE 35: CPT | Performed by: PSYCHIATRY & NEUROLOGY

## 2020-09-12 PROCEDURE — 6370000000 HC RX 637 (ALT 250 FOR IP): Performed by: PSYCHIATRY & NEUROLOGY

## 2020-09-12 PROCEDURE — 36415 COLL VENOUS BLD VENIPUNCTURE: CPT

## 2020-09-12 RX ORDER — DIVALPROEX SODIUM 500 MG/1
1000 TABLET, DELAYED RELEASE ORAL EVERY 12 HOURS SCHEDULED
Status: DISCONTINUED | OUTPATIENT
Start: 2020-09-13 | End: 2020-09-12

## 2020-09-12 RX ORDER — DIVALPROEX SODIUM 500 MG/1
500 TABLET, DELAYED RELEASE ORAL EVERY 12 HOURS SCHEDULED
Status: DISCONTINUED | OUTPATIENT
Start: 2020-09-12 | End: 2020-09-12

## 2020-09-12 RX ORDER — ONDANSETRON 4 MG/1
4 TABLET, ORALLY DISINTEGRATING ORAL EVERY 8 HOURS PRN
Status: DISCONTINUED | OUTPATIENT
Start: 2020-09-12 | End: 2020-09-21 | Stop reason: HOSPADM

## 2020-09-12 RX ORDER — DIVALPROEX SODIUM 500 MG/1
500 TABLET, DELAYED RELEASE ORAL DAILY
Status: DISCONTINUED | OUTPATIENT
Start: 2020-09-13 | End: 2020-09-21 | Stop reason: HOSPADM

## 2020-09-12 RX ORDER — GABAPENTIN 300 MG/1
300 CAPSULE ORAL 3 TIMES DAILY
Status: DISCONTINUED | OUTPATIENT
Start: 2020-09-12 | End: 2020-09-13

## 2020-09-12 RX ORDER — DIVALPROEX SODIUM 500 MG/1
1000 TABLET, DELAYED RELEASE ORAL NIGHTLY
Status: DISCONTINUED | OUTPATIENT
Start: 2020-09-12 | End: 2020-09-21 | Stop reason: HOSPADM

## 2020-09-12 RX ADMIN — ONDANSETRON HYDROCHLORIDE 4 MG: 4 TABLET, FILM COATED ORAL at 11:44

## 2020-09-12 RX ADMIN — NICOTINE POLACRILEX 2 MG: 2 GUM, CHEWING BUCCAL at 16:26

## 2020-09-12 RX ADMIN — BUTALBITAL, ACETAMINOPHEN, AND CAFFEINE 1 CAPSULE: 50; 300; 40 CAPSULE ORAL at 12:47

## 2020-09-12 RX ADMIN — ASPIRIN 81 MG: 81 TABLET, COATED ORAL at 08:39

## 2020-09-12 RX ADMIN — RISPERIDONE 1 MG: 1 TABLET ORAL at 08:39

## 2020-09-12 RX ADMIN — GABAPENTIN 300 MG: 300 CAPSULE ORAL at 21:07

## 2020-09-12 RX ADMIN — NICOTINE POLACRILEX 2 MG: 2 GUM, CHEWING BUCCAL at 14:41

## 2020-09-12 RX ADMIN — DIVALPROEX SODIUM 1000 MG: 500 TABLET, DELAYED RELEASE ORAL at 21:07

## 2020-09-12 RX ADMIN — HYDROXYZINE HYDROCHLORIDE 50 MG: 50 TABLET, FILM COATED ORAL at 14:38

## 2020-09-12 RX ADMIN — BUDESONIDE AND FORMOTEROL FUMARATE DIHYDRATE 2 PUFF: 160; 4.5 AEROSOL RESPIRATORY (INHALATION) at 13:47

## 2020-09-12 RX ADMIN — CLONIDINE HYDROCHLORIDE 0.1 MG: 0.1 TABLET ORAL at 21:07

## 2020-09-12 RX ADMIN — RISPERIDONE 1 MG: 1 TABLET ORAL at 21:08

## 2020-09-12 RX ADMIN — NICOTINE POLACRILEX 2 MG: 2 GUM, CHEWING BUCCAL at 17:24

## 2020-09-12 RX ADMIN — TRAZODONE HYDROCHLORIDE 50 MG: 50 TABLET ORAL at 21:07

## 2020-09-12 RX ADMIN — GABAPENTIN 300 MG: 300 CAPSULE ORAL at 13:47

## 2020-09-12 RX ADMIN — DIVALPROEX SODIUM 500 MG: 500 TABLET, DELAYED RELEASE ORAL at 08:39

## 2020-09-12 ASSESSMENT — PAIN DESCRIPTION - LOCATION
LOCATION: HEAD
LOCATION: HEAD

## 2020-09-12 ASSESSMENT — PAIN SCALES - GENERAL
PAINLEVEL_OUTOF10: 2
PAINLEVEL_OUTOF10: 1

## 2020-09-12 ASSESSMENT — PAIN DESCRIPTION - PAIN TYPE
TYPE: OTHER (COMMENT)
TYPE: OTHER (COMMENT)

## 2020-09-12 NOTE — BH NOTE
Patient voiced complaints of HA/nausea, unsalted saltines provided but ineffective, PRN administered. Patient has no blurred vision or slurred speech nor change in motor skills.

## 2020-09-12 NOTE — PLAN OF CARE
Problem: Suicide risk  Goal: Provide patient with safe environment  Description: Provide patient with safe environment  9/11/2020 2058 by Jaosn Lerma RN  Outcome: Ongoing   Every 15 min safety checks cont  Problem: Altered Mood, Depressive Behavior:  Goal: Able to verbalize support systems  Description: Able to verbalize support systems  9/11/2020 2058 by Jason Lerma RN  Outcome: Ongoing   Problem: Altered Mood, Depressive Behavior:  Goal: Able to verbalize acceptance of life and situations over which he or she has no control  Description: Able to verbalize acceptance of life and situations over which he or she has no control  9/11/2020 2058 by Jason Lerma RN  Outcome: Ongoing   Patient states they are not having thoughts of self harm at this time and verbally agrees to seek staff if feelings of harming self arise. Patient behavior controlled and accepting of medications,flat,denies audio hallucination and visual hallucinations patient eating and sleeping well. Patient reports depression is better. Patient spending long intervals in bed and reports states being sleepy . Staff will continue to monitor for safety and offer support as needed.

## 2020-09-12 NOTE — PLAN OF CARE
Problem: Suicide risk  Goal: Provide patient with safe environment  Description: Provide patient with safe environment  Outcome: Ongoing  Note: Patient denies any thoughts of self harm or hallucinations. Medication compliant and behavior controlled. Out for meals only. Not attending groups.

## 2020-09-12 NOTE — GROUP NOTE
Group Therapy Note    Date: 9/12/2020    Group Start Time: 0900  Group End Time: 0148  Group Topic: Community Meeting    KENNY BHI LUIGI Mcduffie    Patient refused to attend community meeting/ goals group at 0900 after encouragement from staff. 1:1 talk time provided by staff.        Signature:  Varsha Mcduffie

## 2020-09-12 NOTE — PROGRESS NOTES
in inpatient unit  Delusions:  no evidence of delusions  Perceptual Disturbance:  denies any perceptual disturbance  Cognition:  Oriented to person, place, situation   Memory: age appropriate  Insight & Judgement: Impaired  medication side effects:  denies       Data   height is 5' 2\" (1.575 m) and weight is 165 lb (74.8 kg). Her oral temperature is 97.8 °F (36.6 °C). Her blood pressure is 142/82 (abnormal) and her pulse is 63. Her respiration is 14 and oxygen saturation is 98%.    Labs:   Admission on 09/09/2020   Component Date Value Ref Range Status    Amphetamine Screen, Ur 09/09/2020 NEGATIVE  NEGATIVE Final    Comment:       (Positive cutoff 1000 ng/mL)                  Barbiturate Screen, Ur 09/09/2020 POSITIVE* NEGATIVE Final    Comment:       (Positive cutoff 200 ng/mL)                  Benzodiazepine Screen, Urine 09/09/2020 NEGATIVE  NEGATIVE Final    Comment:       (Positive cutoff 200 ng/mL)                  Cocaine Metabolite, Urine 09/09/2020 NEGATIVE  NEGATIVE Final    Comment:       (Positive cutoff 300 ng/mL)                  Methadone Screen, Urine 09/09/2020 NEGATIVE  NEGATIVE Final    Comment:       (Positive cutoff 300 ng/mL)                  Opiates, Urine 09/09/2020 NEGATIVE  NEGATIVE Final    Comment:       (Positive cutoff 300 ng/mL)                  Phencyclidine, Urine 09/09/2020 NEGATIVE  NEGATIVE Final    Comment:       (Positive cutoff 25 ng/mL)                  Propoxyphene, Urine 09/09/2020 NOT REPORTED  NEGATIVE Final    Cannabinoid Scrn, Ur 09/09/2020 NEGATIVE  NEGATIVE Final    Comment:       (Positive cutoff 50 ng/mL)                  Oxycodone Screen, Ur 09/09/2020 NEGATIVE  NEGATIVE Final    Comment:       (Positive cutoff 100 ng/mL)                  Methamphetamine, Urine 09/09/2020 NOT REPORTED  NEGATIVE Final    Tricyclic Antidepressants, Urine 09/09/2020 NOT REPORTED  NEGATIVE Final    MDMA, Urine 09/09/2020 NOT REPORTED  NEGATIVE Final    Buprenorphine Urine 09/09/2020 NOT REPORTED  NEGATIVE Final    Test Information 09/09/2020 Assay provides medical screening only. The absence of expected drug(s) and/or metabolite(s) may indicate diluted or adulterated urine, limitations of testing or timing of collection. Final    Comment: Testing for legal purposes should be confirmed by another method. To request confirmation   of test result, please call the lab within 7 days of sample submission.  SARS-CoV-2 09/09/2020        Final    SARS-CoV-2, Rapid 09/09/2020 Not Detected  Not Detected Final    Comment:       Rapid NAAT:  The specimen is NEGATIVE for SARS-CoV-2, the novel coronavirus associated with   COVID-19. The ID NOW COVID-19 assay is designed to detect the virus that causes COVID-19 in patients   with signs and symptoms of infection who are suspected of COVID-19. An individual without symptoms of COVID-19 and who is not shedding SARS-CoV-2 virus would   expect to have a negative (not detected) result in this assay. Negative results should be treated as presumptive and, if inconsistent with clinical signs   and symptoms or necessary for patient management,  should be tested with an alternative molecular assay. Negative results do not preclude   SARS-CoV-2 infection and   should not be used as the sole basis for patient management decisions. Fact sheet for Healthcare Providers: Darline[employer]xiao  Fact sheet for Patients: Darline[employer]xiao          Methodology: Isothermal Nucleic Acid Amplification      Source 09/09/2020 . NASOPHARYNGEAL SWAB   Final    SARS-CoV-2 09/09/2020        Final            Medications  Current Facility-Administered Medications: [START ON 9/13/2020] divalproex (DEPAKOTE) DR tablet 1,000 mg, 1,000 mg, Oral, 2 times per day  gabapentin (NEURONTIN) capsule 300 mg, 300 mg, Oral, TID  ondansetron (ZOFRAN-ODT) disintegrating tablet 4 mg, 4 mg, Oral, Q8H PRN  nicotine polacrilex (NICORETTE) gum 2 mg, 2 mg, Oral, PRN  aspirin EC tablet 81 mg, 81 mg, Oral, Daily  budesonide-formoterol (SYMBICORT) 160-4.5 MCG/ACT inhaler 2 puff, 2 puff, Inhalation, BID  butalbital-APAP-caffeine -40 MG per capsule 1 capsule, 1 capsule, Oral, Q6H PRN  risperiDONE (RISPERDAL) tablet 1 mg, 1 mg, Oral, BID  cloNIDine (CATAPRES) tablet 0.1 mg, 0.1 mg, Oral, Nightly  albuterol (PROVENTIL) nebulizer solution 2.5 mg, 2.5 mg, Nebulization, Q6H PRN  ibuprofen (ADVIL;MOTRIN) tablet 400 mg, 400 mg, Oral, Q6H PRN  acetaminophen (TYLENOL) tablet 650 mg, 650 mg, Oral, Q4H PRN  aluminum & magnesium hydroxide-simethicone (MAALOX) 200-200-20 MG/5ML suspension 20 mL, 20 mL, Oral, Q6H PRN  hydrOXYzine (ATARAX) tablet 50 mg, 50 mg, Oral, TID PRN  polyethylene glycol (GLYCOLAX) packet 17 g, 17 g, Oral, Daily PRN  traZODone (DESYREL) tablet 50 mg, 50 mg, Oral, Nightly PRN    ASSESSMENT  Bipolar 1 disorder, depressed without psychotic features    PLAN  Discussed with Dr. Nolan Klinefelter. Depakote level ordered for tomorrow morning, to be obtained prior to morning med administration  Zofran as needed changed to ODT  Start gabapentin 300 mg 3 times daily  Plan to increase Depakote to 1000 mg every 12 hours       Electronically signed by KEANU Monterroso CNP on 9/11/20 at 5:22 PM EDT    **This report has been created using voice recognition software. It may contain minor errors which are inherent in voice recognition technology. **    I independently saw and evaluated the patient. I reviewed the nurse practitioners documentation above. Any additional comments or changes to the nurse practitioners documentation are stated below otherwise agree with assessment. Plan will be as follows:        PLAN  Patient s symptoms   are improving  Patient frustrated with regards to some of her medications however overall she is much less anxious and agitated than on admission  Attempt to develop insight  Psycho-education conducted.   Supportive Therapy conducted.   Probable discharge is 2 to 3 days  Follow-up daily while on inpatient unit

## 2020-09-12 NOTE — GROUP NOTE
Group Therapy Note    Date: 9/12/2020    Group Start Time: 1000  Group End Time: 1030  Group Topic: Psychoeducation    KENNY Gillespie        Group Therapy Note       Patient refused to attend psychotherapy group after encouragement from staff. 1:1 talk time offered but refused. Signature:   Suzy Gillespie

## 2020-09-12 NOTE — GROUP NOTE
Group Therapy Note    Date: 9/12/2020    Group Start Time: 1330  Group End Time: 1400  Group Topic: Psychoeducation    KENNY WILKERSONI D Beryle Costa    Patient refused to attend gratitude/ coping skills group at 1330 after encouragement from staff. 1:1 talk time provided by staff.       Signature:  Beryle Costa

## 2020-09-13 PROCEDURE — 6370000000 HC RX 637 (ALT 250 FOR IP): Performed by: PSYCHIATRY & NEUROLOGY

## 2020-09-13 PROCEDURE — 1240000000 HC EMOTIONAL WELLNESS R&B

## 2020-09-13 PROCEDURE — 6370000000 HC RX 637 (ALT 250 FOR IP): Performed by: NURSE PRACTITIONER

## 2020-09-13 PROCEDURE — 99232 SBSQ HOSP IP/OBS MODERATE 35: CPT | Performed by: PSYCHIATRY & NEUROLOGY

## 2020-09-13 RX ORDER — GABAPENTIN 300 MG/1
600 CAPSULE ORAL 3 TIMES DAILY
Status: DISCONTINUED | OUTPATIENT
Start: 2020-09-13 | End: 2020-09-21 | Stop reason: HOSPADM

## 2020-09-13 RX ORDER — TRAZODONE HYDROCHLORIDE 150 MG/1
150 TABLET ORAL NIGHTLY
Status: DISCONTINUED | OUTPATIENT
Start: 2020-09-13 | End: 2020-09-21 | Stop reason: HOSPADM

## 2020-09-13 RX ADMIN — DIVALPROEX SODIUM 500 MG: 500 TABLET, DELAYED RELEASE ORAL at 08:03

## 2020-09-13 RX ADMIN — BUTALBITAL, ACETAMINOPHEN, AND CAFFEINE 1 CAPSULE: 50; 300; 40 CAPSULE ORAL at 20:24

## 2020-09-13 RX ADMIN — TRAZODONE HYDROCHLORIDE 150 MG: 150 TABLET ORAL at 20:55

## 2020-09-13 RX ADMIN — DIVALPROEX SODIUM 1000 MG: 500 TABLET, DELAYED RELEASE ORAL at 20:55

## 2020-09-13 RX ADMIN — GABAPENTIN 600 MG: 300 CAPSULE ORAL at 20:55

## 2020-09-13 RX ADMIN — RISPERIDONE 1 MG: 1 TABLET ORAL at 08:03

## 2020-09-13 RX ADMIN — GABAPENTIN 300 MG: 300 CAPSULE ORAL at 08:02

## 2020-09-13 RX ADMIN — CLONIDINE HYDROCHLORIDE 0.1 MG: 0.1 TABLET ORAL at 20:54

## 2020-09-13 RX ADMIN — NICOTINE POLACRILEX 2 MG: 2 GUM, CHEWING BUCCAL at 13:40

## 2020-09-13 RX ADMIN — BUTALBITAL, ACETAMINOPHEN, AND CAFFEINE 1 CAPSULE: 50; 300; 40 CAPSULE ORAL at 13:40

## 2020-09-13 RX ADMIN — ASPIRIN 81 MG: 81 TABLET, COATED ORAL at 08:03

## 2020-09-13 RX ADMIN — ACETAMINOPHEN 650 MG: 325 TABLET, FILM COATED ORAL at 12:19

## 2020-09-13 RX ADMIN — ACETAMINOPHEN 650 MG: 325 TABLET, FILM COATED ORAL at 19:07

## 2020-09-13 RX ADMIN — RISPERIDONE 1 MG: 1 TABLET ORAL at 20:55

## 2020-09-13 RX ADMIN — NICOTINE POLACRILEX 2 MG: 2 GUM, CHEWING BUCCAL at 11:19

## 2020-09-13 RX ADMIN — NICOTINE POLACRILEX 2 MG: 2 GUM, CHEWING BUCCAL at 15:26

## 2020-09-13 RX ADMIN — NICOTINE POLACRILEX 2 MG: 2 GUM, CHEWING BUCCAL at 19:53

## 2020-09-13 RX ADMIN — GABAPENTIN 300 MG: 300 CAPSULE ORAL at 13:40

## 2020-09-13 RX ADMIN — HYDROXYZINE HYDROCHLORIDE 50 MG: 50 TABLET, FILM COATED ORAL at 20:55

## 2020-09-13 RX ADMIN — BUTALBITAL, ACETAMINOPHEN, AND CAFFEINE 1 CAPSULE: 50; 300; 40 CAPSULE ORAL at 08:03

## 2020-09-13 RX ADMIN — NICOTINE POLACRILEX 2 MG: 2 GUM, CHEWING BUCCAL at 17:32

## 2020-09-13 RX ADMIN — ONDANSETRON 4 MG: 4 TABLET, ORALLY DISINTEGRATING ORAL at 19:53

## 2020-09-13 ASSESSMENT — PAIN SCALES - GENERAL
PAINLEVEL_OUTOF10: 1
PAINLEVEL_OUTOF10: 3
PAINLEVEL_OUTOF10: 1

## 2020-09-13 ASSESSMENT — PAIN - FUNCTIONAL ASSESSMENT
PAIN_FUNCTIONAL_ASSESSMENT: 0-10

## 2020-09-13 NOTE — GROUP NOTE
Group Therapy Note    Date: 9/13/2020    Group Start Time: 1000  Group End Time: 1030  Group Topic: Psychoeducation    KENNY Bustamante        Group Therapy Note         Patient refused to attend psychotherapy group after encouragement from staff. 1:1 talk time offered but refused. Signature:   Laura Bustamante

## 2020-09-13 NOTE — PROGRESS NOTES
Daily Progress Note  Cha Rodriguez CNP  9/13/2020     CHIEF COMPLAINT: Suicidal ideation with planned overdose    Reviewed patient's current plan of care and vital signs with nursing staff. Sleep: Patient continues to endorse poor sleep  Attending groups: No: Isolating to room, did attend 1 group session late yesterday afternoon    SUBJECTIVE:    Staff reports that [address] remains compliant with her medication. She is receiving as needed Atarax for anxiety. She did attend group last evening. She is otherwise been isolating to her room. Lizz Ross was lying in bed during today's interview. She reports that she did come out for breakfast this morning, states that she woke up with a headache and believes that it secondary to her chronic neck pain. She reports that she is feeling better after taking a Fioricet. She continues to endorse poor sleep, she states that she is uncomfortable in her bed. We discussed that we can order a waffle mattress for improved comfort, she was agreeable and thankful. She continues to endorse depression. She endorses auditory hallucinations she states \"in my mind I hear the voice saying just do it\". She endorses suicidal ideation, she has no plan while in hospital she reports that if she were out of the hospital she would \"cut myself\". She is able to contract for safety while on the inpatient unit. She becomes tearful stating \"I just feel worthless\". She denies any significant improvement in mood with medications, she does state \"but I just started them\". She was encouraged to get out of her room more today and attend more groups. She reports that she sat during yesterday's group and did not verbally participate, though she felt that it was worthwhile.       Mental Status Exam  Level of consciousness:  Awake and alert  Appearance: Hospital attire, lying in bed, disheveled  Behavior/Motor: No abnormalities noted  Attitude toward examiner: Attentive, good eye contact  Speech: (DEPAKOTE) DR tablet 1,000 mg, 1,000 mg, Oral, Nightly  nicotine polacrilex (NICORETTE) gum 2 mg, 2 mg, Oral, PRN  aspirin EC tablet 81 mg, 81 mg, Oral, Daily  budesonide-formoterol (SYMBICORT) 160-4.5 MCG/ACT inhaler 2 puff, 2 puff, Inhalation, BID  butalbital-APAP-caffeine -40 MG per capsule 1 capsule, 1 capsule, Oral, Q6H PRN  risperiDONE (RISPERDAL) tablet 1 mg, 1 mg, Oral, BID  cloNIDine (CATAPRES) tablet 0.1 mg, 0.1 mg, Oral, Nightly  albuterol (PROVENTIL) nebulizer solution 2.5 mg, 2.5 mg, Nebulization, Q6H PRN  ibuprofen (ADVIL;MOTRIN) tablet 400 mg, 400 mg, Oral, Q6H PRN  acetaminophen (TYLENOL) tablet 650 mg, 650 mg, Oral, Q4H PRN  aluminum & magnesium hydroxide-simethicone (MAALOX) 200-200-20 MG/5ML suspension 20 mL, 20 mL, Oral, Q6H PRN  hydrOXYzine (ATARAX) tablet 50 mg, 50 mg, Oral, TID PRN  polyethylene glycol (GLYCOLAX) packet 17 g, 17 g, Oral, Daily PRN  traZODone (DESYREL) tablet 50 mg, 50 mg, Oral, Nightly PRN    ASSESSMENT  Bipolar 1 disorder, depressed without psychotic features    PLAN  Discussed with Dr. Ted Feng. Waffle mattress ordered secondary to neck and back pain to improve sleep      Electronically signed by KEANU Barker CNP on 9/11/20 at 5:22 PM EDT    **This report has been created using voice recognition software. It may contain minor errors which are inherent in voice recognition technology. **    I independently saw and evaluated the patient. I reviewed the nurse practitioners documentation above. Any additional comments or changes to the nurse practitioners documentation are stated below otherwise agree with assessment. Plan will be as follows:  Patient appears much more calm and organized today. She continues to request multiple medication changes. I discussed with her the need to address these medications changes slowly given introduction of Depakote.   She is continuing to identify some pain and discomfort which she states was previously treated with

## 2020-09-13 NOTE — PLAN OF CARE
Problem: Suicide risk  Goal: Provide patient with safe environment  Description: Provide patient with safe environment  9/13/2020 1011 by Aris Figueroa RN  Outcome: Ongoing  Note: Q15 minute rounding for patient safety. Denies any thoughts of self harm or hallucinations. Out for meals. Medication compliant and behavior controlled.

## 2020-09-13 NOTE — PLAN OF CARE
Problem: Suicide risk  Goal: Provide patient with safe environment  Description: Provide patient with safe environment  9/12/2020 2145 by Jerome Watkins RN  Outcome: Ongoing  Note:   Patient denies any suicidal/homicidal ideations and auditory/visual hallucinations. Isolative to room. Resting in bed with eyes closed respirations even and unlabored. Compliant with medications. 15 minutesafety rounds maintained per protocol. Problem: Altered Mood, Depressive Behavior:  Goal: Able to verbalize acceptance of life and situations over which he or she has no control  Description: Able to verbalize acceptance of life and situations over which he or she has no control  Outcome: Ongoing  Note:   Patient denies any suicidal/homicidal ideations and auditory/visual hallucinations. Isolative to room. Resting in bed with eyes closed respirations even and unlabored. Compliant with medications. 15 minutesafety rounds maintained per protocol. Problem: Altered Mood, Depressive Behavior:  Goal: Able to verbalize support systems  Description: Able to verbalize support systems  Outcome: Ongoing  Note:   Patient denies any suicidal/homicidal ideations and auditory/visual hallucinations. 15 minutesafety rounds maintained per protocol. Problem: Tobacco Use:  Goal: Inpatient tobacco use cessation counseling participation  Description: Inpatient tobacco use cessation counseling participation  Outcome: Ongoing  Note: Patient given tobacco quitline number 67751494800 at this time, refusing to call at this time, states \" I just dont want to quit now\"- patient given information as to the dangers of long term tobacco use. Continue to reinforce the importance of tobacco cessation.

## 2020-09-13 NOTE — GROUP NOTE
Group Therapy Note    Date: 9/13/2020    Group Start Time: 0900  Group End Time: 0915  Group Topic: Community Meeting    KENNY Sanz    Patient refused to attend community meeting/ goals group at 0900 after encouragement from staff. 1:1 talk time provided by staff.      Signature:  Jasmyne Sanz

## 2020-09-14 PROCEDURE — 6370000000 HC RX 637 (ALT 250 FOR IP): Performed by: PSYCHIATRY & NEUROLOGY

## 2020-09-14 PROCEDURE — 99232 SBSQ HOSP IP/OBS MODERATE 35: CPT | Performed by: PSYCHIATRY & NEUROLOGY

## 2020-09-14 PROCEDURE — 6370000000 HC RX 637 (ALT 250 FOR IP): Performed by: NURSE PRACTITIONER

## 2020-09-14 PROCEDURE — 1240000000 HC EMOTIONAL WELLNESS R&B

## 2020-09-14 RX ADMIN — ASPIRIN 81 MG: 81 TABLET, COATED ORAL at 10:14

## 2020-09-14 RX ADMIN — RISPERIDONE 1 MG: 1 TABLET ORAL at 21:16

## 2020-09-14 RX ADMIN — GABAPENTIN 600 MG: 300 CAPSULE ORAL at 10:14

## 2020-09-14 RX ADMIN — CLONIDINE HYDROCHLORIDE 0.1 MG: 0.1 TABLET ORAL at 21:16

## 2020-09-14 RX ADMIN — BUTALBITAL, ACETAMINOPHEN, AND CAFFEINE 1 CAPSULE: 50; 300; 40 CAPSULE ORAL at 21:16

## 2020-09-14 RX ADMIN — BUTALBITAL, ACETAMINOPHEN, AND CAFFEINE 1 CAPSULE: 50; 300; 40 CAPSULE ORAL at 06:39

## 2020-09-14 RX ADMIN — BUDESONIDE AND FORMOTEROL FUMARATE DIHYDRATE 2 PUFF: 160; 4.5 AEROSOL RESPIRATORY (INHALATION) at 21:16

## 2020-09-14 RX ADMIN — NICOTINE POLACRILEX 2 MG: 2 GUM, CHEWING BUCCAL at 20:29

## 2020-09-14 RX ADMIN — DIVALPROEX SODIUM 500 MG: 500 TABLET, DELAYED RELEASE ORAL at 10:14

## 2020-09-14 RX ADMIN — NICOTINE POLACRILEX 2 MG: 2 GUM, CHEWING BUCCAL at 17:20

## 2020-09-14 RX ADMIN — HYDROXYZINE HYDROCHLORIDE 50 MG: 50 TABLET, FILM COATED ORAL at 21:16

## 2020-09-14 RX ADMIN — TRAZODONE HYDROCHLORIDE 150 MG: 150 TABLET ORAL at 21:16

## 2020-09-14 RX ADMIN — DIVALPROEX SODIUM 1000 MG: 500 TABLET, DELAYED RELEASE ORAL at 21:16

## 2020-09-14 RX ADMIN — RISPERIDONE 1 MG: 1 TABLET ORAL at 10:14

## 2020-09-14 RX ADMIN — GABAPENTIN 600 MG: 300 CAPSULE ORAL at 21:16

## 2020-09-14 RX ADMIN — BUTALBITAL, ACETAMINOPHEN, AND CAFFEINE 1 CAPSULE: 50; 300; 40 CAPSULE ORAL at 15:47

## 2020-09-14 RX ADMIN — GABAPENTIN 600 MG: 300 CAPSULE ORAL at 14:04

## 2020-09-14 ASSESSMENT — PAIN DESCRIPTION - DESCRIPTORS: DESCRIPTORS: CONSTANT;DISCOMFORT

## 2020-09-14 ASSESSMENT — PAIN DESCRIPTION - ORIENTATION: ORIENTATION: RIGHT;LEFT

## 2020-09-14 ASSESSMENT — PAIN SCALES - GENERAL
PAINLEVEL_OUTOF10: 3
PAINLEVEL_OUTOF10: 4
PAINLEVEL_OUTOF10: 1

## 2020-09-14 ASSESSMENT — PAIN DESCRIPTION - LOCATION: LOCATION: BACK

## 2020-09-14 ASSESSMENT — PAIN DESCRIPTION - PAIN TYPE: TYPE: ACUTE PAIN

## 2020-09-14 NOTE — GROUP NOTE
Group Therapy Note    Date: 9/14/2020    Group Start Time: 1100  Group End Time: 1242  Group Topic: Psychotherapy    [facility]    GLENIS Briones, [facility]        Group Therapy Note    Attendees: 7/11         Patient's Goal: Pt will participate in psychotherapy in order to help eliminate or control troubling symptoms so a person can function better and can increase well-being and healing. Notes:  Pt appeared interested in group discussion and openly participated. Status After Intervention:  Improved    Participation Level:  Active Listener and Interactive    Participation Quality: Appropriate, Attentive, Sharing and Supportive      Speech:  normal      Thought Process/Content: Logical      Affective Functioning: Congruent      Mood: euthymic      Level of consciousness:  Alert, Oriented x4 and Attentive      Response to Learning: Able to verbalize current knowledge/experience, Able to verbalize/acknowledge new learning and Progressing to goal      Endings: None Reported    Modes of Intervention: Exploration, Clarifying and Problem-solving      Discipline Responsible: /Counselor      Signature:  GLENIS Briones, [place]

## 2020-09-14 NOTE — GROUP NOTE
Group Therapy Note    Date: 9/14/2020    Group Start Time: 1600  Group End Time: 6815  Group Topic: Group Therapy    [facility]    Sunil Cox RN        Group Therapy Note    Attendees: 9             Status After Intervention:  Unchanged    Participation Level:  Active Listener and Interactive    Participation Quality: Appropriate      Speech:  normal      Thought Process/Content: Logical      Affective Functioning: Flat      Mood: angry      Level of consciousness:  Alert and Oriented x4      Response to Learning: Able to verbalize current knowledge/experience and Able to verbalize/acknowledge new learning      Endings: None Reported    Modes of Intervention: Education, Support and Socialization      Discipline Responsible: Registered Nurse      Signature:  Sunil Cox RN

## 2020-09-14 NOTE — GROUP NOTE
Group Therapy Note    Date: 9/14/2020    Group Start Time: 0900  Group End Time: 4610  Group Topic: Community Meeting    Shireen Olson        Group Therapy Note    Attendees: 5/19         Pt did not participate in Community Meeting/Goals Group at 900am when encouraged by RT due to resting in room. Pt was offered talk time as an alternative to group but declined.       Discipline Responsible: Psychoeducational Specialist      Signature:  Fermin Baird

## 2020-09-14 NOTE — PROGRESS NOTES
Daily Progress Note  Reyes Cash MD  2020  CHIEF COMPLAINT: Suicidal ideation with plan to overdose    Reviewed patient's current plan of care and vital signs with nursing staff. Sleep:  7 hours last night  Attending groups: Yes    SUBJECTIVE:    Patient reports much improved sleep last night. She states the bed adjustments helped immensely along with the medication adjustments. She states she still has feelings of hopelessness and worthlessness and negative thoughts and often thinks that it would not matter if she . However she is denying any active intent or plan here on the unit to hurt herself and is starting to feel \"a little lighter\" when it comes to her mood. She does report improvement in her anxiety as well. States her thoughts are more organized and feels like her mind is slowing down. Mental Status Exam  Level of consciousness:  Within normal limits  Appearance: Hospital attire, seated in chair, with good grooming and hygiene   Behavior/Motor: No abnormalities noted  Attitude toward examiner:  Cooperative, attentive, good eye contact  Speech:  spontaneous, normal rate, normal volume and well articulated  Mood: Depressed but better  Affect: Congruent with stated mood  Thought processes:  linear, goal directed and coherent  Thought content:  denies homicidal ideation  Suicidal Ideation: Endorses passive suicidal ideation  Delusions:  no evidence of delusions  Perceptual Disturbance:  denies any perceptual disturbance  Cognition:  Oriented to self, location, time, and situation  Memory: age appropriate  Insight & Judgement: improving  Medication side effects:  denies       Data   height is 5' 2\" (1.575 m) and weight is 165 lb (74.8 kg). Her oral temperature is 98.2 °F (36.8 °C). Her blood pressure is 110/87 and her pulse is 77. Her respiration is 14 and oxygen saturation is 98%.    Labs:   Admission on 2020   Component Date Value Ref Range Status    Amphetamine Screen, Ur 09/09/2020 NEGATIVE  NEGATIVE Final    Comment:       (Positive cutoff 1000 ng/mL)                  Barbiturate Screen, Ur 09/09/2020 POSITIVE* NEGATIVE Final    Comment:       (Positive cutoff 200 ng/mL)                  Benzodiazepine Screen, Urine 09/09/2020 NEGATIVE  NEGATIVE Final    Comment:       (Positive cutoff 200 ng/mL)                  Cocaine Metabolite, Urine 09/09/2020 NEGATIVE  NEGATIVE Final    Comment:       (Positive cutoff 300 ng/mL)                  Methadone Screen, Urine 09/09/2020 NEGATIVE  NEGATIVE Final    Comment:       (Positive cutoff 300 ng/mL)                  Opiates, Urine 09/09/2020 NEGATIVE  NEGATIVE Final    Comment:       (Positive cutoff 300 ng/mL)                  Phencyclidine, Urine 09/09/2020 NEGATIVE  NEGATIVE Final    Comment:       (Positive cutoff 25 ng/mL)                  Propoxyphene, Urine 09/09/2020 NOT REPORTED  NEGATIVE Final    Cannabinoid Scrn, Ur 09/09/2020 NEGATIVE  NEGATIVE Final    Comment:       (Positive cutoff 50 ng/mL)                  Oxycodone Screen, Ur 09/09/2020 NEGATIVE  NEGATIVE Final    Comment:       (Positive cutoff 100 ng/mL)                  Methamphetamine, Urine 09/09/2020 NOT REPORTED  NEGATIVE Final    Tricyclic Antidepressants, Urine 09/09/2020 NOT REPORTED  NEGATIVE Final    MDMA, Urine 09/09/2020 NOT REPORTED  NEGATIVE Final    Buprenorphine Urine 09/09/2020 NOT REPORTED  NEGATIVE Final    Test Information 09/09/2020 Assay provides medical screening only. The absence of expected drug(s) and/or metabolite(s) may indicate diluted or adulterated urine, limitations of testing or timing of collection. Final    Comment: Testing for legal purposes should be confirmed by another method. To request confirmation   of test result, please call the lab within 7 days of sample submission.       SARS-CoV-2 09/09/2020        Final    SARS-CoV-2, Rapid 09/09/2020 Not Detected  Not Detected Final    Comment:       Rapid NAAT:  The specimen is NEGATIVE for SARS-CoV-2, the novel coronavirus associated with   COVID-19. The ID NOW COVID-19 assay is designed to detect the virus that causes COVID-19 in patients   with signs and symptoms of infection who are suspected of COVID-19. An individual without symptoms of COVID-19 and who is not shedding SARS-CoV-2 virus would   expect to have a negative (not detected) result in this assay. Negative results should be treated as presumptive and, if inconsistent with clinical signs   and symptoms or necessary for patient management,  should be tested with an alternative molecular assay. Negative results do not preclude   SARS-CoV-2 infection and   should not be used as the sole basis for patient management decisions. Fact sheet for Healthcare Providers: [employer]  Fact sheet for Patients: [employer]          Methodology: Isothermal Nucleic Acid Amplification      Source 09/09/2020 . NASOPHARYNGEAL SWAB   Final    SARS-CoV-2 09/09/2020        Final    Valproic Acid Lvl 09/12/2020 54  50 - 125 ug/mL Final    Valproic Dose amount 09/12/2020 500   Final    Valproic Date last dose 09/12/2020 9,122,020   Final    Valproic Time last dose 09/12/2020 839   Final    Glucose 09/12/2020 81  70 - 99 mg/dL Final    BUN 09/12/2020 14  6 - 20 mg/dL Final    CREATININE 09/12/2020 0.81  0.50 - 0.90 mg/dL Final    Bun/Cre Ratio 09/12/2020 NOT REPORTED  9 - 20 Final    Calcium 09/12/2020 10.1  8.6 - 10.4 mg/dL Final    Sodium 09/12/2020 132* 135 - 144 mmol/L Final    Potassium 09/12/2020 5.1  3.7 - 5.3 mmol/L Final    SPECIMEN SLIGHTLY HEMOLYZED, RESULTS MAY BE ADVERSELY AFFECTED.     Chloride 09/12/2020 98  98 - 107 mmol/L Final    CO2 09/12/2020 25  20 - 31 mmol/L Final    Anion Gap 09/12/2020 9  9 - 17 mmol/L Final    Alkaline Phosphatase 09/12/2020 130* 35 - 104 U/L Final    ALT 09/12/2020 16  5 - 33 U/L Final    AST 09/12/2020 19  <32 U/L Final    SPECIMEN SLIGHTLY HEMOLYZED, RESULTS MAY BE ADVERSELY AFFECTED.  Total Bilirubin 09/12/2020 0.18* 0.3 - 1.2 mg/dL Final    Total Protein 09/12/2020 6.9  6.4 - 8.3 g/dL Final    Alb 09/12/2020 4.1  3.5 - 5.2 g/dL Final    Albumin/Globulin Ratio 09/12/2020 NOT REPORTED  1.0 - 2.5 Final    GFR Non- 09/12/2020 >60  >60 mL/min Final    GFR  09/12/2020 >60  >60 mL/min Final    GFR Comment 09/12/2020        Final    Comment: Average GFR for 52-63 years old:   80 mL/min/1.73sq m  Chronic Kidney Disease:   <60 mL/min/1.73sq m  Kidney failure:   <15 mL/min/1.73sq m              eGFR calculated using average adult body mass.  Additional eGFR calculator available at:        [employer]            GFR Staging 09/12/2020 NOT REPORTED   Final            Medications  Current Facility-Administered Medications: gabapentin (NEURONTIN) capsule 600 mg, 600 mg, Oral, TID  traZODone (DESYREL) tablet 150 mg, 150 mg, Oral, Nightly  ondansetron (ZOFRAN-ODT) disintegrating tablet 4 mg, 4 mg, Oral, Q8H PRN  divalproex (DEPAKOTE) DR tablet 500 mg, 500 mg, Oral, Daily  divalproex (DEPAKOTE) DR tablet 1,000 mg, 1,000 mg, Oral, Nightly  nicotine polacrilex (NICORETTE) gum 2 mg, 2 mg, Oral, PRN  aspirin EC tablet 81 mg, 81 mg, Oral, Daily  budesonide-formoterol (SYMBICORT) 160-4.5 MCG/ACT inhaler 2 puff, 2 puff, Inhalation, BID  butalbital-APAP-caffeine -40 MG per capsule 1 capsule, 1 capsule, Oral, Q6H PRN  risperiDONE (RISPERDAL) tablet 1 mg, 1 mg, Oral, BID  cloNIDine (CATAPRES) tablet 0.1 mg, 0.1 mg, Oral, Nightly  albuterol (PROVENTIL) nebulizer solution 2.5 mg, 2.5 mg, Nebulization, Q6H PRN  ibuprofen (ADVIL;MOTRIN) tablet 400 mg, 400 mg, Oral, Q6H PRN  acetaminophen (TYLENOL) tablet 650 mg, 650 mg, Oral, Q4H PRN  aluminum & magnesium hydroxide-simethicone (MAALOX) 200-200-20 MG/5ML suspension 20 mL, 20 mL, Oral, Q6H PRN  hydrOXYzine

## 2020-09-14 NOTE — GROUP NOTE
Group Therapy Note    Date: 9/14/2020    Group Start Time: 1430  Group End Time: 8125  Group Topic: Recreational    [facility]    Laci Toro, CTRS    Pt did not attend [id number] skills group d/t resting in room despite staff invitation to attend. 1:1 talk time offered as alternative to group session, pt declined.             Signature:  Alex Baugh

## 2020-09-14 NOTE — PLAN OF CARE
Problem: Tobacco Use:  Goal: Inpatient tobacco use cessation counseling participation  Description: Inpatient tobacco use cessation counseling participation  Outcome: Ongoing  Note: Patient refused tobacco cessation education. Problem: Altered Mood, Depressive Behavior:  Goal: Able to verbalize acceptance of life and situations over which he or she has no control  Description: Able to verbalize acceptance of life and situations over which he or she has no control  Outcome: Ongoing  Note:   Patient denies any thoughts of self harm or hallucinations. Medication compliant and behavior controlled. Social with peers in [place], attending groups. Problem: Suicide risk  Goal: Provide patient with safe environment  Description: Provide patient with safe environment  Outcome: Ongoing  Note: Safety checks checks are in place for every 15 minutes. Will continue to monitor for safety and support.

## 2020-09-14 NOTE — GROUP NOTE
Group Therapy Note    Date: 9/14/2020    Group Start Time: 1000  Group End Time: 4632  Group Topic: Cognitive Skills    [facility] LUIGI Ruiz, RITOS        Group Therapy Note    Attendees: 6/11         Patient's Goal:  To increase social interaction and to practice deductive reasoning and communication. Notes: Pt participated fully in group task . Pt was able to practice deductive reasoning and communication independently and was given prompts r/t problem solving to work on solving clue if needed. Pt was pleasant and supportive of peers. Status After Intervention:  Improved    Participation Level:  Active Listener and Interactive    Participation Quality: Appropriate, Attentive, Sharing and Supportive      Speech:  normal      Thought Process/Content: Logical  Linear      Affective Functioning: Congruent      Mood: euthymic      Level of consciousness:  Alert, Oriented x4 and Attentive      Response to Learning: Able to verbalize current knowledge/experience, Able to verbalize/acknowledge new learning, Able to retain information and Progressing to goal      Endings: None Reported    Modes of Intervention: Education, Support, Socialization, Exploration, Clarifying and Problem-solving      Discipline Responsible: Psychoeducational Specialist      Signature:  Ivanna Salazar

## 2020-09-15 PROCEDURE — 6370000000 HC RX 637 (ALT 250 FOR IP): Performed by: NURSE PRACTITIONER

## 2020-09-15 PROCEDURE — 6370000000 HC RX 637 (ALT 250 FOR IP): Performed by: PSYCHIATRY & NEUROLOGY

## 2020-09-15 PROCEDURE — 1240000000 HC EMOTIONAL WELLNESS R&B

## 2020-09-15 PROCEDURE — 99232 SBSQ HOSP IP/OBS MODERATE 35: CPT | Performed by: PSYCHIATRY & NEUROLOGY

## 2020-09-15 RX ORDER — RISPERIDONE 1 MG/1
1 TABLET, FILM COATED ORAL DAILY
Status: DISCONTINUED | OUTPATIENT
Start: 2020-09-16 | End: 2020-09-21 | Stop reason: HOSPADM

## 2020-09-15 RX ORDER — RISPERIDONE 2 MG/1
2 TABLET, FILM COATED ORAL NIGHTLY
Status: DISCONTINUED | OUTPATIENT
Start: 2020-09-15 | End: 2020-09-21 | Stop reason: HOSPADM

## 2020-09-15 RX ORDER — CLOMIPRAMINE HYDROCHLORIDE 25 MG/1
25 CAPSULE ORAL NIGHTLY
Status: DISCONTINUED | OUTPATIENT
Start: 2020-09-15 | End: 2020-09-16

## 2020-09-15 RX ADMIN — TRAZODONE HYDROCHLORIDE 150 MG: 150 TABLET ORAL at 20:58

## 2020-09-15 RX ADMIN — BUTALBITAL, ACETAMINOPHEN, AND CAFFEINE 1 CAPSULE: 50; 300; 40 CAPSULE ORAL at 21:01

## 2020-09-15 RX ADMIN — ONDANSETRON 4 MG: 4 TABLET, ORALLY DISINTEGRATING ORAL at 15:35

## 2020-09-15 RX ADMIN — BUDESONIDE AND FORMOTEROL FUMARATE DIHYDRATE 2 PUFF: 160; 4.5 AEROSOL RESPIRATORY (INHALATION) at 07:43

## 2020-09-15 RX ADMIN — CLOMIPRAMINE HYDROCHLORIDE 25 MG: 25 CAPSULE ORAL at 20:58

## 2020-09-15 RX ADMIN — NICOTINE POLACRILEX 2 MG: 2 GUM, CHEWING BUCCAL at 19:44

## 2020-09-15 RX ADMIN — ASPIRIN 81 MG: 81 TABLET, COATED ORAL at 07:42

## 2020-09-15 RX ADMIN — BUTALBITAL, ACETAMINOPHEN, AND CAFFEINE 1 CAPSULE: 50; 300; 40 CAPSULE ORAL at 14:11

## 2020-09-15 RX ADMIN — NICOTINE POLACRILEX 2 MG: 2 GUM, CHEWING BUCCAL at 15:35

## 2020-09-15 RX ADMIN — DIVALPROEX SODIUM 500 MG: 500 TABLET, DELAYED RELEASE ORAL at 07:42

## 2020-09-15 RX ADMIN — GABAPENTIN 600 MG: 300 CAPSULE ORAL at 07:41

## 2020-09-15 RX ADMIN — RISPERIDONE 1 MG: 1 TABLET ORAL at 07:42

## 2020-09-15 RX ADMIN — BUTALBITAL, ACETAMINOPHEN, AND CAFFEINE 1 CAPSULE: 50; 300; 40 CAPSULE ORAL at 07:41

## 2020-09-15 RX ADMIN — RISPERIDONE 2 MG: 2 TABLET ORAL at 20:58

## 2020-09-15 RX ADMIN — ONDANSETRON 4 MG: 4 TABLET, ORALLY DISINTEGRATING ORAL at 07:41

## 2020-09-15 RX ADMIN — GABAPENTIN 600 MG: 300 CAPSULE ORAL at 15:35

## 2020-09-15 RX ADMIN — CLONIDINE HYDROCHLORIDE 0.1 MG: 0.1 TABLET ORAL at 20:57

## 2020-09-15 RX ADMIN — BUDESONIDE AND FORMOTEROL FUMARATE DIHYDRATE 2 PUFF: 160; 4.5 AEROSOL RESPIRATORY (INHALATION) at 20:57

## 2020-09-15 RX ADMIN — NICOTINE POLACRILEX 2 MG: 2 GUM, CHEWING BUCCAL at 17:15

## 2020-09-15 RX ADMIN — DIVALPROEX SODIUM 1000 MG: 500 TABLET, DELAYED RELEASE ORAL at 20:58

## 2020-09-15 RX ADMIN — NICOTINE POLACRILEX 2 MG: 2 GUM, CHEWING BUCCAL at 12:48

## 2020-09-15 RX ADMIN — GABAPENTIN 600 MG: 300 CAPSULE ORAL at 20:58

## 2020-09-15 ASSESSMENT — PAIN DESCRIPTION - DESCRIPTORS
DESCRIPTORS: HEADACHE

## 2020-09-15 ASSESSMENT — PAIN SCALES - GENERAL
PAINLEVEL_OUTOF10: 2
PAINLEVEL_OUTOF10: 2
PAINLEVEL_OUTOF10: 6
PAINLEVEL_OUTOF10: 0
PAINLEVEL_OUTOF10: 6

## 2020-09-15 ASSESSMENT — PAIN DESCRIPTION - ONSET
ONSET: GRADUAL

## 2020-09-15 ASSESSMENT — PAIN DESCRIPTION - PAIN TYPE
TYPE: ACUTE PAIN

## 2020-09-15 ASSESSMENT — PAIN DESCRIPTION - PROGRESSION
CLINICAL_PROGRESSION: GRADUALLY IMPROVING
CLINICAL_PROGRESSION: GRADUALLY IMPROVING
CLINICAL_PROGRESSION: GRADUALLY WORSENING
CLINICAL_PROGRESSION: GRADUALLY WORSENING

## 2020-09-15 ASSESSMENT — PAIN DESCRIPTION - FREQUENCY
FREQUENCY: INTERMITTENT

## 2020-09-15 ASSESSMENT — PAIN - FUNCTIONAL ASSESSMENT
PAIN_FUNCTIONAL_ASSESSMENT: ACTIVITIES ARE NOT PREVENTED

## 2020-09-15 ASSESSMENT — PAIN DESCRIPTION - LOCATION
LOCATION: HEAD

## 2020-09-15 NOTE — GROUP NOTE
Group Therapy Note    Date: 9/15/2020    Group Start Time: 1000  Group End Time: 9099  Group Topic: Cognitive Skills    [facility]    IVORY Nice        Group Therapy Note    Attendees: 5/9         Patient's Goal:  To increase social interaction and to practice deductive reasoning and communication. Notes: Pt participated fully in group task . Pt was able to practice deductive reasoning and communication independently and was given prompts r/t problem solving to work on solving clue if needed. Pt was pleasant and supportive of peers. Status After Intervention:  Improved     Participation Level:  Active Listener and Interactive     Participation Quality: Appropriate, Attentive, Sharing and Supportive        Speech:  normal        Thought Process/Content: Logical         Affective Functioning: Congruent        Mood: euthymic        Level of consciousness:  Alert, Oriented x4 and Attentive        Response to Learning: Able to verbalize current knowledge/experience, Able to verbalize/acknowledge new learning, Able to retain information and Progressing to goal        Endings: None Reported     Modes of Intervention: Education, Support, Socialization, Exploration, Clarifying and Problem-solving        Discipline Responsible: Psychoeducational Specialist        Signature:  Burna Fabry

## 2020-09-15 NOTE — GROUP NOTE
Group Therapy Note    Date: 9/15/2020    Group Start Time: 2424  Group End Time: 8798  Group Topic: Cognitive Skills    KENNY Blake, CTRS        Group Therapy Note    Attendees: 6/16         Patient's Goal:  To increase social interaction and to practice self expression, explore aspects of coping skills and supports/resources        Notes: Pt participated minimally in group task-pt is superficial and focused on games rather than exploring coping the patient is a negative influence on some members in group and distracts peers with side conversations. Pt was able to practice creative expression with little correlation to topic. .     Status After Intervention:  Unchanged     Participation Level: Active Listener and Interactive     Participation Quality:  Attentive, Sharing socially      Speech : Normal        Thought Process/Content: Logical, focused on superficial social topics           Affective Functioning: pt laughs,jokes with peers, smirks when asked to share r/t coping-states \"I'm just here for meds\"       Mood: Social with peers, laughs, intrusive at times -creates drama stating a peer is having a seizure when peer is clearly able to think,talk and move appropriately. Pt affect and mood are somewhat challenging to group process-pt smirks and talks only about celebrity gossip . RT simply reminds group that unit stays are short and they will get as much benefit out of group as they choose to engage in.            Level of consciousness:  Alert,  and Attentive        Response to Learning: Resistant      Endings: None Reported     Modes of Intervention: Education, Support, Socialization, Exploration, Clarifying and Problem-solving        Discipline Responsible: Psychoeducational Specialist        Signature:  Lakisha Cintron

## 2020-09-15 NOTE — GROUP NOTE
Group Therapy Note    Date: 9/15/2020    Group Start Time: 1600  Group End Time: 7351  Group Topic: Healthy Living/Wellness    KENNY Chaves        Group Therapy Note    Attendees: 0/17       Patient refused to attend the Wellness Group at 1600. Continued to socialize in day room.      Discipline Responsible: Behavorial Health Tech      Signature:  Rico Yan

## 2020-09-15 NOTE — GROUP NOTE
Group Therapy Note    Date: 9/15/2020    Group Start Time: 1330  Group End Time: 1318  Group Topic: Cognitive Skills    [facility]    Luís Becerra        Group Therapy Note    Attendees: 9/18         Patient's Goal:  To demonstrate improved social skills and cognition     Notes:  Patient attended the last 10 minutes of the 45 minute group. Patient was pleasant and appropriate. Status After Intervention:  Unchanged    Participation Level:  Active Listener and Minimal    Participation Quality: Appropriate      Speech:  normal      Thought Process/Content: Logical      Affective Functioning: Congruent      Mood: euthymic      Level of consciousness:  Alert and Oriented x4      Response to Learning: Able to verbalize current knowledge/experience, Able to verbalize/acknowledge new learning and Progressing to goal      Endings: None Reported    Modes of Intervention: Education, Support, Socialization, Exploration, Clarifying and Problem-solving      Discipline Responsible: Psychoeducational Specialist      Signature:  Staley Spray

## 2020-09-15 NOTE — PLAN OF CARE
Problem: Suicide risk  Goal: Provide patient with safe environment  Description: Provide patient with safe environment  9/14/2020 2044 by Aimee Queen LPN  Outcome: Ongoing  Note: Patient has been in room with safe environment. Patient safety check every 15 minute     Problem: Altered Mood, Depressive Behavior:  Goal: Able to verbalize acceptance of life and situations over which he or she has no control  Description: Able to verbalize acceptance of life and situations over which he or she has no control  9/14/2020 2044 by Aimee Queen LPN  Outcome: Ongoing  Note: Patient [id number] she is coming to terms with her life. Patient safety check every 15 minute     Problem: Altered Mood, Depressive Behavior:  Goal: Able to verbalize support systems  Description: Able to verbalize support systems  9/14/2020 2044 by Aimee Queen LPN  Outcome: Ongoing  Note: Patient states she has the support of her child. Patient safety check every 15 minute     Problem: Tobacco Use:  Goal: Inpatient tobacco use cessation counseling participation  Description: Inpatient tobacco use cessation counseling participation  9/14/2020 2044 by Aimee Queen LPN  Outcome: Ongoing  Note: Patient given tobacco quitline number 13594014314 at this time, refusing to call at this time, states \" I just dont want to quit now\"- patient given information as to the dangers of long term tobacco use. Continue to reinforce the importance of tobacco cessation.

## 2020-09-15 NOTE — PROGRESS NOTES
Daily Progress Note  Donell Galeazzi, MD  9/15/2020  CHIEF COMPLAINT: Suicidal ideation with plan to overdose    Reviewed patient's current plan of care and vital signs with nursing staff. Sleep:  4 hours last night  Attending groups: Yes  She reports that her anxiety is not any better. She is requesting  SUBJECTIVE:    Patient reports poor sleep last night. She reports that her anxiety is \"not any better\". She is requesting benzodiazepine medication to help with her anxiety. She states she still has anxiety. I expressed to her that many of these problems that she is experiencing will not go away overnight. Reframe to set realistic expectations. Also explored the patient's desire to constantly seek an outside substance to help with what she is experiencing. We have talked in the past about her polypharmacy and revisited this topic again. Trying to strike a balance between what is therapeutic with boundaries around medications versus what is therapeutic with prescribing additional medications. We did discuss the possibility of a addition of clomipramine which may help with her sleep as well as help with anxiety. Patient was agreeable. Also discussed increasing Risperdal to help with racing thoughts. Patient denying any side effects to her current medications. She states she still has feelings of hopelessness and worthlessness and negative thoughts and often thinks that it would not matter if she . However she is denying any active intent or plan here on the unit to hurt herself and continues to report gradual improvement in her mood. States her thoughts are more organized and feels like her mind is slowing down.     Mental Status Exam  Level of consciousness:  Within normal limits  Appearance: Hospital attire, seated in chair, with good grooming and hygiene   Behavior/Motor: No abnormalities noted  Attitude toward examiner:  Cooperative, attentive, good eye contact  Speech:  spontaneous, normal rate, normal volume and well articulated  Mood: Depressed but better  Affect: Congruent with stated mood  Thought processes:  linear, goal directed and coherent  Thought content:  denies homicidal ideation  Suicidal Ideation: Endorses passive suicidal ideation  Delusions:  no evidence of delusions  Perceptual Disturbance:  denies any perceptual disturbance  Cognition:  Oriented to self, location, time, and situation  Memory: age appropriate  Insight & Judgement: improving  Medication side effects:  denies       Data   height is 5' 2\" (1.575 m) and weight is 165 lb (74.8 kg). Her oral temperature is 98.2 °F (36.8 °C). Her blood pressure is 123/77 and her pulse is 76. Her respiration is 14 and oxygen saturation is 98%.    Labs:   Admission on 09/09/2020   Component Date Value Ref Range Status    Amphetamine Screen, Ur 09/09/2020 NEGATIVE  NEGATIVE Final    Comment:       (Positive cutoff 1000 ng/mL)                  Barbiturate Screen, Ur 09/09/2020 POSITIVE* NEGATIVE Final    Comment:       (Positive cutoff 200 ng/mL)                  Benzodiazepine Screen, Urine 09/09/2020 NEGATIVE  NEGATIVE Final    Comment:       (Positive cutoff 200 ng/mL)                  Cocaine Metabolite, Urine 09/09/2020 NEGATIVE  NEGATIVE Final    Comment:       (Positive cutoff 300 ng/mL)                  Methadone Screen, Urine 09/09/2020 NEGATIVE  NEGATIVE Final    Comment:       (Positive cutoff 300 ng/mL)                  Opiates, Urine 09/09/2020 NEGATIVE  NEGATIVE Final    Comment:       (Positive cutoff 300 ng/mL)                  Phencyclidine, Urine 09/09/2020 NEGATIVE  NEGATIVE Final    Comment:       (Positive cutoff 25 ng/mL)                  Propoxyphene, Urine 09/09/2020 NOT REPORTED  NEGATIVE Final    Cannabinoid Scrn, Ur 09/09/2020 NEGATIVE  NEGATIVE Final    Comment:       (Positive cutoff 50 ng/mL)                  Oxycodone Screen, Ur 09/09/2020 NEGATIVE  NEGATIVE Final    Comment:       (Positive cutoff 100 ng/mL)                  Methamphetamine, Urine 09/09/2020 NOT REPORTED  NEGATIVE Final    Tricyclic Antidepressants, Urine 09/09/2020 NOT REPORTED  NEGATIVE Final    MDMA, Urine 09/09/2020 NOT REPORTED  NEGATIVE Final    Buprenorphine Urine 09/09/2020 NOT REPORTED  NEGATIVE Final    Test Information 09/09/2020 Assay provides medical screening only. The absence of expected drug(s) and/or metabolite(s) may indicate diluted or adulterated urine, limitations of testing or timing of collection. Final    Comment: Testing for legal purposes should be confirmed by another method. To request confirmation   of test result, please call the lab within 7 days of sample submission.  SARS-CoV-2 09/09/2020        Final    SARS-CoV-2, Rapid 09/09/2020 Not Detected  Not Detected Final    Comment:       Rapid NAAT:  The specimen is NEGATIVE for SARS-CoV-2, the novel coronavirus associated with   COVID-19. The ID NOW COVID-19 assay is designed to detect the virus that causes COVID-19 in patients   with signs and symptoms of infection who are suspected of COVID-19. An individual without symptoms of COVID-19 and who is not shedding SARS-CoV-2 virus would   expect to have a negative (not detected) result in this assay. Negative results should be treated as presumptive and, if inconsistent with clinical signs   and symptoms or necessary for patient management,  should be tested with an alternative molecular assay. Negative results do not preclude   SARS-CoV-2 infection and   should not be used as the sole basis for patient management decisions. Fact sheet for Healthcare Providers: [employer]  Fact sheet for Patients: [employer]          Methodology: Isothermal Nucleic Acid Amplification      Source 09/09/2020 . NASOPHARYNGEAL SWAB   Final    SARS-CoV-2 09/09/2020        Final    Valproic Acid Lvl 09/12/2020 54  50 - 125 ug/mL Final    Valproic tablet 500 mg, 500 mg, Oral, Daily  divalproex (DEPAKOTE) DR tablet 1,000 mg, 1,000 mg, Oral, Nightly  nicotine polacrilex (NICORETTE) gum 2 mg, 2 mg, Oral, PRN  aspirin EC tablet 81 mg, 81 mg, Oral, Daily  budesonide-formoterol (SYMBICORT) 160-4.5 MCG/ACT inhaler 2 puff, 2 puff, Inhalation, BID  butalbital-APAP-caffeine -40 MG per capsule 1 capsule, 1 capsule, Oral, Q6H PRN  risperiDONE (RISPERDAL) tablet 1 mg, 1 mg, Oral, BID  cloNIDine (CATAPRES) tablet 0.1 mg, 0.1 mg, Oral, Nightly  albuterol (PROVENTIL) nebulizer solution 2.5 mg, 2.5 mg, Nebulization, Q6H PRN  ibuprofen (ADVIL;MOTRIN) tablet 400 mg, 400 mg, Oral, Q6H PRN  acetaminophen (TYLENOL) tablet 650 mg, 650 mg, Oral, Q4H PRN  aluminum & magnesium hydroxide-simethicone (MAALOX) 200-200-20 MG/5ML suspension 20 mL, 20 mL, Oral, Q6H PRN  hydrOXYzine (ATARAX) tablet 50 mg, 50 mg, Oral, TID PRN  polyethylene glycol (GLYCOLAX) packet 17 g, 17 g, Oral, Daily PRN    ASSESSMENT  Severe depressed bipolar I disorder without psychotic features ([facility] [address]     PLAN  Patient s symptoms   are improving  Imipramine 25 mg by mouth at bedtime  Risperdal to 2 mg by mouth at bedtime, continue 1 mg in the morning  Attempt to develop insight  Psycho-education conducted. Supportive Therapy conducted. Probable discharge is 1 to 2 days  Follow-up daily while on the inpatient unit      Electronically signed by Viri Rodriguez MD on 9/14/20 at 4:41 PM EDT    **This report has been created using voice recognition software. It may contain minor errors which are inherent in voice recognition technology. **

## 2020-09-16 PROCEDURE — 6370000000 HC RX 637 (ALT 250 FOR IP): Performed by: NURSE PRACTITIONER

## 2020-09-16 PROCEDURE — 1240000000 HC EMOTIONAL WELLNESS R&B

## 2020-09-16 PROCEDURE — 99232 SBSQ HOSP IP/OBS MODERATE 35: CPT | Performed by: PSYCHIATRY & NEUROLOGY

## 2020-09-16 PROCEDURE — 6370000000 HC RX 637 (ALT 250 FOR IP): Performed by: PSYCHIATRY & NEUROLOGY

## 2020-09-16 RX ORDER — CLOMIPRAMINE HYDROCHLORIDE 50 MG/1
50 CAPSULE ORAL NIGHTLY
Status: DISCONTINUED | OUTPATIENT
Start: 2020-09-16 | End: 2020-09-21 | Stop reason: HOSPADM

## 2020-09-16 RX ADMIN — RISPERIDONE 2 MG: 2 TABLET ORAL at 20:51

## 2020-09-16 RX ADMIN — CLONIDINE HYDROCHLORIDE 0.1 MG: 0.1 TABLET ORAL at 20:51

## 2020-09-16 RX ADMIN — BUTALBITAL, ACETAMINOPHEN, AND CAFFEINE 1 CAPSULE: 50; 300; 40 CAPSULE ORAL at 14:19

## 2020-09-16 RX ADMIN — NICOTINE POLACRILEX 2 MG: 2 GUM, CHEWING BUCCAL at 19:43

## 2020-09-16 RX ADMIN — NICOTINE POLACRILEX 2 MG: 2 GUM, CHEWING BUCCAL at 15:52

## 2020-09-16 RX ADMIN — ASPIRIN 81 MG: 81 TABLET, COATED ORAL at 09:05

## 2020-09-16 RX ADMIN — NICOTINE POLACRILEX 2 MG: 2 GUM, CHEWING BUCCAL at 20:51

## 2020-09-16 RX ADMIN — GABAPENTIN 600 MG: 300 CAPSULE ORAL at 20:51

## 2020-09-16 RX ADMIN — BUTALBITAL, ACETAMINOPHEN, AND CAFFEINE 1 CAPSULE: 50; 300; 40 CAPSULE ORAL at 06:37

## 2020-09-16 RX ADMIN — GABAPENTIN 600 MG: 300 CAPSULE ORAL at 14:19

## 2020-09-16 RX ADMIN — IBUPROFEN 400 MG: 400 TABLET, FILM COATED ORAL at 19:42

## 2020-09-16 RX ADMIN — DIVALPROEX SODIUM 500 MG: 500 TABLET, DELAYED RELEASE ORAL at 09:05

## 2020-09-16 RX ADMIN — BUTALBITAL, ACETAMINOPHEN, AND CAFFEINE 1 CAPSULE: 50; 300; 40 CAPSULE ORAL at 20:51

## 2020-09-16 RX ADMIN — ONDANSETRON 4 MG: 4 TABLET, ORALLY DISINTEGRATING ORAL at 14:19

## 2020-09-16 RX ADMIN — CLOMIPRAMINE HYDROCHLORIDE 50 MG: 50 CAPSULE ORAL at 20:51

## 2020-09-16 RX ADMIN — IBUPROFEN 400 MG: 400 TABLET, FILM COATED ORAL at 09:06

## 2020-09-16 RX ADMIN — DIVALPROEX SODIUM 1000 MG: 500 TABLET, DELAYED RELEASE ORAL at 20:51

## 2020-09-16 RX ADMIN — NICOTINE POLACRILEX 2 MG: 2 GUM, CHEWING BUCCAL at 09:06

## 2020-09-16 RX ADMIN — RISPERIDONE 1 MG: 1 TABLET ORAL at 09:05

## 2020-09-16 RX ADMIN — GABAPENTIN 600 MG: 300 CAPSULE ORAL at 09:05

## 2020-09-16 RX ADMIN — TRAZODONE HYDROCHLORIDE 150 MG: 150 TABLET ORAL at 20:51

## 2020-09-16 ASSESSMENT — PAIN - FUNCTIONAL ASSESSMENT
PAIN_FUNCTIONAL_ASSESSMENT: 0-10

## 2020-09-16 ASSESSMENT — PAIN SCALES - GENERAL
PAINLEVEL_OUTOF10: 6
PAINLEVEL_OUTOF10: 3

## 2020-09-16 NOTE — PROGRESS NOTES
Daily Progress Note  Jhony Delatorre CNP  9/16/2020     CHIEF COMPLAINT: Suicidal ideation with plan to overdose    Reviewed patient's current plan of care and vital signs with nursing staff. Sleep: Slept \"okay\" last night  Attending groups: Yes      SUBJECTIVE:    Deshaun Martínez reports that she is sleeping better and has less back pain secondary to the waffle mattress on her bed. She reports that she would like to have 1 of those upon discharge. She is requesting a prescription. She reports that her mood is \"better\". She is actively attending group and socializing with select peers on the unit. She appears brighter than previous encounters. She rates her mood 5 out of 10, with 10 being the best mood. She continues to endorse occasional auditory hallucinations. She endorses fleeting suicidal ideation and feels that it is improving. She does endorse frustration and feels that her gabapentin should be 800 mg 3 times daily. She states \"it is up to the doctor, I do not think I am going to be able to change his mind\". She is aware that he started a new medication last night to help with sleep, and she started a new medication for mood this morning. She denies any side effects at this time. Yesterday she had a meeting with his [facility], and the plan is for them to return today for another interview.       Mental Status Exam  Level of consciousness: Awake and alert  Appearance: Hospital attire, seated in chair, with good grooming and hygiene   Behavior/Motor: Smiles on occasion  Attitude toward examiner:  Cooperative, attentive, good eye contact  Speech:  spontaneous, normal rate, normal volume and well articulated  Mood: Depressed but better  Affect: Congruent with stated mood  Thought processes:  linear, goal directed and coherent  Thought content:  denies homicidal ideation  Suicidal Ideation: Endorses suicidal ideation  Delusions:  no evidence of delusions  Perceptual Disturbance:  denies any perceptual disturbance  Cognition:  Oriented to self, location, time, and situation  Memory: age appropriate  Insight & Judgement: improving  Medication side effects:  denies       Data   height is 5' 2\" (1.575 m) and weight is 165 lb (74.8 kg). Her temperature is 98.2 °F (36.8 °C). Her blood pressure is 114/83 and her pulse is 77. Her respiration is 14 and oxygen saturation is 98%. Labs:   Admission on 09/09/2020   Component Date Value Ref Range Status    Amphetamine Screen, Ur 09/09/2020 NEGATIVE  NEGATIVE Final    Comment:       (Positive cutoff 1000 ng/mL)                  Barbiturate Screen, Ur 09/09/2020 POSITIVE* NEGATIVE Final    Comment:       (Positive cutoff 200 ng/mL)                  Benzodiazepine Screen, Urine 09/09/2020 NEGATIVE  NEGATIVE Final    Comment:       (Positive cutoff 200 ng/mL)                  Cocaine Metabolite, Urine 09/09/2020 NEGATIVE  NEGATIVE Final    Comment:       (Positive cutoff 300 ng/mL)                  Methadone Screen, Urine 09/09/2020 NEGATIVE  NEGATIVE Final    Comment:       (Positive cutoff 300 ng/mL)                  Opiates, Urine 09/09/2020 NEGATIVE  NEGATIVE Final    Comment:       (Positive cutoff 300 ng/mL)                  Phencyclidine, Urine 09/09/2020 NEGATIVE  NEGATIVE Final    Comment:       (Positive cutoff 25 ng/mL)                  Propoxyphene, Urine 09/09/2020 NOT REPORTED  NEGATIVE Final    Cannabinoid Scrn, Ur 09/09/2020 NEGATIVE  NEGATIVE Final    Comment:       (Positive cutoff 50 ng/mL)                  Oxycodone Screen, Ur 09/09/2020 NEGATIVE  NEGATIVE Final    Comment:       (Positive cutoff 100 ng/mL)                  Methamphetamine, Urine 09/09/2020 NOT REPORTED  NEGATIVE Final    Tricyclic Antidepressants, Urine 09/09/2020 NOT REPORTED  NEGATIVE Final    MDMA, Urine 09/09/2020 NOT REPORTED  NEGATIVE Final    Buprenorphine Urine 09/09/2020 NOT REPORTED  NEGATIVE Final    Test Information 09/09/2020 Assay provides medical screening only. The absence of expected drug(s) and/or metabolite(s) may indicate diluted or adulterated urine, limitations of testing or timing of collection. Final    Comment: Testing for legal purposes should be confirmed by another method. To request confirmation   of test result, please call the lab within 7 days of sample submission.  SARS-CoV-2 09/09/2020        Final    SARS-CoV-2, Rapid 09/09/2020 Not Detected  Not Detected Final    Comment:       Rapid NAAT:  The specimen is NEGATIVE for SARS-CoV-2, the novel coronavirus associated with   COVID-19. The ID NOW COVID-19 assay is designed to detect the virus that causes COVID-19 in patients   with signs and symptoms of infection who are suspected of COVID-19. An individual without symptoms of COVID-19 and who is not shedding SARS-CoV-2 virus would   expect to have a negative (not detected) result in this assay. Negative results should be treated as presumptive and, if inconsistent with clinical signs   and symptoms or necessary for patient management,  should be tested with an alternative molecular assay. Negative results do not preclude   SARS-CoV-2 infection and   should not be used as the sole basis for patient management decisions. Fact sheet for Healthcare Providers: BuildHer.es  Fact sheet for Patients: BuildHer.es          Methodology: Isothermal Nucleic Acid Amplification      Source 09/09/2020 . NASOPHARYNGEAL SWAB   Final    SARS-CoV-2 09/09/2020        Final    Valproic Acid Lvl 09/12/2020 54  50 - 125 ug/mL Final    Valproic Dose amount 09/12/2020 500   Final    Valproic Date last dose 09/12/2020 9,122,020   Final    Valproic Time last dose 09/12/2020 839   Final    Glucose 09/12/2020 81  70 - 99 mg/dL Final    BUN 09/12/2020 14  6 - 20 mg/dL Final    CREATININE 09/12/2020 0.81  0.50 - 0.90 mg/dL Final    Bun/Cre Ratio 09/12/2020 NOT REPORTED  9 - 20 Final    Calcium 09/12/2020 10.1  8.6 - 10.4 mg/dL Final    Sodium 09/12/2020 132* 135 - 144 mmol/L Final    Potassium 09/12/2020 5.1  3.7 - 5.3 mmol/L Final    SPECIMEN SLIGHTLY HEMOLYZED, RESULTS MAY BE ADVERSELY AFFECTED.  Chloride 09/12/2020 98  98 - 107 mmol/L Final    CO2 09/12/2020 25  20 - 31 mmol/L Final    Anion Gap 09/12/2020 9  9 - 17 mmol/L Final    Alkaline Phosphatase 09/12/2020 130* 35 - 104 U/L Final    ALT 09/12/2020 16  5 - 33 U/L Final    AST 09/12/2020 19  <32 U/L Final    SPECIMEN SLIGHTLY HEMOLYZED, RESULTS MAY BE ADVERSELY AFFECTED.  Total Bilirubin 09/12/2020 0.18* 0.3 - 1.2 mg/dL Final    Total Protein 09/12/2020 6.9  6.4 - 8.3 g/dL Final    Alb 09/12/2020 4.1  3.5 - 5.2 g/dL Final    Albumin/Globulin Ratio 09/12/2020 NOT REPORTED  1.0 - 2.5 Final    GFR Non- 09/12/2020 >60  >60 mL/min Final    GFR  09/12/2020 >60  >60 mL/min Final    GFR Comment 09/12/2020        Final    Comment: Average GFR for 52-63 years old:   80 mL/min/1.73sq m  Chronic Kidney Disease:   <60 mL/min/1.73sq m  Kidney failure:   <15 mL/min/1.73sq m              eGFR calculated using average adult body mass.  Additional eGFR calculator available at:        [employer]            GFR Staging 09/12/2020 NOT REPORTED   Final            Medications  Current Facility-Administered Medications: risperiDONE (RISPERDAL) tablet 1 mg, 1 mg, Oral, Daily  risperiDONE (RISPERDAL) tablet 2 mg, 2 mg, Oral, Nightly  clomiPRAMINE (ANAFRANIL) capsule 25 mg, 25 mg, Oral, Nightly  gabapentin (NEURONTIN) capsule 600 mg, 600 mg, Oral, TID  traZODone (DESYREL) tablet 150 mg, 150 mg, Oral, Nightly  ondansetron (ZOFRAN-ODT) disintegrating tablet 4 mg, 4 mg, Oral, Q8H PRN  divalproex (DEPAKOTE) DR tablet 500 mg, 500 mg, Oral, Daily  divalproex (DEPAKOTE) DR tablet 1,000 mg, 1,000 mg, Oral, Nightly  nicotine polacrilex (NICORETTE) gum 2 mg, 2 mg, Oral, PRN  aspirin EC tablet 81 mg, 81 mg, Oral, Daily  budesonide-formoterol (SYMBICORT) 160-4.5 MCG/ACT inhaler 2 puff, 2 puff, Inhalation, BID  butalbital-APAP-caffeine -40 MG per capsule 1 capsule, 1 capsule, Oral, Q6H PRN  cloNIDine (CATAPRES) tablet 0.1 mg, 0.1 mg, Oral, Nightly  albuterol (PROVENTIL) nebulizer solution 2.5 mg, 2.5 mg, Nebulization, Q6H PRN  ibuprofen (ADVIL;MOTRIN) tablet 400 mg, 400 mg, Oral, Q6H PRN  acetaminophen (TYLENOL) tablet 650 mg, 650 mg, Oral, Q4H PRN  aluminum & magnesium hydroxide-simethicone (MAALOX) 200-200-20 MG/5ML suspension 20 mL, 20 mL, Oral, Q6H PRN  hydrOXYzine (ATARAX) tablet 50 mg, 50 mg, Oral, TID PRN  polyethylene glycol (GLYCOLAX) packet 17 g, 17 g, Oral, Daily PRN    ASSESSMENT  Severe depressed bipolar I disorder without psychotic features ([facility] [address]     PLAN  Discussed with Dr. Matheus Hu. I independently saw and evaluated the patient. I reviewed the nurse practitioners documentation above. Any additional comments or changes to the nurse practitioners documentation are stated below otherwise agree with assessment. Plan will be as follows:  Patient reports to this Kranthi Roque continued suicidal ideation. She states \"I cannot go out there yet, I do not know what I will do to myself\". States she still has plans of overdosing  PLAN  Patient s symptoms are somewhat worse today  Increase clomipramine to 50 mg by mouth at bedtime  Attempt to develop insight  Psycho-education conducted. Supportive Therapy conducted. Probable discharge is next 1 to 2 days  Follow-up daily while on inpatient unit          **This report has been created using voice recognition software. It may contain minor errors which are inherent in voice recognition technology. **

## 2020-09-16 NOTE — GROUP NOTE
Group Therapy Note    Date: September 16th, 2020     Group Start Time: 1100                     Group End Time: 6300     Group Topic: Psychotherapy     [facility]    [address] [place], CRC, LPC     Group Therapy Note     Attendees: 4/9    Patient's Goal: develop stress management / identify triggers and safety planning / discharge planning/ community resources / family support system     Notes:  participated in group      Status After Intervention:  Improved     Participation Level:  Active Listener and Interactive     Participation Quality: Appropriate, Attentive and Sharing     Speech:  Normal     Thought Process/Content: Logical     Affective Functioning: Congruent     Mood: Euthymic      Level of consciousness:  Alert, Oriented x4 and Attentive     Response to Learning: Able to verbalize current knowledge/experience, Able to verbalize/acknowledge new learning, Able to retain information, Capable of insight, Able to change behavior and Progressing to goal     Endings: None Reported     Modes of Intervention: Support, Exploration, Clarifying and Problem-solving     Discipline Responsible: Licensed Professional Counselor     Signature:  Maureen ELMORE, CRC, LPC

## 2020-09-16 NOTE — GROUP NOTE
Group Therapy Note    Date: 9/16/2020    Group Start Time: 1000  Group End Time: 2256  Group Topic: Cognitive Skills    RITO PollackS        Group Therapy Note    Attendees: 4/8         Patient's Goal:  To improve goal setting skills     Notes:  Pt was pleasant and interacted well     Status After Intervention:  Improved    Participation Level:  Active Listener and Interactive    Participation Quality: Appropriate, Attentive and Sharing      Speech:  normal      Thought Process/Content: Logical      Affective Functioning: Congruent      Mood: euthymic      Level of consciousness:  Alert and Oriented x4      Response to Learning: Able to verbalize current knowledge/experience and Progressing to goal      Endings: None Reported    Modes of Intervention: Education, Support, Socialization and Problem-solving      Discipline Responsible: Psychoeducational Specialist      Signature:  Julian Olsen

## 2020-09-16 NOTE — PLAN OF CARE
Problem: Altered Mood, Depressive Behavior:  Goal: Able to verbalize acceptance of life and situations over which he or she has no control  Description: Able to verbalize acceptance of life and situations over which he or she has no control  9/15/2020 2252 by Alie Sanchez  Outcome: Ongoing  Note: Pt denies thoughts of self harm and is agreeable to seeking out should thoughts of self harm arise. Safe environment maintained. Q15 minute checks for safety cont per unit policy. Will cont to monitor for safety and provides support and reassurance as needed. Out and social with select peers. Cooperative. Flat affect. Compliant with all prescribed medications this shift. Problem: Altered Mood, Depressive Behavior:  Goal: Able to verbalize support systems  Description: Able to verbalize support systems  9/15/2020 2252 by Alie Sanchez  Outcome: Ongoing  Note: Patient states she has family support system as well as family.

## 2020-09-16 NOTE — GROUP NOTE
Group Therapy Note    Date: 9/16/2020    Group Start Time: 0900  Group End Time: 0930  Group Topic: Community Meeting    [facility]        Group Therapy Note    Attendees: 9         Patient's Goal:  To improve goal setting skills     Notes:   Pt was pleasant and participated well     Status After Intervention:  Improved    Participation Level:  Active Listener and Interactive    Participation Quality: Appropriate, Attentive and Supportive      Speech:  normal      Thought Process/Content: Logical      Affective Functioning: Congruent      Mood: euthymic      Level of consciousness:  Alert and Oriented x4      Response to Learning: Able to verbalize current knowledge/experience and Progressing to goal      Endings: None Reported    Modes of Intervention: Education, Support and Problem-solving      Discipline Responsible: Psychoeducational Specialist    Margie DODSON

## 2020-09-16 NOTE — PLAN OF CARE
Problem: Altered Mood, Depressive Behavior:  Goal: Able to verbalize acceptance of life and situations over which he or she has no control  Description: Able to verbalize acceptance of life and situations over which he or she has no control  9/16/2020 0945 by Debbie Porter  Outcome: Ongoing   Patient is accepting of situations over which she cannot control. Patient denies depression, and is social with peers in the day room. Patient is compliant with medications. Patient is fifteen minute safety check. Problem: Altered Mood, Depressive Behavior:  Goal: Able to verbalize support systems  Description: Able to verbalize support systems  9/16/2020 0945 by Debbie Porter  Outcome: Ongoing   Patient verbalizes support systems in family.

## 2020-09-17 PROCEDURE — 99232 SBSQ HOSP IP/OBS MODERATE 35: CPT | Performed by: PSYCHIATRY & NEUROLOGY

## 2020-09-17 PROCEDURE — 6370000000 HC RX 637 (ALT 250 FOR IP): Performed by: PSYCHIATRY & NEUROLOGY

## 2020-09-17 PROCEDURE — 90833 PSYTX W PT W E/M 30 MIN: CPT | Performed by: PSYCHIATRY & NEUROLOGY

## 2020-09-17 PROCEDURE — 6370000000 HC RX 637 (ALT 250 FOR IP): Performed by: NURSE PRACTITIONER

## 2020-09-17 PROCEDURE — 1240000000 HC EMOTIONAL WELLNESS R&B

## 2020-09-17 RX ADMIN — DIVALPROEX SODIUM 1000 MG: 500 TABLET, DELAYED RELEASE ORAL at 20:25

## 2020-09-17 RX ADMIN — DIVALPROEX SODIUM 500 MG: 500 TABLET, DELAYED RELEASE ORAL at 08:16

## 2020-09-17 RX ADMIN — BUDESONIDE AND FORMOTEROL FUMARATE DIHYDRATE 2 PUFF: 160; 4.5 AEROSOL RESPIRATORY (INHALATION) at 08:15

## 2020-09-17 RX ADMIN — CLONIDINE HYDROCHLORIDE 0.1 MG: 0.1 TABLET ORAL at 20:24

## 2020-09-17 RX ADMIN — NICOTINE POLACRILEX 4 MG: 2 GUM, CHEWING BUCCAL at 21:36

## 2020-09-17 RX ADMIN — ACETAMINOPHEN 650 MG: 325 TABLET, FILM COATED ORAL at 18:19

## 2020-09-17 RX ADMIN — ONDANSETRON 4 MG: 4 TABLET, ORALLY DISINTEGRATING ORAL at 06:01

## 2020-09-17 RX ADMIN — RISPERIDONE 2 MG: 2 TABLET ORAL at 20:25

## 2020-09-17 RX ADMIN — ONDANSETRON 4 MG: 4 TABLET, ORALLY DISINTEGRATING ORAL at 16:22

## 2020-09-17 RX ADMIN — RISPERIDONE 1 MG: 1 TABLET ORAL at 08:16

## 2020-09-17 RX ADMIN — TRAZODONE HYDROCHLORIDE 150 MG: 150 TABLET ORAL at 20:25

## 2020-09-17 RX ADMIN — BUTALBITAL, ACETAMINOPHEN, AND CAFFEINE 1 CAPSULE: 50; 300; 40 CAPSULE ORAL at 21:37

## 2020-09-17 RX ADMIN — NICOTINE POLACRILEX 2 MG: 2 GUM, CHEWING BUCCAL at 08:17

## 2020-09-17 RX ADMIN — NICOTINE POLACRILEX 2 MG: 2 GUM, CHEWING BUCCAL at 06:44

## 2020-09-17 RX ADMIN — IBUPROFEN 400 MG: 400 TABLET, FILM COATED ORAL at 16:23

## 2020-09-17 RX ADMIN — GABAPENTIN 600 MG: 300 CAPSULE ORAL at 14:54

## 2020-09-17 RX ADMIN — BUTALBITAL, ACETAMINOPHEN, AND CAFFEINE 1 CAPSULE: 50; 300; 40 CAPSULE ORAL at 06:01

## 2020-09-17 RX ADMIN — GABAPENTIN 600 MG: 300 CAPSULE ORAL at 08:16

## 2020-09-17 RX ADMIN — NICOTINE POLACRILEX 2 MG: 2 GUM, CHEWING BUCCAL at 14:50

## 2020-09-17 RX ADMIN — NICOTINE POLACRILEX 2 MG: 2 GUM, CHEWING BUCCAL at 13:17

## 2020-09-17 RX ADMIN — NICOTINE POLACRILEX 2 MG: 2 GUM, CHEWING BUCCAL at 12:12

## 2020-09-17 RX ADMIN — NICOTINE POLACRILEX 4 MG: 2 GUM, CHEWING BUCCAL at 18:19

## 2020-09-17 RX ADMIN — CLOMIPRAMINE HYDROCHLORIDE 50 MG: 50 CAPSULE ORAL at 20:27

## 2020-09-17 RX ADMIN — ASPIRIN 81 MG: 81 TABLET, COATED ORAL at 08:16

## 2020-09-17 RX ADMIN — GABAPENTIN 600 MG: 300 CAPSULE ORAL at 20:24

## 2020-09-17 RX ADMIN — HYDROXYZINE HYDROCHLORIDE 50 MG: 50 TABLET, FILM COATED ORAL at 20:25

## 2020-09-17 ASSESSMENT — PAIN SCALES - GENERAL
PAINLEVEL_OUTOF10: 3
PAINLEVEL_OUTOF10: 2
PAINLEVEL_OUTOF10: 1

## 2020-09-17 ASSESSMENT — PAIN - FUNCTIONAL ASSESSMENT
PAIN_FUNCTIONAL_ASSESSMENT: 0-10
PAIN_FUNCTIONAL_ASSESSMENT: 0-10

## 2020-09-17 NOTE — PLAN OF CARE
Problem: Altered Mood, Depressive Behavior:  Goal: Able to verbalize acceptance of life and situations over which he or she has no control  Description: Able to verbalize acceptance of life and situations over which he or she has no control  9/16/2020 2111 by Marianela Lutz RN  Outcome: Ongoing  Note: Patient denies suicidal ideation and verbally contracts for safety. Patient remains safe during Q15 min and random safety checks. Patient remains in hospital appropriate clothing at all times and free from harmful objects. Patient is accepting of talk time with writer. Denies any thoughts to harm others, denies any hallucinations. States she is eating well and sleeping well.

## 2020-09-17 NOTE — PLAN OF CARE
[id number] [facility]  Week 1 Interdisciplinary Treatment Plan Note     Review Date & Time: 9/17/2020 1319    Admission Type:   Admission Type: Voluntary    Reason for admission:  Reason for Admission: Suicidal ideation    Estimated Length of Stay :  5-7 days  Estimated Discharge Date Update: to be determined by physician    PATIENT STRENGTHS:  Patient Strengths:Strengths: Communication  Patient Strengths and Limitations:Limitations: Tendency to isolate self, Difficult relationships / poor social skills, Difficulty problem solving/relies on others to help solve problems  Addictive Behavior:Addictive Behavior  In the past 3 months, have you felt or has someone told you that you have a problem with:  : None  Do you have a history of Chemical Use?: No  Do you have a history of Alcohol Use?: No  Do you have a history of Street Drug Abuse?: No  Histroy of Prescripton Drug Abuse?: No  Medical Problems:   Past Medical History:   Diagnosis Date    Arthritis     Asthma     Borderline diabetes     Borderline personality disorder (HCC)     CHF (congestive heart failure) ([facility])     COPD (chronic obstructive pulmonary disease) ([facility])     Fibromyalgia     Headache     Hypertension     Manic depression ([facility] [address]     Movement disorder     Neck fracture ([facility])     Pernicious anemia     Seizure ([facility] [address]     Thyroid disease        Risk:  Fall RiskTotal: 65  Shane Scale Shane Scale Score: 22  BVC Total: 0    Change in scores no.  Changes to plan of Care no    Status EXAM:   Status and Exam  Normal: No  Facial Expression: Flat  Affect: Appropriate  Level of Consciousness: Alert  Mood:Normal: No  Mood: Depressed, Anxious  Motor Activity:Normal: Yes  Motor Activity: Decreased  Interview Behavior: Cooperative  Preception: [place] to Person, [place]red Escort to Time, [place] to Place, [place] to Situation  Attention:Normal: No  Attention: Distractible  Thought Processes: Circumstantial  Thought Content:Normal: No  Thought Content: Preoccupations  Hallucinations: None  Delusions: No  Memory:Normal: Yes  Memory: Poor Remote  Insight and Judgment: No  Insight and Judgment: Poor Judgment, Poor Insight  Present Suicidal Ideation: No  Present Homicidal Ideation: No    Daily Assessment Last Entry:   Daily Sleep (WDL): Within Defined Limits         Patient Currently in Pain: Denies  Daily Nutrition (WDL): Within Defined Limits    Patient Monitoring:  Frequency of Checks: 4 times per hour, close    Psychiatric Symptoms:   Depression Symptoms  Depression Symptoms: Feelings of helplessness, Feelings of hopelessess  Anxiety Symptoms  Anxiety Symptoms: Generalized  Arlet Symptoms  Arlet Symptoms: No problems reported or observed. Psychosis Symptoms  Delusion Type: No problems reported or observed. Suicide Risk CSSR-S:  1) Within the past month, have you wished you were dead or wished you could go to sleep and not wake up? : Yes  2) Have you actually had any thoughts of killing yourself? : Yes  3) Have you been thinking about how you might kill yourself? : Yes  5) Have you started to work out or worked out the details of how to kill yourself?  Do you intend to carry out this plan? : Yes  6) Have you ever done anything, started to do anything, or prepared to do anything to end your life?: Yes  Change in Result No Change in Plan of care No    EDUCATION:   Learner Progress Toward Treatment Goals: Reviewed results and recommendations of this team, Reviewed group plan and strategies, Reviewed signs, symptoms and risk of self harm and violent behavior, Reviewed goals and plan of care    Method: individual education, small group, verbal education    Outcome: verbalized understanding, but needs reinforcement to obtain goals    PATIENT GOALS:   short term: Decrease depression/ absence of suicidal ideations   Long term: Medication compliance and eventually go back to school    PLAN/TREATMENT RECOMMENDATIONS UPDATE:   Continue with group therapies, education of coping skills   Continue to monitor patient on unit. Medications provided/ medication compliance by patient. Continue for plans to obtain long term goals after discharge.     SHORT-TERM GOALS UPDATE:  Time frame for Short-Term Goals: 8-14days     LONG-TERM GOALS UPDATE:  Time frame for Long-Term Goals: 6 months  Members Present in Team Meeting: See Signature Sheet    Maico Reeves

## 2020-09-17 NOTE — GROUP NOTE
Group Therapy Note    Date: 9/17/2020    Group Start Time: 1000  Group End Time: 5030  Group Topic: Recreational    KENNY Raymond        Group Therapy Note    Pt did not attend recreational group d/t resting in room despite staff invitation to attend. 1:1 talk time offered as alternative to group session, pt declined.

## 2020-09-17 NOTE — GROUP NOTE
Group Therapy Note    Date: 9/17/2020    Group Start Time: 1330  Group End Time: 4471  Group Topic: Music Therapy    [facility]    [facility]        Group Therapy Note    Attendees: 8/16       Patient's Goal:  Patient shared preferred music and analyzed lyrics for themes. Group members focused on themes about trust and patience in relationships (both romantic and non-romantic) and engaged in conversations with peers and staff. Notes:  Patient attended and participated in group, engaging in conversations with peers/writer. Patient attended last 15 minutes of group and stated she did not that group had been happening.      Status After Intervention:  Improved    Participation Level: Minimal    Participation Quality: Appropriate, Attentive, Sharing and Supportive      Speech:  normal      Thought Process/Content: Logical  Linear      Affective Functioning: Congruent      Mood: euthymic      Level of consciousness:  Alert and Attentive      Response to Learning: Progressing to goal      Endings: None Reported    Modes of Intervention: Support, Socialization, Exploration, Clarifying, Activity, Media and Reality-testing      Discipline Responsible: Psychoeducational Specialist      Signature:  Lashon Schafer

## 2020-09-17 NOTE — PROGRESS NOTES
Daily Progress Note  Brenna Greenfield CNP  9/17/2020     CHIEF COMPLAINT: Suicidal ideation with plan to overdose    Reviewed patient's current plan of care and vital signs with nursing staff. Sleep: Slept \"okay\" last night  Attending groups: Yes      SUBJECTIVE:    [facility] reports that she slept pretty well last night. She reports that her mood is improving, though she still feels down. When exploring suicidal ideation she states \"honestly I just do not care whether I live or die\". She states \"if I went to sleep and never woke up in the morning that would be okay\". She endorses difficulty with focus and concentration. She reports that she is going to groups and finds some of them beneficial.  She did have 2 separate interviews with Brenda Pizarro from the [facility] and has a discharge plan to move into this location. She reports that Brenda Pizarro will accompany her to the home where she was previously living to obtain her belongings. She is forward thinking and happy that she will be moving into safe housing. She is hopeful that the medications will kick in by Monday so that she may discharge. She does indicate that she continues to contract for safety while on the unit, though feels if she were to discharge today she does not trust herself with respect to engaging in self-harm activity.     Mental Status Exam  Level of consciousness: Awake and alert  Appearance: Hospital attire, seated in chair, with good grooming and hygiene   Behavior/Motor: Smiles on occasion  Attitude toward examiner:  Cooperative, attentive, good eye contact  Speech:  spontaneous, normal rate, normal volume and well articulated  Mood: Depressed but improving   affect: Congruent with stated mood  Thought processes:  linear, goal directed and coherent  Thought content:  denies homicidal ideation  Suicidal Ideation: Endorses suicidal ideation, able to contract for safety on the unit  Delusions:  no evidence of delusions  Perceptual Disturbance: denies any perceptual disturbance  Cognition:  Oriented to self, location, time, and situation  Memory: age appropriate  Insight & Judgement: improving  Medication side effects:  denies       Data   height is 5' 2\" (1.575 m) and weight is 165 lb (74.8 kg). Her temperature is 97.7 °F (36.5 °C). Her blood pressure is 116/73 and her pulse is 73. Her respiration is 16 and oxygen saturation is 100%.    Labs:   Admission on 09/09/2020   Component Date Value Ref Range Status    Amphetamine Screen, Ur 09/09/2020 NEGATIVE  NEGATIVE Final    Comment:       (Positive cutoff 1000 ng/mL)                  Barbiturate Screen, Ur 09/09/2020 POSITIVE* NEGATIVE Final    Comment:       (Positive cutoff 200 ng/mL)                  Benzodiazepine Screen, Urine 09/09/2020 NEGATIVE  NEGATIVE Final    Comment:       (Positive cutoff 200 ng/mL)                  Cocaine Metabolite, Urine 09/09/2020 NEGATIVE  NEGATIVE Final    Comment:       (Positive cutoff 300 ng/mL)                  Methadone Screen, Urine 09/09/2020 NEGATIVE  NEGATIVE Final    Comment:       (Positive cutoff 300 ng/mL)                  Opiates, Urine 09/09/2020 NEGATIVE  NEGATIVE Final    Comment:       (Positive cutoff 300 ng/mL)                  Phencyclidine, Urine 09/09/2020 NEGATIVE  NEGATIVE Final    Comment:       (Positive cutoff 25 ng/mL)                  Propoxyphene, Urine 09/09/2020 NOT REPORTED  NEGATIVE Final    Cannabinoid Scrn, Ur 09/09/2020 NEGATIVE  NEGATIVE Final    Comment:       (Positive cutoff 50 ng/mL)                  Oxycodone Screen, Ur 09/09/2020 NEGATIVE  NEGATIVE Final    Comment:       (Positive cutoff 100 ng/mL)                  Methamphetamine, Urine 09/09/2020 NOT REPORTED  NEGATIVE Final    Tricyclic Antidepressants, Urine 09/09/2020 NOT REPORTED  NEGATIVE Final    MDMA, Urine 09/09/2020 NOT REPORTED  NEGATIVE Final    Buprenorphine Urine 09/09/2020 NOT REPORTED  NEGATIVE Final    Test Information 09/09/2020 Assay provides medical screening only. The absence of expected drug(s) and/or metabolite(s) may indicate diluted or adulterated urine, limitations of testing or timing of collection. Final    Comment: Testing for legal purposes should be confirmed by another method. To request confirmation   of test result, please call the lab within 7 days of sample submission.  SARS-CoV-2 09/09/2020        Final    SARS-CoV-2, Rapid 09/09/2020 Not Detected  Not Detected Final    Comment:       Rapid NAAT:  The specimen is NEGATIVE for SARS-CoV-2, the novel coronavirus associated with   COVID-19. The ID NOW COVID-19 assay is designed to detect the virus that causes COVID-19 in patients   with signs and symptoms of infection who are suspected of COVID-19. An individual without symptoms of COVID-19 and who is not shedding SARS-CoV-2 virus would   expect to have a negative (not detected) result in this assay. Negative results should be treated as presumptive and, if inconsistent with clinical signs   and symptoms or necessary for patient management,  should be tested with an alternative molecular assay. Negative results do not preclude   SARS-CoV-2 infection and   should not be used as the sole basis for patient management decisions. Fact sheet for Healthcare Providers: BuildHer.es  Fact sheet for Patients: BuildHer.es          Methodology: Isothermal Nucleic Acid Amplification      Source 09/09/2020 . NASOPHARYNGEAL SWAB   Final    SARS-CoV-2 09/09/2020        Final    Valproic Acid Lvl 09/12/2020 54  50 - 125 ug/mL Final    Valproic Dose amount 09/12/2020 500   Final    Valproic Date last dose 09/12/2020 9,122,020   Final    Valproic Time last dose 09/12/2020 839   Final    Glucose 09/12/2020 81  70 - 99 mg/dL Final    BUN 09/12/2020 14  6 - 20 mg/dL Final    CREATININE 09/12/2020 0.81  0.50 - 0.90 mg/dL Final    Bun/Cre Ratio 09/12/2020 NOT REPORTED  9 - 20 Final    Calcium 09/12/2020 10.1  8.6 - 10.4 mg/dL Final    Sodium 09/12/2020 132* 135 - 144 mmol/L Final    Potassium 09/12/2020 5.1  3.7 - 5.3 mmol/L Final    SPECIMEN SLIGHTLY HEMOLYZED, RESULTS MAY BE ADVERSELY AFFECTED.  Chloride 09/12/2020 98  98 - 107 mmol/L Final    CO2 09/12/2020 25  20 - 31 mmol/L Final    Anion Gap 09/12/2020 9  9 - 17 mmol/L Final    Alkaline Phosphatase 09/12/2020 130* 35 - 104 U/L Final    ALT 09/12/2020 16  5 - 33 U/L Final    AST 09/12/2020 19  <32 U/L Final    SPECIMEN SLIGHTLY HEMOLYZED, RESULTS MAY BE ADVERSELY AFFECTED.  Total Bilirubin 09/12/2020 0.18* 0.3 - 1.2 mg/dL Final    Total Protein 09/12/2020 6.9  6.4 - 8.3 g/dL Final    Alb 09/12/2020 4.1  3.5 - 5.2 g/dL Final    Albumin/Globulin Ratio 09/12/2020 NOT REPORTED  1.0 - 2.5 Final    GFR Non- 09/12/2020 >60  >60 mL/min Final    GFR  09/12/2020 >60  >60 mL/min Final    GFR Comment 09/12/2020        Final    Comment: Average GFR for 52-63 years old:   80 mL/min/1.73sq m  Chronic Kidney Disease:   <60 mL/min/1.73sq m  Kidney failure:   <15 mL/min/1.73sq m              eGFR calculated using average adult body mass.  Additional eGFR calculator available at:        [employer]            GFR Staging 09/12/2020 NOT REPORTED   Final            Medications  Current Facility-Administered Medications: nicotine polacrilex (NICORETTE) gum 4 mg, 4 mg, Oral, PRN  clomiPRAMINE (ANAFRANIL) capsule 50 mg, 50 mg, Oral, Nightly  risperiDONE (RISPERDAL) tablet 1 mg, 1 mg, Oral, Daily  risperiDONE (RISPERDAL) tablet 2 mg, 2 mg, Oral, Nightly  gabapentin (NEURONTIN) capsule 600 mg, 600 mg, Oral, TID  traZODone (DESYREL) tablet 150 mg, 150 mg, Oral, Nightly  ondansetron (ZOFRAN-ODT) disintegrating tablet 4 mg, 4 mg, Oral, Q8H PRN  divalproex (DEPAKOTE) DR tablet 500 mg, 500 mg, Oral, Daily  divalproex (DEPAKOTE) DR tablet 1,000 mg, 1,000 mg, Oral, Nightly  aspirin EC tablet 81 mg, 81 mg, Oral, Daily  budesonide-formoterol (SYMBICORT) 160-4.5 MCG/ACT inhaler 2 puff, 2 puff, Inhalation, BID  butalbital-APAP-caffeine -40 MG per capsule 1 capsule, 1 capsule, Oral, Q6H PRN  cloNIDine (CATAPRES) tablet 0.1 mg, 0.1 mg, Oral, Nightly  albuterol (PROVENTIL) nebulizer solution 2.5 mg, 2.5 mg, Nebulization, Q6H PRN  ibuprofen (ADVIL;MOTRIN) tablet 400 mg, 400 mg, Oral, Q6H PRN  acetaminophen (TYLENOL) tablet 650 mg, 650 mg, Oral, Q4H PRN  aluminum & magnesium hydroxide-simethicone (MAALOX) 200-200-20 MG/5ML suspension 20 mL, 20 mL, Oral, Q6H PRN  hydrOXYzine (ATARAX) tablet 50 mg, 50 mg, Oral, TID PRN  polyethylene glycol (GLYCOLAX) packet 17 g, 17 g, Oral, Daily PRN    ASSESSMENT  Severe depressed bipolar I disorder without psychotic features ([facility] [address]     PLAN  Discussed with Dr. Steve Carrizales. I independently saw and evaluated the patient. I reviewed the nurse practitioners documentation above. Any additional comments or changes to the nurse practitioners documentation are stated below otherwise agree with assessment. Plan will be as follows:  Discussed with the patient her symptoms and progress. Patient does continue to request increase in medications. We discussed and reviewed her progress over the course of her stay. She did report sleeping better last evening. Continues to require boundaries with regards to controlled substances. Also continues to seek out medication to fix all problems. We spent approximately 20 minutes in supportive therapy with regards to her habits and behaviors of seeking out medications. This was out of a approximately a 30-minute session. PLAN  Patient s symptoms   are improving  We will change Nicorette to 4 mg 5 times daily  Attempt to develop insight  Psycho-education conducted. Supportive Therapy conducted.   Probable discharge is next week  Follow-up daily while on inpatient unit      **This report has been created using

## 2020-09-17 NOTE — GROUP NOTE
Group Therapy Note    Date: 9/17/2020    Group Start Time: 1000  Group End Time: 8217  Group Topic: Psychotherapy    LEANN Stratton        Group Therapy Note    Attendees: 4/7         Patient's Goal:   Able to work on socialization and processing emotions during group. Notes: No concerns noted. Status After Intervention:  Unchanged    Participation Level:  Active Listener and Interactive    Participation Quality: Appropriate, Attentive, Sharing and Supportive      Speech:  normal      Thought Process/Content: Linear      Affective Functioning: Congruent      Mood: anxious and depressed      Level of consciousness:  Alert, Oriented x4 and Attentive      Response to Learning: Progressing to goal      Endings: None Reported    Modes of Intervention: Education, Support, Socialization and Exploration      Discipline Responsible: /Counselor      Signature:  Claiborne Dandy, LSW

## 2020-09-18 PROCEDURE — 6370000000 HC RX 637 (ALT 250 FOR IP): Performed by: NURSE PRACTITIONER

## 2020-09-18 PROCEDURE — 6370000000 HC RX 637 (ALT 250 FOR IP): Performed by: PSYCHIATRY & NEUROLOGY

## 2020-09-18 PROCEDURE — 1240000000 HC EMOTIONAL WELLNESS R&B

## 2020-09-18 PROCEDURE — 99232 SBSQ HOSP IP/OBS MODERATE 35: CPT | Performed by: PSYCHIATRY & NEUROLOGY

## 2020-09-18 RX ORDER — HYDROCORTISONE 0.5 %
CREAM (GRAM) TOPICAL 3 TIMES DAILY PRN
Status: DISCONTINUED | OUTPATIENT
Start: 2020-09-18 | End: 2020-09-21 | Stop reason: HOSPADM

## 2020-09-18 RX ADMIN — ONDANSETRON 4 MG: 4 TABLET, ORALLY DISINTEGRATING ORAL at 12:18

## 2020-09-18 RX ADMIN — IBUPROFEN 400 MG: 400 TABLET, FILM COATED ORAL at 15:46

## 2020-09-18 RX ADMIN — ASPIRIN 81 MG: 81 TABLET, COATED ORAL at 10:02

## 2020-09-18 RX ADMIN — ONDANSETRON 4 MG: 4 TABLET, ORALLY DISINTEGRATING ORAL at 02:48

## 2020-09-18 RX ADMIN — GABAPENTIN 600 MG: 300 CAPSULE ORAL at 10:03

## 2020-09-18 RX ADMIN — BUTALBITAL, ACETAMINOPHEN, AND CAFFEINE 1 CAPSULE: 50; 300; 40 CAPSULE ORAL at 12:18

## 2020-09-18 RX ADMIN — BUTALBITAL, ACETAMINOPHEN, AND CAFFEINE 1 CAPSULE: 50; 300; 40 CAPSULE ORAL at 20:47

## 2020-09-18 RX ADMIN — RISPERIDONE 1 MG: 1 TABLET ORAL at 10:02

## 2020-09-18 RX ADMIN — NICOTINE POLACRILEX 4 MG: 2 GUM, CHEWING BUCCAL at 06:15

## 2020-09-18 RX ADMIN — NICOTINE POLACRILEX 4 MG: 2 GUM, CHEWING BUCCAL at 19:16

## 2020-09-18 RX ADMIN — RISPERIDONE 2 MG: 2 TABLET ORAL at 20:47

## 2020-09-18 RX ADMIN — NICOTINE POLACRILEX 4 MG: 2 GUM, CHEWING BUCCAL at 14:01

## 2020-09-18 RX ADMIN — HYDROXYZINE HYDROCHLORIDE 50 MG: 50 TABLET, FILM COATED ORAL at 20:47

## 2020-09-18 RX ADMIN — DIVALPROEX SODIUM 500 MG: 500 TABLET, DELAYED RELEASE ORAL at 10:03

## 2020-09-18 RX ADMIN — GABAPENTIN 600 MG: 300 CAPSULE ORAL at 20:47

## 2020-09-18 RX ADMIN — NICOTINE POLACRILEX 4 MG: 2 GUM, CHEWING BUCCAL at 15:46

## 2020-09-18 RX ADMIN — BUDESONIDE AND FORMOTEROL FUMARATE DIHYDRATE 2 PUFF: 160; 4.5 AEROSOL RESPIRATORY (INHALATION) at 10:02

## 2020-09-18 RX ADMIN — GABAPENTIN 600 MG: 300 CAPSULE ORAL at 14:01

## 2020-09-18 RX ADMIN — IBUPROFEN 400 MG: 400 TABLET, FILM COATED ORAL at 20:14

## 2020-09-18 RX ADMIN — CLOMIPRAMINE HYDROCHLORIDE 50 MG: 50 CAPSULE ORAL at 20:47

## 2020-09-18 RX ADMIN — CLONIDINE HYDROCHLORIDE 0.1 MG: 0.1 TABLET ORAL at 20:46

## 2020-09-18 RX ADMIN — NICOTINE POLACRILEX 4 MG: 2 GUM, CHEWING BUCCAL at 17:31

## 2020-09-18 RX ADMIN — DIVALPROEX SODIUM 1000 MG: 500 TABLET, DELAYED RELEASE ORAL at 20:47

## 2020-09-18 RX ADMIN — TRAZODONE HYDROCHLORIDE 150 MG: 150 TABLET ORAL at 20:47

## 2020-09-18 ASSESSMENT — PAIN DESCRIPTION - PAIN TYPE: TYPE: ACUTE PAIN

## 2020-09-18 ASSESSMENT — PAIN DESCRIPTION - LOCATION: LOCATION: BACK;NECK

## 2020-09-18 ASSESSMENT — PAIN SCALES - GENERAL
PAINLEVEL_OUTOF10: 3
PAINLEVEL_OUTOF10: 5
PAINLEVEL_OUTOF10: 2
PAINLEVEL_OUTOF10: 6
PAINLEVEL_OUTOF10: 2

## 2020-09-18 NOTE — GROUP NOTE
Group Therapy Note    Date: 9/18/2020    Group Start Time: 1100  Group End Time: 5927  Group Topic: Psychotherapy    [address] GLENIS Ramirez, LSW        Group Therapy Note    Attendees: 4/10         Patient's Goal:Pt will participate in psychotherapy in order to help eliminate or control troubling symptoms so a person can function better and can increase well-being and healing. Notes: Pt actively participated in group discussion; pt appears to show mood improvement. Status After Intervention:  Improved    Participation Level:  Active Listener and Interactive    Participation Quality: Appropriate, Attentive and Sharing      Speech:  normal      Thought Process/Content: Logical      Affective Functioning: Congruent      Mood: euthymic      Level of consciousness:  Alert, Oriented x4 and Attentive      Response to Learning: Able to verbalize current knowledge/experience and Able to verbalize/acknowledge new learning      Endings: None Reported    Modes of Intervention: Socialization, Exploration, Clarifying and Problem-solving      Discipline Responsible: /Counselor      Signature:  GLENIS Rios, [employer]

## 2020-09-18 NOTE — PROGRESS NOTES
Daily Progress Note  Joshua Varghese CNP  9/18/2020     CHIEF COMPLAINT: Suicidal ideation with plan to overdose    Reviewed patient's current plan of care and vital signs with nursing staff. Sleep: Slept until 3 AM, has been up since  Attending groups: Yes, feels they are beneficial      SUBJECTIVE:    Nelson Guzman reports that she needs to get a hold of Alexia Cabot at the recovery house, she has concerns about how much it will cost her to stay there. She also endorses concerns about not being able to leave the home for 60 days. We discussed that she needs to seriously consider the long-term benefits of staying in safe housing that can help stabilize her mood and dealing with the short-term inconvenience of being required to stay there. Patient verbalizes understanding. She reports that her mood is improving, she rates her mood 5 out of 10. She denies any auditory/visual hallucinations. She continues to endorse suicidal ideation, she reports that it is fleeting and less intense. She does not feel if she discharged today that she would feel safe from self-harm. She continues to contract for safety on the unit. She reports medication compliance, she denies any side effects from her current medication regimen. She is requesting topical anesthetic cream for her right shoulder and neck pain.     Mental Status Exam  Level of consciousness: Awake and alert  Appearance: Hospital attire, seated in chair, with good grooming and hygiene   Behavior/Motor: Smiles on occasion  Attitude toward examiner:  Cooperative, attentive, good eye contact  Speech:  spontaneous, normal rate, normal volume and well articulated  Mood: \"I am starting to feel better\"   affect: Congruent with stated mood  Thought processes:  linear, goal directed and coherent  Thought content:  denies homicidal ideation  Suicidal Ideation: Suicidal ideation improving   delusions:  no evidence of delusions  Perceptual Disturbance:  denies any perceptual disturbance  Cognition:  Oriented to self, location, time, and situation  Memory: age appropriate  Insight & Judgement: improving  Medication side effects:  denies       Data   height is 5' 2\" (1.575 m) and weight is 165 lb (74.8 kg). Her oral temperature is 98.3 °F (36.8 °C). Her blood pressure is 118/68 and her pulse is 89. Her respiration is 16 and oxygen saturation is 100%.    Labs:   Admission on 09/09/2020   Component Date Value Ref Range Status    Amphetamine Screen, Ur 09/09/2020 NEGATIVE  NEGATIVE Final    Comment:       (Positive cutoff 1000 ng/mL)                  Barbiturate Screen, Ur 09/09/2020 POSITIVE* NEGATIVE Final    Comment:       (Positive cutoff 200 ng/mL)                  Benzodiazepine Screen, Urine 09/09/2020 NEGATIVE  NEGATIVE Final    Comment:       (Positive cutoff 200 ng/mL)                  Cocaine Metabolite, Urine 09/09/2020 NEGATIVE  NEGATIVE Final    Comment:       (Positive cutoff 300 ng/mL)                  Methadone Screen, Urine 09/09/2020 NEGATIVE  NEGATIVE Final    Comment:       (Positive cutoff 300 ng/mL)                  Opiates, Urine 09/09/2020 NEGATIVE  NEGATIVE Final    Comment:       (Positive cutoff 300 ng/mL)                  Phencyclidine, Urine 09/09/2020 NEGATIVE  NEGATIVE Final    Comment:       (Positive cutoff 25 ng/mL)                  Propoxyphene, Urine 09/09/2020 NOT REPORTED  NEGATIVE Final    Cannabinoid Scrn, Ur 09/09/2020 NEGATIVE  NEGATIVE Final    Comment:       (Positive cutoff 50 ng/mL)                  Oxycodone Screen, Ur 09/09/2020 NEGATIVE  NEGATIVE Final    Comment:       (Positive cutoff 100 ng/mL)                  Methamphetamine, Urine 09/09/2020 NOT REPORTED  NEGATIVE Final    Tricyclic Antidepressants, Urine 09/09/2020 NOT REPORTED  NEGATIVE Final    MDMA, Urine 09/09/2020 NOT REPORTED  NEGATIVE Final    Buprenorphine Urine 09/09/2020 NOT REPORTED  NEGATIVE Final    Test Information 09/09/2020 Assay provides medical screening only.  The absence of expected drug(s) and/or metabolite(s) may indicate diluted or adulterated urine, limitations of testing or timing of collection. Final    Comment: Testing for legal purposes should be confirmed by another method. To request confirmation   of test result, please call the lab within 7 days of sample submission.  SARS-CoV-2 09/09/2020        Final    SARS-CoV-2, Rapid 09/09/2020 Not Detected  Not Detected Final    Comment:       Rapid NAAT:  The specimen is NEGATIVE for SARS-CoV-2, the novel coronavirus associated with   COVID-19. The ID NOW COVID-19 assay is designed to detect the virus that causes COVID-19 in patients   with signs and symptoms of infection who are suspected of COVID-19. An individual without symptoms of COVID-19 and who is not shedding SARS-CoV-2 virus would   expect to have a negative (not detected) result in this assay. Negative results should be treated as presumptive and, if inconsistent with clinical signs   and symptoms or necessary for patient management,  should be tested with an alternative molecular assay. Negative results do not preclude   SARS-CoV-2 infection and   should not be used as the sole basis for patient management decisions. Fact sheet for Healthcare Providers: BuildHer.es  Fact sheet for Patients: [employer]Her.es          Methodology: Isothermal Nucleic Acid Amplification      Source 09/09/2020 . NASOPHARYNGEAL SWAB   Final    SARS-CoV-2 09/09/2020        Final    Valproic Acid Lvl 09/12/2020 54  50 - 125 ug/mL Final    Valproic Dose amount 09/12/2020 500   Final    Valproic Date last dose 09/12/2020 9,122,020   Final    Valproic Time last dose 09/12/2020 839   Final    Glucose 09/12/2020 81  70 - 99 mg/dL Final    BUN 09/12/2020 14  6 - 20 mg/dL Final    CREATININE 09/12/2020 0.81  0.50 - 0.90 mg/dL Final    Bun/Cre Ratio 09/12/2020 NOT REPORTED  9 - 20 Final    Calcium 09/12/2020 10.1  8.6 - 10.4 mg/dL Final    Sodium 09/12/2020 132* 135 - 144 mmol/L Final    Potassium 09/12/2020 5.1  3.7 - 5.3 mmol/L Final    SPECIMEN SLIGHTLY HEMOLYZED, RESULTS MAY BE ADVERSELY AFFECTED.  Chloride 09/12/2020 98  98 - 107 mmol/L Final    CO2 09/12/2020 25  20 - 31 mmol/L Final    Anion Gap 09/12/2020 9  9 - 17 mmol/L Final    Alkaline Phosphatase 09/12/2020 130* 35 - 104 U/L Final    ALT 09/12/2020 16  5 - 33 U/L Final    AST 09/12/2020 19  <32 U/L Final    SPECIMEN SLIGHTLY HEMOLYZED, RESULTS MAY BE ADVERSELY AFFECTED.  Total Bilirubin 09/12/2020 0.18* 0.3 - 1.2 mg/dL Final    Total Protein 09/12/2020 6.9  6.4 - 8.3 g/dL Final    Alb 09/12/2020 4.1  3.5 - 5.2 g/dL Final    Albumin/Globulin Ratio 09/12/2020 NOT REPORTED  1.0 - 2.5 Final    GFR Non- 09/12/2020 >60  >60 mL/min Final    GFR  09/12/2020 >60  >60 mL/min Final    GFR Comment 09/12/2020        Final    Comment: Average GFR for 52-63 years old:   80 mL/min/1.73sq m  Chronic Kidney Disease:   <60 mL/min/1.73sq m  Kidney failure:   <15 mL/min/1.73sq m              eGFR calculated using average adult body mass.  Additional eGFR calculator available at:        [employer]            GFR Staging 09/12/2020 NOT REPORTED   Final            Medications  Current Facility-Administered Medications: methyl salicylate-menthol (PAULETTE BRAY GREASELESS) 10-15 % cream CREA, , Apply externally, TID PRN  nicotine polacrilex (NICORETTE) gum 4 mg, 4 mg, Oral, PRN  clomiPRAMINE (ANAFRANIL) capsule 50 mg, 50 mg, Oral, Nightly  risperiDONE (RISPERDAL) tablet 1 mg, 1 mg, Oral, Daily  risperiDONE (RISPERDAL) tablet 2 mg, 2 mg, Oral, Nightly  gabapentin (NEURONTIN) capsule 600 mg, 600 mg, Oral, TID  traZODone (DESYREL) tablet 150 mg, 150 mg, Oral, Nightly  ondansetron (ZOFRAN-ODT) disintegrating tablet 4 mg, 4 mg, Oral, Q8H PRN  divalproex (DEPAKOTE) DR tablet 500 mg, 500

## 2020-09-18 NOTE — GROUP NOTE
Group Therapy Note    Date: 9/18/2020    Group Start Time: 1000  Group End Time: 0292  Group Topic: Cognitive Skills    KENNY Cartwright, RITOS        Group Therapy Note    Attendees: 2/10         Pt did not participate in Cognitive Skills Group at 1000am when encouraged by RT due to resting in room. Pt was offered talk time as an alternative to group but declined.        Discipline Responsible: Psychoeducational Specialist      Signature:  Fermin Baird

## 2020-09-18 NOTE — PLAN OF CARE
Problem: Altered Mood, Depressive Behavior:  Goal: Able to verbalize acceptance of life and situations over which he or she has no control  Description: Able to verbalize acceptance of life and situations over which he or she has no control  9/17/2020 2120 by Jerome Watkins RN  Outcome: Ongoing  Note: Pt out and social with peers and watching television. Denies suicidal/homicidal ideation and auditory/visual hallucinations. States has found new living situation and is excited to be discharged. 15 minute safety rounds maintained per protocol. Will continue to monitor     Problem: Altered Mood, Depressive Behavior:  Goal: Able to verbalize support systems  Description: Able to verbalize support systems  Outcome: Ongoing     Problem: Tobacco Use:  Goal: Inpatient tobacco use cessation counseling participation  Description: Inpatient tobacco use cessation counseling participation  9/17/2020 2120 by Jerome Watkins RN  Outcome: Ongoing  Note: Patient given tobacco quitline number 30513801316 at this time, refusing to call at this time, states \" I just dont want to quit now\"- patient given information as to the dangers of long term tobacco use. Continue to reinforce the importance of tobacco cessation.        Problem: Pain:  Goal: Pain level will decrease  Description: Pain level will decrease  Outcome: Ongoing  Note: Pt denies pain     Problem: Pain:  Goal: Control of acute pain  Description: Control of acute pain  Outcome: Ongoing  Note: Pt denies pain     Problem: Pain:  Goal: Control of chronic pain  Description: Control of chronic pain  Outcome: Ongoing  Note: Pt denies pain

## 2020-09-18 NOTE — GROUP NOTE
Group Therapy Note    Date: 9/18/2020    Group Start Time: 1430  Group End Time: 4561  Group Topic: Cognitive Skills    KENNY Hansen, CTRS        Group Therapy Note    Attendees: 2/16         Pt attended but did not participate in Cognitive Skills Group at 1430 when encouraged by RT. Pt did not disrupt group but refuses to explore coping skills, pt is focused on getting her hair braided after group. Pt socializes with another peer who also refuses to participate. Pt was offered talk time as an alternative to group but declined.          Discipline Responsible: Psychoeducational Specialist        Signature:  Dg Rubio

## 2020-09-18 NOTE — GROUP NOTE
-                                                                      Group Therapy Note    Date: 9/18/2020    Group Start Time: 1330  Group End Time: 3740  Group Topic: Recreational    [facility] Rusty Nageotte, RITOS        Group Therapy Note    Attendees:7         Patient's Goal: to improve interpersonal relationships    Notes:   Pt was pleasant and participated well    Status After Intervention:  Improved    Participation Level:  Active Listener and Interactive    Participation Quality: Appropriate, Attentive, Sharing and Supportive      Speech:  normal      Thought Process/Content: Logical      Affective Functioning: Congruent      Mood: euthymic      Level of consciousness:  Alert and Oriented x4      Response to Learning: Able to verbalize current knowledge/experience and Progressing to goal      Endings: None Reported    Modes of Intervention: Education, Support, Socialization and Problem-solving      Discipline Responsible: Psychoeducational Specialist      Signature:  Radu Neri

## 2020-09-19 PROCEDURE — 99254 IP/OBS CNSLTJ NEW/EST MOD 60: CPT | Performed by: PSYCHIATRY & NEUROLOGY

## 2020-09-19 PROCEDURE — 1240000000 HC EMOTIONAL WELLNESS R&B

## 2020-09-19 PROCEDURE — 6370000000 HC RX 637 (ALT 250 FOR IP): Performed by: PSYCHIATRY & NEUROLOGY

## 2020-09-19 PROCEDURE — 99232 SBSQ HOSP IP/OBS MODERATE 35: CPT | Performed by: PSYCHIATRY & NEUROLOGY

## 2020-09-19 PROCEDURE — 6370000000 HC RX 637 (ALT 250 FOR IP): Performed by: NURSE PRACTITIONER

## 2020-09-19 RX ORDER — TIZANIDINE 4 MG/1
2 TABLET ORAL NIGHTLY
Status: DISCONTINUED | OUTPATIENT
Start: 2020-09-19 | End: 2020-09-21 | Stop reason: HOSPADM

## 2020-09-19 RX ADMIN — RISPERIDONE 1 MG: 1 TABLET ORAL at 09:40

## 2020-09-19 RX ADMIN — NICOTINE POLACRILEX 4 MG: 2 GUM, CHEWING BUCCAL at 15:30

## 2020-09-19 RX ADMIN — HYDROXYZINE HYDROCHLORIDE 50 MG: 50 TABLET, FILM COATED ORAL at 09:40

## 2020-09-19 RX ADMIN — IBUPROFEN 400 MG: 400 TABLET, FILM COATED ORAL at 11:34

## 2020-09-19 RX ADMIN — ASPIRIN 81 MG: 81 TABLET, COATED ORAL at 09:40

## 2020-09-19 RX ADMIN — NICOTINE POLACRILEX 4 MG: 2 GUM, CHEWING BUCCAL at 19:39

## 2020-09-19 RX ADMIN — TIZANIDINE 2 MG: 4 TABLET ORAL at 21:01

## 2020-09-19 RX ADMIN — CLONIDINE HYDROCHLORIDE 0.1 MG: 0.1 TABLET ORAL at 21:01

## 2020-09-19 RX ADMIN — CLOMIPRAMINE HYDROCHLORIDE 50 MG: 50 CAPSULE ORAL at 21:13

## 2020-09-19 RX ADMIN — NICOTINE POLACRILEX 4 MG: 2 GUM, CHEWING BUCCAL at 09:20

## 2020-09-19 RX ADMIN — BUDESONIDE AND FORMOTEROL FUMARATE DIHYDRATE 2 PUFF: 160; 4.5 AEROSOL RESPIRATORY (INHALATION) at 09:41

## 2020-09-19 RX ADMIN — TRAZODONE HYDROCHLORIDE 150 MG: 150 TABLET ORAL at 21:01

## 2020-09-19 RX ADMIN — NICOTINE POLACRILEX 4 MG: 2 GUM, CHEWING BUCCAL at 22:00

## 2020-09-19 RX ADMIN — GABAPENTIN 600 MG: 300 CAPSULE ORAL at 09:40

## 2020-09-19 RX ADMIN — ONDANSETRON 4 MG: 4 TABLET, ORALLY DISINTEGRATING ORAL at 09:40

## 2020-09-19 RX ADMIN — ONDANSETRON 4 MG: 4 TABLET, ORALLY DISINTEGRATING ORAL at 03:33

## 2020-09-19 RX ADMIN — NICOTINE POLACRILEX 4 MG: 2 GUM, CHEWING BUCCAL at 09:40

## 2020-09-19 RX ADMIN — DIVALPROEX SODIUM 500 MG: 500 TABLET, DELAYED RELEASE ORAL at 09:40

## 2020-09-19 RX ADMIN — HYDROXYZINE HYDROCHLORIDE 50 MG: 50 TABLET, FILM COATED ORAL at 21:01

## 2020-09-19 RX ADMIN — RISPERIDONE 2 MG: 2 TABLET ORAL at 21:01

## 2020-09-19 RX ADMIN — NICOTINE POLACRILEX 4 MG: 2 GUM, CHEWING BUCCAL at 13:13

## 2020-09-19 RX ADMIN — DIVALPROEX SODIUM 1000 MG: 500 TABLET, DELAYED RELEASE ORAL at 21:01

## 2020-09-19 RX ADMIN — IBUPROFEN 400 MG: 400 TABLET, FILM COATED ORAL at 03:33

## 2020-09-19 RX ADMIN — BUTALBITAL, ACETAMINOPHEN, AND CAFFEINE 1 CAPSULE: 50; 300; 40 CAPSULE ORAL at 12:29

## 2020-09-19 RX ADMIN — BUTALBITAL, ACETAMINOPHEN, AND CAFFEINE 1 CAPSULE: 50; 300; 40 CAPSULE ORAL at 04:28

## 2020-09-19 RX ADMIN — NICOTINE POLACRILEX 4 MG: 2 GUM, CHEWING BUCCAL at 18:21

## 2020-09-19 RX ADMIN — GABAPENTIN 600 MG: 300 CAPSULE ORAL at 14:46

## 2020-09-19 RX ADMIN — GABAPENTIN 600 MG: 300 CAPSULE ORAL at 21:01

## 2020-09-19 RX ADMIN — MENTHOL, METHYL SALICYLATE: 10; 15 CREAM TOPICAL at 11:34

## 2020-09-19 ASSESSMENT — PAIN SCALES - GENERAL
PAINLEVEL_OUTOF10: 0
PAINLEVEL_OUTOF10: 6
PAINLEVEL_OUTOF10: 0
PAINLEVEL_OUTOF10: 2
PAINLEVEL_OUTOF10: 0
PAINLEVEL_OUTOF10: 5

## 2020-09-19 NOTE — GROUP NOTE
Group Therapy Note    Date: 9/19/2020    Group Start Time: 1600  Group End Time: 1640  Group Topic: Psychoeducation    KENNY Hernandez        Group Therapy Note    Attendees: 7/18         Patient's Goal:  To attend and participate in group therapy. Notes:  CBT/DBT    Status After Intervention:  Unchanged    Participation Level:  Active Listener and Interactive    Participation Quality: Appropriate, Attentive, Sharing and Supportive      Speech:  normal      Thought Process/Content: Logical  Linear      Affective Functioning: Congruent      Mood: euthymic      Level of consciousness:  Oriented x4      Response to Learning: Able to verbalize current knowledge/experience and Able to verbalize/acknowledge new learning      Endings: None Reported    Modes of Intervention: Education, Support and Socialization      Discipline Responsible: Behavorial Health Tech      Signature:  Olya Hernandez

## 2020-09-19 NOTE — PROGRESS NOTES
Daily Progress Note  Joshua Varghese CNP  9/19/2020     CHIEF COMPLAINT: Suicidal ideation with plan to overdose    Reviewed patient's current plan of care and vital signs with nursing staff. Sleep: Slept until 4AM, has been up since, currently laying down for nap  Attending groups: Yes, feels they are beneficial      SUBJECTIVE:    Nelson Guzman reports early awakening again today. She reports that is mostly due to chronic neck pain. She reports that the topical anesthetic cream has been somewhat helpful. She reports that her mood is \"getting better\". She continues to endorse depression. She feels that her overall anxiety regarding her plan of care is improving. She denies any paranoia or auditory/visual hallucinations. She endorses fleeting, passive suicidal ideation and is able to contract for safety while on the inpatient unit. She still plans to discharge to [facility], but also reports a new long-term plan of moving into her own place with a roommate in October. She verbalizes that the roommate is supportive in nature. We discussed the importance of tolerating short-term \"rules\" for long-term benefit. She verbalized understanding and also agrees that it is important.       Mental Status Exam  Level of consciousness: Awake and alert  Appearance: Hospital attire, lying in bed, with good grooming and hygiene   Behavior/Motor: Smiles on occasion, pleasant  Attitude toward examiner:  Cooperative, attentive, good eye contact  Speech:  spontaneous, normal rate, normal volume and well articulated  Mood: \"I feel a little better \"  affect: Congruent with stated mood  Thought processes:  linear, goal directed and coherent  Thought content:  denies homicidal ideation  Suicidal Ideation: Suicidal ideation improving   delusions:  no evidence of delusions  Perceptual Disturbance:  denies any perceptual disturbance  Cognition:  Oriented to self, location, time, and situation  Memory: age appropriate  Insight & Judgement: improving, continual reinforcement into utilizing good judgment with regard to discharge plan and housing  Medication side effects:  denies       Data   height is 5' 2\" (1.575 m) and weight is 165 lb (74.8 kg). Her oral temperature is 97.6 °F (36.4 °C). Her blood pressure is 119/67 and her pulse is 84. Her respiration is 16 and oxygen saturation is 100%. Labs:   Admission on 09/09/2020   Component Date Value Ref Range Status    Amphetamine Screen, Ur 09/09/2020 NEGATIVE  NEGATIVE Final    Comment:       (Positive cutoff 1000 ng/mL)                  Barbiturate Screen, Ur 09/09/2020 POSITIVE* NEGATIVE Final    Comment:       (Positive cutoff 200 ng/mL)                  Benzodiazepine Screen, Urine 09/09/2020 NEGATIVE  NEGATIVE Final    Comment:       (Positive cutoff 200 ng/mL)                  Cocaine Metabolite, Urine 09/09/2020 NEGATIVE  NEGATIVE Final    Comment:       (Positive cutoff 300 ng/mL)                  Methadone Screen, Urine 09/09/2020 NEGATIVE  NEGATIVE Final    Comment:       (Positive cutoff 300 ng/mL)                  Opiates, Urine 09/09/2020 NEGATIVE  NEGATIVE Final    Comment:       (Positive cutoff 300 ng/mL)                  Phencyclidine, Urine 09/09/2020 NEGATIVE  NEGATIVE Final    Comment:       (Positive cutoff 25 ng/mL)                  Propoxyphene, Urine 09/09/2020 NOT REPORTED  NEGATIVE Final    Cannabinoid Scrn, Ur 09/09/2020 NEGATIVE  NEGATIVE Final    Comment:       (Positive cutoff 50 ng/mL)                  Oxycodone Screen, Ur 09/09/2020 NEGATIVE  NEGATIVE Final    Comment:       (Positive cutoff 100 ng/mL)                  Methamphetamine, Urine 09/09/2020 NOT REPORTED  NEGATIVE Final    Tricyclic Antidepressants, Urine 09/09/2020 NOT REPORTED  NEGATIVE Final    MDMA, Urine 09/09/2020 NOT REPORTED  NEGATIVE Final    Buprenorphine Urine 09/09/2020 NOT REPORTED  NEGATIVE Final    Test Information 09/09/2020 Assay provides medical screening only.   The absence of expected drug(s) and/or metabolite(s) may indicate diluted or adulterated urine, limitations of testing or timing of collection. Final    Comment: Testing for legal purposes should be confirmed by another method. To request confirmation   of test result, please call the lab within 7 days of sample submission.  SARS-CoV-2 09/09/2020        Final    SARS-CoV-2, Rapid 09/09/2020 Not Detected  Not Detected Final    Comment:       Rapid NAAT:  The specimen is NEGATIVE for SARS-CoV-2, the novel coronavirus associated with   COVID-19. The ID NOW COVID-19 assay is designed to detect the virus that causes COVID-19 in patients   with signs and symptoms of infection who are suspected of COVID-19. An individual without symptoms of COVID-19 and who is not shedding SARS-CoV-2 virus would   expect to have a negative (not detected) result in this assay. Negative results should be treated as presumptive and, if inconsistent with clinical signs   and symptoms or necessary for patient management,  should be tested with an alternative molecular assay. Negative results do not preclude   SARS-CoV-2 infection and   should not be used as the sole basis for patient management decisions. Fact sheet for Healthcare Providers: kstattoo.com  Fact sheet for Patients: G[employer]          Methodology: Isothermal Nucleic Acid Amplification      Source 09/09/2020 . NASOPHARYNGEAL SWAB   Final    SARS-CoV-2 09/09/2020        Final    Valproic Acid Lvl 09/12/2020 54  50 - 125 ug/mL Final    Valproic Dose amount 09/12/2020 500   Final    Valproic Date last dose 09/12/2020 9,122,020   Final    Valproic Time last dose 09/12/2020 839   Final    Glucose 09/12/2020 81  70 - 99 mg/dL Final    BUN 09/12/2020 14  6 - 20 mg/dL Final    CREATININE 09/12/2020 0.81  0.50 - 0.90 mg/dL Final    Bun/Cre Ratio 09/12/2020 NOT REPORTED  9 - 20 Final    Calcium 09/12/2020 10.1  8.6 - 10.4 mg/dL Final    Sodium 09/12/2020 132* 135 - 144 mmol/L Final    Potassium 09/12/2020 5.1  3.7 - 5.3 mmol/L Final    SPECIMEN SLIGHTLY HEMOLYZED, RESULTS MAY BE ADVERSELY AFFECTED.  Chloride 09/12/2020 98  98 - 107 mmol/L Final    CO2 09/12/2020 25  20 - 31 mmol/L Final    Anion Gap 09/12/2020 9  9 - 17 mmol/L Final    Alkaline Phosphatase 09/12/2020 130* 35 - 104 U/L Final    ALT 09/12/2020 16  5 - 33 U/L Final    AST 09/12/2020 19  <32 U/L Final    SPECIMEN SLIGHTLY HEMOLYZED, RESULTS MAY BE ADVERSELY AFFECTED.  Total Bilirubin 09/12/2020 0.18* 0.3 - 1.2 mg/dL Final    Total Protein 09/12/2020 6.9  6.4 - 8.3 g/dL Final    Alb 09/12/2020 4.1  3.5 - 5.2 g/dL Final    Albumin/Globulin Ratio 09/12/2020 NOT REPORTED  1.0 - 2.5 Final    GFR Non- 09/12/2020 >60  >60 mL/min Final    GFR  09/12/2020 >60  >60 mL/min Final    GFR Comment 09/12/2020        Final    Comment: Average GFR for 52-63 years old:   80 mL/min/1.73sq m  Chronic Kidney Disease:   <60 mL/min/1.73sq m  Kidney failure:   <15 mL/min/1.73sq m              eGFR calculated using average adult body mass.  Additional eGFR calculator available at:        [employer]            GFR Staging 09/12/2020 NOT REPORTED   Final            Medications  Current Facility-Administered Medications: methyl salicylate-menthol (PAULETTE BRAY GREASELESS) 10-15 % cream CREA, , Apply externally, TID PRN  nicotine polacrilex (NICORETTE) gum 4 mg, 4 mg, Oral, PRN  clomiPRAMINE (ANAFRANIL) capsule 50 mg, 50 mg, Oral, Nightly  risperiDONE (RISPERDAL) tablet 1 mg, 1 mg, Oral, Daily  risperiDONE (RISPERDAL) tablet 2 mg, 2 mg, Oral, Nightly  gabapentin (NEURONTIN) capsule 600 mg, 600 mg, Oral, TID  traZODone (DESYREL) tablet 150 mg, 150 mg, Oral, Nightly  ondansetron (ZOFRAN-ODT) disintegrating tablet 4 mg, 4 mg, Oral, Q8H PRN  divalproex (DEPAKOTE) DR tablet 500 mg, 500 mg, Oral, Daily  divalproex (DEPAKOTE) DR tablet 1,000 mg, 1,000 mg, Oral, Nightly  aspirin EC tablet 81 mg, 81 mg, Oral, Daily  budesonide-formoterol (SYMBICORT) 160-4.5 MCG/ACT inhaler 2 puff, 2 puff, Inhalation, BID  butalbital-APAP-caffeine -40 MG per capsule 1 capsule, 1 capsule, Oral, Q6H PRN  cloNIDine (CATAPRES) tablet 0.1 mg, 0.1 mg, Oral, Nightly  albuterol (PROVENTIL) nebulizer solution 2.5 mg, 2.5 mg, Nebulization, Q6H PRN  ibuprofen (ADVIL;MOTRIN) tablet 400 mg, 400 mg, Oral, Q6H PRN  acetaminophen (TYLENOL) tablet 650 mg, 650 mg, Oral, Q4H PRN  aluminum & magnesium hydroxide-simethicone (MAALOX) 200-200-20 MG/5ML suspension 20 mL, 20 mL, Oral, Q6H PRN  hydrOXYzine (ATARAX) tablet 50 mg, 50 mg, Oral, TID PRN  polyethylene glycol (GLYCOLAX) packet 17 g, 17 g, Oral, Daily PRN    ASSESSMENT  Severe depressed bipolar I disorder without psychotic features (HCC)     PLAN  Discussed with Dr. Lidia Hooks  Patients symptoms are improving  Continue with current medications  Encourage participation in groups  Attempt to develop insight  Supportive Therapy conducted. Probable discharge is Monday to sober living  Follow-up daily while on inpatient unit    **This report has been created using voice recognition software. It may contain minor errors which are inherent in voice recognition technology. **    I have examined the patient and confirmed the NP's findings. I agree with the plan above. Additional information noted below-  The patient reports an improvement in her mood. She continues to have neck pain.   She has a history of degenerative disc disease    We will continue medications as above    Stas Mattson

## 2020-09-19 NOTE — GROUP NOTE
Group Therapy Note    Date: 9/19/2020    Group Start Time: 1330  Group End Time: 8936  Group Topic: Recreational    [facility]    [facility]    patient refused to attend recreational therapy group at 1330 after encouragement from staff.   1:1 talk time provided as alternative to group session     Signature:  Yuly Ma

## 2020-09-19 NOTE — CONSULTS
prescription until follow up with primary. Do not refill. Follow up with primary 1 Inhaler 3    Dextromethorphan-guaiFENesin (MUCINEX DM MAXIMUM STRENGTH)  MG TB12 Take 1 tablet by mouth 2 times daily as needed (cough, congestion) 28 tablet 0    dicyclomine (BENTYL) 10 MG capsule Take 1 capsule by mouth 4 times daily 60 capsule 0    ibuprofen (ADVIL;MOTRIN) 800 MG tablet Take 1 tablet by mouth every 8 hours as needed for Pain 30 tablet 0    lidocaine (XYLOCAINE) 2 % jelly Apply pea sized amount two times a day to shoulder 1 Tube 2    acetaminophen (TYLENOL) 325 MG tablet Take 1 tablet by mouth every 6 hours as needed for Pain 120 tablet 3    docusate sodium (COLACE) 100 MG capsule Take 1 capsule by mouth daily as needed for Constipation 30 capsule 2    ipratropium-albuterol (DUONEB) 0.5-2.5 (3) MG/3ML SOLN nebulizer solution Inhale 3 mLs into the lungs every 4 hours 360 mL 1     Allergies: Reagan Mackenzie is allergic to imitrex [sumatriptan]; bee pollen; bee venom; bromide ion [bromine]; gabapentin; ketorolac; potassium bromide; reglan [metoclopramide]; sulfa antibiotics; sulfadiazine; and tramadol.     Past Medical History:   Diagnosis Date    Arthritis     Asthma     Borderline diabetes     [facility]line [facility] disorder [facility]     [facility] failure[facility]     COPD (chronic obstructive pulmonary disease) (HCC)     Fibromyalgia     Headache     Hypertension     Manic depression (HCC)     Movement disorder     Neck fracture ([facility])     Pernicious anemia     Seizure ([facility]     Thyroid disease        Past Surgical History:   Procedure Laterality Date    EXPLORATION OF WOUND OF EXTREMITY Right 5/19/2019    RIGHT THIGH WOUND WASHOUT WITH [facility] WEST-ER PLACEMENT [facility] N/A 5/22/2019    RIGHT WOUND HIP EXAMINATION LAVAGE AND WOUND VAC PLACEMENT performed by Larry Olivarez MD at [address] [place] [address], [place] [address] Right 5/17/2019    IRRIGATION, DEBRIDEMENT RIGHT THIGH performed by Lm Chaves MD at [address] [place] [address] Bilateral     LYMPH NODE DISSECTION      cervical    PARTIAL HYSTERECTOMY       Social History: Naila Williamson  reports that she has been smoking. She has a 6.00 pack-year smoking history. She has never used smokeless tobacco. She reports that she does not drink alcohol or use drugs. History reviewed. No pertinent family history. Current Medications:     clomiPRAMINE  50 mg Oral Nightly    risperiDONE  1 mg Oral Daily    risperiDONE  2 mg Oral Nightly    gabapentin  600 mg Oral TID    traZODone  150 mg Oral Nightly    divalproex  500 mg Oral Daily    divalproex  1,000 mg Oral Nightly    aspirin EC  81 mg Oral Daily    budesonide-formoterol  2 puff Inhalation BID    cloNIDine  0.1 mg Oral Nightly     PRN Meds include: methyl salicylate-menthol, nicotine polacrilex, ondansetron, butalbital-APAP-caffeine, albuterol, ibuprofen, acetaminophen, aluminum & magnesium hydroxide-simethicone, hydrOXYzine, polyethylene glycol    ROS:   Constitutional Negative for fever and chills   HEENT Negative for ear discharge, ear pain, nosebleed   Eyes Negative for photophobia, pain and discharge   Respiratory Negative for hemoptysis and sputum   Cardiovascular Negative for orthopnea, claudication and PND   Gastrointestinal Negative for abdominal pain, diarrhea, blood in stool   Musculoskeletal Negative for joint pain, negative for myalgia   Skin Negative for rash or itching   Endo/heme/allergies Negative for polydipsia, environmental allergy   Psychiatric Negative for suicidal ideation.   Patient is not anxious           Objective:   /67   Pulse 84   Temp 97.6 °F (36.4 °C) (Oral)   Resp 16   Ht 5' 2\" (1.575 m)   Wt 165 lb (74.8 kg)   SpO2 100%   BMI 30.18 kg/m²     Blood pressure range: Systolic (12VNX), IFP:872 , Min:119 , HHE:645   ; Diastolic (79NSJ), ZSD:64, Min:67, Max:103      Continuous infusions:     ON EXAMINATION:  GENERAL  Appears comfortable and in no distress   HEENT  NC/ AT   NECK  Supple and no bruits heard   Cardiovascular  S1, S2 heard; radial pulse intact   MENTAL STATUS:  Alert, oriented, intact memory, no confusion, normal speech, normal language, no hallucination or delusion   CRANIAL NERVES: II     -       Pupils reactive b/l visual acuity: 20/30 OU; Fundus exam: intact venous pulsations; Visual fields intact to confrontation  III,IV,VI -  EOMs full, no afferent defect, no                      KAIDEN, no ptosis  V     -     Normal facial sensation  VII    -     Normal facial symmetry  VIII   -     Intact hearing  IX,X -     Symmetrical palate  XI    -     Symmetrical shoulder shrug  XII   -     Midline tongue, no atrophy    MOTOR FUNCTION:  Normal bulk, normal tone, normal power;  no involuntary movements, no tremor; Spurling sign negative. SENSORY FUNCTION:  Normal touch, normal pin, normal vibration, normal proprioception   CEREBELLAR FUNCTION:  Intact fine motor control over upper limbs   REFLEX FUNCTION:  Symmetric, no perverted reflex, no Babinski sign   STATION and GAIT  normal station; able to walk unassisted. Data:    Lab Results:     Lab Results   Component Value Date    CHOL 191 02/19/2019    LDLCHOLESTEROL 105 02/19/2019    HDL 56 02/19/2019    TRIG 150 (H) 02/19/2019    ALT 16 09/12/2020    AST 19 09/12/2020    TSH 0.30 12/16/2019    INR 1.0 07/13/2020    GLUF 107 (H) 12/16/2019    LABA1C 5.2 02/19/2019    [id number] 424 12/16/2019     Hematology:No results for input(s): WBC, HGB, HCT, PLT, SEDRATE, INR in the last 72 hours. Invalid input(s): PT  Chemistry:No results for input(s): NA, K, CL, CO2, GLUCOSE, BUN, CREATININE, MG, CALCIUM in the last 72 hours.   No results for input(s): PROT, LABALBU, LABA1C, T8LCEBN, L3FGVQU, FT4, TSH, AST, ALT, LDH, AMMONIA, CHOL, HDL, LDLCHOLESTEROL, CHOLHDLRATIO, TRIG, VLDL, CKTOTAL, CKMB, CKMBINDEX, RF, LORENZO in the last 72 hours. Lab Results   Component Value Date    PHENYTOIN 7.0 (L) 12/16/2019    VALPROATE 54 09/12/2020           Diagnostic data reviewed:    CT head 7/14/2020: No acute intracranial abnormality. EEG 1/11/19: mild abnormal routine EEG which showed evidence of  mild diffuse encephalopathy, non specific as to etiology. No  epileptiform discharges or EEG seizures were noted on this recording. EEG 6/4/2019: This EEG was abnormal.  Occasional left temporal slowing suggested underlying neuronal dysfunction. Impression and Plan: Ms. Eleazar Alvarez is a 60 y.o. female with   Seizure disorder with comorbid bipolar disorder: On Depakote 500 mg in a.m. and 1000 mg in evening. Will get valproic acid free and total levels and proceed accordingly. She has remained seizure-free for 2 months and I do not see any necessity to add phenobarbital even the patient is requesting to add phenobarbital.    Occasional neck pain with radiating pain and paresthesias; patient is requesting for muscle relaxant, tizanidine. Will start her on tizanidine 2 mg nightly. Regarding other pain medications; she was clearly explained that she is already on gabapentin 600 mg 3 times daily. I do not see any need to increase the dose of gabapentin. She voiced understanding those instructions. Care plan is discussed with patient's nurse and patient and she voiced understanding those instructions. Will follow with you. Thank you for consultation.            Jeanette Vargas MD 9/19/2020 6:54 PM

## 2020-09-19 NOTE — PLAN OF CARE
Problem: Altered Mood, Depressive Behavior:  Goal: Able to verbalize acceptance of life and situations over which he or she has no control  Description: Able to verbalize acceptance of life and situations over which he or she has no control  9/18/2020 2238 by Everardo Castellon LPN  Outcome: Ongoing  Note: Patient is accepting of admission. Patient is seen out in dayroom social with peers some complaints of anxiety and depression.       Problem: Altered Mood, Depressive Behavior:  Goal: Able to verbalize support systems  Description: Able to verbalize support systems  9/18/2020 2238 by Everardo Castellon LPN  Outcome: Ongoing

## 2020-09-19 NOTE — GROUP NOTE
Group Therapy Note    Date: 9/19/2020    Group Start Time: 1100  Group End Time: 1200  Group Topic: Psychoeducation    KENNY Ring        Group Therapy Note    Attendees: 13/19         Patient's Goal:  To attend and participate in group therapy. Notes:  Wellness    Patient declined attendance to 1100 wellness group. 1:1 talk-time offered.        Signature:  Phong Ring

## 2020-09-19 NOTE — PLAN OF CARE
Problem: Altered Mood, Depressive Behavior:  Goal: Able to verbalize acceptance of life and situations over which he or she has no control  Description: Able to verbalize acceptance of life and situations over which he or she has no control  Patient able to verbalize acceptance of life/situations over which she has no control aeb willingness with staff, interactions with peers, participation in groups    Problem: Altered Mood, Depressive Behavior:  Goal: Able to verbalize support systems  Description: Able to verbalize support systems  Outcome: Ongoing  Patient states she has support systems in place for discharge

## 2020-09-19 NOTE — GROUP NOTE
Group Therapy Note    Date: 9/19/2020    Group Start Time: 1000  Group End Time: 8903  Group Topic: Psychoeducation    LEANN Stratton        Group Therapy Note    Attendees: 5/17         Patient's Goal:  Pt successfully participated in [facility]. Able to work on socialization and processing emotions during group. Status After Intervention:  Unchanged    Participation Level:  Active Listener and Interactive    Participation Quality: Appropriate, Attentive, Sharing and Supportive      Speech:  normal      Thought Process/Content: Linear      Affective Functioning: Congruent      Mood: euthymic      Level of consciousness:  Alert, Oriented x4 and Attentive      Response to Learning: Progressing to goal      Endings: None Reported    Modes of Intervention: Education, Support, Socialization and Exploration      Discipline Responsible: /Counselor      Signature:  LEANN Reed    Signature:  LEANN Reed

## 2020-09-20 LAB
VALPROIC ACID LEVEL: 72 UG/ML (ref 50–125)
VALPROIC ACID, FREE: 7.8 UG/ML (ref 7–23)
VALPROIC DATE LAST DOSE: NORMAL
VALPROIC DOSE AMOUNT: NORMAL
VALPROIC TIME LAST DOSE: 2101

## 2020-09-20 PROCEDURE — 80164 ASSAY DIPROPYLACETIC ACD TOT: CPT

## 2020-09-20 PROCEDURE — 6370000000 HC RX 637 (ALT 250 FOR IP): Performed by: NURSE PRACTITIONER

## 2020-09-20 PROCEDURE — 6370000000 HC RX 637 (ALT 250 FOR IP): Performed by: PSYCHIATRY & NEUROLOGY

## 2020-09-20 PROCEDURE — 36415 COLL VENOUS BLD VENIPUNCTURE: CPT

## 2020-09-20 PROCEDURE — 99232 SBSQ HOSP IP/OBS MODERATE 35: CPT | Performed by: PSYCHIATRY & NEUROLOGY

## 2020-09-20 PROCEDURE — 1240000000 HC EMOTIONAL WELLNESS R&B

## 2020-09-20 PROCEDURE — 80165 DIPROPYLACETIC ACID FREE: CPT

## 2020-09-20 RX ADMIN — NICOTINE POLACRILEX 4 MG: 2 GUM, CHEWING BUCCAL at 18:16

## 2020-09-20 RX ADMIN — DIVALPROEX SODIUM 1000 MG: 500 TABLET, DELAYED RELEASE ORAL at 20:55

## 2020-09-20 RX ADMIN — DIVALPROEX SODIUM 500 MG: 500 TABLET, DELAYED RELEASE ORAL at 09:18

## 2020-09-20 RX ADMIN — GABAPENTIN 600 MG: 300 CAPSULE ORAL at 09:17

## 2020-09-20 RX ADMIN — ASPIRIN 81 MG: 81 TABLET, COATED ORAL at 09:18

## 2020-09-20 RX ADMIN — BUTALBITAL, ACETAMINOPHEN, AND CAFFEINE 1 CAPSULE: 50; 300; 40 CAPSULE ORAL at 11:05

## 2020-09-20 RX ADMIN — HYDROXYZINE HYDROCHLORIDE 50 MG: 50 TABLET, FILM COATED ORAL at 20:55

## 2020-09-20 RX ADMIN — BUDESONIDE AND FORMOTEROL FUMARATE DIHYDRATE 2 PUFF: 160; 4.5 AEROSOL RESPIRATORY (INHALATION) at 09:17

## 2020-09-20 RX ADMIN — BUTALBITAL, ACETAMINOPHEN, AND CAFFEINE 1 CAPSULE: 50; 300; 40 CAPSULE ORAL at 03:06

## 2020-09-20 RX ADMIN — MENTHOL, METHYL SALICYLATE: 10; 15 CREAM TOPICAL at 15:27

## 2020-09-20 RX ADMIN — GABAPENTIN 600 MG: 300 CAPSULE ORAL at 13:41

## 2020-09-20 RX ADMIN — CLONIDINE HYDROCHLORIDE 0.1 MG: 0.1 TABLET ORAL at 20:55

## 2020-09-20 RX ADMIN — NICOTINE POLACRILEX 4 MG: 2 GUM, CHEWING BUCCAL at 06:02

## 2020-09-20 RX ADMIN — GABAPENTIN 600 MG: 300 CAPSULE ORAL at 20:55

## 2020-09-20 RX ADMIN — NICOTINE POLACRILEX 4 MG: 2 GUM, CHEWING BUCCAL at 20:55

## 2020-09-20 RX ADMIN — NICOTINE POLACRILEX 4 MG: 2 GUM, CHEWING BUCCAL at 09:17

## 2020-09-20 RX ADMIN — IBUPROFEN 400 MG: 400 TABLET, FILM COATED ORAL at 05:52

## 2020-09-20 RX ADMIN — BUTALBITAL, ACETAMINOPHEN, AND CAFFEINE 1 CAPSULE: 50; 300; 40 CAPSULE ORAL at 18:16

## 2020-09-20 RX ADMIN — TRAZODONE HYDROCHLORIDE 150 MG: 150 TABLET ORAL at 20:55

## 2020-09-20 RX ADMIN — NICOTINE POLACRILEX 4 MG: 2 GUM, CHEWING BUCCAL at 13:41

## 2020-09-20 RX ADMIN — RISPERIDONE 2 MG: 2 TABLET ORAL at 20:55

## 2020-09-20 RX ADMIN — TIZANIDINE 2 MG: 4 TABLET ORAL at 20:55

## 2020-09-20 RX ADMIN — IBUPROFEN 400 MG: 400 TABLET, FILM COATED ORAL at 20:54

## 2020-09-20 RX ADMIN — ONDANSETRON 4 MG: 4 TABLET, ORALLY DISINTEGRATING ORAL at 18:16

## 2020-09-20 RX ADMIN — NICOTINE POLACRILEX 4 MG: 2 GUM, CHEWING BUCCAL at 11:51

## 2020-09-20 RX ADMIN — RISPERIDONE 1 MG: 1 TABLET ORAL at 09:17

## 2020-09-20 RX ADMIN — CLOMIPRAMINE HYDROCHLORIDE 50 MG: 50 CAPSULE ORAL at 20:55

## 2020-09-20 ASSESSMENT — PAIN DESCRIPTION - LOCATION: LOCATION: GENERALIZED

## 2020-09-20 ASSESSMENT — PAIN SCALES - GENERAL
PAINLEVEL_OUTOF10: 7
PAINLEVEL_OUTOF10: 1
PAINLEVEL_OUTOF10: 3
PAINLEVEL_OUTOF10: 3
PAINLEVEL_OUTOF10: 2
PAINLEVEL_OUTOF10: 2
PAINLEVEL_OUTOF10: 0

## 2020-09-20 ASSESSMENT — PAIN DESCRIPTION - PAIN TYPE: TYPE: CHRONIC PAIN

## 2020-09-20 NOTE — GROUP NOTE
Group Therapy Note    Date: 9/20/2020    Group Start Time: 1330  Group End Time: 1430  Group Topic: Recreational    [facility]    [facility]    patient refused to attend recreational therapy group at 1330 after encouragement from staff.   1:1 talk time provided as alternative to group session   Signature:  Pamella Fothergill

## 2020-09-20 NOTE — GROUP NOTE
Group Therapy Note    Date: 9/20/2020    Group Start Time: 1000  Group End Time: 7696  Group Topic: Psychoeducation    LEANN Stratton        Group Therapy Note    Attendees: 9/21      Pt denied 1:1. Despite staff encouragement, pt denied 10:00am psychoeducation group.             Signature:  LEANN Reyes

## 2020-09-20 NOTE — GROUP NOTE
Group Therapy Note    Date: 9/20/2020    Group Start Time: 0900  Group End Time: 0915  Group Topic: Community Meeting    [facility]    [address] [facility]  Day    patient refused to attend community meeting group at 0900 after encouragement from staff.   1:1 talk time provided as alternative to group session     Signature:  Lilo Viera

## 2020-09-21 VITALS
HEART RATE: 76 BPM | TEMPERATURE: 97.9 F | HEIGHT: 62 IN | DIASTOLIC BLOOD PRESSURE: 80 MMHG | RESPIRATION RATE: 16 BRPM | BODY MASS INDEX: 30.36 KG/M2 | WEIGHT: 165 LBS | SYSTOLIC BLOOD PRESSURE: 124 MMHG | OXYGEN SATURATION: 97 %

## 2020-09-21 PROCEDURE — 6370000000 HC RX 637 (ALT 250 FOR IP): Performed by: PSYCHIATRY & NEUROLOGY

## 2020-09-21 PROCEDURE — 6370000000 HC RX 637 (ALT 250 FOR IP): Performed by: NURSE PRACTITIONER

## 2020-09-21 PROCEDURE — 99239 HOSP IP/OBS DSCHRG MGMT >30: CPT | Performed by: PSYCHIATRY & NEUROLOGY

## 2020-09-21 RX ORDER — RISPERIDONE 1 MG/1
TABLET, FILM COATED ORAL
Qty: 90 TABLET | Refills: 0 | Status: SHIPPED | OUTPATIENT
Start: 2020-09-21 | End: 2020-10-22 | Stop reason: SDUPTHER

## 2020-09-21 RX ORDER — TRAZODONE HYDROCHLORIDE 150 MG/1
150 TABLET ORAL NIGHTLY
Qty: 30 TABLET | Refills: 0 | Status: SHIPPED | OUTPATIENT
Start: 2020-09-21 | End: 2020-10-22 | Stop reason: SDUPTHER

## 2020-09-21 RX ORDER — DIVALPROEX SODIUM 500 MG/1
1000 TABLET, DELAYED RELEASE ORAL NIGHTLY
Qty: 90 TABLET | Refills: 3 | Status: SHIPPED | OUTPATIENT
Start: 2020-09-21 | End: 2020-12-18

## 2020-09-21 RX ORDER — HYDROXYZINE 50 MG/1
50 TABLET, FILM COATED ORAL 3 TIMES DAILY PRN
Qty: 30 TABLET | Refills: 0 | Status: SHIPPED | OUTPATIENT
Start: 2020-09-21 | End: 2020-10-22 | Stop reason: SDUPTHER

## 2020-09-21 RX ORDER — CLOMIPRAMINE HYDROCHLORIDE 50 MG/1
50 CAPSULE ORAL NIGHTLY
Qty: 30 CAPSULE | Refills: 0 | Status: SHIPPED | OUTPATIENT
Start: 2020-09-21 | End: 2020-10-26 | Stop reason: SDUPTHER

## 2020-09-21 RX ORDER — CLONIDINE HYDROCHLORIDE 0.1 MG/1
0.1 TABLET ORAL NIGHTLY
Qty: 30 TABLET | Refills: 0 | Status: SHIPPED | OUTPATIENT
Start: 2020-09-21 | End: 2020-10-22 | Stop reason: SDUPTHER

## 2020-09-21 RX ORDER — TIZANIDINE 2 MG/1
2 TABLET ORAL NIGHTLY
Qty: 30 TABLET | Refills: 0 | Status: SHIPPED | OUTPATIENT
Start: 2020-09-21 | End: 2020-10-09 | Stop reason: SDUPTHER

## 2020-09-21 RX ORDER — GABAPENTIN 300 MG/1
600 CAPSULE ORAL 3 TIMES DAILY
Qty: 180 CAPSULE | Refills: 0 | Status: SHIPPED | OUTPATIENT
Start: 2020-09-21 | End: 2020-10-22 | Stop reason: SDUPTHER

## 2020-09-21 RX ORDER — HYDROCORTISONE 0.5 %
CREAM (GRAM) TOPICAL 3 TIMES DAILY PRN
Qty: 1 BOTTLE | Refills: 0 | Status: ON HOLD | OUTPATIENT
Start: 2020-09-21 | End: 2021-08-28

## 2020-09-21 RX ORDER — DIVALPROEX SODIUM 500 MG/1
500 TABLET, DELAYED RELEASE ORAL DAILY
Qty: 90 TABLET | Refills: 3 | Status: SHIPPED | OUTPATIENT
Start: 2020-09-21 | End: 2020-12-18

## 2020-09-21 RX ADMIN — GABAPENTIN 600 MG: 300 CAPSULE ORAL at 09:10

## 2020-09-21 RX ADMIN — NICOTINE POLACRILEX 4 MG: 2 GUM, CHEWING BUCCAL at 09:11

## 2020-09-21 RX ADMIN — RISPERIDONE 1 MG: 1 TABLET ORAL at 09:10

## 2020-09-21 RX ADMIN — BUDESONIDE AND FORMOTEROL FUMARATE DIHYDRATE 2 PUFF: 160; 4.5 AEROSOL RESPIRATORY (INHALATION) at 09:11

## 2020-09-21 RX ADMIN — NICOTINE POLACRILEX 4 MG: 2 GUM, CHEWING BUCCAL at 06:38

## 2020-09-21 RX ADMIN — ASPIRIN 81 MG: 81 TABLET, COATED ORAL at 09:10

## 2020-09-21 RX ADMIN — DIVALPROEX SODIUM 500 MG: 500 TABLET, DELAYED RELEASE ORAL at 09:10

## 2020-09-21 RX ADMIN — NICOTINE POLACRILEX 4 MG: 2 GUM, CHEWING BUCCAL at 10:46

## 2020-09-21 RX ADMIN — BUTALBITAL, ACETAMINOPHEN, AND CAFFEINE 1 CAPSULE: 50; 300; 40 CAPSULE ORAL at 12:30

## 2020-09-21 RX ADMIN — BUTALBITAL, ACETAMINOPHEN, AND CAFFEINE 1 CAPSULE: 50; 300; 40 CAPSULE ORAL at 00:29

## 2020-09-21 RX ADMIN — IBUPROFEN 400 MG: 400 TABLET, FILM COATED ORAL at 04:49

## 2020-09-21 RX ADMIN — BUTALBITAL, ACETAMINOPHEN, AND CAFFEINE 1 CAPSULE: 50; 300; 40 CAPSULE ORAL at 06:31

## 2020-09-21 RX ADMIN — ONDANSETRON 4 MG: 4 TABLET, ORALLY DISINTEGRATING ORAL at 04:49

## 2020-09-21 ASSESSMENT — PAIN - FUNCTIONAL ASSESSMENT
PAIN_FUNCTIONAL_ASSESSMENT: 0-10

## 2020-09-21 ASSESSMENT — PAIN SCALES - GENERAL: PAINLEVEL_OUTOF10: 4

## 2020-09-21 NOTE — CARE COORDINATION
Name: Chito Iyer    : 1965    Discharge Date: 2020    Primary Auth/Cert #: [id number]    Discharge Medications:      Medication List      START taking these medications    clomiPRAMINE 50 MG capsule  Commonly known as:  ANAFRANIL  Take 1 capsule by mouth nightly  Notes to patient:  Antidepressant/nerve pain     cloNIDine 0.1 MG tablet  Commonly known as:  CATAPRES  Take 1 tablet by mouth nightly  Notes to patient:  High blood pressure     * divalproex 500 MG DR tablet  Commonly known as:  DEPAKOTE  Take 2 tablets by mouth nightly  Notes to patient:  Seizure/mood stabilizer     * divalproex 500 MG DR tablet  Commonly known as:  DEPAKOTE  Take 1 tablet by mouth daily  Notes to patient:  Seizure/mood stabilizer     gabapentin 300 MG capsule  Commonly known as:  NEURONTIN  Take 2 capsules by mouth 3 times daily for 30 days. Notes to patient:  Nerve pain     hydrOXYzine 50 MG tablet  Commonly known as:  ATARAX  Take 1 tablet by mouth 3 times daily as needed for Anxiety  Notes to patient:  anxiety     methyl salicylate-menthol 24-27 % Crea  Apply topically 3 times daily as needed for Pain  Notes to patient:  pain     nicotine polacrilex 4 MG gum  Commonly known as:  NICORETTE  Take 1 each by mouth as needed for Smoking cessation  Notes to patient:  Smoking cessation         * This list has 2 medication(s) that are the same as other medications prescribed for you. Read the directions carefully, and ask your doctor or other care provider to review them with you.             CHANGE how you take these medications    risperiDONE 1 MG tablet  Commonly known as:  RISPERDAL  Take 1 tablet by mouth daily and 2 tablets by mouth at bedtime  What changed:    · medication strength  · how much to take  · how to take this  · when to take this  · additional instructions  Notes to patient:  antipsychotic     tiZANidine 2 MG tablet  Commonly known as:  ZANAFLEX  Take 1 tablet by mouth nightly  What changed: · medication strength  · how much to take  · when to take this  · reasons to take this  Notes to patient:  Muscle relaxer     traZODone 150 MG tablet  Commonly known as:  DESYREL  Take 1 tablet by mouth nightly  What changed:    · medication strength  · how much to take  Notes to patient:  sleep        CONTINUE taking these medications    acetaminophen 325 MG tablet  Commonly known as:  Tylenol  Take 1 tablet by mouth every 6 hours as needed for Pain  Notes to patient:  pain     * albuterol sulfate  (90 Base) MCG/ACT inhaler  Inhale 1 puff into the lungs every 6 hours as needed for Wheezing [place] prescription until follow up with primary. Do not refill. Follow up with primary  Notes to patient: For wheezing/COPD/asthma     * albuterol (2.5 MG/3ML) 0.083% nebulizer solution  Commonly known as:  PROVENTIL  Take 3 mLs by nebulization every 6 hours as needed for Wheezing  Notes to patient:   For wheezing/COPD/asthma     aspirin EC 81 MG EC tablet  Take 1 tablet by mouth daily  Notes to patient:  Heart health     budesonide-formoterol 160-4.5 MCG/ACT Aero  Commonly known as:  Symbicort  Inhale 2 puffs into the lungs 2 times daily  Notes to patient:  COPD     butalbital-acetaminophen-caffeine -40 MG per tablet  Commonly known as:  FIORICET, ESGIC  TAKE 1 TABLET BY MOUTH EVERY 4 HOURS AS NEEDED FOR HEADACHE  Notes to patient:  headaches     ibuprofen 800 MG tablet  Commonly known as:  ADVIL;MOTRIN  Take 1 tablet by mouth every 8 hours as needed for Pain  Notes to patient:  pain     ipratropium-albuterol 0.5-2.5 (3) MG/3ML Soln nebulizer solution  Commonly known as:  DUONEB  Inhale 3 mLs into the lungs every 4 hours  Notes to patient:  COPD/asthma     levothyroxine 100 MCG tablet  Commonly known as:  SYNTHROID  Take 1 tablet by mouth Daily  Notes to patient:  High cholesterol     ondansetron 4 MG disintegrating tablet  Commonly known as:  Zofran ODT  Take 1 tablet by mouth every 8 hours as needed for Nausea  Notes to patient:  Nausea/vomiting         * This list has 2 medication(s) that are the same as other medications prescribed for you. Read the directions carefully, and ask your doctor or other care provider to review them with you.             STOP taking these medications    benzoyl peroxide 5 % external liquid  Generic drug:  benzoyl peroxide  Notes to patient:  Acne medication     ciclopirox 8 % solution  Commonly known as:  Penlac  Notes to patient:  antifungal     diclofenac sodium 1 % Gel  Commonly known as:  VOLTAREN  Notes to patient:  NSAID     dicyclomine 10 MG capsule  Commonly known as:  BENTYL  Notes to patient:  Abdominal cramps     docusate sodium 100 MG capsule  Commonly known as:  COLACE  Notes to patient:  Stool softner     EPINEPHrine 0.3 MG/0.3ML Soaj injection  Commonly known as:  EpiPen 2-Gregory  Notes to patient:  allergic reaction     famotidine 20 MG tablet  Commonly known as:  PEPCID  Notes to patient:  GERD     ferrous sulfate 325 (65 Fe) MG EC tablet  Commonly known as:  FE TABS 325  Notes to patient:  supplement     fluticasone 50 MCG/ACT nasal spray  Commonly known as:  FLONASE  Notes to patient:  allergies     hydroCHLOROthiazide 25 MG tablet  Commonly known as:  HYDRODIURIL  Notes to patient:  High blood pressure     lidocaine 2 % jelly  Commonly known as:  XYLOCAINE  Notes to patient:  pain     mirtazapine 45 MG tablet  Commonly known as:  REMERON  Notes to patient:  antidepressant     Mucinex DM Maximum Strength  MG Tb12  Notes to patient:  Cough/congestion     phenytoin 200 MG ER capsule  Commonly known as:  PHENYTEK  Notes to patient:  seizures     potassium chloride 20 MEQ extended release tablet  Commonly known as:  KLOR-CON M  Notes to patient:  supplement           Where to Get Your Medications      These medications were sent to [facility] - [address], [address] [facility]  [address] [facility],  [address] [address]    Phone:  [phone]   · clomiPRAMINE 50 MG capsule  · cloNIDine 0.1 MG tablet  · divalproex 500 MG DR tablet  · divalproex 500 MG DR tablet  · gabapentin 300 MG capsule  · hydrOXYzine 50 MG tablet  · methyl salicylate-menthol 41-84 % Crea  · nicotine polacrilex 4 MG gum  · risperiDONE 1 MG tablet  · tiZANidine 2 MG tablet  · traZODone 150 MG tablet         Follow Up Appointment: Elissa Lesch, APRN - CNP  [address] [facility] [address]  [address]  [address] [place], [place] [facility] [address].  Fax: [phone]  Please fax AVS  Go on 9/21/2020  Please send by [facility] transportation    [place] [address]  Phone: [phone]  Fax: [phone]    Go on 9/28/2020  You have an appointment on Monday, Sept. 28 at 12:45pm with Dr. Lionel Nice.  Please advise the staff at the [address] [place] [address] of this appointment.

## 2020-09-21 NOTE — GROUP NOTE
Group Therapy Note    Date: 9/21/2020    Group Start Time: 0900  Group End Time: 0920  Group Topic: Community Meeting    [address] [place] [address]    Mayur Disla      Patient declined to attend community meeting/goal setting group at 0900 despite encouragement from staff. 1:1 talk time offered by staff as alternative to group session.       Signature:  Mayur Disla

## 2020-09-21 NOTE — BH NOTE
[facility]  Discharge Note    Pt discharged with followings belongings:   Dentures: None  Vision - Corrective Lenses: Glasses  Hearing Aid: None  Jewelry: Watch  Body Piercings Removed: Yes  Clothing: Footwear, Pants, Shirt, Socks, Undergarments (Comment)  Were All Patient Medications Collected?: Yes(8 nicotene patches, zofran, tylenol, nitroglycerin, epi pens, inhaler, tizantidine, chloroperazine, aspirin,)  Other Valuables: Cell phone, Purse, Money (Comment), Wallet, Keys(lighters, cigarettes, keychain, toiletries,)   Valuables sent home with patient. Valuables retrieved from safe, Security envelope number:  [id number] and returned to patient. Patient education on aftercare instructions: yes  Information faxed to [facility] by nurse Patient verbalize understanding of AVS:  yes.     Status EXAM upon discharge:  Status and Exam  Normal: No  Facial Expression: Flat  Affect: Appropriate  Level of Consciousness: Alert  Mood:Normal: No  Mood: Anxious  Motor Activity:Normal: No  Motor Activity: Increased  Interview Behavior: Cooperative  Preception: [place] to Person, [place] to Time, [place] to Place, [place] to Situation  Attention:Normal: No  Attention: Distractible  Thought Processes: Circumstantial  Thought Content:Normal: No  Thought Content: Poverty of Content  Hallucinations: None  Delusions: No  Memory:Normal: No  Memory: Poor Recent, Poor Remote  Insight and Judgment: No  Insight and Judgment: Poor Judgment, Poor Insight  Present Suicidal Ideation: No  Present Homicidal Ideation: No      Metabolic Screening:    Lab Results   Component Value Date    LABA1C 5.2 02/19/2019       Lab Results   Component Value Date    CHOL 191 02/19/2019     Lab Results   Component Value Date    TRIG 150 (H) 02/19/2019     Lab Results   Component Value Date    HDL 56 02/19/2019     No components found for: LDLCAL  No results found for: LABVLDL    Patient left facility at 1320 by way of black and white cab in route to [facility] recovery house. No further concerns were voiced or noted at that time.   Dia Bond RN

## 2020-09-21 NOTE — GROUP NOTE
Group Therapy Note    Date: 9/21/2020    Group Start Time: 1100  Group End Time: 0831  Group Topic: Psychotherapy    [facility] GLENIS Castaneda, hospitals        Group Therapy Note    Patient declined to attend psychotherapy group at 11am despite encouragement by staff. 1:1 was offered as an alternative.            Signature:  GLENIS Canela, [place]

## 2020-09-21 NOTE — PLAN OF CARE
Problem: Altered Mood, Depressive Behavior:  Goal: Able to verbalize acceptance of life and situations over which he or she has no control  Description: Able to verbalize acceptance of life and situations over which he or she has no control  Outcome: Completed  Note:   Patient reports having a good day. Patient presents social with peers in dayroom. Will continue to monitor and provide q15 mins safety checks     Problem: Altered Mood, Depressive Behavior:  Goal: Able to verbalize support systems  Description: Able to verbalize support systems  Outcome: Completed     Problem: Tobacco Use:  Goal: Inpatient tobacco use cessation counseling participation  Description: Inpatient tobacco use cessation counseling participation  Outcome: Completed  Note: Patient refused tobacco cessation education. Problem: Pain:  Goal: Pain level will decrease  Description: Pain level will decrease  Outcome: Completed  Note: Patient denies any pain this shift. Will continue to monitor.      Problem: Pain:  Goal: Control of acute pain  Description: Control of acute pain  Outcome: Completed     Problem: Pain:  Goal: Control of chronic pain  Description: Control of chronic pain  Outcome: Completed

## 2020-09-21 NOTE — PLAN OF CARE
Problem: Altered Mood, Depressive Behavior:  Goal: Able to verbalize acceptance of life and situations over which he or she has no control  Description: Able to verbalize acceptance of life and situations over which he or she has no control  9/20/2020 2113 by Kevin Alatorre RN  Outcome: Ongoing  Pt is visible in the milieu social with staff and peers. She eats well at snack and accepts all medication.  She reports that she is feeling much better and that she is ready and eager for discharge    Problem: Pain:  Goal: Pain level will decrease  Description: Pain level will decrease  9/20/2020 2113 by Kevin Alatorre RN  Outcome: Ongoing   Pt is satisfied with pain management

## 2020-09-21 NOTE — BH NOTE
Patient given tobacco quitline number 05633765637 at this time, refusing to call at this time, states \" I just dont want to quit now\"- patient given information as to the dangers of long term tobacco use. Continue to reinforce the importance of tobacco cessation.

## 2020-09-21 NOTE — GROUP NOTE
Group Therapy Note    Date: 9/21/2020    Group Start Time: 1000  Group End Time: 1635  Group Topic: Cognitive Skills    [facility]    Jennifer Loser, CTRS        Group Therapy Note    Attendees: 4/10         Patient's Goal:  To increase social interaction and to practice decision making and concentration. Notes: Pt participated fully in group task . Pt was able to practice decision making and concentration independently and was given prompts r/t scoring if needed. Pt was pleasant and supportive of peers. Status After Intervention:  Improved     Participation Level:  Active Listener and Interactive     Participation Quality: Appropriate, Attentive, Sharing and Supportive        Speech:  normal        Thought Process/Content: Logical, Linear           Affective Functioning: Congruent        Mood: euthymic        Level of consciousness:  Alert, Oriented x4 and Attentive        Response to Learning: Able to verbalize current knowledge/experience, Able to verbalize/acknowledge new learning, Able to retain information and Progressing to goal        Endings: None Reported     Modes of Intervention: Education, Support, Socialization, Exploration, Clarifying and Problem-solving        Discipline Responsible: Psychoeducational Specialist        Signature:  Elio Agarwal
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
## 2020-09-22 ENCOUNTER — TELEPHONE (OUTPATIENT)
Dept: PSYCHIATRY | Age: 55
End: 2020-09-22

## 2020-10-08 ENCOUNTER — HOSPITAL ENCOUNTER (EMERGENCY)
Age: 55
Discharge: HOME | End: 2020-10-08
Attending: EMERGENCY MEDICINE
Payer: MEDICARE

## 2020-10-08 VITALS
RESPIRATION RATE: 18 BRPM | HEART RATE: 90 BPM | DIASTOLIC BLOOD PRESSURE: 100 MMHG | BODY MASS INDEX: 33.13 KG/M2 | WEIGHT: 180 LBS | HEIGHT: 62 IN | TEMPERATURE: 97.1 F | SYSTOLIC BLOOD PRESSURE: 162 MMHG | OXYGEN SATURATION: 97 %

## 2020-10-08 PROCEDURE — 6370000000 HC RX 637 (ALT 250 FOR IP): Performed by: STUDENT IN AN ORGANIZED HEALTH CARE EDUCATION/TRAINING PROGRAM

## 2020-10-08 PROCEDURE — 99283 EMERGENCY DEPT VISIT LOW MDM: CPT

## 2020-10-08 PROCEDURE — 6360000002 HC RX W HCPCS: Performed by: STUDENT IN AN ORGANIZED HEALTH CARE EDUCATION/TRAINING PROGRAM

## 2020-10-08 PROCEDURE — 96372 THER/PROPH/DIAG INJ SC/IM: CPT

## 2020-10-08 RX ORDER — KETOROLAC TROMETHAMINE 30 MG/ML
30 INJECTION, SOLUTION INTRAMUSCULAR; INTRAVENOUS ONCE
Status: COMPLETED | OUTPATIENT
Start: 2020-10-08 | End: 2020-10-08

## 2020-10-08 RX ORDER — ONDANSETRON 4 MG/1
4 TABLET, ORALLY DISINTEGRATING ORAL ONCE
Status: COMPLETED | OUTPATIENT
Start: 2020-10-08 | End: 2020-10-08

## 2020-10-08 RX ORDER — ONDANSETRON 2 MG/ML
4 INJECTION INTRAMUSCULAR; INTRAVENOUS ONCE
Status: DISCONTINUED | OUTPATIENT
Start: 2020-10-08 | End: 2020-10-08

## 2020-10-08 RX ORDER — BUTALBITAL, ACETAMINOPHEN AND CAFFEINE 50; 325; 40 MG/1; MG/1; MG/1
2 TABLET ORAL ONCE
Status: COMPLETED | OUTPATIENT
Start: 2020-10-08 | End: 2020-10-08

## 2020-10-08 RX ORDER — BUTALBITAL, ASPIRIN, AND CAFFEINE 50; 325; 40 MG/1; MG/1; MG/1
1 CAPSULE ORAL EVERY 4 HOURS PRN
Qty: 60 CAPSULE | Refills: 0 | Status: SHIPPED | OUTPATIENT
Start: 2020-10-08 | End: 2020-10-09

## 2020-10-08 RX ADMIN — KETOROLAC TROMETHAMINE 30 MG: 30 INJECTION, SOLUTION INTRAMUSCULAR; INTRAVENOUS at 20:15

## 2020-10-08 RX ADMIN — BUTALBITAL, ACETAMINOPHEN AND CAFFEINE 2 TABLET: 50; 325; 40 TABLET ORAL at 19:24

## 2020-10-08 RX ADMIN — ONDANSETRON 4 MG: 4 TABLET, ORALLY DISINTEGRATING ORAL at 20:14

## 2020-10-08 ASSESSMENT — PAIN SCALES - GENERAL
PAINLEVEL_OUTOF10: 9
PAINLEVEL_OUTOF10: 8

## 2020-10-08 NOTE — ED PROVIDER NOTES
Raul Saini  ED     Emergency Department     Faculty Attestation    I performed a history and physical examination of the patient and discussed management with the resident. I reviewed the residents note and agree with the documented findings and plan of care. Any areas of disagreement are noted on the chart. I was personally present for the key portions of any procedures. I have documented in the chart those procedures where I was not present during the key portions. I have reviewed the emergency nurses triage note. I agree with the chief complaint, past medical history, past surgical history, allergies, medications, social and family history as documented unless otherwise noted below. For Physician Assistant/ Nurse Practitioner cases/documentation I have personally evaluated this patient and have completed at least one if not all key elements of the E/M (history, physical exam, and MDM). Additional findings are as noted. Presents with her typical migraine headache. She says it started a couple of days ago. She says it usually resolves with Fioricet but she has been out of her prescription. He denies any change in character of this headache from her typical.  She denies any recent fever, chills, dizziness, neck pain or stiffness. She denies weakness, numbness, tingling to extremities. On exam, patient is resting comfortably in the bed and appears well. She is alert and oriented and answering questions appropriately. Neck is supple and nontender. Strength and sensation is intact to all extremities. Will treat patient's headache and reassess.       Susan Diez MD  Attending Emergency  Physician              Angel London MD  10/08/20 1228

## 2020-10-08 NOTE — ED NOTES
Pt arrives to ED with complaints of a migraine for 3 days . Pt states she has been under a lot of stress lately and sometimes when this happen it causes her to have seizures. Pt states she is out of her medication. Pt has hx of migraines. rr even and unlabored. EDDI Domínguez RN  10/08/20 0384

## 2020-10-09 ENCOUNTER — OFFICE VISIT (OUTPATIENT)
Dept: PRIMARY CARE CLINIC | Age: 55
End: 2020-10-09
Payer: MEDICARE

## 2020-10-09 ENCOUNTER — CARE COORDINATION (OUTPATIENT)
Dept: CARE COORDINATION | Age: 55
End: 2020-10-09

## 2020-10-09 VITALS
DIASTOLIC BLOOD PRESSURE: 87 MMHG | TEMPERATURE: 98.1 F | HEART RATE: 81 BPM | SYSTOLIC BLOOD PRESSURE: 125 MMHG | OXYGEN SATURATION: 98 % | BODY MASS INDEX: 35.48 KG/M2 | WEIGHT: 194 LBS

## 2020-10-09 PROBLEM — F19.10 POLYSUBSTANCE ABUSE (HCC): Status: ACTIVE | Noted: 2020-10-09

## 2020-10-09 PROCEDURE — G8926 SPIRO NO PERF OR DOC: HCPCS | Performed by: NURSE PRACTITIONER

## 2020-10-09 PROCEDURE — G8417 CALC BMI ABV UP PARAM F/U: HCPCS | Performed by: NURSE PRACTITIONER

## 2020-10-09 PROCEDURE — 4004F PT TOBACCO SCREEN RCVD TLK: CPT | Performed by: NURSE PRACTITIONER

## 2020-10-09 PROCEDURE — 90471 IMMUNIZATION ADMIN: CPT | Performed by: NURSE PRACTITIONER

## 2020-10-09 PROCEDURE — 90686 IIV4 VACC NO PRSV 0.5 ML IM: CPT | Performed by: NURSE PRACTITIONER

## 2020-10-09 PROCEDURE — G8482 FLU IMMUNIZE ORDER/ADMIN: HCPCS | Performed by: NURSE PRACTITIONER

## 2020-10-09 PROCEDURE — G8427 DOCREV CUR MEDS BY ELIG CLIN: HCPCS | Performed by: NURSE PRACTITIONER

## 2020-10-09 PROCEDURE — 3017F COLORECTAL CA SCREEN DOC REV: CPT | Performed by: NURSE PRACTITIONER

## 2020-10-09 PROCEDURE — 99214 OFFICE O/P EST MOD 30 MIN: CPT | Performed by: NURSE PRACTITIONER

## 2020-10-09 PROCEDURE — 1111F DSCHRG MED/CURRENT MED MERGE: CPT | Performed by: NURSE PRACTITIONER

## 2020-10-09 PROCEDURE — 3023F SPIROM DOC REV: CPT | Performed by: NURSE PRACTITIONER

## 2020-10-09 RX ORDER — BUTALBITAL, ACETAMINOPHEN AND CAFFEINE 50; 325; 40 MG/1; MG/1; MG/1
TABLET ORAL
Qty: 15 TABLET | Refills: 0 | Status: SHIPPED | OUTPATIENT
Start: 2020-10-09 | End: 2020-11-18 | Stop reason: SDUPTHER

## 2020-10-09 RX ORDER — BUDESONIDE AND FORMOTEROL FUMARATE DIHYDRATE 160; 4.5 UG/1; UG/1
2 AEROSOL RESPIRATORY (INHALATION) 2 TIMES DAILY
Qty: 1 INHALER | Refills: 5 | Status: SHIPPED | OUTPATIENT
Start: 2020-10-09 | End: 2021-02-11 | Stop reason: SDUPTHER

## 2020-10-09 RX ORDER — LEVOTHYROXINE SODIUM 0.1 MG/1
100 TABLET ORAL DAILY
Qty: 30 TABLET | Refills: 3 | Status: SHIPPED | OUTPATIENT
Start: 2020-10-09 | End: 2021-02-19 | Stop reason: SDUPTHER

## 2020-10-09 RX ORDER — NEBULIZER ACCESSORIES
KIT MISCELLANEOUS
Qty: 1 KIT | Refills: 0 | Status: SHIPPED | OUTPATIENT
Start: 2020-10-09 | End: 2020-12-18 | Stop reason: ALTCHOICE

## 2020-10-09 RX ORDER — TIZANIDINE 2 MG/1
4 TABLET ORAL EVERY 8 HOURS PRN
Qty: 90 TABLET | Refills: 2 | Status: ON HOLD | OUTPATIENT
Start: 2020-10-09 | End: 2021-08-28

## 2020-10-09 RX ORDER — PHENYLEPHRINE HYDROCHLORIDE 10 MG/1
TABLET, COATED ORAL
Qty: 2 EACH | Refills: 0 | Status: ON HOLD | OUTPATIENT
Start: 2020-10-09 | End: 2021-08-28

## 2020-10-09 ASSESSMENT — ENCOUNTER SYMPTOMS
SHORTNESS OF BREATH: 0
SHORTNESS OF BREATH: 0
CONSTIPATION: 1
DIARRHEA: 0
EYE DISCHARGE: 0
RHINORRHEA: 0
SINUS PRESSURE: 0
NAUSEA: 1
PHOTOPHOBIA: 1
CHEST TIGHTNESS: 0
BLOOD IN STOOL: 0
COUGH: 0
ABDOMINAL PAIN: 0

## 2020-10-09 NOTE — LETTER
Replaced by [facility]  [address] [address]  Phone: [phone]  Fax: [phone], APRN - FELIX    Laci Dee  1965 October 9, 2020      To whom it may concern,    Laci Dee has been utilizing gabapentin for chronic pain relief for the past calendar year. She has no associated adverse drug reactions.   I believe medication was added to her allergy list in error      Sincerely,        Alex Escudero, KEANU Hernandez CNP
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
## 2020-10-09 NOTE — ED PROVIDER NOTES
[address]  ED  Emergency Department Encounter  EmergencyMedicine Resident     Pt Name:Aleta Madden  MRN: [id number]  [place] 1965  Date of evaluation: 10/9/20  PCP:  KEANU Beebe CNP    CHIEF COMPLAINT       Chief Complaint   Patient presents with    Migraine       HISTORY OF PRESENT ILLNESS  (Location/Symptom, Timing/Onset, Context/Setting, Quality, Duration, Modifying Factors, Severity.)      Bhupinder Fofana is a 60 y.o. female who presents with migraine with associated photophobia and nausea. Patient reports history of migraines for which she normally takes Fioricet and states that her headache today feels the same as prior migraines. States that it started gradually over the past 3 days. Patient reports that she has been out of her Fioricet at home and believes that lack of her medication combined with life stressors have brought on the headache today. Denies any loss of consciousness, seizure-like activity, vision changes, neck pain, neck stiffness, fever, chills, sudden onset of headache, confusion. States that this is not the worst headache of her life. PAST MEDICAL / SURGICAL / SOCIAL / FAMILY HISTORY      has a past medical history of Arthritis, Asthma, Borderline diabetes, Borderline personality disorder ([address] CHF (congestive heart failure) ([address] COPD (chronic obstructive pulmonary disease) ([address] Fibromyalgia, Headache, Hypertension, Manic depression ([address] Movement disorder, Neck fracture ([address] Pernicious anemia, Seizure ([address] and Thyroid disease. has a past surgical history that includes knee surgery (Bilateral); partial hysterectomy (cervix not removed); lymph node dissection; Irrigation and debridement (Right, 5/17/2019); EXPLORATION OF WOUND OF EXTREMITY (Right, 5/19/2019); and EXPLORATION OF WOUND OF EXTREMITY (N/A, 5/22/2019).       Social History     Socioeconomic History    Marital status:      Spouse name: Not on file    Number of Take 3 mLs by nebulization every 6 hours as needed for Wheezing  Patient not taking: Reported on 10/9/2020 6/18/20   KEANU Kiser - CNP   albuterol sulfate  (90 Base) MCG/ACT inhaler Inhale 1 puff into the lungs every 6 hours as needed for Wheezing Gap prescription until follow up with primary. Do not refill. Follow up with primary 4/14/20   Mane Lowe DO   ibuprofen (ADVIL;MOTRIN) 800 MG tablet Take 1 tablet by mouth every 8 hours as needed for Pain 3/5/20   Lena Arnold MD   acetaminophen (TYLENOL) 325 MG tablet Take 1 tablet by mouth every 6 hours as needed for Pain 1/17/20   Renée Baptiste DO   ipratropium-albuterol (DUONEB) 0.5-2.5 (3) MG/3ML SOLN nebulizer solution Inhale 3 mLs into the lungs every 4 hours  Patient not taking: Reported on 10/9/2020 6/16/19   Anne-Marie Alexander MD       REVIEW OF SYSTEMS    (2-9 systems for level 4, 10 or more for level 5)      Review of Systems   Constitutional: Negative for fever. Eyes: Positive for photophobia. Respiratory: Negative for shortness of breath. Cardiovascular: Negative for chest pain. Gastrointestinal: Positive for nausea. Musculoskeletal: Negative for neck pain and neck stiffness. Skin: Negative for rash. Neurological: Positive for headaches. Negative for dizziness, seizures, syncope, facial asymmetry, speech difficulty, weakness and numbness. Psychiatric/Behavioral: Negative for confusion. PHYSICAL EXAM   (up to 7 for level 4, 8 or more for level 5)      INITIAL VITALS:   BP (!) 162/100   Pulse 90   Temp 97.1 °F (36.2 °C) (Oral)   Resp 18   Ht 5' 2\" (1.575 m)   Wt 180 lb (81.6 kg)   SpO2 97%   BMI 32.92 kg/m²     Physical Exam  Constitutional:       General: She is not in acute distress. Appearance: She is not toxic-appearing. HENT:      Head: Normocephalic. Left Ear: Tympanic membrane normal.      Nose: No congestion or rhinorrhea.       Mouth/Throat:      Mouth: Mucous membranes are subarachnoid hemorrhage or other acute intracranial abnormalities, relatively benign gradual onset of headache and patient reports that it feels similar to prior migraines. Plan for symptomatic treatment and likely discharge home pending clinical course      EMERGENCY DEPARTMENT COURSE:  Patient treated with 2 tablets of Fioricet. On reevaluation patient states that she feels some better but not completely resolved. Also reporting some nausea at this time which she says is also typical associated symptom for her past migraines. Patient given p.o. Zofran and IM Toradol as well. On reevaluation patient states that she feels better and feels comfortable going home. Patient discharged home with prescription for Fioricet and given strict instructions to follow-up with her primary care physician and neurologist.  Patient agreed to discharge plan and ED return precautions    PROCEDURES:  None    CONSULTS:  None    CRITICAL CARE:  Please see attending note    FINAL IMPRESSION      1. Migraine without status migrainosus, not intractable, unspecified migraine type          DISPOSITION / PLAN     DISPOSITION Decision To Discharge 10/08/2020 08:14:02 PM      PATIENT REFERRED TO:  Mortimer Singleton, APRN - CNP  [address]    Schedule an appointment as soon as possible for a visit in 1 week  For re-evaluation      DISCHARGE MEDICATIONS:  Discharge Medication List as of 10/8/2020  8:15 PM      START taking these medications    Details   butalbital-aspirin-caffeine (FIORINAL) -40 MG per capsule Take 1 capsule by mouth every 4 hours as needed for Headaches for up to 10 days. , Disp-60 capsule,R-0Print             Dhruv Suggs DO  Emergency Medicine Resident    (Please note that portions of thisnote were completed with a voice recognition program.  Efforts were made to edit the dictations but occasionally words are mis-transcribed.)        Dhruv Suggs DO  Resident  10/09/20 [facility]

## 2020-10-12 ENCOUNTER — HOSPITAL ENCOUNTER (EMERGENCY)
Age: 55
Discharge: HOME | End: 2020-10-12
Attending: EMERGENCY MEDICINE
Payer: MEDICARE

## 2020-10-12 VITALS
RESPIRATION RATE: 20 BRPM | SYSTOLIC BLOOD PRESSURE: 131 MMHG | TEMPERATURE: 98.4 F | BODY MASS INDEX: 33.13 KG/M2 | HEART RATE: 83 BPM | OXYGEN SATURATION: 97 % | HEIGHT: 62 IN | DIASTOLIC BLOOD PRESSURE: 83 MMHG | WEIGHT: 180 LBS

## 2020-10-12 PROCEDURE — 6360000002 HC RX W HCPCS: Performed by: EMERGENCY MEDICINE

## 2020-10-12 PROCEDURE — 6360000002 HC RX W HCPCS: Performed by: STUDENT IN AN ORGANIZED HEALTH CARE EDUCATION/TRAINING PROGRAM

## 2020-10-12 PROCEDURE — 96374 THER/PROPH/DIAG INJ IV PUSH: CPT

## 2020-10-12 PROCEDURE — 96375 TX/PRO/DX INJ NEW DRUG ADDON: CPT

## 2020-10-12 PROCEDURE — 99283 EMERGENCY DEPT VISIT LOW MDM: CPT

## 2020-10-12 RX ORDER — DIPHENHYDRAMINE HYDROCHLORIDE 50 MG/ML
50 INJECTION INTRAMUSCULAR; INTRAVENOUS ONCE
Status: COMPLETED | OUTPATIENT
Start: 2020-10-12 | End: 2020-10-12

## 2020-10-12 RX ORDER — PROCHLORPERAZINE EDISYLATE 5 MG/ML
10 INJECTION INTRAMUSCULAR; INTRAVENOUS ONCE
Status: COMPLETED | OUTPATIENT
Start: 2020-10-12 | End: 2020-10-12

## 2020-10-12 RX ORDER — KETOROLAC TROMETHAMINE 15 MG/ML
15 INJECTION, SOLUTION INTRAMUSCULAR; INTRAVENOUS ONCE
Status: COMPLETED | OUTPATIENT
Start: 2020-10-12 | End: 2020-10-12

## 2020-10-12 RX ORDER — ONDANSETRON 2 MG/ML
4 INJECTION INTRAMUSCULAR; INTRAVENOUS ONCE
Status: COMPLETED | OUTPATIENT
Start: 2020-10-12 | End: 2020-10-12

## 2020-10-12 RX ADMIN — ONDANSETRON 4 MG: 2 INJECTION INTRAMUSCULAR; INTRAVENOUS at 19:10

## 2020-10-12 RX ADMIN — KETOROLAC TROMETHAMINE 15 MG: 15 INJECTION, SOLUTION INTRAMUSCULAR; INTRAVENOUS at 18:26

## 2020-10-12 RX ADMIN — PROCHLORPERAZINE EDISYLATE 10 MG: 5 INJECTION INTRAMUSCULAR; INTRAVENOUS at 18:26

## 2020-10-12 RX ADMIN — DIPHENHYDRAMINE HYDROCHLORIDE 50 MG: 50 INJECTION, SOLUTION INTRAMUSCULAR; INTRAVENOUS at 18:26

## 2020-10-12 ASSESSMENT — PAIN SCALES - GENERAL: PAINLEVEL_OUTOF10: 7

## 2020-10-12 ASSESSMENT — PAIN DESCRIPTION - FREQUENCY: FREQUENCY: CONTINUOUS

## 2020-10-12 ASSESSMENT — PAIN DESCRIPTION - LOCATION: LOCATION: HEAD

## 2020-10-12 NOTE — ED NOTES
Pt called out and wanted to speak with writer. Pt is upset with her care and states that no one has seen her in hours, yet an RN was in at 1910 to medicate her. Pt states that she wants to leave. Offered to remove PIV and have her sign AMA. Pt was not pleased with that option and stated that she would wait for paperwork. Pt stated \"Im sick of hearing you guys out there blabing away. \" Pt educated that nurses do not write d/c paperwork and that the [address] would do that. Pt stated \"If your not going to do anything get the fuck out. \" Curtain closed and writer exited.       Piter Linda, LILIBETH  10/12/20 1942

## 2020-10-12 NOTE — ED PROVIDER NOTES
Spouse name: Not on file    Number of children: Not on file    Years of education: Not on file    Highest education level: Not on file   Occupational History    Not on file   Social Needs    Financial resource strain: Not on file    Food insecurity     Worry: Not on file     Inability: Not on file    Transportation needs     Medical: Not on file     Non-medical: Not on file   Tobacco Use    Smoking status: Current Every Day Smoker     Packs/day: 0.50     Years: 12.00     Pack years: 6.00    Smokeless tobacco: Never Used   Substance and Sexual Activity    Alcohol use: No    Drug use: No    Sexual activity: Not Currently   Lifestyle    Physical activity     Days per week: Not on file     Minutes per session: Not on file    Stress: Not on file   Relationships    Social connections     Talks on phone: Not on file     Gets together: Not on file     Attends Alevism service: Not on file     Active member of club or organization: Not on file     Attends meetings of clubs or organizations: Not on file     Relationship status: Not on file    Intimate partner violence     Fear of current or ex partner: Not on file     Emotionally abused: Not on file     Physically abused: Not on file     Forced sexual activity: Not on file   Other Topics Concern    Not on file   Social History Narrative    Not on file       Family Hx:  No relevant family history is reported  History reviewed. No pertinent family history. Allergies:    No known medication allergies  Imitrex [sumatriptan]; Bee pollen; Bee venom; Bromide ion [bromine]; Ketorolac; Potassium bromide; Reglan [metoclopramide]; Sulfa antibiotics; Sulfadiazine; and Tramadol    Home Medications: The patient takes no medications  Prior to Admission medications    Medication Sig Start Date End Date Taking?  Authorizing Provider   budesonide-formoterol [facility]) 160-4.5 MCG/ACT AERO Inhale 2 puffs into the lungs 2 times daily 10/9/20   KEANU Husain - CNP levothyroxine (SYNTHROID) 100 MCG tablet Take 1 tablet by mouth Daily 10/9/20   KEANU Beasley CNP   tiZANidine (ZANAFLEX) 2 MG tablet Take 2 tablets by mouth every 8 hours as needed (muscle spasm, neck pain) 10/9/20   KEANU Beasley CNP   butalbital-acetaminophen-caffeine (FIORICET, ESGIC) -23 MG per tablet TAKE 1 TABLET BY MOUTH EVERY 4 HOURS AS NEEDED FOR HEADACHE 10/9/20   KEANU Beasley CNP   Respiratory Therapy Supplies (NEBULIZER/TUBING/MOUTHPIECE) KIT Use daily 10/9/20   KEANU Beasley CNP   Nebulizers (NEBULIZER COMPRESSOR) MISC Daily as needed 10/9/20   KEANU Beasley CNP   Elastic Bandages & Supports (CARPAL TUNNEL WRIST STABILIZER) [facility] Apply to each wrist at bedtime 10/9/20   KEANU Beasley CNP   clomiPRAMINE (ANAFRANIL) 50 MG capsule Take 1 capsule by mouth nightly 9/21/20   Mari Desouza MD   cloNIDine (CATAPRES) 0.1 MG tablet Take 1 tablet by mouth nightly 9/21/20   Mari Desouza MD   divalproex (DEPAKOTE) 500 MG DR tablet Take 2 tablets by mouth nightly 9/21/20   Mari Desouza MD   divalproex (DEPAKOTE) 500 MG DR tablet Take 1 tablet by mouth daily 9/21/20   Mari Desouza MD   gabapentin (NEURONTIN) 300 MG capsule Take 2 capsules by mouth 3 times daily for 30 days.  9/21/20 10/21/20  Mari Desouza MD   methyl salicylate-menthol (PAULETTE BRAY GREASELESS) 10-15 % CREA Apply topically 3 times daily as needed for Pain 9/21/20   Mari Desouza MD   nicotine polacrilex (NICORETTE) 4 MG gum Take 1 each by mouth as needed for Smoking cessation 9/21/20   Mari Desouza MD   risperiDONE (RISPERDAL) 1 MG tablet Take 1 tablet by mouth daily and 2 tablets by mouth at bedtime 9/21/20   Mari Desouza MD   traZODone (DESYREL) 150 MG tablet Take 1 tablet by mouth nightly 9/21/20   Mari Desouza MD   ondansetron (ZOFRAN ODT) 4 MG disintegrating tablet Take 1 tablet by mouth every 8 hours as needed for Nausea 8/26/20   Christy Emmanuel DO aspirin EC 81 MG EC tablet Take 1 tablet by mouth daily 8/26/20   Lory Riedel,    albuterol (PROVENTIL) (2.5 MG/3ML) 0.083% nebulizer solution Take 3 mLs by nebulization every 6 hours as needed for Wheezing  Patient not taking: Reported on 10/9/2020 6/18/20   Claudia KEANU Sullivan - CNP   albuterol sulfate  (90 Base) MCG/ACT inhaler Inhale 1 puff into the lungs every 6 hours as needed for Wheezing Gap prescription until follow up with primary. Do not refill. Follow up with primary 4/14/20   Rosey Quezada DO   ibuprofen (ADVIL;MOTRIN) 800 MG tablet Take 1 tablet by mouth every 8 hours as needed for Pain 3/5/20   Jo Rock MD   acetaminophen (TYLENOL) 325 MG tablet Take 1 tablet by mouth every 6 hours as needed for Pain 1/17/20   Tony Hurt DO   ipratropium-albuterol (DUONEB) 0.5-2.5 (3) MG/3ML SOLN nebulizer solution Inhale 3 mLs into the lungs every 4 hours  Patient not taking: Reported on 10/9/2020 6/16/19   Pablo Tim MD       PHYSICAL EXAM       INITIAL VITALS: /83   Pulse 83   Temp 98.4 °F (36.9 °C)   Resp 20   Ht 5' 2\" (1.575 m)   Wt 180 lb (81.6 kg)   SpO2 97%   BMI 32.92 kg/m²     CONSTITUTIONAL: Vital signs reviewed, Alert and oriented X 3. HEAD: Atraumatic, Normocephalic. EYES: Eyes are normal to inspection, Pupils equal, round and reactive to light. NECK: Normal ROM, No jugular venous distention, No meningeal signs. RESPIRATORY CHEST: No respiratory distress. ABDOMEN: Abdomen is nontender, No distension. No pulsatile masses palpated. BACK:  No midline bony tenderness to palpation. No CVA tenderness. UPPER EXTREMITY: Inspection normal, No cyanosis. LOWER EXTREMITY: Pulses are 2+ and equal bilaterally with cap refill < 2 seconds. NEURO: GCS is 15.  5/5 strength in bilateral lower extremities with sensation to light touch intact.   Extra ocular eye intact, face symmetric, tongue midline, equal shoulder shrug bilaterally, Bilateral Upper and Lower extremities with 5/5 strength both proximally and distally, and intact sensation to light touch. SKIN: Skin is warm, Skin is dry. PSYCHIATRIC: Oriented X 3, Normal affect. Plan     DIAGNOSTIC ORDERS:  No orders of the defined types were placed in this encounter. MEDICATION ORDERS:  Orders Placed This Encounter   Medications    ketorolac (TORADOL) injection 15 mg    diphenhydrAMINE (BENADRYL) injection 50 mg    prochlorperazine (COMPAZINE) injection 10 mg    ondansetron (ZOFRAN) injection 4 mg       Diagnostic Results     LABS:  Not indicated  No results found for this visit on 10/12/20. RADIOLOGY:  Not indicated  No orders to display       Differential diagnosis      DDX:  SAH, subdural hematoma, epidural, intracerebral, meningitis, encephalitis, migraine, tension, cluster, sinusitis, dental, stroke, encephalitis, abscess, CNS mass, increased ICP, venous thrombosis, carbon monoxide poisoning, acute angle closure glaucoma, temporal arteritis, idiopathic intracranial hypertension    Medical decision making  (MDM) / ED Course      Migraines  The patient presents with  history of chronic intermittent migraines. The patient reports that they see a neurologist for their headaches and current home treatment regimens have not been able to mitigate their current headache. Today's presentation reveals a similar constellation of symptoms without overt evidence and low suspicion for intracranial hemorrhage, subarachnoid hemorrhage, or CNS affection. Patient with non-focal neuro exam. Patient not immunocompromised and no family history of bleeding dyscrasias or aneursymal rupture. Headache had a slow onset and is similar to prior exacerbations. This is NOT the worst headache of the patient's life. Plan: Pain control (non-narcotic Migraine cocktail), IV fluids, reassessment. The patient was Discharged in stable condition.      IMPRESSION:   Typical Migraine     CONSULTS:  None    Procedures None    Final impression      1.  Migraine without aura and without status migrainosus, not intractable           Disposition with plan     Disposition: Decision To Discharge    PATIENT REFERRED TO:  Alyssa Duke, APRN - CNP  [address] [address]  [phone]      As needed    [facility]  [address] [place] [address]  [phone]    As needed      DISCHARGE MEDICATIONS:  Discharge Medication List as of 10/12/2020  7:50 PM            Mindy Chang MD  Emergency Medicine Resident    (Please note that portions of this note were completed with a voice recognition program.  Efforts were made to edit the dictations but occasionally words are mis-transcribed.)        Yeimi Sinha MD  Resident  10/14/20 [id number]

## 2020-10-12 NOTE — ED PROVIDER NOTES
[facility]     Emergency Department     Faculty Attestation    I performed a history and physical examination of the patient and discussed management with the resident. I reviewed the residents note and agree with the documented findings including all diagnostic interpretations and plan of care. Any areas of disagreement are noted on the chart. I was personally present for the key portions of any procedures. I have documented in the chart those procedures where I was not present during the key portions. I have reviewed the emergency nurses triage note. I agree with the chief complaint, past medical history, past surgical history, allergies, medications, social and family history as documented unless otherwise noted below. Documentation of the HPI, Physical Exam and Medical Decision Making performed by scribes is based on my personal performance of the HPI, PE and MDM. For Physician Assistant/ Nurse Practitioner cases/documentation I have personally evaluated this patient and have completed at least one if not all key elements of the E/M (history, physical exam, and MDM). Additional findings are as noted. This patient was evaluated in the Emergency Department for symptoms described in the history of present illness. He/she was evaluated in the context of the global COVID-19 pandemic, which necessitated consideration that the patient might be at risk for infection with the SARS-CoV-2 virus that causes COVID-19. Institutional protocols and algorithms that pertain to the evaluation of patients at risk for COVID-19 are in a state of rapid change based on information released by regulatory bodies including the CDC and federal and state organizations. These policies and algorithms were followed during the patient's care in the ED. Primary Care Physician: Mortimer Singleton, APRN - CNP    History:  This is a 60 y.o. female who presents to the Emergency Department with complaint of headache. Typical headache associated with nausea vomiting photophobia. No numbness no weakness. No change in vision other than photophobia. Does report that she took Zofran at 5:00. Physical:     height is 5' 2\" (1.575 m) and weight is 180 lb (81.6 kg). Her temperature is 98.4 °F (36.9 °C). Her blood pressure is 131/83 and her pulse is 83. Her respiration is 20 and oxygen saturation is 97%.    60 y.o. female no acute distress, cardiac exam regular rate and rhythm no murmurs rubs gallops, pulmonary clear bilaterally strength is 5/5 in all 4 extremities. Sensation intact compared left to right, no facial droop no dysarthria no aphasia.     Impression: Headache    Plan: Symptomatic treatment, reassess      Dorthey Siemens, MD, Adolfo Ohara  Attending Emergency Physician        Jaja Zazueta MD  10/12/20 [id number]

## 2020-10-14 ENCOUNTER — CARE COORDINATION (OUTPATIENT)
Dept: CARE COORDINATION | Age: 55
End: 2020-10-14

## 2020-10-14 NOTE — CARE COORDINATION
Tahira answers the phone stating \"I am in class right now\". She states class is over at 3 PM. She would like a return call after class. Plan:  Call Gloria Malik after 3 PM today.

## 2020-10-15 ENCOUNTER — HOSPITAL ENCOUNTER (EMERGENCY)
Age: 55
Discharge: HOME | End: 2020-10-15
Attending: EMERGENCY MEDICINE
Payer: MEDICARE

## 2020-10-15 VITALS
TEMPERATURE: 97.2 F | WEIGHT: 184 LBS | BODY MASS INDEX: 33.65 KG/M2 | SYSTOLIC BLOOD PRESSURE: 119 MMHG | HEART RATE: 87 BPM | OXYGEN SATURATION: 96 % | DIASTOLIC BLOOD PRESSURE: 76 MMHG | RESPIRATION RATE: 18 BRPM

## 2020-10-15 LAB
DIRECT EXAM: NORMAL
DIRECT EXAM: NORMAL
Lab: NORMAL
Lab: NORMAL
SPECIMEN DESCRIPTION: NORMAL
SPECIMEN DESCRIPTION: NORMAL

## 2020-10-15 PROCEDURE — 87880 STREP A ASSAY W/OPTIC: CPT

## 2020-10-15 PROCEDURE — 6370000000 HC RX 637 (ALT 250 FOR IP): Performed by: STUDENT IN AN ORGANIZED HEALTH CARE EDUCATION/TRAINING PROGRAM

## 2020-10-15 PROCEDURE — 87804 INFLUENZA ASSAY W/OPTIC: CPT

## 2020-10-15 PROCEDURE — 99283 EMERGENCY DEPT VISIT LOW MDM: CPT

## 2020-10-15 RX ORDER — IBUPROFEN 800 MG/1
800 TABLET ORAL ONCE
Status: COMPLETED | OUTPATIENT
Start: 2020-10-15 | End: 2020-10-15

## 2020-10-15 RX ORDER — IBUPROFEN 800 MG/1
800 TABLET ORAL 2 TIMES DAILY PRN
Qty: 30 TABLET | Refills: 0 | Status: SHIPPED | OUTPATIENT
Start: 2020-10-15 | End: 2020-11-26

## 2020-10-15 RX ADMIN — IBUPROFEN 800 MG: 800 TABLET, FILM COATED ORAL at 12:04

## 2020-10-15 ASSESSMENT — ENCOUNTER SYMPTOMS
RHINORRHEA: 0
NAUSEA: 0
EYE DISCHARGE: 0
SORE THROAT: 1
ABDOMINAL PAIN: 0
TROUBLE SWALLOWING: 1
DIARRHEA: 0
VOMITING: 0
BACK PAIN: 0
EYE PAIN: 0
SHORTNESS OF BREATH: 0

## 2020-10-15 ASSESSMENT — PAIN SCALES - GENERAL: PAINLEVEL_OUTOF10: 0

## 2020-10-15 NOTE — ED PROVIDER NOTES
[facility] ED  Emergency Department Encounter  Emergency Medicine Resident     Pt Name: Juan Lees  YPM:[id number]  [place] 1965  Date of evaluation: 10/15/20  PCP:  KEANU Min CNP    CHIEF COMPLAINT       Sore throat, right ear pain and pressure     HISTORY OF PRESENT ILLNESS  (Location/Symptom, Timing/Onset, Context/Setting, Quality, Duration, ModifyingFactors, Severity.)      Juan Lees is a 60 y.o. female with PMH of COPD, CHF, Asthma, Seizure disoder, Thyroid disease c/o sore throat and right ear pain x2 days. The patient also notes associated body aches. She denies any known sick contacts but states she lives at a recovery house and is frequently surrounded by many women who may be sick. She denies fevers, chills, N/V/D. She is frequently seen in the ED for migraines and states this pain is not similar or related to that. Patient notes pain worse in the AM and worse with swallowing. She denies shortness of breath or swelling in her throat. PAST MEDICAL / SURGICAL / SOCIAL /FAMILY HISTORY      has a past medical history of Arthritis, Asthma, Borderline diabetes, Borderline personality disorder ([address] CHF (congestive heart failure) ([address] COPD (chronic obstructive pulmonary disease) ([address] Fibromyalgia, Headache, Hypertension, Manic depression ([address] Movement disorder, Neck fracture ([address] Pernicious anemia, Seizure ([address] and Thyroid disease. No other pertinent PMH on review with patient/guardian. has a past surgical history that includes knee surgery (Bilateral); partial hysterectomy (cervix not removed); lymph node dissection; Irrigation and debridement (Right, 5/17/2019); EXPLORATION OF WOUND OF EXTREMITY (Right, 5/19/2019); and EXPLORATION OF WOUND OF EXTREMITY (N/A, 5/22/2019). No other pertinent PSH on review with patient/guardian.   Social History     Socioeconomic History    Marital status:      Spouse name: Not on file    Number of mouth 2 times daily as needed for Pain 10/15/20  Yes Lisa Eans, DO   budesonide-formoterol (SYMBICORT) 160-4.5 MCG/ACT AERO Inhale 2 puffs into the lungs 2 times daily 10/9/20   KEANU Zambrano CNP   levothyroxine (SYNTHROID) 100 MCG tablet Take 1 tablet by mouth Daily 10/9/20   KEANU Zambrano CNP   tiZANidine (ZANAFLEX) 2 MG tablet Take 2 tablets by mouth every 8 hours as needed (muscle spasm, neck pain) 10/9/20   Dom KEANU Crocker CNP   butalbital-acetaminophen-caffeine (FIORICET, ESGIC) -81 MG per tablet TAKE 1 TABLET BY MOUTH EVERY 4 HOURS AS NEEDED FOR HEADACHE 10/9/20   KEANU Zambrano CNP   Respiratory Therapy Supplies (NEBULIZER/TUBING/MOUTHPIECE) KIT Use daily 10/9/20   KEANU Zambrano CNP   Nebulizers (NEBULIZER COMPRESSOR) MISC Daily as needed 10/9/20   DmoKEANU Crystal CNP   Elastic Bandages & Supports (CARPAL TUNNEL WRIST STABILIZER) MISC Apply to each wrist at bedtime 10/9/20   KEANU Zambrano CNP   clomiPRAMINE (ANAFRANIL) 50 MG capsule Take 1 capsule by mouth nightly 9/21/20   Ken Dominguez MD   cloNIDine (CATAPRES) 0.1 MG tablet Take 1 tablet by mouth nightly 9/21/20   Ken Dominguez MD   divalproex (DEPAKOTE) 500 MG DR tablet Take 2 tablets by mouth nightly 9/21/20   Ken Dominguez MD   divalproex (DEPAKOTE) 500 MG DR tablet Take 1 tablet by mouth daily 9/21/20   Ken Dominguez MD   gabapentin (NEURONTIN) 300 MG capsule Take 2 capsules by mouth 3 times daily for 30 days.  9/21/20 10/21/20  Ken Dominguez MD   methyl salicylate-menthol (PAULETTE BRAY GREASELESS) 10-15 % CREA Apply topically 3 times daily as needed for Pain 9/21/20   Ken Dominguez MD   nicotine polacrilex (NICORETTE) 4 MG gum Take 1 each by mouth as needed for Smoking cessation 9/21/20   Ken Dominguez MD   risperiDONE (RISPERDAL) 1 MG tablet Take 1 tablet by mouth daily and 2 tablets by mouth at bedtime 9/21/20   Ken Dominguez MD   traZODone (DESYREL) 150 MG tablet Take 1 tablet by mouth nightly 9/21/20   Gil Jeong MD   ondansetron (ZOFRAN ODT) 4 MG disintegrating tablet Take 1 tablet by mouth every 8 hours as needed for Nausea 8/26/20   Cathleen Gracia, DO   aspirin EC 81 MG EC tablet Take 1 tablet by mouth daily 8/26/20   Cathleen Gracia, DO   albuterol (PROVENTIL) (2.5 MG/3ML) 0.083% nebulizer solution Take 3 mLs by nebulization every 6 hours as needed for Wheezing  Patient not taking: Reported on 10/9/2020 6/18/20   Kym Cabot, APRN - CNP   albuterol sulfate  (90 Base) MCG/ACT inhaler Inhale 1 puff into the lungs every 6 hours as needed for Wheezing Gap prescription until follow up with primary. Do not refill. Follow up with primary 4/14/20   Jacques [facility], DO   ibuprofen (ADVIL;MOTRIN) 800 MG tablet Take 1 tablet by mouth every 8 hours as needed for Pain 3/5/20   Juan Carlos Benitez MD   acetaminophen (TYLENOL) 325 MG tablet Take 1 tablet by mouth every 6 hours as needed for Pain 1/17/20   Morro Godinez, DO   ipratropium-albuterol (DUONEB) 0.5-2.5 (3) MG/3ML SOLN nebulizer solution Inhale 3 mLs into the lungs every 4 hours  Patient not taking: Reported on 10/9/2020 6/16/19   Horacio Coffey MD       REVIEW OF SYSTEMS    (2-9 systems for level 4, 10 ormore for level 5)      Review of Systems   Constitutional: Negative for chills, diaphoresis, fatigue and fever. HENT: Positive for sore throat and trouble swallowing. Negative for congestion, hearing loss, rhinorrhea and sneezing. Eyes: Negative for pain and discharge. Respiratory: Negative for shortness of breath. Cardiovascular: Negative for chest pain. Gastrointestinal: Negative for abdominal pain, diarrhea, nausea and vomiting. Genitourinary: Negative for dysuria, hematuria, vaginal bleeding and vaginal pain. Musculoskeletal: Negative for back pain, gait problem and joint swelling. Neurological: Negative for dizziness, seizures, numbness and headaches. Psychiatric/Behavioral: Negative for behavioral problems and suicidal ideas. The patient is not nervous/anxious. PHYSICAL EXAM   (up to 7 for level 4, 8 or more for level 5)      INITIAL VITALS:   /76   Pulse 87   Temp 97.2 °F (36.2 °C) (Oral)   Resp 18   Wt 184 lb (83.5 kg)   SpO2 96%   BMI 33.65 kg/m²     Physical Exam  Constitutional:       General: She is not in acute distress. Appearance: She is not ill-appearing, toxic-appearing or diaphoretic. HENT:      Head: Normocephalic and atraumatic. Right Ear: Ear canal normal. Tenderness present. No drainage or swelling. A middle ear effusion is present. Tympanic membrane is not erythematous. Left Ear: Ear canal normal. No drainage, swelling or tenderness. A middle ear effusion is present. Tympanic membrane is not erythematous. Nose: No congestion or rhinorrhea. Mouth/Throat:      Mouth: Mucous membranes are moist. No oral lesions. Pharynx: Oropharynx is clear. Posterior oropharyngeal erythema present. No oropharyngeal exudate. Tonsils: No tonsillar exudate or tonsillar abscesses. 0 on the right. 0 on the left. Eyes:      Conjunctiva/sclera: Conjunctivae normal.      Pupils: Pupils are equal, round, and reactive to light. Neck:      Musculoskeletal: Normal range of motion. Thyroid: No thyromegaly. Cardiovascular:      Rate and Rhythm: Normal rate and regular rhythm. Pulmonary:      Effort: Pulmonary effort is normal. No respiratory distress. Breath sounds: Wheezing present. No rales. Abdominal:      General: There is no distension. Palpations: Abdomen is soft. There is no mass. Tenderness: There is no abdominal tenderness. There is no guarding. Lymphadenopathy:      Cervical: No cervical adenopathy. Skin:     General: Skin is warm and dry. Capillary Refill: Capillary refill takes less than 2 seconds. Neurological:      General: No focal deficit present.       Mental Status: She is alert. Psychiatric:         Mood and Affect: Mood normal.         Behavior: Behavior normal.         DIFFERENTIAL  DIAGNOSIS     PLAN (LABS / IMAGING / EKG):  Orders Placed This Encounter   Procedures    STREP SCREEN GROUP A THROAT    RAPID INFLUENZA A/B ANTIGENS       MEDICATIONS ORDERED:  Orders Placed This Encounter   Medications    ibuprofen (ADVIL;MOTRIN) tablet 800 mg    Cetylpyridinium Chloride (CEPACOL MOUTHWASH/GARGLE) 0.05 % LIQD     Sig: Take 1 mL by mouth as needed (sore throat)     Dispense:  44 mL     Refill:  0    ibuprofen (ADVIL;MOTRIN) 800 MG tablet     Sig: Take 1 tablet by mouth 2 times daily as needed for Pain     Dispense:  30 tablet     Refill:  0           DIAGNOSTIC RESULTS / EMERGENCY DEPARTMENT COURSE / MDM     LABS:  Results for orders placed or performed during the hospital encounter of 10/15/20   STREP SCREEN GROUP A THROAT    Specimen: Throat   Result Value Ref Range    Specimen Description . THROAT     Special Requests NOT REPORTED     Direct Exam       Rapid Strep A negative. A negative Rapid Group A Strep Screen result does not rule out the possibility of Group A Streptococci in the specimen. A Group A Strep DNA test is available upon request.   RAPID INFLUENZA A/B ANTIGENS    Specimen: Nasopharyngeal Swab   Result Value Ref Range    Specimen Description . NASOPHARYNGEAL SWAB     Special Requests NOT REPORTED     Direct Exam       NEGATIVE for Influenza A + B antigens. PCR testing to confirm this result is available upon request.  Specimen will be saved in the laboratory for 7 days. Please call [phone] if PCR testing is indicated. IMPRESSION/MDM/ED COURSE:  60 y.o. female presented with sore throat x2 days with right ear pain. Influenza A/B and Strep swab were both negative. Patient stable for discharge with acute pharyngitis. Rx for Cepacol and Ibuprofen given on discharge.      Patient requesting discharge stating she needs to get to an appointment. Patientwas given written and verbal instructions prior to discharge. Patient understood and agreed. Patient had no further questions. RADIOLOGY:  No orders to display       EKG  None    All EKG's are interpreted by the Emergency Department Physician who either signs or Co-signs this chart in the absence of a cardiologist.      PROCEDURES:  None    CONSULTS:  None        FINAL IMPRESSION      1. Acute pharyngitis, unspecified etiology          DISPOSITION / PLAN     DISPOSITION Decision To Discharge 10/15/2020 12:55:17 PM      PATIENT REFERREDTO:  [facility] ED  [address] [place] [address]  [phone]    If symptoms worsen    KEANU Ann  CNP  [address] [address]  [phone]    Call   for ED follow up      DISCHARGE MEDICATIONS:  Discharge Medication List as of 10/15/2020  1:03 PM      START taking these medications    Details   Cetylpyridinium Chloride (CEPACOL MOUTHWASH/GARGLE) 0.05 % LIQD Take 1 mL by mouth as needed (sore throat), Disp-44 mL,R-0Print      !! ibuprofen (ADVIL;MOTRIN) 800 MG tablet Take 1 tablet by mouth 2 times daily as needed for Pain, Disp-30 tablet,R-0Print       !! - Potential duplicate medications found. Please discuss with provider.           Dinah Dalton DO  PGY 1  Resident Physician Obgyn/ED  10/15/20 1:32 PM        (Please note that portions of this note were completed with a voice recognition program.Efforts were made to edit the dictations but occasionally words are mis-transcribed.)        Dinah Dalton DO  10/15/20 1969

## 2020-10-15 NOTE — ED PROVIDER NOTES
[address] [facility]     Emergency Department     Faculty Attestation    I performed a history and physical examination of the patient and discussed management with the resident. I reviewed the resident´s note and agree with the documented findings and plan of care. Any areas of disagreement are noted on the chart. I was personally present for the key portions of any procedures. I have documented in the chart those procedures where I was not present during the key portions. I have reviewed the emergency nurses triage note. I agree with the chief complaint, past medical history, past surgical history, allergies, medications, social and family history as documented unless otherwise noted below. For Physician Assistant/ Nurse Practitioner cases/documentation I have personally evaluated this patient and have completed at least one if not all key elements of the E/M (history, physical exam, and MDM). Additional findings are as noted. chest clear, heart exam normal, mild erythema posterior pharynx with symmetrical swelling and no signs of peritonsillar abscess, normal voice, no drooling, no sublingual swelling, no cervical lymphadenopathy, no rash or meningeal signs. No pain to palpation of spleen. Neuro intact, no facial swelling. Left TM is dull gray without good light reflex, no erythema. Right tympanic membrane appears normal.  Pt does not appear ill.     Annita Porras MD [address] [facility]  Attending Physician         George Haney MD  10/15/20 4029

## 2020-10-16 ENCOUNTER — CARE COORDINATION (OUTPATIENT)
Dept: CARE COORDINATION | Age: 55
End: 2020-10-16

## 2020-10-16 NOTE — CARE COORDINATION
VM left requesting a return call to [phone]. CC would like to discuss [facility] ED visit and discuss care coordination with [facility]. Plan:  Reach out again Monday.

## 2020-10-18 ENCOUNTER — HOSPITAL ENCOUNTER (EMERGENCY)
Age: 55
Discharge: HOME | End: 2020-10-18
Attending: EMERGENCY MEDICINE
Payer: MEDICARE

## 2020-10-18 VITALS
OXYGEN SATURATION: 98 % | WEIGHT: 185 LBS | HEART RATE: 83 BPM | SYSTOLIC BLOOD PRESSURE: 142 MMHG | DIASTOLIC BLOOD PRESSURE: 88 MMHG | TEMPERATURE: 99 F | HEIGHT: 62 IN | RESPIRATION RATE: 18 BRPM | BODY MASS INDEX: 34.04 KG/M2

## 2020-10-18 PROCEDURE — 99282 EMERGENCY DEPT VISIT SF MDM: CPT

## 2020-10-18 PROCEDURE — 6370000000 HC RX 637 (ALT 250 FOR IP): Performed by: STUDENT IN AN ORGANIZED HEALTH CARE EDUCATION/TRAINING PROGRAM

## 2020-10-18 RX ORDER — CETIRIZINE HYDROCHLORIDE 10 MG/1
10 TABLET ORAL DAILY
Status: DISCONTINUED | OUTPATIENT
Start: 2020-10-18 | End: 2020-10-18 | Stop reason: HOSPADM

## 2020-10-18 RX ORDER — CETIRIZINE HYDROCHLORIDE 10 MG/1
10 TABLET ORAL DAILY
Qty: 30 TABLET | Refills: 0 | Status: SHIPPED | OUTPATIENT
Start: 2020-10-18 | End: 2020-12-18 | Stop reason: SDUPTHER

## 2020-10-18 RX ADMIN — CETIRIZINE HYDROCHLORIDE 10 MG: 10 TABLET ORAL at 20:27

## 2020-10-18 RX ADMIN — NEOMYCIN SULFATE, POLYMYXIN B SULFATE, HYDROCORTISONE 3 DROP: 3.5; 10000; 1 SOLUTION/ DROPS AURICULAR (OTIC) at 20:27

## 2020-10-18 ASSESSMENT — PAIN DESCRIPTION - ORIENTATION: ORIENTATION: RIGHT;LEFT

## 2020-10-18 ASSESSMENT — PAIN DESCRIPTION - FREQUENCY: FREQUENCY: INTERMITTENT

## 2020-10-18 ASSESSMENT — PAIN DESCRIPTION - PAIN TYPE: TYPE: CHRONIC PAIN

## 2020-10-18 ASSESSMENT — PAIN DESCRIPTION - LOCATION: LOCATION: EAR

## 2020-10-18 ASSESSMENT — PAIN SCALES - GENERAL: PAINLEVEL_OUTOF10: 6

## 2020-10-18 NOTE — ED PROVIDER NOTES
[address] [facility]  ED  Emergency Department Encounter  EmergencyMedicine Resident     Pt Name:Aleta Lloyd  MRN: [id number]  [place] 1965  Date of evaluation: 10/18/20  PCP:  KEANU Pérez [id number]       Chief Complaint   Patient presents with   Toney Welch     Returns to ED was here Thursday with same C/O, Bilateral ear pain and swollen glands R>L       HISTORY OF PRESENT ILLNESS  (Location/Symptom, Timing/Onset, Context/Setting, Quality, Duration, Modifying Factors, Severity.)      Deborah Castaneda is a 60 y.o. female who presents with right ear otalgia and fullness since October 12. Patient has a wet feeling in the ear. Has history of recurrent ear infections as an adolescent, tonsillectomy. Patient was seen in the emergency department recently for same complaint and prescribed cough syrup and ibuprofen. Patient is denying fever, headache, cough, chest pain, shortness of breath. Eating and drinking without issue. PAST MEDICAL / SURGICAL / SOCIAL / FAMILY HISTORY      has a past medical history of Arthritis, Asthma, Borderline diabetes, Borderline personality disorder ([address] CHF (congestive heart failure) ([address] COPD (chronic obstructive pulmonary disease) ([address] Fibromyalgia, Headache, Hypertension, Manic depression ([address] Movement disorder, Neck fracture ([address] Pernicious anemia, Seizure ([address] and Thyroid disease. has a past surgical history that includes knee surgery (Bilateral); partial hysterectomy (cervix not removed); lymph node dissection; Irrigation and debridement (Right, 5/17/2019); EXPLORATION OF WOUND OF EXTREMITY (Right, 5/19/2019); and EXPLORATION OF WOUND OF EXTREMITY (N/A, 5/22/2019).     Social History     Socioeconomic History    Marital status:      Spouse name: Not on file    Number of children: Not on file    Years of education: Not on file    Highest education level: Not on file   Occupational History    Not on file Hematological: Does not bruise/bleed easily. Psychiatric/Behavioral: Negative for agitation. PHYSICAL EXAM   (up to 7 for level 4, 8 or more for level 5)      INITIAL VITALS:   BP (!) 142/88   Pulse 83   Temp 99 °F (37.2 °C) (Oral)   Resp 18   Ht 5' 2\" (1.575 m)   Wt 185 lb (83.9 kg)   SpO2 98%   BMI 33.84 kg/m²     Physical Exam  Constitutional:       General: He/She is not in acute distress. Appearance: Normal appearance. He/She is normal weight. He/She is not toxic-appearing. HENT:      Head: Normocephalic and atraumatic. Nose: Nose normal.      Mouth/Throat: Mucous membranes are moist.  Uvula midline no oropharyngeal edema. Pharynx: Oropharynx is clear. Ear: Right ear external canal is erythematous, with some abrasions. Some fullness on the right TM, left TM unremarkable. Eyes:      Extraocular Movements: Extraocular movements intact. Conjunctiva/sclera: Conjunctivae normal.      Pupils: Pupils are equal, round, and reactive to light. Neck:      Musculoskeletal: Normal range of motion and neck supple. No neck rigidity. Cardiovascular:      Rate and Rhythm: Normal rate and regular rhythm. Pulses: Normal pulses. Heart sounds: Normal heart sounds. No murmur. Pulmonary:      Effort: Pulmonary effort is normal.      Breath sounds: Normal breath sounds. No wheezing. Abdominal:      General: Abdomen is flat. Bowel sounds are normal.      Tenderness: There is no abdominal tenderness. Musculoskeletal: Normal range of motion. General: No swelling or tenderness. No LE edema  Skin:     General: Skin is warm. Capillary Refill: Capillary refill takes less than 2 seconds. Coloration: Skin is not jaundiced. Neurological:      General: No focal deficit present. Mental Status: He is alert and oriented to person, place, and time. Mental status is at baseline. Motor: No weakness.        DIFFERENTIAL  DIAGNOSIS     PLAN (Flex Long / Joyce Presybeterian / EKG):  No orders of the defined types were placed in this encounter. MEDICATIONS ORDERED:  Orders Placed This Encounter   Medications    neomycin-polymyxin-hydrocortisone (CORTISPORIN) otic solution 3 drop    cetirizine (ZYRTEC) tablet 10 mg    neomycin-polymyxin-hydrocortisone (CORTISPORIN) 3.5-15544-8 otic solution     Sig: Place 3 drops into the right ear 3 times daily for 10 days Instill into Right  Ear     Dispense:  1 each     Refill:  0    cetirizine (ZYRTEC) 10 MG tablet     Sig: Take 1 tablet by mouth daily     Dispense:  30 tablet     Refill:  0           DIAGNOSTIC RESULTS / EMERGENCY DEPARTMENT COURSE / [facility]     LABS:  No results found for this visit on 10/18/20. RADIOLOGY:  None    EKG  None    All EKG's are interpreted by the Emergency Department Physician who either signs or Co-signs this chart in the absence of a cardiologist.    EMERGENCY DEPARTMENT COURSE:  Patient is alert and oriented x3, breathing quietly and unlabored on room air. Speech is normal and speaking in full sentences without requiring to pause to take a breath. Physical exam as above, concern for otitis externa especially with her wet feeling and erythema in the ear canal.  Will prescribe antibiotic eardrops and cetirizine for symptomatic control, instructed to continue taking ibuprofen. PROCEDURES:  None    CONSULTS:  None    CRITICAL CARE:  None    FINAL IMPRESSION      1.  Infective otitis externa of right ear          DISPOSITION / PLAN     DISPOSITION Decision To Discharge 10/18/2020 08:15:04 PM      PATIENT REFERRED TO:  Madalynn Area, KEANU [facility]  [address]  [phone]    Schedule an appointment as soon as possible for a visit in 2 days        DISCHARGE MEDICATIONS:  Discharge Medication List as of 10/18/2020  8:17 PM      START taking these medications    Details   neomycin-polymyxin-hydrocortisone (CORTISPORIN) 3.5-44394-4 otic solution Place 3 drops into the right ear 3 times daily for 10 days Instill into Right  Ear, Disp-1 each,R-0Print      cetirizine (ZYRTEC) 10 MG tablet Take 1 tablet by mouth daily, Disp-30 tablet,R-0Print             John Barahona MD  Emergency Medicine Resident    (Please note that portions of thisnote were completed with a voice recognition program.  Efforts were made to edit the dictations but occasionally words are mis-transcribed.)       John Barahona MD  Resident  10/18/20 0871

## 2020-10-19 ENCOUNTER — CARE COORDINATION (OUTPATIENT)
Dept: CARE COORDINATION | Age: 55
End: 2020-10-19

## 2020-10-19 NOTE — ED NOTES
Patient returned to ED because she felt she wasn't getting better especially right swollen gland in neck. No N/V/D  No fever  States taking antibiotics.   Denied any drainage from [address], RN  10/18/20 8845

## 2020-10-19 NOTE — ED PROVIDER NOTES
[facility] ED     Emergency Department     Faculty Attestation        I performed a history and physical examination of the patient and discussed management with the resident. I reviewed the residents note and agree with the documented findings and plan of care. Any areas of disagreement are noted on the chart. I was personally present for the key portions of any procedures. I have documented in the chart those procedures where I was not present during the key portions. I have reviewed the emergency nurses triage note. I agree with the chief complaint, past medical history, past surgical history, allergies, medications, social and family history as documented unless otherwise noted below. For Physician Assistant/ Nurse Practitioner cases/documentation I have personally evaluated this patient and have completed at least one if not all key elements of the E/M (history, physical exam, and MDM). Additional findings are as noted. Vital Signs: BP: (!) 142/88  Pulse: 83  Resp: 18  Temp: 99 °F (37.2 °C) SpO2: 98 %  PCP:  KEANU Eddy - FELIX    Pertinent Comments:     Patient is a 23-year-old female who was diagnosed a few days ago with bilateral TM effusions with no obvious infection or otitis at that time. Patient has had some worsening pain on the right and actually some mild drainage. On exam bilateral TM effusions are seen that are moderate but now on the right appears to have irritation of the ear canal and consistent with possible mild otitis externa. No tenderness to palpation over the mastoid at all. Plan: We will add in decongestant as well as antibiotic eardrop    Critical Care  None    This patient was evaluated in the Emergency Department for symptoms described in the history of present illness.  He/she was evaluated in the context of the global COVID-19 pandemic, which necessitated consideration that the patient might be at risk

## 2020-10-20 RX ORDER — RISPERIDONE 2 MG/1
TABLET, FILM COATED ORAL
Qty: 30 TABLET | OUTPATIENT
Start: 2020-10-20

## 2020-10-20 RX ORDER — GABAPENTIN 300 MG/1
CAPSULE ORAL
Qty: 180 CAPSULE | Refills: 0 | OUTPATIENT
Start: 2020-10-20

## 2020-10-20 RX ORDER — RISPERIDONE 1 MG/1
TABLET, FILM COATED ORAL
Qty: 30 TABLET | OUTPATIENT
Start: 2020-10-20

## 2020-10-20 RX ORDER — TRAZODONE HYDROCHLORIDE 150 MG/1
TABLET ORAL
Qty: 30 TABLET | Refills: 0 | OUTPATIENT
Start: 2020-10-20

## 2020-10-21 RX ORDER — TRAZODONE HYDROCHLORIDE 150 MG/1
150 TABLET ORAL NIGHTLY
Qty: 30 TABLET | Refills: 0 | Status: CANCELLED | OUTPATIENT
Start: 2020-10-21

## 2020-10-21 RX ORDER — GABAPENTIN 300 MG/1
600 CAPSULE ORAL 3 TIMES DAILY
Qty: 180 CAPSULE | Refills: 0 | Status: CANCELLED | OUTPATIENT
Start: 2020-10-21 | End: 2020-11-20

## 2020-10-21 RX ORDER — RISPERIDONE 1 MG/1
TABLET, FILM COATED ORAL
Qty: 90 TABLET | Refills: 0 | Status: CANCELLED | OUTPATIENT
Start: 2020-10-21

## 2020-10-21 NOTE — TELEPHONE ENCOUNTER
Health Maintenance   Topic Date Due    HIV screen  02/16/1978    DTaP/Tdap/Td vaccine (1 - Tdap) 02/16/1982    Cervical cancer screen  02/16/1984    Breast cancer screen  02/16/2013    Shingles Vaccine (1 of 2) 02/16/2013    Colon Cancer Screen FIT/FOBT  06/11/2020    TSH testing  12/16/2020    Potassium monitoring  09/12/2021    Creatinine monitoring  09/12/2021    Lipid screen  02/19/2024    Flu vaccine  Completed    Pneumococcal 0-64 years Vaccine  Completed    Hepatitis C screen  Completed    Hepatitis A vaccine  Aged Out    Hepatitis B vaccine  Aged Out    Hib vaccine  Aged Out    Meningococcal (ACWY) vaccine  Aged Out             (applicable per patient's age: Cancer Screenings, Depression Screening, Fall Risk Screening, Immunizations)    Hemoglobin A1C (%)   Date Value   02/19/2019 5.2     LDL Cholesterol (mg/dL)   Date Value   02/19/2019 105     AST (U/L)   Date Value   09/12/2020 19     ALT (U/L)   Date Value   09/12/2020 16     BUN (mg/dL)   Date Value   09/12/2020 14      (goal A1C is < 7)   (goal LDL is <100) need 30-50% reduction from baseline     BP Readings from Last 3 Encounters:   10/18/20 (!) 142/88   10/15/20 119/76   10/12/20 131/83    (goal /80)      All Future Testing planned in [facility]:  Lab Frequency Next Occurrence   Phenobarbital Level Once 01/16/2021   PHENOBARBITAL, FREE Once 01/16/2021   TSH With Reflex Ft4 Once 10/09/2020   Hemoglobin A1C Once 10/09/2020       Next Visit Date:  Future Appointments   Date Time Provider Rashida Torres   12/18/2020 10:45 AM KEANU Seals - CNP [facility]            Patient Active Problem List:     Chest pain     Migraine variant with headache     Drug overdose, accidental or unintentional, initial encounter     Hypothyroidism     Essential hypertension     Seizure disorder [facility])     Drug overdose     History of seizure     Major depressive disorder with psychotic features ([facility] [address] [facility]     Severe major depression

## 2020-10-22 ENCOUNTER — TELEPHONE (OUTPATIENT)
Dept: PRIMARY CARE CLINIC | Age: 55
End: 2020-10-22

## 2020-10-22 ENCOUNTER — VIRTUAL VISIT (OUTPATIENT)
Dept: PRIMARY CARE CLINIC | Age: 55
End: 2020-10-22
Payer: MEDICARE

## 2020-10-22 PROCEDURE — 3017F COLORECTAL CA SCREEN DOC REV: CPT | Performed by: NURSE PRACTITIONER

## 2020-10-22 PROCEDURE — 99213 OFFICE O/P EST LOW 20 MIN: CPT | Performed by: NURSE PRACTITIONER

## 2020-10-22 PROCEDURE — 4004F PT TOBACCO SCREEN RCVD TLK: CPT | Performed by: NURSE PRACTITIONER

## 2020-10-22 PROCEDURE — G8428 CUR MEDS NOT DOCUMENT: HCPCS | Performed by: NURSE PRACTITIONER

## 2020-10-22 PROCEDURE — G8482 FLU IMMUNIZE ORDER/ADMIN: HCPCS | Performed by: NURSE PRACTITIONER

## 2020-10-22 PROCEDURE — G8417 CALC BMI ABV UP PARAM F/U: HCPCS | Performed by: NURSE PRACTITIONER

## 2020-10-22 RX ORDER — ACETAMINOPHEN 500 MG
1000 TABLET ORAL EVERY 8 HOURS PRN
Qty: 60 TABLET | Refills: 0 | Status: SHIPPED | OUTPATIENT
Start: 2020-10-22 | End: 2020-11-18

## 2020-10-22 RX ORDER — GABAPENTIN 300 MG/1
600 CAPSULE ORAL 3 TIMES DAILY
Qty: 180 CAPSULE | Refills: 0 | Status: SHIPPED | OUTPATIENT
Start: 2020-10-22 | End: 2020-11-18 | Stop reason: SDUPTHER

## 2020-10-22 RX ORDER — AMOXICILLIN AND CLAVULANATE POTASSIUM 500; 125 MG/1; MG/1
1 TABLET, FILM COATED ORAL 2 TIMES DAILY
Qty: 20 TABLET | Refills: 0 | Status: SHIPPED | OUTPATIENT
Start: 2020-10-22 | End: 2020-11-01

## 2020-10-22 RX ORDER — HYDROXYZINE 50 MG/1
50 TABLET, FILM COATED ORAL 3 TIMES DAILY PRN
Qty: 30 TABLET | Refills: 0 | Status: SHIPPED | OUTPATIENT
Start: 2020-10-22 | End: 2020-11-01

## 2020-10-22 RX ORDER — CLONIDINE HYDROCHLORIDE 0.1 MG/1
0.1 TABLET ORAL NIGHTLY
Qty: 30 TABLET | Refills: 0 | Status: ON HOLD | OUTPATIENT
Start: 2020-10-22 | End: 2021-08-31 | Stop reason: HOSPADM

## 2020-10-22 RX ORDER — ACETAMINOPHEN 325 MG/1
325 TABLET ORAL EVERY 6 HOURS PRN
Qty: 120 TABLET | Refills: 3 | OUTPATIENT
Start: 2020-10-22

## 2020-10-22 RX ORDER — RISPERIDONE 1 MG/1
TABLET, FILM COATED ORAL
Qty: 90 TABLET | Refills: 0 | Status: SHIPPED | OUTPATIENT
Start: 2020-10-22 | End: 2021-08-18

## 2020-10-22 RX ORDER — ASPIRIN 81 MG/1
81 TABLET ORAL DAILY
Qty: 90 TABLET | Refills: 1 | Status: SHIPPED | OUTPATIENT
Start: 2020-10-22 | End: 2021-04-05

## 2020-10-22 RX ORDER — TRAZODONE HYDROCHLORIDE 150 MG/1
150 TABLET ORAL NIGHTLY
Qty: 30 TABLET | Refills: 0 | Status: SHIPPED | OUTPATIENT
Start: 2020-10-22

## 2020-10-22 ASSESSMENT — ENCOUNTER SYMPTOMS
SORE THROAT: 1
DIARRHEA: 0
VOMITING: 0

## 2020-10-22 NOTE — PROGRESS NOTES
acetaminophen for the symptoms. Review of Systems   HENT: Positive for ear pain and sore throat. Gastrointestinal: Negative for diarrhea and vomiting. Musculoskeletal: Positive for neck pain. Skin: Negative for rash. Prior to Visit Medications    Medication Sig Taking? Authorizing Provider   risperiDONE (RISPERDAL) 1 MG tablet Take 1 tablet by mouth daily and 2 tablets by mouth at bedtime Yes KEANU Cardenas CNP   traZODone (DESYREL) 150 MG tablet Take 1 tablet by mouth nightly Yes KEANU Cardneas CNP   hydrOXYzine (ATARAX) 50 MG tablet Take 1 tablet by mouth 3 times daily as needed for Anxiety Yes KEANU Cardenas CNP   gabapentin (NEURONTIN) 300 MG capsule Take 2 capsules by mouth 3 times daily for 30 days.  Yes KEANU Cardenas CNP   cloNIDine (CATAPRES) 0.1 MG tablet Take 1 tablet by mouth nightly Yes KEANU Cardenas CNP   amoxicillin-clavulanate (AUGMENTIN) 500-125 MG per tablet Take 1 tablet by mouth 2 times daily for 10 days Yes KEANU Cardenas CNP   Benzocaine-Menthol (CEPACOL) 6-10 MG LOZG lozenge Take 1 lozenge by mouth every 2 hours as needed for Sore Throat Yes KEANU Cardenas CNP   acetaminophen (TYLENOL) 500 MG tablet Take 2 tablets by mouth every 8 hours as needed for Pain Yes KEANU Cardenas CNP   aspirin EC 81 MG EC tablet Take 1 tablet by mouth daily Yes KEANU Cardenas CNP   neomycin-polymyxin-hydrocortisone (CORTISPORIN) 3.5-95447-4 otic solution Place 3 drops into the right ear 3 times daily for 10 days Instill into Right  Ear Yes Rajendra Cook MD   cetirizine (ZYRTEC) 10 MG tablet Take 1 tablet by mouth daily Yes Rajendra Cook MD   ibuprofen (ADVIL;MOTRIN) 800 MG tablet Take 1 tablet by mouth 2 times daily as needed for Pain Yes Kasie Haque,    budesonide-formoterol (SYMBICORT) 160-4.5 MCG/ACT AERO Inhale 2 puffs into the lungs 2 times daily Yes KEANU Cardenas CNP   levothyroxine (SYNTHROID) 100 MCG tablet Take 1 tablet by mouth Daily Yes KEANU Ridley CNP   tiZANidine (ZANAFLEX) 2 MG tablet Take 2 tablets by mouth every 8 hours as needed (muscle spasm, neck pain) Yes KEANU Ridley CNP   butalbital-acetaminophen-caffeine (FIORICET, ESGIC) -40 MG per tablet TAKE 1 TABLET BY MOUTH EVERY 4 HOURS AS NEEDED FOR HEADACHE Yes KEANU Ridley CNP   Respiratory Therapy Supplies (NEBULIZER/TUBING/MOUTHPIECE) KIT Use daily Yes KEANU Ridley CNP   Nebulizers (NEBULIZER COMPRESSOR) MISC Daily as needed Yes KEANU Ridley CNP   Elastic Bandages & Supports (CARPAL TUNNEL WRIST STABILIZER) MISC Apply to each wrist at bedtime Yes KEANU Ridley CNP   clomiPRAMINE (ANAFRANIL) 50 MG capsule Take 1 capsule by mouth nightly Yes Anel Parks MD   divalproex (DEPAKOTE) 500 MG DR tablet Take 2 tablets by mouth nightly Yes Anel Parks MD   divalproex (DEPAKOTE) 500 MG DR tablet Take 1 tablet by mouth daily Yes Anel Parks MD   methyl salicylate-menthol (PAULETTE BRAY GREASELESS) 10-15 % CREA Apply topically 3 times daily as needed for Pain Yes Anel Parks MD   nicotine polacrilex (NICORETTE) 4 MG gum Take 1 each by mouth as needed for Smoking cessation Yes Anel Parks MD   ondansetron (ZOFRAN ODT) 4 MG disintegrating tablet Take 1 tablet by mouth every 8 hours as needed for Nausea Yes Lory Riedel,    albuterol sulfate  (90 Base) MCG/ACT inhaler Inhale 1 puff into the lungs every 6 hours as needed for Wheezing Gap prescription until follow up with primary. Do not refill.   Follow up with primary Yes Rosey Quezada, DO   ibuprofen (ADVIL;MOTRIN) 800 MG tablet Take 1 tablet by mouth every 8 hours as needed for Pain Yes Jo Rock MD   acetaminophen (TYLENOL) 325 MG tablet Take 1 tablet by mouth every 6 hours as needed for Pain Yes Tony Hurt, DO   albuterol (PROVENTIL) (2.5 MG/3ML) 0.083% nebulizer solution Take 3 mLs by nebulization every 6 hours as needed for Wheezing  Patient not taking: Reported on 10/9/2020  KEANU Mauro - FELIX   ipratropium-albuterol (DUONEB) 0.5-2.5 (3) MG/3ML SOLN nebulizer solution Inhale 3 mLs into the lungs every 4 hours  Patient not taking: Reported on 10/9/2020  Ami Watson MD       Social History     Tobacco Use    Smoking status: Current Every Day Smoker     Packs/day: 0.50     Years: 12.00     Pack years: 6.00    Smokeless tobacco: Never Used   Substance Use Topics    Alcohol use: No    Drug use: Not Currently     Comment: Has not use Heroin since 2/2020        Past Medical History:   Diagnosis Date    Arthritis     Asthma     Borderline diabetes     Borderline personality disorder ([facility]     CHF (congestive heart failure) ([facility])     COPD (chronic obstructive pulmonary disease) ([facility])     Fibromyalgia     Headache     Hypertension     Manic depression ([address]     Movement disorder     Neck fracture ([facility])     Pernicious anemia     Seizure ([facility])     Thyroid disease         Allergies   Allergen Reactions    Imitrex [Sumatriptan] Hives    Bee Pollen     Bee Venom     Bromide Ion [Bromine]     Ketorolac Hives    Potassium Bromide     Reglan [Metoclopramide]     Sulfa Antibiotics     Sulfadiazine     Tramadol Hives   ,   Past Medical History:   Diagnosis Date    Arthritis     Asthma     Borderline diabetes     Borderline personality disorder ([address]     CHF (congestive heart failure) ([facility])     COPD (chronic obstructive pulmonary disease) ([facility])     Fibromyalgia     Headache     Hypertension     Manic depression (HCC)     Movement disorder     Neck fracture ([facility])     Pernicious anemia     Seizure ([address]     Thyroid disease    ,   Past Surgical History:   Procedure Laterality Date    EXPLORATION OF WOUND OF EXTREMITY Right 5/19/2019    RIGHT THIGH WOUND WASHOUT WITH [facility] WEST-ER PLACEMENT performed by Abdulkadir Silva MD at [address] EXTREMITY N/A 5/22/2019    RIGHT WOUND HIP EXAMINATION LAVAGE AND WOUND VAC PLACEMENT performed by Tony Drummond MD at [address] [facility] [address] Right 5/17/2019    IRRIGATION, DEBRIDEMENT RIGHT THIGH performed by Inocencio Edward MD at [address] [place] [address] Bilateral     LYMPH NODE DISSECTION      cervical    PARTIAL HYSTERECTOMY     ,   Social History     Tobacco Use    Smoking status: Current Every Day Smoker     Packs/day: 0.50     Years: 12.00     Pack years: 6.00    Smokeless tobacco: Never Used   Substance Use Topics    Alcohol use: No    Drug use: Not Currently     Comment: Has not use Heroin since 2/2020         RECORD REVIEW: Previous medical records were reviewed at today's visit. PHYSICAL EXAMINATION:    Vital Signs: (As obtained by patient/caregiver at home)    No flowsheet data found. Physical Exam  Constitutional:       General: She is not in acute distress. Appearance: Normal appearance. She is not toxic-appearing. HENT:      Head: Normocephalic. Right Ear: External ear normal.      Left Ear: External ear normal.      Nose: Nose normal.      Mouth/Throat:      Mouth: Mucous membranes are moist.   Eyes:      General: No scleral icterus. Right eye: No discharge. Left eye: No discharge. Conjunctiva/sclera: Conjunctivae normal.   Neck:      Musculoskeletal: Normal range of motion. Pulmonary:      Effort: Pulmonary effort is normal.      Comments: Patient is able to complete full sentences without shortness of breath or obvious dyspnea  Skin:     Coloration: Skin is not jaundiced. Neurological:      Mental Status: She is alert and oriented to person, place, and time. Psychiatric:         Mood and Affect: Mood normal.         Behavior: Behavior normal.         Thought Content: Thought content normal.             RECORD REVIEW: Previous medical records were reviewed at today's visit.      The past family, medical and social histories were reviewed and unchanged with the exceptions of what is mentioned in this note. Due to this being a TeleHealth encounter, evaluation of the following organ systems is limited: Vitals/Constitutional/EENT/Resp/CV/GI//MS/Neuro/Skin/Heme-Lymph-Imm. ASSESSMENT/PLAN:  June Rasmussen was seen today for ed follow-up and otalgia. Diagnoses and all orders for this visit:    Acute non-recurrent sinusitis, unspecified location  -     amoxicillin-clavulanate (AUGMENTIN) 500-125 MG per tablet; Take 1 tablet by mouth 2 times daily for 10 days  -     Benzocaine-Menthol (CEPACOL) 6-10 MG LOZG lozenge; Take 1 lozenge by mouth every 2 hours as needed for Sore Throat    Medication refill  -     risperiDONE (RISPERDAL) 1 MG tablet; Take 1 tablet by mouth daily and 2 tablets by mouth at bedtime  -     traZODone (DESYREL) 150 MG tablet; Take 1 tablet by mouth nightly  -     hydrOXYzine (ATARAX) 50 MG tablet; Take 1 tablet by mouth 3 times daily as needed for Anxiety  -     gabapentin (NEURONTIN) 300 MG capsule; Take 2 capsules by mouth 3 times daily for 30 days. -     cloNIDine (CATAPRES) 0.1 MG tablet; Take 1 tablet by mouth nightly    Other orders  -     acetaminophen (TYLENOL) 500 MG tablet; Take 2 tablets by mouth every 8 hours as needed for Pain  -     aspirin EC 81 MG EC tablet; Take 1 tablet by mouth daily    -Given symptoms lasting greater than 10 days and persisting, will treat with Augmentin x10 days.  -Sagar Acevedo reports she is struggling getting in with psychiatry, they have canceled on her again until next month. Needs refills of psychiatric medications until she is seen by psychiatry  -Patient can cancel appointment Monday, contact PCP if symptoms not improving with antibiotics    No follow-ups on file. An  electronic signature was used to authenticate this note.     --KEANU Eddy - CNP on 10/22/2020 at 3:23 PM    This note is created with the assistance of a speech-recognition program. While intending to generate a document that actually reflects the content of the visit, the document can still have some mistakes which may not have been identified and corrected by editing. 9    Pursuant to the emergency declaration under the [facility] waiver authority and the [employer] Act, this Virtual  Visit was conducted, with patient's consent, to reduce the patient's risk of exposure to COVID-19 and provide continuity of care for an established patient. Services were provided through a video synchronous discussion virtually to substitute for in-person clinic visit.

## 2020-10-22 NOTE — LETTER
[facility]  [address] [place] [address]  Phone: [phone]  Fax: [phone]    KEANU Eddy CNP        October 22, 2020     Patient: Tammi Medina   YOB: 1965   Date of Visit: 10/22/2020       To Whom It May Concern: It is my medical opinion that Sheila Ching should remain out of work until 10/25/20. If you have any questions or concerns, please don't hesitate to call.     Sincerely,        KEANU Eddy CNP

## 2020-10-22 NOTE — PROGRESS NOTES
Visit Information    Have you changed or started any medications since your last visit including any over-the-counter medicines, vitamins, or herbal medicines? no   Are you having any side effects from any of your medications? -  no  Have you stopped taking any of your medications? Is so, why? -  no    Have you seen any other physician or provider since your last visit? No  Have you had any other diagnostic tests since your last visit? Yes - Records Obtained  Have you been seen in the emergency room and/or had an admission to a hospital since we last saw you? Yes - Records Obtained  Have you had your routine dental cleaning in the past 6 months? no    Have you activated your [employer] account? If not, what are your barriers?  Yes     Patient Care Team:  KEANU Barriga CNP as PCP - General (Family Medicine)  KEANU Barriga CNP as PCP - [facility] Provider  Tess Pinedo as Ambulatory Care Manager    Medical History Review  Past Medical, Family, and Social History reviewed and does not contribute to the patient presenting condition    Health Maintenance   Topic Date Due    HIV screen  02/16/1978    DTaP/Tdap/Td vaccine (1 - Tdap) 02/16/1982    Cervical cancer screen  02/16/1984    Breast cancer screen  02/16/2013    Shingles Vaccine (1 of 2) 02/16/2013    Colon Cancer Screen FIT/FOBT  06/11/2020    TSH testing  12/16/2020    Potassium monitoring  09/12/2021    Creatinine monitoring  09/12/2021    Lipid screen  02/19/2024    Flu vaccine  Completed    Pneumococcal 0-64 years Vaccine  Completed    Hepatitis C screen  Completed    Hepatitis A vaccine  Aged Out    Hepatitis B vaccine  Aged Out    Hib vaccine  Aged Out    Meningococcal (ACWY) vaccine  Aged Out

## 2020-10-22 NOTE — TELEPHONE ENCOUNTER
Pharmacy is requesting to change throat lozenges dosage to 15/3.6 due to current prescription is out of stock.

## 2020-10-23 RX ORDER — MENTHOL 5.8 MG
1 LOZENGE MUCOUS MEMBRANE
Qty: 20 LOZENGE | Refills: 0 | Status: SHIPPED | OUTPATIENT
Start: 2020-10-23 | End: 2020-10-26

## 2020-10-26 ENCOUNTER — OFFICE VISIT (OUTPATIENT)
Dept: PRIMARY CARE CLINIC | Age: 55
End: 2020-10-26
Payer: MEDICARE

## 2020-10-26 VITALS
DIASTOLIC BLOOD PRESSURE: 93 MMHG | WEIGHT: 195 LBS | BODY MASS INDEX: 35.67 KG/M2 | HEART RATE: 77 BPM | TEMPERATURE: 97.1 F | OXYGEN SATURATION: 97 % | SYSTOLIC BLOOD PRESSURE: 133 MMHG

## 2020-10-26 PROCEDURE — G8482 FLU IMMUNIZE ORDER/ADMIN: HCPCS | Performed by: NURSE PRACTITIONER

## 2020-10-26 PROCEDURE — 99213 OFFICE O/P EST LOW 20 MIN: CPT | Performed by: NURSE PRACTITIONER

## 2020-10-26 PROCEDURE — 4004F PT TOBACCO SCREEN RCVD TLK: CPT | Performed by: NURSE PRACTITIONER

## 2020-10-26 PROCEDURE — G8417 CALC BMI ABV UP PARAM F/U: HCPCS | Performed by: NURSE PRACTITIONER

## 2020-10-26 PROCEDURE — G8427 DOCREV CUR MEDS BY ELIG CLIN: HCPCS | Performed by: NURSE PRACTITIONER

## 2020-10-26 PROCEDURE — 3017F COLORECTAL CA SCREEN DOC REV: CPT | Performed by: NURSE PRACTITIONER

## 2020-10-26 RX ORDER — FAMOTIDINE 20 MG/1
20 TABLET, FILM COATED ORAL 2 TIMES DAILY PRN
Qty: 60 TABLET | Refills: 1 | Status: ON HOLD | OUTPATIENT
Start: 2020-10-26 | End: 2021-08-28

## 2020-10-26 RX ORDER — ONDANSETRON 4 MG/1
4 TABLET, ORALLY DISINTEGRATING ORAL EVERY 8 HOURS PRN
Qty: 20 TABLET | Refills: 0 | Status: SHIPPED | OUTPATIENT
Start: 2020-10-26 | End: 2020-12-04 | Stop reason: SDUPTHER

## 2020-10-26 RX ORDER — CLOMIPRAMINE HYDROCHLORIDE 50 MG/1
50 CAPSULE ORAL NIGHTLY
Qty: 30 CAPSULE | Refills: 0 | Status: ON HOLD | OUTPATIENT
Start: 2020-10-26 | End: 2021-08-28

## 2020-10-26 ASSESSMENT — ENCOUNTER SYMPTOMS
BLOOD IN STOOL: 0
ABDOMINAL PAIN: 0
NAUSEA: 1
COUGH: 0
SINUS PRESSURE: 0
DIARRHEA: 0
CHEST TIGHTNESS: 0
RHINORRHEA: 0
SHORTNESS OF BREATH: 0
EYE DISCHARGE: 0
CONSTIPATION: 1

## 2020-10-26 NOTE — LETTER
[facility]  [address] [place] [address]  Phone: [phone]  Fax: [phone] [facility], APRN - CNP        October 23, 2020    Patient: Dax Santo   YOB: 1965   Date of Visit: 10/23/2020       To Whom it May Concern:    Tess Mc was seen for a Virtual Visit in my clinic on 10/23/2020. If you have any questions or concerns, please don't hesitate to call.     Sincerely,         KEANU Cardenas CNP

## 2020-10-26 NOTE — PATIENT INSTRUCTIONS
[address] [place] [address],  [address], [address], [address] , [address] [place] [address]  P: [phone]  F: [phone]

## 2020-10-26 NOTE — LETTER
[address] [place] [address] Primary Care  [address] [place] [address]  Phone: [phone]  Fax: [phone] [facility], KEANU - CNP        October 23, 2020     Patient: Saira Taylor   YOB: 1965   Date of Visit: 10/26/2020       To Whom it May Concern:    Merlyn London was seen on a Virtual Visit in my clinic on 10/26/2020. If you have any questions or concerns, please don't hesitate to call.     Sincerely,         KEANU Rocha - CNP

## 2020-10-28 ENCOUNTER — TELEPHONE (OUTPATIENT)
Dept: PRIMARY CARE CLINIC | Age: 55
End: 2020-10-28

## 2020-10-29 RX ORDER — BUTALBITAL, ASPIRIN, AND CAFFEINE 325; 50; 40 MG/1; MG/1; MG/1
CAPSULE ORAL
Qty: 60 CAPSULE | OUTPATIENT
Start: 2020-10-29

## 2020-11-03 ENCOUNTER — TELEPHONE (OUTPATIENT)
Dept: GASTROENTEROLOGY | Age: 55
End: 2020-11-03

## 2020-11-03 PROBLEM — F32.A DEPRESSION: Status: RESOLVED | Noted: 2020-11-03 | Resolved: 2020-11-03

## 2020-11-04 ENCOUNTER — HOSPITAL ENCOUNTER (EMERGENCY)
Age: 55
Discharge: HOME | End: 2020-11-04
Attending: EMERGENCY MEDICINE
Payer: MEDICARE

## 2020-11-04 VITALS
BODY MASS INDEX: 31.89 KG/M2 | SYSTOLIC BLOOD PRESSURE: 159 MMHG | DIASTOLIC BLOOD PRESSURE: 97 MMHG | OXYGEN SATURATION: 96 % | RESPIRATION RATE: 24 BRPM | HEART RATE: 75 BPM | HEIGHT: 63 IN | WEIGHT: 180 LBS | TEMPERATURE: 98.8 F

## 2020-11-04 PROCEDURE — 6370000000 HC RX 637 (ALT 250 FOR IP): Performed by: EMERGENCY MEDICINE

## 2020-11-04 PROCEDURE — 6360000002 HC RX W HCPCS: Performed by: STUDENT IN AN ORGANIZED HEALTH CARE EDUCATION/TRAINING PROGRAM

## 2020-11-04 PROCEDURE — 96375 TX/PRO/DX INJ NEW DRUG ADDON: CPT

## 2020-11-04 PROCEDURE — 96374 THER/PROPH/DIAG INJ IV PUSH: CPT

## 2020-11-04 PROCEDURE — 99285 EMERGENCY DEPT VISIT HI MDM: CPT

## 2020-11-04 RX ORDER — KETOROLAC TROMETHAMINE 30 MG/ML
30 INJECTION, SOLUTION INTRAMUSCULAR; INTRAVENOUS ONCE
Status: DISCONTINUED | OUTPATIENT
Start: 2020-11-04 | End: 2020-11-04 | Stop reason: HOSPADM

## 2020-11-04 RX ORDER — DIPHENHYDRAMINE HCL 25 MG
50 TABLET ORAL ONCE
Status: COMPLETED | OUTPATIENT
Start: 2020-11-04 | End: 2020-11-04

## 2020-11-04 RX ORDER — KETOROLAC TROMETHAMINE 30 MG/ML
30 INJECTION, SOLUTION INTRAMUSCULAR; INTRAVENOUS ONCE
Status: DISCONTINUED | OUTPATIENT
Start: 2020-11-04 | End: 2020-11-04

## 2020-11-04 RX ORDER — PROCHLORPERAZINE MALEATE 10 MG
10 TABLET ORAL ONCE
Status: DISCONTINUED | OUTPATIENT
Start: 2020-11-04 | End: 2020-11-04 | Stop reason: HOSPADM

## 2020-11-04 RX ORDER — PROCHLORPERAZINE EDISYLATE 5 MG/ML
10 INJECTION INTRAMUSCULAR; INTRAVENOUS ONCE
Status: DISCONTINUED | OUTPATIENT
Start: 2020-11-04 | End: 2020-11-04

## 2020-11-04 RX ORDER — DIPHENHYDRAMINE HCL 25 MG
25 TABLET ORAL ONCE
Status: DISCONTINUED | OUTPATIENT
Start: 2020-11-04 | End: 2020-11-04

## 2020-11-04 RX ADMIN — KETOROLAC TROMETHAMINE 30 MG: 30 INJECTION, SOLUTION INTRAMUSCULAR; INTRAVENOUS at 19:00

## 2020-11-04 RX ADMIN — DIPHENHYDRAMINE HCL 50 MG: 25 TABLET ORAL at 18:58

## 2020-11-04 RX ADMIN — PROCHLORPERAZINE EDISYLATE 10 MG: 5 INJECTION INTRAMUSCULAR; INTRAVENOUS at 18:59

## 2020-11-04 ASSESSMENT — PAIN DESCRIPTION - LOCATION
LOCATION: HEAD
LOCATION: HEAD

## 2020-11-04 ASSESSMENT — PAIN DESCRIPTION - PAIN TYPE
TYPE: ACUTE PAIN
TYPE: ACUTE PAIN

## 2020-11-04 ASSESSMENT — ENCOUNTER SYMPTOMS
SORE THROAT: 0
FACIAL SWELLING: 0
WHEEZING: 0
COUGH: 0

## 2020-11-04 ASSESSMENT — PAIN SCALES - GENERAL
PAINLEVEL_OUTOF10: 7
PAINLEVEL_OUTOF10: 7
PAINLEVEL_OUTOF10: 2

## 2020-11-04 NOTE — ED PROVIDER NOTES
[facility]     Emergency Department     Faculty Note/ Attestation      Pt Name: Saira Taylor                                       MRN: [id number]  [place] 1965  Date of evaluation: 11/4/2020    Patients PCP:    KEANU Rocha - FELIX      Attestation  I performed a history and physical examination of the patient and discussed management with the resident. I reviewed the residents note and agree with the documented findings and plan of care. Any areas of disagreement are noted on the chart. I was personally present for the key portions of any procedures. I have documented in the chart those procedures where I was not present during the key portions. I have reviewed the emergency nurses triage note. I agree with the chief complaint, past medical history, past surgical history, allergies, medications, social and family history as documented unless otherwise noted below. For Physician Assistant/ Nurse Practitioner cases/documentation I have personally evaluated this patient and have completed at least one if not all key elements of the E/M (history, physical exam, and MDM). Additional findings are as noted.       Initial Screens:        [place] Coma Scale  Eye Opening: Spontaneous  Best Verbal Response: Oriented  Best Motor Response: Obeys commands  [place] Coma Scale Score: 15    Vitals:    Vitals:    11/04/20 1810 11/04/20 1816   BP:  (!) 149/98   Pulse:  89   Resp:  20   Temp: 98.8 °F (37.1 °C)    TempSrc: Infrared    SpO2:  99%   Weight:  180 lb (81.6 kg)   Height:  5' 2.5\" (1.588 m)       [address] [place] [address]       Chief Complaint   Patient presents with    Headache     x 4 days, no vision changes, out of fioricet     Hypertension    Nausea             DIAGNOSTIC RESULTS             RADIOLOGY:   No orders to display         LABS:  Labs Reviewed - No data to display      EMERGENCY DEPARTMENT COURSE:     -------------------------  BP: (!) 149/98, Temp: 98.8 °F (37.1 °C),

## 2020-11-05 ENCOUNTER — TELEPHONE (OUTPATIENT)
Dept: PRIMARY CARE CLINIC | Age: 55
End: 2020-11-05

## 2020-11-05 ENCOUNTER — CARE COORDINATION (OUTPATIENT)
Dept: CARE COORDINATION | Age: 55
End: 2020-11-05

## 2020-11-05 RX ORDER — LISINOPRIL 20 MG/1
20 TABLET ORAL DAILY
Qty: 30 TABLET | Refills: 1 | Status: SHIPPED | OUTPATIENT
Start: 2020-11-05 | End: 2021-02-03 | Stop reason: SDUPTHER

## 2020-11-05 NOTE — TELEPHONE ENCOUNTER
Patient contacted on-call provider after hours with concerns regarding blood pressure. She states she was in the emergency room the night prior due to readings at her living facility with a blood pressure over 200. She states there is a concern that her elevated blood pressure is significantly increasing her migraine frequency. She was previously on lisinopril, however it was stopped at her last admission to [facility]. Today her blood pressure is again elevated in the 200s on the cuff at her facility. Agreed to restart lisinopril 20 mg once a day, do not stop clonidine yet at this time. Advised Tahira to call the office tomorrow to schedule a follow-up in roughly 10 to 14 days. Patient verbalized agreement with plan and understanding.

## 2020-11-05 NOTE — TELEPHONE ENCOUNTER
Patient was in the er last night wants a hospital follow up tomorrow if possible didn't want any other day

## 2020-11-05 NOTE — ED NOTES
Pt called out, she is feeling better and is ready to be discharged.       Griselda Del Rosario RN  11/04/20 [id number]

## 2020-11-05 NOTE — ED PROVIDER NOTES
[facility]  ED  Emergency Department Encounter  EmergencyMedicine Resident     Pt Name:Aleta Tellez  MRN: [id number]  [birth year]gfdora 1965  Date of evaluation: 11/4/20  PCP:  KEANU Campos [id number]       Chief Complaint   Patient presents with    Headache     x 4 days, no vision changes, out of fioricet     Hypertension    Nausea        HISTORY OF PRESENT ILLNESS  (Location/Symptom, Timing/Onset, Context/Setting, Quality, Duration, Modifying Factors, Severity.)      Liseth Stokes is a 60 y.o. female who presents with headache x4 days. Patient's past medical history of seizure disorder, migraines for which she takes Fioricet. Patient states she is been out of resources since last Friday. Headache started last Friday patient states it has continued to get worse, 8/10 today. Patient describes headache as throbbing, bifrontal area. Patient states she had associated nausea, but no vomiting, no rhinorrhea, no lacrimation. Patient states sitting and then darkness does improve. Describes headache is similar to her previous migraines but longer. Patient dates her blood pressure this morning checked by our aides of center was elevated with SBP of 200s, however in ED blood pressure was 140s/90s. No other acute complaints. PAST MEDICAL / SURGICAL / SOCIAL / FAMILY HISTORY      has a past medical history of Arthritis, Asthma, Borderline diabetes, Borderline personality disorder ([address] CHF (congestive heart failure) ([address] COPD (chronic obstructive pulmonary disease) ([address] Fibromyalgia, Headache, Hypertension, Manic depression ([address] Movement disorder, Neck fracture ([address] Pernicious anemia, Seizure ([address] and Thyroid disease.        has a past surgical history that includes knee surgery (Bilateral); partial hysterectomy (cervix not removed); lymph node dissection; Irrigation and debridement (Right, 5/17/2019); EXPLORATION OF WOUND OF EXTREMITY (Right, 5/19/2019); and as needed (for acid reflux) 10/26/20   [facility], APRN - CNP   clomiPRAMINE (ANAFRANIL) 50 MG capsule Take 1 capsule by mouth nightly 10/26/20   [facility], APRN - CNP   ondansetron (ZOFRAN ODT) 4 MG disintegrating tablet Take 1 tablet by mouth every 8 hours as needed for Nausea 10/26/20   [facility], APRN - CNP   risperiDONE (RISPERDAL) 1 MG tablet Take 1 tablet by mouth daily and 2 tablets by mouth at bedtime 10/22/20   [facility], APRN - CNP   traZODone (DESYREL) 150 MG tablet Take 1 tablet by mouth nightly 10/22/20   [facility], APRN - CNP   gabapentin (NEURONTIN) 300 MG capsule Take 2 capsules by mouth 3 times daily for 30 days.  10/22/20 11/21/20  [facility], APRN - CNP   cloNIDine (CATAPRES) 0.1 MG tablet Take 1 tablet by mouth nightly 10/22/20   [facility], APRN - CNP   Benzocaine-Menthol (CEPACOL) 6-10 MG LOZG lozenge Take 1 lozenge by mouth every 2 hours as needed for Sore Throat 10/22/20   [facility], APRN - CNP   acetaminophen (TYLENOL) 500 MG tablet Take 2 tablets by mouth every 8 hours as needed for Pain 10/22/20   [facility], APRN - CNP   aspirin EC 81 MG EC tablet Take 1 tablet by mouth daily 10/22/20   [facility], APRN - CNP   cetirizine (ZYRTEC) 10 MG tablet Take 1 tablet by mouth daily 10/18/20   Mine Castañeda MD   ibuprofen (ADVIL;MOTRIN) 800 MG tablet Take 1 tablet by mouth 2 times daily as needed for Pain 10/15/20   Henry Lloyd DO   budesonide-formoterol [facility]) 160-4.5 MCG/ACT AERO Inhale 2 puffs into the lungs 2 times daily 10/9/20   [facility], APRN - CNP   levothyroxine (SYNTHROID) 100 MCG tablet Take 1 tablet by mouth Daily 10/9/20   [facility], APRN - CNP   tiZANidine (ZANAFLEX) 2 MG tablet Take 2 tablets by mouth every 8 hours as needed (muscle spasm, neck pain) 10/9/20   KEANU Ann - CNP   butalbital-acetaminophen-caffeine (FIORICET, ESGIC) -40 MG per tablet TAKE 1 TABLET BY MOUTH EVERY 4 HOURS AS NEEDED FOR HEADACHE 10/9/20   KEANU Vincent CNP   Respiratory Therapy Supplies (NEBULIZER/TUBING/MOUTHPIECE) KIT Use daily 10/9/20   KEANU Vincent CNP   Nebulizers (NEBULIZER COMPRESSOR) MISC Daily as needed 10/9/20   KEANU Vincent CNP   Elastic Bandages & Supports (CARPAL TUNNEL WRIST STABILIZER) [address] [facility] Apply to each wrist at bedtime 10/9/20   KEANU Vincent CNP   divalproex (DEPAKOTE) 500 MG DR tablet Take 2 tablets by mouth nightly 9/21/20   Xochilt Hartman MD   divalproex (DEPAKOTE) 500 MG DR tablet Take 1 tablet by mouth daily 9/21/20   Xochilt Hartman MD   methyl salicylate-menthol (PAULETTE BRAY GREASELESS) 10-15 % CREA Apply topically 3 times daily as needed for Pain 9/21/20   Xochilt Hartman MD   nicotine polacrilex (NICORETTE) 4 MG gum Take 1 each by mouth as needed for Smoking cessation 9/21/20   Xochilt Hartman MD   albuterol (PROVENTIL) (2.5 MG/3ML) 0.083% nebulizer solution Take 3 mLs by nebulization every 6 hours as needed for Wheezing  Patient not taking: Reported on 10/9/2020 6/18/20   KEANU Vincent CNP   albuterol sulfate  (90 Base) MCG/ACT inhaler Inhale 1 puff into the lungs every 6 hours as needed for Wheezing [place] prescription until follow up with primary. Do not refill. Follow up with primary 4/14/20   Krystal Hooker,    ibuprofen (ADVIL;MOTRIN) 800 MG tablet Take 1 tablet by mouth every 8 hours as needed for Pain 3/5/20   Farooq Bernal MD   acetaminophen (TYLENOL) 325 MG tablet Take 1 tablet by mouth every 6 hours as needed for Pain 1/17/20   Darlyn eBgum DO   ipratropium-albuterol (DUONEB) 0.5-2.5 (3) MG/3ML SOLN nebulizer solution Inhale 3 mLs into the lungs every 4 hours  Patient not taking: Reported on 10/9/2020 6/16/19   King Jose MD       REVIEW OF SYSTEMS    (2-9 systems for level 4, 10 or more for level 5)      Review of Systems   Constitutional: Negative for activity change, chills, fatigue and fever.    HENT: Negative for congestion, facial swelling, hearing loss, sore throat and tinnitus. Respiratory: Negative for cough and wheezing. Cardiovascular: Negative for leg swelling. Musculoskeletal: Negative for arthralgias and myalgias. Neurological: Negative for syncope, light-headedness and numbness. PHYSICAL EXAM   (up to 7 for level 4, 8 or more for level 5)      INITIAL VITALS:   BP (!) 149/90   Pulse 81   Temp 98.8 °F (37.1 °C) (Infrared)   Resp 15   Ht 5' 2.5\" (1.588 m)   Wt 180 lb (81.6 kg)   SpO2 96%   BMI 32.40 kg/m²      Vitals:    11/04/20 1810 11/04/20 1816 11/04/20 1832 11/04/20 1847   BP:  (!) 149/98 (!) 139/101 (!) 149/90   Pulse:  89 88 81   Resp:  20 24 15   Temp: 98.8 °F (37.1 °C)      TempSrc: Infrared      SpO2:  99% 98% 96%   Weight:  180 lb (81.6 kg)     Height:  5' 2.5\" (1.588 m)          Physical Exam  Constitutional:       Appearance: Normal appearance. HENT:      Head: Normocephalic. Eyes:      Pupils: Pupils are equal, round, and reactive to light. Neck:      Musculoskeletal: No neck rigidity. Cardiovascular:      Rate and Rhythm: Normal rate. Pulmonary:      Effort: Pulmonary effort is normal.   Abdominal:      General: Abdomen is flat. Musculoskeletal: Normal range of motion. Skin:     General: Skin is warm. Neurological:      General: No focal deficit present. Mental Status: She is alert. DIFFERENTIAL  DIAGNOSIS     PLAN (LABS / IMAGING / EKG):  No orders of the defined types were placed in this encounter.       MEDICATIONS ORDERED:  Orders Placed This Encounter   Medications    DISCONTD: prochlorperazine (COMPAZINE) injection 10 mg    DISCONTD: diphenhydrAMINE (BENADRYL) tablet 25 mg    DISCONTD: ketorolac (TORADOL) injection 30 mg    prochlorperazine (COMPAZINE) tablet 10 mg    diphenhydrAMINE (BENADRYL) tablet 50 mg    ketorolac (TORADOL) injection 30 mg       DIAGNOSTIC RESULTS / EMERGENCY DEPARTMENT COURSE / MDM   LAB RESULTS:  No results found for this visit on 11/04/20. IMPRESSION: migraine    RADIOLOGY:  No orders to display        EKG      All EKG's are interpreted by the Emergency Department Physician who either signs or Co-signs this chart in the absence of a cardiologist.    [facility]:      ED Course as of Nov 04 1928 Wed Nov 04, 2020 1928 RHYTHM STRIP REPORT [KJ]      ED Course User Index  [KJ] Andree Che MD         PROCEDURES:      CONSULTS:  None    CRITICAL CARE:  Please see attending note    FINAL IMPRESSION      Migraine headache    DISPOSITION / PLAN     DISPOSITION  : home      PATIENT REFERRED TO:  No follow-up provider specified.     DISCHARGE MEDICATIONS:  New Prescriptions    No medications on file       Andree Che MD  Emergency Medicine Resident    (Please note that portions of thisnote were completed with a voice recognition program.  Efforts were made to edit the dictations but occasionally words are mis-transcribed.)       Andree Che MD  Resident  11/04/20 2025       Andree Che MD  Resident  11/05/20 [id number]

## 2020-11-05 NOTE — CARE COORDINATION
Spoke with patient briefly, she was shopping and asked if ACM could call back at 3:30 pm.   ACM called back and left a detailed message with contact information, requesting a return call regarding her ED visit.    Yo Mayes RN, ambulatory care manager  Filling in for DARWIN Welsh

## 2020-11-09 ENCOUNTER — CARE COORDINATION (OUTPATIENT)
Dept: CARE COORDINATION | Age: 55
End: 2020-11-09

## 2020-11-18 DIAGNOSIS — Z76.0 MEDICATION REFILL: ICD-10-CM

## 2020-11-18 DIAGNOSIS — G43.809 MIGRAINE VARIANT WITH HEADACHE: ICD-10-CM

## 2020-11-18 RX ORDER — BUTALBITAL, ACETAMINOPHEN AND CAFFEINE 50; 325; 40 MG/1; MG/1; MG/1
TABLET ORAL
Qty: 20 TABLET | Refills: 2 | Status: SHIPPED | OUTPATIENT
Start: 2020-11-18 | End: 2020-12-18

## 2020-11-18 RX ORDER — ACETAMINOPHEN 500 MG
500-1000 TABLET ORAL EVERY 8 HOURS PRN
Qty: 180 TABLET | Refills: 3 | Status: SHIPPED | OUTPATIENT
Start: 2020-11-18 | End: 2021-01-13

## 2020-11-18 RX ORDER — GABAPENTIN 300 MG/1
600 CAPSULE ORAL 3 TIMES DAILY
Qty: 180 CAPSULE | Refills: 2 | Status: SHIPPED | OUTPATIENT
Start: 2020-11-18 | End: 2021-02-02

## 2020-11-18 NOTE — TELEPHONE ENCOUNTER
Health Maintenance   Topic Date Due    HIV screen  02/16/1978    DTaP/Tdap/Td vaccine (1 - Tdap) 02/16/1982    Cervical cancer screen  02/16/1984    Breast cancer screen  02/16/2013    Shingles Vaccine (1 of 2) 02/16/2013    Colon Cancer Screen FIT/FOBT  06/11/2020    TSH testing  12/16/2020    Potassium monitoring  09/12/2021    Creatinine monitoring  09/12/2021    Lipid screen  02/19/2024    Flu vaccine  Completed    Pneumococcal 0-64 years Vaccine  Completed    Hepatitis C screen  Completed    Hepatitis A vaccine  Aged Out    Hepatitis B vaccine  Aged Out    Hib vaccine  Aged Out    Meningococcal (ACWY) vaccine  Aged Out             (applicable per patient's age: Cancer Screenings, Depression Screening, Fall Risk Screening, Immunizations)    Hemoglobin A1C (%)   Date Value   02/19/2019 5.2     LDL Cholesterol (mg/dL)   Date Value   02/19/2019 105     AST (U/L)   Date Value   09/12/2020 19     ALT (U/L)   Date Value   09/12/2020 16     BUN (mg/dL)   Date Value   09/12/2020 14      (goal A1C is < 7)   (goal LDL is <100) need 30-50% reduction from baseline     BP Readings from Last 3 Encounters:   11/04/20 (!) 159/97   10/26/20 (!) 133/93   10/18/20 (!) 142/88    (goal /80)      All Future Testing planned in [facility]:  Lab Frequency Next Occurrence   Phenobarbital Level Once 01/16/2021   PHENOBARBITAL, FREE Once 01/16/2021   TSH With Reflex Ft4 Once 01/21/2021   Hemoglobin A1C Once 01/21/2021       Next Visit Date:  Future Appointments   Date Time Provider Rashida Torres   11/18/2020  2:00 PM KEANU Savage CNP [facility]   12/18/2020 10:45 AM KEANU Savage CNP [facility]            Patient Active Problem List:     Chest pain     Migraine variant with headache     Drug overdose, accidental or unintentional, initial encounter     Hypothyroidism     Essential hypertension     Seizure disorder [facility])     Drug overdose     History of seizure     Major depressive disorder with psychotic features ([facility]     Severe major depression (HCC)     Overdose of barbiturate, intentional self-harm, initial encounter ([facility]     Intentional phenobarbital overdose ([address]     Severe episode of recurrent major depressive disorder, without psychotic features ([address]     Suicide attempt ([facility]     Major depressive disorder, recurrent ([address]     Sepsis ([address]     Severe malnutrition ([address]     Altered mental status     Hypokalemia     Congestive heart failure of unknown etiology ([address]     Lumbar radiculopathy     Chronic low back pain     Piriformis syndrome     COPD (chronic obstructive pulmonary disease) (HCC)     Major depressive disorder, single episode, unspecified     Severe depressed bipolar I disorder without psychotic features ([address]     Polysubstance abuse ([facility]

## 2020-11-23 ENCOUNTER — HOSPITAL ENCOUNTER (EMERGENCY)
Age: 55
Discharge: HOME | End: 2020-11-23
Attending: EMERGENCY MEDICINE
Payer: MEDICARE

## 2020-11-23 ENCOUNTER — APPOINTMENT (OUTPATIENT)
Dept: GENERAL RADIOLOGY | Age: 55
End: 2020-11-23
Payer: MEDICARE

## 2020-11-23 VITALS
DIASTOLIC BLOOD PRESSURE: 86 MMHG | TEMPERATURE: 97.5 F | HEART RATE: 82 BPM | OXYGEN SATURATION: 99 % | RESPIRATION RATE: 14 BRPM | SYSTOLIC BLOOD PRESSURE: 139 MMHG

## 2020-11-23 PROCEDURE — 6370000000 HC RX 637 (ALT 250 FOR IP): Performed by: STUDENT IN AN ORGANIZED HEALTH CARE EDUCATION/TRAINING PROGRAM

## 2020-11-23 PROCEDURE — 99283 EMERGENCY DEPT VISIT LOW MDM: CPT

## 2020-11-23 PROCEDURE — 73562 X-RAY EXAM OF KNEE 3: CPT

## 2020-11-23 PROCEDURE — 73610 X-RAY EXAM OF ANKLE: CPT

## 2020-11-23 RX ORDER — ACETAMINOPHEN 500 MG
1000 TABLET ORAL ONCE
Status: DISCONTINUED | OUTPATIENT
Start: 2020-11-23 | End: 2020-11-23 | Stop reason: HOSPADM

## 2020-11-23 RX ORDER — CYCLOBENZAPRINE HCL 10 MG
5 TABLET ORAL ONCE
Status: COMPLETED | OUTPATIENT
Start: 2020-11-23 | End: 2020-11-23

## 2020-11-23 RX ADMIN — CYCLOBENZAPRINE 5 MG: 10 TABLET, FILM COATED ORAL at 08:13

## 2020-11-23 ASSESSMENT — PAIN DESCRIPTION - PAIN TYPE: TYPE: ACUTE PAIN

## 2020-11-23 ASSESSMENT — ENCOUNTER SYMPTOMS
SHORTNESS OF BREATH: 0
BACK PAIN: 0

## 2020-11-23 ASSESSMENT — PAIN SCALES - GENERAL: PAINLEVEL_OUTOF10: 6

## 2020-11-23 ASSESSMENT — PAIN DESCRIPTION - ORIENTATION: ORIENTATION: RIGHT;LEFT

## 2020-11-23 ASSESSMENT — PAIN DESCRIPTION - LOCATION: LOCATION: ANKLE;KNEE

## 2020-11-23 NOTE — ED PROVIDER NOTES
[facility]  ED     Emergency Department     Faculty Attestation    I performed a history and physical examination of the patient and discussed management with the resident. I reviewed the residents note and agree with the documented findings and plan of care. Any areas of disagreement are noted on the chart. I was personally present for the key portions of any procedures. I have documented in the chart those procedures where I was not present during the key portions. I have reviewed the emergency nurses triage note. I agree with the chief complaint, past medical history, past surgical history, allergies, medications, social and family history as documented unless otherwise noted below. For Physician Assistant/ Nurse Practitioner cases/documentation I have personally evaluated this patient and have completed at least one if not all key elements of the E/M (history, physical exam, and MDM). Additional findings are as noted. Patient presents with right knee and ankle pain after she fell down steps yesterday. She says that her leg gave out on her. She denies any loss of consciousness either before or after the fall. She denies hitting her head. She denies any pain other than to the right lower extremity. On exam, patient is resting comfortably in the bed. There is a small abrasion to the right anterior knee. There is mild tenderness over the anterior knee. There is also tenderness to the right ankle. The ankle appears normal without deformity, swelling, erythema, warmth. Distal pulses and sensation is intact. We will get x-rays and treat patient's pain.       Gail Taylor MD  Attending Emergency  Physician              Vaishali Hanley MD  11/23/20 7726

## 2020-11-23 NOTE — ED PROVIDER NOTES
[facility]  ED  Emergency Department Encounter  Emergency Medicine Resident     Pt Name: Sandie Barger  MRN: [id number]  Chip 1965  Date of evaluation: 11/23/20  PCP:  Rodney Patterson, KEANU Berg [id number]       Chief Complaint   Patient presents with    Fall     fall down approx 7 steps yesterday, landed with her right leg bent under her buttocks    Knee Pain     right knee    Ankle Pain     left ankle       HISTORY OFPRESENT ILLNESS  (Location/Symptom, Timing/Onset, Context/Setting, Quality, Duration, Modifying Factors,Severity.)      Sandie Barger is a 60 y. o.yo female who presents with R knee pain 2/2 fall. Patient fell 7 steps, slid down stairs yesterday landing on her right leg, no head injuries, no loss of loss of consciousness, no blood thinners or direct trauma to the head. Denies shortness of breath or chest pain, states her right knee hurts and also her left ankle hurts, states she might have sprained it. Denies any visual signs of trauma besides an abrasion to the right knee. States that it has changed her gait slightly, able to ambulate. PAST MEDICAL / SURGICAL / SOCIAL / FAMILY HISTORY      has a past medical history of Arthritis, Asthma, Borderline diabetes, Borderline personality disorder ([address] CHF (congestive heart failure) ([address] COPD (chronic obstructive pulmonary disease) ([address] Fibromyalgia, Headache, Hypertension, Manic depression ([address] Movement disorder, Neck fracture ([address] Pernicious anemia, Seizure ([address] and Thyroid disease. has a past surgical history that includes knee surgery (Bilateral); partial hysterectomy (cervix not removed); lymph node dissection; Irrigation and debridement (Right, 5/17/2019); EXPLORATION OF WOUND OF EXTREMITY (Right, 5/19/2019); and EXPLORATION OF WOUND OF EXTREMITY (N/A, 5/22/2019).      Social History     Socioeconomic History    Marital status:      Spouse name: Not on file    Number of children: Not on file    Years of education: Not on file    Highest education level: Not on file   Occupational History    Not on file   Social Needs    Financial resource strain: Not on file    Food insecurity     Worry: Not on file     Inability: Not on file    Transportation needs     Medical: Not on file     Non-medical: Not on file   Tobacco Use    Smoking status: Current Every Day Smoker     Packs/day: 0.50     Years: 12.00     Pack years: 6.00    Smokeless tobacco: Never Used   Substance and Sexual Activity    Alcohol use: No    Drug use: Not Currently     Comment: Has not use Heroin since 2/2020    Sexual activity: Not Currently   Lifestyle    Physical activity     Days per week: Not on file     Minutes per session: Not on file    Stress: Not on file   Relationships    Social connections     Talks on phone: Not on file     Gets together: Not on file     Attends Spiritism service: Not on file     Active member of club or organization: Not on file     Attends meetings of clubs or organizations: Not on file     Relationship status: Not on file    Intimate partner violence     Fear of current or ex partner: Not on file     Emotionally abused: Not on file     Physically abused: Not on file     Forced sexual activity: Not on file   Other Topics Concern    Not on file   Social History Narrative    Not on file       History reviewed. No pertinent family history. Allergies:  Imitrex [sumatriptan]; Bee pollen; Bee venom; Bromide ion [bromine]; Ketorolac; Potassium bromide; Reglan [metoclopramide]; Sulfa antibiotics; Sulfadiazine; and Tramadol    Home Medications:  Prior to Admission medications    Medication Sig Start Date End Date Taking?  Authorizing Provider   acetaminophen (TYLENOL) 500 MG tablet Take 1-2 tablets by mouth every 8 hours as needed for Pain 11/18/20 12/18/20  KEANU Wellington - CNP   butalbital-acetaminophen-caffeine (FIORICET, ESGIC) -40 MG per tablet TAKE 1 TABLET BY KEANU George CNP   Respiratory Therapy Supplies (NEBULIZER/TUBING/MOUTHPIECE) KIT Use daily 10/9/20   KEANU Cardenas CNP   Nebulizers (NEBULIZER COMPRESSOR) MISC Daily as needed 10/9/20   KEANU Cardenas CNP   Elastic Bandages & Supports (CARPAL TUNNEL WRIST STABILIZER) [address] [facility] Apply to each wrist at bedtime 10/9/20   KEANU Cardenas CNP   divalproex (DEPAKOTE) 500 MG DR tablet Take 2 tablets by mouth nightly  Patient not taking: Reported on 11/5/2020 9/21/20   Kiersten Saenz MD   divalproex (DEPAKOTE) 500 MG DR tablet Take 1 tablet by mouth daily  Patient not taking: Reported on 11/5/2020 9/21/20   Kiersten Saenz MD   methyl salicylate-menthol (PAULETTE BRAY GREASELESS) 10-15 % CREA Apply topically 3 times daily as needed for Pain 9/21/20   Kiersten Saenz MD   nicotine polacrilex (NICORETTE) 4 MG gum Take 1 each by mouth as needed for Smoking cessation 9/21/20   Kiersten Saenz MD   albuterol (PROVENTIL) (2.5 MG/3ML) 0.083% nebulizer solution Take 3 mLs by nebulization every 6 hours as needed for Wheezing  Patient not taking: Reported on 10/9/2020 6/18/20   KEANU Cardenas CNP   albuterol sulfate  (90 Base) MCG/ACT inhaler Inhale 1 puff into the lungs every 6 hours as needed for Wheezing Gap prescription until follow up with primary. Do not refill. Follow up with primary 4/14/20   Marjorie Hollins DO   ibuprofen (ADVIL;MOTRIN) 800 MG tablet Take 1 tablet by mouth every 8 hours as needed for Pain 3/5/20   Jackie Ivey MD   ipratropium-albuterol (DUONEB) 0.5-2.5 (3) MG/3ML SOLN nebulizer solution Inhale 3 mLs into the lungs every 4 hours  Patient not taking: Reported on 10/9/2020 6/16/19   Gregoria Peterson MD       REVIEW OFSYSTEMS    (2-9 systems for level 4, 10 or more for level 5)      Review of Systems   Respiratory: Negative for shortness of breath. Genitourinary: Negative for flank pain. Musculoskeletal: Positive for joint swelling. Negative for back pain. Skin: Negative for wound. Neurological: Negative for speech difficulty, light-headedness and headaches. PHYSICAL EXAM   (up to 7 for level 4, 8 or more forlevel 5)      ED TRIAGE VITALS BP: 139/86, Temp: 97.5 °F (36.4 °C), Pulse: 82, Resp: 14, SpO2: 99 %    Vitals:    11/23/20 0656 11/23/20 0704   BP:  139/86   Pulse:  82   Resp:  14   Temp: 97.5 °F (36.4 °C)    TempSrc: Temporal    SpO2:  99%       Physical Exam  HENT:      Head: Normocephalic. Nose: Nose normal.      Mouth/Throat:      Mouth: Mucous membranes are moist.   Eyes:      Extraocular Movements: Extraocular movements intact. Neck:      Musculoskeletal: Normal range of motion. Cardiovascular:      Rate and Rhythm: Normal rate. Pulmonary:      Effort: Pulmonary effort is normal.   Abdominal:      Palpations: Abdomen is soft. Musculoskeletal:         General: Swelling and tenderness present. Right knee: She exhibits swelling. Tenderness found. Left ankle: Tenderness. Neurological:      Mental Status: She is alert. DIFFERENTIAL  DIAGNOSIS     PLAN (LABS / IMAGING / EKG):  Orders Placed This Encounter   Procedures    XR KNEE RIGHT (3 VIEWS)    XR ANKLE LEFT (MIN 3 VIEWS)       MEDICATIONS ORDERED:  Orders Placed This Encounter   Medications    acetaminophen (TYLENOL) tablet 1,000 mg    cyclobenzaprine (FLEXERIL) tablet 5 mg       DDX:     Traumatic injuries, fusion, arthritis, gout, ligament tear    Initial MDM/Plan: 60 y.o. female who presents with R knee pain. Patient fell 7 steps, slid down stairs yesterday landing on her right leg, no head injuries, no loss of loss of consciousness, no blood thinners or direct trauma to the head. Denies shortness of breath or chest pain, states her right knee hurts and also her left ankle hurts, states she might have sprained it. Denies any visual signs of trauma besides an abrasion to the right knee.   States that it has changed her gait slightly, able to ambulate. DIAGNOSTIC RESULTS / EMERGENCYDEPARTMENT COURSE / [facility]     LABS:  No results found for this visit on 11/23/20. RADIOLOGY:  XR KNEE RIGHT (3 VIEWS)   Final Result   Trace effusion without acute osseous abnormality. XR ANKLE LEFT (MIN 3 VIEWS)   Final Result   Unremarkable three view left ankle series               EMERGENCY DEPARTMENT COURSE:  ED Course as of Nov 23 0908   Mon Nov 23, 2020   7147 Patient seen and assessed in the emergency department no acute respiratory or cardiovascular distress. Patient fell 7 steps, slid down stairs yesterday landing on her right leg, no head injuries, no loss of loss of consciousness, no blood thinners or direct trauma to the head. Denies shortness of breath or chest pain, states her right knee hurts and also her left ankle hurts, states she might have sprained it. Denies any visual signs of trauma besides an abrasion to the right knee. States that it has changed her gait slightly, able to ambulate.    [PS]      ED Course User Index  [PS] Christiano Blake MD          PROCEDURES:  None    CONSULTS:  None    CRITICAL CARE:  Please see attending note    FINAL IMPRESSION      1. Fall, initial encounter    2.  Acute pain of right knee          DISPOSITION / PLAN     DISPOSITION Discharge - Pending Orders Complete 11/23/2020 08:38:52 AM       PATIENT REFERRED TO:  Racheltawana Chand, APRN - CNP  [address]    In 1 week      [facility]  [address] [place] [address]  [phone]    As needed, If symptoms worsen      DISCHARGE MEDICATIONS:  New Prescriptions    No medications on file       Christiano Blake MD  Emergency Medicine Resident    (Please note that portions of this note were completed with a voice recognition program.Efforts were made to edit the dictations but occasionally words are mis-transcribed.)     Christiano Blake MD  Resident  11/23/20 5807

## 2020-11-26 ENCOUNTER — HOSPITAL ENCOUNTER (EMERGENCY)
Age: 55
Discharge: HOME | End: 2020-11-26
Attending: EMERGENCY MEDICINE
Payer: MEDICARE

## 2020-11-26 ENCOUNTER — APPOINTMENT (OUTPATIENT)
Dept: GENERAL RADIOLOGY | Age: 55
End: 2020-11-26
Payer: MEDICARE

## 2020-11-26 VITALS
SYSTOLIC BLOOD PRESSURE: 143 MMHG | HEART RATE: 92 BPM | RESPIRATION RATE: 16 BRPM | TEMPERATURE: 97.3 F | DIASTOLIC BLOOD PRESSURE: 85 MMHG | BODY MASS INDEX: 33.13 KG/M2 | OXYGEN SATURATION: 96 % | HEIGHT: 62 IN | WEIGHT: 180 LBS

## 2020-11-26 PROCEDURE — 99284 EMERGENCY DEPT VISIT MOD MDM: CPT

## 2020-11-26 PROCEDURE — 6370000000 HC RX 637 (ALT 250 FOR IP): Performed by: STUDENT IN AN ORGANIZED HEALTH CARE EDUCATION/TRAINING PROGRAM

## 2020-11-26 PROCEDURE — 73564 X-RAY EXAM KNEE 4 OR MORE: CPT

## 2020-11-26 RX ORDER — HYDROCODONE BITARTRATE AND ACETAMINOPHEN 5; 325 MG/1; MG/1
1 TABLET ORAL ONCE
Status: DISCONTINUED | OUTPATIENT
Start: 2020-11-26 | End: 2020-11-26 | Stop reason: HOSPADM

## 2020-11-26 RX ORDER — IBUPROFEN 800 MG/1
800 TABLET ORAL ONCE
Status: COMPLETED | OUTPATIENT
Start: 2020-11-26 | End: 2020-11-26

## 2020-11-26 RX ORDER — IBUPROFEN 800 MG/1
800 TABLET ORAL EVERY 8 HOURS PRN
Qty: 30 TABLET | Refills: 0 | Status: SHIPPED | OUTPATIENT
Start: 2020-11-26 | End: 2020-12-04 | Stop reason: SDUPTHER

## 2020-11-26 RX ORDER — HYDROCODONE BITARTRATE AND ACETAMINOPHEN 5; 325 MG/1; MG/1
1 TABLET ORAL EVERY 8 HOURS PRN
Qty: 4 TABLET | Refills: 0 | Status: SHIPPED | OUTPATIENT
Start: 2020-11-26 | End: 2020-11-28 | Stop reason: ALTCHOICE

## 2020-11-26 RX ADMIN — IBUPROFEN 800 MG: 800 TABLET, FILM COATED ORAL at 18:04

## 2020-11-26 ASSESSMENT — PAIN SCALES - GENERAL: PAINLEVEL_OUTOF10: 6

## 2020-11-26 ASSESSMENT — PAIN DESCRIPTION - FREQUENCY: FREQUENCY: CONTINUOUS

## 2020-11-26 ASSESSMENT — PAIN DESCRIPTION - DESCRIPTORS: DESCRIPTORS: STABBING;SHARP

## 2020-11-26 ASSESSMENT — PAIN DESCRIPTION - LOCATION: LOCATION: KNEE

## 2020-11-26 ASSESSMENT — PAIN DESCRIPTION - ORIENTATION: ORIENTATION: RIGHT

## 2020-11-26 NOTE — ED NOTES
Pt arrived to ED with c/o right knee pain x 5 days, pt reports she fell down stairs, pt states she was seen 5 days ago but states pain has been constant, Pt A&Ox4, RR even/unlabored, NAD noted. call light within reach, will cont to monitor.        Anand Ackerman RN  11/26/20 8512

## 2020-11-26 NOTE — ED PROVIDER NOTES
[address] [facility]  ED  Emergency Department Encounter  Emergency Medicine Resident     Pt Name: Ryan Hinkle  [facility]:[id number]  [place] 1965  Date of evaluation: 11/26/20  PCP:  KEANU Farmer [id number]       Chief Complaint   Patient presents with    Knee Pain       HISTORY OF PRESENT ILLNESS  (Location/Symptom, Timing/Onset, Context/Setting, Quality, Duration, ModifyingFactors, Severity.)      Ryan Hinkle is a 60 y.o. female presents with right knee pain started several days ago after she tripped on the steps and hyperflexing her knee. Was seen here and an x-ray did not show any fracture. She states pain is worsening, and admits to pain over the patellar portion of her knee. Has had previous surgeries where they have smooth out the posterior surface of her patella. She has been using an Ace wrap, and taking ibuprofen but ran out of it 2 days ago. Still able to ambulate on it, no fevers, significant joint swelling, rash, numbness or weakness, ankle, foot, hip pain, back pain. PAST MEDICAL / SURGICAL / SOCIAL /FAMILY HISTORY      has a past medical history of Arthritis, Asthma, Borderline diabetes, Borderline personality disorder ([address] CHF (congestive heart failure) ([address] COPD (chronic obstructive pulmonary disease) ([address] Fibromyalgia, Headache, Hypertension, Manic depression ([address] Movement disorder, Neck fracture ([address] Pernicious anemia, Seizure ([address] and Thyroid disease. No other pertinent PMH on review with patient/guardian. has a past surgical history that includes knee surgery (Bilateral); partial hysterectomy (cervix not removed); lymph node dissection; Irrigation and debridement (Right, 5/17/2019); EXPLORATION OF WOUND OF EXTREMITY (Right, 5/19/2019); and EXPLORATION OF WOUND OF EXTREMITY (N/A, 5/22/2019). No other pertinent PSH on review with patient/guardian.   Social History     Socioeconomic History    Marital status:      Spouse as needed for Pain for up to 2 days. Intended supply: 3 days. Take lowest dose possible to manage pain 11/26/20 11/28/20 Yes Nishant Krishnamurthy,    ibuprofen (ADVIL;MOTRIN) 800 MG tablet Take 1 tablet by mouth every 8 hours as needed for Pain 11/26/20  Yes Nishant Krishnamurthy, DO   acetaminophen (TYLENOL) 500 MG tablet Take 1-2 tablets by mouth every 8 hours as needed for Pain 11/18/20 12/18/20  KEANU Young CNP   butalbital-acetaminophen-caffeine (FIORICET, ESGIC) -40 MG per tablet TAKE 1 TABLET BY MOUTH EVERY 4 HOURS AS NEEDED FOR HEADACHE 11/18/20   KEANU Young CNP   gabapentin (NEURONTIN) 300 MG capsule Take 2 capsules by mouth 3 times daily for 30 days.  11/18/20 12/18/20  KEANU Young CNP   lisinopril (PRINIVIL;ZESTRIL) 20 MG tablet Take 1 tablet by mouth daily 11/5/20 12/5/20  KEANU Young CNP   famotidine (PEPCID) 20 MG tablet Take 1 tablet by mouth 2 times daily as needed (for acid reflux) 10/26/20   KEANU Young CNP   clomiPRAMINE (ANAFRANIL) 50 MG capsule Take 1 capsule by mouth nightly 10/26/20   KEANU Young CNP   ondansetron (ZOFRAN ODT) 4 MG disintegrating tablet Take 1 tablet by mouth every 8 hours as needed for Nausea 10/26/20   KEANU Young CNP   risperiDONE (RISPERDAL) 1 MG tablet Take 1 tablet by mouth daily and 2 tablets by mouth at bedtime 10/22/20   KEANU Young CNP   traZODone (DESYREL) 150 MG tablet Take 1 tablet by mouth nightly 10/22/20   KEANU Young CNP   cloNIDine (CATAPRES) 0.1 MG tablet Take 1 tablet by mouth nightly 10/22/20   KEANU Young CNP   Benzocaine-Menthol (CEPACOL) 6-10 MG LOZG lozenge Take 1 lozenge by mouth every 2 hours as needed for Sore Throat 10/22/20   KEANU Young CNP   aspirin EC 81 MG EC tablet Take 1 tablet by mouth daily 10/22/20   KEANU Young CNP   cetirizine (ZYRTEC) 10 MG tablet Take 1 tablet by mouth daily 10/18/20   Jesus Rodriguez MD   budesonide-formoterol (SYMBICORT) 160-4.5 MCG/ACT AERO Inhale 2 puffs into the lungs 2 times daily 10/9/20   KEANU Caballero CNP   levothyroxine (SYNTHROID) 100 MCG tablet Take 1 tablet by mouth Daily 10/9/20   KEANU Caballero CNP   tiZANidine (ZANAFLEX) 2 MG tablet Take 2 tablets by mouth every 8 hours as needed (muscle spasm, neck pain) 10/9/20   KEANU Caballero CNP   Respiratory Therapy Supplies (NEBULIZER/TUBING/MOUTHPIECE) KIT Use daily 10/9/20   KEANU Caballero CNP   Nebulizers (NEBULIZER COMPRESSOR) MISC Daily as needed 10/9/20   KEANU Caballero CNP   Elastic Bandages & Supports (CARPAL TUNNEL WRIST STABILIZER) [facility] Apply to each wrist at bedtime 10/9/20   KEANU Caballero CNP   divalproex (DEPAKOTE) 500 MG DR tablet Take 2 tablets by mouth nightly  Patient not taking: Reported on 11/5/2020 9/21/20   Janet Bowers MD   divalproex (DEPAKOTE) 500 MG DR tablet Take 1 tablet by mouth daily  Patient not taking: Reported on 11/5/2020 9/21/20   Janet Bowers MD   methyl salicylate-menthol (PAULETTE BRAY GREASELESS) 10-15 % CREA Apply topically 3 times daily as needed for Pain 9/21/20   Janet Bowers MD   nicotine polacrilex (NICORETTE) 4 MG gum Take 1 each by mouth as needed for Smoking cessation 9/21/20   Janet Bowers MD   albuterol (PROVENTIL) (2.5 MG/3ML) 0.083% nebulizer solution Take 3 mLs by nebulization every 6 hours as needed for Wheezing  Patient not taking: Reported on 10/9/2020 6/18/20   KEANU Caballero CNP   albuterol sulfate  (90 Base) MCG/ACT inhaler Inhale 1 puff into the lungs every 6 hours as needed for Wheezing Gap prescription until follow up with primary. Do not refill.   Follow up with primary 4/14/20   Millie Ag DO   ipratropium-albuterol (DUONEB) 0.5-2.5 (3) MG/3ML SOLN nebulizer solution Inhale 3 mLs into the lungs every 4 hours  Patient not taking: Reported on 10/9/2020 6/16/19   Nivia Staley MD REVIEW OF SYSTEMS    (2-9 systems for level 4, 10 ormore for level 5)      Review of Systems   Constitutional: Negative for chills and fever. HENT: Negative for sore throat. Eyes: Negative for pain and visual disturbance. Respiratory: Negative for shortness of breath. Cardiovascular: Negative for chest pain. Gastrointestinal: Negative for abdominal pain, nausea and vomiting. Genitourinary: Negative for dysuria and hematuria. Musculoskeletal: Positive for arthralgias (Right knee pain). Negative for back pain and neck pain. Skin: Negative for rash. Neurological: Negative for light-headedness and headaches. PHYSICAL EXAM   (up to 7 for level 4, 8 or more for level 5)      INITIAL VITALS:   BP (!) 143/85   Pulse 92   Temp 97.3 °F (36.3 °C) (Skin)   Resp 16   Ht 5' 2\" (1.575 m)   Wt 180 lb (81.6 kg)   SpO2 96%   BMI 32.92 kg/m²     Physical Exam  Constitutional:       General: She is not in acute distress. Appearance: She is well-developed. HENT:      Head: Normocephalic and atraumatic. Mouth/Throat:      Pharynx: No oropharyngeal exudate. Eyes:      General:         Right eye: No discharge. Left eye: No discharge. Conjunctiva/sclera: Conjunctivae normal.   Neck:      Musculoskeletal: Normal range of motion and neck supple. Cardiovascular:      Rate and Rhythm: Normal rate and regular rhythm. Heart sounds: Normal heart sounds. No murmur. No friction rub. No gallop. Pulmonary:      Effort: Pulmonary effort is normal.      Breath sounds: Normal breath sounds. No wheezing or rales. Abdominal:      Palpations: Abdomen is soft. Tenderness: There is no abdominal tenderness. There is no guarding or rebound. Musculoskeletal: Normal range of motion. General: Tenderness (Tenderness to superior proximal portion of the patella, no obvious step-offs) present. No swelling, deformity or signs of injury. Right lower leg: No edema.       Left lower leg: No edema. Comments: Ligaments intact to bilateral knees with equal and normal varus and valgus stressing, normal Lachman's test, normal anterior and posterior drawer, no tenderness to bilateral ankles or feet or hips, sensation intact bilateral lower extremities, 2+ DP PT pulses bilaterally, no signs of Baker's cyst, normal patellar tracking, no significant joint effusion   Skin:     General: Skin is warm and dry. Findings: No rash. Neurological:      Mental Status: She is alert and oriented to person, place, and time. DIFFERENTIAL  DIAGNOSIS     PLAN (LABS / IMAGING / EKG):  Orders Placed This Encounter   Procedures    XR KNEE RIGHT (MIN 4 VIEWS)       MEDICATIONS ORDERED:  Orders Placed This Encounter   Medications    ibuprofen (ADVIL;MOTRIN) tablet 800 mg    DISCONTD: HYDROcodone-acetaminophen (NORCO) 5-325 MG per tablet 1 tablet    HYDROcodone-acetaminophen (NORCO) 5-325 MG per tablet     Sig: Take 1 tablet by mouth every 8 hours as needed for Pain for up to 2 days. Intended supply: 3 days. Take lowest dose possible to manage pain     Dispense:  4 tablet     Refill:  0    ibuprofen (ADVIL;MOTRIN) 800 MG tablet     Sig: Take 1 tablet by mouth every 8 hours as needed for Pain     Dispense:  30 tablet     Refill:  0           DIAGNOSTIC RESULTS / EMERGENCY DEPARTMENT COURSE / MDM     LABS:  No results found for this visit on 11/26/20. IMPRESSION/MDM/ED COURSE:  60 y.o. female presented with right knee pain after falling down the steps and hyperflexing it several days ago. Was seen here negative x-ray. Previous surgery to the patella remotely, pain is of the superior portion of her patella, has been using Ace wrap and ibuprofen during ibuprofen. On exam, vital signs normal, patient with confusion, reproducible tenderness, worse with motion, no tenderness to hip or feet or ankles.   Ligaments intact to bilateral knees, no signs of Baker's cyst.  Repeat x-ray including

## 2020-11-28 ENCOUNTER — TELEPHONE (OUTPATIENT)
Dept: PRIMARY CARE CLINIC | Age: 55
End: 2020-11-28

## 2020-11-28 ENCOUNTER — OFFICE VISIT (OUTPATIENT)
Dept: PRIMARY CARE CLINIC | Age: 55
End: 2020-11-28
Payer: MEDICARE

## 2020-11-28 VITALS
TEMPERATURE: 98.4 F | SYSTOLIC BLOOD PRESSURE: 131 MMHG | DIASTOLIC BLOOD PRESSURE: 85 MMHG | HEIGHT: 62 IN | HEART RATE: 92 BPM | WEIGHT: 180 LBS | BODY MASS INDEX: 33.13 KG/M2 | OXYGEN SATURATION: 98 %

## 2020-11-28 PROCEDURE — 4004F PT TOBACCO SCREEN RCVD TLK: CPT | Performed by: FAMILY MEDICINE

## 2020-11-28 PROCEDURE — G8482 FLU IMMUNIZE ORDER/ADMIN: HCPCS | Performed by: FAMILY MEDICINE

## 2020-11-28 PROCEDURE — G8427 DOCREV CUR MEDS BY ELIG CLIN: HCPCS | Performed by: FAMILY MEDICINE

## 2020-11-28 PROCEDURE — 99213 OFFICE O/P EST LOW 20 MIN: CPT | Performed by: FAMILY MEDICINE

## 2020-11-28 PROCEDURE — 3017F COLORECTAL CA SCREEN DOC REV: CPT | Performed by: FAMILY MEDICINE

## 2020-11-28 PROCEDURE — G8417 CALC BMI ABV UP PARAM F/U: HCPCS | Performed by: FAMILY MEDICINE

## 2020-11-28 RX ORDER — CYCLOBENZAPRINE HCL 5 MG
5 TABLET ORAL 2 TIMES DAILY PRN
Qty: 10 TABLET | Refills: 0 | Status: SHIPPED | OUTPATIENT
Start: 2020-11-28 | End: 2020-12-04 | Stop reason: SDUPTHER

## 2020-11-28 RX ORDER — CYCLOBENZAPRINE HCL 5 MG
5 TABLET ORAL 2 TIMES DAILY PRN
Qty: 10 TABLET | Refills: 0 | Status: SHIPPED | OUTPATIENT
Start: 2020-11-28 | End: 2020-11-28

## 2020-11-28 RX ORDER — HYDROCODONE BITARTRATE AND ACETAMINOPHEN 5; 325 MG/1; MG/1
1 TABLET ORAL EVERY 8 HOURS PRN
Qty: 9 TABLET | Refills: 0 | Status: SHIPPED | OUTPATIENT
Start: 2020-11-28 | End: 2020-12-01

## 2020-11-28 ASSESSMENT — ENCOUNTER SYMPTOMS
ABDOMINAL PAIN: 0
SHORTNESS OF BREATH: 0
BACK PAIN: 0
SHORTNESS OF BREATH: 0
SORE THROAT: 0
EYE PAIN: 0
VOMITING: 0
NAUSEA: 0
BACK PAIN: 0

## 2020-11-28 ASSESSMENT — PATIENT HEALTH QUESTIONNAIRE - PHQ9
1. LITTLE INTEREST OR PLEASURE IN DOING THINGS: 0
SUM OF ALL RESPONSES TO PHQ QUESTIONS 1-9: 0
SUM OF ALL RESPONSES TO PHQ9 QUESTIONS 1 & 2: 0
2. FEELING DOWN, DEPRESSED OR HOPELESS: 0
SUM OF ALL RESPONSES TO PHQ QUESTIONS 1-9: 0
SUM OF ALL RESPONSES TO PHQ QUESTIONS 1-9: 0

## 2020-11-28 NOTE — PROGRESS NOTES
Helena Muniz MD  [facility]  [address] [place] [address]  Dept: [phone]    Angelica Clements is a 60 y.o. female who presents today for her medical conditions/complaints as noted below.   Angelica Clements is here today c/o Ankle Pain (here for fell down left ankle pain and right knee pain) and Knee Pain       HPI:     HPI    Patient presents to the walk-in clinic following a fall that occurred 11/23 wherein she fell down 5 steps at home and her R knee became hyperflexed, she landed on both her legs and felt like she twisted her L ankle, she has been to the ER twice for this, right knee x-ray and left ankle x-ray did not show any fracture  She endorses pain at the left ankle and right knee and right lateral calf, denies any swelling or erythema or numbness or tingling, no new pain in the R hip or R ankle (has chronic R hip discomfort following an episode of necrotizing fasciitis there years ago)  Was given Motrin and Norco in the ER, these helped but she ran out of the Norco and her pain is now back to 10 out of 10, she continues to use the Motrin with some mild relief of her pain  Lives in a second story room, she is getting up the stairs by sliding up with her buttock, currently ambulating without crutches, continues to use Ace wrap at her right knee and left ankle   She has peripheral neuropathy, on gabapentin and Zanaflex, she does not feel the Zanaflex does much for her  Past history of opioid abuse, no longer on Suboxone, she denies any recreational drug use at this time  Past history of knee surgeries, she has not had any injections in the knees in decades    Patient Active Problem List   Diagnosis    Chest pain    Migraine variant with headache    Drug overdose, accidental or unintentional, initial encounter    Hypothyroidism    Essential hypertension    [facility] disorder ([facility]    Drug overdose    History of seizure    Major depressive disorder with psychotic features ([address]    Severe major depression ([address]    Overdose of barbiturate, intentional self-harm, initial encounter ([address]    Intentional phenobarbital overdose ([address]    Severe episode of recurrent major depressive disorder, without psychotic features ([address]    Suicide attempt ([address]    Major depressive disorder, recurrent ([address]    Sepsis ([address]    Severe malnutrition ([address]    Altered mental status    Hypokalemia    Congestive heart failure of unknown etiology ([address]    Lumbar radiculopathy    Chronic low back pain    Piriformis syndrome    COPD (chronic obstructive pulmonary disease) ([facility]    Major depressive disorder, single episode, unspecified    Severe depressed bipolar I disorder without psychotic features ([address]    Polysubstance abuse ([address]       Past Medical History:   Diagnosis Date    Arthritis     Asthma     Borderline diabetes     Borderline personality disorder ([address]     CHF (congestive heart failure) ([facility])     COPD (chronic obstructive pulmonary disease) ([facility])     Fibromyalgia     Headache     Hypertension     Manic depression ([address]     Movement disorder     Neck fracture ([address]     Pernicious anemia     Seizure ([address]     Thyroid disease      Past Surgical History:   Procedure Laterality Date    EXPLORATION OF WOUND OF EXTREMITY Right 5/19/2019    RIGHT THIGH WOUND WASHOUT WITH [facility] WEST-ER PLACEMENT performed by Ryan Mora MD at [facility] N/A 5/22/2019    RIGHT WOUND HIP EXAMINATION LAVAGE AND WOUND VAC PLACEMENT performed by Freddie Bautista MD at [address] [place] [address] Right 5/17/2019    IRRIGATION, DEBRIDEMENT RIGHT THIGH performed by Sebas Hernandez MD at [place] [address] Bilateral     LYMPH NODE DISSECTION      cervical    PARTIAL HYSTERECTOMY       No family history on file.   Social History     Tobacco Use    Smoking status: Current Every Day Smoker     Packs/day: 0.50     Years: 12.00 Pack years: 6.00    Smokeless tobacco: Never Used   Substance Use Topics    Alcohol use: No    Drug use: Not Currently     Comment: Has not use Heroin since 2/2020       Current Outpatient Medications:     cyclobenzaprine (FLEXERIL) 5 MG tablet, Take 1 tablet by mouth 2 times daily as needed for Muscle spasms Caution: may cause sedation. Do not take with flexeril., Disp: 10 tablet, Rfl: 0    Misc. Devices (CANE) MISC, Quad base cane, Disp: 1 each, Rfl: 0    HYDROcodone-acetaminophen (NORCO) 5-325 MG per tablet, Take 1 tablet by mouth every 8 hours as needed for Pain for up to 3 days. Intended supply: 3 days. Take lowest dose possible to manage pain, Disp: 9 tablet, Rfl: 0    ibuprofen (ADVIL;MOTRIN) 800 MG tablet, Take 1 tablet by mouth every 8 hours as needed for Pain, Disp: 30 tablet, Rfl: 0    acetaminophen (TYLENOL) 500 MG tablet, Take 1-2 tablets by mouth every 8 hours as needed for Pain, Disp: 180 tablet, Rfl: 3    butalbital-acetaminophen-caffeine (FIORICET, ESGIC) -40 MG per tablet, TAKE 1 TABLET BY MOUTH EVERY 4 HOURS AS NEEDED FOR HEADACHE, Disp: 20 tablet, Rfl: 2    gabapentin (NEURONTIN) 300 MG capsule, Take 2 capsules by mouth 3 times daily for 30 days. , Disp: 180 capsule, Rfl: 2    lisinopril (PRINIVIL;ZESTRIL) 20 MG tablet, Take 1 tablet by mouth daily, Disp: 30 tablet, Rfl: 1    famotidine (PEPCID) 20 MG tablet, Take 1 tablet by mouth 2 times daily as needed (for acid reflux), Disp: 60 tablet, Rfl: 1    clomiPRAMINE (ANAFRANIL) 50 MG capsule, Take 1 capsule by mouth nightly, Disp: 30 capsule, Rfl: 0    ondansetron (ZOFRAN ODT) 4 MG disintegrating tablet, Take 1 tablet by mouth every 8 hours as needed for Nausea, Disp: 20 tablet, Rfl: 0    risperiDONE (RISPERDAL) 1 MG tablet, Take 1 tablet by mouth daily and 2 tablets by mouth at bedtime, Disp: 90 tablet, Rfl: 0    traZODone (DESYREL) 150 MG tablet, Take 1 tablet by mouth nightly, Disp: 30 tablet, Rfl: 0    cloNIDine (CATAPRES) 0.1 MG tablet, Take 1 tablet by mouth nightly, Disp: 30 tablet, Rfl: 0    Benzocaine-Menthol (CEPACOL) 6-10 MG LOZG lozenge, Take 1 lozenge by mouth every 2 hours as needed for Sore Throat, Disp: 60 lozenge, Rfl: 0    aspirin EC 81 MG EC tablet, Take 1 tablet by mouth daily, Disp: 90 tablet, Rfl: 1    cetirizine (ZYRTEC) 10 MG tablet, Take 1 tablet by mouth daily, Disp: 30 tablet, Rfl: 0    budesonide-formoterol (SYMBICORT) 160-4.5 MCG/ACT AERO, Inhale 2 puffs into the lungs 2 times daily, Disp: 1 Inhaler, Rfl: 5    levothyroxine (SYNTHROID) 100 MCG tablet, Take 1 tablet by mouth Daily, Disp: 30 tablet, Rfl: 3    tiZANidine (ZANAFLEX) 2 MG tablet, Take 2 tablets by mouth every 8 hours as needed (muscle spasm, neck pain), Disp: 90 tablet, Rfl: 2    Respiratory Therapy Supplies (NEBULIZER/TUBING/MOUTHPIECE) KIT, Use daily, Disp: 1 kit, Rfl: 0    Nebulizers (NEBULIZER COMPRESSOR) MISC, Daily as needed, Disp: 1 each, Rfl: 0    Elastic Bandages & Supports (CARPAL TUNNEL WRIST STABILIZER) MISC, Apply to each wrist at bedtime, Disp: 2 each, Rfl: 0    divalproex (DEPAKOTE) 500 MG DR tablet, Take 2 tablets by mouth nightly (Patient not taking: Reported on 11/5/2020), Disp: 90 tablet, Rfl: 3    divalproex (DEPAKOTE) 500 MG DR tablet, Take 1 tablet by mouth daily (Patient not taking: Reported on 11/5/2020), Disp: 90 tablet, Rfl: 3    methyl salicylate-menthol (PAULETTE BRAY GREASELESS) 10-15 % CREA, Apply topically 3 times daily as needed for Pain, Disp: 1 Bottle, Rfl: 0    nicotine polacrilex (NICORETTE) 4 MG gum, Take 1 each by mouth as needed for Smoking cessation, Disp: 110 each, Rfl: 3    albuterol (PROVENTIL) (2.5 MG/3ML) 0.083% nebulizer solution, Take 3 mLs by nebulization every 6 hours as needed for Wheezing (Patient not taking: Reported on 10/9/2020), Disp: 120 mL, Rfl: 3    albuterol sulfate  (90 Base) MCG/ACT inhaler, Inhale 1 puff into the lungs every 6 hours as needed for Wheezing [place] prescription until follow up with primary. Do not refill. Follow up with primary, Disp: 1 Inhaler, Rfl: 3    ipratropium-albuterol (DUONEB) 0.5-2.5 (3) MG/3ML SOLN nebulizer solution, Inhale 3 mLs into the lungs every 4 hours (Patient not taking: Reported on 10/9/2020), Disp: 360 mL, Rfl: 1    Subjective:     Review of Systems   Constitutional: Negative for fever. Respiratory: Negative for shortness of breath. Cardiovascular: Negative for chest pain and leg swelling. Musculoskeletal: Positive for arthralgias and gait problem. Negative for back pain and neck stiffness. Skin: Negative for rash. Psychiatric/Behavioral: Positive for sleep disturbance. Negative for suicidal ideas. Objective:     /85 (Site: Right Upper Arm, Position: Sitting, Cuff Size: Large Adult)   Pulse 92   Temp 98.4 °F (36.9 °C) (Oral)   Ht 5' 2\" (1.575 m)   Wt 180 lb (81.6 kg)   SpO2 98%   BMI 32.92 kg/m²     Physical Exam  Constitutional:       Appearance: Normal appearance. She is well-developed. She is not ill-appearing, toxic-appearing or diaphoretic. HENT:      Head: Normocephalic. Eyes:      Conjunctiva/sclera: Conjunctivae normal.   Cardiovascular:      Rate and Rhythm: Normal rate and regular rhythm. Heart sounds: Normal heart sounds. No murmur. Pulmonary:      Effort: Pulmonary effort is normal. No respiratory distress. Breath sounds: Normal breath sounds. No wheezing. Abdominal:      General: There is no distension. Palpations: Abdomen is soft. Tenderness: There is no abdominal tenderness. There is no guarding. Musculoskeletal:      Right knee: She exhibits bony tenderness. She exhibits normal range of motion, no swelling, no deformity, no erythema and normal patellar mobility. Tenderness found. Medial joint line, lateral joint line and patellar tendon tenderness noted. Left ankle: She exhibits normal range of motion, no swelling, no ecchymosis, no deformity and normal pulse.

## 2020-12-03 ENCOUNTER — TELEPHONE (OUTPATIENT)
Dept: PRIMARY CARE CLINIC | Age: 55
End: 2020-12-03

## 2020-12-03 NOTE — TELEPHONE ENCOUNTER
Patient called in to request refills and stated she also would like to speak with Julianne Sargent about information she received from the doctor she saw for her knee injury. She can be reached at [phone].

## 2020-12-04 ENCOUNTER — TELEPHONE (OUTPATIENT)
Dept: PRIMARY CARE CLINIC | Age: 55
End: 2020-12-04

## 2020-12-04 RX ORDER — IBUPROFEN 800 MG/1
800 TABLET ORAL EVERY 8 HOURS PRN
Qty: 30 TABLET | Refills: 0 | Status: SHIPPED | OUTPATIENT
Start: 2020-12-04 | End: 2021-01-13

## 2020-12-04 NOTE — TELEPHONE ENCOUNTER
Called. Pt states she needs refills on Flexeril 5mg, Lisinopril 20mg, Ibuprofen 800mg, and Hydrocodone 325mg. Informed pt she needs to contact her pharmacy regarding refills on Lisinopril. As for the flexeril she should have enough for a few more days.  I informed her I did not see Hydrocodone on her med list.

## 2020-12-05 NOTE — TELEPHONE ENCOUNTER
Pt contacted after hours service c/o knee \"lump\". Pt was seen in ER and walk-in clinic for knee pain, fall sx. Pt states \"notice lump today and painful\". No worsening swelling in area. Pt was instructed to contact [phone] [facility] orthopedic on tomorrow for possible saturday clinic hours to be evaluated, if no answer to contact [facility] orthopedic for possible appointment at their Saturday clinic, pt voiced understanding.

## 2020-12-05 NOTE — TELEPHONE ENCOUNTER
Flexeril already filled. Pt already advised [place] from ER will not be refilled, for acute pain only first few days out of her injury. Pt has orthopedics appt on Monday for knee.

## 2020-12-15 NOTE — TELEPHONE ENCOUNTER
I cannot fill Norco.  Verify why patient missed orthopedics appointment Monday, December 7, this was the appointment she needed to verify why she was still experiencing knee pain after her fall. She is to contact orthopedics and reschedule that appointment in regards to her persistent knee pain.

## 2020-12-15 NOTE — TELEPHONE ENCOUNTER
PT would like a refill on her pain medication, she states it is \"Norco\". She would like enough to get her through until her appt on Friday. She uses [facility] pharmacy on [address] (not the one in [employer])    Please contact PT if you have any questions or concerns OR to let her know if this script was called in.

## 2020-12-15 NOTE — TELEPHONE ENCOUNTER
Called. Pt stated she wasn't feeling well for the Ortho appt. Informed pt she needs to reschedule that appt. Pt gave verbal understanding. Wants to know if you can refill Flexeril until her appt with you on 12/18?  Please advise

## 2020-12-16 RX ORDER — CYCLOBENZAPRINE HCL 5 MG
5 TABLET ORAL 2 TIMES DAILY PRN
Qty: 60 TABLET | Refills: 0 | Status: SHIPPED | OUTPATIENT
Start: 2020-12-16 | End: 2021-01-15

## 2020-12-16 NOTE — TELEPHONE ENCOUNTER
Refill sent.   Again please encourage her to call orthopedics and reschedule, I still do not see any upcoming appointments for her

## 2020-12-18 ENCOUNTER — OFFICE VISIT (OUTPATIENT)
Dept: PRIMARY CARE CLINIC | Age: 55
End: 2020-12-18
Payer: MEDICARE

## 2020-12-18 VITALS
OXYGEN SATURATION: 96 % | BODY MASS INDEX: 37.53 KG/M2 | DIASTOLIC BLOOD PRESSURE: 91 MMHG | SYSTOLIC BLOOD PRESSURE: 134 MMHG | WEIGHT: 205.2 LBS | HEART RATE: 86 BPM | TEMPERATURE: 97.3 F

## 2020-12-18 PROCEDURE — 4004F PT TOBACCO SCREEN RCVD TLK: CPT | Performed by: NURSE PRACTITIONER

## 2020-12-18 PROCEDURE — 3017F COLORECTAL CA SCREEN DOC REV: CPT | Performed by: NURSE PRACTITIONER

## 2020-12-18 PROCEDURE — 3023F SPIROM DOC REV: CPT | Performed by: NURSE PRACTITIONER

## 2020-12-18 PROCEDURE — 99214 OFFICE O/P EST MOD 30 MIN: CPT | Performed by: NURSE PRACTITIONER

## 2020-12-18 PROCEDURE — G8427 DOCREV CUR MEDS BY ELIG CLIN: HCPCS | Performed by: NURSE PRACTITIONER

## 2020-12-18 PROCEDURE — G8926 SPIRO NO PERF OR DOC: HCPCS | Performed by: NURSE PRACTITIONER

## 2020-12-18 PROCEDURE — G8417 CALC BMI ABV UP PARAM F/U: HCPCS | Performed by: NURSE PRACTITIONER

## 2020-12-18 PROCEDURE — G8482 FLU IMMUNIZE ORDER/ADMIN: HCPCS | Performed by: NURSE PRACTITIONER

## 2020-12-18 RX ORDER — BUTALBITAL, ASPIRIN, AND CAFFEINE 50; 325; 40 MG/1; MG/1; MG/1
1 CAPSULE ORAL EVERY 4 HOURS PRN
Qty: 20 CAPSULE | Refills: 0 | Status: SHIPPED | OUTPATIENT
Start: 2020-12-18 | End: 2021-01-13 | Stop reason: SDUPTHER

## 2020-12-18 RX ORDER — ALBUTEROL SULFATE 90 UG/1
1 AEROSOL, METERED RESPIRATORY (INHALATION) EVERY 6 HOURS PRN
Qty: 1 INHALER | Refills: 3 | Status: ON HOLD | OUTPATIENT
Start: 2020-12-18 | End: 2021-06-03 | Stop reason: SDUPTHER

## 2020-12-18 RX ORDER — ATOMOXETINE 18 MG/1
CAPSULE ORAL
COMMUNITY
Start: 2020-12-14 | End: 2020-12-18

## 2020-12-18 RX ORDER — HYDROXYZINE 50 MG/1
TABLET, FILM COATED ORAL
Status: ON HOLD | COMMUNITY
Start: 2020-12-14 | End: 2021-08-28 | Stop reason: ALTCHOICE

## 2020-12-18 RX ORDER — CETIRIZINE HYDROCHLORIDE 10 MG/1
10 TABLET ORAL DAILY
Qty: 30 TABLET | Refills: 3 | Status: SHIPPED | OUTPATIENT
Start: 2020-12-18 | End: 2021-03-11 | Stop reason: SDUPTHER

## 2020-12-18 RX ORDER — MENTHOL 40 MG/ML
1 GEL TOPICAL 3 TIMES DAILY PRN
Qty: 1 TUBE | Refills: 0 | Status: SHIPPED | OUTPATIENT
Start: 2020-12-18 | End: 2021-01-17

## 2020-12-18 RX ORDER — GUAIFENESIN/DEXTROMETHORPHAN 100-10MG/5
5 SYRUP ORAL 3 TIMES DAILY PRN
Qty: 120 ML | Refills: 2 | Status: SHIPPED | OUTPATIENT
Start: 2020-12-18 | End: 2020-12-28

## 2020-12-18 RX ORDER — BLOOD PRESSURE TEST KIT
KIT MISCELLANEOUS
Qty: 1 KIT | Refills: 0 | Status: ON HOLD | OUTPATIENT
Start: 2020-12-18 | End: 2021-08-28

## 2020-12-18 RX ORDER — NEBULIZER ACCESSORIES
KIT MISCELLANEOUS
Qty: 1 KIT | Refills: 2 | Status: ON HOLD | OUTPATIENT
Start: 2020-12-18 | End: 2021-06-03 | Stop reason: SDUPTHER

## 2020-12-18 ASSESSMENT — ENCOUNTER SYMPTOMS
CHEST TIGHTNESS: 0
COUGH: 1
BACK PAIN: 0
SHORTNESS OF BREATH: 0

## 2020-12-18 NOTE — PROGRESS NOTES
[facility]  [id number] [facility] [address]  Dept: [phone]  Dept Fax: [phone]    Patient Care Team:  KEANU Garces CNP as PCP - General (Family Medicine)  KEANU Gacres CNP as PCP - REHABILITATION [facility] Empaneled Provider    2020     Pam Umana (:  [id number])TU a 60 y.o. female, here for evaluation of the following medical concerns:   Chief Complaint   Patient presents with    Follow-up     2M, COPD, Pain       Patient presents following a fall that occurred  wherein she fell down 5 steps at home and her R knee became hyperflexed, she landed on both her legs and felt like she twisted her L ankle, she has been to the ER twice for this, right knee x-ray and left ankle x-ray did not show any fracture. She also reported to the walk-in clinic for further evaluation at the end of November. She was referred to orthopedics and physical therapy. Had Ortho appointment , missed appointment due to illness. Appointment currently rescheduled for . Physical therapy has not been scheduled  She endorses pain at the right knee and right anterior shin, denies any swelling or erythema or numbness or tingling, no new pain in the R hip or R ankle (has chronic R hip discomfort following an episode of necrotizing fasciitis there years ago). Some new left knee pain because she has been favoring the right  Was given Motrin and Norco in the ER, these helped but she ran out of the [address] and her pain is now back to 10 out of 10, she continues to use the Motrin with some mild relief of her pain.   Muscle relaxers also help    Past history of opioid abuse, no longer on Suboxone, she denies any recreational drug use at this time  Past history of knee surgeries, she has not had any injections in the knees in decades Respiratory Therapy Supplies (NEBULIZER/TUBING/MOUTHPIECE) KIT Daily as needed Yes Marien Homans, APRN - CNP   cetirizine (ZYRTEC) 10 MG tablet Take 1 tablet by mouth daily Yes Marien Homans, APRN - CNP   butalbital-aspirin-caffeine [facility]) -40 MG per capsule Take 1 capsule by mouth every 4 hours as needed for Headaches for up to 30 days. Yes Marien Homans, APRN - CNP   Blood Pressure KIT Daily as needed Yes Marien Homans, APRN - CNP   cyclobenzaprine (FLEXERIL) 5 MG tablet Take 1 tablet by mouth 2 times daily as needed for Muscle spasms Caution: may cause sedation. Do not take with zanaflex. Yes Marien Homans, APRN - CNP   ondansetron (ZOFRAN ODT) 4 MG disintegrating tablet Take 1 tablet by mouth every 8 hours as needed for Nausea Yes Marien Homans, APRN - CNP   ibuprofen (ADVIL;MOTRIN) 800 MG tablet Take 1 tablet by mouth every 8 hours as needed for Pain Yes Marien Homans, APRN - CNP   Misc. Devices (CANE) MISC Quad base cane Yes Codey Márquez MD   acetaminophen (TYLENOL) 500 MG tablet Take 1-2 tablets by mouth every 8 hours as needed for Pain Yes Marien Homans, APRN - CNP   gabapentin (NEURONTIN) 300 MG capsule Take 2 capsules by mouth 3 times daily for 30 days.  Yes Marien Homans, APRN - CNP   lisinopril (PRINIVIL;ZESTRIL) 20 MG tablet Take 1 tablet by mouth daily Yes Marien Homans, APRN - CNP   famotidine (PEPCID) 20 MG tablet Take 1 tablet by mouth 2 times daily as needed (for acid reflux) Yes Marien Homans, APRN - CNP   clomiPRAMINE (ANAFRANIL) 50 MG capsule Take 1 capsule by mouth nightly Yes Marien Homans, APRN - CNP   risperiDONE (RISPERDAL) 1 MG tablet Take 1 tablet by mouth daily and 2 tablets by mouth at bedtime Yes Marien Homans, APRN - CNP   traZODone (DESYREL) 150 MG tablet Take 1 tablet by mouth nightly Yes Marien Homans, APRN - CNP   cloNIDine (CATAPRES) 0.1 MG tablet Take 1 tablet by mouth nightly Yes Marien Homans, APRN - CNP Benzocaine-Menthol (CEPACOL) 6-10 MG LOZG lozenge Take 1 lozenge by mouth every 2 hours as needed for Sore Throat Yes KEANU Ann CNP   aspirin EC 81 MG EC tablet Take 1 tablet by mouth daily Yes KEANU Ann CNP   budesonide-formoterol (SYMBICORT) 160-4.5 MCG/ACT AERO Inhale 2 puffs into the lungs 2 times daily Yes KEANU Ann CNP   levothyroxine (SYNTHROID) 100 MCG tablet Take 1 tablet by mouth Daily Yes KEANU Ann CNP   tiZANidine (ZANAFLEX) 2 MG tablet Take 2 tablets by mouth every 8 hours as needed (muscle spasm, neck pain) Yes KEANU Ann CNP   Elastic Bandages & Supports (CARPAL TUNNEL WRIST STABILIZER) MISC Apply to each wrist at bedtime Yes KEANU Ann CNP   methyl salicylate-menthol (PAULETTE BRAY GREASELESS) 10-15 % CREA Apply topically 3 times daily as needed for Pain Yes Ana Blanco MD   hydrOXYzine (ATARAX) 50 MG tablet   Historical Provider, MD   albuterol (PROVENTIL) (2.5 MG/3ML) 0.083% nebulizer solution Take 3 mLs by nebulization every 6 hours as needed for Wheezing  Patient not taking: Reported on 10/9/2020  KEANU Ann CNP   ipratropium-albuterol (DUONEB) 0.5-2.5 (3) MG/3ML SOLN nebulizer solution Inhale 3 mLs into the lungs every 4 hours  Patient not taking: Reported on 10/9/2020  Trevor Pérez MD        Social History     Tobacco Use    Smoking status: Current Every Day Smoker     Packs/day: 0.50     Years: 12.00     Pack years: 6.00    Smokeless tobacco: Never Used   Substance Use Topics    Alcohol use: No        Vitals:    12/18/20 1125 12/18/20 1204   BP: (!) 138/91 (!) 134/91   Site: Right Lower Arm    Position: Sitting    Cuff Size: Medium Adult    Pulse: 86    Temp: 97.3 °F (36.3 °C)    TempSrc: Temporal    SpO2: 96%    Weight: 205 lb 3.2 oz (93.1 kg)      Estimated body mass index is 37.53 kg/m² as calculated from the following:    Height as of 11/28/20: 5' 2\" (1.575 m). Weight as of this encounter: 205 lb 3.2 oz (93.1 kg). DIAGNOSTIC FINDINGS:  CBC:  Lab Results   Component Value Date    WBC 7.5 08/25/2020    HGB 15.9 08/25/2020     08/25/2020       BMP:    Lab Results   Component Value Date     09/12/2020    K 5.1 09/12/2020    CL 98 09/12/2020    CO2 25 09/12/2020    BUN 14 09/12/2020    CREATININE 0.81 09/12/2020    GLUCOSE 81 09/12/2020       HEMOGLOBIN A1C:   Lab Results   Component Value Date    LABA1C 5.2 02/19/2019       FASTING LIPID PANEL:  Lab Results   Component Value Date    CHOL 191 02/19/2019    HDL 56 02/19/2019    TRIG 150 (H) 02/19/2019       Physical Exam  Constitutional:       Appearance: Normal appearance. She is well-developed. She is not ill-appearing, toxic-appearing or diaphoretic. HENT:      Head: Normocephalic. Eyes:      Conjunctiva/sclera: Conjunctivae normal.   Cardiovascular:      Rate and Rhythm: Normal rate and regular rhythm. Heart sounds: Normal heart sounds. No murmur. Pulmonary:      Effort: Pulmonary effort is normal. No respiratory distress. Breath sounds: Normal breath sounds. No wheezing. Abdominal:      General: There is no distension. Palpations: Abdomen is soft. Tenderness: There is no abdominal tenderness. There is no guarding. Musculoskeletal:      Right knee: She exhibits bony tenderness. She exhibits normal range of motion, no swelling, no ecchymosis, no deformity, no erythema and normal patellar mobility. Tenderness found. Medial joint line, lateral joint line and patellar tendon tenderness noted. Right lower leg: She exhibits no swelling. No edema. Comments: Ambulating today on with a cane, gait is slow with a slight limp  No erythema or edema noted in the lower extremities, she describes tenderness on palpation of the right knee, right lateral lower leg, negative anterior and posterior drawer tests   Neurological:      Mental Status: She is alert.    Psychiatric: Mood and Affect: Mood normal.         Behavior: Behavior normal.         Thought Content: Thought content normal.         Judgment: Judgment normal.         ASSESSMENT     Diagnosis Orders   1. Acute pain of right knee  MRI KNEE RIGHT WO CONTRAST    Menthol, Topical Analgesic, (BIOFREEZE ROLL-ON) 4 % GEL   2. Sprain of right knee, unspecified ligament, initial encounter  MRI KNEE RIGHT WO CONTRAST    Menthol, Topical Analgesic, (BIOFREEZE ROLL-ON) 4 % GEL   3. Medication refill     4. Chronic obstructive pulmonary disease, unspecified COPD type ([facility] [address]  guaiFENesin-dextromethorphan (ROBITUSSIN DM) 100-10 MG/5ML syrup    albuterol sulfate  (90 Base) MCG/ACT inhaler    Nebulizers (NEBULIZER COMPRESSOR) [facility]    Respiratory Therapy Supplies (NEBULIZER/TUBING/MOUTHPIECE) KIT   5. Migraine without status migrainosus, not intractable, unspecified migraine type  butalbital-aspirin-caffeine (FIORINAL) -40 MG per capsule   6. Essential hypertension  Blood Pressure KIT          PLAN:  Orders Placed This Encounter   Medications    Menthol, Topical Analgesic, (BIOFREEZE ROLL-ON) 4 % GEL     Sig: Apply 1 applicator topically 3 times daily as needed (pain)     Dispense:  1 Tube     Refill:  0    guaiFENesin-dextromethorphan (ROBITUSSIN DM) 100-10 MG/5ML syrup     Sig: Take 5 mLs by mouth 3 times daily as needed for Cough     Dispense:  120 mL     Refill:  2    albuterol sulfate  (90 Base) MCG/ACT inhaler     Sig: Inhale 1 puff into the lungs every 6 hours as needed for Wheezing [place] prescription until follow up with primary. Do not refill.   Follow up with primary     Dispense:  1 Inhaler     Refill:  3    Nebulizers (NEBULIZER COMPRESSOR) MISC     Sig: Daily as needed     Dispense:  1 each     Refill:  0    Respiratory Therapy Supplies (NEBULIZER/TUBING/MOUTHPIECE) KIT     Sig: Daily as needed     Dispense:  1 kit     Refill:  2    cetirizine (ZYRTEC) 10 MG tablet     Sig: Take 1 tablet by mouth daily Dispense:  30 tablet     Refill:  3    butalbital-aspirin-caffeine (FIORINAL) -40 MG per capsule     Sig: Take 1 capsule by mouth every 4 hours as needed for Headaches for up to 30 days. Dispense:  20 capsule     Refill:  0    Blood Pressure KIT     Sig: Daily as needed     Dispense:  1 kit     Refill:  0         1. Patient with multiple requests and concerns today. 2. MRI ordered given persistent pain given fall on November 23. Again, declined any narcotic pain medication stating she is out of the window of acute pain. Heavily enforced needs to keep orthopedics appointment on the 23rd. Biofreeze ordered for topical pain control, advised not to use heat or ice over top of topical medication. 3. Cough medication and ability supplies ordered at patient request for COPD.  4. Patient requesting BP cuff, order placed. 5. Patient does have chronic migraines, agreed to switch from Fioricet to Fiorinal  6. Declined weight loss medication today, advised patients typically gained weight right back and there is risk factors as it is a stimulant. I also currently can not prescribe the medication based on my collaborative agreement. Discussed avoiding calories and drinks, no Gatorade pop or juice. No snacking within 2 hours of bedtime. Should work on small portions, half the plate to be vegetables  7. Patient signed release of records so I can speak with staff regarding ADHD medication. Discussed she is at high risk given addiction history. Also taking barbiturates for migraines as needed and routinely utilizing gabapentin. All heavily monitored and at risk for abuse. FOLLOW UP AND INSTRUCTIONS:  Return in about 3 months (around 3/18/2021) for copd, Anxiety.     · Aleta received counseling on the following healthy behaviors:nutrition and medication adherence · Discussed use, benefit, and side effects of prescribed medications. Barriers to  medication compliance addressed. All patient questions answered. Pt  verbalized understanding of all instructions given. · Patient given educational materials - see patient instructions      · Patient advised to contact scheduling offices for any referrals or imaging orders  placed today if they have not been contacted in 48 hours. Return in about 3 months (around 3/18/2021) for copd, Anxiety. An electronic signature was used to authenticate this note. --KEANU Thakur CNP on 12/18/2020 at 1:03 PM  Visit Information    Have you changed or started any medications since your last visit including any over-the-counter medicines, vitamins, or herbal medicines? no   Are you having any side effects from any of your medications? -  no  Have you stopped taking any of your medications? Is so, why? -  no    Have you seen any other physician or provider since your last visit? No  Have you had any other diagnostic tests since your last visit? No  Have you been seen in the emergency room and/or had an admission to a hospital since we last saw you? No  Have you had your routine dental cleaning in the past 6 months? no    Have you activated your [employer] account? If not, what are your barriers?  Yes     Patient Care Team:  KEANU Thakur CNP as PCP - General (Family Medicine)  KEANU Thakur CNP as PCP - [facility] [place] [facility]    Medical History Review  Past Medical, Family, and Social History reviewed and does not contribute to the patient presenting condition    Health Maintenance   Topic Date Due    HIV screen  02/16/1978    DTaP/Tdap/Td vaccine (1 - Tdap) 02/16/1982    Cervical cancer screen  02/16/1984    Breast cancer screen  02/16/2013    Shingles Vaccine (1 of 2) 02/16/2013    Colon Cancer Screen FIT/FOBT  06/11/2020    TSH testing  12/16/2020    Potassium monitoring  09/12/2021  Creatinine monitoring  09/12/2021    Lipid screen  02/19/2024    Flu vaccine  Completed    Pneumococcal 0-64 years Vaccine  Completed    Hepatitis C screen  Completed    Hepatitis A vaccine  Aged Out    Hepatitis B vaccine  Aged Out    Hib vaccine  Aged Out    Meningococcal (ACWY) vaccine  Aged Out

## 2020-12-19 ENCOUNTER — APPOINTMENT (OUTPATIENT)
Dept: CT IMAGING | Age: 55
End: 2020-12-19
Payer: MEDICARE

## 2020-12-19 ENCOUNTER — APPOINTMENT (OUTPATIENT)
Dept: GENERAL RADIOLOGY | Age: 55
End: 2020-12-19
Payer: MEDICARE

## 2020-12-19 ENCOUNTER — HOSPITAL ENCOUNTER (OUTPATIENT)
Age: 55
Setting detail: OBSERVATION
Discharge: HOME | End: 2020-12-21
Attending: EMERGENCY MEDICINE | Admitting: EMERGENCY MEDICINE
Payer: MEDICARE

## 2020-12-19 PROBLEM — R55 SYNCOPE AND COLLAPSE: Status: ACTIVE | Noted: 2020-12-19

## 2020-12-19 LAB
ABSOLUTE EOS #: 0.08 K/UL (ref 0–0.44)
ABSOLUTE IMMATURE GRANULOCYTE: 0.05 K/UL (ref 0–0.3)
ABSOLUTE LYMPH #: 2.66 K/UL (ref 1.1–3.7)
ABSOLUTE MONO #: 0.66 K/UL (ref 0.1–1.2)
ANION GAP SERPL CALCULATED.3IONS-SCNC: 11 MMOL/L (ref 9–17)
BASOPHILS # BLD: 1 % (ref 0–2)
BASOPHILS ABSOLUTE: 0.08 K/UL (ref 0–0.2)
BILIRUBIN URINE: NEGATIVE
BNP INTERPRETATION: NORMAL
BUN BLDV-MCNC: 8 MG/DL (ref 6–20)
BUN/CREAT BLD: ABNORMAL (ref 9–20)
CALCIUM SERPL-MCNC: 8.5 MG/DL (ref 8.6–10.4)
CHLORIDE BLD-SCNC: 101 MMOL/L (ref 98–107)
CO2: 23 MMOL/L (ref 20–31)
COLOR: YELLOW
COMMENT UA: NORMAL
CREAT SERPL-MCNC: 0.47 MG/DL (ref 0.5–0.9)
DIFFERENTIAL TYPE: ABNORMAL
EOSINOPHILS RELATIVE PERCENT: 1 % (ref 1–4)
GFR AFRICAN AMERICAN: >60 ML/MIN
GFR NON-AFRICAN AMERICAN: >60 ML/MIN
GFR SERPL CREATININE-BSD FRML MDRD: ABNORMAL ML/MIN/{1.73_M2}
GFR SERPL CREATININE-BSD FRML MDRD: ABNORMAL ML/MIN/{1.73_M2}
GLUCOSE BLD-MCNC: 100 MG/DL (ref 70–99)
GLUCOSE URINE: NEGATIVE
HCT VFR BLD CALC: 39.5 % (ref 36.3–47.1)
HEMOGLOBIN: 13.1 G/DL (ref 11.9–15.1)
IMMATURE GRANULOCYTES: 1 %
KETONES, URINE: NEGATIVE
LEUKOCYTE ESTERASE, URINE: NEGATIVE
LYMPHOCYTES # BLD: 33 % (ref 24–43)
MCH RBC QN AUTO: 30.9 PG (ref 25.2–33.5)
MCHC RBC AUTO-ENTMCNC: 33.2 G/DL (ref 28.4–34.8)
MCV RBC AUTO: 93.2 FL (ref 82.6–102.9)
MONOCYTES # BLD: 8 % (ref 3–12)
NITRITE, URINE: NEGATIVE
NRBC AUTOMATED: 0 PER 100 WBC
PDW BLD-RTO: 12.4 % (ref 11.8–14.4)
PH UA: 7 (ref 5–8)
PLATELET # BLD: 240 K/UL (ref 138–453)
PLATELET ESTIMATE: ABNORMAL
PMV BLD AUTO: 9.1 FL (ref 8.1–13.5)
POTASSIUM SERPL-SCNC: 4.1 MMOL/L (ref 3.7–5.3)
PRO-BNP: 49 PG/ML
PROTEIN UA: NEGATIVE
RBC # BLD: 4.24 M/UL (ref 3.95–5.11)
RBC # BLD: ABNORMAL 10*6/UL
SEG NEUTROPHILS: 56 % (ref 36–65)
SEGMENTED NEUTROPHILS ABSOLUTE COUNT: 4.47 K/UL (ref 1.5–8.1)
SODIUM BLD-SCNC: 135 MMOL/L (ref 135–144)
SPECIFIC GRAVITY UA: 1.01 (ref 1–1.03)
TROPONIN INTERP: NORMAL
TROPONIN INTERP: NORMAL
TROPONIN T: NORMAL NG/ML
TROPONIN T: NORMAL NG/ML
TROPONIN, HIGH SENSITIVITY: <6 NG/L (ref 0–14)
TROPONIN, HIGH SENSITIVITY: <6 NG/L (ref 0–14)
TURBIDITY: CLEAR
URINE HGB: NEGATIVE
UROBILINOGEN, URINE: NORMAL
WBC # BLD: 8 K/UL (ref 3.5–11.3)
WBC # BLD: ABNORMAL 10*3/UL

## 2020-12-19 PROCEDURE — 80048 BASIC METABOLIC PNL TOTAL CA: CPT

## 2020-12-19 PROCEDURE — 96374 THER/PROPH/DIAG INJ IV PUSH: CPT

## 2020-12-19 PROCEDURE — 84484 ASSAY OF TROPONIN QUANT: CPT

## 2020-12-19 PROCEDURE — 6360000002 HC RX W HCPCS: Performed by: STUDENT IN AN ORGANIZED HEALTH CARE EDUCATION/TRAINING PROGRAM

## 2020-12-19 PROCEDURE — 6370000000 HC RX 637 (ALT 250 FOR IP): Performed by: STUDENT IN AN ORGANIZED HEALTH CARE EDUCATION/TRAINING PROGRAM

## 2020-12-19 PROCEDURE — 81003 URINALYSIS AUTO W/O SCOPE: CPT

## 2020-12-19 PROCEDURE — 93005 ELECTROCARDIOGRAM TRACING: CPT | Performed by: STUDENT IN AN ORGANIZED HEALTH CARE EDUCATION/TRAINING PROGRAM

## 2020-12-19 PROCEDURE — 6370000000 HC RX 637 (ALT 250 FOR IP): Performed by: EMERGENCY MEDICINE

## 2020-12-19 PROCEDURE — 85025 COMPLETE CBC W/AUTO DIFF WBC: CPT

## 2020-12-19 PROCEDURE — G0378 HOSPITAL OBSERVATION PER HR: HCPCS

## 2020-12-19 PROCEDURE — 72128 CT CHEST SPINE W/O DYE: CPT

## 2020-12-19 PROCEDURE — 83880 ASSAY OF NATRIURETIC PEPTIDE: CPT

## 2020-12-19 PROCEDURE — 72131 CT LUMBAR SPINE W/O DYE: CPT

## 2020-12-19 PROCEDURE — 73562 X-RAY EXAM OF KNEE 3: CPT

## 2020-12-19 PROCEDURE — 96376 TX/PRO/DX INJ SAME DRUG ADON: CPT

## 2020-12-19 PROCEDURE — 99284 EMERGENCY DEPT VISIT MOD MDM: CPT

## 2020-12-19 RX ORDER — GABAPENTIN 100 MG/1
100 CAPSULE ORAL EVERY 8 HOURS PRN
Status: DISCONTINUED | OUTPATIENT
Start: 2020-12-19 | End: 2020-12-19

## 2020-12-19 RX ORDER — RISPERIDONE 2 MG/1
2 TABLET, FILM COATED ORAL NIGHTLY
Status: DISCONTINUED | OUTPATIENT
Start: 2020-12-19 | End: 2020-12-21 | Stop reason: HOSPADM

## 2020-12-19 RX ORDER — IBUPROFEN 800 MG/1
800 TABLET ORAL EVERY 8 HOURS PRN
Status: DISCONTINUED | OUTPATIENT
Start: 2020-12-19 | End: 2020-12-21 | Stop reason: HOSPADM

## 2020-12-19 RX ORDER — CLOMIPRAMINE HYDROCHLORIDE 25 MG/1
50 CAPSULE ORAL NIGHTLY
Status: DISCONTINUED | OUTPATIENT
Start: 2020-12-19 | End: 2020-12-21 | Stop reason: HOSPADM

## 2020-12-19 RX ORDER — SODIUM CHLORIDE 0.9 % (FLUSH) 0.9 %
10 SYRINGE (ML) INJECTION EVERY 12 HOURS SCHEDULED
Status: DISCONTINUED | OUTPATIENT
Start: 2020-12-19 | End: 2020-12-21 | Stop reason: HOSPADM

## 2020-12-19 RX ORDER — LIDOCAINE 4 G/G
1 PATCH TOPICAL ONCE
Status: COMPLETED | OUTPATIENT
Start: 2020-12-19 | End: 2020-12-20

## 2020-12-19 RX ORDER — ONDANSETRON 2 MG/ML
4 INJECTION INTRAMUSCULAR; INTRAVENOUS ONCE
Status: COMPLETED | OUTPATIENT
Start: 2020-12-19 | End: 2020-12-19

## 2020-12-19 RX ORDER — ONDANSETRON 2 MG/ML
4 INJECTION INTRAMUSCULAR; INTRAVENOUS EVERY 8 HOURS PRN
Status: DISCONTINUED | OUTPATIENT
Start: 2020-12-19 | End: 2020-12-21 | Stop reason: HOSPADM

## 2020-12-19 RX ORDER — ASPIRIN 81 MG/1
81 TABLET ORAL DAILY
Status: DISCONTINUED | OUTPATIENT
Start: 2020-12-19 | End: 2020-12-21 | Stop reason: HOSPADM

## 2020-12-19 RX ORDER — IBUPROFEN 800 MG/1
800 TABLET ORAL ONCE
Status: COMPLETED | OUTPATIENT
Start: 2020-12-19 | End: 2020-12-19

## 2020-12-19 RX ORDER — CETIRIZINE HYDROCHLORIDE 10 MG/1
10 TABLET ORAL DAILY
Status: DISCONTINUED | OUTPATIENT
Start: 2020-12-19 | End: 2020-12-21 | Stop reason: HOSPADM

## 2020-12-19 RX ORDER — LEVOTHYROXINE SODIUM 0.1 MG/1
100 TABLET ORAL DAILY
Status: DISCONTINUED | OUTPATIENT
Start: 2020-12-20 | End: 2020-12-21 | Stop reason: HOSPADM

## 2020-12-19 RX ORDER — ACETAMINOPHEN 500 MG
1000 TABLET ORAL ONCE
Status: COMPLETED | OUTPATIENT
Start: 2020-12-19 | End: 2020-12-19

## 2020-12-19 RX ORDER — GABAPENTIN 300 MG/1
300 CAPSULE ORAL EVERY 8 HOURS PRN
Status: DISCONTINUED | OUTPATIENT
Start: 2020-12-19 | End: 2020-12-21 | Stop reason: HOSPADM

## 2020-12-19 RX ORDER — TIZANIDINE 2 MG/1
2 TABLET ORAL ONCE
Status: DISCONTINUED | OUTPATIENT
Start: 2020-12-19 | End: 2020-12-19

## 2020-12-19 RX ORDER — LISINOPRIL 10 MG/1
20 TABLET ORAL DAILY
Status: DISCONTINUED | OUTPATIENT
Start: 2020-12-19 | End: 2020-12-21 | Stop reason: HOSPADM

## 2020-12-19 RX ORDER — METHOCARBAMOL 500 MG/1
1000 TABLET, FILM COATED ORAL 4 TIMES DAILY PRN
Status: DISCONTINUED | OUTPATIENT
Start: 2020-12-19 | End: 2020-12-21 | Stop reason: HOSPADM

## 2020-12-19 RX ORDER — GABAPENTIN 300 MG/1
600 CAPSULE ORAL 3 TIMES DAILY
Status: DISCONTINUED | OUTPATIENT
Start: 2020-12-19 | End: 2020-12-21 | Stop reason: HOSPADM

## 2020-12-19 RX ORDER — CLONIDINE HYDROCHLORIDE 0.1 MG/1
0.1 TABLET ORAL NIGHTLY
Status: DISCONTINUED | OUTPATIENT
Start: 2020-12-19 | End: 2020-12-21 | Stop reason: HOSPADM

## 2020-12-19 RX ORDER — ALBUTEROL SULFATE 2.5 MG/3ML
2.5 SOLUTION RESPIRATORY (INHALATION) EVERY 6 HOURS PRN
Status: DISCONTINUED | OUTPATIENT
Start: 2020-12-19 | End: 2020-12-21 | Stop reason: HOSPADM

## 2020-12-19 RX ORDER — BUDESONIDE AND FORMOTEROL FUMARATE DIHYDRATE 160; 4.5 UG/1; UG/1
2 AEROSOL RESPIRATORY (INHALATION) 2 TIMES DAILY
Status: DISCONTINUED | OUTPATIENT
Start: 2020-12-19 | End: 2020-12-21 | Stop reason: HOSPADM

## 2020-12-19 RX ORDER — CYCLOBENZAPRINE HCL 10 MG
10 TABLET ORAL ONCE
Status: COMPLETED | OUTPATIENT
Start: 2020-12-19 | End: 2020-12-19

## 2020-12-19 RX ORDER — SODIUM CHLORIDE 0.9 % (FLUSH) 0.9 %
10 SYRINGE (ML) INJECTION PRN
Status: DISCONTINUED | OUTPATIENT
Start: 2020-12-19 | End: 2020-12-21 | Stop reason: HOSPADM

## 2020-12-19 RX ORDER — ACETAMINOPHEN 500 MG
1000 TABLET ORAL EVERY 8 HOURS SCHEDULED
Status: DISCONTINUED | OUTPATIENT
Start: 2020-12-20 | End: 2020-12-21 | Stop reason: HOSPADM

## 2020-12-19 RX ORDER — FAMOTIDINE 20 MG/1
20 TABLET, FILM COATED ORAL 2 TIMES DAILY PRN
Status: DISCONTINUED | OUTPATIENT
Start: 2020-12-19 | End: 2020-12-21 | Stop reason: HOSPADM

## 2020-12-19 RX ORDER — CAPSAICIN 0.025 %
CREAM (GRAM) TOPICAL 2 TIMES DAILY
Status: DISCONTINUED | OUTPATIENT
Start: 2020-12-19 | End: 2020-12-21 | Stop reason: HOSPADM

## 2020-12-19 RX ADMIN — ONDANSETRON 4 MG: 2 INJECTION INTRAMUSCULAR; INTRAVENOUS at 14:57

## 2020-12-19 RX ADMIN — TRAZODONE HYDROCHLORIDE 150 MG: 100 TABLET ORAL at 22:47

## 2020-12-19 RX ADMIN — METHOCARBAMOL 1000 MG: 500 TABLET ORAL at 22:46

## 2020-12-19 RX ADMIN — IBUPROFEN 800 MG: 800 TABLET, FILM COATED ORAL at 18:47

## 2020-12-19 RX ADMIN — CYCLOBENZAPRINE 10 MG: 10 TABLET, FILM COATED ORAL at 14:57

## 2020-12-19 RX ADMIN — CLOMIPRAMINE HYDROCHLORIDE 50 MG: 25 CAPSULE ORAL at 22:47

## 2020-12-19 RX ADMIN — RISPERIDONE 2 MG: 2 TABLET, FILM COATED ORAL at 22:47

## 2020-12-19 RX ADMIN — GABAPENTIN 600 MG: 300 CAPSULE ORAL at 22:47

## 2020-12-19 RX ADMIN — CAPSAICIN: 0.25 CREAM TOPICAL at 22:47

## 2020-12-19 RX ADMIN — ACETAMINOPHEN 1000 MG: 500 TABLET ORAL at 18:47

## 2020-12-19 RX ADMIN — CLONIDINE HYDROCHLORIDE 0.1 MG: 0.1 TABLET ORAL at 22:46

## 2020-12-19 RX ADMIN — ONDANSETRON 4 MG: 2 INJECTION INTRAMUSCULAR; INTRAVENOUS at 18:47

## 2020-12-19 ASSESSMENT — ENCOUNTER SYMPTOMS
DIARRHEA: 0
COUGH: 0
BACK PAIN: 1
SHORTNESS OF BREATH: 0
NAUSEA: 0
ABDOMINAL PAIN: 0
EYE REDNESS: 0
VOMITING: 0
EYE ITCHING: 0
RHINORRHEA: 0
SORE THROAT: 0

## 2020-12-19 ASSESSMENT — PAIN DESCRIPTION - LOCATION: LOCATION: KNEE;BACK

## 2020-12-19 ASSESSMENT — PAIN DESCRIPTION - DESCRIPTORS: DESCRIPTORS: SHARP;STABBING

## 2020-12-19 ASSESSMENT — PAIN SCALES - GENERAL
PAINLEVEL_OUTOF10: 7
PAINLEVEL_OUTOF10: 7

## 2020-12-19 NOTE — ED PROVIDER NOTES
FACULTY SIGN-OUT  ADDENDUM     Care of this patient was assumed from previous attending physician. The patient's initial evaluation and plan have been discussed with the prior provider who initially evaluated the patient. Attestation  I was available and discussed any additional care issues that arose and coordinated the management plans with the resident(s) caring for the patient during my duty period. Any areas of disagreement with resident's documentation of care or procedures are noted on the chart. I was personally present for the key portions of any/all procedures, during my duty period. I have documented in the chart those procedures where I was not present during the key portions. ED COURSE      The patient was given the following medications:  Orders Placed This Encounter   Medications    ondansetron (ZOFRAN) injection 4 mg    cyclobenzaprine (FLEXERIL) tablet 10 mg    lidocaine 4 % external patch 1 patch       RECENT VITALS:   Temp: 97.2 °F (36.2 °C), Pulse: 89, Resp: 20, BP: (!) 159/97    MEDICAL DECISION MAKING        Madge Augustus Bloch is a 60 y.o. female who presents to the Emergency Department with complaints of fall down stairs. CT pending. Plan admit to ETU, needs trauma consult.       Mel Cruz MD  Attending Emergency Physician    (Please note that portions of this note were completed with a voice recognition program.  Efforts were made to edit the dictations but occasionally words are mis-transcribed.)          Mel Cruz MD  12/19/20 [id number]

## 2020-12-19 NOTE — FLOWSHEET NOTE
[facility] CARE DEPARTMENT - [facility]     Emergency/Trauma Note    PATIENT NAME: Sawyer Linda    Shift date: 12/19/2020  Shift day: Saturday   Shift # 2    Room # 46PED/46PED   Name: Sawyer Linda            Age: 60 y.o. Gender: female          Taoism: [employer] [employer] orthodox:     Trauma/Incident type: Adult Trauma Consult  Admit Date & Time: 12/19/2020  2:28 PM  TRAUMA NAME:         PATIENT/EVENT DESCRIPTION:  Sawyer Linda is a 60 y.o. female who arrived via EMS as Trauma Consult. Patient presents with complaint of low back pain and right knee pain after falls. Pt to be admitted to [address] [place] [address]. SPIRITUAL ASSESSMENT/INTERVENTION:   at bedside. Patient engaged  in conversation. Patient stated she has no family support.  provided active, compassionate listening and listened to patient as she shared her feelings and concerns. Patient stated she is currently a non- practicing Anabaptist.  offered spiritual and emotional support. PATIENT BELONGINGS:  With patient    ANY BELONGINGS OFP SIGNIFICANT VALUE NOTED:     12/19/20 1839   Encounter Summary   Services provided to: Patient   Referral/Consult From: Multi-disciplinary team   Place of [place]   [facility])   Continue Visiting   (12/19/2020)   Complexity of Encounter Moderate   Length of Encounter 45 minutes   Spiritual Assessment Completed Yes   Crisis   Type Trauma   Assessment Calm; Approachable   Intervention Active listening;Sustaining presence/ Ministry of presence   Outcome Acceptance;Expressed gratitude       REGISTRATION STAFF NOTIFIED? No      WHAT IS YOUR SPIRITUAL CARE PLAN FOR THIS PATIENT?:   Chaplains will remain available for spiritual and emotional support as needed.     Electronically signed by Pamela White Resident, on 12/19/2020 at 6:41 PM.  [address]  [phone]

## 2020-12-19 NOTE — ED PROVIDER NOTES
[address] [facility]     Emergency Department     Faculty Attestation    I performed a history and physical examination of the patient and discussed management with the resident. I reviewed the residents note and agree with the documented findings and plan of care. Any areas of disagreement are noted on the chart. I was personally present for the key portions of any procedures. I have documented in the chart those procedures where I was not present during the key portions. I have reviewed the emergency nurses triage note. I agree with the chief complaint, past medical history, past surgical history, allergies, medications, social and family history as documented unless otherwise noted below. For Physician Assistant/ Nurse Practitioner cases/documentation I have personally evaluated this patient and have completed at least one if not all key elements of the E/M (history, physical exam, and MDM). Additional findings are as noted. I have personally seen and evaluated the patient. I find the patient's history and physical exam are consistent with the NP/PA documentation. I agree with the care provided, treatment rendered, disposition and follow-up plan. 49-year-old female presenting after fall down 6 stairs this morning. She does not know why she fell, remembers looking at the top of the stairs and landing at the bottom. She landed on her feet with her back against the stairs. She has had been having pain in her back and knees. She has previously injured her knees falling down in a similar manner. She denies any chest pain or palpitations.     Exam:  General: Laying on the bed, awake, alert and in no acute distress  CV: normal rate and regular rhythm  Lungs: Breathing comfortably on room air with no tachypnea, hypoxia, or increased work of breathing    Plan:  Cardiac work-up, x-rays of bilateral knees, CT of T and L-spine Anticipate observation admission for PT/OT and syncope evaluation. Patient did have a stress test in August, however no recent echocardiogram.  Concern for underlying arrhythmia causing her falls. Patient signed out to oncoming physician pending CT results and admission.         Valdo Hunt MD   Attending Emergency  Physician    (Please note that portions of this note were completed with a voice recognition program. Efforts were made to edit the dictations but occasionally words are mis-transcribed.)              Valdo Hunt MD  12/19/20 6613

## 2020-12-19 NOTE — H&P
TRAUMA HISTORY AND PHYSICAL EXAMINATION  (V 2.0)    PATIENT NAME: Yolanda Krishnan  YOB: 1965  MEDICAL RECORD NO. [id number]   DATE: 12/19/2020  PRIMARY CARE PHYSICIAN: KEANU Beasley CNP  PATIENT EVALUATED AT THE REQUEST OF :   Dona    ACTIVATION   []Trauma Alert     [] Trauma Priority     [x]Trauma Consult. IMPRESSION:     Patient Active Problem List   Diagnosis    Chest pain    Migraine variant with headache    Drug overdose, accidental or unintentional, initial encounter    Hypothyroidism    Essential hypertension    Seizure disorder ([address]    Drug overdose    History of seizure    Major depressive disorder with psychotic features ([address]    Severe major depression ([address]    Overdose of barbiturate, intentional self-harm, initial encounter ([address]    Intentional phenobarbital overdose ([address]    Severe episode of recurrent major depressive disorder, without psychotic features ([address]    Suicide attempt ([address]    Major depressive disorder, recurrent ([address]    Sepsis ([address]    Severe malnutrition ([address]    Altered mental status    Hypokalemia    Congestive heart failure of unknown etiology ([address]    Lumbar radiculopathy    Chronic low back pain    Piriformis syndrome    COPD (chronic obstructive pulmonary disease) ([address]    Major depressive disorder, single episode, unspecified    Severe depressed bipolar I disorder without psychotic features ([address]    Polysubstance abuse ([address]       MEDICAL DECISION MAKING AND PLAN:     · Fall down 6 stairs, -LOC, -amnesia, takes ASA    Imaging: no acute injuries. Labs: WNL  Consults: none  Intra-articular loose body seen on XR right knee similar to prior studies. Discussed with ortho. Patient to follow-up as outpatient with previously scheduled orthopedic surgery appointment for knee complaints.   TERT exam  Trauma to sign off  Dispo per ED       CONSULT SERVICES    [] Neurosurgery     [x] Orthopedic Surgery    [] Cardiothoracic [] Facial Trauma    [] Plastic Surgery (Burn)    [] Pediatric Surgery     [] Internal Medicine    [] Pulmonary Medicine    [] Other:       HISTORY:     SOURCE OF INFORMATION  Patient information was obtained from patient. History/Exam limitations: none. INJURY SUMMARY    No traumatic injuries    GENERAL DATA  Age 60 y.o.  female   Patient information was obtained from patient. History/Exam limitations: none. Patient presented to the Emergency Department by ambulance   Injury Date: 12/19/2020   Approximate Injury Time: 0830        Transport mode:   [x]Ambulance      [] Helicopter     []Car       [] Other  Referring Hospital: n/a    INJURY LOCATION,   Home    MECHANISM OF INJURY  Fall down 6 stairs       HISTORY:   Patient fell down 6 stairs earlier today and presented complaining of low back pain, and right knee pain. Pt reports she needed assistance to get up and has been able to walk with a cane since. She has a history of knee pain which she saw her PCP for yesterday and was scheduled for orthopedic follow up, however she states that her pain is worse. Denies headstrike or LOC, amnesia, no dizziness, CP of SOB. Reports she fell a month ago down 7 steps. Reports h/o seizures, CHF, sleep apnea. Smokes daily for 37 years. Denies ETOH, drugs. Last seizure 2 months ago, noncompliant with ppx medication. Loss of Consciousness [x]No   []Yes Duration(min)    Total Fluids Given Prior To Arrival none cc    MEDICATIONS:   []  None     []  Information not available due to exam limitations documented above  Prior to Admission medications    Medication Sig Start Date End Date Taking?  Authorizing Provider   hydrOXYzine (ATARAX) 50 MG tablet  12/14/20   Historical Provider, MD   Menthol, Topical Analgesic, (BIOFREEZE ROLL-ON) 4 % GEL Apply 1 applicator topically 3 times daily as needed (pain) 12/18/20 1/17/21  KEANU Zambrano - CNP guaiFENesin-dextromethorphan (ROBITUSSIN DM) 100-10 MG/5ML syrup Take 5 mLs by mouth 3 times daily as needed for Cough 12/18/20 12/28/20  KEANU Eddy CNP   albuterol sulfate  (90 Base) MCG/ACT inhaler Inhale 1 puff into the lungs every 6 hours as needed for Wheezing Gap prescription until follow up with primary. Do not refill. Follow up with primary 12/18/20   KEANU Eddy CNP   Nebulizers (NEBULIZER COMPRESSOR) MISC Daily as needed 12/18/20   KEANU Eddy CNP   Respiratory Therapy Supplies (NEBULIZER/TUBING/MOUTHPIECE) KIT Daily as needed 12/18/20   KEANU Eddy CNP   cetirizine (ZYRTEC) 10 MG tablet Take 1 tablet by mouth daily 12/18/20   KEANU Eddy CNP   butalbital-aspirin-caffeine [facility]) -40 MG per capsule Take 1 capsule by mouth every 4 hours as needed for Headaches for up to 30 days. 12/18/20 1/17/21  KEANU Eddy CNP   Blood Pressure KIT Daily as needed 12/18/20   KEANU Eddy CNP   cyclobenzaprine (FLEXERIL) 5 MG tablet Take 1 tablet by mouth 2 times daily as needed for Muscle spasms Caution: may cause sedation. Do not take with zanaflex. 12/16/20 1/15/21  KEANU Eddy CNP   ondansetron (ZOFRAN ODT) 4 MG disintegrating tablet Take 1 tablet by mouth every 8 hours as needed for Nausea 12/4/20   KEANU Eddy CNP   ibuprofen (ADVIL;MOTRIN) 800 MG tablet Take 1 tablet by mouth every 8 hours as needed for Pain 12/4/20   KEANU Eddy CNP   Misc. Devices (CANE) MISC Quad base cane 11/28/20   Meseret Obrien MD   acetaminophen (TYLENOL) 500 MG tablet Take 1-2 tablets by mouth every 8 hours as needed for Pain 11/18/20 12/18/20  KEANU Eddy CNP   gabapentin (NEURONTIN) 300 MG capsule Take 2 capsules by mouth 3 times daily for 30 days.  11/18/20 12/18/20  KEANU Eddy CNP lisinopril (PRINIVIL;ZESTRIL) 20 MG tablet Take 1 tablet by mouth daily 11/5/20 12/18/20  Mortimer Singleton, APRN - CNP   famotidine (PEPCID) 20 MG tablet Take 1 tablet by mouth 2 times daily as needed (for acid reflux) 10/26/20   Mortimer Singleton, APRN - CNP   clomiPRAMINE (ANAFRANIL) 50 MG capsule Take 1 capsule by mouth nightly 10/26/20   Mortimer Singleton, APRN - CNP   risperiDONE (RISPERDAL) 1 MG tablet Take 1 tablet by mouth daily and 2 tablets by mouth at bedtime 10/22/20   Mortimer Singleton, APRN - CNP   traZODone (DESYREL) 150 MG tablet Take 1 tablet by mouth nightly 10/22/20   Mortimer Singleton, APRN - CNP   cloNIDine (CATAPRES) 0.1 MG tablet Take 1 tablet by mouth nightly 10/22/20   Mortimer Singleton, APRN - CNP   Benzocaine-Menthol (CEPACOL) 6-10 MG LOZG lozenge Take 1 lozenge by mouth every 2 hours as needed for Sore Throat 10/22/20   Mortimer Singleton, APRN - CNP   aspirin EC 81 MG EC tablet Take 1 tablet by mouth daily 10/22/20   Mortimer Singleton, APRN - CNP   budesonide-formoterol [facility]) 160-4.5 MCG/ACT AERO Inhale 2 puffs into the lungs 2 times daily 10/9/20   Mortimer Singleton, APRN - CNP   levothyroxine (SYNTHROID) 100 MCG tablet Take 1 tablet by mouth Daily 10/9/20   Mortimer Singleton, APRN - CNP   tiZANidine (ZANAFLEX) 2 MG tablet Take 2 tablets by mouth every 8 hours as needed (muscle spasm, neck pain) 10/9/20   Mortimer Singleton, APRN - CNP   Elastic Bandages & Supports (CARPAL TUNNEL WRIST STABILIZER) MISC Apply to each wrist at bedtime 10/9/20   Mortimer Singleton, APRN - CNP   methyl salicylate-menthol (PAULETTE BRAY GREASELESS) 10-15 % CREA Apply topically 3 times daily as needed for Pain 9/21/20   Abilio Jiménez MD   albuterol (PROVENTIL) (2.5 MG/3ML) 0.083% nebulizer solution Take 3 mLs by nebulization every 6 hours as needed for Wheezing  Patient not taking: Reported on 10/9/2020 6/18/20   Mortimer Singleton, APRN - CNP All other components within normal limits   BASIC METABOLIC PANEL - Abnormal; Notable for the following components:    Glucose 100 (*)     CREATININE 0.47 (*)     Calcium 8.5 (*)     All other components within normal limits   TROPONIN   BRAIN NATRIURETIC PEPTIDE   TROPONIN   URINE RT REFLEX TO CULTURE           Debora Loyd DO  12/19/20, 5:44 PM         Attending Note  Patient seen in ED 46 for h/o fall down stairs and incidentally found right knee loose body that is being worked up as outpatient. I have reviewed the above TECSS note(s) and I either performed the key elements of the medical history and physical exam or was present with the resident when the key elements of the medical history and physical exam were performed. I have discussed the findings, established the care plan and recommendations with Resident, YENNY RN, bedside nurse.     Gladys Lui MD  12/19/2020  8:21 PM

## 2020-12-19 NOTE — ED NOTES
Patient states that she fell down six steps today no LOC. Patient states that she has fallen before and previously injured that leg. Upon entering that room patient noted to be vomiting.       Evelyne Schmitt RN  12/19/20 [id number]

## 2020-12-19 NOTE — ED PROVIDER NOTES
[facility]  ED  Emergency Department Encounter  Emergency Medicine Resident     Pt Name: Vale Lomas  MRN: [id number]  [place] 1965  Date ofevaluation: 12/19/20  PCP:  KEANU Ward CNP    CHIEF COMPLAINT       Chief Complaint   Patient presents with    Fall     c/o falling down 6 steps this morning, states missed a step, pt denies hitting her head or loc. HISTORY OF PRESENT ILLNESS  (Location/Symptom, Timing/Onset, Context/Setting, Quality, Duration, Modifying Factors, Severity, Associated signs/symptoms)     Vale Lomas is a 60 y.o. female who presents following a fall. Patient reports that she was walking the steps earlier today when she thinks that she might have missed a step, and fell down 6 feet stairs onto her knees. States that she fell backwards after striking her knees, and hit her low back. Did not hit her head or lose consciousness. States that she felt somewhat lightheaded prior to the fall but denies any prodromal symptoms such as chest pain, shortness of breath, palpitations, vertigo, or other symptoms. She currently reports that she has a severe pain in her bilateral knees as well as her low back. Denies any bowel or bladder incontinence/retention, no saddle anesthesia. No history of IV drug use, recent unintentional weight loss, personal history of cancer. No blood thinner use or surgeries to her spine. She was recently seen in the emergency department in November after a similar fall and had chronic knee pain since then but states that this pain is worsened since this fall today. She was scheduled for an MRI of her right knee for persistent pain however this has not been done yet.     PAST MEDICAL / SURGICAL / SOCIAL / FAMILY HISTORY has a past medical history of Arthritis, Asthma, Borderline diabetes, Borderline personality disorder ([facility] CHF (congestive heart failure) ([facility] COPD (chronic obstructive pulmonary disease) ([facility] Fibromyalgia, Headache, Hypertension, Manic depression ([facility] Movement disorder, Neck fracture ([facility] Pernicious anemia, Seizure ([facility] and Thyroid disease. has a past surgical history that includes knee surgery (Bilateral); partial hysterectomy (cervix not removed); lymph node dissection; Irrigation and debridement (Right, 5/17/2019); EXPLORATION OF WOUND OF EXTREMITY (Right, 5/19/2019); and EXPLORATION OF WOUND OF EXTREMITY (N/A, 5/22/2019).     Social History     Socioeconomic History    Marital status:      Spouse name: Not on file    Number of children: Not on file    Years of education: Not on file    Highest education level: Not on file   Occupational History    Not on file   Social Needs    Financial resource strain: Not on file    Food insecurity     Worry: Not on file     Inability: Not on file    Transportation needs     Medical: Not on file     Non-medical: Not on file   Tobacco Use    Smoking status: Current Every Day Smoker     Packs/day: 0.50     Years: 12.00     Pack years: 6.00    Smokeless tobacco: Never Used   Substance and Sexual Activity    Alcohol use: No    Drug use: Not Currently     Comment: Has not use Heroin since 2/2020    Sexual activity: Not Currently   Lifestyle    Physical activity     Days per week: Not on file     Minutes per session: Not on file    Stress: Not on file   Relationships    Social connections     Talks on phone: Not on file     Gets together: Not on file     Attends Restoration service: Not on file     Active member of club or organization: Not on file     Attends meetings of clubs or organizations: Not on file     Relationship status: Not on file    Intimate partner violence     Fear of current or ex partner: Not on file Emotionally abused: Not on file     Physically abused: Not on file     Forced sexual activity: Not on file   Other Topics Concern    Not on file   Social History Narrative    Not on file       History reviewed. No pertinent family history. Allergies:  Imitrex [sumatriptan], Bee pollen, Bee venom, Bromide ion [bromine], Ketorolac, Reglan [metoclopramide], Sulfa antibiotics, Sulfadiazine, and Tramadol    Home Medications:  Prior to Admission medications    Medication Sig Start Date End Date Taking? Authorizing Provider   hydrOXYzine (ATARAX) 50 MG tablet  12/14/20   Historical Provider, MD   Menthol, Topical Analgesic, (BIOFREEZE ROLL-ON) 4 % GEL Apply 1 applicator topically 3 times daily as needed (pain) 12/18/20 1/17/21  KEAUN Barriga CNP   guaiFENesin-dextromethorphan (ROBITUSSIN DM) 100-10 MG/5ML syrup Take 5 mLs by mouth 3 times daily as needed for Cough 12/18/20 12/28/20  KEANU Barriga CNP   albuterol sulfate  (90 Base) MCG/ACT inhaler Inhale 1 puff into the lungs every 6 hours as needed for Wheezing Gap prescription until follow up with primary. Do not refill. Follow up with primary 12/18/20   KEANU Barriga CNP   Nebulizers (NEBULIZER COMPRESSOR) MISC Daily as needed 12/18/20   KEANU Barriga CNP   Respiratory Therapy Supplies (NEBULIZER/TUBING/MOUTHPIECE) KIT Daily as needed 12/18/20   KEANU Barriga CNP   cetirizine (ZYRTEC) 10 MG tablet Take 1 tablet by mouth daily 12/18/20   KEANU Barriga CNP   butalbital-aspirin-caffeine [facility]) -40 MG per capsule Take 1 capsule by mouth every 4 hours as needed for Headaches for up to 30 days.  12/18/20 1/17/21  KEANU Barriga CNP   Blood Pressure KIT Daily as needed 12/18/20   KEANU Barriga CNP cyclobenzaprine (FLEXERIL) 5 MG tablet Take 1 tablet by mouth 2 times daily as needed for Muscle spasms Caution: may cause sedation. Do not take with zanaflex. 12/16/20 1/15/21  KEANU Eddy CNP   ondansetron (ZOFRAN ODT) 4 MG disintegrating tablet Take 1 tablet by mouth every 8 hours as needed for Nausea 12/4/20   KEANU Eddy CNP   ibuprofen (ADVIL;MOTRIN) 800 MG tablet Take 1 tablet by mouth every 8 hours as needed for Pain 12/4/20   KEANU Eddy CNP   Misc. Devices (CANE) MISC Quad base cane 11/28/20   Meseret Obrien MD   acetaminophen (TYLENOL) 500 MG tablet Take 1-2 tablets by mouth every 8 hours as needed for Pain 11/18/20 12/18/20  KEANU Eddy CNP   gabapentin (NEURONTIN) 300 MG capsule Take 2 capsules by mouth 3 times daily for 30 days.  11/18/20 12/18/20  KEANU Eddy CNP   lisinopril (PRINIVIL;ZESTRIL) 20 MG tablet Take 1 tablet by mouth daily 11/5/20 12/18/20  KEANU Eddy CNP   famotidine (PEPCID) 20 MG tablet Take 1 tablet by mouth 2 times daily as needed (for acid reflux) 10/26/20   KEANU Eddy CNP   clomiPRAMINE (ANAFRANIL) 50 MG capsule Take 1 capsule by mouth nightly 10/26/20   KEANU Eddy CNP   risperiDONE (RISPERDAL) 1 MG tablet Take 1 tablet by mouth daily and 2 tablets by mouth at bedtime 10/22/20   KEANU Eddy CNP   traZODone (DESYREL) 150 MG tablet Take 1 tablet by mouth nightly 10/22/20   KEANU Eddy CNP   cloNIDine (CATAPRES) 0.1 MG tablet Take 1 tablet by mouth nightly 10/22/20   KEANU Eddy CNP   Benzocaine-Menthol (CEPACOL) 6-10 MG LOZG lozenge Take 1 lozenge by mouth every 2 hours as needed for Sore Throat 10/22/20   KEANU Eddy - CNP   aspirin EC 81 MG EC tablet Take 1 tablet by mouth daily 10/22/20   KEANU Eddy - FELIX budesonide-formoterol (SYMBICORT) 160-4.5 MCG/ACT AERO Inhale 2 puffs into the lungs 2 times daily 10/9/20   Jennifer Diaz, APRN - CNP   levothyroxine (SYNTHROID) 100 MCG tablet Take 1 tablet by mouth Daily 10/9/20   Jennifer Diaz, APRN - CNP   tiZANidine (ZANAFLEX) 2 MG tablet Take 2 tablets by mouth every 8 hours as needed (muscle spasm, neck pain) 10/9/20   Jennifer Panchals, APRN - CNP   Elastic Bandages & Supports (CARPAL TUNNEL WRIST STABILIZER) MISC Apply to each wrist at bedtime 10/9/20   Jennifer Diaz, APRN - CNP   methyl salicylate-menthol (PAULETTE BRAY GREASELESS) 10-15 % CREA Apply topically 3 times daily as needed for Pain 9/21/20   Saul Ng MD   albuterol (PROVENTIL) (2.5 MG/3ML) 0.083% nebulizer solution Take 3 mLs by nebulization every 6 hours as needed for Wheezing  Patient not taking: Reported on 10/9/2020 6/18/20   Jennifer Diaz, APRN - CNP   ipratropium-albuterol (DUONEB) 0.5-2.5 (3) MG/3ML SOLN nebulizer solution Inhale 3 mLs into the lungs every 4 hours  Patient not taking: Reported on 10/9/2020 6/16/19   Ami Watson MD       REVIEW OF SYSTEMS    (2-9 systems for level 4, 10 or more for level 5)      Review of Systems   Constitutional: Negative for chills and fever. HENT: Negative for rhinorrhea and sore throat. Eyes: Negative for redness and itching. Respiratory: Negative for cough and shortness of breath. Cardiovascular: Negative for chest pain and palpitations. Gastrointestinal: Negative for abdominal pain, diarrhea, nausea and vomiting. Genitourinary: Negative for difficulty urinating, dysuria and hematuria. Musculoskeletal: Positive for arthralgias (bilateral knee pain) and back pain. Negative for neck pain. Allergic/Immunologic: Negative for environmental allergies and food allergies. Neurological: Positive for dizziness and light-headedness. Negative for weakness and numbness.         Negative for vertigo       PHYSICAL EXAM (up to 7 for level 4, 8 or more for level 5)      INITIAL VITALS:   BP (!) 159/97   Pulse 89   Temp 97.2 °F (36.2 °C) (Oral)   Resp 20   Ht 5' 2.5\" (1.588 m)   Wt 203 lb (92.1 kg)   SpO2 98%   BMI 36.54 kg/m²     Physical Exam  Vitals signs and nursing note reviewed. Constitutional:       General: She is not in acute distress. Appearance: Normal appearance. She is not ill-appearing, toxic-appearing or diaphoretic. HENT:      Head: Normocephalic and atraumatic. Right Ear: Tympanic membrane and ear canal normal.      Left Ear: Tympanic membrane and ear canal normal.      Mouth/Throat:      Mouth: Mucous membranes are moist.      Pharynx: Oropharynx is clear. No oropharyngeal exudate or posterior oropharyngeal erythema. Eyes:      General: No scleral icterus. Conjunctiva/sclera: Conjunctivae normal.      Pupils: Pupils are equal, round, and reactive to light. Neck:      Musculoskeletal: Neck supple. No muscular tenderness. Cardiovascular:      Rate and Rhythm: Normal rate and regular rhythm. Pulses:           Radial pulses are 2+ on the right side and 2+ on the left side. Dorsalis pedis pulses are 2+ on the right side and 2+ on the left side. Pulmonary:      Effort: Pulmonary effort is normal. No respiratory distress. Breath sounds: Normal breath sounds. No stridor. No wheezing, rhonchi or rales. Abdominal:      General: There is no distension. Palpations: Abdomen is soft. There is no mass. Tenderness: There is no abdominal tenderness. There is no guarding or rebound. Musculoskeletal:      Right lower leg: No edema. Left lower leg: No edema. Comments: Patient is pain to palpation of the bilateral knees, but no tenderness to palpation of the right shoulder. Does have some midline spinal tenderness over the thoracic and lumbar spine but no midline cervical spine tenderness to palpation. There are no step-off or deformities noted. No obvious injuries or bruising on examination. Skin:     General: Skin is warm and dry. Findings: No rash (over exposed skin). Neurological:      General: No focal deficit present. Mental Status: She is alert and oriented to person, place, and time. Comments: EOMI. PERRL. Sensation intact throughout face. Smile symmetric. Uvula and palate rise midline. Shoulder shrug equal bilaterally. Tongue protrudes midline. Normal FNF. Full strength and sensation in upper and lower extremities bilaterally. Proprioception intact in bilateral lower extremities. Psychiatric:         Mood and Affect: Mood normal.         Behavior: Behavior normal.       DIAGNOSTICS     PLAN (LABS / IMAGING / EKG):  Orders Placed This Encounter   Procedures    CT THORACIC SPINE WO CONTRAST    CT LUMBAR SPINE WO CONTRAST    XR KNEE LEFT (3 VIEWS)    XR KNEE RIGHT (3 VIEWS)    CBC WITH AUTO DIFFERENTIAL    BASIC METABOLIC PANEL    Troponin    Brain Natriuretic Peptide    Urinalysis Reflex to Culture    EKG 12 Lead     MEDICATIONS ORDERED:  Orders Placed This Encounter   Medications    ondansetron (ZOFRAN) injection 4 mg    cyclobenzaprine (FLEXERIL) tablet 10 mg    lidocaine 4 % external patch 1 patch     DIAGNOSTIC RESULTS / EMERGENCYDEPARTMENT COURSE / MDM     LABS:  No results found for this visit on 12/19/20. RADIOLOGY:  XR KNEE LEFT (3 VIEWS)   Final Result   No acute osseous abnormality of the left knee. XR KNEE RIGHT (3 VIEWS)   Final Result   1. No acute osseous abnormality of the right knee. 2. 7 mm intra-articular loose body in the lateral femorotibial compartment. X-rays are negative. CT scans as well as blood work is pending at this time. Patient will be signed out to oncoming resident pending lab work and imaging. Plan is for admission to observation for syncopal work-up as well as PT OT given recurrent falls as well as complaints of near syncope. She does have a new osseous of normality of her right knee which may be the result of an occult fracture. May need orthopedic consultation in the observation unit. We will leave it at the discretion of the observation team.    PROCEDURES:  Sameer    CONSULTS:  None    FINAL IMPRESSION      1. Fall, initial encounter          DISPOSITION / PLAN     DISPOSITION        PATIENT REFERRED TO:  No follow-up provider specified.     DISCHARGE MEDICATIONS:  New Prescriptions    No medications on file       Evelia Arredondo DO  Emergency Medicine Resident  [facility]    (Please note that portions of this note were completed with a voice recognition program.  Efforts were made to edit thedictations but occasionally words are mis-transcribed.)       Evelia Arredondo DO  Resident  12/19/20 [address] [facility]  Resident  12/19/20 [id number]

## 2020-12-19 NOTE — ED PROVIDER NOTES
Raul Saini  ED  Emergency Department  Emergency Medicine Resident Sign-out     Care of Laci Dee was assumed from Dr. Hedy Meredith and is being seen for Fall (c/o falling down 6 steps this morning, states missed a step, pt denies hitting her head or loc.)  . The patient's initial evaluation and plan have been discussed with the prior provider who initially evaluated the patient.      EMERGENCY DEPARTMENT COURSE / MEDICAL DECISION MAKING:       MEDICATIONS GIVEN:  Orders Placed This Encounter   Medications    ondansetron (ZOFRAN) injection 4 mg    cyclobenzaprine (FLEXERIL) tablet 10 mg    lidocaine 4 % external patch 1 patch    ondansetron (ZOFRAN) injection 4 mg    ibuprofen (ADVIL;MOTRIN) tablet 800 mg    DISCONTD: tiZANidine (ZANAFLEX) tablet 2 mg    acetaminophen (TYLENOL) tablet 1,000 mg    DISCONTD: gabapentin (NEURONTIN) capsule 100 mg    acetaminophen (TYLENOL) tablet 1,000 mg    ibuprofen (ADVIL;MOTRIN) tablet 800 mg    methocarbamol (ROBAXIN) tablet 1,000 mg    gabapentin (NEURONTIN) capsule 300 mg    albuterol (PROVENTIL) nebulizer solution 2.5 mg    aspirin EC tablet 81 mg    budesonide-formoterol (SYMBICORT) 160-4.5 MCG/ACT inhaler 2 puff    cetirizine (ZYRTEC) tablet 10 mg    clomiPRAMINE (ANAFRANIL) capsule 50 mg    cloNIDine (CATAPRES) tablet 0.1 mg    famotidine (PEPCID) tablet 20 mg    gabapentin (NEURONTIN) capsule 600 mg    levothyroxine (SYNTHROID) tablet 100 mcg    lisinopril (PRINIVIL;ZESTRIL) tablet 20 mg    risperiDONE (RISPERDAL) tablet 2 mg    traZODone (DESYREL) tablet 150 mg    sodium chloride flush 0.9 % injection 10 mL    sodium chloride flush 0.9 % injection 10 mL    enoxaparin (LOVENOX) injection 40 mg    ondansetron (ZOFRAN) injection 4 mg    capsaicin (ZOSTRIX) 0.025 % cream       LABS / RADIOLOGY:     Labs Reviewed   CBC WITH AUTO DIFFERENTIAL - Abnormal; Notable for the following components:       Result Value Immature Granulocytes 1 (*)     All other components within normal limits   BASIC METABOLIC PANEL - Abnormal; Notable for the following components:    Glucose 100 (*)     CREATININE 0.47 (*)     Calcium 8.5 (*)     All other components within normal limits   TROPONIN   TROPONIN   BRAIN NATRIURETIC PEPTIDE   URINE RT REFLEX TO CULTURE       Xr Knee Left (3 Views)    Result Date: 12/19/2020  EXAMINATION: THREE XRAY VIEWS OF THE LEFT KNEE 12/19/2020 3:14 pm COMPARISON: None. HISTORY: ORDERING SYSTEM PROVIDED HISTORY: Knee pain s/p fall TECHNOLOGIST PROVIDED HISTORY: Knee pain s/p fall Reason for Exam: Fall today/ knee pain/ port. Acuity: Acute Type of Exam: Initial FINDINGS: There is no acute fracture or suspect osseous lesion. No significant arthropathy is seen. There is mild medial compartment joint space narrowing. No soft tissue abnormality or joint effusion is evident. No acute osseous abnormality of the left knee. Xr Knee Right (3 Views)    Result Date: 12/19/2020  EXAMINATION: THREE XRAY VIEWS OF THE RIGHT KNEE 12/19/2020 3:14 pm COMPARISON: 11/26/2020 HISTORY: ORDERING SYSTEM PROVIDED HISTORY: Fall TECHNOLOGIST PROVIDED HISTORY: Fall Reason for Exam: Fall today/ knee pain/ port. Acuity: Acute Type of Exam: Initial FINDINGS: There is no acute fracture or suspect osseous lesion. Joint spaces and alignment are maintained. Minimal degenerative changes are present. No focal soft tissue swelling or joint effusion is evident. 7 mm intra-articular loose body is noted in the lateral femorotibial compartment. 1. No acute osseous abnormality of the right knee. 2. 7 mm intra-articular loose body in the lateral femorotibial compartment.      Xr Knee Right (3 Views)    Result Date: 11/23/2020 EXAMINATION: THREE XRAY VIEWS OF THE RIGHT KNEE 11/23/2020 8:03 am COMPARISON: None. HISTORY: ORDERING SYSTEM PROVIDED HISTORY: fall TECHNOLOGIST PROVIDED HISTORY: fall Reason for Exam: pt fell FINDINGS: There is no acute osseous abnormality. There is mild degenerative joint disease. There is a trace effusion. The surrounding soft tissues are otherwise unremarkable. Trace effusion without acute osseous abnormality. Xr Knee Right (min 4 Views)    Result Date: 11/26/2020  EXAMINATION: FOUR XRAY VIEWS OF THE RIGHT KNEE 11/26/2020 6:30 pm COMPARISON: November 23, 2020 HISTORY: ORDERING SYSTEM PROVIDED HISTORY: pain TECHNOLOGIST PROVIDED HISTORY: pain include sunrise view Reason for Exam: pain Acuity: Unknown Type of Exam: Unknown FINDINGS: No evidence of acute fracture or dislocation. No focal osseous lesion. No evidence of joint effusion. No focal soft tissue abnormality. No acute abnormality of the knee. Mild degenerative changes. Xr Ankle Left (min 3 Views)    Result Date: 11/23/2020  EXAMINATION: THREE XRAY VIEWS OF THE LEFT ANKLE 11/23/2020 8:03 am COMPARISON: None.  HISTORY: ORDERING SYSTEM PROVIDED HISTORY: fall TECHNOLOGIST PROVIDED HISTORY: fall Reason for Exam: pt fell FINDINGS: The bones and joints are unremarkable without definite fracture, dislocation, significant degenerative change, radiopaque foreign body or bony destructive lesion     Unremarkable three view left ankle series       RECENT VITALS:     Temp: 97.2 °F (36.2 °C),  Pulse: 77, Resp: 22, BP: (!) 152/87, SpO2: 96 % This patient is a 60 y.o. Female with knee and low back pain following a fall. Seen here recently for a fall downstairs. Today she felt lightheaded and fell down about 6 steps onto her knees. No head strike or LOC. Currently has low back pain with midline spinal tenderness as well as bilateral knee tenderness. Awaiting imaging as well as lab work at this time. Plan for admit to ETU for syncope workup, PT/OT given recurrent falls. Has a history of narcotic abuse. Avoid narcotics if possible. Trauma team consulted due to the fact the patient did fall. After they reviewed the x-rays, they determined that the 7 mm loose body within the right knee was likely present on previous x-rays but was not read as such. Trauma team declined admission at this time. Patient will be admitted to observation service for syncope work-up. Cardiology notified via perfect serve. Patient restarted on her home medication for pain in the emergency department which was continued for observation service. OUTSTANDING TASKS / RECOMMENDATIONS:    1. F/u labs  2. F/u imaging     FINAL IMPRESSION:     1. Fall, initial encounter    2. Syncope, unspecified syncope type        DISPOSITION:         DISPOSITION:  []  Discharge   []  Transfer -    [x]  Admission -  obs   []  Against Medical Advice   []  Eloped   FOLLOW-UP: No follow-up provider specified.    DISCHARGE MEDICATIONS: New Prescriptions    No medications on file           Velma Stanford MD  Emergency Medicine Resident  [address]      Velma Stanford MD  12/19/20 0301       Velma Stanford MD  12/19/20 7023

## 2020-12-20 PROCEDURE — 6360000002 HC RX W HCPCS: Performed by: EMERGENCY MEDICINE

## 2020-12-20 PROCEDURE — 6370000000 HC RX 637 (ALT 250 FOR IP): Performed by: STUDENT IN AN ORGANIZED HEALTH CARE EDUCATION/TRAINING PROGRAM

## 2020-12-20 PROCEDURE — 94640 AIRWAY INHALATION TREATMENT: CPT

## 2020-12-20 PROCEDURE — G0378 HOSPITAL OBSERVATION PER HR: HCPCS

## 2020-12-20 PROCEDURE — 6370000000 HC RX 637 (ALT 250 FOR IP): Performed by: EMERGENCY MEDICINE

## 2020-12-20 PROCEDURE — 96372 THER/PROPH/DIAG INJ SC/IM: CPT

## 2020-12-20 RX ORDER — ONDANSETRON 4 MG/1
4 TABLET, ORALLY DISINTEGRATING ORAL EVERY 8 HOURS PRN
Status: DISCONTINUED | OUTPATIENT
Start: 2020-12-20 | End: 2020-12-21 | Stop reason: HOSPADM

## 2020-12-20 RX ORDER — CODEINE/BUTALBITAL/ASA/CAFFEIN 30-50-325
1 CAPSULE ORAL ONCE
Status: DISCONTINUED | OUTPATIENT
Start: 2020-12-20 | End: 2020-12-20

## 2020-12-20 RX ORDER — ALBUTEROL SULFATE 90 UG/1
2 AEROSOL, METERED RESPIRATORY (INHALATION) EVERY 6 HOURS PRN
Status: DISCONTINUED | OUTPATIENT
Start: 2020-12-20 | End: 2020-12-21 | Stop reason: HOSPADM

## 2020-12-20 RX ORDER — NICOTINE 21 MG/24HR
1 PATCH, TRANSDERMAL 24 HOURS TRANSDERMAL DAILY
Status: DISCONTINUED | OUTPATIENT
Start: 2020-12-20 | End: 2020-12-21 | Stop reason: HOSPADM

## 2020-12-20 RX ADMIN — ALBUTEROL SULFATE 2 PUFF: 90 AEROSOL, METERED RESPIRATORY (INHALATION) at 05:40

## 2020-12-20 RX ADMIN — CETIRIZINE HYDROCHLORIDE 10 MG: 10 TABLET ORAL at 09:08

## 2020-12-20 RX ADMIN — METHOCARBAMOL 1000 MG: 500 TABLET ORAL at 14:50

## 2020-12-20 RX ADMIN — CAPSAICIN: 0.25 CREAM TOPICAL at 09:08

## 2020-12-20 RX ADMIN — CLONIDINE HYDROCHLORIDE 0.1 MG: 0.1 TABLET ORAL at 20:10

## 2020-12-20 RX ADMIN — CAPSAICIN: 0.25 CREAM TOPICAL at 20:09

## 2020-12-20 RX ADMIN — TRAZODONE HYDROCHLORIDE 150 MG: 100 TABLET ORAL at 20:10

## 2020-12-20 RX ADMIN — METHOCARBAMOL 1000 MG: 500 TABLET ORAL at 04:33

## 2020-12-20 RX ADMIN — ACETAMINOPHEN 1000 MG: 500 TABLET ORAL at 06:13

## 2020-12-20 RX ADMIN — BUDESONIDE AND FORMOTEROL FUMARATE DIHYDRATE 2 PUFF: 160; 4.5 AEROSOL RESPIRATORY (INHALATION) at 08:52

## 2020-12-20 RX ADMIN — ENOXAPARIN SODIUM 40 MG: 40 INJECTION SUBCUTANEOUS at 09:06

## 2020-12-20 RX ADMIN — GABAPENTIN 600 MG: 300 CAPSULE ORAL at 09:06

## 2020-12-20 RX ADMIN — CLOMIPRAMINE HYDROCHLORIDE 50 MG: 25 CAPSULE ORAL at 20:10

## 2020-12-20 RX ADMIN — ONDANSETRON 4 MG: 4 TABLET, ORALLY DISINTEGRATING ORAL at 14:49

## 2020-12-20 RX ADMIN — LISINOPRIL 20 MG: 10 TABLET ORAL at 09:06

## 2020-12-20 RX ADMIN — IBUPROFEN 800 MG: 800 TABLET, FILM COATED ORAL at 04:33

## 2020-12-20 RX ADMIN — GABAPENTIN 600 MG: 300 CAPSULE ORAL at 20:10

## 2020-12-20 RX ADMIN — RISPERIDONE 2 MG: 2 TABLET, FILM COATED ORAL at 20:10

## 2020-12-20 RX ADMIN — ACETAMINOPHEN 1000 MG: 500 TABLET ORAL at 13:54

## 2020-12-20 RX ADMIN — LEVOTHYROXINE SODIUM 100 MCG: 100 TABLET ORAL at 06:13

## 2020-12-20 RX ADMIN — GABAPENTIN 600 MG: 300 CAPSULE ORAL at 13:54

## 2020-12-20 ASSESSMENT — PAIN SCALES - GENERAL
PAINLEVEL_OUTOF10: 3

## 2020-12-20 NOTE — ED NOTES
Bed: 40  Expected date:   Expected time:   Means of arrival:   Comments:  [id number]  12/19/20 2108

## 2020-12-20 NOTE — ED NOTES
Pt keeps leaving unit and smelling like smoke, admits to going to smoke cigarettes. MD ordered nicotine patch, per pt \"those dont work for [employer] within reach  Will continue to monitor.       Rylan Massey  12/20/20 7337

## 2020-12-20 NOTE — ED NOTES
Pt verbally aggressive to writer and to coordinator. Pt not understanding of hospital being full d/t COVID pandemic. Pt demanding more pain medications and to see ortho immediately. Coordinator explained to patient that ortho will see her during her admission.      Chriss Apley, RN  12/19/20 5131

## 2020-12-20 NOTE — ED NOTES
Report received from [place], [facility]. All questions answered   Resting in bed in NAD  Will continue to monitor.           [place]  12/20/20 8524

## 2020-12-20 NOTE — ED NOTES
Pt asleep in bed. NAD noted. RR even and nonlabored. Call light within reach. Will continue to monitor.          Brett Rivera RN  12/20/20 8298

## 2020-12-20 NOTE — CONSULTS
albuterol sulfate  (90 Base) MCG/ACT inhaler Inhale 1 puff into the lungs every 6 hours as needed for Wheezing [place] prescription until follow up with primary. Do not refill. Follow up with primary 12/18/20   KEANU Young CNP   Nebulizers (NEBULIZER COMPRESSOR) MISC Daily as needed 12/18/20   KEANU Young CNP   Respiratory Therapy Supplies (NEBULIZER/TUBING/MOUTHPIECE) KIT Daily as needed 12/18/20   KEANU Young CNP   cetirizine (ZYRTEC) 10 MG tablet Take 1 tablet by mouth daily 12/18/20   KEANU Young CNP   butalbital-aspirin-caffeine [facility]) -40 MG per capsule Take 1 capsule by mouth every 4 hours as needed for Headaches for up to 30 days. 12/18/20 1/17/21  KEANU Young CNP   Blood Pressure KIT Daily as needed 12/18/20   KEANU Young CNP   cyclobenzaprine (FLEXERIL) 5 MG tablet Take 1 tablet by mouth 2 times daily as needed for Muscle spasms Caution: may cause sedation. Do not take with zanaflex. 12/16/20 1/15/21  KEANU Young CNP   ondansetron (ZOFRAN ODT) 4 MG disintegrating tablet Take 1 tablet by mouth every 8 hours as needed for Nausea 12/4/20   KEANU Young CNP   ibuprofen (ADVIL;MOTRIN) 800 MG tablet Take 1 tablet by mouth every 8 hours as needed for Pain 12/4/20   KEANU Young CNP   Misc. Devices (CANE) MISC Quad base cane 11/28/20   Sagar Agrawal MD   acetaminophen (TYLENOL) 500 MG tablet Take 1-2 tablets by mouth every 8 hours as needed for Pain 11/18/20 12/18/20  KEANU Young CNP   gabapentin (NEURONTIN) 300 MG capsule Take 2 capsules by mouth 3 times daily for 30 days.  11/18/20 12/18/20  KEANU Young CNP   lisinopril (PRINIVIL;ZESTRIL) 20 MG tablet Take 1 tablet by mouth daily 11/5/20 12/18/20  KEANU Young CNP   famotidine (PEPCID) 20 MG tablet Take 1 tablet by mouth 2 times daily as needed (for acid reflux) 10/26/20   KEANU Young CNP clomiPRAMINE (ANAFRANIL) 50 MG capsule Take 1 capsule by mouth nightly 10/26/20   KEANU Beebe CNP   risperiDONE (RISPERDAL) 1 MG tablet Take 1 tablet by mouth daily and 2 tablets by mouth at bedtime 10/22/20   KEANU Beebe CNP   traZODone (DESYREL) 150 MG tablet Take 1 tablet by mouth nightly 10/22/20   KEANU Beebe CNP   cloNIDine (CATAPRES) 0.1 MG tablet Take 1 tablet by mouth nightly 10/22/20   KEANU Beebe CNP   Benzocaine-Menthol (CEPACOL) 6-10 MG LOZG lozenge Take 1 lozenge by mouth every 2 hours as needed for Sore Throat 10/22/20   KEANU Beebe CNP   aspirin EC 81 MG EC tablet Take 1 tablet by mouth daily 10/22/20   KEANU Beebe CNP   budesonide-formoterol [facility]) 160-4.5 MCG/ACT AERO Inhale 2 puffs into the lungs 2 times daily 10/9/20   KEANU Beebe CNP   levothyroxine (SYNTHROID) 100 MCG tablet Take 1 tablet by mouth Daily 10/9/20   KEANU Beebe CNP   tiZANidine (ZANAFLEX) 2 MG tablet Take 2 tablets by mouth every 8 hours as needed (muscle spasm, neck pain) 10/9/20   KEANU Beebe CNP   Elastic Bandages & Supports (CARPAL TUNNEL WRIST STABILIZER) MISC Apply to each wrist at bedtime 10/9/20   KEANU Beebe CNP   methyl salicylate-menthol (PAULETTE BRAY GREASELESS) 10-15 % CREA Apply topically 3 times daily as needed for Pain 9/21/20   Mariela Lofton MD   albuterol (PROVENTIL) (2.5 MG/3ML) 0.083% nebulizer solution Take 3 mLs by nebulization every 6 hours as needed for Wheezing  Patient not taking: Reported on 10/9/2020 6/18/20   KEANU Beebe CNP   ipratropium-albuterol (DUONEB) 0.5-2.5 (3) MG/3ML SOLN nebulizer solution Inhale 3 mLs into the lungs every 4 hours  Patient not taking: Reported on 10/9/2020 6/16/19   Bridger Ahumada MD Allergies:  Imitrex [sumatriptan], Bee pollen, Bee venom, Bromide ion [bromine], Ketorolac, Reglan [metoclopramide], Sulfa antibiotics, Sulfadiazine, and Tramadol    Social History:   reports that she has been smoking. She has a 6.00 pack-year smoking history. She has never used smokeless tobacco. She reports previous drug use. She reports that she does not drink alcohol. Family History:   neg for early CAD    REVIEW OF SYSTEMS:    · Constitutional: there has been no unanticipated weight loss. There's been No change in energy level, No change in activity level. · Eyes: No visual changes or diplopia. No scleral icterus. · ENT: No Headaches, hearing loss or vertigo. No mouth sores or sore throat. · Cardiovascular: As HPI  · Respiratory: As HPI  · Gastrointestinal: No abdominal pain, appetite loss, blood in stools. No change in bowel or bladder habits. · Genitourinary: No dysuria, trouble voiding, or hematuria. · Musculoskeletal:  No gait disturbance, No weakness or joint complaints. · Integumentary: No rash or pruritis. · Neurological: No headache, diplopia, change in muscle strength, numbness or tingling. No change in gait, balance, coordination, mood, affect, memory, mentation, behavior. · Psychiatric: No anxiety, or depression. · Endocrine: No temperature intolerance. No excessive thirst, fluid intake, or urination. No tremor. · Hematologic/Lymphatic: No abnormal bruising or bleeding, blood clots or swollen lymph nodes. · Allergic/Immunologic: No nasal congestion or hives. PHYSICAL EXAM:    Physical Examination:    /71   Pulse 74   Temp 97.2 °F (36.2 °C) (Oral)   Resp 18   Ht 5' 2.5\" (1.588 m)   Wt 203 lb (92.1 kg)   SpO2 97%   BMI 36.54 kg/m²    Constitutional and General Appearance: alert, cooperative, no distress and appears stated age  HEENT: PERRL, no cervical lymphadenopathy. No masses palpable.  Normal oral mucosa  Respiratory: · Normal excursion and expansion without use of accessory muscles  · Resp Auscultation: Good respiratory effort. No for increased work of breathing. On auscultation: Clear  Cardiovascular:  · The apical impulse is not displaced  · Heart tones are crisp and normal. regular S1 and S2. Murmurs: none  · Jugular venous pulsation Normal  · The carotid upstroke is normal in amplitude and contour without delay or bruit  · Peripheral pulses are symmetrical and full   Abdomen:  · No masses or tenderness  · Bowel sounds present  Extremities:  ·  No Cyanosis or Clubbing  ·  Lower extremity edema: none  ·  Skin: Warm and dry  Neurological:  · Alert and oriented.   · Moves all extremities well  · No abnormalities of mood, affect, memory, mentation, or behavior are noted    DATA:    Diagnostics:      EKG:   Results for orders placed or performed during the hospital encounter of 12/19/20   EKG 12 Lead   Result Value Ref Range    Ventricular Rate 72 BPM    Atrial Rate 72 BPM    P-R Interval 166 ms    QRS Duration 88 ms    Q-T Interval 434 ms    QTc Calculation (Bazett) 475 ms    P Axis 52 degrees    R Axis 23 degrees    T Axis 38 degrees    Narrative    Normal sinus rhythm  Possible Left atrial enlargement  Borderline ECG  When compared with ECG of 26-AUG-2020 05:40,  No significant change was found       Echo:  Results for orders placed or performed during the hospital encounter of 06/02/19   ECHO Complete 2D W Doppler W Color   Result Value Ref Range    Left Ventricular Ejection Fraction 74     LVEF MODALITY ECHO     Narrative    Transthoracic Echocardiography Report (TTE)     Patient Name JOSE      Date of Study           06/03/2019                PANCHO HOBSON      Date of      1965   Gender                  Female   Birth      Age          62 year(s)   Race                    Other      Room Number  0432         Height:                 62 inch, 157.48 cm Corporate ID [id number]     Weight:                 202 pounds, 91.6 kg   #      Patient Acct [de-identified]    BSA:        1.92 m^2    BMI:       36.95 kg/m^2   #      MR #         [id number]      [address] [facility]      Accession #  [id number]    Interpreting Physician  Logan Salcedo      Fellow                    Referring Nurse                             Practitioner      Interpreting Juanito Bo  Referring Physician     Carl Mahmood MD   Fellow     Type of Study      TTE procedure:2D Echocardiogram, M-Mode, Doppler, Color Doppler. Procedure Date  Date: 06/03/2019 Start: 11:44 AM    Study Location: [facility]. Comments:  Procedure explained to patient. SYNCOPE    Patient Status: Inpatient    Height: 62 inches Weight: 202 pounds BSA: 1.92 m^2 BMI: 36.95 kg/m^2    CONCLUSIONS    Summary  Normal left ventricle size, wall thickness and function, Calculated EF via  heart model method is 74 %  No significant valvular abnormalities  Mild tricuspid regurgitation  No pericardial effusion    Signature  ----------------------------------------------------------------------------   Electronically signed by Judd Lam on 06/03/2019 12:06   PM  ----------------------------------------------------------------------------    ----------------------------------------------------------------------------   Electronically signed by Logan Salcedo(Interpreting physician) on   06/03/2019 03:03 PM  ----------------------------------------------------------------------------  FINDINGS  Left Atrium  Left atrium is normal in size. Left Ventricle  Normal left ventricle size, wall thickness and function,  Calculated EF via heart model method is 74 %. Right Atrium  Normal right atrial dimension. Right Ventricle  Normal right ventricle size and function. Mitral Valve  Mildly thickened mitral valve leaflets. No mitral stenosis. Mild mitral regurgitation. Aortic Valve  Normal aortic valve structure and function without stenosis or  regurgitation. Aortic valve is trileaflet. Tricuspid Valve  Normal tricuspid valve structure and function. Mild tricuspid regurgitation. Estimated right ventricular systolic pressure is 32 mmHg. Pulmonic Valve  The pulmonic valve is normal in structure. Pericardial Effusion  No significant pericardial effusion is seen. Miscellaneous  Normal aortic root dimension. IVC normal diameter & inspiratory collapse indicating normal RA filling  pressure .     M-mode / 2D Measurements & Calculations:      LVIDd:4.8 cm(3.7 - 5.6 cm)       Diastolic RYHKZR:533 ml   HXBUC:6.3 cm(2.2 - 4.0 cm)       Systolic PDHJBS:76 ml   MLW.0 cm(0.6 - 1.1 cm)        Aortic Root:3 cm(2.0 - 3.7 cm)   LVPWd:0.9 cm(0.6 - 1.1 cm)       LA Dimension: 4.3 cm(1.9 - 4.0 cm)   Fractional Shortenin.42 %   Calculated LVEF (%): 73.53 %     LVOT:2 cm      Mitral:                                Aortic      Valve Area (P1/2-Time): 4 cm^2         Peak Velocity: 1.77 m/s   Peak E-Wave: 0.97 m/s                  Peak Gradient: 12.53 mmHg   Peak A-Wave: 1.03 m/s   E/A Ratio: 0.94   Peak Gradient: 3.76 mmHg   Mean Gradient: 3 mmHg   Deceleration Time: 187 msec   P1/2t: 55 msec      Area (continuity): 3.69 cm^2   Mean Velocity: 0.76 m/s      Tricuspid:                             Pulmonic:      Estimated RVSP: 32 mmHg                Peak Velocity: 1.30 m/s   Peak TR Velocity: 2.71 m/s             Peak Gradient: 6.76 mmHg   Peak TR Gradient: 29.3764 mmHg   Estimated RA Pressure: 3 mmHg                                             Estimated PASP: 32.38 mmHg     Septal Wall E' velocity:0.08 m/s  Lateral Wall E' velocity:0.08 m/s     Labs:     CBC:   Recent Labs     20  1616   WBC 8.0   HGB 13.1   HCT 39.5        BMP:   Recent Labs     20  1616      K 4.1   CO2 23   BUN 8   CREATININE 0.47*   LABGLOM >60   GLUCOSE 100* BNP: No results for input(s): BNP in the last 72 hours. PT/INR: No results for input(s): PROTIME, INR in the last 72 hours. APTT:No results for input(s): APTT in the last 72 hours. CARDIAC ENZYMES:  Recent Labs     12/19/20  1616 12/19/20  1850   TROPHS <6 <6     FASTING LIPID PANEL:  Lab Results   Component Value Date    HDL 56 02/19/2019    TRIG 150 02/19/2019     LIVER PROFILE:No results for input(s): AST, ALT, LABALBU in the last 72 hours. IMPRESSION:    Patient Active Problem List   Diagnosis    Chest pain    Migraine variant with headache    Drug overdose, accidental or unintentional, initial encounter    Hypothyroidism    Essential hypertension    Seizure disorder ([address]    Drug overdose    History of seizure    Major depressive disorder with psychotic features ([address]    Severe major depression ([address]    Overdose of barbiturate, intentional self-harm, initial encounter ([address]    Intentional phenobarbital overdose ([address]    Severe episode of recurrent major depressive disorder, without psychotic features ([address]    Suicide attempt ([address]    Major depressive disorder, recurrent ([address]    Sepsis ([address]    Severe malnutrition ([address]    Altered mental status    Hypokalemia    Congestive heart failure of unknown etiology ([address]    Lumbar radiculopathy    Chronic low back pain    Piriformis syndrome    COPD (chronic obstructive pulmonary disease) ([address]    Major depressive disorder, single episode, unspecified    Severe depressed bipolar I disorder without psychotic features ([address]    Polysubstance abuse ([address]    Syncope and collapse     - Dizziness- ?  Orthostatic hypotension  - Fall- mechanical without any LOC  - HTN     RECOMMENDATIONS:  - acute MI ruled out with troponins  - will check 2d Echo to eval for structural heart disease  - check orthostatics - if all low risk, stay well hydrated, use compression stockings and follow up as outpatient in 2 weeks for consideration for further testing    Discussed with patient and nursing. Thank you for allowing me to participate in the care of this patient, please do not hesitate to call if you have any questions. Bebeto Still DO, [facility], [address], [place] [facility]  [email or web address]  [phone]

## 2020-12-20 NOTE — ED NOTES
Pt with emesis while eating, went to medicate, pt stated \"I dont know why that happened, I'm ok\". MD notified, able to eat and drink after that episode.    Call light within reach  Will continue to monitor      Carlita Callum  12/20/20 3844

## 2020-12-20 NOTE — ED NOTES
Pt resting in bed in NAD  Call light within reach   Will continue to monitor      Nikki Bloch  12/20/20 0853

## 2020-12-20 NOTE — ED NOTES
Pt provided meal tray. Pt ambulatory with cane outside to smoke.      Arthur Carrasquillo RN  12/20/20 7468

## 2020-12-20 NOTE — ED NOTES
Pt asleep in bed. NAD noted. RR even and nonlabored. Call light within reach. Will continue to monitor.          Nayeli Borden RN  12/20/20 6870

## 2020-12-20 NOTE — H&P
[address] [facility]  CDU / OBSERVATION eNCOUnter  Resident Note     Pt Name: Марина Caro  MRN: [id number]  [place] 1965  Date of evaluation: 12/20/20  Patient's PCP is :  KEANU Carreon - FELIX    CHIEF COMPLAINT       Chief Complaint   Patient presents with    Fall     c/o falling down 6 steps this morning, states missed a step, pt denies hitting her head or loc. HISTORY OF PRESENT ILLNESS    Aleta Brunner is a 60 y.o. female who presents following a fall, that patient attributes to missing a step. She fell down 6 feet of stairs onto her knees, states that she fell backwards after striking her knees and hit her lower back. Did not hit her head or lose consciousness. Patient however states that she felt lightheaded prior to the fall, but denies any prodromal symptoms such as chest pain, shortness of breath, palpitations. Currently has pain in bilateral knees as well as lumbar back. Denies any bowel or bladder incontinence retention, no saddle anesthesia. No history of IVDU. No anticoagulation use. Recently seen in ED in November after similar fall. Will schedule for MRI for persistent knee pain, however this has not been done yet.     Location/Symptom: Bilateral knee and lumbar back pain  Timing/Onset: Yesterday  Provocation: After a fall  Quality: Sharp  Radiation: None  Severity: Moderate  Timing/Duration: Constant  Modifying Factors: None    REVIEW OF SYSTEMS       General ROS - No fevers, No malaise   Ophthalmic ROS - No discharge, No changes in vision  ENT ROS -  No sore throat, No rhinorrhea,   Respiratory ROS - no shortness of breath, no cough, no  wheezing  Cardiovascular ROS - No chest pain, no dyspnea on exertion  Gastrointestinal ROS - No abdominal pain, no nausea or vomiting, no change in bowel habits, no black or bloody stools  Genito-Urinary ROS - No dysuria, trouble voiding, or hematuria  Musculoskeletal ROS - No myalgias, No arthalgias Neurological ROS - No headache, no dizziness/lightheadedness, No focal weakness, no loss of sensation  Dermatological ROS - No lesions, No rash     (PQRS) Advance directives on face sheet per hospital policy. No change unless specifically mentioned in chart    PAST MEDICAL HISTORY    has a past medical history of Arthritis, Asthma, Borderline diabetes, Borderline personality disorder ([facility] [address] CHF (congestive heart failure) ([facility] [address] COPD (chronic obstructive pulmonary disease) ([facility] [address] Fibromyalgia, Headache, Hypertension, Manic depression ([facility] [address] Movement disorder, Neck fracture ([facility] [address] Pernicious anemia, Seizure ([facility] [address] and Thyroid disease. I have reviewed the past medical history with the patient and it is pertinent to this complaint. SURGICAL HISTORY      has a past surgical history that includes knee surgery (Bilateral); partial hysterectomy (cervix not removed); lymph node dissection; Irrigation and debridement (Right, 5/17/2019); EXPLORATION OF WOUND OF EXTREMITY (Right, 5/19/2019); and EXPLORATION OF WOUND OF EXTREMITY (N/A, 5/22/2019). I have reviewed and agree with Surgical History entered and it is pertinent to this complaint.      CURRENT MEDICATIONS         albuterol sulfate  (90 Base) MCG/ACT inhaler 2 puff, Q6H PRN      acetaminophen (TYLENOL) tablet 1,000 mg, 3 times per day      ibuprofen (ADVIL;MOTRIN) tablet 800 mg, Q8H PRN      methocarbamol (ROBAXIN) tablet 1,000 mg, 4x Daily PRN      gabapentin (NEURONTIN) capsule 300 mg, Q8H PRN      albuterol (PROVENTIL) nebulizer solution 2.5 mg, Q6H PRN      aspirin EC tablet 81 mg, Daily      budesonide-formoterol (SYMBICORT) 160-4.5 MCG/ACT inhaler 2 puff, BID      cetirizine (ZYRTEC) tablet 10 mg, Daily      clomiPRAMINE (ANAFRANIL) capsule 50 mg, Nightly      cloNIDine (CATAPRES) tablet 0.1 mg, Nightly      famotidine (PEPCID) tablet 20 mg, BID PRN      gabapentin (NEURONTIN) capsule 600 mg, TID   levothyroxine (SYNTHROID) tablet 100 mcg, Daily      lisinopril (PRINIVIL;ZESTRIL) tablet 20 mg, Daily      risperiDONE (RISPERDAL) tablet 2 mg, Nightly      traZODone (DESYREL) tablet 150 mg, Nightly      sodium chloride flush 0.9 % injection 10 mL, 2 times per day      sodium chloride flush 0.9 % injection 10 mL, PRN      enoxaparin (LOVENOX) injection 40 mg, Daily      ondansetron (ZOFRAN) injection 4 mg, Q8H PRN      capsaicin (ZOSTRIX) 0.025 % cream, BID        All medication charted and reviewed. ALLERGIES     is allergic to imitrex [sumatriptan]; bee pollen; bee venom; bromide ion [bromine]; ketorolac; reglan [metoclopramide]; sulfa antibiotics; sulfadiazine; and tramadol. FAMILY HISTORY     She indicated that her mother is . She indicated that her father is . family history is not on file. The patient denies any pertinent family history. I have reviewed and agree with the family history entered. I have reviewed the Family History and it is not significant to the case    SOCIAL HISTORY      reports that she has been smoking. She has a 6.00 pack-year smoking history. She has never used smokeless tobacco. She reports previous drug use. She reports that she does not drink alcohol. I have reviewed and agree with all Social.  There are no concerns for substance abuse/use. PHYSICAL EXAM     INITIAL VITALS:  height is 5' 2.5\" (1.588 m) and weight is 203 lb (92.1 kg). Her oral temperature is 97.2 °F (36.2 °C). Her blood pressure is 111/71 and her pulse is 74. Her respiration is 18 and oxygen saturation is 97%.       CONSTITUTIONAL: AOx4, no apparent distress, appears stated age    HEAD: normocephalic, atraumatic   EYES: PERRLA, EOMI    ENT: moist mucous membranes, uvula midline   NECK: supple, symmetric   BACK: symmetric   LUNGS: clear to auscultation bilaterally   CARDIOVASCULAR: regular rate and rhythm, no murmurs, rubs or gallops ABDOMEN: soft, non-tender, non-distended with normal active bowel sounds   NEUROLOGIC:  MAEx4, no focal sensory or motor deficits   MUSCULOSKELETAL: no clubbing, cyanosis or edema   SKIN: no rash or wounds       DIFFERENTIAL DIAGNOSIS/MDM:     Back Pain:  DDX: PE, vertebral fracture, epidural abscess  Lower: cauda equina, herniated disc   Mid: AAA rupture, pyelonephritis, kidney stone, pancreatitis, PUD  Upper: pneumothorax, aortic dissection, PE, nonspecific    Syncope:  DDX: Stroke, TIA, hypoglycemia, cardiogenic, neurogenic, vasovagal, mechanical fall      DIAGNOSTIC RESULTS     EKG: All EKG's are interpreted by the Observation Physician who either signs or Co-signs this chart in the absence of a cardiologist.    EKG Interpretation    Interpreted by observation physician    Rhythm: normal sinus   Rate: normal  Axis: normal  Ectopy: none  Conduction: normal  ST Segments: No acute change  T Waves: No acute change  Q Waves: none    Clinical Impression: no acute changes compared to 8/26/2020    Che Prasad MD    RADIOLOGY:   I directly visualized the following  images and reviewed the radiologist interpretations:    Xr Knee Left (3 Views)    Result Date: 12/19/2020  EXAMINATION: THREE XRAY VIEWS OF THE LEFT KNEE 12/19/2020 3:14 pm COMPARISON: None. HISTORY: ORDERING SYSTEM PROVIDED HISTORY: Knee pain s/p fall TECHNOLOGIST PROVIDED HISTORY: Knee pain s/p fall Reason for Exam: Fall today/ knee pain/ port. Acuity: Acute Type of Exam: Initial FINDINGS: There is no acute fracture or suspect osseous lesion. No significant arthropathy is seen. There is mild medial compartment joint space narrowing. No soft tissue abnormality or joint effusion is evident. No acute osseous abnormality of the left knee.      Xr Knee Right (3 Views)    Result Date: 12/19/2020 EXAMINATION: THREE XRAY VIEWS OF THE RIGHT KNEE 12/19/2020 3:14 pm COMPARISON: 11/26/2020 HISTORY: ORDERING SYSTEM PROVIDED HISTORY: Fall TECHNOLOGIST PROVIDED HISTORY: Fall Reason for Exam: Fall today/ knee pain/ port. Acuity: Acute Type of Exam: Initial FINDINGS: There is no acute fracture or suspect osseous lesion. Joint spaces and alignment are maintained. Minimal degenerative changes are present. No focal soft tissue swelling or joint effusion is evident. 7 mm intra-articular loose body is noted in the lateral femorotibial compartment. 1. No acute osseous abnormality of the right knee. 2. 7 mm intra-articular loose body in the lateral femorotibial compartment. Ct Thoracic Spine Wo Contrast    Result Date: 12/19/2020  EXAMINATION: CT OF THE THORACIC SPINE WITHOUT CONTRAST  12/19/2020 3:32 pm: TECHNIQUE: CT of the thoracic spine was performed without the administration of intravenous contrast. Multiplanar reformatted images are provided for review. Dose modulation, iterative reconstruction, and/or weight based adjustment of the mA/kV was utilized to reduce the radiation dose to as low as reasonably achievable. COMPARISON: February 12, 2019 HISTORY: ORDERING SYSTEM PROVIDED HISTORY: Midline spine tenderness, fall TECHNOLOGIST PROVIDED HISTORY: Midline spine tenderness, fall Reason for Exam: Midline spine tenderness, fall Acuity: Acute Type of Exam: Initial FINDINGS: BONES/ALIGNMENT: There is normal alignment of the spine. The vertebral body heights are maintained. No osseous destructive lesion is seen. DEGENERATIVE CHANGES: No gross spinal canal stenosis or bony neural foraminal narrowing of the thoracic spine. SOFT TISSUES: No paraspinal mass is seen.   Bibasilar opacities are present, likely representing dependent atelectasis     No evidence of an acute fracture or traumatic malalignment involving the thoracic spine Bibasilar opacities, consistent with dependent atelectasis Ct Lumbar Spine Wo Contrast    Result Date: 12/19/2020  EXAMINATION: CT OF THE LUMBAR SPINE WITHOUT CONTRAST  12/19/2020 TECHNIQUE: CT of the lumbar spine was performed without the administration of intravenous contrast. Multiplanar reformatted images are provided for review. Dose modulation, iterative reconstruction, and/or weight based adjustment of the mA/kV was utilized to reduce the radiation dose to as low as reasonably achievable. COMPARISON: February 12, 2019 HISTORY: ORDERING SYSTEM PROVIDED HISTORY: Midline spine tenderness, fall TECHNOLOGIST PROVIDED HISTORY: Midline spine tenderness, fall Reason for Exam: Midline spine tenderness, fall Acuity: Acute Type of Exam: Initial FINDINGS: BONES/ALIGNMENT: There is normal alignment of the spine. The vertebral body heights are maintained. No osseous destructive lesion is seen. A bone island is again visualized within the right side of the T11 vertebral body, unchanged. DEGENERATIVE CHANGES: No significant degenerative changes of the lumbar spine. SOFT TISSUES/RETROPERITONEUM: No paraspinal mass is seen. Bibasilar atelectasis is present. No evidence of an acute fracture or traumatic malalignment involving the lumbar spine       LABS:  I have reviewed and interpreted all available lab results. Labs Reviewed   CBC WITH AUTO DIFFERENTIAL - Abnormal; Notable for the following components:       Result Value    Immature Granulocytes 1 (*)     All other components within normal limits   BASIC METABOLIC PANEL - Abnormal; Notable for the following components:    Glucose 100 (*)     CREATININE 0.47 (*)     Calcium 8.5 (*)     All other components within normal limits   TROPONIN   TROPONIN   BRAIN NATRIURETIC PEPTIDE   URINE RT REFLEX TO CULTURE       CDU IMPRESSION / PLAN      Марина Caro is a 60 y.o. female who presents with questionable presyncopal episode and fall, resulting in acute worsening of bilateral chronic knee pain and new onset lumbar back pain.

## 2020-12-20 NOTE — PROGRESS NOTES
CDU Daily Progress Note  Attending Physician       Pt Name: Zuleyka Ramon  MRN: [id number]  [place] 1965  Date of evaluation: 12/20/20    I performed a history and physical examination of the patient and discussed management with the resident. I reviewed the residents note and agree with the documented findings and plan of care. Any areas of disagreement are noted on the chart. I was personally present for the key portions of any procedures. I have documented in the chart those procedures where I was not present during the key portions. I have reviewed the emergency nurses triage note. I agree with the chief complaint, past medical history, past surgical history, allergies, medications, social and family history as documented unless otherwise noted below. Documentation of the HPI, Physical Exam and Medical Decision Making performed by medical students or scribes is based on my personal performance of the HPI, PE and MDM. For Physician Assistant/ Nurse Practitioner cases/documentation I have personally evaluated this patient and have completed at least one if not all key elements of the E/M (history, physical exam, and MDM). Additional findings are as noted. The Family History, Social History and Review of Systems are unchanged from the previous day. No significant events overnight. Valentino Huh down 7 steps landing on her knees and the hitting her low back on the stairs injuring her low back and bilateral knees. Has had problems with her knees after falling down is similar manner. She is not sure if she trips and falls or if she just looses consciousness when she falls. Denies any head bump, chest pain, headache, N/V. PMHx: asthma, COPD, smoker, chronic knee pain, HTN, fibromyalgia, bipolar, neck fracture, pernicious anemia, Sz, headaches, CT T spine, Lumbar spine neg, Xray both knees neg except for previously seen intraarticular loose body. Trauma consulted, Cards consulted. Will get 2D echo. Patient smokes 10 cigarettes per day for 44 years. Smoking cessation counseling for 3 minutes. Discussed the effects of smoking in regards to heart disease, lung disease, stroke and cancer. I explained how this negatively effects their condition, will contribute to increased recovery time, and possible lead to further health problems in the future.     Sonya Menjivar MD  Attending Physician  Critical Decision Unit

## 2020-12-20 NOTE — ED NOTES
Pt resting in bed. NAD noted. RR even and nonlabored. Call light within reach. Will continue to monitor.        Melissa Mo RN  12/19/20 3768

## 2020-12-20 NOTE — ED NOTES
ED to inpatient nurses report    Chief Complaint   Patient presents with    Fall     c/o falling down 6 steps this morning, states missed a step, pt denies hitting her head or loc.       Present to ED from home  LOC: alert and orientated to name, place, date  Vital signs   Vitals:    12/19/20 1420 12/19/20 2253 12/20/20 0002   BP: (!) 159/97 (!) 152/87 113/79   Pulse: 89 77 68   Resp: 20 22 20   Temp: 97.2 °F (36.2 °C)     TempSrc: Oral     SpO2: 98% 96% 93%   Weight: 203 lb (92.1 kg)     Height: 5' 2.5\" (1.588 m)        Oxygen Baseline none    Current needs required none   LDAs:    Mobility: ambulates with cane  Pending ED orders: none  Present condition: stable  Code Status: [unfilled]   Consults:  []  Hospitalist  Completed  [] yes [] no  []  Medicine  Completed  [] yes [] No  [x]  Cardiology  Completed  [] yes [x] No  []  GI   Completed  [] yes [] No  []  Neurology  Completed  [] yes [] No  []  Nephrology Completed  [] yes [] No  []  Vascular  Completed  [] yes [] No   [x]  Surgery  Completed  [x] yes [] No   []  Urology  Completed  [] yes [] No   []  Plastics  Completed  [] yes [] No   []  ENT  Completed  [] yes [] No   [x]  Other: OBS  Completed  [x] yes [] No    Pertinent event(s)   Pertinent event(s) follow up outpatient with ortho  Electronically signed by Anita Barr RN on 12/20/2020 at 2:52 AM     Nayeli Borden RN  12/20/20 6292

## 2020-12-20 NOTE — ED NOTES
Pt resting in bed in NAD  Call light within reach  Will continue to monitor      Tan Pereira  12/20/20 3955

## 2020-12-20 NOTE — ED NOTES
Pt c/o of pain given tylenol at 1357 and robaxin for pain at 1440. Also, c/o of nausea, given zofran tablet.      Zion Hurtado  12/20/20 1226

## 2020-12-20 NOTE — PROGRESS NOTES
Trauma Tertiary Survey    Admit Date: 12/19/2020  Hospital day 0    Fall distance 6 steps       Past Medical History:   Diagnosis Date    Arthritis     Asthma     Borderline diabetes     Borderline personality disorder ([facility] [address]     CHF (congestive heart failure) (HCC)     COPD (chronic obstructive pulmonary disease) (HCC)     Fibromyalgia     Headache     Hypertension     Manic depression (HCC)     Movement disorder     Neck fracture (HCC)     Pernicious anemia     Seizure (HCC)     Thyroid disease        Scheduled Meds:   lidocaine  1 patch Transdermal Once    [START ON 12/20/2020] acetaminophen  1,000 mg Oral 3 times per day    aspirin EC  81 mg Oral Daily    budesonide-formoterol  2 puff Inhalation BID    cetirizine  10 mg Oral Daily    clomiPRAMINE  50 mg Oral Nightly    cloNIDine  0.1 mg Oral Nightly    gabapentin  600 mg Oral TID    [START ON 12/20/2020] levothyroxine  100 mcg Oral Daily    lisinopril  20 mg Oral Daily    risperiDONE  2 mg Oral Nightly    traZODone  150 mg Oral Nightly    sodium chloride flush  10 mL Intravenous 2 times per day    enoxaparin  40 mg Subcutaneous Daily    capsaicin   Topical BID     Continuous Infusions:  PRN Meds:ibuprofen, methocarbamol, gabapentin, albuterol, famotidine, sodium chloride flush, ondansetron    Subjective:     Patient has complaints of acute on chronic knee and low back pain. Pain is moderate, worsens with movement, and some relief by rest.  There is not associated numbness, tingling, weakness. Objective:     Patient Vitals for the past 8 hrs:   BP Temp Temp src Pulse Resp SpO2 Height Weight   12/19/20 1420 (!) 159/97 97.2 °F (36.2 °C) Oral 89 20 98 % 5' 2.5\" (1.588 m) 203 lb (92.1 kg)       No intake/output data recorded. No intake/output data recorded. Radiology:  Xr Knee Left (3 Views)    Result Date: 12/19/2020  No acute osseous abnormality of the left knee.      Xr Knee Right (3 Views)    Result Date: 12/19/2020 1. No acute osseous abnormality of the right knee. 2. 7 mm intra-articular loose body in the lateral femorotibial compartment. Ct Thoracic Spine Wo Contrast    Result Date: 12/19/2020  No evidence of an acute fracture or traumatic malalignment involving the thoracic spine Bibasilar opacities, consistent with dependent atelectasis     Ct Lumbar Spine Wo Contrast    Result Date: 12/19/2020  No evidence of an acute fracture or traumatic malalignment involving the lumbar spine       PHYSICAL EXAM:   GCS:  4 - Opens eyes on own   6 - Follows simple motor commands  5 - Alert and oriented    Pupil size:  Left 3 mm Right 3 mm  Pupil reaction: Yes  Wiggles fingers: Left Yes Right Yes  Hand grasp:   Left normal   Right normal  Wiggles toes: Left Yes    Right Yes  Plantar flexion: Left normal  Right normal    GENERAL:  awake, cooperative, NAD  HEENT:  NCAT  CV:  well perfused  LUNGS:  unlabored breathing  ABDOMEN:  soft, non-distended, without tenderness to palpation  EXTREMITIES: Without gross deformity, radial and DP pulses +2 b/l  MSK:  Minimal TTP Right knee without effusion. C/T/L spine without gross deformity, mild TTP L spine. Seen ambulating with cane in ED.   NEURO:  A&Ox3, CN 2-12 grossly intact, sensation to light touch grossly intact BUE & BLE, motor strength 5/5  strength, wiggles toes, repositions self in bed independently  SKIN: Warm and dry      Spine:     Spine Tenderness ROM   Cervical 0 /10 Normal   Thoracic 0 /10 Normal   Lumbar 3 /10 Normal     Musculoskeletal    Joint Tenderness Swelling ROM   Right shoulder absent absent normal   Left shoulder absent absent normal   Right elbow absent absent normal   Left elbow absent absent normal   Right wrist absent absent normal   Left wrist absent absent normal   Right hand grasp absent absent normal   Left hand grasp absent absent normal   Right hip absent absent normal   Left hip absent absent normal   Right knee present absent normal Left knee absent absent normal   Right ankle absent absent normal   Left ankle absent absent normal   Right foot absent absent normal   Left foot absent absent normal           CONSULTS:   none    PROCEDURES: none    INJURIES:        Patient Active Problem List   Diagnosis    Chest pain    Migraine variant with headache    Drug overdose, accidental or unintentional, initial encounter    Hypothyroidism    Essential hypertension    Seizure disorder ([address]    Drug overdose    History of seizure    Major depressive disorder with psychotic features ([address]    Severe major depression ([address]    Overdose of barbiturate, intentional self-harm, initial encounter ([address]    Intentional phenobarbital overdose ([address]    Severe episode of recurrent major depressive disorder, without psychotic features ([address]    Suicide attempt ([address]    Major depressive disorder, recurrent ([address]    Sepsis ([address]    Severe malnutrition ([address]    Altered mental status    Hypokalemia    Congestive heart failure of unknown etiology ([address]    Lumbar radiculopathy    Chronic low back pain    Piriformis syndrome    COPD (chronic obstructive pulmonary disease) ([address]    Major depressive disorder, single episode, unspecified    Severe depressed bipolar I disorder without psychotic features ([address]    Polysubstance abuse ([address]    Syncope and collapse         Assessment/Plan:     · Fall down 6 stairs, -LOC, -amnesia, takes ASA     Imaging: no acute injuries. Labs: WNL  Consults: none  Intra-articular loose body seen on XR right knee similar to prior studies. Discussed with ortho. Patient to follow-up as outpatient with previously scheduled orthopedic surgery appointment for knee complaints.   TERT exam: negative  Trauma to sign off  Dispo per ED          Attending Note I have reviewed the above TECSS note(s) and I either performed the key elements of the medical history and physical exam or was present with the resident when the key elements of the medical history and physical exam were performed. I have discussed the findings, established the care plan and recommendations with Resident, TECSS RN, bedside nurse.     Lucio Gottron, MD  12/20/2020  8:25 AM

## 2020-12-20 NOTE — ED NOTES
Patient on stretcher with no complaints. POC updated.  Will continue to monitor        Demetrio Issa RN  12/19/20 2044

## 2020-12-20 NOTE — ED NOTES
Report received from [place], [facility]. All questions answered. Dr. Charbel Chou and Patricia Marie, Coordinator at bedside speaking with patient.      Alexa Arzola RN  12/20/20 5077

## 2020-12-20 NOTE — ED NOTES
Pt woke up at approximately 0405 asking for pain medication, at which time this nurse observed her go into her purse and take a medication from her wallet. Pt states \"it was just a 81 mg aspirin,\" this nurse did not see the bottle or pill prior to the patient ingesting the pill. Pt then was asking for additional pain medications. Spoke to Dr. Bustamante Forward, ok with giving additional pain medication. Pt asking \"for all my pain medication, whatever I get. \"This nurse administered pt with pain medication. Pt now requesting that she go outside, pt is signing a document  that this nurse was provided with by the unit coordinator \"Policy on Patients Remaining on Their Unit\", per the coordinator it is ok to allow patient to go outside by signing this document.               Aneta Hawkins  12/20/20 0453

## 2020-12-20 NOTE — ED NOTES
Pt tells writer \"While I'm here, I would like to see an orthopedic surgeon. I have a piece of bone floating in my knee. I would hate to say something bad about this place. \"     Joaquin iWlhelm RN  12/20/20 4764

## 2020-12-20 NOTE — ED NOTES
Resting in bed in [facility]  Will continue to monitor.   Call light within reach      Ange Basurto  12/20/20 8850

## 2020-12-21 ENCOUNTER — TELEPHONE (OUTPATIENT)
Dept: ORTHOPEDIC SURGERY | Age: 55
End: 2020-12-21

## 2020-12-21 VITALS
RESPIRATION RATE: 18 BRPM | BODY MASS INDEX: 35.97 KG/M2 | OXYGEN SATURATION: 92 % | WEIGHT: 203 LBS | DIASTOLIC BLOOD PRESSURE: 79 MMHG | HEART RATE: 81 BPM | HEIGHT: 63 IN | TEMPERATURE: 98.4 F | SYSTOLIC BLOOD PRESSURE: 122 MMHG

## 2020-12-21 LAB
EKG ATRIAL RATE: 72 BPM
EKG P AXIS: 52 DEGREES
EKG P-R INTERVAL: 166 MS
EKG Q-T INTERVAL: 434 MS
EKG QRS DURATION: 88 MS
EKG QTC CALCULATION (BAZETT): 475 MS
EKG R AXIS: 23 DEGREES
EKG T AXIS: 38 DEGREES
EKG VENTRICULAR RATE: 72 BPM
LV EF: 66 %
LVEF MODALITY: NORMAL

## 2020-12-21 PROCEDURE — 6360000002 HC RX W HCPCS: Performed by: STUDENT IN AN ORGANIZED HEALTH CARE EDUCATION/TRAINING PROGRAM

## 2020-12-21 PROCEDURE — 97166 OT EVAL MOD COMPLEX 45 MIN: CPT

## 2020-12-21 PROCEDURE — 6370000000 HC RX 637 (ALT 250 FOR IP): Performed by: EMERGENCY MEDICINE

## 2020-12-21 PROCEDURE — 93010 ELECTROCARDIOGRAM REPORT: CPT | Performed by: INTERNAL MEDICINE

## 2020-12-21 PROCEDURE — G0378 HOSPITAL OBSERVATION PER HR: HCPCS

## 2020-12-21 PROCEDURE — 96372 THER/PROPH/DIAG INJ SC/IM: CPT

## 2020-12-21 PROCEDURE — 94640 AIRWAY INHALATION TREATMENT: CPT

## 2020-12-21 PROCEDURE — 93306 TTE W/DOPPLER COMPLETE: CPT

## 2020-12-21 RX ORDER — LIDOCAINE 50 MG/G
1 PATCH TOPICAL DAILY
Qty: 10 PATCH | Refills: 0 | Status: SHIPPED | OUTPATIENT
Start: 2020-12-21 | End: 2020-12-31

## 2020-12-21 RX ORDER — ORPHENADRINE CITRATE 30 MG/ML
60 INJECTION INTRAMUSCULAR; INTRAVENOUS ONCE
Status: COMPLETED | OUTPATIENT
Start: 2020-12-21 | End: 2020-12-21

## 2020-12-21 RX ORDER — CYCLOBENZAPRINE HCL 5 MG
5 TABLET ORAL 2 TIMES DAILY PRN
Qty: 20 TABLET | Refills: 0 | Status: SHIPPED | OUTPATIENT
Start: 2020-12-21 | End: 2021-01-19

## 2020-12-21 RX ADMIN — BUDESONIDE AND FORMOTEROL FUMARATE DIHYDRATE 2 PUFF: 160; 4.5 AEROSOL RESPIRATORY (INHALATION) at 07:57

## 2020-12-21 RX ADMIN — CAPSAICIN: 0.25 CREAM TOPICAL at 11:38

## 2020-12-21 RX ADMIN — Medication 81 MG: at 12:00

## 2020-12-21 RX ADMIN — ORPHENADRINE CITRATE 60 MG: 60 INJECTION INTRAMUSCULAR; INTRAVENOUS at 11:39

## 2020-12-21 RX ADMIN — ACETAMINOPHEN 1000 MG: 500 TABLET ORAL at 00:49

## 2020-12-21 RX ADMIN — ACETAMINOPHEN 1000 MG: 500 TABLET ORAL at 06:46

## 2020-12-21 RX ADMIN — LEVOTHYROXINE SODIUM 100 MCG: 100 TABLET ORAL at 06:46

## 2020-12-21 RX ADMIN — GABAPENTIN 600 MG: 300 CAPSULE ORAL at 11:59

## 2020-12-21 RX ADMIN — CETIRIZINE HYDROCHLORIDE 10 MG: 10 TABLET ORAL at 11:59

## 2020-12-21 RX ADMIN — LISINOPRIL 20 MG: 10 TABLET ORAL at 11:58

## 2020-12-21 ASSESSMENT — PAIN DESCRIPTION - PAIN TYPE
TYPE: CHRONIC PAIN;ACUTE PAIN
TYPE: CHRONIC PAIN

## 2020-12-21 ASSESSMENT — PAIN SCALES - GENERAL
PAINLEVEL_OUTOF10: 0
PAINLEVEL_OUTOF10: 7
PAINLEVEL_OUTOF10: 4
PAINLEVEL_OUTOF10: 6

## 2020-12-21 ASSESSMENT — PAIN DESCRIPTION - ORIENTATION
ORIENTATION: LEFT
ORIENTATION: LEFT;RIGHT

## 2020-12-21 ASSESSMENT — PAIN DESCRIPTION - LOCATION
LOCATION: KNEE
LOCATION: KNEE

## 2020-12-21 ASSESSMENT — PAIN DESCRIPTION - DESCRIPTORS: DESCRIPTORS: SHARP;STABBING

## 2020-12-21 NOTE — ED NOTES
Pt resting on cot in NAD. Pt denies any needs. Pt requesting food, told meal trays were ordered.      Alexa Arzola RN  12/20/20 0747

## 2020-12-21 NOTE — ED NOTES
Report called to [facility]. All questions answered. Will wait for bed to be clean.      Joaquin Wilhelm RN  12/20/20 1950

## 2020-12-21 NOTE — ED NOTES
Pt resting on cot with eyes closed. RR even and non-labored. Will continue to monitor.       Ja Rice RN  12/20/20 0511

## 2020-12-21 NOTE — PROGRESS NOTES
OBS/CDU   RESIDENT NOTE      Patients PCP is:  Cleola Canavan, APRN - [facility]        SUBJECTIVE      No acute events overnight. Has been able to tolerate a full diet without nausea or vomiting. The patient is urinating on his own and is passing flatus. Denies fever, chills, nausea, vomiting, chest pain, shortness of breath, abdominal pain, focal weakness, numbness, tingling, urinary/bowel symptoms, vision changes, visual hallucinations, or headache. Patient has been leaving her room for smoke approximately every hour since admission. Able to ambulate with a slow steady gait, other earlier in the evening patient complained of feeling \"shaky\". She reports improvement in \"feeling shaky\". Discussed with plan for discharge pending normal echo and cardiology signed off, patient verbalized agreement understanding, she has no further questions at this time. PHYSICAL EXAM      General: NAD, AO X 3  Heent: EMOI, PERRL  Neck: SUPPLE, NO JVD  Cardiovascular: RRR, S1S2  Pulmonary: CTAB, NO SOB  Abdomen: SOFT, NTTP, ND, +BS  Extremities: +2/4 PULSES DISTAL, NO SWELLING  Neuro / Psych: NO NUMBNESS OR TINGLING, MENTATION AT BASELINE    PERTINENT TEST /EXAMS      I have reviewed all available laboratory results.     MEDICATIONS CURRENT       albuterol sulfate  (90 Base) MCG/ACT inhaler 2 puff, Q6H PRN      nicotine (NICODERM CQ) 21 MG/24HR 1 patch, Daily      ondansetron (ZOFRAN-ODT) disintegrating tablet 4 mg, Q8H PRN      acetaminophen (TYLENOL) tablet 1,000 mg, 3 times per day      ibuprofen (ADVIL;MOTRIN) tablet 800 mg, Q8H PRN      methocarbamol (ROBAXIN) tablet 1,000 mg, 4x Daily PRN      gabapentin (NEURONTIN) capsule 300 mg, Q8H PRN      albuterol (PROVENTIL) nebulizer solution 2.5 mg, Q6H PRN      aspirin EC tablet 81 mg, Daily      budesonide-formoterol (SYMBICORT) 160-4.5 MCG/ACT inhaler 2 puff, BID      cetirizine (ZYRTEC) tablet 10 mg, Daily      clomiPRAMINE (ANAFRANIL) capsule 50 mg, Nightly   cloNIDine (CATAPRES) tablet 0.1 mg, Nightly      famotidine (PEPCID) tablet 20 mg, BID PRN      gabapentin (NEURONTIN) capsule 600 mg, TID      levothyroxine (SYNTHROID) tablet 100 mcg, Daily      lisinopril (PRINIVIL;ZESTRIL) tablet 20 mg, Daily      risperiDONE (RISPERDAL) tablet 2 mg, Nightly      traZODone (DESYREL) tablet 150 mg, Nightly      sodium chloride flush 0.9 % injection 10 mL, 2 times per day      sodium chloride flush 0.9 % injection 10 mL, PRN      enoxaparin (LOVENOX) injection 40 mg, Daily      ondansetron (ZOFRAN) injection 4 mg, Q8H PRN      capsaicin (ZOSTRIX) 0.025 % cream, BID        All medication charted and reviewed. CONSULTS      IP CONSULT TO TRAUMA SURGERY  IP CONSULT TO CARDIOLOGY    ASSESSMENT/PLAN       Zuleyka Ramon is a 60 y.o. female who presents with questionable presyncopal episode and fall, resulting in acute worsening of bilateral chronic knee pain and new onset lumbar back pain. · Pending echo and cardiology signed off if echo unremarkable  · Continue home medications and pain control  · Monitor vitals, labs, and imaging  · DISPO: pending consults and clinical improvement    --  [address]  Emergency Medicine Resident Physician     This dictation was generated by voice recognition computer software. Although all attempts are made to edit the dictation for accuracy, there may be errors in the transcription that are not intended.

## 2020-12-21 NOTE — PROGRESS NOTES
General appearance: alert and cooperative with exam  HEENT: Head: Normocephalic, no lesions, without obvious abnormality. Neck:no JVD, trachea midline, no adenopathy  Lungs: Clear to auscultation  Heart: Regular rate and rhythm, s1/s2 auscultated, no murmurs  Abdomen: soft, non-tender, bowel sounds active  Extremities: no edema  Neurologic: not done        Assessment / Acute Cardiac Problems:   1. Dizziness - resolved  2. Knee pain  3. Mechanical fall  4. HTN    Patient Active Problem List:     Chest pain     Migraine variant with headache     Drug overdose, accidental or unintentional, initial encounter     Hypothyroidism     Essential hypertension     Seizure disorder ([address]     Drug overdose     History of seizure     Major depressive disorder with psychotic features ([address]     Severe major depression ([address]     Overdose of barbiturate, intentional self-harm, initial encounter ([address]     Intentional phenobarbital overdose ([address]     Severe episode of recurrent major depressive disorder, without psychotic features ([address]     Suicide attempt ([address]     Major depressive disorder, recurrent ([address]     Sepsis ([address]     Severe malnutrition ([address]     Altered mental status     Hypokalemia     Congestive heart failure of unknown etiology ([address]     Lumbar radiculopathy     Chronic low back pain     Piriformis syndrome     COPD (chronic obstructive pulmonary disease) (HCC)     Major depressive disorder, single episode, unspecified     Severe depressed bipolar I disorder without psychotic features ([address]     Polysubstance abuse ([address]     Syncope and collapse      Plan of Treatment:   1. Awaiting echo. 2. If low risk with preserved LVEF will be OK for discharge from CV standpoint with OP f/u. Advised her to f/u with US as OP or another cardiologist (she is still deciding).      Electronically signed by KEANU Coulter CNP on 12/21/2020 at 1:43 PM  [address]  [phone]

## 2020-12-21 NOTE — PROGRESS NOTES
CLINICAL PHARMACY NOTE: MEDS TO [address] Select Patient?: Yes  Total # of Prescriptions Filled: 1   The following medications were delivered to the patient:  · LIDOCAINE PATCH   Total # of Interventions Completed: 0  Time Spent (min): 0    Additional Documentation:    FLEXERIL TOO SOON TO FILL

## 2020-12-21 NOTE — CARE COORDINATION
Case Management Initial Discharge Plan  Aleta Mcdonough,             Met with:patient to discuss discharge plans. Information verified: address, contacts, phone number, , insurance Yes    Emergency Contact/Next of Kin name & number: Quinn Ceja     PCP: KEANU Kimball CNP  Date of last visit:     Insurance Provider: [employer]     Discharge Planning    Living Arrangements:  Friends   Support Systems:  Family Members, Friends/Neighbors    Home has 2 stories  6 stairs to climb to get into front door, 1 flight stairs to climb to reach second floor  Location of bedroom/bathroom in home second floor     Patient able to perform ADL's:Independent    Current Services (outpatient & in home) none   DME equipment: Quad cane   DME provider: na     Receiving oral anticoagulation therapy? Yes or ASA    If indicated:   Physician managing anticoagulation treatment: PCP  Where does patient obtain lab work for ATC treatment? Na       Potential Assistance Needed:  N/A    Patient agreeable to home care: No  [place] of choice provided:  n/a    Prior SNF/Rehab Placement and Facility: none   Agreeable to SNF/Rehab: No  [place] of choice provided: n/a     Evaluation: no    Expected Discharge date:       Patient expects to be discharged to:  Home  Follow Up Appointment: Best Day/ Time:      Transportation provider: Medical cab   Transportation arrangements needed for discharge: No    Readmission Risk              Risk of Unplanned Readmission:        0             Does patient have a readmission risk score greater than 14?: No  If yes, follow-up appointment must be made within 7 days of discharge.      Goals of Care: Decreased knee pain       Discharge Plan: Home independently           Electronically signed by Ajit Mcelroy RN on 20 at 10:51 AM EST

## 2020-12-21 NOTE — ED NOTES
Pt. Resting on stretcher, eyes closed, RR even and non-labored  Pt.  Updated on POC  Will continue to monitor        Shaye Spicer RN  12/21/20 0117

## 2020-12-21 NOTE — ED NOTES
Pt's requesting that if she is asleep for evening meds, she does not want them. Will inform 3C RN.      Vahe Matos RN  12/20/20 1924

## 2020-12-21 NOTE — PROGRESS NOTES
CDU Daily Progress Note  Attending Physician       Pt Name: Donovan Santiago  MRN: [id number]  [place] 1965  Date of evaluation: 12/21/20    I performed a history and physical examination of the patient and discussed management with the resident. I reviewed the residents note and agree with the documented findings and plan of care. Any areas of disagreement are noted on the chart. I was personally present for the key portions of any procedures. I have documented in the chart those procedures where I was not present during the key portions. I have reviewed the emergency nurses triage note. I agree with the chief complaint, past medical history, past surgical history, allergies, medications, social and family history as documented unless otherwise noted below. Documentation of the HPI, Physical Exam and Medical Decision Making performed by medical students or scribes is based on my personal performance of the HPI, PE and MDM. For Physician Assistant/ Nurse Practitioner cases/documentation I have personally evaluated this patient and have completed at least one if not all key elements of the E/M (history, physical exam, and MDM). Additional findings are as noted. The Family History, Social History and Review of Systems are unchanged from the previous day. No significant events overnight. The patient's chart is reviewed. There are no recent hemoglobin A1c's for evaluation. A hemoglobin A1c has been ordered. The patient will follow-up with the primary care physician for long-term glucose control. I did not discuss tobacco cessation with this patient today. The patient is here after a syncopal episode in which she collapsed. She relates she has chronic right knee pain but this has made the knee pain worse. She relates she is quite frustrated because she got up in the middle of the night and took her shirt off and her IV came out. However she is feeling like they have written in her charting that she ripped her IV out. She says that is not true. She is more concerned about her knee and seeing orthopedics during this visit. She is aware that an echo has been ordered. She does have an orthopedic appointment on Wednesday as an outpatient already scheduled. I did discuss with her that the knee would not be anything emergent or life-threatening and would be considered elective. And so she will likely follow-up with her Ortho appointment as scheduled on Wednesday and not see orthopedics in the hospital.  She is scheduled for the echo and we will see with the results of this are and likely discharge her home today.     Trini Espinosa MD  Attending Physician  Critical Decision Unit

## 2020-12-21 NOTE — TELEPHONE ENCOUNTER
LVM with patient stating that Dr. Courtney Snowden will be out of the office at 11:40am on Wed 12/23/20. Therefore, her appt will need to be rescheduled. It is likely at this that her appt will need to be rescheduled to the week of 1/4/21.

## 2020-12-21 NOTE — ED NOTES
Report received from Araceli Womack RN  Pt. Resting on stretcher, eyes open, RR even and non-labored  Pt.  Updated on POC  Will continue to monitor        Shaye Spicer RN  12/20/20 6340

## 2020-12-21 NOTE — PROGRESS NOTES
Physical Therapy  DATE: 2020    NAME: Joe Winn  MRN: [id number]   : 1965    Patient not seen this date for Physical Therapy due to:  [] Blood transfusion in progress  [] Hemodialysis  [] Patient Declined  [] Spine Precautions   [] Strict Bedrest  [] Surgery/ Procedure  [] Testing      [] Other        [x] PT is being discontinued at this time. Patient independently ambulating off unit upon writer's arrival. No further needs. [] PT is being discontinued at this time due to declining physical/ medical status. Therapy is not appropriate at this time.     Flavia Valencia, PT

## 2020-12-21 NOTE — ED NOTES
Pt. Returns to room from outside with slow steady gait   Will continue to monitor      Anna Martinez RN  12/21/20 0105

## 2020-12-21 NOTE — ED NOTES
Pt resting on cot with eyes closed. RR even and non-labored. Pt not awaken.       Sara Wellington RN  12/20/20 8732

## 2020-12-21 NOTE — ED NOTES
Patient ambulating with steady gait  Patient going to smoke,  Writer updated patient on plan of care      Michael Mendez RN  12/21/20 9946

## 2020-12-21 NOTE — PROGRESS NOTES
Occupational Therapy   Occupational Therapy Initial Assessment  Date: 2020   Patient Name: Sydnee Swift  MRN: [id number]     : 1965    Date of Service: 2020    Discharge Recommendations:  Patient would benefit from continued therapy after discharge  OT Equipment Recommendations  Equipment Needed: Yes  Mobility Devices: ADL Assistive Devices  ADL Assistive Devices: Shower Chair with back    Assessment   Performance deficits / Impairments: Decreased functional mobility ; Decreased balance;Decreased ADL status; Decreased strength  Assessment: Patient reports increased pain in the R LE, declining to engage in LB dressing task involving the R LE, able to doff/don sock EOB on the L LE at Mod I using figure four method. Pt completed sit to stand at SBA and ambulation to the bathroom at SBA, functional transfer on/off toilet at Supervision and returned to supine in at Supervision. Patient demonstrates functional deficits impacting performance and safety with functional tasks, may benefit from acute OT services at this time to address through skilled intervention to increase safety. Prognosis: Good  Decision Making: Medium Complexity  Patient Education: OT role, OT POC, purpose of evaluation, home therapy benefits - fair return  REQUIRES OT FOLLOW UP: Yes  Activity Tolerance  Activity Tolerance: Patient Tolerated treatment well;Patient limited by pain  Safety Devices  Safety Devices in place: Yes  Type of devices: Gait belt;Call light within reach; Patient at risk for falls;Nurse notified  Restraints  Initially in place: No         Patient Diagnosis(es): The primary encounter diagnosis was Fall, initial encounter. A diagnosis of Syncope, unspecified syncope type was also pertinent to this visit. has a past medical history of Arthritis, Asthma, Borderline diabetes, Borderline personality disorder ([facility] CHF (congestive heart failure) ([facility] COPD (chronic obstructive pulmonary disease) ([facility] Fibromyalgia, Headache, Hypertension, Manic depression ([facility] Movement disorder, Neck fracture ([facility] Pernicious anemia, Seizure ([facility] and Thyroid disease. has a past surgical history that includes knee surgery (Bilateral); partial hysterectomy (cervix not removed); lymph node dissection; Irrigation and debridement (Right, 5/17/2019); EXPLORATION OF WOUND OF EXTREMITY (Right, 5/19/2019); and EXPLORATION OF WOUND OF EXTREMITY (N/A, 5/22/2019). Restrictions  Restrictions/Precautions  Restrictions/Precautions: General Precautions  Required Braces or Orthoses?: No  Position Activity Restriction  Other position/activity restrictions: up with assistance    Subjective   General  Patient assessed for rehabilitation services?: Yes  Family / Caregiver Present: No  Patient Currently in Pain: Yes  Pain Assessment  Pain Assessment: Faces  Pain Level: 7  Patient's Stated Pain Goal: No pain  Pain Type: Chronic pain;Acute pain  Pain Location: Knee  Pain Orientation: Left;Right  Pain Descriptors: Rolanda Duverney; Stabbing  Non-Pharmaceutical Pain Intervention(s): Ambulation/Increased Activity; Distraction;Repositioned; Emotional support  Response to Pain Intervention: Patient Satisfied  Vital Signs  Pulse: 71  Heart Rate Source: Monitor  Resp: 16  BP: 131/80  BP Location: Right upper arm  Patient Position: Semi fowlers  Level of Consciousness: Alert (0)  MEWS Score: 1  Patient Currently in Pain: Yes  Oxygen Therapy  SpO2: 93 %    Social/Functional History  Social/Functional History  Lives With: Friend(s)  Type of Home: House  Home Layout: Two level, Bed/Bath upstairs, Laundry in basement  Home Access: Stairs to enter with rails  Entrance Stairs - Number of Steps: 6  Entrance Stairs - Rails: Both  Bathroom Shower/Tub: Tub/Shower unit Bathroom Toilet: Standard  Bathroom Accessibility: Accessible  Home Equipment: Quad cane(uses the quad cane at all times)  Receives Help From: Family, Friend(s)  ADL Assistance: Independent  Homemaking Assistance: Independent  Homemaking Responsibilities: Yes  Meal Prep Responsibility: Primary  Laundry Responsibility: Primary(reports friends take the laundry to the basement and bring it up for her; she folds)  Cleaning Responsibility: Primary  Shopping Responsibility: Primary(uses electric cart)  Ambulation Assistance: Independent  Transfer Assistance: Independent  Active : Yes  Mode of Transportation: Truck  Occupation: On disability  Leisure & Hobbies: Just Chilling, cross stitch, puzzles,  IADL Comments: Reports having good support from roommates upon discharge     Objective   Vision: Impaired  Vision Exceptions: Wears glasses for reading  Hearing: Within functional limits          Balance  Sitting Balance: Modified independent   Standing Balance: Stand by assistance  Functional Mobility  Functional - Mobility Device: Cane  Activity: To/from bathroom  Assist Level: Stand by assistance  Toilet Transfers  Toilet - Technique: Ambulating  Equipment Used: Standard toilet  Toilet Transfer: Supervision  ADL  Feeding: Independent  Grooming: Independent  UE Bathing: Independent  LE Bathing: Stand by assistance  UE Dressing: Independent  LE Dressing: Stand by assistance(OT faciltiated donning L LE sock at Mod I, pt declined attempting the R LE d/t pain)  Toileting: Supervision(Pt completed toilet transfer at Supervision, declined the need to toilet this date)  Tone RUE  RUE Tone: Normotonic  Tone LUE  LUE Tone: Normotonic  Coordination  Movements Are Fluid And Coordinated: Yes     Bed mobility  Supine to Sit: Contact guard assistance  Sit to Supine: Supervision  Scooting: Supervision  Transfers  Sit to stand: Stand by assistance  Stand to sit: Stand by assistance     Cognition  Overall Cognitive Status: [facility] Sensation  Overall Sensation Status: Impaired(reports numbness and tingling in bilateral fingers d/t carpal tunnel)      LUE AROM : WFL  Left Hand AROM: WFL  RUE AROM : WFL  RUE General AROM: Exception: shoulder flexion ~95 degrees  Right Hand AROM: WFL  LUE Strength  Gross LUE Strength: WFL  L Hand General: 4+/5  RUE Strength  Gross RUE Strength: Exceptions to [facility]  R Shoulder Flex: 4-/5  R Hand General: 4+/5     Plan   Plan  Times per week: 2-3x/wk  Current Treatment Recommendations: Strengthening, Endurance Training, Patient/Caregiver Education & Training, Equipment Evaluation, Education, & procurement, Self-Care / ADL, Home Management Training, Safety Education & Training, Functional Mobility Training, Balance Training    AM-PAC Score        AM-PAC Inpatient Daily Activity Raw Score: 21 (12/21/20 1128)  AM-PAC Inpatient ADL T-Scale Score : 44.27 (12/21/20 1128)  ADL Inpatient CMS 0-100% Score: 32.79 (12/21/20 1128)  ADL Inpatient CMS G-Code Modifier : Elisabeth Tejeda (12/21/20 1128)    Goals  Short term goals  Time Frame for Short term goals: Patient will, by discharge  Short term goal 1: demo ADLs at Mod I using AE PRN  Short term goal 2: demo 8+ min of dynamic standing balance at Supervision to engage in ADLs  Short term goal 3: demo functional transfers/mobility using LRD at Mod I  Short term goal 4: demo use of EC/WS techs into ADLs to increase safety <1 cue       Therapy Time   Individual Concurrent Group Co-treatment   Time In 1049         Time Out 1100         Minutes 11         Timed Code Treatment Minutes: 5 Minutes     Owen Staley, OTR/L

## 2020-12-22 ENCOUNTER — TELEPHONE (OUTPATIENT)
Dept: PRIMARY CARE CLINIC | Age: 55
End: 2020-12-22

## 2020-12-22 NOTE — TELEPHONE ENCOUNTER
Patient called with c/o of pain in RT KNEE following fall, stated that she was seen in  [facility] ER on 12/19 xray was completed that showed she has a \"chip bone\" was prescribed muscle relaxer, stated that is is not providing relief, pain still present. Patient stated she has been prescribed Norco in the past and would like to have another prescription, stating that is the only medication that has helped with past injury, pls advise.

## 2020-12-22 NOTE — PROGRESS NOTES
Patient discharged. Discharge instructions and education reviewed with the patient. Patient acknowledged understanding of all information. Patient left unit with all personal belongings-room double checked. Patient left unit ambulatory, alone, in stable condition.

## 2020-12-23 ENCOUNTER — OFFICE VISIT (OUTPATIENT)
Dept: ORTHOPEDIC SURGERY | Age: 55
End: 2020-12-23
Payer: MEDICARE

## 2020-12-23 VITALS — HEIGHT: 63 IN | BODY MASS INDEX: 35.97 KG/M2 | TEMPERATURE: 97.5 F | WEIGHT: 203 LBS

## 2020-12-23 PROCEDURE — 4004F PT TOBACCO SCREEN RCVD TLK: CPT | Performed by: ORTHOPAEDIC SURGERY

## 2020-12-23 PROCEDURE — 20610 DRAIN/INJ JOINT/BURSA W/O US: CPT | Performed by: ORTHOPAEDIC SURGERY

## 2020-12-23 PROCEDURE — G8417 CALC BMI ABV UP PARAM F/U: HCPCS | Performed by: ORTHOPAEDIC SURGERY

## 2020-12-23 PROCEDURE — G8427 DOCREV CUR MEDS BY ELIG CLIN: HCPCS | Performed by: ORTHOPAEDIC SURGERY

## 2020-12-23 PROCEDURE — G8482 FLU IMMUNIZE ORDER/ADMIN: HCPCS | Performed by: ORTHOPAEDIC SURGERY

## 2020-12-23 PROCEDURE — 99203 OFFICE O/P NEW LOW 30 MIN: CPT | Performed by: ORTHOPAEDIC SURGERY

## 2020-12-23 PROCEDURE — 3017F COLORECTAL CA SCREEN DOC REV: CPT | Performed by: ORTHOPAEDIC SURGERY

## 2020-12-23 RX ORDER — TRIAMCINOLONE ACETONIDE 40 MG/ML
40 INJECTION, SUSPENSION INTRA-ARTICULAR; INTRAMUSCULAR ONCE
Status: COMPLETED | OUTPATIENT
Start: 2020-12-23 | End: 2020-12-23

## 2020-12-23 RX ORDER — LIDOCAINE HYDROCHLORIDE 10 MG/ML
4 INJECTION, SOLUTION INFILTRATION; PERINEURAL ONCE
Status: COMPLETED | OUTPATIENT
Start: 2020-12-23 | End: 2020-12-23

## 2020-12-23 RX ADMIN — TRIAMCINOLONE ACETONIDE 40 MG: 40 INJECTION, SUSPENSION INTRA-ARTICULAR; INTRAMUSCULAR at 11:55

## 2020-12-23 RX ADMIN — LIDOCAINE HYDROCHLORIDE 4 ML: 10 INJECTION, SOLUTION INFILTRATION; PERINEURAL at 11:55

## 2020-12-23 NOTE — PROGRESS NOTES
Orthopedic Knee Encounter Note     Chief complaint: Right knee pain    HPI: Lo Cardenas is a 60 y.o. female who presents for evaluation of her right knee. She indicates that on the 23rd or 24 November she fell landing on her right knee with pain and then reaggravated this knee 3 days ago when she once again fell on this knee. Her pain is in the anterior and posterior aspect of her knee. Its constant but certainly exacerbated by any weightbearing activity. She states that it keeps giving out on her. She describes having some popping sensations in this knee as well. No locking at this time. No swelling either. Of note she does report a history of having to arthroscopic procedures performed on this knee back in the 80s about 30 years ago. She cannot exactly recall what was done but states that she believes the undersurface of her kneecap was scraped. Previous treatment:    NSAIDs: Ibuprofen    Injections:  None    Physical therapy: No    Surgeries: Remote history of 2 bilateral knee surgeries. Review of Systems:     Constitution: no fever or chills   Pain level: 7/10  Musculoskeletal: As noted in the HPI   Neurologic: no neurologic symptoms    Past Medical History  Aleta  has a past medical history of Arthritis, Asthma, Borderline diabetes, Borderline personality disorder ([address] CHF (congestive heart failure) ([address] COPD (chronic obstructive pulmonary disease) ([address] Fibromyalgia, Headache, Hypertension, Manic depression ([address] Movement disorder, Neck fracture ([address] Pernicious anemia, Seizure ([address] and Thyroid disease. Past Surgical History  Aleta  has a past surgical history that includes knee surgery (Bilateral); partial hysterectomy (cervix not removed); lymph node dissection; Irrigation and debridement (Right, 5/17/2019); EXPLORATION OF WOUND OF EXTREMITY (Right, 5/19/2019); and EXPLORATION OF WOUND OF EXTREMITY (N/A, 5/22/2019).     Current Medications  Current Outpatient Medications Medication Sig Dispense Refill    cyclobenzaprine (FLEXERIL) 5 MG tablet Take 1 tablet by mouth 2 times daily as needed for Muscle spasms 20 tablet 0    lidocaine (LIDODERM) 5 % Place 1 patch onto the skin daily for 10 doses 12 hours on, 12 hours off. 10 patch 0    hydrOXYzine (ATARAX) 50 MG tablet       Menthol, Topical Analgesic, (BIOFREEZE ROLL-ON) 4 % GEL Apply 1 applicator topically 3 times daily as needed (pain) 1 Tube 0    guaiFENesin-dextromethorphan (ROBITUSSIN DM) 100-10 MG/5ML syrup Take 5 mLs by mouth 3 times daily as needed for Cough 120 mL 2    albuterol sulfate  (90 Base) MCG/ACT inhaler Inhale 1 puff into the lungs every 6 hours as needed for Wheezing Gap prescription until follow up with primary. Do not refill. Follow up with primary 1 Inhaler 3    Nebulizers (NEBULIZER COMPRESSOR) MISC Daily as needed 1 each 0    Respiratory Therapy Supplies (NEBULIZER/TUBING/MOUTHPIECE) KIT Daily as needed 1 kit 2    cetirizine (ZYRTEC) 10 MG tablet Take 1 tablet by mouth daily 30 tablet 3    butalbital-aspirin-caffeine (FIORINAL) -40 MG per capsule Take 1 capsule by mouth every 4 hours as needed for Headaches for up to 30 days. 20 capsule 0    Blood Pressure KIT Daily as needed 1 kit 0    cyclobenzaprine (FLEXERIL) 5 MG tablet Take 1 tablet by mouth 2 times daily as needed for Muscle spasms Caution: may cause sedation. Do not take with zanaflex. 60 tablet 0    ondansetron (ZOFRAN ODT) 4 MG disintegrating tablet Take 1 tablet by mouth every 8 hours as needed for Nausea 20 tablet 0    ibuprofen (ADVIL;MOTRIN) 800 MG tablet Take 1 tablet by mouth every 8 hours as needed for Pain 30 tablet 0    Misc. Devices (CANE) MISC Quad base cane 1 each 0    acetaminophen (TYLENOL) 500 MG tablet Take 1-2 tablets by mouth every 8 hours as needed for Pain 180 tablet 3    gabapentin (NEURONTIN) 300 MG capsule Take 2 capsules by mouth 3 times daily for 30 days.  180 capsule 2    lisinopril (PRINIVIL;ZESTRIL) 20 MG tablet Take 1 tablet by mouth daily 30 tablet 1    famotidine (PEPCID) 20 MG tablet Take 1 tablet by mouth 2 times daily as needed (for acid reflux) 60 tablet 1    clomiPRAMINE (ANAFRANIL) 50 MG capsule Take 1 capsule by mouth nightly 30 capsule 0    risperiDONE (RISPERDAL) 1 MG tablet Take 1 tablet by mouth daily and 2 tablets by mouth at bedtime 90 tablet 0    traZODone (DESYREL) 150 MG tablet Take 1 tablet by mouth nightly 30 tablet 0    cloNIDine (CATAPRES) 0.1 MG tablet Take 1 tablet by mouth nightly 30 tablet 0    Benzocaine-Menthol (CEPACOL) 6-10 MG LOZG lozenge Take 1 lozenge by mouth every 2 hours as needed for Sore Throat 60 lozenge 0    aspirin EC 81 MG EC tablet Take 1 tablet by mouth daily 90 tablet 1    budesonide-formoterol (SYMBICORT) 160-4.5 MCG/ACT AERO Inhale 2 puffs into the lungs 2 times daily 1 Inhaler 5    levothyroxine (SYNTHROID) 100 MCG tablet Take 1 tablet by mouth Daily 30 tablet 3    tiZANidine (ZANAFLEX) 2 MG tablet Take 2 tablets by mouth every 8 hours as needed (muscle spasm, neck pain) 90 tablet 2    Elastic Bandages & Supports (CARPAL TUNNEL WRIST STABILIZER) MISC Apply to each wrist at bedtime 2 each 0    methyl salicylate-menthol (PAULETTE BRAY GREASELESS) 10-15 % CREA Apply topically 3 times daily as needed for Pain 1 Bottle 0    albuterol (PROVENTIL) (2.5 MG/3ML) 0.083% nebulizer solution Take 3 mLs by nebulization every 6 hours as needed for Wheezing (Patient not taking: Reported on 10/9/2020) 120 mL 3    ipratropium-albuterol (DUONEB) 0.5-2.5 (3) MG/3ML SOLN nebulizer solution Inhale 3 mLs into the lungs every 4 hours (Patient not taking: Reported on 10/9/2020) 360 mL 1     No current facility-administered medications for this visit. Allergies  Allergies have been reviewed.   Aleta is allergic to imitrex [sumatriptan]; bee pollen; bee venom; bromide ion [bromine]; ketorolac; reglan [metoclopramide]; sulfa antibiotics; sulfadiazine; and spaces appear to be appropriately preserved with subtle medial compartment joint space narrowing. No osteophytic change    Impression/Plan:     Bety Allen is a 60 y.o. old female with right knee pain. We discussed possible etiologies for her pain. This included exacerbation of mild underlying osteoarthritis, chondral damage, meniscal tears. Her knee feels stable clinically. Following a discussion with regards to treatment options I did offer and administered a cortisone injection to her knee as noted below. Anticipate improvement with this treatment and so we will have her follow-up in my clinic as needed but she was certainly encouraged to return or call at anytime with persistent or worsening symptoms. We discussed moving ahead with an MRI study should she have persistent pain. Procedure: right intraarticular knee injection  Following an appropriate discussion with the patient regarding the risks and benefits of the procedure she consented to proceed. her right knee was prepped using chlorhexadine solution. Using aseptic technique and through a superolateral approach, her right knee joint was injected with a 5 cc mixture of 1cc 40mg/ml kenalog and 4 cc of 1% lidocaine without epinephrine. A band aid was applied to the injection site. she tolerated the injection with no immediate adverse reactions.     NA = Not assessed  n = No  y = Yes  SLR = Straight leg raise  MCL = Medial collateral ligament  LCL = Lateral collateral ligament

## 2020-12-23 NOTE — LETTER
12/23/2020    Chito Nobles MD  [address] [facility] [address] [facility]    RE: [place] [facility]    Dear Dr. Sherman Blancas,    Thank you for allowing me to participate in the care of Ms. Mcdonough. I had the opportunity to evaluate the patient on 12/23/2020. Attached you will find my evaluation and recommendations. Thanks again for the confidence you have expressed in me by allowing my participation in the care of your patient. I will keep you apprised of further developments in the patients treatment course as it progresses. If I can be of further assistance in any fashion, please feel free to contact me at your convenience.     Sincerely,        Eduardo Huang  Shoulder and Elbow Surgery

## 2020-12-23 NOTE — DISCHARGE SUMMARY
CDU Discharge Summary        Patient:  Giuliana Corbett  YOB: 1965    MRN: [id number]   Acct: [de-identified]    Primary Care Physician: Mortimer Singleton, APRN - CNP    Admit date:  12/19/2020  2:28 PM  Discharge date: 12/21/2020  8:07 PM     Discharge Diagnoses:     Acute syncopal episode due to possible right knee pain  Improved with cardiology consult for work-up and symptomatic management    Follow-up:  Call today/tomorrow for a follow up appointment with Mortimer Singleton, APRN - CNP , or return to the Emergency Room with worsening symptoms    Stressed to patient the importance of following up with primary care doctor for further workup/management of symptoms. Pt verbalizes understanding and agrees with plan. Discharge Medications:  Changes to medications          Waddell Carrel \"Tahira\"   Home Medication Instructions JKS:[id number]    Printed on:12/23/20 0843   Medication Information                      acetaminophen (TYLENOL) 500 MG tablet  Take 1-2 tablets by mouth every 8 hours as needed for Pain             albuterol (PROVENTIL) (2.5 MG/3ML) 0.083% nebulizer solution  Take 3 mLs by nebulization every 6 hours as needed for Wheezing             albuterol sulfate  (90 Base) MCG/ACT inhaler  Inhale 1 puff into the lungs every 6 hours as needed for Wheezing Gap prescription until follow up with primary. Do not refill. Follow up with primary             aspirin EC 81 MG EC tablet  Take 1 tablet by mouth daily             Benzocaine-Menthol (CEPACOL) 6-10 MG LOZG lozenge  Take 1 lozenge by mouth every 2 hours as needed for Sore Throat             Blood Pressure KIT  Daily as needed             budesonide-formoterol (SYMBICORT) 160-4.5 MCG/ACT AERO  Inhale 2 puffs into the lungs 2 times daily             butalbital-aspirin-caffeine (FIORINAL) -40 MG per capsule  Take 1 capsule by mouth every 4 hours as needed for Headaches for up to 30 days. cetirizine (ZYRTEC) 10 MG tablet  Take 1 tablet by mouth daily             clomiPRAMINE (ANAFRANIL) 50 MG capsule  Take 1 capsule by mouth nightly             cloNIDine (CATAPRES) 0.1 MG tablet  Take 1 tablet by mouth nightly             cyclobenzaprine (FLEXERIL) 5 MG tablet  Take 1 tablet by mouth 2 times daily as needed for Muscle spasms Caution: may cause sedation. Do not take with zanaflex. cyclobenzaprine (FLEXERIL) 5 MG tablet  Take 1 tablet by mouth 2 times daily as needed for Muscle spasms             Elastic Bandages & Supports (CARPAL TUNNEL WRIST STABILIZER) MISC  Apply to each wrist at bedtime             famotidine (PEPCID) 20 MG tablet  Take 1 tablet by mouth 2 times daily as needed (for acid reflux)             gabapentin (NEURONTIN) 300 MG capsule  Take 2 capsules by mouth 3 times daily for 30 days. guaiFENesin-dextromethorphan (ROBITUSSIN DM) 100-10 MG/5ML syrup  Take 5 mLs by mouth 3 times daily as needed for Cough             hydrOXYzine (ATARAX) 50 MG tablet               ibuprofen (ADVIL;MOTRIN) 800 MG tablet  Take 1 tablet by mouth every 8 hours as needed for Pain             ipratropium-albuterol (DUONEB) 0.5-2.5 (3) MG/3ML SOLN nebulizer solution  Inhale 3 mLs into the lungs every 4 hours             levothyroxine (SYNTHROID) 100 MCG tablet  Take 1 tablet by mouth Daily             lidocaine (LIDODERM) 5 %  Place 1 patch onto the skin daily for 10 doses 12 hours on, 12 hours off.             lisinopril (PRINIVIL;ZESTRIL) 20 MG tablet  Take 1 tablet by mouth daily             Menthol, Topical Analgesic, (BIOFREEZE ROLL-ON) 4 % GEL  Apply 1 applicator topically 3 times daily as needed (pain)             methyl salicylate-menthol (PAULETTE BRAY GREASELESS) 10-15 % CREA  Apply topically 3 times daily as needed for Pain             Misc.  Devices (CANE) MISC  Quad base cane             Nebulizers (NEBULIZER COMPRESSOR) MISC  Daily as needed ondansetron (ZOFRAN ODT) 4 MG disintegrating tablet  Take 1 tablet by mouth every 8 hours as needed for Nausea             Respiratory Therapy Supplies (NEBULIZER/TUBING/MOUTHPIECE) KIT  Daily as needed             risperiDONE (RISPERDAL) 1 MG tablet  Take 1 tablet by mouth daily and 2 tablets by mouth at bedtime             tiZANidine (ZANAFLEX) 2 MG tablet  Take 2 tablets by mouth every 8 hours as needed (muscle spasm, neck pain)             traZODone (DESYREL) 150 MG tablet  Take 1 tablet by mouth nightly                 Diet:  No diet orders on file , Advance as tolerated     Activity:  As tolerated    Consultants: IP CONSULT TO TRAUMA SURGERY  IP CONSULT TO CARDIOLOGY    Procedures:  Not indicated     Diagnostic Test:   Results for orders placed or performed during the hospital encounter of 12/19/20   CBC WITH AUTO DIFFERENTIAL   Result Value Ref Range    WBC 8.0 3.5 - 11.3 k/uL    RBC 4.24 3.95 - 5.11 m/uL    Hemoglobin 13.1 11.9 - 15.1 g/dL    Hematocrit 39.5 36.3 - 47.1 %    MCV 93.2 82.6 - 102.9 fL    MCH 30.9 25.2 - 33.5 pg    MCHC 33.2 28.4 - 34.8 g/dL    RDW 12.4 11.8 - 14.4 %    Platelets 207 924 - 875 k/uL    MPV 9.1 8.1 - 13.5 fL    NRBC Automated 0.0 0.0 per 100 WBC    Differential Type NOT REPORTED     Seg Neutrophils 56 36 - 65 %    Lymphocytes 33 24 - 43 %    Monocytes 8 3 - 12 %    Eosinophils % 1 1 - 4 %    Basophils 1 0 - 2 %    Immature Granulocytes 1 (H) 0 %    Segs Absolute 4.47 1.50 - 8.10 k/uL    Absolute Lymph # 2.66 1.10 - 3.70 k/uL    Absolute Mono # 0.66 0.10 - 1.20 k/uL    Absolute Eos # 0.08 0.00 - 0.44 k/uL    Basophils Absolute 0.08 0.00 - 0.20 k/uL    Absolute Immature Granulocyte 0.05 0.00 - 0.30 k/uL    WBC Morphology NOT REPORTED     RBC Morphology NOT REPORTED     Platelet Estimate NOT REPORTED    BASIC METABOLIC PANEL   Result Value Ref Range    Glucose 100 (H) 70 - 99 mg/dL    BUN 8 6 - 20 mg/dL    CREATININE 0.47 (L) 0.50 - 0.90 mg/dL    Bun/Cre Ratio NOT REPORTED 9 - 20 Calcium 8.5 (L) 8.6 - 10.4 mg/dL    Sodium 135 135 - 144 mmol/L    Potassium 4.1 3.7 - 5.3 mmol/L    Chloride 101 98 - 107 mmol/L    CO2 23 20 - 31 mmol/L    Anion Gap 11 9 - 17 mmol/L    GFR Non-African American >60 >60 mL/min    GFR African American >60 >60 mL/min    GFR Comment          GFR Staging NOT REPORTED    Troponin   Result Value Ref Range    Troponin, High Sensitivity <6 0 - 14 ng/L    Troponin T NOT REPORTED <0.03 ng/mL    Troponin Interp NOT REPORTED    Troponin   Result Value Ref Range    Troponin, High Sensitivity <6 0 - 14 ng/L    Troponin T NOT REPORTED <0.03 ng/mL    Troponin Interp NOT REPORTED    Brain Natriuretic Peptide   Result Value Ref Range    Pro-BNP 49 <300 pg/mL    BNP Interpretation Pro-BNP Reference Range:    Urinalysis Reflex to Culture    Specimen: Urine, clean catch   Result Value Ref Range    Color, UA YELLOW YELLOW    Turbidity UA CLEAR CLEAR    Glucose, Ur NEGATIVE NEGATIVE    Bilirubin Urine NEGATIVE NEGATIVE    Ketones, Urine NEGATIVE NEGATIVE    Specific Gravity, UA 1.006 1.005 - 1.030    Urine Hgb NEGATIVE NEGATIVE    pH, UA 7.0 5.0 - 8.0    Protein, UA NEGATIVE NEGATIVE    Urobilinogen, Urine Normal Normal    Nitrite, Urine NEGATIVE NEGATIVE    Leukocyte Esterase, Urine NEGATIVE NEGATIVE    Urinalysis Comments       Microscopic exam not performed based on chemical results unless requested in original order.    EKG 12 Lead   Result Value Ref Range    Ventricular Rate 72 BPM    Atrial Rate 72 BPM    P-R Interval 166 ms    QRS Duration 88 ms    Q-T Interval 434 ms    QTc Calculation (Bazett) 475 ms    P Axis 52 degrees    R Axis 23 degrees    T Axis 38 degrees     Echo Complete 2d W Doppler W Color    Result Date: 12/21/2020 physician) on 2020  07:19 PM ---------------------------------------------------------------------------- FINDINGS Left Atrium Left atrium is normal in size. Left Ventricle Left ventricle is normal in size with normal systolic function globally. Calculated ejection fraction is 66% Right Atrium Right atrium is normal in size. Right Ventricle Normal right ventricular size and function. TAPSE is 1.9 cm Mitral Valve Mitral valve structure is normal. Mild mitral regurgitation. Aortic Valve Aortic valve is sclerotic but opens well. No aortic insufficiency. Tricuspid Valve Tricuspid valve structure is normal. Trace tricuspid regurgitation. Estimated right ventricular systolic pressure is 35 mmHg. Pulmonic Valve The pulmonic valve is normal in structure. Pericardial Effusion No significant pericardial effusion is seen. Miscellaneous Normal aortic root dimension. E/E' average = 13.  IVC diameter and inspiratory collapse is normal. M-mode / 2D Measurements & Calculations:   LVIDd:4.6 cm(3.7 - 5.6 cm)       Diastolic TNJFNR:778 ml  HFWEM:3.14 cm(2.2 - 4.0 cm)      Systolic TYKAWC:19 ml  DEVONTE:1.6 cm(0.6 - 1.1 cm)        Aortic Root:3.1 cm(2.0 - 3.7 cm)  LVPWd:1.1 cm(0.6 - 1.1 cm)       LA Dimension: 3.4 cm(1.9 - 4.0 cm)  Fractional Shortenin.83 %    LA volume/Index: 59.77 ml /31m^2  Calculated LVEF (%): 65.77 %     LVOT:2 cm                                   RVDd:2.2 cm   Mitral:                                 Aortic   Valve Area (P1/2-Time): 3.79 cm^2       Peak Velocity: 1.94 m/s  Peak E-Wave: 1.23 m/s                   Mean Velocity: 1.23 m/s  Peak A-Wave: 0.96 m/s                   Peak Gradient: 15.05 mmHg  E/A Ratio: 1.28                         Mean Gradient: 7 mmHg  Peak Gradient: 6.05 mmHg  Mean Gradient: 4 mmHg  Deceleration Time: 177 msec             Area (continuity): 2.57 cm^2  P1/2t: 58 msec                          AV VTI: 40.1 cm   Area (continuity): 2.85 cm^2  Mean Velocity: 0.93 m/s   Tricuspid: Pulmonic:   Estimated RVSP: 35 mmHg                 Peak Velocity: 0.99 m/s  Peak TR Velocity: 2.51 m/s              Peak Gradient: 3.93 mmHg  Peak TR Gradient: 25.2004 mmHg  Estimated RA Pressure: 10 mmHg                                           Estimated PASP: 35.2 mmHg  Diastology / Tissue Doppler Septal Wall E' velocity:0.09 m/s Septal Wall E/E':13 Lateral Wall E' velocity:0.09 m/s Lateral Wall E/E':13    Xr Knee Left (3 Views)    Result Date: 12/19/2020  EXAMINATION: THREE XRAY VIEWS OF THE LEFT KNEE 12/19/2020 3:14 pm COMPARISON: None. HISTORY: ORDERING SYSTEM PROVIDED HISTORY: Knee pain s/p fall TECHNOLOGIST PROVIDED HISTORY: Knee pain s/p fall Reason for Exam: Fall today/ knee pain/ port. Acuity: Acute Type of Exam: Initial FINDINGS: There is no acute fracture or suspect osseous lesion. No significant arthropathy is seen. There is mild medial compartment joint space narrowing. No soft tissue abnormality or joint effusion is evident. No acute osseous abnormality of the left knee. Xr Knee Right (3 Views)    Result Date: 12/19/2020  EXAMINATION: THREE XRAY VIEWS OF THE RIGHT KNEE 12/19/2020 3:14 pm COMPARISON: 11/26/2020 HISTORY: ORDERING SYSTEM PROVIDED HISTORY: Fall TECHNOLOGIST PROVIDED HISTORY: Fall Reason for Exam: Fall today/ knee pain/ port. Acuity: Acute Type of Exam: Initial FINDINGS: There is no acute fracture or suspect osseous lesion. Joint spaces and alignment are maintained. Minimal degenerative changes are present. No focal soft tissue swelling or joint effusion is evident. 7 mm intra-articular loose body is noted in the lateral femorotibial compartment. 1. No acute osseous abnormality of the right knee. 2. 7 mm intra-articular loose body in the lateral femorotibial compartment.      Xr Knee Right (min 4 Views)    Result Date: 11/26/2020 EXAMINATION: FOUR XRAY VIEWS OF THE RIGHT KNEE 11/26/2020 6:30 pm COMPARISON: November 23, 2020 HISTORY: ORDERING SYSTEM PROVIDED HISTORY: pain TECHNOLOGIST PROVIDED HISTORY: pain include sunrise view Reason for Exam: pain Acuity: Unknown Type of Exam: Unknown FINDINGS: No evidence of acute fracture or dislocation. No focal osseous lesion. No evidence of joint effusion. No focal soft tissue abnormality. No acute abnormality of the knee. Mild degenerative changes. Ct Thoracic Spine Wo Contrast    Result Date: 12/19/2020  EXAMINATION: CT OF THE THORACIC SPINE WITHOUT CONTRAST  12/19/2020 3:32 pm: TECHNIQUE: CT of the thoracic spine was performed without the administration of intravenous contrast. Multiplanar reformatted images are provided for review. Dose modulation, iterative reconstruction, and/or weight based adjustment of the mA/kV was utilized to reduce the radiation dose to as low as reasonably achievable. COMPARISON: February 12, 2019 HISTORY: ORDERING SYSTEM PROVIDED HISTORY: Midline spine tenderness, fall TECHNOLOGIST PROVIDED HISTORY: Midline spine tenderness, fall Reason for Exam: Midline spine tenderness, fall Acuity: Acute Type of Exam: Initial FINDINGS: BONES/ALIGNMENT: There is normal alignment of the spine. The vertebral body heights are maintained. No osseous destructive lesion is seen. DEGENERATIVE CHANGES: No gross spinal canal stenosis or bony neural foraminal narrowing of the thoracic spine. SOFT TISSUES: No paraspinal mass is seen.   Bibasilar opacities are present, likely representing dependent atelectasis     No evidence of an acute fracture or traumatic malalignment involving the thoracic spine Bibasilar opacities, consistent with dependent atelectasis     Ct Lumbar Spine Wo Contrast    Result Date: 12/19/2020 EXAMINATION: CT OF THE LUMBAR SPINE WITHOUT CONTRAST  12/19/2020 TECHNIQUE: CT of the lumbar spine was performed without the administration of intravenous contrast. Multiplanar reformatted images are provided for review. Dose modulation, iterative reconstruction, and/or weight based adjustment of the mA/kV was utilized to reduce the radiation dose to as low as reasonably achievable. COMPARISON: February 12, 2019 HISTORY: ORDERING SYSTEM PROVIDED HISTORY: Midline spine tenderness, fall TECHNOLOGIST PROVIDED HISTORY: Midline spine tenderness, fall Reason for Exam: Midline spine tenderness, fall Acuity: Acute Type of Exam: Initial FINDINGS: BONES/ALIGNMENT: There is normal alignment of the spine. The vertebral body heights are maintained. No osseous destructive lesion is seen. A bone island is again visualized within the right side of the T11 vertebral body, unchanged. DEGENERATIVE CHANGES: No significant degenerative changes of the lumbar spine. SOFT TISSUES/RETROPERITONEUM: No paraspinal mass is seen. Bibasilar atelectasis is present. No evidence of an acute fracture or traumatic malalignment involving the lumbar spine           Physical Exam:    General appearance - NAD, AOx 3   Lungs -CTAB, no R/R/R  Heart - RRR, no M/R/G  Abdomen - Soft, NT/ND  Neurological:  MAEx4, No focal motor deficit, sensory loss  Extremities - Cap refil <2 sec in all ext., no edema  Skin -warm, dry      Hospital Course:  Clinical course has improved, labs and imaging reviewed. Laci Dee originally presented to the hospital on 12/19/2020  2:28 PM. with a likely syncopal episode with lumbar pain and right knee pain. At that time it was determined that She required further observation and cardiology work-up for syncopal episode. She was admitted and labs and imaging were followed daily. Imaging results as above. She is medically stable to be discharged.        Disposition: Home Patient stated that they will not drive themselves home from the hospital if they have gotten pain killers/ narcotics earlier that day and that they will arrange for transportation on their own or work with the  for a ride. Patient counseled NOT to drive while under the influence of narcotics/ pain killers. Condition: Good    Patient stable and ready for discharge home. I have discussed plan of care with patient and they are in understanding. They were instructed to read discharge paperwork. All of their questions and concerns were addressed. Time Spent: 0 day      --  Ramiro Mckinnon MD  Emergency Medicine Resident Physician    This dictation was generated by voice recognition computer software. Although all attempts are made to edit the dictation for accuracy, there may be errors in the transcription that are not intended.

## 2020-12-29 ENCOUNTER — TELEPHONE (OUTPATIENT)
Dept: PODIATRY | Age: 55
End: 2020-12-29

## 2020-12-29 NOTE — TELEPHONE ENCOUNTER
After several attempts to contact pt, pt was unreachable. Mailed orthotics to pt in the mail with form to sign and mail back.

## 2021-01-03 ENCOUNTER — APPOINTMENT (OUTPATIENT)
Dept: GENERAL RADIOLOGY | Age: 56
End: 2021-01-03
Payer: MEDICARE

## 2021-01-03 ENCOUNTER — HOSPITAL ENCOUNTER (EMERGENCY)
Age: 56
Discharge: HOME | End: 2021-01-03
Attending: EMERGENCY MEDICINE
Payer: MEDICARE

## 2021-01-03 VITALS
TEMPERATURE: 97.4 F | OXYGEN SATURATION: 92 % | WEIGHT: 203 LBS | BODY MASS INDEX: 36.54 KG/M2 | RESPIRATION RATE: 15 BRPM | DIASTOLIC BLOOD PRESSURE: 90 MMHG | SYSTOLIC BLOOD PRESSURE: 137 MMHG | HEART RATE: 68 BPM

## 2021-01-03 DIAGNOSIS — B34.9 VIRAL ILLNESS: Primary | ICD-10-CM

## 2021-01-03 LAB
ABSOLUTE EOS #: 0.07 K/UL (ref 0–0.44)
ABSOLUTE IMMATURE GRANULOCYTE: <0.03 K/UL (ref 0–0.3)
ABSOLUTE LYMPH #: 1.89 K/UL (ref 1.1–3.7)
ABSOLUTE MONO #: 0.63 K/UL (ref 0.1–1.2)
ANION GAP SERPL CALCULATED.3IONS-SCNC: 10 MMOL/L (ref 9–17)
BASOPHILS # BLD: 0 % (ref 0–2)
BASOPHILS ABSOLUTE: 0.03 K/UL (ref 0–0.2)
BNP INTERPRETATION: NORMAL
BUN BLDV-MCNC: 8 MG/DL (ref 6–20)
BUN/CREAT BLD: ABNORMAL (ref 9–20)
CALCIUM SERPL-MCNC: 9.3 MG/DL (ref 8.6–10.4)
CHLORIDE BLD-SCNC: 100 MMOL/L (ref 98–107)
CO2: 24 MMOL/L (ref 20–31)
CREAT SERPL-MCNC: 0.49 MG/DL (ref 0.5–0.9)
DIFFERENTIAL TYPE: NORMAL
EOSINOPHILS RELATIVE PERCENT: 1 % (ref 1–4)
GFR AFRICAN AMERICAN: >60 ML/MIN
GFR NON-AFRICAN AMERICAN: >60 ML/MIN
GFR SERPL CREATININE-BSD FRML MDRD: ABNORMAL ML/MIN/{1.73_M2}
GFR SERPL CREATININE-BSD FRML MDRD: ABNORMAL ML/MIN/{1.73_M2}
GLUCOSE BLD-MCNC: 96 MG/DL (ref 70–99)
HCT VFR BLD CALC: 44.4 % (ref 36.3–47.1)
HEMOGLOBIN: 14.8 G/DL (ref 11.9–15.1)
IMMATURE GRANULOCYTES: 0 %
LYMPHOCYTES # BLD: 26 % (ref 24–43)
MCH RBC QN AUTO: 31 PG (ref 25.2–33.5)
MCHC RBC AUTO-ENTMCNC: 33.3 G/DL (ref 28.4–34.8)
MCV RBC AUTO: 92.9 FL (ref 82.6–102.9)
MONOCYTES # BLD: 9 % (ref 3–12)
NRBC AUTOMATED: 0 PER 100 WBC
PDW BLD-RTO: 12.4 % (ref 11.8–14.4)
PLATELET # BLD: 262 K/UL (ref 138–453)
PLATELET ESTIMATE: NORMAL
PMV BLD AUTO: 8.6 FL (ref 8.1–13.5)
POTASSIUM SERPL-SCNC: 3.9 MMOL/L (ref 3.7–5.3)
PRO-BNP: 128 PG/ML
RBC # BLD: 4.78 M/UL (ref 3.95–5.11)
RBC # BLD: NORMAL 10*6/UL
SEG NEUTROPHILS: 64 % (ref 36–65)
SEGMENTED NEUTROPHILS ABSOLUTE COUNT: 4.75 K/UL (ref 1.5–8.1)
SODIUM BLD-SCNC: 134 MMOL/L (ref 135–144)
TROPONIN INTERP: NORMAL
TROPONIN INTERP: NORMAL
TROPONIN T: NORMAL NG/ML
TROPONIN T: NORMAL NG/ML
TROPONIN, HIGH SENSITIVITY: <6 NG/L (ref 0–14)
TROPONIN, HIGH SENSITIVITY: <6 NG/L (ref 0–14)
WBC # BLD: 7.4 K/UL (ref 3.5–11.3)
WBC # BLD: NORMAL 10*3/UL

## 2021-01-03 PROCEDURE — 83880 ASSAY OF NATRIURETIC PEPTIDE: CPT

## 2021-01-03 PROCEDURE — 93005 ELECTROCARDIOGRAM TRACING: CPT | Performed by: STUDENT IN AN ORGANIZED HEALTH CARE EDUCATION/TRAINING PROGRAM

## 2021-01-03 PROCEDURE — 71045 X-RAY EXAM CHEST 1 VIEW: CPT

## 2021-01-03 PROCEDURE — 84484 ASSAY OF TROPONIN QUANT: CPT

## 2021-01-03 PROCEDURE — 85025 COMPLETE CBC W/AUTO DIFF WBC: CPT

## 2021-01-03 PROCEDURE — 99282 EMERGENCY DEPT VISIT SF MDM: CPT

## 2021-01-03 PROCEDURE — 80048 BASIC METABOLIC PNL TOTAL CA: CPT

## 2021-01-03 RX ORDER — GUAIFENESIN/DEXTROMETHORPHAN 100-10MG/5
5 SYRUP ORAL 3 TIMES DAILY PRN
Qty: 120 ML | Refills: 0 | Status: SHIPPED | OUTPATIENT
Start: 2021-01-03 | End: 2021-01-13

## 2021-01-03 ASSESSMENT — ENCOUNTER SYMPTOMS
COUGH: 1
NAUSEA: 0
VOMITING: 0
SHORTNESS OF BREATH: 1
WHEEZING: 0
ABDOMINAL PAIN: 0

## 2021-01-03 NOTE — ED TRIAGE NOTES
Pt. Arrives to ED via private auto for c/o cough x2 weeks. Pt. Denies any sick contacts. Sowmya. Reports that she took tessalon pearls without relief.

## 2021-01-03 NOTE — ED PROVIDER NOTES
[facility]     Emergency Department     Faculty Attestation    I performed a history and physical examination of the patient and discussed management with the resident. I reviewed the resident´s note and agree with the documented findings and plan of care. Any areas of disagreement are noted on the chart. I was personally present for the key portions of any procedures. I have documented in the chart those procedures where I was not present during the key portions. I have reviewed the emergency nurses triage note. I agree with the chief complaint, past medical history, past surgical history, allergies, medications, social and family history as documented unless otherwise noted below. For Physician Assistant/ Nurse Practitioner cases/documentation I have personally evaluated this patient and have completed at least one if not all key elements of the E/M (history, physical exam, and MDM). Additional findings are as noted. Chest clear,  Heart exam normal , no pain or swelling on examination of the lower extremities , equal pulses both wrists , trachea midline. Abdomen is nontender without pulsatile mass or bruit. Patient appears comfortable in no distress, skin is warm and dry.     Juanito Clarke MD [address] [facility]  Attending Physician       EKG Interpretation    Interpreted by emergency department physician    Rhythm: normal sinus   Rate: normal/88  Axis: normal 21  Ectopy: none  Conduction: QT corrected 500 ms  ST Segments: no acute change  T Waves: no acute change  Q Waves: Anteroseptal    Clinical Impression: Abnormal EKG    LAYLA Lu MD  01/03/21 2812

## 2021-01-03 NOTE — ED NOTES
Pt. Resting on stretcher, eyes open, RR even and non-labored  Pt.  Updated on POC  Will continue to monitor        Meche Bennett RN  01/03/21 6040

## 2021-01-03 NOTE — ED PROVIDER NOTES
[facility]  ED  Emergency Department Encounter  Emergency Medicine Resident     Pt Name: Charles Gomez  MRN: [id number]  [place] 1965  Date of evaluation: 1/3/21  PCP:  KEANU Osorio 1436       Chief Complaint   Patient presents with    Cough     x2 weeks with headache, pt. denies SOB or CP       HISTORY OFPRESENT ILLNESS  (Location/Symptom, Timing/Onset, Context/Setting, Quality, Duration, Modifying Guillermina Wyman.)      Charles Gomez is a 59 yo female who presents with cough and shortness of breath. Patient states that for the past 2 weeks she has been short of breath with a sometimes productive cough with phlegm, headaches, chills. Denies any sick contacts or exposure to anyone who is been Covid positive. Denies any known fevers, abdominal pain, nausea, vomiting, diarrhea. Denies any history of coronary artery disease, blood clots, leg swelling, calf cramping unilaterally, hemoptysis, recent long travel or surgery, estrogen use. PAST MEDICAL / SURGICAL / SOCIAL / FAMILY HISTORY      has a past medical history of Arthritis, Asthma, Borderline diabetes, Borderline personality disorder ([address] CHF (congestive heart failure) ([address] COPD (chronic obstructive pulmonary disease) ([address] Fibromyalgia, Headache, Hypertension, Manic depression ([address] Movement disorder, Neck fracture ([address] Pernicious anemia, Seizure ([address] and Thyroid disease. has a past surgical history that includes knee surgery (Bilateral); partial hysterectomy (cervix not removed); lymph node dissection; Irrigation and debridement (Right, 5/17/2019); EXPLORATION OF WOUND OF EXTREMITY (Right, 5/19/2019); and EXPLORATION OF WOUND OF EXTREMITY (N/A, 5/22/2019).      Social History     Socioeconomic History    Marital status:      Spouse name: Not on file    Number of children: Not on file    Years of education: Not on file    Highest education level: Not on file Occupational History    Not on file   Social Needs    Financial resource strain: Not on file    Food insecurity     Worry: Not on file     Inability: Not on file    Transportation needs     Medical: Not on file     Non-medical: Not on file   Tobacco Use    Smoking status: Current Every Day Smoker     Packs/day: 0.50     Years: 12.00     Pack years: 6.00    Smokeless tobacco: Never Used   Substance and Sexual Activity    Alcohol use: No    Drug use: Not Currently     Comment: Has not use Heroin since 2/2020    Sexual activity: Not Currently   Lifestyle    Physical activity     Days per week: Not on file     Minutes per session: Not on file    Stress: Not on file   Relationships    Social connections     Talks on phone: Not on file     Gets together: Not on file     Attends Mandaeism service: Not on file     Active member of club or organization: Not on file     Attends meetings of clubs or organizations: Not on file     Relationship status: Not on file    Intimate partner violence     Fear of current or ex partner: Not on file     Emotionally abused: Not on file     Physically abused: Not on file     Forced sexual activity: Not on file   Other Topics Concern    Not on file   Social History Narrative    Not on file       History reviewed. No pertinent family history. Allergies:  Imitrex [sumatriptan], Bee pollen, Bee venom, Bromide ion [bromine], Ketorolac, Reglan [metoclopramide], Sulfa antibiotics, Sulfadiazine, and Tramadol    Home Medications:  Prior to Admission medications    Medication Sig Start Date End Date Taking?  Authorizing Provider   guaiFENesin-dextromethorphan [facility] DM) 100-10 MG/5ML syrup Take 5 mLs by mouth 3 times daily as needed for Cough 1/3/21 1/13/21 Yes Gentry Patterson DO   cyclobenzaprine (FLEXERIL) 5 MG tablet Take 1 tablet by mouth 2 times daily as needed for Muscle spasms 12/21/20   Andrea Dodge MD hydrOXYzine (ATARAX) 50 MG tablet  12/14/20   Historical Provider, MD   Menthol, Topical Analgesic, (BIOFREEZE ROLL-ON) 4 % GEL Apply 1 applicator topically 3 times daily as needed (pain) 12/18/20 1/17/21  Forrestine Shallow, APRN - CNP   albuterol sulfate  (90 Base) MCG/ACT inhaler Inhale 1 puff into the lungs every 6 hours as needed for Wheezing [place] prescription until follow up with primary. Do not refill. Follow up with primary 12/18/20   Forrestine Shallow, APRN - CNP   Nebulizers (NEBULIZER COMPRESSOR) MISC Daily as needed 12/18/20   Forrestine Shallow, APRN - CNP   Respiratory Therapy Supplies (NEBULIZER/TUBING/MOUTHPIECE) KIT Daily as needed 12/18/20   Forrestine Shallow, APRN - CNP   cetirizine (ZYRTEC) 10 MG tablet Take 1 tablet by mouth daily 12/18/20   Forrestine Shallow, APRN - CNP   butalbital-aspirin-caffeine [facility]) -40 MG per capsule Take 1 capsule by mouth every 4 hours as needed for Headaches for up to 30 days. 12/18/20 1/17/21  Forrestine Shallow, APRN - CNP   Blood Pressure KIT Daily as needed 12/18/20   Forrestine Shallow, APRN - CNP   cyclobenzaprine (FLEXERIL) 5 MG tablet Take 1 tablet by mouth 2 times daily as needed for Muscle spasms Caution: may cause sedation. Do not take with zanaflex. 12/16/20 1/15/21  Forrestine Shallow, APRN - CNP   ondansetron (ZOFRAN ODT) 4 MG disintegrating tablet Take 1 tablet by mouth every 8 hours as needed for Nausea 12/4/20   Forrestine Shallow, APRN - CNP   ibuprofen (ADVIL;MOTRIN) 800 MG tablet Take 1 tablet by mouth every 8 hours as needed for Pain 12/4/20   Forrestine Shallow, APRN - CNP   Misc. Devices (CANE) MISC Quad base cane 11/28/20   George Gupta MD   acetaminophen (TYLENOL) 500 MG tablet Take 1-2 tablets by mouth every 8 hours as needed for Pain 11/18/20 12/18/20  Forrestine Shallow, APRN - CNP   gabapentin (NEURONTIN) 300 MG capsule Take 2 capsules by mouth 3 times daily for 30 days.  11/18/20 12/18/20  Forrestine Leyla, APRN - CNP lisinopril (PRINIVIL;ZESTRIL) 20 MG tablet Take 1 tablet by mouth daily 11/5/20 12/18/20  [facility], APRN - CNP   famotidine (PEPCID) 20 MG tablet Take 1 tablet by mouth 2 times daily as needed (for acid reflux) 10/26/20   [facility], APRN - CNP   clomiPRAMINE (ANAFRANIL) 50 MG capsule Take 1 capsule by mouth nightly 10/26/20   [facility], APRN - CNP   risperiDONE (RISPERDAL) 1 MG tablet Take 1 tablet by mouth daily and 2 tablets by mouth at bedtime 10/22/20   [facility], APRN - CNP   traZODone (DESYREL) 150 MG tablet Take 1 tablet by mouth nightly 10/22/20   [facility], APRN - CNP   cloNIDine (CATAPRES) 0.1 MG tablet Take 1 tablet by mouth nightly 10/22/20   [facility], APRN - CNP   Benzocaine-Menthol (CEPACOL) 6-10 MG LOZG lozenge Take 1 lozenge by mouth every 2 hours as needed for Sore Throat 10/22/20   [facility], APRN - CNP   aspirin EC 81 MG EC tablet Take 1 tablet by mouth daily 10/22/20   [facility], APRN - CNP   budesonide-formoterol [facility]) 160-4.5 MCG/ACT AERO Inhale 2 puffs into the lungs 2 times daily 10/9/20   [facility], APRN - CNP   levothyroxine (SYNTHROID) 100 MCG tablet Take 1 tablet by mouth Daily 10/9/20   [facility], APRN - CNP   tiZANidine (ZANAFLEX) 2 MG tablet Take 2 tablets by mouth every 8 hours as needed (muscle spasm, neck pain) 10/9/20   [facility], APRN - CNP   Elastic Bandages & Supports (CARPAL TUNNEL WRIST STABILIZER) MISC Apply to each wrist at bedtime 10/9/20   [facility], APRN - CNP   methyl salicylate-menthol (PAULETTE BRAY GREASELESS) 10-15 % CREA Apply topically 3 times daily as needed for Pain 9/21/20   Satya Archibald MD   albuterol (PROVENTIL) (2.5 MG/3ML) 0.083% nebulizer solution Take 3 mLs by nebulization every 6 hours as needed for Wheezing  Patient not taking: Reported on 10/9/2020 6/18/20   KEANU Bellamy - CNP ipratropium-albuterol (DUONEB) 0.5-2.5 (3) MG/3ML SOLN nebulizer solution Inhale 3 mLs into the lungs every 4 hours  Patient not taking: Reported on 10/9/2020 6/16/19   Dianna Gao MD       REVIEW OFSYSTEMS    (2-9 systems for level 4, 10 or more for level 5)      Review of Systems   Constitutional: Positive for chills. Negative for fever. HENT: Negative. Respiratory: Positive for cough and shortness of breath. Negative for wheezing. Cardiovascular: Negative for chest pain, palpitations and leg swelling. Gastrointestinal: Negative for abdominal pain, nausea and vomiting. Genitourinary: Negative for dysuria and hematuria. Skin: Negative for rash and wound. Neurological: Positive for headaches. Negative for dizziness, syncope, weakness, light-headedness and numbness. PHYSICAL EXAM   (up to 7 for level 4, 8 or more forlevel 5)      INITIAL VITALS:   ED Triage Vitals   BP Temp Temp Source Pulse Resp SpO2 Height Weight   01/03/21 1143 01/03/21 1133 01/03/21 1133 01/03/21 1143 01/03/21 1143 01/03/21 1143 -- 01/03/21 1143   (!) 132/90 97.4 °F (36.3 °C) Temporal 85 23 94 %  203 lb (92.1 kg)       Physical Exam  Vitals signs and nursing note reviewed. Constitutional:       General: She is not in acute distress. Appearance: Normal appearance. She is not ill-appearing, toxic-appearing or diaphoretic. Cardiovascular:      Rate and Rhythm: Normal rate and regular rhythm. Heart sounds: No murmur. No gallop. Pulmonary:      Effort: Pulmonary effort is normal.      Breath sounds: Normal breath sounds. Abdominal:      General: There is no distension. Palpations: Abdomen is soft. Tenderness: There is no abdominal tenderness. There is no guarding. Musculoskeletal:         General: No tenderness. Right lower leg: No edema. Left lower leg: No edema. Skin:     General: Skin is warm and dry.    Neurological: Mental Status: She is alert and oriented to person, place, and time. DIFFERENTIAL  DIAGNOSIS     PLAN (LABS / IMAGING / EKG):  Orders Placed This Encounter   Procedures    XR CHEST PORTABLE    CBC WITH AUTO DIFFERENTIAL    BASIC METABOLIC PANEL    Troponin    Troponin    Brain Natriuretic Peptide    EKG 12 Lead       MEDICATIONS ORDERED:  Orders Placed This Encounter   Medications    guaiFENesin-dextromethorphan (ROBITUSSIN DM) 100-10 MG/5ML syrup     Sig: Take 5 mLs by mouth 3 times daily as needed for Cough     Dispense:  120 mL     Refill:  0       DDX: Pneumonia, viral illness, COVID-19, ACS, COPD    Initial MDM/Plan/ED course: 60 y.o. female who presents with productive cough, shortness of breath, flulike symptoms. On exam vitals are normal patient is in no acute distress. Physical exam unremarkable with normal heart and lung sounds, abdomen soft nontender, no leg swelling or calf tenderness, patient awake alert and oriented. Cardiac work-up with troponins x2 was obtained and negative. Chest x-ray with no evidence of pneumonia. Symptoms likely secondary to viral illness. Patient given instructions for outpatient COVID-19 testing and instructed to treat herself symptomatically at home and to return if her symptoms worsen.     DIAGNOSTIC RESULTS / EMERGENCY DEPARTMENT COURSE / MDM     LABS:  Labs Reviewed   BASIC METABOLIC PANEL - Abnormal; Notable for the following components:       Result Value    CREATININE 0.49 (*)     Sodium 134 (*)     All other components within normal limits   CBC WITH AUTO DIFFERENTIAL   TROPONIN   TROPONIN   BRAIN NATRIURETIC PEPTIDE         RADIOLOGY:  Xr Chest Portable    Result Date: 1/3/2021 EXAMINATION: ONE XRAY VIEW OF THE CHEST 1/3/2021 12:32 pm COMPARISON: 25 August 2020 HISTORY: ORDERING SYSTEM PROVIDED HISTORY: SIRD+ TECHNOLOGIST PROVIDED HISTORY: SIRD+ FINDINGS: AP portable view of the chest time stamped at 1229 hours is submitted. Overlying cardiac monitoring electrodes are present. Heart size is normal. No vascular congestion, focal consolidation, effusion, or pneumothorax is noted. Osseous and mediastinal structures are age-appropriate. Incidental resection distal right clavicle is noted. No acute cardiopulmonary process. EKG      All EKG's are interpreted by the [facility] Physician who either signs or Co-signs this chart in the absence of a cardiologist.      PROCEDURES:  None    CONSULTS:  None    CRITICAL CARE:  Please see attending note    FINAL IMPRESSION      1.  Viral illness          DISPOSITION / PLAN     DISPOSITION    D/c    PATIENT REFERRED TO:  Krystina Adams, APRN - CNP  [address]  [phone]    Schedule an appointment as soon as possible for a visit in 3 days  Follow up    [facility]  [address] [place] [address]  [phone]  Schedule an appointment as soon as possible for a visit   Outpatient COVID-19 testing      DISCHARGE MEDICATIONS:  Discharge Medication List as of 1/3/2021  2:56 PM          Salima Beard DO  Emergency Medicine Resident    (Please note that portions of this note were completed with a voice recognition program.Efforts were made to edit the dictations but occasionally words are mis-transcribed.)        Mitzy Edmondson DO  Resident  01/03/21 Marian Voss

## 2021-01-04 ENCOUNTER — CARE COORDINATION (OUTPATIENT)
Dept: CARE COORDINATION | Age: 56
End: 2021-01-04

## 2021-01-04 LAB
EKG ATRIAL RATE: 88 BPM
EKG P AXIS: 66 DEGREES
EKG P-R INTERVAL: 172 MS
EKG Q-T INTERVAL: 414 MS
EKG QRS DURATION: 78 MS
EKG QTC CALCULATION (BAZETT): 500 MS
EKG R AXIS: 21 DEGREES
EKG T AXIS: 57 DEGREES
EKG VENTRICULAR RATE: 88 BPM

## 2021-01-04 PROCEDURE — 93010 ELECTROCARDIOGRAM REPORT: CPT | Performed by: INTERNAL MEDICINE

## 2021-01-05 ENCOUNTER — CARE COORDINATION (OUTPATIENT)
Dept: CARE COORDINATION | Age: 56
End: 2021-01-05

## 2021-01-05 NOTE — CARE COORDINATION
Patient contacted regarding Migel Alexander. Discussed COVID-19 related testing which was not done at this time. Test results were not done. Patient informed of results, if available? [facility] Transition Nurse/ Ambulatory Care Manager contacted the patient by telephone to perform post discharge assessment. Call within 2 business days of discharge: Yes. Verified name and  with patient as identifiers. Provided introduction to self, and explanation of the CTN/ACM role, and reason for call due to risk factors for infection and/or exposure to COVID-19. Symptoms reviewed with patient who verbalized the following symptoms: fatigue, cough and HA. Due to no new or worsening symptoms encounter was not routed to provider for escalation. Discussed follow-up appointments. If no appointment was previously scheduled, appointment scheduling offered: [facility] follow up appointment(s):   Future Appointments   Date Time Provider Rashida Torres   2021  1:45 PM Neftali Liriano DPM [facility] Pod[facility]   3/18/2021 10:15 AM KEANU Chakraborty CNP  [facility] follow up appointment(s):     Non-face-to-face services provided:  Obtained and reviewed discharge summary and/or continuity of care documents     Advance Care Planning:   Does patient have an Advance Directive:  patient declined education. Patient has following risk factors of: heart failure, COPD and HTN. CTN/ACM reviewed discharge instructions, medical action plan and red flags such as increased shortness of breath, increasing fever and signs of decompensation with patient who verbalized understanding. Discussed exposure protocols and quarantine with CDC Guidelines What to do if you are sick with coronavirus disease .  Patient was given an opportunity for questions and concerns.  The patient agrees to contact the [facility] exposure line [phone], local health department declined and PCP office for questions related to their healthcare. CTN/ACM provided contact information for future needs. Reviewed and educated patient on any new and changed medications related to discharge diagnosis     Patient/family/caregiver given information for GetWell Loop and agrees to enroll yes  Patient's preferred e-mail:    Patient's preferred phone number: [phone]  Based on Loop alert triggers, patient will be contacted by nurse care manager for worsening symptoms. Pt will be further monitored by COVID Loop Team based on severity of symptoms and risk factors.

## 2021-01-06 ENCOUNTER — OFFICE VISIT (OUTPATIENT)
Dept: PODIATRY | Age: 56
End: 2021-01-06
Payer: MEDICARE

## 2021-01-06 VITALS
WEIGHT: 179 LBS | HEIGHT: 63 IN | DIASTOLIC BLOOD PRESSURE: 75 MMHG | BODY MASS INDEX: 31.71 KG/M2 | SYSTOLIC BLOOD PRESSURE: 105 MMHG | HEART RATE: 77 BPM

## 2021-01-06 DIAGNOSIS — M77.42 METATARSALGIA OF BOTH FEET: ICD-10-CM

## 2021-01-06 DIAGNOSIS — L85.3 XEROSIS OF SKIN: Primary | ICD-10-CM

## 2021-01-06 DIAGNOSIS — B35.1 ONYCHOMYCOSIS DUE TO DERMATOPHYTE: ICD-10-CM

## 2021-01-06 DIAGNOSIS — I73.9 PVD (PERIPHERAL VASCULAR DISEASE) (HCC): ICD-10-CM

## 2021-01-06 DIAGNOSIS — M77.41 METATARSALGIA OF BOTH FEET: ICD-10-CM

## 2021-01-06 DIAGNOSIS — B35.3 TINEA PEDIS OF BOTH FEET: ICD-10-CM

## 2021-01-06 DIAGNOSIS — L84 CALLUS OF FOOT: ICD-10-CM

## 2021-01-06 PROCEDURE — G8427 DOCREV CUR MEDS BY ELIG CLIN: HCPCS | Performed by: STUDENT IN AN ORGANIZED HEALTH CARE EDUCATION/TRAINING PROGRAM

## 2021-01-06 PROCEDURE — 11055 PARING/CUTG B9 HYPRKER LES 1: CPT | Performed by: STUDENT IN AN ORGANIZED HEALTH CARE EDUCATION/TRAINING PROGRAM

## 2021-01-06 PROCEDURE — 99212 OFFICE O/P EST SF 10 MIN: CPT | Performed by: STUDENT IN AN ORGANIZED HEALTH CARE EDUCATION/TRAINING PROGRAM

## 2021-01-06 PROCEDURE — 4004F PT TOBACCO SCREEN RCVD TLK: CPT | Performed by: STUDENT IN AN ORGANIZED HEALTH CARE EDUCATION/TRAINING PROGRAM

## 2021-01-06 PROCEDURE — G8417 CALC BMI ABV UP PARAM F/U: HCPCS | Performed by: STUDENT IN AN ORGANIZED HEALTH CARE EDUCATION/TRAINING PROGRAM

## 2021-01-06 PROCEDURE — G8482 FLU IMMUNIZE ORDER/ADMIN: HCPCS | Performed by: STUDENT IN AN ORGANIZED HEALTH CARE EDUCATION/TRAINING PROGRAM

## 2021-01-06 PROCEDURE — 3017F COLORECTAL CA SCREEN DOC REV: CPT | Performed by: STUDENT IN AN ORGANIZED HEALTH CARE EDUCATION/TRAINING PROGRAM

## 2021-01-06 PROCEDURE — 99214 OFFICE O/P EST MOD 30 MIN: CPT | Performed by: STUDENT IN AN ORGANIZED HEALTH CARE EDUCATION/TRAINING PROGRAM

## 2021-01-06 RX ORDER — CICLOPIROX 7.7 MG/G
GEL TOPICAL
Qty: 1 TUBE | Refills: 5 | Status: ON HOLD | OUTPATIENT
Start: 2021-01-06 | End: 2021-08-28

## 2021-01-06 RX ORDER — AMMONIUM LACTATE 12 G/100G
LOTION TOPICAL
Qty: 1 BOTTLE | Refills: 3 | Status: ON HOLD | OUTPATIENT
Start: 2021-01-06 | End: 2021-08-28

## 2021-01-06 NOTE — PROGRESS NOTES
TIMEOUT  Correct patient (patient name and ) YES  Correct Site YES  Correct Procedure YES    NAIL CLIPPING

## 2021-01-06 NOTE — PROGRESS NOTES
[address]  [address] [place] [address] [facility], [address]  Tel: [phone]   Fax: [phone]    Subjective     CC: Painful and elongated toe nails    Interval History 1/6/21:  Patient is a 61 yo female who presents for tingling pain to feet. Patient has generalized b/l foot pain and was scanned for CMOs, Has not picked up orthoses and CMOs were sent to pt's home. She relates history of b/l heel spur surgery several years prior. Relates pain is non-specific, aching and occasionally \"pins-and-needles like\". She follows PCP for neuropathy symptom management, currently on gabapentin. Notes was given antifungal cream for fungal great toe nails and has been using daily for last 6-8 months with no improvement to appearance. Cannot recall antifungal medication and none on record. Denied any prev trauma to nail. Changes socks and cycles shoes daily. Notes smokes 1 pack of cigarettes a day on and off for years. Relates pain to feet and legs after walking short distances and at night at rest. She denies any wounds or other issues. HPI:  Reinier Narayanan is a 60y.o. year old female who presents to clinic today for pre-diabetic foot examination and also complaining of painful and elongated toenails. The patient is pre-diabetic, and they're last HgbA1c was 5.2% in (February 2019). The patient states their digits are painful when the toenails are elongated, causing rubbing in shoe gear. The patient denies tingling and numbness in the lower extremities. Patient denies any rest pain or claudication symptoms. patient is currently in a group home states she needs a note to purchase supportive shoes. She states she had custom orthotics in [place], but left them behind and is requesting a new pair. The patient denies any history of acute trauma. The patient denies any other pedal complaints.      Primary care physician is KEANU Arenas CNP.    ROS:    Constitutional: Denies nausea, vomiting, fever, chills. Neurologic: Denies numbness, tingling, and burning in the feet. Vascular: Denies symptoms of lower extremity claudication. Skin: Denies open wounds. Otherwise negative except as noted in the HPI. PMH:  Past Medical History:   Diagnosis Date    Arthritis     Asthma     Borderline diabetes     [facility]line [facility] disorder [facility] [address]     CH[facility]congestive [facility] failure) [facility]     COPD (chronic obstructive pulmonary disease) ([facility])     Fibromyalgia     Headache     Hypertension     Manic depression ([facility])     Movement disorder     Neck fracture ([facility])     Pernicious anemia     Seizure ([facility] [address]     Thyroid disease        Surgical History:   Past Surgical History:   Procedure Laterality Date    EXPLORATION OF WOUND OF EXTREMITY Right 5/19/2019    RIGHT THIGH WOUND WASHOUT WITH [facility] WEST-ER PLACEMENT performed by Fadi Rodriguez MD at [address] N/A 5/22/2019    RIGHT WOUND HIP EXAMINATION LAVAGE AND WOUND VAC PLACEMENT performed by Malcolm Ohara MD at [address] [place] [address] Right 5/17/2019    IRRIGATION, DEBRIDEMENT RIGHT THIGH performed by Leti Devlin MD at [place] [address] Bilateral     LYMPH NODE DISSECTION      cervical    PARTIAL HYSTERECTOMY         Social History:  Social History     Tobacco Use    Smoking status: Current Every Day Smoker     Packs/day: 0.50     Years: 12.00     Pack years: 6.00    Smokeless tobacco: Never Used   Substance Use Topics    Alcohol use: No    Drug use: Not Currently     Comment: Has not use Heroin since 2/2020       Medications:  Prior to Admission medications    Medication Sig Start Date End Date Taking? Authorizing Provider   ammonium lactate (LAC-HYDRIN) 12 % lotion Apply topically daily.  1/6/21  Yes Lena Subramanian DPM   Ciclopirox (LOPROX) 0.77 % gel Apply to skin twice a day 1/6/21  Yes Lena Subramanian DPM   guaiFENesin-dextromethorphan (ROBITUSSIN DM) 100-10 MG/5ML syrup Take 5 mLs by mouth 3 times daily as needed for Cough 1/3/21 1/13/21 Yes Mode Flores,    hydrOXYzine (ATARAX) 50 MG tablet  12/14/20  Yes Historical Provider, MD   Menthol, Topical Analgesic, (BIOFREEZE ROLL-ON) 4 % GEL Apply 1 applicator topically 3 times daily as needed (pain) 12/18/20 1/17/21 Yes KEANU Colin CNP   albuterol sulfate  (90 Base) MCG/ACT inhaler Inhale 1 puff into the lungs every 6 hours as needed for Wheezing [place] prescription until follow up with primary. Do not refill. Follow up with primary 12/18/20  Yes KEANU Colin CNP   Nebulizers (NEBULIZER COMPRESSOR) MISC Daily as needed 12/18/20  Yes KEANU Colin CNP   Respiratory Therapy Supplies (NEBULIZER/TUBING/MOUTHPIECE) KIT Daily as needed 12/18/20  Yes KEANU Colin CNP   cetirizine (ZYRTEC) 10 MG tablet Take 1 tablet by mouth daily 12/18/20  Yes KEANU Colin CNP   butalbital-aspirin-caffeine [facility]) -40 MG per capsule Take 1 capsule by mouth every 4 hours as needed for Headaches for up to 30 days. 12/18/20 1/17/21 Yes KEANU Colin CNP   Blood Pressure KIT Daily as needed 12/18/20  Yes KEANU Colin CNP   ondansetron (ZOFRAN ODT) 4 MG disintegrating tablet Take 1 tablet by mouth every 8 hours as needed for Nausea 12/4/20  Yes KEANU Colin CNP   Misc.  Devices (CANE) MISC Quad base cane 11/28/20  Yes Godwin Choi MD   famotidine (PEPCID) 20 MG tablet Take 1 tablet by mouth 2 times daily as needed (for acid reflux) 10/26/20  Yes KEANU Colin CNP   clomiPRAMINE (ANAFRANIL) 50 MG capsule Take 1 capsule by mouth nightly 10/26/20  Yes KEANU Colin CNP   risperiDONE (RISPERDAL) 1 MG tablet Take 1 tablet by mouth daily and 2 tablets by mouth at bedtime 10/22/20  Yes KEANU Colin CNP   traZODone (DESYREL) 150 MG tablet Take 1 tablet by mouth nightly 10/22/20  Yes KEANU Colin CNP   cloNIDine (CATAPRES) 0.1 MG tablet Take acetaminophen (TYLENOL) 500 MG tablet Take 1-2 tablets by mouth every 8 hours as needed for Pain 11/18/20 12/18/20  KEANU Hays CNP   gabapentin (NEURONTIN) 300 MG capsule Take 2 capsules by mouth 3 times daily for 30 days. 11/18/20 12/18/20  KEANU Hays CNP   lisinopril (PRINIVIL;ZESTRIL) 20 MG tablet Take 1 tablet by mouth daily 11/5/20 12/18/20  KEANU Hays CNP       Objective     Vitals:    01/06/21 1413   BP: 105/75   Pulse: 77       Lab Results   Component Value Date    LABA1C 5.2 02/19/2019     Physical Exam:  General:  Alert and oriented x3. In no acute distress. Lower Extremity Physical Exam:    Vascular: DP and PT pulses are palpable, Bilateral. CFT <3 seconds to all digits, Bilateral.  No edema, Bilateral.  Hair growth is present to the level of the digits, Bilateral.     Neuro: Saph/sural/SP/DP/plantar sensation intact to light touch. Protective sensation is intact to 7/10 sites as tested with a 5.07g SWMF, Bilateral.     Musculoskeletal: EHL/FHL/GS/TA gross motor intact. TTP to distal digits b/l. Gross deformity is absent, Bilateral. ttp to metatarsal heads 2-5 kae. Dermatologic: No open lesions, Bilateral.  Interdigital maceration absent, Bilateral.  Nails 1-10 are wnl but subungual debris noted, in addition to being elongated and dystrophic to kae hallux nails. Hyperkeratotic tissue noted to the left lateral tuberosity of 5th met base. Yamil Brown is a 60 y.o. female with     Diagnosis Orders   1. Xerosis of skin  ammonium lactate (LAC-HYDRIN) 12 % lotion    Ciclopirox (LOPROX) 0.77 % gel   2. Tinea pedis of both feet  Ciclopirox (LOPROX) 0.77 % gel   3. Onychomycosis due to dermatophyte  Ciclopirox (LOPROX) 0.77 % gel   4. PVD (peripheral vascular disease) (HCC)  VL LOWER EXTREMITY ARTERIAL SEGMENTAL PRESSURES W PPG   5.  Metatarsalgia of both feet  XR FOOT LEFT (MIN 3 VIEWS)    XR FOOT RIGHT (MIN 3 VIEWS)       Plan   · Patient examined and evaluated  · Diagnosis and treatment options discussed in detail  · Gabapentin management per PCP  · Educated pt on benefits of smoking cessation  · Trimmed callus x1 with sterile 15 blade woi  · Continue daily foot checks, monitor for SOI & no barefoot ambulation  · Educated pt on supportive shoes and to obtain  · Please, call the office with any questions or concerns   · Orthoses were sent to pt home  · PVRs ordered to assess claudication pain  · XR ordered to assess osseus foot structure  · Nail clipping sent to assess onychomycosis vs dystrophic nail   · Pt to RTC 1 month to review XR, PVRs, improvement of tinea. Poss CMO fitting if CMOs never received in mail. Orders Placed This Encounter   Medications    ammonium lactate (LAC-HYDRIN) 12 % lotion     Sig: Apply topically daily.      Dispense:  1 Bottle     Refill:  3    Ciclopirox (LOPROX) 0.77 % gel     Sig: Apply to skin twice a day     Dispense:  1 Tube     Refill:  5     Orders Placed This Encounter   Procedures    VL LOWER EXTREMITY ARTERIAL SEGMENTAL PRESSURES W PPG     Standing Status:   Future     Standing Expiration Date:   1/6/2022     Order Specific Question:   Reason for exam:     Answer:   claudication pain    XR FOOT LEFT (MIN 3 VIEWS)     Standing Status:   Future     Standing Expiration Date:   1/6/2022     Order Specific Question:   Reason for exam:     Answer:   weightbearing, metatarsal head pain    XR FOOT RIGHT (MIN 3 VIEWS)     Standing Status:   Future     Standing Expiration Date:   1/6/2022     Order Specific Question:   Reason for exam:     Answer:   weightbearing, metatarsal head pain       Aurora Del Rio DPM   Podiatric Medicine & Surgery   1/6/2021 at 3:07 PM

## 2021-01-06 NOTE — PROGRESS NOTES
Patient instructed to remove shoes and socks, instructed to sit in exam chair. Current PCP name is DAGMAR Singh and date of last visit 12/18/20. Do you have a follow up visit scheduled?   Yes    If yes the date is 3/18/21

## 2021-01-12 ENCOUNTER — HOSPITAL ENCOUNTER (EMERGENCY)
Age: 56
Discharge: HOME | End: 2021-01-13
Attending: EMERGENCY MEDICINE
Payer: MEDICARE

## 2021-01-12 ENCOUNTER — APPOINTMENT (OUTPATIENT)
Dept: GENERAL RADIOLOGY | Age: 56
End: 2021-01-12
Payer: MEDICARE

## 2021-01-12 VITALS
RESPIRATION RATE: 23 BRPM | WEIGHT: 190 LBS | OXYGEN SATURATION: 97 % | HEIGHT: 63 IN | HEART RATE: 81 BPM | SYSTOLIC BLOOD PRESSURE: 123 MMHG | BODY MASS INDEX: 33.66 KG/M2 | DIASTOLIC BLOOD PRESSURE: 112 MMHG | TEMPERATURE: 97.2 F

## 2021-01-12 DIAGNOSIS — B34.9 VIRAL ILLNESS: Primary | ICD-10-CM

## 2021-01-12 PROCEDURE — 71045 X-RAY EXAM CHEST 1 VIEW: CPT

## 2021-01-12 PROCEDURE — 84484 ASSAY OF TROPONIN QUANT: CPT

## 2021-01-12 PROCEDURE — 99285 EMERGENCY DEPT VISIT HI MDM: CPT

## 2021-01-12 PROCEDURE — 85055 RETICULATED PLATELET ASSAY: CPT

## 2021-01-12 PROCEDURE — 85025 COMPLETE CBC W/AUTO DIFF WBC: CPT

## 2021-01-12 PROCEDURE — 6370000000 HC RX 637 (ALT 250 FOR IP): Performed by: STUDENT IN AN ORGANIZED HEALTH CARE EDUCATION/TRAINING PROGRAM

## 2021-01-12 PROCEDURE — U0002 COVID-19 LAB TEST NON-CDC: HCPCS

## 2021-01-12 PROCEDURE — 93005 ELECTROCARDIOGRAM TRACING: CPT

## 2021-01-12 PROCEDURE — 80053 COMPREHEN METABOLIC PANEL: CPT

## 2021-01-12 RX ORDER — ACETAMINOPHEN 500 MG
1000 TABLET ORAL ONCE
Status: COMPLETED | OUTPATIENT
Start: 2021-01-12 | End: 2021-01-12

## 2021-01-12 RX ORDER — IBUPROFEN 800 MG/1
800 TABLET ORAL ONCE
Status: COMPLETED | OUTPATIENT
Start: 2021-01-12 | End: 2021-01-12

## 2021-01-12 RX ADMIN — IBUPROFEN 800 MG: 800 TABLET, FILM COATED ORAL at 23:27

## 2021-01-12 RX ADMIN — ACETAMINOPHEN 1000 MG: 500 TABLET ORAL at 23:27

## 2021-01-12 ASSESSMENT — PAIN - FUNCTIONAL ASSESSMENT: PAIN_FUNCTIONAL_ASSESSMENT: ACTIVITIES ARE NOT PREVENTED

## 2021-01-12 ASSESSMENT — PAIN DESCRIPTION - PROGRESSION: CLINICAL_PROGRESSION: GRADUALLY WORSENING

## 2021-01-12 ASSESSMENT — PAIN DESCRIPTION - ONSET: ONSET: ON-GOING

## 2021-01-12 ASSESSMENT — PAIN DESCRIPTION - DESCRIPTORS: DESCRIPTORS: ACHING

## 2021-01-12 ASSESSMENT — PAIN DESCRIPTION - FREQUENCY: FREQUENCY: CONTINUOUS

## 2021-01-13 ENCOUNTER — TELEPHONE (OUTPATIENT)
Dept: PRIMARY CARE CLINIC | Age: 56
End: 2021-01-13

## 2021-01-13 DIAGNOSIS — G43.909 MIGRAINE WITHOUT STATUS MIGRAINOSUS, NOT INTRACTABLE, UNSPECIFIED MIGRAINE TYPE: ICD-10-CM

## 2021-01-13 LAB
ABSOLUTE EOS #: 0.13 K/UL (ref 0–0.44)
ABSOLUTE IMMATURE GRANULOCYTE: 0.06 K/UL (ref 0–0.3)
ABSOLUTE LYMPH #: 3.8 K/UL (ref 1.1–3.7)
ABSOLUTE MONO #: 0.79 K/UL (ref 0.1–1.2)
ALBUMIN SERPL-MCNC: 3.9 G/DL (ref 3.5–5.2)
ALBUMIN/GLOBULIN RATIO: 1.3 (ref 1–2.5)
ALP BLD-CCNC: 126 U/L (ref 35–104)
ALT SERPL-CCNC: 22 U/L (ref 5–33)
ANION GAP SERPL CALCULATED.3IONS-SCNC: 10 MMOL/L (ref 9–17)
AST SERPL-CCNC: 33 U/L
BASOPHILS # BLD: 1 % (ref 0–2)
BASOPHILS ABSOLUTE: 0.07 K/UL (ref 0–0.2)
BILIRUB SERPL-MCNC: 0.15 MG/DL (ref 0.3–1.2)
BUN BLDV-MCNC: 7 MG/DL (ref 6–20)
BUN/CREAT BLD: ABNORMAL (ref 9–20)
CALCIUM SERPL-MCNC: 9.2 MG/DL (ref 8.6–10.4)
CHLORIDE BLD-SCNC: 102 MMOL/L (ref 98–107)
CO2: 23 MMOL/L (ref 20–31)
CREAT SERPL-MCNC: 0.51 MG/DL (ref 0.5–0.9)
DIFFERENTIAL TYPE: ABNORMAL
EOSINOPHILS RELATIVE PERCENT: 1 % (ref 1–4)
GFR AFRICAN AMERICAN: >60 ML/MIN
GFR NON-AFRICAN AMERICAN: >60 ML/MIN
GFR SERPL CREATININE-BSD FRML MDRD: ABNORMAL ML/MIN/{1.73_M2}
GFR SERPL CREATININE-BSD FRML MDRD: ABNORMAL ML/MIN/{1.73_M2}
GLUCOSE BLD-MCNC: 92 MG/DL (ref 70–99)
HCT VFR BLD CALC: 44.7 % (ref 36.3–47.1)
HEMOGLOBIN: 14.8 G/DL (ref 11.9–15.1)
IMMATURE GRANULOCYTES: 1 %
LYMPHOCYTES # BLD: 36 % (ref 24–43)
MCH RBC QN AUTO: 30.8 PG (ref 25.2–33.5)
MCHC RBC AUTO-ENTMCNC: 33.1 G/DL (ref 28.4–34.8)
MCV RBC AUTO: 93.1 FL (ref 82.6–102.9)
MONOCYTES # BLD: 8 % (ref 3–12)
NRBC AUTOMATED: 0 PER 100 WBC
PDW BLD-RTO: 12.5 % (ref 11.8–14.4)
PLATELET # BLD: ABNORMAL K/UL (ref 138–453)
PLATELET ESTIMATE: ABNORMAL
PLATELET, FLUORESCENCE: 186 K/UL (ref 138–453)
PMV BLD AUTO: ABNORMAL FL (ref 8.1–13.5)
POTASSIUM SERPL-SCNC: 4.9 MMOL/L (ref 3.7–5.3)
RBC # BLD: 4.8 M/UL (ref 3.95–5.11)
RBC # BLD: ABNORMAL 10*6/UL
SARS-COV-2, RAPID: NOT DETECTED
SARS-COV-2: NORMAL
SARS-COV-2: NORMAL
SEG NEUTROPHILS: 54 % (ref 36–65)
SEGMENTED NEUTROPHILS ABSOLUTE COUNT: 5.68 K/UL (ref 1.5–8.1)
SODIUM BLD-SCNC: 135 MMOL/L (ref 135–144)
SOURCE: NORMAL
TOTAL PROTEIN: 6.9 G/DL (ref 6.4–8.3)
TROPONIN INTERP: NORMAL
TROPONIN T: NORMAL NG/ML
TROPONIN, HIGH SENSITIVITY: <6 NG/L (ref 0–14)
WBC # BLD: 10.5 K/UL (ref 3.5–11.3)
WBC # BLD: ABNORMAL 10*3/UL

## 2021-01-13 RX ORDER — IBUPROFEN 600 MG/1
600 TABLET ORAL EVERY 6 HOURS PRN
Qty: 30 TABLET | Refills: 0 | Status: SHIPPED | OUTPATIENT
Start: 2021-01-13 | End: 2021-03-11 | Stop reason: SDUPTHER

## 2021-01-13 RX ORDER — BUTALBITAL, ASPIRIN, AND CAFFEINE 50; 325; 40 MG/1; MG/1; MG/1
1 CAPSULE ORAL EVERY 4 HOURS PRN
Qty: 20 CAPSULE | Refills: 2 | Status: SHIPPED | OUTPATIENT
Start: 2021-01-13 | End: 2021-03-15 | Stop reason: SDUPTHER

## 2021-01-13 RX ORDER — ACETAMINOPHEN 325 MG/1
650 TABLET ORAL EVERY 6 HOURS PRN
Qty: 30 TABLET | Refills: 0 | Status: SHIPPED | OUTPATIENT
Start: 2021-01-13 | End: 2021-05-15

## 2021-01-13 ASSESSMENT — ENCOUNTER SYMPTOMS
BACK PAIN: 0
SINUS PRESSURE: 0
PHOTOPHOBIA: 0
SHORTNESS OF BREATH: 1
SORE THROAT: 0
CHEST TIGHTNESS: 0
DIARRHEA: 1
EYE REDNESS: 0
COLOR CHANGE: 0
ABDOMINAL PAIN: 1
SINUS PAIN: 0
VOMITING: 0
NAUSEA: 0
TROUBLE SWALLOWING: 0
CONSTIPATION: 0
WHEEZING: 0
COUGH: 1

## 2021-01-13 ASSESSMENT — PAIN SCALES - GENERAL: PAINLEVEL_OUTOF10: 7

## 2021-01-13 NOTE — ED PROVIDER NOTES
[facility]     Emergency Department     Faculty Attestation    I performed a history and physical examination of the patient and discussed management with the resident. I have reviewed and agree with the residents findings including all diagnostic interpretations, and treatment plans as written at the time of my review. Any areas of disagreement are noted on the chart. I was personally present for the key portions of any procedures. I have documented in the chart those procedures where I was not present during the key portions. For Physician Assistant/ Nurse Practitioner cases/documentation I have personally evaluated this patient and have completed at least one if not all key elements of the E/M (history, physical exam, and MDM). Additional findings are as noted. This patient was evaluated in the Emergency Department for symptoms described in the history of present illness. The patient was evaluated in the context of the global COVID-19 pandemic, which necessitated consideration that the patient might be at risk for infection with the SARS-CoV-2 virus that causes COVID-19. Institutional protocols and algorithms that pertain to the evaluation of patients at risk for COVID-19 are in a state of rapid change based on information released by regulatory bodies including the CDC and federal and state organizations. These policies and algorithms were followed during the patient's care in the ED.     Primary Care Physician: KEANU Hobbs - FELIX History: This is a 60 y.o. female who presents to the Emergency Department with complaint of shortness of breath. The patient presents emergency department complaint of shortness of breath that began on Friday. She has complaints of cough has been nonproductive. She also complains of low-grade fever and myalgias. Patient complains of some nausea but denies any vomiting. She does complain of diarrhea as well. He states that she is able to taste things is everything taste bland. Physical:   height is 5' 3\" (1.6 m) and weight is 190 lb (86.2 kg). Her skin temperature is 97.2 °F (36.2 °C). Her blood pressure is 123/112 (abnormal) and her pulse is 81. Her respiration is 23 and oxygen saturation is 97%. Lungs are clear to auscultation bilateral, heart regular rate and rhythm, abdomen is soft    Impression: Shortness of breath, myalgias    Plan: Chest x-ray, EKG, CBC, BMP, troponin, Covid test      EKG Interpretation    Interpreted by me  Normal sinus rhythm ventricular 76, normal DE interval, normal QRS duration, normal axis, cannot rule out anterior infarct, age undetermined, normal QT corrected  Compared EKG of January 3, 2021 QT corrected has shortened          (Please note that portions of this note were completed with a voice recognition program.  Efforts were made to edit the dictations but occasionally words are mis-transcribed.)    Ryan Khan MD, [facility]  Attending Emergency Medicine Physician        Tj Boyle MD  01/12/21 [id number]

## 2021-01-13 NOTE — TELEPHONE ENCOUNTER
Patient requesting a medication refill.     Medication:  butalbital-aspirin-caffeine Melven Ward) -40 MG per capsule      Pharmacy:  [facility]/pharmacy [place], [place] - [address] [place] [address] [phone] [facility] [address] [address]   Phone:  [phone]  Fax:  [phone]      Last office visit: 12/18/2020  Next office visit: 3/18/2021      Thank you

## 2021-01-13 NOTE — ED NOTES
Pt. Reports to the ED with c/o SOB, fatigue, and generalized weakness since Friday. Pt. Does not state that she has been around anyone with COVID at this time. Pt. Denies nausea and vomiting but does state diarrhea for the past few days. Pt. Reports a cough at this time. Pt resting on stretcher, no respiratory distress noted, pt updated on plan of care, will continue to monitor, call light in reach.         Gia Snachez RN  01/12/21 [id number]

## 2021-01-13 NOTE — ED PROVIDER NOTES
has a past surgical history that includes knee surgery (Bilateral); partial hysterectomy (cervix not removed); lymph node dissection; Irrigation and debridement (Right, 5/17/2019); EXPLORATION OF WOUND OF EXTREMITY (Right, 5/19/2019); and EXPLORATION OF WOUND OF EXTREMITY (N/A, 5/22/2019). Social History     Socioeconomic History    Marital status:      Spouse name: Not on file    Number of children: Not on file    Years of education: Not on file    Highest education level: Not on file   Occupational History    Not on file   Social Needs    Financial resource strain: Not on file    Food insecurity     Worry: Not on file     Inability: Not on file    Transportation needs     Medical: Not on file     Non-medical: Not on file   Tobacco Use    Smoking status: Current Every Day Smoker     Packs/day: 0.50     Years: 12.00     Pack years: 6.00    Smokeless tobacco: Never Used   Substance and Sexual Activity    Alcohol use: No    Drug use: Not Currently     Comment: Has not use Heroin since 2/2020    Sexual activity: Not Currently   Lifestyle    Physical activity     Days per week: Not on file     Minutes per session: Not on file    Stress: Not on file   Relationships    Social connections     Talks on phone: Not on file     Gets together: Not on file     Attends Ep[employer]opalian service: Not on file     Active member of club or organization: Not on file     Attends meetings of clubs or organizations: Not on file     Relationship status: Not on file    Intimate partner violence     Fear of current or ex partner: Not on file     Emotionally abused: Not on file     Physically abused: Not on file     Forced sexual activity: Not on file   Other Topics Concern    Not on file   Social History Narrative    Not on file       History reviewed. No pertinent family history. Allergies:  Imitrex [sumatriptan], Bee pollen, Bee venom, Bromide ion [bromine], Ketorolac, Reglan [metoclopramide], Sulfa antibiotics, Sulfadiazine, and Tramadol    Home Medications:  Prior to Admission medications    Medication Sig Start Date End Date Taking? Authorizing Provider   acetaminophen (TYLENOL) 325 MG tablet Take 2 tablets by mouth every 6 hours as needed for Pain 1/13/21  Yes Promise Fay, DO   ibuprofen (IBU) 600 MG tablet Take 1 tablet by mouth every 6 hours as needed for Pain 1/13/21  Yes Promise Call, DO   ammonium lactate (LAC-HYDRIN) 12 % lotion Apply topically daily. 1/6/21   Maggie Bowie DPM   Ciclopirox (LOPROX) 0.77 % gel Apply to skin twice a day 1/6/21   Maggie Bowie DPM   guaiFENesin-dextromethorphan (ROBITUSSIN DM) 100-10 MG/5ML syrup Take 5 mLs by mouth 3 times daily as needed for Cough 1/3/21 1/13/21  Adis Umana,    cyclobenzaprine (FLEXERIL) 5 MG tablet Take 1 tablet by mouth 2 times daily as needed for Muscle spasms  Patient not taking: Reported on 1/6/2021 12/21/20   E Sarbjit Gonsalez MD   hydrOXYzine (ATARAX) 50 MG tablet  12/14/20   Historical Provider, MD   Menthol, Topical Analgesic, (BIOFREEZE ROLL-ON) 4 % GEL Apply 1 applicator topically 3 times daily as needed (pain) 12/18/20 1/17/21  KEANU Lozoya CNP   albuterol sulfate  (90 Base) MCG/ACT inhaler Inhale 1 puff into the lungs every 6 hours as needed for Wheezing Gap prescription until follow up with primary. Do not refill.   Follow up with primary 12/18/20   KEANU Lozoya CNP   Nebulizers (NEBULIZER COMPRESSOR) MISC Daily as needed 12/18/20   KEANU Lozoya CNP   Respiratory Therapy Supplies (NEBULIZER/TUBING/MOUTHPIECE) KIT Daily as needed 12/18/20   KEANU Lozoya CNP   cetirizine (ZYRTEC) 10 MG tablet Take 1 tablet by mouth daily 12/18/20   Soledad Moore, APRN - CNP butalbital-aspirin-caffeine (FIORINAL) -40 MG per capsule Take 1 capsule by mouth every 4 hours as needed for Headaches for up to 30 days. 12/18/20 1/17/21  KEANU Hood CNP   Blood Pressure KIT Daily as needed 12/18/20   KEANU Hood CNP   cyclobenzaprine (FLEXERIL) 5 MG tablet Take 1 tablet by mouth 2 times daily as needed for Muscle spasms Caution: may cause sedation. Do not take with zanaflex. Patient not taking: Reported on 1/6/2021 12/16/20 1/15/21  KEAUN Hood CNP   ondansetron (ZOFRAN ODT) 4 MG disintegrating tablet Take 1 tablet by mouth every 8 hours as needed for Nausea 12/4/20   KEANU Hood CNP   Misc. Devices (CANE) MISC Quad base cane 11/28/20   Suzanne Ibrahim MD   gabapentin (NEURONTIN) 300 MG capsule Take 2 capsules by mouth 3 times daily for 30 days.  11/18/20 12/18/20  KEANU Hood CNP   lisinopril (PRINIVIL;ZESTRIL) 20 MG tablet Take 1 tablet by mouth daily 11/5/20 12/18/20  KEANU Hood CNP   famotidine (PEPCID) 20 MG tablet Take 1 tablet by mouth 2 times daily as needed (for acid reflux) 10/26/20   KEANU Hood CNP   clomiPRAMINE (ANAFRANIL) 50 MG capsule Take 1 capsule by mouth nightly 10/26/20   KEANU Hood CNP   risperiDONE (RISPERDAL) 1 MG tablet Take 1 tablet by mouth daily and 2 tablets by mouth at bedtime 10/22/20   KEANU Hood CNP   traZODone (DESYREL) 150 MG tablet Take 1 tablet by mouth nightly 10/22/20   KEANU Hood CNP   cloNIDine (CATAPRES) 0.1 MG tablet Take 1 tablet by mouth nightly 10/22/20   KEANU Hood CNP   Benzocaine-Menthol (CEPACOL) 6-10 MG LOZG lozenge Take 1 lozenge by mouth every 2 hours as needed for Sore Throat 10/22/20   KEANU Hood CNP   aspirin EC 81 MG EC tablet Take 1 tablet by mouth daily 10/22/20   KEANU Hood CNP Neurological: Negative for dizziness, tremors, speech difficulty, weakness, light-headedness and headaches. PHYSICAL EXAM   (up to 7 for level 4, 8 or more forlevel 5)      INITIAL VITALS:   ED Triage Vitals [01/12/21 2300]   BP Temp Temp Source Pulse Resp SpO2 Height Weight   123/112 97.2 °F (36.2 °C) Skin 81 23 97% 5'3\" 190       Physical Exam  Constitutional:       General: She is not in acute distress. Appearance: She is not ill-appearing or diaphoretic. HENT:      Head: Normocephalic and atraumatic. Right Ear: External ear normal.      Left Ear: External ear normal.      Nose: Congestion present. No rhinorrhea. Mouth/Throat:      Mouth: Mucous membranes are moist.      Pharynx: Oropharynx is clear. Eyes:      General: No scleral icterus. Extraocular Movements: Extraocular movements intact. Pupils: Pupils are equal, round, and reactive to light. Neck:      Musculoskeletal: Normal range of motion and neck supple. No neck rigidity. Cardiovascular:      Rate and Rhythm: Normal rate and regular rhythm. Pulses: Normal pulses. Heart sounds: No murmur. Pulmonary:      Effort: Pulmonary effort is normal. No respiratory distress. Breath sounds: No wheezing or rhonchi. Comments: Slightly Diminished throughout   Abdominal:      General: There is no distension. Palpations: Abdomen is soft. Tenderness: There is no abdominal tenderness. Hernia: No hernia is present. Musculoskeletal: Normal range of motion. General: No swelling. Lymphadenopathy:      Cervical: No cervical adenopathy. Skin:     General: Skin is warm and dry. Neurological:      General: No focal deficit present. Mental Status: She is alert and oriented to person, place, and time.          DIFFERENTIAL  DIAGNOSIS     PLAN (LABS / IMAGING / EKG):  Orders Placed This Encounter   Procedures    XR CHEST PORTABLE    CBC Auto Differential  Comprehensive Metabolic Panel w/ Reflex to MG    COVID-19    Immature Platelet Fraction    Contact isolation    EKG 12 Lead       MEDICATIONS ORDERED:  Orders Placed This Encounter   Medications    acetaminophen (TYLENOL) tablet 1,000 mg    ibuprofen (ADVIL;MOTRIN) tablet 800 mg    acetaminophen (TYLENOL) 325 MG tablet     Sig: Take 2 tablets by mouth every 6 hours as needed for Pain     Dispense:  30 tablet     Refill:  0    ibuprofen (IBU) 600 MG tablet     Sig: Take 1 tablet by mouth every 6 hours as needed for Pain     Dispense:  30 tablet     Refill:  0       DDX: COVID-19, pneumonia, viral URI, myalgias, chronic pain, influenza, pulmonary embolus    Initial MDM/Plan: 60 y.o. female who presents with concerns for diffuse myalgias shortness of breath and generally not feeling well. My suspicion for pulmonary embolus is low as patient has low risk factors is not tachycardic and is not hypoxic or tachypneic. Patient does have significant body aches and symptoms concerning for COVID-19. We will swab her for the virus. Patient is reporting diarrhea will order CBC, CMP, EKG and chest x-ray secondary to the shortness of breath.   Plan for discharge home if work-up negative and symptoms improve    DIAGNOSTIC RESULTS / EMERGENCYDEPARTMENT COURSE / MDM     LABS:  Labs Reviewed   CBC WITH AUTO DIFFERENTIAL - Abnormal; Notable for the following components:       Result Value    Immature Granulocytes 1 (*)     Absolute Lymph # 3.80 (*)     All other components within normal limits   COMPREHENSIVE METABOLIC PANEL W/ REFLEX TO MG FOR LOW K - Abnormal; Notable for the following components:    Alkaline Phosphatase 126 (*)     AST 33 (*)     Total Bilirubin 0.15 (*)     All other components within normal limits   TROPONIN   COVID-19   IMMATURE PLATELET FRACTION         RADIOLOGY:  Xr Chest Portable    Result Date: 1/13/2021 EXAMINATION: ONE XRAY VIEW OF THE CHEST 1/12/2021 11:58 pm COMPARISON: 01/03/2021 HISTORY: ORDERING SYSTEM PROVIDED HISTORY: SOB TECHNOLOGIST PROVIDED HISTORY: SOB Reason for Exam: upr,sob,fatigue,concern for covid FINDINGS: Cardiomediastinal silhouette is unchanged in size. Aortic atherosclerosis. No pulmonary consolidation, pleural effusion, or pneumothorax. No acute osseous abnormality. No acute cardiopulmonary abnormality. EKG  Normal sinus rhythm ventricular 76, normal AR interval, normal QRS duration, normal axis, normal QT corrected    All EKG's are interpreted by the Emergency Department Physicianwho either signs or Co-signs this chart in the absence of a cardiologist.    EMERGENCY DEPARTMENT COURSE:  ED Course as of Jan 13 0118 Wed Jan 13, 2021   0022 COVID neg    [CD]   0022 CXR negative     [CD]   0028 Pt states she is feeling better       [CD]   0044 Troponin <6 pt did have negative cardiac work up within the last 6 months including stress test and echo    [CD]   0045 Will plan for discharge home     [CD]      ED Course User Index  [CD] Jeronimo Conde DO          PROCEDURES:  None    CONSULTS:  None    CRITICAL CARE:  Please see attending note    FINAL IMPRESSION      1.  Viral illness          DISPOSITION / PLAN     DISPOSITION    Discharge     PATIENT REFERRED TO:  [facility] ED  [address] [address]  [phone]  Go to   If symptoms worsen    KEANU Mello [address] [facility]  [phone]    Call in 3 days  For follow up      [address]:  Discharge Medication List as of 1/13/2021 12:57 AM          Jeronimo Conde DO  Emergency Medicine Resident    (Please note that portions of this note were completed with a voice recognition program.Efforts were made to edit the dictations but occasionally words are mis-transcribed.)        Jeronimo Conde DO  Resident  01/13/21 6361

## 2021-01-13 NOTE — ED NOTES
Pt resting on stretcher, no respiratory distress noted, pt updated on plan of care, will continue to monitor, call light in reach.         Carmine Thibodeaux RN  01/13/21 [id number]

## 2021-01-14 ENCOUNTER — HOSPITAL ENCOUNTER (EMERGENCY)
Age: 56
Discharge: HOME | End: 2021-01-14
Attending: EMERGENCY MEDICINE
Payer: MEDICARE

## 2021-01-14 ENCOUNTER — TELEPHONE (OUTPATIENT)
Dept: ORTHOPEDIC SURGERY | Age: 56
End: 2021-01-14

## 2021-01-14 ENCOUNTER — TELEPHONE (OUTPATIENT)
Dept: PRIMARY CARE CLINIC | Age: 56
End: 2021-01-14

## 2021-01-14 ENCOUNTER — CARE COORDINATION (OUTPATIENT)
Dept: CARE COORDINATION | Age: 56
End: 2021-01-14

## 2021-01-14 VITALS
SYSTOLIC BLOOD PRESSURE: 173 MMHG | TEMPERATURE: 98.4 F | BODY MASS INDEX: 29.44 KG/M2 | WEIGHT: 160 LBS | HEIGHT: 62 IN | RESPIRATION RATE: 16 BRPM | OXYGEN SATURATION: 99 % | HEART RATE: 70 BPM | DIASTOLIC BLOOD PRESSURE: 113 MMHG

## 2021-01-14 DIAGNOSIS — M25.561 RIGHT KNEE PAIN, UNSPECIFIED CHRONICITY: Primary | ICD-10-CM

## 2021-01-14 DIAGNOSIS — G43.111 INTRACTABLE MIGRAINE WITH AURA WITH STATUS MIGRAINOSUS: Primary | ICD-10-CM

## 2021-01-14 PROCEDURE — 99285 EMERGENCY DEPT VISIT HI MDM: CPT

## 2021-01-14 PROCEDURE — 6360000002 HC RX W HCPCS: Performed by: STUDENT IN AN ORGANIZED HEALTH CARE EDUCATION/TRAINING PROGRAM

## 2021-01-14 PROCEDURE — 96374 THER/PROPH/DIAG INJ IV PUSH: CPT

## 2021-01-14 PROCEDURE — 96372 THER/PROPH/DIAG INJ SC/IM: CPT

## 2021-01-14 PROCEDURE — 93005 ELECTROCARDIOGRAM TRACING: CPT | Performed by: STUDENT IN AN ORGANIZED HEALTH CARE EDUCATION/TRAINING PROGRAM

## 2021-01-14 PROCEDURE — 6370000000 HC RX 637 (ALT 250 FOR IP): Performed by: STUDENT IN AN ORGANIZED HEALTH CARE EDUCATION/TRAINING PROGRAM

## 2021-01-14 RX ORDER — ACETAMINOPHEN 500 MG
1000 TABLET ORAL ONCE
Status: COMPLETED | OUTPATIENT
Start: 2021-01-14 | End: 2021-01-14

## 2021-01-14 RX ORDER — KETOROLAC TROMETHAMINE 30 MG/ML
30 INJECTION, SOLUTION INTRAMUSCULAR; INTRAVENOUS ONCE
Status: COMPLETED | OUTPATIENT
Start: 2021-01-14 | End: 2021-01-14

## 2021-01-14 RX ORDER — PROCHLORPERAZINE EDISYLATE 5 MG/ML
10 INJECTION INTRAMUSCULAR; INTRAVENOUS ONCE
Status: COMPLETED | OUTPATIENT
Start: 2021-01-14 | End: 2021-01-14

## 2021-01-14 RX ORDER — DIPHENHYDRAMINE HCL 25 MG
50 TABLET ORAL ONCE
Status: COMPLETED | OUTPATIENT
Start: 2021-01-14 | End: 2021-01-14

## 2021-01-14 RX ORDER — ONDANSETRON 4 MG/1
4 TABLET, FILM COATED ORAL ONCE
Status: COMPLETED | OUTPATIENT
Start: 2021-01-14 | End: 2021-01-14

## 2021-01-14 RX ORDER — DEXAMETHASONE SODIUM PHOSPHATE 10 MG/ML
10 INJECTION INTRAMUSCULAR; INTRAVENOUS ONCE
Status: COMPLETED | OUTPATIENT
Start: 2021-01-14 | End: 2021-01-14

## 2021-01-14 RX ADMIN — DIPHENHYDRAMINE HCL 50 MG: 25 TABLET ORAL at 20:36

## 2021-01-14 RX ADMIN — DEXAMETHASONE SODIUM PHOSPHATE 10 MG: 10 INJECTION INTRAMUSCULAR; INTRAVENOUS at 20:35

## 2021-01-14 RX ADMIN — ONDANSETRON HYDROCHLORIDE 4 MG: 4 TABLET, FILM COATED ORAL at 21:15

## 2021-01-14 RX ADMIN — PROCHLORPERAZINE EDISYLATE 10 MG: 5 INJECTION INTRAMUSCULAR; INTRAVENOUS at 20:33

## 2021-01-14 RX ADMIN — ACETAMINOPHEN 1000 MG: 500 TABLET ORAL at 20:36

## 2021-01-14 RX ADMIN — KETOROLAC TROMETHAMINE 30 MG: 30 INJECTION, SOLUTION INTRAMUSCULAR at 20:33

## 2021-01-14 ASSESSMENT — ENCOUNTER SYMPTOMS
DIARRHEA: 0
VOMITING: 0
NAUSEA: 0
SORE THROAT: 0
SHORTNESS OF BREATH: 0
TROUBLE SWALLOWING: 0
CONSTIPATION: 0
ABDOMINAL PAIN: 0

## 2021-01-14 ASSESSMENT — PAIN SCALES - GENERAL
PAINLEVEL_OUTOF10: 7
PAINLEVEL_OUTOF10: 7

## 2021-01-14 ASSESSMENT — PAIN DESCRIPTION - LOCATION
LOCATION: HEAD
LOCATION: HEAD

## 2021-01-14 NOTE — TELEPHONE ENCOUNTER
Seen  12-23-20 for right knee pain. She received a cortisone injection that day and it helped a couple days but now knee is painful again.  You mentioned ordering an MRI and she would like to proceed with this test.

## 2021-01-15 LAB
EKG ATRIAL RATE: 69 BPM
EKG P AXIS: 62 DEGREES
EKG P-R INTERVAL: 224 MS
EKG Q-T INTERVAL: 414 MS
EKG QRS DURATION: 82 MS
EKG QTC CALCULATION (BAZETT): 443 MS
EKG R AXIS: 35 DEGREES
EKG T AXIS: 51 DEGREES
EKG VENTRICULAR RATE: 69 BPM

## 2021-01-15 PROCEDURE — 93010 ELECTROCARDIOGRAM REPORT: CPT | Performed by: INTERNAL MEDICINE

## 2021-01-15 NOTE — ED NOTES
Pt arrives to ED c/o migraine persisting since \"this morning\"   Pt reports hx of migraines since spinal fracture  Pt arrives alert and oriented x4 in no signs of acute distress. Pt deneis chest pain, SOB, N/V/D Pt has no further complaints at this time. Will continue to monitor.      Sadie Rangel RN  01/14/21 0497

## 2021-01-15 NOTE — ED PROVIDER NOTES
 Financial resource strain: Not on file    Food insecurity     Worry: Not on file     Inability: Not on file   [employer] needs     Medical: Not on file     Non-medical: Not on file   Tobacco Use    Smoking status: Current Every Day Smoker     Packs/day: 0.50     Years: 12.00     Pack years: 6.00    Smokeless tobacco: Never Used   Substance and Sexual Activity    Alcohol use: No    Drug use: Not Currently     Comment: Has not use Heroin since 2/2020    Sexual activity: Not Currently   Lifestyle    Physical activity     Days per week: Not on file     Minutes per session: Not on file    Stress: Not on file   Relationships    Social connections     Talks on phone: Not on file     Gets together: Not on file     Attends Mandaeism service: Not on file     Active member of club or organization: Not on file     Attends meetings of clubs or organizations: Not on file     Relationship status: Not on file    Intimate partner violence     Fear of current or ex partner: Not on file     Emotionally abused: Not on file     Physically abused: Not on file     Forced sexual activity: Not on file   Other Topics Concern    Not on file   Social History Narrative    Not on file       History reviewed. No pertinent family history. Allergies:  Imitrex [sumatriptan], Bee pollen, Bee venom, Bromide ion [bromine], Reglan [metoclopramide], Sulfa antibiotics, Sulfadiazine, and Tramadol    Home Medications:  Prior to Admission medications    Medication Sig Start Date End Date Taking?  Authorizing Provider   acetaminophen (TYLENOL) 325 MG tablet Take 2 tablets by mouth every 6 hours as needed for Pain 1/13/21   Trevor Ho, DO   ibuprofen (IBU) 600 MG tablet Take 1 tablet by mouth every 6 hours as needed for Pain 1/13/21   Trevor Ho, DO albuterol (PROVENTIL) (2.5 MG/3ML) 0.083% nebulizer solution Take 3 mLs by nebulization every 6 hours as needed for Wheezing 6/18/20   KEANU Osorio - FELIX   ipratropium-albuterol (DUONEB) 0.5-2.5 (3) MG/3ML SOLN nebulizer solution Inhale 3 mLs into the lungs every 4 hours 6/16/19   Cosme Ritchie MD       REVIEW OF SYSTEMS    (2-9 systems for level 4, 10 or more for level 5)      Review of Systems   Constitutional: Negative for chills and fever. HENT: Negative for sore throat and trouble swallowing. Respiratory: Negative for shortness of breath. Cardiovascular: Negative for chest pain. Gastrointestinal: Negative for abdominal pain, constipation, diarrhea, nausea and vomiting. Genitourinary: Negative for dysuria and vaginal discharge. Musculoskeletal: Negative for arthralgias and myalgias. Skin: Negative for wound. Neurological: Positive for headaches. Negative for dizziness, weakness, light-headedness and numbness. Psychiatric/Behavioral: Negative for behavioral problems. PHYSICAL EXAM   (up to 7 for level 4, 8 or more for level 5)      INITIAL VITALS:   BP (!) 173/113   Pulse 70   Temp 98.4 °F (36.9 °C) (Oral)   Resp 16   Ht 5' 2\" (1.575 m)   Wt 160 lb (72.6 kg)   SpO2 99%   BMI 29.26 kg/m²     Physical Exam  Vitals signs and nursing note reviewed. Constitutional:       General: She is not in acute distress. Appearance: Normal appearance. She is well-developed. She is not diaphoretic. HENT:      Head: Normocephalic and atraumatic. Right Ear: External ear normal.      Left Ear: External ear normal.      Nose: Nose normal.      Mouth/Throat:      Mouth: Mucous membranes are moist.      Pharynx: Uvula midline. No oropharyngeal exudate. Eyes:      General:         Right eye: No discharge. Left eye: No discharge. Extraocular Movements: Extraocular movements intact.       Conjunctiva/sclera: Conjunctivae normal. Pupils: Pupils are equal, round, and reactive to light. Neck:      Musculoskeletal: Normal range of motion. Cardiovascular:      Rate and Rhythm: Normal rate and regular rhythm. Heart sounds: Normal heart sounds. No murmur. Pulmonary:      Effort: Pulmonary effort is normal. No respiratory distress. Abdominal:      Palpations: Abdomen is soft. Tenderness: There is no abdominal tenderness. Musculoskeletal: Normal range of motion. General: No deformity. Skin:     General: Skin is warm and dry. Capillary Refill: Capillary refill takes less than 2 seconds. Neurological:      General: No focal deficit present. Mental Status: She is alert and oriented to person, place, and time. Cranial Nerves: No cranial nerve deficit. Sensory: No sensory deficit. Motor: No weakness. Gait: Gait normal.   Psychiatric:         Behavior: Behavior normal.         DIFFERENTIAL  DIAGNOSIS     PLAN (LABS / IMAGING / EKG):  Orders Placed This Encounter   Procedures    EKG 12 Lead       MEDICATIONS ORDERED:  Orders Placed This Encounter   Medications    prochlorperazine (COMPAZINE) injection 10 mg    ketorolac (TORADOL) injection 30 mg    diphenhydrAMINE (BENADRYL) tablet 50 mg    dexamethasone (DECADRON) injection 10 mg    acetaminophen (TYLENOL) tablet 1,000 mg    ondansetron (ZOFRAN) tablet 4 mg       DDX: Typical migraine versus tension headache versus other    DIAGNOSTIC RESULTS / EMERGENCY DEPARTMENT COURSE / MDM     LABS:  No results found for this visit on 01/14/21. RADIOLOGY:  None    EKG  EKG Interpretation    Interpreted by me    Rhythm: normal sinus   Rate: normal  Axis: normal  Ectopy: none  Conduction: normal  ST Segments: Nonspecific  T Waves: Nonspecific  Q Waves: none    Clinical Impression: Nonspecific EKG with improvement in QTC from prior. Resident  01/14/21 8231

## 2021-01-15 NOTE — TELEPHONE ENCOUNTER
Spoke with patient and she would like to complete MRI study. MRI ordered dropped and patient provided with number to schedule study. I told her Dr. Montrell Arevalo prefers it to be completed at [facility]. She will call to schedule a f/u to go over the results once she knows the date of the MRI.

## 2021-01-18 ENCOUNTER — CARE COORDINATION (OUTPATIENT)
Dept: CARE COORDINATION | Age: 56
End: 2021-01-18

## 2021-01-18 NOTE — CARE COORDINATION
Patient contacted regarding Sade Batters. Discussed COVID-19 related testing which was available at this time. Test results were negative. Patient informed of results, if available? Yes    Care Transition Nurse/ Ambulatory Care Manager contacted the patient by telephone to perform post discharge assessment. Call within 2 business days of discharge: Yes. Verified name and  with patient as identifiers. Provided introduction to self, and explanation of the CTN/ACM role, and reason for call due to risk factors for infection and/or exposure to COVID-19. Symptoms reviewed with patient who verbalized the following symptoms: no worsening symptoms. Due to no new or worsening symptoms encounter was not routed to provider for escalation. Discussed follow-up appointments. If no appointment was previously scheduled, appointment scheduling offered: [facility] follow up appointment(s):   Future Appointments   Date Time Provider Rashida Torres   2/10/2021  1:45 PM Alona Cushing, DPM [facility] Pod[facility]   3/18/2021 10:15 AM KEANU Savage CNP [facility] follow up appointment(s):     Non-face-to-face services provided:  Obtained and reviewed discharge summary and/or continuity of care documents  Reviewed and followed up on pending diagnostic tests and treatments-COVID negative     Advance Care Planning:   Does patient have an Advance Directive:  declined. Patient has following risk factors of: heart failure, COPD, asthma and HTN, tobacco abuse. CTN/ACM reviewed discharge instructions, medical action plan and red flags such as increased shortness of breath, increasing fever and signs of decompensation with patient who verbalized understanding. Discussed exposure protocols and quarantine with CDC Guidelines What to do if you are sick with coronavirus disease .  Patient was given an opportunity for questions and concerns.  The patient agrees to contact the [facility] exposure line [phone], local [facility] department declined and PCP office for questions related to their healthcare. CTN/ACM provided contact information for future needs. Reviewed and educated patient on any new and changed medications related to discharge diagnosis     Patient/family/caregiver given information for GetWell Loop and agrees to enroll yes  Patient's preferred e-mail:    Patient's preferred phone number: [phone]  Based on Loop alert triggers, patient will be contacted by nurse care manager for worsening symptoms. Pt will be further monitored by COVID Loop Team based on severity of symptoms and risk factors.

## 2021-02-02 ENCOUNTER — TELEPHONE (OUTPATIENT)
Dept: PRIMARY CARE CLINIC | Age: 56
End: 2021-02-02

## 2021-02-02 DIAGNOSIS — Z76.0 MEDICATION REFILL: ICD-10-CM

## 2021-02-02 RX ORDER — GABAPENTIN 300 MG/1
CAPSULE ORAL
Qty: 180 CAPSULE | Refills: 2 | Status: SHIPPED | OUTPATIENT
Start: 2021-02-02 | End: 2021-02-03

## 2021-02-03 RX ORDER — LISINOPRIL 20 MG/1
20 TABLET ORAL DAILY
Qty: 30 TABLET | Refills: 1 | Status: SHIPPED | OUTPATIENT
Start: 2021-02-03 | End: 2021-03-02

## 2021-02-03 NOTE — TELEPHONE ENCOUNTER
Health Maintenance   Topic Date Due    HIV screen  02[birth year]/1978    DTaP/Tdap/Td vaccine (1 - Tdap) 02/16/1982    Cervical cancer screen  02/16/1984    Breast cancer screen  02/16/2013    Shingles Vaccine (1 of 2) 02/16/2013    Colon Cancer Screen FIT/FOBT  06/11/2020    TSH testing  12/16/2020    Potassium monitoring  01/12/2022    Creatinine monitoring  01/12/2022    Lipid screen  02/19/2024    Flu vaccine  Completed    Pneumococcal 0-64 years Vaccine  Completed    Hepatitis C screen  Completed    Hepatitis A vaccine  Aged Out    Hepatitis B vaccine  Aged Out    Hib vaccine  Aged Out    Meningococcal (ACWY) vaccine  Aged Out             (applicable per patient's age: Cancer Screenings, Depression Screening, Fall Risk Screening, Immunizations)    Hemoglobin A1C (%)   Date Value   02/19/2019 5.2     LDL Cholesterol (mg/dL)   Date Value   02/19/2019 105     AST (U/L)   Date Value   01/12/2021 33 (H)     ALT (U/L)   Date Value   01/12/2021 22     BUN (mg/dL)   Date Value   01/12/2021 7      (goal A1C is < 7)   (goal LDL is <100) need 30-50% reduction from baseline     BP Readings from Last 3 Encounters:   01/14/21 (!) 173/113   01/12/21 (!) 123/112   01/06/21 105/75    (goal /80)      All Future Testing planned in [facility]:  Lab Frequency Next Occurrence   TSH With Reflex Ft4 Once 01/21/2021   Hemoglobin A1C Once 01/21/2021   MRI KNEE RIGHT WO CONTRAST Once 04/04/2021   VL LOWER EXTREMITY ARTERIAL SEGMENTAL PRESSURES W [facility] Once 01/06/2022   XR FOOT LEFT (MIN 3 VIEWS) Once 01/06/2022   XR FOOT RIGHT (MIN 3 VIEWS) Once 02/10/2021   MRI KNEE RIGHT WO CONTRAST Once 01/15/2021       Next Visit Date:  Future Appointments   Date Time Provider Rashida Torres   2/10/2021  1:45 PM Allan King DPM [facility] Podiatry [facility]   2/16/2021 12:00 PM STV MRI RM 1 [facility] MRI [facility]   3/18/2021 10:15 AM KEANU Arenas - CNP [facility]            Patient Active Problem List:     Chest pain Migraine variant with headache     Drug overdose, accidental or unintentional, initial encounter     Hypothyroidism     Essential hypertension     Seizure disorder ([address]     Drug overdose     History of seizure     Major depressive disorder with psychotic features ([address]     Severe major depression ([address]     Overdose of barbiturate, intentional self-harm, initial encounter ([address]     Intentional phenobarbital overdose ([address]     Severe episode of recurrent major depressive disorder, without psychotic features ([address]     Suicide attempt ([address]     Major depressive disorder, recurrent ([address]     Sepsis ([address]     Severe malnutrition ([address]     Altered mental status     Hypokalemia     Congestive heart failure of unknown etiology ([address]     Lumbar radiculopathy     Chronic low back pain     Piriformis syndrome     COPD (chronic obstructive pulmonary disease) (HCC)     Major depressive disorder, single episode, unspecified     Severe depressed bipolar I disorder without psychotic features ([address]     Polysubstance abuse ([address]     Syncope and collapse

## 2021-02-04 DIAGNOSIS — Z76.0 MEDICATION REFILL: ICD-10-CM

## 2021-02-04 RX ORDER — GABAPENTIN 300 MG/1
CAPSULE ORAL
Qty: 180 CAPSULE | Refills: 1 | Status: SHIPPED | OUTPATIENT
Start: 2021-02-04 | End: 2021-03-18

## 2021-02-04 NOTE — TELEPHONE ENCOUNTER
Patient called to request refill on pended medication , 1st request was received by pharmacy    Health Maintenance   Topic Date Due    HIV screen  02/16/1978    DTaP/Tdap/Td vaccine (1 - Tdap) 02/16/1982    Cervical cancer screen  02/16/1984    Breast cancer screen  02/16/2013    Shingles Vaccine (1 of 2) 02/16/2013    Colon Cancer Screen FIT/FOBT  06/11/2020    TSH testing  12/16/2020    Potassium monitoring  01/12/2022    Creatinine monitoring  01/12/2022    Lipid screen  02/19/2024    Flu vaccine  Completed    Pneumococcal 0-64 years Vaccine  Completed    Hepatitis C screen  Completed    Hepatitis A vaccine  Aged Out    Hepatitis B vaccine  Aged Out    Hib vaccine  Aged Out    Meningococcal (ACWY) vaccine  Aged Out             (applicable per patient's age: Cancer Screenings, Depression Screening, Fall Risk Screening, Immunizations)    Hemoglobin A1C (%)   Date Value   02/19/2019 5.2     LDL Cholesterol (mg/dL)   Date Value   02/19/2019 105     AST (U/L)   Date Value   01/12/2021 33 (H)     ALT (U/L)   Date Value   01/12/2021 22     BUN (mg/dL)   Date Value   01/12/2021 7      (goal A1C is < 7)   (goal LDL is <100) need 30-50% reduction from baseline     BP Readings from Last 3 Encounters:   01/14/21 (!) 173/113   01/12/21 (!) 123/112   01/06/21 105/75    (goal /80)      All Future Testing planned in [facility]:  Lab Frequency Next Occurrence   TSH With Reflex Ft4 Once 01/21/2021   Hemoglobin A1C Once 01/21/2021   MRI KNEE RIGHT WO CONTRAST Once 04/04/2021   VL LOWER EXTREMITY ARTERIAL SEGMENTAL PRESSURES W PPG Once 01/06/2022   XR FOOT LEFT (MIN 3 VIEWS) Once 01/06/2022   XR FOOT RIGHT (MIN 3 VIEWS) Once 02/10/2021   MRI KNEE RIGHT WO CONTRAST Once 01/15/2021       Next Visit Date:  Future Appointments   Date Time Provider Rashida Torres   2/10/2021  1:45 PM Betty Elias DPM [facility]   2/16/2021 12:00 PM STV MRI RM 1 [facility] MRI [facility]   3/18/2021 10:15 AM Cheo Moore Mar Calvert, [facility] [address]            Patient Active Problem List:     Chest pain     Migraine variant with headache     Drug overdose, accidental or unintentional, initial encounter     Hypothyroidism     Essential hypertension     Seizure disorder ([address]     Drug overdose     History of seizure     Major depressive disorder with psychotic features ([address]     Severe major depression ([address]     Overdose of barbiturate, intentional self-harm, initial encounter ([address]     Intentional phenobarbital overdose ([address]     Severe episode of recurrent major depressive disorder, without psychotic features ([address]     Suicide attempt ([address]     Major depressive disorder, recurrent ([address]     Sepsis ([address]     Severe malnutrition ([address]     Altered mental status     Hypokalemia     Congestive heart failure of unknown etiology ([address]     Lumbar radiculopathy     Chronic low back pain     Piriformis syndrome     COPD (chronic obstructive pulmonary disease) (HCC)     Major depressive disorder, single episode, unspecified     Severe depressed bipolar I disorder without psychotic features ([address]     Polysubstance abuse ([address]     Syncope and collapse

## 2021-02-11 ENCOUNTER — TELEMEDICINE (OUTPATIENT)
Dept: PRIMARY CARE CLINIC | Age: 56
End: 2021-02-11
Payer: MEDICARE

## 2021-02-11 DIAGNOSIS — G43.809 MIGRAINE VARIANT WITH HEADACHE: ICD-10-CM

## 2021-02-11 DIAGNOSIS — E03.9 HYPOTHYROIDISM, UNSPECIFIED TYPE: ICD-10-CM

## 2021-02-11 DIAGNOSIS — M25.511 RIGHT SHOULDER PAIN, UNSPECIFIED CHRONICITY: Primary | ICD-10-CM

## 2021-02-11 DIAGNOSIS — Z02.83 ENCOUNTER FOR DRUG SCREENING: Primary | ICD-10-CM

## 2021-02-11 DIAGNOSIS — J44.9 CHRONIC OBSTRUCTIVE PULMONARY DISEASE, UNSPECIFIED COPD TYPE (HCC): ICD-10-CM

## 2021-02-11 PROCEDURE — 3017F COLORECTAL CA SCREEN DOC REV: CPT | Performed by: NURSE PRACTITIONER

## 2021-02-11 PROCEDURE — G8482 FLU IMMUNIZE ORDER/ADMIN: HCPCS | Performed by: NURSE PRACTITIONER

## 2021-02-11 PROCEDURE — G8427 DOCREV CUR MEDS BY ELIG CLIN: HCPCS | Performed by: NURSE PRACTITIONER

## 2021-02-11 PROCEDURE — G8417 CALC BMI ABV UP PARAM F/U: HCPCS | Performed by: NURSE PRACTITIONER

## 2021-02-11 PROCEDURE — 4004F PT TOBACCO SCREEN RCVD TLK: CPT | Performed by: NURSE PRACTITIONER

## 2021-02-11 PROCEDURE — 99213 OFFICE O/P EST LOW 20 MIN: CPT | Performed by: NURSE PRACTITIONER

## 2021-02-11 PROCEDURE — 3023F SPIROM DOC REV: CPT | Performed by: NURSE PRACTITIONER

## 2021-02-11 PROCEDURE — G8926 SPIRO NO PERF OR DOC: HCPCS | Performed by: NURSE PRACTITIONER

## 2021-02-11 RX ORDER — IPRATROPIUM BROMIDE AND ALBUTEROL SULFATE 2.5; .5 MG/3ML; MG/3ML
1 SOLUTION RESPIRATORY (INHALATION) EVERY 6 HOURS PRN
Qty: 360 ML | Refills: 1 | Status: ON HOLD | OUTPATIENT
Start: 2021-02-11 | End: 2021-08-28

## 2021-02-11 RX ORDER — BUDESONIDE AND FORMOTEROL FUMARATE DIHYDRATE 160; 4.5 UG/1; UG/1
2 AEROSOL RESPIRATORY (INHALATION) 2 TIMES DAILY
Qty: 1 INHALER | Refills: 5 | Status: ON HOLD | OUTPATIENT
Start: 2021-02-11 | End: 2021-06-03 | Stop reason: SDUPTHER

## 2021-02-11 ASSESSMENT — ENCOUNTER SYMPTOMS
COUGH: 0
ABDOMINAL PAIN: 0
SHORTNESS OF BREATH: 0
BLOOD IN STOOL: 0
EYE DISCHARGE: 0
RHINORRHEA: 0
DIARRHEA: 0
CONSTIPATION: 1
NAUSEA: 1
CHEST TIGHTNESS: 0
SINUS PRESSURE: 0

## 2021-02-11 NOTE — PROGRESS NOTES
Health Maintenance   Topic Date Due    HIV screen  02/16/1978    DTaP/Tdap/Td vaccine (1 - Tdap) 02/16/1982    Cervical cancer screen  02/16/1984    Breast cancer screen  02/16/2013    Shingles Vaccine (1 of 2) 02/16/2013    Colon Cancer Screen FIT/FOBT  06/11/2020    TSH testing  12/16/2020    Potassium monitoring  01/12/2022    Creatinine monitoring  01/12/2022    Lipid screen  02/19/2024    Flu vaccine  Completed    Pneumococcal 0-64 years Vaccine  Completed    Hepatitis C screen  Completed    Hepatitis A vaccine  Aged Out    Hepatitis B vaccine  Aged Out    Hib vaccine  Aged Out    Meningococcal (ACWY) vaccine  Aged Out             (applicable per patient's age: Cancer Screenings, Depression Screening, Fall Risk Screening, Immunizations)    Hemoglobin A1C (%)   Date Value   02/19/2019 5.2     LDL Cholesterol (mg/dL)   Date Value   02/19/2019 105     AST (U/L)   Date Value   01/12/2021 33 (H)     ALT (U/L)   Date Value   01/12/2021 22     BUN (mg/dL)   Date Value   01/12/2021 7      (goal A1C is < 7)   (goal LDL is <100) need 30-50% reduction from baseline     BP Readings from Last 3 Encounters:   01/14/21 (!) 173/113   01/12/21 (!) 123/112   01/06/21 105/75    (goal /80)      All Future Testing planned in [facility]:  Lab Frequency Next Occurrence   TSH With Reflex Ft4 Once 01/21/2021   Hemoglobin A1C Once 01/21/2021   MRI KNEE RIGHT WO CONTRAST Once 04/04/2021   VL LOWER EXTREMITY ARTERIAL SEGMENTAL PRESSURES W PPG Once 01/06/2022   XR FOOT LEFT (MIN 3 VIEWS) Once 01/06/2022   XR FOOT RIGHT (MIN 3 VIEWS) Once 02/10/2021   MRI KNEE RIGHT WO CONTRAST Once 01/15/2021       Next Visit Date:  Future Appointments   Date Time Provider Rashida Torres   2/11/2021  3:15 PM KEANU Mon - CNP [facility]   2/12/2021 10:00 AM [facility]   2/16/2021 12:00 PM STV MRI RM 1 (1.5T) STVZ MRI [facility] Radiolog

## 2021-02-11 NOTE — PROGRESS NOTES
Martha Hearn is a 60 y.o. female evaluated virtual visit via My Chart on 2021. Consent:  She and/or health care decision maker is aware that that she may receive a bill for this telephone service, depending on her insurance coverage, and has provided verbal consent to proceed: NA - Consent obtained within past 12 months      Documentation:  I communicated with the patient and/or health care decision maker about ADHD, COPD. Details of this discussion including any medical advice provided below      I affirm this is a Patient Initiated Episode with an Established Patient who has not had a related appointment within my department in the past 7 days or scheduled within the next 24 hours. Total Time: minutes: 21-30 minutes    Note: not billable if this call serves to triage the patient into an appointment for the relevant concern      Lisa Carrollralf                  2021    TELEHEALTH EVALUATION -- Audio/Visual (During [id number]-[id number] public health emergency)    Patient and physician are located in their individual homes    HPI:    Martha Hearn (:  1965) has requested an audio only/video evaluation for the following concern(s):      Asking to restart Adderral, [address] [place] [address] center will not prescribe it. Day to day, can't sit and focus. Always up and down, trouble falling asleep. Zeph gave her Strattera and \"it did nothing\". Also asking for weight loss medication    Recently in PHP and IOP, was inpatient at [address] [place] [address] \" to learn more about addiction\"    Asking for refill of her Fiorinal today. She utilized injury of her medication last week after her eye surgery. Alexis Trejo is asking about checking her thyroid levels, she does not think her medication is currently on the correct dosage. Review of Systems   Constitutional: Negative for activity change, appetite change, chills, fatigue and fever. HENT: Negative for congestion, ear pain, rhinorrhea and sinus pressure.     Eyes: Negative for discharge and visual disturbance. Respiratory: Negative for cough, chest tightness and shortness of breath. Cardiovascular: Negative for chest pain, palpitations and leg swelling. Gastrointestinal: Positive for constipation and nausea (With migraines). Negative for abdominal pain, blood in stool and diarrhea. Endocrine: Negative for cold intolerance and heat intolerance. Genitourinary: Negative for difficulty urinating and hematuria. Musculoskeletal: Positive for arthralgias. Negative for myalgias. Skin: Negative for rash. Neurological: Positive for seizures and headaches. Negative for dizziness and light-headedness. Psychiatric/Behavioral: Positive for decreased concentration, dysphoric mood and sleep disturbance. Negative for hallucinations and self-injury. The patient is nervous/anxious. Prior to Visit Medications    Medication Sig Taking? Authorizing Provider   budesonide-formoterol (SYMBICORT) 160-4.5 MCG/ACT AERO Inhale 2 puffs into the lungs 2 times daily Yes Ceferino Earing, APRN - CNP   ipratropium-albuterol (DUONEB) 0.5-2.5 (3) MG/3ML SOLN nebulizer solution Inhale 3 mLs into the lungs every 6 hours as needed for Shortness of Breath Yes Ceferino Earing, APRN - CNP   gabapentin (NEURONTIN) 300 MG capsule TAKE 2 CAPSULES BY MOUTH 3 TIMES DAILY Yes Ceferino Earing, APRN - CNP   lisinopril (PRINIVIL;ZESTRIL) 20 MG tablet Take 1 tablet by mouth daily Yes Ceferino Earing, APRN - CNP   cyclobenzaprine (FLEXERIL) 5 MG tablet TAKE 1 TABLET BY MOUTH 2 TIMES DAILY AS NEEDED FOR MUSCLE SPASMS (DON'T TAKE W/ ZANAFLEX) Yes Ceferino Earing, APRN - CNP   acetaminophen (TYLENOL) 325 MG tablet Take 2 tablets by mouth every 6 hours as needed for Pain Yes Abisai Galax, DO   ibuprofen (IBU) 600 MG tablet Take 1 tablet by mouth every 6 hours as needed for Pain Yes Abisai Galax, DO   ammonium lactate (LAC-HYDRIN) 12 % lotion Apply topically daily.  Yes Ramez Velez DPM   Ciclopirox (LOPROX) 0.77 % gel Apply to skin twice a day Yes [place] Corr[place], DPM   hydrOXYzine (ATARAX) 50 MG tablet  Yes Historical Provider, MD   albuterol sulfate  (90 Base) MCG/ACT inhaler Inhale 1 puff into the lungs every 6 hours as needed for Wheezing [place] prescription until follow up with primary. Do not refill. Follow up with primary Yes KEANU Centeno CNP   Nebulizers (NEBULIZER COMPRESSOR) MISC Daily as needed Yes KEANU Centeno CNP   Respiratory Therapy Supplies (NEBULIZER/TUBING/MOUTHPIECE) KIT Daily as needed Yes KEANU Centeno CNP   cetirizine (ZYRTEC) 10 MG tablet Take 1 tablet by mouth daily Yes KEANU Centeno CNP   Blood Pressure KIT Daily as needed Yes KEANU Centeno CNP   ondansetron (ZOFRAN ODT) 4 MG disintegrating tablet Take 1 tablet by mouth every 8 hours as needed for Nausea Yes KEANU Centeno CNP   Misc.  Devices (CANE) MISC Quad base cane Yes Bandar Lewis MD   famotidine (PEPCID) 20 MG tablet Take 1 tablet by mouth 2 times daily as needed (for acid reflux) Yes KEANU Centeno CNP   clomiPRAMINE (ANAFRANIL) 50 MG capsule Take 1 capsule by mouth nightly Yes KEANU Centeno CNP   risperiDONE (RISPERDAL) 1 MG tablet Take 1 tablet by mouth daily and 2 tablets by mouth at bedtime Yes KEANU Centeno CNP   traZODone (DESYREL) 150 MG tablet Take 1 tablet by mouth nightly Yes KEANU Centeno CNP   cloNIDine (CATAPRES) 0.1 MG tablet Take 1 tablet by mouth nightly Yes KEANU Centeno CNP   Benzocaine-Menthol (CEPACOL) 6-10 MG LOZG lozenge Take 1 lozenge by mouth every 2 hours as needed for Sore Throat Yes KEANU Centeno CNP   levothyroxine (SYNTHROID) 100 MCG tablet Take 1 tablet by mouth Daily Yes KEANU Centeno CNP   tiZANidine (ZANAFLEX) 2 MG tablet Take 2 tablets by mouth every 8 hours as needed (muscle spasm, neck pain) Yes Zora Margret, APRN - CNP   Elastic Bandages & Supports (CARPAL TUNNEL WRIST STABILIZER) MISC Apply to each wrist at bedtime Yes KEANU Lozoya CNP   methyl salicylate-menthol (PAULETTE BRAY GREASELESS) 10-15 % CREA Apply topically 3 times daily as needed for Pain Yes Ravinder Mcgrath MD   albuterol (PROVENTIL) (2.5 MG/3ML) 0.083% nebulizer solution Take 3 mLs by nebulization every 6 hours as needed for Wheezing Yes KEANU Lozoya CNP   ipratropium-albuterol (DUONEB) 0.5-2.5 (3) MG/3ML SOLN nebulizer solution Inhale 3 mLs into the lungs every 4 hours Yes Corby Dangelo MD   butalbital-aspirin-caffeine [facility]) -40 MG per capsule Take 1 capsule by mouth every 4 hours as needed for Headaches for up to 30 days.   Patient not taking: Reported on 2/11/2021  KEANU Lozoya CNP   aspirin EC 81 MG EC tablet Take 1 tablet by mouth daily  Patient not taking: Reported on 2/11/2021  KEANU Lozoya CNP       Social History     Tobacco Use    Smoking status: Current Every Day Smoker     Packs/day: 0.50     Years: 12.00     Pack years: 6.00    Smokeless tobacco: Never Used   Substance Use Topics    Alcohol use: No    Drug use: Not Currently     Comment: Has not use Heroin since 2/2020        Past Medical History:   Diagnosis Date    Arthritis     Asthma     Borderline diabetes     [facility]line [facility]     [facility]     COPD (chronic obstructive pulmonary disease) ([facility])     Fibromyalgia     Headache     Hypertension     Manic depression ([facility]     Movement disorder     Neck fracture ([facility]     Pernicious anemia     Seizure ([facility]     Thyroid disease             CBC:  Lab Results   Component Value Date    WBC 10.5 01/12/2021    HGB 14.8 01/12/2021    PLT See Reflexed IPF Result 01/12/2021       BMP:    Lab Results   Component Value Date     01/12/2021    K 4.9 01/12/2021     01/12/2021    CO2 23 01/12/2021    BUN 7 01/12/2021    CREATININE 0.51 01/12/2021    GLUCOSE 92 01/12/2021       HEMOGLOBIN A1C:   Lab Results   Component Value Date    LABA1C 5.2 02/19/2019       FASTING LIPID PANEL:  Lab Results   Component Value Date    CHOL 191 02/19/2019    HDL 56 02/19/2019    TRIG 150 (H) 02/19/2019         RECORD REVIEW: Previous medical records were reviewed at today's visit. PHYSICAL EXAMINATION:    Vital Signs: (As obtained by patient/caregiver at home)    No flowsheet data found. Physical Exam  Constitutional:       Appearance: Normal appearance. She is not toxic-appearing. HENT:      Head: Normocephalic. Right Ear: External ear normal.      Left Ear: External ear normal.      Nose: Nose normal.      Mouth/Throat:      Mouth: Mucous membranes are moist.   Eyes:      General: No scleral icterus. Right eye: No discharge. Left eye: No discharge. Conjunctiva/sclera: Conjunctivae normal.   Neck:      Musculoskeletal: Normal range of motion. Pulmonary:      Effort: Pulmonary effort is normal.   Skin:     Coloration: Skin is not jaundiced. Neurological:      Mental Status: She is alert and oriented to person, place, and time. Psychiatric:         Mood and Affect: Mood normal.         Behavior: Behavior normal.         Thought Content: Thought content normal.           RECORD REVIEW: Previous medical records were reviewed at today's visit. The past family, medical and social histories were reviewed and unchanged with the exceptions of what is mentioned in this note. Due to this being a TeleHealth encounter, evaluation of the following organ systems is limited: Vitals/Constitutional/EENT/Resp/CV/GI//MS/Neuro/Skin/Heme-Lymph-Imm. ASSESSMENT/PLAN:  Amber Vasquez was seen today for discuss labs and discuss medications. Diagnoses and all orders for this visit:    Encounter for drug screening  -     Drug Screen, Pain; Future    Chronic obstructive pulmonary disease, unspecified COPD type (HCC)  -     budesonide-formoterol (SYMBICORT) 160-4.5 MCG/ACT AERO;  Inhale 2 puffs into the lungs 2 times daily  -     ipratropium-albuterol (DUONEB) 0.5-2.5 (3) MG/3ML SOLN nebulizer solution; Inhale 3 mLs into the lungs every 6 hours as needed for Shortness of Breath    Hypothyroidism, unspecified type    Migraine variant with headache      -Inhaler refills provided for COPD. -May use symptomatic requesting Adderall for ADHD. Advised her this is typically not used in patients with addiction history. Drug screen ordered today in conjunction with overdue labs of A1c and TSH. I attempted to contact her provider at the [facility], however the patient has not signed release of information for me to speak with her psychiatrist.  She endorses over year sobriety, but recently did spend time in inpatient intensive rehab. Patient maintains this was her choice and she has not relapsed. -I again advised the patient that I am not able per my collaborative agreement to prescribe weight loss medication.  -I declined to refill Fiorinal today. I reviewed monthly limits of Fiorinal and Fioricet with the patient multiple times in the past, most recent refill was 20 pills on February 3.  -History of hepatitis C with positive RNA. Patient has not contacted GI, provided with phone number today. Return for keep appt in March. An  electronic signature was used to authenticate this note. --KEANU Torres - CNP on 2/11/2021 at 9:30 PM    This note is created with the assistance of a speech-recognition program. While intending to generate a document that actually reflects the content of the visit, the document can still have some mistakes which may not have been identified and corrected by editing. 9    Pursuant to the emergency declaration under the [facility] waiver authority and the [employer] Act, this Virtual  Visit was conducted, with patient's consent, to reduce the patient's risk of exposure to COVID-19 and provide continuity of care for an established patient. Services were provided through a video synchronous discussion virtually to substitute for in-person clinic visit.

## 2021-02-19 ENCOUNTER — OFFICE VISIT (OUTPATIENT)
Dept: PRIMARY CARE CLINIC | Age: 56
End: 2021-02-19
Payer: MEDICARE

## 2021-02-19 ENCOUNTER — NURSE TRIAGE (OUTPATIENT)
Dept: OTHER | Facility: CLINIC | Age: 56
End: 2021-02-19

## 2021-02-19 VITALS
DIASTOLIC BLOOD PRESSURE: 80 MMHG | OXYGEN SATURATION: 95 % | HEART RATE: 107 BPM | WEIGHT: 206 LBS | TEMPERATURE: 98.2 F | SYSTOLIC BLOOD PRESSURE: 128 MMHG | BODY MASS INDEX: 37.68 KG/M2

## 2021-02-19 DIAGNOSIS — S93.401A SPRAIN OF RIGHT ANKLE, UNSPECIFIED LIGAMENT, INITIAL ENCOUNTER: Primary | ICD-10-CM

## 2021-02-19 DIAGNOSIS — E03.9 HYPOTHYROIDISM, UNSPECIFIED TYPE: ICD-10-CM

## 2021-02-19 DIAGNOSIS — I50.9 CONGESTIVE HEART FAILURE OF UNKNOWN ETIOLOGY (HCC): ICD-10-CM

## 2021-02-19 DIAGNOSIS — I44.0 AV BLOCK, 1ST DEGREE: ICD-10-CM

## 2021-02-19 PROCEDURE — 4004F PT TOBACCO SCREEN RCVD TLK: CPT | Performed by: NURSE PRACTITIONER

## 2021-02-19 PROCEDURE — 99213 OFFICE O/P EST LOW 20 MIN: CPT | Performed by: NURSE PRACTITIONER

## 2021-02-19 PROCEDURE — G8427 DOCREV CUR MEDS BY ELIG CLIN: HCPCS | Performed by: NURSE PRACTITIONER

## 2021-02-19 PROCEDURE — G8417 CALC BMI ABV UP PARAM F/U: HCPCS | Performed by: NURSE PRACTITIONER

## 2021-02-19 PROCEDURE — G8482 FLU IMMUNIZE ORDER/ADMIN: HCPCS | Performed by: NURSE PRACTITIONER

## 2021-02-19 PROCEDURE — 3017F COLORECTAL CA SCREEN DOC REV: CPT | Performed by: NURSE PRACTITIONER

## 2021-02-19 RX ORDER — LEVOTHYROXINE SODIUM 0.1 MG/1
100 TABLET ORAL DAILY
Qty: 30 TABLET | Refills: 3 | Status: SHIPPED | OUTPATIENT
Start: 2021-02-19 | End: 2021-09-07

## 2021-02-19 ASSESSMENT — ENCOUNTER SYMPTOMS
SHORTNESS OF BREATH: 0
CHEST TIGHTNESS: 0
BACK PAIN: 0

## 2021-02-19 NOTE — TELEPHONE ENCOUNTER
Patient called Mckayla at [facility])  with red flag complaint. Brief description of triage: Patient calling for concerns about right ankle pain after \"twisting\" her ankle coming down the stairs. Triage indicates for patient to see today or tomorrow in office. Care advice provided, patient verbalizes understanding; denies any other questions or concerns; instructed to call back for any new or worsening symptoms. Writer provided warm transfer to [place] [facility] for appointment scheduling. Attention Provider: Thank you for allowing me to participate in the care of your patient. The patient was connected to triage in response to information provided to the [facility]. Please do not respond through this encounter as the response is not directed to a shared pool. Reason for Disposition   Patient wants to be seen    Answer Assessment - Initial Assessment Questions  1. MECHANISM: \"How did the injury happen? \"      Twisted her ankle coming down the stairs    2. ONSET: \"When did the injury happen? \" (Minutes or hours ago)       Yesterday afternoon    3. LOCATION: \"Where is the injury located? \"       At home    4. APPEARANCE of INJURY: \"What does the injury look like? \"       Just a little bit of swelling outer part of the ankle    5. SEVERITY: \"Can you use the arm normally? \"       Able to walk on it but uncomfortable    6. SWELLING or BRUISING: \"is there any swelling or bruising? \" If so, ask: \"How large is it? (e.g., inches, centimeters)       Slight swelling to the outside of her ankle    7. PAIN: \"Is there pain? \" If so, ask: \"How bad is the pain? \"    (Scale 1-10; or mild, moderate, severe)      Yes, 7/10    8. TETANUS: For any breaks in the skin, ask: \"When was the last tetanus booster? \"      N/a    9. OTHER SYMPTOMS: \"Do you have any other symptoms? \"  (e.g., numbness in hand)      No    10. PREGNANCY: \"Is there any chance you are pregnant? \" \"When was your last menstrual period? \" N/a    Protocols used: ARM INJURY-ADULT-[place]

## 2021-02-19 NOTE — PROGRESS NOTES
Visit Information    Have you changed or started any medications since your last visit including any over-the-counter medicines, vitamins, or herbal medicines? no   Are you having any side effects from any of your medications? -  no  Have you stopped taking any of your medications? Is so, why? -  no    Have you seen any other physician or provider since your last visit? No  Have you had any other diagnostic tests since your last visit? No  Have you been seen in the emergency room and/or had an admission to a hospital since we last saw you? No  Have you had your routine dental cleaning in the past 6 months? no    Have you activated your [employer] account? If not, what are your barriers?  No:      Patient Care Team:  KEANU Coppola CNP as PCP - General (Family Medicine)  KEAN[facility]ppola CNP as PCP [facility]    Medical History Review  Past Medical, Family, and Social History reviewed and does contribute to the patient presenting condition    Health Maintenance   Topic Date Due    HIV screen  02/16/1978    COVID-19 Vaccine (1 of 2) 02/16/1979    DTaP/Tdap/Td vaccine (1 - Tdap) 02/16/1982    Cervical cancer screen  02/16/1984    Breast cancer screen  02/16/2013    Shingles Vaccine (1 of 2) 02/16/2013    Colon Cancer Screen FIT/FOBT  06/11/2020    TSH testing  12/16/2020    Potassium monitoring  01/12/2022    Creatinine monitoring  01/12/2022    Lipid screen  02/19/2024    Flu vaccine  Completed    Pneumococcal 0-64 years Vaccine  Completed    Hepatitis C screen  Completed    Hepatitis A vaccine  Aged Out    Hepatitis B vaccine  Aged Out    Hib vaccine  Aged Out    Meningococcal (ACWY) vaccine  Aged Out

## 2021-02-19 NOTE — PROGRESS NOTES
[facility]  [id number] [facility] [address]  Dept: [phone]  Dept Fax: [phone]    Patient Care Team:  KEANU Lee CNP as PCP - General (Family Medicine)  KEANU Lee CNP as PCP - [facility] Empaneled Provider    2021     Irma Sauceda (:  )CHANDLER a 60 y.o. female, here for evaluation of the following medical concerns:   Chief Complaint   Patient presents with    Fall     yesterday, twisted right ankle        Uziel Glaser presents today with concerns of right ankle pain following a twisting injury when walking down the stairs. Uziel Glaser states she was walking down the stairs to watch TV yesterday. She was stepping down and rolled her ankle inward. Hx of ankle fracture in high school. No fall. She denies feeling dizzy or lightheaded. She does not believe she lost her balance. However, the patient admits she is had multiple falls over the last year and is constantly afraid of falling. Pain starts in the right outer ankle she is across the top of the foot and down below the foot. There is mild swelling that appears better compared to yesterday. No numbness or tingling to the foot    Uziel Glaser is also asking to discuss her recent EKG results. She states she is concerned because most recently, states she had a first-degree AV block. The patient states she was evaluated in the past before she moved to [place] for cardiac issues, told she had \"multiple blockages\". However, since she has moved here she was told they were not there. [place] cardiac [place], she states she has not had a cardiac cath since  moved back to [place]. She admits to intermittent chest pain, not lasting longer than a few minutes.       Ankle Pain The incident occurred 12 to 24 hours ago. The incident occurred at home. The injury mechanism was an inversion injury. The pain is present in the right ankle and right foot. The quality of the pain is described as aching. The pain is mild. Pertinent negatives include no inability to bear weight, loss of sensation, numbness or tingling. She reports no foreign bodies present. She has tried ice and rest for the symptoms. The treatment provided mild relief. .    Review of Systems   Constitutional: Negative for chills and fever. Respiratory: Negative for chest tightness and shortness of breath. Genitourinary: Negative for difficulty urinating and dysuria. Musculoskeletal: Positive for arthralgias and joint swelling. Negative for back pain. Skin: Negative for rash and wound. Neurological: Negative for dizziness, tingling, syncope, light-headedness and numbness. Prior to Visit Medications    Medication Sig Taking?  Authorizing Provider   levothyroxine (SYNTHROID) 100 MCG tablet Take 1 tablet by mouth Daily Yes KEANU Gilliland CNP   budesonide-formoterol (SYMBICORT) 160-4.5 MCG/ACT AERO Inhale 2 puffs into the lungs 2 times daily Yes KEANU Gilliland CNP   ipratropium-albuterol (DUONEB) 0.5-2.5 (3) MG/3ML SOLN nebulizer solution Inhale 3 mLs into the lungs every 6 hours as needed for Shortness of Breath Yes KEANU Gilliland CNP   gabapentin (NEURONTIN) 300 MG capsule TAKE 2 CAPSULES BY MOUTH 3 TIMES DAILY Yes KEANU Gilliland CNP   lisinopril (PRINIVIL;ZESTRIL) 20 MG tablet Take 1 tablet by mouth daily Yes KEANU Gilliland CNP   cyclobenzaprine (FLEXERIL) 5 MG tablet TAKE 1 TABLET BY MOUTH 2 TIMES DAILY AS NEEDED FOR MUSCLE SPASMS (DON'T TAKE W/ ZANAFLEX) Yes KEANU Gilliland CNP   acetaminophen (TYLENOL) 325 MG tablet Take 2 tablets by mouth every 6 hours as needed for Pain Yes Trevor Ho,  ibuprofen (IBU) 600 MG tablet Take 1 tablet by mouth every 6 hours as needed for Pain Yes aLwrence Merritt,    ammonium lactate (LAC-HYDRIN) 12 % lotion Apply topically daily. Yes Awa SalterrLENIN   Ciclopirox (LOPROX) 0.77 % gel Apply to skin twice a day Yes Awa , LENIN   hydrOXYzine (ATARAX) 50 MG tablet  Yes Historical Provider, MD   albuterol sulfate  (90 Base) MCG/ACT inhaler Inhale 1 puff into the lungs every 6 hours as needed for Wheezing [place] prescription until follow up with primary. Do not refill. Follow up with primary Yes KEANU Arenas CNP   Nebulizers (NEBULIZER COMPRESSOR) MISC Daily as needed Yes KEANU Arenas CNP   Respiratory Therapy Supplies (NEBULIZER/TUBING/MOUTHPIECE) KIT Daily as needed Yes KEANU Arenas CNP   cetirizine (ZYRTEC) 10 MG tablet Take 1 tablet by mouth daily Yes KEANU Arenas CNP   Blood Pressure KIT Daily as needed Yes KEANU Arenas CNP   ondansetron (ZOFRAN ODT) 4 MG disintegrating tablet Take 1 tablet by mouth every 8 hours as needed for Nausea Yes KEANU Arenas CNP   Misc.  Devices (CANE) MISC Quad base cane Yes Olga Pepe MD   famotidine (PEPCID) 20 MG tablet Take 1 tablet by mouth 2 times daily as needed (for acid reflux) Yes KEANU Arenas CNP   clomiPRAMINE (ANAFRANIL) 50 MG capsule Take 1 capsule by mouth nightly Yes KEANU Arenas CNP   risperiDONE (RISPERDAL) 1 MG tablet Take 1 tablet by mouth daily and 2 tablets by mouth at bedtime Yes KEANU Arenas CNP   traZODone (DESYREL) 150 MG tablet Take 1 tablet by mouth nightly Yes KEANU Arenas CNP   cloNIDine (CATAPRES) 0.1 MG tablet Take 1 tablet by mouth nightly Yes KEANU Arenas CNP   Benzocaine-Menthol (CEPACOL) 6-10 MG LOZG lozenge Take 1 lozenge by mouth every 2 hours as needed for Sore Throat Yes KEANU Arenas - CNP tiZANidine (ZANAFLEX) 2 MG tablet Take 2 tablets by mouth every 8 hours as needed (muscle spasm, neck pain) Yes KEANU Lee CNP   Elastic Bandages & Supports (CARPAL TUNNEL WRIST STABILIZER) MISC Apply to each wrist at bedtime Yes KEANU Lee CNP   methyl salicylate-menthol (PAULETTE BRAY GREASELESS) 10-15 % CREA Apply topically 3 times daily as needed for Pain Yes Bev Rojas MD   albuterol (PROVENTIL) (2.5 MG/3ML) 0.083% nebulizer solution Take 3 mLs by nebulization every 6 hours as needed for Wheezing Yes KEANU Lee CNP   ipratropium-albuterol (DUONEB) 0.5-2.5 (3) MG/3ML SOLN nebulizer solution Inhale 3 mLs into the lungs every 4 hours Yes Karina Cox MD   butalbital-aspirin-caffeine [facility]) -40 MG per capsule Take 1 capsule by mouth every 4 hours as needed for Headaches for up to 30 days. Patient not taking: Reported on 2/11/2021  KEANU Lee CNP   aspirin EC 81 MG EC tablet Take 1 tablet by mouth daily  Patient not taking: Reported on 2/11/2021  KEANU Lee CNP        Social History     Tobacco Use    Smoking status: Current Every Day Smoker     Packs/day: 0.50     Years: 12.00     Pack years: 6.00    Smokeless tobacco: Never Used   Substance Use Topics    Alcohol use: No        Vitals:    02/19/21 1008   BP: 128/80   Site: Right Lower Arm   Position: Sitting   Cuff Size: Medium Adult   Pulse: 107   Temp: 98.2 °F (36.8 °C)   SpO2: 95%   Weight: 206 lb (93.4 kg)     Estimated body mass index is 37.68 kg/m² as calculated from the following:    Height as of 1/14/21: 5' 2\" (1.575 m). Weight as of this encounter: 206 lb (93.4 kg).     DIAGNOSTIC FINDINGS:  CBC:  Lab Results   Component Value Date    WBC 10.5 01/12/2021    HGB 14.8 01/12/2021    PLT See Reflexed IPF Result 01/12/2021       BMP:    Lab Results   Component Value Date     01/12/2021    K 4.9 01/12/2021     01/12/2021    CO2 23 01/12/2021    BUN 7 01/12/2021 CREATININE 0.51 01/12/2021    GLUCOSE 92 01/12/2021       HEMOGLOBIN A1C:   Lab Results   Component Value Date    LABA1C 5.2 02/19/2019       FASTING LIPID PANEL:  Lab Results   Component Value Date    CHOL 191 02/19/2019    HDL 56 02/19/2019    TRIG 150 (H) 02/19/2019       Physical Exam  Constitutional:       Appearance: Normal appearance. She is not ill-appearing or toxic-appearing. Cardiovascular:      Pulses:           Dorsalis pedis pulses are 2+ on the right side. Musculoskeletal:      Right ankle: She exhibits normal range of motion, no swelling and no ecchymosis. Tenderness. Lateral malleolus tenderness found. No medial malleolus, no posterior TFL and no head of 5th metatarsal tenderness found. Right lower leg: No edema. Left lower leg: No edema. Feet:      Right foot:      Skin integrity: Skin integrity normal.   Skin:     General: Skin is warm. Capillary Refill: Capillary refill takes less than 2 seconds. Neurological:      Mental Status: She is alert. ASSESSMENT     Diagnosis Orders   1. Sprain of right ankle, unspecified ligament, initial encounter     2. Hypothyroidism, unspecified type  levothyroxine (SYNTHROID) 100 MCG tablet   3. Congestive heart failure of unknown etiology ([facility] [address]  [address], [address] [place], DO, Cardiology, [place]   4. AV block, 1st degree  DEVIKA - Andrey Marie DO, Cardiology, [place]          PLAN:  Orders Placed This Encounter   Medications    levothyroxine (SYNTHROID) 100 MCG tablet     Sig: Take 1 tablet by mouth Daily     Dispense:  30 tablet     Refill:  3         1. Patient advised she likely experienced a sprain given the mechanism of injury. There was no fall, no twisting injury. Pain is already mildly improving. Encouraged to continue RICE. Contact PCP if symptoms appear to worsen. 2. Discussed utilizing shoes while in the home, patient states she did roll her ankle while wearing house shoes or slippers. Given history of knee and ankle injuries, encouraged her she would benefit from wearing supportive footwear inside the home. 3. Reviewed recent EKG from last month. Is unchanged compared to prior EKG in January. Given the patient's reported cardiac history and current reports of intermittent chest pain, further evaluation by cardiology be beneficial.  Smoking cessation encouraged as it is a risk factor for heart disease. Echocardiogram completed on 12/19/2020 which demonstrated normal left ventricle and normal systolic function with an EF of 66%. FOLLOW UP AND INSTRUCTIONS:  No follow-ups on file. · Aleta received counseling on the following healthy behaviors:tobacco cessation    · Discussed use, benefit, and side effects of prescribed medications. Barriers to  medication compliance addressed. All patient questions answered. Pt  verbalized understanding of all instructions given. · Patient given educational materials - see patient instructions      · Patient advised to contact scheduling offices for any referrals or imaging orders  placed today if they have not been contacted in 48 hours. No follow-ups on file. An electronic signature was used to authenticate this note.     --KEANU Salas - CNP on 2/19/2021 at 12:25 PM

## 2021-03-02 RX ORDER — LISINOPRIL 20 MG/1
20 TABLET ORAL DAILY
Qty: 30 TABLET | Refills: 1 | Status: SHIPPED | OUTPATIENT
Start: 2021-03-02 | End: 2021-05-24

## 2021-03-04 ENCOUNTER — TELEPHONE (OUTPATIENT)
Dept: PRIMARY CARE CLINIC | Age: 56
End: 2021-03-04

## 2021-03-10 NOTE — TELEPHONE ENCOUNTER
Health Maintenance   Topic Date Due    HIV screen  Never done    COVID-19 Vaccine (1 of 2) Never done    DTaP/Tdap/Td vaccine (1 - Tdap) Never done    Cervical cancer screen  Never done    Breast cancer screen  Never done    Shingles Vaccine (1 of 2) Never done    Colon Cancer Screen FIT/FOBT  06/11/2020    TSH testing  12/16/2020    Potassium monitoring  01/12/2022    Creatinine monitoring  01/12/2022    Lipid screen  02/19/2024    Flu vaccine  Completed    Pneumococcal 0-64 years Vaccine  Completed    Hepatitis C screen  Completed    Hepatitis A vaccine  Aged Out    Hepatitis B vaccine  Aged Out    Hib vaccine  Aged Out    Meningococcal (ACWY) vaccine  Aged Out             (applicable per patient's age: Cancer Screenings, Depression Screening, Fall Risk Screening, Immunizations)    Hemoglobin A1C (%)   Date Value   02/19/2019 5.2     LDL Cholesterol (mg/dL)   Date Value   02/19/2019 105     AST (U/L)   Date Value   01/12/2021 33 (H)     ALT (U/L)   Date Value   01/12/2021 22     BUN (mg/dL)   Date Value   01/12/2021 7      (goal A1C is < 7)   (goal LDL is <100) need 30-50% reduction from baseline     BP Readings from Last 3 Encounters:   02/19/21 128/80   01/14/21 (!) 173/113   01/12/21 (!) 123/112    (goal /80)      All Future Testing planned in [facility]:  Lab Frequency Next Occurrence   TSH With Reflex Ft4 Once 01/21/2021   Hemoglobin A1C Once 01/21/2021   MRI KNEE RIGHT WO CONTRAST Once 04/04/2021   VL LOWER EXTREMITY ARTERIAL SEGMENTAL PRESSURES W PPG Once 01/06/2022   XR FOOT LEFT (MIN 3 VIEWS) Once 01/06/2022   XR FOOT RIGHT (MIN 3 VIEWS) Once 02/26/2021   MRI KNEE RIGHT WO CONTRAST Once 01/15/2021   Drug Screen, Pain Once 05/19/2021       Next Visit Date:  Future Appointments   Date Time Provider Rashida Torres   3/16/2021  2:30 PM STV MRI RM 1 (1.5T) [facility] MRI [facility]   3/18/2021 10:15 AM KEANU Ruvalcaba - CNP [facility]            Patient Active Problem

## 2021-03-11 RX ORDER — CETIRIZINE HYDROCHLORIDE 10 MG/1
10 TABLET ORAL DAILY
Qty: 30 TABLET | Refills: 3 | Status: ON HOLD | OUTPATIENT
Start: 2021-03-11 | End: 2021-08-28 | Stop reason: ALTCHOICE

## 2021-03-11 RX ORDER — IBUPROFEN 600 MG/1
600 TABLET ORAL EVERY 6 HOURS PRN
Qty: 30 TABLET | Refills: 0 | Status: SHIPPED | OUTPATIENT
Start: 2021-03-11 | End: 2021-05-29 | Stop reason: ALTCHOICE

## 2021-03-15 DIAGNOSIS — G43.909 MIGRAINE WITHOUT STATUS MIGRAINOSUS, NOT INTRACTABLE, UNSPECIFIED MIGRAINE TYPE: ICD-10-CM

## 2021-03-15 RX ORDER — BUTALBITAL, ASPIRIN, AND CAFFEINE 50; 325; 40 MG/1; MG/1; MG/1
1 CAPSULE ORAL EVERY 4 HOURS PRN
Qty: 20 CAPSULE | Refills: 2 | Status: SHIPPED | OUTPATIENT
Start: 2021-03-15 | End: 2021-05-14

## 2021-03-15 NOTE — TELEPHONE ENCOUNTER
Patient is also requesting a prescription for Zofran is sent to pharmacy to help with nausea and and vomiting due to migraine headaches, pls advise    Health Maintenance   Topic Date Due    HIV screen  Never done    COVID-19 Vaccine (1) Never done    DTaP/Tdap/Td vaccine (1 - Tdap) Never done    Cervical cancer screen  Never done    Breast cancer screen  Never done    Shingles Vaccine (1 of 2) Never done    Colon Cancer Screen FIT/FOBT  06/11/2020    TSH testing  12/16/2020    Potassium monitoring  01/12/2022    Creatinine monitoring  01/12/2022    Lipid screen  02/19/2024    Flu vaccine  Completed    Pneumococcal 0-64 years Vaccine  Completed    Hepatitis C screen  Completed    Hepatitis A vaccine  Aged Out    Hepatitis B vaccine  Aged Out    Hib vaccine  Aged Out    Meningococcal (ACWY) vaccine  Aged Out             (applicable per patient's age: Cancer Screenings, Depression Screening, Fall Risk Screening, Immunizations)    Hemoglobin A1C (%)   Date Value   02/19/2019 5.2     LDL Cholesterol (mg/dL)   Date Value   02/19/2019 105     AST (U/L)   Date Value   01/12/2021 33 (H)     ALT (U/L)   Date Value   01/12/2021 22     BUN (mg/dL)   Date Value   01/12/2021 7      (goal A1C is < 7)   (goal LDL is <100) need 30-50% reduction from baseline     BP Readings from Last 3 Encounters:   02/19/21 128/80   01/14/21 (!) 173/113   01/12/21 (!) 123/112    (goal /80)      All Future Testing planned in [facility]:  Lab Frequency Next Occurrence   TSH With Reflex Ft4 Once 01/21/2021   Hemoglobin A1C Once 01/21/2021   MRI KNEE RIGHT WO CONTRAST Once 04/04/2021   VL LOWER EXTREMITY ARTERIAL SEGMENTAL PRESSURES W PPG Once 01/06/2022   XR FOOT LEFT (MIN 3 VIEWS) Once 01/06/2022   XR FOOT RIGHT (MIN 3 VIEWS) Once 02/26/2021   MRI KNEE RIGHT WO CONTRAST Once 01/15/2021   Drug Screen, Pain Once 05/19/2021       Next Visit Date:  Future Appointments   Date Time Provider Rashida Torres   3/16/2021  2:30 PM STV MRI RM 1 (1.5T) ST[facility] MRI [facility]   3/16/2021  3:20 PM [facility], COVID-19 VACCINE SCHEDULE [facility] COVID [facility]   3/18/2021 10:15 AM KEANU Chakraborty CNP [facility] IN [facility]            Patient Active Problem List:     Chest pain     Migraine variant with headache     Drug overdose, accidental or unintentional, initial encounter     Hypothyroidism     Essential hypertension     Seizure disorder ([address]     Drug overdose     History of seizure     Major depressive disorder with psychotic features ([address]     Severe major depression ([address]     Overdose of barbiturate, intentional self-harm, initial encounter ([address]     Intentional phenobarbital overdose ([address]     Severe episode of recurrent major depressive disorder, without psychotic features ([address]     Suicide attempt ([address]     Major depressive disorder, recurrent ([address]     Sepsis ([address]     Severe malnutrition ([address]     Altered mental status     Hypokalemia     Congestive heart failure of unknown etiology ([address]     Lumbar radiculopathy     Chronic low back pain     Piriformis syndrome     COPD (chronic obstructive pulmonary disease) (HCC)     Major depressive disorder, single episode, unspecified     Severe depressed bipolar I disorder without psychotic features ([address]     Polysubstance abuse ([address]     Syncope and collapse

## 2021-03-16 ENCOUNTER — HOSPITAL ENCOUNTER (OUTPATIENT)
Dept: MRI IMAGING | Age: 56
Discharge: HOME | End: 2021-03-18
Payer: MEDICARE

## 2021-03-16 DIAGNOSIS — M25.561 RIGHT KNEE PAIN, UNSPECIFIED CHRONICITY: ICD-10-CM

## 2021-03-16 PROCEDURE — 73721 MRI JNT OF LWR EXTRE W/O DYE: CPT

## 2021-03-18 DIAGNOSIS — G43.809 MIGRAINE VARIANT WITH HEADACHE: ICD-10-CM

## 2021-03-18 DIAGNOSIS — Z76.0 MEDICATION REFILL: ICD-10-CM

## 2021-03-18 RX ORDER — ONDANSETRON 4 MG/1
4 TABLET, ORALLY DISINTEGRATING ORAL EVERY 8 HOURS PRN
Qty: 20 TABLET | Refills: 0 | Status: SHIPPED | OUTPATIENT
Start: 2021-03-18 | End: 2021-04-19 | Stop reason: SDUPTHER

## 2021-03-18 RX ORDER — GABAPENTIN 300 MG/1
CAPSULE ORAL
Qty: 180 CAPSULE | Refills: 2 | Status: SHIPPED | OUTPATIENT
Start: 2021-03-18 | End: 2021-03-18 | Stop reason: SDUPTHER

## 2021-03-18 RX ORDER — GABAPENTIN 300 MG/1
600 CAPSULE ORAL 3 TIMES DAILY
Qty: 180 CAPSULE | Refills: 2 | Status: SHIPPED | OUTPATIENT
Start: 2021-03-18 | End: 2021-05-24

## 2021-03-18 NOTE — TELEPHONE ENCOUNTER
Health Maintenance   Topic Date Due    HIV screen  Never done    COVID-19 Vaccine (1) Never done    DTaP/Tdap/Td vaccine (1 - Tdap) Never done    Cervical cancer screen  Never done    Breast cancer screen  Never done    Shingles Vaccine (1 of 2) Never done    Colon Cancer Screen FIT/FOBT  06/11/2020    TSH testing  12/16/2020    Potassium monitoring  01/12/2022    Creatinine monitoring  01/12/2022    Lipid screen  02/19/2024    Flu vaccine  Completed    Pneumococcal 0-64 years Vaccine  Completed    Hepatitis C screen  Completed    Hepatitis A vaccine  Aged Out    Hepatitis B vaccine  Aged Out    Hib vaccine  Aged Out    Meningococcal (ACWY) vaccine  Aged Out             (applicable per patient's age: Cancer Screenings, Depression Screening, Fall Risk Screening, Immunizations)    Hemoglobin A1C (%)   Date Value   02/19/2019 5.2     LDL Cholesterol (mg/dL)   Date Value   02/19/2019 105     AST (U/L)   Date Value   01/12/2021 33 (H)     ALT (U/L)   Date Value   01/12/2021 22     BUN (mg/dL)   Date Value   01/12/2021 7      (goal A1C is < 7)   (goal LDL is <100) need 30-50% reduction from baseline     BP Readings from Last 3 Encounters:   02/19/21 128/80   01/14/21 (!) 173/113   01/12/21 (!) 123/112    (goal /80)      All Future Testing planned in [facility]:  Lab Frequency Next Occurrence   TSH With Reflex Ft4 Once 01/21/2021   Hemoglobin A1C Once 01/21/2021   MRI KNEE RIGHT WO CONTRAST Once 04/04/2021   VL LOWER EXTREMITY ARTERIAL SEGMENTAL PRESSURES W PPG Once 01/06/2022   XR FOOT LEFT (MIN 3 VIEWS) Once 01/06/2022   XR FOOT RIGHT (MIN 3 VIEWS) Once 03/15/2021   Drug Screen, Pain Once 05/19/2021       Next Visit Date:  Future Appointments   Date Time Provider Rashida Torres   3/23/2021 11:10 AM [facility] [place] [address]            Patient Active Problem List:     Chest pain     Migraine variant with headache     Drug overdose, accidental or unintentional, initial encounter     Hypothyroidism     Essential hypertension     Seizure disorder ([facility] [address]     Drug overdose     History of seizure     Major depressive disorder with psychotic features (HCC)     Severe major depression (HCC)     Overdose of barbiturate, intentional self-harm, initial encounter ([facility] [address]     Intentional phenobarbital overdose ([facility] [address]     Severe episode of recurrent major depressive disorder, without psychotic features ([facility] [address]     Suicide attempt ([facility] [address]     Major depressive disorder, recurrent ([facility] [address]     Sepsis ([facility] [address]     Severe malnutrition ([address]     Altered mental status     Hypokalemia     Congestive heart failure of unknown etiology ([address]     Lumbar radiculopathy     Chronic low back pain     Piriformis syndrome     COPD (chronic obstructive pulmonary disease) (HCC)     Major depressive disorder, single episode, unspecified     Severe depressed bipolar I disorder without psychotic features ([facility] [address]     Polysubstance abuse ([facility] [address]     Syncope and collapse

## 2021-03-18 NOTE — TELEPHONE ENCOUNTER
Pt Is requesting a refill of nausea/anxiety.            Last OV 2/19/2021              [address] on [address]

## 2021-03-19 ENCOUNTER — TELEPHONE (OUTPATIENT)
Dept: PRIMARY CARE CLINIC | Age: 56
End: 2021-03-19

## 2021-03-19 NOTE — TELEPHONE ENCOUNTER
I spoke with the nurse this week who stated there is no record of ADHD on her file as a diagnosis.   She will have to discuss ADHD diagnosis and go through evaluation with her current provider at [facility]

## 2021-03-22 NOTE — TELEPHONE ENCOUNTER
Pt informed. Gave verbal understanding. Stated she was following with [facility] for that medication. Stated she will have office fax over records.

## 2021-03-23 ENCOUNTER — IMMUNIZATION (OUTPATIENT)
Dept: PRIMARY CARE CLINIC | Age: 56
End: 2021-03-23
Payer: MEDICARE

## 2021-03-23 PROCEDURE — 0001A COVID-19, PFIZER VACCINE 30MCG/0.3ML DOSE: CPT | Performed by: INTERNAL MEDICINE

## 2021-03-23 PROCEDURE — 91300 COVID-19, PFIZER VACCINE 30MCG/0.3ML DOSE: CPT | Performed by: INTERNAL MEDICINE

## 2021-04-05 RX ORDER — ASPIRIN 81 MG/1
TABLET ORAL
Qty: 90 TABLET | Refills: 0 | Status: SHIPPED | OUTPATIENT
Start: 2021-04-05

## 2021-04-05 NOTE — TELEPHONE ENCOUNTER
Health Maintenance   Topic Date Due    HIV screen  Never done    DTaP/Tdap/Td vaccine (1 - Tdap) Never done    Cervical cancer screen  Never done    Breast cancer screen  Never done    Shingles Vaccine (1 of 2) Never done    Colon Cancer Screen FIT/FOBT  06/11/2020    TSH testing  12/16/2020    COVID-19 Vaccine (2 - Pfizer 2-dose series) 04/13/2021    Potassium monitoring  01/12/2022    Creatinine monitoring  01/12/2022    Lipid screen  02/19/2024    Flu vaccine  Completed    Pneumococcal 0-64 years Vaccine  Completed    Hepatitis C screen  Completed    Hepatitis A vaccine  Aged Out    Hepatitis B vaccine  Aged Out    Hib vaccine  Aged Out    Meningococcal (ACWY) vaccine  Aged Out             (applicable per patient's age: Cancer Screenings, Depression Screening, Fall Risk Screening, Immunizations)    Hemoglobin A1C (%)   Date Value   02/19/2019 5.2     LDL Cholesterol (mg/dL)   Date Value   02/19/2019 105     AST (U/L)   Date Value   01/12/2021 33 (H)     ALT (U/L)   Date Value   01/12/2021 22     BUN (mg/dL)   Date Value   01/12/2021 7      (goal A1C is < 7)   (goal LDL is <100) need 30-50% reduction from baseline     BP Readings from Last 3 Encounters:   02/19/21 128/80   01/14/21 (!) 173/113   01/12/21 (!) 123/112    (goal /80)      All Future Testing planned in [facility]:  Lab Frequency Next Occurrence   TSH With Reflex Ft4 Once 01/21/2021   Hemoglobin A1C Once 01/21/2021   MRI KNEE RIGHT WO CONTRAST Once 04/04/2021   VL LOWER EXTREMITY ARTERIAL SEGMENTAL PRESSURES W PPG Once 01/06/2022   XR FOOT LEFT (MIN 3 VIEWS) Once 01/06/2022   XR FOOT RIGHT (MIN 3 VIEWS) Once 03/30/2021   Drug Screen, Pain Once 05/19/2021       Next Visit Date:  Future Appointments   Date Time Provider Rashida Torres   4/5/2021  1:30 PM Nate Nagy MD [facility]   4/13/2021  1:20 PM [facility] [address] [facility]            Patient Active Problem List:     Chest pain

## 2021-04-20 DIAGNOSIS — G43.909 MIGRAINE WITHOUT STATUS MIGRAINOSUS, NOT INTRACTABLE, UNSPECIFIED MIGRAINE TYPE: Primary | ICD-10-CM

## 2021-04-20 NOTE — TELEPHONE ENCOUNTER
Pt called stating that she is having migraines and nausea. Requesting something to help. Please advise. Health Maintenance   Topic Date Due    HIV screen  Never done    DTaP/Tdap/Td vaccine (1 - Tdap) Never done    Cervical cancer screen  Never done    Breast cancer screen  Never done    Shingles Vaccine (1 of 2) Never done    Colon Cancer Screen FIT/FOBT  06/11/2020    TSH testing  12/16/2020    COVID-19 Vaccine (2 - Pfizer 2-dose series) 04/13/2021    Potassium monitoring  01/12/2022    Creatinine monitoring  01/12/2022    Lipid screen  02/19/2024    Flu vaccine  Completed    Pneumococcal 0-64 years Vaccine  Completed    Hepatitis C screen  Completed    Hepatitis A vaccine  Aged Out    Hepatitis B vaccine  Aged Out    Hib vaccine  Aged Out    Meningococcal (ACWY) vaccine  Aged Out             (applicable per patient's age: Cancer Screenings, Depression Screening, Fall Risk Screening, Immunizations)    Hemoglobin A1C (%)   Date Value   02/19/2019 5.2     LDL Cholesterol (mg/dL)   Date Value   02/19/2019 105     AST (U/L)   Date Value   01/12/2021 33 (H)     ALT (U/L)   Date Value   01/12/2021 22     BUN (mg/dL)   Date Value   01/12/2021 7      (goal A1C is < 7)   (goal LDL is <100) need 30-50% reduction from baseline     BP Readings from Last 3 Encounters:   02/19/21 128/80   01/14/21 (!) 173/113   01/12/21 (!) 123/112    (goal /80)      All Future Testing planned in [facility]:  Lab Frequency Next Occurrence   TSH With Reflex Ft4 Once 01/21/2021   Hemoglobin A1C Once 01/21/2021   VL LOWER EXTREMITY ARTERIAL SEGMENTAL PRESSURES W PPG Once 01/06/2022   XR FOOT LEFT (MIN 3 VIEWS) Once 01/06/2022   XR FOOT RIGHT (MIN 3 VIEWS) Once 01/06/2022   Drug Screen, Pain Once 05/19/2021       Next Visit Date:  No future appointments.          Patient Active Problem List:     Chest pain     Migraine variant with headache     Drug overdose, accidental or unintentional, initial encounter     Hypothyroidism Essential hypertension     Seizure disorder ([facility] [address]     Drug overdose     History of seizure     Major depressive disorder with psychotic features ([facility] [address] [facility]     Severe major depression (HCC)     Overdose of barbiturate, intentional self-harm, initial encounter ([facility] [address]     Intentional phenobarbital overdose ([facility] [address]     Severe episode of recurrent major depressive disorder, without psychotic features ([facility] [address]     Suicide attempt ([facility] [address]     Major depressive disorder, recurrent ([facility] [address]     Sepsis ([facility] [address]     Severe malnutrition ([address]     Altered mental status     Hypokalemia     Congestive heart failure of unknown etiology ([address]     Lumbar radiculopathy     Chronic low back pain     Piriformis syndrome     COPD (chronic obstructive pulmonary disease) (HCC)     Major depressive disorder, single episode, unspecified     Severe depressed bipolar I disorder without psychotic features ([facility] [address]     Polysubstance abuse ([facility] [address]     Syncope and collapse

## 2021-04-27 ENCOUNTER — HOSPITAL ENCOUNTER (EMERGENCY)
Age: 56
Discharge: HOME | End: 2021-04-28
Attending: EMERGENCY MEDICINE
Payer: MEDICARE

## 2021-04-27 VITALS
WEIGHT: 185 LBS | SYSTOLIC BLOOD PRESSURE: 152 MMHG | HEIGHT: 62 IN | DIASTOLIC BLOOD PRESSURE: 106 MMHG | BODY MASS INDEX: 34.04 KG/M2 | RESPIRATION RATE: 16 BRPM | HEART RATE: 98 BPM | TEMPERATURE: 98.8 F | OXYGEN SATURATION: 98 %

## 2021-04-27 DIAGNOSIS — R19.7 DIARRHEA, UNSPECIFIED TYPE: ICD-10-CM

## 2021-04-27 DIAGNOSIS — R10.31 RIGHT LOWER QUADRANT ABDOMINAL PAIN: ICD-10-CM

## 2021-04-27 DIAGNOSIS — R11.2 NON-INTRACTABLE VOMITING WITH NAUSEA, UNSPECIFIED VOMITING TYPE: Primary | ICD-10-CM

## 2021-04-27 LAB
BILIRUBIN URINE: NEGATIVE
COLOR: YELLOW
COMMENT UA: NORMAL
GLUCOSE URINE: NEGATIVE
KETONES, URINE: NEGATIVE
LEUKOCYTE ESTERASE, URINE: NEGATIVE
NITRITE, URINE: NEGATIVE
PH UA: 6.5 (ref 5–8)
PROTEIN UA: NEGATIVE
SPECIFIC GRAVITY UA: 1.01 (ref 1–1.03)
TURBIDITY: CLEAR
URINE HGB: NEGATIVE
UROBILINOGEN, URINE: NORMAL

## 2021-04-27 PROCEDURE — 99283 EMERGENCY DEPT VISIT LOW MDM: CPT

## 2021-04-27 PROCEDURE — 96375 TX/PRO/DX INJ NEW DRUG ADDON: CPT

## 2021-04-27 PROCEDURE — 85025 COMPLETE CBC W/AUTO DIFF WBC: CPT

## 2021-04-27 PROCEDURE — 80053 COMPREHEN METABOLIC PANEL: CPT

## 2021-04-27 PROCEDURE — 81003 URINALYSIS AUTO W/O SCOPE: CPT

## 2021-04-27 PROCEDURE — 96374 THER/PROPH/DIAG INJ IV PUSH: CPT

## 2021-04-27 RX ORDER — KETOROLAC TROMETHAMINE 30 MG/ML
30 INJECTION, SOLUTION INTRAMUSCULAR; INTRAVENOUS ONCE
Status: COMPLETED | OUTPATIENT
Start: 2021-04-27 | End: 2021-04-28

## 2021-04-27 RX ORDER — 0.9 % SODIUM CHLORIDE 0.9 %
1000 INTRAVENOUS SOLUTION INTRAVENOUS ONCE
Status: COMPLETED | OUTPATIENT
Start: 2021-04-27 | End: 2021-04-28

## 2021-04-27 RX ORDER — ONDANSETRON 2 MG/ML
4 INJECTION INTRAMUSCULAR; INTRAVENOUS ONCE
Status: COMPLETED | OUTPATIENT
Start: 2021-04-27 | End: 2021-04-28

## 2021-04-27 ASSESSMENT — PAIN SCALES - GENERAL: PAINLEVEL_OUTOF10: 7

## 2021-04-27 NOTE — ED TRIAGE NOTES
Pt came in having abdominal pain for the last week with nausea and emesis. Vomitted 1 hr PTA. Drank water and so far it has stayed down. Pt states abdominal is umbillicus to left side. Pt states she has not had any relief from the abdominal pain. Vitals completed.

## 2021-04-28 ENCOUNTER — APPOINTMENT (OUTPATIENT)
Dept: CT IMAGING | Age: 56
End: 2021-04-28
Payer: MEDICARE

## 2021-04-28 ENCOUNTER — TELEPHONE (OUTPATIENT)
Dept: PRIMARY CARE CLINIC | Age: 56
End: 2021-04-28

## 2021-04-28 LAB
ABSOLUTE EOS #: 0.07 K/UL (ref 0–0.44)
ABSOLUTE IMMATURE GRANULOCYTE: 0.05 K/UL (ref 0–0.3)
ABSOLUTE LYMPH #: 3.54 K/UL (ref 1.1–3.7)
ABSOLUTE MONO #: 0.81 K/UL (ref 0.1–1.2)
ALBUMIN SERPL-MCNC: 3.8 G/DL (ref 3.5–5.2)
ALBUMIN/GLOBULIN RATIO: 1.3 (ref 1–2.5)
ALP BLD-CCNC: 105 U/L (ref 35–104)
ALT SERPL-CCNC: 16 U/L (ref 5–33)
ANION GAP SERPL CALCULATED.3IONS-SCNC: 7 MMOL/L (ref 9–17)
AST SERPL-CCNC: 18 U/L
BASOPHILS # BLD: 1 % (ref 0–2)
BASOPHILS ABSOLUTE: 0.07 K/UL (ref 0–0.2)
BILIRUB SERPL-MCNC: 0.39 MG/DL (ref 0.3–1.2)
BUN BLDV-MCNC: 6 MG/DL (ref 6–20)
BUN/CREAT BLD: ABNORMAL (ref 9–20)
CALCIUM SERPL-MCNC: 9.2 MG/DL (ref 8.6–10.4)
CHLORIDE BLD-SCNC: 99 MMOL/L (ref 98–107)
CO2: 28 MMOL/L (ref 20–31)
CREAT SERPL-MCNC: 0.48 MG/DL (ref 0.5–0.9)
DIFFERENTIAL TYPE: ABNORMAL
EOSINOPHILS RELATIVE PERCENT: 1 % (ref 1–4)
GFR AFRICAN AMERICAN: >60 ML/MIN
GFR NON-AFRICAN AMERICAN: >60 ML/MIN
GFR SERPL CREATININE-BSD FRML MDRD: ABNORMAL ML/MIN/{1.73_M2}
GFR SERPL CREATININE-BSD FRML MDRD: ABNORMAL ML/MIN/{1.73_M2}
GLUCOSE BLD-MCNC: 94 MG/DL (ref 70–99)
HCT VFR BLD CALC: 45.9 % (ref 36.3–47.1)
HEMOGLOBIN: 15.5 G/DL (ref 11.9–15.1)
IMMATURE GRANULOCYTES: 0 %
LYMPHOCYTES # BLD: 31 % (ref 24–43)
MCH RBC QN AUTO: 30.6 PG (ref 25.2–33.5)
MCHC RBC AUTO-ENTMCNC: 33.8 G/DL (ref 28.4–34.8)
MCV RBC AUTO: 90.7 FL (ref 82.6–102.9)
MONOCYTES # BLD: 7 % (ref 3–12)
NRBC AUTOMATED: 0 PER 100 WBC
PDW BLD-RTO: 12.6 % (ref 11.8–14.4)
PLATELET # BLD: 266 K/UL (ref 138–453)
PLATELET ESTIMATE: ABNORMAL
PMV BLD AUTO: 9.8 FL (ref 8.1–13.5)
POTASSIUM SERPL-SCNC: 3.7 MMOL/L (ref 3.7–5.3)
RBC # BLD: 5.06 M/UL (ref 3.95–5.11)
RBC # BLD: ABNORMAL 10*6/UL
SEG NEUTROPHILS: 60 % (ref 36–65)
SEGMENTED NEUTROPHILS ABSOLUTE COUNT: 7.03 K/UL (ref 1.5–8.1)
SODIUM BLD-SCNC: 134 MMOL/L (ref 135–144)
TOTAL PROTEIN: 6.7 G/DL (ref 6.4–8.3)
WBC # BLD: 11.6 K/UL (ref 3.5–11.3)
WBC # BLD: ABNORMAL 10*3/UL

## 2021-04-28 PROCEDURE — 2580000003 HC RX 258: Performed by: STUDENT IN AN ORGANIZED HEALTH CARE EDUCATION/TRAINING PROGRAM

## 2021-04-28 PROCEDURE — 74177 CT ABD & PELVIS W/CONTRAST: CPT

## 2021-04-28 PROCEDURE — 6360000004 HC RX CONTRAST MEDICATION: Performed by: STUDENT IN AN ORGANIZED HEALTH CARE EDUCATION/TRAINING PROGRAM

## 2021-04-28 PROCEDURE — 6360000002 HC RX W HCPCS: Performed by: STUDENT IN AN ORGANIZED HEALTH CARE EDUCATION/TRAINING PROGRAM

## 2021-04-28 PROCEDURE — 96375 TX/PRO/DX INJ NEW DRUG ADDON: CPT

## 2021-04-28 PROCEDURE — 96374 THER/PROPH/DIAG INJ IV PUSH: CPT

## 2021-04-28 RX ORDER — ONDANSETRON 4 MG/1
4 TABLET, ORALLY DISINTEGRATING ORAL EVERY 8 HOURS PRN
Qty: 20 TABLET | Refills: 0 | Status: ON HOLD | OUTPATIENT
Start: 2021-04-28 | End: 2021-08-31 | Stop reason: HOSPADM

## 2021-04-28 RX ADMIN — SODIUM CHLORIDE 1000 ML: 9 INJECTION, SOLUTION INTRAVENOUS at 00:24

## 2021-04-28 RX ADMIN — KETOROLAC TROMETHAMINE 30 MG: 30 INJECTION, SOLUTION INTRAMUSCULAR; INTRAVENOUS at 00:24

## 2021-04-28 RX ADMIN — IOPAMIDOL 75 ML: 755 INJECTION, SOLUTION INTRAVENOUS at 02:22

## 2021-04-28 RX ADMIN — ONDANSETRON 4 MG: 2 INJECTION INTRAMUSCULAR; INTRAVENOUS at 00:24

## 2021-04-28 NOTE — ED PROVIDER NOTES
[facility] ED  Emergency Department  Emergency Medicine Resident Sign-out     Care of Maria Del Rosario Whitehead was assumed from Dr. Filippo Hernandez and is being seen for Abdominal Pain and Nausea  . The patient's initial evaluation and plan have been discussed with the prior provider who initially evaluated the patient. EMERGENCY DEPARTMENT COURSE / MEDICAL DECISION MAKING:       MEDICATIONS GIVEN:  Orders Placed This Encounter   Medications    ketorolac (TORADOL) injection 30 mg    0.9 % sodium chloride bolus    ondansetron (ZOFRAN) injection 4 mg    iopamidol (ISOVUE-370) 76 % injection 75 mL    ondansetron (ZOFRAN ODT) 4 MG disintegrating tablet     Sig: Take 1 tablet by mouth every 8 hours as needed for Nausea     Dispense:  20 tablet     Refill:  0       LABS / RADIOLOGY:     Labs Reviewed   CBC WITH AUTO DIFFERENTIAL - Abnormal; Notable for the following components:       Result Value    WBC 11.6 (*)     Hemoglobin 15.5 (*)     All other components within normal limits   COMPREHENSIVE METABOLIC PANEL - Abnormal; Notable for the following components:    CREATININE 0.48 (*)     Sodium 134 (*)     Anion Gap 7 (*)     Alkaline Phosphatase 105 (*)     All other components within normal limits   URINE RT REFLEX TO CULTURE       No results found. RECENT VITALS:     Temp: 98.8 °F (37.1 °C),  Pulse: 98, Resp: 16, BP: (!) 152/106, SpO2: 98 %    This patient is a 60 y.o. Female with right lower quadrant abdominal pain with nausea and vomiting. Tenderness in the right lower quadrant. Has received IV fluids, awaiting CT abdomen pelvis with contrast.    CT scan unable to perform CT with IV in the upper arm. Discussed this with the patient, who would prefer not to have another IV poke. We did discuss the utility of a noncontrast CT scan, patient elected to perform this even though it may not determine exactly what is going on and she may have to return for reevaluation.   Patient agreeable, wants noncontrast CT scan. Nurse was able to get an additional IV contrast was used. No acute abnormality on the CT abdomen pelvis. Patient feeling improved, did discuss return precautions and discharged with Zofran. Encourage p.o. intake. OUTSTANDING TASKS / RECOMMENDATIONS:    1. F/u ct abd  2. dispo     FINAL IMPRESSION:     1. Non-intractable vomiting with nausea, unspecified vomiting type    2. Right lower quadrant abdominal pain    3. Diarrhea, unspecified type        DISPOSITION:         DISPOSITION:  [x]  Discharge   []  Transfer -    []  Admission -     []  Against Medical Advice   []  Eloped   FOLLOW-UP: [facility]  [facility]  [phone]  Go to   If symptoms worsen    KEANU Mclean [address] [facility] [address] [facility]  [phone]    Go in 2 days       DISCHARGE MEDICATIONS: Discharge Medication List as of 4/28/2021  3:12 AM      START taking these medications    Details   !! ondansetron (ZOFRAN ODT) 4 MG disintegrating tablet Take 1 tablet by mouth every 8 hours as needed for Nausea, Disp-20 tablet, R-0Print       !! - Potential duplicate medications found. Please discuss with provider.               Kindra Lara DO  Emergency Medicine Resident  [id number]      Kindra Lara [place]  Resident  04/28/21 6441

## 2021-04-28 NOTE — ED PROVIDER NOTES
Faculty Sign-Out Attestation  Handoff taken on the following patient from prior Attending Physician: Jian Lnage    I was available and discussed any additional care issues that arose and coordinated the management plans with the resident(s) caring for the patient during my duty period. Any areas of disagreement with residents documentation of care or procedures are noted on the chart. I was personally present for the key portions of any/all procedures during my duty period. I have documented in the chart those procedures where I was not present during the key portions.     Abdominal pain, ct abdomen pending, (if neg > dc)    Sandeep Lee DO  Attending Physician     Sandeep Lee DO  04/27/21 2305  Ct stable, s/s improved, vss, tolerating liquids,      Sandeep Lee DO  04/28/21 0326  Discharged per plan     Sandeep Lee DO  04/28/21 9711

## 2021-04-28 NOTE — ED PROVIDER NOTES
[address]  ED  eMERGENCY dEPARTMENT eNCOUnter   Attending Attestation     Pt Name: Silva Damian  MRN: [id number]  [place] 1965  Date of evaluation: 4/27/21       Silva Damian is a 60 y.o. female who presents with Abdominal Pain and Nausea      History: Patient presents with abdominal pain and nausea. Pt has lower abdominal pain and nausea with diarrhea. Exam: Heart rate and rhythm are regular, lungs are clear to auscultation bilaterally. Abdomen is soft, tender in the lower abdomen. Plan for CT and labs, will treat as needed, plan for discharge if work-up and patient is improved otherwise patient will be admitted for IV fluids, pain control if unable to tolerate PO. I performed a history and physical examination of the patient and discussed management with the resident. I reviewed the residents note and agree with the documented findings and plan of care. Any areas of disagreement are noted on the chart. I was personally present for the key portions of any procedures. I have documented in the chart those procedures where I was not present during the key portions. I have personally reviewed all images and agree with the resident's interpretation. I have reviewed the emergency nurses triage note. I agree with the chief complaint, past medical history, past surgical history, allergies, medications, social and family history as documented unless otherwise noted below. Documentation of the HPI, Physical Exam and Medical Decision Making performed by medical students or scribes is based on my personal performance of the HPI, PE and MDM. For Phys Assistant/ Nurse Practitioner cases/documentation I have had a face to face evaluation of this patient and have completed at least one if not all key elements of the E/M (history, physical exam, and MDM). Additional findings are as noted.     For APC cases I have personally evaluated and examined the patient in conjunction with the APC and agree with the treatment plan and disposition of the patient as recorded by the APC.     Akbar Katz MD  Attending Emergency  Physician       Philip Go MD  04/27/21 3328

## 2021-04-28 NOTE — ED PROVIDER NOTES
[facility] ED  Emergency Department Encounter  EmergencyMedicine Resident     Pt Name:Aleta Pérez  MRN: [id number]  [place] 1965  Date of evaluation: 4/27/21  PCP:  KEANU Cabrera CNP    CHIEF COMPLAINT       Chief Complaint   Patient presents with    Abdominal Pain    Nausea       HISTORY OF PRESENT ILLNESS  (Location/Symptom, Timing/Onset, Context/Setting, Quality, Duration, Modifying Factors, Severity.)      Carrie Geiger is a 60 y.o. female who presents with surgical history total hysterectomy including ovaries removed presents with right lower quadrant pain chills diarrhea for 5 days. Patient states pain intermittent been getting worse nothing is made it better she is not take any medications no recent antibiotics history of kidney stones however this does not feel like that no dysuria hematuria, no chest pain or shortness of breath no headache no numbness has had intermittent vomiting decreased oral intake. PAST MEDICAL / SURGICAL / SOCIAL / FAMILY HISTORY      has a past medical history of Arthritis, Asthma, Attention deficit hyperactivity disorder (ADHD), combined type, Borderline diabetes, Borderline personality disorder ([address] CHF (congestive heart failure) ([address] COPD (chronic obstructive pulmonary disease) ([address] Fibromyalgia, Headache, Hypertension, Manic depression ([address] Movement disorder, Neck fracture ([address] Pernicious anemia, Seizure ([address] and Thyroid disease. has a past surgical history that includes knee surgery (Bilateral); partial hysterectomy (cervix not removed); lymph node dissection; Irrigation and debridement (Right, 5/17/2019); EXPLORATION OF WOUND OF EXTREMITY (Right, 5/19/2019); and EXPLORATION OF WOUND OF EXTREMITY (N/A, 5/22/2019).     Social History     Socioeconomic History    Marital status:      Spouse name: Not on file    Number of children: Not on file    Years of education: Not on file    Highest education level: KEANU Garrett - CNP   gabapentin (NEURONTIN) 300 MG capsule Take 2 capsules by mouth 3 times daily for 30 days. 3/18/21 4/17/21  Melinda Colon APRN - CNP   butalbital-aspirin-caffeine [facility]) -40 MG per capsule Take 1 capsule by mouth every 4 hours as needed for Headaches for up to 30 days. 3/15/21 4/14/21  Melinda Colon APRN - CNP   cetirizine (ZYRTEC) 10 MG tablet Take 1 tablet by mouth daily 3/11/21   Melinda Colon APRN - CNP   ibuprofen (IBU) 600 MG tablet Take 1 tablet by mouth every 6 hours as needed for Pain 3/11/21   Melinda Colon APRRAF - CNP   lisinopril (PRINIVIL;ZESTRIL) 20 MG tablet TAKE 1 TABLET BY MOUTH DAILY  3/2/21 4/1/21  Melinda Colon APRRAF - CNP   levothyroxine (SYNTHROID) 100 MCG tablet Take 1 tablet by mouth Daily 2/19/21   Melinda Colon APRN - FELIX   budesonide-formoterol [facility]) 160-4.5 MCG/ACT AERO Inhale 2 puffs into the lungs 2 times daily 2/11/21   Melinda Colon APRRAF - FELIX   ipratropium-albuterol (DUONEB) 0.5-2.5 (3) MG/3ML SOLN nebulizer solution Inhale 3 mLs into the lungs every 6 hours as needed for Shortness of Breath 2/11/21   KEANU Garrett - CNP   cyclobenzaprine (FLEXERIL) 5 MG tablet TAKE 1 TABLET BY MOUTH 2 TIMES DAILY AS NEEDED FOR MUSCLE SPASMS (DON'T TAKE W/ ZANAFLEX) 1/19/21   Melinda Colon APRN - CNP   acetaminophen (TYLENOL) 325 MG tablet Take 2 tablets by mouth every 6 hours as needed for Pain 1/13/21   Susan Dean DO   ammonium lactate (LAC-HYDRIN) 12 % lotion Apply topically daily. 1/6/21   [place] Counter, DPM   Ciclopirox (LOPROX) 0.77 % gel Apply to skin twice a day 1/6/21   West Counter, DPM   hydrOXYzine (ATARAX) 50 MG tablet  12/14/20   Historical Provider, MD   albuterol sulfate  (90 Base) MCG/ACT inhaler Inhale 1 puff into the lungs every 6 hours as needed for Wheezing Gap prescription until follow up with primary. Do not refill.   Follow up with primary 12/18/20   Melinda Colon, KEANU - FELIX   Nebulizers (NEBULIZER COMPRESSOR) MISC Daily as needed 12/18/20   Melinda Colon, APRN - CNP   Respiratory Therapy Supplies (NEBULIZER/TUBING/MOUTHPIECE) KIT Daily as needed 12/18/20   Marcfabián Colon, APRN - CNP   Blood Pressure KIT Daily as needed 12/18/20   Marcfabián Colon, APRN - CNP   Misc.  Devices (CANE) MISC Quad base cane 11/28/20   Cheryl Persaud MD   famotidine (PEPCID) 20 MG tablet Take 1 tablet by mouth 2 times daily as needed (for acid reflux) 10/26/20   Melinda Colon, APRN - CNP   clomiPRAMINE (ANAFRANIL) 50 MG capsule Take 1 capsule by mouth nightly 10/26/20   Melinda Colon, APRN - CNP   risperiDONE (RISPERDAL) 1 MG tablet Take 1 tablet by mouth daily and 2 tablets by mouth at bedtime 10/22/20   Melinda Colon, APRN - CNP   traZODone (DESYREL) 150 MG tablet Take 1 tablet by mouth nightly 10/22/20   Melinda Colon, APRN - CNP   cloNIDine (CATAPRES) 0.1 MG tablet Take 1 tablet by mouth nightly 10/22/20   Melinda Colon, APRN - CNP   Benzocaine-Menthol (CEPACOL) 6-10 MG LOZG lozenge Take 1 lozenge by mouth every 2 hours as needed for Sore Throat 10/22/20   Melinda Colon, APRN - CNP   tiZANidine (ZANAFLEX) 2 MG tablet Take 2 tablets by mouth every 8 hours as needed (muscle spasm, neck pain) 10/9/20   Marcine Darlene, APRN - CNP   Elastic Bandages & Supports (CARPAL TUNNEL WRIST STABILIZER) MISC Apply to each wrist at bedtime 10/9/20   Melinda Colon, APRN - CNP   methyl salicylate-menthol (PAULETTE BRAY GREASELESS) 10-15 % CREA Apply topically 3 times daily as needed for Pain 9/21/20   Paige Wharton MD   albuterol (PROVENTIL) (2.5 MG/3ML) 0.083% nebulizer solution Take 3 mLs by nebulization every 6 hours as needed for Wheezing 6/18/20   Melinda Colon, APRN - CNP   ipratropium-albuterol (DUONEB) 0.5-2.5 (3) MG/3ML SOLN nebulizer solution Inhale 3 mLs into the lungs every 4 hours 6/16/19   Joelle Rose MD       REVIEW OF SYSTEMS    (2-9 systems for level 4, 10 or more for level 5)      General ROS - No fevers, No chills, no gradual weight loss, no night sweats  Ophthalmic ROS - No discharge, No changes in vision  ENT ROS -  No sore throat, No rhinorrhea,   Respiratory ROS - no shortness of breath, no cough, no  wheezing  Cardiovascular ROS - No chest pain, no dyspnea on exertion  Gastrointestinal ROS - positive abdominal pain, positive nausea, positive vomiting, positive change in bowel habits, no black or bloody stools  Genito-Urinary ROS - No dysuria, trouble voiding, or hematuria  Musculoskeletal ROS - No myalgias, No arthalgias  Neurological ROS - No headache, no dizziness/lightheadedness, No focal weakness, no loss of sensation  Dermatological ROS - No lesions, No rash         PHYSICAL EXAM   (up to 7 for level 4, 8 or more for level 5)      INITIAL VITALS:   BP (!) 152/106   Pulse 98   Temp 98.8 °F (37.1 °C)   Resp 16   Ht 5' 2\" (1.575 m)   Wt 185 lb (83.9 kg)   SpO2 98%   BMI 33.84 kg/m²     General Appearance: Well-appearing, in no acute distress  HEENT: Head: normocephalic/atraumatic eyes: PERRLA, EOMT, conjunctiva not injected, sclerae nonicteric ears: External canals patent nose: Nares patent, no rhinorrhea, throat:mucous membranes moist, oropharynx clear     Neck: Trachea midline, no JVD. Lungs: No evidence of increased work of breathing. CTA B/L, no wheezes/rhonchi     Cardiovascular: RRR, no murmur, 2+ peripheral pulses bilaterally. Cap refill less than 2 seconds. No lower extremity edema noted    Abdomen: Soft, mild to moderate tenderness right lower quadrant. No guarding or rebound tenderness. No CVA or suprapubic tenderness negative Modi sign  Neurologic: POLLARD  to person, place, time, and event. No sensation deficits. Moving all extremities    Extremities: Skin warm, dry and intact.       DIFFERENTIAL  DIAGNOSIS     PLAN (LABS / IMAGING / EKG):  Orders Placed This Encounter   Procedures    CT ABDOMEN PELVIS W IV CONTRAST Additional Contrast? None    CBC WITH AUTO DIFFERENTIAL  COMPREHENSIVE METABOLIC PANEL    Urinalysis Reflex to Culture       MEDICATIONS ORDERED:  Orders Placed This Encounter   Medications    ketorolac (TORADOL) injection 30 mg    0.9 % sodium chloride bolus    ondansetron (ZOFRAN) injection 4 mg           DIAGNOSTIC RESULTS / EMERGENCY DEPARTMENT COURSE / MDM     LABS:  Results for orders placed or performed during the hospital encounter of 04/27/21   CBC WITH AUTO DIFFERENTIAL   Result Value Ref Range    WBC 11.6 (H) 3.5 - 11.3 k/uL    RBC 5.06 3.95 - 5.11 m/uL    Hemoglobin 15.5 (H) 11.9 - 15.1 g/dL    Hematocrit 45.9 36.3 - 47.1 %    MCV 90.7 82.6 - 102.9 fL    MCH 30.6 25.2 - 33.5 pg    MCHC 33.8 28.4 - 34.8 g/dL    RDW 12.6 11.8 - 14.4 %    Platelets 544 144 - 716 k/uL    MPV 9.8 8.1 - 13.5 fL    NRBC Automated 0.0 0.0 per 100 WBC    Differential Type NOT REPORTED     Seg Neutrophils 60 36 - 65 %    Lymphocytes 31 24 - 43 %    Monocytes 7 3 - 12 %    Eosinophils % 1 1 - 4 %    Basophils 1 0 - 2 %    Immature Granulocytes 0 0 %    Segs Absolute 7.03 1.50 - 8.10 k/uL    Absolute Lymph # 3.54 1.10 - 3.70 k/uL    Absolute Mono # 0.81 0.10 - 1.20 k/uL    Absolute Eos # 0.07 0.00 - 0.44 k/uL    Basophils Absolute 0.07 0.00 - 0.20 k/uL    Absolute Immature Granulocyte 0.05 0.00 - 0.30 k/uL    WBC Morphology NOT REPORTED     RBC Morphology NOT REPORTED     Platelet Estimate NOT REPORTED    Urinalysis Reflex to Culture    Specimen: Urine, clean catch   Result Value Ref Range    Color, UA YELLOW YELLOW    Turbidity UA CLEAR CLEAR    Glucose, Ur NEGATIVE NEGATIVE    Bilirubin Urine NEGATIVE NEGATIVE    Ketones, Urine NEGATIVE NEGATIVE    Specific Gravity, UA 1.006 1.005 - 1.030    Urine Hgb NEGATIVE NEGATIVE    pH, UA 6.5 5.0 - 8.0    Protein, UA NEGATIVE NEGATIVE    Urobilinogen, Urine Normal Normal    Nitrite, Urine NEGATIVE NEGATIVE    Leukocyte Esterase, Urine NEGATIVE NEGATIVE    Urinalysis Comments       Microscopic exam not performed based on chemical results

## 2021-04-28 NOTE — ED NOTES
Dr Bains Brochure at bedside for [address].       Kimi Craig, RN  04/28/21 5263

## 2021-04-29 ENCOUNTER — CARE COORDINATION (OUTPATIENT)
Dept: CARE COORDINATION | Age: 56
End: 2021-04-29

## 2021-04-29 NOTE — CARE COORDINATION
Patient contacted regarding Betito Neal. Discussed COVID-19 related testing which was not done at this time. Test results were not done. Patient informed of results, if available? na    Ambulatory Care Manager contacted the patient by telephone to perform post discharge assessment. Call within 2 business days of discharge: Yes. Verified name and  with patient as identifiers. Provided introduction to self, and explanation of the CTN/ACM role, and reason for call due to risk factors for infection and/or exposure to COVID-19. Symptoms reviewed with patient who verbalized the following symptoms: no worsening symptoms. Due to no new or worsening symptoms encounter was not routed to provider for escalation. Discussed follow-up appointments. If no appointment was previously scheduled, appointment scheduling offered: Paty Connor reports she will call her PCP's office to schedule an ED f/u appt. [facility] follow up appointment(s): No future appointments. Non-[facility] follow up appointment(s):     Non-face-to-face services provided:  Obtained and reviewed discharge summary and/or continuity of care documents     Advance Care Planning:   Does patient have an Advance Directive:  patient declined education. Patient has following risk factors of: heart failure, COPD and HTN, obesity, tobacco abuse. ACM reviewed discharge instructions, medical action plan and red flags such as increased shortness of breath, increasing fever and signs of decompensation with patient who verbalized understanding. Discussed exposure protocols and quarantine with CDC Guidelines What to do if you are sick with coronavirus disease 2019. patientwas given an opportunity for questions and concerns. The patient agrees to contact the [facility] exposure line [phone], local health department declined and PCP office for questions related to their healthcare. ACM provided contact information for future needs.     Reviewed and educated patient on

## 2021-05-05 ENCOUNTER — HOSPITAL ENCOUNTER (EMERGENCY)
Age: 56
Discharge: HOME | End: 2021-05-05
Attending: EMERGENCY MEDICINE
Payer: MEDICARE

## 2021-05-05 VITALS
OXYGEN SATURATION: 98 % | SYSTOLIC BLOOD PRESSURE: 185 MMHG | HEIGHT: 63 IN | HEART RATE: 107 BPM | WEIGHT: 185 LBS | BODY MASS INDEX: 32.78 KG/M2 | TEMPERATURE: 97.1 F | DIASTOLIC BLOOD PRESSURE: 116 MMHG | RESPIRATION RATE: 20 BRPM

## 2021-05-05 DIAGNOSIS — G43.909 MIGRAINE WITHOUT STATUS MIGRAINOSUS, NOT INTRACTABLE, UNSPECIFIED MIGRAINE TYPE: Primary | ICD-10-CM

## 2021-05-05 LAB
ABSOLUTE EOS #: 0.08 K/UL (ref 0–0.44)
ABSOLUTE IMMATURE GRANULOCYTE: 0.03 K/UL (ref 0–0.3)
ABSOLUTE LYMPH #: 2.61 K/UL (ref 1.1–3.7)
ABSOLUTE MONO #: 0.83 K/UL (ref 0.1–1.2)
ALBUMIN SERPL-MCNC: 3.6 G/DL (ref 3.5–5.2)
ALBUMIN/GLOBULIN RATIO: 1.4 (ref 1–2.5)
ALP BLD-CCNC: 97 U/L (ref 35–104)
ALT SERPL-CCNC: 16 U/L (ref 5–33)
ANION GAP SERPL CALCULATED.3IONS-SCNC: 11 MMOL/L (ref 9–17)
AST SERPL-CCNC: 22 U/L
BASOPHILS # BLD: 1 % (ref 0–2)
BASOPHILS ABSOLUTE: 0.06 K/UL (ref 0–0.2)
BILIRUB SERPL-MCNC: 0.25 MG/DL (ref 0.3–1.2)
BUN BLDV-MCNC: 4 MG/DL (ref 6–20)
BUN/CREAT BLD: ABNORMAL (ref 9–20)
CALCIUM SERPL-MCNC: 8.2 MG/DL (ref 8.6–10.4)
CHLORIDE BLD-SCNC: 104 MMOL/L (ref 98–107)
CO2: 25 MMOL/L (ref 20–31)
CREAT SERPL-MCNC: 0.75 MG/DL (ref 0.5–0.9)
DIFFERENTIAL TYPE: NORMAL
EOSINOPHILS RELATIVE PERCENT: 1 % (ref 1–4)
GFR AFRICAN AMERICAN: >60 ML/MIN
GFR NON-AFRICAN AMERICAN: >60 ML/MIN
GFR SERPL CREATININE-BSD FRML MDRD: ABNORMAL ML/MIN/{1.73_M2}
GFR SERPL CREATININE-BSD FRML MDRD: ABNORMAL ML/MIN/{1.73_M2}
GLUCOSE BLD-MCNC: 95 MG/DL (ref 70–99)
HCT VFR BLD CALC: 43.2 % (ref 36.3–47.1)
HEMOGLOBIN: 14 G/DL (ref 11.9–15.1)
IMMATURE GRANULOCYTES: 0 %
LIPASE: 26 U/L (ref 13–60)
LYMPHOCYTES # BLD: 30 % (ref 24–43)
MCH RBC QN AUTO: 30.6 PG (ref 25.2–33.5)
MCHC RBC AUTO-ENTMCNC: 32.4 G/DL (ref 28.4–34.8)
MCV RBC AUTO: 94.3 FL (ref 82.6–102.9)
MONOCYTES # BLD: 9 % (ref 3–12)
NRBC AUTOMATED: 0 PER 100 WBC
PDW BLD-RTO: 12.5 % (ref 11.8–14.4)
PLATELET # BLD: NORMAL K/UL (ref 138–453)
PLATELET ESTIMATE: NORMAL
PLATELET, FLUORESCENCE: NORMAL K/UL (ref 138–453)
PMV BLD AUTO: NORMAL FL (ref 8.1–13.5)
POTASSIUM SERPL-SCNC: 3.4 MMOL/L (ref 3.7–5.3)
RBC # BLD: 4.58 M/UL (ref 3.95–5.11)
RBC # BLD: NORMAL 10*6/UL
SEG NEUTROPHILS: 59 % (ref 36–65)
SEGMENTED NEUTROPHILS ABSOLUTE COUNT: 5.22 K/UL (ref 1.5–8.1)
SODIUM BLD-SCNC: 140 MMOL/L (ref 135–144)
TOTAL PROTEIN: 6.2 G/DL (ref 6.4–8.3)
WBC # BLD: 8.8 K/UL (ref 3.5–11.3)
WBC # BLD: NORMAL 10*3/UL

## 2021-05-05 PROCEDURE — 99285 EMERGENCY DEPT VISIT HI MDM: CPT

## 2021-05-05 PROCEDURE — 96374 THER/PROPH/DIAG INJ IV PUSH: CPT

## 2021-05-05 PROCEDURE — 6360000002 HC RX W HCPCS: Performed by: STUDENT IN AN ORGANIZED HEALTH CARE EDUCATION/TRAINING PROGRAM

## 2021-05-05 PROCEDURE — 2500000003 HC RX 250 WO HCPCS: Performed by: STUDENT IN AN ORGANIZED HEALTH CARE EDUCATION/TRAINING PROGRAM

## 2021-05-05 PROCEDURE — 83690 ASSAY OF LIPASE: CPT

## 2021-05-05 PROCEDURE — 96375 TX/PRO/DX INJ NEW DRUG ADDON: CPT

## 2021-05-05 PROCEDURE — 85055 RETICULATED PLATELET ASSAY: CPT

## 2021-05-05 PROCEDURE — 80053 COMPREHEN METABOLIC PANEL: CPT

## 2021-05-05 PROCEDURE — 85025 COMPLETE CBC W/AUTO DIFF WBC: CPT

## 2021-05-05 PROCEDURE — 6370000000 HC RX 637 (ALT 250 FOR IP): Performed by: STUDENT IN AN ORGANIZED HEALTH CARE EDUCATION/TRAINING PROGRAM

## 2021-05-05 PROCEDURE — 2580000003 HC RX 258: Performed by: STUDENT IN AN ORGANIZED HEALTH CARE EDUCATION/TRAINING PROGRAM

## 2021-05-05 RX ORDER — MAGNESIUM HYDROXIDE/ALUMINUM HYDROXICE/SIMETHICONE 120; 1200; 1200 MG/30ML; MG/30ML; MG/30ML
30 SUSPENSION ORAL ONCE
Status: COMPLETED | OUTPATIENT
Start: 2021-05-05 | End: 2021-05-05

## 2021-05-05 RX ORDER — DIPHENHYDRAMINE HYDROCHLORIDE 50 MG/ML
25 INJECTION INTRAMUSCULAR; INTRAVENOUS EVERY 6 HOURS PRN
Status: DISCONTINUED | OUTPATIENT
Start: 2021-05-05 | End: 2021-05-06 | Stop reason: HOSPADM

## 2021-05-05 RX ORDER — ONDANSETRON 2 MG/ML
4 INJECTION INTRAMUSCULAR; INTRAVENOUS ONCE
Status: COMPLETED | OUTPATIENT
Start: 2021-05-05 | End: 2021-05-05

## 2021-05-05 RX ORDER — PROCHLORPERAZINE EDISYLATE 5 MG/ML
10 INJECTION INTRAMUSCULAR; INTRAVENOUS ONCE
Status: COMPLETED | OUTPATIENT
Start: 2021-05-05 | End: 2021-05-05

## 2021-05-05 RX ORDER — 0.9 % SODIUM CHLORIDE 0.9 %
1000 INTRAVENOUS SOLUTION INTRAVENOUS ONCE
Status: COMPLETED | OUTPATIENT
Start: 2021-05-05 | End: 2021-05-05

## 2021-05-05 RX ADMIN — ALUMINUM HYDROXIDE, MAGNESIUM HYDROXIDE, AND SIMETHICONE 30 ML: 200; 200; 20 SUSPENSION ORAL at 21:49

## 2021-05-05 RX ADMIN — ONDANSETRON 4 MG: 2 INJECTION INTRAMUSCULAR; INTRAVENOUS at 21:49

## 2021-05-05 RX ADMIN — DIPHENHYDRAMINE HYDROCHLORIDE 25 MG: 50 INJECTION, SOLUTION INTRAMUSCULAR; INTRAVENOUS at 21:49

## 2021-05-05 RX ADMIN — PROCHLORPERAZINE EDISYLATE 10 MG: 5 INJECTION INTRAMUSCULAR; INTRAVENOUS at 21:49

## 2021-05-05 RX ADMIN — SODIUM CHLORIDE 1000 ML: 9 INJECTION, SOLUTION INTRAVENOUS at 21:49

## 2021-05-05 RX ADMIN — FAMOTIDINE 20 MG: 10 INJECTION, SOLUTION INTRAVENOUS at 21:49

## 2021-05-05 ASSESSMENT — PAIN DESCRIPTION - LOCATION: LOCATION: ABDOMEN;HEAD

## 2021-05-06 ENCOUNTER — CARE COORDINATION (OUTPATIENT)
Dept: CARE COORDINATION | Age: 56
End: 2021-05-06

## 2021-05-06 DIAGNOSIS — G43.909 MIGRAINE WITHOUT STATUS MIGRAINOSUS, NOT INTRACTABLE, UNSPECIFIED MIGRAINE TYPE: ICD-10-CM

## 2021-05-06 RX ORDER — BUTALBITAL, ASPIRIN, AND CAFFEINE 50; 325; 40 MG/1; MG/1; MG/1
1 CAPSULE ORAL EVERY 4 HOURS PRN
Qty: 20 CAPSULE | Refills: 2 | OUTPATIENT
Start: 2021-05-06 | End: 2021-06-05

## 2021-05-06 NOTE — ED TRIAGE NOTES
Pt came in c/o headache, mid abdominal pain, and n/v/d for the past four days. Pt took tylenol and zofran around 1600 today with no help. RR even and unlabored, AND, A&O, ambulatory.  Pt denies any chest pain, however states she is having difficulty breathing d/t pain

## 2021-05-06 NOTE — ED PROVIDER NOTES
file   Occupational History    Not on file   Social Needs    Financial resource strain: Not on file    Food insecurity     Worry: Not on file     Inability: Not on file    Transportation needs     Medical: Not on file     Non-medical: Not on file   Tobacco Use    Smoking status: Current Every Day Smoker     Packs/day: 0.50     Years: 12.00     Pack years: 6.00    Smokeless tobacco: Never Used   Substance and Sexual Activity    Alcohol use: No    Drug use: Not Currently     Comment: Has not use Heroin since 2/2020    Sexual activity: Not Currently   Lifestyle    Physical activity     Days per week: Not on file     Minutes per session: Not on file    Stress: Not on file   Relationships    Social connections     Talks on phone: Not on file     Gets together: Not on file     Attends Yazdanism service: Not on file     Active member of club or organization: Not on file     Attends meetings of clubs or organizations: Not on file     Relationship status: Not on file    Intimate partner violence     Fear of current or ex partner: Not on file     Emotionally abused: Not on file     Physically abused: Not on file     Forced sexual activity: Not on file   Other Topics Concern    Not on file   Social History Narrative    Not on file       History reviewed. No pertinent family history. Allergies:  Imitrex [sumatriptan], Bee pollen, Bee venom, Bromide ion [bromine], Reglan [metoclopramide], Sulfa antibiotics, Sulfadiazine, and Tramadol    Home Medications:  Prior to Admission medications    Medication Sig Start Date End Date Taking?  Authorizing Provider   ondansetron (ZOFRAN ODT) 4 MG disintegrating tablet Take 1 tablet by mouth every 8 hours as needed for Nausea 4/28/21   Farhat Dunlap DO   Rimegepant Sulfate 75 MG TBDP Take 1 tablet by mouth every 48 hours as needed (for migraine) 4/20/21 5/20/21  David Davidson APRN - CNP   ondansetron (ZOFRAN ODT) 4 MG disintegrating tablet Take 1 tablet by mouth every 8 hours as needed for Nausea 4/19/21   Naila Erp, APRN - CNP   ASPIRIN ADULT LOW STRENGTH 81 MG EC tablet TAKE 1 TABLET BY MOUTH ONCE DAILY  4/5/21   Naila Erp, APRN - CNP   gabapentin (NEURONTIN) 300 MG capsule Take 2 capsules by mouth 3 times daily for 30 days. 3/18/21 4/17/21  Naila Erp, APRN - CNP   butalbital-aspirin-caffeine [facility]) -40 MG per capsule Take 1 capsule by mouth every 4 hours as needed for Headaches for up to 30 days. 3/15/21 4/14/21  Naila Erp, APRN - CNP   cetirizine (ZYRTEC) 10 MG tablet Take 1 tablet by mouth daily 3/11/21   Naila Erp, APRN - CNP   ibuprofen (IBU) 600 MG tablet Take 1 tablet by mouth every 6 hours as needed for Pain 3/11/21   Naila Erp, APRN - CNP   lisinopril (PRINIVIL;ZESTRIL) 20 MG tablet TAKE 1 TABLET BY MOUTH DAILY  3/2/21 4/1/21  Naila Erp, APRN - CNP   levothyroxine (SYNTHROID) 100 MCG tablet Take 1 tablet by mouth Daily 2/19/21   Naila Erp, APRN - CNP   budesonide-formoterol [facility]) 160-4.5 MCG/ACT AERO Inhale 2 puffs into the lungs 2 times daily 2/11/21   Naila Erp, APRN - CNP   ipratropium-albuterol (DUONEB) 0.5-2.5 (3) MG/3ML SOLN nebulizer solution Inhale 3 mLs into the lungs every 6 hours as needed for Shortness of Breath 2/11/21   Naila Erp, APRN - CNP   cyclobenzaprine (FLEXERIL) 5 MG tablet TAKE 1 TABLET BY MOUTH 2 TIMES DAILY AS NEEDED FOR MUSCLE SPASMS (DON'T TAKE W/ ZANAFLEX) 1/19/21   Naila Erp, APRN - CNP   acetaminophen (TYLENOL) 325 MG tablet Take 2 tablets by mouth every 6 hours as needed for Pain 1/13/21   Uriah Chavarria DO   ammonium lactate (LAC-HYDRIN) 12 % lotion Apply topically daily.  1/6/21   Charlee Lolly, DPM   Ciclopirox (LOPROX) 0.77 % gel Apply to skin twice a day 1/6/21   Charlee Lolly, DPANTHONY   hydrOXYzine (ATARAX) 50 MG tablet  12/14/20   Historical Provider, MD   albuterol sulfate  (90 Base) MCG/ACT inhaler Inhale 1 puff into the lungs every 6 hours as needed for Wheezing Gap prescription until follow up with primary. Do not refill. Follow up with primary 12/18/20   KEANU Kennedy CNP   Nebulizers (NEBULIZER COMPRESSOR) MISC Daily as needed 12/18/20   KEANU Kennedy CNP   Respiratory Therapy Supplies (NEBULIZER/TUBING/MOUTHPIECE) KIT Daily as needed 12/18/20   KEANU Kennedy CNP   Blood Pressure KIT Daily as needed 12/18/20   KEANU Kennedy CNP   Misc.  Devices (CANE) MISC Quad base cane 11/28/20   Lidia Reagan MD   famotidine (PEPCID) 20 MG tablet Take 1 tablet by mouth 2 times daily as needed (for acid reflux) 10/26/20   KEANU Kennedy CNP   clomiPRAMINE (ANAFRANIL) 50 MG capsule Take 1 capsule by mouth nightly 10/26/20   KEANU Kennedy CNP   risperiDONE (RISPERDAL) 1 MG tablet Take 1 tablet by mouth daily and 2 tablets by mouth at bedtime 10/22/20   KEANU Kennedy CNP   traZODone (DESYREL) 150 MG tablet Take 1 tablet by mouth nightly 10/22/20   KEANU Kennedy CNP   cloNIDine (CATAPRES) 0.1 MG tablet Take 1 tablet by mouth nightly 10/22/20   KEANU Kennedy CNP   Benzocaine-Menthol (CEPACOL) 6-10 MG LOZG lozenge Take 1 lozenge by mouth every 2 hours as needed for Sore Throat 10/22/20   KEANU Kennedy CNP   tiZANidine (ZANAFLEX) 2 MG tablet Take 2 tablets by mouth every 8 hours as needed (muscle spasm, neck pain) 10/9/20   KEANU Kennedy CNP   Elastic Bandages & Supports (CARPAL TUNNEL WRIST STABILIZER) MISC Apply to each wrist at bedtime 10/9/20   KEANU Kennedy CNP   methyl salicylate-menthol (PAULETTE BRAY GREASELESS) 10-15 % CREA Apply topically 3 times daily as needed for Pain 9/21/20   Lethaniel Favre, MD   albuterol (PROVENTIL) (2.5 MG/3ML) 0.083% nebulizer solution Take 3 mLs by nebulization every 6 hours as needed for Wheezing 6/18/20   KEANU Kennedy - FELIX   ipratropium-albuterol (DUONEB) 0.5-2.5 (3) MG/3ML SOLN nebulizer solution Inhale 3 mLs into the lungs every 4 hours 6/16/19   Lilli Montoya MD       REVIEW OF SYSTEMS    (2-9 systems for level 4, 10 or more for level 5)      General ROS - No fevers, No chills, no gradual weight loss, no night sweats  Ophthalmic ROS - No discharge, No changes in vision  ENT ROS -  No sore throat, No rhinorrhea,   Respiratory ROS - no shortness of breath, no cough, no  wheezing  Cardiovascular ROS - No chest pain, no dyspnea on exertion  Gastrointestinal ROS - positive abdominal pain, positive nausea, positive vomiting, no change in bowel habits, no black or bloody stools  Genito-Urinary ROS - No dysuria, trouble voiding, or hematuria  Musculoskeletal ROS - No myalgias, No arthalgias  Neurological ROS - positive headache, no dizziness/lightheadedness, No focal weakness, no loss of sensation  Dermatological ROS - No lesions, No rash         PHYSICAL EXAM   (up to 7 for level 4, 8 or more for level 5)      INITIAL VITALS:   BP (!) 185/116   Pulse 107   Temp 97.1 °F (36.2 °C) (Temporal)   Resp 20   Ht 5' 2.5\" (1.588 m)   Wt 185 lb (83.9 kg)   SpO2 98%   BMI 33.30 kg/m²     General Appearance: Well-appearing, in no acute distress  HEENT: Head: normocephalic/atraumatic eyes: PERRLA, EOMT, conjunctiva not injected, sclerae nonicteric ears: External canals patent nose: Nares patent, no rhinorrhea, throat:mucous membranes moist, oropharynx clear     Neck: Trachea midline, no JVD. Lungs: No evidence of increased work of breathing. CTA B/L, no wheezes/rhonchi     Cardiovascular: RRR, no murmur, 2+ peripheral pulses bilaterally. Cap refill less than 2 seconds. No lower extremity edema noted    Abdomen: Soft, nontender. No guarding or rebound tenderness. Neurologic: POLLARD  to person, place, time, and event. No sensation deficits.   Moving all extremities    Patient walks with steady gait, pupils equal round reactive gaze conjugate finger-nose intact equal strength upper and lower extremities neck supple moves range of motion    Extremities: Skin warm, dry and intact.       DIFFERENTIAL  DIAGNOSIS     PLAN (LABS / IMAGING / EKG):  Orders Placed This Encounter   Procedures    CBC WITH AUTO DIFFERENTIAL    Comprehensive Metabolic Panel    LIPASE    Immature Platelet Fraction       MEDICATIONS ORDERED:  Orders Placed This Encounter   Medications    ondansetron (ZOFRAN) injection 4 mg    diphenhydrAMINE (BENADRYL) injection 25 mg    0.9 % sodium chloride bolus    prochlorperazine (COMPAZINE) injection 10 mg    famotidine (PEPCID) injection 20 mg    aluminum & magnesium hydroxide-simethicone (MAALOX) 200-200-20 MG/5ML suspension 30 mL           DIAGNOSTIC RESULTS / EMERGENCY DEPARTMENT COURSE / MDM     LABS:  Results for orders placed or performed during the hospital encounter of 05/05/21   CBC WITH AUTO DIFFERENTIAL   Result Value Ref Range    WBC 8.8 3.5 - 11.3 k/uL    RBC 4.58 3.95 - 5.11 m/uL    Hemoglobin 14.0 11.9 - 15.1 g/dL    Hematocrit 43.2 36.3 - 47.1 %    MCV 94.3 82.6 - 102.9 fL    MCH 30.6 25.2 - 33.5 pg    MCHC 32.4 28.4 - 34.8 g/dL    RDW 12.5 11.8 - 14.4 %    Platelets See Reflexed IPF Result 138 - 453 k/uL    MPV NOT REPORTED 8.1 - 13.5 fL    NRBC Automated 0.0 0.0 per 100 WBC    Differential Type NOT REPORTED     WBC Morphology NOT REPORTED     RBC Morphology NOT REPORTED     Platelet Estimate NOT REPORTED     Seg Neutrophils 59 36 - 65 %    Lymphocytes 30 24 - 43 %    Monocytes 9 3 - 12 %    Eosinophils % 1 1 - 4 %    Basophils 1 0 - 2 %    Immature Granulocytes 0 0 %    Segs Absolute 5.22 1.50 - 8.10 k/uL    Absolute Lymph # 2.61 1.10 - 3.70 k/uL    Absolute Mono # 0.83 0.10 - 1.20 k/uL    Absolute Eos # 0.08 0.00 - 0.44 k/uL    Basophils Absolute 0.06 0.00 - 0.20 k/uL    Absolute Immature Granulocyte 0.03 0.00 - 0.30 k/uL   Comprehensive Metabolic Panel   Result Value Ref Range    Glucose 95 70 - 99 mg/dL    BUN 4 (L) 6 - 20 mg/dL    CREATININE 0.75 0.50 - 0.90 mg/dL    Bun/Cre Ratio NOT REPORTED 9 - 20    Calcium 8.2 (L) 8.6 - 10.4 mg/dL    Sodium 140 135 - 144 mmol/L    Potassium 3.4 (L) 3.7 - 5.3 mmol/L    Chloride 104 98 - 107 mmol/L    CO2 25 20 - 31 mmol/L    Anion Gap 11 9 - 17 mmol/L    Alkaline Phosphatase 97 35 - 104 U/L    ALT 16 5 - 33 U/L    AST 22 <32 U/L    Total Bilirubin 0.25 (L) 0.3 - 1.2 mg/dL    Total Protein 6.2 (L) 6.4 - 8.3 g/dL    Albumin 3.6 3.5 - 5.2 g/dL    Albumin/Globulin Ratio 1.4 1.0 - 2.5    GFR Non-African American >60 >60 mL/min    GFR African American >60 >60 mL/min    GFR Comment          GFR Staging NOT REPORTED    LIPASE   Result Value Ref Range    Lipase 26 13 - 60 U/L   Immature Platelet Fraction   Result Value Ref Range    Platelet, Fluorescence Platelet clumps present, count appears adequate. 138 - 453 k/uL           RADIOLOGY:  Ct Abdomen Pelvis W Iv Contrast Additional Contrast? None    Result Date: 4/28/2021  EXAMINATION: CT OF THE ABDOMEN AND PELVIS WITH CONTRAST 4/28/2021 2:16 am TECHNIQUE: CT of the abdomen and pelvis was performed with the administration of intravenous contrast. Multiplanar reformatted images are provided for review. Dose modulation, iterative reconstruction, and/or weight based adjustment of the mA/kV was utilized to reduce the radiation dose to as low as reasonably achievable. COMPARISON: 11/04/2018 HISTORY: ORDERING SYSTEM PROVIDED HISTORY: RLQ abd pain, n/v/d TECHNOLOGIST PROVIDED HISTORY: RLQ abd pain, n/v/d Decision Support Exception->Emergency Medical Condition (MA) Reason for Exam: abd pain, nausea Acuity: Acute Type of Exam: Initial FINDINGS: Lower Chest: The lung bases are clear. There are no pleural or pericardial effusions evident. Organs: The liver, spleen, pancreas, gallbladder and adrenal glands are unremarkable. There is a simple right renal cyst.  There is no hydronephrosis. GI/Bowel: There are no abnormally dilated loops of large or small bowel present. There is no mesenteric inflammatory stranding present.   The appendix is normal. Pelvis: The urinary bladder is normal.  There is no free fluid or pelvic mass. Peritoneum/Retroperitoneum: There is no pathologically enlarged lymphadenopathy present. Moderate to severe atherosclerotic calcifications are present within the abdominal aorta and proximal iliac arteries. Bones/Soft Tissues: No lytic or blastic osseous lesions are identified. 1. No acute findings within the abdomen or pelvis. 2. Normal appendix. 3. Moderate to severe atherosclerosis. EKG  None    All EKG's are interpreted by the Emergency Department Physician who either signs or Co-signs this chart in the absence of a cardiologist.    EMERGENCY DEPARTMENT COURSE/IMPRESSION:    2 days headache nausea vomiting, headache classic for migraines, low concern for meningitis encephalitis or subarachnoid hemorrhage, recently was seen and had a CAT scan of her belly for nausea vomiting diarrhea she is still having nausea by diarrhea will check belly labs electrolytes, headache migraine cocktail    Patient's headache is gradual onset not maximal intensity. Not associate with any vision changes numbness or focal weakness. No near syncope or syncope. Not on anticoagulation. Based on HPI and clinical exam I have low suspicion for subarachnoid hemorrhage at this time. ED Course as of May 06 0055   Wed May 05, 2021   2249 Symptoms improved will discharge patient would like to leave    [EF]      ED Course User Index  [EF] Juanito Walker DO       PROCEDURES:  None    CONSULTS:  None    CRITICAL CARE:  None    FINAL IMPRESSION      1.  Migraine without status migrainosus, not intractable, unspecified migraine type          DISPOSITION / PLAN     DISPOSITION Decision To Discharge 05/05/2021 10:50:02 PM      PATIENT REFERRED TO:  [facility]  [address]  [phone]  Go to   If symptoms worsen      DISCHARGE MEDICATIONS:  Discharge Medication List as of 5/5/2021 10:50 PM DO Bon Saini D.O.   Emergency Medicine Resident    (Please note that portions of this note were completed with a voice recognition program.  Efforts were made to edit the dictations but occasionally words aremis-transcribed.)       Thuy Landaverde DO  Resident  05/06/21 [id number]

## 2021-05-06 NOTE — TELEPHONE ENCOUNTER
Health Maintenance   Topic Date Due    HIV screen  Never done    DTaP/Tdap/Td vaccine (1 - Tdap) Never done    Cervical cancer screen  Never done    Breast cancer screen  Never done    Shingles Vaccine (1 of 2) Never done    Colon Cancer Screen FIT/FOBT  06/11/2020    TSH testing  12/16/2020    COVID-19 Vaccine (2 - Pfizer 2-dose series) 04/13/2021    Potassium monitoring  05/05/2022    Creatinine monitoring  05/05/2022    Lipid screen  02/19/2024    Flu vaccine  Completed    Pneumococcal 0-64 years Vaccine  Completed    Hepatitis C screen  Completed    Hepatitis A vaccine  Aged Out    Hepatitis B vaccine  Aged Out    Hib vaccine  Aged Out    Meningococcal (ACWY) vaccine  Aged Out             (applicable per patient's age: Cancer Screenings, Depression Screening, Fall Risk Screening, Immunizations)    Hemoglobin A1C (%)   Date Value   02/19/2019 5.2     LDL Cholesterol (mg/dL)   Date Value   02/19/2019 105     AST (U/L)   Date Value   05/05/2021 22     ALT (U/L)   Date Value   05/05/2021 16     BUN (mg/dL)   Date Value   05/05/2021 4 (L)      (goal A1C is < 7)   (goal LDL is <100) need 30-50% reduction from baseline     BP Readings from Last 3 Encounters:   05/05/21 (!) 185/116   04/27/21 (!) 152/106   02/19/21 128/80    (goal /80)      All Future Testing planned in [facility]:  Lab Frequency Next Occurrence   TSH With Reflex Ft4 Once 01/21/2021   Hemoglobin A1C Once 01/21/2021   VL LOWER EXTREMITY ARTERIAL SEGMENTAL PRESSURES W PPG Once 01/06/2022   XR FOOT LEFT (MIN 3 VIEWS) Once 01/06/2022   XR FOOT RIGHT (MIN 3 VIEWS) Once 01/06/2022   Drug Screen, Pain Once 05/19/2021       Next Visit Date:  Future Appointments   Date Time Provider Rashida Torres   5/11/2021  2:20 PM [facility] [address], PFIZER 21 DAY SECOND DOSE [facility]   5/12/2021  1:45 PM Naldo Key DPM [facility]            Patient Active Problem List:     Chest pain     Migraine variant with headache Drug overdose, accidental or unintentional, initial encounter     Hypothyroidism     Essential hypertension     Seizure disorder ([address]     Drug overdose     History of seizure     Major depressive disorder with psychotic features ([address]     Severe major depression ([address]     Overdose of barbiturate, intentional self-harm, initial encounter [address] [address]     Intentional phenobarbital overdose ([address]     Severe episode of recurrent major depressive disorder, without psychotic features ([address]     Suicide attempt ([address]     Major depressive disorder, recurrent ([address]     Sepsis ([address]     Severe malnutrition ([address]     Altered mental status     Hypokalemia     Congestive heart failure of unknown etiology ([address]     Lumbar radiculopathy     Chronic low back pain     Piriformis syndrome     COPD (chronic obstructive pulmonary disease) (HCC)     Major depressive disorder, single episode, unspecified     Severe depressed bipolar I disorder without psychotic features ([address]     Polysubstance abuse ([address]     Syncope and collapse

## 2021-05-06 NOTE — ED NOTES
Pt arrived to ED c/o migraine and emesis. Pt states for the last few days she has had a migraine. Pt states she has chronic migraines but is out of her migraine medication. Pt reports emesis that started with the migraine and light sensitivity. Pt denies CP, SOB, cough, fever.  RR even and NL. ALYSSA Mahmood RN  05/05/21 [id number]

## 2021-05-06 NOTE — ED PROVIDER NOTES
[facility] ED     Emergency Department     Faculty Attestation        I performed a history and physical examination of the patient and discussed management with the resident. I reviewed the residents note and agree with the documented findings and plan of care. Any areas of disagreement are noted on the chart. I was personally present for the key portions of any procedures. I have documented in the chart those procedures where I was not present during the key portions. I have reviewed the emergency nurses triage note. I agree with the chief complaint, past medical history, past surgical history, allergies, medications, social and family history as documented unless otherwise noted below. For mid-level providers such as nurse practitioners as well as physicians assistants:    I have personally seen and evaluated the patient. I find the patient's history and physical exam are consistent with NP/PA documentation. I agree with the care provided, treatment rendered, disposition, & follow-up plan. Additional findings are as noted. Vital Signs: BP (!) 185/116   Pulse 107   Temp 97.1 °F (36.2 °C) (Temporal)   Resp 20   Ht 5' 2.5\" (1.588 m)   Wt 185 lb (83.9 kg)   SpO2 98%   BMI 33.30 kg/m²   PCP:  José Miguel Yanez, APRN - CNP    Pertinent Comments:     Patient presents to the emergency department for evaluation of headache. She has a history of migraine headaches and states this feels like her typical migraine headache. She was unable to take her Fioricet when her headache started she is out of it. No falls or trauma. No anticoagulation use she has some mild abdominal pain with some nausea vomiting diarrhea she was seen here recently for this and had a CT which showed no acute abnormality. Abdomen soft and nontender.       Critical Care  None          Shanita Wilder MD  [facility]  Attending Emergency Medicine Physician              Jany Nunez MD Saul  05/05/21 1737

## 2021-05-07 ENCOUNTER — CARE COORDINATION (OUTPATIENT)
Dept: CARE COORDINATION | Age: 56
End: 2021-05-07

## 2021-05-07 DIAGNOSIS — G43.909 MIGRAINE WITHOUT STATUS MIGRAINOSUS, NOT INTRACTABLE, UNSPECIFIED MIGRAINE TYPE: ICD-10-CM

## 2021-05-07 NOTE — CARE COORDINATION
Attempting to reach patient for a follow up from ED x 2 attempts. Mailbox is full. Visit not related to covid.    Vilma Pop RN, ambulatory care manager

## 2021-05-07 NOTE — TELEPHONE ENCOUNTER
Patient called to inquire on medication being denied, states that she is still taking this medication and would like to know it refill can be sent to pharmacy.     Health Maintenance   Topic Date Due    HIV screen  Never done    DTaP/Tdap/Td vaccine (1 - Tdap) Never done    Cervical cancer screen  Never done    Breast cancer screen  Never done    Shingles Vaccine (1 of 2) Never done    Colon Cancer Screen FIT/FOBT  06/11/2020    TSH testing  12/16/2020    COVID-19 Vaccine (2 - Pfizer 2-dose series) 04/13/2021    Potassium monitoring  05/05/2022    Creatinine monitoring  05/05/2022    Lipid screen  02/19/2024    Flu vaccine  Completed    Pneumococcal 0-64 years Vaccine  Completed    Hepatitis C screen  Completed    Hepatitis A vaccine  Aged Out    Hepatitis B vaccine  Aged Out    Hib vaccine  Aged Out    Meningococcal (ACWY) vaccine  Aged Out             (applicable per patient's age: Cancer Screenings, Depression Screening, Fall Risk Screening, Immunizations)    Hemoglobin A1C (%)   Date Value   02/19/2019 5.2     LDL Cholesterol (mg/dL)   Date Value   02/19/2019 105     AST (U/L)   Date Value   05/05/2021 22     ALT (U/L)   Date Value   05/05/2021 16     BUN (mg/dL)   Date Value   05/05/2021 4 (L)      (goal A1C is < 7)   (goal LDL is <100) need 30-50% reduction from baseline     BP Readings from Last 3 Encounters:   05/05/21 (!) 185/116   04/27/21 (!) 152/106   02/19/21 128/80    (goal /80)      All Future Testing planned in [facility]:  Lab Frequency Next Occurrence   TSH With Reflex Ft4 Once 01/21/2021   Hemoglobin A1C Once 01/21/2021   VL LOWER EXTREMITY ARTERIAL SEGMENTAL PRESSURES W PPG Once 01/06/2022   XR FOOT LEFT (MIN 3 VIEWS) Once 01/06/2022   XR FOOT RIGHT (MIN 3 VIEWS) Once 01/06/2022   Drug Screen, Pain Once 05/19/2021       Next Visit Date:  Future Appointments   Date Time Provider Rashida Torres   5/11/2021  2:20 PM [facility], PFIZER Carlos Bran [facility]   5/12/2021  1:45 PM Jorge Lyon [facility]PM [facility]            Patient Active Problem List:     Chest pain     Migraine variant with headache     Drug overdose, accidental or unintentional, initial encounter     Hypothyroidism     Essential hypertension     Seizure disorder ([address]     Drug overdose     History of seizure     Major depressive disorder with psychotic features ([address]     Severe major depression ([address]     Overdose of barbiturate, intentional self-harm, initial encounter ([address]     Intentional phenobarbital overdose ([address]     Severe episode of recurrent major depressive disorder, without psychotic features ([address]     Suicide attempt ([address]     Major depressive disorder, recurrent ([address]     Sepsis ([address]     Severe malnutrition ([address]     Altered mental status     Hypokalemia     Congestive heart failure of unknown etiology ([address]     Lumbar radiculopathy     Chronic low back pain     Piriformis syndrome     COPD (chronic obstructive pulmonary disease) (HCC)     Major depressive disorder, single episode, unspecified     Severe depressed bipolar I disorder without psychotic features ([address]     Polysubstance abuse ([address]     Syncope and collapse

## 2021-05-08 ENCOUNTER — HOSPITAL ENCOUNTER (EMERGENCY)
Age: 56
Discharge: HOME | End: 2021-05-08
Attending: EMERGENCY MEDICINE
Payer: MEDICARE

## 2021-05-08 VITALS
HEART RATE: 86 BPM | TEMPERATURE: 97 F | RESPIRATION RATE: 18 BRPM | DIASTOLIC BLOOD PRESSURE: 117 MMHG | OXYGEN SATURATION: 96 % | SYSTOLIC BLOOD PRESSURE: 164 MMHG

## 2021-05-08 DIAGNOSIS — G43.709 CHRONIC MIGRAINE WITHOUT AURA WITHOUT STATUS MIGRAINOSUS, NOT INTRACTABLE: Primary | ICD-10-CM

## 2021-05-08 PROCEDURE — 6360000002 HC RX W HCPCS: Performed by: STUDENT IN AN ORGANIZED HEALTH CARE EDUCATION/TRAINING PROGRAM

## 2021-05-08 PROCEDURE — 96375 TX/PRO/DX INJ NEW DRUG ADDON: CPT

## 2021-05-08 PROCEDURE — 99283 EMERGENCY DEPT VISIT LOW MDM: CPT

## 2021-05-08 PROCEDURE — 96374 THER/PROPH/DIAG INJ IV PUSH: CPT

## 2021-05-08 PROCEDURE — 2580000003 HC RX 258: Performed by: STUDENT IN AN ORGANIZED HEALTH CARE EDUCATION/TRAINING PROGRAM

## 2021-05-08 RX ORDER — PROCHLORPERAZINE EDISYLATE 5 MG/ML
10 INJECTION INTRAMUSCULAR; INTRAVENOUS ONCE
Status: COMPLETED | OUTPATIENT
Start: 2021-05-08 | End: 2021-05-08

## 2021-05-08 RX ORDER — ONDANSETRON 2 MG/ML
4 INJECTION INTRAMUSCULAR; INTRAVENOUS ONCE
Status: COMPLETED | OUTPATIENT
Start: 2021-05-08 | End: 2021-05-08

## 2021-05-08 RX ORDER — DIPHENHYDRAMINE HYDROCHLORIDE 50 MG/ML
25 INJECTION INTRAMUSCULAR; INTRAVENOUS ONCE
Status: COMPLETED | OUTPATIENT
Start: 2021-05-08 | End: 2021-05-08

## 2021-05-08 RX ORDER — BUTALBITAL, ASPIRIN, AND CAFFEINE 325; 50; 40 MG/1; MG/1; MG/1
1 CAPSULE ORAL EVERY 4 HOURS PRN
Qty: 10 CAPSULE | Refills: 0 | Status: SHIPPED | OUTPATIENT
Start: 2021-05-08 | End: 2021-05-14 | Stop reason: ALTCHOICE

## 2021-05-08 RX ORDER — DEXAMETHASONE SODIUM PHOSPHATE 10 MG/ML
10 INJECTION INTRAMUSCULAR; INTRAVENOUS ONCE
Status: COMPLETED | OUTPATIENT
Start: 2021-05-08 | End: 2021-05-08

## 2021-05-08 RX ORDER — ONDANSETRON 4 MG/1
4 TABLET, FILM COATED ORAL EVERY 8 HOURS PRN
Qty: 10 TABLET | Refills: 0 | Status: ON HOLD | OUTPATIENT
Start: 2021-05-08 | End: 2021-08-28

## 2021-05-08 RX ORDER — 0.9 % SODIUM CHLORIDE 0.9 %
1000 INTRAVENOUS SOLUTION INTRAVENOUS ONCE
Status: COMPLETED | OUTPATIENT
Start: 2021-05-08 | End: 2021-05-08

## 2021-05-08 RX ADMIN — DIPHENHYDRAMINE HYDROCHLORIDE 25 MG: 50 INJECTION, SOLUTION INTRAMUSCULAR; INTRAVENOUS at 18:08

## 2021-05-08 RX ADMIN — PROCHLORPERAZINE EDISYLATE 10 MG: 5 INJECTION INTRAMUSCULAR; INTRAVENOUS at 18:10

## 2021-05-08 RX ADMIN — SODIUM CHLORIDE 1000 ML: 9 INJECTION, SOLUTION INTRAVENOUS at 18:08

## 2021-05-08 RX ADMIN — ONDANSETRON 4 MG: 2 INJECTION INTRAMUSCULAR; INTRAVENOUS at 18:11

## 2021-05-08 RX ADMIN — DEXAMETHASONE SODIUM PHOSPHATE 10 MG: 10 INJECTION INTRAMUSCULAR; INTRAVENOUS at 18:09

## 2021-05-08 ASSESSMENT — ENCOUNTER SYMPTOMS
VOMITING: 0
COUGH: 0
WHEEZING: 0
ABDOMINAL DISTENTION: 0
CONSTIPATION: 0
ABDOMINAL PAIN: 0
NAUSEA: 1
SHORTNESS OF BREATH: 0
DIARRHEA: 0
CHEST TIGHTNESS: 0
APNEA: 0

## 2021-05-08 NOTE — ED PROVIDER NOTES
[facility]  ED  Emergency Department  Senior Resident Attestation     Primary Care Physician  Shruthi Benítez, APRN - CNP    I performed a history and physical examination of the patient and discussed management with the sharon resident. I reviewed the sharon residents note and agree with the documented findings and plan of care. Any areas of disagreement are noted on the chart. Case was then discussed with Faculty Attending Supervisor for additional medical management. PERTINENT ATTENDING PHYSICIAN COMMENTS:    HISTORY:   Andrei Rae is a 60 y.o. female who  has a past medical history of Arthritis, Asthma, Attention deficit hyperactivity disorder (ADHD), combined type, Borderline diabetes, Borderline personality disorder ([address] CHF (congestive heart failure) ([address] COPD (chronic obstructive pulmonary disease) ([address] Fibromyalgia, Headache, Hypertension, Manic depression ([address] Movement disorder, Neck fracture ([address] Pernicious anemia, Seizure ([address] and Thyroid disease. and presents with complaint of headache x4 days. Patient states that she gets recurrent headaches and this feels fairly typical for her headache. She states that she initially had a migraine approximately 10 days ago did present to the emergency department and was treated with IV migraine cocktail. She felt better prior to going home however she states she woke up the next morning with a headache which has progressively been getting worse for the past 4 days. She denies maximal intensity at onset. Denies any falls or trauma. She does not use anticoagulation. She does endorse some photosensitivity and phono sensitivity which is typical for her headaches. She describes a headache on the top of her head without vision changes or extremity weakness. She denies any fevers, chills, neck pain or stiffness.     PHYSICAL:   Temp: 97 °F (36.1 °C),  Pulse: 86, Resp: 18, BP: (!) 164/117, SpO2: 96 %  Gen: Non-toxic, Afebrile  Neck: Supple, nontender  Cards: Regular rate and rhythm  Pulm: Lung sounds clear to auscultation  Abdomen: Soft, non-tender, non-distended  Skin: warm, dry  Extremities: pulses 2+ radial / dorsalis pedis, no clubbing, cyanosis, edema  Neuro: Both equal and reactive to light, extraocular movements intact, cranial nerves intact, sensation equal for upper and lower extremities bilaterally, muscle strength full and equal for bilateral upper and lower extremities, no dysmetria with finger-nose testing    IMPRESSION/PLAN:   4 days of gradually worsening headache on top of her head with associated nausea, phonophobia, and photophobia. This is typical for her migraines. Second visit. No high risk features including immunocompromise state, fever, meningismus, trauma, or anticoagulation use. She has no focal deficits. I do not feel that she needs CT imaging at this time. Do not feel that she has an infectious cause such as a CNS infection and do not feel that a lumbar puncture is appropriate at this time. Rather, overall impression is typical migraine. After migraine cocktail she did report improvement. Decadron was given to help reduce the recurrence of headache given that the headache did return after her last ER visit. Upon reevaluation, she admits to a significant improvement is appropriate for discharge. Discharged with Zofran and floor and I will along with strict return precautions and PCP follow-up.     CRITICAL CARE TIME:    None    Stephy Stewart DO  Senior Resident Physician    (Please note that portions of this note were completed with a voice recognition program.  Efforts were made to edit the dictations but occasionally words are mis-transcribed.)       Stephy Stewart DO  Resident  05/08/21 1912

## 2021-05-08 NOTE — PROGRESS NOTES
IS given to patient and educated to use every hour while awake only. Attempt 10 times.  Pt reports understanding, states she's \"used them before\" 08-May-2021 03:07

## 2021-05-08 NOTE — ED NOTES
Reports migraine x4 days, out of home meds, pcp called too late to fill script. Pt reports sound/light sensitivity and nausea.       Sara Solis RN  05/08/21 8127

## 2021-05-08 NOTE — ED PROVIDER NOTES
[facility]  ED  Emergency Department Encounter  EmergencyMedicine Resident     Pt Name:Aleta Arguello  MRN: [id number]  [place] 1965  Date of evaluation: 5/8/21  PCP:  KEANU Tellez CNP    CHIEF COMPLAINT       Chief Complaint   Patient presents with    Migraine       HISTORY OF PRESENT ILLNESS  (Location/Symptom, Timing/Onset, Context/Setting, Quality, Duration, Modifying Factors, Severity.)      Con Schaumann is a 60 y.o. female who presents with migraine for the last 4 days. Patient states the migraine started about 10 days ago, patient presented to the ED about 5 days ago and was treated with IV medication. Migraine resolved after that but recurred the next morning. Patient states this headache is similar to what she has generally with her migraines, currently complaining of noise and light sensitivity. Patient has been taking Tylenol for pain relief but has noticed no improvement. Patient generally takes Fiorinal for her migraines but has run out of it, call PCP for refill but has not been able to get a refill due to her PCP being out of office. Patient also complains of tearing from the eye and nausea along with a headache. Denies any episodes of vomiting. Patient also denies any falls, loss of sensation, loss of consciousness, motor deficits, visual changes or other complaints. PAST MEDICAL / SURGICAL / SOCIAL / FAMILY HISTORY      has a past medical history of Arthritis, Asthma, Attention deficit hyperactivity disorder (ADHD), combined type, Borderline diabetes, Borderline personality disorder ([address] CHF (congestive heart failure) ([address] COPD (chronic obstructive pulmonary disease) ([address] Fibromyalgia, Headache, Hypertension, Manic depression ([address] Movement disorder, Neck fracture ([address] Pernicious anemia, Seizure ([address] and Thyroid disease.        has a past surgical history that includes knee surgery (Bilateral); partial hysterectomy (cervix not removed); lymph node dissection; Irrigation and debridement (Right, 5/17/2019); EXPLORATION OF WOUND OF EXTREMITY (Right, 5/19/2019); and EXPLORATION OF WOUND OF EXTREMITY (N/A, 5/22/2019). Social History     Socioeconomic History    Marital status:      Spouse name: Not on file    Number of children: Not on file    Years of education: Not on file    Highest education level: Not on file   Occupational History    Not on file   Social Needs    Financial resource strain: Not on file    Food insecurity     Worry: Not on file     Inability: Not on file    Transportation needs     Medical: Not on file     Non-medical: Not on file   Tobacco Use    Smoking status: Current Every Day Smoker     Packs/day: 0.50     Years: 12.00     Pack years: 6.00    Smokeless tobacco: Never Used   Substance and Sexual Activity    Alcohol use: No    Drug use: Not Currently     Comment: Has not use Heroin since 2/2020    Sexual activity: Not Currently   Lifestyle    Physical activity     Days per week: Not on file     Minutes per session: Not on file    Stress: Not on file   Relationships    Social connections     Talks on phone: Not on file     Gets together: Not on file     Attends [employer] service: Not on file     Active member of club or organization: Not on file     Attends meetings of clubs or organizations: Not on file     Relationship status: Not on file    Intimate partner violence     Fear of current or ex partner: Not on file     Emotionally abused: Not on file     Physically abused: Not on file     Forced sexual activity: Not on file   Other Topics Concern    Not on file   Social History Narrative    Not on file       History reviewed. No pertinent family history.     Allergies:  Imitrex [sumatriptan], Bee pollen, Bee venom, Bromide ion [bromine], Reglan [metoclopramide], Sulfa antibiotics, Sulfadiazine, and Tramadol    Home Medications:  Prior to Admission medications    Medication Sig Start Date End Date Taking? Authorizing Provider   butalbital-aspirin-caffeine [facility]) -40 MG capsule Take 1 capsule by mouth every 4 hours as needed for Headaches for up to 5 days. 5/8/21 5/13/21 Yes Owen Markham MD   ondansetron (ZOFRAN) 4 MG tablet Take 1 tablet by mouth every 8 hours as needed for Nausea or Vomiting 5/8/21  Yes Owen Markham MD   ondansetron (ZOFRAN ODT) 4 MG disintegrating tablet Take 1 tablet by mouth every 8 hours as needed for Nausea 4/28/21   Tahira Dunlap DO   Rimegepant Sulfate 75 MG TBDP Take 1 tablet by mouth every 48 hours as needed (for migraine) 4/20/21 5/20/21  KEANU Esparza CNP   ondansetron (ZOFRAN ODT) 4 MG disintegrating tablet Take 1 tablet by mouth every 8 hours as needed for Nausea 4/19/21   KEANU Esparza CNP   ASPIRIN ADULT LOW STRENGTH 81 MG EC tablet TAKE 1 TABLET BY MOUTH ONCE DAILY  4/5/21   KEANU Esparza CNP   gabapentin (NEURONTIN) 300 MG capsule Take 2 capsules by mouth 3 times daily for 30 days. 3/18/21 4/17/21  KEANU Esparza CNP   butalbital-aspirin-caffeine [facility]) -40 MG per capsule Take 1 capsule by mouth every 4 hours as needed for Headaches for up to 30 days.  3/15/21 4/14/21  KEANU Esparza CNP   cetirizine (ZYRTEC) 10 MG tablet Take 1 tablet by mouth daily 3/11/21   KEANU Esparza CNP   ibuprofen (IBU) 600 MG tablet Take 1 tablet by mouth every 6 hours as needed for Pain 3/11/21   KEANU Esparza CNP   lisinopril (PRINIVIL;ZESTRIL) 20 MG tablet TAKE 1 TABLET BY MOUTH DAILY  3/2/21 4/1/21  KEANU Esparza CNP   levothyroxine (SYNTHROID) 100 MCG tablet Take 1 tablet by mouth Daily 2/19/21   KEANU Esparza CNP   budesonide-formoterol [facility]) 160-4.5 MCG/ACT AERO Inhale 2 puffs into the lungs 2 times daily 2/11/21   KEANU Esparza CNP   ipratropium-albuterol (DUONEB) 0.5-2.5 (3) MG/3ML SOLN nebulizer solution Inhale 3 mLs into the lungs every 6 hours as needed for Shortness of Breath 2/11/21   KEANU Thomas CNP   cyclobenzaprine (FLEXERIL) 5 MG tablet TAKE 1 TABLET BY MOUTH 2 TIMES DAILY AS NEEDED FOR MUSCLE SPASMS (DON'T TAKE W/ ZANAFLEX) 1/19/21   KEANU Thomas CNP   acetaminophen (TYLENOL) 325 MG tablet Take 2 tablets by mouth every 6 hours as needed for Pain 1/13/21   Delfin Olmos DO   ammonium lactate (LAC-HYDRIN) 12 % lotion Apply topically daily. 1/6/21   Pilar Oppenheim, DPM   Ciclopirox (LOPROX) 0.77 % gel Apply to skin twice a day 1/6/21   Pilar Oppenheim, DPM   hydrOXYzine (ATARAX) 50 MG tablet  12/14/20   Historical Provider, MD   albuterol sulfate  (90 Base) MCG/ACT inhaler Inhale 1 puff into the lungs every 6 hours as needed for Wheezing [place] prescription until follow up with primary. Do not refill. Follow up with primary 12/18/20   KEANU Thomas CNP   Nebulizers (NEBULIZER COMPRESSOR) MISC Daily as needed 12/18/20   KEANU Thomas CNP   Respiratory Therapy Supplies (NEBULIZER/TUBING/MOUTHPIECE) KIT Daily as needed 12/18/20   KEANU Thomas CNP   Blood Pressure KIT Daily as needed 12/18/20   KEANU Thomas CNP   Misc.  Devices (CANE) MISC Quad base cane 11/28/20   Anatoly Dominguez MD   famotidine (PEPCID) 20 MG tablet Take 1 tablet by mouth 2 times daily as needed (for acid reflux) 10/26/20   KEANU Thomas CNP   clomiPRAMINE (ANAFRANIL) 50 MG capsule Take 1 capsule by mouth nightly 10/26/20   KEANU Thomas CNP   risperiDONE (RISPERDAL) 1 MG tablet Take 1 tablet by mouth daily and 2 tablets by mouth at bedtime 10/22/20   KEANU Thomas CNP   traZODone (DESYREL) 150 MG tablet Take 1 tablet by mouth nightly 10/22/20   Jolly Crystal, APRN - CNP   cloNIDine (CATAPRES) 0.1 MG tablet Take 1 tablet by mouth nightly 10/22/20   KEANU Thomas - CNP   Benzocaine-Menthol (CEPACOL) 6-10 MG LOZG lozenge Take 1 lozenge by mouth every 2 hours as needed for Sore Throat 10/22/20 KEANU Morejon CNP   tiZANidine (ZANAFLEX) 2 MG tablet Take 2 tablets by mouth every 8 hours as needed (muscle spasm, neck pain) 10/9/20   KEANU Morejon CNP   Elastic Bandages & Supports (CARPAL TUNNEL WRIST STABILIZER) MISC Apply to each wrist at bedtime 10/9/20   KEANU Morejon CNP   methyl salicylate-menthol (PAULETTE BRAY GREASELESS) 10-15 % CREA Apply topically 3 times daily as needed for Pain 9/21/20   Juan No MD   albuterol (PROVENTIL) (2.5 MG/3ML) 0.083% nebulizer solution Take 3 mLs by nebulization every 6 hours as needed for Wheezing 6/18/20   KEANU Morejon CNP   ipratropium-albuterol (DUONEB) 0.5-2.5 (3) MG/3ML SOLN nebulizer solution Inhale 3 mLs into the lungs every 4 hours 6/16/19   Veronica Begum MD       REVIEW OF SYSTEMS    (2-9 systems for level 4, 10 or more for level 5)      Review of Systems   Constitutional: Negative for activity change, appetite change, chills, diaphoresis and fever. Respiratory: Negative for apnea, cough, chest tightness, shortness of breath and wheezing. Cardiovascular: Negative for chest pain and palpitations. Gastrointestinal: Positive for nausea. Negative for abdominal distention, abdominal pain, constipation, diarrhea and vomiting. Genitourinary: Negative for difficulty urinating, dysuria and flank pain. Musculoskeletal: Negative for arthralgias and myalgias. Neurological: Positive for headaches. Negative for dizziness and light-headedness. Psychiatric/Behavioral: Negative for agitation and confusion. All other systems reviewed and are negative. PHYSICAL EXAM   (up to 7 for level 4, 8 or more for level 5)      INITIAL VITALS:   BP (!) 164/117   Pulse 86   Temp 97 °F (36.1 °C) (Tympanic)   Resp 18   SpO2 96%     Physical Exam  Vitals signs reviewed. Constitutional:       General: She is in acute distress. Appearance: Normal appearance. HENT:      Head: Normocephalic and atraumatic.    Neck: medication. RADIOLOGY:      EKG      All EKG's are interpreted by the Emergency Department Physician who either signs or Co-signs this chart in the absence of a cardiologist.    EMERGENCY DEPARTMENT COURSE:  Seen and evaluated. Currently in acute distress due to migraine. Migraine cocktail ordered. Patient significantly improved symptomatically with medication. Denying any complaints, states her migraine is 4/10 at this time, still has some mild nausea but improving. Patient would like to go home. Patient to discharge with outpatient follow-up. Prescription for Fiorinal and Zofran provided. PROCEDURES:      CONSULTS:  None    CRITICAL CARE:  Please see attending note    FINAL IMPRESSION      1.  Chronic migraine without aura without status migrainosus, not intractable          DISPOSITION / PLAN     DISPOSITION Decision To Discharge 05/08/2021 06:55:34 PM      PATIENT REFERRED TO:  KEANU Evangelista - CNP  [address]  [phone]    In 1 week  Hospital follow-up      DISCHARGE MEDICATIONS:  Discharge Medication List as of 5/8/2021  6:57 PM      START taking these medications    Details   ondansetron (ZOFRAN) 4 MG tablet Take 1 tablet by mouth every 8 hours as needed for Nausea or Vomiting, Disp-10 tablet, R-0Print             Owen Markham MD  Emergency Medicine Resident    (Please note that portions of thisnote were completed with a voice recognition program.  Efforts were made to edit the dictations but occasionally words are mis-transcribed.)       Jose Myers MD  Resident  05/08/21 1301

## 2021-05-08 NOTE — ED PROVIDER NOTES
[facility]     Emergency Department     Faculty Attestation    I performed a history and physical examination of the patient and discussed management with the resident. I reviewed the residents note and agree with the documented findings and plan of care. Any areas of disagreement are noted on the chart. I was personally present for the key portions of any procedures. I have documented in the chart those procedures where I was not present during the key portions. I have reviewed the emergency nurses triage note. I agree with the chief complaint, past medical history, past surgical history, allergies, medications, social, and family history as documented unless otherwise noted below.          This is a 24-year-old female who presents to the emergency department for repeat evaluation of a \"migraine\"  Patient seen 3 days ago for identical  Patient indicates that her headache actually started over a week ago but she has been out of her Fiorinal  She was seen 3 days ago and discharged  Her headache had originally improved but is starting to return  She contacted her primary care provider, who refills her medication, but their office is closed  For this reason she has presented back here  Patient does note light and sound sensitivity  No fever or shaking chills  No recent fall or head injury  No new neck pain or stiffness  No known infectious disease or known meningeal exposure  No chest pain, palpitations, or near syncope  No shortness of breath, cough, or wheezing  Notes nausea but no vomiting  No abdominal pain  No genitourinary or stool complaints  No loss of speech or vision  No drooping of the face  No unilateral or focal sensory/motor deficits  Review of systems otherwise negative  This not the worst headache of her life  Patient reports multiple prior headaches identical in character, location, and severity    On examination she appears no acute distress  Alert and oriented  Cranial nerves intact  No sensory or motor deficits  Normal coordination and muscle strength  Heart is regular in rate and rhythm   Lungs are clear to auscultation  Abdomen soft and nontender  Normal bowel sounds    Recent ER visit reviewed  Blood work, LP, or CT head/MRI not clinically indicated    Headache improving  Remains neuro intact  Stable for outpatient follow up          Kranthi Conde DO  Attending Emergency Physician       Surya Nath DO  05/08/21 0021

## 2021-05-10 ENCOUNTER — CARE COORDINATION (OUTPATIENT)
Dept: CARE COORDINATION | Age: 56
End: 2021-05-10

## 2021-05-10 DIAGNOSIS — G43.709 CHRONIC MIGRAINE WITHOUT AURA WITHOUT STATUS MIGRAINOSUS, NOT INTRACTABLE: ICD-10-CM

## 2021-05-10 RX ORDER — BUTALBITAL, ASPIRIN, AND CAFFEINE 50; 325; 40 MG/1; MG/1; MG/1
1 CAPSULE ORAL EVERY 4 HOURS PRN
Qty: 20 CAPSULE | Refills: 2 | OUTPATIENT
Start: 2021-05-10 | End: 2021-06-09

## 2021-05-10 RX ORDER — BUTALBITAL, ASPIRIN, AND CAFFEINE 325; 50; 40 MG/1; MG/1; MG/1
1 CAPSULE ORAL EVERY 4 HOURS PRN
Qty: 10 CAPSULE | Refills: 0 | OUTPATIENT
Start: 2021-05-10 | End: 2021-05-15

## 2021-05-10 NOTE — CARE COORDINATION
Patient contacted regarding Bassem Tolbertshanika. Discussed COVID-19 related testing which was not done at this time. Test results were not done. Patient informed of results, if available? na    Ambulatory Care Manager contacted the patient by telephone to perform post discharge assessment. Call within 2 business days of discharge: Yes. Verified name and  with patient as identifiers. Provided introduction to self, and explanation of the CTN/ACM role, and reason for call due to risk factors for infection and/or exposure to COVID-19. Symptoms reviewed with patient who verbalized the following symptoms: no worsening symptoms. Due to no new or worsening symptoms encounter was not routed to provider for escalation. Discussed follow-up appointments. If no appointment was previously scheduled, appointment scheduling offered: Yes Debra Whitney is upset that her PCP will not refill the Fioricet. She goes on to report she has seen a physician in [place], Dr. Farooq Arias (sp). She states she is considering to begin seeing this physician. She however would like to schedule an appointment to discuss Fioricet with her PCP. [address] follow up appointment(s):   Future Appointments   Date Time Provider Rashida Torres   2021  2:20 PM [facility], PFIZER 21 DAY SECOND DOSE [facility]   2021  1:45 PM Jeremy Conte DPM [facility]     Non-[facility] follow up appointment(s):     Non-face-to-face services provided:  Obtained and reviewed discharge summary and/or continuity of care documents     Advance Care Planning:   Does patient have an Advance Directive:  patient declined education. Patient has following risk factors of: heart failure, COPD and HTN. ACM reviewed discharge instructions, medical action plan and red flags such as increased shortness of breath, increasing fever and signs of decompensation with patient who verbalized understanding.    Discussed exposure protocols and quarantine with [facility]

## 2021-05-10 NOTE — TELEPHONE ENCOUNTER
Provider is no longer prescribing Fiorinal or Fioricet. Spoke with patient's pharmacy and medication was being filled greater than every 30 days. I was clear with patient I would only prescribe 20 capsules/month and she broke that agreement. There is a previous phone encounter to this effect.

## 2021-05-10 NOTE — TELEPHONE ENCOUNTER
Health Maintenance   Topic Date Due    HIV screen  Never done    DTaP/Tdap/Td vaccine (1 - Tdap) Never done    Cervical cancer screen  Never done    Breast cancer screen  Never done    Shingles Vaccine (1 of 2) Never done    Colon Cancer Screen FIT/FOBT  06/11/2020    TSH testing  12/16/2020    COVID-19 Vaccine (2 - Pfizer 2-dose series) 04/13/2021    Potassium monitoring  05/05/2022    Creatinine monitoring  05/05/2022    Lipid screen  02/19/2024    Flu vaccine  Completed    Pneumococcal 0-64 years Vaccine  Completed    Hepatitis C screen  Completed    Hepatitis A vaccine  Aged Out    Hepatitis B vaccine  Aged Out    Hib vaccine  Aged Out    Meningococcal (ACWY) vaccine  Aged Out             (applicable per patient's age: Cancer Screenings, Depression Screening, Fall Risk Screening, Immunizations)    Hemoglobin A1C (%)   Date Value   02/19/2019 5.2     LDL Cholesterol (mg/dL)   Date Value   02/19/2019 105     AST (U/L)   Date Value   05/05/2021 22     ALT (U/L)   Date Value   05/05/2021 16     BUN (mg/dL)   Date Value   05/05/2021 4 (L)      (goal A1C is < 7)   (goal LDL is <100) need 30-50% reduction from baseline     BP Readings from Last 3 Encounters:   05/08/21 (!) 164/117   05/05/21 (!) 185/116   04/27/21 (!) 152/106    (goal /80)      All Future Testing planned in [facility]:  Lab Frequency Next Occurrence   TSH With Reflex Ft4 Once 01/21/2021   Hemoglobin A1C Once 01/21/2021   VL LOWER EXTREMITY ARTERIAL SEGMENTAL PRESSURES W PPG Once 01/06/2022   XR FOOT LEFT (MIN 3 VIEWS) Once 01/06/2022   XR FOOT RIGHT (MIN 3 VIEWS) Once 01/06/2022   Drug Screen, Pain Once 05/19/2021       Next Visit Date:  Future Appointments   Date Time Provider Rashida Torres   5/11/2021  2:20 PM M[facility] [address], PFIZER 21 DAY SECOND DOSE [facility]   5/12/2021  1:45 PM Naldo Key DPM [address] [facility] Podiatry [facility]            Patient Active Problem List:     Chest pain     Migraine variant with headache     Drug overdose, accidental or unintentional, initial encounter     Hypothyroidism     Essential hypertension     Seizure disorder ([address]     Drug overdose     History of seizure     Major depressive disorder with psychotic features ([address]     Severe major depression ([address]     Overdose of barbiturate, intentional self-harm, initial encounter ([address]     Intentional phenobarbital overdose ([address]     Severe episode of recurrent major depressive disorder, without psychotic features ([address]     Suicide attempt ([address]     Major depressive disorder, recurrent ([address]     Sepsis ([address]     Severe malnutrition ([address]     Altered mental status     Hypokalemia     Congestive heart failure of unknown etiology ([address]     Lumbar radiculopathy     Chronic low back pain     Piriformis syndrome     COPD (chronic obstructive pulmonary disease) (HCC)     Major depressive disorder, single episode, unspecified     Severe depressed bipolar I disorder without psychotic features ([address]     Polysubstance abuse ([address]     Syncope and collapse

## 2021-05-14 ENCOUNTER — TELEPHONE (OUTPATIENT)
Dept: PRIMARY CARE CLINIC | Age: 56
End: 2021-05-14

## 2021-05-14 ENCOUNTER — TELEMEDICINE (OUTPATIENT)
Dept: PRIMARY CARE CLINIC | Age: 56
End: 2021-05-14
Payer: MEDICARE

## 2021-05-14 DIAGNOSIS — J30.2 SEASONAL ALLERGIES: ICD-10-CM

## 2021-05-14 DIAGNOSIS — F32.3 MAJOR DEPRESSIVE DISORDER WITH PSYCHOTIC FEATURES (HCC): ICD-10-CM

## 2021-05-14 DIAGNOSIS — G43.909 MIGRAINE WITHOUT STATUS MIGRAINOSUS, NOT INTRACTABLE, UNSPECIFIED MIGRAINE TYPE: Primary | ICD-10-CM

## 2021-05-14 DIAGNOSIS — G40.909 NONINTRACTABLE EPILEPSY WITHOUT STATUS EPILEPTICUS, UNSPECIFIED EPILEPSY TYPE (HCC): ICD-10-CM

## 2021-05-14 PROCEDURE — 3017F COLORECTAL CA SCREEN DOC REV: CPT | Performed by: NURSE PRACTITIONER

## 2021-05-14 PROCEDURE — G8427 DOCREV CUR MEDS BY ELIG CLIN: HCPCS | Performed by: NURSE PRACTITIONER

## 2021-05-14 PROCEDURE — 99214 OFFICE O/P EST MOD 30 MIN: CPT | Performed by: NURSE PRACTITIONER

## 2021-05-14 RX ORDER — BUTALBITAL, ASPIRIN, AND CAFFEINE 50; 325; 40 MG/1; MG/1; MG/1
1 CAPSULE ORAL EVERY 4 HOURS PRN
Qty: 20 CAPSULE | Refills: 0 | Status: SHIPPED | OUTPATIENT
Start: 2021-05-14 | End: 2021-09-21

## 2021-05-14 RX ORDER — FLUTICASONE PROPIONATE 50 MCG
2 SPRAY, SUSPENSION (ML) NASAL DAILY
Qty: 1 BOTTLE | Refills: 3 | Status: SHIPPED | OUTPATIENT
Start: 2021-05-14 | End: 2022-03-22 | Stop reason: SDUPTHER

## 2021-05-14 SDOH — ECONOMIC STABILITY: TRANSPORTATION INSECURITY
IN THE PAST 12 MONTHS, HAS LACK OF TRANSPORTATION KEPT YOU FROM MEETINGS, WORK, OR FROM GETTING THINGS NEEDED FOR DAILY LIVING?: NO

## 2021-05-14 SDOH — ECONOMIC STABILITY: FOOD INSECURITY: WITHIN THE PAST 12 MONTHS, THE FOOD YOU BOUGHT JUST DIDN'T LAST AND YOU DIDN'T HAVE MONEY TO GET MORE.: NEVER TRUE

## 2021-05-14 SDOH — ECONOMIC STABILITY: INCOME INSECURITY: HOW HARD IS IT FOR YOU TO PAY FOR THE VERY BASICS LIKE FOOD, HOUSING, MEDICAL CARE, AND HEATING?: NOT HARD AT ALL

## 2021-05-14 SDOH — ECONOMIC STABILITY: TRANSPORTATION INSECURITY
IN THE PAST 12 MONTHS, HAS THE LACK OF TRANSPORTATION KEPT YOU FROM MEDICAL APPOINTMENTS OR FROM GETTING MEDICATIONS?: NO

## 2021-05-14 ASSESSMENT — ENCOUNTER SYMPTOMS
SHORTNESS OF BREATH: 0
VOMITING: 0
NAUSEA: 1
COUGH: 0
SINUS PAIN: 0
ABDOMINAL PAIN: 0
BLURRED VISION: 0
EYE REDNESS: 0
PHOTOPHOBIA: 1
FACIAL SWEATING: 0
DIARRHEA: 0
SORE THROAT: 0
TROUBLE SWALLOWING: 0

## 2021-05-14 NOTE — TELEPHONE ENCOUNTER
Faxed over letter of appeal by provider and most recent office notes today.  For medication [email or web address]

## 2021-05-14 NOTE — PROGRESS NOTES
problem has been gradually worsening. The pain is located in the bilateral region. The pain quality is similar to prior headaches. The quality of the pain is described as throbbing and pulsating. Associated symptoms include dizziness, nausea, phonophobia, photophobia and seizures. Pertinent negatives include no abdominal pain, blurred vision, coughing, ear pain, eye redness, facial sweating, fever, loss of balance, sore throat or vomiting. The symptoms are aggravated by unknown. She has tried antidepressants, beta blockers, darkened room, Excedrin, triptans and acetaminophen for the symptoms. The treatment provided mild relief. Her past medical history is significant for hypertension, migraine headaches and obesity. There is no history of cluster headaches, recent head traumas or sinus disease. Review of Systems   Constitutional: Negative for chills and fever. HENT: Negative for congestion, ear pain, sinus pain, sore throat and trouble swallowing. Eyes: Positive for photophobia. Negative for blurred vision and redness. Respiratory: Negative for cough and shortness of breath. Cardiovascular: Negative for chest pain and palpitations. Gastrointestinal: Positive for nausea. Negative for abdominal pain, diarrhea and vomiting. Skin: Negative for rash. Neurological: Positive for dizziness, seizures and headaches. Negative for tremors, syncope and loss of balance. All other systems reviewed and are negative. Prior to Visit Medications    Medication Sig Taking? Authorizing Provider   butalbital-aspirin-caffeine [facility]) -40 MG per capsule Take 1 capsule by mouth every 4 hours as needed for Headaches for up to 30 days.  Yes KEANU Albright CNP   fluticasone (FLONASE) 50 MCG/ACT nasal spray 2 sprays by Each Nostril route daily Yes KEANU Albright CNP   ondansetron (ZOFRAN) 4 MG tablet Take 1 tablet by mouth every 8 hours as needed for Nausea or Vomiting Yes Lauren Arguello MD ondansetron (ZOFRAN ODT) 4 MG disintegrating tablet Take 1 tablet by mouth every 8 hours as needed for Nausea Yes Gerry Dunlap,    Rimegepant Sulfate 75 MG TBDP Take 1 tablet by mouth every 48 hours as needed (for migraine) Yes KEANU Mandel CNP   ondansetron (ZOFRAN ODT) 4 MG disintegrating tablet Take 1 tablet by mouth every 8 hours as needed for Nausea Yes KEANU Mandel CNP   ASPIRIN ADULT LOW STRENGTH 81 MG EC tablet TAKE 1 TABLET BY MOUTH ONCE DAILY  Yes KEANU Mandel CNP   gabapentin (NEURONTIN) 300 MG capsule Take 2 capsules by mouth 3 times daily for 30 days. Yes KEANU Mandel CNP   cetirizine (ZYRTEC) 10 MG tablet Take 1 tablet by mouth daily Yes KEANU Mandel CNP   ibuprofen (IBU) 600 MG tablet Take 1 tablet by mouth every 6 hours as needed for Pain Yes KEANU Mandel CNP   lisinopril (PRINIVIL;ZESTRIL) 20 MG tablet TAKE 1 TABLET BY MOUTH DAILY  Yes KEANU Mandel CNP   levothyroxine (SYNTHROID) 100 MCG tablet Take 1 tablet by mouth Daily Yes KEANU Mandel CNP   budesonide-formoterol (SYMBICORT) 160-4.5 MCG/ACT AERO Inhale 2 puffs into the lungs 2 times daily Yes KEANU Mandel CNP   ipratropium-albuterol (DUONEB) 0.5-2.5 (3) MG/3ML SOLN nebulizer solution Inhale 3 mLs into the lungs every 6 hours as needed for Shortness of Breath Yes KEANU Mandel CNP   cyclobenzaprine (FLEXERIL) 5 MG tablet TAKE 1 TABLET BY MOUTH 2 TIMES DAILY AS NEEDED FOR MUSCLE SPASMS (DON'T TAKE W/ ZANAFLEX) Yes KEANU Mandel CNP   acetaminophen (TYLENOL) 325 MG tablet Take 2 tablets by mouth every 6 hours as needed for Pain Yes Rajesh Nguyen DO   ammonium lactate (LAC-HYDRIN) 12 % lotion Apply topically daily.  Yes Susie Quiles DPM   Ciclopirox (LOPROX) 0.77 % gel Apply to skin twice a day Yes Susie Quiles DPM   hydrOXYzine (ATARAX) 50 MG tablet  Yes Historical Provider, MD   albuterol sulfate  (90 Base) MCG/ACT inhaler Inhale 1 puff into the lungs every 6 hours as needed for Wheezing Gap prescription until follow up with primary. Do not refill. Follow up with primary Yes KEANU Albright CNP   Nebulizers (NEBULIZER COMPRESSOR) MISC Daily as needed Yes KEANU Albright CNP   Respiratory Therapy Supplies (NEBULIZER/TUBING/MOUTHPIECE) KIT Daily as needed Yes KEANU Albright CNP   Blood Pressure KIT Daily as needed Yes KEANU Albrihgt CNP   Misc.  Devices (CANE) MISC Quad base cane Yes Valerie Lin MD   famotidine (PEPCID) 20 MG tablet Take 1 tablet by mouth 2 times daily as needed (for acid reflux) Yes KEANU Albright CNP   clomiPRAMINE (ANAFRANIL) 50 MG capsule Take 1 capsule by mouth nightly Yes KEANU Albright CNP   risperiDONE (RISPERDAL) 1 MG tablet Take 1 tablet by mouth daily and 2 tablets by mouth at bedtime Yes KEANU Albright CNP   traZODone (DESYREL) 150 MG tablet Take 1 tablet by mouth nightly Yes KEANU Albright CNP   cloNIDine (CATAPRES) 0.1 MG tablet Take 1 tablet by mouth nightly Yes KEANU Albright CNP   Benzocaine-Menthol (CEPACOL) 6-10 MG LOZG lozenge Take 1 lozenge by mouth every 2 hours as needed for Sore Throat Yes KEANU Albright CNP   tiZANidine (ZANAFLEX) 2 MG tablet Take 2 tablets by mouth every 8 hours as needed (muscle spasm, neck pain) Yes KEANU Albright CNP   Elastic Bandages & Supports (CARPAL TUNNEL WRIST STABILIZER) MISC Apply to each wrist at bedtime Yes KEANU Albright CNP   methyl salicylate-menthol (PAULETTE BRAY GREASELESS) 10-15 % CREA Apply topically 3 times daily as needed for Pain Yes Rhonda Rivera MD   albuterol (PROVENTIL) (2.5 MG/3ML) 0.083% nebulizer solution Take 3 mLs by nebulization every 6 hours as needed for Wheezing Yes KEANU Albright CNP   ipratropium-albuterol (DUONEB) 0.5-2.5 (3) MG/3ML SOLN nebulizer solution Inhale 3 mLs into the lungs every 4 hours Yes Winston Van Coty Merritt MD       Social History     Tobacco Use    Smoking status: Current Every Day Smoker     Packs/day: 0.50     Years: 12.00     Pack years: 6.00    Smokeless tobacco: Never Used   Substance Use Topics    Alcohol use: No    Drug use: Not Currently     Comment: Has not use Heroin since 2/2020        Past Medical History:   Diagnosis Date    Arthritis     Asthma     Attention deficit hyperactivity disorder (ADHD), combined type     Borderline diabetes     Borderline personality disorder ([address]     CH[facility]) [facility])     COPD (chronic obstructive pulmonary disease) (HCC)     Fibromyalgia     Headache     Hypertension     Manic depression ([address]     Movement disorder     Neck fracture (HCC)     Pernicious anemia     Seizure (HCC)     Thyroid disease         Allergies   Allergen Reactions    Imitrex [Sumatriptan] Hives    Bee Pollen     Bee Venom     Bromide Ion [Bromine]     Reglan [Metoclopramide]     Sulfa Antibiotics     Sulfadiazine     Tramadol Hives   ,   Past Medical History:   Diagnosis Date    Arthritis     Asthma     Attention deficit hyperactivity disorder (ADHD), combined type     Borderline diabetes     Borderline personality disorder ([address]     CHF (congestive heart failure) (HCC)     COPD (chronic obstructive pulmonary disease) (HCC)     Fibromyalgia     Headache     Hypertension     Manic depression ([address]     Movement disorder     Neck fracture ([address]     Pernicious anemia     Seizure ([address]     Thyroid disease    ,   Past Surgical History:   Procedure Laterality Date    EXPLORATION OF WOUND OF EXTREMITY Right 5/19/2019    RIGHT THIGH WOUND WASHOUT WITH [facility] WEST-ER PLACEMENT performed by Dali Chen MD at [facility] N/A 5/22/2019    RIGHT WOUND HIP EXAMINATION LAVAGE AND WOUND VAC PLACEMENT performed by Jesenia Kellogg MD at [address] [place] [address] Right 5/17/2019    IRRIGATION, DEBRIDEMENT RIGHT THIGH performed by Naye Win MD at [place] [id number] Bilateral     LYMPH NODE DISSECTION      cervical    PARTIAL HYSTERECTOMY     ,   Social History     Tobacco Use    Smoking status: Current Every Day Smoker     Packs/day: 0.50     Years: 12.00     Pack years: 6.00    Smokeless tobacco: Never Used   Substance Use Topics    Alcohol use: No    Drug use: Not Currently     Comment: Has not use Heroin since 2/2020   , No family history on file. CBC:  Lab Results   Component Value Date    WBC 8.8 05/05/2021    HGB 14.0 05/05/2021    PLT See Reflexed IPF Result 05/05/2021       BMP:    Lab Results   Component Value Date     05/05/2021    K 3.4 05/05/2021     05/05/2021    CO2 25 05/05/2021    BUN 4 05/05/2021    CREATININE 0.75 05/05/2021    GLUCOSE 95 05/05/2021       HEMOGLOBIN A1C:   Lab Results   Component Value Date    LABA1C 5.2 02/19/2019       FASTING LIPID PANEL:  Lab Results   Component Value Date    CHOL 191 02/19/2019    HDL 56 02/19/2019    TRIG 150 (H) 02/19/2019         RECORD REVIEW: Previous medical records were reviewed at today's visit. PHYSICAL EXAMINATION:    Vital Signs: (As obtained by patient/caregiver at home)    No flowsheet data found. Physical Exam  Constitutional:       General: She is not in acute distress. Appearance: Normal appearance. She is not ill-appearing or toxic-appearing. HENT:      Head: Normocephalic. Right Ear: External ear normal.      Left Ear: External ear normal.      Nose: Nose normal.      Mouth/Throat:      Mouth: Mucous membranes are moist.   Eyes:      General: No scleral icterus. Right eye: No discharge. Left eye: No discharge. Conjunctiva/sclera: Conjunctivae normal.   Neck:      Musculoskeletal: Normal range of motion. Pulmonary:      Effort: Pulmonary effort is normal.   Skin:     Coloration: Skin is not jaundiced.    Neurological:      Mental Status: She is alert and oriented to

## 2021-05-14 NOTE — LETTER
[facility]  [address] [address]  Phone: [phone]  Fax: [phone] [facility], APRN - FELIX    Wang Solano  1965        May 14, 2021      To whom it may concern,    Wang Solano is under my care and treated for diagnosis of chronic migraine without status migrainous. At this time I believe she would benefit from treatment with Nurtec 75 mg. She has recently failed a minimum of 2-week treatment with Fiorinal every 4 hours as needed, Fioricet every 4 hours as needed. Former abortive treatments also include sumatriptan and rizatriptan. Former prophylactic treatments include Inderal, topiramate, and amitriptyline. She continues to experience headache days 4 to 7 days out of the week, including 2 recent trips to the emergency room in the past 10 days due to pain.     Sincerely,            KEANU Mirza CNP

## 2021-05-15 ENCOUNTER — APPOINTMENT (OUTPATIENT)
Dept: GENERAL RADIOLOGY | Age: 56
End: 2021-05-15
Payer: MEDICARE

## 2021-05-15 ENCOUNTER — HOSPITAL ENCOUNTER (EMERGENCY)
Age: 56
Discharge: HOME | End: 2021-05-15
Attending: EMERGENCY MEDICINE
Payer: MEDICARE

## 2021-05-15 VITALS
SYSTOLIC BLOOD PRESSURE: 164 MMHG | RESPIRATION RATE: 18 BRPM | WEIGHT: 165 LBS | DIASTOLIC BLOOD PRESSURE: 84 MMHG | HEIGHT: 63 IN | BODY MASS INDEX: 29.23 KG/M2 | HEART RATE: 82 BPM | TEMPERATURE: 97.5 F | OXYGEN SATURATION: 95 %

## 2021-05-15 DIAGNOSIS — S82.891A CLOSED FRACTURE OF RIGHT ANKLE, INITIAL ENCOUNTER: Primary | ICD-10-CM

## 2021-05-15 PROCEDURE — 73502 X-RAY EXAM HIP UNI 2-3 VIEWS: CPT

## 2021-05-15 PROCEDURE — 96375 TX/PRO/DX INJ NEW DRUG ADDON: CPT

## 2021-05-15 PROCEDURE — 99284 EMERGENCY DEPT VISIT MOD MDM: CPT

## 2021-05-15 PROCEDURE — 96374 THER/PROPH/DIAG INJ IV PUSH: CPT

## 2021-05-15 PROCEDURE — 29515 APPLICATION SHORT LEG SPLINT: CPT

## 2021-05-15 PROCEDURE — 73610 X-RAY EXAM OF ANKLE: CPT

## 2021-05-15 PROCEDURE — 6360000002 HC RX W HCPCS: Performed by: STUDENT IN AN ORGANIZED HEALTH CARE EDUCATION/TRAINING PROGRAM

## 2021-05-15 RX ORDER — MORPHINE SULFATE 4 MG/ML
4 INJECTION, SOLUTION INTRAMUSCULAR; INTRAVENOUS ONCE
Status: COMPLETED | OUTPATIENT
Start: 2021-05-15 | End: 2021-05-15

## 2021-05-15 RX ORDER — ACETAMINOPHEN 500 MG
1000 TABLET ORAL 4 TIMES DAILY PRN
Qty: 30 TABLET | Refills: 1 | Status: SHIPPED | OUTPATIENT
Start: 2021-05-15 | End: 2021-08-04

## 2021-05-15 RX ORDER — NAPROXEN 500 MG/1
500 TABLET ORAL 2 TIMES DAILY WITH MEALS
Qty: 30 TABLET | Refills: 1 | Status: SHIPPED | OUTPATIENT
Start: 2021-05-15 | End: 2021-05-16 | Stop reason: SDUPTHER

## 2021-05-15 RX ORDER — FENTANYL CITRATE 50 UG/ML
50 INJECTION, SOLUTION INTRAMUSCULAR; INTRAVENOUS ONCE
Status: COMPLETED | OUTPATIENT
Start: 2021-05-15 | End: 2021-05-15

## 2021-05-15 RX ORDER — ONDANSETRON 2 MG/ML
4 INJECTION INTRAMUSCULAR; INTRAVENOUS ONCE
Status: COMPLETED | OUTPATIENT
Start: 2021-05-15 | End: 2021-05-15

## 2021-05-15 RX ADMIN — ONDANSETRON 4 MG: 2 INJECTION INTRAMUSCULAR; INTRAVENOUS at 15:35

## 2021-05-15 RX ADMIN — MORPHINE SULFATE 4 MG: 4 INJECTION INTRAVENOUS at 15:27

## 2021-05-15 RX ADMIN — FENTANYL CITRATE 50 MCG: 50 INJECTION, SOLUTION INTRAMUSCULAR; INTRAVENOUS at 14:35

## 2021-05-15 ASSESSMENT — ENCOUNTER SYMPTOMS
SHORTNESS OF BREATH: 0
SORE THROAT: 0
NAUSEA: 0
PHOTOPHOBIA: 0
VOMITING: 0
ABDOMINAL PAIN: 0
TROUBLE SWALLOWING: 0

## 2021-05-15 ASSESSMENT — PAIN SCALES - GENERAL
PAINLEVEL_OUTOF10: 8
PAINLEVEL_OUTOF10: 9

## 2021-05-15 NOTE — ED PROVIDER NOTES
[facility] ED  Emergency Department Encounter  EmergencyMedicine Resident     Pt Name:Aleta Rascon  MRN: [id number]  [place] 1965  Date of evaluation: 5/15/21  PCP:  KEANU Lua CNP    CHIEF COMPLAINT       Chief Complaint   Patient presents with    Ankle Pain     right ankle pain.  Hip Pain     right hip pain       HISTORY OF PRESENT ILLNESS  (Location/Symptom, Timing/Onset, Context/Setting, Quality, Duration, Modifying Factors, Severity.)      Cindy Beltran is a 60 y.o. female who presents with a right ankle pain and right hip pain. Patient states that she was going down steps at her home and missed the last step and fell onto her bottom. Denies hitting her head losing consciousness or having any prodromal symptoms patient has had fractures of her right ankle before. She is having difficulty ambulating or bearing weight. She was evaluated by fire rescue who splinted the area. No bleeding. PAST MEDICAL / SURGICAL / SOCIAL / FAMILY HISTORY      has a past medical history of Arthritis, Asthma, Attention deficit hyperactivity disorder (ADHD), combined type, Borderline diabetes, Borderline personality disorder ([address] CHF (congestive heart failure) ([address] COPD (chronic obstructive pulmonary disease) ([address] Fibromyalgia, Headache, Hypertension, Manic depression ([address] Movement disorder, Neck fracture ([address] Pernicious anemia, Seizure ([address] and Thyroid disease. has a past surgical history that includes knee surgery (Bilateral); partial hysterectomy (cervix not removed); lymph node dissection; Irrigation and debridement (Right, 5/17/2019); EXPLORATION OF WOUND OF EXTREMITY (Right, 5/19/2019); and EXPLORATION OF WOUND OF EXTREMITY (N/A, 5/22/2019).       Social History     Socioeconomic History    Marital status:      Spouse name: Not on file    Number of children: Not on file    Years of education: Not on file    Highest education level: Not on file Occupational History    Not on file   Tobacco Use    Smoking status: Current Every Day Smoker     Packs/day: 0.50     Years: 12.00     Pack years: 6.00    Smokeless tobacco: Never Used   Vaping Use    Vaping Use: Never used   Substance and Sexual Activity    Alcohol use: No    Drug use: Not Currently     Comment: Has not use Heroin since 2/2020    Sexual activity: Not Currently   Other Topics Concern    Not on file   Social History Narrative    Not on file     Social Determinants of Health     Financial Resource Strain: Low Risk     Difficulty of Paying Living Expenses: Not hard at all   Food Insecurity: No Food Insecurity    Worried About Running Out of Food in the Last Year: Never true    Michael of Food in the Last Year: Never true   Transportation Needs: No Transportation Needs    Lack of Transportation (Medical): No    Lack of Transportation (Non-Medical): No   Physical Activity:     Days of Exercise per Week:     Minutes of Exercise per Session:    Stress:     Feeling of Stress :    Social Connections:     Frequency of Communication with Friends and Family:     Frequency of Social Gatherings with Friends and Family:     Attends Congregational Services:     Active Member of Clubs or Organizations:     Attends Club or Organization Meetings:     Marital Status:    Intimate Partner Violence:     Fear of Current or Ex-Partner:     Emotionally Abused:     Physically Abused:     Sexually Abused:        No family history on file. Allergies:  Imitrex [sumatriptan], Bee pollen, Bee venom, Bromide ion [bromine], Reglan [metoclopramide], Sulfa antibiotics, Sulfadiazine, and Tramadol    Home Medications:  Prior to Admission medications    Medication Sig Start Date End Date Taking? Authorizing Provider   butalbital-aspirin-caffeine [facility]) -40 MG per capsule Take 1 capsule by mouth every 4 hours as needed for Headaches for up to 30 days.  5/14/21 6/13/21  KEANU Cavazos - CNP fluticasone (FLONASE) 50 MCG/ACT nasal spray 2 sprays by Each Nostril route daily 5/14/21   KEANU Massey CNP   ondansetron (ZOFRAN) 4 MG tablet Take 1 tablet by mouth every 8 hours as needed for Nausea or Vomiting 5/8/21   Owen Markham MD   ondansetron (ZOFRAN ODT) 4 MG disintegrating tablet Take 1 tablet by mouth every 8 hours as needed for Nausea 4/28/21   Jason Dunlap DO   Rimegepant Sulfate 75 MG TBDP Take 1 tablet by mouth every 48 hours as needed (for migraine) 4/20/21 5/20/21  KEANU Massey CNP   ondansetron (ZOFRAN ODT) 4 MG disintegrating tablet Take 1 tablet by mouth every 8 hours as needed for Nausea 4/19/21   KEANU Massey CNP   ASPIRIN ADULT LOW STRENGTH 81 MG EC tablet TAKE 1 TABLET BY MOUTH ONCE DAILY  4/5/21   KEANU Massey CNP   gabapentin (NEURONTIN) 300 MG capsule Take 2 capsules by mouth 3 times daily for 30 days.  3/18/21 5/14/21  KEANU Massey CNP   cetirizine (ZYRTEC) 10 MG tablet Take 1 tablet by mouth daily 3/11/21   KEANU Massey CNP   ibuprofen (IBU) 600 MG tablet Take 1 tablet by mouth every 6 hours as needed for Pain 3/11/21   KEANU Massey CNP   lisinopril (PRINIVIL;ZESTRIL) 20 MG tablet TAKE 1 TABLET BY MOUTH DAILY  3/2/21 5/14/21  KEANU Massey CNP   levothyroxine (SYNTHROID) 100 MCG tablet Take 1 tablet by mouth Daily 2/19/21   KEANU Massey CNP   budesonide-formoterol [facility]) 160-4.5 MCG/ACT AERO Inhale 2 puffs into the lungs 2 times daily 2/11/21   KEANU Massey CNP   ipratropium-albuterol (DUONEB) 0.5-2.5 (3) MG/3ML SOLN nebulizer solution Inhale 3 mLs into the lungs every 6 hours as needed for Shortness of Breath 2/11/21   KEANU Massey CNP   cyclobenzaprine (FLEXERIL) 5 MG tablet TAKE 1 TABLET BY MOUTH 2 TIMES DAILY AS NEEDED FOR MUSCLE SPASMS (DON'T TAKE W/ ZANAFLEX) 1/19/21   KEANU Massey CNP   acetaminophen (TYLENOL) 325 MG tablet Take 2 tablets by mouth every 6 hours as needed for Pain 1/13/21   Ny Ryder,    ammonium lactate (LAC-HYDRIN) 12 % lotion Apply topically daily. 1/6/21   Jake Ferguson DPM   Ciclopirox (LOPROX) 0.77 % gel Apply to skin twice a day 1/6/21   Jake Ferguson DPM   hydrOXYzine (ATARAX) 50 MG tablet  12/14/20   Historical Provider, MD   albuterol sulfate  (90 Base) MCG/ACT inhaler Inhale 1 puff into the lungs every 6 hours as needed for Wheezing Gap prescription until follow up with primary. Do not refill. Follow up with primary 12/18/20   Anel Caballero, KEANU Hernandez CNP   Nebulizers (NEBULIZER COMPRESSOR) MISC Daily as needed 12/18/20   Anel Caballero, KEANU Hernandez CNP   Respiratory Therapy Supplies (NEBULIZER/TUBING/MOUTHPIECE) KIT Daily as needed 12/18/20   Anel Caballero, KEANU Hernandez CNP   Blood Pressure KIT Daily as needed 12/18/20   Anel Caballero, KEANU - CNP   Misc.  Devices (CANE) MISC Quad base cane 11/28/20   Lux Rose MD   famotidine (PEPCID) 20 MG tablet Take 1 tablet by mouth 2 times daily as needed (for acid reflux) 10/26/20   Anel Caballero, KEANU - CNP   clomiPRAMINE (ANAFRANIL) 50 MG capsule Take 1 capsule by mouth nightly 10/26/20   Vista Fee, KEANU - CNP   risperiDONE (RISPERDAL) 1 MG tablet Take 1 tablet by mouth daily and 2 tablets by mouth at bedtime 10/22/20   Olegariota Fee, APRN - CNP   traZODone (DESYREL) 150 MG tablet Take 1 tablet by mouth nightly 10/22/20   Vista Fee, APRN - CNP   cloNIDine (CATAPRES) 0.1 MG tablet Take 1 tablet by mouth nightly 10/22/20   Vista Fee, APRN - CNP   Benzocaine-Menthol (CEPACOL) 6-10 MG LOZG lozenge Take 1 lozenge by mouth every 2 hours as needed for Sore Throat 10/22/20   Olegariota Fee, KEANU - CNP   tiZANidine (ZANAFLEX) 2 MG tablet Take 2 tablets by mouth every 8 hours as needed (muscle spasm, neck pain) 10/9/20   Vista Fee, APRN - CNP   Elastic Bandages & Supports (CARPAL TUNNEL WRIST STABILIZER) MISC Apply to each wrist at bedtime Effort: Pulmonary effort is normal.   Abdominal:      Palpations: Abdomen is soft. Tenderness: There is no abdominal tenderness. Musculoskeletal:      Cervical back: Normal range of motion. Comments: Tenderness to palpation of the right lateral malleolus  Pulse motor and sensory function are intact distally   Skin:     General: Skin is warm. Capillary Refill: Capillary refill takes less than 2 seconds. Neurological:      Mental Status: She is alert and oriented to person, place, and time. Psychiatric:         Mood and Affect: Mood normal.         DIFFERENTIAL  DIAGNOSIS     PLAN (LABS / IMAGING / EKG):  Orders Placed This Encounter   Procedures    XR ANKLE RIGHT (MIN 3 VIEWS)    XR HIP 2-3 VW W PELVIS RIGHT    Ice to affected area   [address]; Pair, Right Side Injury; Med (5'2\"-5'10\")       MEDICATIONS ORDERED:  Orders Placed This Encounter   Medications    fentaNYL (SUBLIMAZE) injection 50 mcg    morphine injection 4 mg    ondansetron (ZOFRAN) injection 4 mg    naproxen (NAPROSYN) 500 MG tablet     Sig: Take 1 tablet by mouth 2 times daily (with meals)     Dispense:  30 tablet     Refill:  1    acetaminophen (TYLENOL) 500 MG tablet     Sig: Take 2 tablets by mouth 4 times daily as needed for Pain     Dispense:  30 tablet     Refill:  1       DDX: fracture, dislocation    DIAGNOSTIC RESULTS / EMERGENCY DEPARTMENT COURSE / MDM   LAB RESULTS:  No results found for this visit on 05/15/21. IMPRESSION: Patient is alert oriented 61-year-old female status post mechanical fall down 1 step with twisting of her right lower extremity about the ankle causing patient to fall onto her buttocks. No head injury no loss of consciousness patient arrives with her right lower extremity in a splint provided by first responders.   Concern for fracture versus dislocation plan will be x-ray imaging of the right lower extremity x-ray imaging of the right hip nausea as Procedure Note    Indication: fracture    Consent: The patient provided verbal consent for this procedure. Procedure: The pre-reduction exam showed distal perfusion & neurologic function to be normal. The patient was placed in the appropriate position. Anesthesia/pain control  Was provided via morphine and fentanyl iv. The affected area was immobilized with a posterior ankle splint and crutches were provided for ambulatory support. A post-splint exam revealed distal perfusion & neurologic function to be normal.     The patient tolerated the procedure well. Complications: None    CONSULTS:  None    CRITICAL CARE:  Please see attending note    FINAL IMPRESSION      1.  Closed fracture of right ankle, initial encounter          DISPOSITION / PLAN     DISPOSITION  [facility] with outpatient followup      PATIENT REFERRED TO:  Carol Merino, APRN - CNP  [address]  [phone]    Call today  for followup as needed     [facility] [address] [facility] [address] [phone]  Schedule an appointment as soon as possible for a visit   for followup in 1-2 days    [facility]  [address] [facility] [address]  [phone]  Go to   As needed, If symptoms worsen      DISCHARGE MEDICATIONS:  Discharge Medication List as of 5/15/2021  3:58 PM      START taking these medications    Details   naproxen (NAPROSYN) 500 MG tablet Take 1 tablet by mouth 2 times daily (with meals), Disp-30 tablet, R-[id number]             Villa Jain DO  Emergency Medicine Resident    (Please note that portions of thisnote were completed with a voice recognition program.  Efforts were made to edit the dictations but occasionally words are mis-transcribed.)        Villa Jain DO  Resident  05/15/21 [id number]

## 2021-05-15 NOTE — ED NOTES
Pt to ed with c/o right hip, right ankle pain. Pt states she tripped and fell while stepping off the bottom step, landing on her buttocks. Pt states she twisted her right ankle and hit her right hip on the floor. EMS placed splint, right ankle is swollen, bruising noted. Pt denies hitting head, pt is alert, oriented, speaking in full, complete sentences. Pms intact.  Pt rates pain 9/10     Srini Hernandez RN  05/15/21 [id number]

## 2021-05-15 NOTE — ED PROVIDER NOTES
[facility]     Emergency Department     Faculty Attestation    I performed a history and physical examination of the patient and discussed management with the resident. I reviewed the residents note and agree with the documented findings and plan of care. Any areas of disagreement are noted on the chart. I was personally present for the key portions of any procedures. I have documented in the chart those procedures where I was not present during the key portions. I have reviewed the emergency nurses triage note. I agree with the chief complaint, past medical history, past surgical history, allergies, medications, social and family history as documented unless otherwise noted below. For Physician Assistant/ Nurse Practitioner cases/documentation I have personally evaluated this patient and have completed at least one if not all key elements of the E/M (history, physical exam, and MDM). Additional findings are as noted. I have personally seen and evaluated the patient. I find the patient's history and physical exam are consistent with the NP/PA documentation. I agree with the care provided, treatment rendered, disposition and follow-up plan. 58-year-old female presenting with right ankle pain and hip pain after falling down one stair. States that she tripped, landed with her ankle twisting under her. Denies LOC. Not on blood thinners. Deformity noted by EMS, patient placed in temporary splint. Exam:  General: Laying on the bed, awake, alert and in no acute distress  CV: normal rate and regular rhythm  Lungs: Breathing comfortably on room air with no tachypnea, hypoxia, or increased work of breathing  MSK: Mild tenderness in the right hip without obvious deformity. No ecchymosis or skin abrasions. Severe tenderness in the right ankle behind the medial malleolus. 2+ PT and DP pulses. Warm and well-perfused.     Plan:  X-ray affected joints  Pain control    X-ray reveals nondisplaced fracture of the lateral malleolus.  Patient placed in posterior short leg splint, crutches, follow-up with orthopedics        Varinder Dominguez MD   Attending Emergency  Physician    (Please note that portions of this note were completed with a voice recognition program. Efforts were made to edit the dictations but occasionally words are mis-transcribed.)             Varinder Dominguez MD  05/15/21 5652

## 2021-05-16 ENCOUNTER — HOSPITAL ENCOUNTER (EMERGENCY)
Age: 56
Discharge: HOME | End: 2021-05-16
Attending: EMERGENCY MEDICINE
Payer: MEDICARE

## 2021-05-16 ENCOUNTER — APPOINTMENT (OUTPATIENT)
Dept: GENERAL RADIOLOGY | Age: 56
End: 2021-05-16
Payer: MEDICARE

## 2021-05-16 DIAGNOSIS — M25.572 ACUTE LEFT ANKLE PAIN: ICD-10-CM

## 2021-05-16 DIAGNOSIS — S83.91XA SPRAIN OF RIGHT KNEE, UNSPECIFIED LIGAMENT, INITIAL ENCOUNTER: ICD-10-CM
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
## 2021-05-16 DIAGNOSIS — G62.9 PERIPHERAL POLYNEUROPATHY: ICD-10-CM

## 2021-05-16 DIAGNOSIS — M79.604 PAIN OF RIGHT LOWER EXTREMITY: Primary | ICD-10-CM

## 2021-05-16 PROCEDURE — 99281 EMR DPT VST MAYX REQ PHY/QHP: CPT

## 2021-05-16 PROCEDURE — 73562 X-RAY EXAM OF KNEE 3: CPT

## 2021-05-16 PROCEDURE — 73590 X-RAY EXAM OF LOWER LEG: CPT

## 2021-05-16 RX ORDER — NAPROXEN 500 MG/1
500 TABLET ORAL 2 TIMES DAILY WITH MEALS
Qty: 30 TABLET | Refills: 1 | Status: ON HOLD | OUTPATIENT
Start: 2021-05-16 | End: 2021-08-28

## 2021-05-16 NOTE — FLOWSHEET NOTE
SPIRITUAL CARE DEPARTMENT - [address]  PROGRESS NOTE    Shift date: 05/16/21  Shift day: Sunday   Shift # 2    Room # Room/bed info not found   Name: Fatuma Augustin            Age: 60 y.o. Gender: female            Admit Date & Time: No admission date for patient encounter. PATIENT/EVENT DESCRIPTION:  Fatuma Augustin is a 60 y.o. female the patient was at [facility] ED yesterday with a broken foot. SPIRITUAL ASSESSMENT/INTERVENTION:  The patient is worried about her foot. The patient asked the  to pray for her foot.  was able to pray with her in the ED Lobby. SPIRITUAL CARE FOLLOW-UP PLAN:  Chaplains will remain available to offer spiritual and emotional support as needed. Electronically signed by Kacey Ballard, on 5/16/2021 at 2:35 PM.  [address] [employer]  [phone]       05/16/21 1435   Encounter Summary   Services provided to: Patient   Referral/Consult From: Patient   Continue Visiting   (05/16/21)   Complexity of Encounter Moderate   Length of Encounter 15 minutes   Spiritual Assessment Completed Yes   Routine   Type Initial   Assessment Calm; Approachable   Intervention Active listening   Outcome Engaged in conversation

## 2021-05-16 NOTE — ED PROVIDER NOTES
[facility]     Emergency Department     Faculty Note/ Attestation      Pt Name: Kay Sprague                                       MRN: [id number]  [place] 1965  Date of evaluation: 5/16/2021    Patients PCP:    KEANU Massey - FELIX      Attestation  I performed a history and physical examination of the patient and discussed management with the resident. I reviewed the residents note and agree with the documented findings and plan of care. Any areas of disagreement are noted on the chart. I was personally present for the key portions of any procedures. I have documented in the chart those procedures where I was not present during the key portions. I have reviewed the emergency nurses triage note. I agree with the chief complaint, past medical history, past surgical history, allergies, medications, social and family history as documented unless otherwise noted below. For Physician Assistant/ Nurse Practitioner cases/documentation I have personally evaluated this patient and have completed at least one if not all key elements of the E/M (history, physical exam, and MDM). Additional findings are as noted. Initial Screens:             Vitals: There were no vitals filed for this visit.     CHIEF COMPLAINT       Chief Complaint   Patient presents with    Foot Pain             DIAGNOSTIC RESULTS             RADIOLOGY:   XR TIBIA FIBULA RIGHT (2 VIEWS)    (Results Pending)   XR KNEE RIGHT (3 VIEWS)    (Results Pending)         LABS:  Labs Reviewed - No data to display      EMERGENCY DEPARTMENT COURSE:     -------------------------   ,  ,  ,        Comments    Lateral mal fx, seen yesterday, splinted  Here today for pain knee to foot  Splint intact, looks appropriate    Will get knee/tib/fib films  Reapply splint    Nonnarcotic pain meds and f/u ortho outpt    (Please note that portions of this note were completed with a voice recognition program.  Efforts were made to edit the dictations but occasionally words are mis-transcribed.)      Tomasa Encinas MD,, MD  Attending Emergency Physician         Tomasa Encinas MD  05/16/21 0253

## 2021-05-17 ENCOUNTER — TELEPHONE (OUTPATIENT)
Dept: PRIMARY CARE CLINIC | Age: 56
End: 2021-05-17

## 2021-05-17 ENCOUNTER — CARE COORDINATION (OUTPATIENT)
Dept: CARE COORDINATION | Age: 56
End: 2021-05-17

## 2021-05-17 ASSESSMENT — ENCOUNTER SYMPTOMS
SHORTNESS OF BREATH: 0
ABDOMINAL PAIN: 0

## 2021-05-17 NOTE — CARE COORDINATION
ACM attempted to reach patient regarding ED visit, not related to Adithyajasbir. No answer, unable to leave VM.    Aminta Jang RN, ambulatory care manager

## 2021-05-17 NOTE — TELEPHONE ENCOUNTER
Patient states that she was seen in ER on 5/16/2021 for two broken bone in left foot. Patient was prescribed naproxen for pain , not working.  Would like to have an alternative to pharmacy for pain, has upcoming appt with Sports Medicine sche 5/18/21    Health Maintenance   Topic Date Due    HIV screen  Never done    DTaP/Tdap/Td vaccine (1 - Tdap) Never done    Cervical cancer screen  Never done    Breast cancer screen  Never done    Shingles Vaccine (1 of 2) Never done    Colon Cancer Screen FIT/FOBT  06/11/2020    TSH testing  12/16/2020    COVID-19 Vaccine (2 - Pfizer 2-dose series) 04/13/2021    Potassium monitoring  05/05/2022    Creatinine monitoring  05/05/2022    Lipid screen  02/19/2024    Pneumococcal 0-64 years Vaccine (2 of 2) 02/16/2028    Flu vaccine  Completed    Hepatitis C screen  Completed    Hepatitis A vaccine  Aged Out    Hepatitis B vaccine  Aged Out    Hib vaccine  Aged Out    Meningococcal (ACWY) vaccine  Aged Out             (applicable per patient's age: Cancer Screenings, Depression Screening, Fall Risk Screening, Immunizations)    Hemoglobin A1C (%)   Date Value   02/19/2019 5.2     LDL Cholesterol (mg/dL)   Date Value   02/19/2019 105     AST (U/L)   Date Value   05/05/2021 22     ALT (U/L)   Date Value   05/05/2021 16     BUN (mg/dL)   Date Value   05/05/2021 4 (L)      (goal A1C is < 7)   (goal LDL is <100) need 30-50% reduction from baseline     BP Readings from Last 3 Encounters:   05/15/21 (!) 164/84   05/08/21 (!) 164/117   05/05/21 (!) 185/116    (goal /80)      All Future Testing planned in [facility]:  Lab Frequency Next Occurrence   TSH With Reflex Ft4 Once 01/21/2021   Hemoglobin A1C Once 01/21/2021   VL LOWER EXTREMITY ARTERIAL SEGMENTAL PRESSURES W PPG Once 01/06/2022   XR FOOT LEFT (MIN 3 VIEWS) Once 01/06/2022   XR FOOT RIGHT (MIN 3 VIEWS) Once 01/06/2022   Drug Screen, Pain Once 05/19/2021       Next Visit Date:  Future Appointments   Date Time Provider Department Center   5/18/2021  8:00 AM Marita Echols MD Sports [facility]            Patient Active Problem List:     Chest pain     Migraine variant with headache     Drug overdose, accidental or unintentional, initial encounter     Hypothyroidism     Essential hypertension     Seizure disorder [facility])     Drug overdose     History of seizure     Major depressive disorder with psychotic features ([address]     Severe major depression ([address]     Overdose of barbiturate, intentional self-harm, initial encounter ([address]     Intentional phenobarbital overdose ([address]     Severe episode of recurrent major depressive disorder, without psychotic features ([address]     Suicide attempt ([address]     Major depressive disorder, recurrent (HCC)     Sepsis ([address]     Severe malnutrition ([address]     Altered mental status     Hypokalemia     Congestive heart failure of unknown etiology ([address]     Lumbar radiculopathy     Chronic low back pain     Piriformis syndrome     COPD (chronic obstructive pulmonary disease) (HCC)     Major depressive disorder, single episode, unspecified     Severe depressed bipolar I disorder without psychotic features ([address]     Polysubstance abuse ([address]     Syncope and collapse

## 2021-05-17 NOTE — ED PROVIDER NOTES
[facility] ED  Emergency Department Encounter  Emergency Medicine Resident     Pt Name: Cindy Beltran  MRN: [id number]  [place] 1965  Date of evaluation: 5/17/21  PCP:  KEANU Lua [id number]       Chief Complaint   Patient presents with    Foot Pain       HISTORY [facility]  (Location/Symptom, Timing/Onset, Context/Setting, Quality, Duration, Modifying Amado Henderson.)      Cindy Beltran is a 60year old female who presents with right foot pain. The patient was evaluated yesterday after a fall and found to have nondisplaced fracture lateral malleolus. She was placed in short leg splint. Xray pelvis/hip negative. The patient returns today for persistent right foot pain and swelling. She reports that the splint is \"too tight\". She has been taking tylenol without relief. She is NWB. Reports that she has been elevating and icing her leg. PAST MEDICAL / SURGICAL / SOCIAL / FAMILY HISTORY      has a past medical history of Arthritis, Asthma, Attention deficit hyperactivity disorder (ADHD), combined type, Borderline diabetes, Borderline personality disorder ([address] CHF (congestive heart failure) ([address] COPD (chronic obstructive pulmonary disease) ([address] Fibromyalgia, Headache, Hypertension, Manic depression ([address] Movement disorder, Neck fracture ([address] Pernicious anemia, Seizure ([address] and Thyroid disease. has a past surgical history that includes knee surgery (Bilateral); partial hysterectomy (cervix not removed); lymph node dissection; Irrigation and debridement (Right, 5/17/2019); EXPLORATION OF WOUND OF EXTREMITY (Right, 5/19/2019); and EXPLORATION OF WOUND OF EXTREMITY (N/A, 5/22/2019).      Social History     Socioeconomic History    Marital status:      Spouse name: Not on file    Number of children: Not on file    Years of education: Not on file    Highest education level: Not on file   Occupational History    Not on file   Tobacco Use    Smoking status: Current Every Day Smoker     Packs/day: 0.50     Years: 12.00     Pack years: 6.00    Smokeless tobacco: Never Used   Vaping Use    Vaping Use: Never used   Substance and Sexual Activity    Alcohol use: No    Drug use: Not Currently     Comment: Has not use Heroin since 2/2020    Sexual activity: Not Currently   Other Topics Concern    Not on file   Social History Narrative    Not on file     Social Determinants of Health     Financial Resource Strain: Low Risk     Difficulty of Paying Living Expenses: Not hard at all   Food Insecurity: No Food Insecurity    Worried About Running Out of Food in the Last Year: Never true    Michael of Food in the Last Year: Never true   Transportation Needs: No Transportation Needs    Lack of Transportation (Medical): No    Lack of Transportation (Non-Medical): No   Physical Activity:     Days of Exercise per Week:     Minutes of Exercise per Session:    Stress:     Feeling of Stress :    Social Connections:     Frequency of Communication with Friends and Family:     Frequency of Social Gatherings with Friends and Family:     Attends Zoroastrian Services:     Active Member of Clubs or Organizations:     Attends Club or Organization Meetings:     Marital Status:    Intimate Partner Violence:     Fear of Current or Ex-Partner:     Emotionally Abused:     Physically Abused:     Sexually Abused:        No family history on file. Allergies:  Imitrex [sumatriptan], Bee pollen, Bee venom, Bromide ion [bromine], Reglan [metoclopramide], Sulfa antibiotics, Sulfadiazine, and Tramadol    Home Medications:  Prior to Admission medications    Medication Sig Start Date End Date Taking?  Authorizing Provider   naproxen (NAPROSYN) 500 MG tablet Take 1 tablet by mouth 2 times daily (with meals) 5/16/21  Yes Raeann Cunningham MD   acetaminophen (TYLENOL) 500 MG tablet Take 2 tablets by mouth 4 times daily as needed for Pain 5/15/21   Hardee Betters, DO butalbital-aspirin-caffeine (FIORINAL) -40 MG per capsule Take 1 capsule by mouth every 4 hours as needed for Headaches for up to 30 days. 5/14/21 6/13/21  KEANU Epps CNP   fluticasone [facility]) 50 MCG/ACT nasal spray 2 sprays by Each Nostril route daily 5/14/21   KEANU Epps CNP   ondansetron (ZOFRAN) 4 MG tablet Take 1 tablet by mouth every 8 hours as needed for Nausea or Vomiting 5/8/21   Owen Markham MD   ondansetron (ZOFRAN ODT) 4 MG disintegrating tablet Take 1 tablet by mouth every 8 hours as needed for Nausea 4/28/21   Martha Dunlap DO   Rimegepant Sulfate 75 MG TBDP Take 1 tablet by mouth every 48 hours as needed (for migraine) 4/20/21 5/20/21  KEANU Epps CNP   ondansetron (ZOFRAN ODT) 4 MG disintegrating tablet Take 1 tablet by mouth every 8 hours as needed for Nausea 4/19/21   KEANU Epps CNP   ASPIRIN ADULT LOW STRENGTH 81 MG EC tablet TAKE 1 TABLET BY MOUTH ONCE DAILY  4/5/21   KEANU Epps CNP   gabapentin (NEURONTIN) 300 MG capsule Take 2 capsules by mouth 3 times daily for 30 days.  3/18/21 5/14/21  KEANU Epps CNP   cetirizine (ZYRTEC) 10 MG tablet Take 1 tablet by mouth daily 3/11/21   KEANU Epps CNP   ibuprofen (IBU) 600 MG tablet Take 1 tablet by mouth every 6 hours as needed for Pain 3/11/21   KEANU Epps CNP   lisinopril (PRINIVIL;ZESTRIL) 20 MG tablet TAKE 1 TABLET BY MOUTH DAILY  3/2/21 5/14/21  KEANU Epps CNP   levothyroxine (SYNTHROID) 100 MCG tablet Take 1 tablet by mouth Daily 2/19/21   KEANU Epps CNP   budesonide-formoterol [facility]) 160-4.5 MCG/ACT AERO Inhale 2 puffs into the lungs 2 times daily 2/11/21   KEANU Epps - FELIX   ipratropium-albuterol (DUONEB) 0.5-2.5 (3) MG/3ML SOLN nebulizer solution Inhale 3 mLs into the lungs every 6 hours as needed for Shortness of Breath 2/11/21   Laurie Mensah APRN - CNP   cyclobenzaprine (FLEXERIL) 5 MG tablet TAKE 1 TABLET BY MOUTH 2 TIMES DAILY AS NEEDED FOR MUSCLE SPASMS (DON'T TAKE W/ ZANAFLEX) 1/19/21   Naila Drew, APRN - CNP   ammonium lactate (LAC-HYDRIN) 12 % lotion Apply topically daily. 1/6/21   Charlee Lolly, DPM   Ciclopirox (LOPROX) 0.77 % gel Apply to skin twice a day 1/6/21   Charlee Lolly, DPM   hydrOXYzine (ATARAX) 50 MG tablet  12/14/20   Historical Provider, MD   albuterol sulfate  (90 Base) MCG/ACT inhaler Inhale 1 puff into the lungs every 6 hours as needed for Wheezing [place] prescription until follow up with primary. Do not refill. Follow up with primary 12/18/20   Naila Russell APRN - CNP   Nebulizers (NEBULIZER COMPRESSOR) MISC Daily as needed 12/18/20   Naila Erp APRN - CNP   Respiratory Therapy Supplies (NEBULIZER/TUBING/MOUTHPIECE) KIT Daily as needed 12/18/20   Naila BROWN RussellN - CNP   Blood Pressure KIT Daily as needed 12/18/20   Naila Erp, APRN - CNP   Misc.  Devices (CANE) MISC Quad base cane 11/28/20   Venancio Lance MD   famotidine (PEPCID) 20 MG tablet Take 1 tablet by mouth 2 times daily as needed (for acid reflux) 10/26/20   Naila Drew, APRN - CNP   clomiPRAMINE (ANAFRANIL) 50 MG capsule Take 1 capsule by mouth nightly 10/26/20   Naila Drew, APRN - CNP   risperiDONE (RISPERDAL) 1 MG tablet Take 1 tablet by mouth daily and 2 tablets by mouth at bedtime 10/22/20   Naila Erp, APRN - CNP   traZODone (DESYREL) 150 MG tablet Take 1 tablet by mouth nightly 10/22/20   Naila Erp, APRN - CNP   cloNIDine (CATAPRES) 0.1 MG tablet Take 1 tablet by mouth nightly 10/22/20   Naila Erp, APRN - CNP   Benzocaine-Menthol (CEPACOL) 6-10 MG LOZG lozenge Take 1 lozenge by mouth every 2 hours as needed for Sore Throat 10/22/20   Naila Erp, APRN - CNP   tiZANidine (ZANAFLEX) 2 MG tablet Take 2 tablets by mouth every 8 hours as needed (muscle spasm, neck pain) 10/9/20   Naila Russell, APRN - CNP   Elastic Bandages & Supports (CARPAL TUNNEL WRIST STABILIZER) MISC Apply to each wrist at bedtime 10/9/20   KEANU Jackson CNP   methyl salicylate-menthol (PAULETTE BRAY GREASELESS) 10-15 % CREA Apply topically 3 times daily as needed for Pain 9/21/20   Flora Boyd MD   albuterol (PROVENTIL) (2.5 MG/3ML) 0.083% nebulizer solution Take 3 mLs by nebulization every 6 hours as needed for Wheezing 6/18/20   KEANU Jackson CNP   ipratropium-albuterol (DUONEB) 0.5-2.5 (3) MG/3ML SOLN nebulizer solution Inhale 3 mLs into the lungs every 4 hours 6/16/19   Stephanie Love MD       REVIEW OFSYSTEMS    (2-9 systems for level 4, 10 or more for level 5)      Review of Systems   Constitutional: Negative for fever. Respiratory: Negative for shortness of breath. Cardiovascular: Negative for chest pain. Gastrointestinal: Negative for abdominal pain. Musculoskeletal: Positive for arthralgias. Nonweightbearing bearing    Skin: Negative for wound. Hematological: Does not bruise/bleed easily. PHYSICAL EXAM   (up to 7 for level 4, 8 or more forlevel 5)      INITIAL VITALS:   ED Triage Vitals   BP Temp Temp src Pulse Resp SpO2 Height Weight   -- -- -- -- -- -- -- --       Physical Exam  Constitutional:       General: She is not in acute distress. Appearance: She is not diaphoretic. Cardiovascular:      Rate and Rhythm: Normal rate and regular rhythm. Pulmonary:      Effort: Pulmonary effort is normal.      Breath sounds: Normal breath sounds. Abdominal:      General: There is no distension. Palpations: Abdomen is soft. Tenderness: There is no abdominal tenderness. There is no guarding. Musculoskeletal:      Comments: RLE: posterior short leg splint in place, cap refill <2 toes, toes warm, able to wiggle all toes, no significant edema, +2 popliteal pulse   Skin:     General: Skin is warm. Neurological:      General: No focal deficit present.       Mental Status: She is alert and oriented to person, place, and time.         DIFFERENTIAL  DIAGNOSIS     PLAN (LABS / IMAGING / EKG):  Orders Placed This Encounter   Procedures    XR TIBIA FIBULA RIGHT (2 VIEWS)    XR KNEE RIGHT (3 VIEWS)       MEDICATIONS ORDERED:  Orders Placed This Encounter   Medications    naproxen (NAPROSYN) 500 MG tablet     Sig: Take 1 tablet by mouth 2 times daily (with meals)     Dispense:  30 tablet     Refill:  1           Initial MDM/Plan: 60 y.o. female who presents with persistent R leg pain. The patient had R posterior L splint in place. RLE neurovascuarly intact. No significant swelling noted. Plan for additional Xray tib/fib and knee as the patient is complaining of pain. Will reapply splint. DIAGNOSTIC RESULTS / EMERGENCYDEPARTMENT COURSE / MDM     LABS:  Labs Reviewed - No data to display      RADIOLOGY:  XR KNEE RIGHT (3 VIEWS)    Result Date: 5/16/2021  EXAMINATION: 2 XRAY VIEWS OF THE RIGHT TIBIA AND FIBULA; THREE XRAY VIEWS OF THE RIGHT KNEE 5/16/2021 3:23 pm COMPARISON: Ankle series today. HISTORY: ORDERING SYSTEM PROVIDED HISTORY: fall TECHNOLOGIST PROVIDED HISTORY: fall FINDINGS: KNEE: Tricompartmental mild degenerative change. Small knee effusion. No acute osseous abnormality identified. TIB/FIB: Overlying splint material noted. An oblique fracture of the distal fibula involving the lateral malleolus is noted. Ankle mortise alignment appears grossly maintained. 1.  Oblique fracture of the distal fibula and lateral malleolus. 2.  Degenerative change in the knee with small knee effusion. XR TIBIA FIBULA RIGHT (2 VIEWS)    Result Date: 5/16/2021  EXAMINATION: 2 XRAY VIEWS OF THE RIGHT TIBIA AND FIBULA; THREE XRAY VIEWS OF THE RIGHT KNEE 5/16/2021 3:23 pm COMPARISON: Ankle series today. HISTORY: ORDERING SYSTEM PROVIDED HISTORY: fall TECHNOLOGIST PROVIDED HISTORY: fall FINDINGS: KNEE: Tricompartmental mild degenerative change. Small knee effusion. No acute osseous abnormality identified.  TIB/FIB: Overlying splint [address] [facility] [address]  [phone]    Schedule an appointment as soon as possible for a visit       [facility] ED  [address] [address]  [phone]  Go to   If symptoms worsen      DISCHARGE MEDICATIONS:  Discharge Medication List as of 5/16/2021  4:43 PM          Darshana Ruggiero MD  Emergency Medicine Resident    (Please note that portions of this note were completed with a voice recognition program.Efforts were made to edit the dictations but occasionally words are mis-transcribed.)        Darshana Ruggiero MD  Resident  05/17/21 [id number]

## 2021-05-18 ENCOUNTER — OFFICE VISIT (OUTPATIENT)
Dept: ORTHOPEDIC SURGERY | Age: 56
End: 2021-05-18
Payer: MEDICARE

## 2021-05-18 ENCOUNTER — CARE COORDINATION (OUTPATIENT)
Dept: CARE COORDINATION | Age: 56
End: 2021-05-18

## 2021-05-18 VITALS — WEIGHT: 165 LBS | HEIGHT: 63 IN | BODY MASS INDEX: 29.23 KG/M2 | RESPIRATION RATE: 12 BRPM

## 2021-05-18 DIAGNOSIS — S82.831A CLOSED FRACTURE OF DISTAL END OF RIGHT FIBULA, UNSPECIFIED FRACTURE MORPHOLOGY, INITIAL ENCOUNTER: Primary | ICD-10-CM

## 2021-05-18 DIAGNOSIS — M25.571 RIGHT ANKLE PAIN, UNSPECIFIED CHRONICITY: Primary | ICD-10-CM

## 2021-05-18 PROCEDURE — G8417 CALC BMI ABV UP PARAM F/U: HCPCS | Performed by: ORTHOPAEDIC SURGERY

## 2021-05-18 PROCEDURE — 99204 OFFICE O/P NEW MOD 45 MIN: CPT | Performed by: ORTHOPAEDIC SURGERY

## 2021-05-18 PROCEDURE — 3017F COLORECTAL CA SCREEN DOC REV: CPT | Performed by: ORTHOPAEDIC SURGERY

## 2021-05-18 PROCEDURE — G8427 DOCREV CUR MEDS BY ELIG CLIN: HCPCS | Performed by: ORTHOPAEDIC SURGERY

## 2021-05-18 PROCEDURE — 4004F PT TOBACCO SCREEN RCVD TLK: CPT | Performed by: ORTHOPAEDIC SURGERY

## 2021-05-18 NOTE — LETTER
Dr. Li [address]  [phone]        5/18/2021     Patient: Kiara Hammond  YOB: 1965    Dear KEANU Albright CNP,    I had the pleasure of seeing one of your patients, Kiara Hammond recently in the office. Below are the relevant portions of my assessment and plan of care. ASSESSMENT AND PLAN:  She has a right De La Cruz B distal fibula fracture, sustained on 5/15/2021. The gravity stress x-ray view obtained in the office on 5/18/2021 does show mild widening medially, however the ankle is in equinus, and thus no definitive determination can be made regarding the presence of a bimalleolar equivalent fracture at this time. Notably, she has a somewhat complex past medical history. She reports a history of COPD/asthma, fibromyalgia, rheumatoid arthritis (she denies taking any DMARDs or medications for her rheumatoid arthritis); per EMR she has a history of seizure disorder, history of altered mental status, history of malnutrition, history of congestive heart failure, history of severe bipolar type I disorder with history of suicide attempt, history of drug overdose, history of polysubstance abuse, and history of tobacco use (she reports that she smokes 1/2 to 1 pack of cigarettes per day). We had a discussion today about the likely diagnosis and its natural history, physical exam and imaging findings, as well as various treatment options in detail. Surgically, we discussed a possible right ankle ORIF. The patient did state that she \"cannot take pain\", and we did discuss that with surgery there would be pain postoperatively, and at this time she does wish to attempt to treat her fracture without surgery if possible, I do believe that this is reasonable. We discussed both surgical and nonsurgical treatments, including risks and benefits.  As a result of our discussion, the my mobile number [phone].         Barrington Figueroa MD  Orthopedic Surgery

## 2021-05-18 NOTE — PROGRESS NOTES
[facility]  [facility] Adithya Lee [address] [place] [address] Drive [address]  Dept: [phone]    Ambulatory Orthopedic Consult      CHIEF COMPLAINT:    Chief Complaint   Patient presents with    Ankle Pain     Right       HISTORY OF PRESENT ILLNESS:      The patient is a 60 y.o. female who is being seen for evaluation of the above, which began 5/15/21 secondary to a fall down the stairs  . At today's visit, she is using a splint/cast.     History is obtained today from:   [x]  the patient     [x]  EMR     []  one family member/friend    []  multiple family members/friends    []  other:      The patient reports that she is having a difficult time sleeping. REVIEW OF SYSTEMS:  Constitutional: Negative for fever. HENT: Negative for tinnitus. Eyes: Negative for pain. Respiratory: Negative for shortness of breath. Cardiovascular: Negative for chest pain. Gastrointestinal: Negative for abdominal pain. Genitourinary: Negative for dysuria. Skin: Negative for rash. Neurological: Negative for headaches. Hematological: Does not bruise/bleed easily. Musculoskeletal: See HPI for pertinent positives     Past Medical History:    She  has a past medical history of Arthritis, Asthma, Attention deficit hyperactivity disorder (ADHD), combined type, Borderline diabetes, Borderline personality disorder ([address] CHF (congestive heart failure) ([address] COPD (chronic obstructive pulmonary disease) ([address] Fibromyalgia, Headache, Hypertension, Manic depression ([address] Movement disorder, Neck fracture ([address] Pernicious anemia, Seizure ([address] and Thyroid disease.      Past Surgical History:    She  has a past surgical history that includes knee surgery (Bilateral); partial hysterectomy (cervix not removed); lymph node dissection; Irrigation and debridement (Right, 5/17/2019); EXPLORATION OF WOUND OF EXTREMITY (Right, 5/19/2019); and EXPLORATION OF WOUND OF EXTREMITY (N/A, (3) MG/3ML SOLN nebulizer solution, Inhale 3 mLs into the lungs every 6 hours as needed for Shortness of Breath, Disp: 360 mL, Rfl: 1    cyclobenzaprine (FLEXERIL) 5 MG tablet, TAKE 1 TABLET BY MOUTH 2 TIMES DAILY AS NEEDED FOR MUSCLE SPASMS (DON'T TAKE W/ David Harmony), Disp: 60 tablet, Rfl: 2    ammonium lactate (LAC-HYDRIN) 12 % lotion, Apply topically daily. , Disp: 1 Bottle, Rfl: 3    Ciclopirox (LOPROX) 0.77 % gel, Apply to skin twice a day, Disp: 1 Tube, Rfl: 5    hydrOXYzine (ATARAX) 50 MG tablet, , Disp: , Rfl:     albuterol sulfate  (90 Base) MCG/ACT inhaler, Inhale 1 puff into the lungs every 6 hours as needed for Wheezing Gap prescription until follow up with primary. Do not refill. Follow up with primary, Disp: 1 Inhaler, Rfl: 3    Nebulizers (NEBULIZER COMPRESSOR) MISC, Daily as needed, Disp: 1 each, Rfl: 0    Respiratory Therapy Supplies (NEBULIZER/TUBING/MOUTHPIECE) KIT, Daily as needed, Disp: 1 kit, Rfl: 2    Blood Pressure KIT, Daily as needed, Disp: 1 kit, Rfl: 0    Misc.  Devices (CANE) MISC, Quad base cane, Disp: 1 each, Rfl: 0    famotidine (PEPCID) 20 MG tablet, Take 1 tablet by mouth 2 times daily as needed (for acid reflux), Disp: 60 tablet, Rfl: 1    clomiPRAMINE (ANAFRANIL) 50 MG capsule, Take 1 capsule by mouth nightly, Disp: 30 capsule, Rfl: 0    risperiDONE (RISPERDAL) 1 MG tablet, Take 1 tablet by mouth daily and 2 tablets by mouth at bedtime, Disp: 90 tablet, Rfl: 0    traZODone (DESYREL) 150 MG tablet, Take 1 tablet by mouth nightly, Disp: 30 tablet, Rfl: 0    cloNIDine (CATAPRES) 0.1 MG tablet, Take 1 tablet by mouth nightly, Disp: 30 tablet, Rfl: 0    Benzocaine-Menthol (CEPACOL) 6-10 MG LOZG lozenge, Take 1 lozenge by mouth every 2 hours as needed for Sore Throat, Disp: 60 lozenge, Rfl: 0    tiZANidine (ZANAFLEX) 2 MG tablet, Take 2 tablets by mouth every 8 hours as needed (muscle spasm, neck pain), Disp: 90 tablet, Rfl: 2    Elastic Bandages & Supports (CARPAL office today and reviewed, revealing mildly displaced distal fibula fracture. No definitive widening of the medial clear space or syndesmosis. With gravity stress view, there is mild widening medially, however the ankle is an equinus. Limited studies. IMPRESSION:  Osseous injury as above. Electronically signed by Frank Flores MD        Relevant previous imaging reviewed, both imaging and report(s) as below:    XR KNEE RIGHT (3 VIEWS)    Result Date: 5/16/2021  1. Oblique fracture of the distal fibula and lateral malleolus. 2.  Degenerative change in the knee with small knee effusion. XR TIBIA FIBULA RIGHT (2 VIEWS)    Result Date: 5/16/2021  1. Oblique fracture of the distal fibula and lateral malleolus. 2.  Degenerative change in the knee with small knee effusion. XR ANKLE RIGHT (MIN 3 VIEWS)    Result Date: 5/15/2021  Oblique nondisplaced fracture of the lateral malleolus with adjacent soft tissue swelling. CT ABDOMEN PELVIS W IV CONTRAST Additional Contrast? None    Result Date: 4/28/2021  1. No acute findings within the abdomen or pelvis. 2. Normal appendix. 3. Moderate to severe atherosclerosis. XR HIP 2-3 VW W PELVIS RIGHT    Result Date: 5/15/2021  No acute bony process involving the right hip. ASSESSMENT AND PLAN:  Body mass index is 29.7 kg/m². She has a right De La Cruz B distal fibula fracture, sustained on 5/15/2021. The gravity stress x-ray view obtained in the office on 5/18/2021 does show mild widening medially, however the ankle is in equinus, and thus no definitive determination can be made regarding the presence of a bimalleolar equivalent fracture at this time. Notably, she has a somewhat complex past medical history.   She reports a history of COPD/asthma, fibromyalgia, rheumatoid arthritis (she denies taking any DMARDs or medications for her rheumatoid arthritis); per EMR she has a history of seizure disorder, history of altered mental status, history of malnutrition, history of congestive heart failure, history of severe bipolar type I disorder with history of suicide attempt, history of drug overdose, history of polysubstance abuse, and history of tobacco use (she reports that she smokes 1/2 to 1 pack of cigarettes per day). We had a discussion today about the likely diagnosis and its natural history, physical exam and imaging findings, as well as various treatment options in detail. Surgically, we discussed a possible right ankle ORIF. The patient did state that she \"cannot take pain\", and we did discuss that with surgery there would be pain postoperatively, and at this time she does wish to attempt to treat her fracture without surgery if possible, I do believe that this is reasonable. We discussed both surgical and nonsurgical treatments, including risks and benefits. As a result of our discussion, the patient was able to make an informed decision, and has elected to proceed with nonoperative management. We did discuss that in 2 weeks time when she returns we will obtain weightbearing radiographs to see if her medial ankle widens (she is unable to weight-bear at today's visit; given her pain tolerance, stress radiographs are not feasible). Orders/referrals were placed as below at today's visit. The patient will avoid the routine use of NSAIDs to prevent the theoretical risk of delayed healing. The patient will avoid pain provoking activity. The patient was placed into a cam boot, and may weight-bear as tolerated. The patient was ordered a walker and rolling knee scooter to help her get ambulate safely. We also had a discussion about the risk of blood clot and thromboembolic events. The patient understands that there is an increased risk with surgery/immobilization, and understands nothing will completely eliminate the risk of DVT/PE's, and that any prophylactic medication does not substitute for early mobilization.  Given her risk profile, ordered:  rolling knee scooter   - Length of Need:  3 Months         Zakiya Sharpe MD  Orthopedic Surgery        Please excuse any typos/errors, as this note was created with the assistance of voice recognition software. While intending to generate a document that actually reflects the content of the visit, the document can still have some errors including those of syntax and sound-a-like substitutions which may escape proof reading. In such instances, actual meaning can be extrapolated by context.

## 2021-05-21 ENCOUNTER — TELEPHONE (OUTPATIENT)
Dept: ORTHOPEDIC SURGERY | Age: 56
End: 2021-05-21

## 2021-05-21 NOTE — TELEPHONE ENCOUNTER
Per Dr. Sushant Dean, he would like to hold off on scheduling the surgery until the patient is seen back in the clinic on 6/1. I called patient back to let her know this, she said she was not happy but will come in at that time. Patient was instructed to call the office with any other questions or issues.

## 2021-05-21 NOTE — TELEPHONE ENCOUNTER
Spoke with patient, she would like to proceed with surgery. I told her Dr. Jessica Currie will be out all next week but I will talk to him today and hopefully get a surgery date before he leaves. I told her to expect a call from myself or our surgery scheduler, Martine Desai, with logistics today or Monday.

## 2021-05-22 DIAGNOSIS — Z76.0 MEDICATION REFILL: ICD-10-CM

## 2021-05-24 DIAGNOSIS — M25.571 ACUTE RIGHT ANKLE PAIN: Primary | ICD-10-CM

## 2021-05-24 RX ORDER — GABAPENTIN 300 MG/1
600 CAPSULE ORAL 3 TIMES DAILY
Qty: 180 CAPSULE | Refills: 2 | Status: ON HOLD | OUTPATIENT
Start: 2021-05-24 | End: 2021-08-31 | Stop reason: HOSPADM

## 2021-05-24 NOTE — TELEPHONE ENCOUNTER
Hemoglobin A1C (%)   Date Value   02/19/2019 5.2             ( goal A1C is < 7)   No results found for: LABMICR  LDL Cholesterol (mg/dL)   Date Value   02/19/2019 105       (goal LDL is <100)   AST (U/L)   Date Value   05/05/2021 22     ALT (U/L)   Date Value   05/05/2021 16     BUN (mg/dL)   Date Value   05/05/2021 4 (L)     BP Readings from Last 3 Encounters:   05/15/21 (!) 164/84   05/08/21 (!) 164/117   05/05/21 (!) 185/116          (goal 120/80)        Patient Active Problem List:     Chest pain     Migraine variant with headache     Drug overdose, accidental or unintentional, initial encounter     Hypothyroidism     Essential hypertension     Seizure disorder ([address]     Drug overdose     History of seizure     Major depressive disorder with psychotic features ([address]     Severe major depression ([address]     Overdose of barbiturate, intentional self-harm, initial encounter ([address]     Intentional phenobarbital overdose ([address]     Severe episode of recurrent major depressive disorder, without psychotic features ([address]     Suicide attempt ([address]     Major depressive disorder, recurrent ([address]     Sepsis ([address]     Severe malnutrition ([address]     Altered mental status     Hypokalemia     Congestive heart failure of unknown etiology ([address]     Lumbar radiculopathy     Chronic low back pain     Piriformis syndrome     COPD (chronic obstructive pulmonary disease) (HCC)     Major depressive disorder, single episode, unspecified     Severe depressed bipolar I disorder without psychotic features ([address]     Polysubstance abuse ([address]     Syncope and collapse      ----Alonso Rang

## 2021-05-26 ENCOUNTER — TELEPHONE (OUTPATIENT)
Dept: PODIATRY | Age: 56
End: 2021-05-26

## 2021-05-26 NOTE — TELEPHONE ENCOUNTER
Left voice mail to call back, need to reschedule podiatry appointment (today @ 12:45) may reschedule for Friday 5-28-19 or 6-2-21

## 2021-05-29 ENCOUNTER — APPOINTMENT (OUTPATIENT)
Dept: GENERAL RADIOLOGY | Age: 56
End: 2021-05-29
Payer: MEDICARE

## 2021-05-29 ENCOUNTER — HOSPITAL ENCOUNTER (EMERGENCY)
Age: 56
Discharge: HOME | End: 2021-05-29
Attending: EMERGENCY MEDICINE
Payer: MEDICARE

## 2021-05-29 VITALS
HEART RATE: 86 BPM | OXYGEN SATURATION: 94 % | SYSTOLIC BLOOD PRESSURE: 146 MMHG | DIASTOLIC BLOOD PRESSURE: 98 MMHG | TEMPERATURE: 98 F | RESPIRATION RATE: 18 BRPM

## 2021-05-29 DIAGNOSIS — E86.0 DEHYDRATION: ICD-10-CM

## 2021-05-29 DIAGNOSIS — J06.9 VIRAL URI WITH COUGH: Primary | ICD-10-CM

## 2021-05-29 LAB
-: ABNORMAL
ABSOLUTE EOS #: 0.1 K/UL (ref 0–0.44)
ABSOLUTE IMMATURE GRANULOCYTE: 0.05 K/UL (ref 0–0.3)
ABSOLUTE LYMPH #: 2.58 K/UL (ref 1.1–3.7)
ABSOLUTE MONO #: 0.78 K/UL (ref 0.1–1.2)
AMORPHOUS: ABNORMAL
ANION GAP SERPL CALCULATED.3IONS-SCNC: 14 MMOL/L (ref 9–17)
BACTERIA: ABNORMAL
BASOPHILS # BLD: 1 % (ref 0–2)
BASOPHILS ABSOLUTE: 0.06 K/UL (ref 0–0.2)
BILIRUBIN URINE: NEGATIVE
BUN BLDV-MCNC: 5 MG/DL (ref 6–20)
BUN/CREAT BLD: ABNORMAL (ref 9–20)
CALCIUM SERPL-MCNC: 9.4 MG/DL (ref 8.6–10.4)
CASTS UA: ABNORMAL /LPF (ref 0–2)
CASTS UA: ABNORMAL /LPF (ref 0–2)
CHLORIDE BLD-SCNC: 100 MMOL/L (ref 98–107)
CO2: 23 MMOL/L (ref 20–31)
COLOR: ABNORMAL
COMMENT UA: ABNORMAL
CREAT SERPL-MCNC: 0.62 MG/DL (ref 0.5–0.9)
CRYSTALS, UA: ABNORMAL /HPF
DIFFERENTIAL TYPE: ABNORMAL
EOSINOPHILS RELATIVE PERCENT: 1 % (ref 1–4)
EPITHELIAL CELLS UA: ABNORMAL /HPF (ref 0–5)
GFR AFRICAN AMERICAN: >60 ML/MIN
GFR NON-AFRICAN AMERICAN: >60 ML/MIN
GFR SERPL CREATININE-BSD FRML MDRD: ABNORMAL ML/MIN/{1.73_M2}
GFR SERPL CREATININE-BSD FRML MDRD: ABNORMAL ML/MIN/{1.73_M2}
GLUCOSE BLD-MCNC: 112 MG/DL (ref 70–99)
GLUCOSE URINE: NEGATIVE
HCT VFR BLD CALC: 45.6 % (ref 36.3–47.1)
HEMOGLOBIN: 15.2 G/DL (ref 11.9–15.1)
IMMATURE GRANULOCYTES: 1 %
KETONES, URINE: NEGATIVE
LEUKOCYTE ESTERASE, URINE: ABNORMAL
LYMPHOCYTES # BLD: 28 % (ref 24–43)
MCH RBC QN AUTO: 30.7 PG (ref 25.2–33.5)
MCHC RBC AUTO-ENTMCNC: 33.3 G/DL (ref 28.4–34.8)
MCV RBC AUTO: 92.1 FL (ref 82.6–102.9)
MONOCYTES # BLD: 9 % (ref 3–12)
MUCUS: ABNORMAL
NITRITE, URINE: NEGATIVE
NRBC AUTOMATED: 0 PER 100 WBC
OTHER OBSERVATIONS UA: ABNORMAL
PDW BLD-RTO: 12.8 % (ref 11.8–14.4)
PH UA: 6 (ref 5–8)
PLATELET # BLD: ABNORMAL K/UL (ref 138–453)
PLATELET ESTIMATE: ABNORMAL
PLATELET, FLUORESCENCE: NORMAL K/UL (ref 138–453)
PLATELET, IMMATURE FRACTION: NORMAL % (ref 1.1–10.3)
PMV BLD AUTO: ABNORMAL FL (ref 8.1–13.5)
POTASSIUM SERPL-SCNC: 3.9 MMOL/L (ref 3.7–5.3)
PROTEIN UA: ABNORMAL
RBC # BLD: 4.95 M/UL (ref 3.95–5.11)
RBC # BLD: ABNORMAL 10*6/UL
RBC UA: ABNORMAL /HPF (ref 0–2)
RENAL EPITHELIAL, UA: ABNORMAL /HPF
SARS-COV-2, RAPID: NOT DETECTED
SEG NEUTROPHILS: 61 % (ref 36–65)
SEGMENTED NEUTROPHILS ABSOLUTE COUNT: 5.64 K/UL (ref 1.5–8.1)
SODIUM BLD-SCNC: 137 MMOL/L (ref 135–144)
SPECIFIC GRAVITY UA: 1.02 (ref 1–1.03)
SPECIMEN DESCRIPTION: NORMAL
TRICHOMONAS: ABNORMAL
TURBIDITY: CLEAR
URINE HGB: NEGATIVE
UROBILINOGEN, URINE: NORMAL
WBC # BLD: 9.2 K/UL (ref 3.5–11.3)
WBC # BLD: ABNORMAL 10*3/UL
WBC UA: ABNORMAL /HPF (ref 0–5)
YEAST: ABNORMAL

## 2021-05-29 PROCEDURE — 99284 EMERGENCY DEPT VISIT MOD MDM: CPT

## 2021-05-29 PROCEDURE — 85025 COMPLETE CBC W/AUTO DIFF WBC: CPT

## 2021-05-29 PROCEDURE — 81001 URINALYSIS AUTO W/SCOPE: CPT

## 2021-05-29 PROCEDURE — 80048 BASIC METABOLIC PNL TOTAL CA: CPT

## 2021-05-29 PROCEDURE — 71045 X-RAY EXAM CHEST 1 VIEW: CPT

## 2021-05-29 PROCEDURE — 93005 ELECTROCARDIOGRAM TRACING: CPT | Performed by: STUDENT IN AN ORGANIZED HEALTH CARE EDUCATION/TRAINING PROGRAM

## 2021-05-29 PROCEDURE — 85055 RETICULATED PLATELET ASSAY: CPT

## 2021-05-29 PROCEDURE — 87635 SARS-COV-2 COVID-19 AMP PRB: CPT

## 2021-05-29 RX ORDER — IBUPROFEN 600 MG/1
600 TABLET ORAL EVERY 6 HOURS PRN
Qty: 28 TABLET | Refills: 0 | Status: ON HOLD | OUTPATIENT
Start: 2021-05-29 | End: 2021-08-28

## 2021-05-29 RX ORDER — BENZONATATE 100 MG/1
100 CAPSULE ORAL 3 TIMES DAILY PRN
Qty: 21 CAPSULE | Refills: 0 | Status: SHIPPED | OUTPATIENT
Start: 2021-05-29 | End: 2021-06-05

## 2021-05-29 ASSESSMENT — ENCOUNTER SYMPTOMS
NAUSEA: 1
SHORTNESS OF BREATH: 0
DIARRHEA: 0
SORE THROAT: 0
COUGH: 1
ABDOMINAL PAIN: 0
VOMITING: 0

## 2021-05-29 ASSESSMENT — PAIN DESCRIPTION - DESCRIPTORS: DESCRIPTORS: ACHING

## 2021-05-29 ASSESSMENT — PAIN DESCRIPTION - PAIN TYPE: TYPE: ACUTE PAIN

## 2021-05-29 ASSESSMENT — PAIN SCALES - GENERAL: PAINLEVEL_OUTOF10: 5

## 2021-05-29 ASSESSMENT — PAIN DESCRIPTION - ORIENTATION: ORIENTATION: RIGHT;LEFT

## 2021-05-29 ASSESSMENT — PAIN DESCRIPTION - LOCATION: LOCATION: CHEST

## 2021-05-30 ENCOUNTER — HOSPITAL ENCOUNTER (OUTPATIENT)
Age: 56
Setting detail: OBSERVATION
Discharge: HOME | End: 2021-06-03
Attending: EMERGENCY MEDICINE | Admitting: EMERGENCY MEDICINE
Payer: MEDICARE

## 2021-05-30 ENCOUNTER — APPOINTMENT (OUTPATIENT)
Dept: GENERAL RADIOLOGY | Age: 56
End: 2021-05-30
Payer: MEDICARE

## 2021-05-30 DIAGNOSIS — J44.9 CHRONIC OBSTRUCTIVE PULMONARY DISEASE, UNSPECIFIED COPD TYPE (HCC): ICD-10-CM

## 2021-05-30 DIAGNOSIS — R07.9 CHEST PAIN, UNSPECIFIED TYPE: ICD-10-CM

## 2021-05-30 DIAGNOSIS — J44.1 COPD EXACERBATION (HCC): Primary | ICD-10-CM

## 2021-05-30 LAB
ANION GAP SERPL CALCULATED.3IONS-SCNC: 14 MMOL/L (ref 9–17)
BNP INTERPRETATION: NORMAL
BUN BLDV-MCNC: 6 MG/DL (ref 6–20)
BUN/CREAT BLD: NORMAL (ref 9–20)
C-REACTIVE PROTEIN: 34.6 MG/L (ref 0–5)
CALCIUM SERPL-MCNC: 9.2 MG/DL (ref 8.6–10.4)
CHLORIDE BLD-SCNC: 98 MMOL/L (ref 98–107)
CO2: 25 MMOL/L (ref 20–31)
CREAT SERPL-MCNC: 0.58 MG/DL (ref 0.5–0.9)
D-DIMER QUANTITATIVE: 0.46 MG/L FEU
FERRITIN: 87 UG/L (ref 13–150)
GFR AFRICAN AMERICAN: >60 ML/MIN
GFR NON-AFRICAN AMERICAN: >60 ML/MIN
GFR SERPL CREATININE-BSD FRML MDRD: NORMAL ML/MIN/{1.73_M2}
GFR SERPL CREATININE-BSD FRML MDRD: NORMAL ML/MIN/{1.73_M2}
GLUCOSE BLD-MCNC: 99 MG/DL (ref 70–99)
MYOGLOBIN: 62 NG/ML (ref 25–58)
POTASSIUM SERPL-SCNC: 4.1 MMOL/L (ref 3.7–5.3)
PRO-BNP: 56 PG/ML
SARS-COV-2, RAPID: NOT DETECTED
SODIUM BLD-SCNC: 137 MMOL/L (ref 135–144)
SPECIMEN DESCRIPTION: NORMAL
TOTAL CK: 75 U/L (ref 26–192)
TROPONIN INTERP: ABNORMAL
TROPONIN INTERP: ABNORMAL
TROPONIN T: ABNORMAL NG/ML
TROPONIN T: ABNORMAL NG/ML
TROPONIN, HIGH SENSITIVITY: 16 NG/L (ref 0–14)
TROPONIN, HIGH SENSITIVITY: 19 NG/L (ref 0–14)

## 2021-05-30 PROCEDURE — 96372 THER/PROPH/DIAG INJ SC/IM: CPT

## 2021-05-30 PROCEDURE — 94640 AIRWAY INHALATION TREATMENT: CPT

## 2021-05-30 PROCEDURE — 82550 ASSAY OF CK (CPK): CPT

## 2021-05-30 PROCEDURE — 6370000000 HC RX 637 (ALT 250 FOR IP): Performed by: STUDENT IN AN ORGANIZED HEALTH CARE EDUCATION/TRAINING PROGRAM

## 2021-05-30 PROCEDURE — 85379 FIBRIN DEGRADATION QUANT: CPT

## 2021-05-30 PROCEDURE — 87635 SARS-COV-2 COVID-19 AMP PRB: CPT

## 2021-05-30 PROCEDURE — 6360000002 HC RX W HCPCS: Performed by: STUDENT IN AN ORGANIZED HEALTH CARE EDUCATION/TRAINING PROGRAM

## 2021-05-30 PROCEDURE — 84443 ASSAY THYROID STIM HORMONE: CPT

## 2021-05-30 PROCEDURE — 99285 EMERGENCY DEPT VISIT HI MDM: CPT

## 2021-05-30 PROCEDURE — 82728 ASSAY OF FERRITIN: CPT

## 2021-05-30 PROCEDURE — 84484 ASSAY OF TROPONIN QUANT: CPT

## 2021-05-30 PROCEDURE — G0378 HOSPITAL OBSERVATION PER HR: HCPCS

## 2021-05-30 PROCEDURE — 86140 C-REACTIVE PROTEIN: CPT

## 2021-05-30 PROCEDURE — 6370000000 HC RX 637 (ALT 250 FOR IP): Performed by: EMERGENCY MEDICINE

## 2021-05-30 PROCEDURE — 80048 BASIC METABOLIC PNL TOTAL CA: CPT

## 2021-05-30 PROCEDURE — 71045 X-RAY EXAM CHEST 1 VIEW: CPT

## 2021-05-30 PROCEDURE — 83880 ASSAY OF NATRIURETIC PEPTIDE: CPT

## 2021-05-30 PROCEDURE — 93005 ELECTROCARDIOGRAM TRACING: CPT | Performed by: STUDENT IN AN ORGANIZED HEALTH CARE EDUCATION/TRAINING PROGRAM

## 2021-05-30 PROCEDURE — 83874 ASSAY OF MYOGLOBIN: CPT

## 2021-05-30 RX ORDER — IPRATROPIUM BROMIDE AND ALBUTEROL SULFATE 2.5; .5 MG/3ML; MG/3ML
1 SOLUTION RESPIRATORY (INHALATION)
Status: DISCONTINUED | OUTPATIENT
Start: 2021-05-31 | End: 2021-05-30

## 2021-05-30 RX ORDER — RISPERIDONE 2 MG/1
2 TABLET, FILM COATED ORAL NIGHTLY
Status: DISCONTINUED | OUTPATIENT
Start: 2021-05-30 | End: 2021-06-03 | Stop reason: HOSPADM

## 2021-05-30 RX ORDER — BENZONATATE 100 MG/1
100 CAPSULE ORAL 3 TIMES DAILY PRN
Status: DISCONTINUED | OUTPATIENT
Start: 2021-05-30 | End: 2021-06-03 | Stop reason: HOSPADM

## 2021-05-30 RX ORDER — ASPIRIN 81 MG/1
324 TABLET, CHEWABLE ORAL ONCE
Status: COMPLETED | OUTPATIENT
Start: 2021-05-30 | End: 2021-05-30

## 2021-05-30 RX ORDER — PREDNISONE 20 MG/1
60 TABLET ORAL ONCE
Status: COMPLETED | OUTPATIENT
Start: 2021-05-30 | End: 2021-05-30

## 2021-05-30 RX ORDER — ACETAMINOPHEN 325 MG/1
650 TABLET ORAL EVERY 4 HOURS PRN
Status: DISCONTINUED | OUTPATIENT
Start: 2021-05-30 | End: 2021-05-31

## 2021-05-30 RX ORDER — AZITHROMYCIN 250 MG/1
250 TABLET, FILM COATED ORAL DAILY
Status: COMPLETED | OUTPATIENT
Start: 2021-05-31 | End: 2021-06-03

## 2021-05-30 RX ORDER — AZITHROMYCIN 250 MG/1
500 TABLET, FILM COATED ORAL ONCE
Status: COMPLETED | OUTPATIENT
Start: 2021-05-30 | End: 2021-05-30

## 2021-05-30 RX ORDER — ACETAMINOPHEN 500 MG
1000 TABLET ORAL ONCE
Status: COMPLETED | OUTPATIENT
Start: 2021-05-30 | End: 2021-05-30

## 2021-05-30 RX ORDER — SODIUM CHLORIDE 0.9 % (FLUSH) 0.9 %
5-40 SYRINGE (ML) INJECTION PRN
Status: DISCONTINUED | OUTPATIENT
Start: 2021-05-30 | End: 2021-06-03 | Stop reason: HOSPADM

## 2021-05-30 RX ORDER — GABAPENTIN 300 MG/1
600 CAPSULE ORAL 3 TIMES DAILY
Status: DISCONTINUED | OUTPATIENT
Start: 2021-05-30 | End: 2021-06-03 | Stop reason: HOSPADM

## 2021-05-30 RX ORDER — SODIUM CHLORIDE 9 MG/ML
25 INJECTION, SOLUTION INTRAVENOUS PRN
Status: DISCONTINUED | OUTPATIENT
Start: 2021-05-30 | End: 2021-06-03 | Stop reason: HOSPADM

## 2021-05-30 RX ORDER — ONDANSETRON 4 MG/1
4 TABLET, ORALLY DISINTEGRATING ORAL EVERY 8 HOURS PRN
Status: DISCONTINUED | OUTPATIENT
Start: 2021-05-30 | End: 2021-06-03 | Stop reason: HOSPADM

## 2021-05-30 RX ORDER — IPRATROPIUM BROMIDE AND ALBUTEROL SULFATE 2.5; .5 MG/3ML; MG/3ML
1 SOLUTION RESPIRATORY (INHALATION) EVERY 4 HOURS
Status: DISCONTINUED | OUTPATIENT
Start: 2021-05-30 | End: 2021-05-31

## 2021-05-30 RX ORDER — IPRATROPIUM BROMIDE AND ALBUTEROL SULFATE 2.5; .5 MG/3ML; MG/3ML
1 SOLUTION RESPIRATORY (INHALATION) ONCE
Status: COMPLETED | OUTPATIENT
Start: 2021-05-30 | End: 2021-05-30

## 2021-05-30 RX ORDER — FAMOTIDINE 20 MG/1
20 TABLET, FILM COATED ORAL 2 TIMES DAILY PRN
Status: DISCONTINUED | OUTPATIENT
Start: 2021-05-30 | End: 2021-06-03 | Stop reason: HOSPADM

## 2021-05-30 RX ORDER — IBUPROFEN 800 MG/1
800 TABLET ORAL ONCE
Status: COMPLETED | OUTPATIENT
Start: 2021-05-30 | End: 2021-05-30

## 2021-05-30 RX ORDER — SODIUM CHLORIDE 0.9 % (FLUSH) 0.9 %
5-40 SYRINGE (ML) INJECTION EVERY 12 HOURS SCHEDULED
Status: DISCONTINUED | OUTPATIENT
Start: 2021-05-30 | End: 2021-06-03 | Stop reason: HOSPADM

## 2021-05-30 RX ORDER — LISINOPRIL 20 MG/1
20 TABLET ORAL DAILY
Status: DISCONTINUED | OUTPATIENT
Start: 2021-05-31 | End: 2021-06-03 | Stop reason: HOSPADM

## 2021-05-30 RX ORDER — CYCLOBENZAPRINE HCL 10 MG
5 TABLET ORAL 3 TIMES DAILY PRN
Status: DISCONTINUED | OUTPATIENT
Start: 2021-05-30 | End: 2021-06-03 | Stop reason: HOSPADM

## 2021-05-30 RX ORDER — PREDNISONE 20 MG/1
40 TABLET ORAL DAILY
Status: DISCONTINUED | OUTPATIENT
Start: 2021-05-31 | End: 2021-06-03 | Stop reason: HOSPADM

## 2021-05-30 RX ORDER — CLONIDINE HYDROCHLORIDE 0.1 MG/1
0.1 TABLET ORAL NIGHTLY
Status: DISCONTINUED | OUTPATIENT
Start: 2021-05-30 | End: 2021-06-03 | Stop reason: HOSPADM

## 2021-05-30 RX ORDER — ALBUTEROL SULFATE 2.5 MG/3ML
2.5 SOLUTION RESPIRATORY (INHALATION) EVERY 6 HOURS PRN
Status: DISCONTINUED | OUTPATIENT
Start: 2021-05-30 | End: 2021-06-02

## 2021-05-30 RX ADMIN — IPRATROPIUM BROMIDE AND ALBUTEROL SULFATE 1 AMPULE: .5; 3 SOLUTION RESPIRATORY (INHALATION) at 22:27

## 2021-05-30 RX ADMIN — ENOXAPARIN SODIUM 40 MG: 100 INJECTION SUBCUTANEOUS at 23:03

## 2021-05-30 RX ADMIN — GABAPENTIN 600 MG: 300 CAPSULE ORAL at 23:59

## 2021-05-30 RX ADMIN — PREDNISONE 60 MG: 20 TABLET ORAL at 20:21

## 2021-05-30 RX ADMIN — IBUPROFEN 800 MG: 800 TABLET, FILM COATED ORAL at 21:46

## 2021-05-30 RX ADMIN — CLONIDINE HYDROCHLORIDE 0.1 MG: 0.1 TABLET ORAL at 23:59

## 2021-05-30 RX ADMIN — AZITHROMYCIN 500 MG: 250 TABLET, FILM COATED ORAL at 23:00

## 2021-05-30 RX ADMIN — ACETAMINOPHEN 1000 MG: 500 TABLET ORAL at 21:46

## 2021-05-30 RX ADMIN — ASPIRIN 324 MG: 81 TABLET, CHEWABLE ORAL at 23:00

## 2021-05-30 RX ADMIN — RISPERIDONE 2 MG: 2 TABLET, FILM COATED ORAL at 23:59

## 2021-05-30 ASSESSMENT — PAIN SCALES - GENERAL
PAINLEVEL_OUTOF10: 7
PAINLEVEL_OUTOF10: 7

## 2021-05-30 ASSESSMENT — HEART SCORE: ECG: 0

## 2021-05-30 ASSESSMENT — ENCOUNTER SYMPTOMS
BLOOD IN STOOL: 0
SORE THROAT: 0
ABDOMINAL PAIN: 0
COUGH: 1
WHEEZING: 0
NAUSEA: 0
SHORTNESS OF BREATH: 1
VOMITING: 0

## 2021-05-30 NOTE — ED PROVIDER NOTES
debridement (Right, 5/17/2019); EXPLORATION OF WOUND OF EXTREMITY (Right, 5/19/2019); and EXPLORATION OF WOUND OF EXTREMITY (N/A, 5/22/2019). Social History     Socioeconomic History    Marital status:      Spouse name: Not on file    Number of children: Not on file    Years of education: Not on file    Highest education level: Not on file   Occupational History    Not on file   Tobacco Use    Smoking status: Current Every Day Smoker     Packs/day: 0.50     Years: 12.00     Pack years: 6.00    Smokeless tobacco: Never Used   Vaping Use    Vaping Use: Never used   Substance and Sexual Activity    Alcohol use: No    Drug use: Not Currently     Comment: Has not use Heroin since 2/2020    Sexual activity: Not Currently   Other Topics Concern    Not on file   Social History Narrative    Not on file     Social Determinants of Health     Financial Resource Strain: Low Risk     Difficulty of Paying Living Expenses: Not hard at all   Food Insecurity: No Food Insecurity    Worried About Running Out of Food in the Last Year: Never true    Michael of Food in the Last Year: Never true   Transportation Needs: No Transportation Needs    Lack of Transportation (Medical): No    Lack of Transportation (Non-Medical): No   Physical Activity:     Days of Exercise per Week:     Minutes of Exercise per Session:    Stress:     Feeling of Stress :    Social Connections:     Frequency of Communication with Friends and Family:     Frequency of Social Gatherings with Friends and Family:     Attends Orthodoxy Services:     Active Member of Clubs or Organizations:     Attends Club or Organization Meetings:     Marital Status:    Intimate Partner Violence:     Fear of Current or Ex-Partner:     Emotionally Abused:     Physically Abused:     Sexually Abused:        No family history on file.     Allergies:  Imitrex [sumatriptan], Bee pollen, Bee venom, Bromide ion [bromine], Reglan [metoclopramide], Sulfa antibiotics, Sulfadiazine, and Tramadol    Home Medications:  Prior to Admission medications    Medication Sig Start Date End Date Taking? Authorizing Provider   ibuprofen (ADVIL;MOTRIN) 600 MG tablet Take 1 tablet by mouth every 6 hours as needed for Pain 5/29/21 6/5/21  Mack Valerio DO   benzonatate (TESSALON PERLES) 100 MG capsule Take 1 capsule by mouth 3 times daily as needed for Cough 5/29/21 6/5/21  Lino Valerio DO   gabapentin (NEURONTIN) 300 MG capsule TAKE 2 CAPSULES BY MOUTH 3 TIMES DAILY FOR 30 DAYS. 5/24/21 6/23/21  KEANU Massey CNP   lisinopril (PRINIVIL;ZESTRIL) 20 MG tablet TAKE 1 TABLET BY MOUTH DAILY  5/24/21 6/23/21  KEANU Massey CNP   enoxaparin (LOVENOX) 40 MG/0.4ML injection Inject 0.4 mLs into the skin daily 5/18/21   Marita Echols MD   enoxaparin (LOVENOX) 40 MG/0.4ML injection Inject 0.4 mLs into the skin daily 5/18/21   Marita Echols MD   naproxen (NAPROSYN) 500 MG tablet Take 1 tablet by mouth 2 times daily (with meals) 5/16/21   Tianna Ambrocio MD   acetaminophen (TYLENOL) 500 MG tablet Take 2 tablets by mouth 4 times daily as needed for Pain 5/15/21   Bassem Rogers DO   butalbital-aspirin-caffeine (FIORINAL) -40 MG per capsule Take 1 capsule by mouth every 4 hours as needed for Headaches for up to 30 days.  5/14/21 6/13/21  KEANU Massey CNP   fluticasone [facility]) 50 MCG/ACT nasal spray 2 sprays by Each Nostril route daily 5/14/21   KEANU Massey CNP   ondansetron (ZOFRAN) 4 MG tablet Take 1 tablet by mouth every 8 hours as needed for Nausea or Vomiting 5/8/21   Owen Markham MD   ondansetron (ZOFRAN ODT) 4 MG disintegrating tablet Take 1 tablet by mouth every 8 hours as needed for Nausea 4/28/21   Jason Dunlap DO   ondansetron (ZOFRAN ODT) 4 MG disintegrating tablet Take 1 tablet by mouth every 8 hours as needed for Nausea 4/19/21   KEANU Massey - CNP   ASPIRIN ADULT LOW STRENGTH 81 MG EC EXAM   (up to 7 for level 4, 8 or more for level 5)      INITIAL VITALS:   /81   Pulse 89   Temp 100.2 °F (37.9 °C) (Oral)   Resp 20   SpO2 92%     Physical Exam  Vitals and nursing note reviewed. Constitutional:       General: She is not in acute distress. Appearance: Normal appearance. She is well-developed and normal weight. She is ill-appearing. She is not toxic-appearing or diaphoretic. HENT:      Head: Normocephalic and atraumatic. Right Ear: External ear normal.      Left Ear: External ear normal.      Nose: Nose normal.      Mouth/Throat:      Mouth: Mucous membranes are moist.      Pharynx: Oropharynx is clear. Eyes:      General: No scleral icterus. Conjunctiva/sclera: Conjunctivae normal.      Pupils: Pupils are equal, round, and reactive to light. Neck:      Vascular: No JVD. Trachea: No tracheal deviation. Cardiovascular:      Rate and Rhythm: Normal rate and regular rhythm. Pulses: Normal pulses. Heart sounds: Normal heart sounds, S1 normal and S2 normal. No murmur heard. No friction rub. No gallop. Pulmonary:      Effort: Pulmonary effort is normal. No respiratory distress. Breath sounds: Rhonchi (diffuse) present. Abdominal:      General: Abdomen is flat. There is no distension. Palpations: Abdomen is soft. Tenderness: There is no abdominal tenderness. There is no guarding. Musculoskeletal:         General: No swelling or tenderness (b/l calves are non-tender). Normal range of motion. Cervical back: Normal range of motion. No rigidity. Lymphadenopathy:      Cervical: No cervical adenopathy. Skin:     General: Skin is warm and dry. Capillary Refill: Capillary refill takes less than 2 seconds. Neurological:      General: No focal deficit present. Mental Status: She is alert and oriented to person, place, and time. Motor: No abnormal muscle tone. Comments: 5/5 strength in all extremities.   Sensation is equal tactile light touch and pain in all extremities. DIFFERENTIAL  DIAGNOSIS     PLAN (LABS / IMAGING / EKG):  Orders Placed This Encounter   Procedures    COVID-19, Rapid    XR CHEST PORTABLE    Basic Metabolic Panel w/ Reflex to MG    Troponin    D-DIMER, QUANTITATIVE    FERRITIN    C-REACTIVE PROTEIN    CK    Myoglobin, Serum    Troponin    Brain Natriuretic Peptide    TSH with Reflex    Respiratory care evaluation only    HHN Treatment    HHN Treatment    EKG 12 Lead    Insert peripheral IV    PATIENT STATUS (FROM ED OR OR/PROCEDURAL) Observation       MEDICATIONS ORDERED:  Orders Placed This Encounter   Medications    ipratropium-albuterol (DUONEB) nebulizer solution 1 ampule    predniSONE (DELTASONE) tablet 60 mg    acetaminophen (TYLENOL) tablet 1,000 mg    ibuprofen (ADVIL;MOTRIN) tablet 800 mg    aspirin chewable tablet 324 mg    ipratropium-albuterol (DUONEB) nebulizer solution 1 ampule    azithromycin (ZITHROMAX) tablet 500 mg     Order Specific Question:   Antimicrobial Indications     Answer:   COPD Exacerbation    enoxaparin (LOVENOX) injection 40 mg       DDX: COVID-19, Viral URI, dehydration, viral myositis, anemia, ACS/MI, angina, electrolyte abnormality, musculoskeletal pain, COPD exacerbation    DIAGNOSTIC RESULTS / EMERGENCY DEPARTMENT COURSE / MDM   LAB RESULTS:  Results for orders placed or performed during the hospital encounter of 05/30/21   COVID-19, Rapid    Specimen: Nasopharyngeal Swab   Result Value Ref Range    Specimen Description . NASOPHARYNGEAL SWAB     SARS-CoV-2, Rapid Not Detected Not Detected   Basic Metabolic Panel w/ Reflex to MG   Result Value Ref Range    Glucose 99 70 - 99 mg/dL    BUN 6 6 - 20 mg/dL    CREATININE 0.58 0.50 - 0.90 mg/dL    Bun/Cre Ratio NOT REPORTED 9 - 20    Calcium 9.2 8.6 - 10.4 mg/dL    Sodium 137 135 - 144 mmol/L    Potassium 4.1 3.7 - 5.3 mmol/L    Chloride 98 98 - 107 mmol/L    CO2 25 20 - 31 mmol/L    Anion Gap 14 pain Reason for Exam: fatigue. upr Acuity: Unknown Type of Exam: Unknown FINDINGS: Heart size and configuration are normal.  Hilar and mediastinal structures are unremarkable. The lungs are clear. No pneumothorax or pleural fluid. No acute bone finding. Portable study is limited by body habitus. No acute cardiopulmonary disease. XR CHEST PORTABLE    Result Date: 5/29/2021  EXAMINATION: ONE XRAY VIEW OF THE CHEST 5/29/2021 7:37 pm COMPARISON: None. HISTORY: ORDERING SYSTEM PROVIDED HISTORY: Cough. body aches TECHNOLOGIST PROVIDED HISTORY: Cough. body aches Reason for Exam: upr Acuity: Unknown Type of Exam: Unknown FINDINGS: A portable upright frontal view chest radiograph was obtained. The heart is enlarged. The mediastinal contour and pleural spaces are otherwise within normal limits. The lungs are grossly clear. There is no focal consolidation or pneumothorax. The pulmonary vascular pattern is within normal limits. No acute thoracic osseous abnormality. Clear lungs. Mild cardiac enlargement. No acute cardiopulmonary abnormality.        EKG  EKG Interpretation    Interpreted by emergency department physician    Rhythm: normal sinus   Rate: normal  Axis: normal  Ectopy: none  Conduction: normal  ST Segments: no acute change  T Waves: no acute change  Q Waves: none    Clinical Impression: no acute changes    Jace Dodson DO      All EKG's are interpreted by the Emergency Department Physician who either signs or Co-signs this chart in the absence of a cardiologist.    EMERGENCY DEPARTMENT COURSE:  ED Course as of May 30 2303   Sun May 30, 2021   1940 CXR is non-concerning.    [CS]   2003 SARS-CoV-2, Rapid: Not Detected [CS]   2034 D-Dimer, Quant: 0.46 [CS]   2048 Troponin, High Sensitivity(!): 19 [CS]   2048 Will repeat    [CS]   2055 CRP(!): 34.6 [CS]   2127 Total CK: 75 [CS]   2127 Myoglobin(!): 62 [CS]   2220 Plan to admit to obs for cardiac evaluation and COPD exacerbation.    [CS]   1839 Heart score of 5    [CS]   8310 Should be noted that when patient falls asleep she will desat to 84% and snores heavily. Patient is awake and her pulse ox is 94%. Concerned that patient might have sleep apnea. We will keep oxygen on patient overnight to see if she feels better. [CS]      ED Course User Index  [CS] Tu Zurita DO         PROCEDURES:  none    CONSULTS:  IP CONSULT TO CARDIOLOGY    CRITICAL CARE:  Please see attending note    FINAL IMPRESSION      1. COPD exacerbation ([facility] [address]    2. Chest pain, unspecified type          DISPOSITION / PLAN     DISPOSITION        PATIENT REFERRED TO:  No follow-up provider specified.     DISCHARGE MEDICATIONS:  New Prescriptions    No medications on file       Tu Zurita DO  Emergency Medicine Resident    (Please note that portions of thisnote were completed with a voice recognition program.  Efforts were made to edit the dictations but occasionally words are mis-transcribed.)        Tu Zurita DO  Resident  05/30/21 [id number]

## 2021-05-30 NOTE — ED NOTES
Blood work and covid swab collected, labeled, and sent to lab     Laurann Simmonds, RN  05/29/21 [id number]

## 2021-05-30 NOTE — ED NOTES
Patient resting comfortably on stretcher, in no apparent distress  Respirations even and non-labored  Patient has no needs at this time  Call light remains within reach     Lonzie Goltz, RN  05/29/21 [id number]

## 2021-05-30 NOTE — ED NOTES
Patient states that she slept until 1745 and that not like her. Patient states that her pain is a 8 from her broken foot. Patient states that she is too weak to get dressed that her roommate had to dress her. Patient states that she cant move any of her body.       Antony Dueñas RN  05/30/21 [id number]

## 2021-05-30 NOTE — ED PROVIDER NOTES
[facility] ED  Emergency Department Encounter  EmergencyMedicine Resident     Pt Name:Aleta Carlos  MRN: [id number]  [place] 1965  Date of evaluation: 5/29/21  PCP:  KEANU Warren [id number]       Chief Complaint   Patient presents with    Cough       HISTORY OF PRESENT ILLNESS  (Location/Symptom, Timing/Onset, Context/Setting, Quality, Duration, Modifying Factors, Severity.)      Luis Quinn is a 60 y.o. female who presents with cough, chest congestion, body aches, loss of smell and taste, decreased appetite for the past 4 days. Cough is productive, unclear what sputum color is. Patient denies any known sick contacts, denies any fevers or chills. Has generalized malaise. Patient has a history of hypertension, COPD, current smoker, drug use, seizures, substance abuse. Has a right ankle fracture that she presents in a walking boot. States that she takes Lovenox daily for DVT prophylaxis. PAST MEDICAL / SURGICAL / SOCIAL / FAMILY HISTORY      has a past medical history of Arthritis, Asthma, Attention deficit hyperactivity disorder (ADHD), combined type, Borderline diabetes, Borderline personality disorder ([address] CHF (congestive heart failure) ([address] COPD (chronic obstructive pulmonary disease) ([address] Fibromyalgia, Headache, Hypertension, Manic depression ([address] Movement disorder, Neck fracture ([address] Pernicious anemia, Seizure ([address] and Thyroid disease. has a past surgical history that includes knee surgery (Bilateral); partial hysterectomy (cervix not removed); lymph node dissection; Irrigation and debridement (Right, 5/17/2019); EXPLORATION OF WOUND OF EXTREMITY (Right, 5/19/2019); and EXPLORATION OF WOUND OF EXTREMITY (N/A, 5/22/2019).       Social History     Socioeconomic History    Marital status:      Spouse name: Not on file    Number of children: Not on file    Years of education: Not on file    Highest education level: Not on file   Occupational History    Not on file   Tobacco Use    Smoking status: Current Every Day Smoker     Packs/day: 0.50     Years: 12.00     Pack years: 6.00    Smokeless tobacco: Never Used   Vaping Use    Vaping Use: Never used   Substance and Sexual Activity    Alcohol use: No    Drug use: Not Currently     Comment: Has not use Heroin since 2/2020    Sexual activity: Not Currently   Other Topics Concern    Not on file   Social History Narrative    Not on file     Social Determinants of Health     Financial Resource Strain: Low Risk     Difficulty of Paying Living Expenses: Not hard at all   Food Insecurity: No Food Insecurity    Worried About Running Out of Food in the Last Year: Never true    Michael of Food in the Last Year: Never true   Transportation Needs: No Transportation Needs    Lack of Transportation (Medical): No    Lack of Transportation (Non-Medical): No   Physical Activity:     Days of Exercise per Week:     Minutes of Exercise per Session:    Stress:     Feeling of Stress :    Social Connections:     Frequency of Communication with Friends and Family:     Frequency of Social Gatherings with Friends and Family:     Attends Sikhism Services:     Active Member of Clubs or Organizations:     Attends Club or Organization Meetings:     Marital Status:    Intimate Partner Violence:     Fear of Current or Ex-Partner:     Emotionally Abused:     Physically Abused:     Sexually Abused:        History reviewed. No pertinent family history. Allergies:  Imitrex [sumatriptan], Bee pollen, Bee venom, Bromide ion [bromine], Reglan [metoclopramide], Sulfa antibiotics, Sulfadiazine, and Tramadol    Home Medications:  Prior to Admission medications    Medication Sig Start Date End Date Taking?  Authorizing Provider   ibuprofen (ADVIL;MOTRIN) 600 MG tablet Take 1 tablet by mouth every 6 hours as needed for Pain 5/29/21 6/5/21 Yes Lino Valerio DO   benzonatate (TESSALON PERLES) 100 MG capsule Take 1 capsule by mouth 3 times daily as needed for Cough 5/29/21 6/5/21 Yes Lino Valerio DO   gabapentin (NEURONTIN) 300 MG capsule TAKE 2 CAPSULES BY MOUTH 3 TIMES DAILY FOR 30 DAYS. 5/24/21 6/23/21  Gaynel Goldberg, APRN - CNP   lisinopril (PRINIVIL;ZESTRIL) 20 MG tablet TAKE 1 TABLET BY MOUTH DAILY  5/24/21 6/23/21  Gaynel Goldberg, APRN - CNP   enoxaparin (LOVENOX) 40 MG/0.4ML injection Inject 0.4 mLs into the skin daily 5/18/21   Tacho Arshad MD   enoxaparin (LOVENOX) 40 MG/0.4ML injection Inject 0.4 mLs into the skin daily 5/18/21   Tacho Arshad MD   naproxen (NAPROSYN) 500 MG tablet Take 1 tablet by mouth 2 times daily (with meals) 5/16/21   Amrita Nunez MD   acetaminophen (TYLENOL) 500 MG tablet Take 2 tablets by mouth 4 times daily as needed for Pain 5/15/21   Bassem Rogers DO   butalbital-aspirin-caffeine (FIORINAL) -40 MG per capsule Take 1 capsule by mouth every 4 hours as needed for Headaches for up to 30 days.  5/14/21 6/13/21  Gaynel Goldberg, APRN - CNP   fluticasone [facility]) 50 MCG/ACT nasal spray 2 sprays by Each Nostril route daily 5/14/21   Gaynel Goldberg, APRN - CNP   ondansetron (ZOFRAN) 4 MG tablet Take 1 tablet by mouth every 8 hours as needed for Nausea or Vomiting 5/8/21   Owen Markham MD   ondansetron (ZOFRAN ODT) 4 MG disintegrating tablet Take 1 tablet by mouth every 8 hours as needed for Nausea 4/28/21   Annabel Dunlap DO   ondansetron (ZOFRAN ODT) 4 MG disintegrating tablet Take 1 tablet by mouth every 8 hours as needed for Nausea 4/19/21   Gaynel Goldberg, APRN - CNP   ASPIRIN ADULT LOW STRENGTH 81 MG EC tablet TAKE 1 TABLET BY MOUTH ONCE DAILY  4/5/21   Gaynel Goldberg, APRN - CNP   cetirizine (ZYRTEC) 10 MG tablet Take 1 tablet by mouth daily 3/11/21   Gaynel Goldberg, APRN - CNP   levothyroxine (SYNTHROID) 100 MCG tablet Take 1 tablet by mouth Daily 2/19/21   Gaynel Goldberg, APRN - CNP   budesonide-formoterol [facility]) 160-4.5 MCG/ACT AERO Inhale 2 puffs into the lungs 2 times daily 2/11/21   KEANU Mirza CNP   ipratropium-albuterol (DUONEB) 0.5-2.5 (3) MG/3ML SOLN nebulizer solution Inhale 3 mLs into the lungs every 6 hours as needed for Shortness of Breath 2/11/21   KEANU Mirza CNP   cyclobenzaprine (FLEXERIL) 5 MG tablet TAKE 1 TABLET BY MOUTH 2 TIMES DAILY AS NEEDED FOR MUSCLE SPASMS (DON'T TAKE W/ ZANAFLEX) 1/19/21   KEANU Mirza CNP   ammonium lactate (LAC-HYDRIN) 12 % lotion Apply topically daily. 1/6/21   Sara Maharaj DPM   Ciclopirox (LOPROX) 0.77 % gel Apply to skin twice a day 1/6/21   Sara Maharaj DPM   hydrOXYzine (ATARAX) 50 MG tablet  12/14/20   Historical Provider, MD   albuterol sulfate  (90 Base) MCG/ACT inhaler Inhale 1 puff into the lungs every 6 hours as needed for Wheezing Gap prescription until follow up with primary. Do not refill. Follow up with primary 12/18/20   KEANU iMrza CNP   Nebulizers (NEBULIZER COMPRESSOR) MISC Daily as needed 12/18/20   KEANU Mirza CNP   Respiratory Therapy Supplies (NEBULIZER/TUBING/MOUTHPIECE) KIT Daily as needed 12/18/20   KEANU Mirza CNP   Blood Pressure KIT Daily as needed 12/18/20   KEANU Mirza CNP   Misc.  Devices (CANE) MISC Quad base cane 11/28/20   Niesha Duque MD   famotidine (PEPCID) 20 MG tablet Take 1 tablet by mouth 2 times daily as needed (for acid reflux) 10/26/20   KEANU Mirza CNP   clomiPRAMINE (ANAFRANIL) 50 MG capsule Take 1 capsule by mouth nightly 10/26/20   KEANU Mirza CNP   risperiDONE (RISPERDAL) 1 MG tablet Take 1 tablet by mouth daily and 2 tablets by mouth at bedtime 10/22/20   KEANU Mirza CNP   traZODone (DESYREL) 150 MG tablet Take 1 tablet by mouth nightly 10/22/20   KEANU Mirza CNP   cloNIDine (CATAPRES) 0.1 MG tablet Take 1 tablet by mouth nightly 10/22/20   KEANU Mirza CNP Benzocaine-Menthol (CEPACOL) 6-10 MG LOZG lozenge Take 1 lozenge by mouth every 2 hours as needed for Sore Throat 10/22/20   KEANU Porter CNP   tiZANidine (ZANAFLEX) 2 MG tablet Take 2 tablets by mouth every 8 hours as needed (muscle spasm, neck pain) 10/9/20   KEANU Porter CNP   Elastic Bandages & Supports (CARPAL TUNNEL WRIST STABILIZER) MISC Apply to each wrist at bedtime 10/9/20   KEANU Porter CNP   methyl salicylate-menthol (PAULETTE BRAY GREASELESS) 10-15 % CREA Apply topically 3 times daily as needed for Pain 9/21/20   Michael Spicer MD   albuterol (PROVENTIL) (2.5 MG/3ML) 0.083% nebulizer solution Take 3 mLs by nebulization every 6 hours as needed for Wheezing 6/18/20   KEANU Porter CNP   ipratropium-albuterol (DUONEB) 0.5-2.5 (3) MG/3ML SOLN nebulizer solution Inhale 3 mLs into the lungs every 4 hours 6/16/19   Ruby Goodwin MD       REVIEW OF SYSTEMS    (2-9 systems for level 4, 10 or more for level 5)      Review of Systems   Constitutional: Positive for appetite change and chills. Negative for fever. HENT: Positive for congestion. Negative for sore throat. Respiratory: Positive for cough. Negative for shortness of breath. Cardiovascular: Positive for chest pain. Gastrointestinal: Positive for nausea. Negative for abdominal pain, diarrhea and vomiting. Genitourinary: Negative for dysuria and frequency. Musculoskeletal: Positive for myalgias. Negative for neck stiffness. Skin: Negative for rash. Allergic/Immunologic: Negative for immunocompromised state. Neurological: Negative for headaches. PHYSICAL EXAM   (up to 7 for level 4, 8 or more for level 5)      INITIAL VITALS:   BP (!) 146/98   Pulse 86   Temp 98 °F (36.7 °C)   Resp 18   SpO2 94%      Vitals:    05/29/21 2017 05/29/21 2018   BP: (!) 146/98    Pulse: 86    Resp: 18    Temp:  98 °F (36.7 °C)   SpO2: 94%         Physical Exam  Vitals reviewed.    Constitutional: General: She is not in acute distress. Appearance: She is well-developed. She is not ill-appearing, toxic-appearing or diaphoretic. HENT:      Head: Normocephalic and atraumatic. Eyes:      Extraocular Movements: Extraocular movements intact. Pupils: Pupils are equal, round, and reactive to light. Cardiovascular:      Rate and Rhythm: Normal rate and regular rhythm. Pulses: Normal pulses. Pulmonary:      Effort: Pulmonary effort is normal.      Breath sounds: Normal breath sounds. Abdominal:      General: There is no distension. Palpations: Abdomen is soft. Tenderness: There is no abdominal tenderness. Musculoskeletal:         General: Normal range of motion. Cervical back: Normal range of motion and neck supple. Skin:     General: Skin is warm and dry. Neurological:      General: No focal deficit present. Mental Status: She is alert and oriented to person, place, and time. DIFFERENTIAL  DIAGNOSIS     PLAN (LABS / IMAGING / EKG):  Orders Placed This Encounter   Procedures    COVID-19, Rapid    XR CHEST PORTABLE    CBC Auto Differential    Basic Metabolic Panel w/ Reflex to MG    Urinalysis Reflex to Culture    Immature Platelet Fraction    Microscopic Urinalysis    EKG 12 Lead       MEDICATIONS ORDERED:  Orders Placed This Encounter   Medications    ibuprofen (ADVIL;MOTRIN) 600 MG tablet     Sig: Take 1 tablet by mouth every 6 hours as needed for Pain     Dispense:  28 tablet     Refill:  0    benzonatate (TESSALON PERLES) 100 MG capsule     Sig: Take 1 capsule by mouth 3 times daily as needed for Cough     Dispense:  21 capsule     Refill:  0       DIAGNOSTIC RESULTS / EMERGENCY DEPARTMENT COURSE / MDM   LAB RESULTS:  Results for orders placed or performed during the hospital encounter of 05/29/21   COVID-19, Rapid    Specimen: Nasopharyngeal Swab   Result Value Ref Range    Specimen Description . NASOPHARYNGEAL SWAB     SARS-CoV-2, Rapid Not Detected Not Detected   CBC Auto Differential   Result Value Ref Range    WBC 9.2 3.5 - 11.3 k/uL    RBC 4.95 3.95 - 5.11 m/uL    Hemoglobin 15.2 (H) 11.9 - 15.1 g/dL    Hematocrit 45.6 36.3 - 47.1 %    MCV 92.1 82.6 - 102.9 fL    MCH 30.7 25.2 - 33.5 pg    MCHC 33.3 28.4 - 34.8 g/dL    RDW 12.8 11.8 - 14.4 %    Platelets See Reflexed IPF Result 138 - 453 k/uL    MPV NOT REPORTED 8.1 - 13.5 fL    NRBC Automated 0.0 0.0 per 100 WBC    Differential Type NOT REPORTED     WBC Morphology NOT REPORTED     RBC Morphology NOT REPORTED     Platelet Estimate NOT REPORTED     Seg Neutrophils 61 36 - 65 %    Lymphocytes 28 24 - 43 %    Monocytes 9 3 - 12 %    Eosinophils % 1 1 - 4 %    Basophils 1 0 - 2 %    Immature Granulocytes 1 (H) 0 %    Segs Absolute 5.64 1.50 - 8.10 k/uL    Absolute Lymph # 2.58 1.10 - 3.70 k/uL    Absolute Mono # 0.78 0.10 - 1.20 k/uL    Absolute Eos # 0.10 0.00 - 0.44 k/uL    Basophils Absolute 0.06 0.00 - 0.20 k/uL    Absolute Immature Granulocyte 0.05 0.00 - 0.30 k/uL   Basic Metabolic Panel w/ Reflex to MG   Result Value Ref Range    Glucose 112 (H) 70 - 99 mg/dL    BUN 5 (L) 6 - 20 mg/dL    CREATININE 0.62 0.50 - 0.90 mg/dL    Bun/Cre Ratio NOT REPORTED 9 - 20    Calcium 9.4 8.6 - 10.4 mg/dL    Sodium 137 135 - 144 mmol/L    Potassium 3.9 3.7 - 5.3 mmol/L    Chloride 100 98 - 107 mmol/L    CO2 23 20 - 31 mmol/L    Anion Gap 14 9 - 17 mmol/L    GFR Non-African American >60 >60 mL/min    GFR African American >60 >60 mL/min    GFR Comment          GFR Staging NOT REPORTED    Urinalysis Reflex to Culture    Specimen: Urine, clean catch   Result Value Ref Range    Color, UA DARK YELLOW (A) YELLOW    Turbidity UA CLEAR CLEAR    Glucose, Ur NEGATIVE NEGATIVE    Bilirubin Urine NEGATIVE NEGATIVE    Ketones, Urine NEGATIVE NEGATIVE    Specific Gravity, UA 1.021 1.005 - 1.030    Urine Hgb NEGATIVE NEGATIVE    pH, UA 6.0 5.0 - 8.0    Protein, UA TRACE (A) NEGATIVE    Urobilinogen, Urine Normal Normal    Nitrite, Urine NEGATIVE NEGATIVE    Leukocyte Esterase, Urine TRACE (A) NEGATIVE    Urinalysis Comments NOT REPORTED    Immature Platelet Fraction   Result Value Ref Range    Platelet, Immature Fraction NOT REPORTED 1.1 - 10.3 %    Platelet, Fluorescence Platelet clumps present, count appears adequate. 138 - 453 k/uL   Microscopic Urinalysis   Result Value Ref Range    -          WBC, UA 5 TO 10 0 - 5 /HPF    RBC, UA 0 TO 2 0 - 2 /HPF    Casts UA 50  0 - 2 /LPF    Casts UA HYALINE 0 - 2 /LPF    Crystals, UA NOT REPORTED None /HPF    Epithelial Cells UA 10 TO 20 0 - 5 /HPF    Renal Epithelial, UA NOT REPORTED 0 /HPF    Bacteria, UA FEW (A) None    Mucus, UA 3+ (A) None    Trichomonas, UA NOT REPORTED None    Amorphous, UA NOT REPORTED None    Other Observations UA NOT REPORTED NOT REQ. Yeast, UA NOT REPORTED None         RADIOLOGY:  XR CHEST PORTABLE   Final Result   Clear lungs. Mild cardiac enlargement. No acute cardiopulmonary abnormality. EKG  EKG Interpretation    Interpreted by me    Rhythm: normal sinus   Rate: normal  Axis: normal  Ectopy: none  Conduction: normal  ST Segments: no acute change  T Waves: no acute change  Q Waves: none    Clinical Impression: no acute changes and normal EKG    All EKG's are interpreted by the Emergency Department Physician who either signs or Co-signs this chart in the absence of a cardiologist.      INITIAL IMPRESSION:    Acute viral illness. Possibly Covid. EMERGENCY DEPARTMENT COURSE & MDM:    Patient here with symptoms consistent with acute viral illness. She is sitting in bed in no acute distress vital signs within normal limits, pulses equal bilaterally, no concerns for PE given vital signs and she is on Lovenox daily. Plan for cardiac work-up, troponins, EKG, chest x-ray, CBC, BMP urinalysis.     ED Course as of May 29 2246   Sat May 29, 2021   2143 SARS-CoV-2, Rapid: Not Detected [CS]   2143 WBC: 9.2 [CS]   2143 Unremarkable    Basic Metabolic Panel w/ Reflex to MG(!):    Glucose 112(!)   BUN 5(!)   Creatinine 0.62   Bun/Cre Ratio NOT REPORTED   Calcium 9.4   Sodium 137   Potassium 3.9   Chloride 100   CO2 23   Anion Gap 14   GFR Non- >60   GFR  >60   GFR Comment        GFR Staging NOT REPORTED [CS]   2152 CXR negative   XR CHEST PORTABLE [CS]   0068 Patient denies any urinary complaints. Patient diagnosed with acute viral syndrome and dehydration. Encourage p.o. rehydration. Encouraged repeat Covid test as an outpatient if still concerned. Return the emergency department for shortness of breath, new worsening concerning symptoms. [CS]      ED Course User Index  [CS] Kenya Machado DO       PROCEDURES:      CONSULTS:  None    CRITICAL CARE:  Please see attending note    FINAL IMPRESSION      1. Viral URI with cough    2. Dehydration          DISPOSITION / PLAN     DISPOSITION        PATIENT REFERRED TO:  No follow-up provider specified.     DISCHARGE MEDICATIONS:  New Prescriptions    BENZONATATE (TESSALON PERLES) 100 MG CAPSULE    Take 1 capsule by mouth 3 times daily as needed for Cough    IBUPROFEN (ADVIL;MOTRIN) 600 MG TABLET    Take 1 tablet by mouth every 6 hours as needed for Pain       Kenya Machado DO  Emergency Medicine Resident    (Please note that portions of thisnote were completed with a voice recognition program.  Efforts were made to edit the dictations but occasionally words are mis-transcribed.)        Kenya Machado DO  Resident  05/29/21 2186

## 2021-05-30 NOTE — ED PROVIDER NOTES
FACULTY SIGN-OUT  ADDENDUM       Patient: Skylar Tam   MRN: [id number]  PCP:  KEANU Mclean CNP  Attestation  I was available and discussed any additional care issues that arose and coordinated the management plans with the resident(s) caring for the patient during my duty period. Any areas of disagreement with resident's documentation of care or procedures are noted on the chart. I was personally present for the key portions of any/all procedures during my duty period. I have documented in the chart those procedures where I was not present during the key portions. The patient's initial evaluation and plan have been discussed with the prior provider who initially evaluated the patient. Pertinent Comments: The patient is a 60 y.o. female taken in signout with some cough and concern initially for Covid but also urinary symptoms mildly. Covid swab is negative and chest x-ray negative for acute process per radiology as well  We are awaiting urinalysis and reevaluation    ED COURSE      The patient was given the following medications:  No orders of the defined types were placed in this encounter.       RECENT VITALS:   BP: (!) 146/98  Pulse: 86  Resp: 18  Temp: 98 °F (36.7 °C) SpO2: 94 %    (Please note that portions of this note were completed with a voice recognition program.  Efforts were made to edit the dictations but occasionally words are mis-transcribed.)    MD Beny Rudolph  Attending Emergency Medicine Physician       Aniyah Granda MD  05/29/21 6317

## 2021-05-30 NOTE — ED NOTES
Patient to ED via self ambulatory to room 2  Patient here for complaint of cough and chest pain  Patient states that this has been going on for the past 4-5 days  Patient denies significant cardiac hx  Recently broke her foot falling down the steps and following up for potential surgery  Vitals obtained, EKG obtained, placed on monitor  IV started and blood work obtained  Respirations even and non-labored  No other needs at this time     Madonna Beltran RN  05/29/21 5943

## 2021-05-30 NOTE — ED PROVIDER NOTES
[address] [facility]     Emergency Department     Faculty Attestation    I performed a history and physical examination of the patient and discussed management with the resident. I reviewed the resident´s note and agree with the documented findings and plan of care. Any areas of disagreement are noted on the chart. I was personally present for the key portions of any procedures. I have documented in the chart those procedures where I was not present during the key portions. I have reviewed the emergency nurses triage note. I agree with the chief complaint, past medical history, past surgical history, allergies, medications, social and family history as documented unless otherwise noted below. For Physician Assistant/ Nurse Practitioner cases/documentation I have personally evaluated this patient and have completed at least one if not all key elements of the E/M (history, physical exam, and MDM). Additional findings are as noted. EKG Interpretation    Interpreted by emergency department physician    Rhythm: normal sinus   Rate: normal/88  Axis: normal 29  Ectopy: none  Conduction: QT corrected 488 ms  ST Segments: no acute change  T Waves: no acute change  Q Waves: none    Clinical Impression: Normal EKG except for prolonged QT corrected    Joe Hernandez, III     Joe Hernandez MD  05/29/21 2045    Recent fracture right ankle on Lovenox, chest clear, heart exam normal, no calf pain or swelling.      Joe Hernandez MD  05/29/21 2105

## 2021-05-31 LAB
MAGNESIUM: 2 MG/DL (ref 1.6–2.6)
TROPONIN INTERP: NORMAL
TROPONIN T: NORMAL NG/ML
TROPONIN, HIGH SENSITIVITY: 10 NG/L (ref 0–14)
TSH SERPL DL<=0.05 MIU/L-ACNC: 0.61 MIU/L (ref 0.3–5)

## 2021-05-31 PROCEDURE — 96375 TX/PRO/DX INJ NEW DRUG ADDON: CPT

## 2021-05-31 PROCEDURE — 96366 THER/PROPH/DIAG IV INF ADDON: CPT

## 2021-05-31 PROCEDURE — 6360000002 HC RX W HCPCS: Performed by: EMERGENCY MEDICINE

## 2021-05-31 PROCEDURE — 96372 THER/PROPH/DIAG INJ SC/IM: CPT

## 2021-05-31 PROCEDURE — 96376 TX/PRO/DX INJ SAME DRUG ADON: CPT

## 2021-05-31 PROCEDURE — 6370000000 HC RX 637 (ALT 250 FOR IP): Performed by: EMERGENCY MEDICINE

## 2021-05-31 PROCEDURE — 83735 ASSAY OF MAGNESIUM: CPT

## 2021-05-31 PROCEDURE — 94640 AIRWAY INHALATION TREATMENT: CPT

## 2021-05-31 PROCEDURE — G0378 HOSPITAL OBSERVATION PER HR: HCPCS

## 2021-05-31 PROCEDURE — 84484 ASSAY OF TROPONIN QUANT: CPT

## 2021-05-31 PROCEDURE — 36415 COLL VENOUS BLD VENIPUNCTURE: CPT

## 2021-05-31 PROCEDURE — 96365 THER/PROPH/DIAG IV INF INIT: CPT

## 2021-05-31 PROCEDURE — 2580000003 HC RX 258: Performed by: EMERGENCY MEDICINE

## 2021-05-31 PROCEDURE — 6370000000 HC RX 637 (ALT 250 FOR IP): Performed by: STUDENT IN AN ORGANIZED HEALTH CARE EDUCATION/TRAINING PROGRAM

## 2021-05-31 PROCEDURE — 93005 ELECTROCARDIOGRAM TRACING: CPT | Performed by: EMERGENCY MEDICINE

## 2021-05-31 RX ORDER — KETOROLAC TROMETHAMINE 15 MG/ML
15 INJECTION, SOLUTION INTRAMUSCULAR; INTRAVENOUS EVERY 6 HOURS
Status: DISCONTINUED | OUTPATIENT
Start: 2021-05-31 | End: 2021-06-03 | Stop reason: HOSPADM

## 2021-05-31 RX ORDER — OXYCODONE HYDROCHLORIDE 5 MG/1
5 TABLET ORAL EVERY 6 HOURS PRN
Status: DISCONTINUED | OUTPATIENT
Start: 2021-05-31 | End: 2021-06-01

## 2021-05-31 RX ORDER — MAGNESIUM SULFATE 1 G/100ML
1000 INJECTION INTRAVENOUS ONCE
Status: COMPLETED | OUTPATIENT
Start: 2021-05-31 | End: 2021-05-31

## 2021-05-31 RX ORDER — BUTALBITAL, ACETAMINOPHEN AND CAFFEINE 50; 325; 40 MG/1; MG/1; MG/1
1 TABLET ORAL EVERY 4 HOURS PRN
Status: DISCONTINUED | OUTPATIENT
Start: 2021-05-31 | End: 2021-06-03 | Stop reason: HOSPADM

## 2021-05-31 RX ORDER — ALBUTEROL SULFATE 2.5 MG/3ML
2.5 SOLUTION RESPIRATORY (INHALATION) 2 TIMES DAILY
Status: DISCONTINUED | OUTPATIENT
Start: 2021-06-01 | End: 2021-06-01

## 2021-05-31 RX ORDER — IPRATROPIUM BROMIDE AND ALBUTEROL SULFATE 2.5; .5 MG/3ML; MG/3ML
1 SOLUTION RESPIRATORY (INHALATION) 4 TIMES DAILY
Status: DISCONTINUED | OUTPATIENT
Start: 2021-05-31 | End: 2021-06-01

## 2021-05-31 RX ORDER — ACETAMINOPHEN 325 MG/1
650 TABLET ORAL EVERY 4 HOURS PRN
Status: DISCONTINUED | OUTPATIENT
Start: 2021-05-31 | End: 2021-06-03 | Stop reason: HOSPADM

## 2021-05-31 RX ORDER — ALBUTEROL SULFATE 2.5 MG/3ML
2.5 SOLUTION RESPIRATORY (INHALATION)
Status: DISCONTINUED | OUTPATIENT
Start: 2021-05-31 | End: 2021-05-31

## 2021-05-31 RX ADMIN — BUTALBITAL, ACETAMINOPHEN AND CAFFEINE 1 TABLET: 50; 325; 40 TABLET ORAL at 21:24

## 2021-05-31 RX ADMIN — BUTALBITAL, ACETAMINOPHEN AND CAFFEINE 1 TABLET: 50; 325; 40 TABLET ORAL at 11:30

## 2021-05-31 RX ADMIN — CYCLOBENZAPRINE 5 MG: 10 TABLET, FILM COATED ORAL at 10:05

## 2021-05-31 RX ADMIN — IPRATROPIUM BROMIDE AND ALBUTEROL SULFATE 3 ML: .5; 3 SOLUTION RESPIRATORY (INHALATION) at 11:49

## 2021-05-31 RX ADMIN — ACETAMINOPHEN 650 MG: 325 TABLET ORAL at 14:48

## 2021-05-31 RX ADMIN — SODIUM CHLORIDE, PRESERVATIVE FREE 10 ML: 5 INJECTION INTRAVENOUS at 00:01

## 2021-05-31 RX ADMIN — GABAPENTIN 600 MG: 300 CAPSULE ORAL at 10:05

## 2021-05-31 RX ADMIN — BUTALBITAL, ACETAMINOPHEN AND CAFFEINE 1 TABLET: 50; 325; 40 TABLET ORAL at 17:33

## 2021-05-31 RX ADMIN — SODIUM CHLORIDE, PRESERVATIVE FREE 10 ML: 5 INJECTION INTRAVENOUS at 20:02

## 2021-05-31 RX ADMIN — BENZONATATE 100 MG: 100 CAPSULE ORAL at 10:06

## 2021-05-31 RX ADMIN — AZITHROMYCIN 250 MG: 250 TABLET, FILM COATED ORAL at 10:06

## 2021-05-31 RX ADMIN — SODIUM CHLORIDE, PRESERVATIVE FREE 10 ML: 5 INJECTION INTRAVENOUS at 10:10

## 2021-05-31 RX ADMIN — BENZONATATE 100 MG: 100 CAPSULE ORAL at 14:48

## 2021-05-31 RX ADMIN — CLONIDINE HYDROCHLORIDE 0.1 MG: 0.1 TABLET ORAL at 20:00

## 2021-05-31 RX ADMIN — KETOROLAC TROMETHAMINE 15 MG: 15 INJECTION, SOLUTION INTRAMUSCULAR; INTRAVENOUS at 20:01

## 2021-05-31 RX ADMIN — ONDANSETRON 4 MG: 4 TABLET, ORALLY DISINTEGRATING ORAL at 11:23

## 2021-05-31 RX ADMIN — IPRATROPIUM BROMIDE AND ALBUTEROL SULFATE 3 ML: .5; 3 SOLUTION RESPIRATORY (INHALATION) at 04:46

## 2021-05-31 RX ADMIN — MAGNESIUM SULFATE HEPTAHYDRATE 1000 MG: 1 INJECTION, SOLUTION INTRAVENOUS at 11:38

## 2021-05-31 RX ADMIN — KETOROLAC TROMETHAMINE 15 MG: 15 INJECTION, SOLUTION INTRAMUSCULAR; INTRAVENOUS at 14:47

## 2021-05-31 RX ADMIN — OXYCODONE HYDROCHLORIDE 5 MG: 5 TABLET ORAL at 17:34

## 2021-05-31 RX ADMIN — OXYCODONE HYDROCHLORIDE 5 MG: 5 TABLET ORAL at 11:29

## 2021-05-31 RX ADMIN — GABAPENTIN 600 MG: 300 CAPSULE ORAL at 14:48

## 2021-05-31 RX ADMIN — LISINOPRIL 20 MG: 20 TABLET ORAL at 10:05

## 2021-05-31 RX ADMIN — GABAPENTIN 600 MG: 300 CAPSULE ORAL at 20:00

## 2021-05-31 RX ADMIN — FAMOTIDINE 20 MG: 20 TABLET, FILM COATED ORAL at 10:04

## 2021-05-31 RX ADMIN — IPRATROPIUM BROMIDE AND ALBUTEROL SULFATE 3 ML: .5; 3 SOLUTION RESPIRATORY (INHALATION) at 08:10

## 2021-05-31 RX ADMIN — CYCLOBENZAPRINE 5 MG: 10 TABLET, FILM COATED ORAL at 17:33

## 2021-05-31 RX ADMIN — ACETAMINOPHEN 650 MG: 325 TABLET ORAL at 10:06

## 2021-05-31 RX ADMIN — ONDANSETRON 4 MG: 4 TABLET, ORALLY DISINTEGRATING ORAL at 17:33

## 2021-05-31 RX ADMIN — RISPERIDONE 2 MG: 2 TABLET, FILM COATED ORAL at 21:24

## 2021-05-31 RX ADMIN — IPRATROPIUM BROMIDE AND ALBUTEROL SULFATE 3 ML: .5; 3 SOLUTION RESPIRATORY (INHALATION) at 15:31

## 2021-05-31 RX ADMIN — PREDNISONE 40 MG: 20 TABLET ORAL at 10:05

## 2021-05-31 RX ADMIN — ENOXAPARIN SODIUM 40 MG: 40 INJECTION, SOLUTION INTRAVENOUS; SUBCUTANEOUS at 14:49

## 2021-05-31 ASSESSMENT — PAIN SCALES - GENERAL
PAINLEVEL_OUTOF10: 0
PAINLEVEL_OUTOF10: 0
PAINLEVEL_OUTOF10: 6
PAINLEVEL_OUTOF10: 7
PAINLEVEL_OUTOF10: 6
PAINLEVEL_OUTOF10: 6
PAINLEVEL_OUTOF10: 4
PAINLEVEL_OUTOF10: 6
PAINLEVEL_OUTOF10: 6

## 2021-05-31 ASSESSMENT — ENCOUNTER SYMPTOMS
FACIAL SWELLING: 0
COUGH: 1
BACK PAIN: 0
VOMITING: 0
CONSTIPATION: 0
NAUSEA: 1
CHEST TIGHTNESS: 1
SHORTNESS OF BREATH: 1
DIARRHEA: 0
ABDOMINAL DISTENTION: 0
COLOR CHANGE: 0
APNEA: 0
ABDOMINAL PAIN: 0

## 2021-05-31 NOTE — CONSULTS
[facility]    Consult                        Today's Date: 5/31/2021  Patient Name: Joao Bagley  Date of admission: 5/30/2021  6:32 PM  Patient's age: 60 y.o., 1965  Admission Dx: Chest pain [R07.9]    Reason for Consult:  {Tired    Requesting Physician: Lilli Montoya MD    CHIEF COMPLAINT:  Tired. History Obtained From:  patient    HISTORY OF PRESENT ILLNESS:      The patient is a 60 y.o.  female who is admitted to the hospital for Tired. The patient has been feeling tired, fatigue, chest pain worse with deep breathing and coughing. Not exertional, felt little dizzy and no syncope. No palpitations. Past Medical History:   has a past medical history of Arthritis, Asthma, Attention deficit hyperactivity disorder (ADHD), combined type, Borderline diabetes, Borderline personality disorder ([address] CHF (congestive heart failure) ([address] COPD (chronic obstructive pulmonary disease) ([address] Fibromyalgia, Headache, Hypertension, Manic depression ([address] Movement disorder, Neck fracture ([address] Pernicious anemia, Seizure ([address] and Thyroid disease. Past Surgical History:   has a past surgical history that includes knee surgery (Bilateral); partial hysterectomy (cervix not removed); lymph node dissection; Irrigation and debridement (Right, 5/17/2019); EXPLORATION OF WOUND OF EXTREMITY (Right, 5/19/2019); and EXPLORATION OF WOUND OF EXTREMITY (N/A, 5/22/2019). Home Medications:    Prior to Admission medications    Medication Sig Start Date End Date Taking? Authorizing Provider   benzonatate (TESSALON PERLES) 100 MG capsule Take 1 capsule by mouth 3 times daily as needed for Cough 5/29/21 6/5/21 Yes Lino Valerio,    gabapentin (NEURONTIN) 300 MG capsule TAKE 2 CAPSULES BY MOUTH 3 TIMES DAILY FOR 30 DAYS.   5/24/21 6/23/21 Yes KEANU Tyler - CNP   lisinopril (PRINIVIL;ZESTRIL) 20 MG tablet TAKE 1 TABLET BY MOUTH DAILY  5/24/21 6/23/21 Yes KEANU Tyler - CNP   ASPIRIN ADULT LOW STRENGTH 81 MG EC tablet TAKE 1 TABLET BY MOUTH ONCE DAILY  4/5/21  Yes KEANU Reed - CNP   cetirizine (ZYRTEC) 10 MG tablet Take 1 tablet by mouth daily 3/11/21  Yes Sarwat Watkins, KEANU - CNP   levothyroxine (SYNTHROID) 100 MCG tablet Take 1 tablet by mouth Daily 2/19/21  Yes Sarwat Watkins, KEANU - FELIX   budesonide-formoterol [facility]) 160-4.5 MCG/ACT AERO Inhale 2 puffs into the lungs 2 times daily 2/11/21  Yes Sarwat Watkins, KEANU - FELIX   ipratropium-albuterol (DUONEB) 0.5-2.5 (3) MG/3ML SOLN nebulizer solution Inhale 3 mLs into the lungs every 6 hours as needed for Shortness of Breath 2/11/21  Yes Sarwat Watkins APRRAF - CNP   cyclobenzaprine (FLEXERIL) 5 MG tablet TAKE 1 TABLET BY MOUTH 2 TIMES DAILY AS NEEDED FOR MUSCLE SPASMS (DON'T TAKE W/ Teola Fee) 1/19/21  Yes Sarwat Watkins APRRAF - CNP   albuterol sulfate  (90 Base) MCG/ACT inhaler Inhale 1 puff into the lungs every 6 hours as needed for Wheezing Gap prescription until follow up with primary. Do not refill.   Follow up with primary 12/18/20  Yes Sarwat Watkins, KEANU - CNP   famotidine (PEPCID) 20 MG tablet Take 1 tablet by mouth 2 times daily as needed (for acid reflux) 10/26/20  Yes Sarwat Watkins APRRAF - CNP   risperiDONE (RISPERDAL) 1 MG tablet Take 1 tablet by mouth daily and 2 tablets by mouth at bedtime 10/22/20  Yes Sarwat Watkins, APRN - CNP   traZODone (DESYREL) 150 MG tablet Take 1 tablet by mouth nightly 10/22/20  Yes Sarwat Watkins, APRN - CNP   cloNIDine (CATAPRES) 0.1 MG tablet Take 1 tablet by mouth nightly 10/22/20  Yes Sarwat Watkins, APRN - CNP   Benzocaine-Menthol (CEPACOL) 6-10 MG LOZG lozenge Take 1 lozenge by mouth every 2 hours as needed for Sore Throat 10/22/20  Yes KEANU Reed CNP   tiZANidine (ZANAFLEX) 2 MG tablet Take 2 tablets by mouth every 8 hours as needed (muscle spasm, neck pain) 10/9/20  Yes KEANU Reed CNP   albuterol (PROVENTIL) (2.5 MG/3ML) 0.083% nebulizer solution Take 3 mLs by nebulization every 6 hours as needed for Wheezing 6/18/20  Yes KEANU Liao CNP   ipratropium-albuterol (DUONEB) 0.5-2.5 (3) MG/3ML SOLN nebulizer solution Inhale 3 mLs into the lungs every 4 hours 6/16/19  Yes Kaitlynn Urbano MD   ibuprofen (ADVIL;MOTRIN) 600 MG tablet Take 1 tablet by mouth every 6 hours as needed for Pain 5/29/21 6/5/21  Janay Manzano,    enoxaparin (LOVENOX) 40 MG/0.4ML injection Inject 0.4 mLs into the skin daily 5/18/21   Femi Augustin MD   enoxaparin (LOVENOX) 40 MG/0.4ML injection Inject 0.4 mLs into the skin daily 5/18/21   Femi Augustin MD   naproxen (NAPROSYN) 500 MG tablet Take 1 tablet by mouth 2 times daily (with meals) 5/16/21   Suman Mckeon MD   acetaminophen (TYLENOL) 500 MG tablet Take 2 tablets by mouth 4 times daily as needed for Pain 5/15/21   Bassem Rogers,    butalbital-aspirin-caffeine (FIORINAL) -40 MG per capsule Take 1 capsule by mouth every 4 hours as needed for Headaches for up to 30 days. 5/14/21 6/13/21  KEANU Liao CNP   fluticasone Loral Aschoff) 50 MCG/ACT nasal spray 2 sprays by Each Nostril route daily 5/14/21   KEANU Liao CNP   ondansetron (ZOFRAN) 4 MG tablet Take 1 tablet by mouth every 8 hours as needed for Nausea or Vomiting 5/8/21   Owen Markham MD   ondansetron (ZOFRAN ODT) 4 MG disintegrating tablet Take 1 tablet by mouth every 8 hours as needed for Nausea 4/28/21   Farhat Dunlap DO   ondansetron (ZOFRAN ODT) 4 MG disintegrating tablet Take 1 tablet by mouth every 8 hours as needed for Nausea 4/19/21   KEANU Liao CNP   ammonium lactate (LAC-HYDRIN) 12 % lotion Apply topically daily.  1/6/21   Fabrice Finley DPM   Ciclopirox (LOPROX) 0.77 % gel Apply to skin twice a day 1/6/21   Fabrice Finley DPM   hydrOXYzine (ATARAX) 50 MG tablet  12/14/20   Historical Provider, MD   Nebulizers (NEBULIZER COMPRESSOR) MISC Daily as needed 12/18/20 KEANU Jackson CNP   Respiratory Therapy Supplies (NEBULIZER/TUBING/MOUTHPIECE) KIT Daily as needed 12/18/20   KEANU Jackson CNP   Blood Pressure KIT Daily as needed 12/18/20   KEANU Jackson CNP   Misc. Devices (CANE) MISC Quad base cane 11/28/20   Oswald Man MD   clomiPRAMINE (ANAFRANIL) 50 MG capsule Take 1 capsule by mouth nightly 10/26/20   KEANU Jackson CNP   Elastic Bandages & Supports (CARPAL TUNNEL WRIST STABILIZER) [address] [facility] Apply to each wrist at bedtime 10/9/20   KEANU Jackson CNP   methyl salicylate-menthol (PAULETTE BRAY GREASELESS) 10-15 % CREA Apply topically 3 times daily as needed for Pain 9/21/20   Flora Boyd MD       Allergies:  Imitrex [sumatriptan], Bee pollen, Bee venom, Bromide ion [bromine], Reglan [metoclopramide], Sulfa antibiotics, Sulfadiazine, and Tramadol    Social History:   reports that she has been smoking. She has a 6.00 pack-year smoking history. She has never used smokeless tobacco. She reports previous drug use. She reports that she does not drink alcohol. Family History: family history is not on file. No h/o sudden cardiac death. No for premature CAD    REVIEW OF SYSTEMS:    · Constitutional: there has been no unanticipated weight loss. There's been Yes change in energy level, Yes change in activity level. · Eyes: No visual changes or diplopia. No scleral icterus. · ENT: No Headaches, hearing loss or vertigo. No mouth sores or sore throat. · Cardiovascular: Yes chest pain, Yes dyspnea on exertion, No palpitations or No loss of consciousness. No cough, hemoptysis, Yes pleuritic pain, or phlebitis. · Respiratory: Yes cough or wheezing, no sputum production. No hematemesis. · Gastrointestinal: No abdominal pain, appetite loss, blood in stools. No change in bowel or bladder habits. · Genitourinary: No dysuria, trouble voiding, or hematuria.   · Musculoskeletal:  No gait disturbance, Yes weakness or joint complaints. · Integumentary: No rash or pruritis. · Neurological: No headache, diplopia, change in muscle strength, numbness or tingling. No change in gait, balance, coordination, mood, affect, memory, mentation, behavior. · Psychiatric: No anxiety, or depression. · Endocrine: No temperature intolerance. No excessive thirst, fluid intake, or urination. No tremor. · Hematologic/Lymphatic: No abnormal bruising or bleeding, blood clots or swollen lymph nodes. · Allergic/Immunologic: No nasal congestion or hives. PHYSICAL EXAM:      /78   Pulse 79   Temp 99.1 °F (37.3 °C) (Oral)   Resp 14   Ht 5' 2.5\" (1.588 m)   Wt 209 lb 12.8 oz (95.2 kg)   SpO2 91%   BMI 37.76 kg/m²    Constitutional and General Appearance: alert, cooperative, no distress and appears stated age  HEENT: PERRL, no cervical lymphadenopathy. No masses palpable. Normal oral mucosa  Respiratory:  · Normal excursion and expansion without use of accessory muscles  · Resp Auscultation: Good respiratory effort. No for increased work of breathing. On auscultation: clear to auscultation bilaterally  Cardiovascular:  · The apical impulse is not displaced  · Heart tones are crisp and normal. regular S1 and S2.  · Jugular venous pulsation Normal  · The carotid upstroke is normal in amplitude and contour without delay or bruit  · Peripheral pulses are symmetrical and full  Abdomen:  · No masses or tenderness  · Bowel sounds present  Extremities:  ·  No Cyanosis or Clubbing  ·  Lower extremity edema: No  · Skin: Warm and dry  Neurological:  · Alert and oriented. · Moves all extremities well  · No abnormalities of mood, affect, memory, mentation, or behavior are noted    DATA:    Diagnostics:    EKG:NSR. No ST or  T wave changes    Echo 12/21/2020  Summary  Left ventricle is normal in size with normal systolic function globally. Calculated ejection fraction is 66%  Left atrium is normal in size.   Mitral valve structure is normal.  Mild mitral regurgitation. Tricuspid valve structure is normal.  Trace tricuspid regurgitation. Estimated right ventricular systolic pressure is 35 mmHg. No significant pericardial effusion is seen. Stress test 8/2020  1. No definitive scintigraphic evidence for reversible ischemia or infarct. 2. Left ventricular ejection fraction of greater than 70%. 3.  Please see report for EKG portion of the examination which will be   performed separately by physician from cardiology. Risk stratification:  Low risk       Labs:     CBC:   Recent Labs     05/29/21 2113   WBC 9.2   HGB 15.2*   HCT 45.6   PLT See Reflexed IPF Result     BMP:   Recent Labs     05/29/21 2113 05/30/21 2010    137   K 3.9 4.1   CO2 23 25   BUN 5* 6   CREATININE 0.62 0.58   LABGLOM >60 >60   GLUCOSE 112* 99     BNP: No results for input(s): BNP in the last 72 hours. PT/INR: No results for input(s): PROTIME, INR in the last 72 hours. APTT:No results for input(s): APTT in the last 72 hours. CARDIAC ENZYMES:  Recent Labs     05/30/21 2010   CKTOTAL 75     FASTING LIPID PANEL:  Lab Results   Component Value Date    HDL 56 02/19/2019    TRIG 150 02/19/2019     LIVER PROFILE:No results for input(s): AST, ALT, LABALBU in the last 72 hours. IMPRESSION/RECOMMENDATIONS:  Chest pain. Atypical and of noncardiac etiology. Stress test on 8/2020 normal perfusion and normal EF. Tired and fatigue. No signs of acute CHF. R/O Viral illness. Echo on 12/2020 reviewed. No further cardiac work up. Will follow as needed. Discussed with patient and Nurse.     Kenney Joy MD, MD  [facility]           [phone]

## 2021-05-31 NOTE — ED NOTES
Report given to [address] [place] [address]  All questions answered   Pt good to go to [address] [place] [address]  05/30/21 8753

## 2021-05-31 NOTE — CARE COORDINATION
Case Management Initial Discharge Plan  Aleta Mcdonough,             Met with:patient to discuss discharge plans. Information verified: address, contacts, phone number, , insurance Yes    Emergency Contact/Next of Kin name & number:   Nu [place] No    Other  Child [phone]         PCP: Faisal Patton, KEANU - CNP  Date of last visit: 1 month  [facility] [address] [facility] Provider:     Discharge Planning    Living Arrangements:  Friends   Support Systems:  Friends/Neighbors    Home has 2 stories  8 stairs to climb to get into front door, 14stairs to climb to reach second floor  Location of bedroom/bathroom in home upper    Patient able to perform ADL's:Independent    Current Services (outpatient & in home) none  DME equipment: cane  DME provider: none    Receiving oral anticoagulation therapy? Yes coumadin    If indicated:   Physician managing anticoagulation treatment: Dr Aneudy Mukherjee  Where does patient obtain lab work for ATC treatment? Potential Assistance Needed:  N/A    Patient agreeable to home care: No  [place] of choice provided:  n/a    Prior SNF/Rehab Placement and Facility: none  Agreeable to SNF/Rehab: No  [place] of choice provided: n/a     Evaluation: no    Expected Discharge date:       Patient expects to be discharged to:  home  Follow Up Appointment: Best Day/ Time:      Transportation provider: nelly  Transportation arrangements needed for discharge: Yes    Readmission Risk              Risk of Unplanned Readmission:  0             Does patient have a readmission risk score greater than 14?: n/a  If yes, follow-up appointment must be made within 7 days of discharge.      Goals of Care: feel better      Discharge Plan: Home independently pcp established will need cab home          Electronically signed by Indiana Hoffman RN on 21 at 8:15 AM EDT

## 2021-05-31 NOTE — ED PROVIDER NOTES
FACULTY SIGN-OUT  ADDENDUM       Patient: Alise Black   MRN: [id number]  PCP:  KEANU Kennedy CNP  Attestation  I was available and discussed any additional care issues that arose and coordinated the management plans with the resident(s) caring for the patient during my duty period. Any areas of disagreement with resident's documentation of care or procedures are noted on the chart. I was personally present for the key portions of any/all procedures during my duty period. I have documented in the chart those procedures where I was not present during the key portions. The patient's initial evaluation and plan have been discussed with the prior provider who initially evaluated the patient. Pertinent Comments: The patient is a 60 y.o. female taken in signout with being seen yesterday as well for fatigue and negative work-up discharged home feeling better yesterday. Symptoms returned and repeat work-up obtained. We are awaiting results but likely admission for possible anginal equivalent even if negative given age and risk factors, and return of symptoms.       ED COURSE      The patient was given the following medications:  Orders Placed This Encounter   Medications    ipratropium-albuterol (DUONEB) nebulizer solution 1 ampule    predniSONE (DELTASONE) tablet 60 mg    acetaminophen (TYLENOL) tablet 1,000 mg    ibuprofen (ADVIL;MOTRIN) tablet 800 mg    aspirin chewable tablet 324 mg       RECENT VITALS:   BP: 115/81  Pulse: 89  Resp: 20  Temp: 100.2 °F (37.9 °C) SpO2: 92 %    (Please note that portions of this note were completed with a voice recognition program.  Efforts were made to edit the dictations but occasionally words are mis-transcribed.)    MD Hernandez Pineda  Attending Emergency Medicine Physician       Enrique Toledo MD  05/30/21 6781

## 2021-05-31 NOTE — H&P
[address]  CDU / OBSERVATION ENCOUNTER  RESIDENT NOTE     Pt Name: Alise Black  MRN: [id number]  [place] 1965  Date of evaluation: 5/31/21  Patient's PCP is :  KEANU Kennedy CNP    CHIEF COMPLAINT       Chief Complaint   Patient presents with    Fatigue         HISTORY OF PRESENT ILLNESS    Aleta Guzman is a 60 y.o. female who presents with midsternal chest pain and fatigue. Patient seen and examined at bedside, sitting up. Patient noted to have a cam boot on right foot, states she broke her tib-fib 2 weeks ago. Patient states that she has had chest pain going on for about 1 week, describes it worsening with associated cough. Patient was worked up in emergency department and noted to have normal D-dimer, troponins that were downtrending, elevated CRP and elevated myoglobin. Patient was admitted for the observation unit for further work-up. Patient reports having midsternal chest pain, worse with inspiration, associated cough. Cough noted to be nonproductive. Patient states she has some nausea, no emesis. Patient denies any abdominal pain, change in urination, change in bowel habits, fever or chills. Location/Symptom: Midsternal chest pain  Timing/Onset: 1 week  Provocation: Unknown  Quality: Sharp stabbing  Radiation: None  Severity: Moderate to severe  Timing/Duration: Comes and goes last half an hour to an hour  Modifying Factors: Pain medication    REVIEW OF SYSTEMS       Review of Systems   Constitutional: Positive for fatigue. Negative for chills and fever. HENT: Negative for facial swelling and nosebleeds. Respiratory: Positive for cough, chest tightness and shortness of breath. Negative for apnea. Cardiovascular: Positive for chest pain. Negative for leg swelling. Gastrointestinal: Positive for nausea. Negative for abdominal distention, abdominal pain, constipation, diarrhea and vomiting.    Genitourinary: Negative for difficulty urinating and dysuria. Musculoskeletal: Negative for back pain. Skin: Negative for color change. Neurological: Negative for dizziness, facial asymmetry, weakness, light-headedness, numbness and headaches. Psychiatric/Behavioral: Negative for agitation, behavioral problems, confusion and suicidal ideas. (PQRS) Advance directives on face sheet per hospital policy. No change unless specifically mentioned in chart    PAST MEDICAL HISTORY    has a past medical history of Arthritis, Asthma, Attention deficit hyperactivity disorder (ADHD), combined type, Borderline diabetes, Borderline personality disorder ([address] CHF (congestive heart failure) ([address] COPD (chronic obstructive pulmonary disease) ([address] Fibromyalgia, Headache, Hypertension, Manic depression ([address] Movement disorder, Neck fracture ([address] Pernicious anemia, Seizure ([address] and Thyroid disease. I have reviewed the past medical history with the patient and it is pertinent to this complaint. SURGICAL HISTORY      has a past surgical history that includes knee surgery (Bilateral); partial hysterectomy (cervix not removed); lymph node dissection; Irrigation and debridement (Right, 5/17/2019); EXPLORATION OF WOUND OF EXTREMITY (Right, 5/19/2019); and EXPLORATION OF WOUND OF EXTREMITY (N/A, 5/22/2019). I have reviewed and agree with Surgical History entered and it is pertinent to this complaint.      CURRENT MEDICATIONS     ipratropium-albuterol (DUONEB) nebulizer solution 3 mL, 4x daily  [START ON 6/1/2021] albuterol (PROVENTIL) nebulizer solution 2.5 mg, BID  albuterol (PROVENTIL) nebulizer solution 2.5 mg, Q6H PRN  benzonatate (TESSALON) capsule 100 mg, TID PRN  cloNIDine (CATAPRES) tablet 0.1 mg, Nightly  cyclobenzaprine (FLEXERIL) tablet 5 mg, TID PRN  enoxaparin (LOVENOX) injection 40 mg, Daily  famotidine (PEPCID) tablet 20 mg, BID PRN  gabapentin (NEURONTIN) capsule 600 mg, TID  lisinopril (PRINIVIL;ZESTRIL) tablet 20 mg, Daily  ondansetron (ZOFRAN-ODT) disintegrating tablet 4 mg, Q8H PRN  risperiDONE (RISPERDAL) tablet 2 mg, Nightly  sodium chloride flush 0.9 % injection 5-40 mL, 2 times per day  sodium chloride flush 0.9 % injection 5-40 mL, PRN  0.9 % sodium chloride infusion, PRN  acetaminophen (TYLENOL) tablet 650 mg, Q4H PRN  azithromycin (ZITHROMAX) tablet 250 mg, Daily  predniSONE (DELTASONE) tablet 40 mg, Daily        All medication charted and reviewed. ALLERGIES     is allergic to imitrex [sumatriptan], bee pollen, bee venom, bromide ion [bromine], reglan [metoclopramide], sulfa antibiotics, sulfadiazine, and tramadol. FAMILY HISTORY     She indicated that her mother is . She indicated that her father is . family history is not on file. The patient denies any pertinent family history. I have reviewed and agree with the family history entered. I have reviewed the Family History and it is not significant to the case    SOCIAL HISTORY      reports that she has been smoking. She has a 6.00 pack-year smoking history. She has never used smokeless tobacco. She reports previous drug use. She reports that she does not drink alcohol. I have reviewed and agree with all Social.  There are concerns for substance abuse/use. PHYSICAL EXAM     INITIAL VITALS:  height is 5' 2.5\" (1.588 m) and weight is 209 lb 12.8 oz (95.2 kg). Her oral temperature is 99.1 °F (37.3 °C). Her blood pressure is 115/78 and her pulse is 79. Her respiration is 14 and oxygen saturation is 91%. Physical Exam  Constitutional:       Appearance: Normal appearance. HENT:      Head: Normocephalic and atraumatic. Mouth/Throat:      Mouth: Mucous membranes are moist.      Pharynx: Oropharynx is clear. Eyes:      Extraocular Movements: Extraocular movements intact. Conjunctiva/sclera: Conjunctivae normal.   Cardiovascular:      Rate and Rhythm: Normal rate and regular rhythm. Pulses: Normal pulses.       Heart sounds: Normal heart sounds. No murmur heard. Pulmonary:      Effort: Pulmonary effort is normal.      Breath sounds: Normal breath sounds. Chest:      Chest wall: Tenderness present. Abdominal:      General: Bowel sounds are normal. There is no distension. Tenderness: There is no abdominal tenderness. There is no guarding. Musculoskeletal:         General: Normal range of motion. Comments: Range of motion noted to be normal with patient's natural movements   Skin:     General: Skin is warm and dry. Findings: No rash (On exposed skin). Neurological:      General: No focal deficit present. Mental Status: She is alert and oriented to person, place, and time. Psychiatric:         Mood and Affect: Mood normal.         Behavior: Behavior normal.           DIFFERENTIAL DIAGNOSIS/MDM:     Patient presenting with downtrending troponins, noted to have elevated myoglobin and might of elevated CRP. Patient is EKG demonstrates prolonged QT, none no other issues. Patient is chest pain is been going on about a week, noted to have COPD, patient on auscultation moving air without difficulty, no wheezes or rhonchi noted. Patient recently received breathing treatment prior to my auscultation. Patient currently being treated for COPD exacerbation, pericarditis not fully ruled out. Patient noted to have a normal dimer and be receiving Lovenox shots on a regular basis, patient not likely having a pulmonary embolism. We will continue continue to evaluate patient and treat patient's pain. Cardiology evaluated patient and said no further cardiac testing or from their perspective.     DIAGNOSTIC RESULTS     EKG: All EKG's are interpreted by the Observation Physician who either signs or Co-signs this chart in the absence of a cardiologist.    EKG Interpretation    May 31, 2021 at [address]  Interpreted by observation physician    Rhythm: normal sinus   Rate: normal  Axis: normal  Ectopy: none  Conduction: QT prolongation  ST Segments: nonspecific changes  T Waves: normal  Q Waves: I and aVl    Clinical Impression: non-specific EKG    Saturnino Lobo DO        RADIOLOGY:   I directly visualized the following  images and reviewed the radiologist interpretations:    XR CHEST PORTABLE    Result Date: 5/30/2021  EXAMINATION: ONE XRAY VIEW OF THE CHEST 5/30/2021 7:11 pm COMPARISON: 05/29/2021 HISTORY: ORDERING SYSTEM PROVIDED HISTORY: chest pain TECHNOLOGIST PROVIDED HISTORY: chest pain Reason for Exam: fatigue. upr Acuity: Unknown Type of Exam: Unknown FINDINGS: Heart size and configuration are normal.  Hilar and mediastinal structures are unremarkable. The lungs are clear. No pneumothorax or pleural fluid. No acute bone finding. Portable study is limited by body habitus. No acute cardiopulmonary disease. LABS:  I have reviewed and interpreted all available lab results.   Labs Reviewed   TROPONIN - Abnormal; Notable for the following components:       Result Value    Troponin, High Sensitivity 19 (*)     All other components within normal limits   C-REACTIVE PROTEIN - Abnormal; Notable for the following components:    CRP 34.6 (*)     All other components within normal limits   MYOGLOBIN, SERUM - Abnormal; Notable for the following components:    Myoglobin 62 (*)     All other components within normal limits   TROPONIN - Abnormal; Notable for the following components:    Troponin, High Sensitivity 16 (*)     All other components within normal limits   COVID-19, RAPID   BASIC METABOLIC PANEL W/ REFLEX TO MG FOR LOW K   D-DIMER, QUANTITATIVE   FERRITIN   CK   BRAIN NATRIURETIC PEPTIDE   TROPONIN   TSH WITH REFLEX       SCREENING TOOLS:    HEART Risk Score for Chest Pain Patients   History and Physical Exam Suspicion Level  (Nausea, Vomiting, Diaphoresis, Radiation, Exertion)   Slightly Suspicious (0 pts)   Moderately Suspicious (1 pt)   Highly Suspicious (2 pts)   EKG Interpretation   Normal (0 pts)   Non-Specific Repolarization Disturbance (1 pt)   Significant ST-Depression (2 pts)   Age of Patient (in years)   = 39 (0 pts)   46-64 (1 pt)   = 65 (2 pts)   Risk Factors   No Risk Factors (0 pts)   1-2 Risk Factors (1 pt)   = 3 Risk Factors (2 pts)   Risk Factors Include:   Hypercholesterolemia   Hypertension   Diabetes Mellitus   Cigarette smoking   Positive family history   Obesity   CAD   (SLE, CKDz, HIV, Cocaine abuse)   Troponin Levels   = Normal Limit (0 pts)   1-3 Times Normal Limit (1 pt)   > 3 Times Normal Limit (2 pts)  TOTAL:    Percent Risk for Major Adverse Cardiac Event (MACE)  0-3 pts indicates low risk for MACE   2.5% (DISCHARGE)   4-7 pts indicates moderate risk for MACE  20.3% (OBS)  8-10 pts indicates high risk for MACE  72.7% (EARLY INVASIVE TX)    CDU HENNA / Max Mckeon is a 60 y.o. female who presents with acute chest pain with concerns for COPD exacerbation    · Respiratory therapy evaluation and treatment continue aerosols. · We will provide prednisone plan to discharge with prednisone burst.  · Pain control with anti-inflammatories. · Cardiac, consultation-no further cardiology testing recommended and have signed off. · Continue home medications and pain control  · Monitor vitals, labs, and imaging  · DISPO: pending consults and clinical improvement    CONSULTS:    IP CONSULT TO CARDIOLOGY    PROCEDURES:  Not indicated       PATIENT REFERRED TO:    No follow-up provider specified. --  [facility]   Emergency Medicine Resident     This dictation was generated by voice recognition computer software. Although all attempts are made to edit the dictation for accuracy, there may be errors in the transcription that are not intended.

## 2021-05-31 NOTE — PROGRESS NOTES
Kary Cardozo SR, [facility]. Chest pain [R07.9] . Vitals:    05/30/21 2319   BP: 115/78   Pulse: 79   Resp: 14   Temp: 99.1 °F (37.3 °C)   SpO2: 91%   . Patients home meds are   Prior to Admission medications    Medication Sig Start Date End Date Taking? Authorizing Provider   benzonatate (TESSALON PERLES) 100 MG capsule Take 1 capsule by mouth 3 times daily as needed for Cough 5/29/21 6/5/21 Yes Lino Valerio DO   gabapentin (NEURONTIN) 300 MG capsule TAKE 2 CAPSULES BY MOUTH 3 TIMES DAILY FOR 30 DAYS. 5/24/21 6/23/21 Yes KEANU Massey CNP   lisinopril (PRINIVIL;ZESTRIL) 20 MG tablet TAKE 1 TABLET BY MOUTH DAILY  5/24/21 6/23/21 Yes KEANU Massey CNP   ASPIRIN ADULT LOW STRENGTH 81 MG EC tablet TAKE 1 TABLET BY MOUTH ONCE DAILY  4/5/21  Yes KEANU Massey CNP   cetirizine (ZYRTEC) 10 MG tablet Take 1 tablet by mouth daily 3/11/21  Yes KEANU Massey CNP   levothyroxine (SYNTHROID) 100 MCG tablet Take 1 tablet by mouth Daily 2/19/21  Yes KEANU Massey CNP   budesonide-formoterol [facility]) 160-4.5 MCG/ACT AERO Inhale 2 puffs into the lungs 2 times daily 2/11/21  Yes KEANU Massey CNP   ipratropium-albuterol (DUONEB) 0.5-2.5 (3) MG/3ML SOLN nebulizer solution Inhale 3 mLs into the lungs every 6 hours as needed for Shortness of Breath 2/11/21  Yes KEANU Massey CNP   cyclobenzaprine (FLEXERIL) 5 MG tablet TAKE 1 TABLET BY MOUTH 2 TIMES DAILY AS NEEDED FOR MUSCLE SPASMS (DON'T TAKE W/ Ann-Marie Gomez) 1/19/21  Yes KEANU Massey CNP   albuterol sulfate  (90 Base) MCG/ACT inhaler Inhale 1 puff into the lungs every 6 hours as needed for Wheezing Gap prescription until follow up with primary. Do not refill.   Follow up with primary 12/18/20  Yes KEANU Massey - CNP   famotidine (PEPCID) 20 MG tablet Take 1 tablet by mouth 2 times daily as needed (for acid reflux) 10/26/20  Yes KEANU Massey - CNP   risperiDONE (RISPERDAL) 1 MG tablet Take 1 tablet by mouth daily and 2 tablets by mouth at bedtime 10/22/20  Yes KEANU Blanco CNP   traZODone (DESYREL) 150 MG tablet Take 1 tablet by mouth nightly 10/22/20  Yes KEANU Blanco CNP   cloNIDine (CATAPRES) 0.1 MG tablet Take 1 tablet by mouth nightly 10/22/20  Yes KEANU Blanco CNP   Benzocaine-Menthol (CEPACOL) 6-10 MG LOZG lozenge Take 1 lozenge by mouth every 2 hours as needed for Sore Throat 10/22/20  Yes KEANU Blanco CNP   tiZANidine (ZANAFLEX) 2 MG tablet Take 2 tablets by mouth every 8 hours as needed (muscle spasm, neck pain) 10/9/20  Yes KEANU Blanco CNP   albuterol (PROVENTIL) (2.5 MG/3ML) 0.083% nebulizer solution Take 3 mLs by nebulization every 6 hours as needed for Wheezing 6/18/20  Yes KEANU Blanco CNP   ipratropium-albuterol (DUONEB) 0.5-2.5 (3) MG/3ML SOLN nebulizer solution Inhale 3 mLs into the lungs every 4 hours 6/16/19  Yes Jordy Bo MD   ibuprofen (ADVIL;MOTRIN) 600 MG tablet Take 1 tablet by mouth every 6 hours as needed for Pain 5/29/21 6/5/21  Shu Delgadillo,    enoxaparin (LOVENOX) 40 MG/0.4ML injection Inject 0.4 mLs into the skin daily 5/18/21   Subhash Winter MD   enoxaparin (LOVENOX) 40 MG/0.4ML injection Inject 0.4 mLs into the skin daily 5/18/21   Subhash Winter MD   naproxen (NAPROSYN) 500 MG tablet Take 1 tablet by mouth 2 times daily (with meals) 5/16/21   Dorita Rose MD   acetaminophen (TYLENOL) 500 MG tablet Take 2 tablets by mouth 4 times daily as needed for Pain 5/15/21   Bassem Rogers DO   butalbital-aspirin-caffeine (FIORINAL) -40 MG per capsule Take 1 capsule by mouth every 4 hours as needed for Headaches for up to 30 days.  5/14/21 6/13/21  KEANU Blanco CNP   fluticasone [facility]) 50 MCG/ACT nasal spray 2 sprays by Each Nostril route daily 5/14/21   KEANU Blanco CNP   ondansetron (ZOFRAN) 4 MG tablet Take 1 tablet by mouth every 8 hours as needed for Nausea or Vomiting 5/8/21   Owen Markham MD   ondansetron (ZOFRAN ODT) 4 MG disintegrating tablet Take 1 tablet by mouth every 8 hours as needed for Nausea 4/28/21   Mychal Dunlap DO   ondansetron (ZOFRAN ODT) 4 MG disintegrating tablet Take 1 tablet by mouth every 8 hours as needed for Nausea 4/19/21   KEANU Kennedy CNP   ammonium lactate (LAC-HYDRIN) 12 % lotion Apply topically daily. 1/6/21   Ninemaksim Soliman DPM   Ciclopirox (LOPROX) 0.77 % gel Apply to skin twice a day 1/6/21   Elisabeth Soliman DPM   hydrOXYzine (ATARAX) 50 MG tablet  12/14/20   Historical Provider, MD   Nebulizers (NEBULIZER COMPRESSOR) MISC Daily as needed 12/18/20   KEANU Kennedy CNP   Respiratory Therapy Supplies (NEBULIZER/TUBING/MOUTHPIECE) KIT Daily as needed 12/18/20   KEANU Kennedy CNP   Blood Pressure KIT Daily as needed 12/18/20   KEANU Kennedy CNP   Misc. Devices (CANE) MISC Quad base cane 11/28/20   Lidia Reagan MD   clomiPRAMINE (ANAFRANIL) 50 MG capsule Take 1 capsule by mouth nightly 10/26/20   KEANU Kennedy CNP   Elastic Bandages & Supports (CARPAL TUNNEL WRIST STABILIZER) [address] [facility] Apply to each wrist at bedtime 10/9/20   KEANU Kennedy CNP   methyl salicylate-menthol (PAULETTE BRAY GREASELESS) 10-15 % CREA Apply topically 3 times daily as needed for Pain 9/21/20   Lethaniel Favre, MD   .  Recent Surgical History: None = 0     Assessment   Received pt on RA, awake, c/o shortness of breath. Pt states she is a smoker with COPD and asthma and uses and albuterol inhaler at home.     RR 16  Breath Sounds: insp/exp wheezing and diminished t/o      · Bronchodilator assessment at level  4  ·   ·   · [x]    Bronchodilator Assessment  BRONCHODILATOR ASSESSMENT SCORE  Score 0 1 2 3 4 5   Breath Sounds   []  Patient Baseline []  No Wheeze good aeration []  Faint, scattered wheezing, good aeration []  Expiratory Wheezing and or moderately diminished [x]  Insp/Exp wheeze and/or very diminished []  Insp/Exp and/ or marked distress   Respiratory Rate   []  Patient Baseline [x]  Less than 20 []  Less than 20 []  20-25 []  Greater than 25 []  Greater than 25   Peak flow % of Pred or PB [x]  NA   []  Greater than 90%  []  81-90% []  71-80% []  Less than or equal to 70%  or unable to perform []  Unable due to Respiratory Distress   Dyspnea re []  Patient Baseline []  No SOB []  No SOB []  SOB on exertion [x]  SOB min activity []  At rest/acute   e FEV% Predicted       [x]  NA []  Above 69%  []  Unable []  Above 60-69%  []  Unable []  Above 50-59%  []  Unable []  Above 35-49%  []  Unable []  Less than 35%  []  Unable

## 2021-05-31 NOTE — PROGRESS NOTES
[address]  CDU / OBSERVATION ENCOUNTER  ATTENDING NOTE       I performed a history and physical examination of the patient and discussed management with the resident or midlevel provider. I reviewed the resident or midlevel provider's note and agree with the documented findings and plan of care. Any areas of disagreement are noted on the chart. I was personally present for the key portions of any procedures. I have documented in the chart those procedures where I was not present during the key portions. I have reviewed the nurses notes. I agree with the chief complaint, past medical history, past surgical history, allergies, medications, social and family history as documented unless otherwise noted below. The Family history, social history, and ROS are effectively unchanged since admission unless noted elsewhere in the chart. Seen and cleared by cardiology. Patient's pain is pleuritic. Patient with pain with coughing. Patient actually with increased sputum production and history of COPD. Believe patient's symptoms are more respiratory than cardiac. Patient having been given steroids. Patient with aerosols given. Patient had antibiotic added to cover for COPD exacerbation. Patient for reevaluation throughout the day. Patient may require ongoing aerosols. Patient improved later in the day but not quite ready for discharge. Concern for potential pericarditis. Pleuritic chest pain. Will get echocardiogram tomorrow.     Marina Crews MD  Attending Emergency  Physician

## 2021-06-01 ENCOUNTER — CARE COORDINATION (OUTPATIENT)
Dept: CARE COORDINATION | Age: 56
End: 2021-06-01

## 2021-06-01 ENCOUNTER — TELEPHONE (OUTPATIENT)
Dept: ORTHOPEDIC SURGERY | Age: 56
End: 2021-06-01

## 2021-06-01 LAB
EKG ATRIAL RATE: 88 BPM
EKG ATRIAL RATE: 88 BPM
EKG ATRIAL RATE: 93 BPM
EKG P AXIS: 47 DEGREES
EKG P AXIS: 52 DEGREES
EKG P AXIS: 53 DEGREES
EKG P-R INTERVAL: 162 MS
EKG P-R INTERVAL: 168 MS
EKG P-R INTERVAL: 198 MS
EKG Q-T INTERVAL: 392 MS
EKG Q-T INTERVAL: 404 MS
EKG Q-T INTERVAL: 432 MS
EKG QRS DURATION: 80 MS
EKG QRS DURATION: 80 MS
EKG QRS DURATION: 86 MS
EKG QTC CALCULATION (BAZETT): 487 MS
EKG QTC CALCULATION (BAZETT): 488 MS
EKG QTC CALCULATION (BAZETT): 522 MS
EKG R AXIS: 14 DEGREES
EKG R AXIS: 14 DEGREES
EKG R AXIS: 29 DEGREES
EKG T AXIS: 30 DEGREES
EKG T AXIS: 37 DEGREES
EKG T AXIS: 46 DEGREES
EKG VENTRICULAR RATE: 88 BPM
EKG VENTRICULAR RATE: 88 BPM
EKG VENTRICULAR RATE: 93 BPM
LV EF: 50 %
LVEF MODALITY: NORMAL

## 2021-06-01 PROCEDURE — 6370000000 HC RX 637 (ALT 250 FOR IP): Performed by: EMERGENCY MEDICINE

## 2021-06-01 PROCEDURE — 96375 TX/PRO/DX INJ NEW DRUG ADDON: CPT

## 2021-06-01 PROCEDURE — 6360000002 HC RX W HCPCS: Performed by: EMERGENCY MEDICINE

## 2021-06-01 PROCEDURE — G0378 HOSPITAL OBSERVATION PER HR: HCPCS

## 2021-06-01 PROCEDURE — 76937 US GUIDE VASCULAR ACCESS: CPT

## 2021-06-01 PROCEDURE — 94640 AIRWAY INHALATION TREATMENT: CPT

## 2021-06-01 PROCEDURE — 2580000003 HC RX 258: Performed by: EMERGENCY MEDICINE

## 2021-06-01 PROCEDURE — 6370000000 HC RX 637 (ALT 250 FOR IP): Performed by: STUDENT IN AN ORGANIZED HEALTH CARE EDUCATION/TRAINING PROGRAM

## 2021-06-01 PROCEDURE — 96376 TX/PRO/DX INJ SAME DRUG ADON: CPT

## 2021-06-01 PROCEDURE — 93010 ELECTROCARDIOGRAM REPORT: CPT | Performed by: INTERNAL MEDICINE

## 2021-06-01 PROCEDURE — 6360000002 HC RX W HCPCS: Performed by: STUDENT IN AN ORGANIZED HEALTH CARE EDUCATION/TRAINING PROGRAM

## 2021-06-01 PROCEDURE — 6370000000 HC RX 637 (ALT 250 FOR IP): Performed by: NURSE PRACTITIONER

## 2021-06-01 PROCEDURE — 93306 TTE W/DOPPLER COMPLETE: CPT

## 2021-06-01 PROCEDURE — 96372 THER/PROPH/DIAG INJ SC/IM: CPT

## 2021-06-01 RX ORDER — OXYCODONE HYDROCHLORIDE 5 MG/1
5 TABLET ORAL EVERY 4 HOURS PRN
Status: DISCONTINUED | OUTPATIENT
Start: 2021-06-01 | End: 2021-06-03 | Stop reason: HOSPADM

## 2021-06-01 RX ORDER — DIPHENHYDRAMINE HCL 25 MG
25 TABLET ORAL ONCE
Status: COMPLETED | OUTPATIENT
Start: 2021-06-01 | End: 2021-06-01

## 2021-06-01 RX ORDER — PROCHLORPERAZINE EDISYLATE 5 MG/ML
10 INJECTION INTRAMUSCULAR; INTRAVENOUS ONCE
Status: COMPLETED | OUTPATIENT
Start: 2021-06-01 | End: 2021-06-01

## 2021-06-01 RX ORDER — ALBUTEROL SULFATE 2.5 MG/3ML
2.5 SOLUTION RESPIRATORY (INHALATION) EVERY 6 HOURS PRN
Status: DISCONTINUED | OUTPATIENT
Start: 2021-06-01 | End: 2021-06-01

## 2021-06-01 RX ORDER — ONDANSETRON 2 MG/ML
4 INJECTION INTRAMUSCULAR; INTRAVENOUS ONCE
Status: COMPLETED | OUTPATIENT
Start: 2021-06-01 | End: 2021-06-02

## 2021-06-01 RX ADMIN — OXYCODONE HYDROCHLORIDE 5 MG: 5 TABLET ORAL at 12:38

## 2021-06-01 RX ADMIN — OXYCODONE HYDROCHLORIDE 5 MG: 5 TABLET ORAL at 21:19

## 2021-06-01 RX ADMIN — DIPHENHYDRAMINE HCL 25 MG: 25 TABLET ORAL at 08:55

## 2021-06-01 RX ADMIN — CYCLOBENZAPRINE 5 MG: 10 TABLET, FILM COATED ORAL at 15:28

## 2021-06-01 RX ADMIN — BUTALBITAL, ACETAMINOPHEN AND CAFFEINE 1 TABLET: 50; 325; 40 TABLET ORAL at 01:27

## 2021-06-01 RX ADMIN — LISINOPRIL 20 MG: 20 TABLET ORAL at 12:38

## 2021-06-01 RX ADMIN — KETOROLAC TROMETHAMINE 15 MG: 15 INJECTION, SOLUTION INTRAMUSCULAR; INTRAVENOUS at 01:25

## 2021-06-01 RX ADMIN — ACETAMINOPHEN 650 MG: 325 TABLET ORAL at 15:28

## 2021-06-01 RX ADMIN — OXYCODONE HYDROCHLORIDE 5 MG: 5 TABLET ORAL at 16:39

## 2021-06-01 RX ADMIN — RISPERIDONE 2 MG: 2 TABLET, FILM COATED ORAL at 19:57

## 2021-06-01 RX ADMIN — PROCHLORPERAZINE EDISYLATE 10 MG: 5 INJECTION INTRAMUSCULAR; INTRAVENOUS at 08:56

## 2021-06-01 RX ADMIN — CLONIDINE HYDROCHLORIDE 0.1 MG: 0.1 TABLET ORAL at 19:56

## 2021-06-01 RX ADMIN — BUTALBITAL, ACETAMINOPHEN AND CAFFEINE 1 TABLET: 50; 325; 40 TABLET ORAL at 16:39

## 2021-06-01 RX ADMIN — ENOXAPARIN SODIUM 40 MG: 40 INJECTION, SOLUTION INTRAVENOUS; SUBCUTANEOUS at 08:56

## 2021-06-01 RX ADMIN — ACETAMINOPHEN 650 MG: 325 TABLET ORAL at 08:59

## 2021-06-01 RX ADMIN — BUTALBITAL, ACETAMINOPHEN AND CAFFEINE 1 TABLET: 50; 325; 40 TABLET ORAL at 12:38

## 2021-06-01 RX ADMIN — GABAPENTIN 600 MG: 300 CAPSULE ORAL at 15:27

## 2021-06-01 RX ADMIN — ONDANSETRON 4 MG: 4 TABLET, ORALLY DISINTEGRATING ORAL at 15:28

## 2021-06-01 RX ADMIN — BENZONATATE 100 MG: 100 CAPSULE ORAL at 08:56

## 2021-06-01 RX ADMIN — OXYCODONE HYDROCHLORIDE 5 MG: 5 TABLET ORAL at 06:45

## 2021-06-01 RX ADMIN — KETOROLAC TROMETHAMINE 15 MG: 15 INJECTION, SOLUTION INTRAMUSCULAR; INTRAVENOUS at 15:28

## 2021-06-01 RX ADMIN — PREDNISONE 40 MG: 20 TABLET ORAL at 08:55

## 2021-06-01 RX ADMIN — BUTALBITAL, ACETAMINOPHEN AND CAFFEINE 1 TABLET: 50; 325; 40 TABLET ORAL at 21:19

## 2021-06-01 RX ADMIN — ONDANSETRON 4 MG: 4 TABLET, ORALLY DISINTEGRATING ORAL at 08:51

## 2021-06-01 RX ADMIN — KETOROLAC TROMETHAMINE 15 MG: 15 INJECTION, SOLUTION INTRAMUSCULAR; INTRAVENOUS at 08:56

## 2021-06-01 RX ADMIN — GABAPENTIN 600 MG: 300 CAPSULE ORAL at 19:57

## 2021-06-01 RX ADMIN — IPRATROPIUM BROMIDE AND ALBUTEROL SULFATE 3 ML: .5; 3 SOLUTION RESPIRATORY (INHALATION) at 12:17

## 2021-06-01 RX ADMIN — AZITHROMYCIN 250 MG: 250 TABLET, FILM COATED ORAL at 08:55

## 2021-06-01 RX ADMIN — BENZONATATE 100 MG: 100 CAPSULE ORAL at 15:28

## 2021-06-01 RX ADMIN — SODIUM CHLORIDE, PRESERVATIVE FREE 10 ML: 5 INJECTION INTRAVENOUS at 08:57

## 2021-06-01 RX ADMIN — BUTALBITAL, ACETAMINOPHEN AND CAFFEINE 1 TABLET: 50; 325; 40 TABLET ORAL at 05:40

## 2021-06-01 RX ADMIN — FAMOTIDINE 20 MG: 20 TABLET, FILM COATED ORAL at 08:55

## 2021-06-01 RX ADMIN — GABAPENTIN 600 MG: 300 CAPSULE ORAL at 08:55

## 2021-06-01 RX ADMIN — SODIUM CHLORIDE, PRESERVATIVE FREE 10 ML: 5 INJECTION INTRAVENOUS at 01:25

## 2021-06-01 RX ADMIN — CYCLOBENZAPRINE 5 MG: 10 TABLET, FILM COATED ORAL at 08:55

## 2021-06-01 RX ADMIN — OXYCODONE HYDROCHLORIDE 5 MG: 5 TABLET ORAL at 00:23

## 2021-06-01 ASSESSMENT — PAIN SCALES - GENERAL
PAINLEVEL_OUTOF10: 7
PAINLEVEL_OUTOF10: 6
PAINLEVEL_OUTOF10: 5
PAINLEVEL_OUTOF10: 4
PAINLEVEL_OUTOF10: 7
PAINLEVEL_OUTOF10: 8
PAINLEVEL_OUTOF10: 6

## 2021-06-01 ASSESSMENT — PAIN DESCRIPTION - LOCATION: LOCATION: HEAD

## 2021-06-01 ASSESSMENT — PAIN DESCRIPTION - PAIN TYPE: TYPE: ACUTE PAIN

## 2021-06-01 NOTE — PROGRESS NOTES
OBS/CDU   RESIDENT NOTE      Patients PCP is:  KEANU Warren - FELIX        SUBJECTIVE    Patient was evaluated at bedside. Patient states that she had a very rough night and was unable to sleep because of a headache and mild chest discomfort. Patient does get Fioricet around-the-clock for her headaches however, this did not seem to relieve them. Patient states that she understands that this is not cardiogenic chest pain which she is experiencing as cardiology has signed off. Patient still concerned as her chest pain has not seemed to been resolving. Patient has been able to tolerate p.o. intake, and has now neuro deficits at this time. PHYSICAL EXAM      General: NAD, AO X 3  Heent: EMOI, PERRL  Neck: SUPPLE, NO JVD  Cardiovascular: RRR, S1S2  Pulmonary: CTAB, NO SOB  Abdomen: SOFT, NTTP, ND, +BS  Extremities: +2/4 PULSES DISTAL, NO SWELLING  Neuro / Psych: NO NUMBNESS OR TINGLING, MENTATION AT BASELINE    PERTINENT TEST /EXAMS      I have reviewed all available laboratory results.     MEDICATIONS CURRENT   diphenhydrAMINE (BENADRYL) tablet 25 mg, Once  prochlorperazine (COMPAZINE) injection 10 mg, Once  ipratropium-albuterol (DUONEB) nebulizer solution 3 mL, 4x daily  albuterol (PROVENTIL) nebulizer solution 2.5 mg, BID  oxyCODONE (ROXICODONE) immediate release tablet 5 mg, Q6H PRN  butalbital-acetaminophen-caffeine (FIORICET, ESGIC) per tablet 1 tablet, Q4H PRN  ketorolac (TORADOL) injection 15 mg, Q6H  acetaminophen (TYLENOL) tablet 650 mg, Q4H PRN  traZODone (DESYREL) tablet 150 mg, Nightly  albuterol (PROVENTIL) nebulizer solution 2.5 mg, Q6H PRN  benzonatate (TESSALON) capsule 100 mg, TID PRN  cloNIDine (CATAPRES) tablet 0.1 mg, Nightly  cyclobenzaprine (FLEXERIL) tablet 5 mg, TID PRN  enoxaparin (LOVENOX) injection 40 mg, Daily  famotidine (PEPCID) tablet 20 mg, BID PRN  gabapentin (NEURONTIN) capsule 600 mg, TID  lisinopril (PRINIVIL;ZESTRIL) tablet 20 mg, Daily  ondansetron (ZOFRAN-ODT) disintegrating tablet 4 mg, Q8H PRN  risperiDONE (RISPERDAL) tablet 2 mg, Nightly  sodium chloride flush 0.9 % injection 5-40 mL, 2 times per day  sodium chloride flush 0.9 % injection 5-40 mL, PRN  0.9 % sodium chloride infusion, PRN  azithromycin (ZITHROMAX) tablet 250 mg, Daily  predniSONE (DELTASONE) tablet 40 mg, Daily        All medication charted and reviewed. CONSULTS      IP CONSULT TO CARDIOLOGY    ASSESSMENT/PLAN       Jasmine Woods is a 60 y.o. female who presents with fatigue and chest pain. Cardiology signed off as noncardiogenic chest pain. Patient's complaining of headache and discomfort. Patient will be given migraine cocktail and reevaluate. · Migraine cocktail  · Prednisone burst  · Anti-inflammatories  · Continue home medications and pain control  · Monitor vitals, labs, and imaging  · DISPO: pending consults and clinical improvement    --  Christy Ann MD  Emergency Medicine Resident Physician     This dictation was generated by voice recognition computer software. Although all attempts are made to edit the dictation for accuracy, there may be errors in the transcription that are not intended.

## 2021-06-01 NOTE — TELEPHONE ENCOUNTER
Spoke with patient to find out when she will be discharged from [facility]'s. She was hoping to be by tonight. I told her we can reschedule her appointment from today and get her in to see Dr Patric Saavedra on Friday. She advised me she will contact me when she is discharged since she is not sure she didn't want to reschedule at this time. Will schedule her on 6/4/21 if discharged.

## 2021-06-01 NOTE — PROGRESS NOTES
Cesar Villatoro was evaluated today and a DME order was entered for a wheeled walker because she requires this to successfully complete daily living tasks of toileting, ambulating, grooming, hygiene, meal preparation and taking own medications. A wheeled walker is necessary due to the patient's unsteady gait, upper body weakness, and inability to  an ambulation device; and she can ambulate only by pushing a walker instead of a lesser assistive device such as a cane, crutch, or standard walker. The need for this equipment was discussed with the patient and she understands and is in agreement.     Toro Chandra MD

## 2021-06-01 NOTE — PROGRESS NOTES
[address] [employer]  CDU / OBSERVATION ENCOUNTER  ATTENDING NOTE       I performed a history and physical examination of the patient and discussed management with the resident or midlevel provider. I reviewed the resident or midlevel provider's note and agree with the documented findings and plan of care. Any areas of disagreement are noted on the chart. I was personally present for the key portions of any procedures. I have documented in the chart those procedures where I was not present during the key portions. I have reviewed the nurses notes. I agree with the chief complaint, past medical history, past surgical history, allergies, medications, social and family history as documented unless otherwise noted below. The Family history, social history, and ROS are effectively unchanged since admission unless noted elsewhere in the chart. Patient with hx of COPD continues to complain of shortness of breath, cough, congestion. She also states that she has not been able to taste any of her food and has decreased appetite because of that. Patient has been tested multiple times for Covid with negative results. On my exam, patient is resting comfortably in the bed. She is not in respiratory distress and is not tachypneic. There is scattered wheeze heard on auscultation of lungs bilaterally. Patient does have an echo scheduled for today. Will await results of echo. If echo is unremarkable, will reassess with possible discharge this afternoon.     Jeancarlos Craig MD  Attending Emergency  Physician

## 2021-06-02 ENCOUNTER — APPOINTMENT (OUTPATIENT)
Dept: NUCLEAR MEDICINE | Age: 56
End: 2021-06-02
Payer: MEDICARE

## 2021-06-02 LAB
LV EF: 70 %
LVEF MODALITY: NORMAL

## 2021-06-02 PROCEDURE — 6360000002 HC RX W HCPCS: Performed by: EMERGENCY MEDICINE

## 2021-06-02 PROCEDURE — 6370000000 HC RX 637 (ALT 250 FOR IP): Performed by: STUDENT IN AN ORGANIZED HEALTH CARE EDUCATION/TRAINING PROGRAM

## 2021-06-02 PROCEDURE — 96372 THER/PROPH/DIAG INJ SC/IM: CPT

## 2021-06-02 PROCEDURE — 96376 TX/PRO/DX INJ SAME DRUG ADON: CPT

## 2021-06-02 PROCEDURE — G0378 HOSPITAL OBSERVATION PER HR: HCPCS

## 2021-06-02 PROCEDURE — 6370000000 HC RX 637 (ALT 250 FOR IP): Performed by: EMERGENCY MEDICINE

## 2021-06-02 PROCEDURE — 6360000002 HC RX W HCPCS: Performed by: STUDENT IN AN ORGANIZED HEALTH CARE EDUCATION/TRAINING PROGRAM

## 2021-06-02 PROCEDURE — 94640 AIRWAY INHALATION TREATMENT: CPT

## 2021-06-02 PROCEDURE — 99244 OFF/OP CNSLTJ NEW/EST MOD 40: CPT | Performed by: INTERNAL MEDICINE

## 2021-06-02 PROCEDURE — 6370000000 HC RX 637 (ALT 250 FOR IP): Performed by: NURSE PRACTITIONER

## 2021-06-02 PROCEDURE — 96375 TX/PRO/DX INJ NEW DRUG ADDON: CPT

## 2021-06-02 PROCEDURE — 78452 HT MUSCLE IMAGE SPECT MULT: CPT

## 2021-06-02 PROCEDURE — A9500 TC99M SESTAMIBI: HCPCS | Performed by: EMERGENCY MEDICINE

## 2021-06-02 PROCEDURE — 3430000000 HC RX DIAGNOSTIC RADIOPHARMACEUTICAL: Performed by: EMERGENCY MEDICINE

## 2021-06-02 PROCEDURE — 2580000003 HC RX 258: Performed by: EMERGENCY MEDICINE

## 2021-06-02 PROCEDURE — 93017 CV STRESS TEST TRACING ONLY: CPT

## 2021-06-02 RX ORDER — BUDESONIDE AND FORMOTEROL FUMARATE DIHYDRATE 80; 4.5 UG/1; UG/1
1 AEROSOL RESPIRATORY (INHALATION) 2 TIMES DAILY
Status: DISCONTINUED | OUTPATIENT
Start: 2021-06-02 | End: 2021-06-03 | Stop reason: HOSPADM

## 2021-06-02 RX ORDER — AMINOPHYLLINE DIHYDRATE 25 MG/ML
50 INJECTION, SOLUTION INTRAVENOUS PRN
Status: DISCONTINUED | OUTPATIENT
Start: 2021-06-02 | End: 2021-06-02 | Stop reason: ALTCHOICE

## 2021-06-02 RX ORDER — SODIUM CHLORIDE 9 MG/ML
500 INJECTION, SOLUTION INTRAVENOUS CONTINUOUS PRN
Status: DISCONTINUED | OUTPATIENT
Start: 2021-06-02 | End: 2021-06-02 | Stop reason: ALTCHOICE

## 2021-06-02 RX ORDER — LEVOTHYROXINE SODIUM 0.1 MG/1
100 TABLET ORAL DAILY
Status: DISCONTINUED | OUTPATIENT
Start: 2021-06-02 | End: 2021-06-03 | Stop reason: HOSPADM

## 2021-06-02 RX ORDER — ALBUTEROL SULFATE 90 UG/1
2 AEROSOL, METERED RESPIRATORY (INHALATION) EVERY 6 HOURS
Status: DISCONTINUED | OUTPATIENT
Start: 2021-06-02 | End: 2021-06-02

## 2021-06-02 RX ORDER — SODIUM CHLORIDE 0.9 % (FLUSH) 0.9 %
10 SYRINGE (ML) INJECTION PRN
Status: DISCONTINUED | OUTPATIENT
Start: 2021-06-02 | End: 2021-06-03 | Stop reason: HOSPADM

## 2021-06-02 RX ORDER — METOPROLOL TARTRATE 5 MG/5ML
5 INJECTION INTRAVENOUS EVERY 5 MIN PRN
Status: DISCONTINUED | OUTPATIENT
Start: 2021-06-02 | End: 2021-06-02 | Stop reason: ALTCHOICE

## 2021-06-02 RX ORDER — NITROGLYCERIN 0.4 MG/1
0.4 TABLET SUBLINGUAL EVERY 5 MIN PRN
Status: DISCONTINUED | OUTPATIENT
Start: 2021-06-02 | End: 2021-06-02 | Stop reason: ALTCHOICE

## 2021-06-02 RX ORDER — IPRATROPIUM BROMIDE AND ALBUTEROL SULFATE 2.5; .5 MG/3ML; MG/3ML
1 SOLUTION RESPIRATORY (INHALATION)
Status: DISCONTINUED | OUTPATIENT
Start: 2021-06-02 | End: 2021-06-03 | Stop reason: HOSPADM

## 2021-06-02 RX ORDER — ATROPINE SULFATE 0.1 MG/ML
0.5 INJECTION INTRAVENOUS EVERY 5 MIN PRN
Status: DISCONTINUED | OUTPATIENT
Start: 2021-06-02 | End: 2021-06-02 | Stop reason: ALTCHOICE

## 2021-06-02 RX ORDER — SODIUM CHLORIDE 0.9 % (FLUSH) 0.9 %
5-40 SYRINGE (ML) INJECTION PRN
Status: DISCONTINUED | OUTPATIENT
Start: 2021-06-02 | End: 2021-06-02 | Stop reason: ALTCHOICE

## 2021-06-02 RX ORDER — ALBUTEROL SULFATE 90 UG/1
2 AEROSOL, METERED RESPIRATORY (INHALATION) PRN
Status: DISCONTINUED | OUTPATIENT
Start: 2021-06-02 | End: 2021-06-02 | Stop reason: ALTCHOICE

## 2021-06-02 RX ORDER — ALBUTEROL SULFATE 90 UG/1
2 AEROSOL, METERED RESPIRATORY (INHALATION) 3 TIMES DAILY
Status: DISCONTINUED | OUTPATIENT
Start: 2021-06-03 | End: 2021-06-03 | Stop reason: HOSPADM

## 2021-06-02 RX ADMIN — OXYCODONE HYDROCHLORIDE 5 MG: 5 TABLET ORAL at 17:23

## 2021-06-02 RX ADMIN — BUTALBITAL, ACETAMINOPHEN AND CAFFEINE 1 TABLET: 50; 325; 40 TABLET ORAL at 17:23

## 2021-06-02 RX ADMIN — OXYCODONE HYDROCHLORIDE 5 MG: 5 TABLET ORAL at 03:13

## 2021-06-02 RX ADMIN — SODIUM CHLORIDE, PRESERVATIVE FREE 10 ML: 5 INJECTION INTRAVENOUS at 11:19

## 2021-06-02 RX ADMIN — LEVOTHYROXINE SODIUM 100 MCG: 100 TABLET ORAL at 15:05

## 2021-06-02 RX ADMIN — SODIUM CHLORIDE, PRESERVATIVE FREE 10 ML: 5 INJECTION INTRAVENOUS at 13:05

## 2021-06-02 RX ADMIN — REGADENOSON 0.4 MG: 0.08 INJECTION, SOLUTION INTRAVENOUS at 11:18

## 2021-06-02 RX ADMIN — GABAPENTIN 600 MG: 300 CAPSULE ORAL at 09:29

## 2021-06-02 RX ADMIN — OXYCODONE HYDROCHLORIDE 5 MG: 5 TABLET ORAL at 08:11

## 2021-06-02 RX ADMIN — LISINOPRIL 20 MG: 20 TABLET ORAL at 09:29

## 2021-06-02 RX ADMIN — PREDNISONE 40 MG: 20 TABLET ORAL at 09:29

## 2021-06-02 RX ADMIN — SODIUM CHLORIDE, PRESERVATIVE FREE 10 ML: 5 INJECTION INTRAVENOUS at 09:30

## 2021-06-02 RX ADMIN — KETOROLAC TROMETHAMINE 15 MG: 15 INJECTION, SOLUTION INTRAMUSCULAR; INTRAVENOUS at 13:28

## 2021-06-02 RX ADMIN — SODIUM CHLORIDE, PRESERVATIVE FREE 10 ML: 5 INJECTION INTRAVENOUS at 11:20

## 2021-06-02 RX ADMIN — GABAPENTIN 600 MG: 300 CAPSULE ORAL at 15:05

## 2021-06-02 RX ADMIN — KETOROLAC TROMETHAMINE 15 MG: 15 INJECTION, SOLUTION INTRAMUSCULAR; INTRAVENOUS at 20:33

## 2021-06-02 RX ADMIN — AZITHROMYCIN 250 MG: 250 TABLET, FILM COATED ORAL at 09:29

## 2021-06-02 RX ADMIN — TETRAKIS(2-METHOXYISOBUTYLISOCYANIDE)COPPER(I) TETRAFLUOROBORATE 52.5 MILLICURIE: 1 INJECTION, POWDER, LYOPHILIZED, FOR SOLUTION INTRAVENOUS at 13:05

## 2021-06-02 RX ADMIN — OXYCODONE HYDROCHLORIDE 5 MG: 5 TABLET ORAL at 13:05

## 2021-06-02 RX ADMIN — CYCLOBENZAPRINE 5 MG: 10 TABLET, FILM COATED ORAL at 20:30

## 2021-06-02 RX ADMIN — GABAPENTIN 600 MG: 300 CAPSULE ORAL at 20:32

## 2021-06-02 RX ADMIN — CLONIDINE HYDROCHLORIDE 0.1 MG: 0.1 TABLET ORAL at 20:33

## 2021-06-02 RX ADMIN — BUTALBITAL, ACETAMINOPHEN AND CAFFEINE 1 TABLET: 50; 325; 40 TABLET ORAL at 03:13

## 2021-06-02 RX ADMIN — BUDESONIDE AND FORMOTEROL FUMARATE DIHYDRATE 1 PUFF: 80; 4.5 AEROSOL RESPIRATORY (INHALATION) at 21:10

## 2021-06-02 RX ADMIN — OXYCODONE HYDROCHLORIDE 5 MG: 5 TABLET ORAL at 21:22

## 2021-06-02 RX ADMIN — ALBUTEROL SULFATE 2.5 MG: 2.5 SOLUTION RESPIRATORY (INHALATION) at 14:40

## 2021-06-02 RX ADMIN — ENOXAPARIN SODIUM 40 MG: 40 INJECTION, SOLUTION INTRAVENOUS; SUBCUTANEOUS at 15:04

## 2021-06-02 RX ADMIN — SODIUM CHLORIDE, PRESERVATIVE FREE 10 ML: 5 INJECTION INTRAVENOUS at 20:34

## 2021-06-02 RX ADMIN — ONDANSETRON 4 MG: 2 INJECTION INTRAMUSCULAR; INTRAVENOUS at 15:05

## 2021-06-02 RX ADMIN — RISPERIDONE 2 MG: 2 TABLET, FILM COATED ORAL at 20:32

## 2021-06-02 RX ADMIN — BUTALBITAL, ACETAMINOPHEN AND CAFFEINE 1 TABLET: 50; 325; 40 TABLET ORAL at 21:24

## 2021-06-02 RX ADMIN — IPRATROPIUM BROMIDE AND ALBUTEROL SULFATE 1 AMPULE: .5; 3 SOLUTION RESPIRATORY (INHALATION) at 21:08

## 2021-06-02 RX ADMIN — BUTALBITAL, ACETAMINOPHEN AND CAFFEINE 1 TABLET: 50; 325; 40 TABLET ORAL at 08:19

## 2021-06-02 RX ADMIN — BUTALBITAL, ACETAMINOPHEN AND CAFFEINE 1 TABLET: 50; 325; 40 TABLET ORAL at 13:05

## 2021-06-02 RX ADMIN — TETRAKIS(2-METHOXYISOBUTYLISOCYANIDE)COPPER(I) TETRAFLUOROBORATE 15.7 MILLICURIE: 1 INJECTION, POWDER, LYOPHILIZED, FOR SOLUTION INTRAVENOUS at 11:20

## 2021-06-02 ASSESSMENT — PAIN DESCRIPTION - PAIN TYPE: TYPE: ACUTE PAIN

## 2021-06-02 ASSESSMENT — PAIN SCALES - GENERAL
PAINLEVEL_OUTOF10: 8
PAINLEVEL_OUTOF10: 7
PAINLEVEL_OUTOF10: 6
PAINLEVEL_OUTOF10: 6
PAINLEVEL_OUTOF10: 7
PAINLEVEL_OUTOF10: 2
PAINLEVEL_OUTOF10: 7
PAINLEVEL_OUTOF10: 8
PAINLEVEL_OUTOF10: 7

## 2021-06-02 ASSESSMENT — PAIN DESCRIPTION - DESCRIPTORS: DESCRIPTORS: SHARP

## 2021-06-02 ASSESSMENT — PAIN DESCRIPTION - LOCATION: LOCATION: CHEST

## 2021-06-02 ASSESSMENT — PAIN DESCRIPTION - FREQUENCY: FREQUENCY: CONTINUOUS

## 2021-06-02 NOTE — CONSULTS
Patient - Josiah Hull   MRN -  [id number]   Acct # - [de-identified]   - 1965        Date of evaluation -  2021    REASON FOR THE CONSULTATION:  Dyspnea. HISTORY OF PRESENT ILLNESS:    Josiah Hull is a 60y.o. year old female here for evaluation of dyspnea. 25-year-old female history of COPD unknown severity, substance abuse with multiple suicidal admissions, chronic smoker half a pack a day for 40 years, presenting with new onset shortness of breath on exertion for the last 2 weeks and worsening cough with yellow phlegm. Patient was admitted to the observation unit with mild elevated troponin and cardiac stress test was negative for definitive scintigraphic evidence for ischemia. Pulmonology consulted for worsening dyspnea. Immunization   Immunization History   Administered Date(s) Administered    COVID-19, Pfizer, PF, 30mcg/0.3mL 2021    Influenza, Quadv, 6 mo and older, IM, PF (Flulaval, Fluarix) 10/31/2018    Influenza, Quadv, IM, PF (6 mo and older Fluzone, Flulaval, Fluarix, and 3 yrs and older Afluria) 2019, 10/09/2020    Pneumococcal Polysaccharide ([id number]) 2019        PAST MEDICAL HISTORY:       Diagnosis Date    Arthritis     Asthma     Attention deficit hyperactivity disorder (ADHD), combined type     Borderline diabetes     Borderline personality disorder ([address]     CHF (congestive heart failure) ([address]     COPD (chronic obstructive pulmonary disease) ([address]     Fibromyalgia     Headache     Hypertension     Manic depression ([address]     Movement disorder     Neck fracture ([address]     Pernicious anemia     Seizure ([address]     Thyroid disease          Family History:   No family history on file.     SURGICAL HISTORY:   Past Surgical History:   Procedure Laterality Date    EXPLORATION OF WOUND OF EXTREMITY Right 2019    RIGHT THIGH WOUND WASHOUT WITH [facility]-ER PLACEMENT performed by Jen No MD at [address] EXTREMITY N/A 5/22/2019    RIGHT WOUND HIP EXAMINATION LAVAGE AND WOUND VAC PLACEMENT performed by Hernandez Morales MD at [facility], [address] Right 5/17/2019    IRRIGATION, DEBRIDEMENT RIGHT THIGH performed by Carolann Martinez MD at [place] [id number] Bilateral     LYMPH NODE DISSECTION      cervical    PARTIAL HYSTERECTOMY             SOCIAL AND OCCUPATIONAL HEALTH:  The patient is a Current smoker of 40 years. Pack year equal 20. TOBACCO:   reports that she has been smoking. She has a 6.00 pack-year smoking history. She has never used smokeless tobacco.  ETOH:   reports no history of alcohol use. ALLERGIES:    Allergies   Allergen Reactions    Imitrex [Sumatriptan] Hives    Bee Pollen     Bee Venom     Bromide Ion [Bromine]     Reglan [Metoclopramide]     Sulfa Antibiotics     Sulfadiazine     Tramadol Hives       Home Meds:   Prior to Admission medications    Medication Sig Start Date End Date Taking? Authorizing Provider   benzonatate (TESSALON PERLES) 100 MG capsule Take 1 capsule by mouth 3 times daily as needed for Cough 5/29/21 6/5/21 Yes Lino Valerio DO   gabapentin (NEURONTIN) 300 MG capsule TAKE 2 CAPSULES BY MOUTH 3 TIMES DAILY FOR 30 DAYS.   5/24/21 6/23/21 Yes KEANU Flores CNP   lisinopril (PRINIVIL;ZESTRIL) 20 MG tablet TAKE 1 TABLET BY MOUTH DAILY  5/24/21 6/23/21 Yes KEANU Flores CNP   ASPIRIN ADULT LOW STRENGTH 81 MG EC tablet TAKE 1 TABLET BY MOUTH ONCE DAILY  4/5/21  Yes KEANU Flores CNP   cetirizine (ZYRTEC) 10 MG tablet Take 1 tablet by mouth daily 3/11/21  Yes KEANU Flores CNP   levothyroxine (SYNTHROID) 100 MCG tablet Take 1 tablet by mouth Daily 2/19/21  Yes KEANU Flores CNP   budesonide-formoterol [facility]) 160-4.5 MCG/ACT AERO Inhale 2 puffs into the lungs 2 times daily 2/11/21  Yes KEANU Flores CNP   ipratropium-albuterol (DUONEB) 0.5-2.5 (3) MG/3ML SOLN nebulizer solution Inhale 3 mLs into the lungs every 6 hours as needed for Shortness of Breath 2/11/21  Yes KEANU Blanco CNP   cyclobenzaprine (FLEXERIL) 5 MG tablet TAKE 1 TABLET BY MOUTH 2 TIMES DAILY AS NEEDED FOR MUSCLE SPASMS (DON'T TAKE W/ ZANAFLEX) 1/19/21  Yes KEANU Blanco CNP   albuterol sulfate  (90 Base) MCG/ACT inhaler Inhale 1 puff into the lungs every 6 hours as needed for Wheezing Gap prescription until follow up with primary. Do not refill.   Follow up with primary 12/18/20  Yes KEANU Blanco CNP   famotidine (PEPCID) 20 MG tablet Take 1 tablet by mouth 2 times daily as needed (for acid reflux) 10/26/20  Yes KEANU Blanco CNP   risperiDONE (RISPERDAL) 1 MG tablet Take 1 tablet by mouth daily and 2 tablets by mouth at bedtime 10/22/20  Yes KEANU Blanco CNP   traZODone (DESYREL) 150 MG tablet Take 1 tablet by mouth nightly 10/22/20  Yes KEANU Blanco CNP   cloNIDine (CATAPRES) 0.1 MG tablet Take 1 tablet by mouth nightly 10/22/20  Yes KEANU Blanco CNP   Benzocaine-Menthol (CEPACOL) 6-10 MG LOZG lozenge Take 1 lozenge by mouth every 2 hours as needed for Sore Throat 10/22/20  Yes KEANU Blanco CNP   tiZANidine (ZANAFLEX) 2 MG tablet Take 2 tablets by mouth every 8 hours as needed (muscle spasm, neck pain) 10/9/20  Yes KEANU Blanco CNP   albuterol (PROVENTIL) (2.5 MG/3ML) 0.083% nebulizer solution Take 3 mLs by nebulization every 6 hours as needed for Wheezing 6/18/20  Yes KEANU Blanco CNP   ipratropium-albuterol (DUONEB) 0.5-2.5 (3) MG/3ML SOLN nebulizer solution Inhale 3 mLs into the lungs every 4 hours 6/16/19  Yes Jordy Bo MD   ibuprofen (ADVIL;MOTRIN) 600 MG tablet Take 1 tablet by mouth every 6 hours as needed for Pain 5/29/21 6/5/21  Shu Delgadillo DO   enoxaparin (LOVENOX) 40 MG/0.4ML injection Inject 0.4 mLs into the skin daily 5/18/21   Subhash Winter MD   enoxaparin motion  NEUROLOGICAL:  negative for headaches, dizziness, seizures, memory problems, speech problems, visual disturbance and coordination problems  BEHAVIOR/PSYCH:  negative for poor appetite, increased appetite, decreased sleep, increased sleep, decreased energy level, increased energy level and poor concentration   skin - no rash no dermatitis     Vitals:  /84   Pulse 75   Temp 98.4 °F (36.9 °C) (Oral)   Resp 18   Ht 5' 2.5\" (1.588 m)   Wt 209 lb 12.8 oz (95.2 kg)   SpO2 98%   BMI 37.76 kg/m²     PHYSICAL EXAM:  General Appearance:    Alert, cooperative, no distress, appears stated age   Head:    Normocephalic, without obvious abnormality, atraumatic      Eyes:    PERRL, conjunctiva/corneas clear, EOM's intact   Ears:    Normal TM's and external ear canals, both ears   Nose:   Nares normal, septum midline, mucosa normal, no drainage        or sinus tenderness   Throat:   Lips, mucosa, and tongue normal; teeth and gums normal   Neck:   Supple, symmetrical, trachea midline, no adenopathy;     thyroid:  no enlargement/tenderness/nodules;    Back:     Symmetric, no curvature, ROM normal, no CVA tenderness   Lungs:    Bibasal fine inspiratory crackles with some wheezing. Decreased air entry. Chest Wall:    No tenderness or deformity      Heart:    Regular rate and rhythm, S1 and S2 normal, no murmur, rub        or gallop no rvh                           Abdomen:                                                 Pulses:                              Skin:                  Lymph nodes:                                      Soft, non-tender, bowel sounds active all four quadrants,     no masses, no organomegaly     2+ and symmetric all extremities     Skin color, texture, turgor normal, no rashes or lesions       Cervical, supraclavicular not enlarged or matted or tender      CNII-XII intact, normal strength 5/5 .   Sensation grossly normal  and reflexes normal 2+            Musculoskeletal - no joint swelling ([facility] I50.9    Lumbar radiculopathy M54.16    Chronic low back pain M54.5, G89.29    Piriformis syndrome G57.00    COPD (chronic obstructive pulmonary disease) ([facility]) J44.9    Major depressive disorder, single episode, unspecified F32.9    Severe depressed bipolar I disorder without psychotic features ([facility] F31.4    Polysubstance abuse ([facility] F19.10    Syncope and collapse R55        ASSESSMENT & PLAN:      1.  New onset dyspnea with some substernal sharp chest pain worsened by coughing and inspiration likely acute on chronic bronchitis flare with negative stress test: Currently patient on Zithromax and albuterol inhalers. 2.  COPD of unknown severity: No PFTs on file. Added DuoNeb to albuterol inhalers and recommend discharging patient on both of them. Patient states that he does not have a nebulizer at home and requested primary to refill her phone supplies. 3. JESSICA, has a CPAP at home. Last sleep study 1/14/2021 however could not pull it up on Epic. Recommend BIPAP/CPAP at night. Patient advised to decrease smoking. Discussed with primary team that chest pain looks more like pleuritic and patient has to cut down on smoking and may benefit from a nicotine patch. Recommend follow up with Pulm as outpatient. Will discuss with attending for final recommendations. Sav Newell MD       Internal Medicine Resident PGY-3  6/2/2021, 3:58 PM   Attending Physician Statement  I have discussed the care of Mc Ambrocio, including pertinent history and exam findings,  with the resident. I have seen and examined the patient and the key elements of all parts of the encounter have been performed by me. I agree with the assessment, plan and orders as documented by the resident with additions . Treatment plan Discussed with nursing staff in detail , all questions answered .    Electronically signed by Terry Guajardo MD on   6/3/21 at 9:00 AM EDT    Please note that this chart was generated using voice recognition Dragon dictation software. Although every effort was made to ensure the accuracy of this automated transcription, some errors in transcription may have occurred.

## 2021-06-02 NOTE — PROGRESS NOTES
OBS/CDU   RESIDENT NOTE      Patients PCP is:  Gaynel Goldberg, KEANU - FELIX        SUBJECTIVE    Patient was evaluated bedside, states there were no acute events overnight. Patient states that she is not still been feeling mildly dizzy and short of breath. Patient states that she had a near fall yesterday but did not actually fall. Patient was able to catch her self for the incident she took place. Patient states that she is unable to eat and drink and has good p.o. intake, able to ambulate without difficulty. Patient still having mild bit of chest pain despite analgesic treatment and time. Patient will be reevaluated cardiology. Patient denies fever, chills, vomiting, abdominal pain, focal weakness, numbness, tingling, urinary/bowel symptoms, vision changes, visual hallucinations, or headache. PHYSICAL EXAM      General: NAD, AO X 3  Heent: EMOI, PERRL  Neck: SUPPLE, NO JVD  Cardiovascular: RRR, S1S2  Pulmonary: CTAB, NO SOB  Abdomen: SOFT, NTTP, ND, +BS  Extremities: +2/4 PULSES DISTAL, NO SWELLING  Neuro / Psych: NO NUMBNESS OR TINGLING, MENTATION AT BASELINE    PERTINENT TEST /EXAMS      I have reviewed all available laboratory results.     MEDICATIONS CURRENT   ondansetron (ZOFRAN) injection 4 mg, Once  oxyCODONE (ROXICODONE) immediate release tablet 5 mg, Q4H PRN  butalbital-acetaminophen-caffeine (FIORICET, ESGIC) per tablet 1 tablet, Q4H PRN  ketorolac (TORADOL) injection 15 mg, Q6H  acetaminophen (TYLENOL) tablet 650 mg, Q4H PRN  traZODone (DESYREL) tablet 150 mg, Nightly  albuterol (PROVENTIL) nebulizer solution 2.5 mg, Q6H PRN  benzonatate (TESSALON) capsule 100 mg, TID PRN  cloNIDine (CATAPRES) tablet 0.1 mg, Nightly  cyclobenzaprine (FLEXERIL) tablet 5 mg, TID PRN  enoxaparin (LOVENOX) injection 40 mg, Daily  famotidine (PEPCID) tablet 20 mg, BID PRN  gabapentin (NEURONTIN) capsule 600 mg, TID  lisinopril (PRINIVIL;ZESTRIL) tablet 20 mg, Daily  ondansetron (ZOFRAN-ODT) disintegrating tablet 4 mg, Q8H PRN  risperiDONE (RISPERDAL) tablet 2 mg, Nightly  sodium chloride flush 0.9 % injection 5-40 mL, 2 times per day  sodium chloride flush 0.9 % injection 5-40 mL, PRN  0.9 % sodium chloride infusion, PRN  azithromycin (ZITHROMAX) tablet 250 mg, Daily  predniSONE (DELTASONE) tablet 40 mg, Daily        All medication charted and reviewed. CONSULTS      IP CONSULT TO CARDIOLOGY  IP CONSULT TO CARDIOLOGY  IP CONSULT TO IV TEAM    ASSESSMENT/PLAN       Aleta Polk is a 60 y.o. female who presents with chest pain, shortness of breath. Patient had a 20% drop on her most recent echocardiogram.  Patient will be reevaluated cardiology. We will further involvement in the case. Will follow recommendations of specialty service. Plan for possible cardiac catheterization inpatient or outpatient for discharge. · Cardiology consultation  · Follow labs, scans and imaging as well as recommendations  · Continue home medications and pain control  · Monitor vitals, labs, and imaging  · DISPO: pending consults and clinical improvement    --  Ghazala Basilio MD  Emergency Medicine Resident Physician     This dictation was generated by voice recognition computer software. Although all attempts are made to edit the dictation for accuracy, there may be errors in the transcription that are not intended.

## 2021-06-02 NOTE — PLAN OF CARE
as needed (for acid reflux) 10/26/20  Yes KEANU Epps CNP   risperiDONE (RISPERDAL) 1 MG tablet Take 1 tablet by mouth daily and 2 tablets by mouth at bedtime 10/22/20  Yes KEANU Epps CNP   traZODone (DESYREL) 150 MG tablet Take 1 tablet by mouth nightly 10/22/20  Yes KEANU Epps - CNP   cloNIDine (CATAPRES) 0.1 MG tablet Take 1 tablet by mouth nightly 10/22/20  Yes KEANU Epps - CNP   Benzocaine-Menthol (CEPACOL) 6-10 MG LOZG lozenge Take 1 lozenge by mouth every 2 hours as needed for Sore Throat 10/22/20  Yes KEANU Epps CNP   tiZANidine (ZANAFLEX) 2 MG tablet Take 2 tablets by mouth every 8 hours as needed (muscle spasm, neck pain) 10/9/20  Yes KEANU Epps CNP   albuterol (PROVENTIL) (2.5 MG/3ML) 0.083% nebulizer solution Take 3 mLs by nebulization every 6 hours as needed for Wheezing 6/18/20  Yes KEANU Epps CNP   ipratropium-albuterol (DUONEB) 0.5-2.5 (3) MG/3ML SOLN nebulizer solution Inhale 3 mLs into the lungs every 4 hours 6/16/19  Yes Arianne Comer MD   ibuprofen (ADVIL;MOTRIN) 600 MG tablet Take 1 tablet by mouth every 6 hours as needed for Pain 5/29/21 6/5/21  Abdiel Wilson,    enoxaparin (LOVENOX) 40 MG/0.4ML injection Inject 0.4 mLs into the skin daily 5/18/21   Dutch Aragon MD   enoxaparin (LOVENOX) 40 MG/0.4ML injection Inject 0.4 mLs into the skin daily 5/18/21   Dutch Aragon MD   naproxen (NAPROSYN) 500 MG tablet Take 1 tablet by mouth 2 times daily (with meals) 5/16/21   Nhan Escobar MD   acetaminophen (TYLENOL) 500 MG tablet Take 2 tablets by mouth 4 times daily as needed for Pain 5/15/21   Bassem Rogers DO   butalbital-aspirin-caffeine (FIORINAL) -40 MG per capsule Take 1 capsule by mouth every 4 hours as needed for Headaches for up to 30 days.  5/14/21 6/13/21  KEANU Epps - FELIX   fluticasone [facility]) 50 MCG/ACT nasal spray 2 sprays by Each Nostril route daily 5/14/21 KEANU Liao CNP   ondansetron (ZOFRAN) 4 MG tablet Take 1 tablet by mouth every 8 hours as needed for Nausea or Vomiting 5/8/21   Owen Markham MD   ondansetron (ZOFRAN ODT) 4 MG disintegrating tablet Take 1 tablet by mouth every 8 hours as needed for Nausea 4/28/21   Farhat Dunlap DO   ondansetron (ZOFRAN ODT) 4 MG disintegrating tablet Take 1 tablet by mouth every 8 hours as needed for Nausea 4/19/21   KEANU Liao CNP   ammonium lactate (LAC-HYDRIN) 12 % lotion Apply topically daily. 1/6/21   Fabricechristina Finley DPM   Ciclopirox (LOPROX) 0.77 % gel Apply to skin twice a day 1/6/21   Fabrice Finley DPM   hydrOXYzine (ATARAX) 50 MG tablet  12/14/20   Historical Provider, MD   Nebulizers (NEBULIZER COMPRESSOR) MISC Daily as needed 12/18/20   KEANU Liao CNP   Respiratory Therapy Supplies (NEBULIZER/TUBING/MOUTHPIECE) KIT Daily as needed 12/18/20   KEANU Liao CNP   Blood Pressure KIT Daily as needed 12/18/20   KEANU Liao CNP   Misc.  Devices (CANE) MISC Quad base cane 11/28/20   Carmelita Gooden MD   clomiPRAMINE (ANAFRANIL) 50 MG capsule Take 1 capsule by mouth nightly 10/26/20   KEANU Liao CNP   Elastic Bandages & Supports (CARPAL TUNNEL WRIST STABILIZER) [facility] Apply to each wrist at bedtime 10/9/20   KEANU Liao CNP   methyl salicylate-menthol (PAULETTE BRAY GREASELESS) 10-15 % CREA Apply topically 3 times daily as needed for Pain 9/21/20   Jun Narvaez MD   .  Recent Surgical History: None = 0     Assessment: Pt takes tx at home Q6 PRN, RA-98%, BS-clear    Peak Flow (asthma only)    Predicted:   Personal Best:   PEF   % Predicted   Peak Flow :     FEV1/FVC    FEV1 Predicted       FEV1     FEV1 % Predicted   FVC   IS volume   IBW   FIO2% 21  SPO2 99  RR 16  Breath Sounds: clear      Bronchodilator assessment at level  1  Hyperinflation assessment at level   Secretion Management assessment at level      [x]    Bronchodilator Assessment  BRONCHODILATOR ASSESSMENT SCORE  Score 0 1 2 3 4 5   Breath Sounds   []  Patient Baseline [x]  No Wheeze good aeration []  Faint, scattered wheezing, good aeration []  Expiratory Wheezing and or moderately diminished []  Insp/Exp wheeze and/or very diminished []  Insp/Exp and/ or marked distress   Respiratory Rate   []  Patient Baseline [x]  Less than 20 []  Less than 20 []  20-25 []  Greater than 25 []  Greater than 25   Peak flow % of Pred or PB [x]  NA   []  Greater than 90%  []  81-90% []  71-80% []  Less than or equal to 70%  or unable to perform []  Unable due to Respiratory Distress   Dyspnea re []  Patient Baseline [x]  No SOB []  No SOB []  SOB on exertion []  SOB min activity []  At rest/acute   e FEV% Predicted       [x]  NA []  Above 69%  []  Unable []  Above 60-69%  []  Unable []  Above 50-59%  []  Unable []  Above 35-49%  []  Unable []  Less than 35%  []  Unable                 []  Hyperinflation Assessment  Score 1 2 3   CXR and Breath Sounds   []  Clear []  No atelectasis  Basilar aeration []  Atelectasis or absent basilar breath sounds   Incentive Spirometry Volume  (Per IBW)   []  Greater than or equal to 15ml/Kg []  less than 15ml/Kg []  less than 15ml/Kg   Surgery within last 2 weeks []  None or general   []  Abdominal or thoracic surgery  []  Abdominal or thoracic   Chronic Pulmonary Historyre []  No []  Yes []  Yes     []  Secretion Management Assessment  Score 1 2 3   Bilateral Breath Sounds   []  Occasional Rhonchi []  Scattered Rhonchi []  Course Rhonchi and/or poor aeration   Sputum    []  Small amount of thin secretions []  Moderate amount of viscous secretions []  Copius, Viscious Yellow/ Secretions   CXR as reported by physician []  clear  []  Unavailable []  Infiltrates and/or consolidation  []  Unavailable []  Mucus Plugging and or lobar consolidation  []  Unavailable   Cough []  Strong, productive cough []  Weak productive cough []  No cough or weak non-productive cough

## 2021-06-02 NOTE — PROGRESS NOTES
[facility]   Progress Note                   Date:   6/2/2021  Patient name: Luis Quinn  Date of admission:  5/30/2021  6:32 PM  MRN:   [id number]  YOB: 1965  PCP: KEANU Warren CNP    Reason for Admission: Chest pain [R07.9]    Subjective:       Clinical Changes / Abnormalities: Patient seen and examined while down in stress lab. No new acute events overnight. Patient complains of shortness of breath with exertion accompanied by chest tightness and pressure that is made worse with coughing or taking a deep breath. Current smokier 1/2 PPD or less lately. She is on room air without distress currently. Reviewed vitals, labs, tele, & previous testing. Medications:   Scheduled Meds:   ondansetron  4 mg Intravenous Once    ketorolac  15 mg Intravenous Q6H    traZODone  150 mg Oral Nightly    cloNIDine  0.1 mg Oral Nightly    enoxaparin  40 mg Subcutaneous Daily    gabapentin  600 mg Oral TID    lisinopril  20 mg Oral Daily    risperiDONE  2 mg Oral Nightly    sodium chloride flush  5-40 mL Intravenous 2 times per day    azithromycin  250 mg Oral Daily    predniSONE  40 mg Oral Daily     Continuous Infusions:   sodium chloride      sodium chloride       CBC: No results for input(s): WBC, HGB, PLT in the last 72 hours. BMP:    Recent Labs     05/30/21 2010      K 4.1   CL 98   CO2 25   BUN 6   CREATININE 0.58   GLUCOSE 99     Hepatic: No results for input(s): AST, ALT, ALB, BILITOT, ALKPHOS in the last 72 hours. Troponin:   Recent Labs     05/30/21 2010 05/30/21  2151 05/31/21  0317   TROPHS 19* 16* 10     BNP: No results for input(s): BNP in the last 72 hours. Lipids: No results for input(s): CHOL, HDL in the last 72 hours. Invalid input(s): LDLCALCU  INR: No results for input(s): INR in the last 72 hours.     Objective:   Vitals: /84   Pulse 75   Temp 98.4 °F (36.9 °C) (Oral)   Resp 18   Ht 5' 2.5\" (1.588 m)   Wt 209 lb 12.8 oz (95.2 kg)   SpO2 98%   BMI 37.76 kg/m²   General appearance: alert and cooperative with exam  HEENT: Head: Normocephalic, no lesions, without obvious abnormality. Neck: no JVD, trachea midline, no adenopathy  Lungs: Clear to auscultation with diminished breath sounds in bases bilat. Heart: Regular rate and rhythm, s1/s2 auscultated, no murmurs. SR  Abdomen: soft, non-tender, bowel sounds active  Extremities: no edema  Neurologic: not done    Echo 6/1/2021  Summary  Left ventricle is normal in size. Global left ventricular systolic function  is normal. Calculated ejection fraction 50% by Heart Model. Normal mitral valve structure. Trivial mitral regurgitation. Normal tricuspid valve structure. Trivial tricuspid regurgitation. E/E' average = 7.0. IVC Increased diameter, but still has inspiratory variation suggesting upper  normal or mildly elevated RA filling pressure (i.e. CVP) .      Echo 12/21/2020  Summary  Left ventricle is normal in size with normal systolic function globally. Calculated ejection fraction is 66%  Left atrium is normal in size. Mitral valve structure is normal.  Mild mitral regurgitation. Tricuspid valve structure is normal.  Trace tricuspid regurgitation. Estimated right ventricular systolic pressure is 35 mmHg. No significant pericardial effusion is seen. Assessment / Acute Cardiac Problems:   1. Chest pain. Atypical and of noncardiac etiology  2.  Tired and fatigue    Patient Active Problem List:     Chest pain     Migraine variant with headache     Drug overdose, accidental or unintentional, initial encounter     Hypothyroidism     Essential hypertension     Seizure disorder ([address]     Drug overdose     History of seizure     Major depressive disorder with psychotic features ([address]     Severe major depression ([address]     Overdose of barbiturate, intentional self-harm, initial encounter ([address]     Intentional phenobarbital overdose ([address]     Severe episode of recurrent major depressive

## 2021-06-02 NOTE — PROGRESS NOTES
Prateek Greer, ROXIEPPatient Assessment complete. Chest pain [R07.9] . Vitals:    06/02/21 0830   BP: 123/84   Pulse: 75   Resp: 18   Temp: 98.4 °F (36.9 °C)   SpO2: 98%   . Patients home meds are   Prior to Admission medications    Medication Sig Start Date End Date Taking? Authorizing Provider   benzonatate (TESSALON PERLES) 100 MG capsule Take 1 capsule by mouth 3 times daily as needed for Cough 5/29/21 6/5/21 Yes Lino Valerio,    gabapentin (NEURONTIN) 300 MG capsule TAKE 2 CAPSULES BY MOUTH 3 TIMES DAILY FOR 30 DAYS. 5/24/21 6/23/21 Yes Naila Erp, APRN - CNP   lisinopril (PRINIVIL;ZESTRIL) 20 MG tablet TAKE 1 TABLET BY MOUTH DAILY  5/24/21 6/23/21 Yes Naila Erp, APRN - CNP   ASPIRIN ADULT LOW STRENGTH 81 MG EC tablet TAKE 1 TABLET BY MOUTH ONCE DAILY  4/5/21  Yes Naila Erp, APRN - CNP   cetirizine (ZYRTEC) 10 MG tablet Take 1 tablet by mouth daily 3/11/21  Yes Naila Erp, APRN - CNP   levothyroxine (SYNTHROID) 100 MCG tablet Take 1 tablet by mouth Daily 2/19/21  Yes Naila Erp, APRN - CNP   budesonide-formoterol [facility]) 160-4.5 MCG/ACT AERO Inhale 2 puffs into the lungs 2 times daily 2/11/21  Yes Naila Erp, APRN - CNP   ipratropium-albuterol (DUONEB) 0.5-2.5 (3) MG/3ML SOLN nebulizer solution Inhale 3 mLs into the lungs every 6 hours as needed for Shortness of Breath 2/11/21  Yes Naila Erp, APRN - CNP   cyclobenzaprine (FLEXERIL) 5 MG tablet TAKE 1 TABLET BY MOUTH 2 TIMES DAILY AS NEEDED FOR MUSCLE SPASMS (DON'T TAKE W/ Kristi Kay) 1/19/21  Yes Naila Erp, APRN - CNP   albuterol sulfate  (90 Base) MCG/ACT inhaler Inhale 1 puff into the lungs every 6 hours as needed for Wheezing [place] prescription until follow up with primary. Do not refill.   Follow up with primary 12/18/20  Yes KEANU Webster - CNP   famotidine (PEPCID) 20 MG tablet Take 1 tablet by mouth 2 times daily as needed (for acid reflux) 10/26/20  Yes KEANU Webster CNP risperiDONE (RISPERDAL) 1 MG tablet Take 1 tablet by mouth daily and 2 tablets by mouth at bedtime 10/22/20  Yes KEANU Cabrera CNP   traZODone (DESYREL) 150 MG tablet Take 1 tablet by mouth nightly 10/22/20  Yes KEANU Cabrera CNP   cloNIDine (CATAPRES) 0.1 MG tablet Take 1 tablet by mouth nightly 10/22/20  Yes KEANU Cabrera CNP   Benzocaine-Menthol (CEPACOL) 6-10 MG LOZG lozenge Take 1 lozenge by mouth every 2 hours as needed for Sore Throat 10/22/20  Yes KEANU Cabrera CNP   tiZANidine (ZANAFLEX) 2 MG tablet Take 2 tablets by mouth every 8 hours as needed (muscle spasm, neck pain) 10/9/20  Yes KEANU Cabrera CNP   albuterol (PROVENTIL) (2.5 MG/3ML) 0.083% nebulizer solution Take 3 mLs by nebulization every 6 hours as needed for Wheezing 6/18/20  Yes KEANU Cabrera CNP   ipratropium-albuterol (DUONEB) 0.5-2.5 (3) MG/3ML SOLN nebulizer solution Inhale 3 mLs into the lungs every 4 hours 6/16/19  Yes Nita Soto MD   ibuprofen (ADVIL;MOTRIN) 600 MG tablet Take 1 tablet by mouth every 6 hours as needed for Pain 5/29/21 6/5/21  Ida Allen DO   enoxaparin (LOVENOX) 40 MG/0.4ML injection Inject 0.4 mLs into the skin daily 5/18/21   Adam Shultz MD   enoxaparin (LOVENOX) 40 MG/0.4ML injection Inject 0.4 mLs into the skin daily 5/18/21   Adam Shultz MD   naproxen (NAPROSYN) 500 MG tablet Take 1 tablet by mouth 2 times daily (with meals) 5/16/21   Raymond Pérez MD   acetaminophen (TYLENOL) 500 MG tablet Take 2 tablets by mouth 4 times daily as needed for Pain 5/15/21   Bassem Rogers,    butalbital-aspirin-caffeine (FIORINAL) -40 MG per capsule Take 1 capsule by mouth every 4 hours as needed for Headaches for up to 30 days.  5/14/21 6/13/21  KEANU Cabrera CNP   fluticasone [facility]) 50 MCG/ACT nasal spray 2 sprays by Each Nostril route daily 5/14/21   KEANU Cabrera CNP   ondansetron (ZOFRAN) 4 MG tablet Take 1 tablet by mouth every 8 hours as needed for Nausea or Vomiting 5/8/21   Owen Markham MD   ondansetron (ZOFRAN ODT) 4 MG disintegrating tablet Take 1 tablet by mouth every 8 hours as needed for Nausea 4/28/21   Martha Dunlap DO   ondansetron (ZOFRAN ODT) 4 MG disintegrating tablet Take 1 tablet by mouth every 8 hours as needed for Nausea 4/19/21   KEANU Epps CNP   ammonium lactate (LAC-HYDRIN) 12 % lotion Apply topically daily. 1/6/21   Gulshan Guild, DPM   Ciclopirox (LOPROX) 0.77 % gel Apply to skin twice a day 1/6/21   Gulshan Guild, DPM   hydrOXYzine (ATARAX) 50 MG tablet  12/14/20   Emeka Edawrds MD   Nebulizers (NEBULIZER COMPRESSOR) MISC Daily as needed 12/18/20   KEANU Epps CNP   Respiratory Therapy Supplies (NEBULIZER/TUBING/MOUTHPIECE) KIT Daily as needed 12/18/20   KEANU Epps CNP   Blood Pressure KIT Daily as needed 12/18/20   KEANU Epps CNP   Misc.  Devices (CANE) MISC Quad base cane 11/28/20   Itzel Sargent MD   clomiPRAMINE (ANAFRANIL) 50 MG capsule Take 1 capsule by mouth nightly 10/26/20   KEANU Epps CNP   Elastic Bandages & Supports (CARPAL TUNNEL WRIST STABILIZER) [address] [facility] Apply to each wrist at bedtime 10/9/20   KEANU Epps CNP   methyl salicylate-menthol (PAULETTE BRAY GREASELESS) 10-15 % CREA Apply topically 3 times daily as needed for Pain 9/21/20   Steven West MD   .  Recent Surgical History: None = 0     Assessment       RR 18  Breath Sounds: Diminished      · Bronchodilator assessment at level  1  · Hyperinflation assessment at level   · Secretion Management assessment at level    ·   · [x]    Bronchodilator Assessment  BRONCHODILATOR ASSESSMENT SCORE  Score 0 1 2 3 4 5   Breath Sounds   []  Patient Baseline []  No Wheeze good aeration [x]  Faint, scattered wheezing, good aeration []  Expiratory Wheezing and or moderately diminished []  Insp/Exp wheeze and/or very diminished []  Insp/Exp and/ or marked distress   Respiratory Rate   []  Patient Baseline [x]  Less than 20 []  Less than 20 []  20-25 []  Greater than 25 []  Greater than 25   Peak flow % of Pred or PB [x]  NA   []  Greater than 90%  []  81-90% []  71-80% []  Less than or equal to 70%  or unable to perform []  Unable due to Respiratory Distress   Dyspnea re []  Patient Baseline [x]  No SOB []  No SOB []  SOB on exertion []  SOB min activity []  At rest/acute   e FEV% Predicted       [x]  NA []  Above 69%  []  Unable []  Above 60-69%  []  Unable []  Above 50-59%  []  Unable []  Above 35-49%  []  Unable []  Less than 35%  []  Unable

## 2021-06-02 NOTE — PROGRESS NOTES
[address]  CDU / OBSERVATION ENCOUNTER  ATTENDING NOTE       I performed a history and physical examination of the patient and discussed management with the resident or midlevel provider. I reviewed the resident or midlevel provider's note and agree with the documented findings and plan of care. Any areas of disagreement are noted on the chart. I was personally present for the key portions of any procedures. I have documented in the chart those procedures where I was not present during the key portions. I have reviewed the nurses notes. I agree with the chief complaint, past medical history, past surgical history, allergies, medications, social and family history as documented unless otherwise noted below. The Family history, social history, and ROS are effectively unchanged since admission unless noted elsewhere in the chart. Patient with persistent wheezing and persistent shortness of breath. Patient with some wheezing heard on exam which is with some congestion and patient also continues to complain of chest pain. Patient was out of breath and tachypneic after a walk from the bathroom to the bed. Echocardiogram done yesterday. Concern over change in echo. Discussed with cardiology. After discussion with cardiology and in light of patient's symptoms will get stress testing done today. If stress test does not give an answer will need to go further with potential pulmonary consult. Patient does have dyspnea walking to and from the bathroom. Patient does require further evaluation due to ongoing shortness of breath despite treatment. Patient requiring further cardiac rule out and maximization of pulmonary function.     Oneil Suero MD  Attending Emergency  Physician

## 2021-06-02 NOTE — PROGRESS NOTES
uPDATE    EKG portion of Lexiscan Stress Study 6/1/2021         IMPRESSION:         Electrocardiographically Negative Lexiscan stress study. Radio-isotope         results to follow from the department of Nuclear Medicine. INTERPRETING PHYSICIAN:  Dr. Nya Peñaloza    Nuclear portion of Cardiac Stress test 6/1/2021      Impression   1. No definitive scintigraphic evidence for reversible ischemia or infarct. 2. Left ventricular ejection fraction of greater than 70%. 3. Please see report for EKG portion of the examination which will be   performed separately by physician from cardiology. Risk stratification:  Low risk           1. No further cardiac workup planned at this time. 2.  Will sign off. Please contact with any questions or concerns.          Electronically signed by KEANU Sauceda CNP on 6/2/2021 at 3:46 [facility]      [phone] Excision Method: Elliptical

## 2021-06-03 VITALS
HEART RATE: 69 BPM | BODY MASS INDEX: 37.17 KG/M2 | TEMPERATURE: 98.2 F | SYSTOLIC BLOOD PRESSURE: 137 MMHG | WEIGHT: 209.8 LBS | HEIGHT: 63 IN | RESPIRATION RATE: 20 BRPM | DIASTOLIC BLOOD PRESSURE: 88 MMHG | OXYGEN SATURATION: 97 %

## 2021-06-03 PROCEDURE — 96372 THER/PROPH/DIAG INJ SC/IM: CPT

## 2021-06-03 PROCEDURE — 6370000000 HC RX 637 (ALT 250 FOR IP): Performed by: NURSE PRACTITIONER

## 2021-06-03 PROCEDURE — 6370000000 HC RX 637 (ALT 250 FOR IP): Performed by: EMERGENCY MEDICINE

## 2021-06-03 PROCEDURE — 6370000000 HC RX 637 (ALT 250 FOR IP): Performed by: STUDENT IN AN ORGANIZED HEALTH CARE EDUCATION/TRAINING PROGRAM

## 2021-06-03 PROCEDURE — 96376 TX/PRO/DX INJ SAME DRUG ADON: CPT

## 2021-06-03 PROCEDURE — 2580000003 HC RX 258: Performed by: EMERGENCY MEDICINE

## 2021-06-03 PROCEDURE — G0378 HOSPITAL OBSERVATION PER HR: HCPCS

## 2021-06-03 PROCEDURE — 6360000002 HC RX W HCPCS: Performed by: EMERGENCY MEDICINE

## 2021-06-03 PROCEDURE — 94640 AIRWAY INHALATION TREATMENT: CPT

## 2021-06-03 RX ORDER — IPRATROPIUM BROMIDE AND ALBUTEROL SULFATE 2.5; .5 MG/3ML; MG/3ML
1 SOLUTION RESPIRATORY (INHALATION) EVERY 4 HOURS
Qty: 360 ML | Refills: 1 | Status: ON HOLD | OUTPATIENT
Start: 2021-06-03 | End: 2021-08-28

## 2021-06-03 RX ORDER — OXYCODONE HYDROCHLORIDE 5 MG/1
5 TABLET ORAL EVERY 6 HOURS PRN
Qty: 20 TABLET | Refills: 0 | Status: SHIPPED | OUTPATIENT
Start: 2021-06-03 | End: 2021-06-08

## 2021-06-03 RX ORDER — BUDESONIDE AND FORMOTEROL FUMARATE DIHYDRATE 160; 4.5 UG/1; UG/1
2 AEROSOL RESPIRATORY (INHALATION) 2 TIMES DAILY
Qty: 1 INHALER | Refills: 1 | Status: SHIPPED | OUTPATIENT
Start: 2021-06-03 | End: 2022-03-29 | Stop reason: SDUPTHER

## 2021-06-03 RX ORDER — ALBUTEROL SULFATE 90 UG/1
1 AEROSOL, METERED RESPIRATORY (INHALATION) EVERY 6 HOURS PRN
Qty: 1 INHALER | Refills: 1 | Status: SHIPPED | OUTPATIENT
Start: 2021-06-03 | End: 2021-07-23 | Stop reason: SDUPTHER

## 2021-06-03 RX ORDER — NEBULIZER ACCESSORIES
KIT MISCELLANEOUS
Qty: 1 KIT | Refills: 2 | Status: SHIPPED | OUTPATIENT
Start: 2021-06-03

## 2021-06-03 RX ORDER — AZITHROMYCIN 250 MG/1
250 TABLET, FILM COATED ORAL SEE ADMIN INSTRUCTIONS
Qty: 6 TABLET | Refills: 0 | Status: SHIPPED | OUTPATIENT
Start: 2021-06-03 | End: 2021-06-08

## 2021-06-03 RX ORDER — PREDNISONE 20 MG/1
40 TABLET ORAL DAILY
Qty: 6 TABLET | Refills: 0 | Status: SHIPPED | OUTPATIENT
Start: 2021-06-04 | End: 2021-06-07

## 2021-06-03 RX ADMIN — BUTALBITAL, ACETAMINOPHEN AND CAFFEINE 1 TABLET: 50; 325; 40 TABLET ORAL at 05:45

## 2021-06-03 RX ADMIN — SODIUM CHLORIDE, PRESERVATIVE FREE 10 ML: 5 INJECTION INTRAVENOUS at 01:38

## 2021-06-03 RX ADMIN — BUTALBITAL, ACETAMINOPHEN AND CAFFEINE 1 TABLET: 50; 325; 40 TABLET ORAL at 09:43

## 2021-06-03 RX ADMIN — BUTALBITAL, ACETAMINOPHEN AND CAFFEINE 1 TABLET: 50; 325; 40 TABLET ORAL at 01:40

## 2021-06-03 RX ADMIN — PREDNISONE 40 MG: 20 TABLET ORAL at 08:48

## 2021-06-03 RX ADMIN — BUTALBITAL, ACETAMINOPHEN AND CAFFEINE 1 TABLET: 50; 325; 40 TABLET ORAL at 13:19

## 2021-06-03 RX ADMIN — OXYCODONE HYDROCHLORIDE 5 MG: 5 TABLET ORAL at 13:19

## 2021-06-03 RX ADMIN — BUDESONIDE AND FORMOTEROL FUMARATE DIHYDRATE 1 PUFF: 80; 4.5 AEROSOL RESPIRATORY (INHALATION) at 08:16

## 2021-06-03 RX ADMIN — OXYCODONE HYDROCHLORIDE 5 MG: 5 TABLET ORAL at 09:43

## 2021-06-03 RX ADMIN — CYCLOBENZAPRINE 5 MG: 10 TABLET, FILM COATED ORAL at 05:45

## 2021-06-03 RX ADMIN — GABAPENTIN 600 MG: 300 CAPSULE ORAL at 08:47

## 2021-06-03 RX ADMIN — IPRATROPIUM BROMIDE AND ALBUTEROL SULFATE 1 AMPULE: .5; 3 SOLUTION RESPIRATORY (INHALATION) at 11:50

## 2021-06-03 RX ADMIN — KETOROLAC TROMETHAMINE 15 MG: 15 INJECTION, SOLUTION INTRAMUSCULAR; INTRAVENOUS at 01:37

## 2021-06-03 RX ADMIN — SODIUM CHLORIDE, PRESERVATIVE FREE 10 ML: 5 INJECTION INTRAVENOUS at 08:19

## 2021-06-03 RX ADMIN — LEVOTHYROXINE SODIUM 100 MCG: 100 TABLET ORAL at 08:04

## 2021-06-03 RX ADMIN — LISINOPRIL 20 MG: 20 TABLET ORAL at 08:48

## 2021-06-03 RX ADMIN — KETOROLAC TROMETHAMINE 15 MG: 15 INJECTION, SOLUTION INTRAMUSCULAR; INTRAVENOUS at 08:06

## 2021-06-03 RX ADMIN — IPRATROPIUM BROMIDE AND ALBUTEROL SULFATE 1 AMPULE: .5; 3 SOLUTION RESPIRATORY (INHALATION) at 08:16

## 2021-06-03 RX ADMIN — OXYCODONE HYDROCHLORIDE 5 MG: 5 TABLET ORAL at 05:46

## 2021-06-03 RX ADMIN — AZITHROMYCIN 250 MG: 250 TABLET, FILM COATED ORAL at 08:48

## 2021-06-03 RX ADMIN — ENOXAPARIN SODIUM 40 MG: 40 INJECTION, SOLUTION INTRAVENOUS; SUBCUTANEOUS at 08:48

## 2021-06-03 RX ADMIN — OXYCODONE HYDROCHLORIDE 5 MG: 5 TABLET ORAL at 01:38

## 2021-06-03 ASSESSMENT — PAIN SCALES - GENERAL
PAINLEVEL_OUTOF10: 8
PAINLEVEL_OUTOF10: 7
PAINLEVEL_OUTOF10: 6
PAINLEVEL_OUTOF10: 3
PAINLEVEL_OUTOF10: 7
PAINLEVEL_OUTOF10: 0
PAINLEVEL_OUTOF10: 0

## 2021-06-03 NOTE — PROGRESS NOTES
CLINICAL PHARMACY NOTE: MEDS TO BEDS    Total # of Prescriptions Filled: 6   The following medications were delivered to the patient:  · Albuterol sulfate hfa inhaler  · Budesonide- formoterol inhaler 160/ 4.5mcg  · Ipratropium-albuterol 0.54- 2.5 sol  · Prednisone 20 mg tab  · Azithromycin 250 mg tab  · Oxycodone 5 mg tab    Additional Documentation:Medications delivered to the patient in 338.

## 2021-06-03 NOTE — CARE COORDINATION
Patient has script for nebulizer and wheeled walker and will use [facility] home equipment due to her insurance network. Scripts is faxed and message is left informing [facility] that patient will call when released from the hospital to arrange  / delivery of her walker. Patient is notified. Discharge [facility] Case Management Department  Written by: Oswald Kirkpatrick RN    Patient Name: Wang Solano  Attending Provider: Moira Bishop MD  Admit Date: 2021  6:32 PM  MRN: [id number]  Account: [de-identified]                     : 1965  Discharge Date:       Disposition: Home with DME from [employer]     Oswald Kirkpatrick RN      [address] [place] [address] home equipment rep called and informed writer that patient had a nebulizer purchased in  through insurance and she is not eligible for a new one until . Patient and Dr Arnaldo Mac are notified.

## 2021-06-03 NOTE — PROGRESS NOTES
(PEPCID) tablet 20 mg, BID PRN  gabapentin (NEURONTIN) capsule 600 mg, TID  lisinopril (PRINIVIL;ZESTRIL) tablet 20 mg, Daily  ondansetron (ZOFRAN-ODT) disintegrating tablet 4 mg, Q8H PRN  risperiDONE (RISPERDAL) tablet 2 mg, Nightly  sodium chloride flush 0.9 % injection 5-40 mL, 2 times per day  sodium chloride flush 0.9 % injection 5-40 mL, PRN  0.9 % sodium chloride infusion, PRN  azithromycin (ZITHROMAX) tablet 250 mg, Daily  predniSONE (DELTASONE) tablet 40 mg, Daily        All medication charted and reviewed. CONSULTS      IP CONSULT TO CARDIOLOGY  IP CONSULT TO CARDIOLOGY  IP CONSULT TO IV TEAM  IP CONSULT TO PULMONOLOGY    ASSESSMENT/PLAN       French Au is a 60 y.o. female who presents with chest pain, shortness of breath. Patient had a 20% drop on her most recent echocardiogram.  Patient will be reevaluated cardiology. Based on untreated echocardiogram stress test was performed which was unremarkable for ischemia. Pulmonology consult was then obtained who felt chest pain and dyspnea was most likely pleuritic in nature given patient's history of COPD, DuoNeb added to patient's current treatment regimen.       Lexiscan stress testing negative for ischemia  Pulmonology consult obtained, likely COPD exacerbation for which patient is receiving steroids, azithromycin, respiratory treatments  Most likely blood-tinged sputum from inflammation secondary to bronchitis and COPD exacerbation we will continue to monitor. · Continue home medications and pain control  · Monitor vitals, labs, and imaging  · DISPO: pending consults and clinical improvement       --  Renee Johnson DO  Emergency Medicine Resident Physician     This dictation was generated by voice recognition computer software. Although all attempts are made to edit the dictation for accuracy, there may be errors in the transcription that are not intended.

## 2021-06-03 NOTE — PROGRESS NOTES
[address]  CDU / OBSERVATION ENCOUNTER  ATTENDING NOTE       I performed a history and physical examination of the patient and discussed management with the resident or midlevel provider. I reviewed the resident or midlevel provider's note and agree with the documented findings and plan of care. Any areas of disagreement are noted on the chart. I was personally present for the key portions of any procedures. I have documented in the chart those procedures where I was not present during the key portions. I have reviewed the nurses notes. I agree with the chief complaint, past medical history, past surgical history, allergies, medications, social and family history as documented unless otherwise noted below. The Family history, social history, and ROS are effectively unchanged since admission unless noted elsewhere in the chart. Stress test negative yesterday. Reevaluated by cardiology. Patient also seen by pulmonology given ongoing dyspnea. Medications adjusted with adjustments made to inhalers as well. Patient for reevaluation today. Patient with acute on chronic bronchitis. Appreciate consultant input. Kassie Ceron was evaluated today and a DME order was entered for a wheeled walker because she requires this to successfully complete daily living tasks of ambulating. A wheeled walker is necessary due to the patient's unsteady gait, upper body weakness, and inability to  an ambulation device; and she can ambulate only by pushing a walker instead of a lesser assistive device such as a cane, crutch, or standard walker. The need for this equipment was discussed with the patient and she understands and is in agreement. Kassie Ceron was evaluated today and a DME order was entered for a nebulizer compressor in order to administer Albuterol due to the diagnosis of COPD.   The need for this equipment and treatment was discussed with the patient and she understands and is in agreement.       Veronica Begum MD  Attending Emergency  Physician

## 2021-06-03 NOTE — DISCHARGE SUMMARY
CDU Discharge Summary        Patient:  Maria Del Rosario Whitehead  YOB: 1965    MRN: [id number]   Acct: [de-identified]    Primary Care Physician: KEANU Flores CNP    Admit date:  5/30/2021  6:32 PM  Discharge date: 6/3/2021  3:44 PM     Discharge Diagnoses:     Acute chest pain due to COPD exacerbation  Improved with steroids, breathing treatments    Follow-up:  Call today/tomorrow for a follow up appointment with KEANU Flores CNP , or return to the Emergency Room with worsening symptoms    Stressed to patient the importance of following up with primary care doctor for further workup/management of symptoms. Pt verbalizes understanding and agrees with plan. Discharge Medications:  Changes to medications          Naeem Wilkinson \"ESTHELA\"   Home Medication Instructions PMQ:[id number]    Printed on:06/05/21 4871   Medication Information                      acetaminophen (TYLENOL) 500 MG tablet  Take 2 tablets by mouth 4 times daily as needed for Pain             albuterol (PROVENTIL) (2.5 MG/3ML) 0.083% nebulizer solution  Take 3 mLs by nebulization every 6 hours as needed for Wheezing             albuterol sulfate  (90 Base) MCG/ACT inhaler  Inhale 1 puff into the lungs every 6 hours as needed for Wheezing Gap prescription until follow up with primary. Do not refill. Follow up with primary             ammonium lactate (LAC-HYDRIN) 12 % lotion  Apply topically daily.              ASPIRIN ADULT LOW STRENGTH 81 MG EC tablet  TAKE 1 TABLET BY MOUTH ONCE DAILY              azithromycin (ZITHROMAX) 250 MG tablet  Take 1 tablet by mouth See Admin Instructions for 5 days 500mg on day 1 followed by 250mg on days 2 - 5             Benzocaine-Menthol (CEPACOL) 6-10 MG LOZG lozenge  Take 1 lozenge by mouth every 2 hours as needed for Sore Throat             benzonatate (TESSALON PERLES) 100 MG capsule  Take 1 capsule by mouth 3 times daily as needed for Cough             Blood Pressure KIT  Daily as needed             budesonide-formoterol (SYMBICORT) 160-4.5 MCG/ACT AERO  Inhale 2 puffs into the lungs 2 times daily             butalbital-aspirin-caffeine (FIORINAL) -40 MG per capsule  Take 1 capsule by mouth every 4 hours as needed for Headaches for up to 30 days. cetirizine (ZYRTEC) 10 MG tablet  Take 1 tablet by mouth daily             Ciclopirox (LOPROX) 0.77 % gel  Apply to skin twice a day             clomiPRAMINE (ANAFRANIL) 50 MG capsule  Take 1 capsule by mouth nightly             cloNIDine (CATAPRES) 0.1 MG tablet  Take 1 tablet by mouth nightly             cyclobenzaprine (FLEXERIL) 5 MG tablet  TAKE 1 TABLET BY MOUTH 2 TIMES DAILY AS NEEDED FOR MUSCLE SPASMS (DON'T TAKE W/ ZANAFLEX)             Elastic Bandages & Supports (CARPAL TUNNEL WRIST STABILIZER) MISC  Apply to each wrist at bedtime             enoxaparin (LOVENOX) 40 MG/0.4ML injection  Inject 0.4 mLs into the skin daily             enoxaparin (LOVENOX) 40 MG/0.4ML injection  Inject 0.4 mLs into the skin daily             famotidine (PEPCID) 20 MG tablet  Take 1 tablet by mouth 2 times daily as needed (for acid reflux)             fluticasone (FLONASE) 50 MCG/ACT nasal spray  2 sprays by Each Nostril route daily             gabapentin (NEURONTIN) 300 MG capsule  TAKE 2 CAPSULES BY MOUTH 3 TIMES DAILY FOR 30 DAYS.               hydrOXYzine (ATARAX) 50 MG tablet               ibuprofen (ADVIL;MOTRIN) 600 MG tablet  Take 1 tablet by mouth every 6 hours as needed for Pain             ipratropium-albuterol (DUONEB) 0.5-2.5 (3) MG/3ML SOLN nebulizer solution  Inhale 3 mLs into the lungs every 6 hours as needed for Shortness of Breath             ipratropium-albuterol (DUONEB) 0.5-2.5 (3) MG/3ML SOLN nebulizer solution  Inhale 3 mLs into the lungs every 4 hours             levothyroxine (SYNTHROID) 100 MCG tablet  Take 1 tablet by mouth Daily             lisinopril (PRINIVIL;ZESTRIL) 20 MG tablet  TAKE 1 TABLET BY QRS Duration 86 ms    Q-T Interval 432 ms    QTc Calculation (Bazett) 522 ms    P Axis 52 degrees    R Axis 14 degrees    T Axis 30 degrees     ECHO Complete 2D W Doppler W Color    Result Date: 6/1/2021  Transthoracic Echocardiography Report (TTE)  Patient Name JOSE     Date of Study           06/01/2021               PANCHO HOBSON   Date of      1965  Gender                  Female  Birth   Age          60 year(s)  Race                    Other   Room Number  3354        Height:                 62 inch, 157.48 cm   Corporate ID [id number]    Weight:                 209 pounds, 94.8 kg  #   Patient Acct [de-identified]   BSA:        1.95 m^2    BMI:        38.23 kg/m^2  #   MR #         [id number]     Sonographer             Heber Vasquez   Accession #  [id number]  Interpreting Physician  [address] [place] [address]   Fellow                   Referring Nurse                           Practitioner   Interpreting             Referring Physician     Katherine Pac  Fellow                                           DO Saturnino  Type of Study   TTE procedure:2D Echocardiogram, M-Mode, Doppler, Color Doppler. Procedure Date Date: 06/01/2021 Start: 07:28 AM Study Location: [facility] Technical Quality: Adequate visualization Indications:Chest pain. History / Tech. Comments: Procedure explained to patient. HTN, COPD Patient Status: Inpatient Height: 62 inches Weight: 209 pounds BSA: 1.95 m^2 BMI: 38.23 kg/m^2 HR: 61 bpm CONCLUSIONS Summary Left ventricle is normal in size. Global left ventricular systolic function is normal. Calculated ejection fraction 50% by Heart Model. Normal mitral valve structure. Trivial mitral regurgitation. Normal tricuspid valve structure. Trivial tricuspid regurgitation. E/E' average = 7.0. IVC Increased diameter, but still has inspiratory variation suggesting upper normal or mildly elevated RA filling pressure (i.e. CVP) .  Signature ----------------------------------------------------------------------------  Electronically signed by Sylvia Willis(Sonographer) on 2021  08:01 AM ---------------------------------------------------------------------------- ----------------------------------------------------------------------------  Electronically signed by Dov Marie(Interpreting physician) on 2021  11:54 AM ---------------------------------------------------------------------------- FINDINGS Left Atrium Left atrium is normal in size. Left Ventricle Left ventricle is normal in size. Global left ventricular systolic function is normal. Calculated ejection fraction 50% by Heart Model. Right Atrium Right atrium is normal in size. Right Ventricle Normal right ventricular size and function. TAPSE value of 2.0cm noted. Mitral Valve Normal mitral valve structure. Trivial mitral regurgitation. Aortic Valve Normal aortic valve structure and function without stenosis or regurgitation. Tricuspid Valve Normal tricuspid valve structure. Trivial tricuspid regurgitation. Pulmonic Valve The pulmonic valve is normal in structure. No pulmonic insufficiency. Pericardial Effusion No pericardial effusion seen. Miscellaneous E/E' average = 7.0. IVC Increased diameter, but still has inspiratory variation suggesting upper normal or mildly elevated RA filling pressure (i.e. CVP) .  M-mode / 2D Measurements & Calculations:   LVIDd:5.1 cm(3.7 - 5.6 cm)       Diastolic JFTWAT:258 ml  HEUZP:9.4 cm(2.2 - 4.0 cm)       Systolic NXNRBJ:32 ml  IVSd:1 cm(0.6 - 1.1 cm)          Aortic Root:2.6 cm(2.0 - 3.7 cm)  LVPWd:1 cm(0.6 - 1.1 cm)         LA Dimension: 3.7 cm(1.9 - 4.0 cm)  Fractional Shortenin.37 %    LA volume/Index: 38.5 ml /20m^2  Calculated LVEF (%): 50.91 %     LVOT:1.8 cm                                   RVDd:2.6 cm   Mitral:                                 Aortic   Valve Area (P1/2-Time): 2.42 cm^2       Peak Velocity: 1.25 m/s  Peak E-Wave: 0.57 m/s                   Mean Velocity: 0.87 m/s  Peak A-Wave: 0.46 m/s                   Peak Gradient: 6.25 mmHg  E/A Ratio: 1.23                         Mean Gradient: 4 mmHg  Peak Gradient: 1.3 mmHg  Mean Gradient: 1 mmHg  Deceleration Time: 310 msec             Area (continuity): 2.15 cm^2  P1/2t: 91 msec                          AV VTI: 24.2 cm   Area (continuity): 2.24 cm^2  Mean Velocity: 0.49 m/s                                           Pulmonic:                                           Peak Velocity: 0.72 m/s                                          Peak Gradient: 2.07 mmHg  Diastology / Tissue Doppler Septal Wall E' velocity:0.08 m/s Septal Wall E/E':7.3 Lateral Wall E' velocity:0.08 m/s Lateral Wall E/E':6.7    XR KNEE RIGHT (3 VIEWS)    Result Date: 5/16/2021  EXAMINATION: 2 XRAY VIEWS OF THE RIGHT TIBIA AND FIBULA; THREE XRAY VIEWS OF THE RIGHT KNEE 5/16/2021 3:23 pm COMPARISON: Ankle series today. HISTORY: ORDERING SYSTEM PROVIDED HISTORY: fall TECHNOLOGIST PROVIDED HISTORY: fall FINDINGS: KNEE: Tricompartmental mild degenerative change. Small knee effusion. No acute osseous abnormality identified. TIB/FIB: Overlying splint material noted. An oblique fracture of the distal fibula involving the lateral malleolus is noted. Ankle mortise alignment appears grossly maintained. 1.  Oblique fracture of the distal fibula and lateral malleolus. 2.  Degenerative change in the knee with small knee effusion. XR TIBIA FIBULA RIGHT (2 VIEWS)    Result Date: 5/16/2021  EXAMINATION: 2 XRAY VIEWS OF THE RIGHT TIBIA AND FIBULA; THREE XRAY VIEWS OF THE RIGHT KNEE 5/16/2021 3:23 pm COMPARISON: Ankle series today. HISTORY: ORDERING SYSTEM PROVIDED HISTORY: fall TECHNOLOGIST PROVIDED HISTORY: fall FINDINGS: KNEE: Tricompartmental mild degenerative change. Small knee effusion. No acute osseous abnormality identified. TIB/FIB: Overlying splint material noted.   An oblique fracture of the distal fibula involving the lateral malleolus is noted. Ankle mortise alignment appears grossly maintained. 1.  Oblique fracture of the distal fibula and lateral malleolus. 2.  Degenerative change in the knee with small knee effusion. XR ANKLE RIGHT (2 VIEWS)    Result Date: 5/18/2021 5/18/2021 FINDINGS: 4 views (AP, Mortise, Lateral, and gravity stress view) of the right ankle and three views (AP, Oblique, Lateral) of the right foot were obtained in the office today and reviewed, revealing mildly displaced distal fibula fracture. No definitive widening of the medial clear space or syndesmosis. With gravity stress view, there is mild widening medially, however the ankle is an equinus. Limited studies.        Osseous injury as above.   Electronically signed by Mackenzie Cary MD    XR ANKLE RIGHT (MIN 3 VIEWS)    Result Date: 5/18/2021 5/18/2021 FINDINGS: 4 views (AP, Mortise, Lateral, and gravity stress view) of the right ankle and three views (AP, Oblique, Lateral) of the right foot were obtained in the office today and reviewed, revealing mildly displaced distal fibula fracture. No definitive widening of the medial clear space or syndesmosis. With gravity stress view, there is mild widening medially, however the ankle is an equinus. Limited studies.        Osseous injury as above.   Electronically signed by Mackenzie Cary MD    XR ANKLE RIGHT (MIN 3 VIEWS)    Result Date: 5/15/2021  EXAMINATION: THREE XRAY VIEWS OF THE RIGHT ANKLE 5/15/2021 2:42 pm COMPARISON: None. HISTORY: ORDERING SYSTEM PROVIDED HISTORY: pain, eval fx TECHNOLOGIST PROVIDED HISTORY: pain, eval fx FINDINGS: Lateral ankle swelling. Nondisplaced oblique fracture of the lateral malleolus. Ankle mortise alignment appears maintained. The joint spaces are maintained. Oblique nondisplaced fracture of the lateral malleolus with adjacent soft tissue swelling.      XR FOOT RIGHT (MIN 3 VIEWS)    Result Date: 5/18/2021 5/18/2021 FINDINGS: 4 views (AP, Mortise, Lateral, and gravity stress view) of the right ankle and three views (AP, Oblique, Lateral) of the right foot were obtained in the office today and reviewed, revealing mildly displaced distal fibula fracture. No definitive widening of the medial clear space or syndesmosis. With gravity stress view, there is mild widening medially, however the ankle is an equinus. Limited studies.        Osseous injury as above.   Electronically signed by Frank Flores MD    XR CHEST PORTABLE    Result Date: 5/30/2021  EXAMINATION: ONE XRAY VIEW OF THE CHEST 5/30/2021 7:11 pm COMPARISON: 05/29/2021 HISTORY: ORDERING SYSTEM PROVIDED HISTORY: chest pain TECHNOLOGIST PROVIDED HISTORY: chest pain Reason for Exam: fatigue. upr Acuity: Unknown Type of Exam: Unknown FINDINGS: Heart size and configuration are normal.  Hilar and mediastinal structures are unremarkable. The lungs are clear. No pneumothorax or pleural fluid. No acute bone finding. Portable study is limited by body habitus. No acute cardiopulmonary disease. XR CHEST PORTABLE    Result Date: 5/29/2021  EXAMINATION: ONE XRAY VIEW OF THE CHEST 5/29/2021 7:37 pm COMPARISON: None. HISTORY: ORDERING SYSTEM PROVIDED HISTORY: Cough. body aches TECHNOLOGIST PROVIDED HISTORY: Cough. body aches Reason for Exam: upr Acuity: Unknown Type of Exam: Unknown FINDINGS: A portable upright frontal view chest radiograph was obtained. The heart is enlarged. The mediastinal contour and pleural spaces are otherwise within normal limits. The lungs are grossly clear. There is no focal consolidation or pneumothorax. The pulmonary vascular pattern is within normal limits. No acute thoracic osseous abnormality. Clear lungs. Mild cardiac enlargement. No acute cardiopulmonary abnormality.      NM Cardiac Stress Test Nuclear Imaging    Result Date: 6/2/2021  EXAMINATION: MYOCARDIAL PERFUSION IMAGING 6/2/2021 11:03 am TECHNIQUE: Rest dose:  52.5 mCi Tc-99m sestamibi intravenously Stress dose:  15.7 mCi Tc-99m sestamibi intravenously Under cardiology supervision, 0.4 mg Lexiscan was infused intravenously prior to injection of the stress dose. SPECT imaging was acquired following injection of the sestamibi. ECG gating was obtained following the stress acquisition. COMPARISON: 08/26/2020 HISTORY: ORDERING SYSTEM PROVIDED HISTORY: Angina TECHNOLOGIST PROVIDED HISTORY: Reason for Exam: Angina Procedure Type->Rx dyspnea Reason for Exam: chest pain, back or arm pain, numbness on arms of hands, short of breath, cardiac cath. family History of  heart disease and high cholesterol. 58-year-old female with history of angina FINDINGS: The patient achieved a maximum heart rate of 83 beats per minute, 51 % of the maximum age predicted heart rate of 162 beats per minute. Perfusion: There is no scintigraphic evidence for a reversible or fixed perfusion defect to suggest reversible ischemia or infarct. Function: The gated SPECT data demonstrates normal left ventricular size and normal wall motion. Left ventricular ejection fraction:  Greater than 70% TID score:  0.97 (threshold value of 1.39 is used for Lexiscan stress with Tc-99m). There is no stress-induced cavitary dilatation to suggest compensated triple vessel disease. End diastolic volume:  24ES Scores are visually adjusted to account for potential artifact. Summed stress score:  0 Summed rest score:  0 Summed reversibility score:  0     1. No definitive scintigraphic evidence for reversible ischemia or infarct. 2. Left ventricular ejection fraction of greater than 70%. 3. Please see report for EKG portion of the examination which will be performed separately by physician from cardiology. Risk stratification:  Low risk Note:  Risk stratification incorporates both clinical history and test results.   Final risk determination is the responsibility of the ordering provider as history and other test results may increase or decrease the risk stratification reported for this examination. Risk stratification criteria are adapted from \"Noninvasive Risk Stratification\" criteria from [facility]. Al, ACC/AATS/AHA/ASE/ASNC/SCAI/SCCT/STS 2017 Appropriate Use Criteria For Coronary Revascularization in Patients With Stable Ischemic Heart Disease [facility] Volume 69, Issue 17, May 2017 High risk (>3% annual death or MI) 1. Severe resting LV dysfunction (LVEF >35%) not readily explained by non coronary causes 2. Resting perfusion abnormalities greater than 10% of the myocardium in patients without prior history or evidence of MI 3. Stress-induced perfusion abnormalities encumbering greater than or equal to 10% myocardium or stress segmental scores indicating multiple vascular territories with abnormalities 4. Stress-induced LV dilatation (TID ratio greater than 1.19 for exercise and greater than 1.39 for regadenoson) Intermediate risk (1% to 3% annual death or MI) 1. Mild/moderate resting LV dysfunction (LVEF 35% to 49%) not readily explained by non coronary causes. 2.  Resting perfusion abnormalities in 5%-9.9% of the myocardium in patients without a history or prior evidence of MI 3. Stress-induced perfusion abnormality encumbering 5%-9.9% of the myocardium or stress segmental scores indicating 1 vascular territory with abnormalities but without LV dilation 4. Small wall motion abnormality involving 1-2 segments and only 1 coronary bed. Low Risk (Less than 1% annual death or MI) 1. Normal or small myocardial perfusion defect at rest or with stress encumbering less than 5% of the myocardium. XR HIP 2-3 VW W PELVIS RIGHT    Result Date: 5/15/2021  EXAMINATION: ONE XRAY VIEW OF THE PELVIS AND TWO XRAY VIEWS RIGHT HIP 5/15/2021 3:11 pm COMPARISON: None. HISTORY: ORDERING SYSTEM PROVIDED HISTORY: fall from standing TECHNOLOGIST PROVIDED HISTORY: fall from standing FINDINGS: No acute bony process. Right joint hip space appears well maintained.   Mild degenerative changes of the lower lumbar spine and SI joints. Visualized portions of left hip appears intact. No acute bony process involving the right hip. Physical Exam:    General appearance - NAD, AOx 3   Lungs -CTAB, no R/R/R  Heart - RRR, no M/R/G  Abdomen - Soft, NT/ND  Neurological:  MAEx4, No focal motor deficit, sensory loss  Extremities - Cap refil <2 sec in all ext., no edema  Skin -warm, dry      Hospital Course:  Clinical course has improved, labs and imaging reviewed. Wang Solano originally presented to the hospital on 5/30/2021  6:32 PM. with complaints of chest pain and shortness of breath, work-up was unremarkable emergency department. At that time it was determined that She required further observation and evaluation. She was admitted and labs and imaging were followed daily. Imaging results as above. Cardiology evaluated the patient and felt pain was likely noncardiac in nature recommended pulmonary consult. Pathology consult was obtained who stated pain most likely due to COPD exacerbation, patient started on steroids, azithromycin as well as respiratory treatments. Patient reported symptomatic improvement throughout her stay. She is medically stable to be discharged with prescriptions for inhalers, steroids, antibiotics. Disposition: Home    Patient stated that they will not drive themselves home from the hospital if they have gotten pain killers/ narcotics earlier that day and that they will arrange for transportation on their own or work with the  for a ride. Patient counseled NOT to drive while under the influence of narcotics/ pain killers. Condition: Good    Patient stable and ready for discharge home. I have discussed plan of care with patient and they are in understanding. They were instructed to read discharge paperwork. All of their questions and concerns were addressed.      Time Spent: 0 day      --  Chela Javed, DO  Emergency Medicine Resident Physician    This dictation was generated by voice recognition computer software. Although all attempts are made to edit the dictation for accuracy, there may be errors in the transcription that are not intended.

## 2021-06-07 ENCOUNTER — TELEPHONE (OUTPATIENT)
Dept: PRIMARY CARE CLINIC | Age: 56
End: 2021-06-07

## 2021-06-07 NOTE — TELEPHONE ENCOUNTER
Pt called about DME order for a walker with seat and send to [facility]. She did have an order from 6/3 but was cancelled. I called back pt to clarify why the original order was cancelled. She stated that it had to be sent to [facility].

## 2021-06-07 NOTE — TELEPHONE ENCOUNTER
PT wants to inform Dr Wellington Roberson that she is out of the hospital and needs to be seen for her right ankle injuries as soon as possible. PT states that Deedee Rodriguez knows of her matter and told her to call back as soon as she out of the hospital, to be scheduled.

## 2021-06-08 NOTE — TELEPHONE ENCOUNTER
Need for a walker is to be fully documented in visit note for insurance to cover. She can discuss it with orthopedics when she sees them today if they feel it is necessary due to her condition regarding her ankle, or she will have to have an appointment with me to discuss the need and benefit of the walker.

## 2021-06-09 NOTE — TELEPHONE ENCOUNTER
Pt informed. Stated she will wait to see Ortho on 6/11. Will c/b to make an appt if ortho will not order walker.

## 2021-06-10 DIAGNOSIS — G43.809 MIGRAINE VARIANT WITH HEADACHE: ICD-10-CM

## 2021-06-10 RX ORDER — ONDANSETRON 4 MG/1
4 TABLET, ORALLY DISINTEGRATING ORAL EVERY 8 HOURS PRN
Qty: 20 TABLET | Refills: 1 | Status: SHIPPED | OUTPATIENT
Start: 2021-06-10 | End: 2021-07-15 | Stop reason: SDUPTHER

## 2021-06-10 NOTE — TELEPHONE ENCOUNTER
----- Message from Preston Esquivel sent at 6/10/2021 11:14 AM EDT -----  Subject: Message to Provider    QUESTIONS  Information for Provider? Patient is asking if Romain Nicole could call   her in a prescription for zofrain   ---------------------------------------------------------------------------  --------------  CALL BACK INFO  What is the best way for the office to contact you? OK to leave message on   voicemail  Preferred Call Back Phone Number? [phone]  ---------------------------------------------------------------------------  --------------  SCRIPT ANSWERS  Relationship to Patient?  Self

## 2021-06-13 ENCOUNTER — HOSPITAL ENCOUNTER (EMERGENCY)
Age: 56
Discharge: HOME | End: 2021-06-13
Attending: EMERGENCY MEDICINE
Payer: MEDICARE

## 2021-06-13 VITALS
RESPIRATION RATE: 16 BRPM | OXYGEN SATURATION: 95 % | TEMPERATURE: 98.6 F | DIASTOLIC BLOOD PRESSURE: 98 MMHG | HEART RATE: 95 BPM | SYSTOLIC BLOOD PRESSURE: 161 MMHG

## 2021-06-13 DIAGNOSIS — G43.109 MIGRAINE WITH AURA AND WITHOUT STATUS MIGRAINOSUS, NOT INTRACTABLE: Primary | ICD-10-CM

## 2021-06-13 PROCEDURE — 6360000002 HC RX W HCPCS: Performed by: HEALTH CARE PROVIDER

## 2021-06-13 PROCEDURE — 99284 EMERGENCY DEPT VISIT MOD MDM: CPT

## 2021-06-13 PROCEDURE — 96372 THER/PROPH/DIAG INJ SC/IM: CPT

## 2021-06-13 RX ORDER — KETOROLAC TROMETHAMINE 30 MG/ML
30 INJECTION, SOLUTION INTRAMUSCULAR; INTRAVENOUS ONCE
Status: DISCONTINUED | OUTPATIENT
Start: 2021-06-13 | End: 2021-06-13

## 2021-06-13 RX ORDER — KETOROLAC TROMETHAMINE 30 MG/ML
30 INJECTION, SOLUTION INTRAMUSCULAR; INTRAVENOUS ONCE
Status: COMPLETED | OUTPATIENT
Start: 2021-06-13 | End: 2021-06-13

## 2021-06-13 RX ORDER — DIPHENHYDRAMINE HYDROCHLORIDE 50 MG/ML
25 INJECTION INTRAMUSCULAR; INTRAVENOUS ONCE
Status: COMPLETED | OUTPATIENT
Start: 2021-06-13 | End: 2021-06-13

## 2021-06-13 RX ORDER — DEXAMETHASONE SODIUM PHOSPHATE 10 MG/ML
10 INJECTION INTRAMUSCULAR; INTRAVENOUS ONCE
Status: COMPLETED | OUTPATIENT
Start: 2021-06-13 | End: 2021-06-13

## 2021-06-13 RX ORDER — PROCHLORPERAZINE EDISYLATE 5 MG/ML
10 INJECTION INTRAMUSCULAR; INTRAVENOUS ONCE
Status: COMPLETED | OUTPATIENT
Start: 2021-06-13 | End: 2021-06-13

## 2021-06-13 RX ADMIN — DEXAMETHASONE SODIUM PHOSPHATE 10 MG: 10 INJECTION INTRAMUSCULAR; INTRAVENOUS at 18:16

## 2021-06-13 RX ADMIN — KETOROLAC TROMETHAMINE 30 MG: 30 INJECTION, SOLUTION INTRAMUSCULAR; INTRAVENOUS at 18:16

## 2021-06-13 RX ADMIN — PROCHLORPERAZINE EDISYLATE 10 MG: 5 INJECTION INTRAMUSCULAR; INTRAVENOUS at 18:13

## 2021-06-13 RX ADMIN — DIPHENHYDRAMINE HYDROCHLORIDE 25 MG: 50 INJECTION INTRAMUSCULAR; INTRAVENOUS at 18:16

## 2021-06-13 ASSESSMENT — PAIN DESCRIPTION - LOCATION: LOCATION: HEAD

## 2021-06-13 ASSESSMENT — ENCOUNTER SYMPTOMS
PHOTOPHOBIA: 1
ABDOMINAL PAIN: 0
NAUSEA: 1
SHORTNESS OF BREATH: 0
TROUBLE SWALLOWING: 0
VOMITING: 1

## 2021-06-13 ASSESSMENT — PAIN SCALES - GENERAL
PAINLEVEL_OUTOF10: 10
PAINLEVEL_OUTOF10: 10

## 2021-06-13 ASSESSMENT — PAIN DESCRIPTION - DESCRIPTORS: DESCRIPTORS: STABBING

## 2021-06-13 NOTE — ED NOTES
Pt arrived with complaints of a migraine headache. Pt stated that it started 3-4 days ago. Pt reports taking tylenol and motrin for relief. Pt stated that it didn't help. Pt stated that the headache is on the top of her head. Pt stated that she is sensitive to light and sound. Pt denies any blurred vision. Pt does report nausea and vomiting. RR equal and unlabored.  Will continue plan of care       Delicia Lloyd RN  06/13/21 0099

## 2021-06-13 NOTE — ED PROVIDER NOTES
[address] [facility]  ED  Emergency Department Encounter  Emergency Medicine Resident     Pt Name: Cecelia Quezada  MRN: [id number]  Chip 1965  Date of evaluation: 6/13/21  PCP:  KEANU Jackson [id number]       Chief Complaint   Patient presents with    Migraine       HISTORY Tyrese  (Location/Symptom, Timing/Onset, Context/Setting, Quality, Duration, Modifying Ivory Ou.)      Cecelia Quezada is a 60 y.o. female who presents with headache. Symptoms started 3 days ago. Pain is constant, throbbing and 10/10 in severity. Localized to the bilateral temporal areas without radiation. Exacerbated with bright lights and loud sounds. Alleviated by resting in a dark room. Patient has a history of migraines with aura and has halos around lights. She states this follows her usual migraine pattern. Not the worst headache of her life. No thunderclap headache. Patient is anticoagulated on Lovenox shots for an ankle fracture. Denies any neurologic symptoms. Last migraine was in May, for which she visited the ED and had resolution of symptoms with the IV medication cocktail. PAST MEDICAL / SURGICAL / SOCIAL / FAMILY HISTORY      has a past medical history of Arthritis, Asthma, Attention deficit hyperactivity disorder (ADHD), combined type, Borderline diabetes, Borderline personality disorder ([address] CHF (congestive heart failure) ([address] COPD (chronic obstructive pulmonary disease) ([address] Fibromyalgia, Headache, Hypertension, Manic depression ([address] Movement disorder, Neck fracture ([address] Pernicious anemia, Seizure ([address] and Thyroid disease. has a past surgical history that includes knee surgery (Bilateral); partial hysterectomy (cervix not removed); lymph node dissection; Irrigation and debridement (Right, 5/17/2019); EXPLORATION OF WOUND OF EXTREMITY (Right, 5/19/2019); and EXPLORATION OF WOUND OF EXTREMITY (N/A, 5/22/2019).      Social History Authorizing Provider   ondansetron (ZOFRAN ODT) 4 MG disintegrating tablet Take 1 tablet by mouth every 8 hours as needed for Nausea 6/10/21 7/10/21  KEANU Flores CNP   albuterol sulfate  (90 Base) MCG/ACT inhaler Inhale 1 puff into the lungs every 6 hours as needed for Wheezing [place] prescription until follow up with primary. Do not refill. Follow up with primary 6/3/21   Saravanan Guerrero DO   budesonide-formoterol [facility]) 160-4.5 MCG/ACT AERO Inhale 2 puffs into the lungs 2 times daily 6/3/21   Saravanan Guerrero DO   ipratropium-albuterol (DUONEB) 0.5-2.5 (3) MG/3ML SOLN nebulizer solution Inhale 3 mLs into the lungs every 4 hours 6/3/21   Saravanan Guerrero DO   Respiratory Therapy Supplies (NEBULIZER/TUBING/MOUTHPIECE) KIT Daily as needed 6/3/21   Saravanan Guerrero DO   ibuprofen (ADVIL;MOTRIN) 600 MG tablet Take 1 tablet by mouth every 6 hours as needed for Pain 5/29/21 6/5/21  Ivory Valerio,    gabapentin (NEURONTIN) 300 MG capsule TAKE 2 CAPSULES BY MOUTH 3 TIMES DAILY FOR 30 DAYS. 5/24/21 6/23/21  KEANU Flores CNP   lisinopril (PRINIVIL;ZESTRIL) 20 MG tablet TAKE 1 TABLET BY MOUTH DAILY  5/24/21 6/23/21  KEANU Flores CNP   enoxaparin (LOVENOX) 40 MG/0.4ML injection Inject 0.4 mLs into the skin daily 5/18/21   Enid Barajas MD   enoxaparin (LOVENOX) 40 MG/0.4ML injection Inject 0.4 mLs into the skin daily 5/18/21   Enid Barajas MD   naproxen (NAPROSYN) 500 MG tablet Take 1 tablet by mouth 2 times daily (with meals) 5/16/21   Lex Velez MD   acetaminophen (TYLENOL) 500 MG tablet Take 2 tablets by mouth 4 times daily as needed for Pain 5/15/21   Bassem Rogers,    butalbital-aspirin-caffeine (FIORINAL) -40 MG per capsule Take 1 capsule by mouth every 4 hours as needed for Headaches for up to 30 days.  5/14/21 6/13/21  KEANU Flores - FELIX   fluticasone [facility]) 50 MCG/ACT nasal spray 2 sprays by Each Nostril route daily 5/14/21 KEANU Massey CNP   ondansetron (ZOFRAN) 4 MG tablet Take 1 tablet by mouth every 8 hours as needed for Nausea or Vomiting 5/8/21   Owen Markham MD   ondansetron (ZOFRAN ODT) 4 MG disintegrating tablet Take 1 tablet by mouth every 8 hours as needed for Nausea 4/28/21   Jason Dunlap DO   ASPIRIN ADULT LOW STRENGTH 81 MG EC tablet TAKE 1 TABLET BY MOUTH ONCE DAILY  4/5/21   KEANU Massey CNP   cetirizine (ZYRTEC) 10 MG tablet Take 1 tablet by mouth daily 3/11/21   KEANU Massey CNP   levothyroxine (SYNTHROID) 100 MCG tablet Take 1 tablet by mouth Daily 2/19/21   KEANU Massey CNP   ipratropium-albuterol (DUONEB) 0.5-2.5 (3) MG/3ML SOLN nebulizer solution Inhale 3 mLs into the lungs every 6 hours as needed for Shortness of Breath 2/11/21   KEANU Massey CNP   cyclobenzaprine (FLEXERIL) 5 MG tablet TAKE 1 TABLET BY MOUTH 2 TIMES DAILY AS NEEDED FOR MUSCLE SPASMS (DON'T TAKE W/ ZANAFLEX) 1/19/21   KEANU Massey CNP   ammonium lactate (LAC-HYDRIN) 12 % lotion Apply topically daily. 1/6/21   Kate Ivory DPM   Ciclopirox (LOPROX) 0.77 % gel Apply to skin twice a day 1/6/21   Kate Ivory DPM   hydrOXYzine (ATARAX) 50 MG tablet  12/14/20   Emeka Edwards MD   Nebulizers (NEBULIZER COMPRESSOR) MISC Daily as needed 12/18/20   KEANU Massey CNP   Blood Pressure KIT Daily as needed 12/18/20   KEANU Massey CNP   Misc.  Devices (CANE) MISC Quad base cane 11/28/20   Smita Blank MD   famotidine (PEPCID) 20 MG tablet Take 1 tablet by mouth 2 times daily as needed (for acid reflux) 10/26/20   KEANU Massey CNP   clomiPRAMINE (ANAFRANIL) 50 MG capsule Take 1 capsule by mouth nightly 10/26/20   KEANU Massey CNP   risperiDONE (RISPERDAL) 1 MG tablet Take 1 tablet by mouth daily and 2 tablets by mouth at bedtime 10/22/20   KEANU Massey CNP   traZODone (DESYREL) 150 MG tablet Take 1 tablet by mouth nightly 10/22/20   KEANU Tyler CNP   cloNIDine (CATAPRES) 0.1 MG tablet Take 1 tablet by mouth nightly 10/22/20   KEANU Tyler CNP   Benzocaine-Menthol (CEPACOL) 6-10 MG LOZG lozenge Take 1 lozenge by mouth every 2 hours as needed for Sore Throat 10/22/20   KEANU Tyler CNP   tiZANidine (ZANAFLEX) 2 MG tablet Take 2 tablets by mouth every 8 hours as needed (muscle spasm, neck pain) 10/9/20   KEANU Tyler CNP   Elastic Bandages & Supports (CARPAL TUNNEL WRIST STABILIZER) MISC Apply to each wrist at bedtime 10/9/20   KEANU Tyler CNP   methyl salicylate-menthol (PAULETTE BRAY GREASELESS) 10-15 % CREA Apply topically 3 times daily as needed for Pain 9/21/20   Tash Ch MD   albuterol (PROVENTIL) (2.5 MG/3ML) 0.083% nebulizer solution Take 3 mLs by nebulization every 6 hours as needed for Wheezing 6/18/20   KEANU Tyler CNP       REVIEW OFSYSTEMS    (2-9 systems for level 4, 10 or more for level 5)      Review of Systems   Constitutional: Positive for fatigue. Negative for appetite change, chills and fever. HENT: Negative for tinnitus and trouble swallowing. Eyes: Positive for photophobia. Negative for visual disturbance. Respiratory: Negative for shortness of breath. Cardiovascular: Negative for chest pain. Gastrointestinal: Positive for nausea and vomiting. Negative for abdominal pain. Genitourinary: Negative for dysuria. Musculoskeletal: Negative for neck pain and neck stiffness. Neurological: Positive for headaches. Negative for dizziness, tremors, facial asymmetry and weakness. Hematological: Bruises/bleeds easily. Psychiatric/Behavioral: Negative for confusion. PHYSICAL EXAM   (up to 7 for level 4, 8 or more forlevel 5)      INITIAL VITALS:     Vitals:    06/13/21 1655   BP: (!) 161/98   Pulse: 95   Resp: 16   Temp: 98.6 °F (37 °C)   SpO2: 95%         Physical Exam  Vitals reviewed.    Constitutional:       General: She is not in acute distress. Appearance: Normal appearance. She is not ill-appearing. HENT:      Head: Normocephalic and atraumatic. Right Ear: Tympanic membrane, ear canal and external ear normal.      Left Ear: Tympanic membrane and external ear normal.      Nose: Nose normal. No congestion. Mouth/Throat:      Mouth: Mucous membranes are moist.      Pharynx: Oropharynx is clear. Eyes:      Extraocular Movements: Extraocular movements intact. Conjunctiva/sclera: Conjunctivae normal.      Pupils: Pupils are equal, round, and reactive to light. Cardiovascular:      Rate and Rhythm: Normal rate and regular rhythm. Pulses: Normal pulses. Heart sounds: Normal heart sounds. Pulmonary:      Effort: Pulmonary effort is normal.      Breath sounds: Normal breath sounds. Abdominal:      General: There is no distension. Palpations: Abdomen is soft. Musculoskeletal:      Cervical back: Normal range of motion and neck supple. Left lower leg: No edema. Comments: RLE in walking boot     Skin:     General: Skin is warm and dry. Capillary Refill: Capillary refill takes less than 2 seconds. Neurological:      Mental Status: She is alert and oriented to person, place, and time. Cranial Nerves: No cranial nerve deficit. Sensory: No sensory deficit. Motor: No weakness. Coordination: Coordination normal.   Psychiatric:         Mood and Affect: Mood normal.         Behavior: Behavior normal.         DIFFERENTIAL  DIAGNOSIS     PLAN (LABS / IMAGING / EKG):  No orders of the defined types were placed in this encounter.       MEDICATIONS ORDERED:  Orders Placed This Encounter   Medications    diphenhydrAMINE (BENADRYL) injection 25 mg    prochlorperazine (COMPAZINE) injection 10 mg    DISCONTD: ketorolac (TORADOL) injection 30 mg    dexamethasone (DECADRON) injection 10 mg    ketorolac (TORADOL) injection 30 mg       DDX: Migraine, tension headache, intracranial hemorrhage. Initial MDM/Plan: 60 y.o. female who presents with headache most consistent with previous migraines. Patient is on anticoagulation, but it is the prophylactic dose and she has no red flags or neurologic symptoms. Will treat with Benadryl, Compazine, Toradol and Decadron. Will reassess after treatment. Likely discharge. DIAGNOSTIC RESULTS / EMERGENCYDEPARTMENT COURSE / MDM     LABS:  Labs Reviewed - No data to display      RADIOLOGY:  No results found. EMERGENCY DEPARTMENT COURSE:  ED Course as of Jun 14 0131   Sun Jun 13, 2021   1901 Patient improved with medication regimen. Will discharge home. Recommend following up with PCP to discuss prophylactic treatment or possible neurology referral, given increased frequency of occurrences. Discussed with patient, who acknowledged understanding and intent to comply. [GG]      ED Course User Index  [GG] Whitney Baker MD          PROCEDURES:  None    CONSULTS:  None    CRITICAL CARE:  Please see attending note    FINAL IMPRESSION      1.  Migraine with aura and without status migrainosus, not intractable          DISPOSITION / PLAN     DISPOSITION        PATIENT REFERRED TO:  [facility]  [address] [facility] [address]  [phone]    If symptoms worsen    KEANU Evangelista [address] [facility] [address] [facility]  [phone]    In 2 days  To discuss prophylactic treatment or neurology referral      DISCHARGE MEDICATIONS:  Discharge Medication List as of 6/13/2021  7:00 PM          Whitney Baker MD  Emergency Medicine Resident    (Please note that portions of this note were completed with a voice recognition program.Efforts were made to edit the dictations but occasionally words are mis-transcribed.)        Whitney Baker MD  Resident  06/14/21 1319

## 2021-06-13 NOTE — ED PROVIDER NOTES
and equal in all extremities, intact sensation to light touch throughout, normal pupils.   Will medicate, avoid Toradol given on Lovenox, reevaluate probable discharge    Critical Care     none    Andrae Amado MD, Jaimee Arguelles  Attending Emergency  Physician             Andrae Amado MD  06/13/21 5830

## 2021-06-14 ENCOUNTER — CARE COORDINATION (OUTPATIENT)
Dept: CARE COORDINATION | Age: 56
End: 2021-06-14

## 2021-06-14 NOTE — CARE COORDINATION
Patient contacted regarding COVID-19 risk, exposure, diagnosis, pulse oximeter ordered at discharge and monoclonal antibody infusion follow up. Discussed COVID-19 related testing which was not done at this time. Test results were not done. Patient informed of results, if available? na.      Ambulatory Care Manager contacted the patient by telephone to perform post discharge assessment. Call within 2 business days of discharge: Yes. Verified name and  with patient as identifiers. Provided introduction to self, and explanation of the CTN/ACM role, and reason for call due to risk factors for infection and/or exposure to COVID-19. Symptoms reviewed with patient who verbalized the following symptoms: no worsening symptoms. Due to no new or worsening symptoms encounter was not routed to provider for escalation. Discussed follow-up appointments. If no appointment was previously scheduled, appointment scheduling offered: Yes. [facility] follow up appointment(s): No future appointments. Non-[facility] follow up appointment(s):     Non-face-to-face services provided:  Obtained and reviewed discharge summary and/or continuity of care documents     Advance Care Planning:   Does patient have an Advance Directive:  not on file. Educated patient about risk for severe COVID-19 due to risk factors according to CDC guidelines. ACM reviewed discharge instructions, medical action plan and red flag symptoms with the patient who verbalized understanding. Discussed COVID vaccination status: Yes. Education provided on COVID-19 vaccination as appropriate. Discussed exposure protocols and quarantine with CDC Guidelines. Patient was given an opportunity to verbalize any questions and concerns and agrees to contact ACM or health care provider for questions related to their healthcare. Reviewed and educated patient on any new and changed medications related to discharge diagnosis     Was patient discharged with a pulse oximeter?  No Discussed and confirmed pulse oximeter discharge instructions and when to notify provider or seek emergency care. ACM provided contact information. No further follow-up call identified based on severity of symptoms and risk factors.

## 2021-07-13 DIAGNOSIS — G43.809 MIGRAINE VARIANT WITH HEADACHE: ICD-10-CM

## 2021-07-15 RX ORDER — ONDANSETRON 4 MG/1
4 TABLET, ORALLY DISINTEGRATING ORAL EVERY 8 HOURS PRN
Qty: 20 TABLET | Refills: 1 | Status: SHIPPED | OUTPATIENT
Start: 2021-07-15 | End: 2022-03-29 | Stop reason: SDUPTHER

## 2021-07-22 DIAGNOSIS — J44.9 CHRONIC OBSTRUCTIVE PULMONARY DISEASE, UNSPECIFIED COPD TYPE (HCC): ICD-10-CM

## 2021-07-22 NOTE — TELEPHONE ENCOUNTER
Health Maintenance   Topic Date Due    HIV screen  Never done    DTaP/Tdap/Td vaccine (1 - Tdap) Never done    Cervical cancer screen  Never done    Breast cancer screen  Never done    Shingles Vaccine (1 of 2) Never done    Colon Cancer Screen FIT/FOBT  06/11/2020    COVID-19 Vaccine (2 - Pfizer 2-dose series) 04/13/2021    Flu vaccine (1) 09/01/2021    TSH testing  05/30/2022    Potassium monitoring  05/30/2022    Creatinine monitoring  05/30/2022    Lipid screen  02/19/2024    Pneumococcal 0-64 years Vaccine (2 of 2 - PPSV23) 02/16/2028    Hepatitis C screen  Completed    Hepatitis A vaccine  Aged Out    Hepatitis B vaccine  Aged Out    Hib vaccine  Aged Out    Meningococcal (ACWY) vaccine  Aged Out             (applicable per patient's age: Cancer Screenings, Depression Screening, Fall Risk Screening, Immunizations)    Hemoglobin A1C (%)   Date Value   02/19/2019 5.2     LDL Cholesterol (mg/dL)   Date Value   02/19/2019 105     AST (U/L)   Date Value   05/05/2021 22     ALT (U/L)   Date Value   05/05/2021 16     BUN (mg/dL)   Date Value   05/30/2021 6      (goal A1C is < 7)   (goal LDL is <100) need 30-50% reduction from baseline     BP Readings from Last 3 Encounters:   06/13/21 (!) 161/98   06/03/21 137/88   05/29/21 (!) 146/98    (goal /80)      All Future Testing planned in [facility]:  Lab Frequency Next Occurrence   TSH With Reflex Ft4 Once 01/21/2021   Hemoglobin A1C Once 01/21/2021   VL LOWER EXTREMITY ARTERIAL SEGMENTAL PRESSURES W PPG Once 01/06/2022   XR FOOT LEFT (MIN 3 VIEWS) Once 01/06/2022   XR FOOT RIGHT (MIN 3 VIEWS) Once 01/06/2022   XR ANKLE RIGHT (MIN 3 VIEWS) Once 07/08/2021       Next Visit Date:  Future Appointments   Date Time Provider Rashida Torres   8/20/2021  1:45 PM Kelsy Koroma MD Sports Med [address] [employer] [facility]            Patient Active Problem List:     Chest pain     Migraine variant with headache     Drug overdose, accidental or unintentional, initial encounter     Hypothyroidism     Essential hypertension     Seizure disorder ([address]     Drug overdose     History of seizure     Major depressive disorder with psychotic features ([address]     Severe major depression (HCC)     Overdose of barbiturate, intentional self-harm, initial encounter ([address]     Intentional phenobarbital overdose ([address]     Severe episode of recurrent major depressive disorder, without psychotic features ([address]     Suicide attempt ([address]     Major depressive disorder, recurrent ([address]     Sepsis ([address]     Severe malnutrition ([address]     Altered mental status     Hypokalemia     Congestive heart failure of unknown etiology ([address]     Lumbar radiculopathy     Chronic low back pain     Piriformis syndrome     COPD (chronic obstructive pulmonary disease) ([facility])     Major depressive disorder, single episode, unspecified     Severe depressed bipolar I disorder without psychotic features ([address]     Polysubstance abuse ([address]     Syncope and collapse

## 2021-07-23 RX ORDER — ALBUTEROL SULFATE 90 UG/1
1 AEROSOL, METERED RESPIRATORY (INHALATION) EVERY 6 HOURS PRN
Qty: 1 INHALER | Refills: 1 | Status: SHIPPED | OUTPATIENT
Start: 2021-07-23 | End: 2021-09-21 | Stop reason: SDUPTHER

## 2021-08-04 RX ORDER — PSEUDOEPHED/ACETAMINOPH/DIPHEN 30MG-500MG
TABLET ORAL
Qty: 540 TABLET | Refills: 0 | Status: SHIPPED | OUTPATIENT
Start: 2021-08-04

## 2021-08-04 NOTE — TELEPHONE ENCOUNTER
Hemoglobin A1C (%)   Date Value   02/19/2019 5.2             ( goal A1C is < 7)   No results found for: LABMICR  LDL Cholesterol (mg/dL)   Date Value   02/19/2019 105       (goal LDL is <100)   AST (U/L)   Date Value   05/05/2021 22     ALT (U/L)   Date Value   05/05/2021 16     BUN (mg/dL)   Date Value   05/30/2021 6     BP Readings from Last 3 Encounters:   06/13/21 (!) 161/98   06/03/21 137/88   05/29/21 (!) 146/98          (goal 120/80)        Patient Active Problem List:     Chest pain     Migraine variant with headache     Drug overdose, accidental or unintentional, initial encounter     Hypothyroidism     Essential hypertension     Seizure disorder ([address]     Drug overdose     History of seizure     Major depressive disorder with psychotic features ([address]     Severe major depression ([address]     Overdose of barbiturate, intentional self-harm, initial encounter ([address]     Intentional phenobarbital overdose ([address]     Severe episode of recurrent major depressive disorder, without psychotic features ([address]     Suicide attempt ([address]     Major depressive disorder, recurrent ([address]     Sepsis ([address]     Severe malnutrition ([address]     Altered mental status     Hypokalemia     Congestive heart failure of unknown etiology ([address]     Lumbar radiculopathy     Chronic low back pain     Piriformis syndrome     COPD (chronic obstructive pulmonary disease) (HCC)     Major depressive disorder, single episode, unspecified     Severe depressed bipolar I disorder without psychotic features ([address]     Polysubstance abuse ([address]     Syncope and collapse      ----Po Meade

## 2021-08-18 ENCOUNTER — HOSPITAL ENCOUNTER (OUTPATIENT)
Dept: GENERAL RADIOLOGY | Age: 56
Discharge: HOME | End: 2021-08-20
Payer: MEDICARE

## 2021-08-18 ENCOUNTER — HOSPITAL ENCOUNTER (OUTPATIENT)
Age: 56
Discharge: HOME | End: 2021-08-20
Payer: MEDICARE

## 2021-08-18 ENCOUNTER — OFFICE VISIT (OUTPATIENT)
Dept: PODIATRY | Age: 56
End: 2021-08-18
Payer: MEDICARE

## 2021-08-18 VITALS
DIASTOLIC BLOOD PRESSURE: 66 MMHG | BODY MASS INDEX: 33.66 KG/M2 | HEART RATE: 98 BPM | HEIGHT: 63 IN | SYSTOLIC BLOOD PRESSURE: 91 MMHG | WEIGHT: 190 LBS

## 2021-08-18 DIAGNOSIS — B35.1 ONYCHOMYCOSIS DUE TO DERMATOPHYTE: ICD-10-CM

## 2021-08-18 DIAGNOSIS — M25.571 RIGHT ANKLE PAIN, UNSPECIFIED CHRONICITY: ICD-10-CM

## 2021-08-18 DIAGNOSIS — Z87.81 HISTORY OF FRACTURE OF RIGHT ANKLE: ICD-10-CM

## 2021-08-18 DIAGNOSIS — I73.9 PVD (PERIPHERAL VASCULAR DISEASE) (HCC): ICD-10-CM

## 2021-08-18 DIAGNOSIS — M25.571 RIGHT ANKLE PAIN, UNSPECIFIED CHRONICITY: Primary | ICD-10-CM

## 2021-08-18 DIAGNOSIS — S82.831A CLOSED FRACTURE OF DISTAL END OF RIGHT FIBULA, UNSPECIFIED FRACTURE MORPHOLOGY, INITIAL ENCOUNTER: Primary | ICD-10-CM

## 2021-08-18 PROCEDURE — 3017F COLORECTAL CA SCREEN DOC REV: CPT | Performed by: STUDENT IN AN ORGANIZED HEALTH CARE EDUCATION/TRAINING PROGRAM

## 2021-08-18 PROCEDURE — 73610 X-RAY EXAM OF ANKLE: CPT

## 2021-08-18 PROCEDURE — 11721 DEBRIDE NAIL 6 OR MORE: CPT | Performed by: STUDENT IN AN ORGANIZED HEALTH CARE EDUCATION/TRAINING PROGRAM

## 2021-08-18 PROCEDURE — 4004F PT TOBACCO SCREEN RCVD TLK: CPT | Performed by: STUDENT IN AN ORGANIZED HEALTH CARE EDUCATION/TRAINING PROGRAM

## 2021-08-18 PROCEDURE — G8427 DOCREV CUR MEDS BY ELIG CLIN: HCPCS | Performed by: STUDENT IN AN ORGANIZED HEALTH CARE EDUCATION/TRAINING PROGRAM

## 2021-08-18 PROCEDURE — G8417 CALC BMI ABV UP PARAM F/U: HCPCS | Performed by: STUDENT IN AN ORGANIZED HEALTH CARE EDUCATION/TRAINING PROGRAM

## 2021-08-18 PROCEDURE — 99212 OFFICE O/P EST SF 10 MIN: CPT

## 2021-08-18 PROCEDURE — 99214 OFFICE O/P EST MOD 30 MIN: CPT | Performed by: STUDENT IN AN ORGANIZED HEALTH CARE EDUCATION/TRAINING PROGRAM

## 2021-08-18 RX ORDER — BUPRENORPHINE AND NALOXONE 8; 2 MG/1; MG/1
FILM, SOLUBLE BUCCAL; SUBLINGUAL
COMMUNITY
Start: 2021-07-07

## 2021-08-18 RX ORDER — RISPERIDONE 2 MG/1
2 TABLET, FILM COATED ORAL DAILY
COMMUNITY
Start: 2021-07-30

## 2021-08-18 RX ORDER — RISPERIDONE 4 MG/1
4 TABLET, FILM COATED ORAL NIGHTLY
COMMUNITY
Start: 2021-07-30

## 2021-08-18 RX ORDER — DIVALPROEX SODIUM 500 MG/1
TABLET, DELAYED RELEASE ORAL
Status: ON HOLD | COMMUNITY
Start: 2021-07-22 | End: 2021-08-31 | Stop reason: SDUPTHER

## 2021-08-18 RX ORDER — VENLAFAXINE HYDROCHLORIDE 75 MG/1
150 CAPSULE, EXTENDED RELEASE ORAL DAILY
Status: ON HOLD | COMMUNITY
Start: 2021-08-03 | End: 2021-08-31 | Stop reason: SDUPTHER

## 2021-08-18 RX ORDER — OXCARBAZEPINE 300 MG/1
300 TABLET, FILM COATED ORAL NIGHTLY
Status: ON HOLD | COMMUNITY
Start: 2021-07-30 | End: 2021-08-31 | Stop reason: HOSPADM

## 2021-08-18 NOTE — PROGRESS NOTES
Patient instructed to remove shoes and socks and instructed to sit in exam chair. Current PCP is KEANU Eddy CNP and date of last visit was 5/15/2021. Do you have a follow up visit scheduled? No    Diabetic visit information    Blood pressure (Control is BP <140/90)  BP Readings from Last 3 Encounters:   06/13/21 (!) 161/98   06/03/21 137/88   05/29/21 (!) 146/98       BP taken with correct size cuff? - Yes   Repeated if > 140/90 Yes      Tobacco use:  Patient  reports that she has been smoking. She has a 6.00 pack-year smoking history. She has never used smokeless tobacco.  If Smoker - Cessation materials given? - Yes       Diabetic Health Maintenance Items due  There are no preventive care reminders to display for this patient. Diabetic retinal exam done in last year? - No   If No: remind patient that it is due and they should schedule an exam    Medications  Is patient taking any medications for diabetes? -   Yes  Have blood sugars been controlled? Fasting blood sugars under 120   -   Patient did not check   Random home sugars or today's POCT glucose is under 180 -   Patient did not check   []  If No to the above then patient should schedule appt with PCP. Diabetic Plan    A1C Plan  Lab Results   Component Value Date    LABA1C 5.2 02/19/2019      []  If A1C over 8 and last result >3 months ago - Order A1C and refer to PCP   []  If last A1C over 6 months ago - Order A1C and refer to PCP for follow up   []  If elevated blood sugars > 180 - refer to PCP for follow up    []  Blood sugar controlled - A1C under 8 and last check was < 6 months      Cholesterol Plan   Lab Results   Component Value Date    LDLCHOLESTEROL 105 02/19/2019      []  If LDL > 100 and last result >3 months ago - order Fasting lipids and refer to PCP for follow up   []  If LDL < 100 and over 1 year ago - Order Fasting lipids and refer to PCP for follow up   [] LDL is controlled.   LDL < 100 and checked within the last

## 2021-08-18 NOTE — PROGRESS NOTES
[address]  [address] [place] [address] 4429 [facility], [address] [facility]  Tel: [phone]   Fax: [phone]    Subjective     CC: Painful and elongated toe nails; Right ankle pain    Interval History: 8/18/2021  Patient presents to clinic today for nail care and painful right ankle. Patient states that approximately 3 months ago she fractured her right ankle and was being seen by an outside orthopedic surgeon but the patient missed several appointments. The patient states she has an upcoming appointment scheduled with an outside provider but would rather continue care through the [facility] podiatry clinic. The patient has been walking in a cam boot since the injury. She admits to smoking 15 cigarettes/day and states she is trying to quit. No other complaints today. HPI:  Giuliana Corbett is a 60y.o. year old female who presents for tingling pain to feet. Patient has generalized b/l foot pain and was scanned for CMOs, Has not picked up orthoses and CMOs were sent to pt's home. She relates history of b/l heel spur surgery several years prior. Relates pain is non-specific, aching and occasionally \"pins-and-needles like\". She follows PCP for neuropathy symptom management, currently on gabapentin. Notes was given antifungal cream for fungal great toe nails and has been using daily for last 6-8 months with no improvement to appearance. Cannot recall antifungal medication and none on record. Denied any prev trauma to nail. Changes socks and cycles shoes daily. Notes smokes 1 pack of cigarettes a day on and off for years. Relates pain to feet and legs after walking short distances and at night at rest. She denies any wounds or other issues. Primary care physician is Mortimer Singleton, APRN - CNP.    ROS:    Constitutional: Denies nausea, vomiting, fever, chills. Neurologic: Denies numbness, tingling, and burning in the feet. Vascular: Denies symptoms of lower extremity claudication.     Skin: Denies venlafaxine (EFFEXOR XR) 75 MG extended release capsule  8/3/21  Yes Historical Provider, MD   risperiDONE (RISPERDAL) 2 MG tablet  7/30/21  Yes Historical Provider, MD   risperiDONE (RISPERDAL) 4 MG tablet  7/30/21  Yes Historical Provider, MD   ciclopirox (LOPROX) 0.77 % cream Apply topically 2 times daily to bottom of feet and in-between toes. 8/18/21  Yes Demarcus Pollard DPM   ACETAMINOPHEN EXTRA STRENGTH 500 MG tablet TAKE 1 TO 2 TAB BY MOUTH EVERY 8 HOURS AS NEEDED FOR PAIN  8/4/21  Yes KEANU Ridley CNP   albuterol sulfate  (90 Base) MCG/ACT inhaler Inhale 1 puff into the lungs every 6 hours as needed for Wheezing Gap prescription until follow up with primary. Do not refill.   Follow up with primary 7/23/21  Yes KEANU Ridley CNP   budesonide-formoterol [facility]) 160-4.5 MCG/ACT AERO Inhale 2 puffs into the lungs 2 times daily 6/3/21  Yes Phuong Blackmon,    ipratropium-albuterol (DUONEB) 0.5-2.5 (3) MG/3ML SOLN nebulizer solution Inhale 3 mLs into the lungs every 4 hours 6/3/21  Yes Phuong Blackmon, DO   Respiratory Therapy Supplies (NEBULIZER/TUBING/MOUTHPIECE) KIT Daily as needed 6/3/21  Yes Phuong Blackmon DO   enoxaparin (LOVENOX) 40 MG/0.4ML injection Inject 0.4 mLs into the skin daily 5/18/21  Yes Gregoria Hutchison MD   enoxaparin (LOVENOX) 40 MG/0.4ML injection Inject 0.4 mLs into the skin daily 5/18/21  Yes Gregoria Hutchison MD   naproxen (NAPROSYN) 500 MG tablet Take 1 tablet by mouth 2 times daily (with meals) 5/16/21  Yes Patricia Augustine MD   fluticasone (FLONASE) 50 MCG/ACT nasal spray 2 sprays by Each Nostril route daily 5/14/21  Yes KEANU Ridley CNP   ondansetron (ZOFRAN) 4 MG tablet Take 1 tablet by mouth every 8 hours as needed for Nausea or Vomiting 5/8/21  Yes Owen Markham MD   ondansetron (ZOFRAN ODT) 4 MG disintegrating tablet Take 1 tablet by mouth every 8 hours as needed for Nausea 4/28/21  Yes Gerry Dunlap, DO   ASPIRIN ADULT LOW STRENGTH 81 MG EC tablet TAKE 1 TABLET BY MOUTH ONCE DAILY  4/5/21  Yes KEANU Thakur CNP   cetirizine (ZYRTEC) 10 MG tablet Take 1 tablet by mouth daily 3/11/21  Yes KEANU Thakur CNP   levothyroxine (SYNTHROID) 100 MCG tablet Take 1 tablet by mouth Daily 2/19/21  Yes KEANU Thakur CNP   ipratropium-albuterol (DUONEB) 0.5-2.5 (3) MG/3ML SOLN nebulizer solution Inhale 3 mLs into the lungs every 6 hours as needed for Shortness of Breath 2/11/21  Yes KEANU Thakur CNP   cyclobenzaprine (FLEXERIL) 5 MG tablet TAKE 1 TABLET BY MOUTH 2 TIMES DAILY AS NEEDED FOR MUSCLE SPASMS (DON'T TAKE W/ ZANAFLEX) 1/19/21  Yes KEANU Thakur CNP   ammonium lactate (LAC-HYDRIN) 12 % lotion Apply topically daily. 1/6/21  Yes Patricia Mattson DPM   Ciclopirox (LOPROX) 0.77 % gel Apply to skin twice a day 1/6/21  Yes Patricia Mattson DPM   hydrOXYzine (ATARAX) 50 MG tablet  12/14/20  Yes Emeka Edwards MD   Nebulizers (NEBULIZER COMPRESSOR) MISC Daily as needed 12/18/20  Yes KEANU Thakur CNP   Blood Pressure KIT Daily as needed 12/18/20  Yes KEANU Thakur CNP   Misc.  Devices (CANE) MISC Quad base cane 11/28/20  Yes Wagner Jang MD   famotidine (PEPCID) 20 MG tablet Take 1 tablet by mouth 2 times daily as needed (for acid reflux) 10/26/20  Yes KEANU Thakur CNP   clomiPRAMINE (ANAFRANIL) 50 MG capsule Take 1 capsule by mouth nightly 10/26/20  Yes KEANU Thakur CNP   traZODone (DESYREL) 150 MG tablet Take 1 tablet by mouth nightly 10/22/20  Yes KEANU Thakur CNP   cloNIDine (CATAPRES) 0.1 MG tablet Take 1 tablet by mouth nightly 10/22/20  Yes KEANU Thakur CNP   Benzocaine-Menthol (CEPACOL) 6-10 MG LOZG lozenge Take 1 lozenge by mouth every 2 hours as needed for Sore Throat 10/22/20  Yes KEANU Thakur CNP   tiZANidine (ZANAFLEX) 2 MG tablet Take 2 tablets by mouth every 8 hours as needed (muscle spasm, neck pain) 10/9/20  Yes KEANU Steven CNP   Elastic Bandages & Supports (CARPAL TUNNEL WRIST STABILIZER) [address] [facility] Apply to each wrist at bedtime 10/9/20  Yes KEANU Steven CNP   methyl salicylate-menthol (PAULETTE BRAY GREASELESS) 10-15 % CREA Apply topically 3 times daily as needed for Pain 9/21/20  Yes Mirela France MD   albuterol (PROVENTIL) (2.5 MG/3ML) 0.083% nebulizer solution Take 3 mLs by nebulization every 6 hours as needed for Wheezing 6/18/20  Yes KEANU Steven CNP   ibuprofen (ADVIL;MOTRIN) 600 MG tablet Take 1 tablet by mouth every 6 hours as needed for Pain 5/29/21 6/5/21  Amy Valerio DO   gabapentin (NEURONTIN) 300 MG capsule TAKE 2 CAPSULES BY MOUTH 3 TIMES DAILY FOR 30 DAYS. 5/24/21 6/23/21  KEANU Steven CNP   lisinopril (PRINIVIL;ZESTRIL) 20 MG tablet TAKE 1 TABLET BY MOUTH DAILY  5/24/21 6/23/21  KEANU Steven CNP   butalbital-aspirin-caffeine [facility]) -40 MG per capsule Take 1 capsule by mouth every 4 hours as needed for Headaches for up to 30 days. 5/14/21 6/13/21  KEANU Steven CNP       Objective     Vitals:    08/18/21 1453   BP: 91/66   Pulse: 98       Lab Results   Component Value Date    LABA1C 5.2 02/19/2019     Physical Exam:  General:  Alert and oriented x3. In no acute distress. Lower Extremity Physical Exam:    Vascular: DP and PT pulses are palpable, Bilateral. CFT <3 seconds to all digits, Bilateral.  No edema, Bilateral.  Hair growth is present to the level of the digits, Bilateral.     Neuro: Saph/sural/SP/DP/plantar sensation intact to light touch. Protective sensation is intact to 7/10 sites as tested with a 5.07g SWMF, Bilateral.     Musculoskeletal: EHL/FHL/GS/TA gross motor intact. TTP to distal digits b/l. Gross deformity is absent, Bilateral. No TTP of right ankle. Able to perform passive ROM w/o pain. Compartments are soft and compressible. Dermatologic: No ecchymosis.  No open lesions, Bilateral. Interdigital maceration absent, Bilateral.  Nails 1-10 are yellow, thickened, and elongated w/subungual debris noted. Benny Meza is a 60 y.o. female with     Diagnosis Orders   1. Right ankle pain, unspecified chronicity  XR ANKLE RIGHT (MIN 3 VIEWS)   2. Onychomycosis due to dermatophyte     3. PVD (peripheral vascular disease) ([address]     4. History of fracture of right ankle         Plan   · Patient examined and evaluated  · Diagnosis and treatment options discussed in detail. Relayed to patient that in order to thoroughly assess treatment options patient will need an updated x-ray of the right ankle. Patient understands and is agreeable to plan. · I spent 30 minutes total reviewing patient's chart and then with the patient. · Educated pt on benefits of smoking cessation  · In the past PVRs were ordered to assess claudication pain; she hasn't obtained them. · Nails 1-10 debrided with sterile nippers WOI. · XR right ankle for fracture assessment. · Rx for Loprox for onychomycosis of nails. · Please, call the office with any questions or concerns   · Discussed with Dr. Georgi Drew. Orders Placed This Encounter   Medications    ciclopirox (LOPROX) 0.77 % cream     Sig: Apply topically 2 times daily to bottom of feet and in-between toes. Dispense:  90 g     Refill:  3     Orders Placed This Encounter   Procedures    XR ANKLE RIGHT (MIN 3 VIEWS)     Standing Status:   Future     Standing Expiration Date:   8/18/2022     Scheduling Instructions:      Weight bearing.      Order Specific Question:   Reason for exam:     Answer:   Right ankle fracture in May       KALPANA Quinonez [facility]   Podiatric Medicine & Surgery   8/18/2021 at 3:48 PM

## 2021-08-27 ENCOUNTER — APPOINTMENT (OUTPATIENT)
Dept: GENERAL RADIOLOGY | Age: 56
DRG: 816 | End: 2021-08-27
Payer: MEDICARE

## 2021-08-27 ENCOUNTER — HOSPITAL ENCOUNTER (INPATIENT)
Age: 56
LOS: 3 days | Discharge: HOME | DRG: 817 | End: 2021-08-31
Attending: EMERGENCY MEDICINE
Payer: MEDICARE

## 2021-08-27 ENCOUNTER — HOSPITAL ENCOUNTER (INPATIENT)
Age: 56
LOS: 1 days | Discharge: ANOTHER HOSPITAL | DRG: 816 | End: 2021-08-27
Attending: EMERGENCY MEDICINE | Admitting: INTERNAL MEDICINE
Payer: MEDICARE

## 2021-08-27 ENCOUNTER — HOSPITAL ENCOUNTER (OUTPATIENT)
Age: 56
Setting detail: SPECIMEN
Discharge: HOME | End: 2021-08-27
Payer: MEDICARE

## 2021-08-27 ENCOUNTER — APPOINTMENT (OUTPATIENT)
Dept: CT IMAGING | Age: 56
DRG: 816 | End: 2021-08-27
Payer: MEDICARE

## 2021-08-27 ENCOUNTER — APPOINTMENT (OUTPATIENT)
Dept: GENERAL RADIOLOGY | Age: 56
DRG: 817 | End: 2021-08-27
Payer: MEDICARE

## 2021-08-27 VITALS
DIASTOLIC BLOOD PRESSURE: 94 MMHG | RESPIRATION RATE: 18 BRPM | HEIGHT: 62 IN | OXYGEN SATURATION: 94 % | SYSTOLIC BLOOD PRESSURE: 151 MMHG | WEIGHT: 190 LBS | HEART RATE: 83 BPM | TEMPERATURE: 97.5 F | BODY MASS INDEX: 34.96 KG/M2

## 2021-08-27 DIAGNOSIS — N17.9 ACUTE RENAL FAILURE, UNSPECIFIED ACUTE RENAL FAILURE TYPE (HCC): ICD-10-CM

## 2021-08-27 DIAGNOSIS — I61.9 INTRAPARENCHYMAL HEMORRHAGE OF BRAIN (HCC): Primary | ICD-10-CM

## 2021-08-27 DIAGNOSIS — J44.1 COPD EXACERBATION (HCC): ICD-10-CM

## 2021-08-27 DIAGNOSIS — R41.82 ALTERED MENTAL STATUS, UNSPECIFIED ALTERED MENTAL STATUS TYPE: Primary | ICD-10-CM

## 2021-08-27 LAB
-: ABNORMAL
-: NORMAL
-: NORMAL
ABSOLUTE BANDS #: 0.17 K/UL (ref 0–1)
ABSOLUTE EOS #: 0 K/UL (ref 0–0.4)
ABSOLUTE IMMATURE GRANULOCYTE: ABNORMAL K/UL (ref 0–0.3)
ABSOLUTE LYMPH #: 2.72 K/UL (ref 1–4.8)
ABSOLUTE MONO #: 0.34 K/UL (ref 0.1–1.3)
ALBUMIN SERPL-MCNC: 4.7 G/DL (ref 3.5–5.2)
ALBUMIN/GLOBULIN RATIO: ABNORMAL (ref 1–2.5)
ALLEN TEST: ABNORMAL
ALP BLD-CCNC: 111 U/L (ref 35–104)
ALT SERPL-CCNC: 11 U/L (ref 5–33)
AMORPHOUS: ABNORMAL
ANION GAP SERPL CALCULATED.3IONS-SCNC: 21 MMOL/L (ref 9–17)
AST SERPL-CCNC: 16 U/L
BACTERIA: ABNORMAL
BANDS: 1 % (ref 0–10)
BASOPHILS # BLD: 0 % (ref 0–2)
BASOPHILS ABSOLUTE: 0 K/UL (ref 0–0.2)
BILIRUB SERPL-MCNC: 0.63 MG/DL (ref 0.3–1.2)
BILIRUBIN URINE: ABNORMAL
BUN BLDV-MCNC: 26 MG/DL (ref 6–20)
BUN/CREAT BLD: ABNORMAL (ref 9–20)
CALCIUM SERPL-MCNC: 10.2 MG/DL (ref 8.6–10.4)
CARBOXYHEMOGLOBIN: 2.1 % (ref 0–5)
CASTS UA: ABNORMAL /LPF
CHLORIDE BLD-SCNC: 96 MMOL/L (ref 98–107)
CO2: 20 MMOL/L (ref 20–31)
COLOR: YELLOW
COMMENT UA: ABNORMAL
CREAT SERPL-MCNC: 2.61 MG/DL (ref 0.5–0.9)
CRYSTALS, UA: ABNORMAL /HPF
DIFFERENTIAL TYPE: ABNORMAL
EOSINOPHILS RELATIVE PERCENT: 0 % (ref 0–4)
EPITHELIAL CELLS UA: ABNORMAL /HPF
FIO2: ABNORMAL
GFR AFRICAN AMERICAN: 23 ML/MIN
GFR NON-AFRICAN AMERICAN: 19 ML/MIN
GFR SERPL CREATININE-BSD FRML MDRD: ABNORMAL ML/MIN/{1.73_M2}
GFR SERPL CREATININE-BSD FRML MDRD: ABNORMAL ML/MIN/{1.73_M2}
GLUCOSE BLD-MCNC: 140 MG/DL (ref 70–99)
GLUCOSE URINE: NEGATIVE
HCO3 ARTERIAL: 19.6 MMOL/L (ref 22–26)
HCT VFR BLD CALC: 51.1 % (ref 36–46)
HEMOGLOBIN: 16.9 G/DL (ref 12–16)
IMMATURE GRANULOCYTES: ABNORMAL %
INR BLD: 0.9
KETONES, URINE: ABNORMAL
LACTIC ACID, SEPSIS WHOLE BLOOD: NORMAL MMOL/L (ref 0.5–1.9)
LACTIC ACID, SEPSIS: 1.5 MMOL/L (ref 0.5–1.9)
LEUKOCYTE ESTERASE, URINE: ABNORMAL
LIPASE: 21 U/L (ref 13–60)
LYMPHOCYTES # BLD: 16 % (ref 24–44)
MCH RBC QN AUTO: 30.4 PG (ref 26–34)
MCHC RBC AUTO-ENTMCNC: 33.1 G/DL (ref 31–37)
MCV RBC AUTO: 91.8 FL (ref 80–100)
METHEMOGLOBIN: 0.7 % (ref 0–1.9)
MODE: ABNORMAL
MONOCYTES # BLD: 2 % (ref 1–7)
MORPHOLOGY: ABNORMAL
MORPHOLOGY: ABNORMAL
MUCUS: ABNORMAL
MYOGLOBIN: 349 NG/ML (ref 25–58)
NEGATIVE BASE EXCESS, ART: 5.2 MMOL/L (ref 0–2)
NITRITE, URINE: POSITIVE
NOTIFICATION TIME: ABNORMAL
NOTIFICATION: ABNORMAL
NRBC AUTOMATED: ABNORMAL PER 100 WBC
O2 DEVICE/FLOW/%: ABNORMAL
O2 SAT, ARTERIAL: 93 % (ref 95–98)
OTHER OBSERVATIONS UA: ABNORMAL
OXYHEMOGLOBIN: ABNORMAL % (ref 95–98)
PARTIAL THROMBOPLASTIN TIME: 19.2 SEC (ref 24–36)
PATIENT TEMP: 37
PCO2 ARTERIAL: 31.7 MMHG (ref 35–45)
PCO2, ART, TEMP ADJ: ABNORMAL (ref 35–45)
PDW BLD-RTO: 15.1 % (ref 11.5–14.9)
PEEP/CPAP: ABNORMAL
PH ARTERIAL: 7.4 (ref 7.35–7.45)
PH UA: 5.5 (ref 5–8)
PH, ART, TEMP ADJ: ABNORMAL (ref 7.35–7.45)
PLATELET # BLD: 257 K/UL (ref 150–450)
PLATELET ESTIMATE: ABNORMAL
PMV BLD AUTO: 7.5 FL (ref 6–12)
PO2 ARTERIAL: 88.9 MMHG (ref 80–100)
PO2, ART, TEMP ADJ: ABNORMAL MMHG (ref 80–100)
POSITIVE BASE EXCESS, ART: ABNORMAL MMOL/L (ref 0–2)
POTASSIUM SERPL-SCNC: 4 MMOL/L (ref 3.7–5.3)
PROTEIN UA: ABNORMAL
PROTHROMBIN TIME: 12.1 SEC (ref 11.8–14.6)
PSV: ABNORMAL
PT. POSITION: ABNORMAL
RBC # BLD: 5.57 M/UL (ref 4–5.2)
RBC # BLD: ABNORMAL 10*6/UL
RBC UA: ABNORMAL /HPF
REASON FOR REJECTION: NORMAL
REASON FOR REJECTION: NORMAL
RENAL EPITHELIAL, UA: ABNORMAL /HPF
RESPIRATORY RATE: 18
SAMPLE SITE: ABNORMAL
SARS-COV-2, RAPID: NOT DETECTED
SEG NEUTROPHILS: 81 % (ref 36–66)
SEGMENTED NEUTROPHILS ABSOLUTE COUNT: 13.77 K/UL (ref 1.3–9.1)
SET RATE: ABNORMAL
SODIUM BLD-SCNC: 137 MMOL/L (ref 135–144)
SPECIFIC GRAVITY UA: 1.02 (ref 1–1.03)
SPECIMEN DESCRIPTION: NORMAL
TEXT FOR RESPIRATORY: ABNORMAL
TOTAL CK: 149 U/L (ref 26–192)
TOTAL HB: ABNORMAL G/DL (ref 12–16)
TOTAL PROTEIN: 8.2 G/DL (ref 6.4–8.3)
TOTAL RATE: ABNORMAL
TRICHOMONAS: ABNORMAL
TURBIDITY: ABNORMAL
URINE HGB: ABNORMAL
UROBILINOGEN, URINE: NORMAL
VT: ABNORMAL
WBC # BLD: 17 K/UL (ref 3.5–11)
WBC # BLD: ABNORMAL 10*3/UL
WBC UA: ABNORMAL /HPF
YEAST: ABNORMAL
ZZ NTE CLEAN UP: ORDERED TEST: NORMAL
ZZ NTE CLEAN UP: ORDERED TEST: NORMAL
ZZ NTE WITH NAME CLEAN UP: SPECIMEN SOURCE: NORMAL
ZZ NTE WITH NAME CLEAN UP: SPECIMEN SOURCE: NORMAL

## 2021-08-27 PROCEDURE — 87086 URINE CULTURE/COLONY COUNT: CPT

## 2021-08-27 PROCEDURE — 87040 BLOOD CULTURE FOR BACTERIA: CPT

## 2021-08-27 PROCEDURE — 1200000000 HC SEMI PRIVATE

## 2021-08-27 PROCEDURE — 2500000003 HC RX 250 WO HCPCS: Performed by: STUDENT IN AN ORGANIZED HEALTH CARE EDUCATION/TRAINING PROGRAM

## 2021-08-27 PROCEDURE — 82805 BLOOD GASES W/O2 SATURATION: CPT

## 2021-08-27 PROCEDURE — 83690 ASSAY OF LIPASE: CPT

## 2021-08-27 PROCEDURE — 36415 COLL VENOUS BLD VENIPUNCTURE: CPT

## 2021-08-27 PROCEDURE — 94660 CPAP INITIATION&MGMT: CPT

## 2021-08-27 PROCEDURE — 99283 EMERGENCY DEPT VISIT LOW MDM: CPT

## 2021-08-27 PROCEDURE — 96365 THER/PROPH/DIAG IV INF INIT: CPT

## 2021-08-27 PROCEDURE — 85610 PROTHROMBIN TIME: CPT

## 2021-08-27 PROCEDURE — 83605 ASSAY OF LACTIC ACID: CPT

## 2021-08-27 PROCEDURE — 81001 URINALYSIS AUTO W/SCOPE: CPT

## 2021-08-27 PROCEDURE — 85025 COMPLETE CBC W/AUTO DIFF WBC: CPT

## 2021-08-27 PROCEDURE — 71045 X-RAY EXAM CHEST 1 VIEW: CPT

## 2021-08-27 PROCEDURE — 96376 TX/PRO/DX INJ SAME DRUG ADON: CPT

## 2021-08-27 PROCEDURE — 85730 THROMBOPLASTIN TIME PARTIAL: CPT

## 2021-08-27 PROCEDURE — 70450 CT HEAD/BRAIN W/O DYE: CPT

## 2021-08-27 PROCEDURE — 87186 SC STD MICRODIL/AGAR DIL: CPT

## 2021-08-27 PROCEDURE — 87088 URINE BACTERIA CULTURE: CPT

## 2021-08-27 PROCEDURE — 96374 THER/PROPH/DIAG INJ IV PUSH: CPT

## 2021-08-27 PROCEDURE — 83874 ASSAY OF MYOGLOBIN: CPT

## 2021-08-27 PROCEDURE — 99285 EMERGENCY DEPT VISIT HI MDM: CPT

## 2021-08-27 PROCEDURE — A0434 SPECIALTY CARE TRANSPORT: HCPCS

## 2021-08-27 PROCEDURE — 96366 THER/PROPH/DIAG IV INF ADDON: CPT

## 2021-08-27 PROCEDURE — A0425 GROUND MILEAGE: HCPCS

## 2021-08-27 PROCEDURE — 82550 ASSAY OF CK (CPK): CPT

## 2021-08-27 PROCEDURE — 6360000002 HC RX W HCPCS: Performed by: EMERGENCY MEDICINE

## 2021-08-27 PROCEDURE — 96368 THER/DIAG CONCURRENT INF: CPT

## 2021-08-27 PROCEDURE — 80307 DRUG TEST PRSMV CHEM ANLYZR: CPT

## 2021-08-27 PROCEDURE — 36600 WITHDRAWAL OF ARTERIAL BLOOD: CPT

## 2021-08-27 PROCEDURE — 80053 COMPREHEN METABOLIC PANEL: CPT

## 2021-08-27 PROCEDURE — 93005 ELECTROCARDIOGRAM TRACING: CPT | Performed by: EMERGENCY MEDICINE

## 2021-08-27 PROCEDURE — 87635 SARS-COV-2 COVID-19 AMP PRB: CPT

## 2021-08-27 RX ORDER — SODIUM CHLORIDE 9 MG/ML
25 INJECTION, SOLUTION INTRAVENOUS PRN
Status: CANCELLED | OUTPATIENT
Start: 2021-08-27

## 2021-08-27 RX ORDER — NICARDIPINE HYDROCHLORIDE 0.1 MG/ML
3-15 INJECTION INTRAVENOUS CONTINUOUS
Status: DISCONTINUED | OUTPATIENT
Start: 2021-08-27 | End: 2021-08-29

## 2021-08-27 RX ORDER — POTASSIUM CHLORIDE 7.45 MG/ML
10 INJECTION INTRAVENOUS PRN
Status: CANCELLED | OUTPATIENT
Start: 2021-08-27

## 2021-08-27 RX ORDER — ACETAMINOPHEN 650 MG/1
650 SUPPOSITORY RECTAL EVERY 6 HOURS PRN
Status: CANCELLED | OUTPATIENT
Start: 2021-08-27

## 2021-08-27 RX ORDER — NALOXONE HYDROCHLORIDE 1 MG/ML
0.4 INJECTION INTRAMUSCULAR; INTRAVENOUS; SUBCUTANEOUS ONCE
Status: COMPLETED | OUTPATIENT
Start: 2021-08-27 | End: 2021-08-27

## 2021-08-27 RX ORDER — POLYETHYLENE GLYCOL 3350 17 G/17G
17 POWDER, FOR SOLUTION ORAL DAILY PRN
Status: CANCELLED | OUTPATIENT
Start: 2021-08-27

## 2021-08-27 RX ORDER — ONDANSETRON 2 MG/ML
4 INJECTION INTRAMUSCULAR; INTRAVENOUS EVERY 6 HOURS PRN
Status: CANCELLED | OUTPATIENT
Start: 2021-08-27

## 2021-08-27 RX ORDER — SODIUM CHLORIDE 0.9 % (FLUSH) 0.9 %
5-40 SYRINGE (ML) INJECTION PRN
Status: CANCELLED | OUTPATIENT
Start: 2021-08-27

## 2021-08-27 RX ORDER — ASPIRIN 81 MG/1
81 TABLET ORAL DAILY
Status: CANCELLED | OUTPATIENT
Start: 2021-08-27

## 2021-08-27 RX ORDER — SODIUM CHLORIDE 9 MG/ML
INJECTION, SOLUTION INTRAVENOUS CONTINUOUS
Status: CANCELLED | OUTPATIENT
Start: 2021-08-27

## 2021-08-27 RX ORDER — HEPARIN SODIUM 5000 [USP'U]/ML
5000 INJECTION, SOLUTION INTRAVENOUS; SUBCUTANEOUS EVERY 8 HOURS SCHEDULED
Status: CANCELLED | OUTPATIENT
Start: 2021-08-27

## 2021-08-27 RX ORDER — LEVOTHYROXINE SODIUM 0.1 MG/1
100 TABLET ORAL DAILY
Status: CANCELLED | OUTPATIENT
Start: 2021-08-28

## 2021-08-27 RX ORDER — SODIUM CHLORIDE 0.9 % (FLUSH) 0.9 %
5-40 SYRINGE (ML) INJECTION PRN
Status: DISCONTINUED | OUTPATIENT
Start: 2021-08-27 | End: 2021-08-27 | Stop reason: HOSPADM

## 2021-08-27 RX ORDER — OXCARBAZEPINE 300 MG/1
300 TABLET, FILM COATED ORAL DAILY
Status: CANCELLED | OUTPATIENT
Start: 2021-08-27

## 2021-08-27 RX ORDER — ACETAMINOPHEN 325 MG/1
650 TABLET ORAL EVERY 6 HOURS PRN
Status: CANCELLED | OUTPATIENT
Start: 2021-08-27

## 2021-08-27 RX ORDER — PHENOBARBITAL SODIUM 65 MG/ML
15 INJECTION INTRAMUSCULAR ONCE
Status: DISCONTINUED | OUTPATIENT
Start: 2021-08-27 | End: 2021-08-27

## 2021-08-27 RX ORDER — SODIUM CHLORIDE 0.9 % (FLUSH) 0.9 %
5-40 SYRINGE (ML) INJECTION EVERY 12 HOURS SCHEDULED
Status: CANCELLED | OUTPATIENT
Start: 2021-08-27

## 2021-08-27 RX ORDER — POTASSIUM CHLORIDE 20 MEQ/1
40 TABLET, EXTENDED RELEASE ORAL PRN
Status: CANCELLED | OUTPATIENT
Start: 2021-08-27

## 2021-08-27 RX ORDER — IPRATROPIUM BROMIDE AND ALBUTEROL SULFATE 2.5; .5 MG/3ML; MG/3ML
1 SOLUTION RESPIRATORY (INHALATION)
Status: DISCONTINUED | OUTPATIENT
Start: 2021-08-27 | End: 2021-08-27 | Stop reason: HOSPADM

## 2021-08-27 RX ORDER — BUDESONIDE AND FORMOTEROL FUMARATE DIHYDRATE 160; 4.5 UG/1; UG/1
2 AEROSOL RESPIRATORY (INHALATION) 2 TIMES DAILY
Status: CANCELLED | OUTPATIENT
Start: 2021-08-27

## 2021-08-27 RX ORDER — CLOMIPRAMINE HYDROCHLORIDE 50 MG/1
50 CAPSULE ORAL NIGHTLY
Status: CANCELLED | OUTPATIENT
Start: 2021-08-27

## 2021-08-27 RX ORDER — SODIUM CHLORIDE 0.9 % (FLUSH) 0.9 %
5-40 SYRINGE (ML) INJECTION EVERY 12 HOURS SCHEDULED
Status: DISCONTINUED | OUTPATIENT
Start: 2021-08-27 | End: 2021-08-27 | Stop reason: HOSPADM

## 2021-08-27 RX ORDER — DIVALPROEX SODIUM 250 MG/1
500 TABLET, DELAYED RELEASE ORAL DAILY
Status: CANCELLED | OUTPATIENT
Start: 2021-08-27

## 2021-08-27 RX ORDER — VENLAFAXINE HYDROCHLORIDE 75 MG/1
75 CAPSULE, EXTENDED RELEASE ORAL DAILY
Status: CANCELLED | OUTPATIENT
Start: 2021-08-27

## 2021-08-27 RX ORDER — ONDANSETRON 4 MG/1
4 TABLET, ORALLY DISINTEGRATING ORAL EVERY 8 HOURS PRN
Status: CANCELLED | OUTPATIENT
Start: 2021-08-27

## 2021-08-27 RX ORDER — SODIUM CHLORIDE 9 MG/ML
25 INJECTION, SOLUTION INTRAVENOUS PRN
Status: DISCONTINUED | OUTPATIENT
Start: 2021-08-27 | End: 2021-08-27 | Stop reason: HOSPADM

## 2021-08-27 RX ORDER — ALBUTEROL SULFATE 2.5 MG/3ML
2.5 SOLUTION RESPIRATORY (INHALATION) EVERY 6 HOURS PRN
Status: CANCELLED | OUTPATIENT
Start: 2021-08-27

## 2021-08-27 RX ADMIN — NALOXONE HYDROCHLORIDE 0.4 MG: 1 INJECTION PARENTERAL at 18:04

## 2021-08-27 RX ADMIN — NICARDIPINE HYDROCHLORIDE 5 MG/HR: 0.1 INJECTION INTRAVENOUS at 22:09

## 2021-08-27 ASSESSMENT — PAIN SCALES - PAIN ASSESSMENT IN ADVANCED DEMENTIA (PAINAD)
FACIALEXPRESSION: 0
NEGVOCALIZATION: 0
BREATHING: 0
CONSOLABILITY: 2
BODYLANGUAGE: 0
TOTALSCORE: 2

## 2021-08-27 ASSESSMENT — PAIN SCALES - GENERAL: PAINLEVEL_OUTOF10: 2

## 2021-08-27 NOTE — ED PROVIDER NOTES
[address]  eMERGENCY dEPARTMENT eNCOUnter      Pt Name: Cheryl Fowler  MRN: [id number]  [place] 1965  Date of evaluation: 8/27/21      CHIEF COMPLAINT       Chief Complaint   Patient presents with    Altered Mental Status         HISTORY OF PRESENT ILLNESS    Madge Charmaine Opitz is a 60 y.o. female who presents complaining of*distress. Patient is unable to answer any questions. EMS states patient woke up about an hour ago in respiratory distress they arrived and was placed on CPAP. No other history is available other than that she does have a history of COPD and she received Solu-Medrol 125 mg by EMS.       REVIEW OF SYSTEMS       Review of Systems   Unable to perform ROS: Mental status change       PAST MEDICAL HISTORY     Past Medical History:   Diagnosis Date    Arthritis     Asthma     Attention deficit hyperactivity disorder (ADHD), combined type     Borderline diabetes     Borderline personality disorder ([address]     CHF (congestive heart failure) (HCC)     COPD (chronic obstructive pulmonary disease) (HCC)     Fibromyalgia     Headache     Hypertension     Manic depression ([address]     Movement disorder     Neck fracture ([address]     Pernicious anemia     Seizure ([address]     Thyroid disease        SURGICAL HISTORY       Past Surgical History:   Procedure Laterality Date    EXPLORATION OF WOUND OF EXTREMITY Right 5/19/2019    RIGHT THIGH WOUND WASHOUT WITH [facility] WEST-ER PLACEMENT performed by Elissa Uribe MD at [facility] [address] N/A 5/22/2019    RIGHT WOUND HIP EXAMINATION LAVAGE AND WOUND VAC PLACEMENT performed by Tony Drummond MD at [address] [place] [address], [place] [address] Right 5/17/2019    IRRIGATION, DEBRIDEMENT RIGHT THIGH performed by Inocencio Edward MD at [place] [address] Bilateral     LYMPH NODE DISSECTION      cervical    PARTIAL HYSTERECTOMY         CURRENT MEDICATIONS       Previous Medications    ACETAMINOPHEN EXTRA STRENGTH 500 MG TABLET    TAKE 1 TO 2 TAB BY MOUTH EVERY 8 HOURS AS NEEDED FOR PAIN     ALBUTEROL (PROVENTIL) (2.5 MG/3ML) 0.083% NEBULIZER SOLUTION    Take 3 mLs by nebulization every 6 hours as needed for Wheezing    ALBUTEROL SULFATE  (90 BASE) MCG/ACT INHALER    Inhale 1 puff into the lungs every 6 hours as needed for Wheezing Gap prescription until follow up with primary. Do not refill. Follow up with primary    AMMONIUM LACTATE (LAC-HYDRIN) 12 % LOTION    Apply topically daily. ASPIRIN ADULT LOW STRENGTH 81 MG EC TABLET    TAKE 1 TABLET BY MOUTH ONCE DAILY     BENZOCAINE-MENTHOL (CEPACOL) 6-10 MG LOZG LOZENGE    Take 1 lozenge by mouth every 2 hours as needed for Sore Throat    BLOOD PRESSURE KIT    Daily as needed    BUDESONIDE-FORMOTEROL (SYMBICORT) 160-4.5 MCG/ACT AERO    Inhale 2 puffs into the lungs 2 times daily    BUPRENORPHINE-NALOXONE (SUBOXONE) 8-2 MG FILM SL FILM    DISSOLVE ONE FILM UNDER TONGUE TWICE DAILY FOR 28 DAYS    BUTALBITAL-ASPIRIN-CAFFEINE (FIORINAL) -40 MG PER CAPSULE    Take 1 capsule by mouth every 4 hours as needed for Headaches for up to 30 days. CETIRIZINE (ZYRTEC) 10 MG TABLET    Take 1 tablet by mouth daily    CICLOPIROX (LOPROX) 0.77 % CREAM    Apply topically 2 times daily to bottom of feet and in-between toes.     CICLOPIROX (LOPROX) 0.77 % GEL    Apply to skin twice a day    CLOMIPRAMINE (ANAFRANIL) 50 MG CAPSULE    Take 1 capsule by mouth nightly    CLONIDINE (CATAPRES) 0.1 MG TABLET    Take 1 tablet by mouth nightly    CYCLOBENZAPRINE (FLEXERIL) 5 MG TABLET    TAKE 1 TABLET BY MOUTH 2 TIMES DAILY AS NEEDED FOR MUSCLE SPASMS (DON'T TAKE W/ ZANAFLEX)    DIVALPROEX (DEPAKOTE) 500 MG DR TABLET        ELASTIC BANDAGES & SUPPORTS (CARPAL TUNNEL WRIST STABILIZER) MISC    Apply to each wrist at bedtime    ENOXAPARIN (LOVENOX) 40 MG/0.4ML INJECTION    Inject 0.4 mLs into the skin daily    ENOXAPARIN (LOVENOX) 40 MG/0.4ML INJECTION    Inject 0.4 mLs into the skin daily FAMOTIDINE (PEPCID) 20 MG TABLET    Take 1 tablet by mouth 2 times daily as needed (for acid reflux)    FLUTICASONE (FLONASE) 50 MCG/ACT NASAL SPRAY    2 sprays by Each Nostril route daily    GABAPENTIN (NEURONTIN) 300 MG CAPSULE    TAKE 2 CAPSULES BY MOUTH 3 TIMES DAILY FOR 30 DAYS. HYDROXYZINE (ATARAX) 50 MG TABLET        IBUPROFEN (ADVIL;MOTRIN) 600 MG TABLET    Take 1 tablet by mouth every 6 hours as needed for Pain    IPRATROPIUM-ALBUTEROL (DUONEB) 0.5-2.5 (3) MG/3ML SOLN NEBULIZER SOLUTION    Inhale 3 mLs into the lungs every 6 hours as needed for Shortness of Breath    IPRATROPIUM-ALBUTEROL (DUONEB) 0.5-2.5 (3) MG/3ML SOLN NEBULIZER SOLUTION    Inhale 3 mLs into the lungs every 4 hours    LEVOTHYROXINE (SYNTHROID) 100 MCG TABLET    Take 1 tablet by mouth Daily    LISINOPRIL (PRINIVIL;ZESTRIL) 20 MG TABLET    TAKE 1 TABLET BY MOUTH DAILY     METHYL SALICYLATE-MENTHOL (PAULETTE BRAY GREASELESS) 10-15 % CREA    Apply topically 3 times daily as needed for Pain    MISC.  DEVICES (CANE) MISC    Quad base cane    NAPROXEN (NAPROSYN) 500 MG TABLET    Take 1 tablet by mouth 2 times daily (with meals)    NEBULIZERS (NEBULIZER COMPRESSOR) MISC    Daily as needed    ONDANSETRON (ZOFRAN ODT) 4 MG DISINTEGRATING TABLET    Take 1 tablet by mouth every 8 hours as needed for Nausea    ONDANSETRON (ZOFRAN) 4 MG TABLET    Take 1 tablet by mouth every 8 hours as needed for Nausea or Vomiting    OXCARBAZEPINE (TRILEPTAL) 300 MG TABLET        RESPIRATORY THERAPY SUPPLIES (NEBULIZER/TUBING/MOUTHPIECE) KIT    Daily as needed    RISPERIDONE (RISPERDAL) 2 MG TABLET        RISPERIDONE (RISPERDAL) 4 MG TABLET        TIZANIDINE (ZANAFLEX) 2 MG TABLET    Take 2 tablets by mouth every 8 hours as needed (muscle spasm, neck pain)    TRAZODONE (DESYREL) 150 MG TABLET    Take 1 tablet by mouth nightly    VENLAFAXINE (EFFEXOR XR) 75 MG EXTENDED RELEASE CAPSULE           ALLERGIES     is allergic to imitrex [sumatriptan], bee pollen, bee venom, bromide ion [bromine], reglan [metoclopramide], sulfa antibiotics, sulfadiazine, and tramadol. FAMILY HISTORY     [unfilled]     SOCIAL HISTORY      reports that she has been smoking. She has a 6.00 pack-year smoking history. She has never used smokeless tobacco. She reports previous drug use. She reports that she does not drink alcohol. PHYSICAL EXAM     INITIAL VITALS: /86   Pulse 93   Temp 97.5 °F (36.4 °C) (Axillary)   Resp 19   Ht 5' 2\" (1.575 m)   Wt 190 lb (86.2 kg)   SpO2 97%   BMI 34.75 kg/m²      Physical Exam  Vitals and nursing note reviewed. Constitutional:       General: She is not in acute distress. Appearance: She is well-developed. She is not diaphoretic. HENT:      Head: Normocephalic and atraumatic. Eyes:      General: No scleral icterus. Right eye: No discharge. Left eye: No discharge. Conjunctiva/sclera: Conjunctivae normal.      Pupils: Pupils are equal, round, and reactive to light. Cardiovascular:      Rate and Rhythm: Regular rhythm. Tachycardia present. Heart sounds: Normal heart sounds. No murmur heard. No friction rub. No gallop. Pulmonary:      Effort: Respiratory distress present. Breath sounds: Rales present. No wheezing. Chest:      Chest wall: No tenderness. Abdominal:      General: Bowel sounds are normal. There is no distension. Palpations: Abdomen is soft. There is no mass. Tenderness: There is no abdominal tenderness. There is no guarding or rebound. Musculoskeletal:         General: No tenderness. Normal range of motion. Skin:     General: Skin is warm and dry. Coloration: Skin is not pale. Findings: No erythema or rash. Neurological:      Mental Status: She is alert. Motor: No abnormal muscle tone. MEDICAL DECISION MAKING:     Unclear what is going on we will put the patient on BiPAP get a gas get a quick EKG and chest x-ray and do a full sepsis work-up.     DIAGNOSTIC RESULTS     EKG: All EKG's are interpreted by the Emergency Department Physician who either signs or Co-signs this chart in the absence of a cardiologist.        RADIOLOGY:All plain film, CT, MRI, and formal ultrasound images (except ED bedside ultrasound)are read by the radiologist and interpretations are directly viewed by the emergency physician. XR CHEST PORTABLE    Result Date: 8/27/2021  EXAMINATION: ONE XRAY VIEW OF THE CHEST 8/27/2021 1:01 pm COMPARISON: 05/30/2021 HISTORY: ORDERING SYSTEM PROVIDED HISTORY: SOB TECHNOLOGIST PROVIDED HISTORY: SOB Reason for Exam: SOB. Acuity: Unknown Type of Exam: Unknown Additional signs and symptoms: SOB. FINDINGS: The lungs are without acute focal process. There is no effusion or pneumothorax. The cardiomediastinal silhouette is without acute process. The osseous structures are without acute process. No acute process. LABS: All lab results were reviewed bymyself, and all abnormals are listed below.   Labs Reviewed   BLOOD GAS, ARTERIAL - Abnormal; Notable for the following components:       Result Value    pCO2, Arterial 31.7 (*)     HCO3, Arterial 19.6 (*)     Negative Base Excess, Art 5.2 (*)     O2 Sat, Arterial 93.0 (*)     All other components within normal limits   CBC WITH AUTO DIFFERENTIAL - Abnormal; Notable for the following components:    WBC 17.0 (*)     RBC 5.57 (*)     Hemoglobin 16.9 (*)     Hematocrit 51.1 (*)     RDW 15.1 (*)     Seg Neutrophils 81 (*)     Lymphocytes 16 (*)     Segs Absolute 13.77 (*)     All other components within normal limits   APTT - Abnormal; Notable for the following components:    PTT 19.2 (*)     All other components within normal limits   COMPREHENSIVE METABOLIC PANEL W/ REFLEX TO MG FOR LOW K - Abnormal; Notable for the following components:    Glucose 140 (*)     BUN 26 (*)     CREATININE 2.61 (*)     Chloride 96 (*)     Anion Gap 21 (*)     Alkaline Phosphatase 111 (*)     GFR Non- 19 (*) GFR  23 (*)     All other components within normal limits   CULTURE, BLOOD 1   CULTURE, BLOOD 2   CULTURE, URINE   COVID-19, RAPID   LACTATE, SEPSIS   SPECIMEN REJECTION   PROTIME-INR   LIPASE   SPECIMEN REJECTION   LACTATE, SEPSIS   URINALYSIS   URINE DRUG SCREEN         EMERGENCY DEPARTMENT COURSE:   Vitals:    Vitals:    08/27/21 1713 08/27/21 1715 08/27/21 1730 08/27/21 1750   BP:       Pulse:  100 104 93   Resp:  19 23 19   Temp: 97.5 °F (36.4 °C)      TempSrc: Axillary      SpO2:  96% 97%    Weight:   190 lb (86.2 kg)    Height:   5' 2\" (1.575 m)        The patient was given the following medications while in the emergency department:     Orders Placed This Encounter   Medications    sodium chloride flush 0.9 % injection 5-40 mL    sodium chloride flush 0.9 % injection 5-40 mL    0.9 % sodium chloride infusion    naloxone (NARCAN) injection 0.4 mg       -------------------------  6:07 PM EDT  Patient is able to get off of the BiPAP and is doing okay. I spoke with Dr. Kalyani Johnson who agrees to admission wants her in the ICU. Patient is now responsive after we explained to her we can give her Narcan. Not sure what was going on up to this point but she seems to be improved. CRITICAL CARE:     CRITICAL CARE: There was a high probability of clinically significant/life threatening deterioration in this patient's condition which required my urgent intervention. Total critical care time was 30 minutes. This excludes any time for separately reportable procedures. CONSULTS:  IP CONSULT TO PULMONOLOGY    PROCEDURES:  none    FINAL IMPRESSION      1. Altered mental status, unspecified altered mental status type    2. COPD exacerbation ([facility]    3. Acute renal failure, unspecified acute renal failure type [facility])          DISPOSITION/PLAN   DISPOSITION Admitted 08/27/2021 06:06:07 PM      PATIENT REFERRED TO:  No follow-up provider specified.     DISCHARGE MEDICATIONS:  New Prescriptions    No

## 2021-08-27 NOTE — Clinical Note
Patient Class: Inpatient [101]   REQUIRED: Diagnosis: Respiratory distress [535395]   Estimated Length of Stay: Estimated stay of more than 2 midnights   Preferred Department: Intermediate ICU

## 2021-08-27 NOTE — ED NOTES
Bed: 04  Expected date:   Expected time:   Means of arrival:   Comments:  YAHAIRA [address], RN  08/27/21 8085

## 2021-08-27 NOTE — H&P
[facility]     HISTORY AND PHYSICAL EXAMINATION            Date:   8/27/2021  Patient name:  Saira Taylor  Date of admission:  8/27/2021  4:05 PM  MRN:   [id number]  Account:  [de-identified]  YOB: 1965  PCP:    KEANU Rocha CNP  Room:   04/04  Code Status:    Prior    Chief Complaint:     Chief Complaint   Patient presents with    Altered Mental Status       History Obtained From:     electronic medical record, emergency medicine doctors per EMS, reason patient could not give history:  altered mental status, Quality of history:  poor historian    History of Present Illness: The patient is a 60 y.o. Non- / non  female who presents withAltered Mental Status   and she is admitted to the hospital for the management of altered mental status due to possible drug overdose versus postictal from seizure. Patient was brought to the hospital per EMS reportedly after she had called them stating she was beginning to feel short of breath. Heartedly shortly after EMS arrived, patient became altered, not responding to EMS personnel. She was placed on BiPAP in the emergency department and given 1 dose of Narcan 0.4 mg. She is somewhat responsive, and was able to be weaned off BiPAP and is now satting in the low 90s on room air. Vital signs are stable at this time. Lab work reveals SEDA, elevated white blood cell count, elevated hemoglobin and hematocrit, elevated anion gap, and elevated CO2. Patient only responds by shaking her head indicating no that she does not want to be administered additional Narcan. She does not respond verbally to any questions regarding pain, regarding what brought her to the hospital, or any other symptoms that she is having.     Per the EMR, patient has past medical history significant for ADHD, CHF, COPD, manic depression, seizure, substance abuse, intentional overdose on barbiturates, and treatment with Suboxone, as well as others listed below. Review of systems was limited due to patient somnolence and not cooperating with exam.      Past Medical History:     Past Medical History:   Diagnosis Date    Arthritis     Asthma     Attention deficit hyperactivity disorder (ADHD), combined type     Borderline diabetes     [facility]line personality disorder [facility]     CHF (congestive heart failure) (HCC)     COPD (chronic obstructive pulmonary disease) ([facility])     Fibromyalgia     Headache     Hypertension     Manic depression ([facility]     Movement disorder     Neck fracture ([facility]     Pernicious anemia     Seizure ([facility]     Thyroid disease         Past SurgicalHistory:     Past Surgical History:   Procedure Laterality Date    EXPLORATION OF WOUND OF EXTREMITY Right 5/19/2019    RIGHT THIGH WOUND WASHOUT WITH [facility] WEST-ER PLACEMENT performed by Ross Alvarez MD at [address] N/A 5/22/2019    RIGHT [facility] performed by Debby Mullen MD at [address] [place] [address] Right 5/17/2019    IRRIGATION, DEBRIDEMENT RIGHT THIGH performed by Hernando Heart MD at [facility] Bilateral     LYMPH NODE DISSECTION      cervical    PARTIAL HYSTERECTOMY          Medications Prior to Admission:        Prior to Admission medications    Medication Sig Start Date End Date Taking?  Authorizing Provider   buprenorphine-naloxone (SUBOXONE) 8-2 MG FILM SL film DISSOLVE ONE FILM UNDER TONGUE TWICE DAILY FOR 28 DAYS 7/7/21   Historical Provider, MD   divalproex (DEPAKOTE) 500 MG DR tablet  7/22/21   Historical Provider, MD   OXcarbazepine (TRILEPTAL) 300 MG tablet  7/30/21   Historical Provider, MD   venlafaxine (EFFEXOR XR) 75 MG extended release capsule  8/3/21   Historical Provider, MD   risperiDONE (RISPERDAL) 2 MG tablet  7/30/21   Historical Provider, MD   risperiDONE (RISPERDAL) 4 MG tablet  7/30/21   Historical Provider, MD   ciclopirox (LOPROX) 0.77 % cream Apply topically 2 times daily to bottom of feet and in-between toes. 8/18/21   Farhat Sanders DPM   ACETAMINOPHEN EXTRA STRENGTH 500 MG tablet TAKE 1 TO 2 TAB BY MOUTH EVERY 8 HOURS AS NEEDED FOR PAIN  8/4/21   KEANU Cardenas CNP   albuterol sulfate  (90 Base) MCG/ACT inhaler Inhale 1 puff into the lungs every 6 hours as needed for Wheezing Gap prescription until follow up with primary. Do not refill. Follow up with primary 7/23/21   KEANU Cardenas CNP   budesonide-formoterol [facility]) 160-4.5 MCG/ACT AERO Inhale 2 puffs into the lungs 2 times daily 6/3/21   Tushar Castillo DO   ipratropium-albuterol (DUONEB) 0.5-2.5 (3) MG/3ML SOLN nebulizer solution Inhale 3 mLs into the lungs every 4 hours 6/3/21   Tushar Castillo DO   Respiratory Therapy Supplies (NEBULIZER/TUBING/MOUTHPIECE) KIT Daily as needed 6/3/21   Tushar Castillo DO   ibuprofen (ADVIL;MOTRIN) 600 MG tablet Take 1 tablet by mouth every 6 hours as needed for Pain 5/29/21 6/5/21  Medardo Valerio DO   gabapentin (NEURONTIN) 300 MG capsule TAKE 2 CAPSULES BY MOUTH 3 TIMES DAILY FOR 30 DAYS. 5/24/21 6/23/21  KEANU Cardenas CNP   lisinopril (PRINIVIL;ZESTRIL) 20 MG tablet TAKE 1 TABLET BY MOUTH DAILY  5/24/21 6/23/21  KEANU Cardenas CNP   enoxaparin (LOVENOX) 40 MG/0.4ML injection Inject 0.4 mLs into the skin daily 5/18/21   Capri Oneil MD   enoxaparin (LOVENOX) 40 MG/0.4ML injection Inject 0.4 mLs into the skin daily 5/18/21   Capri Oneil MD   naproxen (NAPROSYN) 500 MG tablet Take 1 tablet by mouth 2 times daily (with meals) 5/16/21   Igor Hernandez MD   butalbital-aspirin-caffeine [facility]) -40 MG per capsule Take 1 capsule by mouth every 4 hours as needed for Headaches for up to 30 days.  5/14/21 6/13/21  She Doyle KEANU Real CNP   fluticasone (FLONASE) 50 MCG/ACT nasal spray 2 sprays by Each Nostril route daily 5/14/21   KEANU Loo CNP   ondansetron (ZOFRAN) 4 MG tablet Take 1 tablet by mouth every 8 hours as needed for Nausea or Vomiting 5/8/21   Owen Markham MD   ondansetron (ZOFRAN ODT) 4 MG disintegrating tablet Take 1 tablet by mouth every 8 hours as needed for Nausea 4/28/21   Demarcus Dunlap DO   ASPIRIN ADULT LOW STRENGTH 81 MG EC tablet TAKE 1 TABLET BY MOUTH ONCE DAILY  4/5/21   KEANU Loo CNP   cetirizine (ZYRTEC) 10 MG tablet Take 1 tablet by mouth daily 3/11/21   KEANU Loo CNP   levothyroxine (SYNTHROID) 100 MCG tablet Take 1 tablet by mouth Daily 2/19/21   KEANU Loo CNP   ipratropium-albuterol (DUONEB) 0.5-2.5 (3) MG/3ML SOLN nebulizer solution Inhale 3 mLs into the lungs every 6 hours as needed for Shortness of Breath 2/11/21   KEANU Loo CNP   cyclobenzaprine (FLEXERIL) 5 MG tablet TAKE 1 TABLET BY MOUTH 2 TIMES DAILY AS NEEDED FOR MUSCLE SPASMS (DON'T TAKE W/ ZANAFLEX) 1/19/21   KEANU Loo CNP   ammonium lactate (LAC-HYDRIN) 12 % lotion Apply topically daily. 1/6/21   Wendi Garcia DPM   Ciclopirox (LOPROX) 0.77 % gel Apply to skin twice a day 1/6/21   Wendi Garcia DPM   hydrOXYzine (ATARAX) 50 MG tablet  12/14/20   Historical MD Grace   Nebulizers (NEBULIZER COMPRESSOR) MISC Daily as needed 12/18/20   KEANU Loo CNP   Blood Pressure KIT Daily as needed 12/18/20   KEANU Loo CNP   Misc.  Devices (CANE) MISC Quad base cane 11/28/20   Jordan Reyna MD   famotidine (PEPCID) 20 MG tablet Take 1 tablet by mouth 2 times daily as needed (for acid reflux) 10/26/20   KEANU Loo CNP   clomiPRAMINE (ANAFRANIL) 50 MG capsule Take 1 capsule by mouth nightly 10/26/20   Gilmar Montgomery, KEANU - CNP   traZODone (DESYREL) 150 MG tablet Take 1 tablet by mouth nightly 10/22/20   Gilmar Montgomery, APRN - CNP   cloNIDine (CATAPRES) 0.1 MG tablet Take 1 tablet by mouth nightly 10/22/20   Russella Screen, APRN - CNP   Benzocaine-Menthol (CEPACOL) 6-10 MG LOZG lozenge Take 1 lozenge by mouth every 2 hours as needed for Sore Throat 10/22/20   Russella Screen, APRN - CNP   tiZANidine (ZANAFLEX) 2 MG tablet Take 2 tablets by mouth every 8 hours as needed (muscle spasm, neck pain) 10/9/20   Russella Screen, APRN - CNP   Elastic Bandages & Supports ([place]) MISC Apply to each wrist at bedtime 10/9/20   Russella Screen, APRN - CNP   methyl salicylate-menthol (PAULETTE BRAY GREASELESS) 10-15 % CREA Apply topically 3 times daily as needed for Pain 20   Stacey Stahl MD   albuterol (PROVENTIL) (2.5 MG/3ML) 0.083% nebulizer solution Take 3 mLs by nebulization every 6 hours as needed for Wheezing 20   Russella Screen, APRN - CNP        Allergies:     Imitrex [sumatriptan], Bee pollen, Bee venom, Bromide ion [bromine], Reglan [metoclopramide], Sulfa antibiotics, Sulfadiazine, and Tramadol    Social History:     Tobacco:    reports that she has been smoking. She has a 6.00 pack-year smoking history. She has never used smokeless tobacco.  Alcohol:      reports no history of alcohol use. Drug Use:  reports previous drug use. Family History:     History reviewed. No pertinent family history. Review of Systems:     Positive and Negative as described in HPI. Review of Systems   Unable to perform ROS: Mental status change (Patient unresponsive, does not verbally or nonverbally respond to questions or follow commands)   Patient only shakes head when offered Narcan. Physical Exam:   /86   Pulse 94   Temp 97.5 °F (36.4 °C) (Axillary)   Resp 15   Ht 5' 2\" (1.575 m)   Wt 190 lb (86.2 kg)   SpO2 96%   BMI 34.75 kg/m²   Temp (24hrs), Av.5 °F (36.4 °C), Min:97.5 °F (36.4 °C), Max:97.5 °F (36.4 °C)    No results for input(s): POCGLU in the last 72 hours.   No intake or output data in the 24 hours ending 08/27/21 1831    Physical Exam  Vitals and nursing note reviewed. Constitutional:       General: She is not in acute distress. Appearance: She is obese. She is ill-appearing. She is not toxic-appearing. Comments: Hair on head appeared slightly moistened   HENT:      Head: Normocephalic and atraumatic. Eyes:      General: Lids are normal.      Extraocular Movements: Extraocular movements intact. Pupils: Pupils are equal.      Right eye: Pupil is round. Left eye: Pupil is round. Comments: Pupils slightly reactive, pinpoint   Cardiovascular:      Rate and Rhythm: Regular rhythm. Tachycardia present. Comments: Mildly tachycardic  Heart sounds difficult to auscultate due to transmitted upper respiratory sounds from patient somnolence/snoring and unable to awaken/keep patient awake for proper exam at this time  Pulmonary:      Effort: No respiratory distress. Breath sounds: No stridor. No wheezing, rhonchi or rales. Chest:      Chest wall: No tenderness. Abdominal:      General: There is no distension. Palpations: Abdomen is soft. There is no mass. Tenderness: There is no abdominal tenderness. There is no guarding or rebound. Hernia: No hernia is present. Musculoskeletal:         General: No swelling or tenderness. Cervical back: Normal range of motion. No rigidity or tenderness. Comments: Patient with mid calf walking boot on right foot   Skin:     General: Skin is warm and dry. Neurological:      Mental Status: She is lethargic.       Comments: Unable to assess due to patient's somnolence; patient opens her eyes to shoulder tapping/shaking, responds only by shaking her head when Narcan is offered   Psychiatric:      Comments: Unable to assess due to patient somnolence         Investigations:     Laboratory Testing:  Recent Results (from the past 24 hour(s))   Blood gas, arterial    Collection Time: 08/27/21  4:10 PM   Result Value Ref Range    pH, Arterial 7.399 7.350 - 7.450    pCO2, Arterial 31.7 (L) 35.0 - 45.0 mmHg    pO2, Arterial 88.9 80.0 - 100.0 mmHg    HCO3, Arterial 19.6 (L) 22.0 - 26.0 mmol/L    Positive Base Excess, Art NOT REPORTED 0.0 - 2.0 mmol/L    Negative Base Excess, Art 5.2 (H) 0.0 - 2.0 mmol/L    O2 Sat, Arterial 93.0 (L) 95 - 98 %    Total Hb NOT REPORTED 12.0 - 16.0 g/dl    Oxyhemoglobin NOT REPORTED 95.0 - 98.0 %    Carboxyhemoglobin 2.1 0 - 5 %    Methemoglobin 0.7 0.0 - 1.9 %    Pt Temp 37.0     pH, Art, Temp Adj NOT REPORTED 7.350 - 7.450    pCO2, Art, Temp Adj NOT REPORTED 35.0 - 45.0    pO2, Art, Temp Adj NOT REPORTED 80.0 - 100.0 mmHg    O2 Device/Flow/% BIPAP     Respiratory Rate 18     Kevin Test PASS     Sample Site Left Radial Artery     Pt.  Position SEMI-FOWLERS     Mode IPAP 16/EPAP 8     Set Rate NOT REPORTED     Total Rate NOT REPORTED     VT NOT REPORTED     FIO2 NOT REPORTED     Peep/Cpap NOT REPORTED     PSV NOT REPORTED     Text for Respiratory NOT REPORTED     NOTIFICATION NOT REPORTED     NOTIFICATION TIME NOT REPORTED    Lactate, Sepsis    Collection Time: 08/27/21  4:30 PM   Result Value Ref Range    Lactic Acid, Sepsis 1.5 0.5 - 1.9 mmol/L    Lactic Acid, Sepsis, Whole Blood NOT REPORTED 0.5 - 1.9 mmol/L   CBC auto differential    Collection Time: 08/27/21  4:30 PM   Result Value Ref Range    WBC 17.0 (H) 3.5 - 11.0 k/uL    RBC 5.57 (H) 4.0 - 5.2 m/uL    Hemoglobin 16.9 (H) 12.0 - 16.0 g/dL    Hematocrit 51.1 (H) 36 - 46 %    MCV 91.8 80 - 100 fL    MCH 30.4 26 - 34 pg    MCHC 33.1 31 - 37 g/dL    RDW 15.1 (H) 11.5 - 14.9 %    Platelets 850 458 - 200 k/uL    MPV 7.5 6.0 - 12.0 fL    NRBC Automated NOT REPORTED per 100 WBC    Differential Type NOT REPORTED     Immature Granulocytes NOT REPORTED 0 %    Absolute Immature Granulocyte NOT REPORTED 0.00 - 0.30 k/uL    WBC Morphology NOT REPORTED     RBC Morphology NOT REPORTED     Platelet Estimate NOT REPORTED     Seg Neutrophils 81 (H) 36 - 66 %    Lymphocytes 16 (L) 24 - 44 %    Monocytes 2 1 - 7 %    Eosinophils % 0 0 - 4 %    Basophils 0 0 - 2 %    Bands 1 0 - 10 %    Segs Absolute 13.77 (H) 1.3 - 9.1 k/uL    Absolute Lymph # 2.72 1.0 - 4.8 k/uL    Absolute Mono # 0.34 0.1 - 1.3 k/uL    Absolute Eos # 0.00 0.0 - 0.4 k/uL    Basophils Absolute 0.00 0.0 - 0.2 k/uL    Absolute Bands # 0.17 0.0 - 1.0 k/uL    Morphology ANISOCYTOSIS PRESENT     Morphology 1+ TEARDROPS    SPECIMEN REJECTION    Collection Time: 08/27/21  4:30 PM   Result Value Ref Range    Specimen Source . BLOOD     Ordered Test CMPX, LIP, XGREEN,PT, PTT     Reason for Rejection Unable to perform testing: Specimen hemolyzed. - NOT REPORTED    SPECIMEN REJECTION    Collection Time: 08/27/21  4:50 PM   Result Value Ref Range    Specimen Source . BLOOD     Ordered Test CMPX,LIP, Marilee Raider     Reason for Rejection Unable to perform testing: Specimen hemolyzed.      - NOT REPORTED    Protime-INR    Collection Time: 08/27/21  5:12 PM   Result Value Ref Range    Protime 12.1 11.8 - 14.6 sec    INR 0.9    APTT    Collection Time: 08/27/21  5:12 PM   Result Value Ref Range    PTT 19.2 (L) 24.0 - 36.0 sec   Comprehensive Metabolic Panel w/ Reflex to MG    Collection Time: 08/27/21  5:12 PM   Result Value Ref Range    Glucose 140 (H) 70 - 99 mg/dL    BUN 26 (H) 6 - 20 mg/dL    CREATININE 2.61 (H) 0.50 - 0.90 mg/dL    Bun/Cre Ratio NOT REPORTED 9 - 20    Calcium 10.2 8.6 - 10.4 mg/dL    Sodium 137 135 - 144 mmol/L    Potassium 4.0 3.7 - 5.3 mmol/L    Chloride 96 (L) 98 - 107 mmol/L    CO2 20 20 - 31 mmol/L    Anion Gap 21 (H) 9 - 17 mmol/L    Alkaline Phosphatase 111 (H) 35 - 104 U/L    ALT 11 5 - 33 U/L    AST 16 <32 U/L    Total Bilirubin 0.63 0.3 - 1.2 mg/dL    Total Protein 8.2 6.4 - 8.3 g/dL    Albumin 4.7 3.5 - 5.2 g/dL    Albumin/Globulin Ratio NOT REPORTED 1.0 - 2.5    GFR Non- 19 (L) >60 mL/min    GFR  23 (L) >60 mL/min    GFR Comment          GFR Staging NOT REPORTED    Lipase    Collection Time: 08/27/21  5:12 PM   Result Value Ref Range    Lipase 21 13 - 60 U/L   COVID-19, Rapid    Collection Time: 08/27/21  6:08 PM    Specimen: Nasopharyngeal Swab   Result Value Ref Range    Specimen Description . NASOPHARYNGEAL SWAB     SARS-CoV-2, Rapid Not Detected Not Detected       Imaging/Diagnostics:  XR ANKLE RIGHT (MIN 3 VIEWS)    Result Date: 8/19/2021  EXAMINATION: THREE XRAY VIEWS OF THE RIGHT ANKLE 8/18/2021 4:23 pm COMPARISON: 05/18/2021 HISTORY: ORDERING SYSTEM PROVIDED HISTORY: Right ankle pain, unspecified chronicity TECHNOLOGIST PROVIDED HISTORY: Right ankle fracture in May FINDINGS: Ankle mortise intact. Oblique fracture distal fibular metaphysis shows ongoing healing with fracture line less distinct and some callus formation. No new fracture. Soft tissues grossly intact. Healing oblique distal right fibular metaphysis fracture. XR CHEST PORTABLE    Result Date: 8/27/2021  EXAMINATION: ONE XRAY VIEW OF THE CHEST 8/27/2021 1:01 pm COMPARISON: 05/30/2021 HISTORY: ORDERING SYSTEM PROVIDED HISTORY: SOB TECHNOLOGIST PROVIDED HISTORY: SOB Reason for Exam: SOB. Acuity: Unknown Type of Exam: Unknown Additional signs and symptoms: SOB. FINDINGS: The lungs are without acute focal process. There is no effusion or pneumothorax. The cardiomediastinal silhouette is without acute process. The osseous structures are without acute process. No acute process.        Assessment :      Primary Problem    Altered mental status    Plan:     Patient status Admit as inpatient in the  Medical ICU    Acute Respiratory Distress, altered mental status likely secondary to drug overdose vs seizure versus COPD exacerbation  -Patient previously required CPAP with EMS, and BiPAP in ED  -Patient has been weaned off BiPAP, satting in the 90s on room air  -Chest x-ray clear    SIRS criteria 2/4, rule out sepsis  -Chest x-ray negative  -UA pending  -Tachycardia  -Afebrile  -WBC elevated  -Lactate normal  -Blood pressure is normal  -Blood cultures ordered    SEDA likely secondary to dehydration secondary to altered mental status, drug overdose versus seizure  -Creatinine 2.7  -Normal saline 125 mL an hour  -Monitor creatinine with morning labs  -Baseline creatinine 0.5-0.6  -UA pending  -Avoid nephrotoxic drugs    History of Seizure disorder, unknown etiology  -Continue home medications including Depakote and Trileptal  -Lactate normal  -Seizure precautions  -Fall precautions    COPD  -BiPAP in ED, weaned to room air, satting in the 90s  -Continue home Symbicort, albuterol  -Duo-Nebs  -Respiratory therapy on board, appreciate the recommendations  -Pulmonology on board, appreciate the recommendations     Depression  -Continue home medications    Polysubstance abuse  -Patient on Suboxone, medications held  -Urine drug screen pending  -Avoid opioids, centrally acting depressants    DVT prophylaxis  -Heparin 5000 units 3 times daily    Diet  -Regular adult diet          Consultations:   IP CONSULT TO PULMONOLOGY    Patient is admitted as inpatient status because of co-morbidities listed above, severity of signs and symptoms as outlined, requirement for current medical therapies and most importantly because of direct risk to patient if care not provided in a hospital setting.     Rafy Tejeda MD  8/27/2021  6:31 PM    Copy sent to Dr. Viky Blood, APRN - CNP    I could not see the patient   She was Transferred to [facility]

## 2021-08-27 NOTE — ED NOTES
Writer attempted to administer Narcan to the pt; pt began stating \"no, no narcan. \"  Pt would not answer further questions. Dr. Lisbeth Jimenez notified.      Fernanda Caraballo RN  08/27/21 1810

## 2021-08-27 NOTE — ED NOTES
Multiple attempts made at blood draw and IV access on pt.; Some venipunctures provided blood samples for lab analysis, but NONE were able to be established as IV access.      Brian Golden, EMT-P  08/27/21 6551

## 2021-08-28 ENCOUNTER — APPOINTMENT (OUTPATIENT)
Dept: MRI IMAGING | Age: 56
DRG: 817 | End: 2021-08-28
Payer: MEDICARE

## 2021-08-28 PROBLEM — I16.1 HYPERTENSIVE EMERGENCY: Status: ACTIVE | Noted: 2021-08-28

## 2021-08-28 PROBLEM — R41.82 ALTERED MENTAL STATE: Status: ACTIVE | Noted: 2021-08-28

## 2021-08-28 LAB
-: NORMAL
ALBUMIN SERPL-MCNC: 4 G/DL (ref 3.5–5.2)
ALBUMIN/GLOBULIN RATIO: 1.4 (ref 1–2.5)
ALP BLD-CCNC: 89 U/L (ref 35–104)
ALT SERPL-CCNC: 10 U/L (ref 5–33)
AMPHETAMINE SCREEN URINE: NEGATIVE
ANION GAP SERPL CALCULATED.3IONS-SCNC: 15 MMOL/L (ref 9–17)
AST SERPL-CCNC: 15 U/L
BARBITURATE SCREEN URINE: POSITIVE
BENZODIAZEPINE SCREEN, URINE: NEGATIVE
BILIRUB SERPL-MCNC: 0.49 MG/DL (ref 0.3–1.2)
BUN BLDV-MCNC: 18 MG/DL (ref 6–20)
BUN/CREAT BLD: ABNORMAL (ref 9–20)
BUPRENORPHINE URINE: ABNORMAL
CALCIUM SERPL-MCNC: 9.4 MG/DL (ref 8.6–10.4)
CANNABINOID SCREEN URINE: NEGATIVE
CHLORIDE BLD-SCNC: 105 MMOL/L (ref 98–107)
CHOLESTEROL/HDL RATIO: 4.2
CHOLESTEROL: 203 MG/DL
CO2: 20 MMOL/L (ref 20–31)
COCAINE METABOLITE, URINE: NEGATIVE
CREAT SERPL-MCNC: 0.83 MG/DL (ref 0.5–0.9)
EKG ATRIAL RATE: 83 BPM
EKG P AXIS: 64 DEGREES
EKG P-R INTERVAL: 194 MS
EKG Q-T INTERVAL: 402 MS
EKG QRS DURATION: 82 MS
EKG QTC CALCULATION (BAZETT): 472 MS
EKG R AXIS: 1 DEGREES
EKG T AXIS: 32 DEGREES
EKG VENTRICULAR RATE: 83 BPM
GFR AFRICAN AMERICAN: >60 ML/MIN
GFR NON-AFRICAN AMERICAN: >60 ML/MIN
GFR SERPL CREATININE-BSD FRML MDRD: ABNORMAL ML/MIN/{1.73_M2}
GFR SERPL CREATININE-BSD FRML MDRD: ABNORMAL ML/MIN/{1.73_M2}
GLUCOSE BLD-MCNC: 125 MG/DL (ref 70–99)
HCT VFR BLD CALC: 46.3 % (ref 36.3–47.1)
HCT VFR BLD CALC: 53.8 % (ref 36.3–47.1)
HDLC SERPL-MCNC: 48 MG/DL
HEMOGLOBIN: 15.3 G/DL (ref 11.9–15.1)
HEMOGLOBIN: 16.5 G/DL (ref 11.9–15.1)
LACTIC ACID, SEPSIS WHOLE BLOOD: 1.6 MMOL/L (ref 0.5–1.9)
LACTIC ACID, SEPSIS WHOLE BLOOD: 2.2 MMOL/L (ref 0.5–1.9)
LACTIC ACID, SEPSIS: ABNORMAL MMOL/L (ref 0.5–1.9)
LACTIC ACID, SEPSIS: NORMAL MMOL/L (ref 0.5–1.9)
LDL CHOLESTEROL: 131 MG/DL (ref 0–130)
MAGNESIUM: 2.3 MG/DL (ref 1.6–2.6)
MCH RBC QN AUTO: 30 PG (ref 25.2–33.5)
MCH RBC QN AUTO: 30.5 PG (ref 25.2–33.5)
MCHC RBC AUTO-ENTMCNC: 30.7 G/DL (ref 28.4–34.8)
MCHC RBC AUTO-ENTMCNC: 33 G/DL (ref 28.4–34.8)
MCV RBC AUTO: 92.4 FL (ref 82.6–102.9)
MCV RBC AUTO: 97.8 FL (ref 82.6–102.9)
MDMA URINE: ABNORMAL
METHADONE SCREEN, URINE: NEGATIVE
METHAMPHETAMINE, URINE: ABNORMAL
NRBC AUTOMATED: 0 PER 100 WBC
NRBC AUTOMATED: 0 PER 100 WBC
OPIATES, URINE: NEGATIVE
OXYCODONE SCREEN URINE: NEGATIVE
PDW BLD-RTO: 13.6 % (ref 11.8–14.4)
PDW BLD-RTO: 13.8 % (ref 11.8–14.4)
PHENCYCLIDINE, URINE: NEGATIVE
PHOSPHORUS: 3.9 MG/DL (ref 2.6–4.5)
PLATELET # BLD: 126 K/UL (ref 138–453)
PLATELET # BLD: 244 K/UL (ref 138–453)
PMV BLD AUTO: 10.1 FL (ref 8.1–13.5)
PMV BLD AUTO: 9.9 FL (ref 8.1–13.5)
POTASSIUM SERPL-SCNC: 4 MMOL/L (ref 3.7–5.3)
PROPOXYPHENE, URINE: ABNORMAL
RBC # BLD: 5.01 M/UL (ref 3.95–5.11)
RBC # BLD: 5.5 M/UL (ref 3.95–5.11)
REASON FOR REJECTION: NORMAL
SODIUM BLD-SCNC: 140 MMOL/L (ref 135–144)
TEST INFORMATION: ABNORMAL
TOTAL PROTEIN: 6.9 G/DL (ref 6.4–8.3)
TRICYCLIC ANTIDEPRESSANTS, UR: ABNORMAL
TRIGL SERPL-MCNC: 120 MG/DL
TROPONIN INTERP: NORMAL
TROPONIN INTERP: NORMAL
TROPONIN T: NORMAL NG/ML
TROPONIN T: NORMAL NG/ML
TROPONIN, HIGH SENSITIVITY: 6 NG/L (ref 0–14)
TROPONIN, HIGH SENSITIVITY: <6 NG/L (ref 0–14)
VALPROIC ACID % FREE: ABNORMAL % (ref 5–18.4)
VALPROIC ACID LEVEL: <3 UG/ML (ref 50–125)
VALPROIC ACID, FREE: 0.8 UG/ML (ref 7–23)
VALPROIC DATE LAST DOSE: ABNORMAL
VALPROIC DOSE AMOUNT: ABNORMAL
VALPROIC TIME LAST DOSE: ABNORMAL
VLDLC SERPL CALC-MCNC: ABNORMAL MG/DL (ref 1–30)
WBC # BLD: 14.2 K/UL (ref 3.5–11.3)
WBC # BLD: 16 K/UL (ref 3.5–11.3)
ZZ NTE CLEAN UP: ORDERED TEST: NORMAL
ZZ NTE WITH NAME CLEAN UP: SPECIMEN SOURCE: NORMAL

## 2021-08-28 PROCEDURE — 80307 DRUG TEST PRSMV CHEM ANLYZR: CPT

## 2021-08-28 PROCEDURE — 2500000003 HC RX 250 WO HCPCS: Performed by: NURSE PRACTITIONER

## 2021-08-28 PROCEDURE — 82668 ASSAY OF ERYTHROPOIETIN: CPT

## 2021-08-28 PROCEDURE — 84484 ASSAY OF TROPONIN QUANT: CPT

## 2021-08-28 PROCEDURE — 84100 ASSAY OF PHOSPHORUS: CPT

## 2021-08-28 PROCEDURE — 2580000003 HC RX 258: Performed by: STUDENT IN AN ORGANIZED HEALTH CARE EDUCATION/TRAINING PROGRAM

## 2021-08-28 PROCEDURE — 70547 MR ANGIOGRAPHY NECK W/O DYE: CPT

## 2021-08-28 PROCEDURE — 6370000000 HC RX 637 (ALT 250 FOR IP): Performed by: INTERNAL MEDICINE

## 2021-08-28 PROCEDURE — 36415 COLL VENOUS BLD VENIPUNCTURE: CPT

## 2021-08-28 PROCEDURE — 2580000003 HC RX 258: Performed by: PSYCHIATRY & NEUROLOGY

## 2021-08-28 PROCEDURE — 80164 ASSAY DIPROPYLACETIC ACD TOT: CPT

## 2021-08-28 PROCEDURE — 99223 1ST HOSP IP/OBS HIGH 75: CPT | Performed by: PSYCHIATRY & NEUROLOGY

## 2021-08-28 PROCEDURE — 80061 LIPID PANEL: CPT

## 2021-08-28 PROCEDURE — 80165 DIPROPYLACETIC ACID FREE: CPT

## 2021-08-28 PROCEDURE — 2500000003 HC RX 250 WO HCPCS: Performed by: PSYCHIATRY & NEUROLOGY

## 2021-08-28 PROCEDURE — 85027 COMPLETE CBC AUTOMATED: CPT

## 2021-08-28 PROCEDURE — 6360000002 HC RX W HCPCS: Performed by: NURSE PRACTITIONER

## 2021-08-28 PROCEDURE — 2500000003 HC RX 250 WO HCPCS: Performed by: STUDENT IN AN ORGANIZED HEALTH CARE EDUCATION/TRAINING PROGRAM

## 2021-08-28 PROCEDURE — 70551 MRI BRAIN STEM W/O DYE: CPT

## 2021-08-28 PROCEDURE — 6360000002 HC RX W HCPCS: Performed by: STUDENT IN AN ORGANIZED HEALTH CARE EDUCATION/TRAINING PROGRAM

## 2021-08-28 PROCEDURE — 80183 DRUG SCRN QUANT OXCARBAZEPIN: CPT

## 2021-08-28 PROCEDURE — 2580000003 HC RX 258: Performed by: NURSE PRACTITIONER

## 2021-08-28 PROCEDURE — 99254 IP/OBS CNSLTJ NEW/EST MOD 60: CPT | Performed by: NEUROLOGICAL SURGERY

## 2021-08-28 PROCEDURE — 83735 ASSAY OF MAGNESIUM: CPT

## 2021-08-28 PROCEDURE — 94640 AIRWAY INHALATION TREATMENT: CPT

## 2021-08-28 PROCEDURE — 93010 ELECTROCARDIOGRAM REPORT: CPT | Performed by: INTERNAL MEDICINE

## 2021-08-28 PROCEDURE — 2060000000 HC ICU INTERMEDIATE R&B

## 2021-08-28 PROCEDURE — 80053 COMPREHEN METABOLIC PANEL: CPT

## 2021-08-28 PROCEDURE — 6370000000 HC RX 637 (ALT 250 FOR IP): Performed by: NURSE PRACTITIONER

## 2021-08-28 PROCEDURE — 83605 ASSAY OF LACTIC ACID: CPT

## 2021-08-28 PROCEDURE — 99222 1ST HOSP IP/OBS MODERATE 55: CPT | Performed by: INTERNAL MEDICINE

## 2021-08-28 PROCEDURE — 70544 MR ANGIOGRAPHY HEAD W/O DYE: CPT

## 2021-08-28 RX ORDER — BUDESONIDE AND FORMOTEROL FUMARATE DIHYDRATE 160; 4.5 UG/1; UG/1
2 AEROSOL RESPIRATORY (INHALATION) 2 TIMES DAILY
Status: DISCONTINUED | OUTPATIENT
Start: 2021-08-28 | End: 2021-08-31 | Stop reason: HOSPADM

## 2021-08-28 RX ORDER — ASPIRIN 81 MG/1
81 TABLET ORAL ONCE
Status: COMPLETED | OUTPATIENT
Start: 2021-08-28 | End: 2021-08-28

## 2021-08-28 RX ORDER — FAMOTIDINE 20 MG/1
20 TABLET, FILM COATED ORAL 2 TIMES DAILY PRN
Status: DISCONTINUED | OUTPATIENT
Start: 2021-08-28 | End: 2021-08-31 | Stop reason: HOSPADM

## 2021-08-28 RX ORDER — FLUTICASONE PROPIONATE 50 MCG
2 SPRAY, SUSPENSION (ML) NASAL DAILY
Status: DISCONTINUED | OUTPATIENT
Start: 2021-08-28 | End: 2021-08-31 | Stop reason: HOSPADM

## 2021-08-28 RX ORDER — CHLORTHALIDONE 25 MG/1
25 TABLET ORAL DAILY
Status: DISCONTINUED | OUTPATIENT
Start: 2021-08-28 | End: 2021-08-28

## 2021-08-28 RX ORDER — LEVOTHYROXINE SODIUM 0.1 MG/1
100 TABLET ORAL DAILY
Status: DISCONTINUED | OUTPATIENT
Start: 2021-08-28 | End: 2021-08-31 | Stop reason: HOSPADM

## 2021-08-28 RX ORDER — ACETAMINOPHEN 650 MG/1
650 SUPPOSITORY RECTAL EVERY 6 HOURS PRN
Status: DISCONTINUED | OUTPATIENT
Start: 2021-08-28 | End: 2021-08-31 | Stop reason: HOSPADM

## 2021-08-28 RX ORDER — ALBUTEROL SULFATE 90 UG/1
1 AEROSOL, METERED RESPIRATORY (INHALATION) EVERY 6 HOURS PRN
Status: DISCONTINUED | OUTPATIENT
Start: 2021-08-28 | End: 2021-08-31 | Stop reason: HOSPADM

## 2021-08-28 RX ORDER — SODIUM CHLORIDE 0.9 % (FLUSH) 0.9 %
5-40 SYRINGE (ML) INJECTION PRN
Status: DISCONTINUED | OUTPATIENT
Start: 2021-08-28 | End: 2021-08-31 | Stop reason: HOSPADM

## 2021-08-28 RX ORDER — 0.9 % SODIUM CHLORIDE 0.9 %
1000 INTRAVENOUS SOLUTION INTRAVENOUS ONCE
Status: COMPLETED | OUTPATIENT
Start: 2021-08-28 | End: 2021-08-28

## 2021-08-28 RX ORDER — CARVEDILOL 12.5 MG/1
12.5 TABLET ORAL 2 TIMES DAILY WITH MEALS
Status: DISCONTINUED | OUTPATIENT
Start: 2021-08-28 | End: 2021-08-28

## 2021-08-28 RX ORDER — OXCARBAZEPINE 300 MG/1
300 TABLET, FILM COATED ORAL EVERY EVENING
Status: DISCONTINUED | OUTPATIENT
Start: 2021-08-28 | End: 2021-08-31 | Stop reason: HOSPADM

## 2021-08-28 RX ORDER — CARVEDILOL 6.25 MG/1
6.25 TABLET ORAL 2 TIMES DAILY WITH MEALS
Status: DISCONTINUED | OUTPATIENT
Start: 2021-08-28 | End: 2021-08-28

## 2021-08-28 RX ORDER — SODIUM CHLORIDE 0.9 % (FLUSH) 0.9 %
5-40 SYRINGE (ML) INJECTION EVERY 12 HOURS SCHEDULED
Status: DISCONTINUED | OUTPATIENT
Start: 2021-08-28 | End: 2021-08-31 | Stop reason: HOSPADM

## 2021-08-28 RX ORDER — OXCARBAZEPINE 300 MG/1
300 TABLET, FILM COATED ORAL 2 TIMES DAILY
Status: DISCONTINUED | OUTPATIENT
Start: 2021-08-28 | End: 2021-08-28

## 2021-08-28 RX ORDER — VENLAFAXINE HYDROCHLORIDE 75 MG/1
75 CAPSULE, EXTENDED RELEASE ORAL
Status: DISCONTINUED | OUTPATIENT
Start: 2021-08-28 | End: 2021-08-31 | Stop reason: HOSPADM

## 2021-08-28 RX ORDER — CLOMIPRAMINE HYDROCHLORIDE 25 MG/1
50 CAPSULE ORAL NIGHTLY
Status: DISCONTINUED | OUTPATIENT
Start: 2021-08-28 | End: 2021-08-28

## 2021-08-28 RX ORDER — METOPROLOL TARTRATE 5 MG/5ML
5 INJECTION INTRAVENOUS EVERY 4 HOURS PRN
Status: DISCONTINUED | OUTPATIENT
Start: 2021-08-28 | End: 2021-08-31 | Stop reason: HOSPADM

## 2021-08-28 RX ORDER — CETIRIZINE HYDROCHLORIDE 10 MG/1
10 TABLET ORAL DAILY
Status: DISCONTINUED | OUTPATIENT
Start: 2021-08-28 | End: 2021-08-28

## 2021-08-28 RX ORDER — CLONIDINE HYDROCHLORIDE 0.1 MG/1
0.1 TABLET ORAL NIGHTLY
Status: DISCONTINUED | OUTPATIENT
Start: 2021-08-28 | End: 2021-08-28

## 2021-08-28 RX ORDER — SODIUM CHLORIDE 9 MG/ML
INJECTION, SOLUTION INTRAVENOUS CONTINUOUS
Status: DISCONTINUED | OUTPATIENT
Start: 2021-08-28 | End: 2021-08-28

## 2021-08-28 RX ORDER — ONDANSETRON 2 MG/ML
4 INJECTION INTRAMUSCULAR; INTRAVENOUS EVERY 6 HOURS PRN
Status: DISCONTINUED | OUTPATIENT
Start: 2021-08-28 | End: 2021-08-31 | Stop reason: HOSPADM

## 2021-08-28 RX ORDER — SODIUM CHLORIDE 9 MG/ML
25 INJECTION, SOLUTION INTRAVENOUS PRN
Status: DISCONTINUED | OUTPATIENT
Start: 2021-08-28 | End: 2021-08-31 | Stop reason: HOSPADM

## 2021-08-28 RX ORDER — DIVALPROEX SODIUM 500 MG/1
1000 TABLET, EXTENDED RELEASE ORAL EVERY EVENING
Status: DISCONTINUED | OUTPATIENT
Start: 2021-08-28 | End: 2021-08-31 | Stop reason: HOSPADM

## 2021-08-28 RX ORDER — NAPROXEN 250 MG/1
500 TABLET ORAL 2 TIMES DAILY WITH MEALS
Status: DISCONTINUED | OUTPATIENT
Start: 2021-08-28 | End: 2021-08-28

## 2021-08-28 RX ORDER — LORAZEPAM 2 MG/ML
1 INJECTION INTRAMUSCULAR
Status: COMPLETED | OUTPATIENT
Start: 2021-08-28 | End: 2021-08-28

## 2021-08-28 RX ORDER — METOPROLOL TARTRATE 5 MG/5ML
5 INJECTION INTRAVENOUS ONCE
Status: COMPLETED | OUTPATIENT
Start: 2021-08-28 | End: 2021-08-28

## 2021-08-28 RX ORDER — ONDANSETRON 4 MG/1
4 TABLET, ORALLY DISINTEGRATING ORAL EVERY 8 HOURS PRN
Status: DISCONTINUED | OUTPATIENT
Start: 2021-08-28 | End: 2021-08-31 | Stop reason: HOSPADM

## 2021-08-28 RX ORDER — CARVEDILOL 3.12 MG/1
3.12 TABLET ORAL 2 TIMES DAILY WITH MEALS
Status: DISCONTINUED | OUTPATIENT
Start: 2021-08-28 | End: 2021-08-30

## 2021-08-28 RX ORDER — DIVALPROEX SODIUM 500 MG/1
500 TABLET, EXTENDED RELEASE ORAL DAILY
Status: DISCONTINUED | OUTPATIENT
Start: 2021-08-28 | End: 2021-08-31 | Stop reason: HOSPADM

## 2021-08-28 RX ORDER — ACETAMINOPHEN 325 MG/1
650 TABLET ORAL EVERY 6 HOURS PRN
Status: DISCONTINUED | OUTPATIENT
Start: 2021-08-28 | End: 2021-08-31 | Stop reason: HOSPADM

## 2021-08-28 RX ORDER — LISINOPRIL 20 MG/1
20 TABLET ORAL DAILY
Status: DISCONTINUED | OUTPATIENT
Start: 2021-08-28 | End: 2021-08-31

## 2021-08-28 RX ADMIN — NICARDIPINE HYDROCHLORIDE 3 MG/HR: 0.1 INJECTION INTRAVENOUS at 00:46

## 2021-08-28 RX ADMIN — DIVALPROEX SODIUM 500 MG: 500 TABLET, EXTENDED RELEASE ORAL at 16:39

## 2021-08-28 RX ADMIN — PHENOBARBITAL SODIUM: 130 INJECTION INTRAMUSCULAR; INTRAVENOUS at 00:01

## 2021-08-28 RX ADMIN — CARVEDILOL 3.12 MG: 3.12 TABLET, FILM COATED ORAL at 16:38

## 2021-08-28 RX ADMIN — LEVOTHYROXINE SODIUM 100 MCG: 100 TABLET ORAL at 08:44

## 2021-08-28 RX ADMIN — CETIRIZINE HYDROCHLORIDE 10 MG: 10 TABLET ORAL at 08:44

## 2021-08-28 RX ADMIN — DIVALPROEX SODIUM 1000 MG: 500 TABLET, EXTENDED RELEASE ORAL at 17:03

## 2021-08-28 RX ADMIN — SODIUM CHLORIDE, PRESERVATIVE FREE 10 ML: 5 INJECTION INTRAVENOUS at 20:18

## 2021-08-28 RX ADMIN — METOPROLOL TARTRATE 5 MG: 1 INJECTION, SOLUTION INTRAVENOUS at 04:22

## 2021-08-28 RX ADMIN — SODIUM CHLORIDE, PRESERVATIVE FREE 10 ML: 5 INJECTION INTRAVENOUS at 08:44

## 2021-08-28 RX ADMIN — FLUTICASONE PROPIONATE 2 SPRAY: 50 SPRAY, METERED NASAL at 08:45

## 2021-08-28 RX ADMIN — ENOXAPARIN SODIUM 30 MG: 30 INJECTION SUBCUTANEOUS at 08:44

## 2021-08-28 RX ADMIN — SODIUM CHLORIDE: 9 INJECTION, SOLUTION INTRAVENOUS at 04:28

## 2021-08-28 RX ADMIN — BUDESONIDE AND FORMOTEROL FUMARATE DIHYDRATE 2 PUFF: 160; 4.5 AEROSOL RESPIRATORY (INHALATION) at 07:51

## 2021-08-28 RX ADMIN — METOPROLOL TARTRATE 5 MG: 1 INJECTION, SOLUTION INTRAVENOUS at 05:20

## 2021-08-28 RX ADMIN — Medication 81 MG: at 08:43

## 2021-08-28 RX ADMIN — SODIUM CHLORIDE 1000 ML: 9 INJECTION, SOLUTION INTRAVENOUS at 00:18

## 2021-08-28 RX ADMIN — VALPROATE SODIUM 500 MG: 100 INJECTION, SOLUTION INTRAVENOUS at 17:08

## 2021-08-28 RX ADMIN — OXCARBAZEPINE 300 MG: 300 TABLET, FILM COATED ORAL at 17:03

## 2021-08-28 RX ADMIN — CEFTRIAXONE SODIUM 1000 MG: 1 INJECTION, POWDER, FOR SOLUTION INTRAMUSCULAR; INTRAVENOUS at 00:14

## 2021-08-28 RX ADMIN — NICARDIPINE HYDROCHLORIDE 5 MG/HR: 0.1 INJECTION INTRAVENOUS at 07:05

## 2021-08-28 RX ADMIN — LISINOPRIL 20 MG: 20 TABLET ORAL at 08:44

## 2021-08-28 RX ADMIN — LORAZEPAM 1 MG: 2 INJECTION INTRAMUSCULAR; INTRAVENOUS at 12:09

## 2021-08-28 ASSESSMENT — PAIN SCALES - GENERAL
PAINLEVEL_OUTOF10: 0
PAINLEVEL_OUTOF10: 0

## 2021-08-28 NOTE — ED NOTES
Multiple attempts at IV access unsuccessful. Central line to be placed by Dr. Aleta Jeong.      Nas Maguire RN  08/27/21 [id number]

## 2021-08-28 NOTE — ED TRIAGE NOTES
Patient transferred from [facility] via [facility] mobile stroke for C/O AMS. Patient is on 2 L of NC and displays AOx 1 (only by name), responds to pain and agitation. Patient is afebrile and has a right AFO. LKW is 1600.

## 2021-08-28 NOTE — CONSULTS
Department of Neurosurgery                                                             Consult Note      Reason for Consult:  ICH     Neurosurgeon: Dr. Jt Cruz   [] Dr. Mis Giles  [] Dr. Zhang Maguire  [] Dr. Jules Hill  [] Dr. Zena Blank  [] Dr. Rip Cartwright  [] Dr. Pretty Espinal      History Obtained From:  patient    CHIEF COMPLAINT:         ICH     HISTORY OF PRESENT ILLNESS:       The patient is a 60 y.o. female who presents with altered mental status. Patient has past medical history of CHF, COPD, manic depression, seizure disorder, substance abuse. Last known well 1400 on 8/27. EMS was called to patient's house, and upon their arrival they found patient to be in respiratory distress with oxygen desaturation. Patient was put on BiPAP by EMS with some improvement. Patient also received 1 dose of Narcan with minimal response. Patient was taken to [facility] where she was found to have SEDA, urinalysis concerning for UTI, elevated hemoglobin hematocrit, elevated anion gap and elevated CO2. At that time on exam patient was AAO x1 responding to pain. CT head done at that time was concerning for left putamen hemorrhage versus calcification on CT head. Last CT head done in July 2020 did not reveal such hyperdensities. Patient was transferred to [facility] for further evaluation of possible intracranial hemorrhage and neurosurgery consult. She was also seen by mobile stroke unit the patient was able to start following some commands, but confused patient was noted to have minimal to no movement of left arm and leg. On exam at [facility] ER, patient is alert and awake, oriented only to person. Not answering questions appropriately but laughing. States that she is in pain while central line being placed by EM. Patient able to move all 4 extremities, some left-sided weakness greater than right.      PAST MEDICAL HISTORY :       Past Medical History:        Diagnosis Date    Arthritis     Asthma     Attention deficit hyperactivity disorder (ADHD), combined type     Borderline diabetes     Border[facility] personality disorder [facility] [address]     CHF (congestive heart failure) (HCC)     COPD (chronic obstructive pulmonary disease) (HCC)     Fibromyalgia     Headache     Hypertension     Manic depression (HCC)     Movement disorder     Neck fracture (HCC)     Pernicious anemia     Seizure ([facility] [address]     Thyroid disease        Past Surgical History:        Procedure Laterality Date    EXPLORATION OF WOUND OF EXTREMITY Right 5/19/2019    RIGHT THIGH WOUND WASHOUT WITH [facility] WEST-ER PLACEMENT performed by Dorsie Paget, MD at [address] N/A 5/22/2019    RIGHT WOUND HIP EXAMINATION LAVAGE AND WOUND VAC PLACEMENT performed by Luisa Machado MD at [address] [place] [address] Right 5/17/2019    IRRIGATION, DEBRIDEMENT RIGHT THIGH performed by Enriqueta Miller MD at [place] [address] Bilateral     LYMPH NODE DISSECTION      cervical    PARTIAL HYSTERECTOMY         Social History:   Social History     Socioeconomic History    Marital status:      Spouse name: Not on file    Number of children: Not on file    Years of education: Not on file    Highest education level: Not on file   Occupational History    Not on file   Tobacco Use    Smoking status: Current Every Day Smoker     Packs/day: 0.50     Years: 12.00     Pack years: 6.00    Smokeless tobacco: Never Used   Vaping Use    Vaping Use: Never used   Substance and Sexual Activity    Alcohol use: No    Drug use: Not Currently     Comment: Has not use Heroin since 2/2020    Sexual activity: Not Currently   Other Topics Concern    Not on file   Social History Narrative    Not on file     Social Determinants of Health     Financial Resource Strain: Low Risk     Difficulty of Paying Living Expenses: Not hard at all   Food Insecurity: No Food Insecurity    Worried About Running Out of Food in the Last Year: Never true    Ran Out of Food in the Last Year: Never true   Transportation Needs: No Transportation Needs    Lack of Transportation (Medical): No    Lack of Transportation (Non-Medical): No   Physical Activity:     Days of Exercise per Week:     Minutes of Exercise per Session:    Stress:     Feeling of Stress :    Social Connections:     Frequency of Communication with Friends and Family:     Frequency of Social Gatherings with Friends and Family:     Attends Holiness Services:     Active Member of Clubs or Organizations:     Attends Club or Organization Meetings:     Marital Status:    Intimate Partner Violence:     Fear of Current or Ex-Partner:     Emotionally Abused:     Physically Abused:     Sexually Abused:        Family History:   No family history on file. Allergies:  Imitrex [sumatriptan], Bee pollen, Bee venom, Bromide ion [bromine], Reglan [metoclopramide], Sulfa antibiotics, Sulfadiazine, and Tramadol    Home Medications:  Prior to Admission medications    Medication Sig Start Date End Date Taking? Authorizing Provider   buprenorphine-naloxone (SUBOXONE) 8-2 MG FILM SL film DISSOLVE ONE FILM UNDER TONGUE TWICE DAILY FOR 28 DAYS 7/7/21   Historical Provider, MD   divalproex (DEPAKOTE) 500 MG DR tablet  7/22/21   Historical Provider, MD   OXcarbazepine (TRILEPTAL) 300 MG tablet  7/30/21   Historical Provider, MD   venlafaxine (EFFEXOR XR) 75 MG extended release capsule  8/3/21   Historical Provider, MD   risperiDONE (RISPERDAL) 2 MG tablet  7/30/21   Historical Provider, MD   risperiDONE (RISPERDAL) 4 MG tablet  7/30/21   Historical Provider, MD   ciclopirox (LOPROX) 0.77 % cream Apply topically 2 times daily to bottom of feet and in-between toes.  8/18/21   Ness Manzanares DPM   ACETAMINOPHEN EXTRA STRENGTH 500 MG tablet TAKE 1 TO 2 TAB BY MOUTH EVERY 8 HOURS AS NEEDED FOR PAIN  8/4/21   Chick Grapes, APRN - CNP   albuterol sulfate  (90 Base) MCG/ACT inhaler Inhale 1 puff into the lungs every 6 hours as needed for Wheezing Gap prescription until follow up with primary. Do not refill. Follow up with primary 7/23/21   KEANU Christy CNP   budesonide-formoterol [facility]) 160-4.5 MCG/ACT AERO Inhale 2 puffs into the lungs 2 times daily 6/3/21   Yamilet Soto DO   ipratropium-albuterol (DUONEB) 0.5-2.5 (3) MG/3ML SOLN nebulizer solution Inhale 3 mLs into the lungs every 4 hours 6/3/21   Yamilet Soto DO   Respiratory Therapy Supplies (NEBULIZER/TUBING/MOUTHPIECE) KIT Daily as needed 6/3/21   Yamilet Soto DO   ibuprofen (ADVIL;MOTRIN) 600 MG tablet Take 1 tablet by mouth every 6 hours as needed for Pain 5/29/21 6/5/21  Adolfo Valerio DO   gabapentin (NEURONTIN) 300 MG capsule TAKE 2 CAPSULES BY MOUTH 3 TIMES DAILY FOR 30 DAYS. 5/24/21 6/23/21  KEANU Christy CNP   lisinopril (PRINIVIL;ZESTRIL) 20 MG tablet TAKE 1 TABLET BY MOUTH DAILY  5/24/21 6/23/21  KEANU Christy CNP   enoxaparin (LOVENOX) 40 MG/0.4ML injection Inject 0.4 mLs into the skin daily 5/18/21   Lino Rivera MD   enoxaparin (LOVENOX) 40 MG/0.4ML injection Inject 0.4 mLs into the skin daily 5/18/21   Lino Rivera MD   naproxen (NAPROSYN) 500 MG tablet Take 1 tablet by mouth 2 times daily (with meals) 5/16/21   Ursula Toledo MD   butalbital-aspirin-caffeine [facility]) -40 MG per capsule Take 1 capsule by mouth every 4 hours as needed for Headaches for up to 30 days.  5/14/21 6/13/21  KEANU Christy CNP   fluticasone [facility]) 50 MCG/ACT nasal spray 2 sprays by Each Nostril route daily 5/14/21   KEANU Christy CNP   ondansetron [facility]) 4 MG tablet Take 1 tablet by mouth every 8 hours as needed for Nausea or Vomiting 5/8/21   Owen Markham MD   ondansetron (ZOFRAN ODT) 4 MG disintegrating tablet Take 1 tablet by mouth every 8 hours as needed for Nausea 4/28/21   Mirella Dunlap DO   ASPIRIN ADULT LOW STRENGTH 81 MG EC tablet TAKE 1 TABLET BY MOUTH ONCE DAILY  4/5/21   KEANU Bernard CNP   cetirizine (ZYRTEC) 10 MG tablet Take 1 tablet by mouth daily 3/11/21   KEANU Bernard CNP   levothyroxine (SYNTHROID) 100 MCG tablet Take 1 tablet by mouth Daily 2/19/21   KEANU Bernard CNP   ipratropium-albuterol (DUONEB) 0.5-2.5 (3) MG/3ML SOLN nebulizer solution Inhale 3 mLs into the lungs every 6 hours as needed for Shortness of Breath 2/11/21   KEANU Bernard CNP   cyclobenzaprine (FLEXERIL) 5 MG tablet TAKE 1 TABLET BY MOUTH 2 TIMES DAILY AS NEEDED FOR MUSCLE SPASMS (DON'T TAKE W/ ZANAFLEX) 1/19/21   KEANU Bernard CNP   ammonium lactate (LAC-HYDRIN) 12 % lotion Apply topically daily. 1/6/21   Joe Handy DPM   Ciclopirox (LOPROX) 0.77 % gel Apply to skin twice a day 1/6/21   Joe Handy DPM   hydrOXYzine (ATARAX) 50 MG tablet  12/14/20   Historical Provider, MD   Nebulizers (NEBULIZER COMPRESSOR) MISC Daily as needed 12/18/20   KEANU Bernard CNP   Blood Pressure KIT Daily as needed 12/18/20   KEANU Bernard CNP   Misc.  Devices (CANE) MISC Quad base cane 11/28/20   Herminia Guardado MD   famotidine (PEPCID) 20 MG tablet Take 1 tablet by mouth 2 times daily as needed (for acid reflux) 10/26/20   KEANU Bernard CNP   clomiPRAMINE (ANAFRANIL) 50 MG capsule Take 1 capsule by mouth nightly 10/26/20   KEANU Bernard CNP   traZODone (DESYREL) 150 MG tablet Take 1 tablet by mouth nightly 10/22/20   KEANU Bernard CNP   cloNIDine (CATAPRES) 0.1 MG tablet Take 1 tablet by mouth nightly 10/22/20   KEANU Bernard CNP   Benzocaine-Menthol (CEPACOL) 6-10 MG LOZG lozenge Take 1 lozenge by mouth every 2 hours as needed for Sore Throat 10/22/20   KEANU Bernard CNP   tiZANidine (ZANAFLEX) 2 MG tablet Take 2 tablets by mouth every 8 hours as needed (muscle spasm, neck pain) 10/9/20   KEANU Bernard CNP   Elastic Bandages & Supports (CARPAL TUNNEL WRIST STABILIZER) MISC Apply to each wrist at bedtime 10/9/20   KEANU Caballero CNP   methyl salicylate-menthol (PAULETTE BRAY GREASELESS) 10-15 % CREA Apply topically 3 times daily as needed for Pain 9/21/20   Janet Bowers MD   albuterol (PROVENTIL) (2.5 MG/3ML) 0.083% nebulizer solution Take 3 mLs by nebulization every 6 hours as needed for Wheezing 6/18/20   KEANU Caballero CNP       Current Medications:   Current Facility-Administered Medications: albuterol sulfate  (90 Base) MCG/ACT inhaler 1 puff, 1 puff, Inhalation, Q6H PRN  aspirin EC tablet 81 mg, 81 mg, Oral, Once  budesonide-formoterol (SYMBICORT) 160-4.5 MCG/ACT inhaler 2 puff, 2 puff, Inhalation, BID  cetirizine (ZYRTEC) tablet 10 mg, 10 mg, Oral, Daily  clomiPRAMINE (ANAFRANIL) capsule 50 mg, 50 mg, Oral, Nightly  cloNIDine (CATAPRES) tablet 0.1 mg, 0.1 mg, Oral, Nightly  famotidine (PEPCID) tablet 20 mg, 20 mg, Oral, BID PRN  fluticasone (FLONASE) 50 MCG/ACT nasal spray 2 spray, 2 spray, Each Nostril, Daily  levothyroxine (SYNTHROID) tablet 100 mcg, 100 mcg, Oral, Daily  lisinopril (PRINIVIL;ZESTRIL) tablet 20 mg, 20 mg, Oral, Daily  naproxen (NAPROSYN) tablet 500 mg, 500 mg, Oral, BID WC  venlafaxine (EFFEXOR XR) extended release capsule 75 mg, 75 mg, Oral, Daily with breakfast  0.9 % sodium chloride infusion, , IntraVENous, Continuous  sodium chloride flush 0.9 % injection 5-40 mL, 5-40 mL, IntraVENous, 2 times per day  sodium chloride flush 0.9 % injection 5-40 mL, 5-40 mL, IntraVENous, PRN  0.9 % sodium chloride infusion, 25 mL, IntraVENous, PRN  enoxaparin (LOVENOX) injection 30 mg, 30 mg, Subcutaneous, Daily  ondansetron (ZOFRAN-ODT) disintegrating tablet 4 mg, 4 mg, Oral, Q8H PRN **OR** ondansetron (ZOFRAN) injection 4 mg, 4 mg, IntraVENous, Q6H PRN  acetaminophen (TYLENOL) tablet 650 mg, 650 mg, Oral, Q6H PRN **OR** acetaminophen (TYLENOL) suppository 650 mg, 650 mg, Rectal, Q6H PRN  [START ON 8/29/2021] cefTRIAXone (ROCEPHIN) 1000 mg IVPB in 50 mL D5W minibag, 1,000 mg, IntraVENous, Q24H  metoprolol (LOPRESSOR) injection 5 mg, 5 mg, IntraVENous, Q4H PRN  niCARdipine (CARDENE) 20 mg in 0.9 % sodium chloride 200 mL solution, 3-15 mg/hr, IntraVENous, Continuous    REVIEW OF SYSTEMS:     Unable to provide due to metabolic encephalopathy  PHYSICAL EXAM:       /67   Pulse 67   Temp 98.2 °F (36.8 °C)   Resp 21   Wt 182 lb 15.7 oz (83 kg)   SpO2 96%   BMI 33.47 kg/m²       CONSTITUTIONAL: no apparent distress, appears stated age   HEAD: normocephalic, atraumatic   ENT: moist mucous membranes, uvula midline   NECK: supple, symmetric, no midline tenderness to palpation   BACK: without midline tenderness, step-offs or deformities   LUNGS: clear to auscultation bilaterally   CARDIOVASCULAR: regular rate and rhythm   ABDOMEN: Soft, non-tender, non-distended with normal active bowel sounds   NEUROLOGIC:  EYE OPENING     Spontaneous - 4 []       To voice - 3 []       To pain - 2 [x]       None - 1 []    VERBAL RESPONSE     Appropriate, oriented - 5 []       Dazed or confused - 4 [x]       Syllables, expletives - 3 []       Grunts - 2 []       None - 1 []    MOTOR RESPONSE     Spontaneous, command - 6 [x]       Localizes pain - 5 []       Withdraws pain - 4 []       Abnormal flexion - 3 []       Abnormal extension - 2 []       None - 1 []            Total GCS: 12    Mental Status:  AAOx1               Cranial Nerves:    Unable to assess, patient not cooperative with exam   Motor Exam:    Drift:  present - right arm   Tone:  normal      Motor exam:   LUE 4/5   RUE: 4/5   LLE: 4/5   RLE: 2/5     Sensory:    Right Upper Extremity:  normal  Left Upper Extremity:  normal  Right Lower Extremity:  normal  Left Lower Extremity:  normal    Deep Tendon Reflexes:    Right Bicep:  2+  Left Bicep:  2+  Right Knee:  2+  Left Knee:  2+    Plantar Response:    Right:  downgoing  Left:  downgoing    Clonus:  absent  Mcdonough's:  absent    Gait: Congestive heart failure of unknown etiology (HCC)    Lumbar radiculopathy    Chronic low back pain    Piriformis syndrome    COPD (chronic obstructive pulmonary disease) (HCC)    Major depressive disorder, single episode, unspecified    Severe depressed bipolar I disorder without psychotic features ([facility] [address]    Polysubstance abuse ([facility] [address]    Syncope and collapse    Altered mental state         A/P:  This is a 60 y.o. female with new onset AMS in the setting of COPD exacerbation, UTI. CTH negative for ICH. Patient care will be discussed with attending, will reevaluate patient along with attending.   - CTH: left putamen hyper density most likely calcification  - MRI/MRA H&N per neurology    - No neurosurgical interventions planned for now   - Recommend workup for medical encephalopathy   - HOB: 30 degrees   - Neuro checks per protocol  - Ok to begin prophylactic anticoagulation from neurosurgery stand point. However, we recommend careful evaluation of all other risk factors associated with anticoagulation therapy as applied to this patient's medical condition  - We recommend SBP < 140   - Determine the lower limit of SBP clinically based on mentation      Additional recommendations may follow    Please contact neurosurgery with any changes in patients neurologic status. Thank you for your consult.        Loyda Dominguez MD   NS pager [phone]  8/28/2021  4:24 AM

## 2021-08-28 NOTE — ED NOTES
Bed: 24  Expected date:   Expected time:   Means of arrival:   Comments:  101 JAZLYN Osorio RN  08/27/21 [id number]

## 2021-08-28 NOTE — PLAN OF CARE
Problem: Skin Integrity:  Goal: Will show no infection signs and symptoms  Description: Will show no infection signs and symptoms  8/28/2021 1735 by Juan Manuel Rouse RN  Outcome: Ongoing  8/28/2021 0503 by Pauly Cruz RN  Outcome: Ongoing  Goal: Absence of new skin breakdown  Description: Absence of new skin breakdown  8/28/2021 1735 by Juan Manuel Rouse RN  Outcome: Ongoing  8/28/2021 0503 by Pauly Cruz RN  Outcome: Ongoing     Problem: Falls - Risk of:  Goal: Will remain free from falls  Description: Will remain free from falls  Outcome: Ongoing  Goal: Absence of physical injury  Description: Absence of physical injury  Outcome: Ongoing

## 2021-08-28 NOTE — CONSULTS
Department of Endovascular Neurosurgery                                                                                                                      Resident Consult Note  Stroke Alert paged @  11:50 PM  ER Room # 24   Arrival to patient bedside @ 11:55 PM        Reason for Consult:  Right side weakness   Requesting Physician:   ER       History Obtained From:   ER, EMR     CHIEF COMPLAINT:        AMS     HISTORY OF PRESENT ILLNESS:       The patient is a 60 y.o. female   transfer from [facility] for possible bleed on CAT scan evaluation. Last know well: 4 pm 8/26/2021    History obtained from chart review, ER     58-year-old female with past medical history of ADHD, CHF, COPD, maniac depression, seizure disorder, substance abuse, intentional overdose on barbiturate was initially brought in by EMS for shortness of breath. EMS on arrival placed patient on BiPAP received 1 dose of Narcan. With minimal response. Patient was weaned off BiPAP. Patient was admitted to medical ICU and [facility] facility. Patient received head CT: Concerning for hypodensity in the right putamen area versus calcification, decision was made to transfer the patient to [facility] for neurosurgical evaluation. On presentation:  BP:192/132  BSL: 140  CT: Concerning for bleed in the Rt alvaro  No tpa given for above reason   Cannot get CT head and neck because of elevated creatinine. Prior to arrival patient was on  Antiplatelets/anticoagulants: ASA 81   Statins:      Smoking history: Not available      Patient was loaded with phenobarbital.  Patient received Rocephin for UTI  Patient was started on Cardene for blood pressure control in the view of bleed on the CAT scan.        PAST MEDICAL HISTORY :       Past Medical History:        Diagnosis Date    Arthritis     Asthma     Attention deficit hyperactivity disorder (ADHD), combined type     Borderline diabetes     Borderline personality disorder ([facility]     CHF (congestive heart failure) (HCC)     COPD (chronic obstructive pulmonary disease) (HCC)     Fibromyalgia     Headache     Hypertension     Manic depression (HCC)     Movement disorder     Neck fracture (HCC)     Pernicious anemia     Seizure ([facility]     Thyroid disease        Past Surgical History:        Procedure Laterality Date    EXPLORATION OF WOUND OF EXTREMITY Right 5/19/2019    RIGHT THIGH WOUND WASHOUT WITH [facility] WE[facility]-ER PLACEMENT performed by Gisela Cho MD at [address] N/A 5/22/2019    RIGHT WOUND HIP EXAMINATION LAVAGE AND WOUND VAC PLACEMENT performed by Cosme Fagan MD at [address] [place] [address] Right 5/17/2019    IRRIGATION, DEBRIDEMENT RIGHT THIGH performed by Padma Javier MD at [place] [address] Bilateral     LYMPH NODE DISSECTION      cervical    PARTIAL HYSTERECTOMY         Social History:   Social History     Socioeconomic History    Marital status:      Spouse name: Not on file    Number of children: Not on file    Years of education: Not on file    Highest education level: Not on file   Occupational History    Not on file   Tobacco Use    Smoking status: Current Every Day Smoker     Packs/day: 0.50     Years: 12.00     Pack years: 6.00    Smokeless tobacco: Never Used   Vaping Use    Vaping Use: Never used   Substance and Sexual Activity    Alcohol use: No    Drug use: Not Currently     Comment: Has not use Heroin since 2/2020    Sexual activity: Not Currently   Other Topics Concern    Not on file   Social History Narrative    Not on file     Social Determinants of Health     Financial Resource Strain: Low Risk     Difficulty of Paying Living Expenses: Not hard at all   Food Insecurity: No Food Insecurity    Worried About Running Out of Food in the Last Year: Never true    Michael of [employer] in the Last Year: Never true   Transportation Needs: No Transportation Needs    Lack of Transportation (Medical): No    Lack of Transportation (Non-Medical): No   Physical Activity:     Days of Exercise per Week:     Minutes of Exercise per Session:    Stress:     Feeling of Stress :    Social Connections:     Frequency of Communication with Friends and Family:     Frequency of Social Gatherings with Friends and Family:     Attends Congregational Services:     Active Member of Clubs or Organizations:     Attends Club or Organization Meetings:     Marital Status:    Intimate Partner Violence:     Fear of Current or Ex-Partner:     Emotionally Abused:     Physically Abused:     Sexually Abused:        Family History:   No family history on file. Allergies:  Imitrex [sumatriptan], Bee pollen, Bee venom, Bromide ion [bromine], Reglan [metoclopramide], Sulfa antibiotics, Sulfadiazine, and Tramadol    Home Medications:  Prior to Admission medications    Medication Sig Start Date End Date Taking? Authorizing Provider   buprenorphine-naloxone (SUBOXONE) 8-2 MG FILM SL film DISSOLVE ONE FILM UNDER TONGUE TWICE DAILY FOR 28 DAYS 7/7/21   Historical Provider, MD   divalproex (DEPAKOTE) 500 MG DR tablet  7/22/21   Historical Provider, MD   OXcarbazepine (TRILEPTAL) 300 MG tablet  7/30/21   Historical Provider, MD   venlafaxine (EFFEXOR XR) 75 MG extended release capsule  8/3/21   Historical Provider, MD   risperiDONE (RISPERDAL) 2 MG tablet  7/30/21   Historical Provider, MD   risperiDONE (RISPERDAL) 4 MG tablet  7/30/21   Historical Provider, MD   ciclopirox (LOPROX) 0.77 % cream Apply topically 2 times daily to bottom of feet and in-between toes.  8/18/21   Maryellen Heaton DPM   ACETAMINOPHEN EXTRA STRENGTH 500 MG tablet TAKE 1 TO 2 TAB BY MOUTH EVERY 8 HOURS AS NEEDED FOR PAIN  8/4/21   Katharina Chand, APRN - CNP   albuterol sulfate  (90 Base) MCG/ACT inhaler Inhale 1 puff into the lungs every 6 hours as needed for [place] prescription until follow up with primary. Do not refill. Follow up with primary 7/23/21   [facility]   budesonide-formoterol [facility]) 160-4.5 MCG/ACT AERO Inhale 2 puffs into the lungs 2 times daily 6/3/21   Jenny Hunter DO   ipratropium-albuterol (DUONEB) 0.5-2.5 (3) MG/3ML SOLN nebulizer solution Inhale 3 mLs into the lungs every 4 hours 6/3/21   Jenny Hunter,    Respiratory Therapy Supplies (NEBULIZER/TUBING/MOUTHPIECE) KIT Daily as needed 6/3/21   Jenny Hunter DO   ibuprofen (ADVIL;MOTRIN) 600 MG tablet Take 1 tablet by mouth every 6 hours as needed for Pain 5/29/21 6/5/21  Radha Valerio DO   gabapentin (NEURONTIN) 300 MG capsule TAKE 2 CAPSULES BY MOUTH 3 TIMES DAILY FOR 30 DAYS. 5/24/21 6/23/21  Madalynn Area, APRN - CNP   lisinopril (PRINIVIL;ZESTRIL) 20 MG tablet TAKE 1 TABLET BY MOUTH DAILY  5/24/21 6/23/21  Madalynn Area, APRN - CNP   enoxaparin (LOVENOX) 40 MG/0.4ML injection Inject 0.4 mLs into the skin daily 5/18/21   Trice Gupta MD   enoxaparin (LOVENOX) 40 MG/0.4ML injection Inject 0.4 mLs into the skin daily 5/18/21   Trice Gupta MD   naproxen (NAPROSYN) 500 MG tablet Take 1 tablet by mouth 2 times daily (with meals) 5/16/21   Alyssa Viveros MD   butalbital-aspirin-caffeine [facility]) -40 MG per capsule Take 1 capsule by mouth every 4 hours as needed for Headaches for up to 30 days.  5/14/21 6/13/21  Madalynn AreaKEANU CNP   fluticasone [facility]) 50 MCG/ACT nasal spray 2 sprays by Each Nostril route daily 5/14/21   Madalynn Area, APRN - CNP   ondansetron [facility]) 4 MG tablet Take 1 tablet by mouth every 8 hours as needed for Nausea or Vomiting 5/8/21   Owen Markham MD   ondansetron (ZOFRAN ODT) 4 MG disintegrating tablet Take 1 tablet by mouth every 8 hours as needed for Nausea 4/28/21   Lacindmerna Dunlap DO   ASPIRIN ADULT LOW STRENGTH 81 MG EC tablet TAKE 1 TABLET BY MOUTH ONCE DAILY  4/5/21   Chaya Nicole bedtime 10/9/20   Mortimer Singleton, APRN - CNP   methyl salicylate-menthol (PAULETTE BRAY GREASELESS) 10-15 % CREA Apply topically 3 times daily as needed for Pain 9/21/20   Abilio Jiménez MD   albuterol (PROVENTIL) (2.5 MG/3ML) 0.083% nebulizer solution Take 3 mLs by nebulization every 6 hours as needed for Wheezing 6/18/20   Mortimer Singleton, APRN - CNP       Current Medications:   Current Facility-Administered Medications: cefTRIAXone (ROCEPHIN) 1000 mg IVPB in 50 mL D5W minibag, 1,000 mg, IntraVENous, Once  niCARdipine (CARDENE) 20 mg in 0.9 % sodium chloride 200 mL solution, 3-15 mg/hr, IntraVENous, Continuous  PHENobarbital (LUMINAL) 1,250 mg in sodium chloride 0.9 % 100 mL IVPB, , IntraVENous, Once    REVIEW OF SYSTEMS:        unable to obtain     PHYSICAL EXAM:       BP (!) 183/106   Pulse 98   Temp 98.2 °F (36.8 °C)   Resp 22   Wt 182 lb 15.7 oz (83 kg)   SpO2 95%   BMI 33.47 kg/m²     CONSTITUTIONAL:   Patient is drowsy, open eyes to voice, follows intermittent commands   HEAD:  normocephalic, atraumatic    EYES:  PERRLA, EOMI.   ENT:  moist mucous membranes   NECK:  supple, symmetric, no midline tenderness to palpation    BACK:  without midline tenderness, step-offs or deformities    LUNGS:  Equal air entry bilaterally   CARDIOVASCULAR:  normal s1 / s2   ABDOMEN:  Soft, no rigidity   NEUROLOGIC:      INITIAL NIH STROKE SCALE:    Time Performed:  12:00 AM     1a. Level of consciousness:  1 - not alert but arousable by minor stimulation to obey, answer or respond  1b. Level of consciousness questions:  2 - answers neither question correctly  1c. Level of consciousness questions:  0 - performs both tasks correctly  2. Best Gaze:  0 - normal  3. Visual:  0 - no visual loss  4. Facial Palsy:  0 - normal symmetric movement  5a. Motor left arm:  2 - some effort against gravity, limb cannot get to or maintain (if cued) 90 (or 45) degrees, drifts down to bed, but has some effort against gravity   5b. Motor right arm:  1 - drift, limb holds 90 (or 45) degrees but drifts down before full 10 seconds: does not hit bed  6a. Motor left le - some effort against gravity; leg falls to bed by 5 seconds but has some effort against gravity  6b. Motor right leg:  3 - no effort against gravity; leg falls to bed immediately  7. Limb Ataxia:  0 - absent  8. Sensory:  0 - normal; no sensory loss  9. Best Language:  0 - no aphasia, normal  10. Dysarthria:  1 - mild to moderate, patient slurs at least some words and at worst, can be understood with some difficulty  11. Extinction and Inattention:  0 - no abnormality    TOTAL:  12     SKIN:  no rash      Modified Panther Burn Score Scale:       Unable to get     LABS AND IMAGING:     CBC with Differential:    Lab Results   Component Value Date    WBC 17.0 2021    RBC 5.57 2021    HGB 16.9 2021    HCT 51.1 2021     2021    MCV 91.8 2021    MCH 30.4 2021    MCHC 33.1 2021    RDW 15.1 2021    LYMPHOPCT 16 2021    MONOPCT 2 2021    BASOPCT 0 2021    MONOSABS 0.34 2021    LYMPHSABS 2.72 2021    EOSABS 0.00 2021    BASOSABS 0.00 2021    DIFFTYPE NOT REPORTED 2021         BMP:    Lab Results   Component Value Date     2021    K 4.0 2021    CL 96 2021    CO2 20 2021    BUN 26 2021    LABALBU 4.7 2021    CREATININE 2.61 2021    CALCIUM 10.2 2021    GFRAA 23 2021    LABGLOM 19 2021    GLUCOSE 140 2021       Radiology Review:    1.) CT brain without contrast:       Impression   Interval development of hyperdensities in the left putamen.  While these may   represent basal ganglia calcifications the possibility of small focal left   putamen hemorrhages cannot be excluded as these are new from prior study.    Further evaluation with MRI may be beneficial       2.) CTA Head/Neck:   Cannot get due to Kidney issues     3.) Brain MRI W/O: Pending     4)  MRA head and neck: Pending           ASSESSMENT AND PLAN:       Patient Active Problem List   Diagnosis    Chest pain    Migraine variant with headache    Drug overdose, accidental or unintentional, initial encounter    Hypothyroidism    Essential hypertension    Seizure disorder ([address]    Drug overdose    History of seizure    Major depressive disorder with psychotic features ([address]    Severe major depression ([address]    Overdose of barbiturate, intentional self-harm, initial encounter ([address]    Intentional phenobarbital overdose ([address]    Severe episode of recurrent major depressive disorder, without psychotic features ([address]    Suicide attempt ([address]    Major depressive disorder, recurrent ([address]    Sepsis ([address]    Severe malnutrition ([address]    Altered mental status    Hypokalemia    Congestive heart failure of unknown etiology ([address]    Lumbar radiculopathy    Chronic low back pain    Piriformis syndrome    COPD (chronic obstructive pulmonary disease) ([address]    Major depressive disorder, single episode, unspecified    Severe depressed bipolar I disorder without psychotic features ([address]    Polysubstance abuse ([address]    Syncope and collapse       Assessment                 60 y.o. female   ADHD, seizure disorder, CHF, COPD, hypertension, manic depression, presented transfer from outlying facility. CT head showed possible right putamen hemorrhage, neurosurgery for was more for calcification versus bleeding. 1. Last Known Well (date and time):  4 PM and 8/27/2021    2. Candidate for IV tPA therapy     Yes []     No  [x] due to the following exclusion criteria:     3.  Candidate for Thrombectomy    Yes []      No [x] due to the following exclusion criteria:     - Discussed with Dr. Christi Dominguez      Recommendations:    [] General Neurology Care Status - prefer 5th floor (5A/5C)   [x] Internal Medicine General Care Status   [] NICU Status - (5B)     [] MICU Status   [] Observation Status    Please use the following admission order set for stroke admission:   [] [id number] - [place] Intercerebral Hemorrhage Admission   [] [id number] - [place] Sub Arachnoid Hemorrhage Admission   [] [id number] - [place] Ischemic Stroke TPA Treatment Focused   [] [id number] - IP Ischemic Stroke ICU Post Alteplase (TPA) Admission    [] [id number] - GEN Ischemic Stroke Non-Thrombolytic Focussed      Imaging   - CT Head  WO : done   -MRA head and neck wo : Pending   - MRI Brain WO : Pending   - ECHO with bubble stuy       Medications   -  Aspirin 325  mg daily  X 1  - Resume home AED's   - Folic acid 1mg BID  - Atorvastatin mg nightly     Labs  - Fasting Lipid panel  - HgbA1c lab      - PT, OT, Speech eval   - Telemetry   - Neuro checks per protocol  - We recommend -180  - Blood glucose goal less than 180  - Please avoid dextrose containing solutions        Additional recommendations may follow    Please contact [facility] with any changes in patients neurologic status. Thank you for your consult.        Aparna Capellan MD   PGY 3 Neurology Resident  8/28/2021 at 12:12 AM

## 2021-08-28 NOTE — ED PROVIDER NOTES
[facility]  Emergency Department Encounter  EmergencyMedicineResident     This patient was seen during the COVID-19 crisis. There were limited resources and those resources we did have had to be conserved for the sickest of patients. Pt Name: Sandie Barger  MRN: [id number]  Chip 1965  Date of evaluation: 8/27/21  PCP: Rodney Patterson, KEANU - [id number]       Chief Complaint   Patient presents with    Altered Mental Status       HISTORY OF PRESENT ILLNESS  (Location/Symptom, Timing/Onset, Context/Setting, Quality, Duration, Modifying Factors, Severity.)      Sandie Barger is a 60 y.o. female who presents to the emergency department as a transfer from [facility] for concern of intraparenchymal hemorrhage. These are new findings compared to the patient CT 1 year ago, however radiologist did read that they could potentially be a calcification. Mobile stroke unit was summoned and transfer the patient [facility]. Patient is altered at the time of examination. She has significant past medical history of Suboxone use. She is adamant that she does not use Suboxone currently. The outlying facility did show that the patient had a urinary tract infection. Patient states that she has a seizure disorder has not been taking her phenobarbital as prescribed. The remainder of the patient's history is difficult to obtain due to her altered mental status. She denies any pain at this time and any region of the body.     PAST MEDICAL / SURGICAL /SOCIAL / FAMILY HISTORY      has a past medical history of Arthritis, Asthma, Attention deficit hyperactivity disorder (ADHD), combined type, Borderline diabetes, Borderline personality disorder ([address] CHF (congestive heart failure) ([address] COPD (chronic obstructive pulmonary disease) ([address] Fibromyalgia, Headache, Hypertension, Manic depression ([address] Movement disorder, Neck fracture ([address] Pernicious anemia, Seizure ([address] and Thyroid Sexually Abused:        No family history on file. Allergies:  Imitrex [sumatriptan], Bee pollen, Bee venom, Bromide ion [bromine], Reglan [metoclopramide], Sulfa antibiotics, Sulfadiazine, and Tramadol    Home Medications:  Prior to Admission medications    Medication Sig Start Date End Date Taking? Authorizing Provider   buprenorphine-naloxone (SUBOXONE) 8-2 MG FILM SL film DISSOLVE ONE FILM UNDER TONGUE TWICE DAILY FOR 28 DAYS 7/7/21   Historical Provider, MD   divalproex (DEPAKOTE) 500 MG DR tablet  7/22/21   Historical Provider, MD   OXcarbazepine (TRILEPTAL) 300 MG tablet  7/30/21   Historical Provider, MD   venlafaxine (EFFEXOR XR) 75 MG extended release capsule  8/3/21   Historical Provider, MD   risperiDONE (RISPERDAL) 2 MG tablet  7/30/21   Historical Provider, MD   risperiDONE (RISPERDAL) 4 MG tablet  7/30/21   Historical Provider, MD   ciclopirox (LOPROX) 0.77 % cream Apply topically 2 times daily to bottom of feet and in-between toes. 8/18/21   Mary Cano DPM   ACETAMINOPHEN EXTRA STRENGTH 500 MG tablet TAKE 1 TO 2 TAB BY MOUTH EVERY 8 HOURS AS NEEDED FOR PAIN  8/4/21   Kym Cabot, APRN - CNP   albuterol sulfate  (90 Base) MCG/ACT inhaler Inhale 1 puff into the lungs every 6 hours as needed for Wheezing Gap prescription until follow up with primary. Do not refill.   Follow up with primary 7/23/21   Kym Cabot, APRN - CNP   budesonide-formoterol [facility]) 160-4.5 MCG/ACT AERO Inhale 2 puffs into the lungs 2 times daily 6/3/21   Alexsander Mehta, DO   ipratropium-albuterol (DUONEB) 0.5-2.5 (3) MG/3ML SOLN nebulizer solution Inhale 3 mLs into the lungs every 4 hours 6/3/21   Alexsander Lermas, DO   Respiratory Therapy Supplies (NEBULIZER/TUBING/MOUTHPIECE) KIT Daily as needed 6/3/21   Alexsander Mheta, DO   ibuprofen (ADVIL;MOTRIN) 600 MG tablet Take 1 tablet by mouth every 6 hours as needed for Pain 5/29/21 6/5/21  Caroline Valerio,    gabapentin (NEURONTIN) 300 MG capsule TAKE 2 CAPSULES BY MOUTH 3 TIMES DAILY FOR 30 DAYS. 5/24/21 6/23/21  KEANU Cardenas CNP   lisinopril (PRINIVIL;ZESTRIL) 20 MG tablet TAKE 1 TABLET BY MOUTH DAILY  5/24/21 6/23/21  KEANU Cardenas CNP   enoxaparin (LOVENOX) 40 MG/0.4ML injection Inject 0.4 mLs into the skin daily 5/18/21   Capri Oneil MD   enoxaparin (LOVENOX) 40 MG/0.4ML injection Inject 0.4 mLs into the skin daily 5/18/21   Capri Oneil MD   naproxen (NAPROSYN) 500 MG tablet Take 1 tablet by mouth 2 times daily (with meals) 5/16/21   Igor Hernandez MD   butalbital-aspirin-caffeine [facility]) -40 MG per capsule Take 1 capsule by mouth every 4 hours as needed for Headaches for up to 30 days. 5/14/21 6/13/21  KEANU Cardenas CNP   fluticasone [facility]) 50 MCG/ACT nasal spray 2 sprays by Each Nostril route daily 5/14/21   KEANU Cardenas CNP   ondansetron (ZOFRAN) 4 MG tablet Take 1 tablet by mouth every 8 hours as needed for Nausea or Vomiting 5/8/21   Owen Markham MD   ondansetron (ZOFRAN ODT) 4 MG disintegrating tablet Take 1 tablet by mouth every 8 hours as needed for Nausea 4/28/21   Mahesh Dunlap DO   ASPIRIN ADULT LOW STRENGTH 81 MG EC tablet TAKE 1 TABLET BY MOUTH ONCE DAILY  4/5/21   KEANU Cardenas CNP   cetirizine (ZYRTEC) 10 MG tablet Take 1 tablet by mouth daily 3/11/21   KEANU Cardenas CNP   levothyroxine (SYNTHROID) 100 MCG tablet Take 1 tablet by mouth Daily 2/19/21   KEANU Cardenas CNP   ipratropium-albuterol (DUONEB) 0.5-2.5 (3) MG/3ML SOLN nebulizer solution Inhale 3 mLs into the lungs every 6 hours as needed for Shortness of Breath 2/11/21   Judi Souza APRN - CNP   cyclobenzaprine (FLEXERIL) 5 MG tablet TAKE 1 TABLET BY MOUTH 2 TIMES DAILY AS NEEDED FOR MUSCLE SPASMS (DON'T TAKE W/ ZANAFLEX) 1/19/21   Judi Souza APRN - CNP   ammonium lactate (LAC-HYDRIN) 12 % lotion Apply topically daily.  1/6/21   Pasquale Taylor DPM   Ciclopirox (LOPROX) 0.77 % gel Apply to skin twice a day 1/6/21   Bernardo Macario DPM   hydrOXYzine (ATARAX) 50 MG tablet  12/14/20   Historical Provider, MD   Nebulizers (NEBULIZER COMPRESSOR) MISC Daily as needed 12/18/20   Chick Grapes, APRN - CNP   Blood Pressure KIT Daily as needed 12/18/20   Chick Grapes, APRN - CNP   Misc.  Devices (CANE) MISC Quad base cane 11/28/20   Radha Oliver MD   famotidine (PEPCID) 20 MG tablet Take 1 tablet by mouth 2 times daily as needed (for acid reflux) 10/26/20   Chick Grapes, APRN - CNP   clomiPRAMINE (ANAFRANIL) 50 MG capsule Take 1 capsule by mouth nightly 10/26/20   Chick Grapes, APRN - CNP   traZODone (DESYREL) 150 MG tablet Take 1 tablet by mouth nightly 10/22/20   Chick Grapes, APRN - CNP   cloNIDine (CATAPRES) 0.1 MG tablet Take 1 tablet by mouth nightly 10/22/20   Chick Grapes, APRN - CNP   Benzocaine-Menthol (CEPACOL) 6-10 MG LOZG lozenge Take 1 lozenge by mouth every 2 hours as needed for Sore Throat 10/22/20   Chick Grapes, APRN - CNP   tiZANidine (ZANAFLEX) 2 MG tablet Take 2 tablets by mouth every 8 hours as needed (muscle spasm, neck pain) 10/9/20   Chick Grapes, APRN - CNP   Elastic Bandages & Supports (CARPAL TUNNEL WRIST STABILIZER) MISC Apply to each wrist at bedtime 10/9/20   Chick Grapes, APRN - CNP   methyl salicylate-menthol (PAULETTE BRAY GREASELESS) 10-15 % CREA Apply topically 3 times daily as needed for Pain 9/21/20   Saul Ng MD   albuterol (PROVENTIL) (2.5 MG/3ML) 0.083% nebulizer solution Take 3 mLs by nebulization every 6 hours as needed for Wheezing 6/18/20   Chick Grapes, APRN - CNP       REVIEW OF SYSTEMS    (2-9 systems for level 4, 10 or more forlevel 5)      Review of Systems   Unable to perform ROS: Acuity of condition       PHYSICAL EXAM   (up to 7 for level 4, 8 or more forlevel 5)      ED TRIAGE VITALS BP: (!) 192/132, Temp: 98.2 °F (36.8 °C), Pulse: 88, Resp: 17, SpO2: 92 %    Vitals:    08/28/21 0107 08/28/21 0201 08/28/21 0236 08/28/21 0330   BP:  115/67     Pulse: 78 63 67    Resp: 20 22 21    Temp:    97.8 °F (36.6 °C)   TempSrc:    Oral   SpO2: 91% 93% 96%    Weight:             Physical Exam  Constitutional:       Appearance: She is ill-appearing and toxic-appearing. HENT:      Head: Normocephalic and atraumatic. Eyes:      Extraocular Movements: Extraocular movements intact. Right eye: Normal extraocular motion and no nystagmus. Left eye: Normal extraocular motion and no nystagmus. Pupils: Pupils are equal, round, and reactive to light. Cardiovascular:      Rate and Rhythm: Normal rate and regular rhythm. Heart sounds: No murmur heard. No gallop. Pulmonary:      Effort: Pulmonary effort is normal.      Breath sounds: Normal breath sounds. Abdominal:      Palpations: Abdomen is soft. Tenderness: There is no abdominal tenderness. Musculoskeletal:      Cervical back: No rigidity. Comments: Difficult to assess motor exam as the patient does not provide good effort in his inconsistent attention to command. Skin:     General: Skin is warm and dry. Capillary Refill: Capillary refill takes less than 2 seconds. Neurological:      Mental Status: She is disoriented and confused. GCS: GCS eye subscore is 4. GCS verbal subscore is 4. GCS motor subscore is 6. Cranial Nerves: No cranial nerve deficit or facial asymmetry. Sensory: No sensory deficit. DIFFERENTIAL  DIAGNOSIS     PLAN (LABS / IMAGING / EKG):  Orders Placed This Encounter   Procedures    XR CHEST PORTABLE    MRI BRAIN WO CONTRAST    MRA HEAD WO CONTRAST    MRA NECK WO CONTRAST    Lactate, Sepsis    Troponin    Valproic acid level, total and free    Oxcarbazepine Level    CBC    Troponin    Drug screen multi urine    Lactate, Sepsis    SPECIMEN REJECTION    Comprehensive Metabolic Panel w/ Reflex to MG    Lipid Panel    PREVIOUS SPECIMEN    ADULT DIET;  Regular    Vital signs per unit routine Bryce Self Notify Physician    Strict Bedrest    Daily weights    Intake and output    Neurovascular checks    Neuro checks    Obtain initial SBP and decrease SBP by 25% in first 8 hours    Telemetry monitoring - continuous duration    Full Code    Inpatient consult to Neurosurgery    Inpatient Consult to Neurology    [facility] In[facility] consult to [facility] to Neurology    Initiate Oxygen Therapy Protocol    EKG 12 Lead    EEG awake and asleep    PATIENT STATUS (FROM ED OR OR/PROCEDURAL) Inpatient    Seizure precautions       MEDICATIONS ORDERED:  ED Medication Orders (From admission, onward)    Start Ordered     Status Ordering Provider    08/29/21 0100 08/28/21 0336  cefTRIAXone (ROCEPHIN) 1000 mg IVPB in 50 mL D5W minibag  EVERY 24 HOURS     Question:  Antimicrobial Indications  Answer:  Urinary Tract Infection    Acknowledged Rhona [facility]    08/28/21 2100 08/28/21 0336  clomiPRAMINE (ANAFRANIL) capsule 50 mg  NIGHTLY      Acknowledged [facility]    08/28/21 2100 08/28/21 0336  cloNIDine (CATAPRES) tablet 0.1 mg  NIGHTLY      Acknowledged [facility]    08/28/21 0900 08/28/21 0336  budesonide-formoterol (SYMBICORT) 160-4.5 MCG/ACT inhaler 2 puff  2 TIMES DAILY      Acknowledged [facility]    08/28/21 0900 08/28/21 0336  cetirizine (ZYRTEC) tablet 10 mg  DAILY      Acknowledged [facility]    08/28/21 0900 08/28/21 0336  fluticasone (FLONASE) 50 MCG/ACT nasal spray 2 spray  DAILY      Acknowledged [facility]    08/28/21 0900 08/28/21 0336  lisinopril (PRINIVIL;ZESTRIL) tablet 20 mg  DAILY      Acknowledged [facility]    08/28/21 0900 08/28/21 0336  sodium chloride flush 0.9 % injection 5-40 mL  2 times per day      Acknowledged [facility]    08/28/21 0900 08/28/21 0336  enoxaparin (LOVENOX) injection 30 mg  DAILY      Acknowledged [facility]    08/28/21 0800 08/28/21 0336  naproxen (NAPROSYN) tablet 500 mg  2 TIMES DAILY WITH MEALS      Acknowledged Manfred FRENCH    08/28/21 0800 08/28/21 0336  venlafaxine (EFFEXOR XR) extended release capsule 75 mg  DAILY WITH BREAKFAST      Acknowledged Manfred FRENCH    08/28/21 0700 08/28/21 0336  levothyroxine (SYNTHROID) tablet 100 mcg  DAILY      Acknowledged Manfred FRENCH    08/28/21 0530 08/28/21 0507  metoprolol (LOPRESSOR) injection 5 mg  ONCE      Last MAR action: Given - by Sabrina Jones on 08/28/21 at [facility]    08/28/21 0409 08/28/21 0409  metoprolol (LOPRESSOR) injection 5 mg  EVERY 4 HOURS PRN      Last MAR action: Given - by Sabrina Jones on 08/28/21 at [address] [place] [address] Dennyagusto Arauz    08/28/21 0400 08/28/21 0336  aspirin EC tablet 81 mg  ONCE      Last MAR action: Not Given - by Sabrina Jones on 08/28/21 at [address] [facility], Dennyagusto Arauz    08/28/21 0400 08/28/21 0336  0.9 % sodium chloride infusion  CONTINUOUS      Last MAR action: New Bag - by Sabrina Jones on 08/28/21 at [address] [place] [address] Dennyagusto Arauz    08/28/21 0336 08/28/21 0336  sodium chloride flush 0.9 % injection 5-40 mL  PRN      Acknowledged Manfred FRENCH    08/28/21 0336 08/28/21 0336  ondansetron (ZOFRAN-ODT) disintegrating tablet 4 mg  EVERY 8 HOURS PRN      Acknowledged Willim Closs    08/28/21 0336 08/28/21 0336  ondansetron (ZOFRAN) injection 4 mg  EVERY 6 HOURS PRN      Acknowledged Willim Closs    08/28/21 0336 08/28/21 0336  acetaminophen (TYLENOL) tablet 650 mg  EVERY 6 HOURS PRN      Acknowledged Willim Closs    08/28/21 0336 08/28/21 0336  acetaminophen (TYLENOL) suppository 650 mg  EVERY 6 HOURS PRN      Acknowledged Willim Closs    08/28/21 0336 08/28/21 0336  albuterol sulfate  (90 Base) MCG/ACT inhaler 1 puff  EVERY 6 HOURS PRN     Question:  Initiate RT Bronchodilator Protocol  Answer:   Yes    Acknowledged Willim Closs    08/28/21 0336 08/28/21 0336  famotidine (PEPCID) tablet 20 mg  2 TIMES DAILY PRN      Acknowledged Willim Closs    08/28/21 0336 08/28/21 0336  0.9 % sodium chloride infusion  PRN Ganga Asencio    08/28/21 0015 08/28/21 0000  cefTRIAXone (ROCEPHIN) 1000 mg IVPB in 50 mL D5W minibag  ONCE     Question:  Antimicrobial Indications  Answer:  Urinary Tract Infection    Last MAR action: Stopped - by GURMEET WARNER on 08/28/21 at 0053 Akhil PAYAN    08/28/21 0015 08/28/21 0013  0.9 % sodium chloride bolus  ONCE      Last MAR action: Stopped - by GURMEET WARNER on 08/28/21 at [address] sh[address] Ana Senior    08/27/21 2215 08/27/21 2202  PHENobarbital (LUMINAL) 1,250 mg in sodium chloride 0.9 % 100 mL IVPB  ONCE      Last MAR action: Stopped - by GURMEET WARNER on 08/28/21 at 87 CAIN Bellamy    08/27/21 2200 08/27/21 2158  niCARdipine (CARDENE) 20 mg in 0.9 % sodium chloride 200 mL solution  CONTINUOUS      Last MAR action: Paused - [facility]          DDX: Urosepsis, subclinical seizure, ischemic stroke, intraparenchymal hemorrhage, hypertensive emergency    DIAGNOSTIC RESULTS / [address] [place] [address] / [facility]     IMPRESSION & INITIAL PLAN:  This is a 38-year-old female that presentedin hypertensive emergency with suspected intraparenchymal hemorrhages in the deep gray nuclei. The CT head was read as acute hemorrhage versus calcification. Neurosurgery and neuro critical care were immediately consulted on the case and had a service felt as if the CT findings correlate with hemorrhage. Nonetheless patient was treated as such during the initial period of resuscitation. She received Cardene infusion for blood pressure control less than 140, arterial line was placed for close blood pressure monitoring, right IJ CVC was placed for vascular access she had poor peripheral access. Patient was given 1 g of Rocephin for her urinary tract infection. As neurosurgery evaluated the case and decided that he did not believe it was intraparenchymal hemorrhage, Cardene and fusion was weaned off arterial line was removed.   Patient still remained altered, with waxing and waning mentation. Concerned that she may be having subclinical seizures she states that she takes phenobarbital at home for seizure prophylaxis not been taking her is recently. I spoke with pharmacy who recommended loading dose of phenobarbital 15 mg per kilogram.  Patient did not improve during her stay here. I then spoke with the critical care medicine team who felt the patient was stable enough for stepdown. [facility] did [facility] this patient. Of note her kidney function was normal back on 5/30/2021, she has marked SEDA with decreased GFR on today's blood work. She was resuscitated with fluid.   LABS:  Results for orders placed or performed during the hospital encounter of 08/27/21   Lactate, Sepsis   Result Value Ref Range    Lactic Acid, Sepsis NOT REPORTED 0.5 - 1.9 mmol/L    Lactic Acid, Sepsis, Whole Blood 1.6 0.5 - 1.9 mmol/L   Troponin   Result Value Ref Range    Troponin, High Sensitivity 6 0 - 14 ng/L    Troponin T NOT REPORTED <0.03 ng/mL    Troponin Interp NOT REPORTED    CBC   Result Value Ref Range    WBC 14.2 (H) 3.5 - 11.3 k/uL    RBC 5.50 (H) 3.95 - 5.11 m/uL    Hemoglobin 16.5 (H) 11.9 - 15.1 g/dL    Hematocrit 53.8 (H) 36.3 - 47.1 %    MCV 97.8 82.6 - 102.9 fL    MCH 30.0 25.2 - 33.5 pg    MCHC 30.7 28.4 - 34.8 g/dL    RDW 13.6 11.8 - 14.4 %    Platelets 945 (L) 166 - 453 k/uL    MPV 9.9 8.1 - 13.5 fL    NRBC Automated 0.0 0.0 per 100 WBC   Drug screen multi urine   Result Value Ref Range    Amphetamine Screen, Ur NEGATIVE NEGATIVE    Barbiturate Screen, Ur POSITIVE (A) NEGATIVE    Benzodiazepine Screen, Urine NEGATIVE NEGATIVE    Cocaine Metabolite, Urine NEGATIVE NEGATIVE    Methadone Screen, Urine NEGATIVE NEGATIVE    Opiates, Urine NEGATIVE NEGATIVE    Phencyclidine, Urine NEGATIVE NEGATIVE    Propoxyphene, Urine NOT REPORTED NEGATIVE    Cannabinoid Scrn, Ur NEGATIVE NEGATIVE    Oxycodone Screen, Ur NEGATIVE NEGATIVE    Methamphetamine, Urine NOT REPORTED NEGATIVE    Tricyclic Antidepressants, Urine NOT REPORTED NEGATIVE    MDMA, Urine NOT REPORTED NEGATIVE    Buprenorphine Urine NOT REPORTED NEGATIVE    Test Information       Assay provides medical screening only. The absence of expected drug(s) and/or metabolite(s) may indicate diluted or adulterated urine, limitations of testing or timing of collection. Lactate, Sepsis   Result Value Ref Range    Lactic Acid, Sepsis NOT REPORTED 0.5 - 1.9 mmol/L    Lactic Acid, Sepsis, Whole Blood 2.2 (H) 0.5 - 1.9 mmol/L   SPECIMEN REJECTION   Result Value Ref Range    Specimen Source . BLOOD     Ordered Test CMPX LIPR     Reason for Rejection Unable to perform testing: Specimen hemolyzed. - NOT REPORTED    Comprehensive Metabolic Panel w/ Reflex to MG   Result Value Ref Range    Glucose 125 (H) 70 - 99 mg/dL    BUN 18 6 - 20 mg/dL    CREATININE 0.83 0.50 - 0.90 mg/dL    Bun/Cre Ratio NOT REPORTED 9 - 20    Calcium 9.4 8.6 - 10.4 mg/dL    Sodium 140 135 - 144 mmol/L    Potassium 4.0 3.7 - 5.3 mmol/L    Chloride 105 98 - 107 mmol/L    CO2 20 20 - 31 mmol/L    Anion Gap 15 9 - 17 mmol/L    Alkaline Phosphatase 89 35 - 104 U/L    ALT 10 5 - 33 U/L    AST 15 <32 U/L    Total Bilirubin 0.49 0.3 - 1.2 mg/dL    Total Protein 6.9 6.4 - 8.3 g/dL    Albumin 4.0 3.5 - 5.2 g/dL    Albumin/Globulin Ratio 1.4 1.0 - 2.5    GFR Non-African American >60 >60 mL/min    GFR African American >60 >60 mL/min    GFR Comment          GFR Staging NOT REPORTED    Lipid Panel   Result Value Ref Range    Cholesterol 203 (H) <200 mg/dL    HDL 48 >40 mg/dL    LDL Cholesterol 131 (H) 0 - 130 mg/dL    Chol/HDL Ratio 4.2 <5    Triglycerides 120 <150 mg/dL    VLDL NOT REPORTED 1 - 30 mg/dL       RADIOLOGY:  XR CHEST PORTABLE   Final Result   1. Right IJ central line in place, tip at the junction of the SVC and right   atrium.    2. No evidence of a pneumothorax         MRI BRAIN WO CONTRAST    (Results Pending)   MRA HEAD WO CONTRAST    (Results Pending)   MRA NECK WO CONTRAST (Results Pending)           PROCEDURES:  PROCEDURE NOTE - CENTRAL VENOUS LINE PLACEMENT    PATIENT NAME: [address] RECORD NO. [id number]  DATE: 8/28/2021  ATTENDING PHYSICIAN: Dee Griffin    PREOPERATIVE DIAGNOSIS:  vascular access, poor peripheral access and hypovolemia  POSTOPERATIVE DIAGNOSIS:  Same  PROCEDURE PERFORMED:  Right Internal Jugular Vein Central Line Insertion  PERFORMING PHYSICIAN: Elisabeth Sabillon MD  ANESTHESIA:  Local utilizing 1% lidocaine  ESTIMATED BLOOD LOSS:  Less than 25 ml  COMPLICATIONS:  None immediately appreciated. DISCUSSION:  Donovan Santiago is a 60y.o.-year-old female who requires central IV access vascular access, poor peripheral access and hypovolemia. The history and physical examination were reviewed and confirmed. CONSENT: The patient provided verbal consent for this procedure. PROCEDURE:  A timeout was initiated by the bedside nurse and was confirmed by those present. The patient was placed in a supine position. The skin overlying the Right Internal Jugular Vein was prepped with chlorhexadine and draped in sterile fashion. The skin was infiltrated with local anesthetic. The vessel and surrounding anatomy was visualized using ultrasound. Through the anesthetized region, the introducer needle was inserted into the internal jugular vein returning dark red non pulsatile blood. A guidewire was placed through the center of the needle with no resistance. Ultrasound confirmed presence of wire in the vein. A small incision made in the skin with a #11 scalpel blade. The dilator was inserted into the skin and vein over guidewire using Seldinger technique. The dilator was then removed and the 7F 20cm catheter was placed in the vein over the guidewire using Seldinger technique. The guidewire was then removed and all ports aspirated and flushed appropriately. The catheter then secured using silk suture and a temporary sterile dressing was applied.   No immediate complication was evident. All sponge, instrument and needle counts were correct at the completion of the procedure. Postprocedural chest x-ray showed good position of the catheter with no evidence of hemothorax or pneumothorax. The patient tolerated the procedure well with no immediate complication evident. Ramsey Salamanca MD  6:36 AM, 8/28/21     PROCEDURE NOTE - ARTERIAL LINE PLACEMENT    PATIENT NAME: Jordan Blankenship RECORD NO. [id number]  DATE: 8/28/2021  ATTENDING PHYSICIAN:  Meghana      PREOPERATIVE DIAGNOSIS:  Need for blood pressure monitoring  POSTOPERATIVE DIAGNOSIS:  Same  PROCEDURE PERFORMED: Right Radial Arterial Line Insertion  PERFORMING PHYSICIAN:  Ramsey Salamanca MD  ESTIMATED BLOOD LOSS:  Less than 25 ml  COMPLICATIONS:  None immediately appreciated. DISCUSSION:  Joe Winn is a 60 y.o. female who requires invasive pressure monitoring. The history and physical examination were reviewed and confirmed. CONSENT: The patient provided verbal consent for this procedure. PROCEDURE:  A timeout was initiated by the bedside nurse and was confirmed by those present. The patient was placed in a supine position. The skin overlying the Right Radial was prepped with chlorhexadine. Through this region, the introducer needle through catheter was inserted into  until pulsatile bright blood was seen in collection tubing. Guidewire was advanced with no resistance. Catheter was advanced into the artery, wire was pulled with brisk bleeding noted. Pressure monitoring setup was connected to the catheter, it aspirated and flushed easily. The catheter was secured to the wrist with 3-0 silk. No immediate complication was evident. All sponge, instrument and needle counts were correct at the completion of the procedure.       Ramsey Salamanca MD  6:36 AM, 8/28/21      CONSULTS:  IP CONSULT TO NEUROSURGERY  IP CONSULT TO NEUROLOGY  IP CONSULT TO CRITICAL CARE  IP CONSULT TO HOSPITALIST  IP CONSULT TO NEUROLOGY    CRITICAL CARE:  See attending physician note    FINAL IMPRESSION      1. Intraparenchymal hemorrhage of brain ([facility] [address]          DISPOSITION / PLAN     DISPOSITION Admitted 08/28/2021 01:54:27 AM      PATIENT REFERRED TO:  No follow-up provider specified.     DISCHARGE MEDICATIONS:  Current Discharge Medication List        Current Discharge Medication List           Caitlin Rojas MD  Emergency Medicine Resident    (Please note that portions of this note were completed with a voice recognition program.  Efforts were made to edit the dictations but occasionally words are mis-transcribed.)        Caitlin Rojas MD  Resident  08/28/21 9135

## 2021-08-28 NOTE — ED PROVIDER NOTES
ADDENDUM:        Care of this patient was assumed from Dr. Amanda Mendoza    at    20:29  . The patient was seen for Altered Mental Status  . The patient's initial evaluation and plan have been discussed with the prior provider who initially evaluated the patient. Nursing Notes, Past Medical Hx, Past Surgical Hx, Social Hx, Allergies, and Family Hx were all reviewed. I performed a repeat evaluation of the patient and reviewed tests completed so far. 57-year-old female history of COPD, hypertension, seizures presented for evaluation with altered mental status and shortness of breath. Signed out to me awaiting completion of head CT, Covid swab, ICU bed placement. I got a call from the radiologist the noncontrast head CT showed new hyperdensities in the left putamen compared to prior. Spoke to radiologist these could be hemorrhages versus calcifications. I circled back and reexamined the patient she did have poor mental status she was not able to provide any history. She was maintaining her airway. She was moving all extremities. Localizing painful stimuli in all extremities. Reviewed the rest of the work-up her blood gas was normal she is off BiPAP at this point her vital signs are otherwise stable she did have some acute kidney injury and an elevated white blood cell count but blood sugar was normal.    Given mental status changes and findings on CT scan nothing is appropriate to keep the patient here without any neurosurgery consult is available and no neuro ICU. We will transfer the patient over to [address] to the ER for neurology neurosurgery evaluation and ICU admission.         ED Course        The patient was given the following medications:  Orders Placed This Encounter   Medications    sodium chloride flush 0.9 % injection 5-40 mL    sodium chloride flush 0.9 % injection 5-40 mL    0.9 % sodium chloride infusion    naloxone (NARCAN) injection 0.4 mg    ipratropium-albuterol (DUONEB) nebulizer solution 1 ampule     Order Specific Question:   Initiate RT Bronchodilator Protocol     Answer:   Yes       RECENT VITALS:  BP: (!) 151/94, Temp: 97.5 °F (36.4 °C), Pulse: 83, Resp: 18     RADIOLOGY:All plain film, CT, MRI, and formal ultrasound images (except ED bedside ultrasound) are read by the radiologist and the images and interpretations are directly viewed by the emergency physician. CT HEAD WO CONTRAST   Final Result   Interval development of hyperdensities in the left putamen. While these may   represent basal ganglia calcifications the possibility of small focal left   putamen hemorrhages cannot be excluded as these are new from prior study. Further evaluation with MRI may be beneficial.      Findings were discussed with Dr. Yann Damian at 8:10 pm on 8/27/2021. XR CHEST PORTABLE   Final Result   No acute process. LABS: All lab results were reviewed by myself, and all abnormals are listed below.   Labs Reviewed   BLOOD GAS, ARTERIAL - Abnormal; Notable for the following components:       Result Value    pCO2, Arterial 31.7 (*)     HCO3, Arterial 19.6 (*)     Negative Base Excess, Art 5.2 (*)     O2 Sat, Arterial 93.0 (*)     All other components within normal limits   CBC WITH AUTO DIFFERENTIAL - Abnormal; Notable for the following components:    WBC 17.0 (*)     RBC 5.57 (*)     Hemoglobin 16.9 (*)     Hematocrit 51.1 (*)     RDW 15.1 (*)     Seg Neutrophils 81 (*)     Lymphocytes 16 (*)     Segs Absolute 13.77 (*)     All other components within normal limits   APTT - Abnormal; Notable for the following components:    PTT 19.2 (*)     All other components within normal limits   COMPREHENSIVE METABOLIC PANEL W/ REFLEX TO MG FOR LOW K - Abnormal; Notable for the following components:    Glucose 140 (*)     BUN 26 (*)     CREATININE 2.61 (*)     Chloride 96 (*)     Anion Gap 21 (*)     Alkaline Phosphatase 111 (*)     GFR Non- 19 (*)     GFR  23 (*)     All other components within normal limits   COVID-19, RAPID   CULTURE, BLOOD 1   CULTURE, BLOOD 2   CULTURE, URINE   LACTATE, SEPSIS   SPECIMEN REJECTION   PROTIME-INR   LIPASE   SPECIMEN REJECTION   LACTATE, SEPSIS   URINALYSIS   URINE DRUG SCREEN   CK   MYOGLOBIN, SERUM           Disposition   DISPOSITION:    DISPOSITION Admitted 08/27/2021 06:06:07 PM      CLINICAL IMPRESSION:  1. Altered mental status, unspecified altered mental status type    2. COPD exacerbation ([facility]    3. Acute renal failure, unspecified acute renal failure type ([facility]        PATIENT REFERRED TO:  No follow-up provider specified.     DISCHARGE MEDICATIONS:  New Prescriptions    No medications on file       Luisa Hankins MD  Attending Emergency Physician              Luisa Hankins MD  08/27/21 2107

## 2021-08-28 NOTE — ED PROVIDER NOTES
Faculty Sign-Out Attestation  Handoff taken on the following patient from prior Attending Physician: Carlito Goff    I was available and discussed any additional care issues that arose and coordinated the management plans with the resident(s) caring for the patient during my duty period. Any areas of disagreement with residents documentation of care or procedures are noted on the chart. I was personally present for the key portions of any/all procedures during my duty period. I have documented in the chart those procedures where I was not present during the key portions.     Mental status change, possible new punctate bleed, on cardene gtt,   Will need admit,     José Antonio Hernandez DO  Attending Physician     José Antonio Hernandez, DO  08/27/21 2250    -I was present for R IJ central line placement per sterile technique,   cxr stable  A line per Dr Randee Paniagua, DO  08/27/21 73 Aubree Jimenez, DO  08/27/21 9056    -- admitted per plan       José Antonio Hernandez, DO  08/28/21 8007

## 2021-08-28 NOTE — PROGRESS NOTES
Patient received 5 mg Lopressor for elevated BP.  0500 - Dr. Shana Watkins with neurology perfect served for BP parameters. Dr Shana Watkins indicated that Neuro wants pressures <160 but would like primary to control BP after another dose of Lopressor. 0630 Bertin Gaston NP notified of elevated BP regardless of 2 doses of Lopressor, asked to restart cardene gtt. Medication requested from pharmacy. Will monitor.

## 2021-08-28 NOTE — ED NOTES
.    8/27/2021 10:12 PM    Patient: Ryan Hinkle, 60 y.o., female Race:Cauc  Patient Address: [address] [place] [address]    Sending Facility:  [] [facility]  [] [facility] [place]  [x] [facility]  [] [facility]  [] [facility]  [] Other:    Sending Physician: LIZY Ortega MD     Patient Phone #: [phone]   Insurance: Payor: Sarai Vega /  /  /   Chelita Villa:  []  Yes   [x]  No  Emergency Contact: Extended Emergency Contact Information  Primary Emergency Contact: [address] [place] [address] Phone: [phone]  Mobile Phone: [phone]  Relation: Other   needed? No  Secondary Emergency Contact: Ashli Meigs [address] Phone: [phone]  Mobile Phone: [phone]  Relation: Child  MRN: [id number]     [facility]# [id number]  YOB: 1965  Primary Care Physician: KEANU Farmer CNP  Advance Directive: [x] Full Code   []DNR-CC   []DNR-CCA    [facility] Crew: RICHARD Gamboa - Paramedic, [facility] Heranndez RN    Receiving Facility:  [x] [place] [address]  [] [facility] [place]  [] [facility]  [] Other:  Receiving Physician: Jack Bautista MD    Reason for transfer:   [x]   Speciality care required, not available at sending facility   []   Pt/family request   []   Physician request    []   Other:    Response Code   [x] 2  [] 3  []   Change  [] 2  [] 3   Transport Code   [x] 2  [] 3  []   Change  [] 2  [] 3     Call Received 8/27/2021 2039   Dispatched 8/27/2021 2039   Enroute 8/27/2021 2040   Arrived Scene 8/27/2021 2041   At Patient 8/27/2021 2058   Departed Scene 8/27/2021 2106   [facility] 8/27/2021 2133   Departed Hospital 8/27/2021 2143   In Service 8/27/2021 2143     Mileage:  [address] [place] [address]   Total Miles 6.0       Allergies  is allergic to imitrex [sumatriptan], bee pollen, bee venom, bromide ion [bromine], reglan [metoclopramide], sulfa antibiotics, sulfadiazine, and tramadol. Medications  Prior to Admission medications    Medication Sig Start Date End Date Taking? Authorizing Provider   buprenorphine-naloxone (SUBOXONE) 8-2 MG FILM SL film DISSOLVE ONE FILM UNDER TONGUE TWICE DAILY FOR 28 DAYS 7/7/21   Historical Provider, MD   divalproex (DEPAKOTE) 500 MG DR tablet  7/22/21   Historical Provider, MD   OXcarbazepine (TRILEPTAL) 300 MG tablet  7/30/21   Historical Provider, MD   venlafaxine (EFFEXOR XR) 75 MG extended release capsule  8/3/21   Historical Provider, MD   risperiDONE (RISPERDAL) 2 MG tablet  7/30/21   Historical Provider, MD   risperiDONE (RISPERDAL) 4 MG tablet  7/30/21   Historical Provider, MD   ciclopirox (LOPROX) 0.77 % cream Apply topically 2 times daily to bottom of feet and in-between toes. 8/18/21   Angela De Anda DPM   ACETAMINOPHEN EXTRA STRENGTH 500 MG tablet TAKE 1 TO 2 TAB BY MOUTH EVERY 8 HOURS AS NEEDED FOR PAIN  8/4/21   KEANU Perez CNP   albuterol sulfate  (90 Base) MCG/ACT inhaler Inhale 1 puff into the lungs every 6 hours as needed for Wheezing Gap prescription until follow up with primary. Do not refill. Follow up with primary 7/23/21   KEANU Perez CNP   budesonide-formoterol [facility]) 160-4.5 MCG/ACT AERO Inhale 2 puffs into the lungs 2 times daily 6/3/21   Dario Bermudez DO   ipratropium-albuterol (DUONEB) 0.5-2.5 (3) MG/3ML SOLN nebulizer solution Inhale 3 mLs into the lungs every 4 hours 6/3/21   Dario Bermudez DO   Respiratory Therapy Supplies (NEBULIZER/TUBING/MOUTHPIECE) KIT Daily as needed 6/3/21   Dario Bermudez DO   ibuprofen (ADVIL;MOTRIN) 600 MG tablet Take 1 tablet by mouth every 6 hours as needed for Pain 5/29/21 6/5/21  Lelia Valerio DO   gabapentin (NEURONTIN) 300 MG capsule TAKE 2 CAPSULES BY MOUTH 3 TIMES DAILY FOR 30 DAYS.   5/24/21 6/23/21  KEANU Perez CNP   lisinopril (PRINIVIL;ZESTRIL) 20 MG tablet TAKE 1 TABLET BY MOUTH DAILY  5/24/21 6/23/21  KEANU Perez CNP   enoxaparin (LOVENOX) 40 MG/0.4ML injection Inject 0.4 mLs into the skin daily 5/18/21 CNP   Misc. Devices (CANE) MISC Quad base cane 11/28/20   Codey Márquez MD   famotidine (PEPCID) 20 MG tablet Take 1 tablet by mouth 2 times daily as needed (for acid reflux) 10/26/20   Marien Homans, APRN - CNP   clomiPRAMINE (ANAFRANIL) 50 MG capsule Take 1 capsule by mouth nightly 10/26/20   Marien Homans, APRN - CNP   traZODone (DESYREL) 150 MG tablet Take 1 tablet by mouth nightly 10/22/20   Marien Homans, APRN - CNP   cloNIDine (CATAPRES) 0.1 MG tablet Take 1 tablet by mouth nightly 10/22/20   Marien Homans, APRN - CNP   Benzocaine-Menthol (CEPACOL) 6-10 MG LOZG lozenge Take 1 lozenge by mouth every 2 hours as needed for Sore Throat 10/22/20   Marien Homans, APRN - CNP   tiZANidine (ZANAFLEX) 2 MG tablet Take 2 tablets by mouth every 8 hours as needed (muscle spasm, neck pain) 10/9/20   Marien Homans, APRN - CNP   Elastic Bandages & Supports ([employer]) [facility] Apply to each wrist at bedtime 10/9/20   Marien Homans, APRN - CNP   methyl salicylate-menthol (PAULETTE BRAY GREASELESS) 10-15 % CREA Apply topically 3 times daily as needed for Pain 9/21/20   Ryan Bonner MD   albuterol (PROVENTIL) (2.5 MG/3ML) 0.083% nebulizer solution Take 3 mLs by nebulization every 6 hours as needed for Wheezing 6/18/20   Marien Homans, APRN - CNP    Scheduled Meds:   IVPB builder   IntraVENous Once     Continuous Infusions:   niCARdipine       PRN Meds:.  Past Medical History   has a past medical history of Arthritis, Asthma, Attention deficit hyperactivity disorder (ADHD), combined type, Borderline diabetes, Borderline personality disorder ([facility] CHF (congestive heart failure) ([facility] COPD (chronic obstructive pulmonary disease) ([facility] Fibromyalgia, Headache, Hypertension, Manic depression ([facility] Movement disorder, Neck fracture ([address] Pernicious anemia, Seizure ([address] and Thyroid disease.     Patient Weight: 182 lbs   [x]   Estimated   []   Stated        VITALS          Time BP Pulse Resp  Rate N-normal  S-shallow  D-deep Monitor SpO2 O2    LPM BGL   Temp Pain   2105 187/111 102 13 Shallow Sinus T 93% RA      2112 154/56 97 19 Shallow NSR 97% 2L NC      2118 62/35 97 18 Shallow NSR 96% 2L NC      2119 175/123 94 21 Shallow NSR 96% 2L NC       2122 164/104 92 27 Shallow NSR 96% 2L NC         Pupils GCS   Time R L E  4 V  5 M  6 T  15   2059   3 4 6 15                                         NIH - record on all stroke transports and Q15 min after tPA administration    NIHSS       Time Not  Assessed Confused    1a. LOC - Arousal Status       1b. LOC - Questions       1c. LOC - Commands       2. Eye Movements (gaze)       3. Visual Fields       4. Facial Palsy       5a. Motor Left Arm       5b. Motor Right Arm       6a. Motor Left Leg       6b. Motor Right Leg       7. Limb Ataxia       8. Sensory       9. Language/Aphasia       10. Dysarthria       11. Neglect       TOTAL         Research:    RACE Score: 3                                       Time: 2059  StrokeVAN Score: Unclear Confusion   Time:2059    Time Last Known Well: Prior to 1600 hrs    Narrative:    History:    Dispatched to inter-facility transfer by [facility] [employer] [facility]. Arrived to find Giuliana Corbett, 60 y.o., female c/o none specific with confusion. Report received from ED RN at nurses station. RN advised that patient arrival to ED @ 1600 hrs with generalized confusion. During course of assessment and treatment in ED patient became more confused and was administered Narcan @ approximately 2000 hrs with some improvement. Advised Head CT scan obtained with reported hyperdense areas noted, calcifications and possible small ICBs but unclear on areas. Decision to transfer patient to [facility] for neurosurgical consult and [facility] called for transport. General medical history unclear at time of transport. Physical:    Neuro: Open eyes to name, general confusion with patient following some commands.  Unable to give age, date of birth, month of year or where she is. \"I'm in an airplane\". Mask in place with face obscured, noted ability to hold up right arm with drift, right leg with lower leg splint in place and does not move. Minimal to no movement of left arm and leg. Resp: shallow non labored with congestive cough noted. Lungs grossly clear. Cardiac: regular rate, with underlying NSR. See ED 12 LD ECG  Muscular/skeletal/peripheral vascular: Pulses palpable. Abd/GI/: abd appears non tender on palpation. House catheter in place with yellow colored urine noted in drainage bag. Skin: normal color, warm, dry         Patient received the following medications prior to [facility] arrival:Reported Narcan. Patient has the following lines/drains prior to [facility] arrival:INT to left hand #20 ga. Patient has the following airway placed prior to [facility] arrival:None, maintains own airway. Patient was transferred to cot via 4 person sheet lift and secured with straps x3. Zoll ECG, SpO2, and NIBP applied and monitored throughout encounter. HOB maintained at 30 degrees. INFUSIONS        Time Medication Name Concentration Route Rate Dose Alaris Pump or gravity                                           Patient is being transported to [facility] ED for tertiary neurosurgical care unavailable at sending hospital.   During transport patient rests comfortably. Cabin temp maintained at 70-72 °F throughout transport. Patient was transferred to bed 24 via 4 person sheet lift. . Report, care, and all paperwork from sending facility turned over to LILIBETH Blank at bedside.         IV Lines:  Time Type Site/Route Size # Attempts Performed By   Unknown INT Left Hand #20 ga Unknown ED RN                             Total Amount of IV Fluids Infused: Unknown    MEDS / ELECTRICAL RX   Time Therapy Dosage Route Response Preformed By                                                       TPA Administration    Time Bolus Dose Infusion Dose Waste   N/A              POC Labs:    POC LABS      TIME See ED notes   Test (reference range)      FSBG ()      PT (11-13.5)      INR (0.8-1.1)      Na (138-146)      K (3.5-4.9)      Cl ()      iCa (1.2-1.32)      TCO2 (24-29)      Glu ()      BUN (8.0-26)      Crea (0.6-1. 3)      Hct (38-51)      Hb (12.0-17)      AnGap 10.0-20)         Hospital Notification:    Report called to -  [x] [facility]  [] [facility]  [] [facility]  [] Other:     [x]    Med Channel: [employer] @ 2114 HRS. MAF    []    Cell Phone     Med Control Orders Received:   [x] No  []  Yes:     Med Control Physician (if on line medical direction received)  -      Bedside Report Given To:  Rosamaria [x]  RN   [x]  Caryle Amato, MD   []   DO    Hospital Team Alert:   []    Trauma Alert    []    STEMI Alert    [x]    RACE Alert/Stroke Alert      Description Of Valuables: None noted by writer.      Patient Valuables:   []    With Patient    []    Not Recieved    []    ER / Lab     Call Outcome:   [x]    Transport to Facility    []    Transported by other     []    Cancelled    []    Patient Refusal    []                [facility]  Mobile Stroke Unit    Electronically signed by Romero Red RN on 8/27/21 at 10:12 PM EDT     Bhakti Quiñones RN  08/27/21 6556

## 2021-08-28 NOTE — CONSULTS
[facility] Neurology   IN-PATIENT SERVICE    NEUROLOGY CONSULT  NOTE             Reason for Consult:  Right side weakness           History Obtained From:   ER, EMR     CHIEF COMPLAINT:        AMS     HISTORY OF PRESENT ILLNESS:       The patient is a 60 y.o. female   transfer from [facility] for possible bleed on CAT scan evaluation. Last know well: 4 pm on 8/26/2021    History obtained from chart review, ER     66-year-old female with past medical history of ADHD, CHF, COPD, maniac depression, seizure disorder, substance abuse, intentional overdose on barbiturate was initially brought in by EMS for shortness of breath. EMS on arrival placed patient on BiPAP received 1 dose of Narcan. With minimal response. Patient was weaned off BiPAP. [facility] Patient [facility] head CT: Concerning for hypodensity in the right putamen area versus calcification, decision was made to transfer the patient to [facility] for neurosurgical evaluation. On presentation:  BP:192/132  BSL: 140  CT: Concerning for bleed in the Rt alvaro  No tpa given for above reason   Cannot get CT head and neck because of elevated creatinine. Prior to arrival patient was on  Antiplatelets/anticoagulants: ASA 81   Statins: No       Smoking history: Not available      Patient was loaded with phenobarbital.  Patient received Rocephin for UTI  Patient was started on Cardene for blood pressure control in the view of bleed on the CAT scan.        PAST MEDICAL HISTORY :       Past Medical History:        Diagnosis Date    Arthritis     Asthma     Attention deficit hyperactivity disorder (ADHD), combined type     Borderline diabetes     Borderline personality disorder ([facility] [address]     CH[facility]congestive [facility] failure) [facility]     COPD (chronic obstructive pulmonary disease) ([facility])     Fibromyalgia     Headache     Hypertension     Manic depression ([facility] [address]     Movement disorder     Neck fracture ([facility] [address]     Pernicious anemia     Seizure ([facility] [address]     Thyroid disease        Past Surgical History:        Procedure Laterality Date    EXPLORATION OF WOUND OF EXTREMITY Right 5/19/2019    RIGHT THIGH WOUND WASHOUT WITH [facility] WEST-ER PLACEMENT performed by Eve Mathew MD at [address] [facility] [address] N/A 5/22/2019    RIGHT WOUND HIP EXAMINATION LAVAGE AND WOUND VAC PLACEMENT performed by America Bailey MD at [address] [place] [address], [place] [address] Right 5/17/2019    IRRIGATION, DEBRIDEMENT RIGHT THIGH performed by Valerie Pichardo MD at [place] [address] Bilateral     LYMPH NODE DISSECTION      cervical    PARTIAL HYSTERECTOMY         Social History:   Social History     Socioeconomic History    Marital status:      Spouse name: Not on file    Number of children: Not on file    Years of education: Not on file    Highest education level: Not on file   Occupational History    Not on file   Tobacco Use    Smoking status: Current Every Day Smoker     Packs/day: 0.50     Years: 12.00     Pack years: 6.00    Smokeless tobacco: Never Used   Vaping Use    Vaping Use: Never used   Substance and Sexual Activity    Alcohol use: No    Drug use: Not Currently     Comment: Has not use Heroin since 2/2020    Sexual activity: Not Currently   Other Topics Concern    Not on file   Social History Narrative    Not on file     Social Determinants of Health     Financial Resource Strain: Low Risk     Difficulty of Paying Living Expenses: Not hard at all   Food Insecurity: No Food Insecurity    Worried About Running Out of Food in the Last Year: Never true    Michael of Food in the Last Year: Never true   Transportation Needs: No Transportation Needs    Lack of Transportation (Medical): No    Lack of Transportation (Non-Medical):  No   Physical Activity:     Days of Exercise per Week:     Minutes of Exercise per Session:    Stress:     Feeling of Stress :    Social Connections:     Frequency of Communication with Friends and Family:     Frequency of Social Gatherings with Friends and Family:     Attends Buddhism Services:     Active Member of Clubs or Organizations:     Attends Club or Organization Meetings:     Marital Status:    Intimate Partner Violence:     Fear of Current or Ex-Partner:     Emotionally Abused:     Physically Abused:     Sexually Abused:        Family History:   No family history on file. Allergies:  Imitrex [sumatriptan], Bee pollen, Bee venom, Bromide ion [bromine], Reglan [metoclopramide], Sulfa antibiotics, Sulfadiazine, and Tramadol    Home Medications:  Prior to Admission medications    Medication Sig Start Date End Date Taking? Authorizing Provider   buprenorphine-naloxone (SUBOXONE) 8-2 MG FILM SL film DISSOLVE ONE FILM UNDER TONGUE TWICE DAILY FOR 28 DAYS 7/7/21   Historical Provider, MD   divalproex (DEPAKOTE) 500 MG DR tablet  7/22/21   Historical Provider, MD   OXcarbazepine (TRILEPTAL) 300 MG tablet  7/30/21   Historical Provider, MD   venlafaxine (EFFEXOR XR) 75 MG extended release capsule  8/3/21   Historical Provider, MD   risperiDONE (RISPERDAL) 2 MG tablet  7/30/21   Historical Provider, MD   risperiDONE (RISPERDAL) 4 MG tablet  7/30/21   Historical Provider, MD   ciclopirox (LOPROX) 0.77 % cream Apply topically 2 times daily to bottom of feet and in-between toes. 8/18/21   Susan Messer DPM   ACETAMINOPHEN EXTRA STRENGTH 500 MG tablet TAKE 1 TO 2 TAB BY MOUTH EVERY 8 HOURS AS NEEDED FOR PAIN  8/4/21   KEANU Garces CNP   albuterol sulfate  (90 Base) MCG/ACT inhaler Inhale 1 puff into the lungs every 6 hours as needed for Wheezing [place] prescription until follow up with primary. Do not refill.   Follow up with primary 7/23/21   KEANU Garces CNP   budesonide-formoterol [facility]) 160-4.5 MCG/ACT AERO Inhale 2 puffs into the lungs 2 times daily 6/3/21   Saira Mojica,    ipratropium-albuterol (DUONEB) 0.5-2.5 (3) MG/3ML SOLN nebulizer solution Inhale 3 mLs into the lungs every 4 hours 6/3/21   Preston Valencia DO   Respiratory Therapy Supplies (NEBULIZER/TUBING/MOUTHPIECE) KIT Daily as needed 6/3/21   Preston Valencia DO   ibuprofen (ADVIL;MOTRIN) 600 MG tablet Take 1 tablet by mouth every 6 hours as needed for Pain 5/29/21 6/5/21  Lisa Valerio DO   gabapentin (NEURONTIN) 300 MG capsule TAKE 2 CAPSULES BY MOUTH 3 TIMES DAILY FOR 30 DAYS. 5/24/21 6/23/21  KEANU Whittaker CNP   lisinopril (PRINIVIL;ZESTRIL) 20 MG tablet TAKE 1 TABLET BY MOUTH DAILY  5/24/21 6/23/21  KEANU Whittaker - CNP   enoxaparin (LOVENOX) 40 MG/0.4ML injection Inject 0.4 mLs into the skin daily 5/18/21   Karishma Ribeiro MD   enoxaparin (LOVENOX) 40 MG/0.4ML injection Inject 0.4 mLs into the skin daily 5/18/21   Karishma Ribeiro MD   naproxen (NAPROSYN) 500 MG tablet Take 1 tablet by mouth 2 times daily (with meals) 5/16/21   Nga Mccoy MD   butalbital-aspirin-caffeine [facility]) -40 MG per capsule Take 1 capsule by mouth every 4 hours as needed for Headaches for up to 30 days.  5/14/21 6/13/21  KEANU Whittaker - CNP   fluticasone [facility]) 50 MCG/ACT nasal spray 2 sprays by Each Nostril route daily 5/14/21   KEANU Whittaker CNP   ondansetron (ZOFRAN) 4 MG tablet Take 1 tablet by mouth every 8 hours as needed for Nausea or Vomiting 5/8/21   Owen Markham MD   ondansetron (ZOFRAN ODT) 4 MG disintegrating tablet Take 1 tablet by mouth every 8 hours as needed for Nausea 4/28/21   Mariah Dunlap DO   ASPIRIN ADULT LOW STRENGTH 81 MG EC tablet TAKE 1 TABLET BY MOUTH ONCE DAILY  4/5/21   John Chamberlain APRN - CNP   cetirizine (ZYRTEC) 10 MG tablet Take 1 tablet by mouth daily 3/11/21   John Chamberlain APRN - CNP   levothyroxine (SYNTHROID) 100 MCG tablet Take 1 tablet by mouth Daily 2/19/21   John Chamberlain APRN - CNP   ipratropium-albuterol (DUONEB) 0.5-2.5 (3) MG/3ML SOLN nebulizer solution Inhale 3 mLs into the lungs every 6 hours as needed for Shortness of Breath 2/11/21   KEANU Husain CNP   cyclobenzaprine (FLEXERIL) 5 MG tablet TAKE 1 TABLET BY MOUTH 2 TIMES DAILY AS NEEDED FOR MUSCLE SPASMS (DON'T TAKE W/ ZANAFLEX) 1/19/21   KEANU Husain CNP   ammonium lactate (LAC-HYDRIN) 12 % lotion Apply topically daily. 1/6/21   Fernanda Brightly, DPM   Ciclopirox (LOPROX) 0.77 % gel Apply to skin twice a day 1/6/21   Fernanda Brightly, DPM   hydrOXYzine (ATARAX) 50 MG tablet  12/14/20   Historical Provider, MD   Nebulizers (NEBULIZER COMPRESSOR) MISC Daily as needed 12/18/20   KEANU Husain CNP   Blood Pressure KIT Daily as needed 12/18/20   KEANU Husain CNP   Misc.  Devices (CANE) MISC Quad base cane 11/28/20   Abundio Andrade MD   famotidine (PEPCID) 20 MG tablet Take 1 tablet by mouth 2 times daily as needed (for acid reflux) 10/26/20   KEANU Husain CNP   clomiPRAMINE (ANAFRANIL) 50 MG capsule Take 1 capsule by mouth nightly 10/26/20   KEANU Husain CNP   traZODone (DESYREL) 150 MG tablet Take 1 tablet by mouth nightly 10/22/20   KEANU Husain CNP   cloNIDine (CATAPRES) 0.1 MG tablet Take 1 tablet by mouth nightly 10/22/20   KEANU Husain CNP   Benzocaine-Menthol (CEPACOL) 6-10 MG LOZG lozenge Take 1 lozenge by mouth every 2 hours as needed for Sore Throat 10/22/20   KEANU Husain CNP   tiZANidine (ZANAFLEX) 2 MG tablet Take 2 tablets by mouth every 8 hours as needed (muscle spasm, neck pain) 10/9/20   KEANU Husain CNP   Elastic Bandages & Supports (CARPAL TUNNEL WRIST STABILIZER) MISC Apply to each wrist at bedtime 10/9/20   KEANU Husain CNP   methyl salicylate-menthol (PAULETTE BRAY GREASELESS) 10-15 % CREA Apply topically 3 times daily as needed for Pain 9/21/20   Tracee Márquez MD   albuterol (PROVENTIL) (2.5 MG/3ML) 0.083% nebulizer solution Take 3 mLs by nebulization every 6 hours as needed for Wheezing 20   KEANU Kiser - CNP       Current Medications:   Current Facility-Administered Medications: 0.9 % sodium chloride bolus, 1,000 mL, IntraVENous, Once  niCARdipine (CARDENE) 20 mg in 0.9 % sodium chloride 200 mL solution, 3-15 mg/hr, IntraVENous, Continuous    REVIEW OF SYSTEMS:        unable to obtain     PHYSICAL EXAM:       BP (!) 97/55   Pulse 91   Temp 98.2 °F (36.8 °C)   Resp 18   Wt 182 lb 15.7 oz (83 kg)   SpO2 92%   BMI 33.47 kg/m²     CONSTITUTIONAL:   Patient is drowsy, open eyes to voice, follows intermittent commands   HEAD:  normocephalic, atraumatic    EYES:  PERRLA, EOMI.   ENT:  moist mucous membranes   NECK:  supple, symmetric, no midline tenderness to palpation    BACK:  without midline tenderness, step-offs or deformities    LUNGS:  Equal air entry bilaterally   CARDIOVASCULAR:  normal s1 / s2   ABDOMEN:  Soft, no rigidity   NEUROLOGIC:      INITIAL NIH STROKE SCALE:    Time Performed:  12:00 AM     1a. Level of consciousness:  1 - not alert but arousable by minor stimulation to obey, answer or respond  1b. Level of consciousness questions:  2 - answers neither question correctly  1c. Level of consciousness questions:  0 - performs both tasks correctly  2. Best Gaze:  0 - normal  3. Visual:  0 - no visual loss  4. Facial Palsy:  0 - normal symmetric movement  5a. Motor left arm:  2 - some effort against gravity, limb cannot get to or maintain (if cued) 90 (or 45) degrees, drifts down to bed, but has some effort against gravity   5b. Motor right arm:  1 - drift, limb holds 90 (or 45) degrees but drifts down before full 10 seconds: does not hit bed  6a. Motor left le - some effort against gravity; leg falls to bed by 5 seconds but has some effort against gravity  6b. Motor right leg:  3 - no effort against gravity; leg falls to bed immediately  7. Limb Ataxia:  0 - absent  8. Sensory:  0 - normal; no sensory loss  9.     Best Language:  0 - no aphasia, normal  10. Dysarthria:  1 - mild to moderate, patient slurs at least some words and at worst, can be understood with some difficulty  11. Extinction and Inattention:  0 - no abnormality    TOTAL:  12     SKIN:  no rash      Modified Evelin Score Scale:       Unable to get     LABS AND IMAGING:     CBC with Differential:    Lab Results   Component Value Date    WBC 17.0 08/27/2021    RBC 5.57 08/27/2021    HGB 16.9 08/27/2021    HCT 51.1 08/27/2021     08/27/2021    MCV 91.8 08/27/2021    MCH 30.4 08/27/2021    MCHC 33.1 08/27/2021    RDW 15.1 08/27/2021    LYMPHOPCT 16 08/27/2021    MONOPCT 2 08/27/2021    BASOPCT 0 08/27/2021    MONOSABS 0.34 08/27/2021    LYMPHSABS 2.72 08/27/2021    EOSABS 0.00 08/27/2021    BASOSABS 0.00 08/27/2021    DIFFTYPE NOT REPORTED 08/27/2021         BMP:    Lab Results   Component Value Date     08/27/2021    K 4.0 08/27/2021    CL 96 08/27/2021    CO2 20 08/27/2021    BUN 26 08/27/2021    LABALBU 4.7 08/27/2021    CREATININE 2.61 08/27/2021    CALCIUM 10.2 08/27/2021    GFRAA 23 08/27/2021    LABGLOM 19 08/27/2021    GLUCOSE 140 08/27/2021       Radiology Review:    1.) CT brain without contrast:       Impression   Interval development of hyperdensities in the left putamen.  While these may   represent basal ganglia calcifications the possibility of small focal left   putamen hemorrhages cannot be excluded as these are new from prior study.    Further evaluation with MRI may be beneficial       2.) CTA Head/Neck:   Cannot get due to Kidney issues     3.) Brain MRI W/O: Pending     4)  MRA head and neck: Pending           ASSESSMENT AND PLAN:       Patient Active Problem List   Diagnosis    Chest pain    Migraine variant with headache    Drug overdose, accidental or unintentional, initial encounter    Hypothyroidism    Essential hypertension    Se[facility] disorder ([facility] [address]    Drug overdose    History of seizure    Major depressive disorder with psychotic features ([address]    Severe major depression ([address]    Overdose of barbiturate, intentional self-harm, initial encounter ([address]    Intentional phenobarbital overdose ([address]    Severe episode of recurrent major depressive disorder, without psychotic features ([address]    Suicide attempt ([address]    Major depressive disorder, recurrent ([address]    Sepsis ([address]    Severe malnutrition ([address]    Altered mental status    Hypokalemia    Congestive heart failure of unknown etiology ([address]    Lumbar radiculopathy    Chronic low back pain    Piriformis syndrome    COPD (chronic obstructive pulmonary disease) (HCC)    Major depressive disorder, single episode, unspecified    Severe depressed bipolar I disorder without psychotic features ([address]    Polysubstance abuse ([address]    Syncope and collapse       Assessment                 60 y.o. female   ADHD, seizure disorder, CHF, COPD, hypertension, manic depression, presented transfer from outlying facility. CT head showed possible right putamen hemorrhage, neurosurgery for was more for calcification versus bleeding.      - Metabolic encephalopathy likely secondary to urinary tract infection  -Seizure disorder  -CKD  -Other comorbidities CHF, COPD, HTN, manic depression    Imaging   - CT Head  WO : done   -MRA head and neck wo : Pending   - MRI Brain WO : Pending   - ECHO with bubble stuy  - EEG       Medications   -  Aspirin 325  mg daily  X 1  -  Trileptal 300 mg   -  Depakote 500    -  AED's levels   -  Folic acid 1mg BID  -  Atorvastatin mg nightly     Labs  - Fasting Lipid panel  - HgbA1c lab      - PT, OT, Speech eval   - Telemetry   - Neuro checks per protocol  - We recommend -180  - Blood glucose goal less than 180  - Please avoid dextrose containing solutions        Additional recommendations may follow    Please contact [facility] with any changes in patients neurologic status. Thank you for your consult.        Lemuel Almazan MD   PGY 3 Neurology Resident  8/28/2021

## 2021-08-28 NOTE — PROGRESS NOTES
Patient received via stretcher. Admitted to room 534-see admission assessment. Assigned NP and attending physician notified regarding elevated B/Ps and awaiting a response. Will continue to monitor.

## 2021-08-28 NOTE — ED NOTES
Arterial line removed by Dr. Abilio Spring. Pressure applied for several minutes. Pressure dressing applied.      Naty Dover RN  08/28/21 [id number]

## 2021-08-28 NOTE — ED NOTES
Report given to Samantha Florian from [facility] to [facility], RN. Report method by phone   The following was reviewed with receiving RN:   Current vital signs:  BP (!) 151/94   Pulse 83   Temp 97.5 °F (36.4 °C) (Axillary)   Resp 18   Ht 5' 2\" (1.575 m)   Wt 190 lb (86.2 kg)   SpO2 94%   BMI 34.75 kg/m²                      Any medication or safety alerts were reviewed. Any pending diagnostics and notifications were also reviewed, as well as any safety concerns or issues, abnormal labs, abnormal imaging, and abnormal assessment findings. Questions were answered.             Ewelina Isaacs RN  08/27/21 [id number]

## 2021-08-28 NOTE — ED PROVIDER NOTES
[facility] ED  eMERGENCY dEPARTMENT eNCOUnter   Attending Attestation     Pt Name: Giuliana Corbett  MRN: [id number]  [place] 1965  Date of evaluation: 8/27/21       Giuliana Corbett is a 60 y.o. female who presents with Altered Mental Status      History: Pt transferred here for evaluation of AMS in setting of possible IPH. Exam: HRRR, lungs CTABL, abdomen soft and non tender. Pt is disoriented and confused. Plan for ams workup and admission as well as NS consult. I performed a history and physical examination of the patient and discussed management with the resident. I reviewed the residents note and agree with the documented findings and plan of care. Any areas of disagreement are noted on the chart. I was personally present for the key portions of any procedures. I have documented in the chart those procedures where I was not present during the key portions. I have personally reviewed all images and agree with the resident's interpretation. I have reviewed the emergency nurses triage note. I agree with the chief complaint, past medical history, past surgical history, allergies, medications, social and family history as documented unless otherwise noted below. Documentation of the HPI, Physical Exam and Medical Decision Making performed by medical students or scribes is based on my personal performance of the HPI, PE and MDM. For Phys Assistant/ Nurse Practitioner cases/documentation I have had a face to face evaluation of this patient and have completed at least one if not all key elements of the E/M (history, physical exam, and MDM). Additional findings are as noted. For APC cases I have personally evaluated and examined the patient in conjunction with the APC and agree with the treatment plan and disposition of the patient as recorded by the APC.     Iveth Hyatt MD  Attending Emergency  Physician       Samantha Price MD  09/06/21 [id number]

## 2021-08-28 NOTE — PROGRESS NOTES
[facility]  Office: [address], DO, Ezra Kras, DO, Maria E Ng, DO, Sandeep Lloyd Blood, DO, Marvin Brunner MD, Laurie Medina MD, Bhupinder Luke MD, Mark Denise MD, He White MD, Sanjeev Coombs MD, Ct Petty MD, Carl Eid, DO, Butch Gomez MD, Mariela Mcdonald, DO, Nettie Farah MD,  Donis Salguero, DO, Saurabh Scott MD, Mack Gomez MD, Shilo Orlando MD, Mary Layton MD, Mago Edwards MD, Ra Jackson MD, Rosetta Garvin MD, Az Tidwell, [facility], [facility], CNP, Kingston Arora, CNP, Mikaela Pryor, CNS, Chapis Rolon, CNP, Molina Culver, CNP, Juanjose Joseph, CNP, Alexa Palomino, CNP, Harini Soldvasyl, CNP, Medardo Desir PA-C, Mesfin Moreno, [facility], Shyanne Mendez, CNP, Oreugenio Soda, CNP, Wyannaleeia Husbands, CNP, Jamar Zamarripa, CNP, Anel Talbot, CNP, Colletta Moros, CNP, Thalia Park, CNP, Reta Tubbss, [address] [place] [address]    Progress Note    8/28/2021    9:05 AM    Name:   Dax Santo  MRN:     [id number]     Acct:      [de-identified]   Room:   [address] [place] [address] Day:  0  Admit Date:  8/27/2021  9:31 PM    PCP:   KEANU Cardenas CNP  Code Status:  Full Code    Subjective:     C/C:   Chief Complaint   Patient presents with    Altered Mental Status     Interval History Status: improved. Patient seen and examined, blood pressure controlled on Cardene, transfer to cardiac unit for titration of Cardene. Awaiting MRI/MRA for neurological evaluation, patient still confused not answering questions    Brief History:      78-year-old female past medical history for diastolic heart failure, COPD, manic depression, seizure disorder, substance abuse presents for intentional overdose of barbiturates. Patient arrived by EMS short of breath, received 1 dose of Narcan with minimal response.   Patient was seen by the medical ICU team at [facility] and had a CT scan showing hypodensity of the right putamen. Decision was made to transfer to [place] for neurosurgical evaluation. CT was reviewed by neurosurgery team, suspected to be secondary to a calcification. Neurology and neurosurgery recommended blood pressure management with goal of systolic 545. Patient's blood pressure has been labile with record blood pressures of 200s to 90s. Patient has an MRI/MRA pending, she is still confused at this time. Blood pressure currently 160s on a Cardene drip. Review of Systems:     Unable to obtain , patient confused    Medications: Allergies:     Allergies   Allergen Reactions    Imitrex [Sumatriptan] Hives    Bee Pollen     Bee Venom     Bromide Ion [Bromine]     Reglan [Metoclopramide]     Sulfa Antibiotics     Sulfadiazine     Tramadol Hives       Current Meds:   Scheduled Meds:    budesonide-formoterol  2 puff Inhalation BID    cetirizine  10 mg Oral Daily    clomiPRAMINE  50 mg Oral Nightly    fluticasone  2 spray Each Nostril Daily    levothyroxine  100 mcg Oral Daily    lisinopril  20 mg Oral Daily    venlafaxine  75 mg Oral Daily with breakfast    sodium chloride flush  5-40 mL IntraVENous 2 times per day    enoxaparin  30 mg Subcutaneous Daily    [START ON 8/29/2021] cefTRIAXone (ROCEPHIN) IV  1,000 mg IntraVENous Q24H    carvedilol  3.125 mg Oral BID WC    OXcarbazepine  300 mg Oral BID    divalproex  500 mg Oral Daily     Continuous Infusions:    sodium chloride      niCARdipine 2.5 mg/hr (08/28/21 0815)     PRN Meds: albuterol sulfate HFA, famotidine, sodium chloride flush, sodium chloride, ondansetron **OR** ondansetron, acetaminophen **OR** acetaminophen, metoprolol    Data:     Past Medical History:   has a past medical history of Arthritis, Asthma, Attention deficit hyperactivity disorder (ADHD), combined type, Borderline diabetes, Borderline personality disorder ([facility] [address] CHF (congestive heart failure) ([facility] [address] COPD (chronic obstructive pulmonary disease) ([facility] [address] Fibromyalgia, Headache, Hypertension, Manic depression ([facility] [address] Movement disorder, Neck fracture ([facility] [address] Pernicious anemia, Seizure ([facility] [address] and Thyroid disease. Social History:   reports that she has been smoking. She has a 6.00 pack-year smoking history. She has never used smokeless tobacco. She reports previous drug use. She reports that she does not drink alcohol. Family History: No family history on file. Vitals:  BP (!) 206/127   Pulse 75   Temp 97.8 °F (36.6 °C) (Oral)   Resp 18   Wt 182 lb 15.7 oz (83 kg)   SpO2 93%   BMI 33.47 kg/m²   Temp (24hrs), Av.8 °F (36.6 °C), Min:97.5 °F (36.4 °C), Max:98.2 °F (36.8 °C)    No results for input(s): POCGLU in the last 72 hours. I/O (24Hr):     Intake/Output Summary (Last 24 hours) at 2021 0905  Last data filed at 2021 0630  Gross per 24 hour   Intake 273 ml   Output 1250 ml   Net -977 ml       Labs:  Hematology:  Recent Labs     21  16321  0423   WBC 17.0*  --  14.2*   RBC 5.57*  --  5.50*   HGB 16.9*  --  16.5*   HCT 51.1*  --  53.8*   MCV 91.8  --  97.8   MCH 30.4  --  30.0   MCHC 33.1  --  30.7   RDW 15.1*  --  13.6     --  126*   MPV 7.5  --  9.9   INR  --  0.9  --      Chemistry:  Recent Labs     21  0006 21  0527     --   --  140   K 4.0  --   --  4.0   CL 96*  --   --  105   CO2 20  --   --  20   GLUCOSE 140*  --   --  125*   BUN 26*  --   --  18   CREATININE 2.61*  --   --  0.83   ANIONGAP 21*  --   --  15   LABGLOM 19*  --   --  >60   GFRAA 23*  --   --  >60   CALCIUM 10.2  --   --  9.4   TROPHS  --   --  6 <6   CKTOTAL  --  149  --   --    MYOGLOBIN  --  349*  --   --      Recent Labs     21  1712 21  0527   PROT 8.2 6.9   LABALBU 4.7 4.0   AST 16 15   ALT 11 10   ALKPHOS 111* 89   BILITOT 0.63 0.49   LIPASE 21  --    CHOL  --  203*   HDL  --  48   LDLCHOLESTEROL  --  131*   CHOLHDLRATIO  --  4.2   TRIG  --  120   VLDL  --  NOT REPORTED ABG:  Lab Results   Component Value Date    POCPH 7.425 02/15/2019    PHART 7.399 08/27/2021    POCPCO2 43.8 02/15/2019    PYI1EUW 31.7 08/27/2021    POCPO2 67.9 02/15/2019    PO2ART 88.9 08/27/2021    POCHCO3 28.7 02/15/2019    SJG9FGR 19.6 08/27/2021    NBEA 5.2 08/27/2021    PBEA NOT REPORTED 08/27/2021    EAH2DGQ 30 02/15/2019    WTFJ3BMX 94 02/15/2019    Y8QZCIBH 93.0 08/27/2021    FIO2 NOT REPORTED 08/27/2021     Lab Results   Component Value Date/Time    SPECIAL NOT REPORTED 08/27/2021 08:16 PM     Lab Results   Component Value Date/Time    CULTURE NO GROWTH 9 HOURS 08/27/2021 08:16 PM       Radiology:  CT HEAD WO CONTRAST    Result Date: 8/27/2021  Interval development of hyperdensities in the left putamen. While these may represent basal ganglia calcifications the possibility of small focal left putamen hemorrhages cannot be excluded as these are new from prior study. Further evaluation with MRI may be beneficial. Findings were discussed with Dr. Frederic Ramirez at 8:10 pm on 8/27/2021. XR CHEST PORTABLE    Result Date: 8/28/2021  1. Right IJ central line in place, tip at the junction of the SVC and right atrium. 2. No evidence of a pneumothorax     XR CHEST PORTABLE    Result Date: 8/27/2021  No acute process.        Physical Examination:        General appearance:  alert, cooperative and no distress  Mental Status:  confused  Lungs:  clear to auscultation bilaterally, normal effort  Heart:  regular rate and rhythm, no murmur  Abdomen:  soft, nontender, nondistended, normal bowel sounds, no masses, hepatomegaly, splenomegaly  Extremities:  no edema, redness, tenderness in the calves  Skin:  no gross lesions, rashes, induration    Assessment:        Hospital Problems         Last Modified POA    * (Principal) Hypertensive emergency 8/28/2021 Yes    Drug overdose, accidental or unintentional, initial encounter 8/28/2021 Yes    Hypothyroidism 8/28/2021 Yes    Seizure disorder ([address] 8/28/2021 Yes    Drug overdose 8/28/2021 Yes    History of seizure 8/28/2021 Yes    Major depressive disorder with psychotic features ([facility] [address] 8/28/2021 Yes    Sepsis ([facility] [address] 8/28/2021 Yes    COPD (chronic obstructive pulmonary disease) ([facility] [address] 8/28/2021 Yes    Altered mental state 8/28/2021 Yes          Plan:        1. Toxic metabolic encephalopathy -unknown ingestion of medication. Possibly Suboxone per chart. This may be possibly secondary also to hypertensive emergency. Patient is on a Cardene drip with blood pressures of 160s currently. Patient also has been noncompliant with seems with hyperglycemia medications, restarted on home doses of medications per records. Neurosurgery and neurology were both consulted, MRI/MRA ordered  2. Hypertensive emergencymaintain blood pressures of 140s to 180s. Start Coreg, uptitrate as needed. Continue lisinopril  3. Chronic opiate abusepatient supposedly has taken Suboxone in the past but not taking currently. If patient's mentation improves can possibly restart  4. Seizure disorderrestart Depakote and Trileptal, neurology following, given phenobarbital in the ER. 5. Hypothyroidismcontinue to monitor micrograms of Synthroid  6. Suspected sepsisunclear etiology, UA is unconvincing. Blood cultures are negative, WBC decreasing on Rocephin. We will continue short course of 5 days of Rocephin  7. Plan:  1. Transfer to cardiac floor for closer blood pressure monitoring and titration of Cardene  2. MRI/MRA per neurology  3. Restart antiseizure medications  4. Start Coreg, uptitrate as needed. 5. Goal blood pressure of 140s to 160s.     Jim Lyles DO  8/28/2021  9:05 AM

## 2021-08-29 LAB
ALBUMIN SERPL-MCNC: 3.8 G/DL (ref 3.5–5.2)
ANION GAP SERPL CALCULATED.3IONS-SCNC: 13 MMOL/L (ref 9–17)
BUN BLDV-MCNC: 16 MG/DL (ref 6–20)
BUN/CREAT BLD: ABNORMAL (ref 9–20)
CALCIUM SERPL-MCNC: 9.1 MG/DL (ref 8.6–10.4)
CHLORIDE BLD-SCNC: 101 MMOL/L (ref 98–107)
CO2: 24 MMOL/L (ref 20–31)
CREAT SERPL-MCNC: 0.55 MG/DL (ref 0.5–0.9)
GFR AFRICAN AMERICAN: >60 ML/MIN
GFR NON-AFRICAN AMERICAN: >60 ML/MIN
GFR SERPL CREATININE-BSD FRML MDRD: ABNORMAL ML/MIN/{1.73_M2}
GFR SERPL CREATININE-BSD FRML MDRD: ABNORMAL ML/MIN/{1.73_M2}
GLUCOSE BLD-MCNC: 80 MG/DL (ref 70–99)
HCT VFR BLD CALC: 45.3 % (ref 36.3–47.1)
HEMOGLOBIN: 14.5 G/DL (ref 11.9–15.1)
MAGNESIUM: 2.1 MG/DL (ref 1.6–2.6)
MCH RBC QN AUTO: 29.9 PG (ref 25.2–33.5)
MCHC RBC AUTO-ENTMCNC: 32 G/DL (ref 28.4–34.8)
MCV RBC AUTO: 93.4 FL (ref 82.6–102.9)
NRBC AUTOMATED: 0 PER 100 WBC
OXCARBAZEPINE: <1 UG/ML (ref 3–35)
PDW BLD-RTO: 13.9 % (ref 11.8–14.4)
PHOSPHORUS: 1.8 MG/DL (ref 2.6–4.5)
PLATELET # BLD: 221 K/UL (ref 138–453)
PMV BLD AUTO: 9.6 FL (ref 8.1–13.5)
POTASSIUM SERPL-SCNC: 3.8 MMOL/L (ref 3.7–5.3)
RBC # BLD: 4.85 M/UL (ref 3.95–5.11)
SODIUM BLD-SCNC: 138 MMOL/L (ref 135–144)
WBC # BLD: 9.2 K/UL (ref 3.5–11.3)

## 2021-08-29 PROCEDURE — 2060000000 HC ICU INTERMEDIATE R&B

## 2021-08-29 PROCEDURE — 6370000000 HC RX 637 (ALT 250 FOR IP): Performed by: NURSE PRACTITIONER

## 2021-08-29 PROCEDURE — 2500000003 HC RX 250 WO HCPCS: Performed by: NURSE PRACTITIONER

## 2021-08-29 PROCEDURE — 99232 SBSQ HOSP IP/OBS MODERATE 35: CPT | Performed by: INTERNAL MEDICINE

## 2021-08-29 PROCEDURE — 6370000000 HC RX 637 (ALT 250 FOR IP): Performed by: INTERNAL MEDICINE

## 2021-08-29 PROCEDURE — 99232 SBSQ HOSP IP/OBS MODERATE 35: CPT | Performed by: PSYCHIATRY & NEUROLOGY

## 2021-08-29 PROCEDURE — 6360000002 HC RX W HCPCS: Performed by: NURSE PRACTITIONER

## 2021-08-29 PROCEDURE — 2580000003 HC RX 258: Performed by: NURSE PRACTITIONER

## 2021-08-29 PROCEDURE — 85027 COMPLETE CBC AUTOMATED: CPT

## 2021-08-29 PROCEDURE — 36415 COLL VENOUS BLD VENIPUNCTURE: CPT

## 2021-08-29 PROCEDURE — 80069 RENAL FUNCTION PANEL: CPT

## 2021-08-29 PROCEDURE — 83735 ASSAY OF MAGNESIUM: CPT

## 2021-08-29 RX ORDER — HYDRALAZINE HYDROCHLORIDE 20 MG/ML
10 INJECTION INTRAMUSCULAR; INTRAVENOUS ONCE
Status: COMPLETED | OUTPATIENT
Start: 2021-08-29 | End: 2021-08-29

## 2021-08-29 RX ORDER — LANOLIN ALCOHOL/MO/W.PET/CERES
3 CREAM (GRAM) TOPICAL ONCE
Status: COMPLETED | OUTPATIENT
Start: 2021-08-29 | End: 2021-08-29

## 2021-08-29 RX ADMIN — Medication 3 MG: at 20:53

## 2021-08-29 RX ADMIN — SODIUM CHLORIDE, PRESERVATIVE FREE 10 ML: 5 INJECTION INTRAVENOUS at 08:14

## 2021-08-29 RX ADMIN — CARVEDILOL 3.12 MG: 3.12 TABLET, FILM COATED ORAL at 17:46

## 2021-08-29 RX ADMIN — CEFTRIAXONE SODIUM 1000 MG: 1 INJECTION, POWDER, FOR SOLUTION INTRAMUSCULAR; INTRAVENOUS at 00:59

## 2021-08-29 RX ADMIN — LISINOPRIL 20 MG: 20 TABLET ORAL at 08:14

## 2021-08-29 RX ADMIN — FLUTICASONE PROPIONATE 2 SPRAY: 50 SPRAY, METERED NASAL at 08:14

## 2021-08-29 RX ADMIN — ENOXAPARIN SODIUM 30 MG: 30 INJECTION SUBCUTANEOUS at 08:13

## 2021-08-29 RX ADMIN — CARVEDILOL 3.12 MG: 3.12 TABLET, FILM COATED ORAL at 08:13

## 2021-08-29 RX ADMIN — OXCARBAZEPINE 300 MG: 300 TABLET, FILM COATED ORAL at 17:46

## 2021-08-29 RX ADMIN — DIVALPROEX SODIUM 500 MG: 500 TABLET, EXTENDED RELEASE ORAL at 08:15

## 2021-08-29 RX ADMIN — SODIUM CHLORIDE, PRESERVATIVE FREE 10 ML: 5 INJECTION INTRAVENOUS at 20:53

## 2021-08-29 RX ADMIN — LEVOTHYROXINE SODIUM 100 MCG: 100 TABLET ORAL at 08:13

## 2021-08-29 RX ADMIN — DIVALPROEX SODIUM 1000 MG: 500 TABLET, EXTENDED RELEASE ORAL at 17:48

## 2021-08-29 RX ADMIN — METOPROLOL TARTRATE 5 MG: 1 INJECTION, SOLUTION INTRAVENOUS at 20:53

## 2021-08-29 RX ADMIN — VENLAFAXINE HYDROCHLORIDE 75 MG: 75 CAPSULE, EXTENDED RELEASE ORAL at 08:13

## 2021-08-29 RX ADMIN — HYDRALAZINE HYDROCHLORIDE 10 MG: 20 INJECTION INTRAMUSCULAR; INTRAVENOUS at 22:40

## 2021-08-29 ASSESSMENT — PAIN SCALES - GENERAL
PAINLEVEL_OUTOF10: 0
PAINLEVEL_OUTOF10: 0

## 2021-08-29 NOTE — PROGRESS NOTES
[id number] [facility]   [address] [place] [address]    Resident Progress Note             Date:   8/29/2021  Patient name:  Donovan Santiago  Date of admission:  8/27/2021  9:31 PM  MRN:   [id number]  Account:  [de-identified]  YOB: 1965  PCP:    KEANU Wellington CNP  Room:   [address] [place] [address]  Code Status:    Full Code      Brief History and interval:     Hospitalization Day: 3    60years old female patient who presented with altered mental status. She has a past medical history significant for hypothyroidism, CHF, COPD, manic depression, seizure disorder, substance abuse. On multiple home meds; nebulizers, Suboxone, Depakote,oxcarbazepine, venlafaxine, Neurontin, lisinopril, risperidone, levothyroxine, clonidine, trazodone, aspirin    Last well-known 1400 on 8/27; EMS were called to patient's house. Upon their arrival, they found patient to be in respiratory distress with oxygen desaturation. Patient was put on BiPAP by EMS with some improvement. Patient also was given 1 dose of Narcan with minimal response. Patient was taken to [facility] where she was found to have SEDA + urinalysis concerning for UTI + elevated hemoglobin + elevated CO2 + elevated anion gap. At that time on exam patient was AAO x1 responding to pain. CT head done at that time was concerning for left putamen hemorrhage versus calcification on CT head. Last CT head done was in July 2020 and did not reveal such hyperdensities. Patient was transferred to [facility] for further evaluation for possible degree hemorrhage neurosurgery were consulted. Patient was also seen by mobile stroke unit the patient was able to start following some commands but confused. She was noted to have minimal to no movement of left arm and leg. On examination at [facility] ER, patient is alert and awake, oriented only to person. Not answering questions appropriately but laughing.   Stated that she was in pain while central line being placed by EM. Patient able to move all 4 extremities, some left-sided weakness greater than right. Neurosurgery impression: Likely this left basal ganglia hyperdensity is more consistent with calcification then bleed. This finding does not go with her clinical picture of encephalopathy in addition her encephalopathy is improving    Patient was on Cardene infusion due to hypertensive urgency during admission. Also patient was on phenobarbital in the past and was given as a loading dose in the emergency department. Vital signs, Input/output, lines/ drains, labs, cultures, imaging studies, medications, orders, consultations notes- all reviewed. Subjective: The patient was seen and examined and the chart was reviewed. Patient was awake, alert not oriented. Denied any complaints. She said she sees people in the room while it was only me examining her. No auditory hallucinations. Neurological exam was unremarkable except for generalized slowing in movement and in responding to questions. ROS  Constitutional: no fever, chills, fatigue  HENT: No change in vision or hearing   Respiratory: No cough, SOB, wheezing. Cardiovascular:  No chest pain, palpitations, leg swelling. Gastrointestinal: No nausea, vomiting, diarrhea. Genitourinary: No increased frequency, urgency. Musculoskeletal: right knee pain. Skin: No rashes or scarring or bruises. Neurological: No headache, paresthesia, or focal weakness. Endo/Heme/Allergies: Negative for itchy eyes or runny nose. Psychiatric/Behavioral: No anxiety or depressed mood.         budesonide-formoterol  2 puff Inhalation BID    fluticasone  2 spray Each Nostril Daily    levothyroxine  100 mcg Oral Daily    lisinopril  20 mg Oral Daily    venlafaxine  75 mg Oral Daily with breakfast    sodium chloride flush  5-40 mL IntraVENous 2 times per day    enoxaparin  30 mg Subcutaneous Daily    cefTRIAXone (ROCEPHIN) IV  1,000 mg IntraVENous Q24H    carvedilol  3.125 mg Oral BID     divalproex  500 mg Oral Daily    OXcarbazepine  300 mg Oral QPM    divalproex  1,000 mg Oral QPM       Past Medical History:   Diagnosis Date    Arthritis     Asthma     Attention deficit hyperactivity disorder (ADHD), combined type     Borderline diabetes     Borderline personality disorder (HCC)     CHF (congestive heart failure) (HCC)     COPD (chronic obstructive pulmonary disease) (HCC)     Fibromyalgia     Headache     Hypertension     Manic depression ([address]     Movement disorder     Neck fracture ([address]     Pernicious anemia     Seizure ([address]     Thyroid disease        Past Surgical History:   Procedure Laterality Date    EXPLORATION OF WOUND OF EXTREMITY Right 5/19/2019    RIGHT THIGH WOUND WASHOUT WITH [facility] WEST-ER PLACEMENT performed by Jaimee Bailey MD at [address] N/A 5/22/2019    RIGHT WOUND HIP EXAMINATION LAVAGE AND WOUND VAC PLACEMENT performed by Linette Kirkland MD at [address] [place] [address] Right 5/17/2019    IRRIGATION, DEBRIDEMENT RIGHT THIGH performed by Yenny Martin MD at [place] [address] Bilateral     LYMPH NODE DISSECTION      cervical    PARTIAL HYSTERECTOMY         Objective:     PHYSICAL EXAM:      Blood pressure 139/83, pulse 73, temperature 98.6 °F (37 °C), temperature source Oral, resp. rate 22, weight 182 lb 15.7 oz (83 kg), SpO2 92 %, not currently breastfeeding. General Examination    General Resting comfortably in bed   Head Normocephalic, without obvious abnormality   Neck Supple, symmetrical. Good ROM. No midline or paraspinal tenderness. Lungs Respirations unlabored, no wheezing   Chest Wall No deformity   Heart RRR, no murmur   Abdomen Soft. Non-tender, non-distended   Extremities No cyanosis or edema or warmth. Right knee splint   Pulses 2+ and symmetric   Skin: Skin  turgor normal, no rashes or lesions     Mental status  Speech Alert. Not oriented. Not in distress. Speech is fluent without paraphasic errors  Good repetition and naming  Can do 1 step, 2 step, and cross-body commands  Language appropriate. +ve visual hallucinations. delusions. No SI/HI. Cranial nerves   II - VFF, visual threat intact  III, IV, VI - extra-ocular muscles full. No nystagmus. Pupils symmetric and responsive.    V - sensation symmetric         VII -  No facial droop or asymmetric NLF  VIII - intact hearing to conversational tone          IX, X - symmetrical palate elevation   XI - 5/5 strength symmetric  XII - tongue midline   Motor function  Strength: grossly 5/5 in b/l              Deltoid, biceps, triceps, wrist flexion, wrist extension             Hip flexion/extension, knee flexion/extension, plantar flexion  Bulk: grossly normal no atrophy  Tone: symmetric b/l arms and legs  Abnormal movements: No abnormal movements or tremor   Sensory function Symmetric to touch in all extremities bilaterally   Cerebellar No dysmetria or dysdiadochocinesia    Reflex function DTR:        2+ b/l symmetric in biceps, brachioradialis, patellar, calcaneal  Babinski b/l plantar downgoing   Gait                  Not assessed       Investigations:      Laboratory Testing:  Recent Results (from the past 24 hour(s))   Valproic acid level, total and free    Collection Time: 08/28/21 11:27 AM   Result Value Ref Range    Valproic Acid Lvl <3 (L) 50 - 125 ug/mL    Valproic Dose amount UNKNOWN     Valproic Date last dose UNKNOWN     Valproic Time last dose UNKNOWN     Valproic Acid, Free 0.8 (L) 7.0 - 23.0 ug/mL    Valproic Acid % Free CANNOT BE CALCULATED 5.0 - 18.4 %       Imaging/Diagnostics:  XR ANKLE RIGHT (MIN 3 VIEWS)    Result Date: 8/19/2021  EXAMINATION: THREE XRAY VIEWS OF THE RIGHT ANKLE 8/18/2021 4:23 pm COMPARISON: 05/18/2021 HISTORY: ORDERING SYSTEM PROVIDED HISTORY: Right ankle pain, unspecified chronicity TECHNOLOGIST PROVIDED HISTORY: Right ankle fracture in May FINDINGS: Ankle mortise intact. Oblique fracture distal fibular metaphysis shows ongoing healing with fracture line less distinct and some callus formation. No new fracture. Soft tissues grossly intact. Healing oblique distal right fibular metaphysis fracture. CT HEAD WO CONTRAST    Result Date: 8/27/2021  EXAMINATION: CT OF THE HEAD WITHOUT CONTRAST  8/27/2021 7:09 pm TECHNIQUE: CT of the head was performed without the administration of intravenous contrast. Dose modulation, iterative reconstruction, and/or weight based adjustment of the mA/kV was utilized to reduce the radiation dose to as low as reasonably achievable. COMPARISON: July 14, 2020 HISTORY: ORDERING SYSTEM PROVIDED HISTORY: Mental Status Changes TECHNOLOGIST PROVIDED HISTORY: Mental Status Changes Decision Support Exception - unselect if not a suspected or confirmed emergency medical condition->Emergency Medical Condition (MA) Reason for Exam: Altered Mental Status Acuity: Unknown Type of Exam: Unknown Relevant Medical/Surgical History: overdose, htn, seizures, FINDINGS: BRAIN/VENTRICLES: There is chronic ischemic changes of the periventricular white matter. When compared to the prior study there is interval development punctate hyperdensities in the left putamen. The possibility of focal putamen hemorrhages cannot be excluded. The gray-white differentiation is maintained without evidence of an acute infarct. There is no evidence of hydrocephalus. ORBITS: The visualized portion of the orbits demonstrate no acute abnormality. SINUSES: The visualized paranasal sinuses and mastoid air cells demonstrate no acute abnormality. SOFT TISSUES/SKULL:  No acute abnormality of the visualized skull or soft tissues. Interval development of hyperdensities in the left putamen. While these may represent basal ganglia calcifications the possibility of small focal left putamen hemorrhages cannot be excluded as these are new from prior study.  Further evaluation with MRI may be beneficial. Findings were discussed with Dr. Nelson Morse at 8:10 pm on 8/27/2021. MRA HEAD WO CONTRAST    Result Date: 8/28/2021  EXAMINATION: MRI OF THE BRAIN WITHOUT CONTRAST; MRA OF THE HEAD WITHOUT CONTRAST; MRA OF THE NECK WITHOUT CONTRAST 8/28/2021 12:15 pm: TECHNIQUE: Multiplanar multisequence MRI of the brain was performed without the administration of intravenous contrast.; MRA of the head was performed utilizing time-of-flight imaging with MIP images. No intravenous contrast was administered.; Multiplanar multisequence MRA of the neck was performed without the administration of intravenous contrast. Stenosis of the internal carotid arteries measured using NASCET criteria. COMPARISON: None. HISTORY: ORDERING SYSTEM PROVIDED HISTORY: AMS TECHNOLOGIST PROVIDED HISTORY: AMS Reason for Exam: AMS r/o stroke; ORDERING SYSTEM PROVIDED HISTORY: AMS to r/o stroke TECHNOLOGIST PROVIDED HISTORY: AMS to r/o stroke Decision Support Exception - unselect if not a suspected or confirmed emergency medical condition->Emergency Medical Condition (MA) Reason for Exam: AMS to r/o stroke; ORDERING SYSTEM PROVIDED HISTORY: AMS to r/o stroke TECHNOLOGIST PROVIDED HISTORY: AMS to r/o stroke Reason for Exam: AMS r/o stroke FINDINGS: MRI BRAIN: INTRACRANIAL STRUCTURES/VENTRICLES: There is a small area presumed acute to subacute infarct involving the left frontal lobe periventricular white matter (series 4, image 13). No mass effect or midline shift. Areas of T2 FLAIR hyperintensity are seen in the periventricular and subcortical white matter as well as the prema, which are nonspecific, but may represent chronic microvascular ischemic change. There is prominence of the ventricles and sulci due to global parenchymal volume loss. The sellar/suprasellar regions appear unremarkable. The normal signal voids within the major intracranial vessels appear maintained.  ORBITS: The visualized portion of the orbits demonstrate no acute abnormality. SINUSES: The visualized paranasal sinuses and mastoid air cells are well aerated. BONES/SOFT TISSUES: The bone marrow signal intensity appears normal. The soft tissues demonstrate no acute abnormality. MRA NECK: Evaluation is limited due to patient motion as well as lack of intravenous contrast. AORTIC ARCH/ARCH VESSELS: The aortic arch arch vessels are not well evaluated on this exam. CAROTID ARTERIES: Question of mild stenosis involving the proximal left common carotid artery. No convincing stenosis of the right common carotid artery. No flow limiting stenosis is clearly identified of the internal carotid arteries by NASCET criteria. VERTEBRAL ARTERIES: Only minimal flow related enhancement is seen within the left vertebral artery. Unclear whether this is related to a diminutive caliber versus diffuse disease. The origin of the right vertebral artery is not well visualized. No convincing stenosis otherwise seen of the right vertebral artery. 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 There is question moderate stenosis involving the left supraclinoid ICA. No focal stenosis is seen of the right internal carotid artery. No significant stenosis is seen of the anterior cerebral or middle cerebral arteries. POSTERIOR CIRCULATION: There is no significant flow related enhancement within the proximal left vertebral artery. There is a diminutive caliber with diffuse irregularity of the basilar artery. No focal stenosis is seen of the right vertebral artery. No convincing evidence of a significant stenosis or occlusion involving the posterior cerebral arteries within the limitations of patient motion. 1. Small area of presumed acute to subacute infarct involving the left frontal white matter. No significant mass effect or midline shift.  2. Otherwise, no acute intracranial abnormality. 3. Mild global parenchymal volume loss with mild chronic microvascular ischemic changes. 4. MRA of the head and neck is limited given patient motion. 5. Only minimal flow related enhancement is seen within the left cervical vertebral artery without significant flow related enhancement within the proximal V4 segment intracranially. Unclear whether this is related to a diffusely diminutive caliber of the left vertebral artery versus diffuse disease. 6. No flow limiting stenosis otherwise seen of the cervical carotid/vertebral arteries. 7. There is suggestion of moderate stenosis involving the left supraclinoid ICA. 8. Otherwise, no convincing significant stenosis of the [id number]. XR CHEST PORTABLE    Result Date: 8/28/2021  EXAMINATION: ONE XRAY VIEW OF THE CHEST 8/27/2021 5:58 pm COMPARISON: August 27, 2021 1548 hours HISTORY: ORDERING SYSTEM PROVIDED HISTORY: right IJ CVC TECHNOLOGIST PROVIDED HISTORY: right IJ CVC Reason for Exam: line placement  supine port FINDINGS: Portable view of the chest demonstrates interval placement of a right IJ central line. Tip is at the junction of the SVC and right atrium. No pneumothorax is noted. No focal area of consolidation is present. Heart size and mediastinal contours are stable. Osseous structures appear normal.     1. Right IJ central line in place, tip at the junction of the SVC and right atrium. 2. No evidence of a pneumothorax     XR CHEST PORTABLE    Result Date: 8/27/2021  EXAMINATION: ONE XRAY VIEW OF THE CHEST 8/27/2021 1:01 pm COMPARISON: 05/30/2021 HISTORY: ORDERING SYSTEM PROVIDED HISTORY: SOB TECHNOLOGIST PROVIDED HISTORY: SOB Reason for Exam: SOB. Acuity: Unknown Type of Exam: Unknown Additional signs and symptoms: SOB. FINDINGS: The lungs are without acute focal process. There is no effusion or pneumothorax. The cardiomediastinal silhouette is without acute process. The osseous structures are without acute process. No acute process. MRA NECK WO CONTRAST    Result Date: 8/28/2021  EXAMINATION: MRI OF THE BRAIN WITHOUT CONTRAST; MRA OF THE HEAD WITHOUT CONTRAST; MRA OF THE NECK WITHOUT CONTRAST 8/28/2021 12:15 pm: TECHNIQUE: Multiplanar multisequence MRI of the brain was performed without the administration of intravenous contrast.; MRA of the head was performed utilizing time-of-flight imaging with MIP images. No intravenous contrast was administered.; Multiplanar multisequence MRA of the neck was performed without the administration of intravenous contrast. Stenosis of the internal carotid arteries measured using NASCET criteria. COMPARISON: None. HISTORY: ORDERING SYSTEM PROVIDED HISTORY: AMS TECHNOLOGIST PROVIDED HISTORY: AMS Reason for Exam: AMS r/o stroke; ORDERING SYSTEM PROVIDED HISTORY: AMS to r/o stroke TECHNOLOGIST PROVIDED HISTORY: AMS to r/o stroke Decision Support Exception - unselect if not a suspected or confirmed emergency medical condition->Emergency Medical Condition (MA) Reason for Exam: AMS to r/o stroke; ORDERING SYSTEM PROVIDED HISTORY: AMS to r/o stroke TECHNOLOGIST PROVIDED HISTORY: AMS to r/o stroke Reason for Exam: AMS r/o stroke FINDINGS: MRI BRAIN: INTRACRANIAL STRUCTURES/VENTRICLES: There is a small area presumed acute to subacute infarct involving the left frontal lobe periventricular white matter (series 4, image 13). No mass effect or midline shift. Areas of T2 FLAIR hyperintensity are seen in the periventricular and subcortical white matter as well as the prema, which are nonspecific, but may represent chronic microvascular ischemic change. There is prominence of the ventricles and sulci due to global parenchymal volume loss. The sellar/suprasellar regions appear unremarkable. The normal signal voids within the major intracranial vessels appear maintained. ORBITS: The visualized portion of the orbits demonstrate no acute abnormality.  SINUSES: The visualized paranasal sinuses and mastoid air cells are well aerated. BONES/SOFT TISSUES: The bone marrow signal intensity appears normal. The soft tissues demonstrate no acute abnormality. MRA NECK: Evaluation is limited due to patient motion as well as lack of intravenous contrast. AORTIC ARCH/ARCH VESSELS: The aortic arch arch vessels are not well evaluated on this exam. CAROTID ARTERIES: Question of mild stenosis involving the proximal left common carotid artery. No convincing stenosis of the right common carotid artery. No flow limiting stenosis is clearly identified of the internal carotid arteries by NASCET criteria. VERTEBRAL ARTERIES: Only minimal flow related enhancement is seen within the left vertebral artery. Unclear whether this is related to a diminutive caliber versus diffuse disease. The origin of the right vertebral artery is not well visualized. No convincing stenosis otherwise seen of the right vertebral artery. 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 There is question moderate stenosis involving the left supraclinoid ICA. No focal stenosis is seen of the right internal carotid artery. No significant stenosis is seen of the anterior cerebral or middle cerebral arteries. POSTERIOR CIRCULATION: There is no significant flow related enhancement within the proximal left vertebral artery. There is a diminutive caliber with diffuse irregularity of the basilar artery. No focal stenosis is seen of the right vertebral artery. No convincing evidence of a significant stenosis or occlusion involving the posterior cerebral arteries within the limitations of patient motion. 1. Small area of presumed acute to subacute infarct involving the left frontal white matter. No significant mass effect or midline shift. 2. Otherwise, no acute intracranial abnormality.  3. Mild global parenchymal volume loss with mild chronic microvascular ischemic changes. 4. MRA of the head and neck is limited given patient motion. 5. Only minimal flow related enhancement is seen within the left cervical vertebral artery without significant flow related enhancement within the proximal V4 segment intracranially. Unclear whether this is related to a diffusely diminutive caliber of the left vertebral artery versus diffuse disease. 6. No flow limiting stenosis otherwise seen of the cervical carotid/vertebral arteries. 7. There is suggestion of moderate stenosis involving the left supraclinoid ICA. 8. Otherwise, no convincing significant stenosis of the [id number]. MRI BRAIN WO CONTRAST    Result Date: 8/28/2021  EXAMINATION: MRI OF THE BRAIN WITHOUT CONTRAST; MRA OF THE HEAD WITHOUT CONTRAST; MRA OF THE NECK WITHOUT CONTRAST 8/28/2021 12:15 pm: TECHNIQUE: Multiplanar multisequence MRI of the brain was performed without the administration of intravenous contrast.; MRA of the head was performed utilizing time-of-flight imaging with MIP images. No intravenous contrast was administered.; Multiplanar multisequence MRA of the neck was performed without the administration of intravenous contrast. Stenosis of the internal carotid arteries measured using NASCET criteria. COMPARISON: None.  HISTORY: ORDERING SYSTEM PROVIDED HISTORY: AMS TECHNOLOGIST PROVIDED HISTORY: AMS Reason for Exam: AMS r/o stroke; ORDERING SYSTEM PROVIDED HISTORY: AMS to r/o stroke TECHNOLOGIST PROVIDED HISTORY: AMS to r/o stroke Decision Support Exception - unselect if not a suspected or confirmed emergency medical condition->Emergency Medical Condition (MA) Reason for Exam: AMS to r/o stroke; ORDERING SYSTEM PROVIDED HISTORY: AMS to r/o stroke TECHNOLOGIST PROVIDED HISTORY: AMS to r/o stroke Reason for Exam: AMS r/o stroke FINDINGS: MRI BRAIN: INTRACRANIAL STRUCTURES/VENTRICLES: There is a small area presumed acute to subacute infarct involving the left frontal lobe periventricular white matter (series 4, image 13). No mass effect or midline shift. Areas of T2 FLAIR hyperintensity are seen in the periventricular and subcortical white matter as well as the prema, which are nonspecific, but may represent chronic microvascular ischemic change. There is prominence of the ventricles and sulci due to global parenchymal volume loss. The sellar/suprasellar regions appear unremarkable. The normal signal voids within the major intracranial vessels appear maintained. ORBITS: The visualized portion of the orbits demonstrate no acute abnormality. SINUSES: The visualized paranasal sinuses and mastoid air cells are well aerated. BONES/SOFT TISSUES: The bone marrow signal intensity appears normal. The soft tissues demonstrate no acute abnormality. MRA NECK: Evaluation is limited due to patient motion as well as lack of intravenous contrast. AORTIC ARCH/ARCH VESSELS: The aortic arch arch vessels are not well evaluated on this exam. CAROTID ARTERIES: Question of mild stenosis involving the proximal left common carotid artery. No convincing stenosis of the right common carotid artery. No flow limiting stenosis is clearly identified of the internal carotid arteries by NASCET criteria. VERTEBRAL ARTERIES: Only minimal flow related enhancement is seen within the left vertebral artery. Unclear whether this is related to a diminutive caliber versus diffuse disease. The origin of the right vertebral artery is not well visualized. No convincing stenosis otherwise seen of the right vertebral artery. 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 There is question moderate stenosis involving the left supraclinoid ICA. No focal stenosis is seen of the right internal carotid artery. No significant stenosis is seen of the anterior cerebral or middle cerebral arteries.  POSTERIOR CIRCULATION: There is no significant flow related enhancement within the proximal left vertebral artery. There is a diminutive caliber with diffuse irregularity of the basilar artery. No focal stenosis is seen of the right vertebral artery. No convincing evidence of a significant stenosis or occlusion involving the posterior cerebral arteries within the limitations of patient motion. 1. Small area of presumed acute to subacute infarct involving the left frontal white matter. No significant mass effect or midline shift. 2. Otherwise, no acute intracranial abnormality. 3. Mild global parenchymal volume loss with mild chronic microvascular ischemic changes. 4. MRA of the head and neck is limited given patient motion. 5. Only minimal flow related enhancement is seen within the left cervical vertebral artery without significant flow related enhancement within the proximal V4 segment intracranially. Unclear whether this is related to a diffusely diminutive caliber of the left vertebral artery versus diffuse disease. 6. No flow limiting stenosis otherwise seen of the cervical carotid/vertebral arteries. 7. There is suggestion of moderate stenosis involving the left supraclinoid ICA. 8. Otherwise, no convincing significant stenosis of the tekhzm-el-Lpwzcn.        Assessment & Differential Dx:      Primary Problem  Hypertensive emergency    Active Hospital Problems    Diagnosis Date Noted    Altered mental state [R41.82] 08/28/2021    Hypertensive emergency [I16.1] 97/34/2794    Acute metabolic encephalopathy [X19.11]     Abnormal CT of the head [R93.0]     COPD (chronic obstructive pulmonary disease) ([facility] [J44.9] 02/13/2020    Sepsis ([facility] [A41.9] 05/17/2019    Major depressive disorder with psychotic features ([address] [F32.3] 01/11/2019    History of seizure [Z87.898]     Drug overdose [T50.901A]     Seizure disorder ([address] [G40.909]     Drug overdose, accidental or unintentional, initial encounter German Odor 01/09/2019    Hypothyroidism [E03.9] 01/09/2019       60years old female patient, presented with altered mental status in the setting of COPD exacerbation, UTI. CT head showed hypodensity of left basal ganglia concerning for calcification versus hemorrhage. Clinical impression is that this hyperdensity is more of calcification. MRI and MRA brain without contrast showed small area of acute to subacute infarct involving the left frontal white matter. No midline shift. Mild chronic microvascular ischemic changes with global parenchymal volume loss. Moderate stenosis of left supraclinoid ICA. There was flow related enhancement within the left cervical vertebral artery without significant flow related enhancement within the proximal V4 segment intracranially could be representative of diminutive caliber of the left vertebral artery versus diffuse disease. Oxcarbazepine: Pending  Valproic acid level; free 0.8    Clinical impression: Metabolic encephalopathy with Suboxone and underlying COPD/UTI + acute-subacute infarct left frontal white matter.   Pending EEG to rule out subclinical seizures    Plan:     Keep SBP less than 140; patient is currently on Coreg, lisinopril, Lopressor  Neurochecks per protocol  Head of bed 30°  Medical management for COPD and UTI/SEDA  Check EEG  Make Depakote 1000 mg twice daily; will discuss with attending  Oxcarbazepine 300 mg daily, awaiting levels  Need to obtain clear history of her home medications regarding AED    Courtney Campo MD, MD, 8/29/2021 7:24 AM

## 2021-08-29 NOTE — H&P
[facility]  Office: [address], DO, Bentley Ware, DO, Geoff Esdras, DO, Ivette Mcneil, DO, Sanjeev Lala MD, Abhilash Nieto MD, Ramírez Watters MD, Lily Denny MD, Aguilar Garcia MD, Teresa Medina MD, Bruce Suh MD, Salinas Abrams, DO, Karina Dorado MD, Andree Mae DO, aGrcía Reaves MD,  Maxwell Sky DO, Felipa Willson MD, Lance Sena MD, Lacy Bhat MD, Nayeli Dominguez MD, Jacinto Moyer MD, Antonio Stack MD, Robyn Duarte MD, Phong Turner, [facility], [facility], CNP, Heilo Chang, CNP, Inga Oh, [facility], Constantine Parrish, CNP, Mariposa Mcgarry, CNP, Mychal Lance, CNP, Magan Goldstein, CNP, Ami Patiño, CNP, Marj Benoit PAHA, Yani Chua, [facility], Gaby Taylor, CNP, Joel Carmona, CNP, Nelli Stewart, CNP, Warner Villar, CNP, Percy German, CNP, Asha Dueñas, CNP, Preet Brannon, CNP, Christal Diaz, CNP         [address]    HISTORY AND PHYSICAL EXAMINATION            Date:   8/28/2021  Patient name:  Natalie Weber  Date of admission:  8/27/2021  9:31 PM  MRN:   [id number]  Account:  [de-identified]  YOB: 1965  PCP:    KEANU Ann CNP  Room:   0984/7241-91  Code Status:    Full Code    Chief Complaint:     Chief Complaint   Patient presents with    Altered Mental Status       History Obtained From:     electronic medical record    History of Present Illness:     58-year-old female past medical history for diastolic heart failure, COPD, manic depression, seizure disorder, substance abuse presents for intentional overdose of barbiturates. Patient arrived by EMS short of breath, received 1 dose of Narcan with minimal response. Patient was seen by the medical ICU team at [facility] and had a CT scan showing hypodensity of the right putamen. Decision was made to transfer to [place] for neurosurgical evaluation.   CT was reviewed by neurosurgery team, suspected to be secondary to a calcification. Neurology and neurosurgery recommended blood pressure management with goal of systolic 081. Patient's blood pressure has been labile with record blood pressures of 200s to 90s. Patient has an MRI/MRA pending, she is still confused at this time. Blood pressure currently 160s on a Cardene drip. Past Medical History:     Past Medical History:   Diagnosis Date    Arthritis     Asthma     Attention deficit hyperactivity disorder (ADHD), combined type     Borderline diabetes     Borderline personality disorder ([address]     CHF [facility]congestive heart failure) ([facility])     COPD (chronic obstructive pulmonary disease) ([facility])     Fibromyalgia     Headache     Hypertension     Manic depression ([address]     Movement disorder     Neck fracture ([address]     Pernicious anemia     Seizure ([facility]     Thyroid disease         Past Surgical History:     Past Surgical History:   Procedure Laterality Date    EXPLORATION OF WOUND OF EXTREMITY Right 5/19/2019    RIGHT THIGH WOUND WASHOUT WITH [facility] WEST-ER PLACEMENT performed by Jaquelin Castillo MD at [address] N/A 5/22/2019    RIGHT WOUND HIP EXAMINATION LAVAGE AND WOUND VAC PLACEMENT performed by Allen Angeles MD at [address] [place] [address] Right 5/17/2019    IRRIGATION, DEBRIDEMENT RIGHT THIGH performed by Gladys Lui MD at [address] [place] [address] Bilateral     LYMPH NODE DISSECTION      cervical    PARTIAL HYSTERECTOMY          Medications Prior to Admission:     Prior to Admission medications    Medication Sig Start Date End Date Taking?  Authorizing Provider   divalproex (DEPAKOTE) 500 MG DR tablet Take by mouth Take 1 tablet in the morning and 2 tablets at bed time 7/22/21  Yes Historical Provider, MD   venlafaxine (EFFEXOR XR) 75 MG extended release capsule 150 mg daily Patient takes 2 tablets of 75 mg 8/3/21  Yes Historical Provider, MD   risperiDONE (RISPERDAL) 2 MG tablet 2 mg daily 7/30/21  Yes Historical Provider, MD   risperiDONE (RISPERDAL) 4 MG tablet 4 mg nightly  7/30/21  Yes Historical Provider, MD   ACETAMINOPHEN EXTRA STRENGTH 500 MG tablet TAKE 1 TO 2 TAB BY MOUTH EVERY 8 HOURS AS NEEDED FOR PAIN  8/4/21  Yes KEANU Kimball CNP   albuterol sulfate  (90 Base) MCG/ACT inhaler Inhale 1 puff into the lungs every 6 hours as needed for [place] prescription until follow up with primary. Do not refill. Follow up with primary 7/23/21  Yes KEANU Kimball CNP   budesonide-formoterol [facility]) 160-4.5 MCG/ACT AERO Inhale 2 puffs into the lungs 2 times daily 6/3/21  Yes Laura Silva DO   gabapentin (NEURONTIN) 300 MG capsule TAKE 2 CAPSULES BY MOUTH 3 TIMES DAILY FOR 30 DAYS. Patient taking differently: Take 600 mg by mouth 2 times daily. 5/24/21 8/28/21 Yes KEANU Kimball CNP   lisinopril (PRINIVIL;ZESTRIL) 20 MG tablet TAKE 1 TABLET BY MOUTH DAILY  5/24/21 8/28/21 Yes KEANU Kimball CNP   butalbital-aspirin-caffeine [facility]) -40 MG per capsule Take 1 capsule by mouth every 4 hours as needed for Headaches for up to 30 days. 5/14/21 8/28/21 Yes KEANU Kimball CNP   fluticasone [place] [facility]) 50 MCG/ACT nasal spray 2 sprays by Each Nostril route daily 5/14/21  Yes KEANU Kimball CNP   ASPIRIN ADULT LOW STRENGTH 81 MG EC tablet TAKE 1 TABLET BY MOUTH ONCE DAILY  4/5/21  Yes KEANU Kimball CNP   levothyroxine (SYNTHROID) 100 MCG tablet Take 1 tablet by mouth Daily 2/19/21  Yes KEANU Kimball CNP   traZODone (DESYREL) 150 MG tablet Take 1 tablet by mouth nightly 10/22/20  Yes KEANU Kimball CNP   cloNIDine (CATAPRES) 0.1 MG tablet Take 1 tablet by mouth nightly 10/22/20  Yes Ramesh Walsh, APRN - CNP   buprenorphine-naloxone (SUBOXONE) 8-2 MG FILM SL film Last filled at [address] [facility] on 7/7/21 for 28 days. Joseph 53 over a year ago.  7/7/21   Historical Provider, MD OXcarbazepine (TRILEPTAL) 300 MG tablet 300 mg nightly  21   Historical Provider, MD   Respiratory Therapy Supplies (NEBULIZER/TUBING/MOUTHPIECE) KIT Daily as needed 6/3/21   Trey Magana DO   ondansetron (ZOFRAN ODT) 4 MG disintegrating tablet Take 1 tablet by mouth every 8 hours as needed for Nausea 21   Fidel Dunlap DO        Allergies:     Imitrex [sumatriptan], Bee pollen, Bee venom, Bromide ion [bromine], Reglan [metoclopramide], Sulfa antibiotics, Sulfadiazine, and Tramadol    Social History:     Tobacco:    reports that she has been smoking. She has a 6.00 pack-year smoking history. She has never used smokeless tobacco.  Alcohol:      reports no history of alcohol use. Drug Use:  reports previous drug use. Family History:     No family history on file. Review of Systems:     Unable to obtain, patient confused    Physical Exam:   BP (!) 141/87   Pulse 76   Temp 98.2 °F (36.8 °C) (Oral)   Resp 22   Wt 182 lb 15.7 oz (83 kg)   SpO2 95%   BMI 33.47 kg/m²   Temp (24hrs), Av.1 °F (36.7 °C), Min:97.8 °F (36.6 °C), Max:98.2 °F (36.8 °C)    No results for input(s): POCGLU in the last 72 hours.     Intake/Output Summary (Last 24 hours) at 2021  Last data filed at 2021 1643  Gross per 24 hour   Intake 273 ml   Output 2700 ml   Net -2427 ml       General appearance:  alert, cooperative and no distress  Mental Status:  confused  Lungs:  clear to auscultation bilaterally, normal effort  Heart:  regular rate and rhythm, no murmur  Abdomen:  soft, nontender, nondistended, normal bowel sounds, no masses, hepatomegaly, splenomegaly  Extremities:  no edema, redness, tenderness in the calves  Skin:  no gross lesions, rashes, induration    Investigations:      Laboratory Testing:  Recent Results (from the past 24 hour(s))   Urinalysis    Collection Time: 21  9:28 PM   Result Value Ref Range    Color, UA YELLOW YELLOW    Turbidity UA CLOUDY (A) CLEAR    Glucose, Ur NEGATIVE NEGATIVE    Bilirubin Urine NEGATIVE  Verified by ictotest. (A) NEGATIVE    Ketones, Urine TRACE (A) NEGATIVE    Specific [place], UA 1.016 1.000 - 1.030    Urine Hgb TRACE (A) NEGATIVE    pH, UA 5.5 5.0 - 8.0    Protein, UA 1+ (A) NEGATIVE    Urobilinogen, Urine Normal Normal    Nitrite, Urine POSITIVE (A) NEGATIVE    Leukocyte Esterase, Urine SMALL (A) NEGATIVE    Urinalysis Comments NOT REPORTED    Microscopic Urinalysis    Collection Time: 08/27/21  9:28 PM   Result Value Ref Range    -          WBC, UA 10 TO 20 /HPF    RBC, UA None /HPF    Casts UA NOT REPORTED /LPF    Crystals, UA NOT REPORTED None /HPF    Epithelial Cells UA 2 TO 5 /HPF    Renal Epithelial, UA NOT REPORTED 0 /HPF    Bacteria, UA MANY (A) None    Mucus, UA NOT REPORTED None    Trichomonas, UA NOT REPORTED None    Amorphous, UA NOT REPORTED None    Other Observations UA NOT REPORTED NOT REQ.     Yeast, UA NOT REPORTED None   Lactate, Sepsis    Collection Time: 08/28/21 12:06 AM   Result Value Ref Range    Lactic Acid, Sepsis NOT REPORTED 0.5 - 1.9 mmol/L    Lactic Acid, Sepsis, Whole Blood 1.6 0.5 - 1.9 mmol/L   Troponin    Collection Time: 08/28/21 12:06 AM   Result Value Ref Range    Troponin, High Sensitivity 6 0 - 14 ng/L    Troponin T NOT REPORTED <0.03 ng/mL    Troponin Interp NOT REPORTED    CBC    Collection Time: 08/28/21  4:23 AM   Result Value Ref Range    WBC 14.2 (H) 3.5 - 11.3 k/uL    RBC 5.50 (H) 3.95 - 5.11 m/uL    Hemoglobin 16.5 (H) 11.9 - 15.1 g/dL    Hematocrit 53.8 (H) 36.3 - 47.1 %    MCV 97.8 82.6 - 102.9 fL    MCH 30.0 25.2 - 33.5 pg    MCHC 30.7 28.4 - 34.8 g/dL    RDW 13.6 11.8 - 14.4 %    Platelets 771 (L) 869 - 453 k/uL    MPV 9.9 8.1 - 13.5 fL    NRBC Automated 0.0 0.0 per 100 WBC   Lactate, Sepsis    Collection Time: 08/28/21  4:23 AM   Result Value Ref Range    Lactic Acid, Sepsis NOT REPORTED 0.5 - 1.9 mmol/L    Lactic Acid, Sepsis, Whole Blood 2.2 (H) 0.5 - 1.9 mmol/L   SPECIMEN REJECTION    Collection Time: 08/28/21  4:23 AM   Result Value Ref Range    Specimen Source . BLOOD     Ordered Test CMPX LIPR     Reason for Rejection Unable to perform testing: Specimen hemolyzed.      - NOT REPORTED    Comprehensive Metabolic Panel w/ Reflex to MG    Collection Time: 08/28/21  5:27 AM   Result Value Ref Range    Glucose 125 (H) 70 - 99 mg/dL    BUN 18 6 - 20 mg/dL    CREATININE 0.83 0.50 - 0.90 mg/dL    Bun/Cre Ratio NOT REPORTED 9 - 20    Calcium 9.4 8.6 - 10.4 mg/dL    Sodium 140 135 - 144 mmol/L    Potassium 4.0 3.7 - 5.3 mmol/L    Chloride 105 98 - 107 mmol/L    CO2 20 20 - 31 mmol/L    Anion Gap 15 9 - 17 mmol/L    Alkaline Phosphatase 89 35 - 104 U/L    ALT 10 5 - 33 U/L    AST 15 <32 U/L    Total Bilirubin 0.49 0.3 - 1.2 mg/dL    Total Protein 6.9 6.4 - 8.3 g/dL    Albumin 4.0 3.5 - 5.2 g/dL    Albumin/Globulin Ratio 1.4 1.0 - 2.5    GFR Non-African American >60 >60 mL/min    GFR African American >60 >60 mL/min    GFR Comment          GFR Staging NOT REPORTED    Lipid Panel    Collection Time: 08/28/21  5:27 AM   Result Value Ref Range    Cholesterol 203 (H) <200 mg/dL    HDL 48 >40 mg/dL    LDL Cholesterol 131 (H) 0 - 130 mg/dL    Chol/HDL Ratio 4.2 <5    Triglycerides 120 <150 mg/dL    VLDL NOT REPORTED 1 - 30 mg/dL   Troponin    Collection Time: 08/28/21  5:27 AM   Result Value Ref Range    Troponin, High Sensitivity <6 0 - 14 ng/L    Troponin T NOT REPORTED <0.03 ng/mL    Troponin Interp NOT REPORTED    CBC    Collection Time: 08/28/21  5:27 AM   Result Value Ref Range    WBC 16.0 (H) 3.5 - 11.3 k/uL    RBC 5.01 3.95 - 5.11 m/uL    Hemoglobin 15.3 (H) 11.9 - 15.1 g/dL    Hematocrit 46.3 36.3 - 47.1 %    MCV 92.4 82.6 - 102.9 fL    MCH 30.5 25.2 - 33.5 pg    MCHC 33.0 28.4 - 34.8 g/dL    RDW 13.8 11.8 - 14.4 %    Platelets 716 578 - 245 k/uL    MPV 10.1 8.1 - 13.5 fL    NRBC Automated 0.0 0.0 per 100 WBC   Magnesium    Collection Time: 08/28/21  5:27 AM   Result Value Ref Range    Magnesium 2.3 1.6 - 2.6 mg/dL Phosphorus    Collection Time: 08/28/21  5:27 AM   Result Value Ref Range    Phosphorus 3.9 2.6 - 4.5 mg/dL   Drug screen multi urine    Collection Time: 08/28/21  5:45 AM   Result Value Ref Range    Amphetamine Screen, Ur NEGATIVE NEGATIVE    Barbiturate Screen, Ur POSITIVE (A) NEGATIVE    Benzodiazepine Screen, Urine NEGATIVE NEGATIVE    Cocaine Metabolite, Urine NEGATIVE NEGATIVE    Methadone Screen, Urine NEGATIVE NEGATIVE    Opiates, Urine NEGATIVE NEGATIVE    Phencyclidine, Urine NEGATIVE NEGATIVE    Propoxyphene, Urine NOT REPORTED NEGATIVE    Cannabinoid Scrn, Ur NEGATIVE NEGATIVE    Oxycodone Screen, Ur NEGATIVE NEGATIVE    Methamphetamine, Urine NOT REPORTED NEGATIVE    Tricyclic Antidepressants, Urine NOT REPORTED NEGATIVE    MDMA, Urine NOT REPORTED NEGATIVE    Buprenorphine Urine NOT REPORTED NEGATIVE    Test Information       Assay provides medical screening only. The absence of expected drug(s) and/or metabolite(s) may indicate diluted or adulterated urine, limitations of testing or timing of collection. Valproic acid level, total and free    Collection Time: 08/28/21 11:27 AM   Result Value Ref Range    Valproic Acid Lvl <3 (L) 50 - 125 ug/mL    Valproic Dose amount UNKNOWN     Valproic Date last dose UNKNOWN     Valproic Time last dose UNKNOWN     Valproic Acid, Free 0.8 (L) 7.0 - 23.0 ug/mL    Valproic Acid % Free CANNOT BE CALCULATED 5.0 - 18.4 %       Imaging/Diagnostics:  CT HEAD WO CONTRAST    Result Date: 8/27/2021  Interval development of hyperdensities in the left putamen. While these may represent basal ganglia calcifications the possibility of small focal left putamen hemorrhages cannot be excluded as these are new from prior study. Further evaluation with MRI may be beneficial. Findings were discussed with Dr. Vazquez Staff at 8:10 pm on 8/27/2021. MRA HEAD WO CONTRAST    Result Date: 8/28/2021  1.  Small area of presumed acute to subacute infarct involving the left frontal white matter. No significant mass effect or midline shift. 2. Otherwise, no acute intracranial abnormality. 3. Mild global parenchymal volume loss with mild chronic microvascular ischemic changes. 4. MRA of the head and neck is limited given patient motion. 5. Only minimal flow related enhancement is seen within the left cervical vertebral artery without significant flow related enhancement within the proximal V4 segment intracranially. Unclear whether this is related to a diffusely diminutive caliber of the left vertebral artery versus diffuse disease. 6. No flow limiting stenosis otherwise seen of the cervical carotid/vertebral arteries. 7. There is suggestion of moderate stenosis involving the left supraclinoid ICA. 8. Otherwise, no convincing significant stenosis of the dlqzqe-mu-Gknlbw. XR CHEST PORTABLE    Result Date: 8/28/2021  1. Right IJ central line in place, tip at the junction of the SVC and right atrium. 2. No evidence of a pneumothorax     XR CHEST PORTABLE    Result Date: 8/27/2021  No acute process. MRA NECK WO CONTRAST    Result Date: 8/28/2021  1. Small area of presumed acute to subacute infarct involving the left frontal white matter. No significant mass effect or midline shift. 2. Otherwise, no acute intracranial abnormality. 3. Mild global parenchymal volume loss with mild chronic microvascular ischemic changes. 4. MRA of the head and neck is limited given patient motion. 5. Only minimal flow related enhancement is seen within the left cervical vertebral artery without significant flow related enhancement within the proximal V4 segment intracranially. Unclear whether this is related to a diffusely diminutive caliber of the left vertebral artery versus diffuse disease. 6. No flow limiting stenosis otherwise seen of the cervical carotid/vertebral arteries. 7. There is suggestion of moderate stenosis involving the left supraclinoid ICA.  8. Otherwise, no convincing significant stenosis of the izxvdv-hs-Eencvy. MRI BRAIN WO CONTRAST    Result Date: 8/28/2021  1. Small area of presumed acute to subacute infarct involving the left frontal white matter. No significant mass effect or midline shift. 2. Otherwise, no acute intracranial abnormality. 3. Mild global parenchymal volume loss with mild chronic microvascular ischemic changes. 4. MRA of the head and neck is limited given patient motion. 5. Only minimal flow related enhancement is seen within the left cervical vertebral artery without significant flow related enhancement within the proximal V4 segment intracranially. Unclear whether this is related to a diffusely diminutive caliber of the left vertebral artery versus diffuse disease. 6. No flow limiting stenosis otherwise seen of the cervical carotid/vertebral arteries. 7. There is suggestion of moderate stenosis involving the left supraclinoid ICA. 8. Otherwise, no convincing significant stenosis of the pyfgyk-so-Nhbwpz. Assessment :      Hospital Problems         Last Modified POA    * (Principal) Hypertensive emergency 8/28/2021 Yes    Drug overdose, accidental or unintentional, initial encounter 8/28/2021 Yes    Hypothyroidism 8/28/2021 Yes    Seizure disorder ([address] 8/28/2021 Yes    Drug overdose 8/28/2021 Yes    History of seizure 8/28/2021 Yes    Major depressive disorder with psychotic features ([address] 8/28/2021 Yes    Sepsis ([address] 8/28/2021 Yes    COPD (chronic obstructive pulmonary disease) ([address] 8/28/2021 Yes    Altered mental state 8/28/2021 Yes    Acute metabolic encephalopathy 7/29/4437 Yes    Abnormal CT of the head 8/28/2021 Yes          Plan:     Patient status inpatient in the Progressive Unit/Step down    1. Toxic metabolic encephalopathy -unknown ingestion of medication. Possibly Suboxone per chart. This may be possibly secondary also to hypertensive emergency. Patient is on a Cardene drip with blood pressures of 160s currently.   Patient also has been noncompliant with seems with hyperglycemia medications, restarted on home doses of medications per records. Neurosurgery and neurology were both consulted, MRI/MRA ordered  2. Hypertensive emergencymaintain blood pressures of 140s to 180s. Start Coreg, uptitrate as needed. Continue lisinopril  3. Chronic opiate abusepatient supposedly has taken Suboxone in the past but not taking currently. If patient's mentation improves can possibly restart  4. Seizure disorderrestart Depakote and Trileptal, neurology following, given phenobarbital in the ER. 5. Hypothyroidismcontinue to monitor micrograms of Synthroid  6. Suspected sepsisunclear etiology, UA is unconvincing. Blood cultures are negative, WBC decreasing on Rocephin. We will continue short course of 5 days of Rocephin  7. Plan:  1. Transfer to cardiac floor for closer blood pressure monitoring and titration of Cardene  2. MRI/MRA per neurology  3. Restart antiseizure medications  4. Start Coreg, uptitrate as needed. 5. Goal blood pressure of 140s to 160s. Consultations:   IP CONSULT TO NEUROSURGERY  IP CONSULT TO NEUROLOGY  IP CONSULT TO CRITICAL CARE  IP CONSULT TO HOSPITALIST  IP CONSULT TO NEUROLOGY   Patient is admitted as inpatient status because of co-morbidities listed above, severity of signs and symptoms as outlined, requirement for current medical therapies and most importantly because of direct risk to patient if care not provided in a hospital setting. Expected length of stay > 48 hours.     Mala Manriquez DO  8/28/2021  8:17 PM    Copy sent to Dr. Ramesh Walsh, APRN - CNP

## 2021-08-29 NOTE — CARE COORDINATION
Transitional planning-patient confused. Called friend Cindy-hasn't seen patient for over a year.  Called daughter Antoinette york

## 2021-08-29 NOTE — PLAN OF CARE
Problem: Skin Integrity:  Goal: Will show no infection signs and symptoms  Description: Will show no infection signs and symptoms  8/29/2021 0655 by Johanna Pearl RN  Outcome: Ongoing  8/28/2021 1735 by Kiet Brady RN  Outcome: Ongoing  Goal: Absence of new skin breakdown  Description: Absence of new skin breakdown  8/29/2021 0655 by Johanna Pearl RN  Outcome: Ongoing  8/28/2021 1735 by Kiet Brady RN  Outcome: Ongoing     Problem: Falls - Risk of:  Goal: Will remain free from falls  Description: Will remain free from falls  8/29/2021 0655 by Johanna Pearl RN  Outcome: Ongoing  8/28/2021 1735 by Kiet Brady RN  Outcome: Ongoing  Goal: Absence of physical injury  Description: Absence of physical injury  8/29/2021 0655 by Johanna Pearl RN  Outcome: Ongoing  8/28/2021 1735 by Kiet Brady RN  Outcome: Ongoing

## 2021-08-29 NOTE — PLAN OF CARE
Problem: Skin Integrity:  Goal: Will show no infection signs and symptoms  Description: Will show no infection signs and symptoms  8/29/2021 0950 by Leida Love RN  Outcome: Ongoing  8/29/2021 0655 by Merry Flowers RN  Outcome: Ongoing     Problem: Skin Integrity:  Goal: Will show no infection signs and symptoms  Description: Will show no infection signs and symptoms  8/29/2021 0950 by Leida Love RN  Outcome: Ongoing  8/29/2021 0655 by Merry Flowers RN  Outcome: Ongoing  Goal: Absence of new skin breakdown  Description: Absence of new skin breakdown  8/29/2021 0950 by Leida Love RN  Outcome: Ongoing  8/29/2021 0655 by Merry Flowers RN  Outcome: Ongoing     Problem: Falls - Risk of:  Goal: Will remain free from falls  Description: Will remain free from falls  8/29/2021 0950 by Leida Love RN  Outcome: Ongoing  8/29/2021 0655 by Merry Flowers RN  Outcome: Ongoing  Goal: Absence of physical injury  Description: Absence of physical injury  8/29/2021 0950 by Leida Love RN  Outcome: Ongoing  8/29/2021 0655 by Merry Flowers RN  Outcome: Ongoing

## 2021-08-29 NOTE — PROGRESS NOTES
[facility]  Office: [address], DO, Sal Bernard, DO, Ivory Shown, DO, Donise Remedies Blood, DO, Srikanth Randall MD, Cristino Li MD, Shaneka Adhikari MD, Jemima Suero MD, Daija Jenkins MD, Enrique Maldonado MD, Emily Stevensno MD, Alanis Solorzano, DO, Kailee Morocho MD, Kelly Piper, DO, Dahlia Sanchez MD,  Nadir Pearl DO, Mu Claros MD, Kal Hutchinson MD, Melvern Essex, MD, Cheryl Leyva MD, Sol Garza MD, Malina Gamino MD, Troy Coates MD, Emily Hart [facility], [facility], CNP, Annabel Guerra, CNP, Gigi Chang, CNS, Lamberto Pool, CNP, Tori Hanson, CNP, Aydin Guzman, CNP, Dmitry Wilson, CNP, Liv Rosado CNP, COLEMAN CortesC, Jagdish Antonio, [facility], Kelsey Nolen, CNP, Rodo Mancera, CNP, Maude Hyatt, CNP, Morena Perez, CNP, Kiet Olmstead, CNP, Yesi Gomez, CNP, Chelsie Walker, CNP, Chaim Cartagena, [address] [place] [address]    Progress Note    8/29/2021    1:13 PM    Name:   Juan Lees  MRN:     [id number]     Acct:      [de-identified]   Room:   [address] [place] [address] Day:  1  Admit Date:  8/27/2021  9:31 PM    PCP:   KEANU Min CNP  Code Status:  Full Code    Subjective:     C/C:   Chief Complaint   Patient presents with    Altered Mental Status     Interval History Status: improved. Patient was still somewhat confused but was able to answer time, name, location. Patient requested to go home, we discussed her medications at home which she seems to have a poor understanding of. I asked if we could talk to her roommate Steve Hernandez regarding her home medications but she refused. She also states her friend helps her maintain her medications.     Brief History:     43-year-old female past medical history for diastolic heart failure, COPD, manic depression, seizure disorder, substance abuse presents for intentional overdose of barbiturates.  Patient arrived by EMS short of breath, received 1 dose sodium chloride, ondansetron **OR** ondansetron, acetaminophen **OR** acetaminophen, metoprolol    Data:     Past Medical History:   has a past medical history of Arthritis, Asthma, Attention deficit hyperactivity disorder (ADHD), combined type, Borderline diabetes, Borderline personality disorder ([facility] CHF (congestive heart failure) ([facility] COPD (chronic obstructive pulmonary disease) ([facility] Fibromyalgia, Headache, Hypertension, Manic depression ([facility] Movement disorder, Neck fracture ([facility] Pernicious anemia, Seizure ([facility] and Thyroid disease. Social History:   reports that she has been smoking. She has a 6.00 pack-year smoking history. She has never used smokeless tobacco. She reports previous drug use. She reports that she does not drink alcohol. Family History: No family history on file. Vitals:  /84   Pulse 66   Temp 98.1 °F (36.7 °C) (Axillary)   Resp 22   Wt 182 lb 15.7 oz (83 kg)   SpO2 96%   BMI 33.47 kg/m²   Temp (24hrs), Av.3 °F (36.8 °C), Min:97.8 °F (36.6 °C), Max:98.9 °F (37.2 °C)    No results for input(s): POCGLU in the last 72 hours. I/O (24Hr): Intake/Output Summary (Last 24 hours) at 2021 1313  Last data filed at 2021 0900  Gross per 24 hour   Intake    Output 1400 ml   Net -1400 ml       Labs:  Hematology:  Recent Labs     21  1630 21  1712 21  0423 21  0527 21  0631   WBC   < >  --  14.2* 16.0* 9.2   RBC   < >  --  5.50* 5.01 4.85   HGB   < >  --  16.5* 15.3* 14.5   HCT   < >  --  53.8* 46.3 45.3   MCV   < >  --  97.8 92.4 93.4   MCH   < >  --  30.0 30.5 29.9   MCHC   < >  --  30.7 33.0 32.0   RDW   < >  --  13.6 13.8 13.9   PLT   < >  --  126* 244 221   MPV   < >  --  9.9 10.1 9.6   INR  --  0.9  --   --   --     < > = values in this interval not displayed.      Chemistry:  Recent Labs     21  1712 21  0006 21  0527 21  0631     --   --  140 138   K 4.0  --   --  4.0 3.8   CL white matter. No significant mass effect or midline shift. 2. Otherwise, no acute intracranial abnormality. 3. Mild global parenchymal volume loss with mild chronic microvascular ischemic changes. 4. MRA of the head and neck is limited given patient motion. 5. Only minimal flow related enhancement is seen within the left cervical vertebral artery without significant flow related enhancement within the proximal V4 segment intracranially. Unclear whether this is related to a diffusely diminutive caliber of the left vertebral artery versus diffuse disease. 6. No flow limiting stenosis otherwise seen of the cervical carotid/vertebral arteries. 7. There is suggestion of moderate stenosis involving the left supraclinoid ICA. 8. Otherwise, no convincing significant stenosis of the yavxbv-ok-Wfzbpv. XR CHEST PORTABLE    Result Date: 8/28/2021  1. Right IJ central line in place, tip at the junction of the SVC and right atrium. 2. No evidence of a pneumothorax     XR CHEST PORTABLE    Result Date: 8/27/2021  No acute process. MRA NECK WO CONTRAST    Result Date: 8/28/2021  1. Small area of presumed acute to subacute infarct involving the left frontal white matter. No significant mass effect or midline shift. 2. Otherwise, no acute intracranial abnormality. 3. Mild global parenchymal volume loss with mild chronic microvascular ischemic changes. 4. MRA of the head and neck is limited given patient motion. 5. Only minimal flow related enhancement is seen within the left cervical vertebral artery without significant flow related enhancement within the proximal V4 segment intracranially. Unclear whether this is related to a diffusely diminutive caliber of the left vertebral artery versus diffuse disease. 6. No flow limiting stenosis otherwise seen of the cervical carotid/vertebral arteries. 7. There is suggestion of moderate stenosis involving the left supraclinoid ICA.  8. Otherwise, no convincing significant stenosis of the tjylun-iv-Rarfff. MRI BRAIN WO CONTRAST    Result Date: 8/28/2021  1. Small area of presumed acute to subacute infarct involving the left frontal white matter. No significant mass effect or midline shift. 2. Otherwise, no acute intracranial abnormality. 3. Mild global parenchymal volume loss with mild chronic microvascular ischemic changes. 4. MRA of the head and neck is limited given patient motion. 5. Only minimal flow related enhancement is seen within the left cervical vertebral artery without significant flow related enhancement within the proximal V4 segment intracranially. Unclear whether this is related to a diffusely diminutive caliber of the left vertebral artery versus diffuse disease. 6. No flow limiting stenosis otherwise seen of the cervical carotid/vertebral arteries. 7. There is suggestion of moderate stenosis involving the left supraclinoid ICA. 8. Otherwise, no convincing significant stenosis of the syrvmu-kg-Atxutf.        Physical Examination:        General appearance:  alert, cooperative and no distress  Mental Status:  oriented to person, place and time and normal affect  Lungs:  clear to auscultation bilaterally, normal effort  Heart:  regular rate and rhythm, no murmur  Abdomen:  soft, nontender, nondistended, normal bowel sounds, no masses, hepatomegaly, splenomegaly  Extremities:  no edema, redness, tenderness in the calves  Skin:  no gross lesions, rashes, induration    Assessment:        Hospital Problems         Last Modified POA    * (Principal) Hypertensive emergency 8/28/2021 Yes    Drug overdose, accidental or unintentional, initial encounter 8/28/2021 Yes    Hypothyroidism 8/28/2021 Yes    Seizure disorder ([address] 8/28/2021 Yes    Drug overdose 8/28/2021 Yes    History of seizure 8/28/2021 Yes    Major depressive disorder with psychotic features ([address] 8/28/2021 Yes    Sepsis ([address] 8/28/2021 Yes    COPD (chronic obstructive pulmonary disease) ([address] 8/28/2021 Yes    Altered mental state 8/28/2021 Yes    Acute metabolic encephalopathy 4/76/0181 Yes    Abnormal CT of the head 8/28/2021 Yes          Plan:        1. Patient's mentation much improved today, adjusting blood pressure medications. 2. Discontinue House catheter  3. Await further recommendations from neurology  4. Patient does not want her case discussed with her friend Cat Llanos  5. Suspect drug side effect causing toxic metabolic encephalopathy. Patient seems to be a very poor historian and has a poor understanding of her medications and what they are for.     Elaine Olmos DO  8/29/2021  1:13 PM

## 2021-08-30 LAB
ALBUMIN SERPL-MCNC: 3.7 G/DL (ref 3.5–5.2)
ANION GAP SERPL CALCULATED.3IONS-SCNC: 15 MMOL/L (ref 9–17)
BUN BLDV-MCNC: 7 MG/DL (ref 6–20)
BUN/CREAT BLD: ABNORMAL (ref 9–20)
CALCIUM SERPL-MCNC: 8.9 MG/DL (ref 8.6–10.4)
CHLORIDE BLD-SCNC: 98 MMOL/L (ref 98–107)
CO2: 24 MMOL/L (ref 20–31)
CREAT SERPL-MCNC: 0.37 MG/DL (ref 0.5–0.9)
ERYTHROPOIETIN: 2 MU/ML (ref 4–27)
GFR AFRICAN AMERICAN: >60 ML/MIN
GFR NON-AFRICAN AMERICAN: >60 ML/MIN
GFR SERPL CREATININE-BSD FRML MDRD: ABNORMAL ML/MIN/{1.73_M2}
GFR SERPL CREATININE-BSD FRML MDRD: ABNORMAL ML/MIN/{1.73_M2}
GLUCOSE BLD-MCNC: 84 MG/DL (ref 70–99)
HCT VFR BLD CALC: 43.5 % (ref 36.3–47.1)
HEMOGLOBIN: 14.3 G/DL (ref 11.9–15.1)
MAGNESIUM: 1.8 MG/DL (ref 1.6–2.6)
MCH RBC QN AUTO: 30 PG (ref 25.2–33.5)
MCHC RBC AUTO-ENTMCNC: 32.9 G/DL (ref 28.4–34.8)
MCV RBC AUTO: 91.4 FL (ref 82.6–102.9)
NRBC AUTOMATED: 0 PER 100 WBC
PDW BLD-RTO: 13.2 % (ref 11.8–14.4)
PHOSPHORUS: 2.4 MG/DL (ref 2.6–4.5)
PLATELET # BLD: 229 K/UL (ref 138–453)
PMV BLD AUTO: 9.9 FL (ref 8.1–13.5)
POTASSIUM SERPL-SCNC: 3.2 MMOL/L (ref 3.7–5.3)
RBC # BLD: 4.76 M/UL (ref 3.95–5.11)
SODIUM BLD-SCNC: 137 MMOL/L (ref 135–144)
VALPROIC ACID % FREE: 11.2 % (ref 5–18.4)
VALPROIC ACID LEVEL: 78 UG/ML (ref 50–125)
VALPROIC ACID, FREE: 8.7 UG/ML (ref 7–23)
VALPROIC DATE LAST DOSE: NORMAL
VALPROIC DOSE AMOUNT: NORMAL
VALPROIC TIME LAST DOSE: NORMAL
WBC # BLD: 8.1 K/UL (ref 3.5–11.3)

## 2021-08-30 PROCEDURE — 80165 DIPROPYLACETIC ACID FREE: CPT

## 2021-08-30 PROCEDURE — 97162 PT EVAL MOD COMPLEX 30 MIN: CPT

## 2021-08-30 PROCEDURE — 36415 COLL VENOUS BLD VENIPUNCTURE: CPT

## 2021-08-30 PROCEDURE — 6370000000 HC RX 637 (ALT 250 FOR IP): Performed by: NURSE PRACTITIONER

## 2021-08-30 PROCEDURE — 94640 AIRWAY INHALATION TREATMENT: CPT

## 2021-08-30 PROCEDURE — 6370000000 HC RX 637 (ALT 250 FOR IP): Performed by: INTERNAL MEDICINE

## 2021-08-30 PROCEDURE — 2580000003 HC RX 258: Performed by: NURSE PRACTITIONER

## 2021-08-30 PROCEDURE — 99232 SBSQ HOSP IP/OBS MODERATE 35: CPT | Performed by: INTERNAL MEDICINE

## 2021-08-30 PROCEDURE — 80069 RENAL FUNCTION PANEL: CPT

## 2021-08-30 PROCEDURE — 85027 COMPLETE CBC AUTOMATED: CPT

## 2021-08-30 PROCEDURE — 2060000000 HC ICU INTERMEDIATE R&B

## 2021-08-30 PROCEDURE — 83735 ASSAY OF MAGNESIUM: CPT

## 2021-08-30 PROCEDURE — 97535 SELF CARE MNGMENT TRAINING: CPT

## 2021-08-30 PROCEDURE — 99232 SBSQ HOSP IP/OBS MODERATE 35: CPT | Performed by: PSYCHIATRY & NEUROLOGY

## 2021-08-30 PROCEDURE — 97530 THERAPEUTIC ACTIVITIES: CPT

## 2021-08-30 PROCEDURE — 80164 ASSAY DIPROPYLACETIC ACD TOT: CPT

## 2021-08-30 PROCEDURE — 97166 OT EVAL MOD COMPLEX 45 MIN: CPT

## 2021-08-30 PROCEDURE — 6360000002 HC RX W HCPCS: Performed by: NURSE PRACTITIONER

## 2021-08-30 RX ORDER — CARVEDILOL 6.25 MG/1
6.25 TABLET ORAL 2 TIMES DAILY WITH MEALS
Status: DISCONTINUED | OUTPATIENT
Start: 2021-08-30 | End: 2021-08-31 | Stop reason: HOSPADM

## 2021-08-30 RX ORDER — AMLODIPINE BESYLATE 5 MG/1
5 TABLET ORAL DAILY
Status: DISCONTINUED | OUTPATIENT
Start: 2021-08-30 | End: 2021-08-31

## 2021-08-30 RX ADMIN — AMLODIPINE BESYLATE 5 MG: 5 TABLET ORAL at 10:46

## 2021-08-30 RX ADMIN — OXCARBAZEPINE 300 MG: 300 TABLET, FILM COATED ORAL at 17:59

## 2021-08-30 RX ADMIN — LEVOTHYROXINE SODIUM 100 MCG: 100 TABLET ORAL at 10:48

## 2021-08-30 RX ADMIN — BUDESONIDE AND FORMOTEROL FUMARATE DIHYDRATE 2 PUFF: 160; 4.5 AEROSOL RESPIRATORY (INHALATION) at 20:33

## 2021-08-30 RX ADMIN — SODIUM CHLORIDE, PRESERVATIVE FREE 6 ML: 5 INJECTION INTRAVENOUS at 10:49

## 2021-08-30 RX ADMIN — ACETAMINOPHEN 650 MG: 325 TABLET ORAL at 05:09

## 2021-08-30 RX ADMIN — BUDESONIDE AND FORMOTEROL FUMARATE DIHYDRATE 2 PUFF: 160; 4.5 AEROSOL RESPIRATORY (INHALATION) at 07:40

## 2021-08-30 RX ADMIN — SODIUM CHLORIDE, PRESERVATIVE FREE 10 ML: 5 INJECTION INTRAVENOUS at 20:09

## 2021-08-30 RX ADMIN — LISINOPRIL 20 MG: 20 TABLET ORAL at 10:49

## 2021-08-30 RX ADMIN — ACETAMINOPHEN 650 MG: 325 TABLET ORAL at 13:48

## 2021-08-30 RX ADMIN — ENOXAPARIN SODIUM 30 MG: 30 INJECTION SUBCUTANEOUS at 10:48

## 2021-08-30 RX ADMIN — CEFTRIAXONE SODIUM 1000 MG: 1 INJECTION, POWDER, FOR SOLUTION INTRAMUSCULAR; INTRAVENOUS at 00:28

## 2021-08-30 RX ADMIN — VENLAFAXINE HYDROCHLORIDE 75 MG: 75 CAPSULE, EXTENDED RELEASE ORAL at 10:53

## 2021-08-30 RX ADMIN — CARVEDILOL 6.25 MG: 6.25 TABLET, FILM COATED ORAL at 17:58

## 2021-08-30 RX ADMIN — DIVALPROEX SODIUM 1000 MG: 500 TABLET, EXTENDED RELEASE ORAL at 17:59

## 2021-08-30 RX ADMIN — DIVALPROEX SODIUM 500 MG: 500 TABLET, EXTENDED RELEASE ORAL at 10:47

## 2021-08-30 RX ADMIN — FLUTICASONE PROPIONATE 2 SPRAY: 50 SPRAY, METERED NASAL at 10:48

## 2021-08-30 ASSESSMENT — PAIN DESCRIPTION - PAIN TYPE
TYPE: ACUTE PAIN

## 2021-08-30 ASSESSMENT — PAIN DESCRIPTION - LOCATION
LOCATION: HEAD

## 2021-08-30 ASSESSMENT — PAIN DESCRIPTION - DESCRIPTORS
DESCRIPTORS: ACHING;DISCOMFORT;SORE
DESCRIPTORS: ACHING;DISCOMFORT

## 2021-08-30 ASSESSMENT — PAIN DESCRIPTION - FREQUENCY
FREQUENCY: CONTINUOUS
FREQUENCY: CONTINUOUS

## 2021-08-30 ASSESSMENT — PAIN SCALES - GENERAL
PAINLEVEL_OUTOF10: 6
PAINLEVEL_OUTOF10: 10
PAINLEVEL_OUTOF10: 10
PAINLEVEL_OUTOF10: 0
PAINLEVEL_OUTOF10: 10
PAINLEVEL_OUTOF10: 0
PAINLEVEL_OUTOF10: 10

## 2021-08-30 NOTE — PROGRESS NOTES
[facility]  Office: [address], DO, Germain Patel, DO, Areli Perez, DO, Vaishali Caldwellswapnil Blood, DO, César Hernandez MD, Sol Henry MD, Jose David Quinones MD, Michelle Snell MD, Edmond King MD, Ruby Peralta MD, Roberto Dickerson MD, Micah Ruiz, DO, Jordyn Soto MD, Reji Hudson DO, Mir Zurita MD,  April Enamorado, DO, Devika Maza MD, Des James MD, Judie Wells MD, Po Myers MD, Jose M Hastings MD, Dyana Collier MD, Queenie Stanford MD, Roxann Carmona, [facility], [facility], CNP, Emerson Coffey, CNP, Gonzalez White, CNS, Noah Barthel, CNP, Marguerite Lu, CNP, Patricia Cobian, CNP, Radha Espinoza, CNP, Kacey Damon, CNP, Sherley Kennedy PA-C, [facility], [facility], Sylvia Roque, CNP, Marion Benavides, CNP, Skip Reyes, CNP, Gerri Yarbrough, CNP, Saumya Sinclair, CNP, Jose Worthy, CNP, Vianca Kitchen, CNP, Rosy Manuel, CNP         [facility]   [address] [facility]    Progress Note    8/30/2021    2:35 PM    Name:   Saira Taylor  MRN:     [id number]     Acct:      [de-identified]   Room:   [id number]  IP Day:  2  Admit Date:  8/27/2021  9:31 PM    PCP:   KEANU Rocha CNP  Code Status:  Full Code    Subjective:     C/C:   Chief Complaint   Patient presents with    Altered Mental Status     Interval History Status: improved. Patient was upset today that she was still in the hospital.  She requested to leave and we discussed that it was unsafe for her to discharge with blood pressures, systolic blood pressure over 180. She stated initially that she did not care and was informed that if she were to leave [address] that she could suffer a stroke from these high blood pressures. This could result with further debility and possible death, the patient then reconsidered her decision and agreed to stay to continue titrating her blood pressure medications. Neurology still waiting for EEG to be performed.     Brief History: 58-year-old female past medical history for diastolic heart failure, COPD, manic depression, seizure disorder, substance abuse presents for intentional overdose of barbiturates.  Patient arrived by EMS short of breath, received 1 dose of Narcan with minimal response.  Patient was seen by the medical ICU team at [facility] and had a CT scan showing hypodensity of the right putamen.  Decision was made to transfer to [place] for neurosurgical evaluation.  CT was reviewed by neurosurgery team, suspected to be secondary to a calcification.  Neurology and neurosurgery recommended blood pressure management with goal of systolic 213.  Patient's blood pressure has been labile with record blood pressures of 200s to 90s.  Patient has an MRI/MRA pending, she is still confused at this time.  Blood pressure currently 160s on a Cardene drip. Review of Systems:     Constitutional:  negative for chills, fevers, sweats  Respiratory:  negative for cough, dyspnea on exertion, shortness of breath, wheezing  Cardiovascular:  negative for chest pain, chest pressure/discomfort, lower extremity edema, palpitations  Gastrointestinal:  negative for abdominal pain, constipation, diarrhea, nausea, vomiting  Neurological:  negative for dizziness, headache    Medications: Allergies:     Allergies   Allergen Reactions    Imitrex [Sumatriptan] Hives    Bee Pollen     Bee Venom     Bromide Ion [Bromine]     Reglan [Metoclopramide]     Sulfa Antibiotics     Sulfadiazine     Tramadol Hives       Current Meds:   Scheduled Meds:    carvedilol  6.25 mg Oral BID WC    amLODIPine  5 mg Oral Daily    potassium bicarb-citric acid  40 mEq Oral Once    budesonide-formoterol  2 puff Inhalation BID    fluticasone  2 spray Each Nostril Daily    levothyroxine  100 mcg Oral Daily    lisinopril  20 mg Oral Daily    venlafaxine  75 mg Oral Daily with breakfast    sodium chloride flush  5-40 mL IntraVENous 2 times per day  enoxaparin  30 mg SubCUTAneous Daily    cefTRIAXone (ROCEPHIN) IV  1,000 mg IntraVENous Q24H    divalproex  500 mg Oral Daily    OXcarbazepine  300 mg Oral QPM    divalproex  1,000 mg Oral QPM     Continuous Infusions:    sodium chloride       PRN Meds: albuterol sulfate HFA, famotidine, sodium chloride flush, sodium chloride, ondansetron **OR** ondansetron, acetaminophen **OR** acetaminophen, metoprolol    Data:     Past Medical History:   has a past medical history of Arthritis, Asthma, Attention deficit hyperactivity disorder (ADHD), combined type, Borderline diabetes, Borderline personality disorder ([facility] CHF (congestive heart failure) ([facility] COPD (chronic obstructive pulmonary disease) ([facility] Fibromyalgia, Headache, Hypertension, Manic depression ([facility] Movement disorder, Neck fracture ([facility] Pernicious anemia, Seizure ([facility] and Thyroid disease. Social History:   reports that she has been smoking. She has a 6.00 pack-year smoking history. She has never used smokeless tobacco. She reports previous drug use. She reports that she does not drink alcohol. Family History: No family history on file. Vitals:  /89   Pulse 67   Temp 98.6 °F (37 °C) (Oral)   Resp 17   Wt 182 lb 15.7 oz (83 kg)   SpO2 94%   BMI 33.47 kg/m²   Temp (24hrs), Av.5 °F (36.9 °C), Min:98.4 °F (36.9 °C), Max:98.6 °F (37 °C)    No results for input(s): POCGLU in the last 72 hours. I/O (24Hr):     Intake/Output Summary (Last 24 hours) at 2021 1435  Last data filed at 2021 0616  Gross per 24 hour   Intake    Output 700 ml   Net -700 ml       Labs:  Hematology:  Recent Labs     21  1712 21  0423 21  0527 21  0631 21  0441   WBC  --    < > 16.0* 9.2 8.1   RBC  --    < > 5.01 4.85 4.76   HGB  --    < > 15.3* 14.5 14.3   HCT  --    < > 46.3 45.3 43.5   MCV  --    < > 92.4 93.4 91.4   MCH  --    < > 30.5 29.9 30.0   MCHC  --    < > 33.0 32.0 32.9   RDW  --    < > 13.8 13.9 13.2   PLT  --    < > 244 221 229   MPV  --    < > 10.1 9.6 9.9   INR 0.9  --   --   --   --     < > = values in this interval not displayed. Chemistry:  Recent Labs     08/27/21 1712 08/27/21 2017 08/28/21 0006 08/28/21 0527 08/29/21 0631 08/30/21 0441   NA   < >  --   --  140 138 137   K   < >  --   --  4.0 3.8 3.2*   CL   < >  --   --  105 101 98   CO2   < >  --   --  20 24 24   GLUCOSE   < >  --   --  125* 80 84   BUN   < >  --   --  18 16 7   CREATININE   < >  --   --  0.83 0.55 0.37*   MG  --   --   --  2.3 2.1 1.8   ANIONGAP   < >  --   --  15 13 15   LABGLOM   < >  --   --  >60 >60 >60   GFRAA   < >  --   --  >60 >60 >60   CALCIUM   < >  --   --  9.4 9.1 8.9   PHOS  --   --   --  3.9 1.8* 2.4*   TROPHS  --   --  6 <6  --   --    CKTOTAL  --  149  --   --   --   --    MYOGLOBIN  --  349*  --   --   --   --     < > = values in this interval not displayed. Recent Labs     08/27/21 1712 08/27/21 1712 08/28/21 0527 08/29/21 0631 08/30/21  0441   PROT 8.2  --  6.9  --   --    LABALBU 4.7   < > 4.0 3.8 3.7   AST 16  --  15  --   --    ALT 11  --  10  --   --    ALKPHOS 111*  --  89  --   --    BILITOT 0.63  --  0.49  --   --    LIPASE 21  --   --   --   --    CHOL  --   --  203*  --   --    HDL  --   --  48  --   --    LDLCHOLESTEROL  --   --  131*  --   --    CHOLHDLRATIO  --   --  4.2  --   --    TRIG  --   --  120  --   --    VLDL  --   --  NOT REPORTED  --   --     < > = values in this interval not displayed.      ABG:  Lab Results   Component Value Date    POCPH 7.425 02/15/2019    PHART 7.399 08/27/2021    POCPCO2 43.8 02/15/2019    JFG5UZX 31.7 08/27/2021    POCPO2 67.9 02/15/2019    PO2ART 88.9 08/27/2021    POCHCO3 28.7 02/15/2019    SLX4QNT 19.6 08/27/2021    NBEA 5.2 08/27/2021    PBEA NOT REPORTED 08/27/2021    QGQ3NQG 30 02/15/2019    UFMN9BWW 94 02/15/2019    Y4MRVYQR 93.0 08/27/2021    FIO2 NOT REPORTED 08/27/2021     Lab Results   Component Value Date/Time    SPECIAL NOT REPORTED 08/27/2021 09:28 PM     Lab Results   Component Value Date/Time    CULTURE ESCHERICHIA COLI >851367 CFU/ML (A) 08/27/2021 09:28 PM       Radiology:  CT HEAD WO CONTRAST    Result Date: 8/27/2021  Interval development of hyperdensities in the left putamen. While these may represent basal ganglia calcifications the possibility of small focal left putamen hemorrhages cannot be excluded as these are new from prior study. Further evaluation with MRI may be beneficial. Findings were discussed with Dr. Vanessa León at 8:10 pm on 8/27/2021. MRA HEAD WO CONTRAST    Result Date: 8/28/2021  1. Small area of presumed acute to subacute infarct involving the left frontal white matter. No significant mass effect or midline shift. 2. Otherwise, no acute intracranial abnormality. 3. Mild global parenchymal volume loss with mild chronic microvascular ischemic changes. 4. MRA of the head and neck is limited given patient motion. 5. Only minimal flow related enhancement is seen within the left cervical vertebral artery without significant flow related enhancement within the proximal V4 segment intracranially. Unclear whether this is related to a diffusely diminutive caliber of the left vertebral artery versus diffuse disease. 6. No flow limiting stenosis otherwise seen of the cervical carotid/vertebral arteries. 7. There is suggestion of moderate stenosis involving the left supraclinoid ICA. 8. Otherwise, no convincing significant stenosis of the oqmpxr-og-Nekmcf. XR CHEST PORTABLE    Result Date: 8/28/2021  1. Right IJ central line in place, tip at the junction of the SVC and right atrium. 2. No evidence of a pneumothorax     XR CHEST PORTABLE    Result Date: 8/27/2021  No acute process. MRA NECK WO CONTRAST    Result Date: 8/28/2021  1. Small area of presumed acute to subacute infarct involving the left frontal white matter. No significant mass effect or midline shift. 2. Otherwise, no acute intracranial abnormality.  3. Mild global parenchymal volume loss with mild chronic microvascular ischemic changes. 4. MRA of the head and neck is limited given patient motion. 5. Only minimal flow related enhancement is seen within the left cervical vertebral artery without significant flow related enhancement within the proximal V4 segment intracranially. Unclear whether this is related to a diffusely diminutive caliber of the left vertebral artery versus diffuse disease. 6. No flow limiting stenosis otherwise seen of the cervical carotid/vertebral arteries. 7. There is suggestion of moderate stenosis involving the left supraclinoid ICA. 8. Otherwise, no convincing significant stenosis of the wnszxs-bk-Gdtqok. MRI BRAIN WO CONTRAST    Result Date: 8/28/2021  1. Small area of presumed acute to subacute infarct involving the left frontal white matter. No significant mass effect or midline shift. 2. Otherwise, no acute intracranial abnormality. 3. Mild global parenchymal volume loss with mild chronic microvascular ischemic changes. 4. MRA of the head and neck is limited given patient motion. 5. Only minimal flow related enhancement is seen within the left cervical vertebral artery without significant flow related enhancement within the proximal V4 segment intracranially. Unclear whether this is related to a diffusely diminutive caliber of the left vertebral artery versus diffuse disease. 6. No flow limiting stenosis otherwise seen of the cervical carotid/vertebral arteries. 7. There is suggestion of moderate stenosis involving the left supraclinoid ICA. 8. Otherwise, no convincing significant stenosis of the vuvdcw-vi-Nyoxqf.        Physical Examination:        General appearance:  alert, cooperative and no distress  Mental Status:  oriented to person, place and time and normal affect  Lungs:  clear to auscultation bilaterally, normal effort  Heart:  regular rate and rhythm, no murmur  Abdomen:  soft, nontender, nondistended, normal bowel sounds, no masses, hepatomegaly, splenomegaly  Extremities:  no edema, redness, tenderness in the calves  Skin:  no gross lesions, rashes, induration    Assessment:        Hospital Problems         Last Modified POA    * (Principal) Hypertensive emergency 8/28/2021 Yes    Drug overdose, accidental or unintentional, initial encounter 8/28/2021 Yes    Hypothyroidism 8/28/2021 Yes    Seizure disorder ([address] 8/28/2021 Yes    Drug overdose 8/28/2021 Yes    History of seizure 8/28/2021 Yes    Major depressive disorder with psychotic features ([address] 8/28/2021 Yes    Sepsis ([address] 8/28/2021 Yes    COPD (chronic obstructive pulmonary disease) ([facility] 8/28/2021 Yes    Altered mental state 8/28/2021 Yes    Acute metabolic encephalopathy 8/10/9895 Yes    Abnormal CT of the head 8/28/2021 Yes          Plan:        1. Increase Coreg to 6.25 mg, did not receive dose this morning. 2. Await further recommendations from neurology  3. Patient does not want her case discussed with her friend Rodriguez Barraganangel  4. Suspect drug side effect causing toxic metabolic encephalopathy. Patient seems to be a very poor historian and has a poor understanding of her medications and what they are for.    5. Need to remove central line before discharge    Dina Carrasco DO  8/30/2021  2:35 PM

## 2021-08-30 NOTE — PROGRESS NOTES
[facility]   [address] [place] [address]    Resident Progress Note             Date:   8/30/2021  Patient name:  Sydnee Swift  Date of admission:  8/27/2021  9:31 PM  MRN:   [id number]  Account:  [de-identified]  YOB: 1965  PCP:    KEANU Vines CNP  Room:   [address] [place] [address]  Code Status:    Full Code      Brief History and interval:     Hospitalization Day: 1    60years old female patient who presented with altered mental status.     She has a past medical history significant for hypothyroidism, CHF, COPD, manic depression, seizure disorder since the age of 3 (classic tonic clonic preceded by aura) with hx of noncompliance with AED. substance abuse. On multiple home meds; nebulizers, Suboxone, Depakote,oxcarbazepine, venlafaxine, Neurontin, lisinopril, risperidone, levothyroxine, clonidine, trazodone, aspirin     Last well-known 1400 on 8/27; EMS were called to patient's house. Upon their arrival, they found patient to be in respiratory distress with oxygen desaturation. Patient was put on BiPAP by EMS with some improvement. Patient also was given 1 dose of Narcan with minimal response. Patient was taken to [facility] where she was found to have SEDA + urinalysis concerning for UTI + elevated hemoglobin + elevated CO2 + elevated anion gap. At that time on exam patient was AAO x1 responding to pain.     CT head done at that time was concerning for left putamen hemorrhage versus calcification on CT head. Last CT head done was in July 2020 and did not reveal such hyperdensities.     Patient was transferred to [facility] for further evaluation for possible degree hemorrhage neurosurgery were consulted. Patient was also seen by mobile stroke unit the patient was able to start following some commands but confused.   She was noted to have minimal to no movement of left arm and leg.     On examination at [facility], patient is alert and awake, oriented only to person. Not answering questions appropriately but laughing. Stated that she was in pain while central line being placed by EM. Patient able to move all 4 extremities, some left-sided weakness greater than right.     Neurosurgery impression: Likely this left basal ganglia hyperdensity is more consistent with calcification then bleed. This finding does not go with her clinical picture of encephalopathy in addition her encephalopathy is improving     Patient was on Cardene infusion due to hypertensive urgency during admission. Also patient was on phenobarbital in the past and was given as a loading dose in the emergency department. Patient had brain MRI with questionable left frontal area of restricted diffusion  Oxcarbazepine level is nondetectable  Depakote level is low  Patient is maintained on oxcarbazepine and Depakote during this admission awaiting EEG.    Vital signs, Input/output, lines/ drains, labs, cultures, imaging studies, medications, orders, consultations notes- all reviewed.       Subjective: The patient was seen and examined and the chart was reviewed. Patient was awake, alert, oriented     There were no acute events overnight. Patient denied any new symptoms, was calmer today and answered my questions. There's still confusion from her side regarding her home medications. ROS  Constitutional: no fever, chills, fatigue  HENT: No change in vision or hearing   Respiratory: No cough, SOB, wheezing. Cardiovascular:  No chest pain, palpitations, leg swelling. Gastrointestinal: No nausea, vomiting, diarrhea. Genitourinary: No increased frequency, urgency. Musculoskeletal: No myalgia or arthralgia. Skin: No rashes or scarring or bruises. Neurological: No headache, paresthesia, or focal weakness. Endo/Heme/Allergies: Negative for itchy eyes or runny nose. Psychiatric/Behavioral: No anxiety or depressed mood.       budesonide-formoterol 2 puff Inhalation BID    fluticasone  2 spray Each Nostril Daily    levothyroxine  100 mcg Oral Daily    lisinopril  20 mg Oral Daily    venlafaxine  75 mg Oral Daily with breakfast    sodium chloride flush  5-40 mL IntraVENous 2 times per day    enoxaparin  30 mg Subcutaneous Daily    cefTRIAXone (ROCEPHIN) IV  1,000 mg IntraVENous Q24H    carvedilol  3.125 mg Oral BID WC    divalproex  500 mg Oral Daily    OXcarbazepine  300 mg Oral QPM    divalproex  1,000 mg Oral QPM       Past Medical History:   Diagnosis Date    Arthritis     Asthma     Attention deficit hyperactivity disorder (ADHD), combined type     Borderline diabetes     Borderline personality disorder ([address]     CHF (congestive heart failure) (HCC)     COPD (chronic obstructive pulmonary disease) ([facility]     Fibromyalgia     Headache     Hypertension     Manic depression [facility]     Movement disorder     Neck fracture ([address]     Pernicious anemia     Seizure ([facility]     Thyroid disease        Past Surgical History:   Procedure Laterality Date    EXPLORATION OF WOUND OF EXTREMITY Right 5/19/2019    RIGHT THIGH WOUND WASHOUT WITH [facility] WEST-ER PLACEMENT performed by Manuela Olivarez MD at [address] N/A 5/22/2019    RIGHT WOUND HIP EXAMINATION LAVAGE AND WOUND VAC PLACEMENT performed by Ryan Yin MD at [address] [place] [address] Right 5/17/2019    IRRIGATION, DEBRIDEMENT RIGHT THIGH performed by Nelson Donohue MD at [address] Bilateral     LYMPH NODE DISSECTION      cervical    PARTIAL HYSTERECTOMY         Objective:     PHYSICAL EXAM:      Blood pressure (!) 175/95, pulse 72, temperature 98.6 °F (37 °C), temperature source Oral, resp. rate 25, weight 182 lb 15.7 oz (83 kg), SpO2 94 %, not currently breastfeeding. General Examination    General Resting comfortably in bed   Head Normocephalic, without obvious abnormality   Neck Supple, symmetrical. Good ROM.  No midline or paraspinal tenderness. Lungs Respirations unlabored, no wheezing   Chest Wall No deformity   Heart RRR, no murmur   Abdomen Soft. Non-tender, non-distended   Extremities No cyanosis or edema or warmth. Pulses 2+ and symmetric   Skin: Skin  turgor normal, no rashes or lesions     Mental status  Speech Alert. Oriented to person, place, and time. Speech is fluent without paraphasic errors  Good repetition and naming  Can do 1 step, 2 step, and cross-body commands  Can spell world backwards. Language appropriate. No hallucinations or delusions. No SI/HI. Cranial nerves   II - VFF, visual threat intact  III, IV, VI - extra-ocular muscles full. No nystagmus. Pupils symmetric and responsive.    V - sensation symmetric         VII -  No facial droop or asymmetric NLF  VIII - intact hearing to conversational tone          IX, X - symmetrical palate elevation   XI - 5/5 strength symmetric  XII - tongue midline   Motor function  Strength: grossly 5/5 in b/l              Deltoid, biceps, triceps, wrist flexion, wrist extension             Hip flexion/extension, knee flexion/extension, plantar flexion  Bulk: grossly normal no atrophy  Tone: symmetric b/l arms and legs  Abnormal movements: No abnormal movements or tremor   Sensory function Symmetric to touch in all extremities bilaterally   Cerebellar No dysmetria or dysdiadochocinesia    Reflex function DTR:        2+ b/l symmetric in biceps, brachioradialis, patellar, calcaneal  Babinski b/l plantar downgoing   Gait                  Not assessed       Investigations:      Laboratory Testing:  Recent Results (from the past 24 hour(s))   CBC    Collection Time: 08/30/21  4:41 AM   Result Value Ref Range    WBC 8.1 3.5 - 11.3 k/uL    RBC 4.76 3.95 - 5.11 m/uL    Hemoglobin 14.3 11.9 - 15.1 g/dL    Hematocrit 43.5 36.3 - 47.1 %    MCV 91.4 82.6 - 102.9 fL    MCH 30.0 25.2 - 33.5 pg    MCHC 32.9 28.4 - 34.8 g/dL    RDW 13.2 11.8 - 14.4 %    Platelets 943 207 - 172 k/uL    MPV 9.9 8.1 - 13.5 fL    NRBC Automated 0.0 0.0 per 100 WBC   Renal Function Panel    Collection Time: 08/30/21  4:41 AM   Result Value Ref Range    Glucose 84 70 - 99 mg/dL    BUN 7 6 - 20 mg/dL    CREATININE 0.37 (L) 0.50 - 0.90 mg/dL    Bun/Cre Ratio NOT REPORTED 9 - 20    Calcium 8.9 8.6 - 10.4 mg/dL    Albumin 3.7 3.5 - 5.2 g/dL    Phosphorus 2.4 (L) 2.6 - 4.5 mg/dL    Sodium 137 135 - 144 mmol/L    Potassium 3.2 (L) 3.7 - 5.3 mmol/L    Chloride 98 98 - 107 mmol/L    CO2 24 20 - 31 mmol/L    Anion Gap 15 9 - 17 mmol/L    GFR Non-African American >60 >60 mL/min    GFR African American >60 >60 mL/min    GFR Comment          GFR Staging NOT REPORTED    Magnesium    Collection Time: 08/30/21  4:41 AM   Result Value Ref Range    Magnesium 1.8 1.6 - 2.6 mg/dL       Imaging/Diagnostics:  XR ANKLE RIGHT (MIN 3 VIEWS)    Result Date: 8/19/2021  EXAMINATION: THREE XRAY VIEWS OF THE RIGHT ANKLE 8/18/2021 4:23 pm COMPARISON: 05/18/2021 HISTORY: ORDERING SYSTEM PROVIDED HISTORY: Right ankle pain, unspecified chronicity TECHNOLOGIST PROVIDED HISTORY: Right ankle fracture in May FINDINGS: Ankle mortise intact. Oblique fracture distal fibular metaphysis shows ongoing healing with fracture line less distinct and some callus formation. No new fracture. Soft tissues grossly intact. Healing oblique distal right fibular metaphysis fracture. CT HEAD WO CONTRAST    Result Date: 8/27/2021  EXAMINATION: CT OF THE HEAD WITHOUT CONTRAST  8/27/2021 7:09 pm TECHNIQUE: CT of the head was performed without the administration of intravenous contrast. Dose modulation, iterative reconstruction, and/or weight based adjustment of the mA/kV was utilized to reduce the radiation dose to as low as reasonably achievable.  COMPARISON: July 14, 2020 HISTORY: ORDERING SYSTEM PROVIDED HISTORY: Mental Status Changes TECHNOLOGIST PROVIDED HISTORY: Mental Status Changes Decision Support Exception - unselect if not a suspected or confirmed emergency medical condition->Emergency Medical Condition (MA) Reason for Exam: Altered Mental Status Acuity: Unknown Type of Exam: Unknown Relevant Medical/Surgical History: overdose, htn, seizures, FINDINGS: BRAIN/VENTRICLES: There is chronic ischemic changes of the periventricular white matter. When compared to the prior study there is interval development punctate hyperdensities in the left putamen. The possibility of focal putamen hemorrhages cannot be excluded. The gray-white differentiation is maintained without evidence of an acute infarct. There is no evidence of hydrocephalus. ORBITS: The visualized portion of the orbits demonstrate no acute abnormality. SINUSES: The visualized paranasal sinuses and mastoid air cells demonstrate no acute abnormality. SOFT TISSUES/SKULL:  No acute abnormality of the visualized skull or soft tissues. Interval development of hyperdensities in the left putamen. While these may represent basal ganglia calcifications the possibility of small focal left putamen hemorrhages cannot be excluded as these are new from prior study. Further evaluation with MRI may be beneficial. Findings were discussed with Dr. Bruce Conrad at 8:10 pm on 8/27/2021. MRA HEAD WO CONTRAST    Result Date: 8/28/2021  EXAMINATION: MRI OF THE BRAIN WITHOUT CONTRAST; MRA OF THE HEAD WITHOUT CONTRAST; MRA OF THE NECK WITHOUT CONTRAST 8/28/2021 12:15 pm: TECHNIQUE: Multiplanar multisequence MRI of the brain was performed without the administration of intravenous contrast.; MRA of the head was performed utilizing time-of-flight imaging with MIP images. No intravenous contrast was administered.; Multiplanar multisequence MRA of the neck was performed without the administration of intravenous contrast. Stenosis of the internal carotid arteries measured using NASCET criteria. COMPARISON: None.  HISTORY: ORDERING SYSTEM PROVIDED HISTORY: AMS TECHNOLOGIST PROVIDED HISTORY: AMS Reason for Exam: AMS r/o stroke; disease. The origin of the right vertebral artery is not well visualized. No convincing stenosis otherwise seen of the right vertebral artery. 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 There is question moderate stenosis involving the left supraclinoid ICA. No focal stenosis is seen of the right internal carotid artery. No significant stenosis is seen of the anterior cerebral or middle cerebral arteries. POSTERIOR CIRCULATION: There is no significant flow related enhancement within the proximal left vertebral artery. There is a diminutive caliber with diffuse irregularity of the basilar artery. No focal stenosis is seen of the right vertebral artery. No convincing evidence of a significant stenosis or occlusion involving the posterior cerebral arteries within the limitations of patient motion. 1. Small area of presumed acute to subacute infarct involving the left frontal white matter. No significant mass effect or midline shift. 2. Otherwise, no acute intracranial abnormality. 3. Mild global parenchymal volume loss with mild chronic microvascular ischemic changes. 4. MRA of the head and neck is limited given patient motion. 5. Only minimal flow related enhancement is seen within the left cervical vertebral artery without significant flow related enhancement within the proximal V4 segment intracranially. Unclear whether this is related to a diffusely diminutive caliber of the left vertebral artery versus diffuse disease. 6. No flow limiting stenosis otherwise seen of the cervical carotid/vertebral arteries. 7. There is suggestion of moderate stenosis involving the left supraclinoid ICA. 8. Otherwise, no convincing significant stenosis of the [id number].      XR CHEST PORTABLE    Result Date: 8/28/2021  EXAMINATION: ONE XRAY VIEW OF THE CHEST 8/27/2021 5:58 pm COMPARISON: August 27, 2021 1548 hours HISTORY: ORDERING SYSTEM PROVIDED HISTORY: right IJ CVC TECHNOLOGIST PROVIDED HISTORY: right IJ CVC Reason for Exam: line placement  supine port FINDINGS: Portable view of the chest demonstrates interval placement of a right IJ central line. Tip is at the junction of the SVC and right atrium. No pneumothorax is noted. No focal area of consolidation is present. Heart size and mediastinal contours are stable. Osseous structures appear normal.     1. Right IJ central line in place, tip at the junction of the SVC and right atrium. 2. No evidence of a pneumothorax     XR CHEST PORTABLE    Result Date: 8/27/2021  EXAMINATION: ONE XRAY VIEW OF THE CHEST 8/27/2021 1:01 pm COMPARISON: 05/30/2021 HISTORY: ORDERING SYSTEM PROVIDED HISTORY: SOB TECHNOLOGIST PROVIDED HISTORY: SOB Reason for Exam: SOB. Acuity: Unknown Type of Exam: Unknown Additional signs and symptoms: SOB. FINDINGS: The lungs are without acute focal process. There is no effusion or pneumothorax. The cardiomediastinal silhouette is without acute process. The osseous structures are without acute process. No acute process. MRA NECK WO CONTRAST    Result Date: 8/28/2021  EXAMINATION: MRI OF THE BRAIN WITHOUT CONTRAST; MRA OF THE HEAD WITHOUT CONTRAST; MRA OF THE NECK WITHOUT CONTRAST 8/28/2021 12:15 pm: TECHNIQUE: Multiplanar multisequence MRI of the brain was performed without the administration of intravenous contrast.; MRA of the head was performed utilizing time-of-flight imaging with MIP images. No intravenous contrast was administered.; Multiplanar multisequence MRA of the neck was performed without the administration of intravenous contrast. Stenosis of the internal carotid arteries measured using NASCET criteria. COMPARISON: None.  HISTORY: ORDERING SYSTEM PROVIDED HISTORY: AMS TECHNOLOGIST PROVIDED HISTORY: AMS Reason for Exam: AMS r/o stroke; ORDERING SYSTEM PROVIDED HISTORY: AMS to r/o stroke TECHNOLOGIST PROVIDED HISTORY: AMS to r/o stroke Decision Support Exception - unselect if not a suspected or confirmed emergency medical condition->Emergency Medical Condition (MA) Reason for Exam: AMS to r/o stroke; ORDERING SYSTEM PROVIDED HISTORY: AMS to r/o stroke TECHNOLOGIST PROVIDED HISTORY: AMS to r/o stroke Reason for Exam: AMS r/o stroke FINDINGS: MRI BRAIN: INTRACRANIAL STRUCTURES/VENTRICLES: There is a small area presumed acute to subacute infarct involving the left frontal lobe periventricular white matter (series 4, image 13). No mass effect or midline shift. Areas of T2 FLAIR hyperintensity are seen in the periventricular and subcortical white matter as well as the prema, which are nonspecific, but may represent chronic microvascular ischemic change. There is prominence of the ventricles and sulci due to global parenchymal volume loss. The sellar/suprasellar regions appear unremarkable. The normal signal voids within the major intracranial vessels appear maintained. ORBITS: The visualized portion of the orbits demonstrate no acute abnormality. SINUSES: The visualized paranasal sinuses and mastoid air cells are well aerated. BONES/SOFT TISSUES: The bone marrow signal intensity appears normal. The soft tissues demonstrate no acute abnormality. MRA NECK: Evaluation is limited due to patient motion as well as lack of intravenous contrast. AORTIC ARCH/ARCH VESSELS: The aortic arch arch vessels are not well evaluated on this exam. CAROTID ARTERIES: Question of mild stenosis involving the proximal left common carotid artery. No convincing stenosis of the right common carotid artery. No flow limiting stenosis is clearly identified of the internal carotid arteries by NASCET criteria. VERTEBRAL ARTERIES: Only minimal flow related enhancement is seen within the left vertebral artery. Unclear whether this is related to a diminutive caliber versus diffuse disease. The origin of the right vertebral artery is not well visualized.   No convincing stenosis otherwise seen of the right vertebral artery. 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 There is question moderate stenosis involving the left supraclinoid ICA. No focal stenosis is seen of the right internal carotid artery. No significant stenosis is seen of the anterior cerebral or middle cerebral arteries. POSTERIOR CIRCULATION: There is no significant flow related enhancement within the proximal left vertebral artery. There is a diminutive caliber with diffuse irregularity of the basilar artery. No focal stenosis is seen of the right vertebral artery. No convincing evidence of a significant stenosis or occlusion involving the posterior cerebral arteries within the limitations of patient motion. 1. Small area of presumed acute to subacute infarct involving the left frontal white matter. No significant mass effect or midline shift. 2. Otherwise, no acute intracranial abnormality. 3. Mild global parenchymal volume loss with mild chronic microvascular ischemic changes. 4. MRA of the head and neck is limited given patient motion. 5. Only minimal flow related enhancement is seen within the left cervical vertebral artery without significant flow related enhancement within the proximal V4 segment intracranially. Unclear whether this is related to a diffusely diminutive caliber of the left vertebral artery versus diffuse disease. 6. No flow limiting stenosis otherwise seen of the cervical carotid/vertebral arteries. 7. There is suggestion of moderate stenosis involving the left supraclinoid ICA. 8. Otherwise, no convincing significant stenosis of the [id number].      MRI BRAIN WO CONTRAST    Result Date: 8/28/2021  EXAMINATION: MRI OF THE BRAIN WITHOUT CONTRAST; MRA OF THE HEAD WITHOUT CONTRAST; MRA OF THE NECK WITHOUT CONTRAST 8/28/2021 12:15 pm: TECHNIQUE: Multiplanar multisequence MRI of the brain was performed without the administration of intravenous contrast.; MRA of the head was performed utilizing time-of-flight imaging with MIP images. No intravenous contrast was administered.; Multiplanar multisequence MRA of the neck was performed without the administration of intravenous contrast. Stenosis of the internal carotid arteries measured using NASCET criteria. COMPARISON: None. HISTORY: ORDERING SYSTEM PROVIDED HISTORY: AMS TECHNOLOGIST PROVIDED HISTORY: AMS Reason for Exam: AMS r/o stroke; ORDERING SYSTEM PROVIDED HISTORY: AMS to r/o stroke TECHNOLOGIST PROVIDED HISTORY: AMS to r/o stroke Decision Support Exception - unselect if not a suspected or confirmed emergency medical condition->Emergency Medical Condition (MA) Reason for Exam: AMS to r/o stroke; ORDERING SYSTEM PROVIDED HISTORY: AMS to r/o stroke TECHNOLOGIST PROVIDED HISTORY: AMS to r/o stroke Reason for Exam: AMS r/o stroke FINDINGS: MRI BRAIN: INTRACRANIAL STRUCTURES/VENTRICLES: There is a small area presumed acute to subacute infarct involving the left frontal lobe periventricular white matter (series 4, image 13). No mass effect or midline shift. Areas of T2 FLAIR hyperintensity are seen in the periventricular and subcortical white matter as well as the prema, which are nonspecific, but may represent chronic microvascular ischemic change. There is prominence of the ventricles and sulci due to global parenchymal volume loss. The sellar/suprasellar regions appear unremarkable. The normal signal voids within the major intracranial vessels appear maintained. ORBITS: The visualized portion of the orbits demonstrate no acute abnormality. SINUSES: The visualized paranasal sinuses and mastoid air cells are well aerated. BONES/SOFT TISSUES: The bone marrow signal intensity appears normal. The soft tissues demonstrate no acute abnormality.  MRA NECK: Evaluation is limited due to patient motion as well as lack of intravenous contrast. AORTIC ARCH/ARCH VESSELS: The aortic arch arch vessels are not well evaluated on this exam. CAROTID ARTERIES: Question of mild stenosis involving the proximal left common carotid artery. No convincing stenosis of the right common carotid artery. No flow limiting stenosis is clearly identified of the internal carotid arteries by NASCET criteria. VERTEBRAL ARTERIES: Only minimal flow related enhancement is seen within the left vertebral artery. Unclear whether this is related to a diminutive caliber versus diffuse disease. The origin of the right vertebral artery is not well visualized. No convincing stenosis otherwise seen of the right vertebral artery. 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 There is question moderate stenosis involving the left supraclinoid ICA. No focal stenosis is seen of the right internal carotid artery. No significant stenosis is seen of the anterior cerebral or middle cerebral arteries. POSTERIOR CIRCULATION: There is no significant flow related enhancement within the proximal left vertebral artery. There is a diminutive caliber with diffuse irregularity of the basilar artery. No focal stenosis is seen of the right vertebral artery. No convincing evidence of a significant stenosis or occlusion involving the posterior cerebral arteries within the limitations of patient motion. 1. Small area of presumed acute to subacute infarct involving the left frontal white matter. No significant mass effect or midline shift. 2. Otherwise, no acute intracranial abnormality. 3. Mild global parenchymal volume loss with mild chronic microvascular ischemic changes. 4. MRA of the head and neck is limited given patient motion.  5. Only minimal flow related enhancement is seen within the left cervical vertebral artery without significant flow related enhancement within the proximal V4 segment intracranially. Unclear whether this is related to a diffusely diminutive caliber of the left vertebral artery versus diffuse disease. 6. No flow limiting stenosis otherwise seen of the cervical carotid/vertebral arteries. 7. There is suggestion of moderate stenosis involving the left supraclinoid ICA. 8. Otherwise, no convincing significant stenosis of the [id number]. Assessment & Differential Dx:      Primary Problem  Hypertensive emergency    Active Hospital Problems    Diagnosis Date Noted    Altered mental state [R41.82] 08/28/2021    Hypertensive emergency [I16.1] 08/28/2021    Acute metabolic encephalopathy [F60.89]     Abnormal CT of the head [R93.0]     COPD (chronic obstructive pulmonary disease) ([facility] [J44.9] 02/13/2020    Sepsis ([address] [A41.9] 05/17/2019    Major depressive disorder with psychotic features ([address] [F32.3] 01/11/2019    History of seizure [Z87.898]     Drug overdose [T50.901A]     Seizure disorder ([address] [G40.909]     Drug overdose, accidental or unintentional, initial encounter Ravi Hooper 01/09/2019    Hypothyroidism [E03.9] 01/09/2019       Clinical impression: Metabolic encephalopathy with Suboxone and underlying COPD/UTI + acute-subacute infarct left frontal white matter.   Pending EEG to rule out any seizures contribution       Plan:      Medical management for COPD, UTI/SEDA   Check EEG   Depakote 1000 mg a.m., 500 p.m.   Oxcarbazepine 300 mg daily        Georgie Gutiérrez MD, MD, 8/30/2021 8:10 AM

## 2021-08-30 NOTE — CARE COORDINATION
Case Management Initial Discharge Plan  Aleta Mcdonough,             Met with:patient to discuss discharge plans. Information verified: address, contacts, phone number, , insurance Yes  Insurance Provider: [employer]    Emergency Contact/Next of Kin name & number: Trish Bonilla, friend at [phone]  Who are involved in patient's support system? Room mates    PCP: Francisco J Lezama, APRN - CNP  Date of last visit: >6 months      Discharge Planning    Living Arrangements:  Other (Comment) (2 roommates)     Home has 2 stories  5 stairs to climb to get into front door, 17stairs to climb to reach second floor  Location of bedroom/bathroom in home 2nd    Patient able to perform ADL's:Assisted    Current Services (outpatient & in home) none  DME equipment: 4 wheeled walker, crutches, \"she doesn't use\"  DME provider: unknown    Is patient receiving oral anticoagulation therapy? No    If indicated:   Physician managing anticoagulation treatment: n/a  Where does patient obtain lab work for ATC treatment? n/a      Potential Assistance Needed:  Durable Medical Equipment    Patient agreeable to home care: No  [place] of choice provided:  no    Prior SNF/Rehab Placement and Facility[facility] to [facility]/Rehab: No  [place] of choice provided: no     Evaluation: no    Expected Discharge date:       Patient expects to be discharged to: If home: is the family and/or caregiver wiling & able to provide support at home? 2 room mates  Who will be providing this support? 2 room mates    Follow Up Appointment: Best Day/ Time:      Transportation provider: Luca  Transportation arrangements needed for discharge: Yes    Readmission Risk              Risk of Unplanned Readmission:  49             Does patient have a readmission risk score greater than 14?: No  If yes, follow-up appointment must be made within 7 days of discharge.      Goals of Care:       Educated pt on transitional options, provided freedom of choice and are agreeable with plan      Discharge Plan: [facility] [place], [facility]. and SNF. Supported by 2 room mates that live with her. States, should have nebulizer but doesn't.     [address], pt's daughter, who lives in [place], called back and states that mother is drug addicted, and her mother and her have been estranged since she was 11years old. She does not support her mother. Her mother has been in and out of her life for years. Hx bipolar.        Electronically signed by Marcio Romero RN on 8/30/21 at 10:19 AM EDT

## 2021-08-30 NOTE — PROGRESS NOTES
Physician Progress Note      Kishore Asif  CSN #:                  [id number]  :                       1965  ADMIT DATE:       2021 9:31 PM  [address] [place] DATE:  Mere Blancas  PROVIDER #:        Keegan MCKEON          QUERY TEXT:    Patient admitted with Barbituate overdose,UTI,Encephalopathy. Noted   documentation of ' Suspected Sepsis' in H&P/progress notes. In order to   support the diagnosis of sepsis, please include additional clinical indicators   in your documentation. Or please document if the diagnosis of sepsis has   been ruled out after further study    The medical record reflects the following:  Risk Factors: UTI,COPD,overdose  Clinical Indicators: Per H&P ' Suspected sepsis-unclear etiology, UA is   unconvincing. Blood cultures are negative, WBC decreasing on Rocephin.'. Urine c&s shows many bacteria & small leukocytes. Lactic acid 2.2, SEDA on   admit. wbc 17. Toxic Encephalopathy on admit. Treatment: IVF, IV Rocephin, monitoring  Options provided:  -- Sepsis present as evidenced by, Please document evidence. -- Sepsis was ruled out after study  -- Other - I will add my own diagnosis  -- Disagree - Not applicable / Not valid  -- Disagree - Clinically unable to determine / Unknown  -- Refer to Clinical Documentation Reviewer    PROVIDER RESPONSE TEXT:    Sepsis was ruled out after study.     Query created by: Ana York on 2021 7:28 AM      Electronically signed by:  Keegan MCKEON 2021 8:10 AM

## 2021-08-30 NOTE — PROGRESS NOTES
Occupational Therapy   Occupational Therapy Initial Assessment  Date: 2021   Patient Name: Aminta Reeves  MRN: [id number]     : 1965    Date of Service: 2021  Chief Complaint   Patient presents with    Altered Mental Status     Discharge Recommendations:    Further therapy recommended at discharge. Assessment   Performance deficits / Impairments: Decreased functional mobility ; Decreased balance;Decreased ADL status; Decreased high-level IADLs;Decreased endurance;Decreased ROM; Decreased Safety Awareness  Assessment: Pt d/c to prior living arrangements pending ability to show progress w/ therapy goals. Pt would benefit from further acute therapy and further therapy uopn d/c in order to increase knowledge of energy conservation techniques, increase functional mobility/functional, and increase independence w/ ADLs  Prognosis: Good  Decision Making: Medium Complexity  Patient Education: Pt educated on POC, purpose of eval, importance of out of bed activity. Pt demo fair return  REQUIRES OT FOLLOW UP: Yes  Activity Tolerance  Activity Tolerance: Patient limited by fatigue;Patient Tolerated treatment well  Safety Devices  Safety Devices in place: Yes  Type of devices: Patient at risk for falls; Left in bed;Call light within reach; Bed alarm in place;Gait belt  Restraints  Initially in place: No           Patient Diagnosis(es): The encounter diagnosis was Intraparenchymal hemorrhage of brain ([address] has a past medical history of Arthritis, Asthma, Attention deficit hyperactivity disorder (ADHD), combined type, Borderline diabetes, Borderline personality disorder ([address] CHF (congestive heart failure) ([address] COPD (chronic obstructive pulmonary disease) ([address] Fibromyalgia, Headache, Hypertension, Manic depression ([address] Movement disorder, Neck fracture ([address] Pernicious anemia, Seizure ([address] and Thyroid disease.    has a past surgical history that includes knee surgery (Bilateral); partial hysterectomy (cervix not removed); lymph node dissection; Irrigation and debridement (Right, 5/17/2019); EXPLORATION OF WOUND OF EXTREMITY (Right, 5/19/2019); and EXPLORATION OF WOUND OF EXTREMITY (N/A, 5/22/2019). Restrictions  Restrictions/Precautions  Restrictions/Precautions: Weight Bearing, Fall Risk  Required Braces or Orthoses?: Yes  Lower Extremity Weight Bearing Restrictions  Right Lower Extremity Weight Bearing: Weight Bearing As Tolerated  Required Braces or Orthoses  Right Lower Extremity Brace: Boot  RLE Brace Type: WBAT RLE with CAM boot per pt report from prior injury  Position Activity Restriction  Other position/activity restrictions: up w/ assist, up as tolerated    Subjective   General  Patient assessed for rehabilitation services?: Yes  Family / Caregiver Present: No  Diagnosis: altered mental status  General Comment  Comments: RN ok'd for therapy this moring, pt agreeable and cooperative however session limited due to pt lethargic  Patient Currently in Pain: Yes  Pain Assessment  Pain Assessment: 0-10  Pain Level: 10  Pain Type: Acute pain  Pain Location: Head  Pain Descriptors: Aching;Discomfort; Sore  Pain Frequency: Continuous  Non-Pharmaceutical Pain Intervention(s): Ambulation/Increased Activity; Distraction; Therapeutic presence  Response to Pain Intervention: Patient Satisfied    Social/Functional History  Social/Functional History  Lives With: Friend(s)  Type of Home: House  Home Layout: Two level, Bed/Bath upstairs  Home Access: Stairs to enter with rails  Entrance Stairs - Number of Steps: 5, 17 steps to get to 2nd floor  Entrance Stairs - Rails: Both  Bathroom Shower/Tub: Tub/Shower unit  Bathroom Toilet: Standard  Home Equipment: 4 wheeled walker, Crutches (pt reports no use of DME at baseline)  ADL Assistance: [address]: Needs assistance  Homemaking Responsibilities: Yes (pt splits IADLs w/ roomates)  Meal Prep Responsibility: Secondary  Laundry Responsibility: Secondary  Cleaning Responsibility: Secondary  Shopping Responsibility: Secondary  Ambulation Assistance: Independent  Transfer Assistance: Independent  Active : No  Patient's  Info: no car  Mode of Transportation: Cab  Occupation: On [address]: Enjoys watching television, cross-stitch  Additional Comments: Pt reports friends work, otherwise able to assist PRN. Objective   Vision: Impaired  Vision Exceptions: Wears glasses for reading  Hearing: Within functional limits    Orientation  Overall Orientation Status: Within Functional Limits     Balance  Sitting Balance: Stand by assistance (~8 minutes on EOB)  Standing Balance: Contact guard assistance  Standing Balance  Time: ~3 minutes  Functional Mobility  Functional - Mobility Device: Rolling Walker  Activity: Other  Assist Level: Contact guard assistance  Functional Mobility Comments: Pt completed functional mobility around foot of bed w/ CGA and RW. Pt demo no unsteadiness and no LOB observed. Pt reported fatigue upon completion. ADL  Feeding: Independent;Setup  Grooming: Modified independent ;Setup  UE Bathing: Modified independent   LE Bathing: Stand by assistance  UE Dressing: Modified independent   LE Dressing: Stand by assistance  Toileting: Stand by assistance  Additional Comments: OT facilitated pt in doffing/donning L sock using figure 4 technique w/ SBA. Session limited due to pt fatigue. Tone RUE  RUE Tone: Normotonic  Tone LUE  LUE Tone: Normotonic  Coordination  Movements Are Fluid And Coordinated: No  Coordination and Movement description: Decreased speed;Right UE;Left UE     Bed mobility  Supine to Sit: Stand by assistance  Sit to Supine: Stand by assistance  Scooting: Stand by assistance  Comment: HOB elevated, pt observed to be impulsive.    Transfers  Sit to stand: Contact guard assistance  Stand to sit: Contact guard assistance  Transfer Comments: w/ RW     Cognition  Overall Cognitive Status: Exceptions  Decreased Safety Awareness; Decreased awareness of deficits. Arousal/Alertness: Delayed responses to stimuli  Attention Span: Attends with cues to redirect  Cognition Comment: Pt demo impulsivity.  Pt also observed to be lethargic and required min v/c to maintain attention to task and for redirection        Sensation  Overall Sensation Status: WFL (pt denies N/T)        LUE PROM (degrees)  LUE PROM: WFL  LUE AROM (degrees)  LUE AROM : Exceptions  L Shoulder Flexion 0-180: 0-120; limited due to pain   L Elbow Flexion 0-145: WFL  L Elbow Extension 145-0: WFL  Left Hand AROM (degrees)  Left Hand AROM: WFL  RUE PROM (degrees)  RUE PROM: WFL  RUE AROM (degrees)  RUE AROM : Exceptions  R Shoulder Flexion 0-180: 0-130; limited due to pain   R Elbow Flexion 0-145: WFL  R Elbow Extension 145-0: WFL  Right Hand AROM (degrees)  Right Hand AROM: WFL  LUE Strength  Gross LUE Strength: WFL  L Shoulder Flex: 4+/5  L Elbow Flex: 4+/5  L Elbow Ext: 4+/5  L Hand General: 4+/5  RUE Strength  Gross RUE Strength: WFL  R Shoulder Flex: 4+/5  R Elbow Ext: 4+/5  R Elbow Flex: 4+/5  R Hand General: 4+/5        Plan   Plan  Times per week: 1-3 x/wk      AM-PAC Score  -[facility] Inpatient Daily Activity Raw Score: 21 (08/30/21 1139)  AM-PAC Inpatient ADL T-Scale Score : 44.27 (08/30/21 1139)  ADL Inpatient CMS 0-100% Score: 32.79 (08/30/21 1139)  ADL Inpatient CMS G-Code Modifier : Anel El (08/30/21 1139)    Goals  Short term goals  Time Frame for Short term goals: Pt will; by d/c  Short term goal 1: Demo UB ADLs w/ independence  Short term goal 2: Demo LB ADLs w/ mod I and set up PRN  Short term goal 3: complete functional transfers/functional mobility w/ SBA and LRD  Short term goal 4: demo dynamic standing tolerance for 8+ minutes w/ SBA and LRD in order to complete functional tasks  Short term goal 5: Demo 3 energy conservation techniques w/ <2 v/c to utilize when completing ADLs  Short term goal 6: demo activity tolerance fo 25+ minutes to increase endurance in order to complete ADLs  Short term goal 7: maintain attention to task for 15+ minutes w/ <1 v/c for redirection in order to complete ADLs       Therapy Time   Individual Concurrent Group Co-treatment   Time In 0951         Time Out 1011         Minutes 20      co-eval w/ PT   Timed Code Treatment Minutes: 8 Minutes       Vic Tran, OT/S

## 2021-08-30 NOTE — PROGRESS NOTES
Physical Therapy    Facility/Department: [facility] NEUR[facility]  Initial Assessment    NAME: Angelica Clements  : 1965  MRN: [id number]  Chief Complaint   Patient presents with    Altered Mental Status     Date of Service: 2021    Discharge Recommendations: Further therapy recommended at discharge. PT Equipment Recommendations  Equipment Needed: Yes  Mobility Devices: Therisa Garcia: Rolling    Assessment   Body structures, Functions, Activity limitations: Decreased functional mobility ; Decreased balance;Decreased endurance;Decreased safe awareness;Decreased strength; Increased pain;Decreased posture  Assessment: The pt required CGA to ambulate 12ft with RW, limited by balance and endurance deficits. Recommend continued physical therapy as the pt is currently unsafe to ascend/descend the flight of stairs required to reach the bedroom/bathroom. Prognosis: Fair  Decision Making: Medium Complexity  PT Education: Goals;PT Role;Plan of Care; Functional Mobility Training  Barriers to Learning: impulsive  REQUIRES PT FOLLOW UP: Yes  Activity Tolerance  Activity Tolerance: Patient limited by endurance; Patient limited by fatigue       Patient Diagnosis(es): The encounter diagnosis was Intraparenchymal hemorrhage of brain ([address] has a past medical history of Arthritis, Asthma, Attention deficit hyperactivity disorder (ADHD), combined type, Borderline diabetes, Borderline personality disorder ([address] CHF (congestive heart failure) ([address] COPD (chronic obstructive pulmonary disease) ([address] Fibromyalgia, Headache, Hypertension, Manic depression ([address] Movement disorder, Neck fracture ([address] Pernicious anemia, Seizure ([address] and Thyroid disease.    has a past surgical history that includes knee surgery (Bilateral); partial hysterectomy (cervix not removed); lymph node dissection; Irrigation and debridement (Right, 2019); EXPLORATION OF WOUND OF EXTREMITY (Right, 2019); and EXPLORATION OF WOUND OF EXTREMITY (N/A, 5/22/2019). Restrictions  Restrictions/Precautions  Restrictions/Precautions: Weight Bearing, Fall Risk, Up as Tolerated  Required Braces or Orthoses?: Yes  Lower Extremity Weight Bearing Restrictions  Right Lower Extremity Weight Bearing: Weight Bearing As Tolerated  Required Braces or Orthoses  Right Lower Extremity Brace: Boot  RLE Brace Type: WBAT RLE with CAM boot per pt report from prior injury  Position Activity Restriction  Other position/activity restrictions: WBAT RLE with CAM boot per pt report from prior injury; up with assist  Vision/Hearing  Vision: Impaired  Vision Exceptions: Wears glasses for reading  Hearing: Within functional limits     Subjective  General  Patient assessed for rehabilitation services?: Yes  Response To Previous Treatment: Not applicable  Family / Caregiver Present: No  Follows Commands: Within Functional Limits  Subjective  Subjective: RN and pt agreeable to PT. Pt supine in bed upon arrival, flat affect cooperative throughout.   Pain Screening  Patient Currently in Pain: Yes  Pain Assessment  Pain Assessment: 0-10  Pain Level: 10  Pain Type: Acute pain  Pain Location: Head  Pain Descriptors: Aching;Discomfort  Pain Frequency: Continuous  Non-Pharmaceutical Pain Intervention(s): Ambulation/Increased Activity;Repositioned  Response to Pain Intervention: Patient Satisfied  Vital Signs  Patient Currently in Pain: Yes       Orientation  Orientation  Overall Orientation Status: Within Functional Limits  Social/Functional History  Social/Functional History  Lives With: Friend(s)  Type of Home: House  Home Layout: Two level, Bed/Bath upstairs  Home Access: Stairs to enter with rails  Entrance Stairs - Number of Steps: 5, 17 steps to get to 2nd floor  Entrance Stairs - Rails: Both  Bathroom Shower/Tub: Tub/Shower unit  Bathroom Toilet: Standard  Home Equipment: 4 wheeled walker, Crutches (pt reports no use of DME at baseline)  ADL Assistance: [address]: Needs assistance  Homemaking Responsibilities: Yes (pt splits IADLs w/ roomates)  Meal Prep Responsibility: Secondary  Laundry Responsibility: Secondary  Cleaning Responsibility: Secondary  Shopping Responsibility: Secondary  Ambulation Assistance: Independent  Transfer Assistance: Independent  Active : No  Patient's  Info: no car  Mode of Transportation: Cab  Occupation: On disability  Leisure & Hobbies: Enjoys watching television, cross-stitch  Additional Comments: Pt reports friends work, otherwise able to assist PRN.   Cognition   Cognition  Overall Cognitive Status: Exceptions  Arousal/Alertness: Delayed responses to stimuli  Attention Span: Attends with cues to redirect  Safety Judgement: Decreased awareness of need for assistance;Decreased awareness of need for safety  Insights: Decreased awareness of deficits  Initiation: Requires cues for some  Sequencing: Requires cues for some  Cognition Comment: Pt observed to be lethargic and required min v/c to maintain attention to task and for redirection    Objective          Joint Mobility  Spine: WFL  ROM RLE: WFL  ROM LLE: WFL  ROM RUE: WFL  ROM LUE: AROM shoulder flexion ~90 degrees, pt reports pain and prior injury  Strength RLE  Comment: grossly 4/5  Strength LLE  Strength LLE: WFL  Strength RUE  Strength RUE: WFL  Strength LUE  Comment: shoulder flexion 3-/5, otherwise WFL; formally assessed by OT  Tone RLE  RLE Tone: Normotonic  Tone LLE  LLE Tone: Normotonic  Motor Control  Gross Motor?: WFL  Sensation  Overall Sensation Status: WFL (pt denies numbness/tingling)  Bed mobility  Supine to Sit: Stand by assistance  Sit to Supine: Stand by assistance  Scooting: Contact guard assistance  Comment: verbal cues to await line management prior to mobility  Transfers  Sit to Stand: Contact guard assistance  Stand to sit: Contact guard assistance  Comment: Transfers performed with RW, verbal cues for UE placement with fair return  Ambulation  Ambulation?: Yes  Ambulation 1  Surface: level tile  Device: Rolling Walker  Assistance: Contact guard assistance  Quality of Gait: decreased environmental safety awareness, unsteady  Gait Deviations: Shuffles;Decreased step length;Decreased step height  Distance: 12ft  Comments: Mid ambulation pt states, \"I'm done. \" and required redirection to continue ambulation prior to safely sitting EOB  Stairs/Curb  Stairs?: No     Balance  Posture: Good  Sitting - Static: Good;-  Sitting - Dynamic: Good;-  Standing - Static: Fair  Standing - Dynamic: Fair  Comments: standing balance assessed with RW        Plan   Plan  Times per week: 5x/wk  Current Treatment Recommendations: Strengthening, ROM, Balance Training, Functional Mobility Training, Transfer Training, Gait Training, Stair training, Endurance Training, Home Exercise Program, Safety Education & Training, Patient/Caregiver Education & Training, Neuromuscular Re-education  Safety Devices  Type of devices: Nurse notified, Call light within reach, Gait belt, Left in bed, Bed alarm in place  Restraints  Initially in place: No      AM-PAC Score  AM-PAC Inpatient Mobility Raw Score : 17 (08/30/21 1406)  AM-PAC Inpatient T-Scale Score : 42.13 (08/30/21 1406)  Mobility Inpatient CMS 0-100% Score: 50.57 (08/30/21 1406)  Mobility Inpatient CMS G-Code Modifier : CK (08/30/21 1406)          Goals  Short term goals  Time Frame for Short term goals: 14 visits  Short term goal 1: Perform bed mobility and functional transfers with supervision  Short term goal 2: Ambulate 300ft with CAM boot to RLE and least restrictive AD and SBA  Short term goal 3: Ascend/descend 10 steps with HR and CGA  Short term goal 4: Demo Fair+ dynamic standing balance to decrease risk of falls       Therapy Time   Individual Concurrent Group Co-treatment   Time In 0951         Time Out 1012         Minutes 21         Timed Code Treatment Minutes: 8 Minutes       Kacey Santana PT

## 2021-08-30 NOTE — PLAN OF CARE
Problem: Skin Integrity:  Goal: Will show no infection signs and symptoms  8/30/2021 1834 by Riley Rodriguez RN  Outcome: Ongoing  8/30/2021 0623 by Blake Sow RN  Outcome: Ongoing  Goal: Absence of new skin breakdown  8/30/2021 1834 by Riley Rodriguez RN  Outcome: Ongoing  8/30/2021 0623 by Blake Sow RN  Outcome: Ongoing     Problem: Falls - Risk of:  Goal: Will remain free from falls  8/30/2021 1834 by Riley Rodriguez RN  Outcome: Ongoing  8/30/2021 0623 by Blake Sow RN  Outcome: Ongoing  Goal: Absence of physical injury  8/30/2021 1834 by Riley Rodriguez RN  Outcome: Ongoing  8/30/2021 0623 by Blake Sow RN  Outcome: Ongoing     Problem: Pain:  Description: Pain management should include both nonpharmacologic and pharmacologic interventions. Goal: Pain level will decrease  Outcome: Ongoing  Goal: Control of acute pain  Outcome: Ongoing  Goal: Control of chronic pain  Outcome: Ongoing     Problem: Pain:  Description: Pain management should include both nonpharmacologic and pharmacologic interventions.   Goal: Pain level will decrease  Outcome: Ongoing  Goal: Control of acute pain  Outcome: Ongoing  Goal: Control of chronic pain  Outcome: Ongoing     Problem: Falls - Risk of:  Goal: Will remain free from falls  8/30/2021 1834 by Riley Rodriguez RN  Outcome: Ongoing  8/30/2021 0623 by Blake Sow RN  Outcome: Ongoing  Goal: Absence of physical injury  8/30/2021 1834 by Riley Rodriguez RN  Outcome: Ongoing  8/30/2021 0623 by Blake Sow RN  Outcome: Ongoing     Problem: Respiratory  Intervention: Respiratory assessment  8/30/2021 0916 by Rajiv Blair RCP  Note: BRONCHOSPASM/BRONCHOCONSTRICTION     []         IMPROVE AERATION/BREATH SOUNDS  []   ADMINISTER BRONCHODILATOR THERAPY AS APPROPRIATE  []   ASSESS BREATH SOUNDS  []   IMPLEMENT AEROSOL/MDI PROTOCOL  []   PATIENT EDUCATION AS NEEDED

## 2021-08-31 VITALS
OXYGEN SATURATION: 94 % | RESPIRATION RATE: 18 BRPM | TEMPERATURE: 98 F | SYSTOLIC BLOOD PRESSURE: 151 MMHG | WEIGHT: 182.98 LBS | DIASTOLIC BLOOD PRESSURE: 98 MMHG | BODY MASS INDEX: 33.47 KG/M2 | HEART RATE: 66 BPM

## 2021-08-31 LAB
CULTURE: ABNORMAL
Lab: ABNORMAL
POTASSIUM SERPL-SCNC: 3.2 MMOL/L (ref 3.7–5.3)
SPECIMEN DESCRIPTION: ABNORMAL

## 2021-08-31 PROCEDURE — 99232 SBSQ HOSP IP/OBS MODERATE 35: CPT | Performed by: PSYCHIATRY & NEUROLOGY

## 2021-08-31 PROCEDURE — 95819 EEG AWAKE AND ASLEEP: CPT

## 2021-08-31 PROCEDURE — 6360000002 HC RX W HCPCS: Performed by: NURSE PRACTITIONER

## 2021-08-31 PROCEDURE — 95816 EEG AWAKE AND DROWSY: CPT | Performed by: PSYCHIATRY & NEUROLOGY

## 2021-08-31 PROCEDURE — 99239 HOSP IP/OBS DSCHRG MGMT >30: CPT | Performed by: INTERNAL MEDICINE

## 2021-08-31 PROCEDURE — 2580000003 HC RX 258: Performed by: NURSE PRACTITIONER

## 2021-08-31 PROCEDURE — 6370000000 HC RX 637 (ALT 250 FOR IP): Performed by: INTERNAL MEDICINE

## 2021-08-31 PROCEDURE — 94640 AIRWAY INHALATION TREATMENT: CPT

## 2021-08-31 PROCEDURE — 84132 ASSAY OF SERUM POTASSIUM: CPT

## 2021-08-31 PROCEDURE — 2500000003 HC RX 250 WO HCPCS: Performed by: NURSE PRACTITIONER

## 2021-08-31 PROCEDURE — 6370000000 HC RX 637 (ALT 250 FOR IP): Performed by: NURSE PRACTITIONER

## 2021-08-31 PROCEDURE — 36415 COLL VENOUS BLD VENIPUNCTURE: CPT

## 2021-08-31 RX ORDER — AMLODIPINE BESYLATE 5 MG/1
10 TABLET ORAL DAILY
Status: DISCONTINUED | OUTPATIENT
Start: 2021-09-01 | End: 2021-08-31 | Stop reason: HOSPADM

## 2021-08-31 RX ORDER — LISINOPRIL 20 MG/1
40 TABLET ORAL DAILY
Status: DISCONTINUED | OUTPATIENT
Start: 2021-09-01 | End: 2021-08-31 | Stop reason: HOSPADM

## 2021-08-31 RX ORDER — AMLODIPINE BESYLATE 10 MG/1
10 TABLET ORAL DAILY
Qty: 30 TABLET | Refills: 0 | Status: SHIPPED | OUTPATIENT
Start: 2021-09-01 | End: 2021-09-21 | Stop reason: SDUPTHER

## 2021-08-31 RX ORDER — DIVALPROEX SODIUM 500 MG/1
TABLET, DELAYED RELEASE ORAL
Qty: 90 TABLET | Refills: 3 | Status: SHIPPED | OUTPATIENT
Start: 2021-08-31 | End: 2021-09-21 | Stop reason: SDUPTHER

## 2021-08-31 RX ORDER — BLOOD PRESSURE TEST KIT
1 KIT MISCELLANEOUS DAILY
Qty: 1 KIT | Refills: 0 | Status: SHIPPED | OUTPATIENT
Start: 2021-08-31

## 2021-08-31 RX ORDER — CARVEDILOL 6.25 MG/1
6.25 TABLET ORAL 2 TIMES DAILY WITH MEALS
Qty: 60 TABLET | Refills: 3 | Status: SHIPPED | OUTPATIENT
Start: 2021-08-31 | End: 2021-09-21 | Stop reason: SDUPTHER

## 2021-08-31 RX ORDER — VENLAFAXINE HYDROCHLORIDE 75 MG/1
75 CAPSULE, EXTENDED RELEASE ORAL DAILY
Qty: 30 CAPSULE | Refills: 0 | Status: SHIPPED | OUTPATIENT
Start: 2021-08-31

## 2021-08-31 RX ORDER — HYDRALAZINE HYDROCHLORIDE 20 MG/ML
10 INJECTION INTRAMUSCULAR; INTRAVENOUS ONCE
Status: COMPLETED | OUTPATIENT
Start: 2021-08-31 | End: 2021-08-31

## 2021-08-31 RX ORDER — OXCARBAZEPINE 150 MG/1
150 TABLET, FILM COATED ORAL 2 TIMES DAILY
Qty: 60 TABLET | Refills: 0 | Status: SHIPPED | OUTPATIENT
Start: 2021-08-31 | End: 2021-09-21 | Stop reason: SDUPTHER

## 2021-08-31 RX ORDER — LISINOPRIL 40 MG/1
40 TABLET ORAL DAILY
Qty: 30 TABLET | Refills: 0 | Status: SHIPPED | OUTPATIENT
Start: 2021-09-01 | End: 2021-09-21 | Stop reason: SDUPTHER

## 2021-08-31 RX ADMIN — ENOXAPARIN SODIUM 30 MG: 30 INJECTION SUBCUTANEOUS at 08:38

## 2021-08-31 RX ADMIN — BUDESONIDE AND FORMOTEROL FUMARATE DIHYDRATE 2 PUFF: 160; 4.5 AEROSOL RESPIRATORY (INHALATION) at 07:40

## 2021-08-31 RX ADMIN — LEVOTHYROXINE SODIUM 100 MCG: 100 TABLET ORAL at 08:38

## 2021-08-31 RX ADMIN — VENLAFAXINE HYDROCHLORIDE 75 MG: 75 CAPSULE, EXTENDED RELEASE ORAL at 08:39

## 2021-08-31 RX ADMIN — CARVEDILOL 6.25 MG: 6.25 TABLET, FILM COATED ORAL at 08:37

## 2021-08-31 RX ADMIN — CEFTRIAXONE SODIUM 1000 MG: 1 INJECTION, POWDER, FOR SOLUTION INTRAMUSCULAR; INTRAVENOUS at 00:41

## 2021-08-31 RX ADMIN — DIVALPROEX SODIUM 500 MG: 500 TABLET, EXTENDED RELEASE ORAL at 08:38

## 2021-08-31 RX ADMIN — AMLODIPINE BESYLATE 5 MG: 5 TABLET ORAL at 15:00

## 2021-08-31 RX ADMIN — LISINOPRIL 20 MG: 20 TABLET ORAL at 08:38

## 2021-08-31 RX ADMIN — SODIUM CHLORIDE, PRESERVATIVE FREE 6 ML: 5 INJECTION INTRAVENOUS at 08:39

## 2021-08-31 RX ADMIN — HYDRALAZINE HYDROCHLORIDE 10 MG: 20 INJECTION, SOLUTION INTRAMUSCULAR; INTRAVENOUS at 05:05

## 2021-08-31 RX ADMIN — CARVEDILOL 6.25 MG: 6.25 TABLET, FILM COATED ORAL at 16:33

## 2021-08-31 RX ADMIN — METOPROLOL TARTRATE 5 MG: 1 INJECTION, SOLUTION INTRAVENOUS at 02:49

## 2021-08-31 ASSESSMENT — PAIN DESCRIPTION - PAIN TYPE
TYPE: ACUTE PAIN
TYPE: ACUTE PAIN

## 2021-08-31 ASSESSMENT — PAIN DESCRIPTION - DESCRIPTORS: DESCRIPTORS: ACHING;DISCOMFORT

## 2021-08-31 ASSESSMENT — PAIN DESCRIPTION - LOCATION
LOCATION: ANKLE
LOCATION: ANKLE

## 2021-08-31 ASSESSMENT — PAIN DESCRIPTION - ORIENTATION
ORIENTATION: RIGHT
ORIENTATION: RIGHT

## 2021-08-31 ASSESSMENT — PAIN SCALES - GENERAL
PAINLEVEL_OUTOF10: 1
PAINLEVEL_OUTOF10: 7

## 2021-08-31 ASSESSMENT — PAIN DESCRIPTION - FREQUENCY: FREQUENCY: INTERMITTENT

## 2021-08-31 NOTE — PLAN OF CARE
Problem: Skin Integrity:  Goal: Will show no infection signs and symptoms  8/31/2021 1755 by Susanne Perez RN  Outcome: Ongoing  8/31/2021 0455 by Dmitry Rodriguez RN  Outcome: Ongoing  Goal: Absence of new skin breakdown  8/31/2021 1755 by Susanne Perez RN  Outcome: Ongoing  8/31/2021 0455 by Dmitry Rodriguez RN  Outcome: Ongoing     Problem: Falls - Risk of:  Goal: Will remain free from falls  8/31/2021 1755 by Susanne Perez RN  Outcome: Ongoing  8/31/2021 0455 by Dmitry Rodriguez RN  Outcome: Ongoing  Goal: Absence of physical injury  8/31/2021 1755 by Susanne Perez RN  Outcome: Ongoing  8/31/2021 0455 by Dmitry Rodriguez RN  Outcome: Ongoing     Problem: Pain:  Description: Pain management should include both nonpharmacologic and pharmacologic interventions. Goal: Pain level will decrease  8/31/2021 1755 by Susanne Perez RN  Outcome: Ongoing  8/31/2021 0455 by Dmitry Rodriguez RN  Outcome: Ongoing  Goal: Control of acute pain  Outcome: Ongoing  Goal: Control of chronic pain  Outcome: Ongoing     Problem: Pain:  Description: Pain management should include both nonpharmacologic and pharmacologic interventions.   Goal: Pain level will decrease  8/31/2021 1755 by Susanne Perez RN  Outcome: Ongoing  8/31/2021 0455 by Dmitry Rodriguez RN  Outcome: Ongoing  Goal: Control of acute pain  Outcome: Ongoing  Goal: Control of chronic pain  Outcome: Ongoing     Problem: Respiratory  Intervention: Respiratory assessment  8/31/2021 0909 by Jeff Rosario RCP  Note: BRONCHOSPASM/BRONCHOCONSTRICTION     []         IMPROVE AERATION/BREATH SOUNDS  []   ADMINISTER BRONCHODILATOR THERAPY AS APPROPRIATE  []   ASSESS BREATH SOUNDS  []   IMPLEMENT AEROSOL/MDI PROTOCOL  []   PATIENT EDUCATION AS NEEDED

## 2021-08-31 NOTE — DISCHARGE SUMMARY
[facility]  Office: [address], DO, Charla Simple, DO, Odell Baumann, DO, Lauren Mojica Blood, DO, Rubi Nielsen MD, Carito Patel MD, Ed Landeros MD, Bria Hubbard MD, Jenelle Dai MD, Rafael Nobles MD, Burke Rooney MD, Agus Stout DO, Glenn Zhang MD, Rocio Alonzo DO, Hamlet Enriquez MD,  Stacy Salomon DO, Ingris Bryant MD, Cheikh Snyder MD, Davonte Meredith MD, Tushar Alford MD, Elizabeth Woods MD, Beny Polanco MD, An Cornelius MD, Ana Bunch, [facility], [facility], CNP, Toby Damian, CNP, Enoc Smart, CNS, Goldy Kerr, CNP, Audrey Vincent, CNP, Freddy Hurley, CNP, Stephanie Teran, CNP, Sally Anderson, CNP, Wilner Browne PA-C, Christy Cruz, [facility], Yenny Vega, CNP, Ankita Knox, CNP, Pola Kaufman, CNP, Sagar Mcduffie, CNP, Marcy Gutierrez CNP, Pastor Esparza CNP, Natacha John, CNP, Allyson Dwyer, [facility]    Discharge Summary     Patient ID: James Hamilton  :  1965   MRN: [id number]     ACCOUNT:  [de-identified]   Patient's PCP: KEANU Young CNP  Admit Date: 2021   Discharge Date: 2021     Length of Stay: 3  Code Status:  Full Code  Admitting Physician: No admitting provider for patient encounter. Discharge Physician: Rosalio Woods DO     Active Discharge Diagnoses:     Hospital Problem Lists:  Principal Problem:    Hypertensive emergency  Active Problems:    Drug overdose, accidental or unintentional, initial encounter    Hypothyroidism    Seizure disorder [facility])    Drug overdose    History of seizure    Major depressive disorder with psychotic features ([facility]    Sepsis ([facility]    COPD (chronic obstructive pulmonary disease) ([facility]    Altered mental state    Acute metabolic encephalopathy    Abnormal CT of the head    Intraparenchymal hemorrhage of brain ([facility]  Resolved Problems:    * No resolved hospital problems.  *      Admission Condition: stable     Discharged Condition: stable    Hospital Stay:     Hospital Course:  Vale Lomas is a 60 y.o. female who was admitted for the management of hypertensive emergency , encephalopathy and shortness of breath. At outside facility, a CT of her head was done which was concerning for left putamen hemorrhage versus calcification on CT. She was evaluated by neurosurgery but they did not think that this was an acute process. She was evaluated by neurology, who attribute encephalopathy to her blood pressure and to possible polysubstance abuse. Patient does have a seizure history and was continued on her home Depakote, oxcarbazepine. EEG was ordered to evaluate for any underlying seizure disorder. Regarding her blood pressure, patient was restarted on her home antihypertensive regimen. She initially was started on a drip that was weaned off. Patient overall improved and returned back to her baseline mentation prior to discharge. She will be discharged on Norvasc, carvedilol, lisinopril. Her Norvasc and lisinopril were increased to 10 mg and 40 mg respectively. She will need repeat blood work in 1 week's time to evaluate her potassium and creatinine. Patient will need to follow-up with her primary care physician. Patient also instructed to stop smoking.     Significant therapeutic interventions: see above    Significant Diagnostic Studies:   Labs / Micro:  CBC:   Lab Results   Component Value Date    WBC 8.1 08/30/2021    RBC 4.76 08/30/2021    HGB 14.3 08/30/2021    HCT 43.5 08/30/2021    MCV 91.4 08/30/2021    MCH 30.0 08/30/2021    MCHC 32.9 08/30/2021    RDW 13.2 08/30/2021     08/30/2021     BMP:    Lab Results   Component Value Date    GLUCOSE 84 08/30/2021     08/30/2021    K 3.2 08/30/2021    CL 98 08/30/2021    CO2 24 08/30/2021    ANIONGAP 15 08/30/2021    BUN 7 08/30/2021    CREATININE 0.37 08/30/2021    BUNCRER NOT REPORTED 08/30/2021    CALCIUM 8.9 08/30/2021    LABGLOM >60 08/30/2021    GFRAA >60 08/30/2021    GFR      08/30/2021    GFR NOT REPORTED 08/30/2021     HFP:    Lab Results   Component Value Date    PROT 6.9 08/28/2021        Radiology:  CT HEAD WO CONTRAST    Result Date: 8/27/2021  Interval development of hyperdensities in the left putamen. While these may represent basal ganglia calcifications the possibility of small focal left putamen hemorrhages cannot be excluded as these are new from prior study. Further evaluation with MRI may be beneficial. Findings were discussed with Dr. Ruben Mattson at 8:10 pm on 8/27/2021. MRA HEAD WO CONTRAST    Result Date: 8/28/2021  1. Small area of presumed acute to subacute infarct involving the left frontal white matter. No significant mass effect or midline shift. 2. Otherwise, no acute intracranial abnormality. 3. Mild global parenchymal volume loss with mild chronic microvascular ischemic changes. 4. MRA of the head and neck is limited given patient motion. 5. Only minimal flow related enhancement is seen within the left cervical vertebral artery without significant flow related enhancement within the proximal V4 segment intracranially. Unclear whether this is related to a diffusely diminutive caliber of the left vertebral artery versus diffuse disease. 6. No flow limiting stenosis otherwise seen of the cervical carotid/vertebral arteries. 7. There is suggestion of moderate stenosis involving the left supraclinoid ICA. 8. Otherwise, no convincing significant stenosis of the teummv-fo-Lsdfyl. XR CHEST PORTABLE    Result Date: 8/28/2021  1. Right IJ central line in place, tip at the junction of the SVC and right atrium. 2. No evidence of a pneumothorax     XR CHEST PORTABLE    Result Date: 8/27/2021  No acute process. MRA NECK WO CONTRAST    Result Date: 8/28/2021  1. Small area of presumed acute to subacute infarct involving the left frontal white matter. No significant mass effect or midline shift.  2. Otherwise, no acute intracranial abnormality. 3. Mild global parenchymal volume loss with mild chronic microvascular ischemic changes. 4. MRA of the head and neck is limited given patient motion. 5. Only minimal flow related enhancement is seen within the left cervical vertebral artery without significant flow related enhancement within the proximal V4 segment intracranially. Unclear whether this is related to a diffusely diminutive caliber of the left vertebral artery versus diffuse disease. 6. No flow limiting stenosis otherwise seen of the cervical carotid/vertebral arteries. 7. There is suggestion of moderate stenosis involving the left supraclinoid ICA. 8. Otherwise, no convincing significant stenosis of the yakmmw-lo-Ewnkma. MRI BRAIN WO CONTRAST    Result Date: 8/28/2021  1. Small area of presumed acute to subacute infarct involving the left frontal white matter. No significant mass effect or midline shift. 2. Otherwise, no acute intracranial abnormality. 3. Mild global parenchymal volume loss with mild chronic microvascular ischemic changes. 4. MRA of the head and neck is limited given patient motion. 5. Only minimal flow related enhancement is seen within the left cervical vertebral artery without significant flow related enhancement within the proximal V4 segment intracranially. Unclear whether this is related to a diffusely diminutive caliber of the left vertebral artery versus diffuse disease. 6. No flow limiting stenosis otherwise seen of the cervical carotid/vertebral arteries. 7. There is suggestion of moderate stenosis involving the left supraclinoid ICA. 8. Otherwise, no convincing significant stenosis of the laeomt-xe-Iroggs.        Consultations:    Consults:     Final Specialist Recommendations/Findings:   IP CONSULT TO NEUROSURGERY  IP CONSULT TO NEUROLOGY  IP CONSULT TO CRITICAL CARE  IP CONSULT TO HOSPITALIST  IP CONSULT TO NEUROLOGY      The patient was seen and examined on day of discharge and this discharge summary is in conjunction with any daily progress note from day of discharge. Discharge plan:     Disposition: Home    Physician Follow Up:     James Mas, APRN - CNP  [address]  [phone]    Schedule an appointment as soon as possible for a visit in 1 week  Hospital follow up, blood pressure recheck. Kidney labs       Requiring Further Evaluation/Follow Up POST HOSPITALIZATION/Incidental Findings:   -Follow-up with your primary care physician within 1 week's time for posthospitalization follow-up.   You will need your blood pressure rechecked and you will need repeat blood work to evaluate kidney function  -Stop smoking  -Continue outpatient follow-up with neurology.  -Take all medication as prescribed  -Return to hospital if worsening chest pain, shortness of breath conical to breathing, nausea, vomiting, diarrhea    Diet: low fat, low cholesterol diet    Activity: As tolerated    Instructions to Patient: See above    Discharge Medications:      Medication List      START taking these medications    amLODIPine 10 MG tablet  Commonly known as: NORVASC  Take 1 tablet by mouth daily  Start taking on: September 1, 2021     Blood Pressure Kit  1 kit by Does not apply route daily     carvedilol 6.25 MG tablet  Commonly known as: COREG  Take 1 tablet by mouth 2 times daily (with meals)        CHANGE how you take these medications    divalproex 500 MG DR tablet  Commonly known as: DEPAKOTE  Take 1 tablet in the morning and 2 tablets at bed time  What changed: how to take this     lisinopril 40 MG tablet  Commonly known as: PRINIVIL;ZESTRIL  Take 1 tablet by mouth daily  Start taking on: September 1, 2021  What changed:   medication strength  how much to take     OXcarbazepine 150 MG tablet  Commonly known as: TRILEPTAL  Take 1 tablet by mouth 2 times daily  What changed:   medication strength  how much to take  how to take this  when to take this     venlafaxine 75 MG extended ondansetron 4 MG disintegrating tablet  Commonly known as: Zofran ODT           Where to Get Your Medications      Information about where to get these medications is not yet available    Ask your nurse or doctor about these medications  amLODIPine 10 MG tablet  Blood Pressure Kit  carvedilol 6.25 MG tablet  divalproex 500 MG DR tablet  lisinopril 40 MG tablet  OXcarbazepine 150 MG tablet  venlafaxine 75 MG extended release capsule         No discharge procedures on file. Time Spent on discharge is  37 mins in patient examination, evaluation, counseling as well as medication reconciliation, prescriptions for required medications, discharge plan and follow up. Electronically signed by   Elizabeth Brewer DO  8/31/2021  2:26 PM      Thank you Dr. Mortimer Singleton, [facility] for the opportunity to be involved in this patient's care.

## 2021-08-31 NOTE — PROCEDURES
Date: 8/31/2021  Referring physician: Dr. Adelaide Lawson  MRN: [id number]      Indication  Patient aged 60 y with acute encephalopathy. EEG done to assess for epileptiform activity. Introduction  This routine 20-minute EEG was recorded using the [employer] 10-20 System on a [employer] workstation at 256 samples/s. Automated spike and seizure detection algorithms were applied. Description  During the maximal alert state, a well-regulated, symmetric, and reactive 7 Hz posterior dominant rhythm was seen. No consistent focal slowing or interhemispheric asymmetry was noted. Stage I and stage II sleep were not observed. There were no interictal epileptiform discharges or electrographic seizures. Intermittent frontal slowing observed. Activations  Hyperventilation was not performed. Intermittent photic stimulation was performed and demonstrated no posterior driving response, but some myogenic artifact seen. Impression  Abnormal awake EEG. The slowing mentioned above suggests mild non specific encephalopathy. EKG did not show clear arrhyhtmia. No epileptiform discharges were identified. Please note the absence of such activity on this record cannot conclusively rule out an epileptic disorder. If such is still clinically suspected, a repeat study with sleep deprivation and/or prolonged sampling may be helpful. Gonzales Laird MD  Epilepsy Board Certified. Neurology Board Certified.     Electronically Signed

## 2021-08-31 NOTE — PROGRESS NOTES
Pt alert and oriented x 4-pt refuses to wear heart monitor-pt aware her BP is elevated-pt relayed to this writer she can go smoke anytime she wants by herself-Pt requested therapy boot taken off and so done at 0700  0933 pt to EEG lab-pt wants to smoke when she gets back from test

## 2021-08-31 NOTE — PROGRESS NOTES
[facility]  Office: [address], DO, Tan Erwin, DO, Gege Greenberg, DO, Mary Amaro Blood, DO, Melani Figueroa MD, Lazaro Cartagena MD, Ayla Mata MD, Maye Olson MD, Silvia Fan MD, Josue Pennington MD, Alex Del Rosario MD, Delfin Wright, DO, Annika Reaves MD, Mando Greene, DO, Nelli Bryant MD,  Roxann Herrera DO, Alexander Enciso MD, Meir Kelly MD, Wagner Lomas MD, Tasha Souza MD, Jiles Alpers, MD, Emelina Rodriguez MD, Candida Tellez MD, Fredo King, [facility], [facility], CNP, Rome Gutierrez, CNP, Betsey Alfonso, CNS, Jame Zendejas, CNP, Fatuma Michele, CNP, Jesus Esquivel, CNP, Blair Carrasco, CNP, Piper Conrad, CNP, Henry Sosa PA-C, Lian Han, [facility], Edita Beauchamp, CNP, Aundrea Marin, CNP, Owen Jain, CNP, Jose Domingo, CNP, Jennifer Leiva, CNP, Marley Montejo, CNP, Jose David Allen, CNP, Joanne Zavala, [address] [place] [address]    Progress Note    8/31/2021    1:57 PM    Name:   Michael Rai  MRN:     [id number]     Acct:      [de-identified]   Room:   [address] [place] [address] Day:  3  Admit Date:  8/27/2021  9:31 PM    PCP:   KEANU Barnard CNP  Code Status:  Full Code    Subjective:     C/C:   Chief Complaint   Patient presents with    Altered Mental Status     Interval History Status: not changed. Patient seen and examined at bedside. Patient is doing well. No complaints at this time. Blood pressure is improving. Denies any chest pain, nausea, vomiting, diarrhea. Denies any fevers chills. Brief History: This is a 58-year-old female who presented to the hospital with complaints of shortness of breath and hypertensive emergency. Patient blood pressure improved.   Review of Systems:     Constitutional:  negative for chills, fevers, sweats  Respiratory:  negative for cough, dyspnea on exertion, shortness of breath, wheezing  Cardiovascular:  negative for chest pain, chest pressure/discomfort, lower extremity edema, palpitations  Gastrointestinal:  negative for abdominal pain, constipation, diarrhea, nausea, vomiting  Neurological:  negative for dizziness, headache    Medications: Allergies: Allergies   Allergen Reactions    Imitrex [Sumatriptan] Hives    Bee Pollen     Bee Venom     Bromide Ion [Bromine]     Reglan [Metoclopramide]     Sulfa Antibiotics     Sulfadiazine     Tramadol Hives       Current Meds:   Scheduled Meds:    carvedilol  6.25 mg Oral BID WC    amLODIPine  5 mg Oral Daily    potassium bicarb-citric acid  40 mEq Oral Once    budesonide-formoterol  2 puff Inhalation BID    fluticasone  2 spray Each Nostril Daily    levothyroxine  100 mcg Oral Daily    lisinopril  20 mg Oral Daily    venlafaxine  75 mg Oral Daily with breakfast    sodium chloride flush  5-40 mL IntraVENous 2 times per day    enoxaparin  30 mg SubCUTAneous Daily    cefTRIAXone (ROCEPHIN) IV  1,000 mg IntraVENous Q24H    divalproex  500 mg Oral Daily    OXcarbazepine  300 mg Oral QPM    divalproex  1,000 mg Oral QPM     Continuous Infusions:    sodium chloride       PRN Meds: albuterol sulfate HFA, famotidine, sodium chloride flush, sodium chloride, ondansetron **OR** ondansetron, acetaminophen **OR** acetaminophen, metoprolol    Data:     Past Medical History:   has a past medical history of Arthritis, Asthma, Attention deficit hyperactivity disorder (ADHD), combined type, Borderline diabetes, Borderline personality disorder ([facility] [address] CHF (congestive heart failure) ([facility] [address] COPD (chronic obstructive pulmonary disease) ([facility] [address] Fibromyalgia, Headache, Hypertension, Manic depression ([facility] [address] Movement disorder, Neck fracture ([facility] [address] Pernicious anemia, Seizure ([facility] [address] and Thyroid disease. Social History:   reports that she has been smoking. She has a 6.00 pack-year smoking history. She has never used smokeless tobacco. She reports previous drug use.  She reports that she does not drink alcohol. Family History: No family history on file. Vitals:  BP (!) 163/104   Pulse 72   Temp 98.1 °F (36.7 °C) (Oral)   Resp 18   Wt 182 lb 15.7 oz (83 kg)   SpO2 94%   BMI 33.47 kg/m²   Temp (24hrs), Av.3 °F (36.8 °C), Min:98.1 °F (36.7 °C), Max:98.6 °F (37 °C)    No results for input(s): POCGLU in the last 72 hours. I/O (24Hr): Intake/Output Summary (Last 24 hours) at 2021 1357  Last data filed at 2021 1632  Gross per 24 hour   Intake 600 ml   Output 650 ml   Net -50 ml       Labs:  Hematology:  Recent Labs     21  0631 21   WBC 9.2 8.1   RBC 4.85 4.76   HGB 14.5 14.3   HCT 45.3 43.5   MCV 93.4 91.4   MCH 29.9 30.0   MCHC 32.0 32.9   RDW 13.9 13.2    229   MPV 9.6 9.9     Chemistry:  Recent Labs     21  0631 21    137   K 3.8 3.2*    98   CO2 24 24   GLUCOSE 80 84   BUN 16 7   CREATININE 0.55 0.37*   MG 2.1 1.8   ANIONGAP 13 15   LABGLOM >60 >60   GFRAA >60 >60   CALCIUM 9.1 8.9   PHOS 1.8* 2.4*     Recent Labs     21  0631 21   LABALBU 3.8 3.7     ABG:  Lab Results   Component Value Date    POCPH 7.425 02/15/2019    PHART 7.399 2021    POCPCO2 43.8 02/15/2019    ZKE0TXR 31.7 2021    POCPO2 67.9 02/15/2019    PO2ART 88.9 2021    POCHCO3 28.7 02/15/2019    YQW0XOA 19.6 2021    NBEA 5.2 2021    PBEA NOT REPORTED 2021    XVZ0SPU 30 02/15/2019    EOAD3YCQ 94 02/15/2019    O4URXSAC 93.0 2021    FIO2 NOT REPORTED 2021     Lab Results   Component Value Date/Time    SPECIAL NOT REPORTED 2021 09:28 PM     Lab Results   Component Value Date/Time    CULTURE ESCHERICHIA COLI >006065 CFU/ML (A) 2021 09:28 PM       Radiology:  CT HEAD WO CONTRAST    Result Date: 2021  Interval development of hyperdensities in the left putamen.   While these may represent basal ganglia calcifications the possibility of small focal left putamen hemorrhages cannot be excluded as these are new from prior study. Further evaluation with MRI may be beneficial. Findings were discussed with Dr. Marleny Wade at 8:10 pm on 8/27/2021. MRA HEAD WO CONTRAST    Result Date: 8/28/2021  1. Small area of presumed acute to subacute infarct involving the left frontal white matter. No significant mass effect or midline shift. 2. Otherwise, no acute intracranial abnormality. 3. Mild global parenchymal volume loss with mild chronic microvascular ischemic changes. 4. MRA of the head and neck is limited given patient motion. 5. Only minimal flow related enhancement is seen within the left cervical vertebral artery without significant flow related enhancement within the proximal V4 segment intracranially. Unclear whether this is related to a diffusely diminutive caliber of the left vertebral artery versus diffuse disease. 6. No flow limiting stenosis otherwise seen of the cervical carotid/vertebral arteries. 7. There is suggestion of moderate stenosis involving the left supraclinoid ICA. 8. Otherwise, no convincing significant stenosis of the [id number]. XR CHEST PORTABLE    Result Date: 8/28/2021  1. Right IJ central line in place, tip at the junction of the SVC and right atrium. 2. No evidence of a pneumothorax     XR CHEST PORTABLE    Result Date: 8/27/2021  No acute process. MRA NECK WO CONTRAST    Result Date: 8/28/2021  1. Small area of presumed acute to subacute infarct involving the left frontal white matter. No significant mass effect or midline shift. 2. Otherwise, no acute intracranial abnormality. 3. Mild global parenchymal volume loss with mild chronic microvascular ischemic changes. 4. MRA of the head and neck is limited given patient motion. 5. Only minimal flow related enhancement is seen within the left cervical vertebral artery without significant flow related enhancement within the proximal V4 segment intracranially.   Unclear whether this is related to a diffusely diminutive caliber of the left vertebral artery versus diffuse disease. 6. No flow limiting stenosis otherwise seen of the cervical carotid/vertebral arteries. 7. There is suggestion of moderate stenosis involving the left supraclinoid ICA. 8. Otherwise, no convincing significant stenosis of the [id number]. MRI BRAIN WO CONTRAST    Result Date: 8/28/2021  1. Small area of presumed acute to subacute infarct involving the left frontal white matter. No significant mass effect or midline shift. 2. Otherwise, no acute intracranial abnormality. 3. Mild global parenchymal volume loss with mild chronic microvascular ischemic changes. 4. MRA of the head and neck is limited given patient motion. 5. Only minimal flow related enhancement is seen within the left cervical vertebral artery without significant flow related enhancement within the proximal V4 segment intracranially. Unclear whether this is related to a diffusely diminutive caliber of the left vertebral artery versus diffuse disease. 6. No flow limiting stenosis otherwise seen of the cervical carotid/vertebral arteries. 7. There is suggestion of moderate stenosis involving the left supraclinoid ICA. 8. Otherwise, no convincing significant stenosis of the [id number].        Physical Examination:        General appearance: Chronically ill-appearing female, alert, cooperative and in no acute distress  Mental Status:  oriented to person, place and time and normal affect  Lungs:  clear to auscultation bilaterally, normal effort  Heart:  regular rate and rhythm, no murmur  Abdomen:  soft, nontender, nondistended, normal bowel sounds, no masses, hepatomegaly, splenomegaly  Extremities:  no edema, redness, tenderness in the calves  Skin:  no gross lesions, rashes, induration    Assessment:        Hospital Problems         Last Modified POA    * (Principal) Hypertensive emergency 8/28/2021 Yes    Drug overdose, accidental or unintentional, initial encounter 8/28/2021 Yes Hypothyroidism 8/28/2021 Yes    Seizure disorder ([facility] [address] 8/28/2021 Yes    Drug overdose 8/28/2021 Yes    History of seizure 8/28/2021 Yes    Major depressive disorder with psychotic features ([facility] [address] 8/28/2021 Yes    Sepsis ([facility] [address] 8/28/2021 Yes    COPD (chronic obstructive pulmonary disease) ([facility] [address] 8/28/2021 Yes    Altered mental state 8/28/2021 Yes    Acute metabolic encephalopathy 0/20/4478 Yes    Abnormal CT of the head 8/28/2021 Yes    Intraparenchymal hemorrhage of brain ([facility] [address] 8/30/2021 Yes          Plan:        1. Blood pressure better controlled. Continue Coreg 6.25 mg twice daily. Increase Norvasc to 10 mg daily, lisinopril 40 mg.  2. Continue Depakote, Trileptal for seizures. Discussed with neurology, okay to be discharge if EEG normal.  3. Counseled patient on importance of smoking cessation and adverse effects of tobacco use on cardiovascular system. 4. Continue Lovenox for DVT prophylaxis  5. Continue Synthroid for treatment of hypothyroidism  6. Replace potassium given hypokalemia  7. DVT prophylaxis  8.   Tha Schmitz DO  8/31/2021  1:57 PM

## 2021-08-31 NOTE — PROGRESS NOTES
[facility]   [address] [place] [address]    Resident Progress Note             Date:   8/31/2021  Patient name:  Michael Rai  Date of admission:  8/27/2021  9:31 PM  MRN:   [id number]  Account:  [de-identified]  YOB: 1965  PCP:    KEANU Barnard CNP  Room:   [address] [place] [address]  Code Status:    Full Code      Brief History and interval:     Hospitalization Day: 3    60years old female patient who presented with altered mental status.     She has a past medical history significant for hypothyroidism, CHF, COPD, manic depression, seizure disorder since the age of 3 (classic tonic clonic preceded by aura) with hx of noncompliance with AED. substance abuse. On multiple home meds; nebulizers, Suboxone, Depakote,oxcarbazepine, venlafaxine, Neurontin, lisinopril, risperidone, levothyroxine, clonidine, trazodone, aspirin     Last well-known 1400 on 8/27; EMS were called to patient's house. Upon their arrival, they found patient to be in respiratory distress with oxygen desaturation. Patient was put on BiPAP by EMS with some improvement. Patient also was given 1 dose of Narcan with minimal response. Patient was taken to [facility] where she was found to have SEDA + urinalysis concerning for UTI + elevated hemoglobin + elevated CO2 + elevated anion gap. At that time on exam patient was AAO x1 responding to pain.     CT head done at that time was concerning for left putamen hemorrhage versus calcification on CT head. Last CT head done was in July 2020 and did not reveal such hyperdensities.     Patient was transferred to [facility] for further evaluation for possible degree hemorrhage neurosurgery were consulted. Patient was also seen by mobile stroke unit the patient was able to start following some commands but confused.   She was noted to have minimal to no movement of left arm and leg.     On examination at [facility] ER, patient is alert and awake, oriented only to person. Not answering questions appropriately but laughing. Stated that she was in pain while central line being placed by EM. Patient able to move all 4 extremities, some left-sided weakness greater than right.     Neurosurgery impression: Likely this left basal ganglia hyperdensity is more consistent with calcification then bleed. This finding does not go with her clinical picture of encephalopathy in addition her encephalopathy is improving     Patient was on Cardene infusion due to hypertensive urgency during admission. Also patient was on phenobarbital in the past and was given as a loading dose in the emergency department. Patient had brain MRI with questionable left frontal area of restricted diffusion  Oxcarbazepine level is nondetectable  Depakote level is low  Patient is maintained on oxcarbazepine and Depakote during this admission. EEG done. Awaiting read.      Vital signs, Input/output, lines/ drains, labs, cultures, imaging studies, medications, orders, consultations notes- all reviewed.       Subjective: The patient was seen and examined and the chart was reviewed. Patient was awake, alert, oriented     There were no acute events overnight. Patient denied any new symptoms, was calmer today and answered my questions. Patient reports phenobarbitol and phenytoin as her home meds. I don't find them on the home med list.     MRI Brain done yesterday. Reviewed. EEG done. Awaiting read. ROS  Constitutional: no fever, chills, fatigue  HENT: No change in vision or hearing   Respiratory: No cough, SOB, wheezing. Cardiovascular:  No chest pain, palpitations, leg swelling. Gastrointestinal: No nausea, vomiting, diarrhea. Genitourinary: No increased frequency, urgency. Musculoskeletal: No myalgia or arthralgia. Skin: No rashes or scarring or bruises. Neurological: No headache, paresthesia, or focal weakness.      Endo/Heme/Allergies: Negative for itchy eyes or runny nose. Psychiatric/Behavioral: No anxiety or depressed mood.  carvedilol  6.25 mg Oral BID WC    amLODIPine  5 mg Oral Daily    potassium bicarb-citric acid  40 mEq Oral Once    budesonide-formoterol  2 puff Inhalation BID    fluticasone  2 spray Each Nostril Daily    levothyroxine  100 mcg Oral Daily    lisinopril  20 mg Oral Daily    venlafaxine  75 mg Oral Daily with breakfast    sodium chloride flush  5-40 mL IntraVENous 2 times per day    enoxaparin  30 mg SubCUTAneous Daily    cefTRIAXone (ROCEPHIN) IV  1,000 mg IntraVENous Q24H    divalproex  500 mg Oral Daily    OXcarbazepine  300 mg Oral QPM    divalproex  1,000 mg Oral QPM       Past Medical History:   Diagnosis Date    Arthritis     Asthma     Attention deficit hyperactivity disorder (ADHD), combined type     Borderline diabetes     Borderline personality disorder ([address]     CHF (congestive heart failure) ([address]     COPD (chronic obstructive pulmonary disease) (HCC)     Fibromyalgia     Headache     Hypertension     Manic depression ([address]     Movement disorder     Neck fracture ([address]     Pernicious anemia     Seizure ([address]     Thyroid disease        Past Surgical History:   Procedure Laterality Date    EXPLORATION OF WOUND OF EXTREMITY Right 5/19/2019    RIGHT THIGH WOUND WASHOUT WITH [facility] WEST-ER PLACEMENT performed by Melissa Hickey MD at [facility] N/A 5/22/2019    RIGHT WOUND HIP EXAMINATION LAVAGE AND WOUND VAC PLACEMENT performed by Carol Crocker MD at [address] [place] [address] Right 5/17/2019    IRRIGATION, DEBRIDEMENT RIGHT THIGH performed by Leslie Callahan MD at [facility] Bilateral     LYMPH NODE DISSECTION      cervical    PARTIAL HYSTERECTOMY         Objective:     PHYSICAL EXAM:      Blood pressure (!) 144/85, pulse 72, temperature 98.2 °F (36.8 °C), temperature source Oral, resp.  rate 18, weight 182 lb 15.7 oz (83 kg), SpO2 94 %, not currently breastfeeding. General Examination    General Resting comfortably in bed   Head Normocephalic, without obvious abnormality   Neck Supple, symmetrical. Good ROM. No midline or paraspinal tenderness. Lungs Respirations unlabored, no wheezing   Chest Wall No deformity   Heart RRR, no murmur   Abdomen Soft. Non-tender, non-distended   Extremities No cyanosis or edema or warmth. Pulses 2+ and symmetric   Skin: Skin  turgor normal, no rashes or lesions     Mental status  Speech Alert. Oriented to person, place, and time. Speech is fluent without paraphasic errors  Good repetition and naming  Can do 1 step, 2 step, and cross-body commands  Can spell world backwards. Language appropriate. No hallucinations or delusions. No SI/HI. Cranial nerves   II - VFF, visual threat intact  III, IV, VI - extra-ocular muscles full. No nystagmus. Pupils symmetric and responsive.    V - sensation symmetric         VII -  No facial droop or asymmetric NLF  VIII - intact hearing to conversational tone          IX, X - symmetrical palate elevation   XI - 5/5 strength symmetric  XII - tongue midline   Motor function  Strength: grossly 5/5 in b/l              Deltoid, biceps, triceps, wrist flexion, wrist extension             Hip flexion/extension, knee flexion/extension, plantar flexion  Bulk: grossly normal no atrophy  Tone: symmetric b/l arms and legs  Abnormal movements: No abnormal movements or tremor   Sensory function Symmetric to touch in all extremities bilaterally   Cerebellar No dysmetria or dysdiadochocinesia    Reflex function DTR:        2+ b/l symmetric in biceps, brachioradialis, patellar, calcaneal  Babinski b/l plantar downgoing   Gait                  Not assessed       Investigations:      Laboratory Testing:  Recent Results (from the past 24 hour(s))   Valproic acid level, total and free    Collection Time: 08/30/21 11:58 AM   Result Value Ref Range    Valproic Acid Lvl 78 50 - 125 ug/mL    Valproic Dose amount NOT REPORTED     Valproic Date last dose NOT REPORTED     Valproic Time last dose NOT REPORTED     Valproic Acid, Free 8.7 7.0 - 23.0 ug/mL    Valproic Acid % Free 11.2 5.0 - 18.4 %       Imaging/Diagnostics:  XR ANKLE RIGHT (MIN 3 VIEWS)    Result Date: 8/19/2021  EXAMINATION: THREE XRAY VIEWS OF THE RIGHT ANKLE 8/18/2021 4:23 pm COMPARISON: 05/18/2021 HISTORY: ORDERING SYSTEM PROVIDED HISTORY: Right ankle pain, unspecified chronicity TECHNOLOGIST PROVIDED HISTORY: Right ankle fracture in May FINDINGS: Ankle mortise intact. Oblique fracture distal fibular metaphysis shows ongoing healing with fracture line less distinct and some callus formation. No new fracture. Soft tissues grossly intact. Healing oblique distal right fibular metaphysis fracture. CT HEAD WO CONTRAST    Result Date: 8/27/2021  EXAMINATION: CT OF THE HEAD WITHOUT CONTRAST  8/27/2021 7:09 pm TECHNIQUE: CT of the head was performed without the administration of intravenous contrast. Dose modulation, iterative reconstruction, and/or weight based adjustment of the mA/kV was utilized to reduce the radiation dose to as low as reasonably achievable. COMPARISON: July 14, 2020 HISTORY: ORDERING SYSTEM PROVIDED HISTORY: Mental Status Changes TECHNOLOGIST PROVIDED HISTORY: Mental Status Changes Decision Support Exception - unselect if not a suspected or confirmed emergency medical condition->Emergency Medical Condition (MA) Reason for Exam: Altered Mental Status Acuity: Unknown Type of Exam: Unknown Relevant Medical/Surgical History: overdose, htn, seizures, FINDINGS: BRAIN/VENTRICLES: There is chronic ischemic changes of the periventricular white matter. When compared to the prior study there is interval development punctate hyperdensities in the left putamen. The possibility of focal putamen hemorrhages cannot be excluded. The gray-white differentiation is maintained without evidence of an acute infarct.   There is matter (series 4, image 13). No mass effect or midline shift. Areas of T2 FLAIR hyperintensity are seen in the periventricular and subcortical white matter as well as the prema, which are nonspecific, but may represent chronic microvascular ischemic change. There is prominence of the ventricles and sulci due to global parenchymal volume loss. The sellar/suprasellar regions appear unremarkable. The normal signal voids within the major intracranial vessels appear maintained. ORBITS: The visualized portion of the orbits demonstrate no acute abnormality. SINUSES: The visualized paranasal sinuses and mastoid air cells are well aerated. BONES/SOFT TISSUES: The bone marrow signal intensity appears normal. The soft tissues demonstrate no acute abnormality. MRA NECK: Evaluation is limited due to patient motion as well as lack of intravenous contrast. AORTIC ARCH/ARCH VESSELS: The aortic arch arch vessels are not well evaluated on this exam. CAROTID ARTERIES: Question of mild stenosis involving the proximal left common carotid artery. No convincing stenosis of the right common carotid artery. No flow limiting stenosis is clearly identified of the internal carotid arteries by NASCET criteria. VERTEBRAL ARTERIES: Only minimal flow related enhancement is seen within the left vertebral artery. Unclear whether this is related to a diminutive caliber versus diffuse disease. The origin of the right vertebral artery is not well visualized. No convincing stenosis otherwise seen of the right vertebral artery. 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 There is question moderate stenosis involving the left supraclinoid ICA. No focal stenosis is seen of the right internal carotid artery. No significant stenosis is seen of the anterior cerebral or middle cerebral arteries.  POSTERIOR CIRCULATION: There is no significant flow related enhancement within the proximal left vertebral artery. There is a diminutive caliber with diffuse irregularity of the basilar artery. No focal stenosis is seen of the right vertebral artery. No convincing evidence of a significant stenosis or occlusion involving the posterior cerebral arteries within the limitations of patient motion. 1. Small area of presumed acute to subacute infarct involving the left frontal white matter. No significant mass effect or midline shift. 2. Otherwise, no acute intracranial abnormality. 3. Mild global parenchymal volume loss with mild chronic microvascular ischemic changes. 4. MRA of the head and neck is limited given patient motion. 5. Only minimal flow related enhancement is seen within the left cervical vertebral artery without significant flow related enhancement within the proximal V4 segment intracranially. Unclear whether this is related to a diffusely diminutive caliber of the left vertebral artery versus diffuse disease. 6. No flow limiting stenosis otherwise seen of the cervical carotid/vertebral arteries. 7. There is suggestion of moderate stenosis involving the left supraclinoid ICA. 8. Otherwise, no convincing significant stenosis of the [id number]. XR CHEST PORTABLE    Result Date: 8/28/2021  EXAMINATION: ONE XRAY VIEW OF THE CHEST 8/27/2021 5:58 pm COMPARISON: August 27, 2021 1548 hours HISTORY: ORDERING SYSTEM PROVIDED HISTORY: right IJ CVC TECHNOLOGIST PROVIDED HISTORY: right IJ CVC Reason for Exam: line placement  supine port FINDINGS: Portable view of the chest demonstrates interval placement of a right IJ central line. Tip is at the junction of the SVC and right atrium. No pneumothorax is noted. No focal area of consolidation is present. Heart size and mediastinal contours are stable. Osseous structures appear normal.     1. Right IJ central line in place, tip at the junction of the SVC and right atrium.  2. No evidence of a hyperintensity are seen in the periventricular and subcortical white matter as well as the prema, which are nonspecific, but may represent chronic microvascular ischemic change. There is prominence of the ventricles and sulci due to global parenchymal volume loss. The sellar/suprasellar regions appear unremarkable. The normal signal voids within the major intracranial vessels appear maintained. ORBITS: The visualized portion of the orbits demonstrate no acute abnormality. SINUSES: The visualized paranasal sinuses and mastoid air cells are well aerated. BONES/SOFT TISSUES: The bone marrow signal intensity appears normal. The soft tissues demonstrate no acute abnormality. MRA NECK: Evaluation is limited due to patient motion as well as lack of intravenous contrast. AORTIC ARCH/ARCH VESSELS: The aortic arch arch vessels are not well evaluated on this exam. CAROTID ARTERIES: Question of mild stenosis involving the proximal left common carotid artery. No convincing stenosis of the right common carotid artery. No flow limiting stenosis is clearly identified of the internal carotid arteries by NASCET criteria. VERTEBRAL ARTERIES: Only minimal flow related enhancement is seen within the left vertebral artery. Unclear whether this is related to a diminutive caliber versus diffuse disease. The origin of the right vertebral artery is not well visualized. No convincing stenosis otherwise seen of the right vertebral artery. 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 There is question moderate stenosis involving the left supraclinoid ICA. No focal stenosis is seen of the right internal carotid artery. No significant stenosis is seen of the anterior cerebral or middle cerebral arteries. POSTERIOR CIRCULATION: There is no significant flow related enhancement within the proximal left vertebral artery.   There is a diminutive caliber with diffuse irregularity of the basilar artery. No focal stenosis is seen of the right vertebral artery. No convincing evidence of a significant stenosis or occlusion involving the posterior cerebral arteries within the limitations of patient motion. 1. Small area of presumed acute to subacute infarct involving the left frontal white matter. No significant mass effect or midline shift. 2. Otherwise, no acute intracranial abnormality. 3. Mild global parenchymal volume loss with mild chronic microvascular ischemic changes. 4. MRA of the head and neck is limited given patient motion. 5. Only minimal flow related enhancement is seen within the left cervical vertebral artery without significant flow related enhancement within the proximal V4 segment intracranially. Unclear whether this is related to a diffusely diminutive caliber of the left vertebral artery versus diffuse disease. 6. No flow limiting stenosis otherwise seen of the cervical carotid/vertebral arteries. 7. There is suggestion of moderate stenosis involving the left supraclinoid ICA. 8. Otherwise, no convincing significant stenosis of the [id number]. MRI BRAIN WO CONTRAST    Result Date: 8/28/2021  EXAMINATION: MRI OF THE BRAIN WITHOUT CONTRAST; MRA OF THE HEAD WITHOUT CONTRAST; MRA OF THE NECK WITHOUT CONTRAST 8/28/2021 12:15 pm: TECHNIQUE: Multiplanar multisequence MRI of the brain was performed without the administration of intravenous contrast.; MRA of the head was performed utilizing time-of-flight imaging with MIP images. No intravenous contrast was administered.; Multiplanar multisequence MRA of the neck was performed without the administration of intravenous contrast. Stenosis of the internal carotid arteries measured using NASCET criteria. COMPARISON: None.  HISTORY: ORDERING SYSTEM PROVIDED HISTORY: AMS TECHNOLOGIST PROVIDED HISTORY: AMS Reason for Exam: AMS r/o stroke; ORDERING SYSTEM PROVIDED HISTORY: AMS to r/o stroke TECHNOLOGIST PROVIDED HISTORY: AMS to r/o stroke Decision Support Exception - unselect if not a suspected or confirmed emergency medical condition->Emergency Medical Condition (MA) Reason for Exam: AMS to r/o stroke; ORDERING SYSTEM PROVIDED HISTORY: AMS to r/o stroke TECHNOLOGIST PROVIDED HISTORY: AMS to r/o stroke Reason for Exam: AMS r/o stroke FINDINGS: MRI BRAIN: INTRACRANIAL STRUCTURES/VENTRICLES: There is a small area presumed acute to subacute infarct involving the left frontal lobe periventricular white matter (series 4, image 13). No mass effect or midline shift. Areas of T2 FLAIR hyperintensity are seen in the periventricular and subcortical white matter as well as the prema, which are nonspecific, but may represent chronic microvascular ischemic change. There is prominence of the ventricles and sulci due to global parenchymal volume loss. The sellar/suprasellar regions appear unremarkable. The normal signal voids within the major intracranial vessels appear maintained. ORBITS: The visualized portion of the orbits demonstrate no acute abnormality. SINUSES: The visualized paranasal sinuses and mastoid air cells are well aerated. BONES/SOFT TISSUES: The bone marrow signal intensity appears normal. The soft tissues demonstrate no acute abnormality. MRA NECK: Evaluation is limited due to patient motion as well as lack of intravenous contrast. AORTIC ARCH/ARCH VESSELS: The aortic arch arch vessels are not well evaluated on this exam. CAROTID ARTERIES: Question of mild stenosis involving the proximal left common carotid artery. No convincing stenosis of the right common carotid artery. No flow limiting stenosis is clearly identified of the internal carotid arteries by NASCET criteria. VERTEBRAL ARTERIES: Only minimal flow related enhancement is seen within the left vertebral artery. Unclear whether this is related to a diminutive caliber versus diffuse disease.   The origin of the right vertebral artery is not well visualized. No convincing stenosis otherwise seen of the right vertebral artery. 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 There is question moderate stenosis involving the left supraclinoid ICA. No focal stenosis is seen of the right internal carotid artery. No significant stenosis is seen of the anterior cerebral or middle cerebral arteries. POSTERIOR CIRCULATION: There is no significant flow related enhancement within the proximal left vertebral artery. There is a diminutive caliber with diffuse irregularity of the basilar artery. No focal stenosis is seen of the right vertebral artery. No convincing evidence of a significant stenosis or occlusion involving the posterior cerebral arteries within the limitations of patient motion. 1. Small area of presumed acute to subacute infarct involving the left frontal white matter. No significant mass effect or midline shift. 2. Otherwise, no acute intracranial abnormality. 3. Mild global parenchymal volume loss with mild chronic microvascular ischemic changes. 4. MRA of the head and neck is limited given patient motion. 5. Only minimal flow related enhancement is seen within the left cervical vertebral artery without significant flow related enhancement within the proximal V4 segment intracranially. Unclear whether this is related to a diffusely diminutive caliber of the left vertebral artery versus diffuse disease. 6. No flow limiting stenosis otherwise seen of the cervical carotid/vertebral arteries. 7. There is suggestion of moderate stenosis involving the left supraclinoid ICA. 8. Otherwise, no convincing significant stenosis of the [id number].        Assessment & Differential Dx:      Primary Problem  Hypertensive emergency    Active Hospital Problems    Diagnosis Date Noted    Intraparenchymal hemorrhage of brain ([address] [I61.9]     Altered mental state [R41.82] 08/28/2021    Hypertensive emergency [I16.1] 08/28/2021    Acute metabolic encephalopathy [N92.28]     Abnormal CT of the head [R93.0]     COPD (chronic obstructive pulmonary disease) ([facility] [J44.9] 02/13/2020    Sepsis ([facility] [A41.9] 05/17/2019    Major depressive disorder with psychotic features ([facility] [F32.3] 01/11/2019    History of seizure [Z87.898]     Drug overdose [T50.901A]     Seizure disorder ([facility] [G40.909]     Drug overdose, accidental or unintentional, initial encounter Marshal Bombard 01/09/2019    Hypothyroidism [E03.9] 01/09/2019       Clinical impression: Metabolic encephalopathy with Suboxone and underlying COPD/UTI + acute-subacute infarct left frontal white matter. Pending EEG to rule out any seizures contribution       Plan:      Medical management for HTN, COPD, UTI/SEDA   EEG done. Awaiting results.    Depakote 1000 mg a.m., 500 p.m.   Oxcarbazepine 150 mg bid daily        Elaine Alvares MD, PGY2  Internal Medicine,  Neurology service   8/31/2021 9:51 AM

## 2021-08-31 NOTE — PROGRESS NOTES
Pt IJ dc'd-tip in tact-pt adolfo well-pt and roommate given dc instruct-pt and roommate(Casa) instructed on IJ site complications-pt and Casa verbalized understanding-pt able to get up in 4 wheel drive vehicle per self-vitals stable-pt will join my chart to look up lab results-pt denies c/o and vitals stable-pt going to pharmacy to get meds-pt will make an appointment for next week

## 2021-08-31 NOTE — PLAN OF CARE
Problem: Skin Integrity:  Goal: Will show no infection signs and symptoms  Description: Will show no infection signs and symptoms  8/31/2021 0455 by Parisa Bourne RN  Outcome: Ongoing  8/30/2021 1834 by Perry Castillo RN  Outcome: Ongoing     Problem: Skin Integrity:  Goal: Absence of new skin breakdown  Description: Absence of new skin breakdown  8/31/2021 0455 by Parisa Bourne RN  Outcome: Ongoing  8/30/2021 1834 by Perry Castillo RN  Outcome: Ongoing     Problem: Falls - Risk of:  Goal: Will remain free from falls  Description: Will remain free from falls  8/31/2021 0455 by Parisa Bourne RN  Outcome: Ongoing  8/30/2021 1834 by Perry Castillo RN  Outcome: Ongoing     Problem: Falls - Risk of:  Goal: Absence of physical injury  Description: Absence of physical injury  8/31/2021 0455 by Parisa Bourne RN  Outcome: Ongoing  8/30/2021 1834 by Perry Castillo RN  Outcome: Ongoing     Problem: Pain:  Goal: Pain level will decrease  Description: Pain level will decrease  8/31/2021 0455 by Parisa Bourne RN  Outcome: Ongoing  8/30/2021 1834 by Perry Castillo RN  Outcome: Ongoing

## 2021-08-31 NOTE — FLOWSHEET NOTE
08/31/21 1355   Encounter Summary   Services provided to: Patient   Referral/Consult From: Angelika9 Sarah Echeverria of [facility] [employer] No   Continue Visiting   (8/31/21)   Complexity of Encounter Low   Length of Encounter 15 minutes   Spiritual Assessment Completed Yes   Routine   Type Initial   Assessment Calm; Approachable;Coping   Intervention Active listening;Explored feelings, thoughts, concerns;Nurtured hope;Prayer;Sustaining presence/ Ministry of presence; Discussed belief system/Denominational practices/aminata   Outcome Acceptance;Comfort;Expressed gratitude

## 2021-09-08 DIAGNOSIS — Z76.0 MEDICATION REFILL: ICD-10-CM

## 2021-09-08 RX ORDER — GABAPENTIN 300 MG/1
600 CAPSULE ORAL 3 TIMES DAILY
Qty: 180 CAPSULE | Refills: 2 | OUTPATIENT
Start: 2021-09-08 | End: 2021-10-08

## 2021-09-08 NOTE — TELEPHONE ENCOUNTER
Hemoglobin A1C (%)   Date Value   02/19/2019 5.2             ( goal A1C is < 7)   No results found for: LABMICR  LDL Cholesterol (mg/dL)   Date Value   08/28/2021 131 (H)       (goal LDL is <100)   AST (U/L)   Date Value   08/28/2021 15     ALT (U/L)   Date Value   08/28/2021 10     BUN (mg/dL)   Date Value   08/30/2021 7     BP Readings from Last 3 Encounters:   08/31/21 (!) 151/98   08/27/21 (!) 151/94   08/18/21 91/66          (goal 120/80)        Patient Active Problem List:     Chest pain     Migraine variant with headache     Drug overdose, accidental or unintentional, initial encounter     Hypothyroidism     Essential hypertension     Seizure disorder ([address]     Drug overdose     History of seizure     Major depressive disorder with psychotic features ([address]     Severe major depression ([address]     Overdose of barbiturate, intentional self-harm, initial encounter ([address]     Intentional phenobarbital overdose ([address]     Severe episode of recurrent major depressive disorder, without psychotic features ([address]     Suicide attempt ([address]     Major depressive disorder, recurrent ([address]     Sepsis ([address]     Severe malnutrition ([address]     Altered mental status     Hypokalemia     Congestive heart failure of unknown etiology ([address]     Lumbar radiculopathy     Chronic low back pain     Piriformis syndrome     COPD (chronic obstructive pulmonary disease) (HCC)     Major depressive disorder, single episode, unspecified     Severe depressed bipolar I disorder without psychotic features ([address]     Polysubstance abuse ([address]     Syncope and collapse     Altered mental state     Hypertensive emergency     Acute metabolic encephalopathy     Abnormal CT of the head     Intraparenchymal hemorrhage of brain ([address]      ----Marlon Jung

## 2021-09-13 PROBLEM — E11.42 DIABETIC PERIPHERAL NEUROPATHY ASSOCIATED WITH TYPE 2 DIABETES MELLITUS (HCC): Status: ACTIVE | Noted: 2021-09-13

## 2021-09-21 ENCOUNTER — OFFICE VISIT (OUTPATIENT)
Dept: PRIMARY CARE CLINIC | Age: 56
End: 2021-09-21
Payer: MEDICARE

## 2021-09-21 VITALS
BODY MASS INDEX: 33.29 KG/M2 | TEMPERATURE: 97.2 F | SYSTOLIC BLOOD PRESSURE: 133 MMHG | WEIGHT: 182 LBS | DIASTOLIC BLOOD PRESSURE: 86 MMHG | OXYGEN SATURATION: 97 % | HEART RATE: 105 BPM

## 2021-09-21 DIAGNOSIS — E03.9 HYPOTHYROIDISM, UNSPECIFIED TYPE: ICD-10-CM

## 2021-09-21 DIAGNOSIS — Z76.0 MEDICATION REFILL: ICD-10-CM

## 2021-09-21 DIAGNOSIS — I10 ESSENTIAL HYPERTENSION: Primary | ICD-10-CM

## 2021-09-21 DIAGNOSIS — Z23 FLU VACCINE NEED: ICD-10-CM

## 2021-09-21 DIAGNOSIS — J44.9 CHRONIC OBSTRUCTIVE PULMONARY DISEASE, UNSPECIFIED COPD TYPE (HCC): ICD-10-CM

## 2021-09-21 PROCEDURE — 1111F DSCHRG MED/CURRENT MED MERGE: CPT | Performed by: NURSE PRACTITIONER

## 2021-09-21 PROCEDURE — 3017F COLORECTAL CA SCREEN DOC REV: CPT | Performed by: NURSE PRACTITIONER

## 2021-09-21 PROCEDURE — G8926 SPIRO NO PERF OR DOC: HCPCS | Performed by: NURSE PRACTITIONER

## 2021-09-21 PROCEDURE — 4004F PT TOBACCO SCREEN RCVD TLK: CPT | Performed by: NURSE PRACTITIONER

## 2021-09-21 PROCEDURE — G8427 DOCREV CUR MEDS BY ELIG CLIN: HCPCS | Performed by: NURSE PRACTITIONER

## 2021-09-21 PROCEDURE — 3023F SPIROM DOC REV: CPT | Performed by: NURSE PRACTITIONER

## 2021-09-21 PROCEDURE — 90674 CCIIV4 VAC NO PRSV 0.5 ML IM: CPT | Performed by: NURSE PRACTITIONER

## 2021-09-21 PROCEDURE — 90471 IMMUNIZATION ADMIN: CPT | Performed by: NURSE PRACTITIONER

## 2021-09-21 PROCEDURE — 99214 OFFICE O/P EST MOD 30 MIN: CPT | Performed by: NURSE PRACTITIONER

## 2021-09-21 PROCEDURE — G8417 CALC BMI ABV UP PARAM F/U: HCPCS | Performed by: NURSE PRACTITIONER

## 2021-09-21 RX ORDER — AMLODIPINE BESYLATE 10 MG/1
10 TABLET ORAL DAILY
Qty: 90 TABLET | Refills: 0 | Status: SHIPPED | OUTPATIENT
Start: 2021-09-21 | End: 2022-02-25

## 2021-09-21 RX ORDER — DIVALPROEX SODIUM 500 MG/1
TABLET, DELAYED RELEASE ORAL
Qty: 270 TABLET | Refills: 0 | Status: SHIPPED | OUTPATIENT
Start: 2021-09-21 | End: 2021-12-21

## 2021-09-21 RX ORDER — GABAPENTIN 300 MG/1
600 CAPSULE ORAL 3 TIMES DAILY
Qty: 540 CAPSULE | Refills: 0 | Status: SHIPPED | OUTPATIENT
Start: 2021-09-21 | End: 2022-01-04 | Stop reason: SDUPTHER

## 2021-09-21 RX ORDER — ALBUTEROL SULFATE 90 UG/1
1 AEROSOL, METERED RESPIRATORY (INHALATION) EVERY 6 HOURS PRN
Qty: 1 EACH | Refills: 2 | Status: SHIPPED | OUTPATIENT
Start: 2021-09-21 | End: 2022-03-29 | Stop reason: SDUPTHER

## 2021-09-21 RX ORDER — OXCARBAZEPINE 150 MG/1
150 TABLET, FILM COATED ORAL 2 TIMES DAILY
Qty: 180 TABLET | Refills: 0 | Status: SHIPPED | OUTPATIENT
Start: 2021-09-21 | End: 2021-12-20

## 2021-09-21 RX ORDER — CARVEDILOL 6.25 MG/1
6.25 TABLET ORAL 2 TIMES DAILY WITH MEALS
Qty: 60 TABLET | Refills: 3 | Status: SHIPPED | OUTPATIENT
Start: 2021-09-21

## 2021-09-21 RX ORDER — LEVOTHYROXINE SODIUM 0.1 MG/1
100 TABLET ORAL DAILY
Qty: 30 TABLET | Refills: 2 | Status: SHIPPED | OUTPATIENT
Start: 2021-09-21

## 2021-09-21 RX ORDER — LISINOPRIL 40 MG/1
40 TABLET ORAL DAILY
Qty: 90 TABLET | Refills: 0 | Status: SHIPPED | OUTPATIENT
Start: 2021-09-21 | End: 2022-02-25

## 2021-09-21 ASSESSMENT — ENCOUNTER SYMPTOMS
PHOTOPHOBIA: 1
SINUS PAIN: 0
VOMITING: 0
TROUBLE SWALLOWING: 0
SORE THROAT: 0
DIARRHEA: 0
COUGH: 0
NAUSEA: 1
SHORTNESS OF BREATH: 0
EYE REDNESS: 0
ABDOMINAL PAIN: 0

## 2021-09-21 NOTE — PROGRESS NOTES
--Luda Cox MA on 9/21/2021 at 1:57 PM  Visit Information    Have you changed or started any medications since your last visit including any over-the-counter medicines, vitamins, or herbal medicines? no   Are you having any side effects from any of your medications? -  no  Have you stopped taking any of your medications? Is so, why? -  no    Have you seen any other physician or provider since your last visit? Yes - Records Obtained  Have you had any other diagnostic tests since your last visit? Yes - Records Obtained  Have you been seen in the emergency room and/or had an admission to a hospital since we last saw you? Yes - Records Obtained  Have you had your routine dental cleaning in the past 6 months? no    Have you activated your [employer] account? If not, what are your barriers?  Yes     Patient Care Team:  KEANU Ward CNP as PCP - General (Family Medicine)  KEANU Ward CNP as PCP - [facility]  Rroy Blair DO as Consulting Physician (Neurosurgery)    Medical History Review  Past Medical, Family, and Social History reviewed and does not contribute to the patient presenting condition    Health Maintenance   Topic Date Due    Diabetic foot exam  Never done    Diabetic retinal exam  Never done    HIV screen  Never done    Diabetic microalbuminuria test  Never done    Hepatitis B vaccine (1 of 3 - Risk 3-dose series) Never done    DTaP/Tdap/Td vaccine (1 - Tdap) Never done    Breast cancer screen  Never done    Shingles Vaccine (1 of 2) Never done    A1C test (Diabetic or Prediabetic)  02/19/2020    Colon Cancer Screen FIT/FOBT  06/11/2020    COVID-19 Vaccine (2 - Pfizer 2-dose series) 04/13/2021    Flu vaccine (1) 09/01/2021    TSH testing  05/30/2022    Lipid screen  08/28/2022    Creatinine monitoring  08/30/2022    Potassium monitoring  08/31/2022    Pneumococcal 0-64 years Vaccine (2 of 2 - PPSV23) 02/16/2028    Hepatitis C screen  Completed    Hepatitis A vaccine  Aged Out    Hib vaccine  Aged Out    Meningococcal (ACWY) vaccine  Aged Out

## 2021-09-21 NOTE — PROGRESS NOTES
Post-Discharge Transitional Care Management Services or Hospital Follow Up      Joe Winn   YOB: 1965    Date of Office Visit:  9/21/2021  Date of Hospital Admission: 8/27/21  Date of Hospital Discharge: 8/31/21  Readmission Risk Score(high >=14%.  Medium >=10%):Readmission Risk Score: 51      Care management risk score Rising risk (score 2-5) and Complex Care (Scores >=6): 15     Non face to face  following discharge, date last encounter closed (first attempt may have been earlier): *No documented post hospital discharge outreach found in the last 14 days *No documented post hospital discharge outreach found in the last 14 days    Call initiated 2 business days of discharge: *No response recorded in the last 14 days     Patient Active Problem List   Diagnosis    Chest pain    Migraine variant with headache    Drug overdose, accidental or unintentional, initial encounter    Hypothyroidism    Essential hypertension    Seizure disorder ([address]    Drug overdose    History of seizure    Major depressive disorder with psychotic features ([address]    Severe major depression ([address]    Overdose of barbiturate, intentional self-harm, initial encounter ([address]    Intentional phenobarbital overdose ([address]    Severe episode of recurrent major depressive disorder, without psychotic features ([address]    Suicide attempt ([address]    Major depressive disorder, recurrent ([address]    Sepsis ([address]    Severe malnutrition ([address]    Altered mental status    Hypokalemia    Congestive heart failure of unknown etiology ([address]    Lumbar radiculopathy    Chronic low back pain    Piriformis syndrome    COPD (chronic obstructive pulmonary disease) ([address]    Major depressive disorder, single episode, unspecified    Severe depressed bipolar I disorder without psychotic features ([address]    Polysubstance abuse ([address]    Syncope and collapse    Altered mental state    Hypertensive emergency    Acute metabolic MG capsule  Take 2 capsules by mouth 3 times daily for 90 days. levothyroxine (SYNTHROID) 100 MCG tablet  Take 1 tablet by mouth Daily             lisinopril (PRINIVIL;ZESTRIL) 40 MG tablet  Take 1 tablet by mouth daily             OXcarbazepine (TRILEPTAL) 150 MG tablet  Take 1 tablet by mouth 2 times daily             Respiratory Therapy Supplies (NEBULIZER/TUBING/MOUTHPIECE) KIT  Daily as needed             risperiDONE (RISPERDAL) 2 MG tablet  2 mg daily              risperiDONE (RISPERDAL) 4 MG tablet  4 mg nightly              traZODone (DESYREL) 150 MG tablet  Take 1 tablet by mouth nightly             venlafaxine (EFFEXOR XR) 75 MG extended release capsule  Take 1 capsule by mouth daily Patient takes 2 tablets of 75 mg                   Medications marked \"taking\" at this time  Outpatient Medications Marked as Taking for the 9/21/21 encounter (Office Visit) with KEANU Christy CNP   Medication Sig Dispense Refill    albuterol sulfate  (90 Base) MCG/ACT inhaler Inhale 1 puff into the lungs every 6 hours as needed for Wheezing or Shortness of Breath Gap prescription until follow up with primary. Do not refill. Follow up with primary 1 each 2    lisinopril (PRINIVIL;ZESTRIL) 40 MG tablet Take 1 tablet by mouth daily 90 tablet 0    amLODIPine (NORVASC) 10 MG tablet Take 1 tablet by mouth daily 90 tablet 0    OXcarbazepine (TRILEPTAL) 150 MG tablet Take 1 tablet by mouth 2 times daily 180 tablet 0    divalproex (DEPAKOTE) 500 MG DR tablet Take 1 tablet in the morning and 2 tablets at bed time 270 tablet 0    carvedilol (COREG) 6.25 MG tablet Take 1 tablet by mouth 2 times daily (with meals) 60 tablet 3    levothyroxine (SYNTHROID) 100 MCG tablet Take 1 tablet by mouth Daily 30 tablet 2    gabapentin (NEURONTIN) 300 MG capsule Take 2 capsules by mouth 3 times daily for 90 days.  540 capsule 0    Blood Pressure KIT 1 kit by Does not apply route daily 1 kit 0    venlafaxine (EFFEXOR XR) 75 MG extended release capsule Take 1 capsule by mouth daily Patient takes 2 tablets of 75 mg 30 capsule 0    buprenorphine-naloxone (SUBOXONE) 8-2 MG FILM SL film Last filled at [facility] on 7/7/21 for 28 days. Joseph 51 over a year ago.  risperiDONE (RISPERDAL) 2 MG tablet 2 mg daily       risperiDONE (RISPERDAL) 4 MG tablet 4 mg nightly       ACETAMINOPHEN EXTRA STRENGTH 500 MG tablet TAKE 1 TO 2 TAB BY MOUTH EVERY 8 HOURS AS NEEDED FOR PAIN  540 tablet 0    budesonide-formoterol (SYMBICORT) 160-4.5 MCG/ACT AERO Inhale 2 puffs into the lungs 2 times daily 1 Inhaler 1    Respiratory Therapy Supplies (NEBULIZER/TUBING/MOUTHPIECE) KIT Daily as needed 1 kit 2    butalbital-aspirin-caffeine (FIORINAL) -40 MG per capsule Take 1 capsule by mouth every 4 hours as needed for Headaches for up to 30 days. 20 capsule 0    fluticasone (FLONASE) 50 MCG/ACT nasal spray 2 sprays by Each Nostril route daily 1 Bottle 3    ASPIRIN ADULT LOW STRENGTH 81 MG EC tablet TAKE 1 TABLET BY MOUTH ONCE DAILY  90 tablet 0    traZODone (DESYREL) 150 MG tablet Take 1 tablet by mouth nightly 30 tablet 0        Medications patient taking as of now reconciled against medications ordered at time of hospital discharge: Yes    Chief Complaint   Patient presents with    Follow-Up from [address] [place] [address] Medications     gabapentin       Elieser Jones states she recalls having an asthma attack, denies knowing about having a UTI or having altered mentation. Asking to restart her gabapentin, feels she is struggling diabetic foot pain and neuropathy  She is moving to [facility], states her fiance is there. Has been in touch over the last year      Inpatient course: Discharge summary reviewed- see chart. Hospital Course:  Joe Winn is a 60 y.o. female who was admitted for the management of hypertensive emergency , encephalopathy and shortness of breath.   At outside facility, a CT of her head was done which was concerning for left putamen hemorrhage versus calcification on CT. She was evaluated by neurosurgery but they did not think that this was an acute process. She was evaluated by neurology, who attribute encephalopathy to her blood pressure and to possible polysubstance abuse. Patient does have a seizure history and was continued on her home Depakote, oxcarbazepine. EEG was ordered to evaluate for any underlying seizure disorder. Regarding her blood pressure, patient was restarted on her home antihypertensive regimen. She initially was started on a drip that was weaned off. Patient overall improved and returned back to her baseline mentation prior to discharge. She will be discharged on Norvasc, carvedilol, lisinopril. Her Norvasc and lisinopril were increased to 10 mg and 40 mg respectively. She will need repeat blood work in 1 week's time to evaluate her potassium and creatinine. Patient will need to follow-up with her primary care physician. Patient also instructed to stop smoking. Interval history/Current status: Resolved    Review of Systems   Constitutional: Negative for chills and fever. HENT: Negative for congestion, ear pain, sinus pain, sore throat and trouble swallowing. Eyes: Positive for photophobia. Negative for redness. Respiratory: Negative for cough and shortness of breath. Cardiovascular: Negative for chest pain and palpitations. Gastrointestinal: Positive for nausea. Negative for abdominal pain, diarrhea and vomiting. Genitourinary: Negative for dysuria, frequency and hematuria. Musculoskeletal: Positive for arthralgias and myalgias. Skin: Negative for rash. Neurological: Positive for dizziness, seizures and headaches. Negative for tremors and syncope. Psychiatric/Behavioral: Negative for agitation and confusion.        Vitals:    09/21/21 1355 09/21/21 1426   BP: (!) 159/95 133/86   Site: Right Upper Arm    Position: Sitting    Pulse: 103 105   Temp: 97.2 °F (36.2 °C)    TempSrc: Temporal    SpO2: 97%    Weight: 182 lb (82.6 kg)      Body mass index is 33.29 kg/m². Wt Readings from Last 3 Encounters:   09/21/21 182 lb (82.6 kg)   08/27/21 182 lb 15.7 oz (83 kg)   08/27/21 190 lb (86.2 kg)     BP Readings from Last 3 Encounters:   09/21/21 133/86   08/31/21 (!) 151/98   08/27/21 (!) 151/94       Physical Exam  Vitals and nursing note reviewed. Constitutional:       General: She is not in acute distress. Appearance: She is well-developed. HENT:      Head: Normocephalic. Eyes:      General: No scleral icterus. Pupils: Pupils are equal, round, and reactive to light. Cardiovascular:      Rate and Rhythm: Normal rate and regular rhythm. Pulmonary:      Effort: Pulmonary effort is normal.      Breath sounds: Normal breath sounds. Abdominal:      Palpations: Abdomen is soft. Musculoskeletal:      Cervical back: Normal range of motion and neck supple. Skin:     General: Skin is warm and dry. Neurological:      Mental Status: She is alert and oriented to person, place, and time. Coordination: Coordination normal.   Psychiatric:         Mood and Affect: Mood is anxious. Behavior: Behavior normal. Behavior is cooperative. Thought Content: Thought content normal.         Judgment: Judgment normal.             Assessment/Plan:  1. Chronic obstructive pulmonary disease, unspecified COPD type (HCC)    - albuterol sulfate  (90 Base) MCG/ACT inhaler; Inhale 1 puff into the lungs every 6 hours as needed for Wheezing or Shortness of Breath Gap prescription until follow up with primary. Do not refill. Follow up with primary  Dispense: 1 each; Refill: 2  - AK DISCHARGE MEDS RECONCILED W/ CURRENT OUTPATIENT MED LIST    2. Essential hypertension  Blood pressure at goal with changes, refills provided. - lisinopril (PRINIVIL;ZESTRIL) 40 MG tablet; Take 1 tablet by mouth daily  Dispense: 90 tablet;  Refill: 0  - amLODIPine (NORVASC) 10 MG tablet; Take 1 tablet by mouth daily  Dispense: 90 tablet; Refill: 0  - carvedilol (COREG) 6.25 MG tablet; Take 1 tablet by mouth 2 times daily (with meals)  Dispense: 60 tablet; Refill: 3    3. Hypothyroidism, unspecified type  - levothyroxine (SYNTHROID) 100 MCG tablet; Take 1 tablet by mouth Daily  Dispense: 30 tablet; Refill: 2    4. Medication refill  [place], agreed to provide 90-day supply until she is established with provider in [facility].  - OXcarbazepine (TRILEPTAL) 150 MG tablet; Take 1 tablet by mouth 2 times daily  Dispense: 180 tablet; Refill: 0  - divalproex (DEPAKOTE) 500 MG DR tablet; Take 1 tablet in the morning and 2 tablets at bed time  Dispense: 270 tablet; Refill: 0  - gabapentin (NEURONTIN) 300 MG capsule; Take 2 capsules by mouth 3 times daily for 90 days. Dispense: 540 capsule; Refill: 0    5.  Flu vaccine need    - INFLUENZA, MDCK QUADV, 2 YRS AND OLDER, IM, PF, PREFILL SYR OR SDV, 0.5ML (FLUCELVAX QUADV, PF)        Medical Decision Making: moderate complexity

## 2021-09-24 ENCOUNTER — TELEPHONE (OUTPATIENT)
Dept: PRIMARY CARE CLINIC | Age: 56
End: 2021-09-24

## 2021-09-24 NOTE — TELEPHONE ENCOUNTER
Patient is requesting a refill of Fioricet tamiko though discontinued she like something incase she gets a hedache,just until her insurance takes place

## 2021-09-27 NOTE — TELEPHONE ENCOUNTER
Doesn't look like this is a long term med for her, Kina Quevedo said one time fill in May, she will need to discuss this with pcp

## 2022-01-03 DIAGNOSIS — Z76.0 MEDICATION REFILL: ICD-10-CM

## 2022-01-03 NOTE — TELEPHONE ENCOUNTER
Patient is requesting a medication refill on gabapentin and zofran be sent to pharmacy, pharmacy is current in chart.  Patient last seen in office on 9/21/21

## 2022-01-04 RX ORDER — GABAPENTIN 300 MG/1
600 CAPSULE ORAL 3 TIMES DAILY
Qty: 180 CAPSULE | Refills: 1 | Status: SHIPPED | OUTPATIENT
Start: 2022-01-04 | End: 2022-03-09 | Stop reason: SDUPTHER

## 2022-01-04 RX ORDER — GABAPENTIN 300 MG/1
600 CAPSULE ORAL 3 TIMES DAILY
Qty: 540 CAPSULE | Refills: 0 | OUTPATIENT
Start: 2022-01-04 | End: 2022-04-04

## 2022-01-04 NOTE — TELEPHONE ENCOUNTER
Please clarify with patient. At last visit in September Tahira notified me that she was moving out of the state, however refill requests are for pharmacy in [facility].

## 2022-02-16 NOTE — DISCHARGE INSTR - DIET

## 2022-02-24 NOTE — ED NOTES
.    2/24/2022 12:41 PM    Patient: César Granda, 59 y.o., female Race:[facility]  Patient Address: [address] [place] [address]    Sending Facility:  [] [facility]  [] [facility]  [x] [facility]  [] [facility]  [] [facility]  [] Other:    Sending Physician: LIZY Sharma MD     Patient Phone #: [phone]   Insurance: Payor: Minoo Hendrix /  /  /   Clarice Pike:  []  Yes   [x]  No  Emergency Contact: Extended Emergency Contact Information  Primary Emergency Contact: [address] [place] [address] Phone: [phone]  Mobile Phone: [phone]  Relation: Other   needed? No  Secondary Emergency Contact: [address] Phone: [phone]  Mobile Phone: [phone]  Relation: Child  MRN: [id number]     [facility]# [id number]  YOB: 1965  Primary Care Physician: Kandis Brunner, APRN - CNP  Advance Directive: [x] Full Code   []DNR-CC   []DNR-CCA    [facility] Crew: RICHARD Vale - Paramedic, VA Hernandez RN    Receiving Facility:  [x] [facility]  [] [facility]  [] [facility]  [] Other:  Receiving Physician: Erika Reich MD    Reason for transfer:   [x]   Speciality care required, not available at sending facility   []   Pt/family request   []   Physician request    []   Other:    Response Code   [x] 2  [] 3  []   Change  [] 2  [] 3   Transport Code   [x] 2  [] 3  []   Change  [] 2  [] 3     Call Received 2/24/2022 2039   Dispatched 2/24/2022 2039   Enroute 2/24/2022 2040   Arrived Scene 2/24/2022 2041   At Patient 2/24/2022 2058   Departed Scene 2/24/2022 2106   Arrived Hospital 2/24/2022 2133   Departed Hospital 2/24/2022 2143   In Service 2/24/2022 2143     Mileage:  Scene [address]   Total Miles 6.0       Allergies  is allergic to imitrex [sumatriptan], bee pollen, bee venom, bromide ion [bromine], reglan [metoclopramide], sulfa antibiotics, sulfadiazine, and tramadol. Medications  Prior to Admission medications    Medication Sig Start Date End Date Taking? Authorizing Provider   gabapentin (NEURONTIN) 300 MG capsule Take 2 capsules by mouth 3 times daily for 30 days. 1/4/22 2/3/22  KEANU Kaufman CNP   albuterol sulfate  (90 Base) MCG/ACT inhaler Inhale 1 puff into the lungs every 6 hours as needed for Wheezing or Shortness of Breath Gap prescription until follow up with primary. Do not refill. Follow up with primary 9/21/21   KEANU Kaufman CNP   lisinopril (PRINIVIL;ZESTRIL) 40 MG tablet Take 1 tablet by mouth daily 9/21/21   KEANU Kaufman CNP   amLODIPine (NORVASC) 10 MG tablet Take 1 tablet by mouth daily 9/21/21   KEANU Kaufman CNP   OXcarbazepine (TRILEPTAL) 150 MG tablet Take 1 tablet by mouth 2 times daily 9/21/21 12/20/21  KEANU Kaufman CNP   divalproex (DEPAKOTE) 500 MG DR tablet Take 1 tablet in the morning and 2 tablets at bed time 9/21/21 12/21/21  KEANU Kaufman CNP   carvedilol (COREG) 6.25 MG tablet Take 1 tablet by mouth 2 times daily (with meals) 9/21/21   KEANU Kaufman CNP   levothyroxine (SYNTHROID) 100 MCG tablet Take 1 tablet by mouth Daily 9/21/21   KEANU Kafuman CNP   Blood Pressure KIT 1 kit by Does not apply route daily 8/31/21   Paula Reina DO   venlafaxine (EFFEXOR XR) 75 MG extended release capsule Take 1 capsule by mouth daily Patient takes 2 tablets of 75 mg 8/31/21   Paula Reina DO   buprenorphine-naloxone (SUBOXONE) 8-2 MG FILM SL film Last filled at [address] [facility] on 7/7/21 for 28 days. Joseph 51 over a year ago.  7/7/21   Historical Provider, MD   risperiDONE (RISPERDAL) 2 MG tablet 2 mg daily  7/30/21   Historical Provider, MD   risperiDONE (RISPERDAL) 4 MG tablet 4 mg nightly  7/30/21   Historical Provider, MD   ACETAMINOPHEN EXTRA STRENGTH 500 MG tablet TAKE 1 TO 2 TAB BY MOUTH EVERY 8 HOURS AS NEEDED FOR PAIN  8/4/21   Joselo Crooks, APRN - FELIX   budesonide-formoterol (SYMBICORT) 160-4.5 MCG/ACT AERO Inhale 2 puffs into the lungs 2 times daily 6/3/21   Dillon Schafer DO   Respiratory Therapy Supplies (NEBULIZER/TUBING/MOUTHPIECE) KIT Daily as needed 6/3/21   Dillon Schafer DO   butalbital-aspirin-caffeine [facility]) -40 MG per capsule Take 1 capsule by mouth every 4 hours as needed for Headaches for up to 30 days. 5/14/21 9/21/21  KEANU Steiner CNP   fluticasone [facility]) 50 MCG/ACT nasal spray 2 sprays by Each Nostril route daily 5/14/21   KEANU Steiner CNP   ASPIRIN ADULT LOW STRENGTH 81 MG EC tablet TAKE 1 TABLET BY MOUTH ONCE DAILY  4/5/21   KEANU Steiner CNP   traZODone (DESYREL) 150 MG tablet Take 1 tablet by mouth nightly 10/22/20   KEANU Steiner CNP    Scheduled Meds:    Continuous Infusions:    PRN Meds:.  Past Medical History   has a past medical history of Arthritis, Asthma, Attention deficit hyperactivity disorder (ADHD), combined type, Borderline diabetes, Borderline personality disorder ([address] CHF (congestive heart failure) ([address] COPD (chronic obstructive pulmonary disease) ([address] Fibromyalgia, Headache, Hypertension, Manic depression ([address] Movement disorder, Neck fracture ([address] Pernicious anemia, Seizure ([address] and Thyroid disease. Patient Weight: 182 lbs   [x]   Estimated   []   Stated        VITALS          Time BP Pulse   Resp  Rate N-normal  S-shallow  D-deep Monitor SpO2 O2    LPM BGL   Temp Pain   2105 187/111 102 13 Shallow Sinus T 93% RA      2112 154/56 97 19 Shallow NSR 97% 2L NC      2118 62/35 97 18 Shallow NSR 96% 2L NC      2119 175/123 94 21 Shallow NSR 96% 2L NC       2122 164/104 92 27 Shallow NSR 96% 2L NC         Pupils GCS   Time R L E  4 V  5 M  6 T  15   2059   3 4 6 15                                         NIH - record on all stroke transports and Q15 min after tPA administration    NIHSS       Time Not  Assessed Confused    1a. LOC - Arousal Status       1b. LOC - Questions       1c. LOC - Commands       2.  Eye Movements (gaze) 3. Visual Fields       4. Facial Palsy       5a. Motor Left Arm       5b. Motor Right Arm       6a. Motor Left Leg       6b. Motor Right Leg       7. Limb Ataxia       8. Sensory       9. Language/Aphasia       10. Dysarthria       11. Neglect       TOTAL         Research:    RACE Score: 3                                       Time: 2059  StrokeVAN Score: Unclear Confusion   Time:2059    Time Last Known Well: Prior to 1600 hrs    Narrative:    History:    Dispatched to inter-facility transfer by [facility]. Arrived to find Kay Sprague, 59 y.o., female c/o none specific with confusion. Report received from ED RN at nurses station. RN advised that patient arrival to ED @ 1600 hrs with generalized confusion. During course of assessment and treatment in ED patient became more confused and was administered Narcan @ approximately 2000 hrs with some improvement. Advised Head CT scan obtained with reported hyperdense areas noted, calcifications and possible small ICBs but unclear on areas. Decision to transfer patient to [place] [address] for neurosurgical consult and [facility] called for transport. General medical history unclear at time of transport. Physical:    Neuro: Open eyes to name, general confusion with patient following some commands. Unable to give age, date of birth, month of year or where she is. \"I'm in an airplane\". Mask in place with face obscured, noted ability to hold up right arm with drift, right leg with lower leg splint in place and does not move. Minimal to no movement of left arm and leg. Resp: shallow non labored with congestive cough noted. Lungs grossly clear. Cardiac: regular rate, with underlying NSR. See ED 12 LD ECG  Muscular/skeletal/peripheral vascular: Pulses palpable. Abd/GI/: abd appears non tender on palpation. House catheter in place with yellow colored urine noted in drainage bag.    Skin: normal color, warm, dry         Patient received the following Summer [x]  RN   [x]  Justyn Ramon MD   []   DO    Hospital Team Alert:   []    Trauma Alert    []    STEMI Alert    [x]    RACE Alert/Stroke Alert      Description Of Valuables: None noted by writer.      Patient Valuables:   []    With Patient    []    Not Recieved    []    ER / Lab     Call Outcome:   [x]    Transport to Facility    []    Transported by other     []    Cancelled    []    Patient Refusal    []                [facility]  Mobile Stroke Unit    Electronically signed by Michael Quijano RN on 8/27/21 at 10:12 PM EDT     Ciro Vincent RN  08/27/21 [id number]

## 2022-02-24 NOTE — CONSULTS
Department of Endovascular Neurosurgery                                                                                                                      Resident Consult Note  Stroke Alert paged @  11:50 PM  ER Room # 24   Arrival to patient bedside @ 11:55 PM        Reason for Consult:  Right side weakness   Requesting Physician:   ER       History Obtained From:   ER, EMR     CHIEF COMPLAINT:        AMS     HISTORY OF PRESENT ILLNESS:       The patient is a 59 y.o. female   transfer from [facility] for possible bleed on CAT scan evaluation. Last know well: 4 pm 8/26/2021    History obtained from chart review, ER     78-year-old female with past medical history of ADHD, CHF, COPD, maniac depression, seizure disorder, substance abuse, intentional overdose on barbiturate was initially brought in by EMS for shortness of breath. EMS on arrival placed patient on BiPAP received 1 dose of Narcan. With minimal response. Patient was weaned off BiPAP. Patient was admitted to medical ICU and [facility] facility. Patient received head CT: Concerning for hypodensity in the right putamen area versus calcification, decision was made to transfer the patient to [facility] for neurosurgical evaluation. On presentation:  BP:192/132  BSL: 140  CT: Concerning for bleed in the Rt alvaro  No tpa given for above reason   Cannot get CT head and neck because of elevated creatinine. Prior to arrival patient was on  Antiplatelets/anticoagulants: ASA 81   Statins:      Smoking history: Not available      Patient was loaded with phenobarbital.  Patient received Rocephin for UTI  Patient was started on Cardene for blood pressure control in the view of bleed on the CAT scan.        PAST MEDICAL HISTORY :       Past Medical History:        Diagnosis Date    Arthritis     Asthma     Attention deficit hyperactivity disorder (ADHD), combined type     Borderline diabetes     Borderline [facility] disorder ([facility]     CHF (congestive heart failure) (HCC)     COPD (chronic obstructive pulmonary disease) (HCC)     Fibromyalgia     Headache     Hypertension     Manic depression (HCC)     Movement disorder     Neck fracture (HCC)     Pernicious anemia     Seizure ([facility]     Thyroid disease        Past Surgical History:        Procedure Laterality Date    EXPLORATION OF WOUND OF EXTREMITY Right 5/19/2019    RIGHT THIGH WOUND WASHOUT WITH [facility] WEST-ER PLACEMENT performed by Ralph Tejeda MD at [address] N/A 5/22/2019    RIGHT WOUND HIP EXAMINATION LAVAGE AND WOUND VAC PLACEMENT performed by Claudia Alex MD at [address] [place] [address] Right 5/17/2019    IRRIGATION, DEBRIDEMENT RIGHT THIGH performed by Eneida Parada MD at [place] [address] Bilateral     LYMPH NODE DISSECTION      cervical    PARTIAL HYSTERECTOMY         Social History:   Social History     Socioeconomic History    Marital status:      Spouse name: Not on file    Number of children: Not on file    Years of education: Not on file    Highest education level: Not on file   Occupational History    Not on file   Tobacco Use    Smoking status: Current Every Day Smoker     Packs/day: 0.50     Years: 12.00     Pack years: 6.00    Smokeless tobacco: Never Used   Vaping Use    Vaping Use: Never used   Substance and Sexual Activity    Alcohol use: No    Drug use: Not Currently     Comment: Has not use Heroin since 2/2020    Sexual activity: Not Currently   Other Topics Concern    Not on file   Social History Narrative    Not on file     Social Determinants of Health     Financial Resource Strain: Low Risk     Difficulty of Paying Living Expenses: Not hard at all   Food Insecurity: No Food Insecurity    Worried About Running Out of Food in the Last Year: Never true    Michael of [employer] in the Last Year: Never true   Transportation Needs: No Transportation Needs    Lack of Transportation (Medical): No    Lack of Transportation (Non-Medical): No   Physical Activity:     Days of Exercise per Week: Not on file    Minutes of Exercise per Session: Not on file   Stress:     Feeling of Stress : Not on file   Social Connections:     Frequency of Communication with Friends and Family: Not on file    Frequency of Social Gatherings with Friends and Family: Not on file    Attends Rastafari Services: Not on file    Active Member of [address] [place] [address] [employer] or Organizations: Not on file    Attends Club or Organization Meetings: Not on file    Marital Status: Not on file   Intimate Partner Violence:     Fear of Current or Ex-Partner: Not on file    Emotionally Abused: Not on file    Physically Abused: Not on file    Sexually Abused: Not on file   Housing Stability:     Unable to Pay for Housing in the Last Year: Not on file    Number of [place] in the Last Year: Not on file    Unstable Housing in the Last Year: Not on file       Family History:   No family history on file. Allergies:  Imitrex [sumatriptan], Bee pollen, Bee venom, Bromide ion [bromine], Reglan [metoclopramide], Sulfa antibiotics, Sulfadiazine, and Tramadol    Home Medications:  Prior to Admission medications    Medication Sig Start Date End Date Taking? Authorizing Provider   gabapentin (NEURONTIN) 300 MG capsule Take 2 capsules by mouth 3 times daily for 30 days. 1/4/22 2/3/22  KEANU Liao CNP   albuterol sulfate  (90 Base) MCG/ACT inhaler Inhale 1 puff into the lungs every 6 hours as needed for Wheezing or Shortness of Breath Gap prescription until follow up with primary. Do not refill.   Follow up with primary 9/21/21   KEANU Liao CNP   lisinopril (PRINIVIL;ZESTRIL) 40 MG tablet Take 1 tablet by mouth daily 9/21/21   KEANU Liao CNP   amLODIPine (NORVASC) 10 MG tablet Take 1 tablet by mouth daily 9/21/21   KEANU Massey CNP   OXcarbazepine (TRILEPTAL) 150 MG tablet Take 1 tablet by mouth 2 times daily 9/21/21 12/20/21  KEANU Massey CNP   divalproex (DEPAKOTE) 500 MG DR tablet Take 1 tablet in the morning and 2 tablets at bed time 9/21/21 12/21/21  KEANU Massey CNP   carvedilol (COREG) 6.25 MG tablet Take 1 tablet by mouth 2 times daily (with meals) 9/21/21   KEANU Massey CNP   levothyroxine (SYNTHROID) 100 MCG tablet Take 1 tablet by mouth Daily 9/21/21   KEANU Massey CNP   Blood Pressure KIT 1 kit by Does not apply route daily 8/31/21   Yuri Howe DO   venlafaxine (EFFEXOR XR) 75 MG extended release capsule Take 1 capsule by mouth daily Patient takes 2 tablets of 75 mg 8/31/21   Yuri Howe DO   buprenorphine-naloxone (SUBOXONE) 8-2 MG FILM SL film Last filled at [address] [facility] on 7/7/21 for 28 days. Joseph 53 over a year ago. 7/7/21   Historical Provider, MD   risperiDONE (RISPERDAL) 2 MG tablet 2 mg daily  7/30/21   Historical Provider, MD   risperiDONE (RISPERDAL) 4 MG tablet 4 mg nightly  7/30/21   Historical Provider, MD   ACETAMINOPHEN EXTRA STRENGTH 500 MG tablet TAKE 1 TO 2 TAB BY MOUTH EVERY 8 HOURS AS NEEDED FOR PAIN  8/4/21   KEANU Massey CNP   budesonide-formoterol [facility]) 160-4.5 MCG/ACT AERO Inhale 2 puffs into the lungs 2 times daily 6/3/21   Carol Erazo DO   Respiratory Therapy Supplies (NEBULIZER/TUBING/MOUTHPIECE) KIT Daily as needed 6/3/21   Carol Erazo DO   butalbital-aspirin-caffeine [facility]) -40 MG per capsule Take 1 capsule by mouth every 4 hours as needed for Headaches for up to 30 days.  5/14/21 9/21/21  KEANU Massey CNP   fluticasone [facility]) 50 MCG/ACT nasal spray 2 sprays by Each Nostril route daily 5/14/21   KEANU Massey - CNP   ASPIRIN ADULT LOW STRENGTH 81 MG EC tablet TAKE 1 TABLET BY MOUTH ONCE DAILY  4/5/21   KEANU Massey - CNP   traZODone (DESYREL) 150 MG tablet Take 1 tablet by mouth nightly 10/22/20   KEANU James CNP       Current Medications:   No current facility-administered medications for this encounter. REVIEW OF SYSTEMS:        unable to obtain     PHYSICAL EXAM:       There were no vitals taken for this visit. CONSTITUTIONAL:   Patient is drowsy, open eyes to voice, follows intermittent commands   HEAD:  normocephalic, atraumatic    EYES:  PERRLA, EOMI.   ENT:  moist mucous membranes   NECK:  supple, symmetric, no midline tenderness to palpation    BACK:  without midline tenderness, step-offs or deformities    LUNGS:  Equal air entry bilaterally   CARDIOVASCULAR:  normal s1 / s2   ABDOMEN:  Soft, no rigidity   NEUROLOGIC:      INITIAL NIH STROKE SCALE:    Time Performed:  12:00 AM     1a. Level of consciousness:  1 - not alert but arousable by minor stimulation to obey, answer or respond  1b. Level of consciousness questions:  2 - answers neither question correctly  1c. Level of consciousness questions:  0 - performs both tasks correctly  2. Best Gaze:  0 - normal  3. Visual:  0 - no visual loss  4. Facial Palsy:  0 - normal symmetric movement  5a. Motor left arm:  2 - some effort against gravity, limb cannot get to or maintain (if cued) 90 (or 45) degrees, drifts down to bed, but has some effort against gravity   5b. Motor right arm:  1 - drift, limb holds 90 (or 45) degrees but drifts down before full 10 seconds: does not hit bed  6a. Motor left le - some effort against gravity; leg falls to bed by 5 seconds but has some effort against gravity  6b. Motor right leg:  3 - no effort against gravity; leg falls to bed immediately  7. Limb Ataxia:  0 - absent  8. Sensory:  0 - normal; no sensory loss  9. Best Language:  0 - no aphasia, normal  10. Dysarthria:  1 - mild to moderate, patient slurs at least some words and at worst, can be understood with some difficulty  11. Extinction and Inattention:  0 - no abnormality    TOTAL:  12     SKIN:  no rash      Modified Evelin Score Scale:       Unable to get     LABS AND IMAGING:     CBC with Differential:    Lab Results   Component Value Date    WBC 8.1 08/30/2021    RBC 4.76 08/30/2021    HGB 14.3 08/30/2021    HCT 43.5 08/30/2021     08/30/2021    MCV 91.4 08/30/2021    MCH 30.0 08/30/2021    MCHC 32.9 08/30/2021    RDW 13.2 08/30/2021    LYMPHOPCT 16 08/27/2021    MONOPCT 2 08/27/2021    BASOPCT 0 08/27/2021    MONOSABS 0.34 08/27/2021    LYMPHSABS 2.72 08/27/2021    EOSABS 0.00 08/27/2021    BASOSABS 0.00 08/27/2021    DIFFTYPE NOT REPORTED 08/27/2021         BMP:    Lab Results   Component Value Date     08/30/2021    K 3.2 08/31/2021    CL 98 08/30/2021    CO2 24 08/30/2021    BUN 7 08/30/2021    LABALBU 3.7 08/30/2021    CREATININE 0.37 08/30/2021    CALCIUM 8.9 08/30/2021    GFRAA >60 08/30/2021    LABGLOM >60 08/30/2021    GLUCOSE 84 08/30/2021       Radiology Review:    1.) CT brain without contrast:       Impression   Interval development of hyperdensities in the left putamen.  While these may   represent basal ganglia calcifications the possibility of small focal left   putamen hemorrhages cannot be excluded as these are new from prior study.    Further evaluation with MRI may be beneficial       2.) CTA Head/Neck:   Cannot get due to Kidney issues     3.) Brain MRI W/O: Pending     4)  MRA head and neck: Pending           ASSESSMENT AND PLAN:       Patient Active Problem List   Diagnosis    Chest pain    Migraine variant with headache    Drug overdose, accidental or unintentional, initial encounter    Hypothyroidism    Essential hypertension    Seizure disorder ([address]    Drug overdose    History of seizure    Major depressive disorder with psychotic features ([address]    Severe major depression ([address]    Overdose of barbiturate, intentional self-harm, initial encounter ([facility]    Int[facility] overdose [facility]    Severe episode of recurrent major depressive disorder, without psychotic features ([facility]    Suicide attempt ([facility]    Major depressive disorder, recurrent ([facility]    Sepsis ([address]    Severe malnutrition ([address]    Altered mental status    Hypokalemia    Congestive heart failure of unknown etiology ([address]    Lumbar radiculopathy    Chronic low back pain    Piriformis syndrome    COPD (chronic obstructive pulmonary disease) (HCC)    Major depressive disorder, single episode, unspecified    Severe depressed bipolar I disorder without psychotic features ([facility]    Polysubstance abuse ([facility]    Syncope and collapse    Altered mental state    Hypertensive emergency    Acute metabolic encephalopathy    Abnormal CT of the head    Intraparenchymal hemorrhage of brain ([facility]    Diabetic peripheral neuropathy associated with type 2 diabetes mellitus ([facility]       Assessment                 59 y.o. female   ADHD, seizure disorder, CHF, COPD, hypertension, manic depression, presented transfer from outlying facility. CT head showed possible right putamen hemorrhage, neurosurgery for was more for calcification versus bleeding. 1. Last Known Well (date and time):  4 PM and 8/27/2021    2. Candidate for IV tPA therapy     Yes []     No  [x] due to the following exclusion criteria:     3.  Candidate for Thrombectomy    Yes []      No [x] due to the following exclusion criteria:     - Discussed with Dr. Jasmine Zimmerman      Recommendations:    [] General Neurology Care Status - prefer 5th floor (5A/5C)   [x] Internal Medicine General Care Status   [] NICU Status - (5B)     [] MICU Status   [] Observation Status    Please use the following admission order set for stroke admission:   [] [id number] - [place] Intercerebral Hemorrhage Admission   [] [id number] - [place] Sub Arachnoid Hemorrhage Admission   [] [id number] - [place] Ischemic Stroke TPA Treatment Focused   [] [id number] - IP Ischemic Stroke ICU Post Alteplase (TPA) Admission    [] [id number] - GEN Ischemic Stroke Non-Thrombolytic Focussed      Imaging   - CT Head  WO : done   -MRA head and neck wo : Pending   - MRI Brain WO : Pending   - ECHO with bubble stuy       Medications   -  Aspirin 325  mg daily  X 1  - Resume home AED's   - Folic acid 1mg BID  - Atorvastatin mg nightly     Labs  - Fasting Lipid panel  - HgbA1c lab      - PT, OT, Speech eval   - Telemetry   - Neuro checks per protocol  - We recommend -180  - Blood glucose goal less than 180  - Please avoid dextrose containing solutions        Additional recommendations may follow    Please contact [facility] with any changes in patients neurologic status. Thank you for your consult.        Virlinda Najjar , MD   PGY 3 Neurology Resident  2/24/2022 at 12:42 PM

## 2022-02-25 DIAGNOSIS — I10 ESSENTIAL HYPERTENSION: ICD-10-CM

## 2022-02-25 RX ORDER — AMLODIPINE BESYLATE 10 MG/1
TABLET ORAL
Qty: 30 TABLET | Refills: 1 | Status: SHIPPED | OUTPATIENT
Start: 2022-02-25

## 2022-02-25 RX ORDER — LISINOPRIL 40 MG/1
TABLET ORAL
Qty: 30 TABLET | Refills: 1 | Status: SHIPPED | OUTPATIENT
Start: 2022-02-25 | End: 2022-03-31

## 2022-02-25 NOTE — TELEPHONE ENCOUNTER
Health Maintenance   Topic Date Due    Diabetic foot exam  Never done    Depression Monitoring  Never done    HIV screen  Never done    Diabetic microalbuminuria test  Never done    Diabetic retinal exam  Never done    Hepatitis B vaccine (1 of 3 - Risk 3-dose series) Never done    DTaP/Tdap/Td vaccine (1 - Tdap) Never done    Breast cancer screen  Never done    Shingles Vaccine (1 of 2) Never done    A1C test (Diabetic or Prediabetic)  02/19/2020    Colorectal Cancer Screen  06/11/2020    COVID-19 Vaccine (2 - Pfizer 3-dose series) 04/13/2021    TSH testing  05/30/2022    Lipid screen  08/28/2022    Creatinine monitoring  08/30/2022    Potassium monitoring  08/31/2022    Pneumococcal 0-64 years Vaccine (2 of 2 - PPSV23) 02/16/2028    Flu vaccine  Completed    Hepatitis C screen  Completed    Hepatitis A vaccine  Aged Out    Hib vaccine  Aged Out    Meningococcal (ACWY) vaccine  Aged Out             (applicable per patient's age: Cancer Screenings, Depression Screening, Fall Risk Screening, Immunizations)    Hemoglobin A1C (%)   Date Value   02/19/2019 5.2     LDL Cholesterol (mg/dL)   Date Value   08/28/2021 131 (H)     AST (U/L)   Date Value   08/28/2021 15     ALT (U/L)   Date Value   08/28/2021 10     BUN (mg/dL)   Date Value   08/30/2021 7      (goal A1C is < 7)   (goal LDL is <100) need 30-50% reduction from baseline     BP Readings from Last 3 Encounters:   09/21/21 133/86   08/31/21 (!) 151/98   08/27/21 (!) 151/94    (goal /80)      All Future Testing planned in [facility]:  Lab Frequency Next Occurrence       Next Visit Date:  No future appointments.          Patient Active Problem List:     Chest pain     Migraine variant with headache     Drug overdose, accidental or unintentional, initial encounter     Hypothyroidism     Essential hypertension     Seizure disorder [facility])     Drug overdose     History of seizure     Major depressive disorder with psychotic features ([address] Severe major depression (HCC)     Overdose of barbiturate, intentional self-harm, initial encounter ([address]     Intentional phenobarbital overdose ([address]     Severe episode of recurrent major depressive disorder, without psychotic features ([address]     Suicide attempt ([address]     Major depressive disorder, recurrent ([address]     Sepsis ([address]     Severe malnutrition ([address]     Altered mental status     Hypokalemia     Congestive heart failure of unknown etiology ([facility])     Lumbar radiculopathy     Chronic low back pain     Piriformis syndrome     COPD (chronic obstructive pulmonary disease) (HCC)     Major depressive disorder, single episode, unspecified     Severe depressed bipolar I disorder without psychotic features ([address]     Polysubstance abuse ([address]     Syncope and collapse     Altered mental state     Hypertensive emergency     Acute metabolic encephalopathy     Abnormal CT of the head     Intraparenchymal hemorrhage of brain ([address]     Diabetic peripheral neuropathy associated with type 2 diabetes mellitus ([address]

## 2022-02-25 NOTE — TELEPHONE ENCOUNTER
Health Maintenance   Topic Date Due    Diabetic foot exam  Never done    Depression Monitoring  Never done    HIV screen  Never done    Diabetic microalbuminuria test  Never done    Diabetic retinal exam  Never done    Hepatitis B vaccine (1 of 3 - Risk 3-dose series) Never done    DTaP/Tdap/Td vaccine (1 - Tdap) Never done    Breast cancer screen  Never done    Shingles Vaccine (1 of 2) Never done    A1C test (Diabetic or Prediabetic)  02/19/2020    Colorectal Cancer Screen  06/11/2020    COVID-19 Vaccine (2 - Pfizer 3-dose series) 04/13/2021    TSH testing  05/30/2022    Lipid screen  08/28/2022    Creatinine monitoring  08/30/2022    Potassium monitoring  08/31/2022    Pneumococcal 0-64 years Vaccine (2 of 2 - PPSV23) 02/16/2028    Flu vaccine  Completed    Hepatitis C screen  Completed    Hepatitis A vaccine  Aged Out    Hib vaccine  Aged Out    Meningococcal (ACWY) vaccine  Aged Out             (applicable per patient's age: Cancer Screenings, Depression Screening, Fall Risk Screening, Immunizations)    Hemoglobin A1C (%)   Date Value   02/19/2019 5.2     LDL Cholesterol (mg/dL)   Date Value   08/28/2021 131 (H)     AST (U/L)   Date Value   08/28/2021 15     ALT (U/L)   Date Value   08/28/2021 10     BUN (mg/dL)   Date Value   08/30/2021 7      (goal A1C is < 7)   (goal LDL is <100) need 30-50% reduction from baseline     BP Readings from Last 3 Encounters:   09/21/21 133/86   08/31/21 (!) 151/98   08/27/21 (!) 151/94    (goal /80)      All Future Testing planned in [facility]:  Lab Frequency Next Occurrence       Next Visit Date:  No future appointments.          Patient Active Problem List:     Chest pain     Migraine variant with headache     Drug overdose, accidental or unintentional, initial encounter     Hypothyroidism     Essential hypertension     Seizure disorder [facility])     Drug overdose     History of seizure     Major depressive disorder with psychotic features ([facility] Severe major depression (HCC)     Overdose of barbiturate, intentional self-harm, initial encounter ([address]     Intentional phenobarbital overdose ([address]     Severe episode of recurrent major depressive disorder, without psychotic features ([address]     Suicide attempt ([address]     Major depressive disorder, recurrent ([address]     Sepsis ([address]     Severe malnutrition ([address]     Altered mental status     Hypokalemia     Congestive heart failure of unknown etiology ([facility])     Lumbar radiculopathy     Chronic low back pain     Piriformis syndrome     COPD (chronic obstructive pulmonary disease) (HCC)     Major depressive disorder, single episode, unspecified     Severe depressed bipolar I disorder without psychotic features ([address]     Polysubstance abuse ([address]     Syncope and collapse     Altered mental state     Hypertensive emergency     Acute metabolic encephalopathy     Abnormal CT of the head     Intraparenchymal hemorrhage of brain ([address]     Diabetic peripheral neuropathy associated with type 2 diabetes mellitus ([address]

## 2022-03-02 DIAGNOSIS — Z76.0 MEDICATION REFILL: ICD-10-CM

## 2022-03-02 RX ORDER — GABAPENTIN 300 MG/1
CAPSULE ORAL
Qty: 180 CAPSULE | Refills: 1 | OUTPATIENT
Start: 2022-03-02

## 2022-03-08 DIAGNOSIS — Z76.0 MEDICATION REFILL: ICD-10-CM

## 2022-03-08 NOTE — TELEPHONE ENCOUNTER
Pt is requesting refill unable to come into office for an appt due to her taking care of family issues in [place] correct pharmacy is on file .

## 2022-03-09 RX ORDER — GABAPENTIN 300 MG/1
600 CAPSULE ORAL 3 TIMES DAILY
Qty: 180 CAPSULE | Refills: 1 | Status: SHIPPED | OUTPATIENT
Start: 2022-03-09 | End: 2022-04-08

## 2022-03-22 DIAGNOSIS — J30.2 SEASONAL ALLERGIES: ICD-10-CM

## 2022-03-23 RX ORDER — FLUTICASONE PROPIONATE 50 MCG
2 SPRAY, SUSPENSION (ML) NASAL DAILY
Qty: 1 EACH | Refills: 3 | Status: SHIPPED | OUTPATIENT
Start: 2022-03-23

## 2022-03-28 ENCOUNTER — TELEPHONE (OUTPATIENT)
Dept: PRIMARY CARE CLINIC | Age: 57
End: 2022-03-28

## 2022-03-28 NOTE — TELEPHONE ENCOUNTER
Patient called inquiring about medication refills and was informed and understands that she needs an appointment first.

## 2022-03-29 DIAGNOSIS — G43.809 MIGRAINE VARIANT WITH HEADACHE: ICD-10-CM

## 2022-03-29 DIAGNOSIS — J44.9 CHRONIC OBSTRUCTIVE PULMONARY DISEASE, UNSPECIFIED COPD TYPE (HCC): ICD-10-CM

## 2022-03-29 RX ORDER — ONDANSETRON 4 MG/1
4 TABLET, ORALLY DISINTEGRATING ORAL EVERY 8 HOURS PRN
Qty: 20 TABLET | Refills: 1 | Status: SHIPPED | OUTPATIENT
Start: 2022-03-29 | End: 2022-04-28

## 2022-03-29 RX ORDER — ALBUTEROL SULFATE 90 UG/1
1 AEROSOL, METERED RESPIRATORY (INHALATION) EVERY 6 HOURS PRN
Qty: 1 EACH | Refills: 2 | Status: SHIPPED | OUTPATIENT
Start: 2022-03-29 | End: 2022-05-31

## 2022-03-29 RX ORDER — BUDESONIDE AND FORMOTEROL FUMARATE DIHYDRATE 160; 4.5 UG/1; UG/1
2 AEROSOL RESPIRATORY (INHALATION) 2 TIMES DAILY
Qty: 1 EACH | Refills: 1 | Status: SHIPPED | OUTPATIENT
Start: 2022-03-29

## 2022-03-29 NOTE — TELEPHONE ENCOUNTER
Patient is requesting a med refill on Symbicort, Zofran and Albuterol 160/4.5 g is sent to pharmacy in [place], states that father has passed away and she is handling his affairs. Patient was advised and agreed that  If she will be in [place] long term, she may want to est care for future medication refill and request.   Health Maintenance   Topic Date Due    Diabetic foot exam  Never done    HIV screen  Never done    Diabetic microalbuminuria test  Never done    Diabetic retinal exam  Never done    Hepatitis B vaccine (1 of 3 - Risk 3-dose series) Never done    DTaP/Tdap/Td vaccine (1 - Tdap) Never done    Breast cancer screen  Never done    Shingles Vaccine (1 of 2) Never done    A1C test (Diabetic or Prediabetic)  02/19/2020    Colorectal Cancer Screen  06/11/2020    COVID-19 Vaccine (2 - Pfizer 3-dose series) 04/13/2021    Depression Monitoring  11/28/2021    TSH testing  05/30/2022    Lipid screen  08/28/2022    Creatinine monitoring  08/30/2022    Potassium monitoring  08/31/2022    Pneumococcal 0-64 years Vaccine (2 of 2 - PPSV23) 02/16/2028    Flu vaccine  Completed    Hepatitis C screen  Completed    Hepatitis A vaccine  Aged Out    Hib vaccine  Aged Out    Meningococcal (ACWY) vaccine  Aged Out             (applicable per patient's age: Cancer Screenings, Depression Screening, Fall Risk Screening, Immunizations)    Hemoglobin A1C (%)   Date Value   02/19/2019 5.2     LDL Cholesterol (mg/dL)   Date Value   08/28/2021 131 (H)     AST (U/L)   Date Value   08/28/2021 15     ALT (U/L)   Date Value   08/28/2021 10     BUN (mg/dL)   Date Value   08/30/2021 7      (goal A1C is < 7)   (goal LDL is <100) need 30-50% reduction from baseline     BP Readings from Last 3 Encounters:   09/21/21 133/86   08/31/21 (!) 151/98   08/27/21 (!) 151/94    (goal /80)      All Future Testing planned in [facility]:  Lab Frequency Next Occurrence       Next Visit Date:  No future appointments. Patient Active Problem List:     Chest pain     Migraine variant with headache     Drug overdose, accidental or unintentional, initial encounter     Hypothyroidism     Essential hypertension     Seizure disorder ([address]     Drug overdose     History of seizure     Major depressive disorder with psychotic features ([address]     Severe major depression ([address]     Overdose of barbiturate, intentional self-harm, initial encounter ([address]     Intentional phenobarbital overdose ([address]     Severe episode of recurrent major depressive disorder, without psychotic features ([address]     Suicide attempt ([address]     Major depressive disorder, recurrent ([address]     Sepsis ([address]     Severe malnutrition ([address]     Altered mental status     Hypokalemia     Congestive heart failure of unknown etiology ([address]     Lumbar radiculopathy     Chronic low back pain     Piriformis syndrome     COPD (chronic obstructive pulmonary disease) (HCC)     Major depressive disorder, single episode, unspecified     Severe depressed bipolar I disorder without psychotic features ([address]     Polysubstance abuse ([address]     Syncope and collapse     Altered mental state     Hypertensive emergency     Acute metabolic encephalopathy     Abnormal CT of the head     Intraparenchymal hemorrhage of brain ([address]     Diabetic peripheral neuropathy associated with type 2 diabetes mellitus ([address]

## 2022-03-31 DIAGNOSIS — I10 ESSENTIAL HYPERTENSION: ICD-10-CM

## 2022-03-31 RX ORDER — LISINOPRIL 40 MG/1
TABLET ORAL
Qty: 30 TABLET | Refills: 1 | Status: SHIPPED | OUTPATIENT
Start: 2022-03-31 | End: 2022-05-31

## 2022-03-31 NOTE — TELEPHONE ENCOUNTER
Health Maintenance   Topic Date Due    Diabetic foot exam  Never done    HIV screen  Never done    Diabetic microalbuminuria test  Never done    Diabetic retinal exam  Never done    Hepatitis B vaccine (1 of 3 - Risk 3-dose series) Never done    DTaP/Tdap/Td vaccine (1 - Tdap) Never done    Breast cancer screen  Never done    Shingles Vaccine (1 of 2) Never done    A1C test (Diabetic or Prediabetic)  02/19/2020    Colorectal Cancer Screen  06/11/2020    COVID-19 Vaccine (2 - Pfizer 3-dose series) 04/13/2021    Depression Monitoring  11/28/2021    TSH testing  05/30/2022    Lipid screen  08/28/2022    Creatinine monitoring  08/30/2022    Potassium monitoring  08/31/2022    Pneumococcal 0-64 years Vaccine (2 of 2 - PPSV23) 02/16/2028    Flu vaccine  Completed    Hepatitis C screen  Completed    Hepatitis A vaccine  Aged Out    Hib vaccine  Aged Out    Meningococcal (ACWY) vaccine  Aged Out             (applicable per patient's age: Cancer Screenings, Depression Screening, Fall Risk Screening, Immunizations)    Hemoglobin A1C (%)   Date Value   02/19/2019 5.2     LDL Cholesterol (mg/dL)   Date Value   08/28/2021 131 (H)     AST (U/L)   Date Value   08/28/2021 15     ALT (U/L)   Date Value   08/28/2021 10     BUN (mg/dL)   Date Value   08/30/2021 7      (goal A1C is < 7)   (goal LDL is <100) need 30-50% reduction from baseline     BP Readings from Last 3 Encounters:   09/21/21 133/86   08/31/21 (!) 151/98   08/27/21 (!) 151/94    (goal /80)      All Future Testing planned in [facility]:  Lab Frequency Next Occurrence       Next Visit Date:  No future appointments.          Patient Active Problem List:     Chest pain     Migraine variant with headache     Drug overdose, accidental or unintentional, initial encounter     Hypothyroidism     Essential hypertension     Seizure disorder [facility])     Drug overdose     History of seizure     Major depressive disorder with psychotic features ([facility] Severe major depression (HCC)     Overdose of barbiturate, intentional self-harm, initial encounter ([address]     Intentional phenobarbital overdose ([address]     Severe episode of recurrent major depressive disorder, without psychotic features ([address]     Suicide attempt ([address]     Major depressive disorder, recurrent ([address]     Sepsis ([address]     Severe malnutrition ([address]     Altered mental status     Hypokalemia     Congestive heart failure of unknown etiology ([facility])     Lumbar radiculopathy     Chronic low back pain     Piriformis syndrome     COPD (chronic obstructive pulmonary disease) (HCC)     Major depressive disorder, single episode, unspecified     Severe depressed bipolar I disorder without psychotic features ([address]     Polysubstance abuse ([address]     Syncope and collapse     Altered mental state     Hypertensive emergency     Acute metabolic encephalopathy     Abnormal CT of the head     Intraparenchymal hemorrhage of brain ([address]     Diabetic peripheral neuropathy associated with type 2 diabetes mellitus ([address]

## 2022-04-12 RX ORDER — CETIRIZINE HYDROCHLORIDE 10 MG/1
10 TABLET ORAL DAILY
Qty: 30 TABLET | Refills: 1 | Status: SHIPPED | OUTPATIENT
Start: 2022-04-12

## 2022-04-12 NOTE — TELEPHONE ENCOUNTER
Pt called in c/o post-nasal drip due to high pollen in [place] also stated causing asthma to flare up. Nose is red/raw from blowing so often. Requesting Rx for symptoms such as Claritin, Zyrtec or your recommendations. Stated Flonase has not been helpful. Please advise. Pharm has been updated in chart.

## 2022-04-12 NOTE — TELEPHONE ENCOUNTER
Writer informed patient of pcp message regarding Rx sent to pharm. Gave verbal understanding. No questions asked at time of call.

## 2022-04-29 RX ORDER — ONDANSETRON 4 MG/1
TABLET, ORALLY DISINTEGRATING ORAL
Qty: 20 TABLET | Refills: 1 | OUTPATIENT
Start: 2022-04-29

## 2022-05-04 DIAGNOSIS — I10 ESSENTIAL HYPERTENSION: ICD-10-CM

## 2022-05-04 RX ORDER — AMLODIPINE BESYLATE 10 MG/1
TABLET ORAL
Qty: 30 TABLET | Refills: 1 | OUTPATIENT
Start: 2022-05-04

## 2022-05-29 DIAGNOSIS — J44.9 CHRONIC OBSTRUCTIVE PULMONARY DISEASE, UNSPECIFIED COPD TYPE (HCC): ICD-10-CM

## 2022-05-31 DIAGNOSIS — I10 ESSENTIAL HYPERTENSION: ICD-10-CM

## 2022-05-31 RX ORDER — ALBUTEROL SULFATE 90 UG/1
AEROSOL, METERED RESPIRATORY (INHALATION)
Qty: 18 EACH | Refills: 1 | Status: SHIPPED | OUTPATIENT
Start: 2022-05-31

## 2022-05-31 RX ORDER — LISINOPRIL 40 MG/1
TABLET ORAL
Qty: 30 TABLET | Refills: 1 | Status: SHIPPED | OUTPATIENT
Start: 2022-05-31

## 2022-05-31 NOTE — TELEPHONE ENCOUNTER
Contacted patient because she is due for appointment. Patient states she is in [place] right now taking care of her fathers estate. Patient would like to know if we could refill the medication and she will call and make an appointment when she returns.

## 2022-05-31 NOTE — TELEPHONE ENCOUNTER
Health Maintenance   Topic Date Due    Diabetic foot exam  Never done    HIV screen  Never done    Diabetic microalbuminuria test  Never done    Diabetic retinal exam  Never done    Hepatitis B vaccine (1 of 3 - Risk 3-dose series) Never done    DTaP/Tdap/Td vaccine (1 - Tdap) Never done    Breast cancer screen  Never done    Shingles vaccine (1 of 2) Never done    A1C test (Diabetic or Prediabetic)  02/19/2020    Colorectal Cancer Screen  06/11/2020    Pneumococcal 0-64 years Vaccine (2 - PCV) 09/11/2020    COVID-19 Vaccine (2 - Pfizer 3-dose series) 04/13/2021    Depression Monitoring  11/28/2021    Lipids  08/28/2022    Flu vaccine  Completed    Hepatitis C screen  Completed    Hepatitis A vaccine  Aged Out    Hib vaccine  Aged Out    Meningococcal (ACWY) vaccine  Aged Out             (applicable per patient's age: Cancer Screenings, Depression Screening, Fall Risk Screening, Immunizations)    Hemoglobin A1C (%)   Date Value   02/19/2019 5.2     LDL Cholesterol (mg/dL)   Date Value   08/28/2021 131 (H)     AST (U/L)   Date Value   08/28/2021 15     ALT (U/L)   Date Value   08/28/2021 10     BUN (mg/dL)   Date Value   08/30/2021 7      (goal A1C is < 7)   (goal LDL is <100) need 30-50% reduction from baseline     BP Readings from Last 3 Encounters:   09/21/21 133/86   08/31/21 (!) 151/98   08/27/21 (!) 151/94    (goal /80)      All Future Testing planned in [facility]:  Lab Frequency Next Occurrence       Next Visit Date:  No future appointments.          Patient Active Problem List:     Chest pain     Migraine variant with headache     Drug overdose, accidental or unintentional, initial encounter     Hypothyroidism     Essential hypertension     Seizure disorder ([address]     Drug overdose     History of seizure     Major depressive disorder with psychotic features ([address]     Severe major depression ([address]     Overdose of barbiturate, intentional self-harm, initial encounter ([address]     Intentional phenobarbital overdose (HCC)     Severe episode of recurrent major depressive disorder, without psychotic features ([address]     Suicide attempt ([address]     Major depressive disorder, recurrent ([address]     Sepsis ([address]     Severe malnutrition ([address]     Altered mental status     Hypokalemia     Congestive heart failure of unknown etiology (HCC)     Lumbar radiculopathy     Chronic low back pain     Piriformis syndrome     COPD (chronic obstructive pulmonary disease) ([facility])     Major depressive disorder, single episode, unspecified     Severe depressed bipolar I disorder without psychotic features ([address]     Polysubstance abuse ([address]     Syncope and collapse     Altered mental state     Hypertensive emergency     Acute metabolic encephalopathy     Abnormal CT of the head     Intraparenchymal hemorrhage of brain ([address]     Diabetic peripheral neuropathy associated with type 2 diabetes mellitus ([address]

## 2022-06-20 DIAGNOSIS — J30.2 SEASONAL ALLERGIES: ICD-10-CM

## 2022-06-20 RX ORDER — FLUTICASONE PROPIONATE 50 MCG
SPRAY, SUSPENSION (ML) NASAL
Refills: 3 | OUTPATIENT
Start: 2022-06-20

## 2022-06-22 DIAGNOSIS — I10 ESSENTIAL HYPERTENSION: ICD-10-CM

## 2022-06-22 RX ORDER — LISINOPRIL 40 MG/1
TABLET ORAL
Qty: 30 TABLET | Refills: 1 | OUTPATIENT
Start: 2022-06-22

## 2022-07-30 DIAGNOSIS — J30.2 SEASONAL ALLERGIES: ICD-10-CM

## 2022-07-30 DIAGNOSIS — I10 ESSENTIAL HYPERTENSION: ICD-10-CM

## 2022-08-01 RX ORDER — FLUTICASONE PROPIONATE 50 MCG
SPRAY, SUSPENSION (ML) NASAL
Refills: 3 | OUTPATIENT
Start: 2022-08-01

## 2022-08-01 RX ORDER — LISINOPRIL 40 MG/1
TABLET ORAL
Qty: 30 TABLET | Refills: 1 | OUTPATIENT
Start: 2022-08-01

## 2022-08-24 DIAGNOSIS — I10 ESSENTIAL HYPERTENSION: ICD-10-CM

## 2022-08-24 DIAGNOSIS — J30.2 SEASONAL ALLERGIES: ICD-10-CM

## 2022-08-25 RX ORDER — LISINOPRIL 40 MG/1
TABLET ORAL
Qty: 30 TABLET | Refills: 1 | OUTPATIENT
Start: 2022-08-25

## 2022-08-25 RX ORDER — FLUTICASONE PROPIONATE 50 MCG
SPRAY, SUSPENSION (ML) NASAL
Refills: 3 | OUTPATIENT
Start: 2022-08-25

## 2022-10-18 DIAGNOSIS — I10 ESSENTIAL HYPERTENSION: ICD-10-CM

## 2022-10-18 DIAGNOSIS — J30.2 SEASONAL ALLERGIES: ICD-10-CM

## 2022-10-18 RX ORDER — LISINOPRIL 40 MG/1
TABLET ORAL
Qty: 30 TABLET | Refills: 1 | OUTPATIENT
Start: 2022-10-18

## 2022-10-18 RX ORDER — FLUTICASONE PROPIONATE 50 MCG
SPRAY, SUSPENSION (ML) NASAL
Refills: 3 | OUTPATIENT
Start: 2022-10-18

## 2022-10-18 NOTE — TELEPHONE ENCOUNTER
Health Maintenance   Topic Date Due    Diabetic foot exam  Never done    HIV screen  Never done    Diabetic microalbuminuria test  Never done    Diabetic retinal exam  Never done    Hepatitis B vaccine (1 of 3 - Risk 3-dose series) Never done    DTaP/Tdap/Td vaccine (1 - Tdap) Never done    Cervical cancer screen  Never done    Breast cancer screen  Never done    Shingles vaccine (1 of 2) Never done    A1C test (Diabetic or Prediabetic)  02/19/2020    Colorectal Cancer Screen  06/11/2020    COVID-19 Vaccine (2 - Pfizer series) 04/13/2021    Depression Monitoring  11/28/2021    Flu vaccine (1) 08/01/2022    Lipids  08/28/2022    Pneumococcal 0-64 years Vaccine  Completed    Hepatitis C screen  Completed    Hepatitis A vaccine  Aged Out    Hib vaccine  Aged Out    Meningococcal (ACWY) vaccine  Aged Out             (applicable per patient's age: Cancer Screenings, Depression Screening, Fall Risk Screening, Immunizations)    Hemoglobin A1C (%)   Date Value   02/19/2019 5.2     LDL Cholesterol (mg/dL)   Date Value   08/28/2021 131 (H)     AST (U/L)   Date Value   08/28/2021 15     ALT (U/L)   Date Value   08/28/2021 10     BUN (mg/dL)   Date Value   08/30/2021 7      (goal A1C is < 7)   (goal LDL is <100) need 30-50% reduction from baseline     BP Readings from Last 3 Encounters:   09/21/21 133/86   08/31/21 (!) 151/98   08/27/21 (!) 151/94    (goal /80)      All Future Testing planned in [facility]:  Lab Frequency Next Occurrence       Next Visit Date:  No future appointments.          Patient Active Problem List:     Chest pain     Migraine variant with headache     Drug overdose, accidental or unintentional, initial encounter     Hypothyroidism     Essential hypertension     Seizure disorder ([address]     Drug overdose     History of seizure     Major depressive disorder with psychotic features ([address]     Severe major depression ([address]     Overdose of barbiturate, intentional self-harm, initial encounter ([address] Intentional phenobarbital overdose (HCC)     Severe episode of recurrent major depressive disorder, without psychotic features ([address]     Suicide attempt ([address]     Major depressive disorder, recurrent ([address]     Sepsis ([address]     Severe malnutrition ([address]     Altered mental status     Hypokalemia     Congestive heart failure of unknown etiology (HCC)     Lumbar radiculopathy     Chronic low back pain     Piriformis syndrome     COPD (chronic obstructive pulmonary disease) ([facility])     Major depressive disorder, single episode, unspecified     Severe depressed bipolar I disorder without psychotic features ([address]     Polysubstance abuse ([address]     Syncope and collapse     Altered mental state     Hypertensive emergency     Acute metabolic encephalopathy     Abnormal CT of the head     Intraparenchymal hemorrhage of brain ([address]     Diabetic peripheral neuropathy associated with type 2 diabetes mellitus ([address]

## 2022-10-25 DIAGNOSIS — J30.2 SEASONAL ALLERGIES: ICD-10-CM

## 2022-10-25 DIAGNOSIS — I10 ESSENTIAL HYPERTENSION: ICD-10-CM

## 2022-10-25 RX ORDER — FLUTICASONE PROPIONATE 50 MCG
SPRAY, SUSPENSION (ML) NASAL
Refills: 3 | OUTPATIENT
Start: 2022-10-25

## 2022-10-25 RX ORDER — LISINOPRIL 40 MG/1
TABLET ORAL
Qty: 30 TABLET | Refills: 1 | OUTPATIENT
Start: 2022-10-25

## 2022-11-13 DIAGNOSIS — I10 ESSENTIAL HYPERTENSION: ICD-10-CM

## 2022-11-13 DIAGNOSIS — J44.9 CHRONIC OBSTRUCTIVE PULMONARY DISEASE, UNSPECIFIED COPD TYPE (HCC): ICD-10-CM

## 2022-11-14 RX ORDER — ALBUTEROL SULFATE 90 UG/1
AEROSOL, METERED RESPIRATORY (INHALATION)
Qty: 18 EACH | Refills: 1 | OUTPATIENT
Start: 2022-11-14

## 2022-11-14 RX ORDER — LISINOPRIL 40 MG/1
TABLET ORAL
Qty: 30 TABLET | Refills: 1 | OUTPATIENT
Start: 2022-11-14

## 2022-11-14 NOTE — TELEPHONE ENCOUNTER
Health Maintenance   Topic Date Due    Diabetic foot exam  Never done    HIV screen  Never done    Diabetic microalbuminuria test  Never done    Diabetic retinal exam  Never done    Hepatitis B vaccine (1 of 3 - Risk 3-dose series) Never done    DTaP/Tdap/Td vaccine (1 - Tdap) Never done    Cervical cancer screen  Never done    Breast cancer screen  Never done    Shingles vaccine (1 of 2) Never done    A1C test (Diabetic or Prediabetic)  02/19/2020    Colorectal Cancer Screen  06/11/2020    COVID-19 Vaccine (2 - Pfizer series) 04/13/2021    Depression Monitoring  11/28/2021    Flu vaccine (1) 08/01/2022    Lipids  08/28/2022    Pneumococcal 0-64 years Vaccine  Completed    Hepatitis C screen  Completed    Hepatitis A vaccine  Aged Out    Hib vaccine  Aged Out    Meningococcal (ACWY) vaccine  Aged Out             (applicable per patient's age: Cancer Screenings, Depression Screening, Fall Risk Screening, Immunizations)    Hemoglobin A1C (%)   Date Value   02/19/2019 5.2     LDL Cholesterol (mg/dL)   Date Value   08/28/2021 131 (H)     AST (U/L)   Date Value   08/28/2021 15     ALT (U/L)   Date Value   08/28/2021 10     BUN (mg/dL)   Date Value   08/30/2021 7      (goal A1C is < 7)   (goal LDL is <100) need 30-50% reduction from baseline     BP Readings from Last 3 Encounters:   09/21/21 133/86   08/31/21 (!) 151/98   08/27/21 (!) 151/94    (goal /80)      All Future Testing planned in [facility]:  Lab Frequency Next Occurrence       Next Visit Date:  No future appointments.          Patient Active Problem List:     Chest pain     Migraine variant with headache     Drug overdose, accidental or unintentional, initial encounter     Hypothyroidism     Essential hypertension     Seizure disorder ([address]     Drug overdose     History of seizure     Major depressive disorder with psychotic features ([address]     Severe major depression ([address]     Overdose of barbiturate, intentional self-harm, initial encounter ([address] Intentional phenobarbital overdose (HCC)     Severe episode of recurrent major depressive disorder, without psychotic features ([address]     Suicide attempt ([address]     Major depressive disorder, recurrent ([address]     Sepsis ([address]     Severe malnutrition ([address]     Altered mental status     Hypokalemia     Congestive heart failure of unknown etiology (HCC)     Lumbar radiculopathy     Chronic low back pain     Piriformis syndrome     COPD (chronic obstructive pulmonary disease) ([facility])     Major depressive disorder, single episode, unspecified     Severe depressed bipolar I disorder without psychotic features ([address]     Polysubstance abuse ([address]     Syncope and collapse     Altered mental state     Hypertensive emergency     Acute metabolic encephalopathy     Abnormal CT of the head     Intraparenchymal hemorrhage of brain ([address]     Diabetic peripheral neuropathy associated with type 2 diabetes mellitus ([address]

## 2022-12-05 DIAGNOSIS — J44.9 CHRONIC OBSTRUCTIVE PULMONARY DISEASE, UNSPECIFIED COPD TYPE (HCC): ICD-10-CM

## 2022-12-07 RX ORDER — ALBUTEROL SULFATE 90 UG/1
AEROSOL, METERED RESPIRATORY (INHALATION)
Qty: 18 EACH | Refills: 1 | OUTPATIENT
Start: 2022-12-07

## 2023-06-21 NOTE — PROGRESS NOTES
[facility]  Internal Medicine Residency Program  Inpatient Daily Progress Note  ______________________________________________________________________________    Patient: Verner Canner  YOB: 1965   MRN: [id number]    Acct: [de-identified]     Admit date: 6/2/2019  Today's date: 06/03/19  Number of days in the hospital: 1  Expected Discharge Date: 06/05/19    Admitting Diagnosis: Altered mental status x 1 day      Subjective:   Pt seen and Chart reviewed. She is hemodynamically stable, afebrile. AAO x3. Her WBC trended down to 9.8 today. LA was 2.9. We will repeat one tomorrow. Hb dropped to 8.8(9.9 yesterday) likely dilutional.  Iron saturation was 9 and started on iron supplementation. Still complains of pain in the wound VAC area,  site is clean and draining well. Awaited gen surgery recommendations. Awaiting neurology recommendations. Was evaluated by cardiology and continued on same Rx management. No other acute issues currently. Objective:   Vital Sign:  /63   Pulse 76   Temp 98.8 °F (37.1 °C) (Temporal)   Resp 23   Ht 5' 2.01\" (1.575 m)   Wt 202 lb 6.1 oz (91.8 kg)   SpO2 99%   BMI 37.01 kg/m²       Physical Exam:  General appearance:   alert, well appearing, and in no distress  Mental Status: alert, oriented to person, place, and time  Neurologic:  alert, oriented, normal speech, no focal findings or movement disorder noted  Lungs:  clear to auscultation, wheezes-resolving, rales or rhonchi, symmetric air entry  Heart[de-identified] normal rate, regular rhythm, normal S1, S2, no murmurs, rubs, clicks or gallops  Abdomen:  soft, nontender, nondistended, no masses or organomegaly  Extremities: peripheral pulses normal, no pedal edema, no clubbing or cyanosis   Skin:Rt hip wound VAC in place, draining well.   Skin her wound VAC slightly erythematous(at baseline as per patient        Medications:  Scheduled Medications   [START ON 6/4/2019] ferrous sulfate  325 mg Oral Daily with breakfast    vancomycin  1,250 mg Intravenous Q12H    vancomycin (VANCOCIN) intermittent dosing (placeholder)   Other RX Placeholder    sodium chloride  1,000 mL Intravenous Once    sodium chloride flush  10 mL Intravenous 2 times per day    sodium chloride flush  10 mL Intravenous 2 times per day    enoxaparin  40 mg Subcutaneous Daily    ipratropium-albuterol  1 ampule Inhalation Q4H WA    mometasone-formoterol  2 puff Inhalation BID    methylPREDNISolone  40 mg Intravenous Daily    fluticasone  2 spray Each Nostril Daily    divalproex  750 mg Oral 2 times per day    mirtazapine  45 mg Oral Nightly    PHENobarbital  32.4 mg Oral TID    phenytoin  100 mg Oral TID    risperiDONE  2 mg Oral Nightly    levothyroxine  50 mcg Oral Daily    piperacillin-tazobactam  3.375 g Intravenous Q8H       PRN Medications    sodium chloride flush 10 mL PRN   acetaminophen 650 mg Q4H PRN   sodium chloride flush 10 mL PRN   ondansetron 4 mg Q6H PRN   magnesium sulfate 1 g PRN   albuterol sulfate HFA 2 puff Q6H PRN   benzonatate 100 mg TID PRN   LORazepam 1 mg Q6H PRN       Diagnostic Labs and Imaging:  CBC:  Recent Labs     06/02/19  0853 06/03/19  0625   WBC 12.3* 7.9   HGB 9.9* 8.8*   * 486*     BMP:   Recent Labs     06/02/19  0853 06/03/19  0625   * 136   K 3.7 3.6*   CL 95* 99   CO2 27 25   BUN 17 9   CREATININE 0.90 0.60   GLUCOSE 96 89     Hepatic:   Recent Labs     06/02/19  0853   AST 56*   ALT 19   BILITOT 0.18*   ALKPHOS 124*   CT head without contrast  Impression   No acute intracranial abnormality.  Redemonstration of moderate sinus   inflammation similar to that noted on prior exam.                   Chest x-ray  FINDINGS:   Mild bibasilar atelectasis.  No focal consolidation, pleural effusion or   pneumothorax.  The cardiomediastinal silhouette is stable.  No overt   pulmonary edema.  The osseous structures are stable.           Impression   Mild bibasilar atelectasis.              IMPRESSION  This is a 62 y.o. female with PMH of depression with suicide attempt in past, seizure disorder, hypertension, migraine, hypothyroidism, asthma/COPD and recent admission for necrotizing fasciitis s/p debridement with wound VAC in place presented from SNF with altered mentation and found to have leukocytosis and likely sepsis. Patient requires inpatient admission to [facility].       ASSESSMENT/PLAN:    1. Toxic metabolic encephalopathy likely secondary to underlying sepsis and opioid and misuse. resolved. alert and oriented ×3. CT head unremarkable, ammonia levels normal.  We will continue to monitor. neurology following. Awaited further recommendations.     2. Sepsis etiology yet to be determined-leukocytosis resolved. Blood, urine cultures showed no growth to date. Currently afebrile. Continued on IV fluids at 125 mg per hour for now. Received 1 dose of vancomycin and Zosyn in ER. Will deascalate IV vancomycin and zosyn. Will monitor WBCs and fever.     3.  Leukocytosis-WBC 12.3 on presentation. trended down to 9.8. Afebrile otherwise. Continued on vancomycin and  Zosyn dosed by pharmacy. will de-escalate antibiotics as appropriate. Daily CBC.     3. Necrotizing fasciitis of right hip in recent admission s/p surgical wound debridement and wound VAC in place. Wound VAC draining well. No s/s/o infection noticed around [facility]. awaited general surgery       4. COPD/asthma-resolving CXR showed chronic sinusitis changes with no acute pathology. bilateral wheezing-resolving . We'll continue on albuterol, DuoNeb nebulizer and switched to oral prednisone 40 mg for 5 days. RT aerosol treatment. RT eval and treat. Pulse ox. Continue telemetry. Oxygen as per protocol     5. Depression with suicidal ideation past-  stable. currently denies any acute signs and symptoms. No suicidal ideation as per patient.   Resumed on home doses of Remeron, Resperdal.      6.  Seizure disorder-On home doses of Dilantin, Depakote inhibitor. Drug levels were low. Neurology consulted . Awaited further recs.     7.  Hypothyroidism-TSH/T4-wnl. On levo thyroxine 50 mcs as per home dose.     8. Hypertension-In normal range. We'll monitor and resume medications as appropriate.     9.  Elevated troponins-troponins trended up from 7<18<19. Initial EKG done in ER showed nonspecific ST-T wave changes. Patient asymptomatic. Cardiology was following. Recommended to continue same medical management.           Diet- General diet  DVT prophylaxis-On lovenox  PT/OT Evaluation and treatment.    consulted for discharge planning.     Bryan Butler, PGY 1, Internal Medicine Resident  [address] [facility]    no known allergies

## 2023-07-05 NOTE — GROUP NOTE
Group Therapy Note    Date: 9/10/2020    Group Start Time: 9523  Group End Time: 0521  Group Topic: Cognitive Skills    KENNY Arthur, CTRS        Group Therapy Note    Attendees: 5/19       Pt did not participate in Cognitive Skills Group at [phone] when encouraged by RT due to up socializing in day room with peers, pt runs errands getting drinks and asking Nursing staff for things for another peer despite group being in progress. Pt was offered talk time as an alternative to group but declined.          Discipline Responsible: Psychoeducational Specialist        Signature:  Brianna Beltrán no

## 2024-08-25 NOTE — PROGRESS NOTES
Daily Progress Note  Darnell Lee, CNP  9/20/2020     CHIEF COMPLAINT: Suicidal ideation with plan to overdose    Reviewed patient's current plan of care and vital signs with nursing staff. Sleep: Slept until 3 AM, has been up since  Attending groups: Yes, feels they are beneficial      SUBJECTIVE:    Staff reports the patient remains medication compliant. [facility] is observed socializing in the milieu and watching football with other peers. She reports that her overall mood is much better. She rates her mood 7 out of 10, with 10 being the best mood. She reports that she was tearful yesterday during group when discussing the death of her father and her son, otherwise denies any crying spells. She denies any paranoia or auditory/visual hallucinations. She continues to have poor sleep often related to neck and back pain. She endorses fleeting suicidal ideation, however states it is much less frequent and not intense. She has plans to discharge to [facility] for 60-day sober living, she also reports \"I have a backup plan to live with a roommate. \"  We explored the importance of making good choices and not making impulsive decisions based on rules that she is not happy about complying with. We discussed the pros of remaining at the [facility] and how in the long-term it would be the most beneficial discharge plan. She verbalized understanding and agreed to give [facility] to try.     Mental Status Exam  Level of consciousness: Awake and alert  Appearance: Hospital attire with good grooming and hygiene   Behavior/Motor: Smiles on occasion, pleasant  Attitude toward examiner:  Cooperative, attentive, good eye contact  Speech:  spontaneous, normal rate, normal volume and well articulated  Mood: \"I feel a better \"  affect: Congruent with stated mood  Thought processes:  linear, goal directed and coherent  Thought content:  denies homicidal ideation  Suicidal Ideation: Suicidal ideation improving  MDMA, Urine 09/09/2020 NOT REPORTED  NEGATIVE Final    Buprenorphine Urine 09/09/2020 NOT REPORTED  NEGATIVE Final    Test Information 09/09/2020 Assay provides medical screening only. The absence of expected drug(s) and/or metabolite(s) may indicate diluted or adulterated urine, limitations of testing or timing of collection. Final    Comment: Testing for legal purposes should be confirmed by another method. To request confirmation   of test result, please call the lab within 7 days of sample submission.  SARS-CoV-2 09/09/2020        Final    SARS-CoV-2, Rapid 09/09/2020 Not Detected  Not Detected Final    Comment:       Rapid NAAT:  The specimen is NEGATIVE for SARS-CoV-2, the novel coronavirus associated with   COVID-19. The ID NOW COVID-19 assay is designed to detect the virus that causes COVID-19 in patients   with signs and symptoms of infection who are suspected of COVID-19. An individual without symptoms of COVID-19 and who is not shedding SARS-CoV-2 virus would   expect to have a negative (not detected) result in this assay. Negative results should be treated as presumptive and, if inconsistent with clinical signs   and symptoms or necessary for patient management,  should be tested with an alternative molecular assay. Negative results do not preclude   SARS-CoV-2 infection and   should not be used as the sole basis for patient management decisions. Fact sheet for Healthcare Providers: Darline[employer]xiao  Fact sheet for Patients: Darline.xiao          Methodology: Isothermal Nucleic Acid Amplification      Source 09/09/2020 . NASOPHARYNGEAL SWAB   Final    SARS-CoV-2 09/09/2020        Final    Valproic Acid Lvl 09/12/2020 54  50 - 125 ug/mL Final    Valproic Dose amount 09/12/2020 500   Final    Valproic Date last dose 09/12/2020 9,122,020   Final    Valproic Time last dose 09/12/2020 839   Final    Glucose 09/12/2020 81  70 - 99 mg/dL Final    BUN 09/12/2020 14  6 - 20 mg/dL Final    CREATININE 09/12/2020 0.81  0.50 - 0.90 mg/dL Final    Bun/Cre Ratio 09/12/2020 NOT REPORTED  9 - 20 Final    Calcium 09/12/2020 10.1  8.6 - 10.4 mg/dL Final    Sodium 09/12/2020 132* 135 - 144 mmol/L Final    Potassium 09/12/2020 5.1  3.7 - 5.3 mmol/L Final    SPECIMEN SLIGHTLY HEMOLYZED, RESULTS MAY BE ADVERSELY AFFECTED.  Chloride 09/12/2020 98  98 - 107 mmol/L Final    CO2 09/12/2020 25  20 - 31 mmol/L Final    Anion Gap 09/12/2020 9  9 - 17 mmol/L Final    Alkaline Phosphatase 09/12/2020 130* 35 - 104 U/L Final    ALT 09/12/2020 16  5 - 33 U/L Final    AST 09/12/2020 19  <32 U/L Final    SPECIMEN SLIGHTLY HEMOLYZED, RESULTS MAY BE ADVERSELY AFFECTED.  Total Bilirubin 09/12/2020 0.18* 0.3 - 1.2 mg/dL Final    Total Protein 09/12/2020 6.9  6.4 - 8.3 g/dL Final    Alb 09/12/2020 4.1  3.5 - 5.2 g/dL Final    Albumin/Globulin Ratio 09/12/2020 NOT REPORTED  1.0 - 2.5 Final    GFR Non- 09/12/2020 >60  >60 mL/min Final    GFR  09/12/2020 >60  >60 mL/min Final    GFR Comment 09/12/2020        Final    Comment: Average GFR for 52-63 years old:   80 mL/min/1.73sq m  Chronic Kidney Disease:   <60 mL/min/1.73sq m  Kidney failure:   <15 mL/min/1.73sq m              eGFR calculated using average adult body mass.  Additional eGFR calculator available at:        [employer]            GFR Staging 09/12/2020 NOT REPORTED   Final    Valproic Acid Lvl 09/20/2020 72  50 - 125 ug/mL Final    Valproic Dose amount 09/20/2020 1000 MG   Final    Valproic Date last dose 09/20/2020 6,836,501   Final    Valproic Time last dose 09/20/2020 2,101   Final    Valproic Acid, Free 09/20/2020 7.8  7.0 - 23.0 ug/mL Final            Medications  Current Facility-Administered Medications: tiZANidine (ZANAFLEX) tablet 2 mg, 2 mg, Oral, Nightly  methyl salicylate-menthol (PAULETTE BRAY GREASELESS) 10-15 % cream CREA, , Apply externally, TID PRN  nicotine polacrilex (NICORETTE) gum 4 mg, 4 mg, Oral, PRN  clomiPRAMINE (ANAFRANIL) capsule 50 mg, 50 mg, Oral, Nightly  risperiDONE (RISPERDAL) tablet 1 mg, 1 mg, Oral, Daily  risperiDONE (RISPERDAL) tablet 2 mg, 2 mg, Oral, Nightly  gabapentin (NEURONTIN) capsule 600 mg, 600 mg, Oral, TID  traZODone (DESYREL) tablet 150 mg, 150 mg, Oral, Nightly  ondansetron (ZOFRAN-ODT) disintegrating tablet 4 mg, 4 mg, Oral, Q8H PRN  divalproex (DEPAKOTE) DR tablet 500 mg, 500 mg, Oral, Daily  divalproex (DEPAKOTE) DR tablet 1,000 mg, 1,000 mg, Oral, Nightly  aspirin EC tablet 81 mg, 81 mg, Oral, Daily  budesonide-formoterol (SYMBICORT) 160-4.5 MCG/ACT inhaler 2 puff, 2 puff, Inhalation, BID  butalbital-APAP-caffeine -40 MG per capsule 1 capsule, 1 capsule, Oral, Q6H PRN  cloNIDine (CATAPRES) tablet 0.1 mg, 0.1 mg, Oral, Nightly  albuterol (PROVENTIL) nebulizer solution 2.5 mg, 2.5 mg, Nebulization, Q6H PRN  ibuprofen (ADVIL;MOTRIN) tablet 400 mg, 400 mg, Oral, Q6H PRN  acetaminophen (TYLENOL) tablet 650 mg, 650 mg, Oral, Q4H PRN  aluminum & magnesium hydroxide-simethicone (MAALOX) 200-200-20 MG/5ML suspension 20 mL, 20 mL, Oral, Q6H PRN  hydrOXYzine (ATARAX) tablet 50 mg, 50 mg, Oral, TID PRN  polyethylene glycol (GLYCOLAX) packet 17 g, 17 g, Oral, Daily PRN    ASSESSMENT  Severe depressed bipolar I disorder without psychotic features (HCC)     PLAN  Discussed with Dr. Aydee Osborne  Patients symptoms are improving  Continue with current medications  Encourage participation in groups  Attempt to develop insight  Supportive Therapy conducted. Probable discharge is Monday to sober living  Follow-up daily while on inpatient unit    **This report has been created using voice recognition software. It may contain minor errors which are inherent in voice recognition technology. **      I have examined the patient and confirmed the NP's findings. I agree with the plan above.   Additional information noted below-complains of a headache and feeling tired.   Her mood is a little better     Mace Octave right abdominal pain with nausea, started today.

## 2024-09-20 NOTE — PLAN OF CARE
self  Note: Pt is currently not attempting to inflict self harm. Intervention: Assist with patient's verbalization of feelings regarding stressors precipitating current crisis  Note: Patient does not want to discuss her financial or housing issues with writer. Patient quickly finds a way to exit the 1:1 talk leeroy when Lillian Isbell begins to ask questions about patient's safe discharge plan. Problem: Tobacco Use:  Intervention: Tobacco-use cessation counseling  Note: Patient is currently not requesting any medications as prescribed for smoking cessation. Her/She

## (undated) DEVICE — GOWN,AURORA,NONREINFORCED,LARGE: Brand: MEDLINE

## (undated) DEVICE — GLOVE SURG SZ 75 L12IN FNGR THK79MIL GRN LTX FREE

## (undated) DEVICE — 3M™ COBAN™ NL STERILE NON-LATEX SELF-ADHERENT WRAP, 2086S, 6 IN X 5 YD (15 CM X 4,5 M), 12 ROLLS/CASE: Brand: 3M™ COBAN™

## (undated) DEVICE — PATIENT RETURN ELECTRODE, SINGLE-USE, CONTACT QUALITY MONITORING, ADULT, WITH 9FT CORD, FOR PATIENTS WEIGING OVER 33LBS. (15KG): Brand: MEGADYNE

## (undated) DEVICE — MARKER,SKIN,WI/RULER AND LABELS: Brand: MEDLINE

## (undated) DEVICE — DRESSING NEG PRESSURE WND VAC

## (undated) DEVICE — GOWN,SIRUS,POLYRNF,BRTHSLV,XL,30/CS: Brand: MEDLINE

## (undated) DEVICE — TUBING, SUCTION, 9/32" X 20', STRAIGHT: Brand: MEDLINE INDUSTRIES, INC.

## (undated) DEVICE — TOWEL,OR,DSP,ST,NATURAL,DLX,4/PK,20PK/CS: Brand: MEDLINE

## (undated) DEVICE — DRESSING WND VAC XLG GRANUFOAM SENSATRAC

## (undated) DEVICE — GAUZE,SPONGE,FLUFF,6"X6.75",STRL,5/TRAY: Brand: MEDLINE

## (undated) DEVICE — INTENDED FOR TISSUE SEPARATION, AND OTHER PROCEDURES THAT REQUIRE A SHARP SURGICAL BLADE TO PUNCTURE OR CUT.: Brand: BARD-PARKER ® CARBON RIB-BACK BLADES

## (undated) DEVICE — GOWN,AURORA,NONRNF,XL,30/CS: Brand: MEDLINE

## (undated) DEVICE — SHEET, T, LAPAROTOMY, STERILE: Brand: MEDLINE

## (undated) DEVICE — DRAPE,REIN 53X77,STERILE: Brand: MEDLINE

## (undated) DEVICE — CASSETTE THER 38 MM W/ INSTILLATION TBNG VAC VERALINK

## (undated) DEVICE — PREP SOL PVP IODINE 4%  4 OZ/BTL

## (undated) DEVICE — GLOVE ORANGE PI 7   MSG9070

## (undated) DEVICE — SYRINGE IRRIG 60ML SFT PLIABLE BLB EZ TO GRP 1 HND USE W/

## (undated) DEVICE — KIT NEG PRSS M DSG FOAM VAC VERAFLO

## (undated) DEVICE — KIT VAC SENSA TRAC PAD DRP

## (undated) DEVICE — GLOVE SURG SZ 65 THK91MIL LTX FREE SYN POLYISOPRENE

## (undated) DEVICE — PACK SURG ABD SVMMC

## (undated) DEVICE — SKIN MARKER,FINE TIP: Brand: DEVON

## (undated) DEVICE — CANISTER NEG PRSS 1000ML W/ GEL INFOVAC

## (undated) DEVICE — Device

## (undated) DEVICE — SOLUTION SURG PREP POV IOD 7.5% 4 OZ

## (undated) DEVICE — STOCKINETTE,IMPERVIOUS,12X48,STERILE: Brand: MEDLINE